# Patient Record
Sex: FEMALE | Race: WHITE | Employment: OTHER
[De-identification: names, ages, dates, MRNs, and addresses within clinical notes are randomized per-mention and may not be internally consistent; named-entity substitution may affect disease eponyms.]

---

## 2017-01-04 ENCOUNTER — TELEPHONE (OUTPATIENT)
Dept: FAMILY MEDICINE CLINIC | Facility: CLINIC | Age: 62
End: 2017-01-04

## 2017-01-04 RX ORDER — NITROFURANTOIN 25; 75 MG/1; MG/1
100 CAPSULE ORAL 2 TIMES DAILY
Qty: 14 CAPSULE | Refills: 0 | Status: SHIPPED | OUTPATIENT
Start: 2017-01-04 | End: 2017-01-11

## 2017-03-06 RX ORDER — SIMVASTATIN 40 MG
TABLET ORAL
Qty: 30 TABLET | Refills: 5 | Status: SHIPPED | OUTPATIENT
Start: 2017-03-06 | End: 2017-09-15 | Stop reason: SDUPTHER

## 2017-03-30 DIAGNOSIS — E55.9 VITAMIN D DEFICIENCY: ICD-10-CM

## 2017-04-03 RX ORDER — ERGOCALCIFEROL 1.25 MG/1
CAPSULE ORAL
Qty: 4 CAPSULE | Refills: 5 | Status: SHIPPED | OUTPATIENT
Start: 2017-04-03 | End: 2017-09-15 | Stop reason: SDUPTHER

## 2017-05-22 DIAGNOSIS — E55.9 VITAMIN D DEFICIENCY: ICD-10-CM

## 2017-05-23 RX ORDER — AVOBENZONE, HOMOSALATE, OCTISALATE, OCTOCRYLENE 30; 100; 50; 25 MG/ML; MG/ML; MG/ML; MG/ML
SPRAY TOPICAL
Qty: 30 TABLET | Refills: 5 | Status: SHIPPED | OUTPATIENT
Start: 2017-05-23 | End: 2017-12-06 | Stop reason: SDUPTHER

## 2017-05-23 RX ORDER — EPINEPHRINE 0.3 MG/.3ML
INJECTION SUBCUTANEOUS
Qty: 2 EACH | Refills: 2 | Status: SHIPPED | OUTPATIENT
Start: 2017-05-23 | End: 2019-04-16 | Stop reason: SDUPTHER

## 2017-09-15 DIAGNOSIS — E55.9 VITAMIN D DEFICIENCY: ICD-10-CM

## 2017-09-15 RX ORDER — ERGOCALCIFEROL 1.25 MG/1
CAPSULE ORAL
Qty: 4 CAPSULE | Refills: 5 | Status: SHIPPED | OUTPATIENT
Start: 2017-09-15 | End: 2018-03-05 | Stop reason: SDUPTHER

## 2017-11-16 ENCOUNTER — APPOINTMENT (OUTPATIENT)
Dept: GENERAL RADIOLOGY | Age: 62
DRG: 140 | End: 2017-11-16
Payer: COMMERCIAL

## 2017-11-16 ENCOUNTER — HOSPITAL ENCOUNTER (INPATIENT)
Age: 62
LOS: 5 days | Discharge: HOME | DRG: 140 | End: 2017-11-21
Attending: EMERGENCY MEDICINE | Admitting: INTERNAL MEDICINE
Payer: COMMERCIAL

## 2017-11-16 DIAGNOSIS — R05.9 COUGH: ICD-10-CM

## 2017-11-16 DIAGNOSIS — J44.1 COPD EXACERBATION (HCC): Primary | ICD-10-CM

## 2017-11-16 LAB
ABSOLUTE EOS #: 0 K/UL (ref 0–0.4)
ABSOLUTE IMMATURE GRANULOCYTE: ABNORMAL K/UL (ref 0–0.3)
ABSOLUTE LYMPH #: 0.35 K/UL (ref 1–4.8)
ABSOLUTE MONO #: 0.53 K/UL (ref 0.1–1.3)
ANION GAP SERPL CALCULATED.3IONS-SCNC: 10 MMOL/L (ref 9–17)
BASOPHILS # BLD: 0 %
BASOPHILS ABSOLUTE: 0 K/UL (ref 0–0.2)
BUN BLDV-MCNC: 11 MG/DL (ref 8–23)
BUN/CREAT BLD: ABNORMAL (ref 9–20)
CALCIUM SERPL-MCNC: 8 MG/DL (ref 8.6–10.4)
CHLORIDE BLD-SCNC: 107 MMOL/L (ref 98–107)
CO2: 25 MMOL/L (ref 20–31)
CREAT SERPL-MCNC: 0.57 MG/DL (ref 0.5–0.9)
DIFFERENTIAL TYPE: ABNORMAL
EKG ATRIAL RATE: 74 BPM
EKG P AXIS: 65 DEGREES
EKG P-R INTERVAL: 150 MS
EKG Q-T INTERVAL: 374 MS
EKG QRS DURATION: 84 MS
EKG QTC CALCULATION (BAZETT): 415 MS
EKG R AXIS: -106 DEGREES
EKG T AXIS: 46 DEGREES
EKG VENTRICULAR RATE: 74 BPM
EOSINOPHILS RELATIVE PERCENT: 0 %
GFR AFRICAN AMERICAN: >60 ML/MIN
GFR NON-AFRICAN AMERICAN: >60 ML/MIN
GFR SERPL CREATININE-BSD FRML MDRD: ABNORMAL ML/MIN/{1.73_M2}
GFR SERPL CREATININE-BSD FRML MDRD: ABNORMAL ML/MIN/{1.73_M2}
GLUCOSE BLD-MCNC: 88 MG/DL (ref 70–99)
HCT VFR BLD CALC: 38.6 % (ref 36–46)
HEMOGLOBIN: 12.7 G/DL (ref 12–16)
IMMATURE GRANULOCYTES: ABNORMAL %
LYMPHOCYTES # BLD: 8 %
MCH RBC QN AUTO: 32.7 PG (ref 26–34)
MCHC RBC AUTO-ENTMCNC: 33 G/DL (ref 31–37)
MCV RBC AUTO: 99 FL (ref 80–100)
MONOCYTES # BLD: 12 %
MORPHOLOGY: ABNORMAL
PDW BLD-RTO: 14.7 % (ref 11.5–14.9)
PLATELET # BLD: 242 K/UL (ref 150–450)
PLATELET ESTIMATE: ABNORMAL
PMV BLD AUTO: 8.1 FL (ref 6–12)
POTASSIUM SERPL-SCNC: 4.1 MMOL/L (ref 3.7–5.3)
RBC # BLD: 3.89 M/UL (ref 4–5.2)
RBC # BLD: ABNORMAL 10*6/UL
SEG NEUTROPHILS: 80 %
SEGMENTED NEUTROPHILS ABSOLUTE COUNT: 3.52 K/UL (ref 1.3–9.1)
SODIUM BLD-SCNC: 142 MMOL/L (ref 135–144)
TROPONIN INTERP: NORMAL
TROPONIN INTERP: NORMAL
TROPONIN T: <0.03 NG/ML
TROPONIN T: <0.03 NG/ML
WBC # BLD: 4.4 K/UL (ref 3.5–11)
WBC # BLD: ABNORMAL 10*3/UL

## 2017-11-16 PROCEDURE — 2060000000 HC ICU INTERMEDIATE R&B

## 2017-11-16 PROCEDURE — 94760 N-INVAS EAR/PLS OXIMETRY 1: CPT

## 2017-11-16 PROCEDURE — 99223 1ST HOSP IP/OBS HIGH 75: CPT | Performed by: INTERNAL MEDICINE

## 2017-11-16 PROCEDURE — 84484 ASSAY OF TROPONIN QUANT: CPT

## 2017-11-16 PROCEDURE — 36415 COLL VENOUS BLD VENIPUNCTURE: CPT

## 2017-11-16 PROCEDURE — 99285 EMERGENCY DEPT VISIT HI MDM: CPT

## 2017-11-16 PROCEDURE — 6360000002 HC RX W HCPCS: Performed by: EMERGENCY MEDICINE

## 2017-11-16 PROCEDURE — 6370000000 HC RX 637 (ALT 250 FOR IP): Performed by: NURSE PRACTITIONER

## 2017-11-16 PROCEDURE — 94664 DEMO&/EVAL PT USE INHALER: CPT

## 2017-11-16 PROCEDURE — 85025 COMPLETE CBC W/AUTO DIFF WBC: CPT

## 2017-11-16 PROCEDURE — 80048 BASIC METABOLIC PNL TOTAL CA: CPT

## 2017-11-16 PROCEDURE — 6370000000 HC RX 637 (ALT 250 FOR IP): Performed by: EMERGENCY MEDICINE

## 2017-11-16 PROCEDURE — 96374 THER/PROPH/DIAG INJ IV PUSH: CPT

## 2017-11-16 PROCEDURE — 94640 AIRWAY INHALATION TREATMENT: CPT

## 2017-11-16 PROCEDURE — 2580000003 HC RX 258: Performed by: EMERGENCY MEDICINE

## 2017-11-16 PROCEDURE — 96375 TX/PRO/DX INJ NEW DRUG ADDON: CPT

## 2017-11-16 PROCEDURE — 93005 ELECTROCARDIOGRAM TRACING: CPT

## 2017-11-16 PROCEDURE — 6370000000 HC RX 637 (ALT 250 FOR IP): Performed by: INTERNAL MEDICINE

## 2017-11-16 PROCEDURE — 71020 XR CHEST STANDARD TWO VW: CPT

## 2017-11-16 RX ORDER — METHYLPREDNISOLONE SODIUM SUCCINATE 125 MG/2ML
60 INJECTION, POWDER, LYOPHILIZED, FOR SOLUTION INTRAMUSCULAR; INTRAVENOUS EVERY 6 HOURS
Status: DISCONTINUED | OUTPATIENT
Start: 2017-11-16 | End: 2017-11-17

## 2017-11-16 RX ORDER — METHYLPREDNISOLONE SODIUM SUCCINATE 125 MG/2ML
125 INJECTION, POWDER, LYOPHILIZED, FOR SOLUTION INTRAMUSCULAR; INTRAVENOUS ONCE
Status: COMPLETED | OUTPATIENT
Start: 2017-11-16 | End: 2017-11-16

## 2017-11-16 RX ORDER — BISACODYL 10 MG
10 SUPPOSITORY, RECTAL RECTAL DAILY PRN
Status: DISCONTINUED | OUTPATIENT
Start: 2017-11-16 | End: 2017-11-21 | Stop reason: HOSPADM

## 2017-11-16 RX ORDER — NICOTINE 21 MG/24HR
1 PATCH, TRANSDERMAL 24 HOURS TRANSDERMAL DAILY PRN
Status: DISCONTINUED | OUTPATIENT
Start: 2017-11-16 | End: 2017-11-21 | Stop reason: HOSPADM

## 2017-11-16 RX ORDER — SODIUM CHLORIDE 0.9 % (FLUSH) 0.9 %
10 SYRINGE (ML) INJECTION PRN
Status: DISCONTINUED | OUTPATIENT
Start: 2017-11-16 | End: 2017-11-21 | Stop reason: HOSPADM

## 2017-11-16 RX ORDER — ACETAMINOPHEN 325 MG/1
650 TABLET ORAL ONCE
Status: COMPLETED | OUTPATIENT
Start: 2017-11-16 | End: 2017-11-16

## 2017-11-16 RX ORDER — BENZONATATE 100 MG/1
100 CAPSULE ORAL ONCE
Status: COMPLETED | OUTPATIENT
Start: 2017-11-16 | End: 2017-11-16

## 2017-11-16 RX ORDER — 0.9 % SODIUM CHLORIDE 0.9 %
1000 INTRAVENOUS SOLUTION INTRAVENOUS ONCE
Status: COMPLETED | OUTPATIENT
Start: 2017-11-16 | End: 2017-11-16

## 2017-11-16 RX ORDER — MORPHINE SULFATE 10 MG/ML
4 INJECTION, SOLUTION INTRAMUSCULAR; INTRAVENOUS
Status: DISCONTINUED | OUTPATIENT
Start: 2017-11-16 | End: 2017-11-17

## 2017-11-16 RX ORDER — BENZONATATE 100 MG/1
200 CAPSULE ORAL 3 TIMES DAILY PRN
Status: DISCONTINUED | OUTPATIENT
Start: 2017-11-16 | End: 2017-11-17

## 2017-11-16 RX ORDER — DIPHENHYDRAMINE HYDROCHLORIDE 50 MG/ML
25 INJECTION INTRAMUSCULAR; INTRAVENOUS ONCE
Status: COMPLETED | OUTPATIENT
Start: 2017-11-16 | End: 2017-11-16

## 2017-11-16 RX ORDER — LEVOFLOXACIN 5 MG/ML
750 INJECTION, SOLUTION INTRAVENOUS EVERY 24 HOURS
Status: DISCONTINUED | OUTPATIENT
Start: 2017-11-17 | End: 2017-11-17

## 2017-11-16 RX ORDER — CITALOPRAM 20 MG/1
20 TABLET ORAL DAILY
Status: DISCONTINUED | OUTPATIENT
Start: 2017-11-17 | End: 2017-11-21 | Stop reason: HOSPADM

## 2017-11-16 RX ORDER — ACETAMINOPHEN 325 MG/1
650 TABLET ORAL EVERY 4 HOURS PRN
Status: DISCONTINUED | OUTPATIENT
Start: 2017-11-16 | End: 2017-11-16 | Stop reason: CLARIF

## 2017-11-16 RX ORDER — FAMOTIDINE 20 MG/1
20 TABLET, FILM COATED ORAL 2 TIMES DAILY
Status: DISCONTINUED | OUTPATIENT
Start: 2017-11-16 | End: 2017-11-21 | Stop reason: HOSPADM

## 2017-11-16 RX ORDER — MORPHINE SULFATE 10 MG/ML
2 INJECTION, SOLUTION INTRAMUSCULAR; INTRAVENOUS
Status: DISCONTINUED | OUTPATIENT
Start: 2017-11-16 | End: 2017-11-21 | Stop reason: HOSPADM

## 2017-11-16 RX ORDER — AZITHROMYCIN 250 MG/1
250 TABLET, FILM COATED ORAL DAILY
Status: DISCONTINUED | OUTPATIENT
Start: 2017-11-18 | End: 2017-11-17

## 2017-11-16 RX ORDER — ONDANSETRON 2 MG/ML
4 INJECTION INTRAMUSCULAR; INTRAVENOUS EVERY 6 HOURS PRN
Status: DISCONTINUED | OUTPATIENT
Start: 2017-11-16 | End: 2017-11-21 | Stop reason: HOSPADM

## 2017-11-16 RX ORDER — HYDROCODONE BITARTRATE AND ACETAMINOPHEN 5; 325 MG/1; MG/1
1 TABLET ORAL ONCE
Status: DISCONTINUED | OUTPATIENT
Start: 2017-11-16 | End: 2017-11-16

## 2017-11-16 RX ORDER — BUTALBITAL, ACETAMINOPHEN AND CAFFEINE 50; 325; 40 MG/1; MG/1; MG/1
1 TABLET ORAL EVERY 4 HOURS PRN
Status: DISCONTINUED | OUTPATIENT
Start: 2017-11-16 | End: 2017-11-17

## 2017-11-16 RX ORDER — SODIUM CHLORIDE 0.9 % (FLUSH) 0.9 %
10 SYRINGE (ML) INJECTION EVERY 12 HOURS SCHEDULED
Status: DISCONTINUED | OUTPATIENT
Start: 2017-11-16 | End: 2017-11-21 | Stop reason: HOSPADM

## 2017-11-16 RX ORDER — LEVOFLOXACIN 750 MG/1
750 TABLET ORAL ONCE
Status: COMPLETED | OUTPATIENT
Start: 2017-11-16 | End: 2017-11-16

## 2017-11-16 RX ORDER — LEVOFLOXACIN 5 MG/ML
750 INJECTION, SOLUTION INTRAVENOUS EVERY 24 HOURS
Status: DISCONTINUED | OUTPATIENT
Start: 2017-11-17 | End: 2017-11-16

## 2017-11-16 RX ORDER — SODIUM CHLORIDE 9 MG/ML
INJECTION, SOLUTION INTRAVENOUS CONTINUOUS
Status: DISCONTINUED | OUTPATIENT
Start: 2017-11-16 | End: 2017-11-18

## 2017-11-16 RX ORDER — ACETAMINOPHEN 325 MG/1
650 TABLET ORAL EVERY 6 HOURS PRN
Status: DISCONTINUED | OUTPATIENT
Start: 2017-11-16 | End: 2017-11-21 | Stop reason: HOSPADM

## 2017-11-16 RX ORDER — SIMVASTATIN 40 MG
40 TABLET ORAL NIGHTLY
Status: DISCONTINUED | OUTPATIENT
Start: 2017-11-16 | End: 2017-11-21 | Stop reason: HOSPADM

## 2017-11-16 RX ORDER — AZITHROMYCIN 250 MG/1
500 TABLET, FILM COATED ORAL DAILY
Status: COMPLETED | OUTPATIENT
Start: 2017-11-17 | End: 2017-11-17

## 2017-11-16 RX ORDER — IPRATROPIUM BROMIDE AND ALBUTEROL SULFATE 2.5; .5 MG/3ML; MG/3ML
1 SOLUTION RESPIRATORY (INHALATION)
Status: DISCONTINUED | OUTPATIENT
Start: 2017-11-16 | End: 2017-11-17

## 2017-11-16 RX ORDER — IPRATROPIUM BROMIDE AND ALBUTEROL SULFATE 2.5; .5 MG/3ML; MG/3ML
1 SOLUTION RESPIRATORY (INHALATION)
Status: DISCONTINUED | OUTPATIENT
Start: 2017-11-16 | End: 2017-11-16

## 2017-11-16 RX ORDER — TOPIRAMATE 25 MG/1
50 TABLET ORAL EVERY MORNING
Status: DISCONTINUED | OUTPATIENT
Start: 2017-11-17 | End: 2017-11-21 | Stop reason: HOSPADM

## 2017-11-16 RX ADMIN — LEVOFLOXACIN 750 MG: 750 TABLET, FILM COATED ORAL at 17:07

## 2017-11-16 RX ADMIN — FAMOTIDINE 20 MG: 20 TABLET, FILM COATED ORAL at 23:32

## 2017-11-16 RX ADMIN — DIPHENHYDRAMINE HYDROCHLORIDE 25 MG: 50 INJECTION, SOLUTION INTRAMUSCULAR; INTRAVENOUS at 16:33

## 2017-11-16 RX ADMIN — SIMVASTATIN 40 MG: 40 TABLET, FILM COATED ORAL at 23:32

## 2017-11-16 RX ADMIN — IPRATROPIUM BROMIDE AND ALBUTEROL SULFATE 1 AMPULE: .5; 3 SOLUTION RESPIRATORY (INHALATION) at 21:03

## 2017-11-16 RX ADMIN — ACETAMINOPHEN 650 MG: 325 TABLET, FILM COATED ORAL at 18:25

## 2017-11-16 RX ADMIN — SODIUM CHLORIDE: 9 INJECTION, SOLUTION INTRAVENOUS at 23:05

## 2017-11-16 RX ADMIN — IPRATROPIUM BROMIDE AND ALBUTEROL SULFATE 1 AMPULE: .5; 3 SOLUTION RESPIRATORY (INHALATION) at 16:57

## 2017-11-16 RX ADMIN — METHYLPREDNISOLONE SODIUM SUCCINATE 125 MG: 125 INJECTION, POWDER, FOR SOLUTION INTRAMUSCULAR; INTRAVENOUS at 16:41

## 2017-11-16 RX ADMIN — BENZONATATE 100 MG: 100 CAPSULE ORAL at 18:19

## 2017-11-16 RX ADMIN — LURASIDONE HYDROCHLORIDE 80 MG: 80 TABLET, FILM COATED ORAL at 23:33

## 2017-11-16 RX ADMIN — PROCHLORPERAZINE EDISYLATE 10 MG: 5 INJECTION INTRAMUSCULAR; INTRAVENOUS at 16:34

## 2017-11-16 RX ADMIN — SODIUM CHLORIDE 1000 ML: 9 INJECTION, SOLUTION INTRAVENOUS at 16:30

## 2017-11-16 ASSESSMENT — ENCOUNTER SYMPTOMS
VOMITING: 0
ABDOMINAL PAIN: 0
RHINORRHEA: 1
COUGH: 1
DIARRHEA: 0
EYE PAIN: 0
CHEST TIGHTNESS: 1
CONSTIPATION: 0
NAUSEA: 0
SHORTNESS OF BREATH: 1

## 2017-11-16 ASSESSMENT — PAIN DESCRIPTION - DESCRIPTORS: DESCRIPTORS: ACHING

## 2017-11-16 ASSESSMENT — PAIN DESCRIPTION - LOCATION: LOCATION: CHEST

## 2017-11-16 ASSESSMENT — PAIN SCALES - GENERAL
PAINLEVEL_OUTOF10: 6
PAINLEVEL_OUTOF10: 0

## 2017-11-16 ASSESSMENT — PAIN DESCRIPTION - FREQUENCY: FREQUENCY: CONTINUOUS

## 2017-11-16 NOTE — ED NOTES
Pt to ED with c/o cough, HA, fatigue, fever, chills and SOB for two days. Pt has a hx of COPD. Family reports that she tried Mucinex with no relief. Pt is currently 94% O2 on monitor with 2L of oxygen. Pt denies oxygen use at home.       Chilango Garza RN  11/16/17 7556

## 2017-11-16 NOTE — ED PROVIDER NOTES
[facility]  eMERGENCY dEPARTMENT eNCOUnter   Attending Attestation     Pt Name: Lynn Mcgill  MRN: [id number]  [place] 1955  Date of evaluation: 11/16/17       Lynn Mcgill is a 63 y.o. female who presents with Cough      MDM:     History of COPD. Patient coming in with cough congestion. We'll obtain lab work, chest x-ray and disposition per patient response. Vitals:   Vitals:    11/20/17 0358 11/20/17 0531 11/20/17 0747 11/20/17 0747   BP:    (!) 146/63   Pulse:    69   Resp:    18   Temp:    97.5 °F (36.4 °C)   TempSrc:       SpO2: 97%  (!) 89% 90%   Weight:  158 lb 11.7 oz (72 kg)     Height:             I personally evaluated and examined the patient in conjunction with the resident and agree with the assessment, treatment plan, and disposition of the patient as recorded by the resident. I performed a history and physical examination of the patient and discussed management with the resident. I reviewed the residents note and agree with the documented findings and plan of care. Any areas of disagreement are noted on the chart. I was personally present for the key portions of any procedures. I have documented in the chart those procedures where I was not present during the key portions. I have personally reviewed all images and agree with the resident's interpretation. I have reviewed the emergency nurses triage note. I agree with the chief complaint, past medical history, past surgical history, allergies, medications, social and family history as documented unless otherwise noted.     Maynro Benites MD  Attending Emergency Physician            Maynor Benites MD  11/16/17 7957       Maynor Benites MD  11/20/17 7085

## 2017-11-16 NOTE — ED PROVIDER NOTES
[address]  Emergency Department Encounter  Emergency Medicine Resident     Pt Name: Mercedes Rose  MRN: [id number]  [place] 1955  Date of evaluation: 17  PCP:  [address], [place]    [address] [place] [address]       Chief Complaint   Patient presents with    Cough       HISTORY OF PRESENT ILLNESS  (Location/Symptom, Timing/Onset, Context/Setting, Quality, Duration, Modifying Factors, Severity.)      Mercedes Rose is a 63 y.o. female who presents with cough, shortness of breath, and headache. Patient states that symptoms started 2 days ago with productive cough of yellow sputum which is different for her. Patient also states she did have some shortness of breath and chest tightness yesterday. Patient does have history of COPD and asthma. Patient denies any fevers. Patient does admit to headache that she states started after her cough. Patient states she sees a neurologist and headache feels like her typical headache which she states is like migraine. Patient denies any chest pain or current shortness of breath. Patient has been taking her inhaler as prescribed for COPD/asthma. Patient denies any recent steroids. Patient denies any history of blood clots, recent travel, recent hospitalization, recent surgeries, use of estrogen, or history of cancer. Patient is a smoker and states she smokes approximately one pack per week. PAST MEDICAL / SURGICAL / SOCIAL / FAMILY HISTORY      has a past medical history of Abnormal EKG; Anxiety; Asthma; Bipolar disorder ([address] COPD (chronic obstructive pulmonary disease) ([address] Cramps, extremity; Depression; History of elective ; Hyperlipidemia; Prolonged emergence from general anesthesia; Substance abuse; Unspecified sleep apnea; and Vision abnormalities. has a past surgical history that includes Tonsillectomy and adenoidectomy (Bilateral); LASIK; Induced ;  Ankle surgery; eye surgery (Bilateral); Vulva surgery; and hysteroscopy (10/19/2016). Social History     Social History    Marital status:      Spouse name: N/A    Number of children: N/A    Years of education: N/A     Occupational History    Not on file. Social History Main Topics    Smoking status: Current Some Day Smoker     Packs/day: 2.00     Years: 42.00     Types: Cigarettes     Last attempt to quit: 5/12/2016    Smokeless tobacco: Never Used    Alcohol use No    Drug use: No    Sexual activity: Not Currently     Partners: Male     Birth control/ protection: Post-menopausal     Other Topics Concern    Not on file     Social History Narrative    No narrative on file       Family History   Problem Relation Age of Onset    Dementia Maternal Aunt     Cancer Mother      unsure of type    Kidney Disease Mother     Heart Attack Sister     Prostate Cancer Father     High Cholesterol Brother     Heart Attack Paternal Grandmother        Allergies:  Advil [ibuprofen]; Aleve [naproxen]; Other; Strawberry extract; and Aspirin    Home Medications:  Prior to Admission medications    Medication Sig Start Date End Date Taking?  Authorizing Provider   vitamin D (ERGOCALCIFEROL) 96798 units CAPS capsule take 1 capsule by mouth every week 9/15/17  Yes Mini Coulter MD   simvastatin (ZOCOR) 40 MG tablet take 1 tablet by mouth at bedtime 9/15/17  Yes Jaleel Chan MD   RA VITAMIN D-3 1000 UNITS TABS tablet take 1 tablet by mouth once daily 5/23/17  Yes Tiffanie Chopra MD   Acetaminophen (TYLENOL) 325 MG CAPS Take 1 tablet by mouth every 4 hours 21/06/34  Yes Chanelle Alert, DO   butalbital-acetaminophen-caffeine (FIORICET, ESGIC) -40 MG per tablet Take 1 tablet by mouth every 4 hours as needed for Headaches   Yes Historical Provider, MD   citalopram (CELEXA) 20 MG tablet Take 20 mg by mouth daily   Yes Historical Provider, MD   LATUDA 80 MG TABS tablet Take 80 mg by mouth nightly 9/29/16  Yes Historical Provider, MD   albuterol sulfate HFA 108 (90 BASE) MCG/ACT inhaler Inhale 2 puffs into the lungs every 6 hours as needed for Wheezing   Yes Historical Provider, MD   albuterol (PROVENTIL) (2.5 MG/3ML) 0.083% nebulizer solution Take 2.5 mg by nebulization every 6 hours as needed for Wheezing   Yes Historical Provider, MD   topiramate (TOPAMAX) 50 MG tablet Take 50 mg by mouth every morning  2/1/16  Yes Historical Provider, MD   EPINEPHrine (EPIPEN) 0.3 MG/0.3ML SOAJ injection use as directed by prescriber FOR ALLERGIC REACTION 5/23/17   Nena Hurley MD   fluticasone [facility]) 50 MCG/ACT nasal spray 1 spray by Nasal route daily 11/10/16   Nena Hurley MD   TUDORZA PRESSAIR 400 MCG/ACT AEPB inhaler inhale 1 puff by mouth twice a day 10/18/16   Debbie Kohler CNP   Biotin 5000 MCG CAPS Take by mouth    Historical Provider, MD   Multiple Vitamins-Minerals (THERAPEUTIC MULTIVITAMIN-MINERALS) tablet Take 1 tablet by mouth daily [address]    Historical Provider, MD   Omega 3-6-9 Fatty Acids (OMEGA DHA PO) Take by mouth    Historical Provider, MD   budesonide-formoterol (SYMBICORT) 160-4.5 MCG/ACT AERO Inhale 2 puffs into the lungs 2 times daily    Historical Provider, MD   ketotifen (ZADITOR) 0.025 % ophthalmic solution Place 1 drop into both eyes 2 times daily    Historical Provider, MD   montelukast (SINGULAIR) 10 MG tablet Take 10 mg by mouth nightly    Historical Provider, MD   benztropine (COGENTIN) 2 MG tablet Take 2 mg by mouth 3 times daily  1/15/16   Historical Provider, MD       REVIEW OF SYSTEMS    (2-9 systems for level 4, 10 or more for level 5)      Review of Systems   Constitutional: Positive for appetite change. Negative for chills and fever. HENT: Positive for rhinorrhea. Negative for congestion. Eyes: Negative for pain and visual disturbance. Respiratory: Positive for cough, chest tightness and shortness of breath. Cardiovascular: Negative for chest pain and palpitations.    Gastrointestinal: Negative for abdominal pain, constipation, diarrhea, nausea and vomiting. Genitourinary: Negative for difficulty urinating and dysuria. Musculoskeletal: Negative for gait problem and neck pain. Skin: Negative for rash and wound. Neurological: Negative for dizziness, weakness and numbness. PHYSICAL EXAM   (up to 7 for level 4, 8 or more for level 5)      INITIAL VITALS:   BP (!) 90/55   Pulse 66   Temp 97.8 °F (36.6 °C) (Oral)   Resp 18   Ht 5' 4\" (1.626 m)   Wt 143 lb (64.9 kg)   LMP 05/20/2013 (Exact Date)   SpO2 92%   BMI 24.55 kg/m²     Physical Exam   Constitutional: She is oriented to person, place, and time. She appears well-developed and well-nourished. No distress. HENT:   Head: Normocephalic and atraumatic. Eyes: Conjunctivae and EOM are normal. Pupils are equal, round, and reactive to light. Neck: Neck supple. Cardiovascular: Normal rate, regular rhythm, normal heart sounds and intact distal pulses. Exam reveals no gallop and no friction rub. No murmur heard. Pulmonary/Chest: Effort normal. No accessory muscle usage. No respiratory distress. She has no wheezes. She has no rhonchi. She has no rales. Decreased breath sounds bilaterally   Abdominal: Soft. She exhibits no distension. There is no tenderness. There is no rebound and no guarding. Musculoskeletal: She exhibits no edema, tenderness or deformity. Lymphadenopathy:     She has no cervical adenopathy. Neurological: She is alert and oriented to person, place, and time. Skin: Skin is warm and dry. No rash noted. She is not diaphoretic.        DIFFERENTIAL  DIAGNOSIS     PLAN (LABS / IMAGING / EKG):  Orders Placed This Encounter   Procedures    Sputum gram stain    Respiratory Culture    XR CHEST STANDARD (2 VW)    XR Chest Portable    CBC Auto Differential    Troponin    Basic Metabolic Panel    PREVIOUS SPECIMEN    Troponin    Basic Metabolic Prof    CBC with DIFF    DIET CARDIAC;    Cardiac output monitoring    (ZOCOR) tablet 40 mg    levofloxacin (LEVAQUIN) 750 MG/150ML infusion 750 mg    OR Linked Order Group     morphine injection 2 mg     morphine injection 4 mg    bisacodyl (DULCOLAX) suppository 10 mg    famotidine (PEPCID) tablet 20 mg    nicotine (NICODERM CQ) 21 MG/24HR 1 patch     If indicated/pateint smokes    enoxaparin (LOVENOX) injection 40 mg    methylPREDNISolone sodium (SOLU-MEDROL) injection 60 mg    FOLLOWED BY Linked Order Group     azithromycin (ZITHROMAX) tablet 500 mg     azithromycin (ZITHROMAX) tablet 250 mg    benzonatate (TESSALON) capsule 200 mg       DDX: COPD exacerbation, asthma exacerbation, pneumonia, viral infection, URI    DIAGNOSTIC RESULTS / EMERGENCY DEPARTMENT COURSE / MDM     LABS:  Results for orders placed or performed during the hospital encounter of 11/16/17   CBC Auto Differential   Result Value Ref Range    WBC 4.4 3.5 - 11.0 k/uL    RBC 3.89 (L) 4.0 - 5.2 m/uL    Hemoglobin 12.7 12.0 - 16.0 g/dL    Hematocrit 38.6 36 - 46 %    MCV 99.0 80 - 100 fL    MCH 32.7 26 - 34 pg    MCHC 33.0 31 - 37 g/dL    RDW 14.7 11.5 - 14.9 %    Platelets 140 031 - 763 k/uL    MPV 8.1 6.0 - 12.0 fL    Differential Type NOT REPORTED     Immature Granulocytes NOT REPORTED 0 %    Absolute Immature Granulocyte NOT REPORTED 0.00 - 0.30 k/uL    WBC Morphology NOT REPORTED     RBC Morphology NOT REPORTED     Platelet Estimate NOT REPORTED     Seg Neutrophils 80 %    Lymphocytes 8 %    Monocytes 12 %    Eosinophils % 0 %    Basophils 0 %    Segs Absolute 3.52 1.3 - 9.1 k/uL    Absolute Lymph # 0.35 (L) 1.0 - 4.8 k/uL    Absolute Mono # 0.53 0.1 - 1.3 k/uL    Absolute Eos # 0.00 0.0 - 0.4 k/uL    Basophils # 0.00 0.0 - 0.2 k/uL    Morphology TOXIC GRANULATION PRESENT    Troponin   Result Value Ref Range    Troponin T <0.03 <0.03 ng/mL    Troponin Interp         Basic Metabolic Panel   Result Value Ref Range    Glucose 88 70 - 99 mg/dL    BUN 11 8 - 23 mg/dL    CREATININE 0.57 0.50 - 0.90 mg/dL Impression: no acute changes and non-specific EKG    Yohana Downing      All EKG's are interpreted by the Emergency Department Physician who either signs or Co-signs this chart in the absence of a cardiologist.    EMERGENCY DEPARTMENT COURSE:  4:11 PM: Patient evaluated by myself and attending physician. Patient appears in no acute distress. Patient was with dry cough is in the room. Patient appears in no respiratory distress. Patient with decreased breath sounds bilaterally. Abdomen soft, nontender, nondistended. Heart rate normal with regular rhythm. O2 saturation 89-90% has been somewhat thin but quickly climbed to 93-95% on nasal cannula O2. Patient states he is not on oxygen at home. We'll continue with lab evaluation, EKG, steroids, chest x-ray, and DuoNeb treatment. We'll continue symptomatic treatment of patient's headache with migraine cocktail and fluids. We'll reassess. Based on history and physical examination, I believe the patient is likely having a COPD exacerbation. 5:35 PM: Patient reassessed. Patient lying complaint bed. Patient informed of x-ray and current labs. Awaiting rest of her blood work. Patient at 93% on 2 L O2 nasal cannula. Recommended admission due to patient's likely COPD exacerbation with O2 desaturation. Patient agrees with plan. We will await further lab work and plan for admission. 6:27 PM: Spoke to Dr. Anna Martinez. Patient except for admission. We'll place bridging orders at this time. PROCEDURES:  None    CONSULTS:  IP CONSULT TO INTERNAL MEDICINE    CRITICAL CARE:  None    FINAL IMPRESSION      1. COPD exacerbation ([address]    2. Cough          DISPOSITION / PLAN     DISPOSITION Admitted    PATIENT REFERRED TO:  Emir Bowie MD  [address] [place], [place] [address].  [facility]  [phone]            DISCHARGE MEDICATIONS:  Current Discharge Medication List          Moinque Sandoval DO  Emergency Medicine Resident    (Please note that portions of

## 2017-11-16 NOTE — ED NOTES
Fluids continue to infuse. Pt in bed with eyes closed at this time.  Daughter at [facility], [address] [place] [address]  11/16/17 3625

## 2017-11-17 ENCOUNTER — APPOINTMENT (OUTPATIENT)
Dept: GENERAL RADIOLOGY | Age: 62
DRG: 140 | End: 2017-11-17
Payer: COMMERCIAL

## 2017-11-17 ENCOUNTER — APPOINTMENT (OUTPATIENT)
Dept: CT IMAGING | Age: 62
DRG: 140 | End: 2017-11-17
Payer: COMMERCIAL

## 2017-11-17 PROBLEM — F17.200 CURRENT SMOKER ON SOME DAYS: Status: ACTIVE | Noted: 2017-11-17

## 2017-11-17 PROBLEM — R51.9 CHRONIC HEADACHES: Status: ACTIVE | Noted: 2017-11-17

## 2017-11-17 PROBLEM — G89.29 CHRONIC HEADACHES: Status: ACTIVE | Noted: 2017-11-17

## 2017-11-17 PROBLEM — F17.200 TOBACCO DEPENDENCE: Chronic | Status: ACTIVE | Noted: 2017-11-17

## 2017-11-17 PROBLEM — J18.9 COMMUNITY ACQUIRED PNEUMONIA OF RIGHT MIDDLE LOBE OF LUNG: Status: ACTIVE | Noted: 2017-11-17

## 2017-11-17 LAB
ABSOLUTE EOS #: 0 K/UL (ref 0–0.4)
ABSOLUTE IMMATURE GRANULOCYTE: ABNORMAL K/UL (ref 0–0.3)
ABSOLUTE LYMPH #: 0.4 K/UL (ref 1–4.8)
ABSOLUTE MONO #: 0.4 K/UL (ref 0.1–1.3)
ANION GAP SERPL CALCULATED.3IONS-SCNC: 13 MMOL/L (ref 9–17)
BASOPHILS # BLD: 0 %
BASOPHILS ABSOLUTE: 0 K/UL (ref 0–0.2)
BUN BLDV-MCNC: 9 MG/DL (ref 8–23)
BUN/CREAT BLD: ABNORMAL (ref 9–20)
CALCIUM SERPL-MCNC: 8.3 MG/DL (ref 8.6–10.4)
CHLORIDE BLD-SCNC: 106 MMOL/L (ref 98–107)
CO2: 20 MMOL/L (ref 20–31)
CREAT SERPL-MCNC: 0.51 MG/DL (ref 0.5–0.9)
DIFFERENTIAL TYPE: ABNORMAL
EOSINOPHILS RELATIVE PERCENT: 0 %
GFR AFRICAN AMERICAN: >60 ML/MIN
GFR NON-AFRICAN AMERICAN: >60 ML/MIN
GFR SERPL CREATININE-BSD FRML MDRD: ABNORMAL ML/MIN/{1.73_M2}
GFR SERPL CREATININE-BSD FRML MDRD: ABNORMAL ML/MIN/{1.73_M2}
GLUCOSE BLD-MCNC: 89 MG/DL (ref 70–99)
HCT VFR BLD CALC: 35.8 % (ref 36–46)
HEMOGLOBIN: 11.6 G/DL (ref 12–16)
IMMATURE GRANULOCYTES: ABNORMAL %
LYMPHOCYTES # BLD: 10 %
MCH RBC QN AUTO: 32.9 PG (ref 26–34)
MCHC RBC AUTO-ENTMCNC: 32.5 G/DL (ref 31–37)
MCV RBC AUTO: 101.4 FL (ref 80–100)
MONOCYTES # BLD: 12 %
PDW BLD-RTO: 15.5 % (ref 11.5–14.9)
PLATELET # BLD: 196 K/UL (ref 150–450)
PLATELET ESTIMATE: ABNORMAL
PMV BLD AUTO: 8 FL (ref 6–12)
POTASSIUM SERPL-SCNC: 3.7 MMOL/L (ref 3.7–5.3)
RBC # BLD: 3.53 M/UL (ref 4–5.2)
RBC # BLD: ABNORMAL 10*6/UL
SEG NEUTROPHILS: 78 %
SEGMENTED NEUTROPHILS ABSOLUTE COUNT: 2.9 K/UL (ref 1.3–9.1)
SODIUM BLD-SCNC: 139 MMOL/L (ref 135–144)
WBC # BLD: 3.7 K/UL (ref 3.5–11)
WBC # BLD: ABNORMAL 10*3/UL

## 2017-11-17 PROCEDURE — 6370000000 HC RX 637 (ALT 250 FOR IP): Performed by: INTERNAL MEDICINE

## 2017-11-17 PROCEDURE — 6360000002 HC RX W HCPCS: Performed by: INTERNAL MEDICINE

## 2017-11-17 PROCEDURE — 94760 N-INVAS EAR/PLS OXIMETRY 1: CPT

## 2017-11-17 PROCEDURE — 71010 XR CHEST PORTABLE: CPT

## 2017-11-17 PROCEDURE — 85025 COMPLETE CBC W/AUTO DIFF WBC: CPT

## 2017-11-17 PROCEDURE — 80048 BASIC METABOLIC PNL TOTAL CA: CPT

## 2017-11-17 PROCEDURE — 2060000000 HC ICU INTERMEDIATE R&B

## 2017-11-17 PROCEDURE — 86738 MYCOPLASMA ANTIBODY: CPT

## 2017-11-17 PROCEDURE — 36415 COLL VENOUS BLD VENIPUNCTURE: CPT

## 2017-11-17 PROCEDURE — 94640 AIRWAY INHALATION TREATMENT: CPT

## 2017-11-17 PROCEDURE — 87804 INFLUENZA ASSAY W/OPTIC: CPT

## 2017-11-17 PROCEDURE — 6360000002 HC RX W HCPCS: Performed by: NURSE PRACTITIONER

## 2017-11-17 PROCEDURE — 6370000000 HC RX 637 (ALT 250 FOR IP): Performed by: NURSE PRACTITIONER

## 2017-11-17 PROCEDURE — 87449 NOS EACH ORGANISM AG IA: CPT

## 2017-11-17 PROCEDURE — 2580000003 HC RX 258: Performed by: EMERGENCY MEDICINE

## 2017-11-17 PROCEDURE — 94761 N-INVAS EAR/PLS OXIMETRY MLT: CPT

## 2017-11-17 PROCEDURE — 71250 CT THORAX DX C-: CPT

## 2017-11-17 PROCEDURE — 94664 DEMO&/EVAL PT USE INHALER: CPT

## 2017-11-17 RX ORDER — LEVOFLOXACIN 5 MG/ML
500 INJECTION, SOLUTION INTRAVENOUS EVERY 24 HOURS
Status: DISCONTINUED | OUTPATIENT
Start: 2017-11-17 | End: 2017-11-17

## 2017-11-17 RX ORDER — LEVOFLOXACIN 5 MG/ML
750 INJECTION, SOLUTION INTRAVENOUS EVERY 24 HOURS
Status: DISCONTINUED | OUTPATIENT
Start: 2017-11-17 | End: 2017-11-21 | Stop reason: HOSPADM

## 2017-11-17 RX ORDER — BUTALBITAL, ACETAMINOPHEN AND CAFFEINE 50; 325; 40 MG/1; MG/1; MG/1
2 TABLET ORAL EVERY 6 HOURS PRN
Status: DISCONTINUED | OUTPATIENT
Start: 2017-11-17 | End: 2017-11-21 | Stop reason: HOSPADM

## 2017-11-17 RX ORDER — GUAIFENESIN 600 MG/1
1200 TABLET, EXTENDED RELEASE ORAL 2 TIMES DAILY
Status: DISCONTINUED | OUTPATIENT
Start: 2017-11-17 | End: 2017-11-21 | Stop reason: HOSPADM

## 2017-11-17 RX ORDER — BENZONATATE 100 MG/1
200 CAPSULE ORAL 3 TIMES DAILY
Status: DISCONTINUED | OUTPATIENT
Start: 2017-11-17 | End: 2017-11-21 | Stop reason: HOSPADM

## 2017-11-17 RX ORDER — METHYLPREDNISOLONE SODIUM SUCCINATE 40 MG/ML
40 INJECTION, POWDER, LYOPHILIZED, FOR SOLUTION INTRAMUSCULAR; INTRAVENOUS EVERY 6 HOURS
Status: DISCONTINUED | OUTPATIENT
Start: 2017-11-17 | End: 2017-11-19

## 2017-11-17 RX ORDER — GUAIFENESIN 600 MG/1
600 TABLET, EXTENDED RELEASE ORAL 2 TIMES DAILY
Status: DISCONTINUED | OUTPATIENT
Start: 2017-11-17 | End: 2017-11-17

## 2017-11-17 RX ORDER — ALBUTEROL SULFATE 2.5 MG/3ML
2.5 SOLUTION RESPIRATORY (INHALATION) EVERY 4 HOURS
Status: DISCONTINUED | OUTPATIENT
Start: 2017-11-17 | End: 2017-11-21 | Stop reason: HOSPADM

## 2017-11-17 RX ORDER — IPRATROPIUM BROMIDE AND ALBUTEROL SULFATE 2.5; .5 MG/3ML; MG/3ML
1 SOLUTION RESPIRATORY (INHALATION) EVERY 4 HOURS PRN
Status: DISCONTINUED | OUTPATIENT
Start: 2017-11-17 | End: 2017-11-18

## 2017-11-17 RX ADMIN — FAMOTIDINE 20 MG: 20 TABLET, FILM COATED ORAL at 10:21

## 2017-11-17 RX ADMIN — METHYLPREDNISOLONE SODIUM SUCCINATE 60 MG: 125 INJECTION, POWDER, FOR SOLUTION INTRAMUSCULAR; INTRAVENOUS at 10:22

## 2017-11-17 RX ADMIN — ACETAMINOPHEN 650 MG: 325 TABLET, FILM COATED ORAL at 06:59

## 2017-11-17 RX ADMIN — BUTALBITAL, ACETAMINOPHEN AND CAFFEINE 2 TABLET: 50; 325; 40 TABLET ORAL at 03:16

## 2017-11-17 RX ADMIN — GUAIFENESIN 600 MG: 600 TABLET, EXTENDED RELEASE ORAL at 10:22

## 2017-11-17 RX ADMIN — TIOTROPIUM BROMIDE 18 MCG: 18 CAPSULE ORAL; RESPIRATORY (INHALATION) at 18:20

## 2017-11-17 RX ADMIN — ALBUTEROL SULFATE 2.5 MG: 2.5 SOLUTION RESPIRATORY (INHALATION) at 20:22

## 2017-11-17 RX ADMIN — METHYLPREDNISOLONE SODIUM SUCCINATE 40 MG: 40 INJECTION, POWDER, FOR SOLUTION INTRAMUSCULAR; INTRAVENOUS at 23:38

## 2017-11-17 RX ADMIN — ALBUTEROL SULFATE 2.5 MG: 2.5 SOLUTION RESPIRATORY (INHALATION) at 15:37

## 2017-11-17 RX ADMIN — LURASIDONE HYDROCHLORIDE 80 MG: 80 TABLET, FILM COATED ORAL at 21:10

## 2017-11-17 RX ADMIN — IPRATROPIUM BROMIDE AND ALBUTEROL SULFATE 1 AMPULE: .5; 3 SOLUTION RESPIRATORY (INHALATION) at 11:19

## 2017-11-17 RX ADMIN — GUAIFENESIN 1200 MG: 600 TABLET, EXTENDED RELEASE ORAL at 21:10

## 2017-11-17 RX ADMIN — METHYLPREDNISOLONE SODIUM SUCCINATE 40 MG: 40 INJECTION, POWDER, FOR SOLUTION INTRAMUSCULAR; INTRAVENOUS at 17:01

## 2017-11-17 RX ADMIN — ACETAMINOPHEN 650 MG: 325 TABLET, FILM COATED ORAL at 23:37

## 2017-11-17 RX ADMIN — AZITHROMYCIN 500 MG: 250 TABLET, FILM COATED ORAL at 10:22

## 2017-11-17 RX ADMIN — LEVOFLOXACIN 750 MG: 5 INJECTION, SOLUTION INTRAVENOUS at 17:03

## 2017-11-17 RX ADMIN — METHYLPREDNISOLONE SODIUM SUCCINATE 60 MG: 125 INJECTION, POWDER, FOR SOLUTION INTRAMUSCULAR; INTRAVENOUS at 06:22

## 2017-11-17 RX ADMIN — MOMETASONE FUROATE AND FORMOTEROL FUMARATE DIHYDRATE 2 PUFF: 200; 5 AEROSOL RESPIRATORY (INHALATION) at 21:09

## 2017-11-17 RX ADMIN — CITALOPRAM HYDROBROMIDE 20 MG: 20 TABLET ORAL at 10:22

## 2017-11-17 RX ADMIN — SIMVASTATIN 40 MG: 40 TABLET, FILM COATED ORAL at 21:10

## 2017-11-17 RX ADMIN — TOPIRAMATE 50 MG: 25 TABLET, FILM COATED ORAL at 10:25

## 2017-11-17 RX ADMIN — FAMOTIDINE 20 MG: 20 TABLET, FILM COATED ORAL at 21:10

## 2017-11-17 RX ADMIN — BENZONATATE 200 MG: 100 CAPSULE ORAL at 06:22

## 2017-11-17 RX ADMIN — IPRATROPIUM BROMIDE AND ALBUTEROL SULFATE 1 AMPULE: .5; 3 SOLUTION RESPIRATORY (INHALATION) at 07:13

## 2017-11-17 RX ADMIN — IPRATROPIUM BROMIDE AND ALBUTEROL SULFATE 1 AMPULE: .5; 3 SOLUTION RESPIRATORY (INHALATION) at 03:54

## 2017-11-17 RX ADMIN — SODIUM CHLORIDE: 9 INJECTION, SOLUTION INTRAVENOUS at 13:38

## 2017-11-17 RX ADMIN — ENOXAPARIN SODIUM 40 MG: 40 INJECTION SUBCUTANEOUS at 10:25

## 2017-11-17 RX ADMIN — BENZONATATE 200 MG: 100 CAPSULE ORAL at 21:10

## 2017-11-17 ASSESSMENT — ENCOUNTER SYMPTOMS
COUGH: 1
SPUTUM PRODUCTION: 1
SORE THROAT: 0
DOUBLE VISION: 0
DIARRHEA: 0
ABDOMINAL PAIN: 0
WHEEZING: 1
VOMITING: 0
BACK PAIN: 0
SHORTNESS OF BREATH: 1
NAUSEA: 0
BLURRED VISION: 0
CONSTIPATION: 0
ORTHOPNEA: 0

## 2017-11-17 ASSESSMENT — PAIN SCALES - GENERAL
PAINLEVEL_OUTOF10: 8
PAINLEVEL_OUTOF10: 8
PAINLEVEL_OUTOF10: 6
PAINLEVEL_OUTOF10: 6
PAINLEVEL_OUTOF10: 8

## 2017-11-17 ASSESSMENT — PAIN DESCRIPTION - FREQUENCY
FREQUENCY: CONTINUOUS
FREQUENCY: CONTINUOUS

## 2017-11-17 ASSESSMENT — PAIN DESCRIPTION - DESCRIPTORS
DESCRIPTORS: ACHING
DESCRIPTORS: ACHING

## 2017-11-17 ASSESSMENT — PAIN DESCRIPTION - PAIN TYPE
TYPE: ACUTE PAIN
TYPE: ACUTE PAIN

## 2017-11-17 ASSESSMENT — PAIN DESCRIPTION - LOCATION
LOCATION: HEAD
LOCATION: HEAD

## 2017-11-17 NOTE — PLAN OF CARE
Problem: Falls - Risk of:  Goal: Will remain free from falls  Will remain free from falls   Outcome: Met This Shift  The patient remained free from falls this shift, call light within reach, bed in locked and lowest position. Side rails up x2. Continue to monitor closely. Problem: Breathing Pattern - Ineffective:  Goal: Ability to achieve and maintain a regular respiratory rate will improve  Ability to achieve and maintain a regular respiratory rate will improve   Outcome: Met This Shift  Patient remains on 2L nasal cannula, resting comfortably in bed. IV steroids administered this shift, as well as IV levaquin. Problem: Pain:  Goal: Pain level will decrease  Pain level will decrease   Outcome: Met This Shift  Patient's pain has been well controlled throughout the entire shift.

## 2017-11-17 NOTE — H&P
[address] [facility]     HISTORY AND PHYSICAL EXAMINATION            Date:   2017  Patient name:  Andres Ledezma  Date of admission:  2017  3:55 PM  MRN:   [id number]  Account:  [de-identified]  YOB: 1955  PCP:    Lora Murray MD  Room:     Code Status:    Full Code    CHIEF COMPLAINT     Chief Complaint   Patient presents with    Cough       HISTORY OF PRESENT ILLNESS  (Character, Onset, Location, Duration,  Exacerbating/Relieving Factors, Radiation,   Associated Symptoms, Severity )      The patient is a 63 y.o.  female with a history of asthma and COPD, who presents with a 3 day history of productive cough, shortness of breath, and body aches. According to patient, symptoms started as a head cold and has progressively worsened since that time. Symptoms are associated with headache,  wheezing and  intermittent episodes of chills. Denies chest pain,  abdominal pain, nausea, vomiting, diarrhea, and urinary symptoms. There are no aggravating or alleviating factors. Symptoms are reported as moderate and worsening. Patient is a current smoker and reportedly smokes approximately one pack per week. PAST MEDICAL HISTORY   Patient  has a past medical history of Abnormal EKG; Anxiety; Asthma; Bipolar disorder ([address] COPD (chronic obstructive pulmonary disease) ([facility] [address] Cramps, extremity; Depression; History of elective ; Hyperlipidemia; Prolonged emergence from general anesthesia; Substance abuse; Unspecified sleep apnea; and Vision abnormalities. PAST SURGICAL HISTORY    Patient  has a past surgical history that includes Tonsillectomy and adenoidectomy (Bilateral); LASIK; Induced ; Ankle surgery; eye surgery (Bilateral); Vulva surgery; and hysteroscopy (10/19/2016). FAMILY HISTORY    Patient family history includes Cancer in her mother; Dementia in her maternal aunt;  Heart Attack in her paternal grandmother and sister; High Cholesterol in her brother; Kidney Disease in her mother; Prostate Cancer in her father. SOCIAL HISTORY    Patient  reports that she has been smoking Cigarettes. She has a 84.00 pack-year smoking history. She has never used smokeless tobacco. She reports that she does not drink alcohol or use drugs. HOME MEDICATIONS        Prior to Admission medications    Medication Sig Start Date End Date Taking?  Authorizing Provider   vitamin D (ERGOCALCIFEROL) 77535 units CAPS capsule take 1 capsule by mouth every week 9/15/17  Yes Monica Lopez MD   simvastatin (ZOCOR) 40 MG tablet take 1 tablet by mouth at bedtime 9/15/17  Yes River Pagan MD   RA VITAMIN D-3 1000 UNITS TABS tablet take 1 tablet by mouth once daily 5/23/17  Yes Rebecca Sanz MD   Acetaminophen (TYLENOL) 325 MG CAPS Take 1 tablet by mouth every 4 hours 03/84/83  Yes Vonzelkaren [place], DO   butalbital-acetaminophen-caffeine (FIORICET, ESGIC) -40 MG per tablet Take 1 tablet by mouth every 4 hours as needed for Headaches   Yes Historical Provider, MD   citalopram (CELEXA) 20 MG tablet Take 20 mg by mouth daily   Yes Historical Provider, MD   LATUDA 80 MG TABS tablet Take 80 mg by mouth nightly 9/29/16  Yes Historical Provider, MD   albuterol sulfate  (90 BASE) MCG/ACT inhaler Inhale 2 puffs into the lungs every 6 hours as needed for Wheezing   Yes Historical Provider, MD   albuterol (PROVENTIL) (2.5 MG/3ML) 0.083% nebulizer solution Take 2.5 mg by nebulization every 6 hours as needed for Wheezing   Yes Historical Provider, MD   topiramate (TOPAMAX) 50 MG tablet Take 50 mg by mouth every morning  2/1/16  Yes Historical Provider, MD   EPINEPHrine (EPIPEN) 0.3 MG/0.3ML SOAJ injection use as directed by prescriber FOR ALLERGIC REACTION 5/23/17   Rebecca Sanz MD   fluticasone [facility]) 50 MCG/ACT nasal spray 1 spray by Nasal route daily 11/10/16   Rebecca Sanz MD   TUDORZA PRESSAIR 400 MCG/ACT AEPB inhaler inhale 1 puff by mouth twice a day 10/18/16   April Ding CNP   Biotin 5000 MCG CAPS Take by mouth    Historical Provider, MD   Multiple Vitamins-Minerals (THERAPEUTIC MULTIVITAMIN-MINERALS) tablet Take 1 tablet by mouth daily [address]    Historical Provider, MD   Omega 3-6-9 Fatty Acids (OMEGA DHA PO) Take by mouth    Historical Provider, MD   budesonide-formoterol (SYMBICORT) 160-4.5 MCG/ACT AERO Inhale 2 puffs into the lungs 2 times daily    Historical Provider, MD   ketotifen (ZADITOR) 0.025 % ophthalmic solution Place 1 drop into both eyes 2 times daily    Historical Provider, MD   montelukast (SINGULAIR) 10 MG tablet Take 10 mg by mouth nightly    Historical Provider, MD   benztropine (COGENTIN) 2 MG tablet Take 2 mg by mouth 3 times daily  1/15/16   Historical Provider, MD       ALLERGIES      Advil [ibuprofen]; Aleve [naproxen]; Other; Strawberry extract; and Aspirin    REVIEW OF SYSTEMS     Review of Systems   Constitutional: Positive for chills. Negative for diaphoresis, fever and malaise/fatigue. Poor po intake recently   HENT: Negative for congestion and sore throat. Eyes: Negative for blurred vision and double vision. Respiratory: Positive for cough, sputum production, shortness of breath and wheezing. Cardiovascular: Negative for chest pain, palpitations, orthopnea and leg swelling. Gastrointestinal: Negative for abdominal pain, constipation, diarrhea, nausea and vomiting. Genitourinary: Negative for dysuria, frequency and urgency. Musculoskeletal: Positive for myalgias. Negative for back pain and falls. Skin: Negative for itching and rash. Neurological: Positive for headaches. Negative for dizziness, sensory change, focal weakness and weakness. Psychiatric/Behavioral: Negative for depression and substance abuse. The patient is not nervous/anxious.         PHYSICAL EXAM      BP (!) 90/55   Pulse 66   Temp 97.8 °F (36.6 °C) (Oral)   Resp 18   Ht 5' 4\" (1.626 m)   Wt 143 lb (64.9 kg)   LMP 05/20/2013 (Exact Date)   SpO2 94%   BMI 24.55 kg/m²  Body mass index is 24.55 kg/m². Physical Exam   Constitutional: She is oriented to person, place, and time. She appears well-developed and well-nourished. No distress. HENT:   Head: Normocephalic and atraumatic. Mouth/Throat: Oropharynx is clear and moist.   Eyes: Conjunctivae and EOM are normal. Pupils are equal, round, and reactive to light. Neck: Normal range of motion. Neck supple. No tracheal deviation present. Cardiovascular: Normal rate, regular rhythm, normal heart sounds and intact distal pulses. Exam reveals no gallop and no friction rub. No murmur heard. Pulmonary/Chest: Effort normal. No respiratory distress. She has wheezes. She has no rales. She exhibits no tenderness. Abdominal: Soft. Bowel sounds are normal. She exhibits no distension. There is no tenderness. There is no guarding. Musculoskeletal: Normal range of motion. She exhibits no edema or tenderness. Lymphadenopathy:     She has no cervical adenopathy. Neurological: She is alert and oriented to person, place, and time. Skin: Skin is warm and dry. No rash noted. She is not diaphoretic. No erythema. No pallor. Psychiatric: She has a normal mood and affect.  Her behavior is normal. Thought content normal.     DIAGNOSTICS      EKG:   EKG 12 Lead [[id number]] Collected: 11/16/17 1620   Updated: 11/16/17 1622     Ventricular Rate 74 BPM    Atrial Rate 74 BPM    P-R Interval 150 ms    QRS Duration 84 ms    Q-T Interval 374 ms    QTc Calculation (Bazett) 415 ms    P Axis 65 degrees    R Axis -106 degrees    T Axis 46 degrees   Narrative:     Normal sinus rhythm  Right superior axis deviation  Pulmonary disease pattern  Right ventricular hypertrophy  Septal infarct , age undetermined  Abnormal ECG  When compared with ECG of 09-SEP-2016 13:22,  Premature atrial complexes are no longer Present     Labs:  CBC:   Recent Labs      11/16/17   1630  11/17/17   0566 Provided Reason for Exam: Productive cough  H/o COPD Acuity: Acute Type of Exam: Initial Additional signs and symptoms: Productive cough  H/o COPD FINDINGS: The heart is not enlarged. No pulmonary venous congestion or edema. Emphysematous changes of the lungs are redemonstrated. Reticulonodular densities in the lateral right mid chest bilaterally are redemonstrated. No focal lung consolidation or infiltrate. No pleural effusion or pneumothorax. Chronic wedge compression deformity of the mid thoracic vertebral body is stable. Stable exam without acute cardiopulmonary process. COPD. ASSESSMENT  and  PLAN     Principal Problem:    COPD exacerbation (HCC)  Active Problems:    Asthma    Chronic headaches    Current smoker on some days    Plan:    COPD Exacerbation  -IV Solumedrol initiated in ED  --Continue on admission  -IV Levaquin and Zithromax po  -Mucinex BID; Tessalon PRN  -pain control  -Respiratory care eval and treat  -Duoneb aerosols 4x/ day and PRN  -Sputum C&S - pending specimen  -repeat CXR in am    Tobacco use   -smoking cessation education  -nicotine patch PRN    Consultations:     IP CONSULT TO INTERNAL MEDICINE      Olga Saavedra CNP   11/17/2017  6:15 AM  Agree with H and P by [employer] CNP   Admitted with COPD Exacerbation   Symptoms started Monday with Excessive Coughing and Wheezing   CXR negative for PNA , but Positive for SPN in RUL   Will Start on Levoquin   Consult Pulmonologist   CT chest , SPN is not present in CXR in 2016   Continue steroids , Duoneb and ALbuterol   Still smokes   On DVT [address] [facility] - [address]  [address], [address] [facility].    Phone [phone]

## 2017-11-18 LAB
ABSOLUTE BANDS #: 0.13 K/UL (ref 0–1)
ABSOLUTE EOS #: 0 K/UL (ref 0–0.4)
ABSOLUTE IMMATURE GRANULOCYTE: ABNORMAL K/UL (ref 0–0.3)
ABSOLUTE LYMPH #: 0.23 K/UL (ref 1–4.8)
ABSOLUTE MONO #: 0.2 K/UL (ref 0.1–1.3)
ANION GAP SERPL CALCULATED.3IONS-SCNC: 9 MMOL/L (ref 9–17)
BANDS: 4 %
BASOPHILS # BLD: 0 %
BASOPHILS ABSOLUTE: 0 K/UL (ref 0–0.2)
BUN BLDV-MCNC: 10 MG/DL (ref 8–23)
BUN/CREAT BLD: ABNORMAL (ref 9–20)
CALCIUM SERPL-MCNC: 8.3 MG/DL (ref 8.6–10.4)
CHLORIDE BLD-SCNC: 108 MMOL/L (ref 98–107)
CO2: 25 MMOL/L (ref 20–31)
CREAT SERPL-MCNC: 0.46 MG/DL (ref 0.5–0.9)
DIFFERENTIAL TYPE: ABNORMAL
DIRECT EXAM: NORMAL
EOSINOPHILS RELATIVE PERCENT: 0 %
GFR AFRICAN AMERICAN: >60 ML/MIN
GFR NON-AFRICAN AMERICAN: >60 ML/MIN
GFR SERPL CREATININE-BSD FRML MDRD: ABNORMAL ML/MIN/{1.73_M2}
GFR SERPL CREATININE-BSD FRML MDRD: ABNORMAL ML/MIN/{1.73_M2}
GLUCOSE BLD-MCNC: 115 MG/DL (ref 70–99)
HCT VFR BLD CALC: 34.2 % (ref 36–46)
HEMOGLOBIN: 11.3 G/DL (ref 12–16)
IMMATURE GRANULOCYTES: ABNORMAL %
LYMPHOCYTES # BLD: 7 %
Lab: NORMAL
Lab: NORMAL
MCH RBC QN AUTO: 32.5 PG (ref 26–34)
MCHC RBC AUTO-ENTMCNC: 33.1 G/DL (ref 31–37)
MCV RBC AUTO: 98.1 FL (ref 80–100)
MONOCYTES # BLD: 6 %
MORPHOLOGY: ABNORMAL
PDW BLD-RTO: 15.3 % (ref 11.5–14.9)
PLATELET # BLD: 202 K/UL (ref 150–450)
PLATELET ESTIMATE: ABNORMAL
PMV BLD AUTO: 8 FL (ref 6–12)
POTASSIUM SERPL-SCNC: 4 MMOL/L (ref 3.7–5.3)
RBC # BLD: 3.49 M/UL (ref 4–5.2)
RBC # BLD: ABNORMAL 10*6/UL
SEG NEUTROPHILS: 83 %
SEGMENTED NEUTROPHILS ABSOLUTE COUNT: 2.74 K/UL (ref 1.3–9.1)
SODIUM BLD-SCNC: 142 MMOL/L (ref 135–144)
SPECIMEN DESCRIPTION: NORMAL
STATUS: NORMAL
STATUS: NORMAL
WBC # BLD: 3.3 K/UL (ref 3.5–11)
WBC # BLD: ABNORMAL 10*3/UL

## 2017-11-18 PROCEDURE — 6370000000 HC RX 637 (ALT 250 FOR IP): Performed by: INTERNAL MEDICINE

## 2017-11-18 PROCEDURE — 85025 COMPLETE CBC W/AUTO DIFF WBC: CPT

## 2017-11-18 PROCEDURE — 6360000002 HC RX W HCPCS: Performed by: INTERNAL MEDICINE

## 2017-11-18 PROCEDURE — 94640 AIRWAY INHALATION TREATMENT: CPT

## 2017-11-18 PROCEDURE — 6360000002 HC RX W HCPCS: Performed by: NURSE PRACTITIONER

## 2017-11-18 PROCEDURE — 94761 N-INVAS EAR/PLS OXIMETRY MLT: CPT

## 2017-11-18 PROCEDURE — 36415 COLL VENOUS BLD VENIPUNCTURE: CPT

## 2017-11-18 PROCEDURE — 99232 SBSQ HOSP IP/OBS MODERATE 35: CPT | Performed by: INTERNAL MEDICINE

## 2017-11-18 PROCEDURE — 2580000003 HC RX 258: Performed by: EMERGENCY MEDICINE

## 2017-11-18 PROCEDURE — 6370000000 HC RX 637 (ALT 250 FOR IP): Performed by: NURSE PRACTITIONER

## 2017-11-18 PROCEDURE — 87804 INFLUENZA ASSAY W/OPTIC: CPT

## 2017-11-18 PROCEDURE — 80048 BASIC METABOLIC PNL TOTAL CA: CPT

## 2017-11-18 PROCEDURE — 2580000003 HC RX 258: Performed by: INTERNAL MEDICINE

## 2017-11-18 PROCEDURE — 2060000000 HC ICU INTERMEDIATE R&B

## 2017-11-18 RX ORDER — ALBUTEROL SULFATE 2.5 MG/3ML
2.5 SOLUTION RESPIRATORY (INHALATION) EVERY 6 HOURS PRN
Status: DISCONTINUED | OUTPATIENT
Start: 2017-11-18 | End: 2017-11-21 | Stop reason: HOSPADM

## 2017-11-18 RX ADMIN — TOPIRAMATE 50 MG: 25 TABLET, FILM COATED ORAL at 08:30

## 2017-11-18 RX ADMIN — METHYLPREDNISOLONE SODIUM SUCCINATE 40 MG: 40 INJECTION, POWDER, FOR SOLUTION INTRAMUSCULAR; INTRAVENOUS at 06:04

## 2017-11-18 RX ADMIN — GUAIFENESIN 1200 MG: 600 TABLET, EXTENDED RELEASE ORAL at 08:28

## 2017-11-18 RX ADMIN — MOMETASONE FUROATE AND FORMOTEROL FUMARATE DIHYDRATE 2 PUFF: 200; 5 AEROSOL RESPIRATORY (INHALATION) at 08:29

## 2017-11-18 RX ADMIN — METHYLPREDNISOLONE SODIUM SUCCINATE 40 MG: 40 INJECTION, POWDER, FOR SOLUTION INTRAMUSCULAR; INTRAVENOUS at 22:27

## 2017-11-18 RX ADMIN — FAMOTIDINE 20 MG: 20 TABLET, FILM COATED ORAL at 08:28

## 2017-11-18 RX ADMIN — ENOXAPARIN SODIUM 40 MG: 40 INJECTION SUBCUTANEOUS at 08:28

## 2017-11-18 RX ADMIN — Medication 10 ML: at 20:21

## 2017-11-18 RX ADMIN — ALBUTEROL SULFATE 2.5 MG: 2.5 SOLUTION RESPIRATORY (INHALATION) at 11:47

## 2017-11-18 RX ADMIN — SIMVASTATIN 40 MG: 40 TABLET, FILM COATED ORAL at 20:21

## 2017-11-18 RX ADMIN — GUAIFENESIN 1200 MG: 600 TABLET, EXTENDED RELEASE ORAL at 20:21

## 2017-11-18 RX ADMIN — METHYLPREDNISOLONE SODIUM SUCCINATE 40 MG: 40 INJECTION, POWDER, FOR SOLUTION INTRAMUSCULAR; INTRAVENOUS at 18:09

## 2017-11-18 RX ADMIN — TIOTROPIUM BROMIDE 18 MCG: 18 CAPSULE ORAL; RESPIRATORY (INHALATION) at 08:30

## 2017-11-18 RX ADMIN — LURASIDONE HYDROCHLORIDE 80 MG: 80 TABLET, FILM COATED ORAL at 20:21

## 2017-11-18 RX ADMIN — LEVOFLOXACIN 750 MG: 5 INJECTION, SOLUTION INTRAVENOUS at 16:16

## 2017-11-18 RX ADMIN — ALBUTEROL SULFATE 2.5 MG: 2.5 SOLUTION RESPIRATORY (INHALATION) at 15:48

## 2017-11-18 RX ADMIN — METHYLPREDNISOLONE SODIUM SUCCINATE 40 MG: 40 INJECTION, POWDER, FOR SOLUTION INTRAMUSCULAR; INTRAVENOUS at 11:50

## 2017-11-18 RX ADMIN — FAMOTIDINE 20 MG: 20 TABLET, FILM COATED ORAL at 20:21

## 2017-11-18 RX ADMIN — MOMETASONE FUROATE AND FORMOTEROL FUMARATE DIHYDRATE 2 PUFF: 200; 5 AEROSOL RESPIRATORY (INHALATION) at 20:21

## 2017-11-18 RX ADMIN — BENZONATATE 200 MG: 100 CAPSULE ORAL at 13:52

## 2017-11-18 RX ADMIN — BENZONATATE 200 MG: 100 CAPSULE ORAL at 20:21

## 2017-11-18 RX ADMIN — ALBUTEROL SULFATE 2.5 MG: 2.5 SOLUTION RESPIRATORY (INHALATION) at 07:55

## 2017-11-18 RX ADMIN — SODIUM CHLORIDE: 9 INJECTION, SOLUTION INTRAVENOUS at 04:11

## 2017-11-18 RX ADMIN — BENZONATATE 200 MG: 100 CAPSULE ORAL at 08:28

## 2017-11-18 RX ADMIN — ALBUTEROL SULFATE 2.5 MG: 2.5 SOLUTION RESPIRATORY (INHALATION) at 19:49

## 2017-11-18 RX ADMIN — IPRATROPIUM BROMIDE AND ALBUTEROL SULFATE 1 AMPULE: .5; 3 SOLUTION RESPIRATORY (INHALATION) at 03:31

## 2017-11-18 RX ADMIN — Medication 10 ML: at 08:29

## 2017-11-18 RX ADMIN — ALBUTEROL SULFATE 2.5 MG: 2.5 SOLUTION RESPIRATORY (INHALATION) at 00:31

## 2017-11-18 RX ADMIN — CITALOPRAM HYDROBROMIDE 20 MG: 20 TABLET ORAL at 08:28

## 2017-11-18 ASSESSMENT — PAIN SCALES - GENERAL: PAINLEVEL_OUTOF10: 5

## 2017-11-18 NOTE — PROGRESS NOTES
Progress Note    11/18/2017    7:47 PM    Name:   Bernice Chatman  MRN:     [id number]     [place]:      [de-identified]   Room:   [address] [place] [address] Day:  2  Admit Date:  11/16/2017  3:55 PM    PCP:   Josh Rosenberg MD  Code Status:  Full Code    Subjective:     C/C:     Interval History Status: significantly improved. Brief History:   Patient admitted with SOB, has H/o COPD, CXR suggestive of SPN in Right Lung     Review of Systems:     Constitutional:  negative for chills, fevers, sweats  Respiratory:  Positive cough, dyspnea on exertion, shortness of breath and wheezing  Cardiovascular:  negative for chest pain, chest pressure/discomfort, lower extremity edema, palpitations  Gastrointestinal:  negative for abdominal pain, constipation, diarrhea, nausea, vomiting  Neurological:  negative for dizziness, headache    Medications: Allergies:     Allergies   Allergen Reactions    Advil [Ibuprofen] Other (See Comments)     vomiting    Aleve [Naproxen] Other (See Comments)     vomiting    Other      GRASS, TREES, WEEDS    Strawberry Extract      HEADACHES    Aspirin Nausea And Vomiting       Current Meds:   Scheduled Meds:    influenza virus vaccine  0.5 mL Intramuscular Once    methylPREDNISolone  40 mg Intravenous Q6H    levofloxacin  750 mg Intravenous Q24H    tiotropium  18 mcg Inhalation Daily    albuterol  2.5 mg Nebulization Q4H    mometasone-formoterol  2 puff Inhalation BID    guaiFENesin  1,200 mg Oral BID    benzonatate  200 mg Oral TID    sodium chloride flush  10 mL Intravenous 2 times per day    citalopram  20 mg Oral Daily    lurasidone  80 mg Oral Nightly    topiramate  50 mg Oral QAM    simvastatin  40 mg Oral Nightly    famotidine  20 mg Oral BID    enoxaparin  40 mg Subcutaneous Daily     Continuous Infusions:    PRN Meds: albuterol, butalbital-acetaminophen-caffeine, sodium chloride flush, magnesium hydroxide, ondansetron, acetaminophen, morphine **OR** [DISCONTINUED] morphine, bisacodyl, nicotine    Data:     Past Medical History:   has a past medical history of Abnormal EKG; Anxiety; Asthma; Bipolar disorder ([address] COPD (chronic obstructive pulmonary disease) ([facility] [address] Cramps, extremity; Depression; History of elective ; Hyperlipidemia; Prolonged emergence from general anesthesia; Substance abuse; Unspecified sleep apnea; and Vision abnormalities. Social History:   reports that she has been smoking Cigarettes. She has a 84.00 pack-year smoking history. She has never used smokeless tobacco. She reports that she does not drink alcohol or use drugs. Family History:   Family History   Problem Relation Age of Onset    Dementia Maternal Aunt     Cancer Mother      unsure of type    Kidney Disease Mother     Heart Attack Sister     Prostate Cancer Father     High Cholesterol Brother     Heart Attack Paternal Grandmother        Vitals:  BP (!) 119/54   Pulse 65   Temp 97.9 °F (36.6 °C) (Oral)   Resp 17   Ht 5' 4\" (1.626 m)   Wt 155 lb 3.3 oz (70.4 kg)   LMP 2013 (Exact Date)   SpO2 96%   BMI 26.64 kg/m²   Temp (24hrs), Av °F (36.7 °C), Min:96.9 °F (36.1 °C), Max:98.6 °F (37 °C)    No results for input(s): POCGLU in the last 72 hours. I/O (24Hr):     Intake/Output Summary (Last 24 hours) at 17  Last data filed at 17 1613   Gross per 24 hour   Intake             1722 ml   Output                0 ml   Net             1722 ml       Labs:    [unfilled]    Lab Results   Component Value Date/Time    SPECIAL NOT REPORTED 2017 02:30 PM     Lab Results   Component Value Date/Time    CULTURE NO SIGNIFICANT GROWTH 10/12/2016 01:30 PM    CULTURE  10/12/2016 01:30 PM     Performed at [address], [address] [place] [address] [phone]       [facility]    Radiology:      Physical Examination:        General appearance:  alert, cooperative and no distress  Mental Status:  oriented to person, place and time and normal affect  Lungs:  Bilateral wheezing Present   Heart:  regular rate and rhythm, no murmur  Abdomen:  soft, nontender, nondistended, normal bowel sounds, no masses, hepatomegaly, splenomegaly  Extremities:  no edema, redness, tenderness in the calves  Skin:  no gross lesions, rashes, induration    Assessment:        Primary Problem  COPD exacerbation [facility])    Active Hospital Problems    Diagnosis Date Noted    Chronic headaches [R51] 11/17/2017    Current smoker on some days [F17.200] 11/17/2017    Tobacco dependence [F17.200] 11/17/2017    Community acquired pneumonia of right middle lobe of lung ([facility] [J18.1] 11/17/2017    Asthma [J45.909]     COPD exacerbation ([facility] [address] [J44.1]        Plan:        1. COPD - Improving , on steroids , Nebulization   2. CXR was suggestive of SPN in Right Lung , CT chest was Reviewed , will Needs to have Repeat CT in 6 months   3. On DVT Prophyalaxis   4.  Stop smoking     Curt Sykes MD  11/18/2017  7:47 PM

## 2017-11-18 NOTE — CONSULTS
[address] [facility]                   [address] [facility], [address]                                   CONSULTATION    PATIENT NAME: Tory Cordoba                    :        1955  MED REC NO:   [id number]                              ROOM:       2109  ACCOUNT NO:   [de-identified]                           ADMIT DATE: 2017  PROVIDER:     Celestino Longo    CONSULT DATE:  2017    CHIEF COMPLAINT:  Dyspnea and cough. HISTORY OF PRESENT ILLNESS:  The patient is a 63-year-old female, well  known to me with severe COPD (FEV1 is 1.40, which is 55% of predicted). I  had just seen her in the office on 2017. At that time, she was doing  reasonably well. She has had COPD for approximately 11 years. She did not  feel any worse at that time. She is maintained on Spiriva, Breo, and  albuterol either via inhaler or nebulizers. She is not on home oxygen  therapy. When I had seen her, she has stopped smoking for about four  weeks. However, she resumed and now she is up to one pack a day. Subsequently, the patient stated that since Monday she has had increasing  shortness of breath, dyspnea with exertion, accompanied by subjective  fevers, cough productive of yellowish phlegm, and chest tightness. She did  not call our office. This progressed and so she came into the emergency  room yesterday and subsequently was admitted. She had a CT of the chest  done, which showed a scar in the right lower lobe but it is unchanged from  her CT scan in 2016. Additionally, she also had an area of what appears  to me more of an infiltrative process in the right middle lobe. ALLERGIES:  She is allergic to IBUPROFEN. PAST MEDICAL HISTORY:  Significant for COPD as noted above and history of  depression with bipolar disorder. She had a tonsillectomy, adenoidectomy,  joint replacement surgery, and seasonal allergies.     SOCIAL HISTORY:  As mentioned above, smoker in the past.  She smokes two  packs a day. Again, she had stopped smoking, but now she has resumed. FAMILY HISTORY:  Noncontributory. REVIEW OF SYSTEMS:  As in the history of present illness, otherwise all  systems reviewed and negative. PHYSICAL EXAMINATION:  GENERAL APPEARANCE:  Middle-aged female, looking older than her stated age,  looking fatigued but in no acute respiratory distress. VITALS:  Temperature 98.1, T-max is 99.9, respiratory rate about 20, pulse  is 78, blood pressure 100/60, and oxygen saturation on 2 liters 94%. NECK:  Supple without adenopathy. HEENT:  Oral cavity is clear. LUNGS:  Notable for diffuse bilateral coarse scattered wheezes and rhonchi. CARDIAC SYSTEM:  S1 and S2.  ABDOMINAL EXAM:  Soft, nontender, and nondistended. EXTREMITIES:  Show no edema. NEUROLOGIC:  There are no focal deficits. LABORATORY DATA:  Notable for electrolytes all being within normal limits. Her white count is 4. 4.  with an H and H of 11.6 and 35.8 and  platelet count of 496. IMAGING STUDIES:  CT scan results are as above. IMPRESSION:  My impression is that the patient has possibly early pneumonia  right middle lobe, COPD exacerbation, tobacco dependence, and chronic right  lower lobe nodule scarring that has been stable since 05/2016. RECOMMENDATIONS:  My recommendation would be to continue her Levaquin but  increase the dose to 750 to cover pneumonia. Could check serologies and  cultures. Continue IV Solu-Medrol. We will place her home Spiriva and  Breo. Smoking cessation has been discussed extensively on multiple times  with her and she need a followup CT scan in 05/2018. I specifically told  her that just because there is no change in the size of her nodule it does  not mean down the road that she would not develop a new nodule or this  nodule may get bigger. She understands. We will also put her on Acapella,  Mucinex, and Tessalon Perles.         Jose HENRY: 11/17/2017 15:15:49       T: 11/17/2017 15:25:30     [email or web address]  Job#: [id number]     Doc#: [id number]

## 2017-11-18 NOTE — PROGRESS NOTES
rhythm   Abdomen - Soft, nontender, nondistended, no masses or organomegaly  Neurologic - There are no focal motor or sensory deficits  Skin - No bruising or bleeding  Extremities - No clubbing, cyanosis, edema    MEDS      influenza virus vaccine  0.5 mL Intramuscular Once    methylPREDNISolone  40 mg Intravenous Q6H    levofloxacin  750 mg Intravenous Q24H    tiotropium  18 mcg Inhalation Daily    albuterol  2.5 mg Nebulization Q4H    mometasone-formoterol  2 puff Inhalation BID    guaiFENesin  1,200 mg Oral BID    benzonatate  200 mg Oral TID    sodium chloride flush  10 mL Intravenous 2 times per day    citalopram  20 mg Oral Daily    lurasidone  80 mg Oral Nightly    topiramate  50 mg Oral QAM    simvastatin  40 mg Oral Nightly    famotidine  20 mg Oral BID    enoxaparin  40 mg Subcutaneous Daily      sodium chloride 75 mL/hr at 11/18/17 0411     albuterol, butalbital-acetaminophen-caffeine, sodium chloride flush, magnesium hydroxide, ondansetron, acetaminophen, morphine **OR** [DISCONTINUED] morphine, bisacodyl, nicotine    LABS   CBC   Recent Labs      11/18/17   0540   WBC  3.3*   HGB  11.3*   HCT  34.2*   MCV  98.1   PLT  202     BMP: Lab Results   Component Value Date     11/18/2017    K 4.0 11/18/2017     11/18/2017    CO2 25 11/18/2017    BUN 10 11/18/2017    LABALBU 3.7 09/09/2016    CREATININE 0.46 11/18/2017    CALCIUM 8.3 11/18/2017    GFRAA >60 11/18/2017    LABGLOM >60 11/18/2017     ABGs:  Lab Results   Component Value Date    PHART 7.361 05/19/2016    PO2ART 67.1 05/19/2016    WOH5TII 46.1 05/19/2016    Lab Results   Component Value Date    MODE NOT REPORTED 05/19/2016     Ionized Calcium:  No results found for: IONCA  Magnesium:  No results found for: MG  Phosphorus:  No results found for: PHOS     LIVER PROFILE No results for input(s): AST, ALT, LIPASE, BILIDIR, BILITOT, ALKPHOS in the last 72 hours. Invalid input(s):   AMYLASE,  ALB  INR No results for input(s): INR in the last 72 hours.   PTT   Lab Results   Component Value Date    APTT 23.7 10/12/2016         RADIOLOGY     (See actual reports for details)    ASSESSMENT/PLAN   Principal Problem:    COPD exacerbation ([facility] [address]  Active Problems:    Asthma    Chronic headaches    Current smoker on some days    Tobacco dependence    Community acquired pneumonia of right middle lobe of lung ([facility] [address]    Continue current level of care  No change in steroid dose  Discussed with her once again the need to stop smoking  Awaiting serologies  Mobilize  Electronically signed by Nichol Nascimento MD on 11/18/2017 at 10:52 AM

## 2017-11-19 LAB
ABSOLUTE BANDS #: 0.14 K/UL (ref 0–1)
ABSOLUTE EOS #: 0 K/UL (ref 0–0.4)
ABSOLUTE IMMATURE GRANULOCYTE: ABNORMAL K/UL (ref 0–0.3)
ABSOLUTE LYMPH #: 0.28 K/UL (ref 1–4.8)
ABSOLUTE MONO #: 0.28 K/UL (ref 0.1–1.3)
ANION GAP SERPL CALCULATED.3IONS-SCNC: 10 MMOL/L (ref 9–17)
BANDS: 4 %
BASOPHILS # BLD: 0 %
BASOPHILS ABSOLUTE: 0 K/UL (ref 0–0.2)
BUN BLDV-MCNC: 13 MG/DL (ref 8–23)
BUN/CREAT BLD: ABNORMAL (ref 9–20)
CALCIUM SERPL-MCNC: 8.2 MG/DL (ref 8.6–10.4)
CHLORIDE BLD-SCNC: 109 MMOL/L (ref 98–107)
CHOLESTEROL/HDL RATIO: 2.3
CHOLESTEROL: 152 MG/DL
CO2: 28 MMOL/L (ref 20–31)
CREAT SERPL-MCNC: 0.5 MG/DL (ref 0.5–0.9)
DIFFERENTIAL TYPE: ABNORMAL
EOSINOPHILS RELATIVE PERCENT: 0 %
GFR AFRICAN AMERICAN: >60 ML/MIN
GFR NON-AFRICAN AMERICAN: >60 ML/MIN
GFR SERPL CREATININE-BSD FRML MDRD: ABNORMAL ML/MIN/{1.73_M2}
GFR SERPL CREATININE-BSD FRML MDRD: ABNORMAL ML/MIN/{1.73_M2}
GLUCOSE BLD-MCNC: 115 MG/DL (ref 70–99)
HCT VFR BLD CALC: 34 % (ref 36–46)
HDLC SERPL-MCNC: 65 MG/DL
HEMOGLOBIN: 11.5 G/DL (ref 12–16)
IMMATURE GRANULOCYTES: ABNORMAL %
LDL CHOLESTEROL: 71 MG/DL (ref 0–130)
LYMPHOCYTES # BLD: 8 %
MCH RBC QN AUTO: 32.9 PG (ref 26–34)
MCHC RBC AUTO-ENTMCNC: 33.7 G/DL (ref 31–37)
MCV RBC AUTO: 97.7 FL (ref 80–100)
MONOCYTES # BLD: 8 %
MORPHOLOGY: NORMAL
PDW BLD-RTO: 15.4 % (ref 11.5–14.9)
PLATELET # BLD: 217 K/UL (ref 150–450)
PLATELET ESTIMATE: ABNORMAL
PMV BLD AUTO: 7.6 FL (ref 6–12)
POTASSIUM SERPL-SCNC: 3.9 MMOL/L (ref 3.7–5.3)
RBC # BLD: 3.48 M/UL (ref 4–5.2)
RBC # BLD: ABNORMAL 10*6/UL
SEG NEUTROPHILS: 80 %
SEGMENTED NEUTROPHILS ABSOLUTE COUNT: 2.8 K/UL (ref 1.3–9.1)
SODIUM BLD-SCNC: 147 MMOL/L (ref 135–144)
TRIGL SERPL-MCNC: 79 MG/DL
VLDLC SERPL CALC-MCNC: NORMAL MG/DL (ref 1–30)
WBC # BLD: 3.5 K/UL (ref 3.5–11)
WBC # BLD: ABNORMAL 10*3/UL

## 2017-11-19 PROCEDURE — 94761 N-INVAS EAR/PLS OXIMETRY MLT: CPT

## 2017-11-19 PROCEDURE — 94640 AIRWAY INHALATION TREATMENT: CPT

## 2017-11-19 PROCEDURE — 6370000000 HC RX 637 (ALT 250 FOR IP): Performed by: NURSE PRACTITIONER

## 2017-11-19 PROCEDURE — 2580000003 HC RX 258: Performed by: INTERNAL MEDICINE

## 2017-11-19 PROCEDURE — 80048 BASIC METABOLIC PNL TOTAL CA: CPT

## 2017-11-19 PROCEDURE — 36415 COLL VENOUS BLD VENIPUNCTURE: CPT

## 2017-11-19 PROCEDURE — 6360000002 HC RX W HCPCS: Performed by: INTERNAL MEDICINE

## 2017-11-19 PROCEDURE — 2060000000 HC ICU INTERMEDIATE R&B

## 2017-11-19 PROCEDURE — 80061 LIPID PANEL: CPT

## 2017-11-19 PROCEDURE — 6370000000 HC RX 637 (ALT 250 FOR IP): Performed by: INTERNAL MEDICINE

## 2017-11-19 PROCEDURE — 85025 COMPLETE CBC W/AUTO DIFF WBC: CPT

## 2017-11-19 PROCEDURE — 99232 SBSQ HOSP IP/OBS MODERATE 35: CPT | Performed by: INTERNAL MEDICINE

## 2017-11-19 PROCEDURE — 6360000002 HC RX W HCPCS: Performed by: NURSE PRACTITIONER

## 2017-11-19 RX ORDER — METHYLPREDNISOLONE SODIUM SUCCINATE 40 MG/ML
40 INJECTION, POWDER, LYOPHILIZED, FOR SOLUTION INTRAMUSCULAR; INTRAVENOUS EVERY 8 HOURS
Status: DISCONTINUED | OUTPATIENT
Start: 2017-11-19 | End: 2017-11-20

## 2017-11-19 RX ADMIN — METHYLPREDNISOLONE SODIUM SUCCINATE 40 MG: 40 INJECTION, POWDER, FOR SOLUTION INTRAMUSCULAR; INTRAVENOUS at 05:50

## 2017-11-19 RX ADMIN — Medication 10 ML: at 08:27

## 2017-11-19 RX ADMIN — MOMETASONE FUROATE AND FORMOTEROL FUMARATE DIHYDRATE 2 PUFF: 200; 5 AEROSOL RESPIRATORY (INHALATION) at 08:26

## 2017-11-19 RX ADMIN — ACETAMINOPHEN 650 MG: 325 TABLET, FILM COATED ORAL at 06:33

## 2017-11-19 RX ADMIN — ALBUTEROL SULFATE 2.5 MG: 2.5 SOLUTION RESPIRATORY (INHALATION) at 00:17

## 2017-11-19 RX ADMIN — GUAIFENESIN 1200 MG: 600 TABLET, EXTENDED RELEASE ORAL at 08:26

## 2017-11-19 RX ADMIN — GUAIFENESIN 1200 MG: 600 TABLET, EXTENDED RELEASE ORAL at 19:44

## 2017-11-19 RX ADMIN — LURASIDONE HYDROCHLORIDE 80 MG: 80 TABLET, FILM COATED ORAL at 19:44

## 2017-11-19 RX ADMIN — BENZONATATE 200 MG: 100 CAPSULE ORAL at 19:44

## 2017-11-19 RX ADMIN — TOPIRAMATE 50 MG: 25 TABLET, FILM COATED ORAL at 08:31

## 2017-11-19 RX ADMIN — ALBUTEROL SULFATE 2.5 MG: 2.5 SOLUTION RESPIRATORY (INHALATION) at 16:08

## 2017-11-19 RX ADMIN — ALBUTEROL SULFATE 2.5 MG: 2.5 SOLUTION RESPIRATORY (INHALATION) at 07:42

## 2017-11-19 RX ADMIN — BENZONATATE 200 MG: 100 CAPSULE ORAL at 08:26

## 2017-11-19 RX ADMIN — LEVOFLOXACIN 750 MG: 5 INJECTION, SOLUTION INTRAVENOUS at 19:44

## 2017-11-19 RX ADMIN — CITALOPRAM HYDROBROMIDE 20 MG: 20 TABLET ORAL at 08:26

## 2017-11-19 RX ADMIN — ALBUTEROL SULFATE 2.5 MG: 2.5 SOLUTION RESPIRATORY (INHALATION) at 04:12

## 2017-11-19 RX ADMIN — Medication 10 ML: at 19:45

## 2017-11-19 RX ADMIN — MOMETASONE FUROATE AND FORMOTEROL FUMARATE DIHYDRATE 2 PUFF: 200; 5 AEROSOL RESPIRATORY (INHALATION) at 19:45

## 2017-11-19 RX ADMIN — ALBUTEROL SULFATE 2.5 MG: 2.5 SOLUTION RESPIRATORY (INHALATION) at 11:29

## 2017-11-19 RX ADMIN — FAMOTIDINE 20 MG: 20 TABLET, FILM COATED ORAL at 08:26

## 2017-11-19 RX ADMIN — BENZONATATE 200 MG: 100 CAPSULE ORAL at 15:14

## 2017-11-19 RX ADMIN — SIMVASTATIN 40 MG: 40 TABLET, FILM COATED ORAL at 19:44

## 2017-11-19 RX ADMIN — ALBUTEROL SULFATE 2.5 MG: 2.5 SOLUTION RESPIRATORY (INHALATION) at 19:05

## 2017-11-19 RX ADMIN — FAMOTIDINE 20 MG: 20 TABLET, FILM COATED ORAL at 19:44

## 2017-11-19 RX ADMIN — METHYLPREDNISOLONE SODIUM SUCCINATE 40 MG: 40 INJECTION, POWDER, FOR SOLUTION INTRAMUSCULAR; INTRAVENOUS at 15:14

## 2017-11-19 RX ADMIN — TIOTROPIUM BROMIDE 18 MCG: 18 CAPSULE ORAL; RESPIRATORY (INHALATION) at 08:27

## 2017-11-19 RX ADMIN — ENOXAPARIN SODIUM 40 MG: 40 INJECTION SUBCUTANEOUS at 08:26

## 2017-11-19 ASSESSMENT — PAIN SCALES - GENERAL: PAINLEVEL_OUTOF10: 8

## 2017-11-19 NOTE — PROGRESS NOTES
Progress Note    11/19/2017    3:29 PM    Name:   Reina Whitlock  MRN:     [id number]     [place]:      [de-identified]   Room:   [address] [place] [address] Day:  3  Admit Date:  11/16/2017  3:55 PM    PCP:   Tara Nicholson MD  Code Status:  Full Code    Subjective:     C/C:     Interval History Status: significantly improved. Brief History:   Patient admitted with SOB, has H/o COPD, CXR suggestive of SPN in Right Lung     Review of Systems:     Constitutional:  negative for chills, fevers, sweats  Respiratory:  Positive cough, dyspnea on exertion, shortness of breath and wheezing  Cardiovascular:  negative for chest pain, chest pressure/discomfort, lower extremity edema, palpitations  Gastrointestinal:  negative for abdominal pain, constipation, diarrhea, nausea, vomiting  Neurological:  negative for dizziness, headache    Medications: Allergies:     Allergies   Allergen Reactions    Advil [Ibuprofen] Other (See Comments)     vomiting    Aleve [Naproxen] Other (See Comments)     vomiting    Other      GRASS, TREES, WEEDS    Strawberry Extract      HEADACHES    Aspirin Nausea And Vomiting       Current Meds:   Scheduled Meds:    [START ON 11/20/2017] influenza quadrivalent split vaccine  0.5 mL Intramuscular Once    methylPREDNISolone  40 mg Intravenous Q8H    levofloxacin  750 mg Intravenous Q24H    tiotropium  18 mcg Inhalation Daily    albuterol  2.5 mg Nebulization Q4H    mometasone-formoterol  2 puff Inhalation BID    guaiFENesin  1,200 mg Oral BID    benzonatate  200 mg Oral TID    sodium chloride flush  10 mL Intravenous 2 times per day    citalopram  20 mg Oral Daily    lurasidone  80 mg Oral Nightly    topiramate  50 mg Oral QAM    simvastatin  40 mg Oral Nightly    famotidine  20 mg Oral BID    enoxaparin  40 mg Subcutaneous Daily     Continuous Infusions:    PRN Meds: albuterol, butalbital-acetaminophen-caffeine, sodium chloride flush, magnesium hydroxide, ondansetron, acetaminophen, morphine **OR** [DISCONTINUED] morphine, bisacodyl, nicotine    Data:     Past Medical History:   has a past medical history of Abnormal EKG; Anxiety; Asthma; Bipolar disorder ([facility] COPD (chronic obstructive pulmonary disease) ([facility] Cramps, extremity; Depression; History of elective ; Hyperlipidemia; Prolonged emergence from general anesthesia; Substance abuse; Unspecified sleep apnea; and Vision abnormalities. Social History:   reports that she has been smoking Cigarettes. She has a 84.00 pack-year smoking history. She has never used smokeless tobacco. She reports that she does not drink alcohol or use drugs. Family History:   Family History   Problem Relation Age of Onset    Dementia Maternal Aunt     Cancer Mother      unsure of type    Kidney Disease Mother     Heart Attack Sister     Prostate Cancer Father     High Cholesterol Brother     Heart Attack Paternal Grandmother        Vitals:  BP (!) 129/55   Pulse 54   Temp 98 °F (36.7 °C)   Resp 19   Ht 5' 4\" (1.626 m)   Wt 157 lb 3 oz (71.3 kg)   LMP 2013 (Exact Date)   SpO2 95%   BMI 26.98 kg/m²   Temp (24hrs), Av.1 °F (36.7 °C), Min:97.9 °F (36.6 °C), Max:98.6 °F (37 °C)    No results for input(s): POCGLU in the last 72 hours. I/O (24Hr):     Intake/Output Summary (Last 24 hours) at 17 1529  Last data filed at 17 0549   Gross per 24 hour   Intake              858 ml   Output                0 ml   Net              858 ml       Labs:    [unfilled]    Lab Results   Component Value Date/Time    SPECIAL NOT REPORTED 2017 02:30 PM     Lab Results   Component Value Date/Time    CULTURE NO SIGNIFICANT GROWTH 10/12/2016 01:30 PM    CULTURE  10/12/2016 01:30 PM     Performed at [address], [address] [place] [address] [phone]       [facility]    Radiology:      Physical Examination:        General appearance:  alert, cooperative and no distress  Mental Status:  oriented to person, place and time and normal affect  Lungs:  Air entry bilaterally decreased  , occasional wheezing present   Heart:  regular rate and rhythm, no murmur  Abdomen:  soft, nontender, nondistended, normal bowel sounds, no masses, hepatomegaly, splenomegaly  Extremities:  no edema, redness, tenderness in the calves  Skin:  no gross lesions, rashes, induration    Assessment:        Primary Problem  COPD exacerbation ([facility]    Active Hospital Problems    Diagnosis Date Noted    Chronic headaches [R51] 11/17/2017    Current smoker on some days [F17.200] 11/17/2017    Tobacco dependence [F17.200] 11/17/2017    Community acquired pneumonia of right middle lobe of lung ([facility] [J18.1] 11/17/2017    Asthma [J45.909]     COPD exacerbation ([facility] [J44.1]        Plan:        1. COPD - Improving , on steroids , Nebulization , Steroids dosages reducing   2. CXR was suggestive of SPN in Right Lung , CT chest was Reviewed , will Needs to have Repeat CT in 6 months   3. On DVT Prophyalaxis   4.  Stop smoking     Allison Ballesteros MD  11/19/2017  3:29 PM

## 2017-11-19 NOTE — PROGRESS NOTES
Pulmonary Progress Note  [facility]      Patient - Reina Whitlock,  Age - 63 y.o.    - 1955      Room Number - 2109/2109-01   N -  [id number]   Acct # - [de-identified]  Date of Admission -  2017  3:55 PM        Consulting Theluis Goldstein MD  Primary Care Physician - Tara Nicholson MD     SUBJECTIVE   She feels better today, less dyspneic    OBJECTIVE   VITALS    height is 5' 4\" (1.626 m) and weight is 157 lb 3 oz (71.3 kg). Her oral temperature is 98.2 °F (36.8 °C). Her blood pressure is 128/52 (abnormal) and her pulse is 50. Her respiration is 19 and oxygen saturation is 98%. Body mass index is 26.98 kg/m². Temperature Range: Temp: 98.2 °F (36.8 °C) Temp  Av.9 °F (36.6 °C)  Min: 96.9 °F (36.1 °C)  Max: 98.6 °F (37 °C)  BP Range:  Systolic (66QVC), VWW:841 , Min:113 , XWN:282     Diastolic (30NIJ), FCZ:73, Min:52, Max:76    Pulse Range: Pulse  Av  Min: 50  Max: 70  Respiration Range: Resp  Av  Min: 17  Max: 24  Current Pulse Ox[de-identified]  SpO2: 98 %  24HR Pulse Ox Range:  SpO2  Av %  Min: 92 %  Max: 98 %  Oxygen Amount and Delivery: O2 Flow Rate (L/min): 2 L/min    Wt Readings from Last 3 Encounters:   17 157 lb 3 oz (71.3 kg)   16 162 lb (73.5 kg)   10/19/16 164 lb (74.4 kg)       I/O (24 Hours)    Intake/Output Summary (Last 24 hours) at 17 1036  Last data filed at 17 0549   Gross per 24 hour   Intake              858 ml   Output                0 ml   Net              858 ml       EXAM     General Appearance  Awake, alert, oriented, in no acute distress  HEENT - normocephalic, atraumatic.  []  Mallampati  [] Crowded airway   [] Macroglossia  []  Retrognathia  [] Micrognathia  []  Normal tongue size []  Normal Bite  [] Tru sign positive    Neck - Supple,  trachea midline   Lungs - Wheezes improved  Cardiovascular - Heart sounds are normal.  Regular rate and rhythm Abdomen - Soft, nontender, nondistended, no masses or organomegaly  Neurologic - There are no focal motor or sensory deficits  Skin - No bruising or bleeding  Extremities - No clubbing, cyanosis, edema    MEDS      [START ON 11/20/2017] influenza quadrivalent split vaccine  0.5 mL Intramuscular Once    methylPREDNISolone  40 mg Intravenous Q6H    levofloxacin  750 mg Intravenous Q24H    tiotropium  18 mcg Inhalation Daily    albuterol  2.5 mg Nebulization Q4H    mometasone-formoterol  2 puff Inhalation BID    guaiFENesin  1,200 mg Oral BID    benzonatate  200 mg Oral TID    sodium chloride flush  10 mL Intravenous 2 times per day    citalopram  20 mg Oral Daily    lurasidone  80 mg Oral Nightly    topiramate  50 mg Oral QAM    simvastatin  40 mg Oral Nightly    famotidine  20 mg Oral BID    enoxaparin  40 mg Subcutaneous Daily        albuterol, butalbital-acetaminophen-caffeine, sodium chloride flush, magnesium hydroxide, ondansetron, acetaminophen, morphine **OR** [DISCONTINUED] morphine, bisacodyl, nicotine    LABS   CBC   Recent Labs      11/19/17   0605   WBC  3.5   HGB  11.5*   HCT  34.0*   MCV  97.7   PLT  217     BMP: Lab Results   Component Value Date     11/19/2017    K 3.9 11/19/2017     11/19/2017    CO2 28 11/19/2017    BUN 13 11/19/2017    LABALBU 3.7 09/09/2016    CREATININE 0.50 11/19/2017    CALCIUM 8.2 11/19/2017    GFRAA >60 11/19/2017    LABGLOM >60 11/19/2017     ABGs:  Lab Results   Component Value Date    PHART 7.361 05/19/2016    PO2ART 67.1 05/19/2016    TEC2POK 46.1 05/19/2016    Lab Results   Component Value Date    MODE NOT REPORTED 05/19/2016     Ionized Calcium:  No results found for: IONCA  Magnesium:  No results found for: MG  Phosphorus:  No results found for: PHOS     LIVER PROFILE No results for input(s): AST, ALT, LIPASE, BILIDIR, BILITOT, ALKPHOS in the last 72 hours. Invalid input(s):   AMYLASE,  ALB  INR No results for input(s): INR in the last 72 hours.  PTT   Lab Results   Component Value Date    APTT 23.7 10/12/2016         RADIOLOGY     (See actual reports for details)    ASSESSMENT/PLAN   Principal Problem:    COPD exacerbation ([facility]  Active Problems:    Asthma    Chronic headaches    Current smoker on some days    Tobacco dependence    Community acquired pneumonia of right middle lobe of lung ([facility]    Continue Levaquin  Wean Solu-Medrol  Mobilize, encouraged to use Acapella  Electronically signed by Denys Duckworth MD on 11/19/2017 at 10:36 AM

## 2017-11-19 NOTE — PLAN OF CARE
Problem: Falls - Risk of:  Goal: Will remain free from falls  Will remain free from falls   Outcome: Met This Shift      Problem: Breathing Pattern - Ineffective:  Goal: Ability to achieve and maintain a regular respiratory rate will improve  Ability to achieve and maintain a regular respiratory rate will improve   Outcome: Met This Shift      Problem: Pain:  Goal: Control of acute pain  Control of acute pain   Outcome: Met This Shift    Goal: Control of chronic pain  Control of chronic pain   Outcome: Met This Shift      Problem: Falls - Risk of  Goal: Absence of falls  Outcome: Met This Shift

## 2017-11-20 LAB
ABSOLUTE EOS #: 0 K/UL (ref 0–0.4)
ABSOLUTE IMMATURE GRANULOCYTE: ABNORMAL K/UL (ref 0–0.3)
ABSOLUTE LYMPH #: 0.4 K/UL (ref 1–4.8)
ABSOLUTE MONO #: 0.5 K/UL (ref 0.1–1.3)
ANION GAP SERPL CALCULATED.3IONS-SCNC: 10 MMOL/L (ref 9–17)
BASOPHILS # BLD: 0 %
BASOPHILS ABSOLUTE: 0 K/UL (ref 0–0.2)
BUN BLDV-MCNC: 14 MG/DL (ref 8–23)
BUN/CREAT BLD: NORMAL (ref 9–20)
CALCIUM SERPL-MCNC: 8.7 MG/DL (ref 8.6–10.4)
CHLORIDE BLD-SCNC: 105 MMOL/L (ref 98–107)
CO2: 29 MMOL/L (ref 20–31)
CREAT SERPL-MCNC: 0.54 MG/DL (ref 0.5–0.9)
DIFFERENTIAL TYPE: ABNORMAL
EOSINOPHILS RELATIVE PERCENT: 0 %
GFR AFRICAN AMERICAN: >60 ML/MIN
GFR NON-AFRICAN AMERICAN: >60 ML/MIN
GFR SERPL CREATININE-BSD FRML MDRD: NORMAL ML/MIN/{1.73_M2}
GFR SERPL CREATININE-BSD FRML MDRD: NORMAL ML/MIN/{1.73_M2}
GLUCOSE BLD-MCNC: 97 MG/DL (ref 70–99)
HCT VFR BLD CALC: 38.4 % (ref 36–46)
HEMOGLOBIN: 12.8 G/DL (ref 12–16)
IMMATURE GRANULOCYTES: ABNORMAL %
LYMPHOCYTES # BLD: 10 %
MCH RBC QN AUTO: 32.7 PG (ref 26–34)
MCHC RBC AUTO-ENTMCNC: 33.3 G/DL (ref 31–37)
MCV RBC AUTO: 98.2 FL (ref 80–100)
MONOCYTES # BLD: 12 %
MYCOPLASMA PNEUMONIAE IGM: 0.6
PDW BLD-RTO: 15.3 % (ref 11.5–14.9)
PLATELET # BLD: 225 K/UL (ref 150–450)
PLATELET ESTIMATE: ABNORMAL
PMV BLD AUTO: 7.9 FL (ref 6–12)
POTASSIUM SERPL-SCNC: 3.7 MMOL/L (ref 3.7–5.3)
RBC # BLD: 3.91 M/UL (ref 4–5.2)
RBC # BLD: ABNORMAL 10*6/UL
SEG NEUTROPHILS: 78 %
SEGMENTED NEUTROPHILS ABSOLUTE COUNT: 3.5 K/UL (ref 1.3–9.1)
SODIUM BLD-SCNC: 144 MMOL/L (ref 135–144)
WBC # BLD: 4.4 K/UL (ref 3.5–11)
WBC # BLD: ABNORMAL 10*3/UL

## 2017-11-20 PROCEDURE — 2060000000 HC ICU INTERMEDIATE R&B

## 2017-11-20 PROCEDURE — 6370000000 HC RX 637 (ALT 250 FOR IP): Performed by: INTERNAL MEDICINE

## 2017-11-20 PROCEDURE — 80048 BASIC METABOLIC PNL TOTAL CA: CPT

## 2017-11-20 PROCEDURE — 99232 SBSQ HOSP IP/OBS MODERATE 35: CPT | Performed by: INTERNAL MEDICINE

## 2017-11-20 PROCEDURE — 94640 AIRWAY INHALATION TREATMENT: CPT

## 2017-11-20 PROCEDURE — 6360000002 HC RX W HCPCS: Performed by: INTERNAL MEDICINE

## 2017-11-20 PROCEDURE — 6360000002 HC RX W HCPCS: Performed by: NURSE PRACTITIONER

## 2017-11-20 PROCEDURE — 36415 COLL VENOUS BLD VENIPUNCTURE: CPT

## 2017-11-20 PROCEDURE — 97161 PT EVAL LOW COMPLEX 20 MIN: CPT

## 2017-11-20 PROCEDURE — 85025 COMPLETE CBC W/AUTO DIFF WBC: CPT

## 2017-11-20 PROCEDURE — 6370000000 HC RX 637 (ALT 250 FOR IP): Performed by: NURSE PRACTITIONER

## 2017-11-20 PROCEDURE — G0008 ADMIN INFLUENZA VIRUS VAC: HCPCS | Performed by: INTERNAL MEDICINE

## 2017-11-20 PROCEDURE — 94761 N-INVAS EAR/PLS OXIMETRY MLT: CPT

## 2017-11-20 PROCEDURE — 90686 IIV4 VACC NO PRSV 0.5 ML IM: CPT | Performed by: INTERNAL MEDICINE

## 2017-11-20 PROCEDURE — 2580000003 HC RX 258: Performed by: INTERNAL MEDICINE

## 2017-11-20 PROCEDURE — 97116 GAIT TRAINING THERAPY: CPT

## 2017-11-20 RX ORDER — METHYLPREDNISOLONE SODIUM SUCCINATE 40 MG/ML
40 INJECTION, POWDER, LYOPHILIZED, FOR SOLUTION INTRAMUSCULAR; INTRAVENOUS EVERY 12 HOURS
Status: DISCONTINUED | OUTPATIENT
Start: 2017-11-21 | End: 2017-11-21 | Stop reason: HOSPADM

## 2017-11-20 RX ADMIN — ENOXAPARIN SODIUM 40 MG: 40 INJECTION SUBCUTANEOUS at 10:20

## 2017-11-20 RX ADMIN — BENZONATATE 200 MG: 100 CAPSULE ORAL at 13:51

## 2017-11-20 RX ADMIN — CITALOPRAM HYDROBROMIDE 20 MG: 20 TABLET ORAL at 10:21

## 2017-11-20 RX ADMIN — SIMVASTATIN 40 MG: 40 TABLET, FILM COATED ORAL at 22:09

## 2017-11-20 RX ADMIN — ALBUTEROL SULFATE 2.5 MG: 2.5 SOLUTION RESPIRATORY (INHALATION) at 07:37

## 2017-11-20 RX ADMIN — METHYLPREDNISOLONE SODIUM SUCCINATE 40 MG: 40 INJECTION, POWDER, FOR SOLUTION INTRAMUSCULAR; INTRAVENOUS at 05:31

## 2017-11-20 RX ADMIN — ALBUTEROL SULFATE 2.5 MG: 2.5 SOLUTION RESPIRATORY (INHALATION) at 11:47

## 2017-11-20 RX ADMIN — ACETAMINOPHEN 650 MG: 325 TABLET, FILM COATED ORAL at 18:35

## 2017-11-20 RX ADMIN — ALBUTEROL SULFATE 2.5 MG: 2.5 SOLUTION RESPIRATORY (INHALATION) at 03:57

## 2017-11-20 RX ADMIN — LURASIDONE HYDROCHLORIDE 80 MG: 80 TABLET, FILM COATED ORAL at 22:09

## 2017-11-20 RX ADMIN — Medication 10 ML: at 10:20

## 2017-11-20 RX ADMIN — ALBUTEROL SULFATE 2.5 MG: 2.5 SOLUTION RESPIRATORY (INHALATION) at 00:00

## 2017-11-20 RX ADMIN — METHYLPREDNISOLONE SODIUM SUCCINATE 40 MG: 40 INJECTION, POWDER, FOR SOLUTION INTRAMUSCULAR; INTRAVENOUS at 13:51

## 2017-11-20 RX ADMIN — GUAIFENESIN 1200 MG: 600 TABLET, EXTENDED RELEASE ORAL at 10:21

## 2017-11-20 RX ADMIN — TIOTROPIUM BROMIDE 18 MCG: 18 CAPSULE ORAL; RESPIRATORY (INHALATION) at 10:20

## 2017-11-20 RX ADMIN — INFLUENZA VIRUS VACCINE 0.5 ML: 15; 15; 15; 15 SUSPENSION INTRAMUSCULAR at 13:51

## 2017-11-20 RX ADMIN — Medication 10 ML: at 22:08

## 2017-11-20 RX ADMIN — TOPIRAMATE 50 MG: 25 TABLET, FILM COATED ORAL at 10:21

## 2017-11-20 RX ADMIN — GUAIFENESIN 1200 MG: 600 TABLET, EXTENDED RELEASE ORAL at 22:08

## 2017-11-20 RX ADMIN — BENZONATATE 200 MG: 100 CAPSULE ORAL at 22:08

## 2017-11-20 RX ADMIN — ALBUTEROL SULFATE 2.5 MG: 2.5 SOLUTION RESPIRATORY (INHALATION) at 15:39

## 2017-11-20 RX ADMIN — BUTALBITAL, ACETAMINOPHEN AND CAFFEINE 2 TABLET: 50; 325; 40 TABLET ORAL at 10:23

## 2017-11-20 RX ADMIN — ALBUTEROL SULFATE 2.5 MG: 2.5 SOLUTION RESPIRATORY (INHALATION) at 19:40

## 2017-11-20 RX ADMIN — LEVOFLOXACIN 750 MG: 5 INJECTION, SOLUTION INTRAVENOUS at 22:09

## 2017-11-20 RX ADMIN — Medication 10 ML: at 13:51

## 2017-11-20 RX ADMIN — MOMETASONE FUROATE AND FORMOTEROL FUMARATE DIHYDRATE 2 PUFF: 200; 5 AEROSOL RESPIRATORY (INHALATION) at 10:19

## 2017-11-20 RX ADMIN — MOMETASONE FUROATE AND FORMOTEROL FUMARATE DIHYDRATE 2 PUFF: 200; 5 AEROSOL RESPIRATORY (INHALATION) at 22:09

## 2017-11-20 RX ADMIN — BENZONATATE 200 MG: 100 CAPSULE ORAL at 10:21

## 2017-11-20 RX ADMIN — ACETAMINOPHEN 650 MG: 325 TABLET, FILM COATED ORAL at 05:31

## 2017-11-20 RX ADMIN — ALBUTEROL SULFATE 2.5 MG: 2.5 SOLUTION RESPIRATORY (INHALATION) at 23:42

## 2017-11-20 RX ADMIN — FAMOTIDINE 20 MG: 20 TABLET, FILM COATED ORAL at 22:09

## 2017-11-20 RX ADMIN — FAMOTIDINE 20 MG: 20 TABLET, FILM COATED ORAL at 10:21

## 2017-11-20 ASSESSMENT — PAIN SCALES - GENERAL
PAINLEVEL_OUTOF10: 8
PAINLEVEL_OUTOF10: 8
PAINLEVEL_OUTOF10: 0
PAINLEVEL_OUTOF10: 8

## 2017-11-20 ASSESSMENT — PAIN DESCRIPTION - PAIN TYPE: TYPE: ACUTE PAIN

## 2017-11-20 ASSESSMENT — PAIN DESCRIPTION - LOCATION: LOCATION: HEAD

## 2017-11-20 NOTE — PROGRESS NOTES
Physical Therapy    Facility/Department: [facility] PROGRESSIVE CARE  Initial Assessment    NAME: Allison Zheng  : 1955  MRN: [id number]    Date of Service: 2017    Patient Diagnosis(es): The primary encounter diagnosis was COPD exacerbation ([facility] A diagnosis of Cough was also pertinent to this visit. has a past medical history of Abnormal EKG; Anxiety; Asthma; Bipolar disorder ([facility] COPD (chronic obstructive pulmonary disease) ([facility] Cramps, extremity; Depression; History of elective ; Hyperlipidemia; Prolonged emergence from general anesthesia; Substance abuse; Unspecified sleep apnea; and Vision abnormalities. has a past surgical history that includes Tonsillectomy and adenoidectomy (Bilateral); LASIK; Induced ; Ankle surgery; eye surgery (Bilateral); Vulva surgery; and hysteroscopy (10/19/2016).     Restrictions     Vision/Hearing  Vision: Impaired  Vision Exceptions: Wears glasses for reading  Hearing: Within functional limits     Subjective  General  Patient assessed for rehabilitation services?: Yes  Additional Pertinent Hx: Admitted with SOB, productive cough, diagnosed with puemonia  Referral Date : 17  Diagnosis: COPD exacerbation  Follows Commands: Within Functional Limits  Pain Screening  Patient Currently in Pain: Denies  Vital Signs  Patient Currently in Pain: Denies       Orientation  Orientation  Overall Orientation Status: Within Normal Limits    Social/Functional History  Social/Functional History  Lives With: Alone  Type of Home: House  Home Layout: One level  Home Access: Stairs to enter without rails  Entrance Stairs - Number of Steps: 3  Bathroom Shower/Tub: Tub/Shower unit, Curtain  Bathroom Toilet: Standard  Bathroom Accessibility: Accessible  ADL Assistance: Independent  Ambulation Assistance: Independent  Transfer Assistance: Independent  Active : No  Patient's  Info: Daughter /[place]  Occupation: On disability  Additional Comments: Pt Training, Functional Mobility Training, Transfer Training, Gait Training, Endurance Training, Home Exercise Program, Safety Education & Training  Safety Devices  Type of devices: Left in bed, Call light within reach    G-Code     OutComes Score                                           Goals  Short term goals  Time Frame for Short term goals: 3 to 4 visits  Short term goal 1:  Pt able to ambulate dist of 200 ft, sao2 > 89%, safely   Short term goal 2: Pt to tolerate activity for 20-25 minutes with knowledge of pacing skills. Short term goal 3: Pt to demonstrate independence in HEP  Patient Goals   Patient goals : \"I hope I don't have to use oxygen at home. \"       Therapy Time   Individual Concurrent Group Co-treatment   Time In 3430         Time Out 1430         Minutes 15                 Janit Ranks, PT

## 2017-11-20 NOTE — PROGRESS NOTES
[facility]    Date:   11/20/2017  Patient name:  Raúl Figueroa  Date of admission:  11/16/2017  3:55 PM  MRN:   [id number]  YOB: 1955      HPI   COPD: Patient complains of dyspnea. Symptoms began 7 days ago. Symptoms acute dyspnea does worsen with exertion. Sputum is clear in small amounts. Fever has been low grade fevers. Patient uses 0 pillows at night. Patient can walk 10 feet before resting. Patient currently is not on home oxygen therapy. Jelena Nash Respiratory history: asthma            REVIEW OF SYSTEMS:    · Cardiovascular: Negative for lightheadedness/orthostatic symptoms ,chest pain, dyspnea on exertion, palpitations or loss of consciousness. · Respiratory: positive for dyspena  · Cough wheeze  ·   · Gastrointestinal: Negative for nausea/vomiting, change in bowel habits, abdominal pain, dysphagia/appetite loss, hematemesis, blood in stools. OBJECTIVE:    /75   Pulse 63   Temp 97.9 °F (36.6 °C) (Oral)   Resp 16   Ht 5' 4\" (1.626 m)   Wt 158 lb 11.7 oz (72 kg)   LMP 05/20/2013 (Exact Date)   SpO2 93%   BMI 27.25 kg/m²      · Lungs: poor air entry  · Wheeze  ·   · Heart: regular rate and rhythm, S1, S2 normal, no murmur, click, rub or gallop  · Abdomen: soft, non-tender; bowel sounds normal; no masses,  no organomegaly  · Extremities: extremities normal, atraumatic, no cyanosis or edema  · Neurological:  Reflexes normal and symmetric. Sensation grossly normal  · Skin - no rash, no lump   · Eye- no icterus no redness  · GI-oral mucosa moist,  · Lymphatic system-no lymphadenopathy no splenomegaly  · Mouth- mucous membrane moist  · Head-normocephalic atraumatic  · Neck- supple no carotid bruit,Thyroid not palpable.       Laboratory Testing:  CBC:   Recent Labs      11/20/17 0439   WBC  4.4   HGB  12.8   PLT  225     BMP:    Recent Labs      11/18/17   0540  11/19/17   0605  11/20/17   0439   NA  142 History of elective     Vision abnormalities    Abnormal endometrial ultrasound    Status post D&C    Status post hysteroscopy    Chronic headaches    Current smoker on some days    Tobacco dependence    Community acquired pneumonia of right middle lobe of lung ([facility]        PLAN:  Copd exa  Slow improvement  cotn iv steroids  Dr Paresh Aldrich input noted  Appreciated      MD JUNO [address], [address] [place] [address].    Phone [phone]   Fax: [phone]  Answering Service: [phone]

## 2017-11-20 NOTE — PROGRESS NOTES
ml       EXAM     General Appearance  Awake, alert, oriented, in no acute distress  HEENT - normocephalic, atraumatic. []  Mallampati  [] Crowded airway   [] Macroglossia  []  Retrognathia  [] Micrognathia  []  Normal tongue size []  Normal Bite  [] Tru sign positive    Neck - Supple,  trachea midline   Lungs - BS equal bilat. + wheezes, no rales, or rhonchi.    Cardiovascular - Heart sounds are normal.  Regular rate and rhythm   Abdomen - Soft, nontender, nondistended, no masses or organomegaly  Neurologic - There are no focal motor or sensory deficits  Skin - No bruising or bleeding  Extremities - No clubbing, cyanosis, edema    MEDS      influenza quadrivalent split vaccine  0.5 mL Intramuscular Once    methylPREDNISolone  40 mg Intravenous Q8H    levofloxacin  750 mg Intravenous Q24H    tiotropium  18 mcg Inhalation Daily    albuterol  2.5 mg Nebulization Q4H    mometasone-formoterol  2 puff Inhalation BID    guaiFENesin  1,200 mg Oral BID    benzonatate  200 mg Oral TID    sodium chloride flush  10 mL Intravenous 2 times per day    citalopram  20 mg Oral Daily    lurasidone  80 mg Oral Nightly    topiramate  50 mg Oral QAM    simvastatin  40 mg Oral Nightly    famotidine  20 mg Oral BID    enoxaparin  40 mg Subcutaneous Daily        albuterol, butalbital-acetaminophen-caffeine, sodium chloride flush, magnesium hydroxide, ondansetron, acetaminophen, morphine **OR** [DISCONTINUED] morphine, bisacodyl, nicotine    LABS   CBC   Recent Labs      11/20/17   0439   WBC  4.4   HGB  12.8   HCT  38.4   MCV  98.2   PLT  225     BMP: Lab Results   Component Value Date     11/20/2017    K 3.7 11/20/2017     11/20/2017    CO2 29 11/20/2017    BUN 14 11/20/2017    LABALBU 3.7 09/09/2016    CREATININE 0.54 11/20/2017    CALCIUM 8.7 11/20/2017    GFRAA >60 11/20/2017    LABGLOM >60 11/20/2017     ABGs:  Lab Results   Component Value Date    PHART 7.361 05/19/2016    PO2ART 67.1 05/19/2016    QGA0NJL 46.1 05/19/2016    Lab Results   Component Value Date    MODE NOT REPORTED 05/19/2016     Ionized Calcium:  No results found for: IONCA  Magnesium:  No results found for: MG  Phosphorus:  No results found for: PHOS     LIVER PROFILE No results for input(s): AST, ALT, LIPASE, BILIDIR, BILITOT, ALKPHOS in the last 72 hours. Invalid input(s): AMYLASE,  ALB  INR No results for input(s): INR in the last 72 hours. PTT   Lab Results   Component Value Date    APTT 23.7 10/12/2016         RADIOLOGY     (See actual reports for details)    ASSESSMENT/PLAN   Principal Problem:    COPD exacerbation ([facility]  Active Problems:    Asthma    Chronic headaches    Current smoker on some days    Tobacco dependence    Community acquired pneumonia of right middle lobe of lung ([facility]      1. COPD Exacerbation  - Improved, no longer on oxygen and able to ambulate without difficulty   - Possible d/c today    2. Tobacco Dependence  - Currently using Nicorette gum  - Encouraged patient to quit    Electronically signed by Jody Mobley on 11/20/2017 at 12:33 PM  Patient seen and examined independently by me. Above discussed and I agree with Medical student note except where indicated in the EMR revision history. Also see my additional comments and changes indicated by discrete font, text color, italics, and/or initials. Labs, cultures, and radiographs where available were reviewed.      Wean Solu-Medrol  Home oxygen evaluation including nocturnal  Mobilize  Smoking cessation discussed  Electronically signed by Arsalan Hurd MD on 11/20/2017 at 3:55 PM

## 2017-11-20 NOTE — PLAN OF CARE
Problem: Falls - Risk of:  Goal: Will remain free from falls  Will remain free from falls   Outcome: Ongoing  No falls noted this shift. Patient ambulates up per self without difficulty. Bed kept in low position. Safe environment maintained. Bedside table & call light in reach. Uses call light appropriately when needing assistance. Problem: Pain:  Goal: Pain level will decrease  Pain level will decrease   Outcome: Ongoing  No pain at this time. 0/10 pain scale    Problem: Falls - Risk of  Goal: Absence of falls  Outcome: Ongoing  No falls noted on this shift. Problem: Musculor/Skeletal Functional Status  Goal: Highest potential functional level  Outcome: Ongoing  Patient is up per self. Home O2 eval will be done tonight.

## 2017-11-20 NOTE — CARE COORDINATION
DISCHARGE PLANNING NOTE:    Plan is for patient to return to home. She is independent with her care and up per self. We are following for possible home O2 needs. Does not want information at this time about outpatient pulmonary rehab. Will follow.      Electronically signed by Corrine Lozoya RN on 11/20/2017 at 12:07 PM

## 2017-11-21 VITALS
HEART RATE: 71 BPM | HEIGHT: 64 IN | SYSTOLIC BLOOD PRESSURE: 127 MMHG | BODY MASS INDEX: 27.1 KG/M2 | OXYGEN SATURATION: 93 % | WEIGHT: 158.73 LBS | DIASTOLIC BLOOD PRESSURE: 64 MMHG | TEMPERATURE: 98.8 F | RESPIRATION RATE: 20 BRPM

## 2017-11-21 LAB
ABSOLUTE EOS #: 0 K/UL (ref 0–0.4)
ABSOLUTE IMMATURE GRANULOCYTE: ABNORMAL K/UL (ref 0–0.3)
ABSOLUTE LYMPH #: 0.49 K/UL (ref 1–4.8)
ABSOLUTE MONO #: 0.14 K/UL (ref 0.1–1.3)
ANION GAP SERPL CALCULATED.3IONS-SCNC: 13 MMOL/L (ref 9–17)
BASOPHILS # BLD: 0 %
BASOPHILS ABSOLUTE: 0 K/UL (ref 0–0.2)
BUN BLDV-MCNC: 15 MG/DL (ref 8–23)
BUN/CREAT BLD: ABNORMAL (ref 9–20)
CALCIUM SERPL-MCNC: 8.6 MG/DL (ref 8.6–10.4)
CHLORIDE BLD-SCNC: 103 MMOL/L (ref 98–107)
CO2: 28 MMOL/L (ref 20–31)
CREAT SERPL-MCNC: 0.51 MG/DL (ref 0.5–0.9)
DIFFERENTIAL TYPE: ABNORMAL
EOSINOPHILS RELATIVE PERCENT: 0 %
GFR AFRICAN AMERICAN: >60 ML/MIN
GFR NON-AFRICAN AMERICAN: >60 ML/MIN
GFR SERPL CREATININE-BSD FRML MDRD: ABNORMAL ML/MIN/{1.73_M2}
GFR SERPL CREATININE-BSD FRML MDRD: ABNORMAL ML/MIN/{1.73_M2}
GLUCOSE BLD-MCNC: 120 MG/DL (ref 70–99)
HCT VFR BLD CALC: 37.6 % (ref 36–46)
HEMOGLOBIN: 12.6 G/DL (ref 12–16)
IMMATURE GRANULOCYTES: ABNORMAL %
LYMPHOCYTES # BLD: 14 %
MCH RBC QN AUTO: 33.1 PG (ref 26–34)
MCHC RBC AUTO-ENTMCNC: 33.5 G/DL (ref 31–37)
MCV RBC AUTO: 98.7 FL (ref 80–100)
MONOCYTES # BLD: 4 %
MORPHOLOGY: NORMAL
PDW BLD-RTO: 14.9 % (ref 11.5–14.9)
PLATELET # BLD: 217 K/UL (ref 150–450)
PLATELET ESTIMATE: ABNORMAL
PMV BLD AUTO: 7.7 FL (ref 6–12)
POTASSIUM SERPL-SCNC: 3.7 MMOL/L (ref 3.7–5.3)
RBC # BLD: 3.81 M/UL (ref 4–5.2)
RBC # BLD: ABNORMAL 10*6/UL
SEG NEUTROPHILS: 82 %
SEGMENTED NEUTROPHILS ABSOLUTE COUNT: 2.87 K/UL (ref 1.3–9.1)
SODIUM BLD-SCNC: 144 MMOL/L (ref 135–144)
WBC # BLD: 3.5 K/UL (ref 3.5–11)
WBC # BLD: ABNORMAL 10*3/UL

## 2017-11-21 PROCEDURE — 6370000000 HC RX 637 (ALT 250 FOR IP): Performed by: INTERNAL MEDICINE

## 2017-11-21 PROCEDURE — 80048 BASIC METABOLIC PNL TOTAL CA: CPT

## 2017-11-21 PROCEDURE — 6360000002 HC RX W HCPCS: Performed by: NURSE PRACTITIONER

## 2017-11-21 PROCEDURE — 85025 COMPLETE CBC W/AUTO DIFF WBC: CPT

## 2017-11-21 PROCEDURE — 6370000000 HC RX 637 (ALT 250 FOR IP): Performed by: NURSE PRACTITIONER

## 2017-11-21 PROCEDURE — 6360000002 HC RX W HCPCS: Performed by: INTERNAL MEDICINE

## 2017-11-21 PROCEDURE — 2580000003 HC RX 258: Performed by: INTERNAL MEDICINE

## 2017-11-21 PROCEDURE — 36415 COLL VENOUS BLD VENIPUNCTURE: CPT

## 2017-11-21 PROCEDURE — 94762 N-INVAS EAR/PLS OXIMTRY CONT: CPT

## 2017-11-21 PROCEDURE — 94640 AIRWAY INHALATION TREATMENT: CPT

## 2017-11-21 PROCEDURE — 99239 HOSP IP/OBS DSCHRG MGMT >30: CPT | Performed by: INTERNAL MEDICINE

## 2017-11-21 RX ORDER — ALBUTEROL SULFATE 90 UG/1
2 AEROSOL, METERED RESPIRATORY (INHALATION) EVERY 4 HOURS PRN
Qty: 1 INHALER | Refills: 3 | Status: SHIPPED | OUTPATIENT
Start: 2017-11-21 | End: 2021-08-05 | Stop reason: ALTCHOICE

## 2017-11-21 RX ORDER — FLUTICASONE PROPIONATE 50 MCG
2 SPRAY, SUSPENSION (ML) NASAL DAILY
Qty: 1 BOTTLE | Refills: 5 | Status: SHIPPED | OUTPATIENT
Start: 2017-11-21 | End: 2021-08-05 | Stop reason: ALTCHOICE

## 2017-11-21 RX ORDER — DIPHENHYDRAMINE HCL 25 MG
25 TABLET ORAL NIGHTLY PRN
Status: DISCONTINUED | OUTPATIENT
Start: 2017-11-21 | End: 2017-11-21 | Stop reason: HOSPADM

## 2017-11-21 RX ORDER — PREDNISONE 10 MG/1
TABLET ORAL
Qty: 31 TABLET | Refills: 0 | Status: SHIPPED | OUTPATIENT
Start: 2017-11-21 | End: 2018-01-05

## 2017-11-21 RX ORDER — LEVOFLOXACIN 750 MG/1
750 TABLET ORAL DAILY
Qty: 3 TABLET | Refills: 0 | Status: SHIPPED | OUTPATIENT
Start: 2017-11-21 | End: 2017-11-24

## 2017-11-21 RX ORDER — DIPHENHYDRAMINE HCL 25 MG
25 TABLET ORAL NIGHTLY PRN
Status: DISCONTINUED | OUTPATIENT
Start: 2017-11-21 | End: 2017-11-21

## 2017-11-21 RX ORDER — ALBUTEROL SULFATE 2.5 MG/3ML
2.5 SOLUTION RESPIRATORY (INHALATION) 4 TIMES DAILY
Qty: 120 EACH | Refills: 3 | Status: ON HOLD | OUTPATIENT
Start: 2017-11-21 | End: 2022-05-03 | Stop reason: HOSPADM

## 2017-11-21 RX ADMIN — CITALOPRAM HYDROBROMIDE 20 MG: 20 TABLET ORAL at 08:15

## 2017-11-21 RX ADMIN — ACETAMINOPHEN 650 MG: 325 TABLET, FILM COATED ORAL at 01:14

## 2017-11-21 RX ADMIN — ENOXAPARIN SODIUM 40 MG: 40 INJECTION SUBCUTANEOUS at 08:17

## 2017-11-21 RX ADMIN — TOPIRAMATE 50 MG: 25 TABLET, FILM COATED ORAL at 08:15

## 2017-11-21 RX ADMIN — BENZONATATE 200 MG: 100 CAPSULE ORAL at 15:01

## 2017-11-21 RX ADMIN — ALBUTEROL SULFATE 2.5 MG: 2.5 SOLUTION RESPIRATORY (INHALATION) at 07:11

## 2017-11-21 RX ADMIN — Medication 10 ML: at 04:30

## 2017-11-21 RX ADMIN — Medication 10 ML: at 08:16

## 2017-11-21 RX ADMIN — TIOTROPIUM BROMIDE 18 MCG: 18 CAPSULE ORAL; RESPIRATORY (INHALATION) at 08:15

## 2017-11-21 RX ADMIN — ALBUTEROL SULFATE 2.5 MG: 2.5 SOLUTION RESPIRATORY (INHALATION) at 14:53

## 2017-11-21 RX ADMIN — FAMOTIDINE 20 MG: 20 TABLET, FILM COATED ORAL at 08:15

## 2017-11-21 RX ADMIN — ALBUTEROL SULFATE 2.5 MG: 2.5 SOLUTION RESPIRATORY (INHALATION) at 03:21

## 2017-11-21 RX ADMIN — MORPHINE SULFATE 2 MG: 10 INJECTION, SOLUTION INTRAMUSCULAR; INTRAVENOUS at 04:30

## 2017-11-21 RX ADMIN — MOMETASONE FUROATE AND FORMOTEROL FUMARATE DIHYDRATE 2 PUFF: 200; 5 AEROSOL RESPIRATORY (INHALATION) at 08:16

## 2017-11-21 RX ADMIN — ACETAMINOPHEN 650 MG: 325 TABLET, FILM COATED ORAL at 15:03

## 2017-11-21 RX ADMIN — BENZONATATE 200 MG: 100 CAPSULE ORAL at 08:15

## 2017-11-21 RX ADMIN — METHYLPREDNISOLONE SODIUM SUCCINATE 40 MG: 40 INJECTION, POWDER, FOR SOLUTION INTRAMUSCULAR; INTRAVENOUS at 01:14

## 2017-11-21 RX ADMIN — DIPHENHYDRAMINE HCL 25 MG: 25 TABLET ORAL at 01:14

## 2017-11-21 RX ADMIN — ALBUTEROL SULFATE 2.5 MG: 2.5 SOLUTION RESPIRATORY (INHALATION) at 10:45

## 2017-11-21 RX ADMIN — GUAIFENESIN 1200 MG: 600 TABLET, EXTENDED RELEASE ORAL at 08:15

## 2017-11-21 ASSESSMENT — PAIN DESCRIPTION - LOCATION
LOCATION: BACK
LOCATION: BACK

## 2017-11-21 ASSESSMENT — PAIN DESCRIPTION - PAIN TYPE
TYPE: ACUTE PAIN
TYPE: ACUTE PAIN

## 2017-11-21 ASSESSMENT — PAIN DESCRIPTION - ORIENTATION
ORIENTATION: LOWER
ORIENTATION: LOWER

## 2017-11-21 ASSESSMENT — PAIN SCALES - GENERAL
PAINLEVEL_OUTOF10: 0
PAINLEVEL_OUTOF10: 4
PAINLEVEL_OUTOF10: 0
PAINLEVEL_OUTOF10: 8
PAINLEVEL_OUTOF10: 0
PAINLEVEL_OUTOF10: 8
PAINLEVEL_OUTOF10: 8

## 2017-11-21 ASSESSMENT — PAIN DESCRIPTION - DESCRIPTORS
DESCRIPTORS: ACHING
DESCRIPTORS: ACHING

## 2017-11-21 ASSESSMENT — PAIN DESCRIPTION - FREQUENCY
FREQUENCY: INTERMITTENT
FREQUENCY: INTERMITTENT

## 2017-11-21 NOTE — CARE COORDINATION
Reviewed new antibiotic prescription for patient at discharge. Information added to discharge instructions    - reviewed possible and common side effects. Especially monitoring for diarrhea due to levaquin  antibiotic use. -reviewed directions for when to take antibiotic, and dietary restrictions. - emphasized importance of completing antibiotic therapy. - reviewed when to call physician.     Patient waiting on ride at approx 4pm.

## 2017-11-21 NOTE — PROGRESS NOTES
Patient requests something to \"help her sleep\". Notified Valeda Finder JENNIFER, who gave an order.

## 2017-11-21 NOTE — PROGRESS NOTES
Home Oxygen Evaluation    Home Oxygen Evaluation completed. Patient is on 1 liters per minute via nasal canula. Resting SpO2 = 91%  Resting SpO2 on room air = 90%    SpO2 on room air with exercise = 87%  SpO2 on oxygen as above with exercise = 89%    Nocturnal Oximetry with patient on room air is recommended is SpO2 is between 89% and 95% (requires additional order).     Enzo Ortiz  9:36 AM

## 2017-11-21 NOTE — CARE COORDINATION
DISCHARGE PLANNING NOTE:    Prescription for portable home O2, prescription for nebulizer, facesheet, face to face, and discharge med list was faxed to [facility] [place] at [phone]. I also called Ralf Martinez from [facility] [place] and notified her that this patient qualified for home O2 and is agreeable and she also needs a nebulizer and she stated that she will be here fairly soon to deliver the oxygen. Will follow.      Electronically signed by Candi Bonds RN on 11/21/2017 at 2:09 PM

## 2017-11-21 NOTE — DISCHARGE SUMMARY
[facility]    Patient name:  Lynne Shea  YOB: 1955  Primary Care Physician: Colt Coulter MD    Date of admission:  11/16/2017  3:55 PM  Date of discharge:11/21/2017       DISCHARGE DIAGNOSES       Principal Problem:    COPD exacerbation ([facility] [address]  Active Problems:    Asthma    Chronic headaches    Current smoker on some days    Tobacco dependence    Community acquired pneumonia of right middle lobe of lung ([facility] [address]      HOSPITAL COURSE      Pt admittedwith dyspnea  Found to have copd exa  No pneumonia  Home oxygen taper steroids  Out pt with dr Kelly Rodriguez    Consultants:  -dr Oliva Erp    Procedures:  None    DISCHARGE MEDICATIONS        Neyda Benoit   Home Medication Instructions INQ:[id number]    Printed on:11/21/17 1411   Medication Information                      Acetaminophen (TYLENOL) 325 MG CAPS  Take 1 tablet by mouth every 4 hours             albuterol (PROVENTIL) (2.5 MG/3ML) 0.083% nebulizer solution  Take 3 mLs by nebulization 4 times daily             albuterol sulfate  (90 Base) MCG/ACT inhaler  Inhale 2 puffs into the lungs every 4 hours as needed for Wheezing             Biotin 5000 MCG CAPS  Take by mouth             budesonide-formoterol (SYMBICORT) 160-4.5 MCG/ACT AERO  Inhale 2 puffs into the lungs 2 times daily             butalbital-acetaminophen-caffeine (FIORICET, ESGIC) -40 MG per tablet  Take 1 tablet by mouth every 4 hours as needed for Headaches             citalopram (CELEXA) 20 MG tablet  Take 20 mg by mouth daily             EPINEPHrine (EPIPEN) 0.3 MG/0.3ML SOAJ injection  use as directed by prescriber FOR ALLERGIC REACTION             fluticasone (FLONASE) 50 MCG/ACT nasal spray  2 sprays by Nasal route daily             ketotifen (ZADITOR) 0.025 % ophthalmic solution  Place 1 drop into both eyes 2 times daily             LATUDA 80 MG TABS tablet  Take 80 mg by mouth nightly             levofloxacin (LEVAQUIN) 750 MG tablet  Take 1 tablet by mouth daily for 3 days             montelukast (SINGULAIR) 10 MG tablet  Take 10 mg by mouth nightly             Multiple Vitamins-Minerals (THERAPEUTIC MULTIVITAMIN-MINERALS) tablet  Take 1 tablet by mouth daily ADVANCED EYE HEALTH             Omega 3-6-9 Fatty Acids (OMEGA DHA PO)  Take by mouth             predniSONE (DELTASONE) 10 MG tablet  Take 40 mg for 3 days, then 30 mg for 3 days, then 20 mg for 3 days, then 10 mg for 4 days then stop. RA VITAMIN D-3 1000 UNITS TABS tablet  take 1 tablet by mouth once daily             simvastatin (ZOCOR) 40 MG tablet  take 1 tablet by mouth at bedtime             topiramate (TOPAMAX) 50 MG tablet  Take 50 mg by mouth every morning              TUDORZA PRESSAIR 400 MCG/ACT AEPB inhaler  inhale 1 puff by mouth twice a day             vitamin D (ERGOCALCIFEROL) 73297 units CAPS capsule  take 1 capsule by mouth every week                 DISPOSITION AND FOLLOW-UP     Disposition: home      Condition: Stable     Diet:  Regular diet     Activity: As tolerated     Follow-up:   with Cony Ny MD,    Discharge time spent on pt and paperworki more than MD JUNO [place] [address], [address] [place] [address].    Phone [phone]   Fax: [phone]  Answering Service: [phone]

## 2017-11-22 ENCOUNTER — TELEPHONE (OUTPATIENT)
Dept: FAMILY MEDICINE CLINIC | Age: 62
End: 2017-11-22

## 2017-11-22 NOTE — TELEPHONE ENCOUNTER
Voicemail left for patient regarding recent hospital stay, patient was instructed to follow up with OBGYN within two weeks and message left for patient to remind her to do so.

## 2017-12-06 ENCOUNTER — OFFICE VISIT (OUTPATIENT)
Dept: FAMILY MEDICINE CLINIC | Age: 62
End: 2017-12-06
Payer: COMMERCIAL

## 2017-12-06 VITALS
HEART RATE: 77 BPM | SYSTOLIC BLOOD PRESSURE: 110 MMHG | DIASTOLIC BLOOD PRESSURE: 80 MMHG | HEIGHT: 64 IN | RESPIRATION RATE: 20 BRPM | WEIGHT: 150.2 LBS | BODY MASS INDEX: 25.64 KG/M2 | TEMPERATURE: 96.9 F | OXYGEN SATURATION: 96 %

## 2017-12-06 DIAGNOSIS — E55.9 VITAMIN D DEFICIENCY: ICD-10-CM

## 2017-12-06 DIAGNOSIS — J44.1 COPD EXACERBATION (HCC): Primary | ICD-10-CM

## 2017-12-06 DIAGNOSIS — E78.5 HYPERLIPIDEMIA WITH TARGET LDL LESS THAN 100: ICD-10-CM

## 2017-12-06 DIAGNOSIS — Z12.11 COLON CANCER SCREENING: ICD-10-CM

## 2017-12-06 PROBLEM — J18.9 COMMUNITY ACQUIRED PNEUMONIA OF RIGHT MIDDLE LOBE OF LUNG: Status: RESOLVED | Noted: 2017-11-17 | Resolved: 2017-12-06

## 2017-12-06 PROBLEM — F17.200 CURRENT SMOKER ON SOME DAYS: Status: RESOLVED | Noted: 2017-11-17 | Resolved: 2017-12-06

## 2017-12-06 PROCEDURE — G8926 SPIRO NO PERF OR DOC: HCPCS | Performed by: FAMILY MEDICINE

## 2017-12-06 PROCEDURE — G8427 DOCREV CUR MEDS BY ELIG CLIN: HCPCS | Performed by: FAMILY MEDICINE

## 2017-12-06 PROCEDURE — 3023F SPIROM DOC REV: CPT | Performed by: FAMILY MEDICINE

## 2017-12-06 PROCEDURE — 4004F PT TOBACCO SCREEN RCVD TLK: CPT | Performed by: FAMILY MEDICINE

## 2017-12-06 PROCEDURE — 99214 OFFICE O/P EST MOD 30 MIN: CPT | Performed by: FAMILY MEDICINE

## 2017-12-06 PROCEDURE — G8419 CALC BMI OUT NRM PARAM NOF/U: HCPCS | Performed by: FAMILY MEDICINE

## 2017-12-06 PROCEDURE — 3017F COLORECTAL CA SCREEN DOC REV: CPT | Performed by: FAMILY MEDICINE

## 2017-12-06 PROCEDURE — 3014F SCREEN MAMMO DOC REV: CPT | Performed by: FAMILY MEDICINE

## 2017-12-06 PROCEDURE — 1111F DSCHRG MED/CURRENT MED MERGE: CPT | Performed by: FAMILY MEDICINE

## 2017-12-06 PROCEDURE — G8484 FLU IMMUNIZE NO ADMIN: HCPCS | Performed by: FAMILY MEDICINE

## 2017-12-06 RX ORDER — GUAIFENESIN/DEXTROMETHORPHAN 100-10MG/5
5 SYRUP ORAL 3 TIMES DAILY PRN
Qty: 120 ML | Refills: 0 | Status: SHIPPED | OUTPATIENT
Start: 2017-12-06 | End: 2018-01-05

## 2017-12-06 RX ORDER — SIMVASTATIN 40 MG
TABLET ORAL
Qty: 30 TABLET | Refills: 3 | Status: SHIPPED | OUTPATIENT
Start: 2017-12-06 | End: 2018-04-03 | Stop reason: SDUPTHER

## 2017-12-06 ASSESSMENT — ENCOUNTER SYMPTOMS
NAUSEA: 0
SORE THROAT: 0
COUGH: 1
BLOOD IN STOOL: 0
ABDOMINAL PAIN: 0
VOMITING: 0
SPUTUM PRODUCTION: 1
BACK PAIN: 0
SHORTNESS OF BREATH: 1
WHEEZING: 0
DIARRHEA: 0
CONSTIPATION: 0

## 2017-12-06 NOTE — PROGRESS NOTES
Post-Discharge Transitional Care Management Services      Galileo Mendez   YOB: 1955    Date of Office Visit:  12/6/2017  Date of Hospital Admission: 11/16/17  Date of Hospital Discharge: 11/21/17  [facility] Risk Score [risk of hospital readmission >=10  medium risk (chance of readmission ~ 12%) >14  high risk (chance of readmission ~18%)]:Risk Score: 11.75    Care management risk score Rising risk (score 2-5) and Complex Care (Scores >=6): 2       Patient Active Problem List   Diagnosis    Cervical motion tenderness    Chronic obstructive pulmonary disease ([address]    Nabothian cyst    Vitamin D deficiency    [facility] bronchitis ([facility] [address]    COPD exacerbation ([facility] [address]    Anxiety    Asthma    Bipolar disorder ([facility] [address]    Depression    Hyperlipidemia    Sleep apnea    Substance abuse    Vision abnormalities    Abnormal endometrial ultrasound    Status post D&C    Status post hysteroscopy    Chronic headaches    Tobacco dependence       Allergies   Allergen Reactions    Advil [Ibuprofen] Other (See Comments)     vomiting    Aleve [Naproxen] Other (See Comments)     vomiting    Other      GRASS, TREES, WEEDS    Strawberry Extract      HEADACHES    Aspirin Nausea And Vomiting       Medications listed as ordered at the time of discharge from hospital   Judie Spaniel   Home Medication Instructions NARAYAN:    Printed on:12/06/17 1050   Medication Information                      Acetaminophen (TYLENOL) 325 MG CAPS  Take 1 tablet by mouth every 4 hours             albuterol (PROVENTIL) (2.5 MG/3ML) 0.083% nebulizer solution  Take 3 mLs by nebulization 4 times daily             albuterol sulfate  (90 Base) MCG/ACT inhaler  Inhale 2 puffs into the lungs every 4 hours as needed for Wheezing             Biotin 5000 MCG CAPS  Take by mouth             budesonide-formoterol (SYMBICORT) 160-4.5 MCG/ACT AERO  Inhale 2 puffs into the lungs 2 times daily Medication Sig Dispense Refill    Fluticasone Furoate-Vilanterol (BREO ELLIPTA IN) Inhale into the lungs daily      Tiotropium Bromide Monohydrate (SPIRIVA HANDIHALER IN) Inhale into the lungs daily      vitamin D (RA VITAMIN D-3) 1000 units TABS tablet take 1 tablet by mouth once daily 30 tablet 5    simvastatin (ZOCOR) 40 MG tablet take 1 tablet by mouth at bedtime 30 tablet 3    cyanocobalamin 1000 MCG tablet Take 1 tablet by mouth daily 30 tablet 3    guaiFENesin-dextromethorphan (ROBITUSSIN DM) 100-10 MG/5ML syrup Take 5 mLs by mouth 3 times daily as needed for Cough 120 mL 0    albuterol (PROVENTIL) (2.5 MG/3ML) 0.083% nebulizer solution Take 3 mLs by nebulization 4 times daily 120 each 3    albuterol sulfate  (90 Base) MCG/ACT inhaler Inhale 2 puffs into the lungs every 4 hours as needed for Wheezing 1 Inhaler 3    fluticasone (FLONASE) 50 MCG/ACT nasal spray 2 sprays by Nasal route daily 1 Bottle 5    predniSONE (DELTASONE) 10 MG tablet Take 40 mg for 3 days, then 30 mg for 3 days, then 20 mg for 3 days, then 10 mg for 4 days then stop.  31 tablet 0    vitamin D (ERGOCALCIFEROL) 96872 units CAPS capsule take 1 capsule by mouth every week 4 capsule 5    EPINEPHrine (EPIPEN) 0.3 MG/0.3ML SOAJ injection use as directed by prescriber FOR ALLERGIC REACTION 2 each 2    Acetaminophen (TYLENOL) 325 MG CAPS Take 1 tablet by mouth every 4 hours 30 capsule 0    TUDORZA PRESSAIR 400 MCG/ACT AEPB inhaler inhale 1 puff by mouth twice a day 1 each 0    butalbital-acetaminophen-caffeine (FIORICET, ESGIC) -40 MG per tablet Take 1 tablet by mouth every 4 hours as needed for Headaches      Biotin 5000 MCG CAPS Take by mouth      Multiple Vitamins-Minerals (THERAPEUTIC MULTIVITAMIN-MINERALS) tablet Take 1 tablet by mouth daily ADVANCED EYE HEALTH      citalopram (CELEXA) 20 MG tablet Take 20 mg by mouth daily      LATUDA 80 MG TABS tablet Take 80 mg by mouth nightly      budesonide-formoterol (SYMBICORT) 160-4.5 MCG/ACT AERO Inhale 2 puffs into the lungs 2 times daily      ketotifen (ZADITOR) 0.025 % ophthalmic solution Place 1 drop into both eyes 2 times daily      topiramate (TOPAMAX) 50 MG tablet Take 50 mg by mouth every morning   0        Medications patient taking as of now reconciled against medications ordered at time of hospital discharge . No change, pt started on prednisone     Vitals:    12/06/17 1017   BP: 110/80   Pulse: 77   Resp: 20   Temp: 96.9 °F (36.1 °C)   TempSrc: Tympanic   SpO2: 96%   Weight: 150 lb 3.2 oz (68.1 kg)   Height: 5' 4\" (1.626 m)     Body mass index is 25.78 kg/m². Wt Readings from Last 3 Encounters:   12/06/17 150 lb 3.2 oz (68.1 kg)   11/20/17 158 lb 11.7 oz (72 kg)   11/03/16 162 lb (73.5 kg)     BP Readings from Last 3 Encounters:   12/06/17 110/80   11/21/17 127/64   11/03/16 104/60        Inpatient course: Discharge summary reviewed- see chart. Chief Complaint   Patient presents with   [address] from Hospital     copd     History of Present illness - Follow up of Hospital diagnosis(es): COPD exercabation   -Patient is here for hospital follow-up for COPD exacerbation. Patient reports she was admitted for 5 days, was treated with steroids, antibiotic and aerosol treatments. Chest x-ray did not show any pneumonia. Patient was also seen by pulmonologist Dr. Lawrnce Moritz. She has appointment with Dr. Lawrnce Moritz today. Patient reports she feels same, her breathing has been improved but she is been coughing continuously. She is still taking steroids and is done with antibiotics. Patient reports she is doing her aerosol treatments. She denies any new fever or chills.   Patient is oxygen dependent 24/24 hours      Non face to face  following discharge, date last encounter closed (first attempt may have been earlier): *No documented post hospital discharge outreach found in the last 14 days    Call initiated 2 business days of discharge: *No response recorded in the last 14 days     Interval history/Current status: active       Review of Systems   Constitutional: Negative for chills, fever and malaise/fatigue. HENT: Negative for congestion, ear discharge, ear pain and sore throat. Respiratory: Positive for cough, sputum production and shortness of breath. Negative for wheezing. Cardiovascular: Negative for chest pain. Gastrointestinal: Negative for abdominal pain, blood in stool, constipation, diarrhea, nausea and vomiting. Genitourinary: Negative for flank pain, frequency, hematuria and urgency. Musculoskeletal: Negative for back pain, myalgias and neck pain. Skin: Negative for itching and rash. Neurological: Negative for dizziness, tingling, tremors, speech change, focal weakness, weakness and headaches. Psychiatric/Behavioral: Negative for depression and hallucinations. The patient is not nervous/anxious.         Physical Exam:  General Appearance: alert and oriented to person, place and time, well-developed and well-nourished, in no acute distress, wearing oxygen  Skin: warm and dry, no rash or erythema  Head: normocephalic and atraumatic  Neck: neck supple and non tender without mass, no thyromegaly or thyroid nodules, no cervical lymphadenopathy   Pulmonary/Chest: Bilateral decreased breath sounds, no wheezes heard  Cardiovascular: normal rate, normal S1 and S2, no gallops, intact distal pulses and no carotid bruits  Abdomen: soft, non-tender, non-distended, normal bowel sounds, no masses or organomegaly  Extremities: no cyanosis and no clubbing  Musculoskeletal: normal range of motion, no joint swelling, deformity or tenderness  Neurologic: gait and coordination normal and speech normal        Assessment/Plan:  Jase Dockery was seen today for follow-up from hospital.    Diagnoses and all orders for this visit:    COPD exacerbation ([address]  -Stable on current treatment, and discussed with patient to follow with pulmonologist.  Symptomatic

## 2017-12-08 ENCOUNTER — HOSPITAL ENCOUNTER (OUTPATIENT)
Age: 62
Discharge: HOME | End: 2017-12-08
Payer: COMMERCIAL

## 2017-12-08 ENCOUNTER — HOSPITAL ENCOUNTER (OUTPATIENT)
Dept: GENERAL RADIOLOGY | Age: 62
Discharge: HOME | End: 2017-12-08
Payer: COMMERCIAL

## 2017-12-08 DIAGNOSIS — Z12.11 COLON CANCER SCREENING: ICD-10-CM

## 2017-12-08 DIAGNOSIS — J44.1 COPD EXACERBATION (HCC): ICD-10-CM

## 2017-12-08 LAB
CONTROL: PRESENT
HEMOCCULT STL QL: POSITIVE

## 2017-12-08 PROCEDURE — 71020 XR CHEST STANDARD TWO VW: CPT

## 2017-12-08 PROCEDURE — 82274 ASSAY TEST FOR BLOOD FECAL: CPT | Performed by: FAMILY MEDICINE

## 2017-12-11 DIAGNOSIS — R19.5 POSITIVE FIT (FECAL IMMUNOCHEMICAL TEST): Primary | ICD-10-CM

## 2018-01-05 ENCOUNTER — OFFICE VISIT (OUTPATIENT)
Dept: GASTROENTEROLOGY | Age: 63
End: 2018-01-05
Payer: COMMERCIAL

## 2018-01-05 VITALS
WEIGHT: 148 LBS | SYSTOLIC BLOOD PRESSURE: 119 MMHG | RESPIRATION RATE: 16 BRPM | BODY MASS INDEX: 27.23 KG/M2 | DIASTOLIC BLOOD PRESSURE: 79 MMHG | OXYGEN SATURATION: 96 % | HEART RATE: 109 BPM | HEIGHT: 62 IN

## 2018-01-05 DIAGNOSIS — K58.8 OTHER IRRITABLE BOWEL SYNDROME: ICD-10-CM

## 2018-01-05 DIAGNOSIS — K64.8 INTERNAL HEMORRHOIDS WITH COMPLICATION: ICD-10-CM

## 2018-01-05 DIAGNOSIS — Z12.11 SCREENING FOR COLORECTAL CANCER: Primary | ICD-10-CM

## 2018-01-05 DIAGNOSIS — R19.5 POSITIVE FIT (FECAL IMMUNOCHEMICAL TEST): ICD-10-CM

## 2018-01-05 DIAGNOSIS — Z12.12 SCREENING FOR COLORECTAL CANCER: Primary | ICD-10-CM

## 2018-01-05 PROCEDURE — G8427 DOCREV CUR MEDS BY ELIG CLIN: HCPCS | Performed by: INTERNAL MEDICINE

## 2018-01-05 PROCEDURE — G8419 CALC BMI OUT NRM PARAM NOF/U: HCPCS | Performed by: INTERNAL MEDICINE

## 2018-01-05 PROCEDURE — 3017F COLORECTAL CA SCREEN DOC REV: CPT | Performed by: INTERNAL MEDICINE

## 2018-01-05 PROCEDURE — 99244 OFF/OP CNSLTJ NEW/EST MOD 40: CPT | Performed by: INTERNAL MEDICINE

## 2018-01-05 PROCEDURE — 3014F SCREEN MAMMO DOC REV: CPT | Performed by: INTERNAL MEDICINE

## 2018-01-05 PROCEDURE — G8484 FLU IMMUNIZE NO ADMIN: HCPCS | Performed by: INTERNAL MEDICINE

## 2018-01-05 ASSESSMENT — ENCOUNTER SYMPTOMS
ALLERGIC/IMMUNOLOGIC NEGATIVE: 1
VOMITING: 0
CONSTIPATION: 0
BLOOD IN STOOL: 0
ABDOMINAL PAIN: 0
DIARRHEA: 0
ABDOMINAL DISTENTION: 0
RESPIRATORY NEGATIVE: 1
GASTROINTESTINAL NEGATIVE: 1
ANAL BLEEDING: 0
NAUSEA: 0
RECTAL PAIN: 0
TROUBLE SWALLOWING: 0

## 2018-01-05 NOTE — PROGRESS NOTES
start taking some OTC Probiotics products   These are available over the counter at the Pharmacy stores and Grocery stores  He was explained about the beneficial effects they have in the GI track  They will help to establish the good bacterial kit and will help with the digestion and bowel movements  The patient has verbalized understanding and agreement to this plan

## 2018-01-09 RX ORDER — POLYETHYLENE GLYCOL 3350 17 G/17G
POWDER, FOR SOLUTION ORAL
Qty: 255 G | Refills: 0 | Status: SHIPPED | OUTPATIENT
Start: 2018-01-09 | End: 2018-02-04

## 2018-02-07 ENCOUNTER — OFFICE VISIT (OUTPATIENT)
Dept: FAMILY MEDICINE CLINIC | Age: 63
End: 2018-02-07
Payer: COMMERCIAL

## 2018-02-07 VITALS
OXYGEN SATURATION: 96 % | SYSTOLIC BLOOD PRESSURE: 98 MMHG | DIASTOLIC BLOOD PRESSURE: 70 MMHG | TEMPERATURE: 97.9 F | WEIGHT: 153 LBS | BODY MASS INDEX: 28.16 KG/M2 | RESPIRATION RATE: 24 BRPM | HEIGHT: 62 IN

## 2018-02-07 DIAGNOSIS — J20.9 ACUTE BRONCHITIS, UNSPECIFIED ORGANISM: ICD-10-CM

## 2018-02-07 DIAGNOSIS — R19.5 POSITIVE FIT (FECAL IMMUNOCHEMICAL TEST): ICD-10-CM

## 2018-02-07 DIAGNOSIS — Z23 NEED FOR ZOSTER VACCINATION: ICD-10-CM

## 2018-02-07 DIAGNOSIS — Z12.39 BREAST CANCER SCREENING: ICD-10-CM

## 2018-02-07 DIAGNOSIS — R68.89 FLU-LIKE SYMPTOMS: ICD-10-CM

## 2018-02-07 DIAGNOSIS — J41.8 MIXED SIMPLE AND MUCOPURULENT CHRONIC BRONCHITIS (HCC): Primary | ICD-10-CM

## 2018-02-07 PROBLEM — F17.200 TOBACCO DEPENDENCE: Chronic | Status: RESOLVED | Noted: 2017-11-17 | Resolved: 2018-02-07

## 2018-02-07 LAB
INFLUENZA A ANTIBODY: NEGATIVE
INFLUENZA B ANTIBODY: NEGATIVE

## 2018-02-07 PROCEDURE — G8484 FLU IMMUNIZE NO ADMIN: HCPCS | Performed by: FAMILY MEDICINE

## 2018-02-07 PROCEDURE — 3023F SPIROM DOC REV: CPT | Performed by: FAMILY MEDICINE

## 2018-02-07 PROCEDURE — 3014F SCREEN MAMMO DOC REV: CPT | Performed by: FAMILY MEDICINE

## 2018-02-07 PROCEDURE — 1036F TOBACCO NON-USER: CPT | Performed by: FAMILY MEDICINE

## 2018-02-07 PROCEDURE — 96160 PT-FOCUSED HLTH RISK ASSMT: CPT | Performed by: FAMILY MEDICINE

## 2018-02-07 PROCEDURE — G8926 SPIRO NO PERF OR DOC: HCPCS | Performed by: FAMILY MEDICINE

## 2018-02-07 PROCEDURE — G8419 CALC BMI OUT NRM PARAM NOF/U: HCPCS | Performed by: FAMILY MEDICINE

## 2018-02-07 PROCEDURE — 87804 INFLUENZA ASSAY W/OPTIC: CPT | Performed by: FAMILY MEDICINE

## 2018-02-07 PROCEDURE — 3017F COLORECTAL CA SCREEN DOC REV: CPT | Performed by: FAMILY MEDICINE

## 2018-02-07 PROCEDURE — 99214 OFFICE O/P EST MOD 30 MIN: CPT | Performed by: FAMILY MEDICINE

## 2018-02-07 PROCEDURE — G8427 DOCREV CUR MEDS BY ELIG CLIN: HCPCS | Performed by: FAMILY MEDICINE

## 2018-02-07 RX ORDER — GUAIFENESIN 600 MG/1
600 TABLET, EXTENDED RELEASE ORAL 2 TIMES DAILY
Qty: 30 TABLET | Refills: 0 | Status: SHIPPED | OUTPATIENT
Start: 2018-02-07 | End: 2018-05-09 | Stop reason: ALTCHOICE

## 2018-02-07 RX ORDER — AZITHROMYCIN 250 MG/1
TABLET, FILM COATED ORAL
Qty: 1 PACKET | Refills: 0 | Status: SHIPPED | OUTPATIENT
Start: 2018-02-07 | End: 2018-05-09 | Stop reason: ALTCHOICE

## 2018-02-07 ASSESSMENT — ENCOUNTER SYMPTOMS
COUGH: 1
BLOOD IN STOOL: 0
WHEEZING: 0
EYE REDNESS: 0
PHOTOPHOBIA: 0
RHINORRHEA: 1
DIARRHEA: 0
SINUS PRESSURE: 1
ABDOMINAL PAIN: 0
SHORTNESS OF BREATH: 0
CONSTIPATION: 0
SORE THROAT: 0

## 2018-02-07 ASSESSMENT — PATIENT HEALTH QUESTIONNAIRE - PHQ9
2. FEELING DOWN, DEPRESSED OR HOPELESS: 2
SUM OF ALL RESPONSES TO PHQ QUESTIONS 1-9: 14
5. POOR APPETITE OR OVEREATING: 2
10. IF YOU CHECKED OFF ANY PROBLEMS, HOW DIFFICULT HAVE THESE PROBLEMS MADE IT FOR YOU TO DO YOUR WORK, TAKE CARE OF THINGS AT HOME, OR GET ALONG WITH OTHER PEOPLE: 1
7. TROUBLE CONCENTRATING ON THINGS, SUCH AS READING THE NEWSPAPER OR WATCHING TELEVISION: 0
SUM OF ALL RESPONSES TO PHQ9 QUESTIONS 1 & 2: 4
6. FEELING BAD ABOUT YOURSELF - OR THAT YOU ARE A FAILURE OR HAVE LET YOURSELF OR YOUR FAMILY DOWN: 2
4. FEELING TIRED OR HAVING LITTLE ENERGY: 3
8. MOVING OR SPEAKING SO SLOWLY THAT OTHER PEOPLE COULD HAVE NOTICED. OR THE OPPOSITE, BEING SO FIGETY OR RESTLESS THAT YOU HAVE BEEN MOVING AROUND A LOT MORE THAN USUAL: 0
9. THOUGHTS THAT YOU WOULD BE BETTER OFF DEAD, OR OF HURTING YOURSELF: 0
1. LITTLE INTEREST OR PLEASURE IN DOING THINGS: 2
3. TROUBLE FALLING OR STAYING ASLEEP: 3

## 2018-02-07 NOTE — PROGRESS NOTES
Visit Information    Have you changed or started any medications since your last visit including any over-the-counter medicines, vitamins, or herbal medicines? no   Have you stopped taking any of your medications? Is so, why? -  no  Are you having any side effects from any of your medications? - no    Have you seen any other physician or provider since your last visit?  no   Have you had any other diagnostic tests since your last visit?  no   Have you been seen in the emergency room and/or had an admission in a hospital since we last saw you?  no   Have you had your routine dental cleaning in the past 6 months?  no     Do you have an active MyChart account? If no, what is the barrier?   Yes    Patient Care Team:  Cornelia Dove MD as PCP - General (Family Medicine)  Shyann Hayes DO as Consulting Physician (Obstetrics & Gynecology)  Gisella Pendleton MD as Consulting Physician (Pulmonology)  Lopez Wilder MD as Consulting Physician (Gastroenterology)    Medical History Review  Past Medical, Family, and Social History reviewed and does contribute to the patient presenting condition    Health Maintenance   Topic Date Due    Zostavax vaccine  02/10/2017    Breast cancer screen  02/17/2018    Smoker: low dose lung CT screening  11/17/2018    Colon Cancer Screen FIT/FOBT  12/08/2018    Cervical cancer screen  01/06/2019    Diabetes screen  03/17/2019    Lipid screen  11/19/2022    DTaP/Tdap/Td vaccine (2 - Td) 09/09/2026    Flu vaccine  Completed    Pneumococcal med risk  Completed    Hepatitis C screen  Completed    HIV screen  Completed

## 2018-02-07 NOTE — PROGRESS NOTES
250 MG tablet Take 2 tablets (500 mg) on Day 1, followed by 1 tablet (250 mg) once daily on Days 2 through 5. 1 packet 0    Fluticasone Furoate-Vilanterol (BREO ELLIPTA IN) Inhale into the lungs daily      Tiotropium Bromide Monohydrate (SPIRIVA HANDIHALER IN) Inhale into the lungs daily      simvastatin (ZOCOR) 40 MG tablet take 1 tablet by mouth at bedtime 30 tablet 3    albuterol (PROVENTIL) (2.5 MG/3ML) 0.083% nebulizer solution Take 3 mLs by nebulization 4 times daily 120 each 3    albuterol sulfate  (90 Base) MCG/ACT inhaler Inhale 2 puffs into the lungs every 4 hours as needed for Wheezing 1 Inhaler 3    fluticasone (FLONASE) 50 MCG/ACT nasal spray 2 sprays by Nasal route daily 1 Bottle 5    vitamin D (ERGOCALCIFEROL) 26344 units CAPS capsule take 1 capsule by mouth every week 4 capsule 5    EPINEPHrine (EPIPEN) 0.3 MG/0.3ML SOAJ injection use as directed by prescriber FOR ALLERGIC REACTION 2 each 2    Acetaminophen (TYLENOL) 325 MG CAPS Take 1 tablet by mouth every 4 hours 30 capsule 0    TUDORZA PRESSAIR 400 MCG/ACT AEPB inhaler inhale 1 puff by mouth twice a day 1 each 0    butalbital-acetaminophen-caffeine (FIORICET, ESGIC) -40 MG per tablet Take 1 tablet by mouth every 4 hours as needed for Headaches      Biotin 5000 MCG CAPS Take 1,000 mcg by mouth       citalopram (CELEXA) 20 MG tablet Take 20 mg by mouth daily      LATUDA 80 MG TABS tablet Take 80 mg by mouth nightly      ketotifen (ZADITOR) 0.025 % ophthalmic solution Place 1 drop into both eyes 2 times daily      topiramate (TOPAMAX) 50 MG tablet Take 50 mg by mouth every morning   0     No current facility-administered medications for this visit. Social History     Social History    Marital status:      Spouse name: N/A    Number of children: N/A    Years of education: N/A     Occupational History    Not on file.      Social History Main Topics    Smoking status: Former Smoker     Packs/day: 2.00     Years: 42.00     Types: Cigarettes     Quit date: 5/12/2016    Smokeless tobacco: Never Used    Alcohol use Yes      Comment: yearly    Drug use: No    Sexual activity: Not Currently     Partners: Male     Birth control/ protection: Post-menopausal     Other Topics Concern    Not on file     Social History Narrative    No narrative on file     Counseling given: Not Answered        Family History   Problem Relation Age of Onset    Dementia Maternal Aunt     Cancer Mother      unsure of type    Kidney Disease Mother     Heart Attack Sister     Prostate Cancer Father     High Cholesterol Brother     Heart Attack Paternal Grandmother              -rest of complaints with corresponding details per ROS    The patient's past medical, surgical, social, and family history as well as her current medications and allergies were reviewed as documented in today's encounter. Review of Systems   Constitutional: Positive for chills. Negative for activity change, appetite change, fever and unexpected weight change. HENT: Positive for congestion, rhinorrhea and sinus pressure. Negative for ear pain, postnasal drip and sore throat. Eyes: Negative for photophobia, redness and visual disturbance. Respiratory: Positive for cough. Negative for shortness of breath and wheezing. Cardiovascular: Negative for chest pain and palpitations. Gastrointestinal: Negative for abdominal pain, blood in stool, constipation and diarrhea. Genitourinary: Negative for flank pain, frequency, hematuria, urgency and vaginal pain. Musculoskeletal: Negative for arthralgias and neck pain. Neurological: Positive for headaches. Negative for dizziness, speech difficulty, weakness, light-headedness and numbness. Hematological: Negative for adenopathy. Psychiatric/Behavioral: Negative for confusion, decreased concentration, hallucinations, self-injury and sleep disturbance. The patient is not nervous/anxious. Physical Exam    PHYSICAL EXAM:   VITALS:   Vitals:    02/07/18 1038   BP: 98/70   Resp: 24   Temp: 97.9 °F (36.6 °C)   SpO2: 96%     GENERAL:  Patient is a well-developed, well-nourished female  in no acute distress, alert and oriented x3, appropriate and pleasant conversation. HEAD: Normocephalic, atraumatic. EYES: Pupils equal, round and reactive to light and accommodation, extraocular   movements intact. ENT: Moist mucous membranes. No sinus tenderness seen. NECK: Supple. No masses. No lymphadenopathy. CARDIOVASCULAR: Regular rate and rhythm. PULMONARY: Lungs are clear to auscultation bilaterally. Occasional wheezes heard. ABDOMEN: Soft, nontender, nondistended. Positive bowel sounds. MUSCULOSKELETAL: Strength 5/5 bilaterally in all extremities. No tenderness to   palpation of the ribs, long bones, or spine. NEUROLOGIC: Cranial nerves II through XII grossly intact. No focal deficits are noted. ASSESSMENT AND PLAN      1. Mixed simple and mucopurulent chronic bronchitis ([address]  -Stable continue current therapy continued to follow with pulmonologist    2. Acute bronchitis, unspecified organism  -Influenza test is negative, treatment for bronchitis with Z-Tucker and Mucinex.  - guaiFENesin (MUCINEX) 600 MG extended release tablet; Take 1 tablet by mouth 2 times daily  Dispense: 30 tablet; Refill: 0  - azithromycin (ZITHROMAX) 250 MG tablet; Take 2 tablets (500 mg) on Day 1, followed by 1 tablet (250 mg) once daily on Days 2 through 5. Dispense: 1 packet; Refill: 0  - POCT Influenza A  - POCT Influenza B    3. Flu-like symptoms  -Negative influenza test, discussed with patient if symptoms do not improve or got worse call back. - POCT Influenza A  - POCT Influenza B    4. Positive FIT (fecal immunochemical test)  -Patient has appointment with GI for colonoscopy. 5. Breast cancer screening    - FARIHA DIGITAL SCREEN W CAD BILATERAL; Future    6.  Need for zoster vaccination    - Zoster Vac Recomb Adjuvanted 50 MCG SUSR; Inject 0.5 mLs into the muscle once for 1 dose  Dispense: 1 each; Refill: 0      Orders Placed This Encounter   Procedures    FARIHA DIGITAL SCREEN W CAD BILATERAL     Standing Status:   Future     Standing Expiration Date:   4/9/2019     Order Specific Question:   Reason for exam:     Answer:   screening    POCT Influenza A    POCT Influenza B         Medications Discontinued During This Encounter   Medication Reason    Multiple Vitamins-Minerals (THERAPEUTIC MULTIVITAMIN-MINERALS) tablet Therapy completed    cyanocobalamin 6360 MCG tablet Duplicate Order    vitamin D (RA VITAMIN D-3) 1000 units TABS tablet Therapy completed       Uvaldo Rupal received counseling on the following healthy behaviors: nutrition, exercise, medication adherence and tobacco cessation  Reviewed prior labs and health maintenance  Continue current medications, diet and exercise. Discussed use, benefit, and side effects of prescribed medications. Barriers to medication compliance addressed. Patient given educational materials - see patient instructions  Was a self-tracking handout given in paper form or via [employer]? Yes    Requested Prescriptions     Signed Prescriptions Disp Refills    Zoster Vac Recomb Adjuvanted 50 MCG SUSR 1 each 0     Sig: Inject 0.5 mLs into the muscle once for 1 dose    guaiFENesin (MUCINEX) 600 MG extended release tablet 30 tablet 0     Sig: Take 1 tablet by mouth 2 times daily    azithromycin (ZITHROMAX) 250 MG tablet 1 packet 0     Sig: Take 2 tablets (500 mg) on Day 1, followed by 1 tablet (250 mg) once daily on Days 2 through 5. All patient questions answered. Patient voiced understanding. Quality Measures    Body mass index is 27.98 kg/m². Normal. Weight control planned discussed Healthy diet and regular exercise. BP: 98/70 Blood pressure is normal. Treatment plan consists of No treatment change needed.     Lab Results   Component Value Date    LDLCHOLESTEROL 70

## 2018-02-19 RX ORDER — KETOTIFEN FUMARATE 0.25 MG/ML
SOLUTION/ DROPS OPHTHALMIC
Qty: 10 ML | Refills: 6 | Status: SHIPPED | OUTPATIENT
Start: 2018-02-19 | End: 2021-06-17

## 2018-02-21 DIAGNOSIS — R19.5 POSITIVE FIT (FECAL IMMUNOCHEMICAL TEST): ICD-10-CM

## 2018-02-21 DIAGNOSIS — Z12.12 SCREENING FOR COLORECTAL CANCER: ICD-10-CM

## 2018-02-21 DIAGNOSIS — K64.8 INTERNAL HEMORRHOIDS WITH COMPLICATION: ICD-10-CM

## 2018-02-21 DIAGNOSIS — K58.8 OTHER IRRITABLE BOWEL SYNDROME: ICD-10-CM

## 2018-02-21 DIAGNOSIS — Z12.11 SCREENING FOR COLORECTAL CANCER: ICD-10-CM

## 2018-03-05 DIAGNOSIS — E55.9 VITAMIN D DEFICIENCY: ICD-10-CM

## 2018-03-05 RX ORDER — ERGOCALCIFEROL 1.25 MG/1
CAPSULE ORAL
Qty: 4 CAPSULE | Refills: 5 | Status: SHIPPED | OUTPATIENT
Start: 2018-03-05 | End: 2018-07-05 | Stop reason: DRUGHIGH

## 2018-03-07 ENCOUNTER — HOSPITAL ENCOUNTER (OUTPATIENT)
Dept: CT IMAGING | Age: 63
Discharge: HOME | End: 2018-03-09
Payer: COMMERCIAL

## 2018-03-07 ENCOUNTER — HOSPITAL ENCOUNTER (OUTPATIENT)
Age: 63
Discharge: HOME | End: 2018-03-09
Payer: COMMERCIAL

## 2018-03-07 DIAGNOSIS — Z72.0 TOBACCO USE: ICD-10-CM

## 2018-03-07 DIAGNOSIS — R91.8 LUNG MASS: ICD-10-CM

## 2018-03-07 PROCEDURE — 71250 CT THORAX DX C-: CPT

## 2018-03-13 ENCOUNTER — TELEPHONE (OUTPATIENT)
Dept: FAMILY MEDICINE CLINIC | Age: 63
End: 2018-03-13

## 2018-03-30 ENCOUNTER — OFFICE VISIT (OUTPATIENT)
Dept: GASTROENTEROLOGY | Age: 63
End: 2018-03-30
Payer: COMMERCIAL

## 2018-03-30 VITALS
BODY MASS INDEX: 27.29 KG/M2 | SYSTOLIC BLOOD PRESSURE: 114 MMHG | WEIGHT: 149.2 LBS | DIASTOLIC BLOOD PRESSURE: 75 MMHG | HEART RATE: 88 BPM

## 2018-03-30 DIAGNOSIS — K52.831 COLLAGENOUS COLITIS: Primary | ICD-10-CM

## 2018-03-30 DIAGNOSIS — K58.9 IRRITABLE BOWEL SYNDROME WITHOUT DIARRHEA: ICD-10-CM

## 2018-03-30 DIAGNOSIS — K63.5 POLYP OF COLON, UNSPECIFIED PART OF COLON, UNSPECIFIED TYPE: ICD-10-CM

## 2018-03-30 PROCEDURE — G8427 DOCREV CUR MEDS BY ELIG CLIN: HCPCS | Performed by: INTERNAL MEDICINE

## 2018-03-30 PROCEDURE — G8482 FLU IMMUNIZE ORDER/ADMIN: HCPCS | Performed by: INTERNAL MEDICINE

## 2018-03-30 PROCEDURE — G8419 CALC BMI OUT NRM PARAM NOF/U: HCPCS | Performed by: INTERNAL MEDICINE

## 2018-03-30 PROCEDURE — 99214 OFFICE O/P EST MOD 30 MIN: CPT | Performed by: INTERNAL MEDICINE

## 2018-03-30 PROCEDURE — 3017F COLORECTAL CA SCREEN DOC REV: CPT | Performed by: INTERNAL MEDICINE

## 2018-03-30 PROCEDURE — 3014F SCREEN MAMMO DOC REV: CPT | Performed by: INTERNAL MEDICINE

## 2018-03-30 PROCEDURE — 1036F TOBACCO NON-USER: CPT | Performed by: INTERNAL MEDICINE

## 2018-03-30 ASSESSMENT — ENCOUNTER SYMPTOMS
ABDOMINAL DISTENTION: 0
ABDOMINAL PAIN: 0
RESPIRATORY NEGATIVE: 1
BLOOD IN STOOL: 0
TROUBLE SWALLOWING: 0
VOMITING: 0
RECTAL PAIN: 0
NAUSEA: 0
GASTROINTESTINAL NEGATIVE: 1
ALLERGIC/IMMUNOLOGIC NEGATIVE: 1
CONSTIPATION: 0
DIARRHEA: 0
ANAL BLEEDING: 0

## 2018-04-03 DIAGNOSIS — E78.5 HYPERLIPIDEMIA WITH TARGET LDL LESS THAN 100: ICD-10-CM

## 2018-04-03 DIAGNOSIS — J44.1 COPD EXACERBATION (HCC): ICD-10-CM

## 2018-04-03 RX ORDER — SIMVASTATIN 40 MG
TABLET ORAL
Qty: 30 TABLET | Refills: 3 | Status: SHIPPED | OUTPATIENT
Start: 2018-04-03 | End: 2018-07-21 | Stop reason: SDUPTHER

## 2018-04-03 RX ORDER — PSYLLIUM HUSK 3.4 G/7G
POWDER ORAL
Qty: 30 TABLET | Refills: 3 | Status: SHIPPED | OUTPATIENT
Start: 2018-04-03 | End: 2018-07-24 | Stop reason: SDUPTHER

## 2018-05-09 ENCOUNTER — OFFICE VISIT (OUTPATIENT)
Dept: FAMILY MEDICINE CLINIC | Age: 63
End: 2018-05-09
Payer: COMMERCIAL

## 2018-05-09 VITALS
HEIGHT: 62 IN | HEART RATE: 78 BPM | SYSTOLIC BLOOD PRESSURE: 98 MMHG | BODY MASS INDEX: 28.3 KG/M2 | WEIGHT: 153.8 LBS | TEMPERATURE: 98.1 F | DIASTOLIC BLOOD PRESSURE: 62 MMHG | OXYGEN SATURATION: 97 %

## 2018-05-09 DIAGNOSIS — R91.1 LUNG NODULE: ICD-10-CM

## 2018-05-09 DIAGNOSIS — K63.5 HYPERPLASTIC COLONIC POLYP, UNSPECIFIED PART OF COLON: ICD-10-CM

## 2018-05-09 DIAGNOSIS — K52.831 COLLAGENOUS COLITIS: ICD-10-CM

## 2018-05-09 DIAGNOSIS — R73.9 HYPERGLYCEMIA: ICD-10-CM

## 2018-05-09 DIAGNOSIS — Z12.31 ENCOUNTER FOR SCREENING MAMMOGRAM FOR BREAST CANCER: ICD-10-CM

## 2018-05-09 DIAGNOSIS — F31.9 BIPOLAR AFFECTIVE DISORDER, REMISSION STATUS UNSPECIFIED (HCC): Primary | ICD-10-CM

## 2018-05-09 LAB — HBA1C MFR BLD: 5.1 %

## 2018-05-09 PROCEDURE — G8427 DOCREV CUR MEDS BY ELIG CLIN: HCPCS | Performed by: FAMILY MEDICINE

## 2018-05-09 PROCEDURE — G8419 CALC BMI OUT NRM PARAM NOF/U: HCPCS | Performed by: FAMILY MEDICINE

## 2018-05-09 PROCEDURE — 1036F TOBACCO NON-USER: CPT | Performed by: FAMILY MEDICINE

## 2018-05-09 PROCEDURE — 3017F COLORECTAL CA SCREEN DOC REV: CPT | Performed by: FAMILY MEDICINE

## 2018-05-09 PROCEDURE — 83036 HEMOGLOBIN GLYCOSYLATED A1C: CPT | Performed by: FAMILY MEDICINE

## 2018-05-09 PROCEDURE — 99213 OFFICE O/P EST LOW 20 MIN: CPT | Performed by: FAMILY MEDICINE

## 2018-05-09 ASSESSMENT — ENCOUNTER SYMPTOMS
DIARRHEA: 0
EYE REDNESS: 0
COUGH: 0
COLOR CHANGE: 0
NAUSEA: 0
WHEEZING: 0
SHORTNESS OF BREATH: 1
RHINORRHEA: 0
PHOTOPHOBIA: 0
BACK PAIN: 0
ABDOMINAL PAIN: 0
ABDOMINAL DISTENTION: 0
CONSTIPATION: 0

## 2018-06-14 ENCOUNTER — HOSPITAL ENCOUNTER (OUTPATIENT)
Dept: CT IMAGING | Age: 63
Discharge: HOME | End: 2018-06-16
Payer: COMMERCIAL

## 2018-06-14 DIAGNOSIS — Z72.0 TOBACCO ABUSE: ICD-10-CM

## 2018-06-14 DIAGNOSIS — R91.8 LUNG NODULES: ICD-10-CM

## 2018-06-14 PROCEDURE — 71250 CT THORAX DX C-: CPT

## 2018-07-05 DIAGNOSIS — E55.9 VITAMIN D DEFICIENCY: ICD-10-CM

## 2018-07-05 RX ORDER — AVOBENZONE, HOMOSALATE, OCTISALATE, OCTOCRYLENE 30; 100; 50; 25 MG/ML; MG/ML; MG/ML; MG/ML
SPRAY TOPICAL
Qty: 30 TABLET | Refills: 5 | Status: SHIPPED | OUTPATIENT
Start: 2018-07-05 | End: 2019-01-16 | Stop reason: SDUPTHER

## 2018-07-18 ENCOUNTER — HOSPITAL ENCOUNTER (OUTPATIENT)
Dept: WOMENS IMAGING | Age: 63
Discharge: HOME | End: 2018-07-20
Payer: COMMERCIAL

## 2018-07-18 DIAGNOSIS — Z12.39 BREAST CANCER SCREENING: ICD-10-CM

## 2018-07-18 PROCEDURE — 77067 SCR MAMMO BI INCL CAD: CPT

## 2018-07-21 DIAGNOSIS — E78.5 HYPERLIPIDEMIA WITH TARGET LDL LESS THAN 100: ICD-10-CM

## 2018-07-23 RX ORDER — SIMVASTATIN 40 MG
TABLET ORAL
Qty: 30 TABLET | Refills: 3 | Status: SHIPPED | OUTPATIENT
Start: 2018-07-23 | End: 2018-11-20 | Stop reason: SDUPTHER

## 2018-07-24 DIAGNOSIS — J44.1 COPD EXACERBATION (HCC): ICD-10-CM

## 2018-07-24 RX ORDER — PSYLLIUM HUSK 3.4 G/7G
POWDER ORAL
Qty: 30 TABLET | Refills: 3 | Status: SHIPPED | OUTPATIENT
Start: 2018-07-24 | End: 2018-11-16 | Stop reason: SDUPTHER

## 2018-07-24 NOTE — TELEPHONE ENCOUNTER
Please Approve or Refuse.   Send [facility] per Pt's Request[facility] [address] [facility] [phone] - f [phone]: [phone]       Next Visit Date:  8/22/2018   Last Visit Date: 5/9/2018    Hemoglobin A1C (%)   Date Value   05/09/2018 5.1   03/17/2016 5.4             ( goal A1C is < 7)   BP Readings from Last 3 Encounters:   05/09/18 98/62   03/30/18 114/75   02/07/18 98/70          (goal 120/80)

## 2018-08-16 DIAGNOSIS — E55.9 VITAMIN D DEFICIENCY: ICD-10-CM

## 2018-08-16 RX ORDER — ERGOCALCIFEROL 1.25 MG/1
CAPSULE ORAL
Qty: 4 CAPSULE | Refills: 5 | Status: SHIPPED | OUTPATIENT
Start: 2018-08-16 | End: 2019-01-31 | Stop reason: SDUPTHER

## 2018-08-22 ENCOUNTER — OFFICE VISIT (OUTPATIENT)
Dept: FAMILY MEDICINE CLINIC | Age: 63
End: 2018-08-22
Payer: COMMERCIAL

## 2018-08-22 VITALS
SYSTOLIC BLOOD PRESSURE: 115 MMHG | HEART RATE: 63 BPM | OXYGEN SATURATION: 98 % | DIASTOLIC BLOOD PRESSURE: 69 MMHG | TEMPERATURE: 96.8 F | WEIGHT: 156.2 LBS | BODY MASS INDEX: 28.74 KG/M2 | HEIGHT: 62 IN

## 2018-08-22 DIAGNOSIS — R91.1 LUNG NODULE: ICD-10-CM

## 2018-08-22 DIAGNOSIS — Z23 NEED FOR PROPHYLACTIC VACCINATION AND INOCULATION AGAINST VARICELLA: ICD-10-CM

## 2018-08-22 DIAGNOSIS — E78.5 HYPERLIPIDEMIA, UNSPECIFIED HYPERLIPIDEMIA TYPE: ICD-10-CM

## 2018-08-22 DIAGNOSIS — K63.5 HYPERPLASTIC COLONIC POLYP, UNSPECIFIED PART OF COLON: ICD-10-CM

## 2018-08-22 DIAGNOSIS — K52.831 COLLAGENOUS COLITIS: ICD-10-CM

## 2018-08-22 DIAGNOSIS — R19.5 POSITIVE FIT (FECAL IMMUNOCHEMICAL TEST): ICD-10-CM

## 2018-08-22 DIAGNOSIS — J41.8 MIXED SIMPLE AND MUCOPURULENT CHRONIC BRONCHITIS (HCC): Primary | ICD-10-CM

## 2018-08-22 PROBLEM — R68.89 FLU-LIKE SYMPTOMS: Status: RESOLVED | Noted: 2018-02-07 | Resolved: 2018-08-22

## 2018-08-22 PROCEDURE — 1036F TOBACCO NON-USER: CPT | Performed by: FAMILY MEDICINE

## 2018-08-22 PROCEDURE — 99214 OFFICE O/P EST MOD 30 MIN: CPT | Performed by: FAMILY MEDICINE

## 2018-08-22 PROCEDURE — 3017F COLORECTAL CA SCREEN DOC REV: CPT | Performed by: FAMILY MEDICINE

## 2018-08-22 PROCEDURE — G8427 DOCREV CUR MEDS BY ELIG CLIN: HCPCS | Performed by: FAMILY MEDICINE

## 2018-08-22 PROCEDURE — G8419 CALC BMI OUT NRM PARAM NOF/U: HCPCS | Performed by: FAMILY MEDICINE

## 2018-08-22 PROCEDURE — 3023F SPIROM DOC REV: CPT | Performed by: FAMILY MEDICINE

## 2018-08-22 PROCEDURE — G8926 SPIRO NO PERF OR DOC: HCPCS | Performed by: FAMILY MEDICINE

## 2018-08-22 ASSESSMENT — ENCOUNTER SYMPTOMS
ABDOMINAL PAIN: 0
PHOTOPHOBIA: 0
SINUS PRESSURE: 0
WHEEZING: 0
DIARRHEA: 0
RHINORRHEA: 0
BLOOD IN STOOL: 0
SHORTNESS OF BREATH: 0
CONSTIPATION: 0
COUGH: 0

## 2018-08-22 NOTE — PROGRESS NOTES
Chief Complaint   Patient presents with    COPD    Results     lung nodule, mammogram         Clifford Obrien  here today for follow up on chronic medical problems, go over labs and/or diagnostic studies, and medication refills. COPD and Results (lung nodule, mammogram)      HPI:Patient is here to discuss test results. She had CT lung done which showed pulmonary nodules and lymphadenopathy. Patient follows with Dr. Anel Ricks pulmonologist and they are planning to repeat the CT scan of her they will discuss with her insurance. Membranes normal.    She also had posterior fit test, had colonoscopy done showed colonic polyp and also collagenous colitis. Patient follows with GI. COPD stable on current treatment on home oxygen. Hyperlipidemia on statins. Needs yearly blood work. /69   Pulse 63   Temp 96.8 °F (36 °C) (Tympanic)   Ht 5' 2\" (1.575 m)   Wt 156 lb 3.2 oz (70.9 kg)   LMP 05/20/2013 (Exact Date)   SpO2 98% Comment: with oxygen @ 2  BMI 28.57 kg/m²   Body mass index is 28.57 kg/m². Wt Readings from Last 3 Encounters:   08/22/18 156 lb 3.2 oz (70.9 kg)   05/09/18 153 lb 12.8 oz (69.8 kg)   03/30/18 149 lb 3.2 oz (67.7 kg)        []Negative depression screening. PHQ Scores 2/7/2018 6/30/2016   PHQ2 Score 4 0   PHQ9 Score 14 0      []1-4 = Minimal depression   []5-9 = Mild depression   [x]10-14 = Moderate depression   []15-19 = Moderately severe depression   []20-27 = Severe depression    Discussed testing with the patient and all questions fully answered.     Office Visit on 05/09/2018   Component Date Value Ref Range Status    Hemoglobin A1C 05/09/2018 5.1  % Final         Most recent labs reviewed:     Lab Results   Component Value Date    WBC 3.5 11/21/2017    HGB 12.6 11/21/2017    HCT 37.6 11/21/2017    MCV 98.7 11/21/2017     11/21/2017       Lab Results   Component Value Date     11/21/2017    K 3.7 11/21/2017     11/21/2017    CO2 28 11/21/2017 BUN 15 11/21/2017    CREATININE 0.51 11/21/2017    GLUCOSE 120 11/21/2017    CALCIUM 8.6 11/21/2017        Lab Results   Component Value Date    ALT 16 09/09/2016    AST 15 09/09/2016    ALKPHOS 86 09/09/2016    BILITOT <0.15 (L) 09/09/2016       Lab Results   Component Value Date    TSH 2.72 03/17/2016       Lab Results   Component Value Date    CHOL 152 11/19/2017    CHOL 182 03/17/2016    CHOL 125 05/05/2014     Lab Results   Component Value Date    TRIG 79 11/19/2017    TRIG 91 03/17/2016    TRIG 63 05/05/2014     Lab Results   Component Value Date    HDL 65 11/19/2017    HDL 74 03/17/2016    HDL 59 05/05/2014     Lab Results   Component Value Date    LDLCHOLESTEROL 71 11/19/2017    LDLCHOLESTEROL 90 03/17/2016    LDLCHOLESTEROL 53 05/05/2014     Lab Results   Component Value Date    VLDL NOT REPORTED 11/19/2017    VLDL NOT REPORTED 03/17/2016    VLDL NOT REPORTED 05/05/2014     Lab Results   Component Value Date    CHOLHDLRATIO 2.3 11/19/2017    CHOLHDLRATIO 2.5 03/17/2016    CHOLHDLRATIO 2.1 05/05/2014       Lab Results   Component Value Date    LABA1C 5.1 05/09/2018       No results found for: [id number]    No results found for: FOLATE    No results found for: IRON, TIBC, FERRITIN    Lab Results   Component Value Date    VITD25 14.4 (L) 03/17/2016             Current Outpatient Prescriptions   Medication Sig Dispense Refill    zoster recombinant adjuvanted vaccine (SHINGRIX) 50 MCG SUSR injection 50 MCG IM then repeat 2-6 months.  0.5 mL 0    vitamin D (ERGOCALCIFEROL) 32357 units CAPS capsule take 1 capsule by mouth every week 4 capsule 5    RA VITAMIN B-12 TR 1000 MCG TBCR take 1 tablet by mouth once daily 30 tablet 3    simvastatin (ZOCOR) 40 MG tablet take 1 tablet by mouth at bedtime 30 tablet 3    RA VITAMIN D-3 1000 units TABS tablet take 1 tablet by mouth once daily 30 tablet 5    ALAWAY 0.025 % ophthalmic solution INSTILL 1 DROP TWO TIMES A DAY INTO LEFT EYE ONLY 10 mL 6    Fluticasone Furoate-Vilanterol (BREO ELLIPTA IN) Inhale into the lungs daily      Tiotropium Bromide Monohydrate (SPIRIVA HANDIHALER IN) Inhale into the lungs daily      albuterol (PROVENTIL) (2.5 MG/3ML) 0.083% nebulizer solution Take 3 mLs by nebulization 4 times daily 120 each 3    albuterol sulfate  (90 Base) MCG/ACT inhaler Inhale 2 puffs into the lungs every 4 hours as needed for Wheezing 1 Inhaler 3    fluticasone (FLONASE) 50 MCG/ACT nasal spray 2 sprays by Nasal route daily 1 Bottle 5    EPINEPHrine (EPIPEN) 0.3 MG/0.3ML SOAJ injection use as directed by prescriber FOR ALLERGIC REACTION 2 each 2    TUDORZA PRESSAIR 400 MCG/ACT AEPB inhaler inhale 1 puff by mouth twice a day 1 each 0    butalbital-acetaminophen-caffeine (FIORICET, ESGIC) -40 MG per tablet Take 1 tablet by mouth every 4 hours as needed for Headaches      Biotin 5000 MCG CAPS Take 1,000 mcg by mouth       citalopram (CELEXA) 20 MG tablet Take 20 mg by mouth daily      LATUDA 80 MG TABS tablet Take 80 mg by mouth nightly      topiramate (TOPAMAX) 50 MG tablet Take 50 mg by mouth every morning   0     No current facility-administered medications for this visit. Social History     Social History    Marital status:      Spouse name: N/A    Number of children: N/A    Years of education: N/A     Occupational History    Not on file.      Social History Main Topics    Smoking status: Former Smoker     Packs/day: 2.00     Years: 42.00     Types: Cigarettes     Quit date: 5/12/2016    Smokeless tobacco: Never Used    Alcohol use Yes      Comment: yearly    Drug use: No    Sexual activity: Not Currently     Partners: Male     Birth control/ protection: Post-menopausal     Other Topics Concern    Not on file     Social History Narrative    No narrative on file     Counseling given: Yes        Family History   Problem Relation Age of Onset    Dementia Maternal Aunt     Cancer Mother         unsure of type    by editing.   Electronically signed by Betsy Leggett MD on 8/22/2018  12:34 PM

## 2018-11-15 ENCOUNTER — TELEPHONE (OUTPATIENT)
Dept: FAMILY MEDICINE CLINIC | Age: 63
End: 2018-11-15

## 2018-11-16 DIAGNOSIS — J44.1 COPD EXACERBATION (HCC): ICD-10-CM

## 2018-11-16 DIAGNOSIS — Z29.9 PREVENTIVE MEASURE: Primary | ICD-10-CM

## 2018-11-16 RX ORDER — PSYLLIUM HUSK 3.4 G/7G
POWDER ORAL
Qty: 30 TABLET | Refills: 3 | Status: SHIPPED | OUTPATIENT
Start: 2018-11-16 | End: 2019-03-11 | Stop reason: SDUPTHER

## 2018-11-20 DIAGNOSIS — E78.5 HYPERLIPIDEMIA WITH TARGET LDL LESS THAN 100: ICD-10-CM

## 2018-11-20 RX ORDER — SIMVASTATIN 40 MG
TABLET ORAL
Qty: 30 TABLET | Refills: 3 | Status: SHIPPED | OUTPATIENT
Start: 2018-11-20 | End: 2019-03-23 | Stop reason: SDUPTHER

## 2018-11-20 NOTE — TELEPHONE ENCOUNTER
Please Approve or Refuse.   Send to Pharmacy per Pt's Request:      Next Visit Date:  2/27/2019   Last Visit Date: 8/22/2018    Hemoglobin A1C (%)   Date Value   05/09/2018 5.1   03/17/2016 5.4             ( goal A1C is < 7)   BP Readings from Last 3 Encounters:   08/22/18 115/69   05/09/18 98/62   03/30/18 114/75          (goal 120/80)  BUN   Date Value Ref Range Status   11/21/2017 15 8 - 23 mg/dL Final     CREATININE   Date Value Ref Range Status   11/21/2017 0.51 0.50 - 0.90 mg/dL Final     Potassium   Date Value Ref Range Status   11/21/2017 3.7 3.7 - 5.3 mmol/L Final

## 2019-01-16 DIAGNOSIS — E55.9 VITAMIN D DEFICIENCY: ICD-10-CM

## 2019-01-17 RX ORDER — AVOBENZONE, HOMOSALATE, OCTISALATE, OCTOCRYLENE 30; 100; 50; 25 MG/ML; MG/ML; MG/ML; MG/ML
SPRAY TOPICAL
Qty: 30 TABLET | Refills: 5 | Status: SHIPPED | OUTPATIENT
Start: 2019-01-17 | End: 2019-10-14 | Stop reason: SDUPTHER

## 2019-01-31 DIAGNOSIS — E55.9 VITAMIN D DEFICIENCY: ICD-10-CM

## 2019-01-31 RX ORDER — ERGOCALCIFEROL 1.25 MG/1
CAPSULE ORAL
Qty: 4 CAPSULE | Refills: 5 | Status: SHIPPED | OUTPATIENT
Start: 2019-01-31 | End: 2019-07-11 | Stop reason: SDUPTHER

## 2019-02-06 ENCOUNTER — TELEPHONE (OUTPATIENT)
Dept: FAMILY MEDICINE CLINIC | Age: 64
End: 2019-02-06

## 2019-02-06 DIAGNOSIS — B00.2 ORAL HERPES: Primary | ICD-10-CM

## 2019-02-06 RX ORDER — ACYCLOVIR 400 MG/1
400 TABLET ORAL 2 TIMES DAILY
Qty: 10 TABLET | Refills: 0 | Status: SHIPPED | OUTPATIENT
Start: 2019-02-06 | End: 2019-03-25 | Stop reason: SDUPTHER

## 2019-02-22 ENCOUNTER — HOSPITAL ENCOUNTER (OUTPATIENT)
Age: 64
Discharge: HOME | End: 2019-02-22
Payer: COMMERCIAL

## 2019-02-22 DIAGNOSIS — E78.5 HYPERLIPIDEMIA, UNSPECIFIED HYPERLIPIDEMIA TYPE: ICD-10-CM

## 2019-02-22 LAB
ABSOLUTE BANDS #: 0.16 K/UL (ref 0–1)
ABSOLUTE EOS #: 0.03 K/UL (ref 0–0.4)
ABSOLUTE IMMATURE GRANULOCYTE: ABNORMAL K/UL (ref 0–0.3)
ABSOLUTE LYMPH #: 0.31 K/UL (ref 1–4.8)
ABSOLUTE MONO #: 0.34 K/UL (ref 0.1–1.3)
ALBUMIN SERPL-MCNC: 3.9 G/DL (ref 3.5–5.2)
ALBUMIN/GLOBULIN RATIO: ABNORMAL (ref 1–2.5)
ALP BLD-CCNC: 76 U/L (ref 35–104)
ALT SERPL-CCNC: 8 U/L (ref 5–33)
ANION GAP SERPL CALCULATED.3IONS-SCNC: 7 MMOL/L (ref 9–17)
AST SERPL-CCNC: 13 U/L
ATYPICAL LYMPHOCYTE ABSOLUTE COUNT: 0.06 K/UL
ATYPICAL LYMPHOCYTES: 2 %
BANDS: 5 % (ref 0–10)
BASOPHILS # BLD: 0 % (ref 0–2)
BASOPHILS ABSOLUTE: 0 K/UL (ref 0–0.2)
BILIRUB SERPL-MCNC: 0.15 MG/DL (ref 0.3–1.2)
BUN BLDV-MCNC: 11 MG/DL (ref 8–23)
BUN/CREAT BLD: ABNORMAL (ref 9–20)
CALCIUM SERPL-MCNC: 9.2 MG/DL (ref 8.6–10.4)
CHLORIDE BLD-SCNC: 109 MMOL/L (ref 98–107)
CHOLESTEROL/HDL RATIO: 2.3
CHOLESTEROL: 161 MG/DL
CO2: 26 MMOL/L (ref 20–31)
CREAT SERPL-MCNC: 0.84 MG/DL (ref 0.5–0.9)
DIFFERENTIAL TYPE: ABNORMAL
EOSINOPHILS RELATIVE PERCENT: 1 % (ref 0–4)
GFR AFRICAN AMERICAN: >60 ML/MIN
GFR NON-AFRICAN AMERICAN: >60 ML/MIN
GFR SERPL CREATININE-BSD FRML MDRD: ABNORMAL ML/MIN/{1.73_M2}
GFR SERPL CREATININE-BSD FRML MDRD: ABNORMAL ML/MIN/{1.73_M2}
GLUCOSE BLD-MCNC: 95 MG/DL (ref 70–99)
HCT VFR BLD CALC: 42.9 % (ref 36–46)
HDLC SERPL-MCNC: 69 MG/DL
HEMOGLOBIN: 13.6 G/DL (ref 12–16)
IMMATURE GRANULOCYTES: ABNORMAL %
LDL CHOLESTEROL: 76 MG/DL (ref 0–130)
LYMPHOCYTES # BLD: 10 % (ref 24–44)
MCH RBC QN AUTO: 31.8 PG (ref 26–34)
MCHC RBC AUTO-ENTMCNC: 31.8 G/DL (ref 31–37)
MCV RBC AUTO: 100 FL (ref 80–100)
MONOCYTES # BLD: 11 % (ref 1–7)
MORPHOLOGY: NORMAL
NRBC AUTOMATED: ABNORMAL PER 100 WBC
PDW BLD-RTO: 14.2 % (ref 11.5–14.9)
PLATELET # BLD: 246 K/UL (ref 150–450)
PLATELET ESTIMATE: ABNORMAL
PMV BLD AUTO: 8.1 FL (ref 6–12)
POTASSIUM SERPL-SCNC: 4.1 MMOL/L (ref 3.7–5.3)
RBC # BLD: 4.29 M/UL (ref 4–5.2)
RBC # BLD: ABNORMAL 10*6/UL
SEG NEUTROPHILS: 71 % (ref 36–66)
SEGMENTED NEUTROPHILS ABSOLUTE COUNT: 2.2 K/UL (ref 1.3–9.1)
SODIUM BLD-SCNC: 142 MMOL/L (ref 135–144)
TOTAL PROTEIN: 6.8 G/DL (ref 6.4–8.3)
TRIGL SERPL-MCNC: 79 MG/DL
VLDLC SERPL CALC-MCNC: NORMAL MG/DL (ref 1–30)
WBC # BLD: 3.1 K/UL (ref 3.5–11)
WBC # BLD: ABNORMAL 10*3/UL

## 2019-02-22 PROCEDURE — 80053 COMPREHEN METABOLIC PANEL: CPT

## 2019-02-22 PROCEDURE — 36415 COLL VENOUS BLD VENIPUNCTURE: CPT

## 2019-02-22 PROCEDURE — 85025 COMPLETE CBC W/AUTO DIFF WBC: CPT

## 2019-02-22 PROCEDURE — 80061 LIPID PANEL: CPT

## 2019-02-27 ENCOUNTER — OFFICE VISIT (OUTPATIENT)
Dept: FAMILY MEDICINE CLINIC | Age: 64
End: 2019-02-27
Payer: COMMERCIAL

## 2019-02-27 ENCOUNTER — HOSPITAL ENCOUNTER (OUTPATIENT)
Age: 64
Discharge: HOME | End: 2019-02-27
Payer: COMMERCIAL

## 2019-02-27 VITALS
DIASTOLIC BLOOD PRESSURE: 58 MMHG | OXYGEN SATURATION: 96 % | BODY MASS INDEX: 25.19 KG/M2 | WEIGHT: 151.2 LBS | HEART RATE: 64 BPM | HEIGHT: 65 IN | SYSTOLIC BLOOD PRESSURE: 106 MMHG

## 2019-02-27 DIAGNOSIS — M25.522 LEFT ELBOW PAIN: ICD-10-CM

## 2019-02-27 DIAGNOSIS — D72.810 LYMPHOPENIA: ICD-10-CM

## 2019-02-27 DIAGNOSIS — F31.9 BIPOLAR AFFECTIVE DISORDER, REMISSION STATUS UNSPECIFIED (HCC): ICD-10-CM

## 2019-02-27 DIAGNOSIS — J45.20 MILD INTERMITTENT ASTHMA WITHOUT COMPLICATION: ICD-10-CM

## 2019-02-27 DIAGNOSIS — J41.8 MIXED SIMPLE AND MUCOPURULENT CHRONIC BRONCHITIS (HCC): Primary | ICD-10-CM

## 2019-02-27 DIAGNOSIS — E78.5 HYPERLIPIDEMIA, UNSPECIFIED HYPERLIPIDEMIA TYPE: ICD-10-CM

## 2019-02-27 DIAGNOSIS — K63.5 POLYP OF COLON, UNSPECIFIED PART OF COLON, UNSPECIFIED TYPE: ICD-10-CM

## 2019-02-27 PROBLEM — R19.5 POSITIVE FIT (FECAL IMMUNOCHEMICAL TEST): Status: RESOLVED | Noted: 2018-01-05 | Resolved: 2019-02-27

## 2019-02-27 LAB
ABSOLUTE BANDS #: 0.09 K/UL (ref 0–1)
ABSOLUTE EOS #: 0.09 K/UL (ref 0–0.4)
ABSOLUTE IMMATURE GRANULOCYTE: ABNORMAL K/UL (ref 0–0.3)
ABSOLUTE LYMPH #: 0.41 K/UL (ref 1–4.8)
ABSOLUTE MONO #: 0.23 K/UL (ref 0.1–1.3)
ATYPICAL LYMPHOCYTE ABSOLUTE COUNT: 0.05 K/UL
ATYPICAL LYMPHOCYTES: 2 %
BANDS: 4 % (ref 0–10)
BASOPHILS # BLD: 1 % (ref 0–2)
BASOPHILS ABSOLUTE: 0.02 K/UL (ref 0–0.2)
DIFFERENTIAL TYPE: ABNORMAL
EOSINOPHILS RELATIVE PERCENT: 4 % (ref 0–4)
HCT VFR BLD CALC: 40.6 % (ref 36–46)
HEMOGLOBIN: 13.5 G/DL (ref 12–16)
IMMATURE GRANULOCYTES: ABNORMAL %
LYMPHOCYTES # BLD: 18 % (ref 24–44)
MCH RBC QN AUTO: 33.3 PG (ref 26–34)
MCHC RBC AUTO-ENTMCNC: 33.3 G/DL (ref 31–37)
MCV RBC AUTO: 100.2 FL (ref 80–100)
MONOCYTES # BLD: 10 % (ref 1–7)
MORPHOLOGY: NORMAL
NRBC AUTOMATED: ABNORMAL PER 100 WBC
PDW BLD-RTO: 13.7 % (ref 11.5–14.9)
PLATELET # BLD: 209 K/UL (ref 150–450)
PLATELET ESTIMATE: ABNORMAL
PMV BLD AUTO: 7.5 FL (ref 6–12)
RBC # BLD: 4.05 M/UL (ref 4–5.2)
RBC # BLD: ABNORMAL 10*6/UL
SEG NEUTROPHILS: 61 % (ref 36–66)
SEGMENTED NEUTROPHILS ABSOLUTE COUNT: 1.41 K/UL (ref 1.3–9.1)
WBC # BLD: 2.3 K/UL (ref 3.5–11)
WBC # BLD: ABNORMAL 10*3/UL

## 2019-02-27 PROCEDURE — G8484 FLU IMMUNIZE NO ADMIN: HCPCS | Performed by: FAMILY MEDICINE

## 2019-02-27 PROCEDURE — 3023F SPIROM DOC REV: CPT | Performed by: FAMILY MEDICINE

## 2019-02-27 PROCEDURE — G8427 DOCREV CUR MEDS BY ELIG CLIN: HCPCS | Performed by: FAMILY MEDICINE

## 2019-02-27 PROCEDURE — 36415 COLL VENOUS BLD VENIPUNCTURE: CPT

## 2019-02-27 PROCEDURE — G8926 SPIRO NO PERF OR DOC: HCPCS | Performed by: FAMILY MEDICINE

## 2019-02-27 PROCEDURE — 1036F TOBACCO NON-USER: CPT | Performed by: FAMILY MEDICINE

## 2019-02-27 PROCEDURE — G8419 CALC BMI OUT NRM PARAM NOF/U: HCPCS | Performed by: FAMILY MEDICINE

## 2019-02-27 PROCEDURE — 85025 COMPLETE CBC W/AUTO DIFF WBC: CPT

## 2019-02-27 PROCEDURE — 3017F COLORECTAL CA SCREEN DOC REV: CPT | Performed by: FAMILY MEDICINE

## 2019-02-27 PROCEDURE — 99214 OFFICE O/P EST MOD 30 MIN: CPT | Performed by: FAMILY MEDICINE

## 2019-02-27 RX ORDER — MIRTAZAPINE 15 MG/1
TABLET, FILM COATED ORAL
Refills: 0 | COMMUNITY
Start: 2019-02-20 | End: 2021-05-19 | Stop reason: ALTCHOICE

## 2019-02-27 RX ORDER — ACETAMINOPHEN 500 MG
1000 TABLET ORAL EVERY 6 HOURS PRN
Qty: 120 TABLET | Refills: 0 | Status: SHIPPED | OUTPATIENT
Start: 2019-02-27 | End: 2019-03-28 | Stop reason: SDUPTHER

## 2019-02-27 RX ORDER — LIDOCAINE 40 MG/G
CREAM TOPICAL
Qty: 45 G | Refills: 3 | Status: SHIPPED | OUTPATIENT
Start: 2019-02-27 | End: 2019-07-02 | Stop reason: SDUPTHER

## 2019-02-27 RX ORDER — LEG BRACE
EACH MISCELLANEOUS
Qty: 1 EACH | Refills: 0 | Status: SHIPPED | OUTPATIENT
Start: 2019-02-27 | End: 2021-08-05 | Stop reason: ALTCHOICE

## 2019-02-27 RX ORDER — BENZONATATE 200 MG/1
CAPSULE ORAL
Qty: 30 CAPSULE | Refills: 2 | Status: SHIPPED | OUTPATIENT
Start: 2019-02-27 | End: 2019-07-08 | Stop reason: SDUPTHER

## 2019-02-27 RX ORDER — BENZONATATE 200 MG/1
CAPSULE ORAL
Refills: 0 | COMMUNITY
Start: 2019-01-14 | End: 2019-02-27 | Stop reason: SDUPTHER

## 2019-02-27 ASSESSMENT — ENCOUNTER SYMPTOMS
BACK PAIN: 1
SORE THROAT: 0
WHEEZING: 0
CONSTIPATION: 0
SHORTNESS OF BREATH: 0
RHINORRHEA: 0
ABDOMINAL DISTENTION: 0
COUGH: 1
PHOTOPHOBIA: 0
DIARRHEA: 0
SINUS PRESSURE: 0

## 2019-02-28 LAB — PATHOLOGIST REVIEW: NORMAL

## 2019-03-08 ENCOUNTER — HOSPITAL ENCOUNTER (OUTPATIENT)
Age: 64
Discharge: HOME | End: 2019-03-08
Payer: COMMERCIAL

## 2019-03-08 ENCOUNTER — INITIAL CONSULT (OUTPATIENT)
Dept: ONCOLOGY | Age: 64
End: 2019-03-08
Payer: COMMERCIAL

## 2019-03-08 VITALS
HEIGHT: 64 IN | WEIGHT: 147 LBS | HEART RATE: 70 BPM | SYSTOLIC BLOOD PRESSURE: 127 MMHG | TEMPERATURE: 98.2 F | DIASTOLIC BLOOD PRESSURE: 79 MMHG | BODY MASS INDEX: 25.1 KG/M2

## 2019-03-08 DIAGNOSIS — D72.810 LYMPHOPENIA: ICD-10-CM

## 2019-03-08 DIAGNOSIS — D72.810 LYMPHOPENIA: Primary | ICD-10-CM

## 2019-03-08 LAB
ABSOLUTE EOS #: 0.03 K/UL (ref 0–0.4)
ABSOLUTE IMMATURE GRANULOCYTE: ABNORMAL K/UL (ref 0–0.3)
ABSOLUTE LYMPH #: 0.35 K/UL (ref 1–4.8)
ABSOLUTE MONO #: 0.22 K/UL (ref 0.1–0.8)
ABSOLUTE RETIC #: 0.05 M/UL (ref 0.03–0.08)
BASOPHILS # BLD: 0 % (ref 0–2)
BASOPHILS ABSOLUTE: 0 K/UL (ref 0–0.2)
DIFFERENTIAL TYPE: ABNORMAL
EOSINOPHILS RELATIVE PERCENT: 1 % (ref 1–4)
FERRITIN: 160 UG/L (ref 13–150)
FOLATE: 8.3 NG/ML
HAV IGM SER IA-ACNC: NONREACTIVE
HCT VFR BLD CALC: 42.5 % (ref 36–46)
HEMOGLOBIN: 13.3 G/DL (ref 12–16)
HEPATITIS B CORE IGM ANTIBODY: NONREACTIVE
HEPATITIS B SURFACE ANTIGEN: NONREACTIVE
HEPATITIS C ANTIBODY: NONREACTIVE
IMMATURE GRANULOCYTES: ABNORMAL %
IMMATURE RETIC FRACT: 9.3 % (ref 2.7–18.3)
IRON SATURATION: 19 % (ref 20–55)
IRON: 56 UG/DL (ref 37–145)
LACTATE DEHYDROGENASE: 194 U/L (ref 135–214)
LYMPHOCYTES # BLD: 11 % (ref 24–44)
MCH RBC QN AUTO: 32 PG (ref 26–34)
MCHC RBC AUTO-ENTMCNC: 31.3 G/DL (ref 31–37)
MCV RBC AUTO: 102.4 FL (ref 80–100)
MONOCYTES # BLD: 7 % (ref 1–7)
MORPHOLOGY: NORMAL
NRBC AUTOMATED: ABNORMAL PER 100 WBC
PDW BLD-RTO: 13.3 % (ref 12.5–15.4)
PLATELET # BLD: 206 K/UL (ref 140–450)
PLATELET ESTIMATE: ABNORMAL
PMV BLD AUTO: 9.1 FL (ref 8–14)
RBC # BLD: 4.15 M/UL (ref 4–5.2)
RBC # BLD: ABNORMAL 10*6/UL
RETIC %: 1.4 % (ref 0.5–1.9)
RETIC HEMOGLOBIN: 35.6 PG (ref 28.2–35.7)
SEG NEUTROPHILS: 81 % (ref 36–66)
SEGMENTED NEUTROPHILS ABSOLUTE COUNT: 2.6 K/UL (ref 1.8–7.7)
TOTAL IRON BINDING CAPACITY: 288 UG/DL (ref 250–450)
UNSATURATED IRON BINDING CAPACITY: 232 UG/DL (ref 112–347)
VITAMIN B-12: 1053 PG/ML (ref 232–1245)
WBC # BLD: 3.2 K/UL (ref 3.5–11)
WBC # BLD: ABNORMAL 10*3/UL

## 2019-03-08 PROCEDURE — 83615 LACTATE (LD) (LDH) ENZYME: CPT

## 2019-03-08 PROCEDURE — 83550 IRON BINDING TEST: CPT

## 2019-03-08 PROCEDURE — 36415 COLL VENOUS BLD VENIPUNCTURE: CPT

## 2019-03-08 PROCEDURE — 88184 FLOWCYTOMETRY/ TC 1 MARKER: CPT

## 2019-03-08 PROCEDURE — G8419 CALC BMI OUT NRM PARAM NOF/U: HCPCS | Performed by: INTERNAL MEDICINE

## 2019-03-08 PROCEDURE — 99201 HC NEW PT, E/M LEVEL 1: CPT | Performed by: INTERNAL MEDICINE

## 2019-03-08 PROCEDURE — 88185 FLOWCYTOMETRY/TC ADD-ON: CPT

## 2019-03-08 PROCEDURE — 87389 HIV-1 AG W/HIV-1&-2 AB AG IA: CPT

## 2019-03-08 PROCEDURE — G8427 DOCREV CUR MEDS BY ELIG CLIN: HCPCS | Performed by: INTERNAL MEDICINE

## 2019-03-08 PROCEDURE — 82728 ASSAY OF FERRITIN: CPT

## 2019-03-08 PROCEDURE — 82746 ASSAY OF FOLIC ACID SERUM: CPT

## 2019-03-08 PROCEDURE — 80074 ACUTE HEPATITIS PANEL: CPT

## 2019-03-08 PROCEDURE — 85045 AUTOMATED RETICULOCYTE COUNT: CPT

## 2019-03-08 PROCEDURE — 99244 OFF/OP CNSLTJ NEW/EST MOD 40: CPT | Performed by: INTERNAL MEDICINE

## 2019-03-08 PROCEDURE — 82525 ASSAY OF COPPER: CPT

## 2019-03-08 PROCEDURE — G8484 FLU IMMUNIZE NO ADMIN: HCPCS | Performed by: INTERNAL MEDICINE

## 2019-03-08 PROCEDURE — 86038 ANTINUCLEAR ANTIBODIES: CPT

## 2019-03-08 PROCEDURE — 85025 COMPLETE CBC W/AUTO DIFF WBC: CPT

## 2019-03-08 PROCEDURE — 83540 ASSAY OF IRON: CPT

## 2019-03-08 PROCEDURE — 3017F COLORECTAL CA SCREEN DOC REV: CPT | Performed by: INTERNAL MEDICINE

## 2019-03-08 PROCEDURE — 82607 VITAMIN B-12: CPT

## 2019-03-09 LAB — HIV AG/AB: NONREACTIVE

## 2019-03-11 DIAGNOSIS — Z29.9 PREVENTIVE MEASURE: ICD-10-CM

## 2019-03-11 LAB
ANTI-NUCLEAR ANTIBODY (ANA): NEGATIVE
COPPER: 114 UG/DL (ref 80–155)
SURGICAL PATHOLOGY REPORT: NORMAL

## 2019-03-12 LAB — FLOW CYTOMETRY BL: NORMAL

## 2019-03-12 RX ORDER — PSYLLIUM HUSK 3.4 G/7G
POWDER ORAL
Qty: 30 TABLET | Refills: 3 | Status: SHIPPED | OUTPATIENT
Start: 2019-03-12 | End: 2019-07-09 | Stop reason: SDUPTHER

## 2019-03-15 ENCOUNTER — HOSPITAL ENCOUNTER (OUTPATIENT)
Dept: ULTRASOUND IMAGING | Age: 64
Discharge: HOME | End: 2019-03-17
Payer: COMMERCIAL

## 2019-03-15 DIAGNOSIS — D72.810 LYMPHOPENIA: ICD-10-CM

## 2019-03-15 PROCEDURE — 76705 ECHO EXAM OF ABDOMEN: CPT

## 2019-03-21 ENCOUNTER — TELEPHONE (OUTPATIENT)
Dept: FAMILY MEDICINE CLINIC | Age: 64
End: 2019-03-21

## 2019-03-21 DIAGNOSIS — J41.8 MIXED SIMPLE AND MUCOPURULENT CHRONIC BRONCHITIS (HCC): Primary | ICD-10-CM

## 2019-03-21 RX ORDER — GUAIFENESIN AND DEXTROMETHORPHAN HYDROBROMIDE 100; 10 MG/5ML; MG/5ML
10 SOLUTION ORAL EVERY 4 HOURS PRN
Qty: 120 ML | Refills: 0 | Status: SHIPPED | OUTPATIENT
Start: 2019-03-21 | End: 2019-05-15

## 2019-03-23 DIAGNOSIS — E78.5 HYPERLIPIDEMIA WITH TARGET LDL LESS THAN 100: ICD-10-CM

## 2019-03-25 DIAGNOSIS — B00.2 ORAL HERPES: ICD-10-CM

## 2019-03-25 RX ORDER — SIMVASTATIN 40 MG
TABLET ORAL
Qty: 30 TABLET | Refills: 3 | Status: SHIPPED | OUTPATIENT
Start: 2019-03-25 | End: 2019-08-02 | Stop reason: SDUPTHER

## 2019-03-25 RX ORDER — ACYCLOVIR 400 MG/1
400 TABLET ORAL 2 TIMES DAILY
Qty: 14 TABLET | Refills: 0 | Status: SHIPPED | OUTPATIENT
Start: 2019-03-25 | End: 2019-10-29 | Stop reason: SDUPTHER

## 2019-03-28 DIAGNOSIS — D72.810 LYMPHOPENIA: ICD-10-CM

## 2019-03-28 RX ORDER — ACETAMINOPHEN 500 MG
TABLET ORAL
Qty: 120 TABLET | Refills: 0 | Status: SHIPPED | OUTPATIENT
Start: 2019-03-28 | End: 2019-05-02 | Stop reason: SDUPTHER

## 2019-04-09 ENCOUNTER — OFFICE VISIT (OUTPATIENT)
Dept: ONCOLOGY | Age: 64
End: 2019-04-09
Payer: COMMERCIAL

## 2019-04-09 VITALS
HEART RATE: 72 BPM | SYSTOLIC BLOOD PRESSURE: 102 MMHG | WEIGHT: 144 LBS | DIASTOLIC BLOOD PRESSURE: 66 MMHG | BODY MASS INDEX: 24.72 KG/M2 | TEMPERATURE: 97.9 F

## 2019-04-09 DIAGNOSIS — D72.810 LYMPHOPENIA: Primary | ICD-10-CM

## 2019-04-09 PROCEDURE — G8427 DOCREV CUR MEDS BY ELIG CLIN: HCPCS | Performed by: INTERNAL MEDICINE

## 2019-04-09 PROCEDURE — G8420 CALC BMI NORM PARAMETERS: HCPCS | Performed by: INTERNAL MEDICINE

## 2019-04-09 PROCEDURE — 3017F COLORECTAL CA SCREEN DOC REV: CPT | Performed by: INTERNAL MEDICINE

## 2019-04-09 PROCEDURE — 99211 OFF/OP EST MAY X REQ PHY/QHP: CPT | Performed by: INTERNAL MEDICINE

## 2019-04-09 PROCEDURE — 1036F TOBACCO NON-USER: CPT | Performed by: INTERNAL MEDICINE

## 2019-04-09 PROCEDURE — 99214 OFFICE O/P EST MOD 30 MIN: CPT | Performed by: INTERNAL MEDICINE

## 2019-04-09 NOTE — PROGRESS NOTES
Patient ID: Santos Anderson, 1955, [id number], 60 y.o. Referred by : Katey Turner MD  Reason for consultation:   Leukopenia  HISTORY OF PRESENT ILLNESS:    Hematologic History:  Annmarie Napier is a 56-year-old  female with a past medical history of bipolar disorder, COPD, anxiety, asthma was seen during initial consultation visit for leukopenia. She states she had routine blood work with her primary care physician on 2/22/19 which showed WBC 3.1 and she had a repeat blood work on 2/27 which showed WBC 2.3 and therefore she was referred to hematology for further evaluation. Her hemoglobin is within normal limit and platelets are also normal.    Review of patient's prior records indicate that she did have chronic leukopenia for past few years. She is a smoker since the age of 18 and has COPD with home oxygen dependent. She lives by herself. She denied any unintentional weight loss, drenching night sweats or fever chills. She denied any recent infection or change in her medication. She reports she had a colonoscopy last year for positive stool test.  She has routine screening mammogram last year and was negative for any malignancy. Interval history:   Patient is returning for follow-up visit and to discuss the results of labwork and further recommendations. She denied any fever chills, unintentional weight loss or drenching night sweats. She reports off an on right upper quadrant pain and thinks that she may have had muscle spasm. Her ultrasound of abdomen showed mild biliary duct dilatation. During this visit patient's allergy, social, medical, surgical history and medications were reviewed and updated.     Allergies   Allergen Reactions    Advil [Ibuprofen] Other (See Comments)     vomiting    Aleve [Naproxen] Other (See Comments)     vomiting    Antipyrine Other (See Comments)     unknown    Celecoxib Other (See Comments)    Fomepizole     Other      GRASS, TREES, WEEDS    Rofecoxib Other (See Comments)     Unknown reaction    Salicylates      Unknown reaction     Strawberry Extract      HEADACHES    Sulfinpyrazone Other (See Comments)     Unknown reaction    Aspirin Nausea And Vomiting, Other (See Comments) and Nausea Only    Nsaids Nausea Only, Other (See Comments) and Nausea And Vomiting     Current Outpatient Medications   Medication Sig Dispense Refill    MAPAP 500 MG tablet take 2 tablets by mouth every 6 hours if needed for pain 120 tablet 0    simvastatin (ZOCOR) 40 MG tablet take 1 tablet by mouth at bedtime 30 tablet 3    Dextromethorphan-guaiFENesin  MG/5ML SYRP Take 10 mLs by mouth every 4 hours as needed for Cough 120 mL 0    RA VITAMIN B-12 TR 1000 MCG TBCR take 1 tablet by mouth once daily 30 tablet 3    nicotine polacrilex (NICORETTE) 2 MG gum Take 2 mg by mouth as needed for Smoking cessation      mirtazapine (REMERON) 15 MG tablet take 1 tablet by mouth at bedtime for sleep  0    benzonatate (TESSALON) 200 MG capsule take 1 capsule by mouth three times a day if needed 30 capsule 2    lidocaine (LMX) 4 % cream Apply topically every 8 hrs as needed for pain 45 g 3    Elastic Bandages & Supports (ACE ELBOW BRACE LARGE/X-LARGE) MISC Use daily for elbow pain 1 each 0    vitamin D (ERGOCALCIFEROL) 14662 units CAPS capsule take 1 capsule by mouth every week 4 capsule 5    RA VITAMIN D-3 1000 units TABS tablet take 1 tablet by mouth once daily 30 tablet 5    ALAWAY 0.025 % ophthalmic solution INSTILL 1 DROP TWO TIMES A DAY INTO LEFT EYE ONLY 10 mL 6    Fluticasone Furoate-Vilanterol (BREO ELLIPTA IN) Inhale into the lungs daily      Tiotropium Bromide Monohydrate (SPIRIVA HANDIHALER IN) Inhale into the lungs daily      albuterol (PROVENTIL) (2.5 MG/3ML) 0.083% nebulizer solution Take 3 mLs by nebulization 4 times daily 120 each 3    albuterol sulfate  (90 Base) MCG/ACT inhaler Inhale 2 puffs into the lungs every 4 hours as needed for Wheezing 1 Inhaler 3  fluticasone (FLONASE) 50 MCG/ACT nasal spray 2 sprays by Nasal route daily 1 Bottle 5    EPINEPHrine (EPIPEN) 0.3 MG/0.3ML SOAJ injection use as directed by prescriber FOR ALLERGIC REACTION 2 each 2    TUDORZA PRESSAIR 400 MCG/ACT AEPB inhaler inhale 1 puff by mouth twice a day 1 each 0    butalbital-acetaminophen-caffeine (FIORICET, ESGIC) -40 MG per tablet Take 1 tablet by mouth every 4 hours as needed for Headaches      Biotin 5000 MCG CAPS Take 1,000 mcg by mouth       citalopram (CELEXA) 20 MG tablet Take 20 mg by mouth daily      LATUDA 80 MG TABS tablet Take 80 mg by mouth nightly      topiramate (TOPAMAX) 50 MG tablet Take 50 mg by mouth every morning   0    zoster recombinant adjuvanted vaccine (SHINGRIX) 50 MCG SUSR injection 50 MCG IM then repeat 2-6 months. 0.5 mL 0     No current facility-administered medications for this visit.       Social History     Socioeconomic History    Marital status:      Spouse name: Not on file    Number of children: Not on file    Years of education: Not on file    Highest education level: Not on file   Occupational History    Not on file   Social Needs    Financial resource strain: Not on file    Food insecurity:     Worry: Not on file     Inability: Not on file    Transportation needs:     Medical: Not on file     Non-medical: Not on file   Tobacco Use    Smoking status: Former Smoker     Packs/day: 2.00     Years: 42.00     Pack years: 84.00     Types: Cigarettes     Last attempt to quit: 2018     Years since quittin.4    Smokeless tobacco: Never Used   Substance and Sexual Activity    Alcohol use: Yes     Comment: yearly    Drug use: No    Sexual activity: Not Currently     Partners: Male     Birth control/protection: Post-menopausal   Lifestyle    Physical activity:     Days per week: Not on file     Minutes per session: Not on file    Stress: Not on file   Relationships    Social connections:     Talks on phone: Not on file     Gets together: Not on file     Attends Sikh service: Not on file     Active member of club or organization: Not on file     Attends meetings of clubs or organizations: Not on file     Relationship status: Not on file    Intimate partner violence:     Fear of current or ex partner: Not on file     Emotionally abused: Not on file     Physically abused: Not on file     Forced sexual activity: Not on file   Other Topics Concern    Not on file   Social History Narrative    Not on file       Family History   Problem Relation Age of Onset    Dementia Maternal Aunt     Kidney Disease Mother     Heart Attack Sister     Prostate Cancer Father     High Cholesterol Brother     Heart Attack Paternal Grandmother      REVIEW OF SYSTEM:   Constitutional: No fever or chills. No night sweats, no weight loss   Eyes: No eye discharge, double vision, or eye pain   HEENT: negative for sore mouth, sore throat, hoarseness and voice change   Respiratory: negative for cough , sputum, dyspnea, wheezing, hemoptysis, chest pain   Cardiovascular: negative for chest pain, dyspnea, palpitations, orthopnea, PND   Gastrointestinal: negative for nausea, vomiting, diarrhea, constipation, abdominal pain, Dysphagia, hematemesis and hematochezia   Genitourinary: negative for frequency, dysuria, nocturia, urinary incontinence, and hematuria   Integument: negative for rash, skin lesions, bruises.    Hematologic/Lymphatic: negative for easy bruising, bleeding, lymphadenopathy, petechiae and swelling/edema   Endocrine: negative for heat or cold intolerance, tremor, weight changes, change in bowel habits and hair loss   Musculoskeletal: negative for myalgias, arthralgias, pain, joint swelling,and bone pain   Neurological: negative for headaches, dizziness, seizures, weakness, numbness   OBJECTIVE:         Vitals:    04/09/19 1057   BP: 102/66   Pulse: 72   Temp: 97.9 °F (36.6 °C)   PHYSICAL EXAM:   General appearance - well appearing, no 1 [facility]   Value: TO BE REVIEWED BY PATHOLOGIST   Comment: Reviewed by pathologist: Shant Galarza M.D.   Cristobal Carroll. LEUKOPENIA WITH ABSOLUTE NEUTROPHIL COUNT 1,300,   LYMPHOPENIA AND MONOCYTOSIS. WBCs ARE MORPHOLOGICALLY UNREMARKABLE. PLATELETS ARE NORMAL IN   NUMBER AND MORPHOLOGY. IMAGING DATA:    REviewed  ASSESSMENT:    Perez is a 72-year-old female with a history of bipolar disorder, COPD was seen for leukopenia. Her leukopenia/lymphopenia appears to be chronic in nature. She denied any recurrent infection. She does not have systemic B symptoms. Based on her symptoms I do not suspect any under lying bone marrow pathology. However due to chronicity of her leukopenia low-grade bone marrow disease cannot be completely ruled out. I reviewed the lab work which is unremarkable. I will also check ultrasound of the abdomen which did not show hepatosplenomegaly  I advised her to follow with her primary care physician to arrange for MRCP to evaluate biliary dilatation  Additionally her medication Topamax can cause leukopenia however I do not recommend changing the medication at this point as her leukopenia is mild. I do not see any indication for bone marrow biopsy at this point however if her workup is negative and her counts drops further I would consider bone marrow biopsy    During today's visit, the patient and the family had a number of reasonable questions which were answered to their satisfaction. They verbalized understanding of the information provided and they agreed to proceed as outlined above. PLAN:   Check nutritional deficiency, flow cytometry, HIV and hepatitis  Check ultrasound of abdomen  Return to clinic in 3-4 weeks to discuss the results and further recommendations      Tom Means MD  Hematologist/Medical Oncologist          This note is created with the assistance of a speech recognition program.  While intending to generate a document that actually reflects the content of the visit, the document can still have some errors including those of syntax and sound a like substitutions which may escape proof reading. It such instances, actual meaning can be extrapolated by contextual diversion.     CC:  Zenobia Drew MD

## 2019-04-16 RX ORDER — BUTALBITAL, ACETAMINOPHEN AND CAFFEINE 50; 325; 40 MG/1; MG/1; MG/1
1 TABLET ORAL EVERY 4 HOURS PRN
Qty: 180 TABLET | Refills: 3 | Status: SHIPPED | OUTPATIENT
Start: 2019-04-16 | End: 2019-07-17 | Stop reason: SDUPTHER

## 2019-04-16 RX ORDER — EPINEPHRINE 0.3 MG/.3ML
INJECTION SUBCUTANEOUS
Qty: 2 EACH | Refills: 2 | Status: SHIPPED | OUTPATIENT
Start: 2019-04-16 | End: 2021-03-28

## 2019-04-16 NOTE — TELEPHONE ENCOUNTER
Please Approve or Refuse.   Send to Pharmacy per Pt's Request:      Next Visit Date:  5/15/2019   Last Visit Date: 2/27/2019    Hemoglobin A1C (%)   Date Value   05/09/2018 5.1   03/17/2016 5.4             ( goal A1C is < 7)   BP Readings from Last 3 Encounters:   04/09/19 102/66   03/08/19 127/79   02/27/19 (!) 106/58          (goal 120/80)  BUN   Date Value Ref Range Status   02/22/2019 11 8 - 23 mg/dL Final     CREATININE   Date Value Ref Range Status   02/22/2019 0.84 0.50 - 0.90 mg/dL Final     Potassium   Date Value Ref Range Status   02/22/2019 4.1 3.7 - 5.3 mmol/L Final

## 2019-05-02 DIAGNOSIS — M25.522 LEFT ELBOW PAIN: Primary | ICD-10-CM

## 2019-05-02 DIAGNOSIS — D72.810 LYMPHOPENIA: ICD-10-CM

## 2019-05-02 RX ORDER — ACETAMINOPHEN 500 MG
TABLET ORAL
Qty: 120 TABLET | Refills: 0 | Status: SHIPPED | OUTPATIENT
Start: 2019-05-02 | End: 2019-07-17 | Stop reason: SDUPTHER

## 2019-05-15 ENCOUNTER — OFFICE VISIT (OUTPATIENT)
Dept: FAMILY MEDICINE CLINIC | Age: 64
End: 2019-05-15
Payer: COMMERCIAL

## 2019-05-15 VITALS
SYSTOLIC BLOOD PRESSURE: 102 MMHG | OXYGEN SATURATION: 95 % | WEIGHT: 141.6 LBS | BODY MASS INDEX: 24.17 KG/M2 | HEART RATE: 72 BPM | HEIGHT: 64 IN | DIASTOLIC BLOOD PRESSURE: 60 MMHG

## 2019-05-15 DIAGNOSIS — K83.8 DILATED BILE DUCT: ICD-10-CM

## 2019-05-15 DIAGNOSIS — D72.810 LYMPHOPENIA: Primary | ICD-10-CM

## 2019-05-15 PROCEDURE — G8427 DOCREV CUR MEDS BY ELIG CLIN: HCPCS | Performed by: FAMILY MEDICINE

## 2019-05-15 PROCEDURE — 99213 OFFICE O/P EST LOW 20 MIN: CPT | Performed by: FAMILY MEDICINE

## 2019-05-15 PROCEDURE — 1036F TOBACCO NON-USER: CPT | Performed by: FAMILY MEDICINE

## 2019-05-15 PROCEDURE — 3017F COLORECTAL CA SCREEN DOC REV: CPT | Performed by: FAMILY MEDICINE

## 2019-05-15 PROCEDURE — G8420 CALC BMI NORM PARAMETERS: HCPCS | Performed by: FAMILY MEDICINE

## 2019-05-15 ASSESSMENT — PATIENT HEALTH QUESTIONNAIRE - PHQ9
SUM OF ALL RESPONSES TO PHQ QUESTIONS 1-9: 0
1. LITTLE INTEREST OR PLEASURE IN DOING THINGS: 0
SUM OF ALL RESPONSES TO PHQ9 QUESTIONS 1 & 2: 0
SUM OF ALL RESPONSES TO PHQ QUESTIONS 1-9: 0
2. FEELING DOWN, DEPRESSED OR HOPELESS: 0

## 2019-05-15 ASSESSMENT — ENCOUNTER SYMPTOMS
SINUS PRESSURE: 0
ABDOMINAL PAIN: 0
NAUSEA: 0
SHORTNESS OF BREATH: 1
RHINORRHEA: 0
COUGH: 0
ABDOMINAL DISTENTION: 0
COLOR CHANGE: 0
SINUS PAIN: 0
DIARRHEA: 0
WHEEZING: 0
CHEST TIGHTNESS: 0
BACK PAIN: 1
BLOOD IN STOOL: 0
CONSTIPATION: 0

## 2019-05-15 NOTE — PROGRESS NOTES
Visit Information    Have you changed or started any medications since your last visit including any over-the-counter medicines, vitamins, or herbal medicines? no   Are you having any side effects from any of your medications? -  no  Have you stopped taking any of your medications? Is so, why? -  no    Have you seen any other physician or provider since your last visit? Yes - Records Obtained  Have you had any other diagnostic tests since your last visit? Yes - Records Obtained  Have you been seen in the emergency room and/or had an admission to a hospital since we last saw you? No  Have you had your routine dental cleaning in the past 6 months? no    Have you activated your [employer] account? If not, what are your barriers?  Yes     Patient Care Team:  Katey Turner MD as PCP - General (Family Medicine)  Katey Turner MD as PCP - S Attributed Provider  Pauly Cuellar DO as Consulting Physician (Obstetrics & Gynecology)  Angle Dean MD as Consulting Physician (Pulmonology)  Kevin Berrios MD as Consulting Physician (Gastroenterology)    Medical History Review  Past Medical, Family, and Social History reviewed and does contribute to the patient presenting condition    Health Maintenance   Topic Date Due    Shingles Vaccine (2 of 2) 04/09/2018    Cervical cancer screen  01/06/2019    Low dose CT lung screening  06/14/2019    Breast cancer screen  07/18/2020    Colon cancer screen colonoscopy  02/15/2023    Lipid screen  02/22/2024    DTaP/Tdap/Td vaccine (2 - Td) 09/09/2026    Flu vaccine  Completed    Pneumococcal 0-64 years Vaccine  Completed    Hepatitis C screen  Completed    HIV screen  Completed

## 2019-05-15 NOTE — PROGRESS NOTES
Chief Complaint   Patient presents with   [address]     pt has seen specialist and they are concerned about her gallbladder    Arm Pain     needs a new rx for arm brace as she lost the one we gave her last sharon    [id number] [facility]  here today for follow up on chronic medical problems, go over labs and/or diagnostic studies, and medication refills. Discuss Labs (pt has seen specialist and they are concerned about her gallbladder); Arm Pain (needs a new rx for arm brace as she lost the one we gave her last sharon); and Shaking      HPI: Patient is here follow-up for lymphopenia/leukopenia. Patient was referred to hematologist for further evaluation. Patient had flow cytometry done and also path review which is normal.  Patient also had ultrasound abdomen done to rule out hepatosplenomegaly which showed dilated common bile duct but no gallstones. Patient reports , as per hematologist lymphopenia is likely due to Topamax and a decrease Topamax to 1 tablet twice daily interested of 2 tablets in the evening. She has further blood work ordered and is scheduled appointment in October. Ultrasound abdomen showed dilated common bile duct but no gallstones patient is asymptomatic for any pain, she had normal liver enzymes and normal alk phos face. Patient follows with GI for irritable bowel syndrome and her colonoscopy done. Needs evaluation by MRCP. /60   Pulse 72   Ht 5' 4\" (1.626 m)   Wt 141 lb 9.6 oz (64.2 kg)   LMP 05/20/2013 (Exact Date)   SpO2 95%   BMI 24.31 kg/m²    Body mass index is 24.31 kg/m². Wt Readings from Last 3 Encounters:   05/15/19 141 lb 9.6 oz (64.2 kg)   04/09/19 144 lb (65.3 kg)   03/08/19 147 lb (66.7 kg)        [x]Negative depression screening.   PHQ Scores 5/15/2019 2/7/2018 6/30/2016   PHQ2 Score 0 4 0   PHQ9 Score 0 14 0      []1-4 = Minimal depression   []5-9 = Milddepression   []10-14 = Moderate depression   []15-19 = Moderately severe depression ug/L Final    Copper 03/08/2019 114  80 - 155 ug/dL Final    Comment: (NOTE)  INTERPRETIVE INFORMATION: Copper, Serum or Plasma  Serum copper may be elevated with infection, inflammation, stress,   and copper supplementation. In females, elevated copper may also   be caused by oral contraceptives and pregnancy (concentrations may   be elevated up to 3 times normal during the third trimester). Serum copper may be reduced by use of corticosteroids and zinc and   by malnutrition or malabsorption. See Compliance Statement B at [email or web address]  Performed by Pam Early , [address] [facility] [phone]  www. Carman Seip, MD, Lab. Director      LD 03/08/2019 194  135 - 214 U/L Final    J CARLOS 03/08/2019 NEGATIVE  NEGATIVE Final    Comment: This test was run on the [employer] J CARLOS test system. The system provides ten test   results (HEp-2NA, dsDNA, SSA, SSB, Sm, RNP, Scl-70, Rebecca-1, Centromere and Histone analytes)   from a single patient sample. A negative J CARLOS screen indicates that the specimen was   negative for all ten markers.  HIV Ag/Ab 03/08/2019 NONREACTIVE  NONREACTIVE Final    Comment: No laboratory evidence of HIV infection. If acute HIV infection is suspected, consider   testing for HIV-1 RNA.  Hepatitis B Surface Ag 03/08/2019 NONREACTIVE  NONREACTIVE Final    Hepatitis C Ab 03/08/2019 NONREACTIVE  NONREACTIVE Final    Comment:       The hepatitis C procedure used in our laboratory is a Chemiluminescent test specific for   three recombinant HCV antigens. A negative anti-HCV result indicates that the antibodies to   hepatitis C virus are not present at this time. Individuals with reactive anti-HCV should be considered infected and infectious until proven   otherwise. Confirmation of all equivocal or reactive results is recommended by ordering   HCV RNA by PCR.       Hep B Core Ab, IgM 03/08/2019 NONREACTIVE  NONREACTIVE Final    Hep A IgM 03/08/2019 NONREACTIVE  NONREACTIVE Final    Flow Cytometry Bl 03/08/2019    Final    SEE SEPARATE REPORT    Retic % 03/08/2019 1.4  0.5 - 1.9 % Final    Absolute Retic # 03/08/2019 0.050  0.030 - 0.080 M/uL Final    Immature Retic Fract 03/08/2019 9.300  2.7 - 18.3 % Final    Retic Hemoglobin 03/08/2019 35.6  28.2 - 35.7 pg Final    Surgical Pathology Report 03/08/2019    Final                    [id number]  [employer]  [facility]  ANATOMIC PATHOLOGY  [address] [place] [address], [facility] [address] [place], [address] [place]  [phone]  Fax: [phone]  SURGICAL PATHOLOGY CONSULTATION    Patient Name: Karie Orr  MR#: [id number]  Specimen #[id number]    Procedures/Addenda  FLOW CYTOMETRY REPORT     Date Ordered:     3/11/2019     Status:  Signed Out       Date Complete:     3/11/2019     By: Alexandra Avitia M.D. Date Reported:     3/11/2019       INTERPRETATION  PERIPHERAL BLOOD:  -MILD MACROCYTOSIS. MACROCYTOSIS CAN BE SECONDARY TO B12/FOLATE  DEFICIENCY, LIVER    DISEASE, HYPOTHYROIDISM, DRUG/TOXIN EFFECT INCLUDING ETOH, MDS, ETC.  -LYMPHOPENIA (350/UL); FLOW CYTOMETRY NEGATIVE (SEE BELOW). Arleth Puentes FLOW CYTOMETRIC IMMUNOPHENOTYPING ANALYSIS OF PERIPHERAL BLOOD  LYMPHOCYTE POPULATION IS NEGATIVE FOR B-CELL MONOCLONALITY AND T-CELL  ABERRANCY; ALTHOUGH, T-LYMPHOCYTES ARE MARKEDLY DECREASED. RESULTS-COMMENTS  CLINICAL INFORMATION:  OTHER STUDIES:                                                       PERIPHERAL BLOOD STUDY    HEMOGRAM                              DIFFERENTIAL %     ABSOLUTE  (K/UL)    WBC (K/uL)                    IMM GRANS                                           RBC (K/uL)     4.15               SEGS               81                2.60  HGB (G/dL)     13.3               LYMPHS          11                0.35  HCT (%)     42.5               ATYP.  LYMPH                      MCV (FL.)     102.4               MONOS          7               0.22  MCH (PG.)     32.0 EOS               1                0.03  MCHC (g/dL)     31.3               BASO               0                0.00  RDW (%)     13.3               META                           PLT (k/uL)     206               MYELO                           RETIC (%)     1.4               PROGRAN                      Absolute RetCt     0.050               BLAST                                                    NRBS                                                      PERIPHERAL EXAMIN                          ATION BY PATHOLOGIST  Morphology: Macrocytosis  SPECIMEN TYPE:               CYTOCENTRIFUGE DIFFERENTIAL CELL COUNT:    Peripheral Blood               Lymphocytes                7%                           Neutrophils/Mono, Eosinophils     93%                             Viability:  89%                        Flow cytometric immunophenotyping analysis is performed on peripheral  blood following RBC lysis procedure. These cells are labeled by  direct, five color immunostaining procedure, and analyzed on a   flow cytometer.                        % POSITIVE                                    Target Cells                    RESULTS:               (Lymphocytes)    1. CD1a               0       2.     CD2               25       3. CD3               22       4. CD4               15       5. CD5               23       6. CD7               19       7. CD8               8       8.     CD10               7       9.     CD11c               5       10. CD16/56                                         6       11. CD19               66       12. CD20               70       13. CD22               74       14. CD23               48       15. CD25               2       16. CD45               100       17. CD57               4       18.                1       19. FMC7               67       20. Kappa               45       21.      Lambda 20         This test was developed and its performance characteristics determined  by [facility]. It has not  been cleared or approved by the U.S. Food and Drug Administration. The FDA does not require this test to go through premarket FDA review. This test is used for clinical purposes. It should not be regarded  as investigational or for research. This laboratory is certified  under the [address] (CLIA) as  qualified to perform high complexity clinical laboratory testing. GISELLA Martin M.D.                    Source:  1: PERIPHERAL BLOOD FOR FLOW CYTOMETRY           Most recent labs reviewed:     Lab Results   Component Value Date    WBC 3.2 (L) 03/08/2019    HGB 13.3 03/08/2019    HCT 42.5 03/08/2019    .4 (H) 03/08/2019     03/08/2019       @BRIEFLAB(NA,K,CL,CO2,BUN,CREATININE,GLUCOSE,CALCIUM)@     Lab Results   Component Value Date    ALT 8 02/22/2019    AST 13 02/22/2019    ALKPHOS 76 02/22/2019    BILITOT 0.15 (L) 02/22/2019       Lab Results   Component Value Date    TSH 2.72 03/17/2016       Lab Results   Component Value Date    CHOL 161 02/22/2019    CHOL 152 11/19/2017    CHOL 182 03/17/2016     Lab Results   Component Value Date    TRIG 79 02/22/2019    TRIG 79 11/19/2017    TRIG 91 03/17/2016     Lab Results   Component Value Date    HDL 69 02/22/2019    HDL 65 11/19/2017    HDL 74 03/17/2016     Lab Results   Component Value Date    LDLCHOLESTEROL 76 02/22/2019    LDLCHOLESTEROL 71 11/19/2017    LDLCHOLESTEROL 90 03/17/2016     Lab Results   Component Value Date    VLDL NOT REPORTED 02/22/2019    VLDL NOT REPORTED 11/19/2017    VLDL NOT REPORTED 03/17/2016     Lab Results   Component Value Date    CHOLHDLRATIO 2.3 02/22/2019    CHOLHDLRATIO 2.3 11/19/2017    CHOLHDLRATIO 2.5 03/17/2016       Lab Results   Component Value Date    LABA1C 5.1 05/09/2018 Lab Results   Component Value Date    HFPTWGTW21 3725 03/08/2019       Lab Results   Component Value Date    FOLATE 8.3 03/08/2019       Lab Results   Component Value Date    IRON 56 03/08/2019    TIBC 288 03/08/2019    FERRITIN 160 (H) 03/08/2019       Lab Results   Component Value Date    VITD25 14.4 (L) 03/17/2016             Current Outpatient Medications   Medication Sig Dispense Refill    MAPAP 500 MG tablet take 2 tablets by mouth every 6 hours if needed for pain 120 tablet 0    EPINEPHrine (EPIPEN) 0.3 MG/0.3ML SOAJ injection use as directed by prescriber FOR ALLERGIC REACTION 2 each 2    butalbital-acetaminophen-caffeine (FIORICET, ESGIC) -40 MG per tablet Take 1 tablet by mouth every 4 hours as needed for Headaches 180 tablet 3    simvastatin (ZOCOR) 40 MG tablet take 1 tablet by mouth at bedtime 30 tablet 3    RA VITAMIN B-12 TR 1000 MCG TBCR take 1 tablet by mouth once daily 30 tablet 3    nicotine polacrilex (NICORETTE) 2 MG gum Take 2 mg by mouth as needed for Smoking cessation      mirtazapine (REMERON) 15 MG tablet take 1 tablet by mouth at bedtime for sleep  0    benzonatate (TESSALON) 200 MG capsule take 1 capsule by mouth three times a day if needed 30 capsule 2    lidocaine (LMX) 4 % cream Apply topically every 8 hrs as needed for pain 45 g 3    Elastic Bandages & Supports (ACE ELBOW BRACE LARGE/X-LARGE) MISC Use daily for elbow pain 1 each 0    vitamin D (ERGOCALCIFEROL) 95819 units CAPS capsule take 1 capsule by mouth every week 4 capsule 5    RA VITAMIN D-3 1000 units TABS tablet take 1 tablet by mouth once daily 30 tablet 5    ALAWAY 0.025 % ophthalmic solution INSTILL 1 DROP TWO TIMES A DAY INTO LEFT EYE ONLY 10 mL 6    Fluticasone Furoate-Vilanterol (BREO ELLIPTA IN) Inhale into the lungs daily      Tiotropium Bromide Monohydrate (SPIRIVA HANDIHALER IN) Inhale into the lungs daily      albuterol (PROVENTIL) (2.5 MG/3ML) 0.083% nebulizer solution Take 3 mLs by nebulization 4 times daily 120 each 3    albuterol sulfate  (90 Base) MCG/ACT inhaler Inhale 2 puffs into the lungs every 4 hours as needed for Wheezing 1 Inhaler 3    fluticasone (FLONASE) 50 MCG/ACT nasal spray 2 sprays by Nasal route daily 1 Bottle 5    TUDORZA PRESSAIR 400 MCG/ACT AEPB inhaler inhale 1 puff by mouth twice a day 1 each 0    butalbital-acetaminophen-caffeine (FIORICET, ESGIC) -40 MG per tablet Take 1 tablet by mouth every 4 hours as needed for Headaches      Biotin 5000 MCG CAPS Take 1,000 mcg by mouth       citalopram (CELEXA) 20 MG tablet Take 20 mg by mouth daily      LATUDA 80 MG TABS tablet Take 80 mg by mouth nightly      topiramate (TOPAMAX) 50 MG tablet Take 50 mg by mouth every morning   0     No current facility-administered medications for this visit.               Social History     Socioeconomic History    Marital status:      Spouse name: Not on file    Number of children: Not on file    Years of education: Not on file    Highest education level: Not on file   Occupational History    Not on file   Social Needs    Financial resource strain: Not on file    Food insecurity:     Worry: Not on file     Inability: Not on file    Transportation needs:     Medical: Not on file     Non-medical: Not on file   Tobacco Use    Smoking status: Former Smoker     Packs/day: 2.00     Years: 42.00     Pack years: 84.00     Types: Cigarettes     Last attempt to quit: 2018     Years since quittin.5    Smokeless tobacco: Never Used   Substance and Sexual Activity    Alcohol use: Yes     Comment: yearly    Drug use: No    Sexual activity: Not Currently     Partners: Male     Birth control/protection: Post-menopausal   Lifestyle    Physical activity:     Days per week: Not on file     Minutes per session: Not on file    Stress: Not on file   Relationships    Social connections:     Talks on phone: Not on file     Gets together: Not on file     Attends Christian service: Not on file     Active member of club or organization: Not on file     Attends meetings of clubs or organizations: Not on file     Relationship status: Not on file    Intimate partner violence:     Fear of current or ex partner: Not on file     Emotionally abused: Not on file     Physically abused: Not on file     Forced sexual activity: Not on file   Other Topics Concern    Not on file   Social History Narrative    Not on file     Counseling given: Yes        Family History   Problem Relation Age of Onset    Dementia Maternal Aunt     Kidney Disease Mother     Heart Attack Sister     Prostate Cancer Father     High Cholesterol Brother     Heart Attack Paternal Grandmother              -rest of complaints with corresponding details per ROS    The patient's past medical, surgical, social, and family history as well as her current medications and allergies were reviewed as documented intoday's encounter. Review of Systems   Constitutional: Negative for activity change, appetite change, fatigue, fever and unexpected weight change. HENT: Negative for congestion, ear pain, postnasal drip, rhinorrhea, sinus pressure and sinus pain. Eyes: Negative for visual disturbance. Respiratory: Positive for shortness of breath. Negative for cough, chest tightness and wheezing. Cardiovascular: Negative for chest pain, palpitations and leg swelling. Gastrointestinal: Negative for abdominal distention, abdominal pain, blood in stool, constipation, diarrhea and nausea. Endocrine: Negative for polydipsia, polyphagia and polyuria. Genitourinary: Negative for difficulty urinating, dysuria, enuresis, flank pain, frequency and urgency. Musculoskeletal: Positive for arthralgias and back pain. Negative for gait problem, joint swelling, neck pain and neck stiffness. Skin: Negative for color change and pallor. Neurological: Positive for numbness.  Negative for dizziness, weakness and following healthy behaviors: nutrition, exercise and medication adherence  Reviewed prior labs and health maintenance  Continue current medications, diet and exercise. Discussed use, benefit, and side effects of prescribed medications. Barriers to medication compliance addressed. Patient given educational materials - see patient instructions  Was a self-tracking handout given in paper form or via [employer]? Yes    Requested Prescriptions      No prescriptions requested or ordered in this encounter       All patient questions answered. Patient voiced understanding. Quality Measures    Body mass index is 24.31 kg/m². Normal. Weight control planned discussed Healthy diet and regular exercise. BP: 102/60 Blood pressure is normal. Treatment plan consists of No treatment change needed. Lab Results   Component Value Date    LDLCHOLESTEROL 76 02/22/2019    (goal LDL reduction with dx if diabetes is 50% LDL reduction)      PHQ Scores 5/15/2019 2/7/2018 6/30/2016   PHQ2 Score 0 4 0   PHQ9 Score 0 14 0     Interpretation of Total Score Depression Severity: 1-4 = Minimal depression, 5-9 = Mild depression, 10-14 = Moderate depression, 15-19 = Moderately severe depression, 20-27 = Severe depression    The patient'spast medical, surgical, social, and family history as well as her   current medications and allergies were reviewed as documented in today's encounter. Medications, labs, diagnostic studies, consultations andfollow-up as documented in this encounter. Return in about 2 months (around 7/15/2019) for gi note. Patient wasseen with total face to face time of 15 minutes. More than 50% of this visit was counseling and education.        Future Appointments   Date Time Provider Percy Rodriguez   7/17/2019  2:15 PM Dulce Campbell MD [id number]   10/11/2019  1:00 PM Vic Shoemaker MD [address] [place] [address]     This note was completed by using the assistance of a speech-recognition program. However, inadvertent computerized transcription errors may be present. Althoughevery effort was made to ensure accuracy, no guarantees can be provided that every mistake has been identified and corrected by editing.   Electronically signed by Manjeet Perez MD on 5/15/2019  11:55 AM

## 2019-05-16 ENCOUNTER — OFFICE VISIT (OUTPATIENT)
Dept: GASTROENTEROLOGY | Age: 64
End: 2019-05-16
Payer: COMMERCIAL

## 2019-05-16 VITALS
BODY MASS INDEX: 24.27 KG/M2 | SYSTOLIC BLOOD PRESSURE: 126 MMHG | DIASTOLIC BLOOD PRESSURE: 76 MMHG | HEART RATE: 82 BPM | WEIGHT: 141.4 LBS

## 2019-05-16 DIAGNOSIS — K83.8 DILATED BILE DUCT: Primary | ICD-10-CM

## 2019-05-16 DIAGNOSIS — K52.831 COLLAGENOUS COLITIS: ICD-10-CM

## 2019-05-16 PROCEDURE — G8427 DOCREV CUR MEDS BY ELIG CLIN: HCPCS | Performed by: INTERNAL MEDICINE

## 2019-05-16 PROCEDURE — 99243 OFF/OP CNSLTJ NEW/EST LOW 30: CPT | Performed by: INTERNAL MEDICINE

## 2019-05-16 PROCEDURE — G8420 CALC BMI NORM PARAMETERS: HCPCS | Performed by: INTERNAL MEDICINE

## 2019-05-16 ASSESSMENT — ENCOUNTER SYMPTOMS
TROUBLE SWALLOWING: 0
ANAL BLEEDING: 0
SHORTNESS OF BREATH: 1
ABDOMINAL PAIN: 0
CONSTIPATION: 0
DIARRHEA: 0
RECTAL PAIN: 0
VOMITING: 0
ALLERGIC/IMMUNOLOGIC NEGATIVE: 1
GASTROINTESTINAL NEGATIVE: 1
BLOOD IN STOOL: 0
NAUSEA: 0
ABDOMINAL DISTENTION: 0

## 2019-05-16 NOTE — PROGRESS NOTES
Subjective:      Patient ID: Shirley Gaytan is a 60 y.o. female. Dr. Destiney Sun MD our mutual patient Shirley Gaytan was seen  for   1. Dilated bile duct    2. Collagenous colitis     . This patient is seen in my office for above issues    She has been seen in the past with GI issues and was diagnosed with Collagenous colitis    She is here with NEW COMPLAINTS and referal    While working for low WBCs hematology w/u showed dilated CBD during US    Pt is clinically feeling well GI wise  Has No significant abdominal pains, bloating or cramping  Has no sig GERD symptoms  Has no Dysphagia, No Nausea or any vomiting  Denies any rectal bleeding or any melena  Denies any sig constipation or any diarrhea symptoms  Weight is stable and appetite is generally good. She is still a smoker while on O2    Past Medical, Family, and Social History reviewed and does contribute to the patient presenting condition. patient\"s PMH/PSH,SH,PSYCH hx, MEDs, ALLERGIES, and ROS was all reviewed and updated ion the appropriate sections    GI Problem   Primary symptoms do not include fatigue, abdominal pain, nausea, vomiting or diarrhea. The illness does not include constipation. Review of Systems   Constitutional: Negative. Negative for fatigue. HENT: Positive for dental problem. Negative for trouble swallowing. Eyes: Positive for visual disturbance. Respiratory: Positive for shortness of breath (on O2). Cardiovascular: Negative. Gastrointestinal: Negative. Negative for abdominal distention, abdominal pain, anal bleeding, blood in stool, constipation, diarrhea, nausea, rectal pain and vomiting. Denies   Endocrine: Negative. Genitourinary: Negative. Musculoskeletal: Negative. Skin: Negative. Allergic/Immunologic: Negative. Neurological: Negative. Negative for dizziness, weakness and light-headedness. Hematological: Negative. Psychiatric/Behavioral: Negative.       Reviewed and agree  Objective:   Physical Exam   Constitutional: She is oriented to person, place, and time. She appears well-developed and well-nourished. Anxious  On O 2   HENT:   Head: Normocephalic and atraumatic. Mouth/Throat: Oropharynx is clear and moist.   Eyes: Pupils are equal, round, and reactive to light. Conjunctivae are normal.   Neck: Normal range of motion. Neck supple. Cardiovascular: Normal heart sounds and intact distal pulses. Pulmonary/Chest: Effort normal. She has rales. Abdominal: Soft. Bowel sounds are normal.   NON TENDER, NON DISTENTED  LIVER SPLEEN AND HERNIAS ARE NOT  PALPABLE  BOWEL SOUNDS ARE POSITIVE      Musculoskeletal: Normal range of motion. Neurological: She is alert and oriented to person, place, and time. Skin: Skin is warm. Psychiatric: Her behavior is normal.   Vitals reviewed. Assessment:      Patient Active Problem List   Diagnosis    Chronic obstructive pulmonary disease ([facility]    Vitamin D deficiency    Anxiety    Asthma    Bipolar disorder ([facility]    Depression    Hyperlipidemia    Sleep apnea    Substance abuse ([facility]    Vision abnormalities    Status post hysteroscopy    Chronic headaches    IBS (irritable bowel syndrome)    Colon polyp    Collagenous colitis    Lung nodule    Lymphopenia    Left elbow pain    Dilated bile duct           Plan: Will order MRCP    Check LFTs    Appears to be non specific    If has any issues may need ERCP    Her questions were answered    Her Collagenous colitis seems to be stable    Quit smoking    Pt was advised in detail about some life style and dietary modifications. She was advised about avoidance of caffeine, nicotine and chocolate. Pt was also told to stay away from any kind of fast foods, soda pops. She was also advised to avoid lots of spices, grease and fried food etc.     Instructions were also given about trying to arrange the timing, quality and quantity of food.     Instructions were given about

## 2019-06-26 ENCOUNTER — HOSPITAL ENCOUNTER (OUTPATIENT)
Dept: MRI IMAGING | Age: 64
Discharge: HOME | End: 2019-06-28
Payer: COMMERCIAL

## 2019-06-26 DIAGNOSIS — K83.8 DILATED BILE DUCT: ICD-10-CM

## 2019-06-26 DIAGNOSIS — K52.831 COLLAGENOUS COLITIS: ICD-10-CM

## 2019-06-26 LAB
BUN BLDV-MCNC: 11 MG/DL (ref 8–23)
CREAT SERPL-MCNC: 0.78 MG/DL (ref 0.5–0.9)
GFR AFRICAN AMERICAN: >60 ML/MIN
GFR NON-AFRICAN AMERICAN: >60 ML/MIN
GFR SERPL CREATININE-BSD FRML MDRD: NORMAL ML/MIN/{1.73_M2}
GFR SERPL CREATININE-BSD FRML MDRD: NORMAL ML/MIN/{1.73_M2}

## 2019-06-26 PROCEDURE — A9579 GAD-BASE MR CONTRAST NOS,1ML: HCPCS | Performed by: INTERNAL MEDICINE

## 2019-06-26 PROCEDURE — 84520 ASSAY OF UREA NITROGEN: CPT

## 2019-06-26 PROCEDURE — 2580000003 HC RX 258: Performed by: INTERNAL MEDICINE

## 2019-06-26 PROCEDURE — 82565 ASSAY OF CREATININE: CPT

## 2019-06-26 PROCEDURE — 36415 COLL VENOUS BLD VENIPUNCTURE: CPT

## 2019-06-26 PROCEDURE — 6360000004 HC RX CONTRAST MEDICATION: Performed by: INTERNAL MEDICINE

## 2019-06-26 PROCEDURE — 76377 3D RENDER W/INTRP POSTPROCES: CPT

## 2019-06-26 RX ORDER — SODIUM CHLORIDE 0.9 % (FLUSH) 0.9 %
10 SYRINGE (ML) INJECTION PRN
Status: DISCONTINUED | OUTPATIENT
Start: 2019-06-26 | End: 2019-06-29 | Stop reason: HOSPADM

## 2019-06-26 RX ORDER — 0.9 % SODIUM CHLORIDE 0.9 %
50 INTRAVENOUS SOLUTION INTRAVENOUS ONCE
Status: COMPLETED | OUTPATIENT
Start: 2019-06-26 | End: 2019-06-26

## 2019-06-26 RX ADMIN — GADOTERIDOL 15 ML: 279.3 INJECTION, SOLUTION INTRAVENOUS at 12:16

## 2019-06-26 RX ADMIN — SODIUM CHLORIDE 50 ML: 9 INJECTION, SOLUTION INTRAVENOUS at 12:16

## 2019-06-26 RX ADMIN — Medication 10 ML: at 12:16

## 2019-07-01 ENCOUNTER — TELEPHONE (OUTPATIENT)
Dept: GASTROENTEROLOGY | Age: 64
End: 2019-07-01

## 2019-07-01 DIAGNOSIS — K58.9 IRRITABLE BOWEL SYNDROME WITHOUT DIARRHEA: ICD-10-CM

## 2019-07-01 DIAGNOSIS — K83.8 DILATED BILE DUCT: ICD-10-CM

## 2019-07-01 DIAGNOSIS — K52.831 COLLAGENOUS COLITIS: Primary | ICD-10-CM

## 2019-07-02 DIAGNOSIS — M25.522 LEFT ELBOW PAIN: ICD-10-CM

## 2019-07-02 RX ORDER — LIDOCAINE 40 MG/G
CREAM TOPICAL
Qty: 30 G | Refills: 3 | Status: SHIPPED | OUTPATIENT
Start: 2019-07-02 | End: 2020-01-27 | Stop reason: SDUPTHER

## 2019-07-08 ENCOUNTER — HOSPITAL ENCOUNTER (OUTPATIENT)
Age: 64
Discharge: HOME | End: 2019-07-08
Payer: COMMERCIAL

## 2019-07-08 DIAGNOSIS — K83.8 DILATED BILE DUCT: ICD-10-CM

## 2019-07-08 DIAGNOSIS — J41.8 MIXED SIMPLE AND MUCOPURULENT CHRONIC BRONCHITIS (HCC): ICD-10-CM

## 2019-07-08 DIAGNOSIS — K52.831 COLLAGENOUS COLITIS: ICD-10-CM

## 2019-07-08 DIAGNOSIS — K58.9 IRRITABLE BOWEL SYNDROME WITHOUT DIARRHEA: ICD-10-CM

## 2019-07-08 LAB
ALBUMIN SERPL-MCNC: 4.3 G/DL (ref 3.5–5.2)
ALBUMIN/GLOBULIN RATIO: ABNORMAL (ref 1–2.5)
ALP BLD-CCNC: 88 U/L (ref 35–104)
ALT SERPL-CCNC: 10 U/L (ref 5–33)
AST SERPL-CCNC: 13 U/L
BILIRUB SERPL-MCNC: 0.21 MG/DL (ref 0.3–1.2)
BILIRUBIN DIRECT: 0.09 MG/DL
BILIRUBIN, INDIRECT: 0.12 MG/DL (ref 0–1)
GLOBULIN: ABNORMAL G/DL (ref 1.5–3.8)
TOTAL PROTEIN: 7.3 G/DL (ref 6.4–8.3)

## 2019-07-08 PROCEDURE — 80076 HEPATIC FUNCTION PANEL: CPT

## 2019-07-08 PROCEDURE — 36415 COLL VENOUS BLD VENIPUNCTURE: CPT

## 2019-07-08 RX ORDER — BENZONATATE 200 MG/1
CAPSULE ORAL
Qty: 30 CAPSULE | Refills: 2 | Status: SHIPPED | OUTPATIENT
Start: 2019-07-08 | End: 2019-09-24 | Stop reason: SDUPTHER

## 2019-07-09 DIAGNOSIS — Z29.9 PREVENTIVE MEASURE: ICD-10-CM

## 2019-07-09 RX ORDER — UBIDECARENONE 75 MG
CAPSULE ORAL
Qty: 30 TABLET | Refills: 3 | Status: SHIPPED | OUTPATIENT
Start: 2019-07-09 | End: 2020-01-13

## 2019-07-11 DIAGNOSIS — E55.9 VITAMIN D DEFICIENCY: ICD-10-CM

## 2019-07-11 RX ORDER — ERGOCALCIFEROL 1.25 MG/1
CAPSULE ORAL
Qty: 4 CAPSULE | Refills: 5 | Status: SHIPPED | OUTPATIENT
Start: 2019-07-11 | End: 2019-12-23

## 2019-07-17 ENCOUNTER — OFFICE VISIT (OUTPATIENT)
Dept: FAMILY MEDICINE CLINIC | Age: 64
End: 2019-07-17
Payer: COMMERCIAL

## 2019-07-17 VITALS
SYSTOLIC BLOOD PRESSURE: 119 MMHG | HEIGHT: 64 IN | BODY MASS INDEX: 24.69 KG/M2 | WEIGHT: 144.6 LBS | OXYGEN SATURATION: 96 % | HEART RATE: 73 BPM | DIASTOLIC BLOOD PRESSURE: 69 MMHG

## 2019-07-17 DIAGNOSIS — Z87.891 PERSONAL HISTORY OF TOBACCO USE: ICD-10-CM
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
## 2019-07-17 DIAGNOSIS — K83.8 DILATED BILE DUCT: Primary | ICD-10-CM

## 2019-07-17 DIAGNOSIS — M25.512 ACUTE PAIN OF LEFT SHOULDER: ICD-10-CM

## 2019-07-17 DIAGNOSIS — J41.8 MIXED SIMPLE AND MUCOPURULENT CHRONIC BRONCHITIS (HCC): ICD-10-CM

## 2019-07-17 DIAGNOSIS — E78.5 HYPERLIPIDEMIA, UNSPECIFIED HYPERLIPIDEMIA TYPE: ICD-10-CM

## 2019-07-17 DIAGNOSIS — M25.522 LEFT ELBOW PAIN: ICD-10-CM

## 2019-07-17 PROCEDURE — 1036F TOBACCO NON-USER: CPT | Performed by: FAMILY MEDICINE

## 2019-07-17 PROCEDURE — G0296 VISIT TO DETERM LDCT ELIG: HCPCS | Performed by: FAMILY MEDICINE

## 2019-07-17 PROCEDURE — 3017F COLORECTAL CA SCREEN DOC REV: CPT | Performed by: FAMILY MEDICINE

## 2019-07-17 PROCEDURE — 99214 OFFICE O/P EST MOD 30 MIN: CPT | Performed by: FAMILY MEDICINE

## 2019-07-17 PROCEDURE — G8926 SPIRO NO PERF OR DOC: HCPCS | Performed by: FAMILY MEDICINE

## 2019-07-17 PROCEDURE — G8427 DOCREV CUR MEDS BY ELIG CLIN: HCPCS | Performed by: FAMILY MEDICINE

## 2019-07-17 PROCEDURE — 3023F SPIROM DOC REV: CPT | Performed by: FAMILY MEDICINE

## 2019-07-17 PROCEDURE — G8420 CALC BMI NORM PARAMETERS: HCPCS | Performed by: FAMILY MEDICINE

## 2019-07-17 RX ORDER — ACETAMINOPHEN 500 MG
TABLET ORAL
Qty: 120 TABLET | Refills: 0 | Status: SHIPPED | OUTPATIENT
Start: 2019-07-17 | End: 2019-08-11 | Stop reason: SDUPTHER

## 2019-07-17 ASSESSMENT — ENCOUNTER SYMPTOMS
ABDOMINAL DISTENTION: 0
COLOR CHANGE: 0
COUGH: 1
CONSTIPATION: 0
BACK PAIN: 1
SINUS PRESSURE: 0
RHINORRHEA: 0
WHEEZING: 1
VOMITING: 0
BLOOD IN STOOL: 0
ABDOMINAL PAIN: 0
PHOTOPHOBIA: 0
SHORTNESS OF BREATH: 1
CHEST TIGHTNESS: 0

## 2019-07-17 NOTE — PROGRESS NOTES
Chief Complaint   Patient presents with    COPD    Hyperlipidemia    Anxiety    Depression    Other     wants xray          Francisco Crews  here today for follow up on chronic medical problems, go over labs and/or diagnostic studies, and medication refills. COPD; Hyperlipidemia; Anxiety; Depression; and Other (wants xray )      HPI: Patient is here for follow-up on dilated common bile duct was referred to GI, has MRCP done that showed dilated duct but no mass or blockage. Patient has appointment with GI tomorrow. Patient is asymptomatic for any pain. COPD on oxygen follows with Dr. Allan Fernandez reports she has 30% lung function left. She is trying to quit smoking now is due for CT lung screening. Hyperlipidemia stable  Patient complaining of  Left shoulder pain reports she fell down from her bed 2 weeks before and is causing pain. The pain is worse with movement, denies any weakness she denies a history of chronic joint pains. She tried topical lidocaine which helped and Tylenol as needed. /69   Pulse 73   Ht 5' 4\" (1.626 m)   Wt 144 lb 9.6 oz (65.6 kg)   LMP 05/20/2013 (Exact Date)   SpO2 96% Comment: o2 @ 2L  BMI 24.82 kg/m²    Body mass index is 24.82 kg/m². Wt Readings from Last 3 Encounters:   07/17/19 144 lb 9.6 oz (65.6 kg)   05/16/19 141 lb 6.4 oz (64.1 kg)   05/15/19 141 lb 9.6 oz (64.2 kg)        [x]Negative depression screening. PHQ Scores 5/15/2019 2/7/2018 6/30/2016   PHQ2 Score 0 4 0   PHQ9 Score 0 14 0      []1-4 = Minimal depression   []5-9 = Milddepression   []10-14 = Moderate depression   []15-19 = Moderately severe depression   []20-27 = Severe depression    Discussed testing with the patient and all questions fully answered.     Hospital Outpatient Visit on 07/08/2019   Component Date Value Ref Range Status    Alb 07/08/2019 4.3  3.5 - 5.2 g/dL Final    Alkaline Phosphatase 07/08/2019 88  35 - 104 U/L Final    ALT 07/08/2019 10  5 - 33 U/L Final   

## 2019-07-18 ENCOUNTER — OFFICE VISIT (OUTPATIENT)
Dept: GASTROENTEROLOGY | Age: 64
End: 2019-07-18
Payer: COMMERCIAL

## 2019-07-18 ENCOUNTER — HOSPITAL ENCOUNTER (OUTPATIENT)
Dept: GENERAL RADIOLOGY | Age: 64
Discharge: HOME | End: 2019-07-20
Payer: COMMERCIAL

## 2019-07-18 ENCOUNTER — HOSPITAL ENCOUNTER (OUTPATIENT)
Age: 64
Discharge: HOME | End: 2019-07-20
Payer: COMMERCIAL

## 2019-07-18 VITALS
DIASTOLIC BLOOD PRESSURE: 72 MMHG | BODY MASS INDEX: 24.51 KG/M2 | WEIGHT: 142.8 LBS | SYSTOLIC BLOOD PRESSURE: 121 MMHG | HEART RATE: 83 BPM

## 2019-07-18 DIAGNOSIS — K52.831 COLLAGENOUS COLITIS: ICD-10-CM

## 2019-07-18 DIAGNOSIS — K58.9 IRRITABLE BOWEL SYNDROME WITHOUT DIARRHEA: Primary | ICD-10-CM

## 2019-07-18 DIAGNOSIS — K83.8 DILATED CBD, ACQUIRED: ICD-10-CM

## 2019-07-18 DIAGNOSIS — M25.512 ACUTE PAIN OF LEFT SHOULDER: ICD-10-CM

## 2019-07-18 PROCEDURE — G8427 DOCREV CUR MEDS BY ELIG CLIN: HCPCS | Performed by: INTERNAL MEDICINE

## 2019-07-18 PROCEDURE — 99214 OFFICE O/P EST MOD 30 MIN: CPT | Performed by: INTERNAL MEDICINE

## 2019-07-18 PROCEDURE — 3017F COLORECTAL CA SCREEN DOC REV: CPT | Performed by: INTERNAL MEDICINE

## 2019-07-18 PROCEDURE — 1036F TOBACCO NON-USER: CPT | Performed by: INTERNAL MEDICINE

## 2019-07-18 PROCEDURE — 73030 X-RAY EXAM OF SHOULDER: CPT

## 2019-07-18 PROCEDURE — G8420 CALC BMI NORM PARAMETERS: HCPCS | Performed by: INTERNAL MEDICINE

## 2019-07-18 RX ORDER — FLUTICASONE FUROATE AND VILANTEROL TRIFENATATE 100; 25 UG/1; UG/1
POWDER RESPIRATORY (INHALATION)
Refills: 0 | COMMUNITY
Start: 2019-07-17 | End: 2020-01-27

## 2019-07-18 ASSESSMENT — ENCOUNTER SYMPTOMS
ABDOMINAL DISTENTION: 0
COUGH: 1
TROUBLE SWALLOWING: 0
WHEEZING: 1
RECTAL PAIN: 0
SHORTNESS OF BREATH: 1
BLOOD IN STOOL: 0
ABDOMINAL PAIN: 0
ALLERGIC/IMMUNOLOGIC NEGATIVE: 1
CONSTIPATION: 0
DIARRHEA: 0
GASTROINTESTINAL NEGATIVE: 1
NAUSEA: 0
ANAL BLEEDING: 0
VOMITING: 0

## 2019-07-18 NOTE — PROGRESS NOTES
well-nourished. On O2  Anxious    HENT:   Head: Normocephalic and atraumatic. Mouth/Throat: Oropharynx is clear and moist.   Eyes: Pupils are equal, round, and reactive to light. Conjunctivae are normal.   Neck: Normal range of motion. Neck supple. Cardiovascular: Normal heart sounds and intact distal pulses. Pulmonary/Chest: Effort normal. She has wheezes. She has rales. Abdominal: Soft. Bowel sounds are normal.   NON TENDER, NON DISTENTED  LIVER SPLEEN AND HERNIAS ARE NOT  PALPABLE  BOWEL SOUNDS ARE POSITIVE      Musculoskeletal: Normal range of motion. Neurological: She is alert and oriented to person, place, and time. Skin: Skin is warm. Psychiatric: Her behavior is normal.   Vitals reviewed. Assessment:      Patient Active Problem List   Diagnosis    Chronic obstructive pulmonary disease ([facility] [address] [facility]    Vitamin D deficiency    Anxiety    Asthma    Bipolar disorder ([facility]    Depression    Hyperlipidemia    Sleep apnea    Substance abuse ([facility] [address] [facility]    Vision abnormalities    Status post hysteroscopy    Chronic headaches    IBS (irritable bowel syndrome)    Colon polyp    Collagenous colitis    Lung nodule    Lymphopenia    Left elbow pain    Dilated bile duct    Acute pain of left shoulder           Plan: Will watch    Pt was advised in detail about some life style and dietary modifications. She was advised about avoidance of caffeine, nicotine and chocolate. Pt was also told to stay away from any kind of fast foods, soda pops. She was also advised to avoid lots of spices, grease and fried food etc.     Instructions were also given about trying to arrange the timing, quality and quantity of food. Instructions were given about using ample amount of fiber including dietary and supplemental fiber either metamucil, bennafiber or citrucell etc.  Pt was advised about drinking ample amount of water without any colors or chemicals. Stress was given about regular exercise.     Pt has

## 2019-07-19 ENCOUNTER — TELEPHONE (OUTPATIENT)
Dept: ONCOLOGY | Age: 64
End: 2019-07-19

## 2019-07-19 NOTE — LETTER
7/19/2019        Reina Nuñez  [address]  [facility] [address]    Dear Reina Nuñez:    Your healthcare provider has ordered a low dose CT scan of the chest for lung cancer screening. You will find enclosed, information about CT lung screening. Please review the statement of understanding, you will be asked to sign a copy of this at the time of your CT scan    If you have not already been contacted to make the appointment for your scan, please call our scheduling department at [phone]    Keep in mind that CT lung screening does not take the place of smoking cessation. If you are a current smoker, you will find enclosed smoking cessation resources. Please do not hesitate to contact me if you have any questions or concerns.     [address],      [id number] [place] [address] Lung Screening Program  [phone]

## 2019-07-25 ENCOUNTER — HOSPITAL ENCOUNTER (OUTPATIENT)
Dept: CT IMAGING | Age: 64
Discharge: HOME | End: 2019-07-27
Payer: COMMERCIAL

## 2019-07-25 DIAGNOSIS — Z87.891 PERSONAL HISTORY OF TOBACCO USE: ICD-10-CM

## 2019-07-25 PROCEDURE — G0297 LDCT FOR LUNG CA SCREEN: HCPCS

## 2019-08-02 DIAGNOSIS — E78.5 HYPERLIPIDEMIA WITH TARGET LDL LESS THAN 100: ICD-10-CM

## 2019-08-02 RX ORDER — SIMVASTATIN 40 MG
TABLET ORAL
Qty: 30 TABLET | Refills: 3 | Status: SHIPPED | OUTPATIENT
Start: 2019-08-02 | End: 2020-03-03

## 2019-08-11 DIAGNOSIS — M25.522 LEFT ELBOW PAIN: ICD-10-CM

## 2019-08-12 RX ORDER — ACETAMINOPHEN 500 MG
TABLET ORAL
Qty: 120 TABLET | Refills: 0 | Status: SHIPPED | OUTPATIENT
Start: 2019-08-12 | End: 2020-06-17 | Stop reason: SDUPTHER

## 2019-08-19 ENCOUNTER — TELEPHONE (OUTPATIENT)
Dept: FAMILY MEDICINE CLINIC | Age: 64
End: 2019-08-19
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
## 2019-08-19 NOTE — TELEPHONE ENCOUNTER
Took a sleeping pill and fell back on 08/02/19, she states the pain is on R side under breast bone and would like to see if you can order an XR to see if she broke anything. Please advise. RX: [facility].

## 2019-08-20 ENCOUNTER — HOSPITAL ENCOUNTER (OUTPATIENT)
Age: 64
Discharge: HOME | End: 2019-08-22
Payer: COMMERCIAL

## 2019-08-20 ENCOUNTER — HOSPITAL ENCOUNTER (OUTPATIENT)
Dept: GENERAL RADIOLOGY | Age: 64
Discharge: HOME | End: 2019-08-22
Payer: COMMERCIAL
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
## 2019-08-20 PROCEDURE — 71100 X-RAY EXAM RIBS UNI 2 VIEWS: CPT

## 2019-09-09 DIAGNOSIS — M25.522 LEFT ELBOW PAIN: ICD-10-CM

## 2019-09-09 RX ORDER — ACETAMINOPHEN 500 MG
TABLET ORAL
Qty: 120 TABLET | Refills: 0 | Status: SHIPPED | OUTPATIENT
Start: 2019-09-09 | End: 2020-01-15

## 2019-09-24 DIAGNOSIS — J41.8 MIXED SIMPLE AND MUCOPURULENT CHRONIC BRONCHITIS (HCC): ICD-10-CM

## 2019-09-24 RX ORDER — BENZONATATE 200 MG/1
CAPSULE ORAL
Qty: 30 CAPSULE | Refills: 2 | Status: SHIPPED | OUTPATIENT
Start: 2019-09-24 | End: 2019-12-13 | Stop reason: SDUPTHER

## 2019-10-08 ENCOUNTER — HOSPITAL ENCOUNTER (OUTPATIENT)
Dept: PULMONOLOGY | Age: 64
Setting detail: THERAPIES SERIES
Discharge: HOME | End: 2019-10-08
Payer: COMMERCIAL

## 2019-10-08 VITALS — WEIGHT: 143 LBS | BODY MASS INDEX: 24.55 KG/M2

## 2019-10-08 PROCEDURE — G0424 PULMONARY REHAB W EXER: HCPCS

## 2019-10-08 ASSESSMENT — PATIENT HEALTH QUESTIONNAIRE - PHQ9: SUM OF ALL RESPONSES TO PHQ QUESTIONS 1-9: 13

## 2019-10-11 ENCOUNTER — TELEPHONE (OUTPATIENT)
Dept: ONCOLOGY | Age: 64
End: 2019-10-11

## 2019-10-11 ENCOUNTER — OFFICE VISIT (OUTPATIENT)
Dept: ONCOLOGY | Age: 64
End: 2019-10-11
Payer: COMMERCIAL

## 2019-10-11 ENCOUNTER — HOSPITAL ENCOUNTER (OUTPATIENT)
Age: 64
Discharge: HOME | End: 2019-10-11
Payer: COMMERCIAL

## 2019-10-11 VITALS
TEMPERATURE: 98.3 F | HEART RATE: 80 BPM | DIASTOLIC BLOOD PRESSURE: 62 MMHG | SYSTOLIC BLOOD PRESSURE: 119 MMHG | BODY MASS INDEX: 24.31 KG/M2 | WEIGHT: 141.6 LBS

## 2019-10-11 DIAGNOSIS — D72.810 LYMPHOPENIA: ICD-10-CM

## 2019-10-11 LAB
ABSOLUTE EOS #: 0.2 K/UL (ref 0–0.4)
ABSOLUTE IMMATURE GRANULOCYTE: ABNORMAL K/UL (ref 0–0.3)
ABSOLUTE LYMPH #: 1.2 K/UL (ref 1–4.8)
ABSOLUTE MONO #: 0.8 K/UL (ref 0.1–1.2)
BASOPHILS # BLD: 0 % (ref 0–2)
BASOPHILS ABSOLUTE: 0 K/UL (ref 0–0.2)
DIFFERENTIAL TYPE: ABNORMAL
EOSINOPHILS RELATIVE PERCENT: 2 % (ref 1–4)
HCT VFR BLD CALC: 45.5 % (ref 36–46)
HEMOGLOBIN: 14.6 G/DL (ref 12–16)
IMMATURE GRANULOCYTES: ABNORMAL %
LYMPHOCYTES # BLD: 18 % (ref 24–44)
MCH RBC QN AUTO: 32 PG (ref 26–34)
MCHC RBC AUTO-ENTMCNC: 32.2 G/DL (ref 31–37)
MCV RBC AUTO: 99.5 FL (ref 80–100)
MONOCYTES # BLD: 11 % (ref 2–11)
NRBC AUTOMATED: ABNORMAL PER 100 WBC
PDW BLD-RTO: 14.4 % (ref 12.5–15.4)
PLATELET # BLD: 250 K/UL (ref 140–450)
PLATELET ESTIMATE: ABNORMAL
PMV BLD AUTO: 7.5 FL (ref 6–12)
RBC # BLD: 4.57 M/UL (ref 4–5.2)
RBC # BLD: ABNORMAL 10*6/UL
SEG NEUTROPHILS: 69 % (ref 36–66)
SEGMENTED NEUTROPHILS ABSOLUTE COUNT: 4.8 K/UL (ref 1.8–7.7)
WBC # BLD: 6.9 K/UL (ref 3.5–11)
WBC # BLD: ABNORMAL 10*3/UL

## 2019-10-11 PROCEDURE — 85025 COMPLETE CBC W/AUTO DIFF WBC: CPT

## 2019-10-11 PROCEDURE — 36415 COLL VENOUS BLD VENIPUNCTURE: CPT

## 2019-10-11 PROCEDURE — 1036F TOBACCO NON-USER: CPT | Performed by: INTERNAL MEDICINE

## 2019-10-11 PROCEDURE — 99214 OFFICE O/P EST MOD 30 MIN: CPT | Performed by: INTERNAL MEDICINE

## 2019-10-11 PROCEDURE — G8420 CALC BMI NORM PARAMETERS: HCPCS | Performed by: INTERNAL MEDICINE

## 2019-10-11 PROCEDURE — 99211 OFF/OP EST MAY X REQ PHY/QHP: CPT | Performed by: INTERNAL MEDICINE

## 2019-10-11 PROCEDURE — 3017F COLORECTAL CA SCREEN DOC REV: CPT | Performed by: INTERNAL MEDICINE

## 2019-10-11 PROCEDURE — G8484 FLU IMMUNIZE NO ADMIN: HCPCS | Performed by: INTERNAL MEDICINE

## 2019-10-11 PROCEDURE — G8427 DOCREV CUR MEDS BY ELIG CLIN: HCPCS | Performed by: INTERNAL MEDICINE

## 2019-10-11 RX ORDER — TRAMADOL HYDROCHLORIDE 50 MG/1
50 TABLET ORAL EVERY 6 HOURS PRN
COMMUNITY
End: 2019-10-30 | Stop reason: ALTCHOICE

## 2019-10-11 RX ORDER — PREDNISONE 10 MG/1
10 TABLET ORAL DAILY
COMMUNITY
End: 2019-10-30 | Stop reason: ALTCHOICE

## 2019-10-14 DIAGNOSIS — E55.9 VITAMIN D DEFICIENCY: ICD-10-CM

## 2019-10-15 RX ORDER — AVOBENZONE, HOMOSALATE, OCTISALATE, OCTOCRYLENE 30; 100; 50; 25 MG/ML; MG/ML; MG/ML; MG/ML
SPRAY TOPICAL
Qty: 30 TABLET | Refills: 5 | Status: SHIPPED | OUTPATIENT
Start: 2019-10-15 | End: 2020-03-09

## 2019-10-21 ENCOUNTER — HOSPITAL ENCOUNTER (OUTPATIENT)
Dept: PULMONOLOGY | Age: 64
Setting detail: THERAPIES SERIES
Discharge: HOME | End: 2019-10-21
Payer: COMMERCIAL

## 2019-10-21 VITALS — BODY MASS INDEX: 25.75 KG/M2 | WEIGHT: 150 LBS

## 2019-10-21 PROCEDURE — G0424 PULMONARY REHAB W EXER: HCPCS

## 2019-10-23 ENCOUNTER — HOSPITAL ENCOUNTER (OUTPATIENT)
Dept: PULMONOLOGY | Age: 64
Setting detail: THERAPIES SERIES
Discharge: HOME | End: 2019-10-23
Payer: COMMERCIAL

## 2019-10-23 VITALS — BODY MASS INDEX: 25.37 KG/M2 | WEIGHT: 147.8 LBS

## 2019-10-28 ENCOUNTER — HOSPITAL ENCOUNTER (OUTPATIENT)
Dept: PULMONOLOGY | Age: 64
Setting detail: THERAPIES SERIES
Discharge: HOME | End: 2019-10-28
Payer: COMMERCIAL

## 2019-10-29 DIAGNOSIS — B00.2 ORAL HERPES: ICD-10-CM

## 2019-10-30 ENCOUNTER — APPOINTMENT (OUTPATIENT)
Dept: PULMONOLOGY | Age: 64
End: 2019-10-30
Payer: COMMERCIAL

## 2019-10-30 ENCOUNTER — OFFICE VISIT (OUTPATIENT)
Dept: FAMILY MEDICINE CLINIC | Age: 64
End: 2019-10-30
Payer: COMMERCIAL

## 2019-10-30 VITALS
WEIGHT: 143.4 LBS | SYSTOLIC BLOOD PRESSURE: 108 MMHG | HEIGHT: 64 IN | DIASTOLIC BLOOD PRESSURE: 74 MMHG | HEART RATE: 81 BPM | OXYGEN SATURATION: 89 % | BODY MASS INDEX: 24.48 KG/M2

## 2019-10-30 DIAGNOSIS — J41.8 MIXED SIMPLE AND MUCOPURULENT CHRONIC BRONCHITIS (HCC): Primary | ICD-10-CM

## 2019-10-30 DIAGNOSIS — D72.810 LYMPHOPENIA: ICD-10-CM

## 2019-10-30 DIAGNOSIS — M75.02 ADHESIVE CAPSULITIS OF LEFT SHOULDER: ICD-10-CM

## 2019-10-30 DIAGNOSIS — K58.9 IRRITABLE BOWEL SYNDROME WITHOUT DIARRHEA: ICD-10-CM

## 2019-10-30 DIAGNOSIS — Z23 NEED FOR VACCINATION: ICD-10-CM

## 2019-10-30 PROCEDURE — G8427 DOCREV CUR MEDS BY ELIG CLIN: HCPCS | Performed by: FAMILY MEDICINE

## 2019-10-30 PROCEDURE — 90686 IIV4 VACC NO PRSV 0.5 ML IM: CPT | Performed by: FAMILY MEDICINE

## 2019-10-30 PROCEDURE — G8926 SPIRO NO PERF OR DOC: HCPCS | Performed by: FAMILY MEDICINE

## 2019-10-30 PROCEDURE — 3023F SPIROM DOC REV: CPT | Performed by: FAMILY MEDICINE

## 2019-10-30 PROCEDURE — G8482 FLU IMMUNIZE ORDER/ADMIN: HCPCS | Performed by: FAMILY MEDICINE

## 2019-10-30 PROCEDURE — 1036F TOBACCO NON-USER: CPT | Performed by: FAMILY MEDICINE

## 2019-10-30 PROCEDURE — 99214 OFFICE O/P EST MOD 30 MIN: CPT | Performed by: FAMILY MEDICINE

## 2019-10-30 PROCEDURE — 3017F COLORECTAL CA SCREEN DOC REV: CPT | Performed by: FAMILY MEDICINE

## 2019-10-30 PROCEDURE — 90471 IMMUNIZATION ADMIN: CPT | Performed by: FAMILY MEDICINE

## 2019-10-30 PROCEDURE — G8420 CALC BMI NORM PARAMETERS: HCPCS | Performed by: FAMILY MEDICINE

## 2019-10-30 RX ORDER — ACYCLOVIR 400 MG/1
TABLET ORAL
Qty: 14 TABLET | Refills: 0 | Status: SHIPPED | OUTPATIENT
Start: 2019-10-30 | End: 2020-01-30

## 2019-10-30 RX ORDER — LIDOCAINE HYDROCHLORIDE 10 MG/ML
2 INJECTION, SOLUTION EPIDURAL; INFILTRATION; INTRACAUDAL; PERINEURAL ONCE
Status: COMPLETED | OUTPATIENT
Start: 2019-10-30 | End: 2019-10-30

## 2019-10-30 RX ADMIN — LIDOCAINE HYDROCHLORIDE 2 ML: 10 INJECTION, SOLUTION EPIDURAL; INFILTRATION; INTRACAUDAL; PERINEURAL at 15:51

## 2019-10-30 ASSESSMENT — ENCOUNTER SYMPTOMS
DIARRHEA: 0
SINUS PRESSURE: 0
SHORTNESS OF BREATH: 1
BLOOD IN STOOL: 0
BACK PAIN: 1
WHEEZING: 1
RHINORRHEA: 0
ABDOMINAL DISTENTION: 0
VOMITING: 0
CHEST TIGHTNESS: 1
ABDOMINAL PAIN: 0

## 2019-11-04 ENCOUNTER — HOSPITAL ENCOUNTER (OUTPATIENT)
Dept: PULMONOLOGY | Age: 64
Setting detail: THERAPIES SERIES
Discharge: HOME | End: 2019-11-04
Payer: COMMERCIAL

## 2019-11-06 ENCOUNTER — HOSPITAL ENCOUNTER (OUTPATIENT)
Dept: PULMONOLOGY | Age: 64
Setting detail: THERAPIES SERIES
Discharge: HOME | End: 2019-11-06
Payer: COMMERCIAL

## 2019-11-06 ENCOUNTER — HOSPITAL ENCOUNTER (OUTPATIENT)
Dept: PHYSICAL THERAPY | Age: 64
Setting detail: THERAPIES SERIES
Discharge: HOME | End: 2019-11-06
Payer: COMMERCIAL

## 2019-11-06 VITALS — WEIGHT: 143.8 LBS | BODY MASS INDEX: 24.68 KG/M2

## 2019-11-06 PROCEDURE — 97162 PT EVAL MOD COMPLEX 30 MIN: CPT

## 2019-11-06 PROCEDURE — G0424 PULMONARY REHAB W EXER: HCPCS

## 2019-11-06 PROCEDURE — 97110 THERAPEUTIC EXERCISES: CPT

## 2019-11-06 ASSESSMENT — PAIN DESCRIPTION - PAIN TYPE: TYPE: CHRONIC PAIN

## 2019-11-06 ASSESSMENT — PAIN - FUNCTIONAL ASSESSMENT: PAIN_FUNCTIONAL_ASSESSMENT: INTOLERABLE, UNABLE TO DO ANY ACTIVE OR PASSIVE ACTIVITIES

## 2019-11-06 ASSESSMENT — PAIN DESCRIPTION - FREQUENCY: FREQUENCY: CONTINUOUS

## 2019-11-06 ASSESSMENT — PAIN DESCRIPTION - LOCATION: LOCATION: SHOULDER

## 2019-11-06 ASSESSMENT — PAIN DESCRIPTION - ORIENTATION: ORIENTATION: LEFT;ANTERIOR

## 2019-11-06 ASSESSMENT — PAIN DESCRIPTION - ONSET: ONSET: ON-GOING

## 2019-11-06 ASSESSMENT — PAIN SCALES - GENERAL: PAINLEVEL_OUTOF10: 9

## 2019-11-06 ASSESSMENT — PAIN DESCRIPTION - PROGRESSION: CLINICAL_PROGRESSION: GRADUALLY WORSENING

## 2019-11-06 ASSESSMENT — PAIN DESCRIPTION - DESCRIPTORS: DESCRIPTORS: DISCOMFORT

## 2019-11-11 ENCOUNTER — HOSPITAL ENCOUNTER (OUTPATIENT)
Dept: PULMONOLOGY | Age: 64
Setting detail: THERAPIES SERIES
Discharge: HOME | End: 2019-11-11
Payer: COMMERCIAL

## 2019-11-13 ENCOUNTER — HOSPITAL ENCOUNTER (OUTPATIENT)
Dept: PULMONOLOGY | Age: 64
Setting detail: THERAPIES SERIES
Discharge: HOME | End: 2019-11-13
Payer: COMMERCIAL

## 2019-11-13 VITALS — WEIGHT: 147 LBS | BODY MASS INDEX: 25.23 KG/M2

## 2019-11-13 PROCEDURE — G0424 PULMONARY REHAB W EXER: HCPCS

## 2019-11-18 ENCOUNTER — APPOINTMENT (OUTPATIENT)
Dept: PULMONOLOGY | Age: 64
End: 2019-11-18
Payer: COMMERCIAL

## 2019-11-18 ENCOUNTER — HOSPITAL ENCOUNTER (OUTPATIENT)
Dept: PHYSICAL THERAPY | Age: 64
Setting detail: THERAPIES SERIES
Discharge: HOME | End: 2019-11-18
Payer: COMMERCIAL

## 2019-11-20 ENCOUNTER — APPOINTMENT (OUTPATIENT)
Dept: PULMONOLOGY | Age: 64
End: 2019-11-20
Payer: COMMERCIAL

## 2019-11-25 ENCOUNTER — HOSPITAL ENCOUNTER (OUTPATIENT)
Dept: PHYSICAL THERAPY | Age: 64
Setting detail: THERAPIES SERIES
Discharge: HOME | End: 2019-11-25
Payer: COMMERCIAL

## 2019-11-25 ENCOUNTER — APPOINTMENT (OUTPATIENT)
Dept: PULMONOLOGY | Age: 64
End: 2019-11-25
Payer: COMMERCIAL

## 2019-11-27 ENCOUNTER — HOSPITAL ENCOUNTER (OUTPATIENT)
Dept: PULMONOLOGY | Age: 64
Setting detail: THERAPIES SERIES
Discharge: HOME | End: 2019-11-27
Payer: COMMERCIAL

## 2019-12-02 ENCOUNTER — HOSPITAL ENCOUNTER (OUTPATIENT)
Dept: PULMONOLOGY | Age: 64
Setting detail: THERAPIES SERIES
Discharge: HOME | End: 2019-12-02
Payer: COMMERCIAL

## 2019-12-02 ENCOUNTER — HOSPITAL ENCOUNTER (OUTPATIENT)
Dept: PHYSICAL THERAPY | Age: 64
Setting detail: THERAPIES SERIES
Discharge: HOME | End: 2019-12-02
Payer: COMMERCIAL

## 2019-12-04 ENCOUNTER — HOSPITAL ENCOUNTER (OUTPATIENT)
Dept: PULMONOLOGY | Age: 64
Setting detail: THERAPIES SERIES
Discharge: HOME | End: 2019-12-04
Payer: COMMERCIAL

## 2019-12-04 VITALS — BODY MASS INDEX: 24.34 KG/M2 | WEIGHT: 141.8 LBS

## 2019-12-04 PROCEDURE — G0424 PULMONARY REHAB W EXER: HCPCS

## 2019-12-09 ENCOUNTER — HOSPITAL ENCOUNTER (OUTPATIENT)
Dept: PULMONOLOGY | Age: 64
Setting detail: THERAPIES SERIES
Discharge: HOME | End: 2019-12-09
Payer: COMMERCIAL

## 2019-12-11 ENCOUNTER — HOSPITAL ENCOUNTER (OUTPATIENT)
Dept: PULMONOLOGY | Age: 64
Setting detail: THERAPIES SERIES
Discharge: HOME | End: 2019-12-11
Payer: COMMERCIAL

## 2019-12-11 VITALS — WEIGHT: 142.8 LBS | BODY MASS INDEX: 24.51 KG/M2

## 2019-12-11 PROCEDURE — G0424 PULMONARY REHAB W EXER: HCPCS

## 2019-12-16 ENCOUNTER — TELEPHONE (OUTPATIENT)
Dept: FAMILY MEDICINE CLINIC | Age: 64
End: 2019-12-16

## 2019-12-16 ENCOUNTER — HOSPITAL ENCOUNTER (OUTPATIENT)
Dept: PULMONOLOGY | Age: 64
Setting detail: THERAPIES SERIES
Discharge: HOME | End: 2019-12-16
Payer: COMMERCIAL

## 2019-12-16 VITALS — WEIGHT: 147 LBS | BODY MASS INDEX: 25.23 KG/M2

## 2019-12-16 PROCEDURE — G0424 PULMONARY REHAB W EXER: HCPCS

## 2019-12-18 ENCOUNTER — HOSPITAL ENCOUNTER (OUTPATIENT)
Dept: PULMONOLOGY | Age: 64
Setting detail: THERAPIES SERIES
Discharge: HOME | End: 2019-12-18
Payer: COMMERCIAL

## 2019-12-23 ENCOUNTER — HOSPITAL ENCOUNTER (OUTPATIENT)
Dept: PULMONOLOGY | Age: 64
Setting detail: THERAPIES SERIES
Discharge: HOME | End: 2019-12-23
Payer: COMMERCIAL

## 2019-12-23 VITALS — WEIGHT: 146.4 LBS | BODY MASS INDEX: 25.13 KG/M2

## 2019-12-23 DIAGNOSIS — E55.9 VITAMIN D DEFICIENCY: ICD-10-CM

## 2019-12-23 PROCEDURE — G0424 PULMONARY REHAB W EXER: HCPCS

## 2019-12-23 RX ORDER — ERGOCALCIFEROL 1.25 MG/1
CAPSULE ORAL
Qty: 4 CAPSULE | Refills: 5 | Status: SHIPPED | OUTPATIENT
Start: 2019-12-23 | End: 2020-08-17 | Stop reason: SDUPTHER

## 2019-12-25 ENCOUNTER — APPOINTMENT (OUTPATIENT)
Dept: PULMONOLOGY | Age: 64
End: 2019-12-25
Payer: COMMERCIAL

## 2019-12-30 ENCOUNTER — HOSPITAL ENCOUNTER (OUTPATIENT)
Dept: PULMONOLOGY | Age: 64
Setting detail: THERAPIES SERIES
Discharge: HOME | End: 2019-12-30
Payer: COMMERCIAL

## 2020-01-01 ENCOUNTER — APPOINTMENT (OUTPATIENT)
Dept: PULMONOLOGY | Age: 65
End: 2020-01-01
Payer: COMMERCIAL

## 2020-01-06 ENCOUNTER — HOSPITAL ENCOUNTER (OUTPATIENT)
Dept: PHYSICAL THERAPY | Age: 65
Setting detail: THERAPIES SERIES
Discharge: HOME | End: 2020-01-06
Payer: COMMERCIAL

## 2020-01-06 ENCOUNTER — HOSPITAL ENCOUNTER (OUTPATIENT)
Dept: PULMONOLOGY | Age: 65
Setting detail: THERAPIES SERIES
Discharge: HOME | End: 2020-01-06
Payer: COMMERCIAL

## 2020-01-06 VITALS — WEIGHT: 144 LBS | BODY MASS INDEX: 24.72 KG/M2

## 2020-01-06 VITALS — OXYGEN SATURATION: 94 %

## 2020-01-06 PROCEDURE — 97162 PT EVAL MOD COMPLEX 30 MIN: CPT

## 2020-01-06 PROCEDURE — G0424 PULMONARY REHAB W EXER: HCPCS

## 2020-01-06 PROCEDURE — 97110 THERAPEUTIC EXERCISES: CPT

## 2020-01-06 ASSESSMENT — PAIN DESCRIPTION - ONSET
ONSET: ON-GOING
ONSET: ON-GOING

## 2020-01-06 ASSESSMENT — PAIN DESCRIPTION - ORIENTATION
ORIENTATION: LEFT;ANTERIOR
ORIENTATION: LEFT;ANTERIOR

## 2020-01-06 ASSESSMENT — PAIN DESCRIPTION - LOCATION
LOCATION: SHOULDER
LOCATION: SHOULDER

## 2020-01-06 ASSESSMENT — PAIN DESCRIPTION - PROGRESSION
CLINICAL_PROGRESSION: GRADUALLY WORSENING
CLINICAL_PROGRESSION: GRADUALLY WORSENING

## 2020-01-06 ASSESSMENT — PAIN - FUNCTIONAL ASSESSMENT
PAIN_FUNCTIONAL_ASSESSMENT: PREVENTS OR INTERFERES WITH MANY ACTIVE NOT PASSIVE ACTIVITIES
PAIN_FUNCTIONAL_ASSESSMENT: PREVENTS OR INTERFERES SOME ACTIVE ACTIVITIES AND ADLS

## 2020-01-06 ASSESSMENT — PAIN DESCRIPTION - PAIN TYPE
TYPE: CHRONIC PAIN
TYPE: CHRONIC PAIN

## 2020-01-06 ASSESSMENT — PAIN DESCRIPTION - DIRECTION
RADIATING_TOWARDS: TO LEFT ELBOW
RADIATING_TOWARDS: TO LEFT ELBOW

## 2020-01-06 ASSESSMENT — PAIN DESCRIPTION - DESCRIPTORS
DESCRIPTORS: ACHING;TINGLING
DESCRIPTORS: ACHING;TINGLING

## 2020-01-06 ASSESSMENT — PAIN DESCRIPTION - FREQUENCY
FREQUENCY: CONTINUOUS
FREQUENCY: CONTINUOUS

## 2020-01-06 ASSESSMENT — PAIN SCALES - GENERAL
PAINLEVEL_OUTOF10: 5
PAINLEVEL_OUTOF10: 5

## 2020-01-06 NOTE — PROGRESS NOTES
Pt was not able to complete her free walk or perform second set on NuStep due to a migraine headache. Reviewed set up and use of NuStep. She c/o right knee pain today. Pt was instructed to exercise as tolerated. Encouraged use of PLB/DB, especially while walking with moderate to minimal cueing required. We will continue to monitor her SpO2 and increase exercise time and intensity as tolerated.

## 2020-01-06 NOTE — PROGRESS NOTES
Physical Therapy  Initial Assessment  Date: 2020  Patient Name: Brigitte Mortensen  MRN: [id number]  : 1955     Treatment Diagnosis: Left Shoulder Pain     Restrictions  Restrictions/Precautions  Restrictions/Precautions: ROM Restrictions    Subjective   General  Chart Reviewed: Yes  Patient assessed for rehabilitation services?: Yes  Additional Pertinent Hx: COPD - oxygen 2 L   Response To Previous Treatment: Not applicable  Family / Caregiver Present: No  Referring Practitioner: Stephen Portillo MD  Referral Date : 10/30/19  Diagnosis: Left Should Adhesive capsulitis   Follows Commands: Within Functional Limits  PT Visit Information  Onset Date: 19  PT Insurance Information: [facility] source   Total # of Visits Approved: 6  Total # of Visits to Date: 1  Plan of Care/Certification Expiration Date: 19  No Show: 0  Progress Note Due Date: 20  Canceled Appointment: 0  Progress Note Counter:   Subjective  Subjective: My shoulder froze up last April around Located within [facility]. I have not been abl to use my arm since, for washing my hair or anything. Pain Screening  Patient Currently in Pain: Yes  Pain Assessment  Pain Assessment: 0-10  Pain Level: 5  Patient's Stated Pain Goal: No pain  Pain Type: Chronic pain  Pain Location: Shoulder  Pain Orientation: Left; Anterior  Pain Radiating Towards: to left elbow   Pain Descriptors: Aching;Tingling  Pain Frequency: Continuous  Pain Onset: On-going  Clinical Progression: Gradually worsening  Functional Pain Assessment: Prevents or interferes some active activities and ADLs  Non-Pharmaceutical Pain Intervention(s): Ambulation/Increased Activity; Emotional support; Environmental changes; Rest;Repositioned;Relaxation techniques  Response to Pain Intervention: None  Multiple Pain Sites: No  Vital Signs  Patient Currently in Pain: Yes  Oxygen Therapy  SpO2: 94 %  Pulse Oximeter Device Mode: Intermittent  Pulse Oximeter Device Location: Right;Finger  O2 Device: Nasal table slides, shoulder extension, triceps strech   Exercise 3: Heat to left shoulder today for pain pre/post exercise. Ortho Screen  Posture: poor rounded shoulders and forward head     Assessment   Neurological  Neurological (WDL): Within Defined Limits  Conditions Requiring Skilled Therapeutic Intervention  Body structures, Functions, Activity limitations: Decreased functional mobility ; Decreased ADL status; Decreased ROM; Decreased strength;Decreased posture; Increased pain;Decreased endurance;Decreased high-level IADLs;Decreased coordination  Assessment: The patient should do well with PT 1 x a week. Will progress as tolerated, maybe limited by sever pain. Treatment Diagnosis: Left shoulder pain   Prognosis: Fair  Decision Making: Medium Complexity  Clinical Presentation: limited AROM of left shoulder   Barriers to Learning: pain  REQUIRES PT FOLLOW UP: Yes  Treatment Initiated : PROM and HEP   Discharge Recommendations: Home independently  Activity Tolerance  Activity Tolerance: Patient limited by pain     Plan     Will see 1 x a week at the patient's request.  She has a very limited left shoulder ROM and strength. Short term goals  Time Frame for Short term goals: 3 weeks   Short term goal 1: OPTIMAL score of 15/3 at the time of  her evaluation, her goal is 12/3. Short term goal 2: Independent with home program.   Long term goals  Time Frame for Long term goals : 6 weeks  Long term goal 1: OPTIMAL score of 9/3 at the time of her discharge.    Patient Goals   Patient goals : Use left arm with ADL's without pain        Therapy Time   Individual Concurrent Group Co-treatment   Time In  1430         Time Out  1530         Minutes  60 min   Total  30 min Eval - mod  15 min  XRD  15 min   HP N/C          Timed Code Treatment Minutes: 39 Minutes    Rehab Potential:  [x] Good  [] Fair  [] Poor   Suggested Professional Referral:  [x] No  [] Yes:  Barriers to Goal Achievement:  [x] No  [] Yes:  Domestic Concerns:  [x] No  [] Yes:    Treatment Plan:  [x] Therapeutic Exercise    [] Modalities:  [] Therapeutic Activity     [] Ultrasound  [x] Electrical Stimulation  [] Gait Training     [] Massage       [] Lumbar/Cervical Traction  [] Neuromuscular Re-education [x] Cold/hot pack [] Other:  [x] Instruction in HEP              [] Work Conditioning       [] Manual Therapy                     [] Aquatic Therapy     [] Vasocompression  [] Iontophoresis: 4 mg/mL                      Dexamethasone Sodium      Phosphate 40-80mAmin (Allergies Reviewed)     Frequency:    1       X/wk x     6     wk's      [x] Plans/Goals, Risk/Benefits discussed with pt/family  Comprehension of Education [x] yes  [x] Needs Review  Pt/Family Education: [x] Verbal  [x] Demo  [x] Written    More objective information is available upon request.  Thank you for this referral.        Medicare/Regulatory Requirements:  I have reviewed this plan of care and certify a need for   Medically necessary rehabilitation services.   [x] Physician Signature    Date:     Electronically signed by: Cecy Viveros, [address], [address] [facility]  Phone [phone]  Fax [phone]

## 2020-01-08 ENCOUNTER — HOSPITAL ENCOUNTER (OUTPATIENT)
Dept: PULMONOLOGY | Age: 65
Setting detail: THERAPIES SERIES
Discharge: HOME | End: 2020-01-08
Payer: COMMERCIAL

## 2020-01-08 VITALS — WEIGHT: 142.4 LBS | BODY MASS INDEX: 24.44 KG/M2

## 2020-01-08 PROCEDURE — G0424 PULMONARY REHAB W EXER: HCPCS

## 2020-01-08 NOTE — PROGRESS NOTES
Pt states she is experiencing very slight dyspnea at rest today. She is using her nebulizer as ordered. Instructed pt to exercise as tolerated. She was able to complete 3 sets on the NuStep. She has resumed PT for her left shoulder. She was able to use her left arm about half of the time on the NuStep-arms and legs together. She stopped free walk at 6 minutes due to increased dyspnea reporting a 4 on the sky chart. Pt required moderate cueing to perform PLB and pace her walk. She states she has not used tobacco since 1/6/20. We will continue to monitor her SpO2 and increase exercise time and intensity as tolerated.

## 2020-01-13 ENCOUNTER — HOSPITAL ENCOUNTER (OUTPATIENT)
Dept: PHYSICAL THERAPY | Age: 65
Setting detail: THERAPIES SERIES
Discharge: HOME | End: 2020-01-13
Payer: COMMERCIAL

## 2020-01-13 ENCOUNTER — HOSPITAL ENCOUNTER (OUTPATIENT)
Dept: PULMONOLOGY | Age: 65
Setting detail: THERAPIES SERIES
Discharge: HOME | End: 2020-01-13
Payer: COMMERCIAL

## 2020-01-13 VITALS — WEIGHT: 144 LBS | BODY MASS INDEX: 24.72 KG/M2

## 2020-01-13 PROCEDURE — G0424 PULMONARY REHAB W EXER: HCPCS

## 2020-01-13 PROCEDURE — 97110 THERAPEUTIC EXERCISES: CPT

## 2020-01-13 PROCEDURE — 97140 MANUAL THERAPY 1/> REGIONS: CPT

## 2020-01-13 RX ORDER — PSYLLIUM HUSK 3.4 G/7G
POWDER ORAL
Qty: 30 TABLET | Refills: 3 | Status: SHIPPED | OUTPATIENT
Start: 2020-01-13 | End: 2020-04-13

## 2020-01-13 ASSESSMENT — PAIN DESCRIPTION - PROGRESSION: CLINICAL_PROGRESSION: GRADUALLY WORSENING

## 2020-01-13 NOTE — PROGRESS NOTES
Physical Therapy  Daily Treatment Note  Date: 2020  Patient Name: Cuco Sherwood  MRN: [id number]     :   1955    Subjective:   General  Additional Pertinent Hx: COPD - oxygen 2 L   Referring Practitioner: Stephanie Araiza MD  PT Visit Information  Onset Date: 19  PT Insurance Information: Letha Garay   Total # of Visits Approved: 6  Total # of Visits to Date: 2  Plan of Care/Certification Expiration Date: 19  No Show: 0  Canceled Appointment: 0  Progress Note Counter: /  Pain Screening  Patient Currently in Pain: Yes  Pain Assessment  Pain Assessment: 0-10  Clinical Progression: Gradually worsening  Response to Pain Intervention: None  Vital Signs  Patient Currently in Pain: Yes  Oxygen Therapy  O2 Device: Nasal cannula  Patient Observation  Observations: limited mobility of left shoulder        Treatment Activities:      Bed mobility  Bridging: Independent  Transfers  Sit to Stand: Independent         AROM RUE (degrees)  RUE General AROM: .   PROM LUE (degrees)  L Shoulder Flex  0-170: 0-60  L Shoulder Ext  0-45: 0-30  L Shoulder ABduction 0-140: 0-40  L Shoulder Int Rotation  0-70: 0-15  L Shoulder Ext Rotation  0-90: 0-10  AROM LUE (degrees)  LUE General AROM: Unable to use UE in any functional patters due to poor shoulder AROM   Strength RUE  Strength RUE: WFL  Strength LUE  Strength LUE: Exception  Comment: painful   L Shoulder Flexion: 2/5  L Shoulder Extension: 2/5  L Shoulder ABduction: 2/5  L Shoulder Internal Rotation: 2/5  L Shoulder External Rotation: 2/5    Exercises  Exercise 1: Table slides 10 x Flex/ABD  Exercise 2: Cane stretches chest press/over head  Exercise 3: Pulley 10 x 5 sec hold   Exercise 4: PREs flexion, Scaption  Exercise 5: PROM(15 MIN) flex/abd distraction,           Ortho Screen  Posture: poor rounded shoulders and forward head       Assessment:   Conditions Requiring Skilled Therapeutic Intervention  Body structures, Functions, Activity limitations:

## 2020-01-15 ENCOUNTER — HOSPITAL ENCOUNTER (OUTPATIENT)
Dept: PULMONOLOGY | Age: 65
Setting detail: THERAPIES SERIES
Discharge: HOME | End: 2020-01-15
Payer: COMMERCIAL

## 2020-01-15 VITALS — WEIGHT: 144 LBS | BODY MASS INDEX: 24.72 KG/M2

## 2020-01-15 PROCEDURE — G0424 PULMONARY REHAB W EXER: HCPCS

## 2020-01-15 RX ORDER — ACETAMINOPHEN 500 MG
TABLET ORAL
Qty: 120 TABLET | Refills: 0 | Status: SHIPPED | OUTPATIENT
Start: 2020-01-15 | End: 2020-04-13

## 2020-01-15 RX ORDER — BENZONATATE 200 MG/1
CAPSULE ORAL
Qty: 30 CAPSULE | Refills: 0 | Status: SHIPPED | OUTPATIENT
Start: 2020-01-15 | End: 2020-07-30

## 2020-01-20 ENCOUNTER — HOSPITAL ENCOUNTER (OUTPATIENT)
Dept: PULMONOLOGY | Age: 65
Setting detail: THERAPIES SERIES
Discharge: HOME | End: 2020-01-20
Payer: COMMERCIAL

## 2020-01-20 ENCOUNTER — HOSPITAL ENCOUNTER (OUTPATIENT)
Dept: PHYSICAL THERAPY | Age: 65
Setting detail: THERAPIES SERIES
Discharge: HOME | End: 2020-01-20
Payer: COMMERCIAL

## 2020-01-20 VITALS — BODY MASS INDEX: 24.2 KG/M2 | WEIGHT: 141 LBS

## 2020-01-20 PROCEDURE — 97140 MANUAL THERAPY 1/> REGIONS: CPT

## 2020-01-20 PROCEDURE — 97110 THERAPEUTIC EXERCISES: CPT

## 2020-01-20 PROCEDURE — G0424 PULMONARY REHAB W EXER: HCPCS

## 2020-01-20 ASSESSMENT — PAIN DESCRIPTION - FREQUENCY: FREQUENCY: CONTINUOUS

## 2020-01-20 ASSESSMENT — PAIN DESCRIPTION - DESCRIPTORS: DESCRIPTORS: DISCOMFORT;ACHING;SORE;SHARP

## 2020-01-20 ASSESSMENT — PAIN DESCRIPTION - ORIENTATION: ORIENTATION: LEFT;ANTERIOR;INNER

## 2020-01-20 ASSESSMENT — PAIN DESCRIPTION - LOCATION: LOCATION: SHOULDER

## 2020-01-20 ASSESSMENT — PAIN DESCRIPTION - PAIN TYPE: TYPE: CHRONIC PAIN

## 2020-01-20 ASSESSMENT — PAIN DESCRIPTION - ONSET: ONSET: ON-GOING

## 2020-01-20 ASSESSMENT — PAIN DESCRIPTION - DIRECTION: RADIATING_TOWARDS: LEFT ELBOW

## 2020-01-20 ASSESSMENT — PAIN SCALES - GENERAL: PAINLEVEL_OUTOF10: 7

## 2020-01-20 ASSESSMENT — PAIN DESCRIPTION - PROGRESSION: CLINICAL_PROGRESSION: GRADUALLY WORSENING

## 2020-01-20 ASSESSMENT — PAIN - FUNCTIONAL ASSESSMENT: PAIN_FUNCTIONAL_ASSESSMENT: PREVENTS OR INTERFERES SOME ACTIVE ACTIVITIES AND ADLS

## 2020-01-21 NOTE — PROGRESS NOTES
Physical Therapy  Daily Treatment Note  Date: 2020  Patient Name: Hieu Schafer  MRN: [id number]     :   1955    Subjective:   General  Additional Pertinent Hx: COPD - oxygen 2 L   Referring Practitioner: Arnav Brown MD  PT Visit Information  Onset Date: 19  PT Insurance Information: P[employer]ar Dougie   Total # of Visits Approved: 6  Total # of Visits to Date: 3  Plan of Care/Certification Expiration Date: 19  Progress Note Counter: 3/6  Subjective  Subjective: \"My arm is really sore, in fact it's killing me. \"  Pain Screening  Patient Currently in Pain: Yes  Pain Assessment  Pain Assessment: 0-10  Pain Level: 7  Patient's Stated Pain Goal: 1  Pain Type: Chronic pain  Pain Location: Shoulder  Pain Orientation: Left; Anterior; Inner  Pain Radiating Towards: left elbow   Pain Descriptors: Discomfort;Aching;Sore;Sharp  Pain Frequency: Continuous  Pain Onset: On-going  Clinical Progression: Gradually worsening  Functional Pain Assessment: Prevents or interferes some active activities and ADLs  Non-Pharmaceutical Pain Intervention(S): Ambulation/Increased Activity; Heat applied;Relaxation techniques; Rest;Repositioned  Response to Pain Intervention: None  Multiple Pain Sites: No  Vital Signs  Patient Currently in Pain: Yes  Oxygen Therapy  O2 Device: Nasal cannula  Patient Observation  Observations: limited mobility of left shoulder        Treatment Activities:      Bed mobility  Bridging: Independent  Transfers  Sit to Stand: Independent      AROM RUE (degrees)  RUE General AROM: .   PROM LUE (degrees)  L Shoulder Flex  0-170: 0-60  L Shoulder Ext  0-45: 0-30  L Shoulder ABduction 0-140: 0-40  L Shoulder Int Rotation  0-70: 0-15  L Shoulder Ext Rotation  0-90: 0-10  AROM LUE (degrees)  LUE General AROM: Unable to use UE in any functional patters due to poor shoulder AROM   Strength RUE  Strength RUE: WFL  Strength LUE  Strength LUE: Exception  Comment: painful   L Shoulder Flexion: 2/5  L Shoulder Minutes  45 min   Total  15 min   XRD  15 min   MRD  15 min   N/C - HP          Timed Code Treatment Minutes: 39 Minutes      Rehab Potential:  [] Good  [x] Fair  [] Poor   Suggested Professional Referral:  [x] No  [] Yes:  Barriers to Goal Achievement:  [x] No  [] Yes:  Domestic Concerns:  [x] No  [] Yes:    Treatment Plan:  [x] Therapeutic Exercise    [x] Modalities:  [] Therapeutic Activity     [] Ultrasound  [x] Electrical Stimulation  [] Gait Training     [] Massage       [] Lumbar/Cervical Traction  [x] Neuromuscular Re-education [x] Cold/hot pack [] Other:  [x] Instruction in HEP              [] Work Conditioning                                  [] Manual Therapy                     [] Aquatic Therapy     [] Vasocompression  [] Iontophoresis: 4 mg/mL                      Dexamethasone Sodium      Phosphate 40-80mAmin (Allergies Reviewed)     Frequency:      2     X/wk x      6    wk's      [x] Plans/Goals, Risk/Benefits discussed with pt/family  Comprehension of Education [] yes  [x] Needs Review  Pt/Family Education: [x] Verbal  [x] Demo  [x] Written    More objective information is available upon request.  Thank you for this referral.        Medicare/Regulatory Requirements:  I have reviewed this plan of care and certify a need for   Medically necessary rehabilitation services.   [] Physician Signature    Date:     Electronically signed by: Kristie Schafer, [address] [facility] [address] [facility], [id number] [place] [address]  Phone [phone]  Fax [phone]

## 2020-01-22 ENCOUNTER — HOSPITAL ENCOUNTER (OUTPATIENT)
Dept: PULMONOLOGY | Age: 65
Setting detail: THERAPIES SERIES
Discharge: HOME | End: 2020-01-22
Payer: COMMERCIAL

## 2020-01-22 ENCOUNTER — TELEPHONE (OUTPATIENT)
Dept: GASTROENTEROLOGY | Age: 65
End: 2020-01-22

## 2020-01-22 VITALS — BODY MASS INDEX: 24.2 KG/M2 | WEIGHT: 141 LBS

## 2020-01-22 PROCEDURE — G0424 PULMONARY REHAB W EXER: HCPCS

## 2020-01-23 ENCOUNTER — OFFICE VISIT (OUTPATIENT)
Dept: GASTROENTEROLOGY | Age: 65
End: 2020-01-23
Payer: COMMERCIAL

## 2020-01-23 VITALS
DIASTOLIC BLOOD PRESSURE: 63 MMHG | SYSTOLIC BLOOD PRESSURE: 111 MMHG | HEART RATE: 76 BPM | WEIGHT: 139.2 LBS | BODY MASS INDEX: 23.89 KG/M2

## 2020-01-23 PROCEDURE — G8420 CALC BMI NORM PARAMETERS: HCPCS | Performed by: INTERNAL MEDICINE

## 2020-01-23 PROCEDURE — 1036F TOBACCO NON-USER: CPT | Performed by: INTERNAL MEDICINE

## 2020-01-23 PROCEDURE — 99214 OFFICE O/P EST MOD 30 MIN: CPT | Performed by: INTERNAL MEDICINE

## 2020-01-23 PROCEDURE — 3017F COLORECTAL CA SCREEN DOC REV: CPT | Performed by: INTERNAL MEDICINE

## 2020-01-23 PROCEDURE — G8482 FLU IMMUNIZE ORDER/ADMIN: HCPCS | Performed by: INTERNAL MEDICINE

## 2020-01-23 PROCEDURE — G8427 DOCREV CUR MEDS BY ELIG CLIN: HCPCS | Performed by: INTERNAL MEDICINE

## 2020-01-23 ASSESSMENT — ENCOUNTER SYMPTOMS
TROUBLE SWALLOWING: 0
COUGH: 1
ANAL BLEEDING: 0
VOMITING: 0
ABDOMINAL DISTENTION: 0
DIARRHEA: 0
BLOOD IN STOOL: 0
ABDOMINAL PAIN: 0
SHORTNESS OF BREATH: 1
RECTAL PAIN: 0
CONSTIPATION: 0
WHEEZING: 1
ALLERGIC/IMMUNOLOGIC NEGATIVE: 1
NAUSEA: 0
GASTROINTESTINAL NEGATIVE: 1

## 2020-01-23 NOTE — PROGRESS NOTES
is well-developed. Comments: Anxious   HENT:      Head: Normocephalic and atraumatic. Eyes:      Conjunctiva/sclera: Conjunctivae normal.      Pupils: Pupils are equal, round, and reactive to light. Neck:      Musculoskeletal: Normal range of motion and neck supple. Cardiovascular:      Heart sounds: Normal heart sounds. Pulmonary:      Effort: Pulmonary effort is normal.      Breath sounds: Rales present. Abdominal:      General: Bowel sounds are normal.      Palpations: Abdomen is soft. Comments: NON TENDER, NON DISTENTED  LIVER SPLEEN AND HERNIAS ARE NOT  PALPABLE  BOWEL SOUNDS ARE POSITIVE      Musculoskeletal: Normal range of motion. Skin:     General: Skin is warm. Neurological:      Mental Status: She is alert and oriented to person, place, and time. Psychiatric:         Behavior: Behavior normal.         Assessment:      Patient Active Problem List   Diagnosis    Chronic obstructive pulmonary disease (HCC)    Vitamin D deficiency    Anxiety    Asthma    Bipolar disorder ([address]    Depression    Hyperlipidemia    Sleep apnea    Substance abuse ([facility] [address] [facility]    Vision abnormalities    Status post hysteroscopy    Chronic headaches    IBS (irritable bowel syndrome)    Colon polyp    Collagenous colitis    Lung nodule    Lymphopenia    Left elbow pain    Dilated bile duct    Acute pain of left shoulder    Adhesive capsulitis of left shoulder           Plan:      The patient was instructed to start taking some OTC Probiotics products   These are available over the counter at the Pharmacy stores and Grocery stores  He was explained about the beneficial effects they have in the GI track  They will help to establish the good bacterial kit and will help with the digestion and bowel movements  The patient has verbalized understanding and agreement to this plan      Pt was advised in detail about some life style and dietary modifications.  She was advised about avoidance of

## 2020-01-27 ENCOUNTER — OFFICE VISIT (OUTPATIENT)
Dept: FAMILY MEDICINE CLINIC | Age: 65
End: 2020-01-27
Payer: COMMERCIAL

## 2020-01-27 ENCOUNTER — HOSPITAL ENCOUNTER (OUTPATIENT)
Dept: PULMONOLOGY | Age: 65
Setting detail: THERAPIES SERIES
Discharge: HOME | End: 2020-01-27
Payer: COMMERCIAL

## 2020-01-27 ENCOUNTER — HOSPITAL ENCOUNTER (OUTPATIENT)
Dept: PHYSICAL THERAPY | Age: 65
Setting detail: THERAPIES SERIES
Discharge: HOME | End: 2020-01-27
Payer: COMMERCIAL

## 2020-01-27 VITALS — WEIGHT: 140 LBS | BODY MASS INDEX: 24.03 KG/M2

## 2020-01-27 VITALS
HEART RATE: 85 BPM | BODY MASS INDEX: 23.9 KG/M2 | DIASTOLIC BLOOD PRESSURE: 80 MMHG | OXYGEN SATURATION: 96 % | SYSTOLIC BLOOD PRESSURE: 110 MMHG | WEIGHT: 140 LBS | HEIGHT: 64 IN

## 2020-01-27 PROBLEM — D72.810 LYMPHOPENIA: Status: RESOLVED | Noted: 2019-02-27 | Resolved: 2020-01-27

## 2020-01-27 PROCEDURE — 99214 OFFICE O/P EST MOD 30 MIN: CPT | Performed by: FAMILY MEDICINE

## 2020-01-27 PROCEDURE — G0424 PULMONARY REHAB W EXER: HCPCS

## 2020-01-27 PROCEDURE — 97110 THERAPEUTIC EXERCISES: CPT

## 2020-01-27 PROCEDURE — 1036F TOBACCO NON-USER: CPT | Performed by: FAMILY MEDICINE

## 2020-01-27 PROCEDURE — G8482 FLU IMMUNIZE ORDER/ADMIN: HCPCS | Performed by: FAMILY MEDICINE

## 2020-01-27 PROCEDURE — G8427 DOCREV CUR MEDS BY ELIG CLIN: HCPCS | Performed by: FAMILY MEDICINE

## 2020-01-27 PROCEDURE — G8926 SPIRO NO PERF OR DOC: HCPCS | Performed by: FAMILY MEDICINE

## 2020-01-27 PROCEDURE — G8420 CALC BMI NORM PARAMETERS: HCPCS | Performed by: FAMILY MEDICINE

## 2020-01-27 PROCEDURE — 3017F COLORECTAL CA SCREEN DOC REV: CPT | Performed by: FAMILY MEDICINE

## 2020-01-27 PROCEDURE — 3023F SPIROM DOC REV: CPT | Performed by: FAMILY MEDICINE

## 2020-01-27 RX ORDER — MONTELUKAST SODIUM 10 MG/1
10 TABLET ORAL NIGHTLY
COMMUNITY
Start: 2019-12-27 | End: 2021-08-05 | Stop reason: ALTCHOICE

## 2020-01-27 RX ORDER — LIDOCAINE 40 MG/G
CREAM TOPICAL
Qty: 30 G | Refills: 3 | Status: SHIPPED | OUTPATIENT
Start: 2020-01-27 | End: 2020-07-19

## 2020-01-27 RX ORDER — TOPIRAMATE 50 MG/1
50 TABLET, FILM COATED ORAL DAILY
COMMUNITY
Start: 2020-01-12 | End: 2021-08-05 | Stop reason: ALTCHOICE

## 2020-01-27 ASSESSMENT — ENCOUNTER SYMPTOMS
SINUS PRESSURE: 0
SHORTNESS OF BREATH: 1
CHEST TIGHTNESS: 0
WHEEZING: 0
PHOTOPHOBIA: 0
BACK PAIN: 1
RHINORRHEA: 0
COUGH: 1

## 2020-01-27 ASSESSMENT — PAIN DESCRIPTION - LOCATION: LOCATION: SHOULDER

## 2020-01-27 ASSESSMENT — PATIENT HEALTH QUESTIONNAIRE - PHQ9
SUM OF ALL RESPONSES TO PHQ QUESTIONS 1-9: 0
SUM OF ALL RESPONSES TO PHQ QUESTIONS 1-9: 0
SUM OF ALL RESPONSES TO PHQ9 QUESTIONS 1 & 2: 0
2. FEELING DOWN, DEPRESSED OR HOPELESS: 0
1. LITTLE INTEREST OR PLEASURE IN DOING THINGS: 0

## 2020-01-27 ASSESSMENT — PAIN DESCRIPTION - PAIN TYPE: TYPE: CHRONIC PAIN

## 2020-01-27 ASSESSMENT — PAIN DESCRIPTION - ONSET: ONSET: PROGRESSIVE

## 2020-01-27 ASSESSMENT — PAIN DESCRIPTION - PROGRESSION: CLINICAL_PROGRESSION: NOT CHANGED

## 2020-01-27 ASSESSMENT — PAIN DESCRIPTION - ORIENTATION: ORIENTATION: LEFT;ANTERIOR

## 2020-01-27 ASSESSMENT — PAIN DESCRIPTION - FREQUENCY: FREQUENCY: CONTINUOUS

## 2020-01-27 ASSESSMENT — PAIN - FUNCTIONAL ASSESSMENT: PAIN_FUNCTIONAL_ASSESSMENT: PREVENTS OR INTERFERES WITH ALL ACTIVE AND SOME PASSIVE ACTIVITIES

## 2020-01-27 ASSESSMENT — PAIN DESCRIPTION - DIRECTION: RADIATING_TOWARDS: LEFT ELBOW

## 2020-01-27 ASSESSMENT — PAIN SCALES - GENERAL: PAINLEVEL_OUTOF10: 7

## 2020-01-27 NOTE — PROGRESS NOTES
Physical Therapy  Daily Treatment Note  Date: 2020  Patient Name: Ck Pace  MRN: [id number]     :   1955    Subjective:   General  Additional Pertinent Hx: COPD - oxygen 2 L   Referring Practitioner: Lor Combs MD  PT Visit Information  Onset Date: 19  PT Insurance Information: Justine Mosqueda   Total # of Visits Approved: 6  Total # of Visits to Date: 4  Plan of Care/Certification Expiration Date: 02/10/20  No Show: 0  Progress Note Due Date: 02/10/20  Progress Note Counter:   Subjective  Subjective: \"My arm kept me awake all night. \"  \"The pain is so bad it wakes me up or doesn't let me sleep. \"  Pain Screening  Patient Currently in Pain: Yes  Pain Assessment  Pain Assessment: 0-10  Pain Level: 7  Patient's Stated Pain Goal: 1  Pain Type: Chronic pain  Pain Location: Shoulder  Pain Orientation: Left; Anterior  Pain Radiating Towards: left elbow   Pain Descriptors: Sore;Sharp;Radiating;Pounding;Discomfort; Constant; Aching  Pain Frequency: Continuous  Pain Onset: Progressive  Clinical Progression: Not changed  Functional Pain Assessment: Prevents or interferes with all active and some passive activities  Non-Pharmaceutical Pain Intervention(s): Ambulation/Increased Activity; Heat applied; Environmental changes; Emotional support;Rest;Repositioned;Relaxation techniques  Response to Pain Intervention: None  Multiple Pain Sites: No  Vital Signs  Patient Currently in Pain: Yes  Oxygen Therapy  O2 Device: Nasal cannula  O2 Flow Rate (L/min): 2 L/min  Patient Observation  Observations: limited mobility of left shoulder        Treatment Activities:      Bed mobility  Bridging: Independent  Transfers  Sit to Stand: Independent         AROM RUE (degrees)  RUE General AROM: .   PROM LUE (degrees)  L Shoulder Flex  0-170: 0-60  L Shoulder Ext  0-45: 0-30  L Shoulder ABduction 0-140: 0-40  L Shoulder Int Rotation  0-70: 0-15  L Shoulder Ext Rotation  0-90: 0-10  AROM LUE (degrees)  LUE General AROM: Code Treatment Minutes: 45 Minutes  Total Treatment Time: 45  Frequency and duration of TX  Days: 1  Weeks: 6     Therapy Time   Individual Concurrent Group Co-treatment   Time In  1400         Time Out  1445         Minutes  45 min   Total  30 min   XRD  15 min   HP         Timed Code Treatment Minutes: 70 Tawnya Echavarria, PT

## 2020-01-27 NOTE — PROGRESS NOTES
Visit Information    Have you changed or started any medications since your last visit including any over-the-counter medicines, vitamins, or herbal medicines? no   Are you having any side effects from any of your medications? -  no  Have you stopped taking any of your medications? Is so, why? -  no    Have you seen any other physician or provider since your last visit? No  Have you had any other diagnostic tests since your last visit? No  Have you been seen in the emergency room and/or had an admission to a hospital since we last saw you? No  Have you had your routine dental cleaning in the past 6 months? no    Have you activated your [employer] account? If not, what are your barriers?  Yes     Patient Care Team:  Arnav Brown MD as PCP - General (Family Medicine)  Arnav Brown MD as PCP - [facility]  Isaac Luna DO as Consulting Physician (Obstetrics & Gynecology)  Parveen Fields MD as Consulting Physician (Pulmonology)  Mallika Tristan MD as Consulting Physician (Gastroenterology)  Rox London RN as Nurse Navigator    Medical History Review  Past Medical, Family, and Social History reviewed and does contribute to the patient presenting condition    Health Maintenance   Topic Date Due    Cervical cancer screen  01/06/2019    Lipid screen  02/22/2020    Breast cancer screen  07/18/2020    Low dose CT lung screening  07/25/2020    Colon cancer screen colonoscopy  02/15/2023    DTaP/Tdap/Td vaccine (2 - Td) 09/09/2026    Flu vaccine  Completed    Shingles Vaccine  Completed    Pneumococcal 0-64 years Vaccine  Completed    Hepatitis C screen  Completed    HIV screen  Completed

## 2020-01-27 NOTE — PROGRESS NOTES
Chief Complaint   Patient presents with    Shoulder Pain     left side          Weber Covert  here today for follow up on chronic medical problems, go over labs and/or diagnostic studies, and medication refills. Shoulder Pain (left side )      HPI: Patient is here for follow-up on COPD and left shoulder pain. COPD stable on home oxygen follows with Dr. Katelynn Moody. Patient had chronic left shoulder pain was referred to physical therapy, reports she could not complete physical therapy as she was sick, she restarted physical therapy. She also goes to pulmonary rehab. Patient had intra-articular injection done 3 months before, reports that helped her pain and movement. She was able to move her left arm patient wants to get another injection today. Bipolar disorder stable on current treatment        Lymphopenia resolved seen by oncologist.    Hyperlipidemia stable on diet control medications        /80   Pulse 85   Ht 5' 4\" (1.626 m)   Wt 140 lb (63.5 kg)   LMP 05/20/2013 (Exact Date)   SpO2 96%   BMI 24.03 kg/m²    Body mass index is 24.03 kg/m². Wt Readings from Last 3 Encounters:   01/27/20 140 lb (63.5 kg)   01/23/20 139 lb 3.2 oz (63.1 kg)   01/22/20 141 lb (64 kg)        [x]Negative depression screening. PHQ Scores 1/27/2020 10/8/2019 5/15/2019 2/7/2018 6/30/2016   PHQ2 Score 0 6 0 4 0   PHQ9 Score 0 13 0 14 0      []1-4 = Minimal depression   []5-9 = Milddepression   []10-14 = Moderate depression   []15-19 = Moderately severe depression   []20-27 = Severe depression    Discussed testing with the patient and all questions fully answered.     Hospital Outpatient Visit on 10/11/2019   Component Date Value Ref Range Status    WBC 10/11/2019 6.9  3.5 - 11.0 k/uL Final    RBC 10/11/2019 4.57  4.0 - 5.2 m/uL Final    Hemoglobin 10/11/2019 14.6  12.0 - 16.0 g/dL Final    Hematocrit 10/11/2019 45.5  36 - 46 % Final    MCV 10/11/2019 99.5  80 - 100 fL Final    MCH 10/11/2019 32.0  26 - 34 pg Final    MCHC 10/11/2019 32.2  31 - 37 g/dL Final    RDW 10/11/2019 14.4  12.5 - 15.4 % Final    Platelets 04/02/5961 250  140 - 450 k/uL Final    MPV 10/11/2019 7.5  6.0 - 12.0 fL Final    NRBC Automated 10/11/2019 NOT REPORTED  per 100 WBC Final    Differential Type 10/11/2019 NOT REPORTED   Final    Seg Neutrophils 10/11/2019 69* 36 - 66 % Final    Lymphocytes 10/11/2019 18* 24 - 44 % Final    Monocytes 10/11/2019 11  2 - 11 % Final    Eosinophils % 10/11/2019 2  1 - 4 % Final    Basophils 10/11/2019 0  0 - 2 % Final    Immature Granulocytes 10/11/2019 NOT REPORTED  0 % Final    Segs Absolute 10/11/2019 4.80  1.8 - 7.7 k/uL Final    Absolute Lymph # 10/11/2019 1.20  1.0 - 4.8 k/uL Final    Absolute Mono # 10/11/2019 0.80  0.1 - 1.2 k/uL Final    Absolute Eos # 10/11/2019 0.20  0.0 - 0.4 k/uL Final    Basophils Absolute 10/11/2019 0.00  0.0 - 0.2 k/uL Final    Absolute Immature Granulocyte 10/11/2019 NOT REPORTED  0.00 - 0.30 k/uL Final    WBC Morphology 10/11/2019 NOT REPORTED   Final    RBC Morphology 10/11/2019 NOT REPORTED   Final    Platelet Estimate 82/80/1060 NOT REPORTED   Final         Most recent labs reviewed:     Lab Results   Component Value Date    WBC 6.9 10/11/2019    HGB 14.6 10/11/2019    HCT 45.5 10/11/2019    MCV 99.5 10/11/2019     10/11/2019       @BRIEFLAB(NA,K,CL,CO2,BUN,CREATININE,GLUCOSE,CALCIUM)@     Lab Results   Component Value Date    ALT 10 07/08/2019    AST 13 07/08/2019    ALKPHOS 88 07/08/2019    BILITOT 0.21 (L) 07/08/2019       Lab Results   Component Value Date    TSH 2.72 03/17/2016       Lab Results   Component Value Date    CHOL 161 02/22/2019    CHOL 152 11/19/2017    CHOL 182 03/17/2016     Lab Results   Component Value Date    TRIG 79 02/22/2019    TRIG 79 11/19/2017    TRIG 91 03/17/2016     Lab Results   Component Value Date    HDL 69 02/22/2019    HDL 65 11/19/2017    HDL 74 03/17/2016     Lab Results   Component Value Date 2 each 2    nicotine polacrilex (NICORETTE) 2 MG gum Take 2 mg by mouth as needed for Smoking cessation      mirtazapine (REMERON) 15 MG tablet take 1 tablet by mouth at bedtime for sleep  0    Elastic Bandages & Supports (ACE ELBOW BRACE LARGE/X-LARGE) MISC Use daily for elbow pain 1 each 0    ALAWAY 0.025 % ophthalmic solution INSTILL 1 DROP TWO TIMES A DAY INTO LEFT EYE ONLY 10 mL 6    albuterol (PROVENTIL) (2.5 MG/3ML) 0.083% nebulizer solution Take 3 mLs by nebulization 4 times daily 120 each 3    albuterol sulfate  (90 Base) MCG/ACT inhaler Inhale 2 puffs into the lungs every 4 hours as needed for Wheezing 1 Inhaler 3    fluticasone (FLONASE) 50 MCG/ACT nasal spray 2 sprays by Nasal route daily 1 Bottle 5    TUDORZA PRESSAIR 400 MCG/ACT AEPB inhaler inhale 1 puff by mouth twice a day 1 each 0    butalbital-acetaminophen-caffeine (FIORICET, ESGIC) -40 MG per tablet Take 1 tablet by mouth every 4 hours as needed for Headaches      Biotin 5000 MCG CAPS Take 1,000 mcg by mouth       citalopram (CELEXA) 20 MG tablet Take 20 mg by mouth daily      LATUDA 80 MG TABS tablet Take 80 mg by mouth nightly      RA VITAMIN B-12 TR 1000 MCG TBCR take 1 tablet by mouth once daily (Patient not taking: Reported on 1/27/2020) 30 tablet 3     No current facility-administered medications for this visit.               Social History     Socioeconomic History    Marital status:      Spouse name: Not on file    Number of children: Not on file    Years of education: Not on file    Highest education level: Not on file   Occupational History    Not on file   Social Needs    Financial resource strain: Not on file    Food insecurity:     Worry: Not on file     Inability: Not on file    Transportation needs:     Medical: Not on file     Non-medical: Not on file   Tobacco Use    Smoking status: Former Smoker     Packs/day: 2.00     Years: 42.00     Pack years: 84.00     Types: Cigarettes     Last swelling. Endocrine: Negative for polyphagia and polyuria. Genitourinary: Negative for difficulty urinating, flank pain, frequency, hematuria and menstrual problem. Musculoskeletal: Positive for arthralgias, back pain, gait problem, joint swelling and myalgias. Shoulder pain   Neurological: Positive for weakness and numbness. Negative for dizziness, light-headedness and headaches. Psychiatric/Behavioral: Negative for agitation, behavioral problems, decreased concentration, dysphoric mood and sleep disturbance. The patient is not nervous/anxious. Physical Exam  Vitals signs and nursing note reviewed. Constitutional:       Appearance: She is cachectic. Comments: On home oxygen   HENT:      Head: Normocephalic and atraumatic. Eyes:      General: Lids are normal.   Neck:      Musculoskeletal: Full passive range of motion without pain and normal range of motion. Cardiovascular:      Rate and Rhythm: Normal rate. Heart sounds: Normal heart sounds, S1 normal and S2 normal.   Pulmonary:      Effort: Pulmonary effort is normal.      Breath sounds: Decreased air movement present. Decreased breath sounds present. Abdominal:      General: Abdomen is flat. Palpations: Abdomen is soft. Tenderness: There is no abdominal tenderness. Lymphadenopathy:      Head:      Right side of head: No submental adenopathy. Cervical: No cervical adenopathy. Skin:     General: Skin is warm. Neurological:      Mental Status: She is alert and oriented to person, place, and time. Cranial Nerves: Cranial nerves are intact. Psychiatric:         Attention and Perception: Attention normal.         Mood and Affect: Mood normal.         Speech: Speech normal.             ASSESSMENT AND PLAN      1. Mixed simple and mucopurulent chronic bronchitis (HCC)  -Stable continue same medications continue oxygen    2.  B[facility] affective disorder, [facility] status unspecified [facility]  Stable on current treatment    3. Mild intermittent asthma without complication  Stable continue same inhalers    4. Adhesive capsulitis of left shoulder  Discussed with patient continue physical therapy will do intra-articular injection in 1 month. 5. Hyperlipidemia, unspecified hyperlipidemia type  Stable continue statins-    6. Left elbow pain  Conservative treatment as needed  - lidocaine (LMX) 4 % cream; apply topically every 8 hours if needed for pain  Dispense: 30 g; Refill: 3      No orders of the defined types were placed in this encounter. Medications Discontinued During This Encounter   Medication Reason    Fluticasone Furoate-Vilanterol (BREO ELLIPTA IN)     BREO ELLIPTA 100-25 MCG/INH AEPB inhaler     Tiotropium Bromide Monohydrate (SPIRIVA HANDIHALER IN)     lidocaine (LMX) 4 % cream REORDER       Elaina received counseling on the following healthy behaviors: nutrition, exercise, medication adherence and tobacco cessation  Reviewed prior labs and health maintenance  Continue current medications, diet and exercise. Discussed use, benefit, and side effects of prescribed medications. Barriers to medication compliance addressed. Patient given educational materials - see patient instructions  Was a self-tracking handout given in paper form or via [employer]? Yes    Requested Prescriptions     Signed Prescriptions Disp Refills    lidocaine (LMX) 4 % cream 30 g 3     Sig: apply topically every 8 hours if needed for pain       All patient questions answered. Patient voiced understanding. Quality Measures    Body mass index is 24.03 kg/m². Normal. Weight control planned discussed Healthy diet and regular exercise. BP: 110/80 Blood pressure is normal. Treatment plan consists of No treatment change needed.     Lab Results   Component Value Date    LDLCHOLESTEROL 76 02/22/2019    (goal LDL reduction with dx if diabetes is 50% LDL reduction)      PHQ Scores 1/27/2020 10/8/2019 5/15/2019 2/7/2018 6/30/2016   PHQ2 Score 0 6 0 4 0   PHQ9 Score 0 13 0 14 0     Interpretation of Total Score Depression Severity: 1-4 = Minimal depression, 5-9 = Mild depression, 10-14 = Moderate depression, 15-19 = Moderately severe depression, 20-27 = Severe depression    The patient'spast medical, surgical, social, and family history as well as her   current medications and allergies were reviewed as documented in today's encounter. Medications, labs, diagnostic studies, consultations andfollow-up as documented in this encounter. Return in about 4 weeks (around 2/24/2020) for for joint injection in left shoulder . Patient wasseen with total face to face time of 15 minutes. More than 50% of this visit was counseling and education.        Future Appointments   Date Time Provider Percy Rodriguez   1/27/2020 12:00  [facility] [place] [facility] [place] 3 [facility] [place]   1/27/2020  2:00 PM Bakari Haley, PT [facility]   1/29/2020 12:00  [facility] [place] [facility] [place] 3 [facility]   2/3/2020 12:00 PM [facility] PULM REHAB RM 3 [facility]UL[facility]  [place]   2/3/2020  2:00 PM Bakari Haley, PT [facility]OB PT [facility]   2/3/2020  7:00 PM Bakari Haley, PT [facility] MOB PT [facility]   2/5/2020 12:00 PM [facility] PULM REHAB RM 3 STCZ [facility]   2/10/2020 12:00 PM [facility] PULM REHAB RM 3 STCZ [facility]UL[facility]   2/12/2020 12:00 PM [facility] PULM REHAB RM 3 STCZ [facility]UL[facility]   2/17/2020 12:00 PM [facility] PULM REHAB RM 3 [facility]   2/19/2020 12:00 PM [facility] PULM REHAB RM 3 [facility]CZ [facility]UL[facility]   2/24/2020 12:00 PM [facility] PULM REHAB RM 3 [facility]CZ [facility]   2/26/2020 10:30 AM [facility]nell [facility]   2/26/2020 12:00 PM [facility] PULM REHAB RM 3 [facility]Z [facility]   3/2/2020 12:00 PM [facility] PULM REHAB RM 3 STCZ [facility]UL[facility]   3/4/2020 12:00 PM [facility] PULM REHAB RM 3 [facility]CZ [facility]UL[facility]   3/9/2020 12:00 PM [facility] PULM REHAB RM 3 [facility]CZ [facility]UL[facility]   3/11/2020 12:00 PM [facility] PULM REHAB RM 3 [facility] PUL[facility]  [place]   3/16/2020 12:00  [facility] [place] [facility] [place] 3 [facility]CZ [facility]UL[facility]   3/18/2020 12:00 PM [facility] PULM REHAB RM 3 [facility]CZ [facility]UL[facility]   3/23/2020 12:00 PM [facility] PULM REHAB RM 3 STCZ PUL[facility]   3/25/2020 12:00 PM [facility] PULM REHAB RM 3 [facility]CZ PUL[facility]   3/30/2020 12:00 PM [facility] PULM REHAB RM 3 [facility]   4/1/2020 12:00 PM [facility] PULM REHAB RM 3 [facility]   4/6/2020 12:00 PM [facility] PULM REHAB RM 3 [facility]CZ [facility]UL[facility]   4/8/2020 12:00 PM [facility] PULM REHAB RM 3 [facility]CZ [facility]UL[facility]   4/30/2020  2:00 PM Aly Robles MD [facility]   7/30/2020  1:00 PM Naomy Christie MD [facility]     This note was completed by using the assistance of a speech-recognition program. However, inadvertent computerized transcription errors may be present. Althoughevery effort was made to ensure accuracy, no guarantees can be provided that every mistake has been identified and corrected by editing.   Electronically signed by Aly Robles MD on 1/27/2020  11:20 AM

## 2020-01-29 ENCOUNTER — HOSPITAL ENCOUNTER (OUTPATIENT)
Dept: PULMONOLOGY | Age: 65
Setting detail: THERAPIES SERIES
Discharge: HOME | End: 2020-01-29
Payer: COMMERCIAL

## 2020-01-29 VITALS — WEIGHT: 138 LBS | BODY MASS INDEX: 23.69 KG/M2

## 2020-01-29 PROCEDURE — G0424 PULMONARY REHAB W EXER: HCPCS

## 2020-01-30 RX ORDER — ACYCLOVIR 400 MG/1
TABLET ORAL
Qty: 14 TABLET | Refills: 0 | Status: SHIPPED | OUTPATIENT
Start: 2020-01-30 | End: 2020-12-21

## 2020-02-03 ENCOUNTER — APPOINTMENT (OUTPATIENT)
Dept: PHYSICAL THERAPY | Age: 65
End: 2020-02-03
Payer: COMMERCIAL

## 2020-02-03 ENCOUNTER — HOSPITAL ENCOUNTER (OUTPATIENT)
Dept: PHYSICAL THERAPY | Age: 65
Setting detail: THERAPIES SERIES
Discharge: HOME | End: 2020-02-03
Payer: COMMERCIAL

## 2020-02-03 ENCOUNTER — HOSPITAL ENCOUNTER (OUTPATIENT)
Dept: PULMONOLOGY | Age: 65
Setting detail: THERAPIES SERIES
Discharge: HOME | End: 2020-02-03
Payer: COMMERCIAL

## 2020-02-03 NOTE — PROGRESS NOTES
Physical Therapy  Cancellation Note  Date: 2/3/2020  Patient Name: Rinku Sharpe  MRN: [id number]     :   1955    General  Additional Pertinent Hx: COPD - oxygen 2 L   Referring Practitioner: Jitendra Gonzales MD  PT Visit Information  Onset Date: 19  PT Insurance Information: Sally Hernandez   Total # of Visits Approved: 6  Total # of Visits to Date: 4  Plan of Care/Certification Expiration Date: 02/10/20  No Show: 0  Progress Note Due Date: 02/10/20  Canceled Appointment: 0  Progress Note Counter:   General Comment  Comments: Called and cancelled           Ximena Zuniga, PT

## 2020-02-05 ENCOUNTER — HOSPITAL ENCOUNTER (OUTPATIENT)
Dept: PULMONOLOGY | Age: 65
Setting detail: THERAPIES SERIES
Discharge: HOME | End: 2020-02-05
Payer: COMMERCIAL

## 2020-02-10 ENCOUNTER — HOSPITAL ENCOUNTER (OUTPATIENT)
Dept: PULMONOLOGY | Age: 65
Setting detail: THERAPIES SERIES
Discharge: HOME | End: 2020-02-10
Payer: COMMERCIAL

## 2020-02-10 ENCOUNTER — HOSPITAL ENCOUNTER (OUTPATIENT)
Dept: PHYSICAL THERAPY | Age: 65
Setting detail: THERAPIES SERIES
Discharge: HOME | End: 2020-02-10
Payer: COMMERCIAL

## 2020-02-10 NOTE — PROGRESS NOTES
Physical Therapy  Cancellation  Note  Date: 2/10/2020  Patient Name: Cuco Sherwood  MRN: [id number]     :   1955    General  Additional Pertinent Hx: COPD - oxygen 2 L   Referring Practitioner: Stephanie Araiza MD  PT Visit Information  Onset Date: 19  PT Insurance Information: Cristino Schirmer   Total # of Visits Approved: 6  Total # of Visits to Date: 4  Plan of Care/Certification Expiration Date: 02/10/20  No Show: 0  Canceled Appointment: 2  Progress Note Counter:   General Comment  Comments: Called and cancelled        Michi Amador, PT

## 2020-02-12 ENCOUNTER — HOSPITAL ENCOUNTER (OUTPATIENT)
Dept: PULMONOLOGY | Age: 65
Setting detail: THERAPIES SERIES
Discharge: HOME | End: 2020-02-12
Payer: COMMERCIAL

## 2020-02-12 NOTE — PROGRESS NOTES
Vielka Child called stated she would not be in feels better but she needs another day . Plans to be here on Monday .

## 2020-02-17 ENCOUNTER — HOSPITAL ENCOUNTER (OUTPATIENT)
Dept: GENERAL RADIOLOGY | Age: 65
Discharge: HOME | End: 2020-02-19
Payer: COMMERCIAL

## 2020-02-17 ENCOUNTER — HOSPITAL ENCOUNTER (OUTPATIENT)
Dept: PHYSICAL THERAPY | Age: 65
Setting detail: THERAPIES SERIES
Discharge: HOME | End: 2020-02-17
Payer: COMMERCIAL

## 2020-02-17 ENCOUNTER — HOSPITAL ENCOUNTER (OUTPATIENT)
Age: 65
Discharge: HOME | End: 2020-02-17
Payer: COMMERCIAL

## 2020-02-17 ENCOUNTER — HOSPITAL ENCOUNTER (OUTPATIENT)
Dept: PULMONOLOGY | Age: 65
Setting detail: THERAPIES SERIES
Discharge: HOME | End: 2020-02-17
Payer: COMMERCIAL

## 2020-02-17 PROCEDURE — G0283 ELEC STIM OTHER THAN WOUND: HCPCS

## 2020-02-17 PROCEDURE — 97110 THERAPEUTIC EXERCISES: CPT

## 2020-02-17 PROCEDURE — 97140 MANUAL THERAPY 1/> REGIONS: CPT

## 2020-02-17 PROCEDURE — 71046 X-RAY EXAM CHEST 2 VIEWS: CPT

## 2020-02-17 ASSESSMENT — PAIN DESCRIPTION - FREQUENCY: FREQUENCY: CONTINUOUS

## 2020-02-17 ASSESSMENT — PAIN DESCRIPTION - DESCRIPTORS: DESCRIPTORS: ACHING;SORE

## 2020-02-17 ASSESSMENT — PAIN DESCRIPTION - PAIN TYPE: TYPE: CHRONIC PAIN

## 2020-02-17 ASSESSMENT — PAIN DESCRIPTION - ORIENTATION: ORIENTATION: LEFT;ANTERIOR

## 2020-02-17 ASSESSMENT — PAIN DESCRIPTION - PROGRESSION: CLINICAL_PROGRESSION: NOT CHANGED

## 2020-02-17 ASSESSMENT — PAIN SCALES - GENERAL: PAINLEVEL_OUTOF10: 8

## 2020-02-17 ASSESSMENT — PAIN DESCRIPTION - LOCATION: LOCATION: ARM;SHOULDER

## 2020-02-17 ASSESSMENT — PAIN DESCRIPTION - ONSET: ONSET: PROGRESSIVE

## 2020-02-17 ASSESSMENT — PAIN - FUNCTIONAL ASSESSMENT: PAIN_FUNCTIONAL_ASSESSMENT: PREVENTS OR INTERFERES SOME ACTIVE ACTIVITIES AND ADLS

## 2020-02-17 NOTE — PROGRESS NOTES
Physical Therapy  Daily Treatment Note  Date: 2020  Patient Name: Ruth Sheridan  MRN: [id number]     :   1955    Subjective:   General  Additional Pertinent Hx: COPD - oxygen 2 L   Referring Practitioner: Aleksandar Bhakta MD  PT Visit Information  Onset Date: 19  PT Insurance Information: Mookie De La Torre   Total # of Visits Approved: 6  Total # of Visits to Date: 5  Plan of Care/Certification Expiration Date: 02/10/20  No Show: 0  Canceled Appointment: 2  Progress Note Counter:   Subjective  Subjective: \"I get an injection in two weeks. \"  \"My arm is still killing me. \"  Pain Screening  Patient Currently in Pain: Yes  Pain Assessment  Pain Assessment: 0-10  Pain Level: 8  Patient's Stated Pain Goal: No pain  Pain Type: Chronic pain  Pain Location: Arm; Shoulder  Pain Orientation: Left; Anterior  Pain Descriptors: Aching; Sore  Pain Frequency: Continuous  Pain Onset: Progressive  Clinical Progression: Not changed  Functional Pain Assessment: Prevents or interferes some active activities and ADLs  Non-Pharmaceutical Pain Intervention(s): Ambulation/Increased Activity; Environmental changes; Emotional support;Rest;Repositioned;Relaxation techniques  Response to Pain Intervention: None  Multiple Pain Sites: No  Vital Signs  Patient Currently in Pain: Yes  Oxygen Therapy  O2 Device: Nasal cannula  Patient Observation  Observations: limited mobility of left shoulder        Treatment Activities:      Bed mobility  Bridging: Independent  Transfers  Sit to Stand: Independent   AROM RUE (degrees)  RUE General AROM: .   PROM LUE (degrees)  L Shoulder Flex  0-170: 0-60  L Shoulder Ext  0-45: 0-30  L Shoulder ABduction 0-140: 0-70(with elbow flexed to 90 deg. )  L Shoulder Int Rotation  0-70: 0-15  L Shoulder Ext Rotation  0-90: 0-10  AROM LUE (degrees)  LUE General AROM: Unable to use UE in any functional patters due to poor shoulder AROM and sever pain.    Strength RUE  Strength RUE: WFL  Strength LUE  Strength MELISAE: Exception  Comment: painful   L Shoulder Flexion: 2/5  L Shoulder Extension: 2/5  L Shoulder ABduction: 2/5  L Shoulder Internal Rotation: 2/5  L Shoulder External Rotation: 2/5    Exercises  Exercise 1: (HEP)Table slides 10 x Flex/ABD  Exercise 2: Wall walks 5 x flex/abd   Exercise 3: Pulley 5 min   Exercise 4: PROM left shoulder 15 min in sitting   Exercise 5: IFC + HP 15 min to left shoulder for pain relief. Exercise 6: (HEP) Codmans CCW/CW/Flex/ext x10      Modalities  Moist heat: 15 min left shoulder  E-Stim (parameters): IFC with heat to left shoulder in sitting with a pillow under the left elbow. Ortho Screen  Posture: Poor rounded shoulders and forward head       Assessment:   Conditions Requiring Skilled Therapeutic Intervention  Body structures, Functions, Activity limitations: Decreased functional mobility ; Decreased ADL status; Decreased ROM; Decreased strength;Decreased posture; Increased pain;Decreased endurance;Decreased high-level IADLs;Decreased coordination  Assessment: The patient is not making any progress at this time. Will see in one week after her injection. Added IFC + Ho for pain reduction. Treatment Diagnosis: Left shoulder pain   Prognosis: Fair  Decision Making: Medium Complexity  Clinical Presentation: limited AROM of left shoulder   Barriers to Learning: pain  REQUIRES PT FOLLOW UP: Yes  Treatment Initiated : PROM and HEP   Discharge Recommendations: Home independently  Activity Tolerance  Activity Tolerance: Patient limited by pain     Goals:  Short term goals  Time Frame for Short term goals: 3 weeks   Short term goal 1: OPTIMAL score of 15/3 at her evaluation, her goal is 12/3. (NOT MET )  Short term goal 2: Independent with home program. (NOT MET )  Long term goals  Time Frame for Long term goals : 6 weeks  Long term goal 1: OPTIMAL score of 9/3 at the time of her discharge.  (NOT MET )  Patient Goals   Patient goals : Use left arm with ADL's without pain (NOT MET)    Plan:      Continue 1 x a week.   Timed Code Treatment Minutes: 45 Minutes  Total Treatment Time: 45  Frequency and duration of TX  Days: 1  Weeks: 6     Therapy Time   Individual Concurrent Group Co-treatment   Time In  1400         Time Out  1445         Minutes  45 min   Total         Timed Code Treatment Minutes: 45 Minutes     Treatment Charges: Minutes Units   []  Ultrasound     []  Electrical-Stim 15 1   []  Iontophoresis     []  Traction     []  Massage       []  Eval     []  Gait     [x]  Ther Exercise 15  1    [x]  Manual Therapy 15  1    []  Ther Activities       []  Aquatics     []  Vasopneumatic Device     []  Neuro Re-Ed       []  Other       Total Treatment Time: 39 3          Ara Comp, PT

## 2020-02-19 ENCOUNTER — HOSPITAL ENCOUNTER (OUTPATIENT)
Dept: PULMONOLOGY | Age: 65
Setting detail: THERAPIES SERIES
Discharge: HOME | End: 2020-02-19
Payer: COMMERCIAL

## 2020-02-19 VITALS — WEIGHT: 140 LBS | BODY MASS INDEX: 24.03 KG/M2

## 2020-02-19 PROCEDURE — G0424 PULMONARY REHAB W EXER: HCPCS

## 2020-02-24 ENCOUNTER — HOSPITAL ENCOUNTER (OUTPATIENT)
Dept: PULMONOLOGY | Age: 65
Setting detail: THERAPIES SERIES
Discharge: HOME | End: 2020-02-24
Payer: COMMERCIAL

## 2020-02-26 ENCOUNTER — APPOINTMENT (OUTPATIENT)
Dept: PULMONOLOGY | Age: 65
End: 2020-02-26
Payer: COMMERCIAL

## 2020-03-02 ENCOUNTER — APPOINTMENT (OUTPATIENT)
Dept: PULMONOLOGY | Age: 65
End: 2020-03-02
Payer: COMMERCIAL

## 2020-03-03 RX ORDER — SIMVASTATIN 40 MG
TABLET ORAL
Qty: 30 TABLET | Refills: 3 | Status: SHIPPED | OUTPATIENT
Start: 2020-03-03 | End: 2020-07-06

## 2020-03-04 ENCOUNTER — APPOINTMENT (OUTPATIENT)
Dept: PULMONOLOGY | Age: 65
End: 2020-03-04
Payer: COMMERCIAL

## 2020-03-04 ENCOUNTER — APPOINTMENT (OUTPATIENT)
Dept: PHYSICAL THERAPY | Age: 65
End: 2020-03-04
Payer: COMMERCIAL

## 2020-03-09 ENCOUNTER — HOSPITAL ENCOUNTER (OUTPATIENT)
Dept: PHYSICAL THERAPY | Age: 65
Setting detail: THERAPIES SERIES
Discharge: HOME | End: 2020-03-09
Payer: COMMERCIAL

## 2020-03-09 ENCOUNTER — HOSPITAL ENCOUNTER (OUTPATIENT)
Dept: PULMONOLOGY | Age: 65
Setting detail: THERAPIES SERIES
Discharge: HOME | End: 2020-03-09
Payer: COMMERCIAL

## 2020-03-09 VITALS — WEIGHT: 144 LBS | BODY MASS INDEX: 24.72 KG/M2

## 2020-03-09 VITALS — OXYGEN SATURATION: 92 %

## 2020-03-09 PROCEDURE — G0283 ELEC STIM OTHER THAN WOUND: HCPCS

## 2020-03-09 PROCEDURE — 97110 THERAPEUTIC EXERCISES: CPT

## 2020-03-09 PROCEDURE — 97140 MANUAL THERAPY 1/> REGIONS: CPT

## 2020-03-09 PROCEDURE — G0424 PULMONARY REHAB W EXER: HCPCS

## 2020-03-09 RX ORDER — MELATONIN
Qty: 30 TABLET | Refills: 5 | Status: SHIPPED | OUTPATIENT
Start: 2020-03-09 | End: 2020-12-22 | Stop reason: SDUPTHER

## 2020-03-09 ASSESSMENT — PAIN DESCRIPTION - ORIENTATION: ORIENTATION: LEFT;ANTERIOR

## 2020-03-09 ASSESSMENT — PAIN DESCRIPTION - PAIN TYPE: TYPE: CHRONIC PAIN

## 2020-03-09 ASSESSMENT — PAIN SCALES - GENERAL: PAINLEVEL_OUTOF10: 8

## 2020-03-09 ASSESSMENT — PAIN DESCRIPTION - ONSET: ONSET: PROGRESSIVE

## 2020-03-09 ASSESSMENT — PAIN DESCRIPTION - FREQUENCY: FREQUENCY: CONTINUOUS

## 2020-03-09 ASSESSMENT — PAIN DESCRIPTION - LOCATION: LOCATION: ARM;SHOULDER

## 2020-03-09 ASSESSMENT — PAIN DESCRIPTION - PROGRESSION: CLINICAL_PROGRESSION: NOT CHANGED

## 2020-03-09 ASSESSMENT — PAIN - FUNCTIONAL ASSESSMENT: PAIN_FUNCTIONAL_ASSESSMENT: PREVENTS OR INTERFERES SOME ACTIVE ACTIVITIES AND ADLS

## 2020-03-09 ASSESSMENT — PAIN DESCRIPTION - DESCRIPTORS: DESCRIPTORS: ACHING;SORE

## 2020-03-11 ENCOUNTER — HOSPITAL ENCOUNTER (OUTPATIENT)
Dept: PULMONOLOGY | Age: 65
Setting detail: THERAPIES SERIES
Discharge: HOME | End: 2020-03-11
Payer: COMMERCIAL

## 2020-03-11 VITALS — WEIGHT: 144 LBS | BODY MASS INDEX: 24.72 KG/M2

## 2020-03-11 PROCEDURE — G0424 PULMONARY REHAB W EXER: HCPCS

## 2020-03-16 ENCOUNTER — APPOINTMENT (OUTPATIENT)
Dept: PHYSICAL THERAPY | Age: 65
End: 2020-03-16
Payer: COMMERCIAL

## 2020-03-16 ENCOUNTER — HOSPITAL ENCOUNTER (OUTPATIENT)
Dept: PULMONOLOGY | Age: 65
Setting detail: THERAPIES SERIES
Discharge: HOME | End: 2020-03-16
Payer: COMMERCIAL

## 2020-03-18 ENCOUNTER — APPOINTMENT (OUTPATIENT)
Dept: PULMONOLOGY | Age: 65
End: 2020-03-18
Payer: COMMERCIAL

## 2020-03-23 ENCOUNTER — APPOINTMENT (OUTPATIENT)
Dept: PULMONOLOGY | Age: 65
End: 2020-03-23
Payer: COMMERCIAL

## 2020-03-23 ENCOUNTER — APPOINTMENT (OUTPATIENT)
Dept: PHYSICAL THERAPY | Age: 65
End: 2020-03-23
Payer: COMMERCIAL

## 2020-03-25 ENCOUNTER — APPOINTMENT (OUTPATIENT)
Dept: PULMONOLOGY | Age: 65
End: 2020-03-25
Payer: COMMERCIAL

## 2020-03-30 ENCOUNTER — APPOINTMENT (OUTPATIENT)
Dept: PULMONOLOGY | Age: 65
End: 2020-03-30
Payer: COMMERCIAL

## 2020-03-30 ENCOUNTER — APPOINTMENT (OUTPATIENT)
Dept: PHYSICAL THERAPY | Age: 65
End: 2020-03-30
Payer: COMMERCIAL

## 2020-04-13 RX ORDER — ACETAMINOPHEN 500 MG
TABLET ORAL
Qty: 120 TABLET | Refills: 0 | Status: SHIPPED | OUTPATIENT
Start: 2020-04-13 | End: 2020-06-17 | Stop reason: SDUPTHER

## 2020-04-13 RX ORDER — PSYLLIUM HUSK 3.4 G/7G
POWDER ORAL
Qty: 30 TABLET | Refills: 3 | Status: SHIPPED | OUTPATIENT
Start: 2020-04-13 | End: 2020-05-08

## 2020-04-29 ENCOUNTER — TELEPHONE (OUTPATIENT)
Dept: FAMILY MEDICINE CLINIC | Age: 65
End: 2020-04-29

## 2020-05-08 RX ORDER — PSYLLIUM HUSK 3.4 G/7G
POWDER ORAL
Qty: 30 TABLET | Refills: 3 | Status: SHIPPED | OUTPATIENT
Start: 2020-05-08 | End: 2021-06-17

## 2020-06-17 RX ORDER — ACETAMINOPHEN 500 MG
TABLET ORAL
Qty: 120 TABLET | Refills: 0 | Status: SHIPPED | OUTPATIENT
Start: 2020-06-17 | End: 2020-12-14

## 2020-06-17 NOTE — TELEPHONE ENCOUNTER
Please Approve or Refuse.   Send to Pharmacy per Pt's Request:      Next Visit Date:  Visit date not found   Last Visit Date: 1/27/2020    Hemoglobin A1C (%)   Date Value   05/09/2018 5.1   03/17/2016 5.4             ( goal A1C is < 7)   BP Readings from Last 3 Encounters:   01/27/20 110/80   01/23/20 111/63   10/30/19 108/74          (goal 120/80)  BUN   Date Value Ref Range Status   06/26/2019 11 8 - 23 mg/dL Final     CREATININE   Date Value Ref Range Status   06/26/2019 0.78 0.50 - 0.90 mg/dL Final     Potassium   Date Value Ref Range Status   02/22/2019 4.1 3.7 - 5.3 mmol/L Final

## 2020-07-06 RX ORDER — SIMVASTATIN 40 MG
TABLET ORAL
Qty: 30 TABLET | Refills: 3 | Status: SHIPPED | OUTPATIENT
Start: 2020-07-06 | End: 2020-11-02

## 2020-07-17 NOTE — TELEPHONE ENCOUNTER
Last seen 1/27/20    Next Visit Date:  Future Appointments   Date Time Provider Percy Jennifer   7/30/2020  1:00 PM [facility]   Topic Date Due    Cervical cancer screen  01/06/2019    Lipid screen  02/22/2020    Breast cancer screen  07/18/2020    Low dose CT lung screening  07/25/2020    Flu vaccine (1) 09/01/2020    Colon cancer screen colonoscopy  02/15/2023    DTaP/Tdap/Td vaccine (2 - Td) 09/09/2026    Shingles Vaccine  Completed    Pneumococcal 0-64 years Vaccine  Completed    Hepatitis C screen  Completed    HIV screen  Completed    Hepatitis A vaccine  Aged Out    Hepatitis B vaccine  Aged Out    Hib vaccine  Aged Out    Meningococcal (ACWY) vaccine  Aged Out       Hemoglobin A1C (%)   Date Value   05/09/2018 5.1   03/17/2016 5.4             ( goal A1C is < 7)   No results found for: LABMICR  LDL Cholesterol (mg/dL)   Date Value   02/22/2019 76       (goal LDL is <100)   AST (U/L)   Date Value   07/08/2019 13     ALT (U/L)   Date Value   07/08/2019 10     BUN (mg/dL)   Date Value   06/26/2019 11     BP Readings from Last 3 Encounters:   01/27/20 110/80   01/23/20 111/63   10/30/19 108/74          (goal 120/80)    All Future Testing planned in [facility]  Lab Frequency Next Occurrence               Patient Active Problem List:     Chronic obstructive pulmonary disease (HCC)     Vitamin D deficiency     Anxiety     Asthma     Bipolar disorder ([address]     Depression     Hyperlipidemia     Sleep apnea     Vision abnormalities     Status post hysteroscopy     Chronic headaches     IBS (irritable bowel syndrome)     Colon polyp     Collagenous colitis     Lung nodule     Left elbow pain     Dilated bile duct     Acute pain of left shoulder     Adhesive capsulitis of left shoulder

## 2020-07-19 RX ORDER — LIDOCAINE 40 MG/G
CREAM TOPICAL
Qty: 30 G | Refills: 3 | Status: SHIPPED | OUTPATIENT
Start: 2020-07-19 | End: 2020-11-18

## 2020-07-29 ENCOUNTER — TELEPHONE (OUTPATIENT)
Dept: ONCOLOGY | Age: 65
End: 2020-07-29

## 2020-07-30 ENCOUNTER — OFFICE VISIT (OUTPATIENT)
Dept: GASTROENTEROLOGY | Age: 65
End: 2020-07-30
Payer: COMMERCIAL

## 2020-07-30 VITALS — BODY MASS INDEX: 27.57 KG/M2 | WEIGHT: 160.6 LBS

## 2020-07-30 PROBLEM — K59.1 FUNCTIONAL DIARRHEA: Status: ACTIVE | Noted: 2020-07-30

## 2020-07-30 PROBLEM — R63.5 WEIGHT GAIN: Status: ACTIVE | Noted: 2020-07-30

## 2020-07-30 PROCEDURE — G8427 DOCREV CUR MEDS BY ELIG CLIN: HCPCS | Performed by: INTERNAL MEDICINE

## 2020-07-30 PROCEDURE — G8419 CALC BMI OUT NRM PARAM NOF/U: HCPCS | Performed by: INTERNAL MEDICINE

## 2020-07-30 PROCEDURE — 3017F COLORECTAL CA SCREEN DOC REV: CPT | Performed by: INTERNAL MEDICINE

## 2020-07-30 PROCEDURE — 1036F TOBACCO NON-USER: CPT | Performed by: INTERNAL MEDICINE

## 2020-07-30 PROCEDURE — 99214 OFFICE O/P EST MOD 30 MIN: CPT | Performed by: INTERNAL MEDICINE

## 2020-07-30 ASSESSMENT — ENCOUNTER SYMPTOMS
GASTROINTESTINAL NEGATIVE: 1
RECTAL PAIN: 0
ABDOMINAL DISTENTION: 0
BLOOD IN STOOL: 0
SHORTNESS OF BREATH: 1
COUGH: 1
ALLERGIC/IMMUNOLOGIC NEGATIVE: 1
ABDOMINAL PAIN: 0
ANAL BLEEDING: 0
WHEEZING: 1
CONSTIPATION: 0
NAUSEA: 0
TROUBLE SWALLOWING: 0
VOMITING: 0
DIARRHEA: 0

## 2020-07-30 NOTE — PROGRESS NOTES
GI OFFICE FOLLOW UP    Joel Quintanilla is a 63 y.o. female evaluated via on 2020. Consent:  She and/or health care decision maker is aware that that she may receive a bill for this telephone service, depending on her insurance coverage, and has provided verbal consent to proceed: YES      INTERVAL HISTORY:   No referring provider defined for this encounter. Chief Complaint   Patient presents with    Diarrhea     6 month f/u denies any issues       1. Collagenous colitis    2. Functional diarrhea    3. Weight gain      Pt is clinically feeling well GI wise  Has No significant abdominal pains, bloating or cramping  Has no sig GERD symptoms  Has no Dysphagia, No Nausea or any vomiting  Denies any rectal bleeding or any melena  Denies any sig constipation or any diarrhea symptoms  Weight is stable and appetite is generally good. Clinically feeling much better has frequency of stools 1-2 bowel movements per day    Apparently has family history for colon cancer    She has gained some weight        HISTORY OF PRESENT ILLNESS: Reginaldo Fitzpatrick is a 63 y.o. female with a past history remarkable for , referred for evaluation of   Chief Complaint   Patient presents with    Diarrhea     6 month f/u denies any issues   . Past Medical,Family, and Social History reviewed and does contribute to the patient presenting condition. Patient's PMH/PSH,SH,PSYCH Hx, MEDs, ALLERGIES, and ROS were all reviewed and updated in the appropriate sections.     PAST MEDICAL HISTORY:  Past Medical History:   Diagnosis Date    Abnormal EKG     Anxiety     Asthma     Bipolar disorder ([facility] [address]     SEVERE IN , [place]    COPD (chronic obstructive pulmonary disease) (HCC)     Cramps, extremity     SEVERE LEG CRAMPS    Depression     Dilated bile duct     Headache     History of elective      Hyperlipidemia     mirtazapine (REMERON) 15 MG tablet, take 1 tablet by mouth at bedtime for sleep, Disp: , Rfl: 0    Elastic Bandages & Supports (ACE ELBOW BRACE LARGE/X-LARGE) MISC, Use daily for elbow pain, Disp: 1 each, Rfl: 0    ALAWAY 0.025 % ophthalmic solution, INSTILL 1 DROP TWO TIMES A DAY INTO LEFT EYE ONLY, Disp: 10 mL, Rfl: 6    albuterol (PROVENTIL) (2.5 MG/3ML) 0.083% nebulizer solution, Take 3 mLs by nebulization 4 times daily, Disp: 120 each, Rfl: 3    albuterol sulfate  (90 Base) MCG/ACT inhaler, Inhale 2 puffs into the lungs every 4 hours as needed for Wheezing, Disp: 1 Inhaler, Rfl: 3    fluticasone (FLONASE) 50 MCG/ACT nasal spray, 2 sprays by Nasal route daily, Disp: 1 Bottle, Rfl: 5    TUDORZA PRESSAIR 400 MCG/ACT AEPB inhaler, inhale 1 puff by mouth twice a day, Disp: 1 each, Rfl: 0    butalbital-acetaminophen-caffeine (FIORICET, ESGIC) -40 MG per tablet, Take 1 tablet by mouth every 4 hours as needed for Headaches, Disp: , Rfl:     Biotin 5000 MCG CAPS, Take 1,000 mcg by mouth , Disp: , Rfl:     citalopram (CELEXA) 20 MG tablet, Take 20 mg by mouth daily, Disp: , Rfl:     LATUDA 80 MG TABS tablet, Take 80 mg by mouth nightly, Disp: , Rfl:     ALLERGIES:   Allergies   Allergen Reactions    Advil [Ibuprofen] Other (See Comments)     vomiting    Aleve [Naproxen] Other (See Comments)     vomiting    Antipyrine Other (See Comments)     unknown    Celecoxib Other (See Comments)    Fomepizole     Other      GRASS, TREES, WEEDS    Rofecoxib Other (See Comments)     Unknown reaction    Salicylates      Unknown reaction     Strawberry Extract      HEADACHES    Sulfinpyrazone Other (See Comments)     Unknown reaction    Aspirin Nausea And Vomiting, Other (See Comments) and Nausea Only    Nsaids Nausea Only, Other (See Comments) and Nausea And Vomiting       FAMILY HISTORY:       Problem Relation Age of Onset    Dementia Maternal Aunt     Kidney Disease Mother     Heart Attack Sister shortness of breath (on O2) and wheezing. Cardiovascular: Negative. Gastrointestinal: Negative. Negative for abdominal distention, abdominal pain, anal bleeding, blood in stool, constipation, diarrhea, nausea, rectal pain and vomiting. Denies   Endocrine: Negative. Genitourinary: Negative. Musculoskeletal: Negative. Skin: Negative. Allergic/Immunologic: Negative. Neurological: Negative. Negative for dizziness, weakness and light-headedness. Hematological: Negative. Psychiatric/Behavioral: Negative. PHYSICAL EXAMINATION: Vital signs reviewed per the nursing documentation. Wt 160 lb 9.6 oz (72.8 kg)   LMP 05/20/2013 (Exact Date)   BMI 27.57 kg/m²   Body mass index is 27.57 kg/m². Physical Exam  Nursing note reviewed. Constitutional:       Appearance: She is well-developed. Comments: Anxious    HENT:      Head: Normocephalic and atraumatic. Eyes:      Conjunctiva/sclera: Conjunctivae normal.      Pupils: Pupils are equal, round, and reactive to light. Neck:      Musculoskeletal: Normal range of motion and neck supple. Cardiovascular:      Heart sounds: Normal heart sounds. Pulmonary:      Effort: Pulmonary effort is normal.      Breath sounds: Normal breath sounds. Abdominal:      General: Bowel sounds are normal.      Palpations: Abdomen is soft. Comments: NON TENDER, NON DISTENTED  LIVER SPLEEN AND HERNIAS ARE NOT  PALPABLE  BOWEL SOUNDS ARE POSITIVE      Musculoskeletal: Normal range of motion. Skin:     General: Skin is warm. Neurological:      Mental Status: She is alert and oriented to person, place, and time.    Psychiatric:         Behavior: Behavior normal.           LABORATORY DATA: Reviewed  Lab Results   Component Value Date    WBC 6.9 10/11/2019    HGB 14.6 10/11/2019    HCT 45.5 10/11/2019    MCV 99.5 10/11/2019     10/11/2019     02/22/2019    K 4.1 02/22/2019     (H) 02/22/2019    CO2 26 02/22/2019    BUN 11 06/26/2019    CREATININE 0.78 06/26/2019    LABALBU 4.3 07/08/2019    BILITOT 0.21 (L) 07/08/2019    ALKPHOS 88 07/08/2019    AST 13 07/08/2019    ALT 10 07/08/2019    INR 1.0 10/12/2016         Lab Results   Component Value Date    RBC 4.57 10/11/2019    HGB 14.6 10/11/2019    MCV 99.5 10/11/2019    MCH 32.0 10/11/2019    MCHC 32.2 10/11/2019    RDW 14.4 10/11/2019    MPV 7.5 10/11/2019    BASOPCT 0 10/11/2019    LYMPHSABS 1.20 10/11/2019    MONOSABS 0.80 10/11/2019    NEUTROABS 4.80 10/11/2019    EOSABS 0.20 10/11/2019    BASOSABS 0.00 10/11/2019         DIAGNOSTIC TESTING:     No results found. Assessment  1. Collagenous colitis    2. Functional diarrhea    3. Weight gain        Plan    Pt was advised in detail about some life style and dietary modifications. She was advised about avoidance of caffeine, nicotine and chocolate. Pt was also told to stay away from any kind of fast foods, soda pops. She was also advised to avoid lots of spices, grease and fried food etc.     Instructions were also given about trying to arrange the timing, quality and quantity of food. Instructions were given about using ample amount of fiber including dietary and supplemental fiber either metamucil, bennafiber or citrucell etc.  Pt was advised about drinking ample amount of water without any colors or chemicals. Stress was given about regular exercise. Pt has verbalized understanding and agreement to these modifications.         The patient was instructed to start taking some OTC Probiotics products   These are available over the counter at the Pharmacy stores and Grocery stores  He was explained about the beneficial effects they have in the GI track  They will help to establish the good bacterial kit and will help with the digestion and bowel movements  The patient has verbalized understanding and agreement to this plan    More than half of patient's clinic visit time was spent in counseling about lifestyle and dietary modifications  Patient's  questions were answered in this regard as well  The patient has verbalized understanding and agreement       I communicated with the patient and/or health care decision maker about   Details of this discussion including any medical advice provided:YES      I affirm this is a Patient Initiated Episode with an Established Patient who has not had a related appointment within my department in the past 7 days or scheduled within the next 24 hours. Total Time: minutes: 21-30 minutes    Note: not billable if this call serves to triage the patient into an appointment for the relevant concern      Thank you for allowing me to participate in the care of Ms. Sirena Hernandez. For any further questions please do not hesitate to contact me. I have reviewed and agree with the ROS entered by the MA/LPN.          Nathaly Vicente MD, [facility]  Board Certified in Gastroenterology and [address] [place] [address] Gastroenterology  Office #: [phone]

## 2020-08-17 RX ORDER — ERGOCALCIFEROL 1.25 MG/1
CAPSULE ORAL
Qty: 4 CAPSULE | Refills: 0 | Status: SHIPPED | OUTPATIENT
Start: 2020-08-17 | End: 2020-12-22 | Stop reason: SDUPTHER

## 2020-08-17 NOTE — TELEPHONE ENCOUNTER
Please Approve or Refuse.   Send to Pharmacy per Pt's Request:      Next Visit Date:  8/19/2020   Last Visit Date: 1/27/2020    Hemoglobin A1C (%)   Date Value   05/09/2018 5.1   03/17/2016 5.4             ( goal A1C is < 7)   BP Readings from Last 3 Encounters:   01/27/20 110/80   01/23/20 111/63   10/30/19 108/74          (goal 120/80)  BUN   Date Value Ref Range Status   06/26/2019 11 8 - 23 mg/dL Final     CREATININE   Date Value Ref Range Status   06/26/2019 0.78 0.50 - 0.90 mg/dL Final     Potassium   Date Value Ref Range Status   02/22/2019 4.1 3.7 - 5.3 mmol/L Final

## 2020-08-19 ENCOUNTER — VIRTUAL VISIT (OUTPATIENT)
Dept: FAMILY MEDICINE CLINIC | Age: 65
End: 2020-08-19
Payer: COMMERCIAL

## 2020-08-19 PROBLEM — M25.472 ANKLE SWELLING, LEFT: Status: ACTIVE | Noted: 2020-08-19

## 2020-08-19 PROCEDURE — 99443 PR PHYS/QHP TELEPHONE EVALUATION 21-30 MIN: CPT | Performed by: FAMILY MEDICINE

## 2020-08-19 NOTE — PROGRESS NOTES
Felipe Dow is a 63 y.o. female evaluated via telephone on 8/19/2020. Consults  This is a telehealth visit that was performed with the originating site at Patient Location: home and Provider Location of [place], [place]. Verbal consent to participate in video visit was obtained. Pursuant to the emergency declaration under the [address] [place] [address] [facility] authority and the [employer] Act, this Virtual Visit was conducted, with patient's consent, to reduce the patient's risk of exposure to COVID-19 and provide continuity of care for an established/new patient. Services were provided through a video synchronous discussion virtually to substitute for in-person clinic visit. I discussed with the patient the nature of our telehealth visits via interactive/real-time audio/video that:  - I would evaluate the patient and recommend diagnostics and treatments based on my assessment  - Our sessions are not being recorded and that personal health information is protected  - Our team would provide follow up care in person if/when the patient needs it. Consent:  She and/or health care decision maker is aware that that she may receive a bill for this telephone service, depending on her insurance coverage, and has provided verbal consent to proceed: Yes      Documentation:  I communicated with the patient and/or health care decision maker about . Is here for follow-up for COPD and medication refills. COPD patient is on home oxygen follows with pulmonologist, patient is due for lung screening which is scheduled in September. Patient reports her breathing is stable. History of lung nodule stable on previous CT. Hyperlipidemia on statins. Patient complains of left ankle swelling reports she has history of fracture in the past and has screws placed, patient reports her ankle is swollen on and off.   She denies any leg swelling, any redness. She is due for mammogram    Addendum:  Patient is due for CT chest, which was ordered needs to reschedule, that showed thoracic spine fracture which needed DEXA scan and MRI. Patient has MRI ordered and that showed compression fracture and DEXA scan showed osteoporosis. She was started on Prolia. Details of this discussion including any medical advice provided:     1. Mixed simple and mucopurulent chronic bronchitis (HCC)  -Stable continue same medication.  - CBC; Future  - Comprehensive Metabolic Panel; Future  - TSH without Reflex; Future    2. Lung nodule  -Patient CT lung scheduled  - CBC; Future  - Comprehensive Metabolic Panel; Future  - TSH without Reflex; Future    3. Hyperlipidemia, unspecified hyperlipidemia type  -Stable continue same treatment  - Lipid, Fasting; Future    4. Ankle swelling, left  Discussed with patient try compression stockings. If that does not help call back  - Elastic Bandages & Supports (MEDICAL COMPRESSION STOCKINGS) MISC; 1 each by Does not apply route daily Keep pressure between 20 -30mm  Dispense: 1 each; Refill: 0    5. Screening mammogram for high-risk patient    - [facility] DIGITAL DIAGNOSTIC W OR WO CAD BILATERAL; Future          I affirm this is a Patient Initiated Episode with a Patient who has not had a related appointment within my department in the past 7 days or scheduled within the next 24 hours.     Patient identification was verified at the start of the visit: Yes    Total Time: minutes: 21-30 minutes    Note: not billable if this call serves to triage the patient into an appointment for the relevant concern      Chas Kolb

## 2020-08-21 ENCOUNTER — TELEPHONE (OUTPATIENT)
Dept: FAMILY MEDICINE CLINIC | Age: 65
End: 2020-08-21

## 2020-09-02 ENCOUNTER — TELEPHONE (OUTPATIENT)
Dept: ONCOLOGY | Age: 65
End: 2020-09-02

## 2020-09-02 NOTE — LETTER
9/2/2020        Rubens Ellison  [address] [facility] [address]    Dear Rubens Ellison:    Your healthcare provider has ordered a low dose CT scan of the chest for lung cancer screening. You will find enclosed, information about CT lung screening. Please review the statement of understanding, you will be asked to sign a copy of this at the time of your CT scan    If you have not already been contacted to make the appointment for your scan, please call our scheduling department at [phone]    Keep in mind that CT lung screening does not take the place of smoking cessation. If you are a current smoker, you will find enclosed smoking cessation resources. Please do not hesitate to contact me if you have any questions or concerns.     [address] [place] [address],      [facility]  [phone]

## 2020-09-09 ENCOUNTER — HOSPITAL ENCOUNTER (OUTPATIENT)
Age: 65
Discharge: HOME | End: 2020-09-09
Payer: COMMERCIAL

## 2020-09-09 ENCOUNTER — HOSPITAL ENCOUNTER (OUTPATIENT)
Dept: CT IMAGING | Age: 65
Discharge: HOME | End: 2020-09-11
Payer: COMMERCIAL

## 2020-09-09 PROBLEM — D72.819 LEUCOPENIA: Status: ACTIVE | Noted: 2020-09-09

## 2020-09-09 LAB
ALBUMIN SERPL-MCNC: 4.1 G/DL (ref 3.5–5.2)
ALBUMIN/GLOBULIN RATIO: ABNORMAL (ref 1–2.5)
ALP BLD-CCNC: 107 U/L (ref 35–104)
ALT SERPL-CCNC: 12 U/L (ref 5–33)
ANION GAP SERPL CALCULATED.3IONS-SCNC: 9 MMOL/L (ref 9–17)
AST SERPL-CCNC: 16 U/L
BILIRUB SERPL-MCNC: <0.15 MG/DL (ref 0.3–1.2)
BUN BLDV-MCNC: 13 MG/DL (ref 8–23)
BUN/CREAT BLD: ABNORMAL (ref 9–20)
CALCIUM SERPL-MCNC: 8.9 MG/DL (ref 8.6–10.4)
CHLORIDE BLD-SCNC: 104 MMOL/L (ref 98–107)
CHOLESTEROL, FASTING: 162 MG/DL
CHOLESTEROL/HDL RATIO: 2.1
CO2: 28 MMOL/L (ref 20–31)
CREAT SERPL-MCNC: 0.88 MG/DL (ref 0.5–0.9)
GFR AFRICAN AMERICAN: >60 ML/MIN
GFR NON-AFRICAN AMERICAN: >60 ML/MIN
GFR SERPL CREATININE-BSD FRML MDRD: ABNORMAL ML/MIN/{1.73_M2}
GFR SERPL CREATININE-BSD FRML MDRD: ABNORMAL ML/MIN/{1.73_M2}
GLUCOSE BLD-MCNC: 91 MG/DL (ref 70–99)
HCT VFR BLD CALC: 41.2 % (ref 36–46)
HDLC SERPL-MCNC: 77 MG/DL
HEMOGLOBIN: 13.6 G/DL (ref 12–16)
LDL CHOLESTEROL: 72 MG/DL (ref 0–130)
MCH RBC QN AUTO: 32.1 PG (ref 26–34)
MCHC RBC AUTO-ENTMCNC: 33 G/DL (ref 31–37)
MCV RBC AUTO: 97.2 FL (ref 80–100)
NRBC AUTOMATED: ABNORMAL PER 100 WBC
PDW BLD-RTO: 13.9 % (ref 11.5–14.9)
PLATELET # BLD: 239 K/UL (ref 150–450)
PMV BLD AUTO: 7.7 FL (ref 6–12)
POTASSIUM SERPL-SCNC: 4.8 MMOL/L (ref 3.7–5.3)
RBC # BLD: 4.24 M/UL (ref 4–5.2)
SODIUM BLD-SCNC: 141 MMOL/L (ref 135–144)
TOTAL PROTEIN: 6.7 G/DL (ref 6.4–8.3)
TRIGLYCERIDE, FASTING: 66 MG/DL
TSH SERPL DL<=0.05 MIU/L-ACNC: 1.94 MIU/L (ref 0.3–5)
VLDLC SERPL CALC-MCNC: NORMAL MG/DL (ref 1–30)
WBC # BLD: 2.9 K/UL (ref 3.5–11)

## 2020-09-09 PROCEDURE — 36415 COLL VENOUS BLD VENIPUNCTURE: CPT

## 2020-09-09 PROCEDURE — G0297 LDCT FOR LUNG CA SCREEN: HCPCS

## 2020-09-09 PROCEDURE — 85027 COMPLETE CBC AUTOMATED: CPT

## 2020-09-09 PROCEDURE — 80061 LIPID PANEL: CPT

## 2020-09-09 PROCEDURE — 80053 COMPREHEN METABOLIC PANEL: CPT

## 2020-09-09 PROCEDURE — 84443 ASSAY THYROID STIM HORMONE: CPT

## 2020-09-22 ENCOUNTER — TELEPHONE (OUTPATIENT)
Dept: FAMILY MEDICINE CLINIC | Age: 65
End: 2020-09-22

## 2020-09-22 RX ORDER — CETIRIZINE HYDROCHLORIDE, PSEUDOEPHEDRINE HYDROCHLORIDE 5; 120 MG/1; MG/1
1 TABLET, FILM COATED, EXTENDED RELEASE ORAL 2 TIMES DAILY
Qty: 90 TABLET | Refills: 1 | Status: SHIPPED | OUTPATIENT
Start: 2020-09-22 | End: 2020-10-22

## 2020-09-22 NOTE — TELEPHONE ENCOUNTER
Patient called in because she has a flare up of her allergies right now. She states she has hay fever. She is sneezing a lot more and has laryngitis. She currently takes Singulair but it is not helping. Please advise.      RX: [facility] [address]    Next Visit Date:  Visit date not found   Last Visit Date: 8/19/2020    Hemoglobin A1C (%)   Date Value   05/09/2018 5.1   03/17/2016 5.4             ( goal A1C is < 7)   BP Readings from Last 3 Encounters:   01/27/20 110/80   01/23/20 111/63   10/30/19 108/74          (goal 120/80)  BUN   Date Value Ref Range Status   09/09/2020 13 8 - 23 mg/dL Final     CREATININE   Date Value Ref Range Status   09/09/2020 0.88 0.50 - 0.90 mg/dL Final     Potassium   Date Value Ref Range Status   09/09/2020 4.8 3.7 - 5.3 mmol/L Final

## 2020-09-25 ENCOUNTER — HOSPITAL ENCOUNTER (OUTPATIENT)
Dept: MRI IMAGING | Facility: CLINIC | Age: 65
Discharge: HOME | End: 2020-09-27
Payer: COMMERCIAL

## 2020-09-25 PROCEDURE — 72146 MRI CHEST SPINE W/O DYE: CPT

## 2020-09-28 PROBLEM — S22.000A COMPRESSION FRACTURE OF BODY OF THORACIC VERTEBRA (HCC): Status: ACTIVE | Noted: 2020-09-28

## 2020-09-28 RX ORDER — DENOSUMAB 60 MG/ML
60 INJECTION SUBCUTANEOUS ONCE
Qty: 1 ML | Refills: 0 | Status: SHIPPED | OUTPATIENT
Start: 2020-09-28 | End: 2021-06-15

## 2020-10-08 ENCOUNTER — OFFICE VISIT (OUTPATIENT)
Dept: ORTHOPEDIC SURGERY | Age: 65
End: 2020-10-08
Payer: COMMERCIAL

## 2020-10-08 PROCEDURE — 3017F COLORECTAL CA SCREEN DOC REV: CPT | Performed by: ORTHOPAEDIC SURGERY

## 2020-10-08 PROCEDURE — G8484 FLU IMMUNIZE NO ADMIN: HCPCS | Performed by: ORTHOPAEDIC SURGERY

## 2020-10-08 PROCEDURE — G8419 CALC BMI OUT NRM PARAM NOF/U: HCPCS | Performed by: ORTHOPAEDIC SURGERY

## 2020-10-08 PROCEDURE — G8428 CUR MEDS NOT DOCUMENT: HCPCS | Performed by: ORTHOPAEDIC SURGERY

## 2020-10-08 PROCEDURE — 1036F TOBACCO NON-USER: CPT | Performed by: ORTHOPAEDIC SURGERY

## 2020-10-08 PROCEDURE — 99203 OFFICE O/P NEW LOW 30 MIN: CPT | Performed by: ORTHOPAEDIC SURGERY

## 2020-10-08 PROCEDURE — 20610 DRAIN/INJ JOINT/BURSA W/O US: CPT | Performed by: ORTHOPAEDIC SURGERY

## 2020-10-08 RX ORDER — BETAMETHASONE SODIUM PHOSPHATE AND BETAMETHASONE ACETATE 3; 3 MG/ML; MG/ML
12 INJECTION, SUSPENSION INTRA-ARTICULAR; INTRALESIONAL; INTRAMUSCULAR; SOFT TISSUE ONCE
Status: COMPLETED | OUTPATIENT
Start: 2020-10-08 | End: 2020-10-08

## 2020-10-08 RX ORDER — BUPIVACAINE HYDROCHLORIDE 5 MG/ML
2 INJECTION, SOLUTION PERINEURAL ONCE
Status: COMPLETED | OUTPATIENT
Start: 2020-10-08 | End: 2020-10-08

## 2020-10-08 RX ORDER — LIDOCAINE HYDROCHLORIDE 10 MG/ML
2 INJECTION, SOLUTION EPIDURAL; INFILTRATION; INTRACAUDAL; PERINEURAL ONCE
Status: COMPLETED | OUTPATIENT
Start: 2020-10-08 | End: 2020-10-08

## 2020-10-08 RX ADMIN — BUPIVACAINE HYDROCHLORIDE 10 MG: 5 INJECTION, SOLUTION PERINEURAL at 14:15

## 2020-10-08 RX ADMIN — BETAMETHASONE SODIUM PHOSPHATE AND BETAMETHASONE ACETATE 12 MG: 3; 3 INJECTION, SUSPENSION INTRA-ARTICULAR; INTRALESIONAL; INTRAMUSCULAR; SOFT TISSUE at 14:15

## 2020-10-08 RX ADMIN — LIDOCAINE HYDROCHLORIDE 2 ML: 10 INJECTION, SOLUTION EPIDURAL; INFILTRATION; INTRACAUDAL; PERINEURAL at 14:16

## 2020-10-08 NOTE — PROGRESS NOTES
Patient ID: Kyaw Colvin is a 63 y.o. female. No chief complaint on file. HPI       Patient presents with left shoulder pain.     Patient had an MRI of her thoracic spine at this time she is really not sure why she did she denies any back pain    Reports that she did physical therapy and had an injection of her left shoulder before COVID but then had to cease treatment    Past Medical History:   Diagnosis Date    Abnormal EKG     Anxiety     Asthma     Bipolar disorder ([address]     [place] , [place]    COPD (chronic obstructive pulmonary disease) (HCC)     Cramps, extremity     SEVERE LEG CRAMPS    Depression     Dilated bile duct     Headache     History of elective      Hyperlipidemia     Irritable bowel syndrome     Prolonged emergence from general anesthesia     SEVERE ISSUES WAKING UP    Substance abuse ([address]     street drugs when younger   [place] Unspecified sleep apnea     Vision abnormalities     glasses     Past Surgical History:   Procedure Laterality Date    ANKLE SURGERY      HAD SCREWS AND HARDWARE, THEN REMOVED    COLONOSCOPY  02/15/2018    tubular adenoma    EYE SURGERY Bilateral     CATARACTS    HYSTEROSCOPY  10/19/2016    D & C    INDUCED       LASIK      bilateral    TONSILLECTOMY AND ADENOIDECTOMY Bilateral     VULVA SURGERY      HAD A BIOPSY AND REMOVAL OF     Family History   Problem Relation Age of Onset    Dementia Maternal Aunt     Kidney Disease Mother     Heart Attack Sister     Prostate Cancer Father     High Cholesterol Brother     Heart Attack Paternal Grandmother      Social History     Occupational History    Not on file   Tobacco Use    Smoking status: Former Smoker     Packs/day: 2.00     Years: 42.00     Pack years: 84.00     Types: Cigarettes     Last attempt to quit: 2018     Years since quittin.9    Smokeless tobacco: Never Used   Substance and Sexual Activity    Alcohol use: Yes     Comment: yearly    Drug use: No    Sexual activity: Not Currently     Partners: Male     Birth control/protection: Post-menopausal        Review of Systems         Physical Exam  Vitals signs and nursing note reviewed. Constitutional:       Appearance: She is well-developed. HENT:      Head: Normocephalic and atraumatic. Nose: Nose normal.   Eyes:      Conjunctiva/sclera: Conjunctivae normal.   Neck:      Musculoskeletal: Normal range of motion and neck supple. Pulmonary:      Effort: Pulmonary effort is normal. No respiratory distress. Musculoskeletal:      Comments: Normal gait     Skin:     General: Skin is warm and dry. Neurological:      Mental Status: She is alert and oriented to person, place, and time. Sensory: No sensory deficit. Psychiatric:         Behavior: Behavior normal.         Thought Content: Thought content normal.       Diagnostic shoulders left shoulder x-rays age-appropriate severe osteopenia    MRI thoracic spine T5 compression fracture acute T6 chronic  Assessment:          1. Closed wedge compression fracture of T5 vertebra, initial encounter ([facility])    2. Closed wedge compression fracture of T6 vertebra, sequela    3. Adhesive capsulitis of left shoulder    4. Age-related osteoporosis with current pathological fracture, initial encounter        Asymptomatic T5 osteoporotic fracture    Symptomatic adhesive capsulitis left shoulder    Due to osteoporosis patient poor candidate for manipulation under anesthesia also secondary to COPD          Plan:     Instructed on home stretching    Reorder physical therapy    Inject left shoulder    An informed verbal consent for the procedure was obtained and risks including, but not limited to: allergy to medications, injection, bleeding, stiffness of joint, recurrence of symptoms, loss of function, swelling, drainage, irrigation, need for surgery and pseudo-septic inflammation, were explained to the patient.  Also, discussed was the potential for further

## 2020-10-09 ENCOUNTER — TELEPHONE (OUTPATIENT)
Dept: FAMILY MEDICINE CLINIC | Age: 65
End: 2020-10-09

## 2020-10-19 ENCOUNTER — HOSPITAL ENCOUNTER (OUTPATIENT)
Dept: PHYSICAL THERAPY | Age: 65
Setting detail: THERAPIES SERIES
Discharge: HOME | End: 2020-10-19
Payer: COMMERCIAL

## 2020-10-19 PROCEDURE — 97161 PT EVAL LOW COMPLEX 20 MIN: CPT

## 2020-10-19 PROCEDURE — G0283 ELEC STIM OTHER THAN WOUND: HCPCS

## 2020-10-19 PROCEDURE — 97110 THERAPEUTIC EXERCISES: CPT

## 2020-10-19 NOTE — PROGRESS NOTES
Transfers: Independent  Ambulation  Ambulation?: Yes  Ambulation 1  Surface: level tile  Device: No Device  Other Apparatus: O2  Assistance: Independent  Gait Deviations: None  Stairs/Curb  Stairs?: No     Exercises  Exercise 1: PROM L judy sitting 10x  Exercise 2: seated FB stretch L judy 10x10\"  Exercise 3: Codman's ex 10x L judy  Exercise 4: HP and EStim to L judy 20 min sitting    Assessment   Conditions Requiring Skilled Therapeutic Intervention  Body structures, Functions, Activity limitations: Decreased functional mobility ; Decreased ADL status; Decreased ROM; Decreased strength  Assessment: L judy stiffness limiting function  Treatment Diagnosis: stiffness L judy M25.612 weakness L judy M62.512  Prognosis: Good  Decision Making: Low Complexity  History: fell 2 years ago and injured L judy  Exam: limited ROM and strength L judy  Clinical Presentation: UEFS 69  Barriers to Learning: none  REQUIRES PT FOLLOW UP: Yes  Treatment Initiated : therapeutic ex HP and EStim to L judy  Discharge Recommendations: Home independently  Activity Tolerance  Activity Tolerance: Patient Tolerated treatment well         Plan   Plan  Times per week: 2x/week  Plan weeks: 6 weeks  Specific instructions for Next Treatment: progress with ex as tolerated  Current Treatment Recommendations: Strengthening, ROM, Home Exercise Program, Modalities  Plan Comment: instructed in HEP    OutComes Score  UEFS Score: 68.75 (10/19/20 1010)     Goals  Short term goals  Time Frame for Short term goals: 6 visits  Short term goal 1: increase AROM L judy by 10-20 or better  Short term goal 2: increase strength L judy by 1/2 grade or better  Short term goal 3: indep with HEP  Long term goals  Time Frame for Long term goals : 12 visits  Long term goal 1: improve UEFS from 71 to 78 or better  Patient Goals   Patient goals : to be able to take a bath using L arm without problem     Treatment Charges: Minutes Units   []  Ultrasound     [x]  Electrical-Stim 20 1   [] Iontophoresis     []  Traction     []  Massage       [x]  Eval 20 1   []  Gait     [x]  Ther Exercise 10  1    []  Manual Therapy       []  Ther Activities       []  Aquatics     []  Vasopneumatic Device     []  Neuro Re-Ed       []  Other       Total Treatment Time: 50 3        Therapy Time   Individual Concurrent Group Co-treatment   Time In 1010         Time Out 1100         Minutes 50         Timed Code Treatment Minutes: 10 Minutes    Patient Goals:to be able to take a bath using L arm without problem    Comments/Assessment:    Rehab Potential:  [x] Good  [] Fair  [] Poor   Suggested Professional Referral:  [x] No  [] Yes:  Barriers to Goal Achievement:  [] No  [x] Yes: chronic  Domestic Concerns:  [x] No  [] Yes:    Treatment Plan:  [x] Therapeutic Exercise   45163  [] Iontophoresis: 4 mg/mL Dexamethasone Sodium Phosphate  mAmin  60858   [] Therapeutic Activity  11805 [] Vasopneumatic cold with compression  67233    [] Gait Training   58602 [] Ultrasound   [id number]   [] Neuromuscular Re-education  38554 [x] Electrical Stimulation Unattended  57306   [] Manual Therapy  79626 [] Electrical Stimulation Attended  63402   [x] Instruction in HEP  [] Lumbar/Cervical Traction  07621   [] Aquatic Therapy   78867 [] Cold/hotpack    [] Massage   00604      [] Dry Needling, 1 or 2 muscles  32114   [] Biofeedback, first 15 minutes   24752  [] Biofeedback, additional 15 minutes   54952 [] Dry Needling, 3 or more muscles  03946      Frequency:        2   X/wk x       6   wk's      [x] Plans/Goals, Risk/Benefits discussed with pt/family  Comprehension of Education [x] yes  [] Needs Review  Pt/Family Education: [x] Verbal  [x] Demo  [] Written    More objective information is available upon request.  Thank you for this referral.        Medicare/Regulatory Requirements:  I have reviewed this plan of care and certify a need for   Medically necessary rehabilitation services.   [] Physician Signature    Date:     Electronically signed by: Heather Nation, [address] [place] [address] Vikas Shireen  [place], [address] [facility]  Phone [phone]  Fax [phone]

## 2020-10-26 ENCOUNTER — HOSPITAL ENCOUNTER (OUTPATIENT)
Dept: GENERAL RADIOLOGY | Age: 65
Discharge: HOME | End: 2020-10-28
Payer: COMMERCIAL

## 2020-10-26 ENCOUNTER — HOSPITAL ENCOUNTER (OUTPATIENT)
Age: 65
Discharge: HOME | End: 2020-10-28
Payer: COMMERCIAL

## 2020-10-26 PROCEDURE — 71046 X-RAY EXAM CHEST 2 VIEWS: CPT

## 2020-10-27 ENCOUNTER — HOSPITAL ENCOUNTER (OUTPATIENT)
Dept: PHYSICAL THERAPY | Age: 65
Setting detail: THERAPIES SERIES
Discharge: HOME | End: 2020-10-27
Payer: COMMERCIAL

## 2020-10-27 NOTE — PROGRESS NOTES
[] [facility] ([facility]) @ [facility]  [address] [address] [address]  Phone [phone]  Fax [phone]    Physical Therapy Discharge Note    Date: 10/27/2020      Patient: Mia Carr  : 1955  MRN: [id number]    Physician: Dr Nikko Lira     Diagnosis: L judy pain Onset Date: 10/19/18    Rehab Diagnosis:stiff/weak L judy  ICD-10 Codes: Total visits attended:  Cancels/No shows:1  Date of initial visit: 10/19/20                  [] Patient recovered from conditions. Treatment goals were met. [] Patient received maximum benefit. No further therapy indicated at this time. [x] Patient demonstrated improvement from condition with  0 Of  3 Short term goals met. [x]Patient demonstrated improvement from condition with 0   Of 1  Long term goals met. [] Patient to continue exercise/home instructions independently. [] Therapy interrupted due to:    [] Patient has 2 or more no shows/cancels, is discontinued per our policy. [] Patient has completed prescribed number of treatment sessions. [] Other:      Pain level at evaluation was      0 /10 and at discharge was       /10    It Is My Understanding That The:  [] Patient returned to work. [] Patient demonstrated improved level of function. [] Patient returned to previous functional level.   [] Patient's current functional status is unknown due to no-shows  [x] Other:patient requested to be discontinued from PT due to her COPD getting worst     Recommendations/Comments: to discontinue PT                  Treatment Included:     [x] Therapeutic Exercise   51358  [] Iontophoresis: 4 mg/mL Dexamethasone Sodium Phosphate  mAmin  19789   [] Therapeutic Activity  22906 [] Vasopneumatic cold with compression  42097    [] Gait Training   83380 [] Ultrasound   00923   [] Neuromuscular Re-education  54362 [x] Electrical Stimulation Unattended  15217   [] Manual Therapy  63796 [] Electrical Stimulation

## 2020-10-30 ENCOUNTER — APPOINTMENT (OUTPATIENT)
Dept: PHYSICAL THERAPY | Age: 65
End: 2020-10-30
Payer: COMMERCIAL

## 2020-11-02 RX ORDER — SIMVASTATIN 40 MG
TABLET ORAL
Qty: 30 TABLET | Refills: 3 | Status: SHIPPED | OUTPATIENT
Start: 2020-11-02 | End: 2021-03-29

## 2020-11-02 NOTE — TELEPHONE ENCOUNTER
Please Approve or Refuse.   Send to Pharmacy per Pt's Request:      Next Visit Date:  Visit date not found   Last Visit Date: 8/19/2020    Hemoglobin A1C (%)   Date Value   05/09/2018 5.1   03/17/2016 5.4             ( goal A1C is < 7)   BP Readings from Last 3 Encounters:   01/27/20 110/80   01/23/20 111/63   10/30/19 108/74          (goal 120/80)  BUN   Date Value Ref Range Status   09/09/2020 13 8 - 23 mg/dL Final     CREATININE   Date Value Ref Range Status   09/09/2020 0.88 0.50 - 0.90 mg/dL Final     Potassium   Date Value Ref Range Status   09/09/2020 4.8 3.7 - 5.3 mmol/L Final

## 2020-11-18 RX ORDER — LIDOCAINE 40 MG/G
CREAM TOPICAL
Qty: 30 G | Refills: 3 | Status: SHIPPED | OUTPATIENT
Start: 2020-11-18 | End: 2021-08-05 | Stop reason: ALTCHOICE

## 2020-12-14 RX ORDER — ACETAMINOPHEN 500 MG
TABLET ORAL
Qty: 120 TABLET | Refills: 0 | Status: SHIPPED | OUTPATIENT
Start: 2020-12-14 | End: 2021-03-11 | Stop reason: SDUPTHER

## 2020-12-21 ENCOUNTER — HOSPITAL ENCOUNTER (OUTPATIENT)
Dept: WOMENS IMAGING | Age: 65
Discharge: HOME | End: 2020-12-23
Payer: COMMERCIAL

## 2020-12-21 PROCEDURE — 77080 DXA BONE DENSITY AXIAL: CPT

## 2020-12-21 PROCEDURE — 77063 BREAST TOMOSYNTHESIS BI: CPT

## 2020-12-21 RX ORDER — ACYCLOVIR 400 MG/1
TABLET ORAL
Qty: 14 TABLET | Refills: 0 | Status: SHIPPED | OUTPATIENT
Start: 2020-12-21 | End: 2021-01-12

## 2020-12-22 RX ORDER — ERGOCALCIFEROL 1.25 MG/1
CAPSULE ORAL
Qty: 4 CAPSULE | Refills: 3 | Status: SHIPPED | OUTPATIENT
Start: 2020-12-22 | End: 2021-04-01

## 2021-01-07 ENCOUNTER — TELEPHONE (OUTPATIENT)
Dept: FAMILY MEDICINE CLINIC | Age: 66
End: 2021-01-07

## 2021-01-07 DIAGNOSIS — S22.000A COMPRESSION FRACTURE OF BODY OF THORACIC VERTEBRA (HCC): Primary | ICD-10-CM

## 2021-01-07 DIAGNOSIS — M80.00XD AGE-RELATED OSTEOPOROSIS WITH CURRENT PATHOLOGICAL FRACTURE WITH ROUTINE HEALING, SUBSEQUENT ENCOUNTER: ICD-10-CM

## 2021-01-07 RX ORDER — DENOSUMAB 60 MG/ML
60 INJECTION SUBCUTANEOUS ONCE
Qty: 1 ML | Refills: 0 | Status: SHIPPED | OUTPATIENT
Start: 2021-01-07 | End: 2021-03-11

## 2021-01-08 ENCOUNTER — TELEPHONE (OUTPATIENT)
Dept: ORTHOPEDIC SURGERY | Age: 66
End: 2021-01-08

## 2021-01-08 NOTE — TELEPHONE ENCOUNTER
Patient called to let doctor Beeks  know her shoulder is unfroze and can put arm over head. Has been unable to come in and go to therapy due to illness and covid but is doing exercises at home and seems to be improving per patient.

## 2021-01-12 DIAGNOSIS — B00.2 ORAL HERPES: ICD-10-CM

## 2021-01-12 RX ORDER — ACYCLOVIR 400 MG/1
TABLET ORAL
Qty: 14 TABLET | Refills: 0 | Status: SHIPPED | OUTPATIENT
Start: 2021-01-12 | End: 2021-03-11 | Stop reason: ALTCHOICE

## 2021-03-02 RX ORDER — ALLOPURINOL 300 MG/1
TABLET ORAL
COMMUNITY
Start: 2021-01-05 | End: 2021-06-17

## 2021-03-02 NOTE — PROGRESS NOTES
Visit Information    Have you changed or started any medications since your last visit including any over-the-counter medicines, vitamins, or herbal medicines? no   Are you having any side effects from any of your medications? -  no  Have you stopped taking any of your medications? Is so, why? -  no    Have you seen any other physician or provider since your last visit? Yes - Records Obtained  Have you had any other diagnostic tests since your last visit? No  Have you been seen in the emergency room and/or had an admission to a hospital since we last saw you? No  Have you had your routine dental cleaning in the past 6 months? no    Have you activated your [employer] account? If not, what are your barriers?  Yes     Patient Care Team:  Dulce Huertas MD as PCP - General (Family Medicine)  Dulce Huertas MD as PCP - [address] [place] [address]  [facility] Provider  Alison Rodarte DO as Consulting Physician (Obstetrics & Gynecology)  Celestine Garland MD as Consulting Physician (Pulmonology)  Sil Laguna MD as Consulting Physician (Gastroenterology)  Kylie Balderrama RN as Nurse Navigator    Medical History Review  Past Medical, Family, and Social History reviewed and does contribute to the patient presenting condition    Health Maintenance   Topic Date Due    Pneumococcal 0-64 years Vaccine (2 of 3 - PCV13) 04/06/2017    Cervical cancer screen  01/06/2019    Lipid screen  09/09/2021    Low dose CT lung screening  09/09/2021    Breast cancer screen  12/21/2022    Colon cancer screen colonoscopy  02/15/2023    DTaP/Tdap/Td vaccine (2 - Td) 09/09/2026    Flu vaccine  Completed    Shingles Vaccine  Completed    Hepatitis C screen  Completed    HIV screen  Completed    Hepatitis A vaccine  Aged Out    Hepatitis B vaccine  Aged Out    Hib vaccine  Aged Out    Meningococcal (ACWY) vaccine  Aged Out

## 2021-03-03 ENCOUNTER — VIRTUAL VISIT (OUTPATIENT)
Dept: FAMILY MEDICINE CLINIC | Age: 66
End: 2021-03-03
Payer: COMMERCIAL

## 2021-03-03 DIAGNOSIS — B00.2 ORAL HERPES: Primary | ICD-10-CM

## 2021-03-03 DIAGNOSIS — F31.9 BIPOLAR AFFECTIVE DISORDER, REMISSION STATUS UNSPECIFIED (HCC): ICD-10-CM

## 2021-03-03 DIAGNOSIS — J41.8 MIXED SIMPLE AND MUCOPURULENT CHRONIC BRONCHITIS (HCC): ICD-10-CM

## 2021-03-03 PROCEDURE — 99443 PR PHYS/QHP TELEPHONE EVALUATION 21-30 MIN: CPT | Performed by: FAMILY MEDICINE

## 2021-03-03 RX ORDER — GUAIFENESIN 100 MG/5ML
10 SYRUP ORAL EVERY 4 HOURS PRN
Qty: 1 BOTTLE | Refills: 0 | Status: SHIPPED | OUTPATIENT
Start: 2021-03-03 | End: 2021-03-18

## 2021-03-03 RX ORDER — ACYCLOVIR 50 MG/G
OINTMENT TOPICAL
Qty: 1 TUBE | Refills: 0 | Status: SHIPPED | OUTPATIENT
Start: 2021-03-03 | End: 2021-03-11 | Stop reason: ALTCHOICE

## 2021-03-03 RX ORDER — VALACYCLOVIR HYDROCHLORIDE 500 MG/1
500 TABLET, FILM COATED ORAL 2 TIMES DAILY
Qty: 10 TABLET | Refills: 0 | Status: SHIPPED | OUTPATIENT
Start: 2021-03-03 | End: 2021-03-08

## 2021-03-03 NOTE — PROGRESS NOTES
Dewey Herrera is a 61 y.o. female evaluated via telephone on 3/3/2021. Chief Complaint   Patient presents with    Mouth Lesions     Patient-Reported Vitals 3/2/2021   Patient-Reported Weight 160 lb   Patient-Reported Height 5'5        Consent:  She and/or health care decision maker is aware that that she may receive a bill for this telephone service, depending on her insurance coverage, and has provided verbal consent to proceed: Yes      Documentation:  I communicated with the patient and/or health care decision maker about ,    Patient is scheduled for sick call, complaining of cold sores on lips and around the chin. Patient reports it started 2 days before. She has recurrent history of oral herpes. She denies any fever chills shortness of breath. Patient has COPD and is on home oxygen. Patient also complains of congestion reports he did not get in contact with any person who is sick. She is asking for cough syrup. Bipolar disorder stable on current treatment  Details of this discussion including any medical advice provided:   1. Oral herpes    - valACYclovir (VALTREX) 500 MG tablet; Take 1 tablet by mouth 2 times daily for 5 days  Dispense: 10 tablet; Refill: 0  - acyclovir (ZOVIRAX) 5 % ointment; Apply topically 5 times daily FOR 4 DAYS  Dispense: 1 Tube; Refill: 0    2. Bipolar affective disorder, remission status unspecified ([facility]  Stable follows with psychiatrist    3. Mixed simple and mucopurulent chronic bronchitis (HCC)    - guaiFENesin (ALTARUSSIN) 100 MG/5ML syrup; Take 10 mLs by mouth every 4 hours as needed for Cough  Dispense: 1 Bottle; Refill: 0        I affirm this is a Patient Initiated Episode with a Patient who has not had a related appointment within my department in the past 7 days or scheduled within the next 24 hours.     Patient identification was verified at the start of the visit: Yes    Total Time: minutes: 21-30 minutes    The visit was conducted pursuant to the emergency declaration under the [facility], [address] [place] [address] waiver authority and the [employer] and [employer] General Act. Patient identification was verified, and a caregiver was present when appropriate. The patient was located in a state where the provider was credentialed to provide care.     Note: not billable if this call serves to triage the patient into an appointment for the relevant concern      Martha Voss

## 2021-03-08 ENCOUNTER — TELEPHONE (OUTPATIENT)
Dept: FAMILY MEDICINE CLINIC | Age: 66
End: 2021-03-08

## 2021-03-08 DIAGNOSIS — R30.0 DYSURIA: Primary | ICD-10-CM

## 2021-03-08 NOTE — TELEPHONE ENCOUNTER
Patient states since starting the Valtrex and Acyclovir she has had burning with urination and frequency. Patient denies any blood in urine, difficulty in urinating, or discharge. Please advise.

## 2021-03-11 ENCOUNTER — HOSPITAL ENCOUNTER (OUTPATIENT)
Age: 66
Discharge: HOME | End: 2021-03-11
Payer: COMMERCIAL

## 2021-03-11 ENCOUNTER — OFFICE VISIT (OUTPATIENT)
Dept: FAMILY MEDICINE CLINIC | Age: 66
End: 2021-03-11
Payer: COMMERCIAL

## 2021-03-11 VITALS
DIASTOLIC BLOOD PRESSURE: 70 MMHG | SYSTOLIC BLOOD PRESSURE: 100 MMHG | WEIGHT: 147.6 LBS | TEMPERATURE: 97.3 F | OXYGEN SATURATION: 97 % | BODY MASS INDEX: 27.16 KG/M2 | HEIGHT: 62 IN | HEART RATE: 85 BPM

## 2021-03-11 DIAGNOSIS — Z23 NEED FOR PROPHYLACTIC VACCINATION AGAINST STREPTOCOCCUS PNEUMONIAE (PNEUMOCOCCUS): ICD-10-CM

## 2021-03-11 DIAGNOSIS — M25.522 LEFT ELBOW PAIN: ICD-10-CM

## 2021-03-11 DIAGNOSIS — R10.9 FLANK PAIN: ICD-10-CM

## 2021-03-11 DIAGNOSIS — J41.8 MIXED SIMPLE AND MUCOPURULENT CHRONIC BRONCHITIS (HCC): ICD-10-CM

## 2021-03-11 DIAGNOSIS — D70.2 OTHER DRUG-INDUCED NEUTROPENIA (HCC): ICD-10-CM

## 2021-03-11 DIAGNOSIS — R35.89 POLYURIA: ICD-10-CM

## 2021-03-11 DIAGNOSIS — R10.9 FLANK PAIN: Primary | ICD-10-CM

## 2021-03-11 DIAGNOSIS — M80.00XD AGE-RELATED OSTEOPOROSIS WITH CURRENT PATHOLOGICAL FRACTURE WITH ROUTINE HEALING, SUBSEQUENT ENCOUNTER: ICD-10-CM

## 2021-03-11 PROBLEM — M80.00XA AGE-RELATED OSTEOPOROSIS WITH CURRENT PATHOLOGICAL FRACTURE: Status: ACTIVE | Noted: 2021-03-11

## 2021-03-11 PROBLEM — M25.472 ANKLE SWELLING, LEFT: Status: RESOLVED | Noted: 2020-08-19 | Resolved: 2021-03-11

## 2021-03-11 PROBLEM — K59.1 FUNCTIONAL DIARRHEA: Status: RESOLVED | Noted: 2020-07-30 | Resolved: 2021-03-11

## 2021-03-11 PROBLEM — R63.5 WEIGHT GAIN: Status: RESOLVED | Noted: 2020-07-30 | Resolved: 2021-03-11

## 2021-03-11 LAB
-: ABNORMAL
ABSOLUTE EOS #: 0.1 K/UL (ref 0–0.4)
ABSOLUTE IMMATURE GRANULOCYTE: ABNORMAL K/UL (ref 0–0.3)
ABSOLUTE LYMPH #: 0.5 K/UL (ref 1–4.8)
ABSOLUTE MONO #: 0.4 K/UL (ref 0.1–1.3)
ALBUMIN SERPL-MCNC: 4.2 G/DL (ref 3.5–5.2)
ALBUMIN/GLOBULIN RATIO: ABNORMAL (ref 1–2.5)
ALP BLD-CCNC: 110 U/L (ref 35–104)
ALT SERPL-CCNC: 9 U/L (ref 5–33)
AMORPHOUS: ABNORMAL
ANION GAP SERPL CALCULATED.3IONS-SCNC: 6 MMOL/L (ref 9–17)
AST SERPL-CCNC: 14 U/L
BACTERIA: ABNORMAL
BASOPHILS # BLD: 0 % (ref 0–2)
BASOPHILS ABSOLUTE: 0 K/UL (ref 0–0.2)
BILIRUB SERPL-MCNC: <0.15 MG/DL (ref 0.3–1.2)
BILIRUBIN URINE: NEGATIVE
BILIRUBIN, POC: NEGATIVE
BLOOD URINE, POC: NEGATIVE
BUN BLDV-MCNC: 16 MG/DL (ref 8–23)
BUN/CREAT BLD: ABNORMAL (ref 9–20)
CALCIUM SERPL-MCNC: 8.9 MG/DL (ref 8.6–10.4)
CASTS UA: ABNORMAL /LPF
CHLORIDE BLD-SCNC: 104 MMOL/L (ref 98–107)
CLARITY, POC: NORMAL
CO2: 30 MMOL/L (ref 20–31)
COLOR, POC: YELLOW
COLOR: YELLOW
COMMENT UA: ABNORMAL
CREAT SERPL-MCNC: 0.89 MG/DL (ref 0.5–0.9)
CREATININE URINE: 157.6 MG/DL (ref 28–217)
CRYSTALS, UA: ABNORMAL /HPF
DIFFERENTIAL TYPE: ABNORMAL
EOSINOPHILS RELATIVE PERCENT: 1 % (ref 0–4)
EPITHELIAL CELLS UA: ABNORMAL /HPF
GFR AFRICAN AMERICAN: >60 ML/MIN
GFR NON-AFRICAN AMERICAN: >60 ML/MIN
GFR SERPL CREATININE-BSD FRML MDRD: ABNORMAL ML/MIN/{1.73_M2}
GFR SERPL CREATININE-BSD FRML MDRD: ABNORMAL ML/MIN/{1.73_M2}
GLUCOSE BLD-MCNC: 98 MG/DL (ref 70–99)
GLUCOSE URINE, POC: NEGATIVE
GLUCOSE URINE: NEGATIVE
HCT VFR BLD CALC: 40.1 % (ref 36–46)
HEMOGLOBIN: 12.6 G/DL (ref 12–16)
IMMATURE GRANULOCYTES: ABNORMAL %
KETONES, POC: NEGATIVE
KETONES, URINE: NEGATIVE
LEUKOCYTE EST, POC: NEGATIVE
LEUKOCYTE ESTERASE, URINE: NEGATIVE
LYMPHOCYTES # BLD: 11 % (ref 24–44)
MCH RBC QN AUTO: 31.5 PG (ref 26–34)
MCHC RBC AUTO-ENTMCNC: 31.4 G/DL (ref 31–37)
MCV RBC AUTO: 100.1 FL (ref 80–100)
MICROALBUMIN/CREAT 24H UR: <12 MG/L
MICROALBUMIN/CREAT UR-RTO: NORMAL MCG/MG CREAT
MONOCYTES # BLD: 7 % (ref 1–7)
MUCUS: ABNORMAL
NITRITE, POC: NEGATIVE
NITRITE, URINE: NEGATIVE
NRBC AUTOMATED: ABNORMAL PER 100 WBC
OSMOLALITY URINE: 725 MOSM/KG (ref 80–1300)
OTHER OBSERVATIONS UA: ABNORMAL
PDW BLD-RTO: 14.5 % (ref 11.5–14.9)
PH UA: 7.5 (ref 5–8)
PH, POC: 7
PLATELET # BLD: 228 K/UL (ref 150–450)
PLATELET ESTIMATE: ABNORMAL
PMV BLD AUTO: 7.7 FL (ref 6–12)
POTASSIUM SERPL-SCNC: 4.1 MMOL/L (ref 3.7–5.3)
PROTEIN UA: NEGATIVE
PROTEIN, POC: NEGATIVE
RBC # BLD: 4.01 M/UL (ref 4–5.2)
RBC # BLD: ABNORMAL 10*6/UL
RBC UA: ABNORMAL /HPF
RENAL EPITHELIAL, UA: ABNORMAL /HPF
SEG NEUTROPHILS: 81 % (ref 36–66)
SEGMENTED NEUTROPHILS ABSOLUTE COUNT: 3.9 K/UL (ref 1.3–9.1)
SERUM OSMOLALITY: 301 MOSM/KG (ref 275–295)
SODIUM BLD-SCNC: 140 MMOL/L (ref 135–144)
SPECIFIC GRAVITY UA: 1.02 (ref 1–1.03)
SPECIFIC GRAVITY, POC: 1.02
TOTAL PROTEIN: 6.5 G/DL (ref 6.4–8.3)
TRICHOMONAS: ABNORMAL
TURBIDITY: ABNORMAL
URINE HGB: NEGATIVE
UROBILINOGEN, POC: 0.2
UROBILINOGEN, URINE: NORMAL
WBC # BLD: 4.9 K/UL (ref 3.5–11)
WBC # BLD: ABNORMAL 10*3/UL
WBC UA: ABNORMAL /HPF
YEAST: ABNORMAL

## 2021-03-11 PROCEDURE — 80053 COMPREHEN METABOLIC PANEL: CPT

## 2021-03-11 PROCEDURE — G8926 SPIRO NO PERF OR DOC: HCPCS | Performed by: FAMILY MEDICINE

## 2021-03-11 PROCEDURE — 83930 ASSAY OF BLOOD OSMOLALITY: CPT

## 2021-03-11 PROCEDURE — 99214 OFFICE O/P EST MOD 30 MIN: CPT | Performed by: FAMILY MEDICINE

## 2021-03-11 PROCEDURE — 90471 IMMUNIZATION ADMIN: CPT | Performed by: FAMILY MEDICINE

## 2021-03-11 PROCEDURE — 82570 ASSAY OF URINE CREATININE: CPT

## 2021-03-11 PROCEDURE — 3023F SPIROM DOC REV: CPT | Performed by: FAMILY MEDICINE

## 2021-03-11 PROCEDURE — G8482 FLU IMMUNIZE ORDER/ADMIN: HCPCS | Performed by: FAMILY MEDICINE

## 2021-03-11 PROCEDURE — G8427 DOCREV CUR MEDS BY ELIG CLIN: HCPCS | Performed by: FAMILY MEDICINE

## 2021-03-11 PROCEDURE — 36415 COLL VENOUS BLD VENIPUNCTURE: CPT

## 2021-03-11 PROCEDURE — 1036F TOBACCO NON-USER: CPT | Performed by: FAMILY MEDICINE

## 2021-03-11 PROCEDURE — 3017F COLORECTAL CA SCREEN DOC REV: CPT | Performed by: FAMILY MEDICINE

## 2021-03-11 PROCEDURE — 81001 URINALYSIS AUTO W/SCOPE: CPT

## 2021-03-11 PROCEDURE — 81002 URINALYSIS NONAUTO W/O SCOPE: CPT | Performed by: FAMILY MEDICINE

## 2021-03-11 PROCEDURE — G8419 CALC BMI OUT NRM PARAM NOF/U: HCPCS | Performed by: FAMILY MEDICINE

## 2021-03-11 PROCEDURE — 83935 ASSAY OF URINE OSMOLALITY: CPT

## 2021-03-11 PROCEDURE — 90670 PCV13 VACCINE IM: CPT | Performed by: FAMILY MEDICINE

## 2021-03-11 PROCEDURE — 85025 COMPLETE CBC W/AUTO DIFF WBC: CPT

## 2021-03-11 PROCEDURE — 82043 UR ALBUMIN QUANTITATIVE: CPT

## 2021-03-11 RX ORDER — IBANDRONATE SODIUM 150 MG/1
150 TABLET, FILM COATED ORAL
Qty: 30 TABLET | Refills: 3 | Status: SHIPPED | OUTPATIENT
Start: 2021-03-11 | End: 2022-02-08 | Stop reason: SDUPTHER

## 2021-03-11 RX ORDER — ACETAMINOPHEN 500 MG
TABLET ORAL
Qty: 120 TABLET | Refills: 0 | Status: SHIPPED | OUTPATIENT
Start: 2021-03-11 | End: 2021-05-03

## 2021-03-11 ASSESSMENT — ENCOUNTER SYMPTOMS
COLOR CHANGE: 0
SHORTNESS OF BREATH: 1
VOMITING: 0
NAUSEA: 0
SINUS PAIN: 0
BLOOD IN STOOL: 0
RECTAL PAIN: 0
PHOTOPHOBIA: 0
CONSTIPATION: 0
ABDOMINAL PAIN: 1
ABDOMINAL DISTENTION: 0
COUGH: 1
APNEA: 0
WHEEZING: 1
DIARRHEA: 0
BACK PAIN: 1

## 2021-03-11 NOTE — PROGRESS NOTES
Chief Complaint   Patient presents with    Urinary Tract Infection         Faye Cordova  here today for follow up on chronic medical problems, go over labs and/or diagnostic studies, and medication refills. Urinary Tract Infection      HPI: Patient is scheduled for sick call is complaining of suprapubic pain, increased frequency of micturition. Patient reports she feels urgency denies any incontinence. The symptoms started last 1 week and are getting worse. Patient reports she feels pain on the left side of belly, and when she sleeps on the left side she has to use restroom more often. She wakes up 6-7 times during night. She never had this problem in the past.  She did not had any incontinence or any UTI. Patient denies any fever chills. Patient denies any abdominal swelling. Patient never had any recent Pap done. She feels bulge in the pelvic area. Recently she was treated for oral herpes with acyclovir reports lesions has improved. Osteoporosis on DEXA scan, was started on Prolia reports insurance did not cover. Mammogram also discussed with patient          /70   Pulse 85   Temp 97.3 °F (36.3 °C) (Temporal)   Ht 5' 2\" (1.575 m)   Wt 147 lb 9.6 oz (67 kg)   LMP 05/20/2013 (Exact Date)   SpO2 97% Comment: @2L  BMI 27.00 kg/m²    Body mass index is 27 kg/m². Wt Readings from Last 3 Encounters:   03/11/21 147 lb 9.6 oz (67 kg)   07/30/20 160 lb 9.6 oz (72.8 kg)   03/11/20 144 lb (65.3 kg)        [x]Negative depression screening. PHQ Scores 1/27/2020 10/8/2019 5/15/2019 2/7/2018 6/30/2016   PHQ2 Score 0 6 0 4 0   PHQ9 Score 0 13 0 14 0      []1-4 = Minimal depression   []5-9 = Milddepression   []10-14 = Moderate depression   []15-19 = Moderately severe depression   []20-27 = Severe depression    Discussed testing with the patient and all questions fully answered.     Hospital Outpatient Visit on 09/09/2020   Component Date Value Ref Range Status    Cholesterol, Fasting 09/09/2020 162  <200 mg/dL Final    Comment:    Cholesterol Guidelines:      <200  Desirable   200-240  Borderline      >240  Undesirable         HDL 09/09/2020 77  >40 mg/dL Final    Comment:    HDL Guidelines:    <40     Undesirable   40-59    Borderline    >59     Desirable         LDL Cholesterol 09/09/2020 72  0 - 130 mg/dL Final    Comment:    LDL Guidelines:     <100    Desirable   100-129   Near to/above Desirable   130-159   Borderline      >159   Undesirable     Direct (measured) LDL and calculated LDL are not interchangeable tests.  Chol/HDL Ratio 09/09/2020 2.1  <5 Final            Triglyceride, Fasting 09/09/2020 66  <150 mg/dL Final    Comment:    Triglyceride Guidelines:     <150   Desirable   150-199  Borderline   200-499  High     >499   Very high   Based on AHA Guidelines for fasting triglyceride, October 2012.          VLDL 09/09/2020 NOT REPORTED  1 - 30 mg/dL Final    WBC 09/09/2020 2.9* 3.5 - 11.0 k/uL Final    RBC 09/09/2020 4.24  4.0 - 5.2 m/uL Final    Hemoglobin 09/09/2020 13.6  12.0 - 16.0 g/dL Final    Hematocrit 09/09/2020 41.2  36 - 46 % Final    MCV 09/09/2020 97.2  80 - 100 fL Final    MCH 09/09/2020 32.1  26 - 34 pg Final    MCHC 09/09/2020 33.0  31 - 37 g/dL Final    RDW 09/09/2020 13.9  11.5 - 14.9 % Final    Platelets 44/72/1246 239  150 - 450 k/uL Final    MPV 09/09/2020 7.7  6.0 - 12.0 fL Final    NRBC Automated 09/09/2020 NOT REPORTED  per 100 WBC Final    TSH 09/09/2020 1.94  0.30 - 5.00 mIU/L Final    Glucose 09/09/2020 91  70 - 99 mg/dL Final    BUN 09/09/2020 13  8 - 23 mg/dL Final    CREATININE 09/09/2020 0.88  0.50 - 0.90 mg/dL Final    Bun/Cre Ratio 09/09/2020 NOT REPORTED  9 - 20 Final    Calcium 09/09/2020 8.9  8.6 - 10.4 mg/dL Final    Sodium 09/09/2020 141  135 - 144 mmol/L Final    Potassium 09/09/2020 4.8  3.7 - 5.3 mmol/L Final    Chloride 09/09/2020 104  98 - 107 mmol/L Final    CO2 09/09/2020 28  20 - 31 mmol/L Final    Anion Gap 09/09/2020 9 9 - 17 mmol/L Final    Alkaline Phosphatase 09/09/2020 107* 35 - 104 U/L Final    ALT 09/09/2020 12  5 - 33 U/L Final    AST 09/09/2020 16  <32 U/L Final    Total Bilirubin 09/09/2020 <0.15* 0.3 - 1.2 mg/dL Final    Total Protein 09/09/2020 6.7  6.4 - 8.3 g/dL Final    Albumin 09/09/2020 4.1  3.5 - 5.2 g/dL Final    Albumin/Globulin Ratio 09/09/2020 NOT REPORTED  1.0 - 2.5 Final    GFR Non- 09/09/2020 >60  >60 mL/min Final    GFR  09/09/2020 >60  >60 mL/min Final    GFR Comment 09/09/2020        Final    Comment: Average GFR for 61-76 years old:   80 mL/min/1.73sq m  Chronic Kidney Disease:   <60 mL/min/1.73sq m  Kidney failure:   <15 mL/min/1.73sq m              eGFR calculated using average adult body mass.  Additional eGFR calculator available at:        Creww.[email or web address]            GFR Staging 09/09/2020 NOT REPORTED   Final         Most recent labs reviewed:     Lab Results   Component Value Date    WBC 4.9 03/11/2021    HGB 12.6 03/11/2021    HCT 40.1 03/11/2021    .1 (H) 03/11/2021     03/11/2021       @BRIEFLAB(NA,K,CL,CO2,BUN,CREATININE,GLUCOSE,CALCIUM)@     Lab Results   Component Value Date    ALT 9 03/11/2021    AST 14 03/11/2021    ALKPHOS 110 (H) 03/11/2021    BILITOT <0.15 (L) 03/11/2021       Lab Results   Component Value Date    TSH 1.94 09/09/2020       Lab Results   Component Value Date    CHOL 161 02/22/2019    CHOL 152 11/19/2017    CHOL 182 03/17/2016     Lab Results   Component Value Date    TRIG 79 02/22/2019    TRIG 79 11/19/2017    TRIG 91 03/17/2016     Lab Results   Component Value Date    HDL 77 09/09/2020    HDL 69 02/22/2019    HDL 65 11/19/2017     Lab Results   Component Value Date    LDLCHOLESTEROL 72 09/09/2020    LDLCHOLESTEROL 76 02/22/2019    LDLCHOLESTEROL 71 11/19/2017     Lab Results   Component Value Date    VLDL NOT REPORTED 09/09/2020    VLDL NOT REPORTED 02/22/2019    VLDL NOT REPORTED 11/19/2017     Lab Results   Component Value Date    CHOLHDLRATIO 2.1 09/09/2020    CHOLHDLRATIO 2.3 02/22/2019    CHOLHDLRATIO 2.3 11/19/2017       Lab Results   Component Value Date    LABA1C 5.1 05/09/2018       Lab Results   Component Value Date    LDQXXOQJ36 1053 03/08/2019       Lab Results   Component Value Date    FOLATE 8.3 03/08/2019       Lab Results   Component Value Date    IRON 56 03/08/2019    TIBC 288 03/08/2019    FERRITIN 160 (H) 03/08/2019       Lab Results   Component Value Date    VITD25 14.4 (L) 03/17/2016             Current Outpatient Medications   Medication Sig Dispense Refill    ibandronate (BONIVA) 150 MG tablet Take 1 tablet by mouth every 30 days Take one (1) tablet once per month in the morning with a full glass of water, on an empty stomach, and do not take anything else by mouth or lie down for the next 30 minutes.  30 tablet 3    acetaminophen (ACETAMINOPHEN EXTRA STRENGTH) 500 MG tablet take 2 tablets by mouth every 6 hours AS NEEDED FOR PAIN 120 tablet 0    guaiFENesin (ALTARUSSIN) 100 MG/5ML syrup Take 10 mLs by mouth every 4 hours as needed for Cough 1 Bottle 0    AYR 0.65 % nasal spray instill 2 sprays into each nostril four times a day      vitamin D (ERGOCALCIFEROL) 1.25 MG (43051 UT) CAPS capsule take 1 capsule by mouth every week 4 capsule 3    lidocaine (LMX) 4 % cream apply topically every 8 hours AS NEEDED FOR PAIN 30 g 3    simvastatin (ZOCOR) 40 MG tablet take 1 tablet by mouth at bedtime 30 tablet 3    Elastic Bandages & Supports (MEDICAL COMPRESSION STOCKINGS) MISC 1 each by Does not apply route daily Keep pressure between 20 -30mm 1 each 0    RA VITAMIN B-12 TR 1000 MCG TBCR take 1 tablet by mouth once daily 30 tablet 3    topiramate (TOPAMAX) 50 MG tablet       montelukast (SINGULAIR) 10 MG tablet       fluticasone-umeclidin-vilant (TRELEGY ELLIPTA) 100-62.5-25 MCG/INH AEPB Inhale 1 puff into the lungs daily      EPINEPHrine (EPIPEN) 0.3 MG/0.3ML SOAJ injection use as directed by prescriber FOR ALLERGIC REACTION 2 each 2    nicotine polacrilex (NICORETTE) 2 MG gum Take 2 mg by mouth as needed for Smoking cessation      mirtazapine (REMERON) 15 MG tablet take 1 tablet by mouth at bedtime for sleep  0    Elastic Bandages & Supports (ACE ELBOW BRACE LARGE/X-LARGE) MISC Use daily for elbow pain 1 each 0    ALAWAY 0.025 % ophthalmic solution INSTILL 1 DROP TWO TIMES A DAY INTO LEFT EYE ONLY 10 mL 6    albuterol (PROVENTIL) (2.5 MG/3ML) 0.083% nebulizer solution Take 3 mLs by nebulization 4 times daily 120 each 3    albuterol sulfate  (90 Base) MCG/ACT inhaler Inhale 2 puffs into the lungs every 4 hours as needed for Wheezing 1 Inhaler 3    fluticasone (FLONASE) 50 MCG/ACT nasal spray 2 sprays by Nasal route daily 1 Bottle 5    TUDORZA PRESSAIR 400 MCG/ACT AEPB inhaler inhale 1 puff by mouth twice a day 1 each 0    butalbital-acetaminophen-caffeine (FIORICET, ESGIC) -40 MG per tablet Take 1 tablet by mouth every 4 hours as needed for Headaches      Biotin 5000 MCG CAPS Take 1,000 mcg by mouth       citalopram (CELEXA) 20 MG tablet Take 20 mg by mouth daily      LATUDA 80 MG TABS tablet Take 80 mg by mouth nightly      denosumab (PROLIA) 60 MG/ML SOSY SC injection Inject 1 mL into the skin once for 1 dose 1 mL 0     No current facility-administered medications for this visit.               Social History     Socioeconomic History    Marital status:      Spouse name: Not on file    Number of children: Not on file    Years of education: Not on file    Highest education level: Not on file   Occupational History    Not on file   Social Needs    Financial resource strain: Not on file    Food insecurity     Worry: Not on file     Inability: Not on file    Transportation needs     Medical: Not on file     Non-medical: Not on file   Tobacco Use    Smoking status: Former Smoker     Packs/day: 2.00     Years: 42.00     Pack years: 84.00 Types: Cigarettes     Quit date: 2018     Years since quittin.3    Smokeless tobacco: Never Used   Substance and Sexual Activity    Alcohol use: Yes     Comment: yearly    Drug use: No    Sexual activity: Not Currently     Partners: Male     Birth control/protection: Post-menopausal   Lifestyle    Physical activity     Days per week: Not on file     Minutes per session: Not on file    Stress: Not on file   Relationships    Social connections     Talks on phone: Not on file     Gets together: Not on file     Attends Jewish service: Not on file     Active member of club or organization: Not on file     Attends meetings of clubs or organizations: Not on file     Relationship status: Not on file    Intimate partner violence     Fear of current or ex partner: Not on file     Emotionally abused: Not on file     Physically abused: Not on file     Forced sexual activity: Not on file   Other Topics Concern    Not on file   Social History Narrative    Not on file     Counseling given: Yes        Family History   Problem Relation Age of Onset    Dementia Maternal Aunt     Kidney Disease Mother     Heart Attack Sister     Prostate Cancer Father     High Cholesterol Brother     Heart Attack Paternal Grandmother              -rest of complaints with corresponding details per ROS    The patient's past medical, surgical, social, and family history as well as her current medications and allergies were reviewed as documented intoday's encounter. Review of Systems   Constitutional: Positive for appetite change. Negative for activity change, diaphoresis, fatigue and unexpected weight change. HENT: Negative for congestion, hearing loss, nosebleeds, postnasal drip and sinus pain. Eyes: Negative for photophobia and visual disturbance. Respiratory: Positive for cough, shortness of breath and wheezing. Negative for apnea. Cardiovascular: Negative for chest pain, palpitations and leg swelling. Gastrointestinal: Positive for abdominal pain. Negative for abdominal distention, blood in stool, constipation, diarrhea, nausea, rectal pain and vomiting. Genitourinary: Positive for difficulty urinating, dysuria, flank pain, frequency and urgency. Negative for genital sores and hematuria. Musculoskeletal: Positive for arthralgias, back pain and gait problem. Negative for joint swelling, myalgias and neck stiffness. Skin: Negative for color change and rash. Neurological: Positive for numbness. Negative for dizziness, speech difficulty, weakness, light-headedness and headaches. Psychiatric/Behavioral: Negative for agitation, decreased concentration, hallucinations and sleep disturbance. The patient is nervous/anxious. Physical Exam  Vitals signs and nursing note reviewed. Constitutional:       Appearance: Normal appearance. Comments: On home oxygen   HENT:      Head: Normocephalic and atraumatic. Nose: Nose normal.      Mouth/Throat:      Mouth: Mucous membranes are moist.      Tongue: No lesions. Tongue does not deviate from midline. Cardiovascular:      Rate and Rhythm: Normal rate and regular rhythm. Heart sounds: Normal heart sounds, S1 normal and S2 normal.   Pulmonary:      Effort: Pulmonary effort is normal. No tachypnea, prolonged expiration or respiratory distress. Breath sounds: Decreased air movement present. Decreased breath sounds present. No wheezing, rhonchi or rales. Abdominal:      General: Abdomen is protuberant. Bowel sounds are normal. There is no distension. Palpations: Abdomen is soft. Tenderness: There is abdominal tenderness in the suprapubic area. There is left CVA tenderness. There is no right CVA tenderness or guarding. Hernia: No hernia is present. Musculoskeletal:      Lumbar back: She exhibits normal range of motion and no tenderness. Lymphadenopathy:      Cervical: No cervical adenopathy.    Skin:     General: Skin is warm.   Neurological:      Mental Status: She is alert and oriented to person, place, and time. Cranial Nerves: Cranial nerves are intact. No cranial nerve deficit. Motor: Weakness present. Gait: Gait abnormal.   Psychiatric:         Attention and Perception: Attention normal.         Mood and Affect: Mood is anxious. Speech: Speech normal. She is communicative. Behavior: Behavior normal.         Thought Content: Thought content normal.         Cognition and Memory: Cognition and memory normal.             ASSESSMENT AND PLAN      1. Flank pain  Urine test is normal.  Discussed to do at lab ultrasound kidneys to rule out any mass. Patient may need pelvic exam scheduled in 1 month. - POCT Urinalysis no Micro  - Urinalysis Reflex to Culture; Future  - Comprehensive Metabolic Panel; Future  - CBC Auto Differential; Future  - Microalbumin / Creatinine Urine Ratio; Future  - US RENAL COMPLETE; Future    2. Polyuria  After blood work will try oxybutynin. - POCT Urinalysis no Micro  - Urinalysis Reflex to Culture; Future  - Comprehensive Metabolic Panel; Future  - CBC Auto Differential; Future  - Microalbumin / Creatinine Urine Ratio; Future  - US RENAL COMPLETE; Future  - Osmolality; Future  - Osmolality, Urine; Future    3. Mixed simple and mucopurulent chronic bronchitis ([facility] [address]  At her baseline continue home oxygen continue to follow with pulmonologist    4. Age-related osteoporosis with current pathological fracture with routine healing, subsequent encounter  Discontinue Prolia due to insurance. - ibandronate (BONIVA) 150 MG tablet; Take 1 tablet by mouth every 30 days Take one (1) tablet once per month in the morning with a full glass of water, on an empty stomach, and do not take anything else by mouth or lie down for the next 30 minutes. Dispense: 30 tablet; Refill: 3    5.  Need for prophylactic vaccination against Streptococcus pneumoniae (pneumococcus)    - Pneumococcal conjugate vaccine 13-valent PCV13    6. Left elbow pain    - acetaminophen (ACETAMINOPHEN EXTRA STRENGTH) 500 MG tablet; take 2 tablets by mouth every 6 hours AS NEEDED FOR PAIN  Dispense: 120 tablet; Refill: 0      Orders Placed This Encounter   Procedures    US RENAL COMPLETE     This procedure can be scheduled via [employer]. Access your [facility] account by visiting [email or web address]. Standing Status:   Future     Standing Expiration Date:   3/11/2022     Order Specific Question:   Reason for exam:     Answer:   only during    Pneumococcal conjugate vaccine 13-valent PCV13    Urinalysis Reflex to Culture     Standing Status:   Future     Number of Occurrences:   1     Standing Expiration Date:   3/11/2022     Order Specific Question:   SPECIFY(EX-CATH,MIDSTREAM,CYSTO,ETC)?      Answer:   midstream    Comprehensive Metabolic Panel     Fasting 8 hrs     Standing Status:   Future     Number of Occurrences:   1     Standing Expiration Date:   3/11/2022    CBC Auto Differential     Standing Status:   Future     Number of Occurrences:   1     Standing Expiration Date:   3/12/2022    Microalbumin / Creatinine Urine Ratio     Standing Status:   Future     Number of Occurrences:   1     Standing Expiration Date:   3/11/2022    Osmolality     Standing Status:   Future     Number of Occurrences:   1     Standing Expiration Date:   3/11/2022    Osmolality, Urine     Standing Status:   Future     Number of Occurrences:   1     Standing Expiration Date:   3/11/2022    POCT Urinalysis no Micro         Medications Discontinued During This Encounter   Medication Reason    acyclovir (ZOVIRAX) 400 MG tablet Therapy completed    acyclovir (ZOVIRAX) 5 % ointment Therapy completed    denosumab (PROLIA) 60 MG/ML SOSY SC injection     acetaminophen (ACETAMINOPHEN EXTRA STRENGTH) 500 MG tablet REORDER       Elaina received counseling on the following healthy behaviors: nutrition, exercise and medication adherence  Reviewed prior labs and health maintenance  Continue current medications, diet and exercise. Discussed use, benefit, and side effects of prescribed medications. Barriers to medication compliance addressed. Patient given educational materials - see patient instructions  Was a self-tracking handout given in paper form or via [employer]? Yes    Requested Prescriptions     Signed Prescriptions Disp Refills    ibandronate (BONIVA) 150 MG tablet 30 tablet 3     Sig: Take 1 tablet by mouth every 30 days Take one (1) tablet once per month in the morning with a full glass of water, on an empty stomach, and do not take anything else by mouth or lie down for the next 30 minutes.  acetaminophen (ACETAMINOPHEN EXTRA STRENGTH) 500 MG tablet 120 tablet 0     Sig: take 2 tablets by mouth every 6 hours AS NEEDED FOR PAIN       All patient questions answered. Patient voiced understanding. Quality Measures    Body mass index is 27 kg/m². Elevated. Weight control planned discussed daily exercise regimen and Healthy diet and regular exercise. BP: 100/70 Blood pressure is normal. Treatment plan consists of No treatment change needed. Lab Results   Component Value Date    LDLCHOLESTEROL 72 09/09/2020    (goal LDL reduction with dx if diabetes is 50% LDL reduction)      PHQ Scores 1/27/2020 10/8/2019 5/15/2019 2/7/2018 6/30/2016   PHQ2 Score 0 6 0 4 0   PHQ9 Score 0 13 0 14 0     Interpretation of Total Score Depression Severity: 1-4 = Minimal depression, 5-9 = Mild depression, 10-14 = Moderate depression, 15-19 = Moderately severe depression, 20-27 = Severe depression    The patient'spast medical, surgical, social, and family history as well as her   current medications and allergies were reviewed as documented in today's encounter. Medications, labs, diagnostic studies, consultations andfollow-up as documented in this encounter. Return in about 4 weeks (around 4/8/2021) for pa check on cervic , pap 30min, lab work.     Patient wasseen with total face to face time of 30 minutes. More than 50% of this visit was counseling and education. Future Appointments   Date Time Provider Percy Rodriguez   4/13/2021 11:30 AM Annika Morfin MD fp sc [facility]   5/6/2021  7:15 AM Annika Morfin MD fp sc [facility]     This note was completed by using the assistance of a speech-recognition program. However, inadvertent computerized transcription errors may be present. Althoughevery effort was made to ensure accuracy, no guarantees can be provided that every mistake has been identified and corrected by editing.   Electronically signed by Annika Morfin MD on 3/11/2021  2:45 PM

## 2021-03-11 NOTE — PATIENT INSTRUCTIONS
Schedule a Vaccine  If you qualify for the vaccine as a Phase 1B recipient, call the [facility]) COVID-19 Vaccination Hotline to schedule your appointment or to get additional information about the [facility] locations which are offering the COVID-19 vaccine. To be 94% effective, it's important that you receive two doses of one of the COVID-19 vaccines. -If you are receiving the Barahona Peter vaccine, your second shot will be scheduled as close to 21 days after the first shot as possible. -If you are receiving the Moderna vaccine, your second shot will be scheduled as close to 28 days after the first shot as possible. Call [phone]      Links to [facility]) website and [facility] website -     [email or web address]    [email or web address]

## 2021-03-11 NOTE — PROGRESS NOTES
Visit Information    Have you changed or started any medications since your last visit including any over-the-counter medicines, vitamins, or herbal medicines? no   Are you having any side effects from any of your medications? -  no  Have you stopped taking any of your medications? Is so, why? -  no    Have you seen any other physician or provider since your last visit? No  Have you had any other diagnostic tests since your last visit? No  Have you been seen in the emergency room and/or had an admission to a hospital since we last saw you? No  Have you had your routine dental cleaning in the past 6 months? no    Have you activated your [employer] account? If not, what are your barriers?  Yes     Patient Care Team:  Mallory Benoit MD as PCP - General (Family Medicine)  Mallory Benoit MD as PCP - [facility] Provider  Tiffany Kelly DO as Consulting Physician (Obstetrics & Gynecology)  Krista Oliveira MD as Consulting Physician (Pulmonology)  Gayle Rice MD as Consulting Physician (Gastroenterology)  Quentin Santamaria RN as Nurse Navigator    Medical History Review  Past Medical, Family, and Social History reviewed and does contribute to the patient presenting condition    Health Maintenance   Topic Date Due    COVID-19 Vaccine (1 of 2) Never done    Pneumococcal 0-64 years Vaccine (2 of 3 - PCV13) 04/06/2017    Cervical cancer screen  01/06/2019    Lipid screen  09/09/2021    Low dose CT lung screening  09/09/2021    Breast cancer screen  12/21/2022    Colon cancer screen colonoscopy  02/15/2023    DTaP/Tdap/Td vaccine (2 - Td) 09/09/2026    Flu vaccine  Completed    Shingles Vaccine  Completed    Hepatitis C screen  Completed    HIV screen  Completed    Hepatitis A vaccine  Aged Out    Hepatitis B vaccine  Aged Out    Hib vaccine  Aged Out    Meningococcal (ACWY) vaccine  Aged Out

## 2021-03-12 ENCOUNTER — TELEPHONE (OUTPATIENT)
Dept: FAMILY MEDICINE CLINIC | Age: 66
End: 2021-03-12

## 2021-03-12 NOTE — TELEPHONE ENCOUNTER
Patient states she is wanting to know about her lab results from yesterday. I advised patient that they are in the results but that I would have her talk with an MA regarding them. Patient states she is not having any pain when she urinates. Patient states she gets her medications delivered so she did not pick them up. Patient states a friend of hers told her it may be a kidney stone. Please advise.

## 2021-03-12 NOTE — TELEPHONE ENCOUNTER
Her lab test results are normal, I have ordered US kidney, she can schedule that. Kidney stone does not usually cause pain, unless they cause any blockage ,and her urine test did not show any blood. She need pelvic exam in 2 wks.

## 2021-03-15 LAB — PATHOLOGIST REVIEW: NORMAL

## 2021-03-17 ENCOUNTER — HOSPITAL ENCOUNTER (OUTPATIENT)
Dept: ULTRASOUND IMAGING | Age: 66
Discharge: HOME | End: 2021-03-19
Payer: COMMERCIAL

## 2021-03-17 DIAGNOSIS — R35.89 POLYURIA: ICD-10-CM

## 2021-03-17 DIAGNOSIS — R10.9 FLANK PAIN: ICD-10-CM

## 2021-03-17 PROCEDURE — 76770 US EXAM ABDO BACK WALL COMP: CPT

## 2021-03-18 DIAGNOSIS — J41.8 MIXED SIMPLE AND MUCOPURULENT CHRONIC BRONCHITIS (HCC): ICD-10-CM

## 2021-03-18 NOTE — TELEPHONE ENCOUNTER
Patient states she just called in for a med refill on the Robitussin but states she does not want it anymore because it is not working. She states she would like to be prescribe something else since her cough is more dry than before. Please advise.

## 2021-03-28 RX ORDER — EPINEPHRINE 0.3 MG/.3ML
INJECTION SUBCUTANEOUS
Qty: 2 EACH | Refills: 2 | Status: SHIPPED | OUTPATIENT
Start: 2021-03-28 | End: 2021-08-05

## 2021-03-29 DIAGNOSIS — E78.5 HYPERLIPIDEMIA WITH TARGET LDL LESS THAN 100: ICD-10-CM

## 2021-03-29 RX ORDER — SIMVASTATIN 40 MG
TABLET ORAL
Qty: 30 TABLET | Refills: 3 | Status: SHIPPED | OUTPATIENT
Start: 2021-03-29 | End: 2021-07-20

## 2021-04-01 DIAGNOSIS — E55.9 VITAMIN D DEFICIENCY: ICD-10-CM

## 2021-04-01 RX ORDER — ERGOCALCIFEROL 1.25 MG/1
CAPSULE ORAL
Qty: 4 CAPSULE | Refills: 3 | Status: SHIPPED | OUTPATIENT
Start: 2021-04-01 | End: 2021-07-19

## 2021-04-13 ENCOUNTER — OFFICE VISIT (OUTPATIENT)
Dept: FAMILY MEDICINE CLINIC | Age: 66
End: 2021-04-13
Payer: COMMERCIAL

## 2021-04-13 ENCOUNTER — HOSPITAL ENCOUNTER (OUTPATIENT)
Age: 66
Setting detail: SPECIMEN
Discharge: HOME | End: 2021-04-13
Payer: COMMERCIAL

## 2021-04-13 VITALS
BODY MASS INDEX: 26.35 KG/M2 | HEART RATE: 92 BPM | DIASTOLIC BLOOD PRESSURE: 72 MMHG | TEMPERATURE: 97.9 F | SYSTOLIC BLOOD PRESSURE: 110 MMHG | HEIGHT: 62 IN | OXYGEN SATURATION: 97 % | WEIGHT: 143.2 LBS

## 2021-04-13 DIAGNOSIS — R10.9 FLANK PAIN: ICD-10-CM

## 2021-04-13 DIAGNOSIS — Z12.4 PAP SMEAR FOR CERVICAL CANCER SCREENING: Primary | ICD-10-CM

## 2021-04-13 PROCEDURE — 99396 PREV VISIT EST AGE 40-64: CPT | Performed by: FAMILY MEDICINE

## 2021-04-13 RX ORDER — FLUTICASONE FUROATE AND VILANTEROL TRIFENATATE 100; 25 UG/1; UG/1
1 POWDER RESPIRATORY (INHALATION) DAILY
Status: ON HOLD | COMMUNITY
Start: 2021-03-06 | End: 2021-06-19 | Stop reason: HOSPADM

## 2021-04-13 RX ORDER — TIOTROPIUM BROMIDE INHALATION SPRAY 3.12 UG/1
SPRAY, METERED RESPIRATORY (INHALATION)
Status: ON HOLD | COMMUNITY
Start: 2021-03-18 | End: 2021-06-19 | Stop reason: HOSPADM

## 2021-04-13 ASSESSMENT — ENCOUNTER SYMPTOMS
BACK PAIN: 1
PHOTOPHOBIA: 0
COUGH: 0
SHORTNESS OF BREATH: 0
APNEA: 0
SINUS PAIN: 0
ABDOMINAL DISTENTION: 0
SINUS PRESSURE: 0

## 2021-04-13 ASSESSMENT — PATIENT HEALTH QUESTIONNAIRE - PHQ9
2. FEELING DOWN, DEPRESSED OR HOPELESS: 0
SUM OF ALL RESPONSES TO PHQ QUESTIONS 1-9: 0
SUM OF ALL RESPONSES TO PHQ QUESTIONS 1-9: 0

## 2021-04-13 NOTE — PROGRESS NOTES
2021      Christiana Tovar (:  1955) is a 61 y.o. female, here for a preventive medicine evaluation, Gynecologic Exam   .      ASSESSMENT/PLAN:    1. Pap smear for cervical cancer screening  -     PAP Smear; Future  2. Flank pain  Pain has resolved . Ultrasound normal    Low carb, low fat diet, increase fruits and vegetables, and exercise 4-5 times a week 30-40 minutes a day, or walk 1-2 hours per day, or wear a pedometer and get at least 10,000 steps per day. Dental exam 2-3 times /year advised. Immunizations reviewed. Health Maintenance reviewed   Smoking cessation counseling given     Annual eye exam advised. Stanislaw Ahuja received counseling on the following healthy behaviors: nutrition, exercise and medication adherence  Reviewed prior labs and health maintenance  Discussed use, benefit, and side effects of prescribed medications. Barriers to medication compliance addressed. Patient given educational materials - see patient instructions  All patient questions answered. Patient voiced understanding. The patient's past medical, surgical, social, and family history as well as her  current medications and allergies were reviewed as documented in today's encounter. Medications, labs, diagnostic studies, consultations and follow-up as documented in thisencounter. Return in about 3 months (around 2021).     Data Unavailable        Patient Active Problem List   Diagnosis    Chronic obstructive pulmonary disease (HCC)    Vitamin D deficiency    Anxiety    Asthma    [facility] disorder ([facility] [address]    Depression    Hyperlipidemia    Sleep apnea    Vision abnormalities    Status post hysteroscopy    Chronic headaches    IBS (irritable bowel syndrome)    Colon polyp    Collagenous colitis    Lung nodule    Left elbow pain    Dilated bile duct    Acute pain of left shoulder    Adhesive capsulitis of left shoulder    Leucopenia    Compression fracture of body of thoracic vertebra ([address]    Age-related osteoporosis with current pathological fracture    Flank pain    Polyuria       Prior to Visit Medications    Medication Sig Taking? Authorizing Provider   BREO ELLIPTA 100-25 MCG/INH AEPB inhaler inhale 1 puff by mouth and INTO THE LUNGS once daily Yes Historical Provider, MD   SPIRIVA RESPIMAT 2.5 MCG/ACT AERS inhaler inhale 2 puffs by mouth once daily Yes Historical Provider, MD   vitamin D (ERGOCALCIFEROL) 1.25 MG (15428 UT) CAPS capsule take 1 capsule by mouth every week Yes Rosa Batista MD   simvastatin (ZOCOR) 40 MG tablet take 1 tablet by mouth at bedtime Yes IVONNE Nunez CNP   EPINEPHrine (EPIPEN) 0.3 MG/0.3ML SOAJ injection use as directed BY PRESCRIBER FOR ALLERGIC REACTION Yes IVONNE Nunez CNP   guaiFENesin (ROBITUSSIN) 100 MG/5ML liquid take 10 milliliters every 4 hours if needed for cough Yes Rosa Batista MD   ibandronate (BONIVA) 150 MG tablet Take 1 tablet by mouth every 30 days Take one (1) tablet once per month in the morning with a full glass of water, on an empty stomach, and do not take anything else by mouth or lie down for the next 30 minutes.  Yes Rosa Batista MD   acetaminophen (ACETAMINOPHEN EXTRA STRENGTH) 500 MG tablet take 2 tablets by mouth every 6 hours AS NEEDED FOR PAIN Yes Rosa Batista MD   AYR 0.65 % nasal spray instill 2 sprays into each nostril four times a day Yes Historical Provider, MD   lidocaine (LMX) 4 % cream apply topically every 8 hours AS NEEDED FOR PAIN Yes Rosa Batista MD   Elastic Bandages & Supports ([facility]) [address] 1 each by Does not apply route daily Keep pressure between 20 -30mm Yes Rosa Batista MD   RA VITAMIN B-12 TR 1000 MCG TBCR take 1 tablet by mouth once daily Yes Rosa Batista MD   topiramate (TOPAMAX) 50 MG tablet  Yes Historical Provider, MD   montelukast (SINGULAIR) 10 MG tablet  Yes Historical Provider, MD   fluticasone-umeclidin-vilant (Dorise Jeanette) 168-62.4-90 MCG/INH AEPB Inhale 1 puff into the lungs daily Yes Historical Provider, MD   nicotine polacrilex (NICORETTE) 2 MG gum Take 2 mg by mouth as needed for Smoking cessation Yes Historical Provider, MD   mirtazapine (REMERON) 15 MG tablet take 1 tablet by mouth at bedtime for sleep Yes Historical Provider, MD   Elastic Bandages & Supports (ACE ELBOW BRACE LARGE/X-LARGE) MISC Use daily for elbow pain Yes Khris Bridges MD   ALAWAY 0.025 % ophthalmic solution INSTILL 1 DROP TWO TIMES A DAY INTO LEFT EYE ONLY Yes Khris Bridges MD   albuterol (PROVENTIL) (2.5 MG/3ML) 0.083% nebulizer solution Take 3 mLs by nebulization 4 times daily Yes Unique Ferro MD   albuterol sulfate  (90 Base) MCG/ACT inhaler Inhale 2 puffs into the lungs every 4 hours as needed for Wheezing Yes Unique Ferro MD   fluticasone (FLONASE) 50 MCG/ACT nasal spray 2 sprays by Nasal route daily Yes Unique Ferro MD   TUDORZA PRESSAIR 400 MCG/ACT AEPB inhaler inhale 1 puff by mouth twice a day Yes IVONNE Maddox - CNP   butalbital-acetaminophen-caffeine (FIORICET, ESGIC) -40 MG per tablet Take 1 tablet by mouth every 4 hours as needed for Headaches Yes Historical Provider, MD   Biotin 5000 MCG CAPS Take 1,000 mcg by mouth  Yes Historical Provider, MD   citalopram (CELEXA) 20 MG tablet Take 20 mg by mouth daily Yes Historical Provider, MD   LATUDA 80 MG TABS tablet Take 80 mg by mouth nightly Yes Historical Provider, MD   denosumab (PROLIA) 60 MG/ML SOSY SC injection Inject 1 mL into the skin once for 1 dose  Khris Bridges MD        Allergies   Allergen Reactions    Advil [Ibuprofen] Other (See Comments)     vomiting    Aleve [Naproxen] Other (See Comments)     vomiting    Antipyrine Other (See Comments)     unknown    Celecoxib Other (See Comments)    Fomepizole     Other      GRASS, TREES, WEEDS    Rofecoxib Other (See Comments)     Unknown reaction    Salicylates      Unknown reaction     Strawberry Extract years Vaccine (3 of 3 - PPSV23) 05/06/2021    Lipid screen  09/09/2021    Low dose CT lung screening  09/09/2021    Breast cancer screen  12/21/2022    Colon cancer screen colonoscopy  02/15/2023    DTaP/Tdap/Td vaccine (2 - Td) 09/09/2026    Flu vaccine  Completed    Shingles Vaccine  Completed    Hepatitis C screen  Completed    HIV screen  Completed    Hepatitis A vaccine  Aged Out    Hepatitis B vaccine  Aged Out    Hib vaccine  Aged Out    Meningococcal (ACWY) vaccine  Aged Out       -rest of complaints with corresponding details per ROS    Review of Systems   Constitutional: Negative for activity change, appetite change, diaphoresis and unexpected weight change. HENT: Negative for congestion, sinus pressure and sinus pain. Eyes: Negative for photophobia and visual disturbance. Respiratory: Negative for apnea, cough and shortness of breath. Cardiovascular: Negative for chest pain, palpitations and leg swelling. Gastrointestinal: Negative for abdominal distention. Genitourinary: Negative for difficulty urinating, dysuria, flank pain, frequency, pelvic pain, urgency, vaginal discharge and vaginal pain. Musculoskeletal: Positive for arthralgias and back pain. Neurological: Negative for dizziness, weakness and light-headedness. Psychiatric/Behavioral: Negative for agitation, decreased concentration and dysphoric mood. -vital signs stable and within normal limits except     /72   Pulse 92   Temp 97.9 °F (36.6 °C)   Ht 5' 2\" (1.575 m)   Wt 143 lb 3.2 oz (65 kg)   LMP 05/20/2013 (Exact Date)   SpO2 97%   BMI 26.19 kg/m²   Vitals:    04/13/21 1135   BP: 110/72   Pulse: 92   Temp: 97.9 °F (36.6 °C)   SpO2: 97%   Weight: 143 lb 3.2 oz (65 kg)   Height: 5' 2\" (1.575 m)     Estimated body mass index is 26.19 kg/m² as calculated from the following:    Height as of this encounter: 5' 2\" (1.575 m). Weight as of this encounter: 143 lb 3.2 oz (65 kg).       Physical Exam  Vitals signs and nursing note reviewed. Exam conducted with a chaperone present. Constitutional:       Appearance: Normal appearance. HENT:      Head: Normocephalic and atraumatic. Nose: Nose normal.      Mouth/Throat:      Mouth: Mucous membranes are moist.   Neck:      Musculoskeletal: Normal range of motion. Cardiovascular:      Rate and Rhythm: Normal rate. Heart sounds: Normal heart sounds. Pulmonary:      Effort: Pulmonary effort is normal.      Breath sounds: Normal breath sounds. No wheezing. Abdominal:      Hernia: There is no hernia in the right inguinal area. Genitourinary:     General: Normal vulva. Exam position: Lithotomy position. Labia:         Right: No rash. Left: No rash. Comments: Cervix is normal in shape. No cervical motion tenderness. No vaginal discharge seen. Bilateral fornix is normal  Neurological:      Mental Status: She is alert. No flowsheet data found. I personally reviewed testing with patient.       Otherwise labs within normal limits      Lab Results   Component Value Date    WBC 4.9 03/11/2021    HGB 12.6 03/11/2021    HCT 40.1 03/11/2021    .1 (H) 03/11/2021     03/11/2021       Lab Results   Component Value Date     03/11/2021    K 4.1 03/11/2021     03/11/2021    CO2 30 03/11/2021    BUN 16 03/11/2021    CREATININE 0.89 03/11/2021    GLUCOSE 98 03/11/2021    CALCIUM 8.9 03/11/2021        Lab Results   Component Value Date    ALT 9 03/11/2021    AST 14 03/11/2021    ALKPHOS 110 (H) 03/11/2021    BILITOT <0.15 (L) 03/11/2021       Lab Results   Component Value Date    TSH 1.94 09/09/2020       Lab Results   Component Value Date    LDLCHOLESTEROL 72 09/09/2020       Lab Results   Component Value Date    LABA1C 5.1 05/09/2018       Lab Results   Component Value Date    ZILLNQXV27 1053 03/08/2019       Lab Results   Component Value Date    FOLATE 8.3 03/08/2019       Lab Results   Component Value Date    IRON 56 2019    TIBC 288 2019    FERRITIN 160 (H) 2019       Lab Results   Component Value Date    VITD25 14.4 (L) 2016         No orders of the defined types were placed in this encounter. There are no discontinued medications. Orders Placed This Encounter   Procedures    PAP Smear     Patient History:    Patient's last menstrual period was 2013 (exact date). OBGYN Status: Postmenopausal  Past Surgical History:  No date: ANKLE SURGERY      Comment:  HAD SCREWS AND HARDWARE, THEN REMOVED  02/15/2018: COLONOSCOPY      Comment:  tubular adenoma  No date: EYE SURGERY; Bilateral      Comment:  CATARACTS  10/19/2016: HYSTEROSCOPY      Comment:  D & C  No date: INDUCED   No date: LASIK      Comment:  bilateral  No date: TONSILLECTOMY AND ADENOIDECTOMY; Bilateral  No date: VULVA SURGERY      Comment:  HAD A BIOPSY AND REMOVAL OF      Social History    Tobacco Use      Smoking status: Former Smoker        Packs/day: 2.00        Years: 42.00        Pack years: 80        Types: Cigarettes        Quit date: 2018        Years since quittin.4      Smokeless tobacco: Never Used       Standing Status:   Future     Standing Expiration Date:   2022     Order Specific Question:   Collection Type     Answer: Thin Prep     Order Specific Question:   Prior Abnormal Pap Test     Answer:   No     Order Specific Question:   Screening or Diagnostic     Answer:   Screening     Order Specific Question:   HPV Requested? Answer:   Yes -  If ASCUS Reflex HPV     Order Specific Question:   High Risk Patient     Answer:   N/A         Future Appointments   Date Time Provider Percy Rodriguez   2021 11:45 AM MD afshin Shaffer [facility]       This note was completed by using the assistance of a speech-recognition program. However, inadvertent computerized transcription errors may be present.  Although every effort was made to ensure accuracy, no guarantees can be provided that every mistake has been identified and corrected by editing. An electronic signature was used to authenticate this note.     Electronically signed by Khris Bridges MD on 4/13/2021 at 12:55 PM

## 2021-04-15 LAB — CYTOLOGY REPORT: NORMAL

## 2021-05-02 DIAGNOSIS — M25.522 LEFT ELBOW PAIN: ICD-10-CM

## 2021-05-03 RX ORDER — ACETAMINOPHEN 500 MG
TABLET ORAL
Qty: 120 TABLET | Refills: 0 | Status: SHIPPED | OUTPATIENT
Start: 2021-05-03 | End: 2021-07-30

## 2021-05-09 ENCOUNTER — HOSPITAL ENCOUNTER (EMERGENCY)
Age: 66
Discharge: HOME | End: 2021-05-09
Attending: EMERGENCY MEDICINE
Payer: COMMERCIAL

## 2021-05-09 ENCOUNTER — APPOINTMENT (OUTPATIENT)
Dept: GENERAL RADIOLOGY | Age: 66
End: 2021-05-09
Payer: COMMERCIAL

## 2021-05-09 VITALS
OXYGEN SATURATION: 100 % | WEIGHT: 147 LBS | RESPIRATION RATE: 16 BRPM | DIASTOLIC BLOOD PRESSURE: 73 MMHG | HEART RATE: 80 BPM | TEMPERATURE: 98.6 F | BODY MASS INDEX: 27.05 KG/M2 | SYSTOLIC BLOOD PRESSURE: 105 MMHG | HEIGHT: 62 IN

## 2021-05-09 DIAGNOSIS — J44.1 COPD EXACERBATION (HCC): Primary | ICD-10-CM

## 2021-05-09 PROCEDURE — 71046 X-RAY EXAM CHEST 2 VIEWS: CPT

## 2021-05-09 PROCEDURE — 99284 EMERGENCY DEPT VISIT MOD MDM: CPT

## 2021-05-09 PROCEDURE — 6370000000 HC RX 637 (ALT 250 FOR IP): Performed by: EMERGENCY MEDICINE

## 2021-05-09 PROCEDURE — 94640 AIRWAY INHALATION TREATMENT: CPT

## 2021-05-09 PROCEDURE — 2700000000 HC OXYGEN THERAPY PER DAY

## 2021-05-09 RX ORDER — PREDNISONE 20 MG/1
50 TABLET ORAL ONCE
Status: COMPLETED | OUTPATIENT
Start: 2021-05-09 | End: 2021-05-09

## 2021-05-09 RX ORDER — PREDNISONE 10 MG/1
TABLET ORAL
Qty: 16 TABLET | Refills: 0 | Status: SHIPPED | OUTPATIENT
Start: 2021-05-10 | End: 2021-05-13

## 2021-05-09 RX ORDER — IPRATROPIUM BROMIDE AND ALBUTEROL SULFATE 2.5; .5 MG/3ML; MG/3ML
1 SOLUTION RESPIRATORY (INHALATION)
Status: DISCONTINUED | OUTPATIENT
Start: 2021-05-09 | End: 2021-05-10 | Stop reason: HOSPADM

## 2021-05-09 RX ADMIN — PREDNISONE 50 MG: 20 TABLET ORAL at 22:49

## 2021-05-09 RX ADMIN — IPRATROPIUM BROMIDE AND ALBUTEROL SULFATE 1 AMPULE: .5; 2.5 SOLUTION RESPIRATORY (INHALATION) at 19:18

## 2021-05-09 ASSESSMENT — ENCOUNTER SYMPTOMS
COUGH: 1
BACK PAIN: 0
WHEEZING: 1
SHORTNESS OF BREATH: 1
NAUSEA: 0
ABDOMINAL PAIN: 0
VOMITING: 0

## 2021-05-09 NOTE — ED PROVIDER NOTES
[address] [facility]  EMERGENCY DEPARTMENT ENCOUNTER      Pt Name: Wayne Claire  MRN: [id number]  [place] 1955  Date of evaluation: 21      CHIEF COMPLAINT       Chief Complaint   Patient presents with    Wheezing     HISTORY OF PRESENT ILLNESS   HPI 61 y.o. female presents with c/o sob. Has had sob and wheezing. Symptoms started about 10 days ago. Saw her pulmonologist and was suppose to start steroids, but she decided not to because she was having her covid vaccine. Symptoms have been present over the last 10 days. Symptoms are rated as moderate in severity, constant in duration, and worsened by weather changes. Patient tried her home breathing treatments with some relief of symtpoms  Symptoms are associated with cough. The patient denies associated chest pain, leg swelling or orthopnea. Pt reports have a history of similar symptoms, and states that symptoms feel typical of her copd flares. Pt is on 2-3L of O2 at home. REVIEW OF SYSTEMS       Review of Systems   Constitutional: Negative for chills and fever. HENT: Negative for congestion. Eyes: Negative for visual disturbance. Respiratory: Positive for cough, shortness of breath and wheezing. Cardiovascular: Negative for chest pain and leg swelling. Gastrointestinal: Negative for abdominal pain, nausea and vomiting. Genitourinary: Negative for difficulty urinating. Musculoskeletal: Negative for back pain. Neurological: Negative for headaches. Psychiatric/Behavioral: Negative for confusion.        PAST MEDICAL HISTORY     Past Medical History:   Diagnosis Date    Abnormal EKG     Anxiety     Asthma     Bipolar disorder ([facility] [address]     [place] , [place]    COPD (chronic obstructive pulmonary disease) (HCC)     Cramps, extremity     SEVERE LEG CRAMPS    Depression     Dilated bile duct     Headache     History of elective      Hyperlipidemia     Irritable bowel syndrome     Prolonged emergence from general anesthesia     SEVERE ISSUES WAKING UP    Substance abuse ([facility] [address]     street drugs when younger    Unspecified sleep apnea     Vision abnormalities     glasses       SURGICAL HISTORY       Past Surgical History:   Procedure Laterality Date    ANKLE SURGERY      HAD SCREWS AND HARDWARE, THEN REMOVED    COLONOSCOPY  02/15/2018    tubular adenoma    EYE SURGERY Bilateral     CATARACTS    HYSTEROSCOPY  10/19/2016    D & C    INDUCED       LASIK      bilateral    TONSILLECTOMY AND ADENOIDECTOMY Bilateral     VULVA SURGERY      HAD A BIOPSY AND REMOVAL OF       CURRENT MEDICATIONS       Discharge Medication List as of 2021 10:35 PM      CONTINUE these medications which have NOT CHANGED    Details   acetaminophen (ACETAMINOPHEN EXTRA STRENGTH) 500 MG tablet take 2 tablets by mouth every 6 hours AS NEEDED FOR PAIN, Disp-120 tablet, R-0Normal      BREO ELLIPTA 100-25 MCG/INH AEPB inhaler inhale 1 puff by mouth and INTO THE LUNGS once daily, DAWHistorical Med      SPIRIVA RESPIMAT 2.5 MCG/ACT AERS inhaler inhale 2 puffs by mouth once daily, DAWHistorical Med      vitamin D (ERGOCALCIFEROL) 1.25 MG (60969 UT) CAPS capsule take 1 capsule by mouth every week, Disp-4 capsule, R-3Normal      simvastatin (ZOCOR) 40 MG tablet take 1 tablet by mouth at bedtime, Disp-30 tablet, R-3Normal      EPINEPHrine (EPIPEN) 0.3 MG/0.3ML SOAJ injection use as directed BY PRESCRIBER FOR ALLERGIC REACTION, Disp-2 each, R-2Normal      guaiFENesin (ROBITUSSIN) 100 MG/5ML liquid take 10 milliliters every 4 hours if needed for cough, Disp-473 mL, R-1Normal      ibandronate (BONIVA) 150 MG tablet Take 1 tablet by mouth every 30 days Take one (1) tablet once per month in the morning with a full glass of water, on an empty stomach, and do not take anything else by mouth or lie down for the next 30 minutes. , Disp-30 tablet, R-3Normal      AYR 0.65 % nasal spray instill 2 sprays into each nostril four times a day, DAWHistorical Med 80 mg by mouth nightly, BELA       !! - Potential duplicate medications found. Please discuss with provider. ALLERGIES     is allergic to advil [ibuprofen]; aleve [naproxen]; antipyrine; celecoxib; fomepizole; other; rofecoxib; salicylates; strawberry extract; sulfinpyrazone; aspirin; and nsaids. FAMILY HISTORY     She indicated that her mother is . She indicated that her father is . She indicated that all of her three sisters are alive. She indicated that her brother is alive. She indicated that her maternal grandmother is . She indicated that her maternal grandfather is . She indicated that her paternal grandmother is . She indicated that her paternal grandfather is . She indicated that her maternal aunt is . SOCIAL HISTORY      reports that she quit smoking about 2 years ago. Her smoking use included cigarettes. She has a 84.00 pack-year smoking history. She has never used smokeless tobacco. She reports current alcohol use. She reports that she does not use drugs. PHYSICAL EXAM     INITIAL VITALS: /73   Pulse 80   Temp 98.6 °F (37 °C) (Oral)   Resp 16   Ht 5' 2\" (1.575 m)   Wt 147 lb (66.7 kg)   LMP 2013 (Exact Date)   SpO2 100%   BMI 26.89 kg/m²   Gen: nad  Head: Normocephalic, atraumatic  Eye: Pupils equal round reactive to light, no conjunctivitis  ENT: MMM  Neck: no JVD  Heart: Regular rate and rhythm no murmurs  Lungs: Wheezing in the upper lobes bilaterally and the r. Middle lobe, no respiratory distress, speaking full sentences, no rhonchi or rales.    Chest wall: No crepitus, no tenderness palpation  Abdomen: Soft, nontender, nondistended, with no peritoneal signs  Neurologic: Patient is alert and oriented x3, motor and sensation is intact in all 4 extremities, fluent speech  Extremities: no edema, no calf tenderness to palpation    MEDICAL DECISION MAKING:     MDM  61 y.o. female presenting with a copd [address]  [phone]    If symptoms worsen      DISCHARGE MEDICATIONS:  Discharge Medication List as of 5/9/2021 10:35 PM      START taking these medications    Details   predniSONE (DELTASONE) 10 MG tablet Take 4 tablets by mouth once daily for 4 days, Disp-16 tablet, R-0Print               Mallika Crow MD  Attending Emergency Physician                      Mallika Crow MD  05/10/21 0010

## 2021-05-09 NOTE — ED TRIAGE NOTES
Mode of arrival (squad #, walk in, police, etc) : walk in        Chief complaint(s): Wheezing         Arrival Note (brief scenario, treatment PTA, etc). : Pt states she was at Dr Alexi Drake' office 4/30 and he wanted to put her on a steroid for wheezing at that time. Pt states she was due for and received her second COVID vaccine on 5/5 so she did not start the steroids. Pt states she woke up this morning and felt that her wheezing had returned. Pt uses home inhaler and nebulizers. C= \"Have you ever felt that you should Cut down on your drinking? \"  No  A= \"Have people Annoyed you by criticizing your drinking? \"  No  G= \"Have you ever felt bad or Guilty about your drinking? \"  No  E= \"Have you ever had a drink as an Eye-opener first thing in the morning to steady your nerves or to help a hangover? \"  No      Deferred []      Reason for deferring: N/A    *If yes to two or more: probable alcohol abuse. *

## 2021-05-10 ENCOUNTER — CARE COORDINATION (OUTPATIENT)
Dept: CARE COORDINATION | Age: 66
End: 2021-05-10

## 2021-05-11 ENCOUNTER — TELEPHONE (OUTPATIENT)
Dept: FAMILY MEDICINE CLINIC | Age: 66
End: 2021-05-11

## 2021-05-11 NOTE — TELEPHONE ENCOUNTER
[facility] received a call from:Elaina    Name of Caller: same    Relationship to patient:self    Organization name: na    Best contact number: same    Reason for call: Patient was at the Er for wheezing. Gave her some meds but needs a f/u appt . Can come in Thurs or Friday. Was suppose to come yesterday per ER . They wanted to keep her overnight but pt refused. Call hr back to get an appt soon. May run out of meds before end of week.

## 2021-05-12 ENCOUNTER — CARE COORDINATION (OUTPATIENT)
Dept: CARE COORDINATION | Age: 66
End: 2021-05-12

## 2021-05-13 ENCOUNTER — CARE COORDINATION (OUTPATIENT)
Dept: CARE COORDINATION | Age: 66
End: 2021-05-13

## 2021-05-18 ASSESSMENT — PATIENT HEALTH QUESTIONNAIRE - PHQ9
SUM OF ALL RESPONSES TO PHQ QUESTIONS 1-9: 0
1. LITTLE INTEREST OR PLEASURE IN DOING THINGS: 0

## 2021-05-19 ENCOUNTER — CARE COORDINATION (OUTPATIENT)
Dept: CARE COORDINATION | Age: 66
End: 2021-05-19

## 2021-05-19 ENCOUNTER — TELEMEDICINE (OUTPATIENT)
Dept: FAMILY MEDICINE CLINIC | Age: 66
End: 2021-05-19
Payer: COMMERCIAL

## 2021-05-19 DIAGNOSIS — J41.8 MIXED SIMPLE AND MUCOPURULENT CHRONIC BRONCHITIS (HCC): ICD-10-CM

## 2021-05-19 DIAGNOSIS — J44.1 COPD EXACERBATION (HCC): Primary | ICD-10-CM

## 2021-05-19 PROCEDURE — 99442 PR PHYS/QHP TELEPHONE EVALUATION 11-20 MIN: CPT | Performed by: FAMILY MEDICINE

## 2021-05-19 NOTE — CARE COORDINATION
Second call placed to patient for CC Enrollment. No answer received. Message left requesting callback. Call back information provided. Introductory letter sent to patient via [employer] on 13 May 2021. Will make final attempt to enroll in one week if no response.

## 2021-05-19 NOTE — PROGRESS NOTES
Calista Gatica is a 61 y.o. female evaluated via telephone on 5/19/2021. Chief Complaint   Patient presents with    ED Follow-up     copd excerbation       Consent:  She and/or health care decision maker is aware that that she may receive a bill for this telephone service, depending on her insurance coverage, and has provided verbal consent to proceed: Yes      Documentation:  I communicated with the patient and/or health care decision maker about . Details of this discussion including any medical advice provided:     Patient is scheduled for ER visit. Patient continue on May 9 with symptoms of cough wheezing shortness of breath. Patient was treated with steroids and antibiotics. Patient reports her symptoms are improved. Currently she is on aerosol treatments. Reports she is still has cough on and off. She is on home oxygen 24 hours. Patient follows with pulmonologist denies any fever chills chest pain. Patient and chest x-ray done which did not show any pneumonia. I affirm this is a Patient Initiated Episode with a Patient who has not had a related appointment within my department in the past 7 days or scheduled within the next 24 hours. 1. COPD exacerbation (HCC)    - guaiFENesin (ROBITUSSIN) 100 MG/5ML liquid; take 10 milliliters every 4 hours if needed for cough  Dispense: 473 mL; Refill: 0    2. Mixed simple and mucopurulent chronic bronchitis (HCC)    - guaiFENesin (ROBITUSSIN) 100 MG/5ML liquid; take 10 milliliters every 4 hours if needed for cough  Dispense: 473 mL; Refill: 0      Patient identification was verified at the start of the visit: Yes    Total Time: minutes:  20 -30minutes    The visit was conducted pursuant to the emergency declaration under the [address] and the [address] authority and the [place] and [employer] General Act. Patient identification was verified, and a caregiver was present when appropriate.

## 2021-05-26 ENCOUNTER — CARE COORDINATION (OUTPATIENT)
Dept: CARE COORDINATION | Age: 66
End: 2021-05-26

## 2021-05-26 NOTE — CARE COORDINATION
Final call placed to patient for CC Enrollment. No answer received. Message left requesting return call. Call back info provided. Will suspend enrollments efforts d/t inability to contact if no response received. Jason Willson

## 2021-06-15 SDOH — ECONOMIC STABILITY: FOOD INSECURITY: WITHIN THE PAST 12 MONTHS, YOU WORRIED THAT YOUR FOOD WOULD RUN OUT BEFORE YOU GOT MONEY TO BUY MORE.: NEVER TRUE

## 2021-06-15 SDOH — ECONOMIC STABILITY: FOOD INSECURITY: WITHIN THE PAST 12 MONTHS, THE FOOD YOU BOUGHT JUST DIDN'T LAST AND YOU DIDN'T HAVE MONEY TO GET MORE.: NEVER TRUE

## 2021-06-15 ASSESSMENT — SOCIAL DETERMINANTS OF HEALTH (SDOH): HOW HARD IS IT FOR YOU TO PAY FOR THE VERY BASICS LIKE FOOD, HOUSING, MEDICAL CARE, AND HEATING?: VERY HARD

## 2021-06-16 ENCOUNTER — TELEMEDICINE (OUTPATIENT)
Dept: FAMILY MEDICINE CLINIC | Age: 66
End: 2021-06-16
Payer: COMMERCIAL

## 2021-06-16 DIAGNOSIS — F31.9 BIPOLAR AFFECTIVE DISORDER, REMISSION STATUS UNSPECIFIED (HCC): ICD-10-CM

## 2021-06-16 DIAGNOSIS — I27.21 PULMONARY ARTERIAL HYPERTENSION (HCC): ICD-10-CM

## 2021-06-16 DIAGNOSIS — M25.471 ANKLE SWELLING, RIGHT: ICD-10-CM

## 2021-06-16 DIAGNOSIS — R06.09 DOE (DYSPNEA ON EXERTION): Primary | ICD-10-CM

## 2021-06-16 DIAGNOSIS — M79.89 LEG SWELLING: ICD-10-CM

## 2021-06-16 PROBLEM — R35.89 POLYURIA: Status: RESOLVED | Noted: 2021-03-11 | Resolved: 2021-06-16

## 2021-06-16 PROCEDURE — 3017F COLORECTAL CA SCREEN DOC REV: CPT | Performed by: FAMILY MEDICINE

## 2021-06-16 PROCEDURE — G8427 DOCREV CUR MEDS BY ELIG CLIN: HCPCS | Performed by: FAMILY MEDICINE

## 2021-06-16 PROCEDURE — 99214 OFFICE O/P EST MOD 30 MIN: CPT | Performed by: FAMILY MEDICINE

## 2021-06-16 RX ORDER — BLOOD PRESSURE TEST KIT
KIT MISCELLANEOUS
Qty: 1 KIT | Refills: 0 | Status: SHIPPED | OUTPATIENT
Start: 2021-06-16 | End: 2021-08-05

## 2021-06-16 RX ORDER — FUROSEMIDE 20 MG/1
10 TABLET ORAL DAILY
Qty: 7 TABLET | Refills: 0 | Status: SHIPPED | OUTPATIENT
Start: 2021-06-16 | End: 2021-06-26

## 2021-06-16 ASSESSMENT — ENCOUNTER SYMPTOMS
COUGH: 0
SHORTNESS OF BREATH: 1
PHOTOPHOBIA: 0
DIARRHEA: 0
CHEST TIGHTNESS: 0
ABDOMINAL DISTENTION: 0
ABDOMINAL PAIN: 0
WHEEZING: 1
BLOOD IN STOOL: 0

## 2021-06-16 NOTE — PROGRESS NOTES
[place] [address]  Phone: [phone], Fax: [phone] EVALUATION -- Audio/Visual (During MKBNY-43 public health emergency)    Patient ID verified by me prior to start of this visit    Dany Esteban (:  1955) has requested an audio/video evaluation for the following concern(s):  Chief Complaint   Patient presents with    Other     Pt states its personal      HPI:  Dany Esteban is an established patient of Arlene Ward MD  . Patient is scheduled with her daughter is scheduled with her daughter for sick call. Patient reports she has noticed leg swelling since last 1 to 2 weeks, the swelling is more on the right side elbow ankle. Patient has mild pain, denies any trauma or any redness. On exam the swelling is more on the foot above the ankle denies any calf tenderness. Daughter reports patient has been paranoid since last 1 to 2 weeks, she has history of bipolar disorder and is on [place] and Topamax. She has appointment with Lakshmi Domínguez in  due to her worsening of symptoms. Patient reports she speaks irrelevant and is agitated. She is confused. She denies any suicidal thoughts or attempts. Patient lives herself and daughter is helping her . Daughter reports she has to take off from her work to take care of her. She refused home health. Patient has end-stage lung disease last echo was in  with pulmonary artery hypertension and valve regurgitation. Patient reports she did not had any echo since then. She has ankle swelling denies any trauma, patient does not remember any acute injury. .. [x]Negative depression screening.    []1-4 = Minimal depression   []5-9 = Mild depression   []10-14 = Moderate depression   []15-19 = Moderately severe depression   []20-27 = Severe depression  PHQ Scores 2021 2021 2020 10/8/2019 5/15/2019 2018 2016   PHQ2 Score 0 0 0 6 0 4 2016    Chronic obstructive pulmonary disease ([facility] [address] 2016        Past Surgical History:   Procedure Laterality Date    ANKLE SURGERY      HAD SCREWS AND HARDWARE, THEN REMOVED    COLONOSCOPY  02/15/2018    tubular adenoma    EYE SURGERY Bilateral     CATARACTS    HYSTEROSCOPY  10/19/2016    D & C    INDUCED       LASIK      bilateral    TONSILLECTOMY AND ADENOIDECTOMY Bilateral     VULVA SURGERY      HAD A BIOPSY AND REMOVAL OF     Family History   Problem Relation Age of Onset    Dementia Maternal Aunt     Kidney Disease Mother     Heart Attack Sister     Prostate Cancer Father     High Cholesterol Brother     Heart Attack Paternal Grandmother      Current Outpatient Medications   Medication Sig Dispense Refill    furosemide (LASIX) 20 MG tablet Take 0.5 tablets by mouth daily 7 tablet 0    Blood Pressure KIT Dx: HTN. Needs automatic blood pressure machine to monitor her blood pressure. 1 kit 0    guaiFENesin (ROBITUSSIN) 100 MG/5ML liquid take 10 milliliters every 4 hours if needed for cough 473 mL 0    acetaminophen (ACETAMINOPHEN EXTRA STRENGTH) 500 MG tablet take 2 tablets by mouth every 6 hours AS NEEDED FOR PAIN 120 tablet 0    BREO ELLIPTA 100-25 MCG/INH AEPB inhaler inhale 1 puff by mouth and INTO THE LUNGS once daily      SPIRIVA RESPIMAT 2.5 MCG/ACT AERS inhaler inhale 2 puffs by mouth once daily      vitamin D (ERGOCALCIFEROL) 1.25 MG (35518 UT) CAPS capsule take 1 capsule by mouth every week 4 capsule 3    simvastatin (ZOCOR) 40 MG tablet take 1 tablet by mouth at bedtime 30 tablet 3    EPINEPHrine (EPIPEN) 0.3 MG/0.3ML SOAJ injection use as directed BY PRESCRIBER FOR ALLERGIC REACTION 2 each 2    ibandronate (BONIVA) 150 MG tablet Take 1 tablet by mouth every 30 days Take one (1) tablet once per month in the morning with a full glass of water, on an empty stomach, and do not take anything else by mouth or lie down for the next 30 minutes.  30 tablet 3    lidocaine (LMX) 4 % cream apply topically every 8 hours AS NEEDED FOR PAIN 30 g 3    Elastic Bandages & Supports (MEDICAL COMPRESSION STOCKINGS) MISC 1 each by Does not apply route daily Keep pressure between 20 -30mm 1 each 0    topiramate (TOPAMAX) 50 MG tablet       montelukast (SINGULAIR) 10 MG tablet       fluticasone-umeclidin-vilant (TRELEGY ELLIPTA) 100-62.5-25 MCG/INH AEPB Inhale 1 puff into the lungs daily      Elastic Bandages & Supports (ACE ELBOW BRACE LARGE/X-LARGE) MISC Use daily for elbow pain 1 each 0    ALAWAY 0.025 % ophthalmic solution INSTILL 1 DROP TWO TIMES A DAY INTO LEFT EYE ONLY 10 mL 6    albuterol (PROVENTIL) (2.5 MG/3ML) 0.083% nebulizer solution Take 3 mLs by nebulization 4 times daily 120 each 3    albuterol sulfate  (90 Base) MCG/ACT inhaler Inhale 2 puffs into the lungs every 4 hours as needed for Wheezing 1 Inhaler 3    fluticasone (FLONASE) 50 MCG/ACT nasal spray 2 sprays by Nasal route daily 1 Bottle 5    butalbital-acetaminophen-caffeine (FIORICET, ESGIC) -40 MG per tablet Take 1 tablet by mouth every 4 hours as needed for Headaches      Biotin 5000 MCG CAPS Take 1,000 mcg by mouth       citalopram (CELEXA) 20 MG tablet Take 20 mg by mouth daily      LATUDA 80 MG TABS tablet Take 80 mg by mouth nightly      AYR 0.65 % nasal spray instill 2 sprays into each nostril four times a day (Patient not taking: Reported on 6/15/2021)      RA VITAMIN B-12 TR 1000 MCG TBCR take 1 tablet by mouth once daily (Patient not taking: Reported on 6/15/2021) 30 tablet 3    nicotine polacrilex (NICORETTE) 2 MG gum Take 2 mg by mouth as needed for Smoking cessation (Patient not taking: Reported on 6/15/2021)       No current facility-administered medications for this visit.        Allergies   Allergen Reactions    Advil [Ibuprofen] Other (See Comments)     vomiting    Aleve [Naproxen] Other (See Comments)     vomiting    Antipyrine Other (See Comments)     unknown    Celecoxib Other (See Comments)    Codeine      headache    Fomepizole     Other      GRASS, TREES, WEEDS    Rofecoxib Other (See Comments)     Unknown reaction    Salicylates      Unknown reaction     Strawberry Extract      HEADACHES    Sulfinpyrazone Other (See Comments)     Unknown reaction    Aspirin Nausea And Vomiting, Other (See Comments) and Nausea Only    Nsaids Nausea Only, Other (See Comments) and Nausea And Vomiting        Social History     Tobacco Use    Smoking status: Former Smoker     Packs/day: 2.00     Years: 42.00     Pack years: 84.00     Types: Cigarettes     Quit date: 2018     Years since quittin.6    Smokeless tobacco: Never Used   Substance Use Topics    Alcohol use: Yes     Comment: yearly    Drug use: No        PHYSICAL EXAMINATION:  Vital Signs: (As obtained by patient/caregiver or practitioner observation)  Patient-Reported Vitals 6/15/2021   Patient-Reported Weight 147 lb   Patient-Reported Height 5'2   Patient-Reported Temperature -      Constitutional: [x] Appears well-developed and well-nourished [x] No apparent distress      [] Abnormal-   Mental status  [x] Alert and awake  [x] Oriented to person/place/time [x]Able to follow commands      Eyes:  EOM    [x]  Normal  [] Abnormal-  Sclera  [x]  Normal  [] Abnormal -         Discharge [x]  None visible  [] Abnormal -    HENT:   [x] Normocephalic, atraumatic. [] Abnormal   [x] Mouth/Throat: Mucous membranes are moist.     External Ears [x] Normal  [] Abnormal-     Neck: [x] No visualized mass     Pulmonary/Chest: [x] Respiratory effort normal.  [x] No visualized signs of difficulty breathing or respiratory distress        [] Abnormal     Musculoskeletal:   [] Normal gait with no signs of ataxia         [] Normal range of motion of neck        [x] Abnormal-left lower extremity ankle swelling.     Neurological:        [x] No Facial Asymmetry (Cranial nerve 7 motor function) (limited exam to video visit)          [x] No gaze palsy        [] Abnormal-     Skin:        [x] No significant exanthematous lesions or discoloration noted on facial skin         [] Abnormal-     Psychiatric:       [] Normal Affect [x] No Hallucinations        [x] Abnormal- Anxious, with pressured speech patient talks irrelevant    Other pertinent observable physical exam findings-   Lab Results   Component Value Date    WBC 4.9 03/11/2021    HGB 12.6 03/11/2021    HCT 40.1 03/11/2021    .1 (H) 03/11/2021     03/11/2021     Lab Results   Component Value Date     03/11/2021    K 4.1 03/11/2021     03/11/2021    CO2 30 03/11/2021    BUN 16 03/11/2021    CREATININE 0.89 03/11/2021    GLUCOSE 98 03/11/2021    CALCIUM 8.9 03/11/2021        Due to this being a TeleHealth encounter, evaluation of the following organ systems is limited: Vitals/Constitutional/EENT/Resp/CV/GI//MS/Neuro/Skin/Heme-Lymph-Imm. ASSESSMENT/PLAN:  1. DOMÍNGUEZ (dyspnea on exertion)  Increasing shortness of breath and leg swelling, do echocardiogram start on low-dose Lasix. - ECHO Complete 2D W Doppler W Color; Future  - Brain Natriuretic Peptide; Future    2. Bipolar affective disorder, remission status unspecified ([facility] [address]  Worsening of psychosis, patient has appointment with psychiatrist in June 24. Daughter refused home health. She needs FMLA filled for herself. 3. Leg swelling  Echocardiogram, monitor blood pressure start on low-dose Lasix monitor BMP  - ECHO Complete 2D W Doppler W Color; Future  - Brain Natriuretic Peptide; Future  - Blood Pressure KIT; Dx: HTN. Needs automatic blood pressure machine to monitor her blood pressure. Dispense: 1 kit; Refill: 0  - Basic Metabolic Panel; Future    4. Ankle swelling, right  X-ray ankle rule out trauma  - XR ANKLE RIGHT (2 VIEWS); Future    5. Pulmonary arterial hypertension (HCC)  Pulmonary hypertension more than 35mm  - ECHO Complete 2D W Doppler W Color;  Future  -  Controlled Substance Monitoring:  Acute and Chronic Pain Monitoring:   No flowsheet data found. Orders Placed This Encounter   Procedures    XR ANKLE RIGHT (2 VIEWS)     Standing Status:   Future     Standing Expiration Date:   6/16/2022     Order Specific Question:   Reason for exam:     Answer:   pain    Brain Natriuretic Peptide     Standing Status:   Future     Standing Expiration Date:   6/16/2022    Basic Metabolic Panel     Standing Status:   Future     Standing Expiration Date:   6/16/2022    ECHO Complete 2D W Doppler W Color     Standing Status:   Future     Standing Expiration Date:   6/16/2022     Order Specific Question:   Reason for exam:     Answer:   DOMÍNGUEZ      Orders Placed This Encounter   Medications    furosemide (LASIX) 20 MG tablet     Sig: Take 0.5 tablets by mouth daily     Dispense:  7 tablet     Refill:  0    Blood Pressure KIT     Sig: Dx: HTN. Needs automatic blood pressure machine to monitor her blood pressure. Dispense:  1 kit     Refill:  0      Medications Discontinued During This Encounter   Medication Reason    denosumab (PROLIA) 60 MG/ML SOSY SC injection     TUDORZA PRESSAIR 400 MCG/ACT AEPB inhaler LIST CLEANUP      Elaina received counseling on the following healthy behaviors: nutrition, exercise and medication adherence  Reviewed prior labs and health maintenance. Continue current medications, diet and exercise. Discussed use, benefit, and side effects of prescribed medications. Barriers to medication compliance addressed. Patient given educational materials - see patient instructions. All patient questions answered. Patient voiced understanding. No follow-ups on file. Kiana Hinojosa is a 61 y.o. female patient  being evaluated by a Virtual Visit (video visit) encounter to address concerns as mentioned above. A caregiver was present when appropriate.  Due to this being a TeleHealth encounter (During ZJQCI-80 public health emergency), evaluation of the following organ systems was limited:Vitals/Constitutional/EENT/Resp/CV/GI//MS/Neuro/Skin/Heme-Lymph-Imm. Services were provided through a video synchronous discussion virtually to substitute for in-person clinic visit. This is a telehealth visit that was performed with the originating site at Patient Location: home and provider Location of [place], [place]. Verbal consent to participate in video visit was obtained. Patient ID verified by me prior to start of this visit  I discussed with the patient the nature of our telehealth visits via interactive/real-time audio/video that:  - I would evaluate the patient and recommend diagnostics and treatments based on my assessment  - Our sessions are not being recorded and that personal health information is protected  - Our team would provide follow up care in person if/when the patient needs it. Pursuant to the emergency declaration under the [address] [place] [address], [address] [place] [address] authority and the [employer] Act, this Virtual Visit was conducted with patient's (and/or legal guardian's) consent, to reduce the patient's risk of exposure to COVID-19 and provide necessary medical care. The patient (and/or legal guardian) has also been advised to contact this office for worsening conditions or problems, and seek emergency medical treatment and/or call 911 if deemed necessary. This note was completed by using the assistance of a speech-recognition program. However, inadvertent computerized transcription errors may be present. Although every effort was made to ensure accuracy, no guarantees can be provided that every mistake has been identified and corrected by editing.   Electronically signed by Nadine Sanchez MD on 6/16/21 at 4:37 PM EDT

## 2021-06-17 ENCOUNTER — APPOINTMENT (OUTPATIENT)
Dept: CT IMAGING | Age: 66
DRG: 134 | End: 2021-06-17
Payer: COMMERCIAL

## 2021-06-17 ENCOUNTER — HOSPITAL ENCOUNTER (INPATIENT)
Age: 66
LOS: 2 days | Discharge: HOME | DRG: 134 | End: 2021-06-19
Attending: EMERGENCY MEDICINE | Admitting: INTERNAL MEDICINE
Payer: COMMERCIAL

## 2021-06-17 ENCOUNTER — APPOINTMENT (OUTPATIENT)
Dept: GENERAL RADIOLOGY | Age: 66
DRG: 134 | End: 2021-06-17
Payer: COMMERCIAL

## 2021-06-17 DIAGNOSIS — I26.99 PULMONARY EMBOLISM ON RIGHT (HCC): Primary | ICD-10-CM

## 2021-06-17 DIAGNOSIS — R60.0 BILATERAL LOWER EXTREMITY EDEMA: ICD-10-CM

## 2021-06-17 DIAGNOSIS — F22 PARANOID BEHAVIOR (HCC): ICD-10-CM

## 2021-06-17 PROBLEM — G43.909 MIGRAINES: Status: ACTIVE | Noted: 2021-06-17

## 2021-06-17 LAB
-: ABNORMAL
ABSOLUTE EOS #: 0.1 K/UL (ref 0–0.4)
ABSOLUTE IMMATURE GRANULOCYTE: ABNORMAL K/UL (ref 0–0.3)
ABSOLUTE LYMPH #: 0.4 K/UL (ref 1–4.8)
ABSOLUTE MONO #: 0.5 K/UL (ref 0.1–1.3)
ACETAMINOPHEN LEVEL: <5 UG/ML (ref 10–30)
ALLEN TEST: ABNORMAL
AMORPHOUS: ABNORMAL
AMPHETAMINE SCREEN URINE: NEGATIVE
ANION GAP SERPL CALCULATED.3IONS-SCNC: 6 MMOL/L (ref 9–17)
BACTERIA: ABNORMAL
BARBITURATE SCREEN URINE: POSITIVE
BASOPHILS # BLD: 1 % (ref 0–2)
BASOPHILS ABSOLUTE: 0 K/UL (ref 0–0.2)
BENZODIAZEPINE SCREEN, URINE: NEGATIVE
BILIRUBIN URINE: NEGATIVE
BNP INTERPRETATION: ABNORMAL
BUN BLDV-MCNC: 11 MG/DL (ref 8–23)
BUN/CREAT BLD: ABNORMAL (ref 9–20)
BUPRENORPHINE URINE: ABNORMAL
CALCIUM SERPL-MCNC: 8.9 MG/DL (ref 8.6–10.4)
CANNABINOID SCREEN URINE: NEGATIVE
CARBOXYHEMOGLOBIN: 2.9 % (ref 0–5)
CASTS UA: ABNORMAL /LPF
CHLORIDE BLD-SCNC: 110 MMOL/L (ref 98–107)
CO2: 27 MMOL/L (ref 20–31)
COCAINE METABOLITE, URINE: NEGATIVE
COLOR: YELLOW
COMMENT UA: ABNORMAL
CREAT SERPL-MCNC: 0.65 MG/DL (ref 0.5–0.9)
CRYSTALS, UA: ABNORMAL /HPF
DIFFERENTIAL TYPE: ABNORMAL
EOSINOPHILS RELATIVE PERCENT: 2 % (ref 0–4)
EPITHELIAL CELLS UA: ABNORMAL /HPF
ETHANOL PERCENT: <0.01 %
ETHANOL: <10 MG/DL
FIO2: ABNORMAL
GFR AFRICAN AMERICAN: >60 ML/MIN
GFR NON-AFRICAN AMERICAN: >60 ML/MIN
GFR SERPL CREATININE-BSD FRML MDRD: ABNORMAL ML/MIN/{1.73_M2}
GFR SERPL CREATININE-BSD FRML MDRD: ABNORMAL ML/MIN/{1.73_M2}
GLUCOSE BLD-MCNC: 107 MG/DL (ref 70–99)
GLUCOSE URINE: NEGATIVE
HCO3 VENOUS: 27.5 MMOL/L (ref 24–30)
HCT VFR BLD CALC: 37.7 % (ref 36–46)
HEMOGLOBIN: 12.1 G/DL (ref 12–16)
IMMATURE GRANULOCYTES: ABNORMAL %
KETONES, URINE: ABNORMAL
LEUKOCYTE ESTERASE, URINE: NEGATIVE
LYMPHOCYTES # BLD: 10 % (ref 24–44)
MCH RBC QN AUTO: 31.7 PG (ref 26–34)
MCHC RBC AUTO-ENTMCNC: 32 G/DL (ref 31–37)
MCV RBC AUTO: 99.3 FL (ref 80–100)
MDMA URINE: ABNORMAL
METHADONE SCREEN, URINE: NEGATIVE
METHAMPHETAMINE, URINE: ABNORMAL
METHEMOGLOBIN: 0 % (ref 0–1.9)
MODE: ABNORMAL
MONOCYTES # BLD: 14 % (ref 1–7)
MUCUS: ABNORMAL
NEGATIVE BASE EXCESS, VEN: ABNORMAL MMOL/L (ref 0–2)
NITRITE, URINE: NEGATIVE
NOTIFICATION TIME: ABNORMAL
NOTIFICATION: ABNORMAL
NRBC AUTOMATED: ABNORMAL PER 100 WBC
O2 DEVICE/FLOW/%: ABNORMAL
O2 SAT, VEN: 47.3 % (ref 60–85)
OPIATES, URINE: NEGATIVE
OTHER OBSERVATIONS UA: ABNORMAL
OXYCODONE SCREEN URINE: NEGATIVE
OXYHEMOGLOBIN: ABNORMAL % (ref 95–98)
PATIENT TEMP: 37
PCO2, VEN, TEMP ADJ: ABNORMAL MMHG (ref 39–55)
PCO2, VEN: 48.4 (ref 39–55)
PDW BLD-RTO: 14.1 % (ref 11.5–14.9)
PEEP/CPAP: ABNORMAL
PH UA: 7.5 (ref 5–8)
PH VENOUS: 7.36 (ref 7.32–7.42)
PH, VEN, TEMP ADJ: ABNORMAL (ref 7.32–7.42)
PHENCYCLIDINE, URINE: NEGATIVE
PLATELET # BLD: 178 K/UL (ref 150–450)
PLATELET ESTIMATE: ABNORMAL
PMV BLD AUTO: 7.3 FL (ref 6–12)
PO2, VEN, TEMP ADJ: ABNORMAL MMHG (ref 30–50)
PO2, VEN: 26 (ref 30–50)
POSITIVE BASE EXCESS, VEN: 2.1 MMOL/L (ref 0–2)
POTASSIUM SERPL-SCNC: 4.3 MMOL/L (ref 3.7–5.3)
PRO-BNP: 337 PG/ML
PROPOXYPHENE, URINE: ABNORMAL
PROTEIN UA: NEGATIVE
PSV: ABNORMAL
PT. POSITION: ABNORMAL
RBC # BLD: 3.8 M/UL (ref 4–5.2)
RBC # BLD: ABNORMAL 10*6/UL
RBC UA: ABNORMAL /HPF
RENAL EPITHELIAL, UA: ABNORMAL /HPF
RESPIRATORY RATE: ABNORMAL
SALICYLATE LEVEL: <1 MG/DL (ref 3–10)
SAMPLE SITE: ABNORMAL
SEG NEUTROPHILS: 73 % (ref 36–66)
SEGMENTED NEUTROPHILS ABSOLUTE COUNT: 2.9 K/UL (ref 1.3–9.1)
SET RATE: ABNORMAL
SODIUM BLD-SCNC: 143 MMOL/L (ref 135–144)
SPECIFIC GRAVITY UA: 1.06 (ref 1–1.03)
TEST INFORMATION: ABNORMAL
TEXT FOR RESPIRATORY: ABNORMAL
TOTAL HB: ABNORMAL G/DL (ref 12–16)
TOTAL RATE: ABNORMAL
TOXIC TRICYCLIC SC,BLOOD: ABNORMAL
TRICHOMONAS: ABNORMAL
TRICYCLIC ANTIDEP,URINE: NEGATIVE
TRICYCLIC ANTIDEPRESSANTS, UR: ABNORMAL
TROPONIN INTERP: NORMAL
TROPONIN INTERP: NORMAL
TROPONIN T: NORMAL NG/ML
TROPONIN T: NORMAL NG/ML
TROPONIN, HIGH SENSITIVITY: 9 NG/L (ref 0–14)
TROPONIN, HIGH SENSITIVITY: 9 NG/L (ref 0–14)
TURBIDITY: ABNORMAL
URINE HGB: NEGATIVE
UROBILINOGEN, URINE: NORMAL
VT: ABNORMAL
WBC # BLD: 3.9 K/UL (ref 3.5–11)
WBC # BLD: ABNORMAL 10*3/UL
WBC UA: ABNORMAL /HPF
YEAST: ABNORMAL

## 2021-06-17 PROCEDURE — 80048 BASIC METABOLIC PNL TOTAL CA: CPT

## 2021-06-17 PROCEDURE — 6360000002 HC RX W HCPCS: Performed by: STUDENT IN AN ORGANIZED HEALTH CARE EDUCATION/TRAINING PROGRAM

## 2021-06-17 PROCEDURE — G0480 DRUG TEST DEF 1-7 CLASSES: HCPCS

## 2021-06-17 PROCEDURE — 84484 ASSAY OF TROPONIN QUANT: CPT

## 2021-06-17 PROCEDURE — 71045 X-RAY EXAM CHEST 1 VIEW: CPT

## 2021-06-17 PROCEDURE — 6370000000 HC RX 637 (ALT 250 FOR IP): Performed by: STUDENT IN AN ORGANIZED HEALTH CARE EDUCATION/TRAINING PROGRAM

## 2021-06-17 PROCEDURE — 81001 URINALYSIS AUTO W/SCOPE: CPT

## 2021-06-17 PROCEDURE — 36415 COLL VENOUS BLD VENIPUNCTURE: CPT

## 2021-06-17 PROCEDURE — 6360000002 HC RX W HCPCS: Performed by: EMERGENCY MEDICINE

## 2021-06-17 PROCEDURE — 83880 ASSAY OF NATRIURETIC PEPTIDE: CPT

## 2021-06-17 PROCEDURE — 2580000003 HC RX 258: Performed by: STUDENT IN AN ORGANIZED HEALTH CARE EDUCATION/TRAINING PROGRAM

## 2021-06-17 PROCEDURE — 93005 ELECTROCARDIOGRAM TRACING: CPT | Performed by: EMERGENCY MEDICINE

## 2021-06-17 PROCEDURE — 6360000004 HC RX CONTRAST MEDICATION: Performed by: EMERGENCY MEDICINE

## 2021-06-17 PROCEDURE — 99285 EMERGENCY DEPT VISIT HI MDM: CPT

## 2021-06-17 PROCEDURE — 85025 COMPLETE CBC W/AUTO DIFF WBC: CPT

## 2021-06-17 PROCEDURE — 70450 CT HEAD/BRAIN W/O DYE: CPT

## 2021-06-17 PROCEDURE — 80179 DRUG ASSAY SALICYLATE: CPT

## 2021-06-17 PROCEDURE — 82805 BLOOD GASES W/O2 SATURATION: CPT

## 2021-06-17 PROCEDURE — 80307 DRUG TEST PRSMV CHEM ANLYZR: CPT

## 2021-06-17 PROCEDURE — 2580000003 HC RX 258: Performed by: EMERGENCY MEDICINE

## 2021-06-17 PROCEDURE — 71260 CT THORAX DX C+: CPT

## 2021-06-17 PROCEDURE — 99223 1ST HOSP IP/OBS HIGH 75: CPT | Performed by: INTERNAL MEDICINE

## 2021-06-17 PROCEDURE — 2000000000 HC ICU R&B

## 2021-06-17 PROCEDURE — 80143 DRUG ASSAY ACETAMINOPHEN: CPT

## 2021-06-17 RX ORDER — HEPARIN SODIUM 10000 [USP'U]/100ML
5-30 INJECTION, SOLUTION INTRAVENOUS CONTINUOUS
Status: DISCONTINUED | OUTPATIENT
Start: 2021-06-17 | End: 2021-06-17

## 2021-06-17 RX ORDER — ONDANSETRON 4 MG/1
4 TABLET, ORALLY DISINTEGRATING ORAL EVERY 8 HOURS PRN
Status: DISCONTINUED | OUTPATIENT
Start: 2021-06-17 | End: 2021-06-19 | Stop reason: HOSPADM

## 2021-06-17 RX ORDER — SODIUM CHLORIDE 0.9 % (FLUSH) 0.9 %
5-40 SYRINGE (ML) INJECTION EVERY 12 HOURS SCHEDULED
Status: DISCONTINUED | OUTPATIENT
Start: 2021-06-17 | End: 2021-06-19 | Stop reason: HOSPADM

## 2021-06-17 RX ORDER — TOPIRAMATE 50 MG/1
100 TABLET, FILM COATED ORAL NIGHTLY
COMMUNITY
End: 2021-08-05

## 2021-06-17 RX ORDER — ONDANSETRON 2 MG/ML
4 INJECTION INTRAMUSCULAR; INTRAVENOUS EVERY 6 HOURS PRN
Status: DISCONTINUED | OUTPATIENT
Start: 2021-06-17 | End: 2021-06-19 | Stop reason: HOSPADM

## 2021-06-17 RX ORDER — POTASSIUM CHLORIDE 7.45 MG/ML
10 INJECTION INTRAVENOUS PRN
Status: DISCONTINUED | OUTPATIENT
Start: 2021-06-17 | End: 2021-06-19 | Stop reason: HOSPADM

## 2021-06-17 RX ORDER — SODIUM CHLORIDE 0.9 % (FLUSH) 0.9 %
10 SYRINGE (ML) INJECTION PRN
Status: DISCONTINUED | OUTPATIENT
Start: 2021-06-17 | End: 2021-06-19 | Stop reason: HOSPADM

## 2021-06-17 RX ORDER — PANTOPRAZOLE SODIUM 40 MG/1
40 TABLET, DELAYED RELEASE ORAL
Status: DISCONTINUED | OUTPATIENT
Start: 2021-06-18 | End: 2021-06-19 | Stop reason: HOSPADM

## 2021-06-17 RX ORDER — ACETAMINOPHEN 650 MG/1
650 SUPPOSITORY RECTAL EVERY 6 HOURS PRN
Status: DISCONTINUED | OUTPATIENT
Start: 2021-06-17 | End: 2021-06-19 | Stop reason: HOSPADM

## 2021-06-17 RX ORDER — HEPARIN SODIUM 1000 [USP'U]/ML
40 INJECTION, SOLUTION INTRAVENOUS; SUBCUTANEOUS PRN
Status: DISCONTINUED | OUTPATIENT
Start: 2021-06-17 | End: 2021-06-17

## 2021-06-17 RX ORDER — METHYLPREDNISOLONE SODIUM SUCCINATE 40 MG/ML
40 INJECTION, POWDER, LYOPHILIZED, FOR SOLUTION INTRAMUSCULAR; INTRAVENOUS EVERY 8 HOURS
Status: DISCONTINUED | OUTPATIENT
Start: 2021-06-17 | End: 2021-06-18

## 2021-06-17 RX ORDER — 0.9 % SODIUM CHLORIDE 0.9 %
80 INTRAVENOUS SOLUTION INTRAVENOUS ONCE
Status: COMPLETED | OUTPATIENT
Start: 2021-06-17 | End: 2021-06-17

## 2021-06-17 RX ORDER — METHYLPREDNISOLONE SODIUM SUCCINATE 125 MG/2ML
125 INJECTION, POWDER, LYOPHILIZED, FOR SOLUTION INTRAMUSCULAR; INTRAVENOUS ONCE
Status: COMPLETED | OUTPATIENT
Start: 2021-06-17 | End: 2021-06-17

## 2021-06-17 RX ORDER — TOPIRAMATE 50 MG/1
50 TABLET, FILM COATED ORAL DAILY
Status: DISCONTINUED | OUTPATIENT
Start: 2021-06-17 | End: 2021-06-19 | Stop reason: HOSPADM

## 2021-06-17 RX ORDER — BUTALBITAL, ACETAMINOPHEN AND CAFFEINE 300; 40; 50 MG/1; MG/1; MG/1
1 CAPSULE ORAL EVERY 4 HOURS PRN
Status: DISCONTINUED | OUTPATIENT
Start: 2021-06-17 | End: 2021-06-19 | Stop reason: HOSPADM

## 2021-06-17 RX ORDER — HEPARIN SODIUM 1000 [USP'U]/ML
80 INJECTION, SOLUTION INTRAVENOUS; SUBCUTANEOUS ONCE
Status: DISCONTINUED | OUTPATIENT
Start: 2021-06-17 | End: 2021-06-17

## 2021-06-17 RX ORDER — ACETAMINOPHEN 325 MG/1
650 TABLET ORAL EVERY 6 HOURS PRN
Status: DISCONTINUED | OUTPATIENT
Start: 2021-06-17 | End: 2021-06-19 | Stop reason: HOSPADM

## 2021-06-17 RX ORDER — SODIUM CHLORIDE 0.9 % (FLUSH) 0.9 %
5-40 SYRINGE (ML) INJECTION PRN
Status: DISCONTINUED | OUTPATIENT
Start: 2021-06-17 | End: 2021-06-19 | Stop reason: HOSPADM

## 2021-06-17 RX ORDER — HEPARIN SODIUM 1000 [USP'U]/ML
80 INJECTION, SOLUTION INTRAVENOUS; SUBCUTANEOUS PRN
Status: DISCONTINUED | OUTPATIENT
Start: 2021-06-17 | End: 2021-06-17

## 2021-06-17 RX ORDER — SODIUM CHLORIDE 9 MG/ML
25 INJECTION, SOLUTION INTRAVENOUS PRN
Status: DISCONTINUED | OUTPATIENT
Start: 2021-06-17 | End: 2021-06-19 | Stop reason: HOSPADM

## 2021-06-17 RX ORDER — POLYETHYLENE GLYCOL 3350 17 G/17G
17 POWDER, FOR SOLUTION ORAL DAILY PRN
Status: DISCONTINUED | OUTPATIENT
Start: 2021-06-17 | End: 2021-06-19 | Stop reason: HOSPADM

## 2021-06-17 RX ORDER — MONTELUKAST SODIUM 10 MG/1
10 TABLET ORAL NIGHTLY
Status: DISCONTINUED | OUTPATIENT
Start: 2021-06-17 | End: 2021-06-19 | Stop reason: HOSPADM

## 2021-06-17 RX ORDER — ATORVASTATIN CALCIUM 20 MG/1
20 TABLET, FILM COATED ORAL NIGHTLY
Status: DISCONTINUED | OUTPATIENT
Start: 2021-06-17 | End: 2021-06-19 | Stop reason: HOSPADM

## 2021-06-17 RX ORDER — BUDESONIDE AND FORMOTEROL FUMARATE DIHYDRATE 160; 4.5 UG/1; UG/1
2 AEROSOL RESPIRATORY (INHALATION) 2 TIMES DAILY
Status: DISCONTINUED | OUTPATIENT
Start: 2021-06-17 | End: 2021-06-19 | Stop reason: HOSPADM

## 2021-06-17 RX ORDER — TOPIRAMATE 100 MG/1
100 TABLET, FILM COATED ORAL NIGHTLY
Status: DISCONTINUED | OUTPATIENT
Start: 2021-06-17 | End: 2021-06-19 | Stop reason: HOSPADM

## 2021-06-17 RX ADMIN — TOPIRAMATE 100 MG: 100 TABLET, FILM COATED ORAL at 23:56

## 2021-06-17 RX ADMIN — SODIUM CHLORIDE 80 ML: 9 INJECTION, SOLUTION INTRAVENOUS at 14:32

## 2021-06-17 RX ADMIN — TIOTROPIUM BROMIDE INHALATION SPRAY 2 PUFF: 3.12 SPRAY, METERED RESPIRATORY (INHALATION) at 20:38

## 2021-06-17 RX ADMIN — METHYLPREDNISOLONE SODIUM SUCCINATE 40 MG: 40 INJECTION, POWDER, FOR SOLUTION INTRAMUSCULAR; INTRAVENOUS at 19:02

## 2021-06-17 RX ADMIN — TOPIRAMATE 50 MG: 50 TABLET, FILM COATED ORAL at 19:05

## 2021-06-17 RX ADMIN — ENOXAPARIN SODIUM 70 MG: 80 INJECTION SUBCUTANEOUS at 15:20

## 2021-06-17 RX ADMIN — IOPAMIDOL 75 ML: 755 INJECTION, SOLUTION INTRAVENOUS at 14:33

## 2021-06-17 RX ADMIN — METHYLPREDNISOLONE SODIUM SUCCINATE 125 MG: 125 INJECTION, POWDER, FOR SOLUTION INTRAMUSCULAR; INTRAVENOUS at 15:20

## 2021-06-17 RX ADMIN — MONTELUKAST 10 MG: 10 TABLET, FILM COATED ORAL at 20:40

## 2021-06-17 RX ADMIN — BUDESONIDE AND FORMOTEROL FUMARATE DIHYDRATE 2 PUFF: 160; 4.5 AEROSOL RESPIRATORY (INHALATION) at 20:45

## 2021-06-17 RX ADMIN — ATORVASTATIN CALCIUM 20 MG: 20 TABLET, FILM COATED ORAL at 20:40

## 2021-06-17 RX ADMIN — SODIUM CHLORIDE, PRESERVATIVE FREE 10 ML: 5 INJECTION INTRAVENOUS at 20:42

## 2021-06-17 RX ADMIN — ENOXAPARIN SODIUM 70 MG: 80 INJECTION SUBCUTANEOUS at 20:39

## 2021-06-17 RX ADMIN — SODIUM CHLORIDE, PRESERVATIVE FREE 10 ML: 5 INJECTION INTRAVENOUS at 14:33

## 2021-06-17 RX ADMIN — LURASIDONE HYDROCHLORIDE 80 MG: 80 TABLET, FILM COATED ORAL at 20:40

## 2021-06-17 ASSESSMENT — ENCOUNTER SYMPTOMS
EYE DISCHARGE: 0
NAUSEA: 0
COLOR CHANGE: 0
ABDOMINAL PAIN: 0
TROUBLE SWALLOWING: 0
SORE THROAT: 0
DIARRHEA: 0
SHORTNESS OF BREATH: 1
BACK PAIN: 0
BLOOD IN STOOL: 0
CONSTIPATION: 0
VOMITING: 0
RHINORRHEA: 0
COUGH: 0

## 2021-06-17 ASSESSMENT — PAIN SCALES - GENERAL
PAINLEVEL_OUTOF10: 0
PAINLEVEL_OUTOF10: 0

## 2021-06-17 NOTE — CONSULTS
[facility]   CONSULT NOTE:      DATE OF CONSULT 6/17/2021    REASON FOR CONSULTATION:  Pulmonary management, pulmonary embolism      PCP Mansoor Santizo MD     CHIEF COMPLAINT: Right leg swelling and dyspnea    HISTORY OF PRESENT ILLNESS:   Miah Salgado is a 33-year-old female with COPD and chronic hypoxic respiratory failure who wears 2 L nasal cannula 24 hours a day. I just seen her in the office towards the end of April. She is usually on Spiriva, Breo, and albuterol. She continues to smoke. She has an 84+ pack year history of smoking. When I had seen her she was doing fairly well She received her Materna vaccine at the end of April and early May. .A week ago she started having swelling in her right leg. She also started having dyspnea that began this past Monday. She did not call our office. The dyspnea became worse. It was not accompanied by any cough, fevers, chills, or sputum production. She denies any wheezing. She did turn her oxygen up and thought that that helped her. She finally came to the emergency room. Emergency room, she was evaluated and her saturation was 99% but it is unclear how much oxygen she was on. Was noted to be tachypneic and she also had lower extremity edema. She underwent CT of the chest and was found to have a large right pulmonary embolism in the mainstem pulmonary artery and also in the right upper lobe.       ALLERGIES:  Allergies   Allergen Reactions    Advil [Ibuprofen] Other (See Comments)     vomiting    Aleve [Naproxen] Other (See Comments)     vomiting    Antipyrine Other (See Comments)     unknown    Celecoxib Other (See Comments)    Codeine      headache    Fomepizole     Other      GRASS, TREES, WEEDS    Rofecoxib Other (See Comments)     Unknown reaction    Salicylates      Unknown reaction     Strawberry Extract      HEADACHES    Sulfinpyrazone Other (See Comments)     Unknown reaction    Aspirin Nausea And failure  COPD, stage IV  History of tobacco use  Right lower lobe nodule, stable    Plan:      Lovenox 1 mg/kg twice daily  Transition to Eliquis the next 24 hours-to treat at least 1 year; I suspect this is a nonprovoked pulmonary embolism  Venous Dopplers  Echocardiogram  Would give her a burst of steroids, 40 mg every 8  Continue home pulmonary medication  Elevate right lower extremity  Oxygen keep saturations greater than 88%  Smoking cessation once again advised  She does not need COVID-19 isolation   Admit to intermediate ICU for the next 24 hours

## 2021-06-17 NOTE — ED NOTES
Report given to Graham Alfonso RN from ICU. Report method in person   The following was reviewed with receiving RN:   Current vital signs:  /68   Pulse 75   Temp 98.1 °F (36.7 °C)   Resp 24   Ht 5' 2\" (1.575 m)   Wt 147 lb (66.7 kg)   LMP 05/20/2013 (Exact Date)   SpO2 99%   BMI 26.89 kg/m²                MEWS Score: 2     Any medication or safety alerts were reviewed. Any pending diagnostics and notifications were also reviewed, as well as any safety concerns or issues, abnormal labs, abnormal imaging, and abnormal assessment findings. Questions were answered.       Dario Wolf RN  06/17/21 2954

## 2021-06-17 NOTE — H&P
[address]      [address] [facility]     HISTORY AND PHYSICAL EXAMINATION            Date:   6/17/2021  Patient name:  Angie Weldon  Date of admission:  6/17/2021 12:49 PM  MRN:   [id number]  Account:  [de-identified]  YOB: 1955  PCP:    Mansoor Santizo MD  Room:   GABRIEL/GABRIEL  Code Status:    Prior    Chief Complaint:     Chief Complaint   Patient presents with    Mental Health Problem     Pt reports that she is paranoid but unable to explain why or what exactly she is feeling. Pt denies SI/HI.  Leg Swelling     Bilateral lower extremity swelling; pt stated the right leg is worse than the left. Pt is ambulatory per self.  Other     SOB with exertion - hx of COPD - on 2L continuously. History Obtained From:     patient, family member - daughter, electronic medical record    History of Present Illness: The patient is a 61 y.o. Non-/non  female who presents withMental Health Problem (Pt reports that she is paranoid but unable to explain why or what exactly she is feeling. Pt denies SI/HI.), Leg Swelling (Bilateral lower extremity swelling; pt stated the right leg is worse than the left. Pt is ambulatory per self.), and Other (SOB with exertion - hx of COPD - on 2L continuously. )   and she is admitted to the hospital for the management of acute pulmonary embolus. Patient has past medical history of COPD on 2 L home oxygen, bipolar disorder, hyperlipidemia, and gout who presents to the emergency department complaining of leg swelling and worsening shortness of breath. She reports being on 2 L oxygen at home and 3 L oxygen during sleep. Patient continued to smoke cigarettes recently quit on 6/14 and began chewing Nicorette gum. Patient has a 84-pack-year smoking history.   Follows pulmonologist Dr. Renu Rodriguez regularly who recently started 6/16/21  Yes Tameka Gilbert MD   acetaminophen (ACETAMINOPHEN EXTRA STRENGTH) 500 MG tablet take 2 tablets by mouth every 6 hours AS NEEDED FOR PAIN 5/3/21  Yes Tameka Gilbert MD   BREO ELLIPTA 100-25 MCG/INH AEPB inhaler Inhale 1 puff into the lungs daily  3/6/21  Yes Historical Provider, MD   SPIRIVA RESPIMAT 2.5 MCG/ACT AERS inhaler inhale 2 puffs by mouth once daily 3/18/21  Yes Historical Provider, MD   vitamin D (ERGOCALCIFEROL) 1.25 MG (48580 UT) CAPS capsule take 1 capsule by mouth every week 4/1/21  Yes Tameka Gilbert MD   simvastatin (ZOCOR) 40 MG tablet take 1 tablet by mouth at bedtime 3/29/21  Yes IVONNE Nunez CNP   EPINEPHrine (EPIPEN) 0.3 MG/0.3ML SOAJ injection use as directed BY PRESCRIBER FOR ALLERGIC REACTION 3/28/21  Yes IVONNE Nunez CNP   ibandronate (BONIVA) 150 MG tablet Take 1 tablet by mouth every 30 days Take one (1) tablet once per month in the morning with a full glass of water, on an empty stomach, and do not take anything else by mouth or lie down for the next 30 minutes.  3/11/21  Yes Tameka Gilbert MD   lidocaine (LMX) 4 % cream apply topically every 8 hours AS NEEDED FOR PAIN 11/18/20  Yes Tameka Gilbert MD   topiramate (TOPAMAX) 50 MG tablet Take 50 mg by mouth daily  1/12/20  Yes Historical Provider, MD   montelukast (SINGULAIR) 10 MG tablet Take 10 mg by mouth nightly  12/27/19  Yes Historical Provider, MD   albuterol (PROVENTIL) (2.5 MG/3ML) 0.083% nebulizer solution Take 3 mLs by nebulization 4 times daily 11/21/17  Yes Zak Epperson MD   albuterol sulfate  (90 Base) MCG/ACT inhaler Inhale 2 puffs into the lungs every 4 hours as needed for Wheezing 11/21/17  Yes Zak Epperson MD   fluticasone [facility]) 50 MCG/ACT nasal spray 2 sprays by Nasal route daily 11/21/17  Yes Zak Epperson MD   butalbital-acetaminophen-caffeine (FIORICET, ESGIC) 74717-69 MG per tablet Take 1 tablet by mouth every 4 hours as needed for Headaches   Yes Historical Provider, MD   Biotin 1000 MCG TABS Take 1,000 mcg by mouth daily    Yes Historical Provider, MD   citalopram (CELEXA) 20 MG tablet Take 20 mg by mouth daily   Yes Historical Provider, MD   LATUDA 80 MG TABS tablet Take 80 mg by mouth nightly 9/29/16  Yes Historical Provider, MD   Blood Pressure KIT Dx: HTN. Needs automatic blood pressure machine to monitor her blood pressure. 6/16/21   Khris Bridges MD   Elastic Bandages & Supports (MEDICAL COMPRESSION STOCKINGS) [address] 1 each by Does not apply route daily Keep pressure between 20 -30mm 8/19/20   Khris Bridges MD   Elastic Bandages & Supports (ACE ELBOW BRACE LARGE/X-LARGE) MISC Use daily for elbow pain 2/27/19   Khris Bridges MD        Allergies:     Advil [ibuprofen], Aleve [naproxen], Antipyrine, Celecoxib, Codeine, Fomepizole, Other, Rofecoxib, Salicylates, Strawberry extract, Sulfinpyrazone, Aspirin, and Nsaids    Social History:     Tobacco:    reports that she quit smoking about 2 years ago. Her smoking use included cigarettes. She has a 84.00 pack-year smoking history. She has never used smokeless tobacco.  Alcohol:      reports current alcohol use. Drug Use:  reports no history of drug use. Family History:     Family History   Problem Relation Age of Onset    Dementia Maternal Aunt     Kidney Disease Mother     Heart Attack Sister     Prostate Cancer Father     High Cholesterol Brother     Heart Attack Paternal Grandmother        Review of Systems:     Positive and Negative as described in HPI. Review of Systems   Constitutional: Negative for appetite change, fatigue and fever. HENT: Negative for ear pain, rhinorrhea and sore throat. Eyes: Negative for discharge and visual disturbance. Respiratory: Positive for shortness of breath. Negative for cough. Cardiovascular: Positive for leg swelling. Negative for chest pain and palpitations. Gastrointestinal: Negative for abdominal pain, constipation, diarrhea, nausea and vomiting. Endocrine: Negative for polyuria. Genitourinary: Negative for dysuria. Musculoskeletal: Negative for arthralgias. Skin: Negative for rash. Neurological: Negative for dizziness, weakness, light-headedness and headaches. Psychiatric/Behavioral: Negative for agitation. Physical Exam:   BP (!) 162/77   Pulse 83   Temp 98.1 °F (36.7 °C)   Resp 22   Ht 5' 2\" (1.575 m)   Wt 147 lb (66.7 kg)   LMP 2013 (Exact Date)   SpO2 99%   BMI 26.89 kg/m²   Temp (24hrs), Av.1 °F (36.7 °C), Min:98.1 °F (36.7 °C), Max:98.1 °F (36.7 °C)    No results for input(s): POCGLU in the last 72 hours. No intake or output data in the 24 hours ending 21 1712    Physical Exam  Vitals reviewed. Constitutional:       General: She is not in acute distress. HENT:      Nose: No congestion or rhinorrhea. Comments: Nasal cannula in place. Mouth/Throat:      Mouth: Mucous membranes are moist.   Eyes:      Conjunctiva/sclera: Conjunctivae normal.   Cardiovascular:      Rate and Rhythm: Normal rate and regular rhythm. Heart sounds: Normal heart sounds. No murmur heard. No friction rub. Pulmonary:      Breath sounds: Normal breath sounds. No wheezing or rales. Abdominal:      General: Bowel sounds are normal.      Palpations: Abdomen is soft. Tenderness: There is no abdominal tenderness. There is no guarding or rebound. Musculoskeletal:         General: No swelling or tenderness. Normal range of motion. Right lower leg: Edema present. Left lower leg: No edema. Skin:     Findings: No rash. Neurological:      Mental Status: She is alert and oriented to person, place, and time. Psychiatric:         Mood and Affect: Mood is anxious. Speech: Speech normal.         Behavior: Behavior is cooperative. Thought Content: Thought content normal. Thought content does not include homicidal or suicidal ideation.          Investigations:     Laboratory Testing:  Recent Results (from the past 24 hour(s))   Troponin    Collection Time: 06/17/21  1:15 PM   Result Value Ref Range    Troponin, High Sensitivity 9 0 - 14 ng/L    Troponin T NOT REPORTED <0.03 ng/mL    Troponin Interp NOT REPORTED    CBC Auto Differential    Collection Time: 06/17/21  1:15 PM   Result Value Ref Range    WBC 3.9 3.5 - 11.0 k/uL    RBC 3.80 (L) 4.0 - 5.2 m/uL    Hemoglobin 12.1 12.0 - 16.0 g/dL    Hematocrit 37.7 36 - 46 %    MCV 99.3 80 - 100 fL    MCH 31.7 26 - 34 pg    MCHC 32.0 31 - 37 g/dL    RDW 14.1 11.5 - 14.9 %    Platelets 677 164 - 472 k/uL    MPV 7.3 6.0 - 12.0 fL    NRBC Automated NOT REPORTED per 100 WBC    Differential Type NOT REPORTED     Seg Neutrophils 73 (H) 36 - 66 %    Lymphocytes 10 (L) 24 - 44 %    Monocytes 14 (H) 1 - 7 %    Eosinophils % 2 0 - 4 %    Basophils 1 0 - 2 %    Immature Granulocytes NOT REPORTED 0 %    Segs Absolute 2.90 1.3 - 9.1 k/uL    Absolute Lymph # 0.40 (L) 1.0 - 4.8 k/uL    Absolute Mono # 0.50 0.1 - 1.3 k/uL    Absolute Eos # 0.10 0.0 - 0.4 k/uL    Basophils Absolute 0.00 0.0 - 0.2 k/uL    Absolute Immature Granulocyte NOT REPORTED 0.00 - 0.30 k/uL    WBC Morphology NOT REPORTED     RBC Morphology NOT REPORTED     Platelet Estimate NOT REPORTED    Basic Metabolic Panel    Collection Time: 06/17/21  1:15 PM   Result Value Ref Range    Glucose 107 (H) 70 - 99 mg/dL    BUN 11 8 - 23 mg/dL    CREATININE 0.65 0.50 - 0.90 mg/dL    Bun/Cre Ratio NOT REPORTED 9 - 20    Calcium 8.9 8.6 - 10.4 mg/dL    Sodium 143 135 - 144 mmol/L    Potassium 4.3 3.7 - 5.3 mmol/L    Chloride 110 (H) 98 - 107 mmol/L    CO2 27 20 - 31 mmol/L    Anion Gap 6 (L) 9 - 17 mmol/L    GFR Non-African American >60 >60 mL/min    GFR African American >60 >60 mL/min    GFR Comment          GFR Staging NOT REPORTED    Brain Natriuretic Peptide    Collection Time: 06/17/21  1:15 PM   Result Value Ref Range    Pro- (H) <300 pg/mL    BNP Interpretation Pro-BNP Reference Range:    TOX SCR, BLD, ED Collection Time: 06/17/21  1:15 PM   Result Value Ref Range    Acetaminophen Level <5 (L) 10 - 30 ug/mL    Ethanol <10 <10 mg/dL    Ethanol percent <1.650 %    Salicylate Lvl <1 (L) 3 - 10 mg/dL    Toxic Tricyclic Sc,Blood WRONG TEST ORDERED NEGATIVE   EKG 12 Lead    Collection Time: 06/17/21  1:28 PM   Result Value Ref Range    Ventricular Rate 78 BPM    Atrial Rate 78 BPM    P-R Interval 144 ms    QRS Duration 84 ms    Q-T Interval 374 ms    QTc Calculation (Bazett) 426 ms    P Axis 76 degrees    R Axis -95 degrees    T Axis 49 degrees   Blood Gas, Venous    Collection Time: 06/17/21  1:30 PM   Result Value Ref Range    pH, Louis 7.362 7.320 - 7.420    pCO2, Louis 48.4 39.0 - 55.0    pO2, Louis 26.0 (L) 30 - 50    HCO3, Venous 27.5 24.0 - 30.0 mmol/L    Positive Base Excess, Louis 2.1 (H) 0.0 - 2.0 mmol/L    Negative Base Excess, Louis NOT REPORTED 0.0 - 2.0 mmol/L    O2 Sat, Louis 47.3 (L) 60.0 - 85.0 %    Total Hb NOT REPORTED 12.0 - 16.0 g/dl    Oxyhemoglobin NOT REPORTED 95.0 - 98.0 %    Carboxyhemoglobin 2.9 0 - 5 %    Methemoglobin 0.0 0.0 - 1.9 %    Pt Temp 37.0     pH, Louis, Temp Adj NOT REPORTED 7.320 - 7.420    pCO2, Louis, Temp Adj NOT REPORTED 39.0 - 55.0 mmHg    pO2, Louis, Temp Adj NOT REPORTED 30.0 - 50.0 mmHg    O2 Device/Flow/% NOT REPORTED     Respiratory Rate NOT REPORTED     Nicolas Test NA     Sample Site NA     Pt.  Position NOT REPORTED     Mode NOT REPORTED     Set Rate NOT REPORTED     Total Rate NOT REPORTED     VT NOT REPORTED     FIO2 NOT REPORTED     Peep/Cpap NOT REPORTED     PSV NOT REPORTED     Text for Respiratory NOT REPORTED     NOTIFICATION NOT REPORTED     NOTIFICATION TIME NOT REPORTED    Troponin    Collection Time: 06/17/21  3:20 PM   Result Value Ref Range    Troponin, High Sensitivity 9 0 - 14 ng/L    Troponin T NOT REPORTED <0.03 ng/mL    Troponin Interp NOT REPORTED        Imaging/Diagnostics:  CT HEAD WO CONTRAST    Result Date: 6/17/2021  EXAMINATION: CT OF THE HEAD WITHOUT CONTRAST abnormalities. The hilar structures are normal.     No acute cardiopulmonary disease     CT CHEST PULMONARY EMBOLISM W CONTRAST    Result Date: 6/17/2021  EXAMINATION: CTA OF THE CHEST 6/17/2021 2:11 pm TECHNIQUE: CTA of the chest was performed after the administration of intravenous contrast.  Multiplanar reformatted images are provided for review. MIP images are provided for review. Dose modulation, iterative reconstruction, and/or weight based adjustment of the mA/kV was utilized to reduce the radiation dose to as low as reasonably achievable. COMPARISON: 09/09/2020, 07/25/2019 HISTORY: ORDERING SYSTEM PROVIDED HISTORY: SOB TECHNOLOGIST PROVIDED HISTORY: SOB Decision Support Exception - unselect if not a suspected or confirmed emergency medical condition->Emergency Medical Condition (MA) Reason for Exam: SOB FINDINGS: Pulmonary Arteries: Pulmonary arteries are adequately opacified for evaluation. Emboli in the right main pulmonary artery as well as lobar, segmental and subsegmental branches of the right middle and upper lobes. Similar dilatation of the main pulmonary artery to 3.3 cm. . Mediastinum: No evidence of mediastinal lymphadenopathy. The heart and pericardium demonstrate no acute abnormality. There is no acute abnormality of the thoracic aorta. Lungs/pleura: The lungs are without acute process. No focal consolidation or pulmonary edema. No evidence of pleural effusion or pneumothorax. Moderate emphysema. Ill-defined 1.9 cm solid nodular opacity of the superior segment of the right lower lobe on series 4, image 46 is not significantly changed since 2019. Upper Abdomen: Limited images of the upper abdomen are unremarkable. Soft Tissues/Bones: No acute bone or soft tissue abnormality. Diffuse osteopenia with multilevel degenerative disc disease.   Similar compression deformities at T5 and T6.     1. Emboli in the right main pulmonary artery as well as lobar, segmental and subsegmental branches of the not provided in a hospital setting. Emily Torres MD  6/17/2021  5:12 PM    Copy sent to Dr. Georgi Brantley MD    Attending Physician Statement  I have discussed the care of Virginia Cormier and I have examined the patient myselft and taken ros and hpi , including pertinent history and exam findings,  with the resident. I have reviewed the key elements of all parts of the encounter with the resident. I agree with the assessment, plan and orders as documented by the resident.   pulm artery artery  No rv strain  eliquis full dose transion with lovenox  Psych input for bipolar and auditory hallucination  Age approproate screen for cancer   Out pt mamogram  And stool occult blood  Ok to [employer]      Electronically signed by Arnav Dalton MD

## 2021-06-17 NOTE — PROGRESS NOTES
Medication History completed:    New medications: none    Medications discontinued: vitamin B12, nicotine gum, guaifenesin liquid, Trelegy ellipta, Ayr nasal spray, alaway ophthalmic solution    Changes to dosing:   Topiramate changed to 50 mg in the morning and 100 mg (two 50 mg tablets) in the evening    Stated allergies: As listed    Other pertinent information: Medications confirmed with [facility].      Thank you,  Richie Oliver, PharmD, BCPS  [phone]

## 2021-06-17 NOTE — ED PROVIDER NOTES
[address]  EMERGENCY DEPARTMENT ENCOUNTER    Pt Name: Jeraldine Scheuermann  MRN: [id number]  [place]: 1955  Date of evaluation:6/17/21  PCP: Khris Bridges MD    [address] [place] [address]       Chief Complaint   Patient presents with    Mental Health Problem     Pt reports that she is paranoid but unable to explain why or what exactly she is feeling. Pt denies SI/HI.  Leg Swelling     Bilateral lower extremity swelling; pt stated the right leg is worse than the left. Pt is ambulatory per self.  Other     SOB with exertion - hx of COPD - on 2L continuously. HISTORY OF PRESENT ILLNESS    Jeraldine Scheuermann is a 61 y.o. female who presents with a chief complaint of leg swelling and shortness of breath. Patient states for over about the past 2 weeks she has been more short of breath than her baseline and having worsening swelling in her lower extremities. Symptoms are worse with exertion. Rest does improve her symptoms. She is supposed to be on 2 L of oxygen continuously at home secondary to her COPD. She tells me she has been wearing it as prescribed. Also states she has been paranoid. She cannot really explain what she means by this. Denies thoughts of suicide or homicide. No recent fevers, chills other illnesses. No chest pain or cough. She also reports decreased appetite, decreased urination and constipation. Symptoms are acute on chronic. Symptoms are moderate. Nothing else make symptoms better or worse. Patient has no other complaints at this time. REVIEW OF SYSTEMS       Review of Systems   Constitutional: Positive for activity change, appetite change and fatigue. Negative for chills and fever. HENT: Negative for congestion, ear pain, sore throat and trouble swallowing. Eyes: Negative for visual disturbance. Respiratory: Positive for shortness of breath. Negative for cough. Cardiovascular: Positive for leg swelling. Negative for chest pain and palpitations. Gastrointestinal: Negative for abdominal pain, blood in stool, constipation, diarrhea, nausea and vomiting. Genitourinary: Negative for dysuria and flank pain. Musculoskeletal: Negative for arthralgias, back pain, myalgias and neck pain. Skin: Negative for color change, rash and wound. Neurological: Negative for dizziness, weakness, light-headedness, numbness and headaches. Psychiatric/Behavioral: Negative for confusion and suicidal ideas. The patient is nervous/anxious. All other systems reviewed and are negative. Negative in 10 essential Systems except as mentioned above and in the HPI. PAST MEDICAL HISTORY     Past Medical History:   Diagnosis Date    Abnormal EKG     Anxiety     Asthma     Bipolar disorder ([facility]     SEVERE [place] , [place]    COPD (chronic obstructive pulmonary disease) (HCC)     Cramps, extremity     SEVERE LEG CRAMPS    Depression     Dilated bile duct     Headache     History of elective      Hyperlipidemia     Irritable bowel syndrome     Prolonged emergence from general anesthesia     SEVERE ISSUES WAKING UP    Substance abuse ([facility]     street drugs when younger    Unspecified sleep apnea     Vision abnormalities     glasses         SURGICAL HISTORY      has a past surgical history that includes Tonsillectomy and adenoidectomy (Bilateral); LASIK; Induced ; Ankle surgery; eye surgery (Bilateral); Vulva surgery; hysteroscopy (10/19/2016); and Colonoscopy (02/15/2018).       CURRENT MEDICATIONS       Previous Medications    ACETAMINOPHEN (ACETAMINOPHEN EXTRA STRENGTH) 500 MG TABLET    take 2 tablets by mouth every 6 hours AS NEEDED FOR PAIN    ALAWAY 0.025 % OPHTHALMIC SOLUTION    INSTILL 1 DROP TWO TIMES A DAY INTO LEFT EYE ONLY    ALBUTEROL (PROVENTIL) (2.5 MG/3ML) 0.083% NEBULIZER SOLUTION    Take 3 mLs by nebulization 4 times daily    ALBUTEROL SULFATE  (90 BASE) MCG/ACT INHALER    Inhale 2 puffs into the lungs every 4 hours as needed for Wheezing    AYR 0.65 % NASAL SPRAY    instill 2 sprays into each nostril four times a day    BIOTIN 5000 MCG CAPS    Take 1,000 mcg by mouth     BLOOD PRESSURE KIT    Dx: HTN. Needs automatic blood pressure machine to monitor her blood pressure. BREO ELLIPTA 100-25 MCG/INH AEPB INHALER    inhale 1 puff by mouth and INTO THE LUNGS once daily    BUTALBITAL-ACETAMINOPHEN-CAFFEINE (FIORICET, ESGIC) -40 MG PER TABLET    Take 1 tablet by mouth every 4 hours as needed for Headaches    CITALOPRAM (CELEXA) 20 MG TABLET    Take 20 mg by mouth daily    ELASTIC BANDAGES & SUPPORTS (ACE ELBOW BRACE LARGE/X-LARGE) MISC    Use daily for elbow pain    ELASTIC BANDAGES & SUPPORTS (MEDICAL COMPRESSION STOCKINGS) MISC    1 each by Does not apply route daily Keep pressure between 20 -30mm    EPINEPHRINE (EPIPEN) 0.3 MG/0.3ML SOAJ INJECTION    use as directed BY PRESCRIBER FOR ALLERGIC REACTION    FLUTICASONE (FLONASE) 50 MCG/ACT NASAL SPRAY    2 sprays by Nasal route daily    FLUTICASONE-UMECLIDIN-VILANT (TRELEGY ELLIPTA) 100-62.5-25 MCG/INH AEPB    Inhale 1 puff into the lungs daily    FUROSEMIDE (LASIX) 20 MG TABLET    Take 0.5 tablets by mouth daily    GUAIFENESIN (ROBITUSSIN) 100 MG/5ML LIQUID    take 10 milliliters every 4 hours if needed for cough    IBANDRONATE (BONIVA) 150 MG TABLET    Take 1 tablet by mouth every 30 days Take one (1) tablet once per month in the morning with a full glass of water, on an empty stomach, and do not take anything else by mouth or lie down for the next 30 minutes.     LATUDA 80 MG TABS TABLET    Take 80 mg by mouth nightly    LIDOCAINE (LMX) 4 % CREAM    apply topically every 8 hours AS NEEDED FOR PAIN    MONTELUKAST (SINGULAIR) 10 MG TABLET        NICOTINE POLACRILEX (NICORETTE) 2 MG GUM    Take 2 mg by mouth as needed for Smoking cessation    RA VITAMIN B-12 TR 1000 MCG TBCR    take 1 tablet by mouth once daily    SIMVASTATIN (ZOCOR) 40 MG TABLET    take 1 tablet by mouth at bedtime    SPIRIVA RESPIMAT 2.5 MCG/ACT AERS INHALER    inhale 2 puffs by mouth once daily    TOPIRAMATE (TOPAMAX) 50 MG TABLET        VITAMIN D (ERGOCALCIFEROL) 1.25 MG (49963 UT) CAPS CAPSULE    take 1 capsule by mouth every week       ALLERGIES     is allergic to advil [ibuprofen], aleve [naproxen], antipyrine, celecoxib, codeine, fomepizole, other, rofecoxib, salicylates, strawberry extract, sulfinpyrazone, aspirin, and nsaids. FAMILY HISTORY     She indicated that her mother is . She indicated that her father is . She indicated that all of her three sisters are alive. She indicated that her brother is alive. She indicated that her maternal grandmother is . She indicated that her maternal grandfather is . She indicated that her paternal grandmother is . She indicated that her paternal grandfather is . She indicated that her maternal aunt is . family history includes Dementia in her maternal aunt; Heart Attack in her paternal grandmother and sister; High Cholesterol in her brother; Kidney Disease in her mother; Prostate Cancer in her father. SOCIAL HISTORY      reports that she quit smoking about 2 years ago. Her smoking use included cigarettes. She has a 84.00 pack-year smoking history. She has never used smokeless tobacco. She reports current alcohol use. She reports that she does not use drugs. PHYSICAL EXAM     INITIAL VITALS:  height is 5' 2\" (1.575 m) and weight is 147 lb (66.7 kg). Her temperature is 98.1 °F (36.7 °C). Her blood pressure is 138/68 and her pulse is 76. Her respiration is 20 and oxygen saturation is 99%. Physical Exam  Vitals and nursing note reviewed. Constitutional:       General: She is not in acute distress. HENT:      Head: Normocephalic and atraumatic. Eyes:      Conjunctiva/sclera: Conjunctivae normal.      Pupils: Pupils are equal, round, and reactive to light.    Cardiovascular:      Rate and Rhythm: Normal rate and regular rhythm. Heart sounds: Normal heart sounds. No murmur heard. Pulmonary:      Effort: Pulmonary effort is normal. Tachypnea present. No respiratory distress. Breath sounds: Normal breath sounds. Abdominal:      General: Bowel sounds are normal. There is no distension. Palpations: Abdomen is soft. Tenderness: There is no abdominal tenderness. Musculoskeletal:         General: No tenderness. Cervical back: Neck supple. Right lower leg: Edema present. Left lower leg: Edema present. Lymphadenopathy:      Cervical: No cervical adenopathy. Skin:     General: Skin is warm and dry. Findings: No rash. Neurological:      Mental Status: She is alert and oriented to person, place, and time. Psychiatric:         Judgment: Judgment normal.           DIFFERENTIAL DIAGNOSIS/MDM:   72-year-old female presents with several weeks of increased shortness of breath and fluid overload. She is afebrile, nontoxic, tachypneic but otherwise vital signs normal.  Oxygen saturation is 98 to 99% on 2 L nasal cannula. She does have 2+ edema in bilateral lower extremities. They appear equal to me. No skin changes noted. Lower suspicion for DVT with bilateral symptoms. She is paranoid but has no thoughts of suicidal or homicidal ideations. We will get cardiac work-up, electrolytes, chest x-ray, reassess. Differential diagnosis is broad includes pneumonia, COPD exacerbation, fluid overload, PE, anxiety.     DIAGNOSTIC RESULTS     EKG: All EKG's are interpreted by the Emergency Department Physician who either signs or Co-signs this chart in the absence of a cardiologist.    EKG Interpretation    Interpreted by me    Rhythm: normal sinus   Rate: normal  Axis: Right  Ectopy: none  Conduction: normal  ST Segments: no acute change  T Waves: no acute change  Q Waves: none    Clinical Impression: Nonspecific EKG      RADIOLOGY:   I directly visualized the following  images and reviewed the radiologist interpretations:  CT CHEST PULMONARY EMBOLISM W CONTRAST   Final Result   1. Emboli in the right main pulmonary artery as well as lobar, segmental and   subsegmental branches of the right middle and upper lobes. No evidence of   right heart strain. 2. Ill-defined 1.9 cm solid nodular opacity of the superior segment of the   right lower lobe is not significantly changed since 2019. Recommend   attention on continued lung cancer screening. CT HEAD WO CONTRAST   Final Result   No acute intracranial abnormality. Negative CT of the head.          XR CHEST PORTABLE   Final Result   No acute cardiopulmonary disease                 ED BEDSIDE ULTRASOUND:      LABS:  Labs Reviewed   CBC WITH AUTO DIFFERENTIAL - Abnormal; Notable for the following components:       Result Value    RBC 3.80 (*)     Seg Neutrophils 73 (*)     Lymphocytes 10 (*)     Monocytes 14 (*)     Absolute Lymph # 0.40 (*)     All other components within normal limits   BASIC METABOLIC PANEL - Abnormal; Notable for the following components:    Glucose 107 (*)     Chloride 110 (*)     Anion Gap 6 (*)     All other components within normal limits   BRAIN NATRIURETIC PEPTIDE - Abnormal; Notable for the following components:    Pro- (*)     All other components within normal limits   BLOOD GAS, VENOUS - Abnormal; Notable for the following components:    pO2, Louis 26.0 (*)     Positive Base Excess, Louis 2.1 (*)     O2 Sat, Louis 47.3 (*)     All other components within normal limits   TOX SCR, BLD, ED - Abnormal; Notable for the following components:    Acetaminophen Level <5 (*)     Salicylate Lvl <1 (*)     All other components within normal limits   TROPONIN   TROPONIN   URINE RT REFLEX TO CULTURE   URINE DRUG SCREEN         EMERGENCY DEPARTMENT COURSE:   Vitals:    Vitals:    06/17/21 1345 06/17/21 1400 06/17/21 1415 06/17/21 1445   BP: 130/72 116/72 133/67 138/68   Pulse: 74 74 75 76   Resp: 22 22 18 20 Temp:       SpO2: 99% 98% 98% 99%   Weight:       Height:         2:59 PM EDT  Received call from reading radiologist the patient has a pulmonary embolus in the right main pulmonary artery as well as some distal clot burden. No evidence of heart strain. I spoke with Dr. Akilah Baer. He is actually familiar with the patient as she sees him for her COPD. He is recommending full dose Lovenox, IV steroids and would like the patient in intermediate ICU at least for today. 3:13 PM EDT  Spoke with Dr. Justyna Ford who accepted patient for admission. Spoke with residents will place admission orders to intermediate ICU. CRITICALCARE:      CONSULTS:  IP CONSULT TO INTERNAL MEDICINE  IP CONSULT TO PULMONOLOGY      PROCEDURES:      FINAL IMPRESSION      1. Pulmonary embolism on right ([address]    2. Paranoid behavior ([address]    3. Bilateral lower extremity edema            DISPOSITION/PLAN   DISPOSITION Decision To Admit 06/17/2021 02:52:26 PM          PATIENT REFERRED TO:  No follow-up provider specified.     DISCHARGE MEDICATIONS:  New Prescriptions    No medications on file       (Please note that portions ofthis note were completed with a voice recognition program.  Efforts were made to edit the dictations but occasionally words are mis-transcribed.)    Olga Lei, DO  Attending Emergency Physician          Olga Lei, DO  06/17/21 [facility], DO  06/17/21 153

## 2021-06-18 LAB
ABSOLUTE EOS #: 0 K/UL (ref 0–0.4)
ABSOLUTE IMMATURE GRANULOCYTE: ABNORMAL K/UL (ref 0–0.3)
ABSOLUTE LYMPH #: 0.13 K/UL (ref 1–4.8)
ABSOLUTE MONO #: 0.03 K/UL (ref 0.1–1.3)
ALBUMIN SERPL-MCNC: 3.6 G/DL (ref 3.5–5.2)
ALBUMIN/GLOBULIN RATIO: ABNORMAL (ref 1–2.5)
ALP BLD-CCNC: 86 U/L (ref 35–104)
ALT SERPL-CCNC: 13 U/L (ref 5–33)
ANION GAP SERPL CALCULATED.3IONS-SCNC: 8 MMOL/L (ref 9–17)
AST SERPL-CCNC: 14 U/L
BASOPHILS # BLD: 0 % (ref 0–2)
BASOPHILS ABSOLUTE: 0 K/UL (ref 0–0.2)
BILIRUB SERPL-MCNC: 0.18 MG/DL (ref 0.3–1.2)
BUN BLDV-MCNC: 12 MG/DL (ref 8–23)
BUN/CREAT BLD: ABNORMAL (ref 9–20)
CALCIUM SERPL-MCNC: 8.4 MG/DL (ref 8.6–10.4)
CARCINOEMBRYONIC ANTIGEN: 6.4 NG/ML
CHLORIDE BLD-SCNC: 107 MMOL/L (ref 98–107)
CO2: 23 MMOL/L (ref 20–31)
CREAT SERPL-MCNC: 0.47 MG/DL (ref 0.5–0.9)
DIFFERENTIAL TYPE: ABNORMAL
EOSINOPHILS RELATIVE PERCENT: 0 % (ref 0–4)
GFR AFRICAN AMERICAN: >60 ML/MIN
GFR NON-AFRICAN AMERICAN: >60 ML/MIN
GFR SERPL CREATININE-BSD FRML MDRD: ABNORMAL ML/MIN/{1.73_M2}
GFR SERPL CREATININE-BSD FRML MDRD: ABNORMAL ML/MIN/{1.73_M2}
GLUCOSE BLD-MCNC: 137 MG/DL (ref 70–99)
HCT VFR BLD CALC: 36.7 % (ref 36–46)
HEMOGLOBIN: 11.8 G/DL (ref 12–16)
IMMATURE GRANULOCYTES: ABNORMAL %
INR BLD: 1.1
LV EF: 68 %
LVEF MODALITY: NORMAL
LYMPHOCYTES # BLD: 4 % (ref 24–44)
MCH RBC QN AUTO: 31.9 PG (ref 26–34)
MCHC RBC AUTO-ENTMCNC: 32.3 G/DL (ref 31–37)
MCV RBC AUTO: 98.9 FL (ref 80–100)
MONOCYTES # BLD: 1 % (ref 1–7)
MORPHOLOGY: NORMAL
NRBC AUTOMATED: ABNORMAL PER 100 WBC
PARTIAL THROMBOPLASTIN TIME: 37.3 SEC (ref 24–36)
PDW BLD-RTO: 13.8 % (ref 11.5–14.9)
PLATELET # BLD: 176 K/UL (ref 150–450)
PLATELET ESTIMATE: ABNORMAL
PMV BLD AUTO: 7.1 FL (ref 6–12)
POTASSIUM SERPL-SCNC: 4.3 MMOL/L (ref 3.7–5.3)
PROTHROMBIN TIME: 14.1 SEC (ref 11.8–14.6)
RBC # BLD: 3.71 M/UL (ref 4–5.2)
RBC # BLD: ABNORMAL 10*6/UL
SEG NEUTROPHILS: 95 % (ref 36–66)
SEGMENTED NEUTROPHILS ABSOLUTE COUNT: 3.14 K/UL (ref 1.3–9.1)
SODIUM BLD-SCNC: 138 MMOL/L (ref 135–144)
TOTAL PROTEIN: 6 G/DL (ref 6.4–8.3)
WBC # BLD: 3.3 K/UL (ref 3.5–11)
WBC # BLD: ABNORMAL 10*3/UL

## 2021-06-18 PROCEDURE — 6370000000 HC RX 637 (ALT 250 FOR IP): Performed by: STUDENT IN AN ORGANIZED HEALTH CARE EDUCATION/TRAINING PROGRAM

## 2021-06-18 PROCEDURE — 2700000000 HC OXYGEN THERAPY PER DAY

## 2021-06-18 PROCEDURE — 93970 EXTREMITY STUDY: CPT

## 2021-06-18 PROCEDURE — 6360000002 HC RX W HCPCS: Performed by: INTERNAL MEDICINE

## 2021-06-18 PROCEDURE — 94664 DEMO&/EVAL PT USE INHALER: CPT

## 2021-06-18 PROCEDURE — 93010 ELECTROCARDIOGRAM REPORT: CPT | Performed by: INTERNAL MEDICINE

## 2021-06-18 PROCEDURE — 99233 SBSQ HOSP IP/OBS HIGH 50: CPT | Performed by: INTERNAL MEDICINE

## 2021-06-18 PROCEDURE — 6360000002 HC RX W HCPCS: Performed by: STUDENT IN AN ORGANIZED HEALTH CARE EDUCATION/TRAINING PROGRAM

## 2021-06-18 PROCEDURE — 82378 CARCINOEMBRYONIC ANTIGEN: CPT

## 2021-06-18 PROCEDURE — 2580000003 HC RX 258: Performed by: STUDENT IN AN ORGANIZED HEALTH CARE EDUCATION/TRAINING PROGRAM

## 2021-06-18 PROCEDURE — 94761 N-INVAS EAR/PLS OXIMETRY MLT: CPT

## 2021-06-18 PROCEDURE — 93306 TTE W/DOPPLER COMPLETE: CPT

## 2021-06-18 PROCEDURE — 80053 COMPREHEN METABOLIC PANEL: CPT

## 2021-06-18 PROCEDURE — 36415 COLL VENOUS BLD VENIPUNCTURE: CPT

## 2021-06-18 PROCEDURE — 85730 THROMBOPLASTIN TIME PARTIAL: CPT

## 2021-06-18 PROCEDURE — 85025 COMPLETE CBC W/AUTO DIFF WBC: CPT

## 2021-06-18 PROCEDURE — 94640 AIRWAY INHALATION TREATMENT: CPT

## 2021-06-18 PROCEDURE — 2060000000 HC ICU INTERMEDIATE R&B

## 2021-06-18 PROCEDURE — 85610 PROTHROMBIN TIME: CPT

## 2021-06-18 RX ORDER — METHYLPREDNISOLONE SODIUM SUCCINATE 40 MG/ML
40 INJECTION, POWDER, LYOPHILIZED, FOR SOLUTION INTRAMUSCULAR; INTRAVENOUS EVERY 12 HOURS
Status: DISCONTINUED | OUTPATIENT
Start: 2021-06-18 | End: 2021-06-19

## 2021-06-18 RX ORDER — ALBUTEROL SULFATE 2.5 MG/3ML
2.5 SOLUTION RESPIRATORY (INHALATION) EVERY 4 HOURS
Status: DISCONTINUED | OUTPATIENT
Start: 2021-06-18 | End: 2021-06-19 | Stop reason: HOSPADM

## 2021-06-18 RX ADMIN — METHYLPREDNISOLONE SODIUM SUCCINATE 40 MG: 40 INJECTION, POWDER, FOR SOLUTION INTRAMUSCULAR; INTRAVENOUS at 21:23

## 2021-06-18 RX ADMIN — BUDESONIDE AND FORMOTEROL FUMARATE DIHYDRATE 2 PUFF: 160; 4.5 AEROSOL RESPIRATORY (INHALATION) at 19:51

## 2021-06-18 RX ADMIN — TOPIRAMATE 100 MG: 100 TABLET, FILM COATED ORAL at 19:50

## 2021-06-18 RX ADMIN — ALBUTEROL SULFATE 2.5 MG: 2.5 SOLUTION RESPIRATORY (INHALATION) at 12:19

## 2021-06-18 RX ADMIN — TOPIRAMATE 50 MG: 50 TABLET, FILM COATED ORAL at 08:49

## 2021-06-18 RX ADMIN — TIOTROPIUM BROMIDE INHALATION SPRAY 2 PUFF: 3.12 SPRAY, METERED RESPIRATORY (INHALATION) at 08:50

## 2021-06-18 RX ADMIN — ALBUTEROL SULFATE 2.5 MG: 2.5 SOLUTION RESPIRATORY (INHALATION) at 23:09

## 2021-06-18 RX ADMIN — APIXABAN 10 MG: 5 TABLET, FILM COATED ORAL at 19:50

## 2021-06-18 RX ADMIN — PANTOPRAZOLE SODIUM 40 MG: 40 TABLET, DELAYED RELEASE ORAL at 06:19

## 2021-06-18 RX ADMIN — MONTELUKAST 10 MG: 10 TABLET, FILM COATED ORAL at 19:50

## 2021-06-18 RX ADMIN — BUTALBITA,ACETAMINOPHEN AND CAFFEINE 1 CAPSULE: 50; 300; 40 CAPSULE ORAL at 18:49

## 2021-06-18 RX ADMIN — ATORVASTATIN CALCIUM 20 MG: 20 TABLET, FILM COATED ORAL at 19:50

## 2021-06-18 RX ADMIN — ALBUTEROL SULFATE 2.5 MG: 2.5 SOLUTION RESPIRATORY (INHALATION) at 15:10

## 2021-06-18 RX ADMIN — ALBUTEROL SULFATE 2.5 MG: 2.5 SOLUTION RESPIRATORY (INHALATION) at 19:22

## 2021-06-18 RX ADMIN — LURASIDONE HYDROCHLORIDE 80 MG: 80 TABLET, FILM COATED ORAL at 19:49

## 2021-06-18 RX ADMIN — METHYLPREDNISOLONE SODIUM SUCCINATE 40 MG: 40 INJECTION, POWDER, FOR SOLUTION INTRAMUSCULAR; INTRAVENOUS at 08:51

## 2021-06-18 RX ADMIN — SODIUM CHLORIDE, PRESERVATIVE FREE 10 ML: 5 INJECTION INTRAVENOUS at 08:51

## 2021-06-18 RX ADMIN — ENOXAPARIN SODIUM 70 MG: 80 INJECTION SUBCUTANEOUS at 08:49

## 2021-06-18 RX ADMIN — BUDESONIDE AND FORMOTEROL FUMARATE DIHYDRATE 2 PUFF: 160; 4.5 AEROSOL RESPIRATORY (INHALATION) at 08:50

## 2021-06-18 RX ADMIN — SODIUM CHLORIDE, PRESERVATIVE FREE 10 ML: 5 INJECTION INTRAVENOUS at 19:55

## 2021-06-18 RX ADMIN — METHYLPREDNISOLONE SODIUM SUCCINATE 40 MG: 40 INJECTION, POWDER, FOR SOLUTION INTRAMUSCULAR; INTRAVENOUS at 02:06

## 2021-06-18 ASSESSMENT — PAIN SCALES - GENERAL
PAINLEVEL_OUTOF10: 0

## 2021-06-18 ASSESSMENT — ENCOUNTER SYMPTOMS
NAUSEA: 0
ABDOMINAL PAIN: 0
EYE DISCHARGE: 0
COUGH: 0
SHORTNESS OF BREATH: 1
DIARRHEA: 0
CONSTIPATION: 0
VOMITING: 0
SORE THROAT: 0
RHINORRHEA: 0

## 2021-06-18 NOTE — PROGRESS NOTES
Physician Progress Note      PATIENT:               Janell Whiting  CSN #:                  [id number]  :                       1955  ADMIT DATE:       2021 12:49 PM  [address] [place] DATE:  RESPONDING  PROVIDER #:        Yakelin Mtz MD          QUERY TEXT:    Patient admitted with pulmonary embolism. Noted documentation of acute on   chronic respiratory failure in H&P on  and in pulmonary consult note from   . In order to support the diagnosis of acute respiratory failure, please   include additional clinical indicators in your documentation. Or please   document if the diagnosis of acute respiratory failure has been ruled out   after further study.     The medical record reflects the following:  Risk Factors: 67yo, Hx COPD, wears home o2 @ 2L  Clinical Indicators: RR17-28, pt satting % on 3L; per H/P--> pt positive   for SOB, normal breath sound, no wheezing; Per ED notes--> pulmonary effort   is normal, tachypnea present, no respiratory distress noted, normal breath   sounds; no noted documentation of use of accessory muscles or inability to   speak in complete sentences, no noted confusion; VBG pH 7.36 PO2 26 CO2 48.4  Treatment: 2-3L O2 via NC, pulm consult, respiratory care evaluation,   monitoring, hospital admission    Acute Respiratory Failure Clinical Indicators per [employer] [employer] Training Guide and   Quick Reference Guide:  pO2 < 60 mmHg or SpO2 (pulse oximetry) < 91% breathing room air  pCO2 > 50 and pH < 7.35  P/F ratio (pO2 / FIO2) < 300  pO2 decrease or pCO2 increase by 10 mmHg from baseline (if known)  Supplemental oxygen of 40% or more  Presence of respiratory distress, tachypnea, dyspnea, shortness of breath,   wheezing  Unable to speak in complete sentences  Use of accessory muscles to breathe  Extreme anxiety and feeling of impending doom  Tripod position  Confusion/altered mental status/obtunded  Options provided:  -- Acute Respiratory Failure as evidenced by, Please document evidence. -- Acute Respiratory Failure ruled out after study, chronic respiratory   failure only present. -- Other - I will add my own diagnosis  -- Disagree - Not applicable / Not valid  -- Disagree - Clinically unable to determine / Unknown  -- Refer to Clinical Documentation Reviewer    PROVIDER RESPONSE TEXT:    This patient is in acute respiratory failure as evidenced by When I saw her in   the ER, she was on 4 to 5 L nasal cannula. Normally she is just on 2-3. She   also told me that she increased her oxygen for the last several days   especially at nighttime.  She has chronic respiratory failure and wears oxygen   at home so therefore if she increase her oxygen because she was short of   breath, I felt that she had acute respiratory failure    Query created by: Jelani Galicia on 6/18/2021 10:39 AM      Electronically signed by:  Jenni Reyes MD 6/18/2021 10:44 AM

## 2021-06-18 NOTE — PROGRESS NOTES
Patient upon returning to the bed from the restroom expressed that she currently has a hemorrhoid and that there was some bleeding after having a bowel movement this time. I advised that patient to keep an eye on the bleeding as she is on blood thinners and to alert staff if the bleeding should increase. The patient was also able to get up and walk to the restroom with O2 in place. She did appear SOB after returning to bed and oxygen was increased form 2L to 3L for ten minutes before returning back down to 2L after patient caught her breath. Will continue to monitor.

## 2021-06-18 NOTE — PROGRESS NOTES
Resident rounded on patient and spoke with both her and her daughter regarding the care plan for this admission. All questions answered at this time. Writer discussed medications Solumedrol and Lovenox at this time with purpose of medication as well as side effects. Plan to convert Lovenox to Elaquis also discussed at this time. Patient is extremely pleasant and perceptive to education. Resident discussed possible transfer to PCU today if okay from pulmonary stand point. Patient does continue to express paranoia. This was relayed to resident team and orders were placed for inpatient psych to evaluate. Will continue to follow.

## 2021-06-18 NOTE — PROGRESS NOTES
ICU Progress Note (Non-Vent)  [facility]    Patient - Katya Yoo,  Age - 61 y.o.    - 1955      Room Number -    MRN -  [id number]   [facility]  Date of Admission -  2021 12:49 PM    Events of Past 24 Hours   Still dyspneic with exertion, requiring her to increase her oxygen when she moves; at rest, she is at her baseline 3 L nasal cannula saturating 97%    Vitals    height is 5' 2\" (1.575 m) and weight is 150 lb 5.7 oz (68.2 kg). Her oral temperature is 97.7 °F (36.5 °C). Her blood pressure is 109/80 and her pulse is 89. Her respiration is 22 and oxygen saturation is 95%.        Temperature Range: Temp: 97.7 °F (36.5 °C) Temp  Av.3 °F (36.8 °C)  Min: 97.7 °F (36.5 °C)  Max: 98.8 °F (37.1 °C)  BP Range:  Systolic (85NBE), TGF:159 , Min:108 , QCL:467     Diastolic (48YIR), VVN:51, Min:58, Max:107    Pulse Range: Pulse  Av.5  Min: 66  Max: 102  Respiration Range: Resp  Av.6  Min: 14  Max: 30  Current Pulse Ox[de-identified]  SpO2: 95 %  24HR Pulse Ox Range:  SpO2  Av.8 %  Min: 91 %  Max: 100 %  Oxygen Amount and Delivery: O2 Flow Rate (L/min): 2 L/min    Wt Readings from Last 3 Encounters:   21 150 lb 5.7 oz (68.2 kg)   21 147 lb (66.7 kg)   21 143 lb 3.2 oz (65 kg)     I/O       Intake/Output Summary (Last 24 hours) at 2021 1208  Last data filed at 2021 2990  Gross per 24 hour   Intake 480 ml   Output 1000 ml   Net -520 ml     DRAIN/TUBE OUTPUT       Invasive Lines   ICP PRESSURE RANGE  No data recorded  CVP PRESSURE RANGE  No data recorded      Medications      montelukast  10 mg Oral Nightly    lurasidone  80 mg Oral Nightly    sodium chloride flush  5-40 mL Intravenous 2 times per day    enoxaparin  1 mg/kg Subcutaneous BID    atorvastatin  20 mg Oral Nightly    topiramate  50 mg Oral Daily    topiramate  100 mg Oral Nightly    methylPREDNISolone  40 mg Intravenous Q8H    pantoprazole  40 mg Oral QAM AC    budesonide-formoterol  2 puff Inhalation BID    And    tiotropium  2 puff Inhalation Daily     perflutren lipid microspheres, sodium chloride flush, guaiFENesin, butalbital-APAP-caffeine, sodium chloride flush, sodium chloride, ondansetron **OR** ondansetron, polyethylene glycol, acetaminophen **OR** acetaminophen, potassium chloride, magnesium sulfate, nicotine polacrilex  IV Drips/Infusions   sodium chloride         Diet/Nutrition   ADULT DIET; Regular    Exam      Constitutional - Alert, arousable  General Appearance  well developed, well nourished  HEENT -normocephalic, atraumatic. PERRLA  Lungs - Chest expands equally, diminished with poor air movement   Cardiovascular - Heart sounds are normal.  normal rate and rhythm regular, no murmur, gallop or rub. Abdomen - soft, nontender, nondistended, no masses or organomegaly  Neurologic - CN II-XII are grossly intact.  There are no focal motor deficits  Skin - no bruising or bleeding  Extremities - no cyanosis, clubbing or edema    Lab Results   CBC     Lab Results   Component Value Date    WBC 3.3 06/18/2021    RBC 3.71 06/18/2021    HGB 11.8 06/18/2021    HCT 36.7 06/18/2021     06/18/2021    MCV 98.9 06/18/2021    MCH 31.9 06/18/2021    MCHC 32.3 06/18/2021    RDW 13.8 06/18/2021    LYMPHOPCT 4 06/18/2021    MONOPCT 1 06/18/2021    BASOPCT 0 06/18/2021    MONOSABS 0.03 06/18/2021    LYMPHSABS 0.13 06/18/2021    EOSABS 0.00 06/18/2021    BASOSABS 0.00 06/18/2021    DIFFTYPE NOT REPORTED 06/18/2021       BMP   Lab Results   Component Value Date     06/18/2021    K 4.3 06/18/2021     06/18/2021    CO2 23 06/18/2021    BUN 12 06/18/2021    CREATININE 0.47 06/18/2021    GLUCOSE 137 06/18/2021       LFTS  Lab Results   Component Value Date    ALKPHOS 86 06/18/2021    ALT 13 06/18/2021    AST 14 06/18/2021    PROT 6.0 06/18/2021    BILITOT 0.18 06/18/2021    BILIDIR 0.09 07/08/2019    IBILI 0.12 07/08/2019    LABALBU 3.6 06/18/2021       ABG ABGs:   Lab Results   Component Value Date    PHART 7.361 05/19/2016    PO2ART 67.1 05/19/2016    CQG7FOU 46.1 05/19/2016       Lab Results   Component Value Date    MODE NOT REPORTED 06/17/2021         INR  Recent Labs     06/18/21  0604   PROTIME 14.1   INR 1.1       APTT  Recent Labs     06/18/21  0604   APTT 37.3*       Lactic Acid  No results found for: LACTA     BNP   No results for input(s): BNP in the last 72 hours.      Cultures       Radiology     CXR      CT Scans    (See actual reports for details)      SYSTEMS ASSESSMENT    Acute pulmonary embolism  Acute on chronic hypoxic respiratory failure-improved  Stage IV COPD  History of tobacco use  Right lower lobe nodule, stable    Agree with transition to Eliquis  Decrease Solu-Medrol to 40 every 12  Still awaiting results of echo and venous Dopplers  Okay to transfer to stepdown  Needs mobilization  Discussed with her again smoking cessation        Electronically signed by Devika Varela MD on 6/18/2021 at 12:08 PM

## 2021-06-18 NOTE — PROGRESS NOTES
[address]    PROGRESS NOTE             6/18/2021    8:17 AM    Name:   Agnes Ford  MRN:     [id number]     [place]:      [de-identified]   Room:   2012/2012-01  IP Day:  1  Admit Date:  6/17/2021 12:49 PM    PCP:  Anita Go MD  Code Status:  Full Code    Subjective:     C/C:   Chief Complaint   Patient presents with    Mental Health Problem     Pt reports that she is paranoid but unable to explain why or what exactly she is feeling. Pt denies SI/HI.  Leg Swelling     Bilateral lower extremity swelling; pt stated the right leg is worse than the left. Pt is ambulatory per self.  Other     SOB with exertion - hx of COPD - on 2L continuously. Interval History Status: improved. No acute events overnight. Patient seen and examined at bedside. Nurse at bedside. Daughter at bedside. Patient sitting comfortably in bed on 3 L oxygen nasal cannula. Patient reports no worsening shortness of breath. Patient does report some paranoid thoughts. Otherwise patient denies chest pain palpitations. Brief History:     The patient is a 61 y.o. Non-/non  female who presents withMental Health Problem (Pt reports that she is paranoid but unable to explain why or what exactly she is feeling. Pt denies SI/HI.), Leg Swelling (Bilateral lower extremity swelling; pt stated the right leg is worse than the left. Pt is ambulatory per self.), and Other (SOB with exertion - hx of COPD - on 2L continuously. )   and she is admitted to the hospital for the management of acute pulmonary embolus. Patient has past medical history of COPD on 2 L home oxygen, bipolar disorder, hyperlipidemia, and gout who presents to the emergency department complaining of leg swelling and worsening shortness of breath. She reports being on 2 L oxygen at home and 3 L oxygen during sleep.   Patient continued to smoke cigarettes recently quit on 6/14 and began chewing Nicorette gum. Patient has a 84-pack-year smoking history. Follows pulmonologist Dr. Mali Mtz regularly who recently started patient on Spiriva, Breo and albuterol. Patient reports she is compliant with all medications. Patient denies worsening productive cough, fevers or chills. Patient also reports change in her mood recently has become paranoid. She denies SI/HI/AVH.    In the emergency department, patient was tachypneic and had right lower extremity edema. BMP within normal limits, proBNP 337. Troponin x2 normal.  CBC shows no leukocytosis or anemia. CT of the chest and was found to have a large right pulmonary embolism in the mainstem pulmonary artery and also in the right upper lobe. Stable 1.9 cm nodule in the right lower lobe. Review of Systems:     Review of Systems   Constitutional: Negative for appetite change, fatigue and fever. HENT: Negative for ear pain, rhinorrhea and sore throat. Eyes: Negative for discharge and visual disturbance. Respiratory: Positive for shortness of breath. Negative for cough. Cardiovascular: Negative for chest pain and palpitations. Gastrointestinal: Negative for abdominal pain, constipation, diarrhea, nausea and vomiting. Endocrine: Negative for polyuria. Genitourinary: Negative for dysuria. Musculoskeletal: Negative for arthralgias. Skin: Negative for rash. Neurological: Negative for dizziness, weakness, light-headedness and headaches. Psychiatric/Behavioral: Negative for agitation. Medications: Allergies:     Allergies   Allergen Reactions    Advil [Ibuprofen] Other (See Comments)     vomiting    Aleve [Naproxen] Other (See Comments)     vomiting    Antipyrine Other (See Comments)     unknown    Celecoxib Other (See Comments)    Codeine      headache    Fomepizole     Other      GRASS, TREES, WEEDS    Rofecoxib Other (See Comments)     Unknown reaction    Salicylates      Unknown reaction     Strawberry Extract      HEADACHES    Sulfinpyrazone Other (See Comments)     Unknown reaction    Aspirin Nausea And Vomiting, Other (See Comments) and Nausea Only    Nsaids Nausea Only, Other (See Comments) and Nausea And Vomiting       Current Meds:   Scheduled Meds:    montelukast  10 mg Oral Nightly    lurasidone  80 mg Oral Nightly    sodium chloride flush  5-40 mL Intravenous 2 times per day    enoxaparin  1 mg/kg Subcutaneous BID    atorvastatin  20 mg Oral Nightly    topiramate  50 mg Oral Daily    topiramate  100 mg Oral Nightly    methylPREDNISolone  40 mg Intravenous Q8H    pantoprazole  40 mg Oral QAM AC    budesonide-formoterol  2 puff Inhalation BID    And    tiotropium  2 puff Inhalation Daily     Continuous Infusions:    sodium chloride       PRN Meds: sodium chloride flush, guaiFENesin, butalbital-APAP-caffeine, sodium chloride flush, sodium chloride, ondansetron **OR** ondansetron, polyethylene glycol, acetaminophen **OR** acetaminophen, potassium chloride, magnesium sulfate, nicotine polacrilex    Data:     Past Medical History:   has a past medical history of Abnormal EKG, Anxiety, Asthma, Bipolar disorder ([facility]), COPD (chronic obstructive pulmonary disease) ([facility] Cramps, extremity, Depression, Dilated bile duct, Headache, History of elective , Hyperlipidemia, Irritable bowel syndrome, Prolonged emergence from general anesthesia, Substance abuse ([facility] Unspecified sleep apnea, and Vision abnormalities. Social History:   reports that she quit smoking about 2 years ago. Her smoking use included cigarettes. She has a 84.00 pack-year smoking history. She has never used smokeless tobacco. She reports current alcohol use. She reports that she does not use drugs.      Family History:   Family History   Problem Relation Age of Onset    Dementia Maternal Aunt     Kidney Disease Mother     Heart Attack Sister     Prostate Cancer Father     High Cholesterol Brother     Heart Attack Paternal Grandmother        Vitals:  BP (!) 134/91   Pulse 82   Temp 98.2 °F (36.8 °C) (Oral)   Resp 26   Ht 5' 2\" (1.575 m)   Wt 150 lb 5.7 oz (68.2 kg)   LMP 2013 (Exact Date)   SpO2 99%   BMI 27.50 kg/m²   Temp (24hrs), Av.4 °F (36.9 °C), Min:98.1 °F (36.7 °C), Max:98.8 °F (37.1 °C)    No results for input(s): POCGLU in the last 72 hours. I/O(24Hr): Intake/Output Summary (Last 24 hours) at 2021 0817  Last data filed at 2021 0624  Gross per 24 hour   Intake 480 ml   Output 1000 ml   Net -520 ml       Labs:    [unfilled]    Lab Results   Component Value Date/Time    SPECIAL NOT REPORTED 2017 02:30 PM     Lab Results   Component Value Date/Time    CULTURE NO SIGNIFICANT GROWTH 10/12/2016 01:30 PM    CULTURE  10/12/2016 01:30 PM     Performed at [address] [phone]       [facility]    Radiology:    CT HEAD WO CONTRAST    Result Date: 2021  EXAMINATION: CT OF THE HEAD WITHOUT CONTRAST  2021 2:10 pm TECHNIQUE: CT of the head was performed without the administration of intravenous contrast. Dose modulation, iterative reconstruction, and/or weight based adjustment of the mA/kV was utilized to reduce the radiation dose to as low as reasonably achievable. COMPARISON: None. HISTORY: ORDERING SYSTEM PROVIDED HISTORY: AMS, paranoid TECHNOLOGIST PROVIDED HISTORY: AMS, paranoid Decision Support Exception - unselect if not a suspected or confirmed emergency medical condition->Emergency Medical Condition (MA) Reason for Exam: AMS, patient denies any HA, dizziness, or blurred vision Acuity: Unknown Type of Exam: Initial FINDINGS: The patient is somewhat asymmetrically positioned in the CT gantry. BRAIN/VENTRICLES: There is no acute intracranial hemorrhage, mass effect or midline shift. No abnormal extra-axial fluid collection. The gray-white differentiation is maintained without evidence of an acute infarct.   There is no evidence of hydrocephalus. ORBITS: The visualized portion of the orbits demonstrate no acute abnormality. SINUSES: The visualized paranasal sinuses and mastoid air cells demonstrate no acute abnormality. There is some deviation of the nasal septum. SOFT TISSUES/SKULL:  No acute abnormality of the visualized skull or soft tissues. No acute intracranial abnormality. Negative CT of the head. XR CHEST PORTABLE    Result Date: 6/17/2021  EXAMINATION: ONE XRAY VIEW OF THE CHEST 6/17/2021 10:19 am COMPARISON: 05/09/2021 HISTORY: ORDERING SYSTEM PROVIDED HISTORY: SOB TECHNOLOGIST PROVIDED HISTORY: SOB Reason for Exam: leg awelling, ams Acuity: Unknown Type of Exam: Unknown FINDINGS: Pulmonary hyperinflation. Stable mild patchy areas of parenchymal scarring. The cardiac size is normal. No acute infiltrates or pleural effusions are seen. Pulmonary vascularity appears normal. There is mild ectasia of the thoracic aorta. There are degenerative changes in the spine . No acute bony abnormalities. The hilar structures are normal.     No acute cardiopulmonary disease     CT CHEST PULMONARY EMBOLISM W CONTRAST    Addendum Date: 6/17/2021    ADDENDUM: Critical results were called by Dr. Nicola Jones to Dr. Raleigh Patel on 6/17/2021 at 14:51. Result Date: 6/17/2021  EXAMINATION: CTA OF THE CHEST 6/17/2021 2:11 pm TECHNIQUE: CTA of the chest was performed after the administration of intravenous contrast.  Multiplanar reformatted images are provided for review. MIP images are provided for review. Dose modulation, iterative reconstruction, and/or weight based adjustment of the mA/kV was utilized to reduce the radiation dose to as low as reasonably achievable.  COMPARISON: 09/09/2020, 07/25/2019 HISTORY: ORDERING SYSTEM PROVIDED HISTORY: SOB TECHNOLOGIST PROVIDED HISTORY: SOB Decision Support Exception - unselect if not a suspected or confirmed emergency medical condition->Emergency Medical Condition (MA) Reason for Exam: SOB FINDINGS: Pulmonary Arteries: Pulmonary arteries are adequately opacified for evaluation. Emboli in the right main pulmonary artery as well as lobar, segmental and subsegmental branches of the right middle and upper lobes. Similar dilatation of the main pulmonary artery to 3.3 cm. . Mediastinum: No evidence of mediastinal lymphadenopathy. The heart and pericardium demonstrate no acute abnormality. There is no acute abnormality of the thoracic aorta. Lungs/pleura: The lungs are without acute process. No focal consolidation or pulmonary edema. No evidence of pleural effusion or pneumothorax. Moderate emphysema. Ill-defined 1.9 cm solid nodular opacity of the superior segment of the right lower lobe on series 4, image 46 is not significantly changed since 2019. Upper Abdomen: Limited images of the upper abdomen are unremarkable. Soft Tissues/Bones: No acute bone or soft tissue abnormality. Diffuse osteopenia with multilevel degenerative disc disease. Similar compression deformities at T5 and T6.     1. Emboli in the right main pulmonary artery as well as lobar, segmental and subsegmental branches of the right middle and upper lobes. No evidence of right heart strain. 2. Ill-defined 1.9 cm solid nodular opacity of the superior segment of the right lower lobe is not significantly changed since 2019. Recommend attention on continued lung cancer screening. Physical Examination:        Physical Exam  Constitutional:       Appearance: Normal appearance. HENT:      Nose:      Comments: Nasal cannula in place. Mouth/Throat:      Mouth: Mucous membranes are moist.   Eyes:      Conjunctiva/sclera: Conjunctivae normal.   Cardiovascular:      Rate and Rhythm: Normal rate and regular rhythm. Pulses: Normal pulses. Heart sounds: Normal heart sounds. Pulmonary:      Breath sounds: Normal breath sounds. No wheezing. Comments: 3 L oxygen nasal cannula.   Abdominal:      Palpations: Abdomen is soft.      Tenderness: There is no abdominal tenderness. Musculoskeletal:         General: Swelling present. No tenderness. Right lower leg: Edema present. Neurological:      Mental Status: She is alert and oriented to person, place, and time. Psychiatric:         Attention and Perception: Attention normal.         Mood and Affect: Mood normal.         Speech: Speech normal.         Behavior: Behavior normal. Behavior is cooperative. Thought Content: Thought content is paranoid.            Assessment:        Primary Problem  <principal problem not specified>    Active Hospital Problems    Diagnosis Date Noted    Pulmonary embolism on right ([facility] [facility] [I26.99] 06/17/2021    Migraines [G43.909] 06/17/2021    DOMÍNGUEZ (dyspnea on exertion) [R06.00] 06/16/2021    Leg swelling [M79.89] 08/19/2020    Bipolar disorder ([facility] [address] [F31.9]     Sleep apnea [G47.30]     [facility] [J44.9] 01/06/2016       Plan:        Acute pulmonary embolism, nonprovoked  -Lovenox 1 mg/kg twice daily  -Transition to Eliquis for next 24 hours treat for at least 1 year  -Venous Dopplers ordered  -Echocardiogram ordered  -Pulmonology on board     Acute on chronic hypoxic respiratory failure, setting of COPD due to tobacco use disorder  -On 3 L oxygen via nasal cannula  -Solu-Medrol 125 mg IV once  -Nicorette gum 2 mg p.o. as needed  -Pulmonology on board              -Recommends continuing home pulmonary medications              -Trelegy Ellipta, Spiriva              -Montelukast 10 mg p.o. nightly              -Solu-Medrol 40 mg IV every 8 hours     Migraines  -Continue Topamax 100 MG p.o. nightly  -Need Topamax 50 mg p.o. daily     Bipolar disorder  -Latuda 80 mg  -Psych consulted.     Hyperlipidemia   -Atorvastatin 20 mg p.o. nightly     Code full  Diet adult  DVT prophylaxis Lovenox 70 mg subcu twice daily  GI prophylaxis Protonix 40 mg PO once daily  PT/OT/SW    Joy Cash MD  6/18/2021  8:17 AM Attending Physician Statement  I have discussed the care of Chelsea England and I have examined the patient myselft and taken ros and hpi , including pertinent history and exam findings,  with the resident. I have reviewed the key elements of all parts of the encounter with the resident. I agree with the assessment, plan and orders as documented by the resident.     pulm artery artery  No rv strain  eliquis full dose transion with lovenox  Psych input for bipolar and auditory hallucination  Age approproate screen for cancer   Out pt mamogram  And stool occult blood  Ok to [employer]  Electronically signed by Jenniefr Reed MD

## 2021-06-18 NOTE — PROGRESS NOTES
Physical Therapy        Physical Therapy Cancel Note      DATE: 2021    NAME: Jeraldine Scheuermann  MRN: [id number]   : 1955      Patient not seen this date for Physical Therapy due to:    Testin21: BLE Dopplers ordered, await results      Electronically signed by Janki Lim PT on 2021 at 8:20 AM

## 2021-06-18 NOTE — PLAN OF CARE
Problem: Falls - Risk of:  Goal: Will remain free from falls  Description: Will remain free from falls  6/18/2021 0307 by Dalton Camacho RN  Outcome: Ongoing  Note: Bed remains in lowest position, bed alarm on, call light within reach. Patient remains free of falls at this time. RN will continue to monitor. Problem: Falls - Risk of:  Goal: Absence of physical injury  Description: Absence of physical injury  6/18/2021 0307 by Dalton Camacho RN  Outcome: Ongoing     Problem: Skin Integrity:  Goal: Will show no infection signs and symptoms  Description: Will show no infection signs and symptoms  6/18/2021 0307 by Dalton Camacho RN  Outcome: Ongoing     Problem: Skin Integrity:  Goal: Absence of new skin breakdown  Description: Absence of new skin breakdown  6/18/2021 0307 by Dalton Camacho RN  Outcome: Ongoing  Note: Skin remains dry and intact. No new skin breakdown noted.       Problem: Breathing Pattern - Ineffective:  Goal: Ability to achieve and maintain a regular respiratory rate will improve  Description: Ability to achieve and maintain a regular respiratory rate will improve  6/18/2021 0307 by Dalton Camacho RN  Outcome: Ongoing     Problem: Anxiety:  Goal: Level of anxiety will decrease  Description: Level of anxiety will decrease  6/18/2021 0307 by Dalton Camacho RN  Outcome: Ongoing     Problem: Bleeding:  Goal: Will show no signs and symptoms of excessive bleeding  Description: Will show no signs and symptoms of excessive bleeding  Outcome: Ongoing

## 2021-06-18 NOTE — CARE COORDINATION
CASE MANAGEMENT NOTE:    Admission Date:  6/17/2021 Oh Min is a 61 y.o.  female    Admitted for : Pulmonary embolism on right ([address] [I26.99]    Met with:  Patient and daughter Cecilia Delgadillo    PCP:  Devika Mullen                                Insurance:  Antwan Villafana      Is patient alert and oriented at time of discussion:  Yes    Current Residence/ Living Arrangements:  independently at home             Current Services PTA:  Yes, current with [facility] Dec  On Sumeet Cabrera, has appt on 6/24/21  Does patient go to outpatient dialysis: No  If yes, location and chair time:     Is patient agreeable to VNS: No    Freedom of choice provided:  NA    List of [address] provided: NA    VNS chosen:  No    DME:  none    Home Oxygen: Yes - 2l/nc  During the day and 3l/nc at HS    Nebulizer: Yes    CPAP/BIPAP: No    Supplier: [facility]    Potential Assistance Needed: Yes - patient wants a oxygen concentrator to be at her daughters home as well    SNF needed: No    Freedom of choice and list provided: NA    [facility]       Does Patient want to use MEDS to BEDS? No    Is patient currently receiving oral anticoagulation therapy? No    Is the Patient an [facility] with Read[facility] Risk Score greater than 14%? Yes  If yes, pt needs a follow up appointment made within 7 days. Family Members/Caregivers that pt would like involved in their care:    Yes    If yes, list name here:  Kevan Postin    Transportation Provider:  Family             Discharge Plan:  6/18/21 - [facility] - patient lives alone, however will be staying with her Daughter for awhile after discharge. DME: Rolator if she needs it, home oxygen 2-3l/nc 24/7 by [facility]. Nebulizer, Denies need for VNS. Patient daughter asking if they can get another oxygen concentrator to stray at her daughters home. Will check with [facility]. Otherwise daughter will move concentrator back and forth from homes.   eliquis called in, OOP $0   [place] header pt risk 20% will need f/u appt,  Plan is for home with no needs. Will follow . //pf            Electronically signed by: Marnie Schreiber RN on 6/18/2021 at 5:29 PM

## 2021-06-18 NOTE — PLAN OF CARE
Problem: Falls - Risk of:  Goal: Will remain free from falls  Description: Will remain free from falls  6/18/2021 1525 by Joe Campbell RN  Outcome: Ongoing  Note: Bed remains in lowest position, call light within reach. Patient remains free of falls at this time. RN will continue to monitor. 6/18/2021 0307 by Merry Gtz RN  Outcome: Ongoing  Note: Bed remains in lowest position, call light within reach. Patient remains free of falls at this time. RN will continue to monitor. Goal: Absence of physical injury  Description: Absence of physical injury  6/18/2021 1525 by Joe Campbell RN  Outcome: Ongoing  6/18/2021 0307 by Merry Gtz RN  Outcome: Ongoing     Problem: Skin Integrity:  Goal: Will show no infection signs and symptoms  Description: Will show no infection signs and symptoms  6/18/2021 1525 by Joe Campbell RN  Outcome: Ongoing  Note: Patient turned and repositioned every 2 hours and as needed for comfort. Skin remains dry and intact. No new skin breakdown noted. 6/18/2021 0307 by Merry Gtz RN  Outcome: Ongoing  Goal: Absence of new skin breakdown  Description: Absence of new skin breakdown  6/18/2021 1525 by Joe Campbell RN  Outcome: Ongoing  6/18/2021 0307 by Merry Gtz RN  Outcome: Ongoing  Note: Skin remains dry and intact. No new skin breakdown noted.       Problem: Breathing Pattern - Ineffective:  Goal: Ability to achieve and maintain a regular respiratory rate will improve  Description: Ability to achieve and maintain a regular respiratory rate will improve  6/18/2021 1525 by Joe Campbell RN  Outcome: Ongoing  Note: Home O2 2-3L  6/18/2021 0307 by Merry Gtz RN  Outcome: Ongoing     Problem: Anxiety:  Goal: Level of anxiety will decrease  Description: Level of anxiety will decrease  6/18/2021 1525 by Joe Campbell RN  Outcome: Ongoing  Note: Calm and Cooperative this shift  6/18/2021 0307 by Hermann Abbott SUE Robles  Outcome: Ongoing     Problem: Bleeding:  Goal: Will show no signs and symptoms of excessive bleeding  Description: Will show no signs and symptoms of excessive bleeding  6/18/2021 1525 by Curt Sandoval RN  Outcome: Ongoing  Note: Pt educated  6/18/2021 0307 by Jaren David RN  Outcome: Ongoing

## 2021-06-19 VITALS
TEMPERATURE: 98 F | WEIGHT: 151.24 LBS | HEIGHT: 62 IN | BODY MASS INDEX: 27.83 KG/M2 | RESPIRATION RATE: 21 BRPM | DIASTOLIC BLOOD PRESSURE: 76 MMHG | OXYGEN SATURATION: 95 % | HEART RATE: 87 BPM | SYSTOLIC BLOOD PRESSURE: 120 MMHG

## 2021-06-19 PROBLEM — I82.401 ACUTE DEEP VEIN THROMBOSIS (DVT) OF RIGHT LOWER EXTREMITY (HCC): Status: ACTIVE | Noted: 2021-06-19

## 2021-06-19 LAB
ABSOLUTE BANDS #: 0.41 K/UL (ref 0–1)
ABSOLUTE EOS #: 0 K/UL (ref 0–0.4)
ABSOLUTE IMMATURE GRANULOCYTE: ABNORMAL K/UL (ref 0–0.3)
ABSOLUTE LYMPH #: 0.18 K/UL (ref 1–4.8)
ABSOLUTE MONO #: 0.12 K/UL (ref 0.1–1.3)
ALBUMIN SERPL-MCNC: 3.7 G/DL (ref 3.5–5.2)
ALBUMIN/GLOBULIN RATIO: ABNORMAL (ref 1–2.5)
ALP BLD-CCNC: 82 U/L (ref 35–104)
ALT SERPL-CCNC: 12 U/L (ref 5–33)
ANION GAP SERPL CALCULATED.3IONS-SCNC: 9 MMOL/L (ref 9–17)
AST SERPL-CCNC: 11 U/L
BANDS: 7 % (ref 0–10)
BASOPHILS # BLD: 0 % (ref 0–2)
BASOPHILS ABSOLUTE: 0 K/UL (ref 0–0.2)
BILIRUB SERPL-MCNC: <0.15 MG/DL (ref 0.3–1.2)
BUN BLDV-MCNC: 20 MG/DL (ref 8–23)
BUN/CREAT BLD: ABNORMAL (ref 9–20)
CALCIUM SERPL-MCNC: 8.3 MG/DL (ref 8.6–10.4)
CHLORIDE BLD-SCNC: 107 MMOL/L (ref 98–107)
CO2: 24 MMOL/L (ref 20–31)
CREAT SERPL-MCNC: 0.63 MG/DL (ref 0.5–0.9)
DIFFERENTIAL TYPE: ABNORMAL
EOSINOPHILS RELATIVE PERCENT: 0 % (ref 0–4)
GFR AFRICAN AMERICAN: >60 ML/MIN
GFR NON-AFRICAN AMERICAN: >60 ML/MIN
GFR SERPL CREATININE-BSD FRML MDRD: ABNORMAL ML/MIN/{1.73_M2}
GFR SERPL CREATININE-BSD FRML MDRD: ABNORMAL ML/MIN/{1.73_M2}
GLUCOSE BLD-MCNC: 172 MG/DL (ref 70–99)
HCT VFR BLD CALC: 36 % (ref 36–46)
HEMOGLOBIN: 11.7 G/DL (ref 12–16)
IMMATURE GRANULOCYTES: ABNORMAL %
INR BLD: 1.1
LYMPHOCYTES # BLD: 3 % (ref 24–44)
MCH RBC QN AUTO: 32.4 PG (ref 26–34)
MCHC RBC AUTO-ENTMCNC: 32.4 G/DL (ref 31–37)
MCV RBC AUTO: 99.8 FL (ref 80–100)
METAMYELOCYTES ABSOLUTE COUNT: 0.06 K/UL
METAMYELOCYTES: 1 %
MONOCYTES # BLD: 2 % (ref 1–7)
MORPHOLOGY: ABNORMAL
NRBC AUTOMATED: ABNORMAL PER 100 WBC
PARTIAL THROMBOPLASTIN TIME: 28.7 SEC (ref 24–36)
PDW BLD-RTO: 14 % (ref 11.5–14.9)
PLATELET # BLD: 185 K/UL (ref 150–450)
PLATELET ESTIMATE: ABNORMAL
PMV BLD AUTO: 7.2 FL (ref 6–12)
POTASSIUM SERPL-SCNC: 4.6 MMOL/L (ref 3.7–5.3)
PROTHROMBIN TIME: 14.7 SEC (ref 11.8–14.6)
RBC # BLD: 3.61 M/UL (ref 4–5.2)
RBC # BLD: ABNORMAL 10*6/UL
SEG NEUTROPHILS: 87 % (ref 36–66)
SEGMENTED NEUTROPHILS ABSOLUTE COUNT: 5.13 K/UL (ref 1.3–9.1)
SODIUM BLD-SCNC: 140 MMOL/L (ref 135–144)
TOTAL PROTEIN: 6.1 G/DL (ref 6.4–8.3)
WBC # BLD: 5.9 K/UL (ref 3.5–11)
WBC # BLD: ABNORMAL 10*3/UL

## 2021-06-19 PROCEDURE — 80053 COMPREHEN METABOLIC PANEL: CPT

## 2021-06-19 PROCEDURE — 94640 AIRWAY INHALATION TREATMENT: CPT

## 2021-06-19 PROCEDURE — 6370000000 HC RX 637 (ALT 250 FOR IP): Performed by: INTERNAL MEDICINE

## 2021-06-19 PROCEDURE — 85610 PROTHROMBIN TIME: CPT

## 2021-06-19 PROCEDURE — 6370000000 HC RX 637 (ALT 250 FOR IP): Performed by: STUDENT IN AN ORGANIZED HEALTH CARE EDUCATION/TRAINING PROGRAM

## 2021-06-19 PROCEDURE — 2700000000 HC OXYGEN THERAPY PER DAY

## 2021-06-19 PROCEDURE — 36415 COLL VENOUS BLD VENIPUNCTURE: CPT

## 2021-06-19 PROCEDURE — 94761 N-INVAS EAR/PLS OXIMETRY MLT: CPT

## 2021-06-19 PROCEDURE — 99232 SBSQ HOSP IP/OBS MODERATE 35: CPT | Performed by: INTERNAL MEDICINE

## 2021-06-19 PROCEDURE — 99254 IP/OBS CNSLTJ NEW/EST MOD 60: CPT | Performed by: PSYCHIATRY & NEUROLOGY

## 2021-06-19 PROCEDURE — 2580000003 HC RX 258: Performed by: STUDENT IN AN ORGANIZED HEALTH CARE EDUCATION/TRAINING PROGRAM

## 2021-06-19 PROCEDURE — 85025 COMPLETE CBC W/AUTO DIFF WBC: CPT

## 2021-06-19 PROCEDURE — 85730 THROMBOPLASTIN TIME PARTIAL: CPT

## 2021-06-19 PROCEDURE — 6360000002 HC RX W HCPCS: Performed by: INTERNAL MEDICINE

## 2021-06-19 PROCEDURE — 99233 SBSQ HOSP IP/OBS HIGH 50: CPT | Performed by: INTERNAL MEDICINE

## 2021-06-19 RX ORDER — METHYLPREDNISOLONE 4 MG/1
TABLET ORAL
Qty: 1 KIT | Refills: 0 | Status: SHIPPED | OUTPATIENT
Start: 2021-06-19 | End: 2021-06-22 | Stop reason: ALTCHOICE

## 2021-06-19 RX ORDER — PREDNISONE 20 MG/1
40 TABLET ORAL DAILY
Status: DISCONTINUED | OUTPATIENT
Start: 2021-06-19 | End: 2021-06-19 | Stop reason: HOSPADM

## 2021-06-19 RX ORDER — BUDESONIDE AND FORMOTEROL FUMARATE DIHYDRATE 160; 4.5 UG/1; UG/1
2 AEROSOL RESPIRATORY (INHALATION) 2 TIMES DAILY
Qty: 1 INHALER | Refills: 3 | Status: SHIPPED | OUTPATIENT
Start: 2021-06-19 | End: 2021-06-22

## 2021-06-19 RX ORDER — DONEPEZIL HYDROCHLORIDE 5 MG/1
5 TABLET, FILM COATED ORAL NIGHTLY
Qty: 30 TABLET | Refills: 3 | Status: SHIPPED | OUTPATIENT
Start: 2021-06-19 | End: 2021-08-05 | Stop reason: ALTCHOICE

## 2021-06-19 RX ADMIN — APIXABAN 10 MG: 5 TABLET, FILM COATED ORAL at 10:00

## 2021-06-19 RX ADMIN — TOPIRAMATE 50 MG: 50 TABLET, FILM COATED ORAL at 10:00

## 2021-06-19 RX ADMIN — ALBUTEROL SULFATE 2.5 MG: 2.5 SOLUTION RESPIRATORY (INHALATION) at 03:15

## 2021-06-19 RX ADMIN — PREDNISONE 40 MG: 20 TABLET ORAL at 10:00

## 2021-06-19 RX ADMIN — ALBUTEROL SULFATE 2.5 MG: 2.5 SOLUTION RESPIRATORY (INHALATION) at 08:01

## 2021-06-19 RX ADMIN — ALBUTEROL SULFATE 2.5 MG: 2.5 SOLUTION RESPIRATORY (INHALATION) at 11:28

## 2021-06-19 RX ADMIN — BUDESONIDE AND FORMOTEROL FUMARATE DIHYDRATE 2 PUFF: 160; 4.5 AEROSOL RESPIRATORY (INHALATION) at 09:59

## 2021-06-19 RX ADMIN — TIOTROPIUM BROMIDE INHALATION SPRAY 2 PUFF: 3.12 SPRAY, METERED RESPIRATORY (INHALATION) at 09:59

## 2021-06-19 RX ADMIN — SODIUM CHLORIDE, PRESERVATIVE FREE 10 ML: 5 INJECTION INTRAVENOUS at 10:06

## 2021-06-19 RX ADMIN — PANTOPRAZOLE SODIUM 40 MG: 40 TABLET, DELAYED RELEASE ORAL at 06:23

## 2021-06-19 ASSESSMENT — ENCOUNTER SYMPTOMS
VOMITING: 0
ABDOMINAL PAIN: 0
DIARRHEA: 0
CONSTIPATION: 0
SHORTNESS OF BREATH: 0
COUGH: 0
RHINORRHEA: 0
NAUSEA: 0
SORE THROAT: 0
EYE DISCHARGE: 0

## 2021-06-19 ASSESSMENT — PAIN SCALES - GENERAL: PAINLEVEL_OUTOF10: 0

## 2021-06-19 NOTE — CONSULTS
Department of Psychiatry   Psychiatric Assessment      Thank you very much for allowing us to participate in the care of this patient. Reason for Consult: Concern for manic symptoms    HISTORY OF PRESENT ILLNESS:          The patient is a 61 y.o. female with significant history of pulmonary embolism, anxiety, COPD, and bipolar disorder who is admitted medically secondary to respiratory distress, exacerbation of COPD and suspected pulmonary embolus. Currently be treating with steroids. Patient states that she has had a long time history of bipolar disorder and that she follows up with a provider at [facility]. She is clearly struggling with memory throughout the course of our conversation. Has difficulty with word finding. Her difficulty with memory does not seem to be secondary to any manic process going on for underlying neurocognitive process versus delirium. She was given a slums assessment and scored a 14 out of 30, positive screening for dementia but again not a diagnostic tool. She has multiple reasons including exacerbation of COPD, PE, to have potential decline in cognitive functioning at present time. She endorses chronic paranoia. She states at present time she would rate this paranoia at about a 4 out of 10 intensity and this is a level \"where I usually live\". She denies that the paranoia is any worse than at her baseline. She is denying any auditory or visual hallucinations. She is denying suicidal or homicidal ideation intent or plan. She expresses some concerns about taking medications to sleep as she reports erratic behavior in the past when taking sleeping medication but she cannot remember which medication that was    She does endorse more difficulties with memory lately. She states that the evening time does seem to be a more difficult time, with increased restlessness and confusion and worsening paranoia in the evening.   Did discuss with her that this could be related to MD   donepezil (ARICEPT) 5 MG tablet Take 1 tablet by mouth nightly 6/19/21  Yes Anntete Bess MD   topiramate (TOPAMAX) 50 MG tablet Take 100 mg by mouth nightly   Yes Historical Provider, MD   furosemide (LASIX) 20 MG tablet Take 0.5 tablets by mouth daily 6/16/21  Yes Monika Fan MD   acetaminophen (ACETAMINOPHEN EXTRA STRENGTH) 500 MG tablet take 2 tablets by mouth every 6 hours AS NEEDED FOR PAIN 5/3/21  Yes Moniak Fan MD   vitamin D (ERGOCALCIFEROL) 1.25 MG (17746 UT) CAPS capsule take 1 capsule by mouth every week 4/1/21  Yes Monika Fan MD   simvastatin (ZOCOR) 40 MG tablet take 1 tablet by mouth at bedtime 3/29/21  Yes IVONNE Nunez CNP   EPINEPHrine (EPIPEN) 0.3 MG/0.3ML SOAJ injection use as directed BY PRESCRIBER FOR ALLERGIC REACTION 3/28/21  Yes IVONNE Nunez CNP   ibandronate (BONIVA) 150 MG tablet Take 1 tablet by mouth every 30 days Take one (1) tablet once per month in the morning with a full glass of water, on an empty stomach, and do not take anything else by mouth or lie down for the next 30 minutes.  3/11/21  Yes Monika Fan MD   lidocaine (LMX) 4 % cream apply topically every 8 hours AS NEEDED FOR PAIN 11/18/20  Yes Monika Fan MD   topiramate (TOPAMAX) 50 MG tablet Take 50 mg by mouth daily  1/12/20  Yes Historical Provider, MD   montelukast (SINGULAIR) 10 MG tablet Take 10 mg by mouth nightly  12/27/19  Yes Historical Provider, MD   albuterol (PROVENTIL) (2.5 MG/3ML) 0.083% nebulizer solution Take 3 mLs by nebulization 4 times daily 11/21/17  Yes Andrey Drew MD   albuterol sulfate  (90 Base) MCG/ACT inhaler Inhale 2 puffs into the lungs every 4 hours as needed for Wheezing 11/21/17  Yes Andrey Drew MD   fluticasone [facility]) 50 MCG/ACT nasal spray 2 sprays by Nasal route daily 11/21/17  Yes MD leila Plummer-acetaminophen-caffeine (FIORICET, ESGIC) -40 MG per tablet Take 1 tablet by mouth every 4 hours as needed for Headaches   Yes Historical Provider, MD   Biotin 1000 MCG TABS Take 1,000 mcg by mouth daily    Yes Historical Provider, MD   citalopram (CELEXA) 20 MG tablet Take 20 mg by mouth daily   Yes Historical Provider, MD   LATUDA 80 MG TABS tablet Take 80 mg by mouth nightly 9/29/16  Yes Historical Provider, MD   Blood Pressure KIT Dx: HTN. Needs automatic blood pressure machine to monitor her blood pressure.  6/16/21   Cheri Craven MD   Elastic Bandages & Supports (MEDICAL COMPRESSION STOCKINGS) [facility] 1 each by Does not apply route daily Keep pressure between 20 -30mm 8/19/20   Cheri Craven MD   Elastic Bandages & Supports (ACE ELBOW BRACE LARGE/X-LARGE) MISC Use daily for elbow pain 2/27/19   Cheri Craven MD        Medications:    Current Facility-Administered Medications: predniSONE (DELTASONE) tablet 40 mg, 40 mg, Oral, Daily  perflutren lipid microspheres (DEFINITY) injection 2.2 mg, 2 mL, Intravenous, ONCE PRN  apixaban (ELIQUIS) tablet 10 mg, 10 mg, Oral, BID  albuterol (PROVENTIL) nebulizer solution 2.5 mg, 2.5 mg, Nebulization, Q4H  sodium chloride flush 0.9 % injection 10 mL, 10 mL, Intravenous, PRN  montelukast (SINGULAIR) tablet 10 mg, 10 mg, Oral, Nightly  lurasidone (LATUDA) tablet 80 mg, 80 mg, Oral, Nightly  guaiFENesin (ROBITUSSIN) 100 MG/5ML liquid 100 mg, 100 mg, Oral, Q6H PRN  butalbital-APAP-caffeine -40 MG per capsule 1 capsule, 1 capsule, Oral, Q4H PRN  sodium chloride flush 0.9 % injection 5-40 mL, 5-40 mL, Intravenous, 2 times per day  sodium chloride flush 0.9 % injection 5-40 mL, 5-40 mL, Intravenous, PRN  0.9 % sodium chloride infusion, 25 mL, Intravenous, PRN  ondansetron (ZOFRAN-ODT) disintegrating tablet 4 mg, 4 mg, Oral, Q8H PRN **OR** ondansetron (ZOFRAN) injection 4 mg, 4 mg, Intravenous, Q6H PRN  polyethylene glycol (GLYCOLAX) packet 17 g, 17 g, Oral, Daily PRN  acetaminophen (TYLENOL) tablet 650 mg, 650 mg, Oral, Q6H PRN **OR** acetaminophen (TYLENOL) suppository 650 mg, 650 mg, Rectal, Q6H PRN  potassium chloride 10 mEq/100 mL IVPB (Peripheral Line), 10 mEq, Intravenous, PRN  magnesium sulfate 2,000 mg in dextrose 5 % 100 mL IVPB, 2,000 mg, Intravenous, PRN  atorvastatin (LIPITOR) tablet 20 mg, 20 mg, Oral, Nightly  topiramate (TOPAMAX) tablet 50 mg, 50 mg, Oral, Daily  topiramate (TOPAMAX) tablet 100 mg, 100 mg, Oral, Nightly  nicotine polacrilex (NICORETTE) gum 2 mg, 2 mg, Oral, PRN  pantoprazole (PROTONIX) tablet 40 mg, 40 mg, Oral, QAM AC  budesonide-formoterol (SYMBICORT) 160-4.5 MCG/ACT inhaler 2 puff, 2 puff, Inhalation, BID **AND** tiotropium (SPIRIVA RESPIMAT) 2.5 MCG/ACT inhaler 2 puff, 2 puff, Inhalation, Daily     Past Medical History:        Diagnosis Date    Abnormal EKG     Anxiety     Asthma     Bipolar disorder ([address]     [place] , [place]    [facility]     Cramps, extremity     SEVERE LEG CRAMPS    Depression     Dilated bile duct     Headache     History of elective      Hyperlipidemia     Irritable bowel syndrome     Prolonged emergence from general anesthesia     SEVERE ISSUES WAKING UP    Substance abuse ([facility]     street drugs when younger   Murrel Neither Unspecified sleep apnea     Vision abnormalities     glasses       Past Surgical History:        Procedure Laterality Date    ANKLE SURGERY      HAD SCREWS AND HARDWARE, THEN REMOVED    COLONOSCOPY  02/15/2018    tubular adenoma    EYE SURGERY Bilateral     CATARACTS    HYSTEROSCOPY  10/19/2016    D & C    INDUCED       LASIK      bilateral    TONSILLECTOMY AND ADENOIDECTOMY Bilateral     VULVA SURGERY      HAD A BIOPSY AND REMOVAL OF       Allergies: Advil [ibuprofen], Aleve [naproxen], Antipyrine, Celecoxib, Codeine, Fomepizole, Other, Rofecoxib, Salicylates, Strawberry extract, Sulfinpyrazone, Aspirin, and Nsaids      Social History:      RESIDENCE: Lives in her own home  : Not     CHILDREN: Reports 1 adult child  OCCUPATION: Unemployed  EDUCATION: Completed 10th grade    SUBSTANCE USE HISTORY: Patient denies        Family Medical and Psychiatric History:     Denies        Problem Relation Age of Onset    Dementia Maternal Aunt     Kidney Disease Mother     Heart Attack Sister     Prostate Cancer Father     High Cholesterol Brother     Heart Attack Paternal Grandmother          Physical  /63   Pulse 84   Temp 98 °F (36.7 °C) (Oral)   Resp 16   Ht 5' 2\" (1.575 m)   Wt 151 lb 3.8 oz (68.6 kg)   LMP 05/20/2013 (Exact Date)   SpO2 93%   BMI 27.66 kg/m²         Mental Status Examination:  Level of consciousness:  Within normal limits  Appearance: hospital attire, lying in bed, fair grooming  Behavior/Motor:  no abnormalities noted  Attitude toward examiner:  cooperative, attentive and good eye contact  Speech:  Spontaneous, normal rate and volume  Mood: \"Good\"  Affect: mood congruent  Thought processes:  Linear, goal directed, coherent  Thought content: Denies suicidal ideations   Denies homicidal ideations    Denies hallucinations   Denies delusions  Cognition: Oriented to self and general circumstance  Concentration impaired   memory impaired-scored a 14 out of 30 on slums examination  Insight & Judgment: Limited    DSM-5 DIAGNOSIS:      Bipolar disorder-in partial remission  Delirium due to COPD exacerbation  Rule out underlying neurocognitive impairment        PLAN:      Would recommend the patient have occupational therapy assessment or home health care upon discharge to the hospital to help manage her affairs at home. Can start donepezil 5 mg daily and follow-up with outpatient provider    Would recommend neuropsychological testing upon discharge-phone number to call and schedule an name of provider given to nurse    Would consider geriatric psychiatrist in name and number given to nurse    Thank you very much for allowing us to participate in the care of this patient. Time spent 45 min. Electronically signed by Qamar Myers MD on 6/19/21 at 2:12 PM EDT

## 2021-06-19 NOTE — PROGRESS NOTES
Pulmonary Progress Note  [facility]      Patient - Efrain Whittington,  Age - 61 y.o.    - 1955      Room Number -    MRN -  [id number]   [facility]  Date of Admission -  2021 12:49 PM        Consulting Rosendo Ronquillo MD  Primary Care Physician - Lesly Davis MD     SUBJECTIVE   Alert and oriented getting respiratory treatment    OBJECTIVE   VITALS    height is 5' 2\" (1.575 m) and weight is 151 lb 3.8 oz (68.6 kg). Her oral temperature is 98 °F (36.7 °C). Her blood pressure is 101/57 (abnormal) and her pulse is 72. Her respiration is 19 and oxygen saturation is 98%. Body mass index is 27.66 kg/m². Temperature Range: Temp: 98 °F (36.7 °C) Temp  Av °F (36.7 °C)  Min: 97.7 °F (36.5 °C)  Max: 98.5 °F (36.9 °C)  BP Range:  Systolic (94MHW), TJ , Min:101 , QKX:364     Diastolic (27RYD), AXD:63, Min:41, Max:141    Pulse Range: Pulse  Av.2  Min: 72  Max: 102  Respiration Range: Resp  Av.5  Min: 15  Max: 33  Current Pulse Ox[de-identified]  SpO2: 98 %  24HR Pulse Ox Range:  SpO2  Av.6 %  Min: 92 %  Max: 98 %  Oxygen Amount and Delivery: O2 Flow Rate (L/min): 2 L/min    Wt Readings from Last 3 Encounters:   21 151 lb 3.8 oz (68.6 kg)   21 147 lb (66.7 kg)   21 143 lb 3.2 oz (65 kg)       I/O (24 Hours)    Intake/Output Summary (Last 24 hours) at 2021  Last data filed at 2021 4740  Gross per 24 hour   Intake --   Output 600 ml   Net -600 ml       EXAM     General Appearance  Awake, alert, oriented, in no acute distress  HEENT - normocephalic, atraumatic.  []  Mallampati  [] Crowded airway   [] Macroglossia  []  Retrognathia  [] Micrognathia  []  Normal tongue size []  Normal Bite  [] Tru sign positive    Neck - Supple,  trachea midline   Lungs -clear but diminished, close to her baseline  Cardiovascular - Heart sounds are normal.  Regular rate and rhythm   Abdomen - Soft, nontender, nondistended, no masses or organomegaly  Neurologic - There are no focal motor or sensory deficits  Skin - No bruising or bleeding  Extremities - No clubbing, cyanosis, edema    MEDS      apixaban  10 mg Oral BID    albuterol  2.5 mg Nebulization Q4H    methylPREDNISolone  40 mg Intravenous Q12H    montelukast  10 mg Oral Nightly    lurasidone  80 mg Oral Nightly    sodium chloride flush  5-40 mL Intravenous 2 times per day    atorvastatin  20 mg Oral Nightly    topiramate  50 mg Oral Daily    topiramate  100 mg Oral Nightly    pantoprazole  40 mg Oral QAM AC    budesonide-formoterol  2 puff Inhalation BID    And    tiotropium  2 puff Inhalation Daily      sodium chloride       perflutren lipid microspheres, sodium chloride flush, guaiFENesin, butalbital-APAP-caffeine, sodium chloride flush, sodium chloride, ondansetron **OR** ondansetron, polyethylene glycol, acetaminophen **OR** acetaminophen, potassium chloride, magnesium sulfate, nicotine polacrilex    LABS   CBC   Recent Labs     06/19/21 0452   WBC 5.9   HGB 11.7*   HCT 36.0   MCV 99.8        BMP:   Lab Results   Component Value Date     06/19/2021    K 4.6 06/19/2021     06/19/2021    CO2 24 06/19/2021    BUN 20 06/19/2021    LABALBU 3.7 06/19/2021    CREATININE 0.63 06/19/2021    CALCIUM 8.3 06/19/2021    GFRAA >60 06/19/2021    LABGLOM >60 06/19/2021     ABGs:  Lab Results   Component Value Date    PHART 7.361 05/19/2016    PO2ART 67.1 05/19/2016    FRJ0VHG 46.1 05/19/2016      Lab Results   Component Value Date    MODE NOT REPORTED 06/17/2021     Ionized Calcium:  No results found for: IONCA  Magnesium:  No results found for: MG  Phosphorus:  No results found for: PHOS     LIVER PROFILE   Recent Labs     06/19/21  0452   AST 11   ALT 12   BILITOT <0.15*   ALKPHOS 82     INR   Recent Labs     06/19/21 0452   INR 1.1     PTT   Lab Results   Component Value Date    APTT 28.7 06/19/2021 RADIOLOGY     (See actual reports for details)    ASSESSMENT/PLAN   Active Problems:    Chronic obstructive pulmonary disease (HCC)    Bipolar disorder (HCC)    Sleep apnea    Leg swelling    DOMÍNGUEZ (dyspnea on exertion)    Pulmonary embolism on right (HCC)    Migraines    Paranoid behavior (HCC)    Bilateral lower extremity edema    Acute deep vein thrombosis (DVT) of right lower extremity (HCC)  Resolved Problems:    * No resolved hospital problems.  *    Echo shows normal LV function, unable to estimate RVSP  Venous Doppler shows DVT right lower extremity  Needs to be mobilized-has not gotten out of bed    switch to oral prednisone  Continue bronchodilators  No objection to low-dose Risperdal  She is asking for headache medication but I am not sure what it is, and she says it begins with \"B\"-butalbital has already been ordered  Unclear why she is not in stepdown    Electronically signed by Edd Marquez MD on 6/19/2021 at 8:08 AM

## 2021-06-19 NOTE — CONSULTS
Yes Historical Provider, MD   furosemide (LASIX) 20 MG tablet Take 0.5 tablets by mouth daily 6/16/21  Yes Devika Section, MD   acetaminophen (ACETAMINOPHEN EXTRA STRENGTH) 500 MG tablet take 2 tablets by mouth every 6 hours AS NEEDED FOR PAIN 5/3/21  Yes MD SHAD Parrish ELLIPTA 100-25 MCG/INH AEPB inhaler Inhale 1 puff into the lungs daily  3/6/21  Yes Historical Provider, MD   SPIRIVA RESPIMAT 2.5 MCG/ACT AERS inhaler inhale 2 puffs by mouth once daily 3/18/21  Yes Historical Provider, MD   vitamin D (ERGOCALCIFEROL) 1.25 MG (82830 UT) CAPS capsule take 1 capsule by mouth every week 4/1/21  Yes Devika Mullen MD   simvastatin (ZOCOR) 40 MG tablet take 1 tablet by mouth at bedtime 3/29/21  Yes IVONNE Nunez CNP   EPINEPHrine (EPIPEN) 0.3 MG/0.3ML SOAJ injection use as directed BY PRESCRIBER FOR ALLERGIC REACTION 3/28/21  Yes IVONNE Nunez CNP   ibandronate (BONIVA) 150 MG tablet Take 1 tablet by mouth every 30 days Take one (1) tablet once per month in the morning with a full glass of water, on an empty stomach, and do not take anything else by mouth or lie down for the next 30 minutes.  3/11/21  Yes Devika Mullen MD   lidocaine (LMX) 4 % cream apply topically every 8 hours AS NEEDED FOR PAIN 11/18/20  Yes Devika Mullen MD   topiramate (TOPAMAX) 50 MG tablet Take 50 mg by mouth daily  1/12/20  Yes Historical Provider, MD   montelukast (SINGULAIR) 10 MG tablet Take 10 mg by mouth nightly  12/27/19  Yes Historical Provider, MD   albuterol (PROVENTIL) (2.5 MG/3ML) 0.083% nebulizer solution Take 3 mLs by nebulization 4 times daily 11/21/17  Yes Jean Paul Staff, MD   albuterol sulfate  (90 Base) MCG/ACT inhaler Inhale 2 puffs into the lungs every 4 hours as needed for Wheezing 11/21/17  Yes Jean Paul Staff, MD   fluticasone [facility]) 50 MCG/ACT nasal spray 2 sprays by Nasal route daily 11/21/17  Yes Jean Paul Staff, MD   butalbital-acetaminophen-caffeine [facility]) -76 MG per tablet Take 1 tablet by mouth every 4 hours as needed for Headaches   Yes Historical Provider, MD   Biotin 1000 MCG TABS Take 1,000 mcg by mouth daily    Yes Historical Provider, MD   citalopram (CELEXA) 20 MG tablet Take 20 mg by mouth daily   Yes Historical Provider, MD   LATUDA 80 MG TABS tablet Take 80 mg by mouth nightly 9/29/16  Yes Historical Provider, MD   Blood Pressure KIT Dx: HTN. Needs automatic blood pressure machine to monitor her blood pressure.  6/16/21   Mary Kay Oneill MD   Elastic Bandages & Supports (MEDICAL COMPRESSION STOCKINGS) [facility] 1 each by Does not apply route daily Keep pressure between 20 -30mm 8/19/20   Mary Kay Oneill MD   Elastic Bandages & Supports (ACE ELBOW BRACE LARGE/X-LARGE) MISC Use daily for elbow pain 2/27/19   Mary Kay Oneill MD     Current Facility-Administered Medications   Medication Dose Route Frequency Provider Last Rate Last Admin    perflutren lipid microspheres (DEFINITY) injection 2.2 mg  2 mL Intravenous ONCE PRN Manish Flores DO        apixaban (ELIQUIS) tablet 10 mg  10 mg Oral BID Urbano Raymond MD   10 mg at 06/18/21 1950    albuterol (PROVENTIL) nebulizer solution 2.5 mg  2.5 mg Nebulization Q4H Romy Phillips MD   2.5 mg at 06/18/21 1922    methylPREDNISolone sodium (SOLU-MEDROL) injection 40 mg  40 mg Intravenous Q12H Romy Phillips MD   40 mg at 06/18/21 2123    sodium chloride flush 0.9 % injection 10 mL  10 mL Intravenous PRN Olga Lei DO   10 mL at 06/17/21 1433    montelukast (SINGULAIR) tablet 10 mg  10 mg Oral Nightly Francisco Sun MD   10 mg at 06/18/21 1950    lurasidone (LATUDA) tablet 80 mg  80 mg Oral Nightly Francisco Sun MD   80 mg at 06/18/21 1949    guaiFENesin (ROBITUSSIN) 100 MG/5ML liquid 100 mg  100 mg Oral Q6H PRN Francisco Sun MD        butalbital-APAP-caffeine -40 MG per capsule 1 capsule  1 capsule Oral Q4H PRN Francisco Sun MD   1 capsule at 06/18/21 4732    sodium chloride flush 0.9 % injection 5-40 mL  5-40 mL Intravenous 2 times per day Eliel Booker MD   10 mL at 06/18/21 1955    sodium chloride flush 0.9 % injection 5-40 mL  5-40 mL Intravenous PRN Eliel Booker MD        0.9 % sodium chloride infusion  25 mL Intravenous PRN Eliel Booker MD        ondansetron (ZOFRAN-ODT) disintegrating tablet 4 mg  4 mg Oral Q8H PRN Eliel Booker MD        Or    [facility] injection 4 mg  4 mg Intravenous Q6H PRN Eliel Booker MD        polyethylene glycol (GLYCOLAX) packet 17 g  17 g Oral Daily PRN Eliel Booker MD        acetaminophen (TYLENOL) tablet 650 mg  650 mg Oral Q6H PRN Eliel Booker MD        Or    acetaminophen (TYLENOL) suppository 650 mg  650 mg Rectal Q6H PRN Eliel Booker MD        potassium chloride 10 mEq/100 mL IVPB (Peripheral Line)  10 mEq Intravenous PRN Eliel Booker MD        magnesium sulfate 2,000 mg in dextrose 5 % 100 mL IVPB  2,000 mg Intravenous PRN Eliel Booker MD        atorvastatin (LIPITOR) tablet 20 mg  20 mg Oral Nightly Armando Rees MD   20 mg at 06/18/21 1950    topiramate (TOPAMAX) tablet 50 mg  50 mg Oral Daily Armando Rees MD   50 mg at 06/18/21 0849    topiramate (TOPAMAX) tablet 100 mg  100 mg Oral Nightly Armando Rees MD   100 mg at 06/18/21 1950    nicotine polacrilex (NICORETTE) gum 2 mg  2 mg Oral PRN Uriel Mary MD        pantoprazole (PROTONIX) tablet 40 mg  40 mg Oral QAM AC Armando Rees MD   40 mg at 06/18/21 8061    budesonide-formoterol (SYMBICORT) 160-4.5 MCG/ACT inhaler 2 puff  2 puff Inhalation BID Eliel Booker MD   2 puff at 06/18/21 1951    And    tiotropium (SPIRIVA RESPIMAT) 2.5 MCG/ACT inhaler 2 puff  2 puff Inhalation Daily Eliel Booker MD   2 puff at 06/18/21 0850       Allergies:  Advil [ibuprofen], Aleve [naproxen], Antipyrine, Celecoxib, Codeine, Fomepizole, Other, Rofecoxib, Salicylates, Strawberry extract, Sulfinpyrazone, Aspirin, and Nsaids    Social History:   reports that she quit smoking about 2 years ago. Her smoking use included cigarettes. She has a 84.00 pack-year smoking history. She has never used smokeless tobacco. She reports current alcohol use. She reports that she does not use drugs. Family History: family history includes Dementia in her maternal aunt; Heart Attack in her paternal grandmother and sister; High Cholesterol in her brother; Kidney Disease in her mother; Prostate Cancer in her father. REVIEW OF SYSTEMS:      Constitutional: No fever or chills. No night sweats, no weight loss   Eyes: No eye discharge, double vision, or eye pain   HEENT: negative for sore mouth, sore throat, hoarseness and voice change   Respiratory: negative for cough , sputum, dyspnea, wheezing, hemoptysis, chest pain. Positive shortness of breath. Cardiovascular: negative for chest pain, dyspnea, palpitations, orthopnea, PND   Gastrointestinal: negative for nausea, vomiting, diarrhea, constipation, abdominal pain, Dysphagia, hematemesis and hematochezia   Genitourinary: negative for frequency, dysuria, nocturia, urinary incontinence, and hematuria   Integument: negative for rash, skin lesions, bruises.    Hematologic/Lymphatic: negative for easy bruising, bleeding, lymphadenopathy, or petechiae   Endocrine: negative for heat or cold intolerance,weight changes, change in bowel habits and hair loss   Musculoskeletal: negative for myalgias, arthralgias, pain, joint swelling,and bone pain   Neurological: negative for headaches, dizziness, seizures, weakness, numbness    PHYSICAL EXAM:        BP (!) 109/54   Pulse 98   Temp 97.9 °F (36.6 °C) (Oral)   Resp 28   Ht 5' 2\" (1.575 m)   Wt 150 lb 5.7 oz (68.2 kg)   LMP 2013 (Exact Date)   SpO2 92%   BMI 27.50 kg/m²    Temp (24hrs), Av °F (36.7 °C), Min:97.7 °F (36.5 °C), Max:98.5 °F (36.9 °C)      General appearance - well appearing, no in pain or distress   Mental status - alert and cooperative   Eyes - pupils equal and 0. 00 0.0 - 0.2 k/uL    Absolute Immature Granulocyte NOT REPORTED 0.00 - 0.30 k/uL    WBC Morphology NOT REPORTED     RBC Morphology NOT REPORTED     Platelet Estimate NOT REPORTED    Basic Metabolic Panel   Result Value Ref Range    Glucose 107 (H) 70 - 99 mg/dL    BUN 11 8 - 23 mg/dL    CREATININE 0.65 0.50 - 0.90 mg/dL    Bun/Cre Ratio NOT REPORTED 9 - 20    Calcium 8.9 8.6 - 10.4 mg/dL    Sodium 143 135 - 144 mmol/L    Potassium 4.3 3.7 - 5.3 mmol/L    Chloride 110 (H) 98 - 107 mmol/L    CO2 27 20 - 31 mmol/L    Anion Gap 6 (L) 9 - 17 mmol/L    GFR Non-African American >60 >60 mL/min    GFR African American >60 >60 mL/min    GFR Comment          GFR Staging NOT REPORTED    Brain Natriuretic Peptide   Result Value Ref Range    Pro- (H) <300 pg/mL    BNP Interpretation Pro-BNP Reference Range:    Blood Gas, Venous   Result Value Ref Range    pH, Louis 7.362 7.320 - 7.420    pCO2, Louis 48.4 39.0 - 55.0    pO2, Louis 26.0 (L) 30 - 50    HCO3, Venous 27.5 24.0 - 30.0 mmol/L    Positive Base Excess, Louis 2.1 (H) 0.0 - 2.0 mmol/L    Negative Base Excess, Louis NOT REPORTED 0.0 - 2.0 mmol/L    O2 Sat, Louis 47.3 (L) 60.0 - 85.0 %    Total Hb NOT REPORTED 12.0 - 16.0 g/dl    Oxyhemoglobin NOT REPORTED 95.0 - 98.0 %    Carboxyhemoglobin 2.9 0 - 5 %    Methemoglobin 0.0 0.0 - 1.9 %    Pt Temp 37.0     pH, Louis, Temp Adj NOT REPORTED 7.320 - 7.420    pCO2, Louis, Temp Adj NOT REPORTED 39.0 - 55.0 mmHg    pO2, Louis, Temp Adj NOT REPORTED 30.0 - 50.0 mmHg    O2 Device/Flow/% NOT REPORTED     Respiratory Rate NOT REPORTED     Nicolas Test NA     Sample Site NA     Pt.  Position NOT REPORTED     Mode NOT REPORTED     Set Rate NOT REPORTED     Total Rate NOT REPORTED     VT NOT REPORTED     FIO2 NOT REPORTED     Peep/Cpap NOT REPORTED     PSV NOT REPORTED     Text for Respiratory NOT REPORTED     NOTIFICATION NOT REPORTED     NOTIFICATION TIME NOT REPORTED    TOX SCR, BLD, ED   Result Value Ref Range    Acetaminophen Level <5 (L) 10 - 30 ug/mL    Ethanol <10 <10 mg/dL    Ethanol percent <6.634 %    Salicylate Lvl <1 (L) 3 - 10 mg/dL    Toxic Tricyclic Sc,Blood WRONG TEST ORDERED NEGATIVE   Urinalysis Reflex to Culture    Specimen: Urine, clean catch   Result Value Ref Range    Color, UA YELLOW YELLOW    Turbidity UA TURBID (A) CLEAR    Glucose, Ur NEGATIVE NEGATIVE    Bilirubin Urine NEGATIVE NEGATIVE    Ketones, Urine TRACE (A) NEGATIVE    Specific Gravity, UA 1.062 (H) 1.000 - 1.030    Urine Hgb NEGATIVE NEGATIVE    pH, UA 7.5 5.0 - 8.0    Protein, UA NEGATIVE NEGATIVE    Urobilinogen, Urine Normal Normal    Nitrite, Urine NEGATIVE NEGATIVE    Leukocyte Esterase, Urine NEGATIVE NEGATIVE    Urinalysis Comments NOT REPORTED    Urine Drug Screen   Result Value Ref Range    Amphetamine Screen, Ur NEGATIVE NEGATIVE    Barbiturate Screen, Ur POSITIVE (A) NEGATIVE    Benzodiazepine Screen, Urine NEGATIVE NEGATIVE    Cocaine Metabolite, Urine NEGATIVE NEGATIVE    Methadone Screen, Urine NEGATIVE NEGATIVE    Opiates, Urine NEGATIVE NEGATIVE    Phencyclidine, Urine NEGATIVE NEGATIVE    Propoxyphene, Urine NOT REPORTED NEGATIVE    Cannabinoid Scrn, Ur NEGATIVE NEGATIVE    Oxycodone Screen, Ur NEGATIVE NEGATIVE    Methamphetamine, Urine NOT REPORTED NEGATIVE    Tricyclic Antidepressants, Urine NOT REPORTED NEGATIVE    MDMA, Urine NOT REPORTED NEGATIVE    Buprenorphine Urine NOT REPORTED NEGATIVE    Test Information       Assay provides medical screening only. The absence of expected drug(s) and/or metabolite(s) may indicate diluted or adulterated urine, limitations of testing or timing of collection.    CBC auto differential   Result Value Ref Range    WBC 3.3 (L) 3.5 - 11.0 k/uL    RBC 3.71 (L) 4.0 - 5.2 m/uL    Hemoglobin 11.8 (L) 12.0 - 16.0 g/dL    Hematocrit 36.7 36 - 46 %    MCV 98.9 80 - 100 fL    MCH 31.9 26 - 34 pg    MCHC 32.3 31 - 37 g/dL    RDW 13.8 11.5 - 14.9 %    Platelets 759 459 - 856 k/uL    MPV 7.1 6.0 - 12.0 fL    NRBC Automated NOT REPORTED per 100 WBC    Differential Type NOT REPORTED     Immature Granulocytes NOT REPORTED 0 %    Absolute Immature Granulocyte NOT REPORTED 0.00 - 0.30 k/uL    WBC Morphology NOT REPORTED     RBC Morphology NOT REPORTED     Platelet Estimate NOT REPORTED     Seg Neutrophils 95 (H) 36 - 66 %    Lymphocytes 4 (L) 24 - 44 %    Monocytes 1 1 - 7 %    Eosinophils % 0 0 - 4 %    Basophils 0 0 - 2 %    Segs Absolute 3.14 1.3 - 9.1 k/uL    Absolute Lymph # 0.13 (L) 1.0 - 4.8 k/uL    Absolute Mono # 0.03 (L) 0.1 - 1.3 k/uL    Absolute Eos # 0.00 0.0 - 0.4 k/uL    Basophils Absolute 0.00 0.0 - 0.2 k/uL    Morphology Normal    Comprehensive Metabolic Panel w/ Reflex to MG   Result Value Ref Range    Glucose 137 (H) 70 - 99 mg/dL    BUN 12 8 - 23 mg/dL    CREATININE 0.47 (L) 0.50 - 0.90 mg/dL    Bun/Cre Ratio NOT REPORTED 9 - 20    Calcium 8.4 (L) 8.6 - 10.4 mg/dL    Sodium 138 135 - 144 mmol/L    Potassium 4.3 3.7 - 5.3 mmol/L    Chloride 107 98 - 107 mmol/L    CO2 23 20 - 31 mmol/L    Anion Gap 8 (L) 9 - 17 mmol/L    Alkaline Phosphatase 86 35 - 104 U/L    ALT 13 5 - 33 U/L    AST 14 <32 U/L    Total Bilirubin 0.18 (L) 0.3 - 1.2 mg/dL    Total Protein 6.0 (L) 6.4 - 8.3 g/dL    Albumin 3.6 3.5 - 5.2 g/dL    Albumin/Globulin Ratio NOT REPORTED 1.0 - 2.5    GFR Non-African American >60 >60 mL/min    GFR African American >60 >60 mL/min    GFR Comment          GFR Staging NOT REPORTED    PT   Result Value Ref Range    Protime 14.1 11.8 - 14.6 sec    INR 1.1    PTT   Result Value Ref Range    PTT 37.3 (H) 24.0 - 36.0 sec   Drug screen, tricyclic   Result Value Ref Range    Tricyclic Antidep,Urine NEGATIVE NEGATIVE   Microscopic Urinalysis   Result Value Ref Range    -          WBC, UA 2 TO 5 /HPF    RBC, UA 0 TO 2 /HPF    Casts UA NOT REPORTED /LPF    Crystals, UA NOT REPORTED None /HPF    Epithelial Cells UA 5 TO 10 /HPF    Renal Epithelial, UA NOT REPORTED 0 /HPF    Bacteria, UA MODERATE (A) None    Mucus, UA NOT REPORTED None    Trichomonas, UA NOT REPORTED None    Amorphous, UA 4+ (A) None    Other Observations UA NOT REPORTED NOT REQ. Yeast, UA NOT REPORTED None   CEA   Result Value Ref Range    CEA 6.4 (H) <3.9 ng/mL   EKG 12 Lead   Result Value Ref Range    Ventricular Rate 78 BPM    Atrial Rate 78 BPM    P-R Interval 144 ms    QRS Duration 84 ms    Q-T Interval 374 ms    QTc Calculation (Bazett) 426 ms    P Axis 76 degrees    R Axis -95 degrees    T Axis 49 degrees         IMAGING DATA:    ECHO Complete 2D W Doppler W Color    Result Date: 6/18/2021  1604 [facility] Transthoracic Echocardiography Report (TTE)  Patient Name Maxwell Galeana     Date of Study               06/18/2021               BOBBY OROSCO   Date of      1955  Gender                      Female  Birth   Age          61 year(s)  Race                           Room Number  2012        Height:                     62 inch, 157.48 cm   Corporate ID [id number]    Weight:                     147 pounds, 66.7 kg  #   Patient Acct [de-identified]   BSA:          1.68 m^2      BMI:      26.89  #                                                              kg/m^2   MR #         [id number]      Sonographer                 Мария Stein   Accession #  [id number]  Interpreting Physician      [address] [place] [address]   Fellow                   Referring Nurse                           Practitioner   Interpreting             Referring Physician         Yoan Britton  Fellow  Type of Study   TTE procedure:2D Echocardiogram, M-Mode, Doppler, Color Doppler. Procedure Date Date: 06/18/2021 Start: 11:44 AM Study Location: [address] [place] [address] Technical Quality: Fair visualization Indications:Pulmonary embolus. History / Tech. Comments: COPD, HLD, Sleep apnea Patient Status: Inpatient Height: 62 inches Weight: 147 pounds BSA: 1.68 m^2 BMI: 26.89 kg/m^2 Rhythm: Within normal limits HR: 78 bpm BP: 140/79 mmHg CONCLUSIONS Summary Left ventricle is normal in size and wall thickness. Global left ventricular systolic function is normal. Estimated LV EF 65-70 %. No significant valvular regurgitation or stenosis seen. Signature ----------------------------------------------------------------------------  Electronically signed by Мария Stein(Sonographer) on 2021 01:07  PM ---------------------------------------------------------------------------- ----------------------------------------------------------------------------  Electronically signed by Manish Flores(Interpreting physician) on 2021  02:46 PM ---------------------------------------------------------------------------- FINDINGS Left Atrium Left atrium is normal in size. Left Ventricle Left ventricle is normal in size and wall thickness. Global left ventricular systolic function is normal. Estimated LV EF 65-70 %. No obvious wall motion abnormality seen. Right Atrium Right atrium is normal in size. Right Ventricle Normal right ventricular size and function. Mitral Valve No obvious valvular abnormality seen. No evidence of mitral regurgitation. Aortic Valve No obvious valvular abnormality seen. No evidence of aortic insufficiency or stenosis. Tricuspid Valve No obvious valvular abnormality seen. Insignificant tricuspid regurgitation, unable to estimate RVSP. Pulmonic Valve Pulmonic valve was not well visualized. No evidence of pulmonic insufficiency or stenosis. Pericardial Effusion No significant pericardial effusion is seen. Pleural Effusion No pleural effusion seen. Miscellaneous Normal aortic root dimension.  E/E' average = 6.7. M-mode / 2D Measurements & Calculations:   LVIDd:4.01 cm(3.7 - 5.6 cm)       Diastolic WQKIFO:49.1 ml  IHHOR:2.27 cm(2.2 - 4.0 cm)       Systolic CVBRKT:10.4 ml  LYUD:2.89 cm(0.6 - 1.1 cm)        Aortic Root:3.4 cm(2.0 - 3.7 cm)  LVPWd:1.06 cm(0.6 - 1.1 cm)       LA Dimension: 3 cm(1.9 - 4.0 cm)  Fractional Shortenin.64 %     LA volume/Index: 37.3 ml /22m^2  Calculated LVEF (%): 82.17 % LVOT:1.9 cm   Mitral:                              Aortic   Peak E-Wave: 0.81 m/s                Peak Velocity: 1.28 m/s  Peak A-Wave: 1.06 m/s                Mean Velocity: 0.86 m/s  E/A Ratio: 0.76                      Peak Gradient: 6.55 mmHg  Peak Gradient: 2.59 mmHg             Mean Gradient: 3 mmHg  Deceleration Time: 246 msec                                        Area (continuity): 2.56 cm^2                                       AV VTI: 22.5 cm  Septal Wall E' velocity:0.11 m/s Lateral Wall E' velocity:0.14 m/s    CT HEAD WO CONTRAST    Result Date: 6/17/2021  EXAMINATION: CT OF THE HEAD WITHOUT CONTRAST  6/17/2021 2:10 pm TECHNIQUE: CT of the head was performed without the administration of intravenous contrast. Dose modulation, iterative reconstruction, and/or weight based adjustment of the mA/kV was utilized to reduce the radiation dose to as low as reasonably achievable. COMPARISON: None. HISTORY: ORDERING SYSTEM PROVIDED HISTORY: AMS, paranoid TECHNOLOGIST PROVIDED HISTORY: AMS, paranoid Decision Support Exception - unselect if not a suspected or confirmed emergency medical condition->Emergency Medical Condition (MA) Reason for Exam: AMS, patient denies any HA, dizziness, or blurred vision Acuity: Unknown Type of Exam: Initial FINDINGS: The patient is somewhat asymmetrically positioned in the CT gantry. BRAIN/VENTRICLES: There is no acute intracranial hemorrhage, mass effect or midline shift. No abnormal extra-axial fluid collection. The gray-white differentiation is maintained without evidence of an acute infarct. There is no evidence of hydrocephalus. ORBITS: The visualized portion of the orbits demonstrate no acute abnormality. SINUSES: The visualized paranasal sinuses and mastoid air cells demonstrate no acute abnormality. There is some deviation of the nasal septum. SOFT TISSUES/SKULL:  No acute abnormality of the visualized skull or soft tissues. No acute intracranial abnormality.  Negative no acute abnormality. There is no acute abnormality of the thoracic aorta. Lungs/pleura: The lungs are without acute process. No focal consolidation or pulmonary edema. No evidence of pleural effusion or pneumothorax. Moderate emphysema. Ill-defined 1.9 cm solid nodular opacity of the superior segment of the right lower lobe on series 4, image 46 is not significantly changed since 2019. Upper Abdomen: Limited images of the upper abdomen are unremarkable. Soft Tissues/Bones: No acute bone or soft tissue abnormality. Diffuse osteopenia with multilevel degenerative disc disease. Similar compression deformities at T5 and T6.     1. Emboli in the right main pulmonary artery as well as lobar, segmental and subsegmental branches of the right middle and upper lobes. No evidence of right heart strain. 2. Ill-defined 1.9 cm solid nodular opacity of the superior segment of the right lower lobe is not significantly changed since 2019. Recommend attention on continued lung cancer screening.      VL Lower Extremity Bilateral Venous Duplex    Result Date: 6/18/2021    [facility]  Vascular Lower Extremities DVT Study Procedure   Patient Name   Ade Kamara     Date of Study           06/18/2021                 BOBBY OROSCO   Date of Birth  1955  Gender                  Female   Age            61 year(s)  Race                       Room Number    2012        Height:                 62 inch, 157.48 cm   Corporate ID # [id number]    Weight:                 147 pounds, 66.7 kg   Patient Acct # [de-identified]   BSA:        1.68 m^2    BMI:       26.89 kg/m^2   MR #           [id number]      Sonographer             Marlena Mejia, T   Accession #    [id number]  Interpreting Physician  Hermelinda Cogan   Referring                  Referring Physician     Rohit Lee  Nurse  Practitioner  Procedure Type of Study:   Veins: Lower Extremities DVT Study, Venous Scan Lower Bilateral.  Indications for Study:Leg Swelling and Pulmonary Embolism. Patient Status: In Patient. Technical Quality:Limited visualization.   - Critical Result:SUE Hunter. Conclusions   Summary   Acute deep vein thrombosis of the right leg involving the femoral,  popliteal, and tibio-peroneal trunk veins. Signature   ----------------------------------------------------------------  Electronically signed by Rafy Eid RVT(Sonographer) on  06/18/2021 08:27 AM  ----------------------------------------------------------------   ----------------------------------------------------------------  Electronically signed by Geoffery Morgans Reyes,Arthur(Interpreting  physician) on 06/18/2021 04:30 PM  ----------------------------------------------------------------  Findings:   Right Impression:                   Left Impression:  The common femoral and tibial veins The common femoral, femoral, popliteal  demonstrate normal compressibility  and tibial veins demonstrate normal  and augmentation. compressibility and augmentation. The mid and distal femoral vein is  Normal compressibility of the great  dilated and non-compressible with   saphenous vein. hypoechoic echoes. Normal compressibility of the small  The popliteal vein and              saphenous vein. tibio-peroneal trunk are dilated  and non-compressible with  hypoechoic echoes. Normal compressibility of the great  saphenous vein. Normal compressibility of the small  saphenous vein. Velocities are measured in cm/s ; Diameters are measured in cm Right Lower Extremities DVT Study Measurements Right 2D Measurements +------------------------------------+----------+---------------+----------+ ! Location                            ! Visualized! Compressibility! Thrombosis! +------------------------------------+----------+---------------+----------+ ! Common Femoral                      !Yes       ! Yes            ! None      ! +------------------------------------+----------+---------------+----------+ ! Prox Femoral                        !Yes       ! Yes            ! None      ! +------------------------------------+----------+---------------+----------+ ! Mid Femoral                         !Yes       ! No             !          ! +------------------------------------+----------+---------------+----------+ ! Dist Femoral                        !Yes       ! No             !          ! +------------------------------------+----------+---------------+----------+ ! Deep Femoral                        !Yes       ! Yes            ! None      ! +------------------------------------+----------+---------------+----------+ ! Popliteal                           !Yes       ! No             !          ! +------------------------------------+----------+---------------+----------+ ! Sapheno Femoral Junction            ! Yes       ! Yes            ! None      ! +------------------------------------+----------+---------------+----------+ ! PTV                                 ! Yes       ! Yes            ! None      ! +------------------------------------+----------+---------------+----------+ ! Peroneal                            !Yes       ! Yes            ! None      ! +------------------------------------+----------+---------------+----------+ ! Gastroc                             ! Yes       ! Yes            ! None      ! +------------------------------------+----------+---------------+----------+ ! GSV Thigh                           ! Yes       ! Yes            ! None      ! +------------------------------------+----------+---------------+----------+ ! GSV Knee                            ! Yes       ! Yes            ! None      ! +------------------------------------+----------+---------------+----------+ ! GSV Ankle                           ! Yes       ! Yes            ! None      ! +------------------------------------+----------+---------------+----------+ ! SSV still have some errors including those of syntax and sound a like substitutions which may escape proof reading. It such instances, actual meaning can be extrapolated by contextual diversion.

## 2021-06-19 NOTE — PROGRESS NOTES
Today's Date: 2021  Patient Name: Kalyani Harrison  Date of admission: 2021 12:49 PM  Patient's age: 61 y.o., 1955  Admission Dx: Pulmonary embolism on right [facility]) [I26.99]    Reason for Consult: management recommendations  Requesting Physician: Claus Parson MD    CHIEF COMPLAINT: Shortness of breath. Pulmonary embolism. INTERIM HISTORY  The patient is seen and evaluated. She is alert and oriented. According to the nurse, plan for discharge later today. She is transition to Eliquis and tolerates that well. No active bleeding. HISTORY OF PRESENT ILLNESS:      The patient is a 61 y.o.  female who is admitted to the hospital for Admitted to hospital with chief complains of shortness of breath. Patient started noticing shortness of breath a week ago. She does have baseline COPD on home O2. Patient has significant history of tobacco dependence. Patient also noted swelling of her right leg. Patient shortness of breath worsened and she presented to the ER. CT chest done shows a large right pulmonary embolism in the main stem pulmonary artery and also in the right upper lobe. Patient continues to smoke cigarettes. Patient has been started on Lovenox. She has been transitioned to Eliquis. Past Medical History:   has a past medical history of Abnormal EKG, Anxiety, Asthma, Bipolar disorder ([address] COPD (chronic obstructive pulmonary disease) ([address] Cramps, extremity, Depression, Dilated bile duct, Headache, History of elective , Hyperlipidemia, Irritable bowel syndrome, Prolonged emergence from general anesthesia, Substance abuse ([address] Unspecified sleep apnea, and Vision abnormalities. Past Surgical History:   has a past surgical history that includes Tonsillectomy and adenoidectomy (Bilateral); LASIK; Induced ; Ankle surgery; eye surgery (Bilateral); Vulva surgery; hysteroscopy (10/19/2016); and Colonoscopy (02/15/2018).      Medications:    Reviewed kg/m²    Temp (24hrs), Av °F (36.7 °C), Min:97.7 °F (36.5 °C), Max:98.5 °F (36.9 °C)      General appearance - well appearing, no in pain or distress   Mental status - alert and cooperative   Eyes - pupils equal and reactive, extraocular eye movements intact   Ears - bilateral TM's and external ear canals normal   Mouth - mucous membranes moist, pharynx normal without lesions   Neck - supple, no significant adenopathy   Lymphatics - no palpable lymphadenopathy, no hepatosplenomegaly   Chest -decreased breathing sounds  Heart - normal rate, regular rhythm, normal S1, S2, no murmurs  Abdomen - soft, nontender, nondistended, no masses or organomegaly   Neurological - alert, oriented, normal speech, no focal findings or movement disorder noted   Musculoskeletal - no joint tenderness, deformity or swelling   Extremities - peripheral pulses normal, no pedal edema, no clubbing or cyanosis   Skin - normal coloration and turgor, no rashes, no suspicious skin lesions noted ,      DATA:      Labs:     Lab Results   Component Value Date    WBC 5.9 2021    HGB 11.7 (L) 2021    HCT 36.0 2021    MCV 99.8 2021     2021           IMPRESSION:   Primary Problem  <principal problem not specified>    Active Hospital Problems    Diagnosis Date Noted    Acute deep vein thrombosis (DVT) of right lower extremity ([address] [I82.401] 2021    Paranoid behavior ([address] [F22]     Bilateral lower extremity edema [R60.0]     Pulmonary embolism on right ([address] [I26.99] 2021    Migraines [G43.909] 2021    DOMÍNGUEZ (dyspnea on exertion) [R06.00] 2021    Leg swelling [M79.89] 2020    Bipolar disorder ([address] [F31.9]     Sleep apnea [G47.30]     Chronic obstructive pulmonary disease ([address] [J44.9] 2016     Acute DVT right lower extremity      RECOMMENDATIONS:  Patient seems to be doing well.   We will plan to continue on with anticoagulation with Eliquis  Recommend follow-up with hematology with Dr. Jody Schwab in about 3 months. we will decide the duration of anticoagulation after 6 months                     This note is created with the assistance of a speech recognition program.  While intending to generate a document that actually reflects the content of the visit, the document can still have some errors including those of syntax and sound a like substitutions which may escape proof reading. It such instances, actual meaning can be extrapolated by contextual diversion.
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
## 2021-06-19 NOTE — PROGRESS NOTES
[address]    PROGRESS NOTE             6/19/2021    8:12 AM    Name:   Fatuma Biswas  MRN:     [id number]     [place]:      [de-identified]   Room:   2012/2012-01  IP Day:  2  Admit Date:  6/17/2021 12:49 PM    PCP:  Darien Olguin MD  Code Status:  Full Code    Subjective:     C/C:   Chief Complaint   Patient presents with    Mental Health Problem     Pt reports that she is paranoid but unable to explain why or what exactly she is feeling. Pt denies SI/HI.  Leg Swelling     Bilateral lower extremity swelling; pt stated the right leg is worse than the left. Pt is ambulatory per self.  Other     SOB with exertion - hx of COPD - on 2L continuously. Interval History Status: improved. Patient was seen and examined bedside. No acute events overnight. Patient is currently on 2L NC lying comfortably in bed. VL duplex shows acute DVT of the right leg involving the femoral, popliteal, and tibio-peroneal trunk veins. Patient is currently on oral Eliquis 10 mg BID. Echo shows an EF 65-70%. No evidence of right heart strain. Patient lying comfortably in bed on 2L NC. Patient denies any fevers, chills, headaches, chest pain, worsening SOB, palpitations, abdominal pain, nausea, or vomiting. Plan for discharge home later today    Brief History:     The patient is a 61 y.o. Non-/non  female who presents withMental Health Problem (Pt reports that she is paranoid but unable to explain why or what exactly she is feeling. Pt denies SI/HI.), Leg Swelling (Bilateral lower extremity swelling; pt stated the right leg is worse than the left. Pt is ambulatory per self.), and Other (SOB with exertion - hx of COPD - on 2L continuously. )   and she is admitted to the hospital for the management of acute pulmonary embolus.   Patient has past medical history of COPD on 2 L home oxygen, bipolar disorder, hyperlipidemia, and gout who presents to the emergency department complaining of leg swelling and worsening shortness of breath. She reports being on 2 L oxygen at home and 3 L oxygen during sleep. Patient continued to smoke cigarettes recently quit on 6/14 and began chewing Nicorette gum. Patient has a 84-pack-year smoking history. Follows pulmonologist Dr. Ailin Narvaez regularly who recently started patient on Spiriva, Breo and albuterol. Patient reports she is compliant with all medications. Patient denies worsening productive cough, fevers or chills. Patient also reports change in her mood recently has become paranoid. She denies SI/HI/AVH.    In the emergency department, patient was tachypneic and had right lower extremity edema. BMP within normal limits, proBNP 337. Troponin x2 normal.  CBC shows no leukocytosis or anemia. CT of the chest and was found to have a large right pulmonary embolism in the mainstem pulmonary artery and also in the right upper lobe. Stable 1.9 cm nodule in the right lower lobe. Review of Systems:     Review of Systems   Constitutional: Negative for appetite change, fatigue and fever. HENT: Negative for ear pain, rhinorrhea and sore throat. Eyes: Negative for discharge and visual disturbance. Respiratory: Negative for cough and shortness of breath. Cardiovascular: Negative for chest pain and palpitations. Gastrointestinal: Negative for abdominal pain, constipation, diarrhea, nausea and vomiting. Endocrine: Negative for polyuria. Genitourinary: Negative for dysuria. Musculoskeletal: Negative for arthralgias. Skin: Negative for rash. Neurological: Negative for dizziness, weakness, light-headedness and headaches. Psychiatric/Behavioral: Negative for agitation. Medications: Allergies:     Allergies   Allergen Reactions    Advil [Ibuprofen] Other (See Comments)     vomiting    Aleve [Naproxen] Other (See Comments)     vomiting    Antipyrine Other (See Comments) unknown    Celecoxib Other (See Comments)    Codeine      headache    Fomepizole     Other      GRASS, TREES, WEEDS    Rofecoxib Other (See Comments)     Unknown reaction    Salicylates      Unknown reaction     Strawberry Extract      HEADACHES    Sulfinpyrazone Other (See Comments)     Unknown reaction    Aspirin Nausea And Vomiting, Other (See Comments) and Nausea Only    Nsaids Nausea Only, Other (See Comments) and Nausea And Vomiting       Current Meds:   Scheduled Meds:    apixaban  10 mg Oral BID    albuterol  2.5 mg Nebulization Q4H    methylPREDNISolone  40 mg Intravenous Q12H    montelukast  10 mg Oral Nightly    lurasidone  80 mg Oral Nightly    sodium chloride flush  5-40 mL Intravenous 2 times per day    atorvastatin  20 mg Oral Nightly    topiramate  50 mg Oral Daily    topiramate  100 mg Oral Nightly    pantoprazole  40 mg Oral QAM AC    budesonide-formoterol  2 puff Inhalation BID    And    tiotropium  2 puff Inhalation Daily     Continuous Infusions:    sodium chloride       PRN Meds: perflutren lipid microspheres, sodium chloride flush, guaiFENesin, butalbital-APAP-caffeine, sodium chloride flush, sodium chloride, ondansetron **OR** ondansetron, polyethylene glycol, acetaminophen **OR** acetaminophen, potassium chloride, magnesium sulfate, nicotine polacrilex    Data:     Past Medical History:   has a past medical history of Abnormal EKG, Anxiety, Asthma, Bipolar disorder (HCC), COPD (chronic obstructive pulmonary disease) (HCC), Cramps, extremity, Depression, Dilated bile duct, Headache, History of elective , Hyperlipidemia, Irritable bowel syndrome, Prolonged emergence from general anesthesia, Substance abuse ([facility] [address] Unspecified sleep apnea, and Vision abnormalities. Social History:   reports that she quit smoking about 2 years ago. Her smoking use included cigarettes. She has a 84.00 pack-year smoking history.  She has never used smokeless tobacco. She reports current alcohol use. She reports that she does not use drugs. Family History:   Family History   Problem Relation Age of Onset    Dementia Maternal Aunt     Kidney Disease Mother     Heart Attack Sister     Prostate Cancer Father     High Cholesterol Brother     Heart Attack Paternal Grandmother        Vitals:  BP (!) 101/57   Pulse 72   Temp 98 °F (36.7 °C) (Oral)   Resp 19   Ht 5' 2\" (1.575 m)   Wt 151 lb 3.8 oz (68.6 kg)   LMP 2013 (Exact Date)   SpO2 98%   BMI 27.66 kg/m²   Temp (24hrs), Av °F (36.7 °C), Min:97.7 °F (36.5 °C), Max:98.5 °F (36.9 °C)    No results for input(s): POCGLU in the last 72 hours. I/O(24Hr): Intake/Output Summary (Last 24 hours) at 2021 0812  Last data filed at 2021 0634  Gross per 24 hour   Intake --   Output 600 ml   Net -600 ml       Labs:    [unfilled]    Lab Results   Component Value Date/Time    SPECIAL NOT REPORTED 2017 02:30 PM     Lab Results   Component Value Date/Time    CULTURE NO SIGNIFICANT GROWTH 10/12/2016 01:30 PM    CULTURE  10/12/2016 01:30 PM     Performed at [address] [phone]       [facility]    Radiology:    CT HEAD WO CONTRAST    Result Date: 2021  EXAMINATION: CT OF THE HEAD WITHOUT CONTRAST  2021 2:10 pm TECHNIQUE: CT of the head was performed without the administration of intravenous contrast. Dose modulation, iterative reconstruction, and/or weight based adjustment of the mA/kV was utilized to reduce the radiation dose to as low as reasonably achievable. COMPARISON: None.  HISTORY: ORDERING SYSTEM PROVIDED HISTORY: AMS, paranoid TECHNOLOGIST PROVIDED HISTORY: AMS, paranoid Decision Support Exception - unselect if not a suspected or confirmed emergency medical condition->Emergency Medical Condition (MA) Reason for Exam: AMS, patient denies any HA, dizziness, or blurred vision Acuity: Unknown Type of Exam: Initial FINDINGS: The patient is somewhat asymmetrically positioned in the CT gantry. BRAIN/VENTRICLES: There is no acute intracranial hemorrhage, mass effect or midline shift. No abnormal extra-axial fluid collection. The gray-white differentiation is maintained without evidence of an acute infarct. There is no evidence of hydrocephalus. ORBITS: The visualized portion of the orbits demonstrate no acute abnormality. SINUSES: The visualized paranasal sinuses and mastoid air cells demonstrate no acute abnormality. There is some deviation of the nasal septum. SOFT TISSUES/SKULL:  No acute abnormality of the visualized skull or soft tissues. No acute intracranial abnormality. Negative CT of the head. XR CHEST PORTABLE    Result Date: 6/17/2021  EXAMINATION: ONE XRAY VIEW OF THE CHEST 6/17/2021 10:19 am COMPARISON: 05/09/2021 HISTORY: ORDERING SYSTEM PROVIDED HISTORY: SOB TECHNOLOGIST PROVIDED HISTORY: SOB Reason for Exam: leg awelling, ams Acuity: Unknown Type of Exam: Unknown FINDINGS: Pulmonary hyperinflation. Stable mild patchy areas of parenchymal scarring. The cardiac size is normal. No acute infiltrates or pleural effusions are seen. Pulmonary vascularity appears normal. There is mild ectasia of the thoracic aorta. There are degenerative changes in the spine . No acute bony abnormalities. The hilar structures are normal.     No acute cardiopulmonary disease     CT CHEST PULMONARY EMBOLISM W CONTRAST    Addendum Date: 6/17/2021    ADDENDUM: Critical results were called by Dr. Nicola Jones to Dr. Raleigh Patel on 6/17/2021 at 14:51. Result Date: 6/17/2021  EXAMINATION: CTA OF THE CHEST 6/17/2021 2:11 pm TECHNIQUE: CTA of the chest was performed after the administration of intravenous contrast.  Multiplanar reformatted images are provided for review. MIP images are provided for review.  Dose modulation, iterative reconstruction, and/or weight based adjustment of the mA/kV was utilized to reduce the radiation dose to as low as reasonably achievable. COMPARISON: 09/09/2020, 07/25/2019 HISTORY: ORDERING SYSTEM PROVIDED HISTORY: SOB TECHNOLOGIST PROVIDED HISTORY: SOB Decision Support Exception - unselect if not a suspected or confirmed emergency medical condition->Emergency Medical Condition (MA) Reason for Exam: SOB FINDINGS: Pulmonary Arteries: Pulmonary arteries are adequately opacified for evaluation. Emboli in the right main pulmonary artery as well as lobar, segmental and subsegmental branches of the right middle and upper lobes. Similar dilatation of the main pulmonary artery to 3.3 cm. . Mediastinum: No evidence of mediastinal lymphadenopathy. The heart and pericardium demonstrate no acute abnormality. There is no acute abnormality of the thoracic aorta. Lungs/pleura: The lungs are without acute process. No focal consolidation or pulmonary edema. No evidence of pleural effusion or pneumothorax. Moderate emphysema. Ill-defined 1.9 cm solid nodular opacity of the superior segment of the right lower lobe on series 4, image 46 is not significantly changed since 2019. Upper Abdomen: Limited images of the upper abdomen are unremarkable. Soft Tissues/Bones: No acute bone or soft tissue abnormality. Diffuse osteopenia with multilevel degenerative disc disease. Similar compression deformities at T5 and T6.     1. Emboli in the right main pulmonary artery as well as lobar, segmental and subsegmental branches of the right middle and upper lobes. No evidence of right heart strain. 2. Ill-defined 1.9 cm solid nodular opacity of the superior segment of the right lower lobe is not significantly changed since 2019. Recommend attention on continued lung cancer screening. Physical Examination:        Physical Exam  Constitutional:       Appearance: Normal appearance.       Comments: On 2L NC   HENT:      Mouth/Throat:      Mouth: Mucous membranes are moist.   Eyes:      Conjunctiva/sclera: Conjunctivae normal.   Cardiovascular:      Rate and Rhythm: Normal rate and regular rhythm. Pulses: Normal pulses. Heart sounds: Normal heart sounds. Pulmonary:      Breath sounds: Normal breath sounds. No wheezing. Abdominal:      Palpations: Abdomen is soft. Tenderness: There is no abdominal tenderness. Musculoskeletal:         General: Swelling present. No tenderness. Right lower leg: Edema present. Neurological:      Mental Status: She is alert and oriented to person, place, and time. Psychiatric:         Attention and Perception: Attention normal.         Speech: Speech normal.         Behavior: Behavior normal. Behavior is cooperative. Assessment:        Primary Problem  <principal problem not specified>    Active Hospital Problems    Diagnosis Date Noted    Acute deep vein thrombosis (DVT) of right lower extremity ([facility] [address] [I82.401] 06/19/2021    Paranoid behavior (HCC) [F22]     Bilateral lower extremity edema [R60.0]     Pulmonary embolism on right ([address] [I26.99] 06/17/2021    Migraines [G43.909] 06/17/2021    DOMÍNGUEZ (dyspnea on exertion) [R06.00] 06/16/2021    Leg swelling [M79.89] 08/19/2020    Bipolar disorder ([facility] [address] [F31.9]     Sleep apnea [G47.30]     Chronic obstructive pulmonary disease ([facility] [address] [J44.9] 01/06/2016       Plan:        Acute pulmonary embolism, nonprovoked  -On oral Eliquis 10 mg BID  -Venous Dopplers: Shows DVT right lower extremity  -Echocardiogram ordered: EF 65-70%. Unable to estimate RVSP  -Heme-onc on board:   -Recommends at least 6 months of anticoagulation   -Recommend outpatient follow-up for consideration for thrombophilia work-up     Acute on chronic hypoxic respiratory failure, setting of COPD due to tobacco use disorder  -On 2L oxygen via nasal cannula  -On oral prednisone 40 mg  -Nicorette gum 2 mg p.o. as needed  -Pulmonology on board:   -Switch to oral prednisone   -Continue bronchodilators      Migraines  -Continue Topamax 100 MG p.o. nightly  -Need Topamax 50 mg p.o. daily     Bipolar disorder  -Latuda 80 mg  -Psych consulted:   -Recommends Risperdal 0.5 mg at bedtime?   -Appreciate further recommendations for discharge on [facility] meds     Hyperlipidemia   -Atorvastatin 20 mg p.o. nightly     Code full  Diet adult  DVT prophylaxis: On Eliquis 10 mg BID  GI prophylaxis Protonix 40 mg PO once daily  PT/OT/SW    Marta Leung MD  6/19/2021  8:12 AM     Attestation and add on       I have discussed the care of Opal Alexis , including pertinent history and exam findings,      6/19/21    with the resident. I have seen and examined the patient and the key elements of all parts of the encounter have been performed by me . I agree with the assessment, plan and orders as documented by the resident.     ---- ;     MD JUNO Pyle[address], [address] [place] [address].    Phone [phone]   Fax: [phone]  Answering Service: [phone]

## 2021-06-19 NOTE — PROGRESS NOTES
Pt discharged home with all belongings with daughter Esha Cox. New medication information printed, provided to and discussed with pt and family. F/u recs from Dr. Sarah Espino added to discharge paperwork including contact phone numbers and explained to pt and family.

## 2021-06-19 NOTE — FLOWSHEET NOTE
Pt's daughter Alfred Smalls at bedside. Patient welcomed prayer. 06/19/21 1127   Encounter Summary   Services provided to: Patient and family together   Referral/Consult From: [address] [place] [address] Road Visiting   (6-19-21)   Complexity of Encounter Moderate   Length of Encounter 15 minutes   Spiritual Assessment Completed Yes   Spiritual/Shinto   Type Spiritual support   Assessment Calm; Approachable   Intervention Active listening;Explored feelings, thoughts, concerns;Prayer;Sustaining presence/ Ministry of presence; Discussed illness/injury and it's impact   Outcome Expressed gratitude;Engaged in conversation;Expressed feelings/needs/concerns;Receptive

## 2021-06-21 ENCOUNTER — TELEPHONE (OUTPATIENT)
Dept: FAMILY MEDICINE CLINIC | Age: 66
End: 2021-06-21

## 2021-06-21 ENCOUNTER — CARE COORDINATION (OUTPATIENT)
Dept: CASE MANAGEMENT | Age: 66
End: 2021-06-21

## 2021-06-21 NOTE — TELEPHONE ENCOUNTER
[facility] Initial [facility] Up Call    Outreach made within 2 business days of discharge: Yes    Patient: Xuan Fitzpatrick Patient : 1955   MRN: [id number]  Reason for Admission: There are no discharge diagnoses documented for the most recent discharge. Discharge Date: 21       Spoke with: [facility]    Discharge department/facility: [address] [place] [address] Interactive Patient Contact:  Was patient able to fill all prescriptions: Yes  Was patient instructed to bring all medications to the follow-up visit: Yes  Is patient taking all medications as directed in the discharge summary?  Yes  Does patient understand their discharge instructions: Yes  Does patient have questions or concerns that need addressed prior to 7-14 day follow up office visit: NO    Scheduled appointment with PCP within 7-14 days    Follow Up  Future Appointments   Date Time Provider Percy Rodriguez   2021 11:15 AM IVONNE Choi - CNP fp sc [email or web address]   2021 12:45 PM Beckie Rain MD [address] [place] [address]   2021 11:45 AM Roddie Gottron, MD fp renetta Funez MA

## 2021-06-21 NOTE — CARE COORDINATION
[facility] Transitions Initial Follow Up Call    Call within 2 business days of discharge: Yes    Patient: Karen Balderrama Patient : 1955   MRN: [id number]  Reason for Admission: DVT  Discharge Date: 21 RARS: Readmission Risk Score: 21      Last Discharge [facility]       Complaint Diagnosis Description Type Department Provider    21 Mental Health Problem; Leg Swelling; Other Pulmonary embolism on right ([address] . .. ED to Hosp-Admission (Discharged) (ADMITTED) Wally Hernandez MD; Aicha Meadows. ..            # 1 attempt-unable to reach patient, left vm message with name and call back information, requested call back//JU    Facility: [facility]    Non-face-to-face services provided:  Scheduled appointment with PCP-Hospital follow up appointment with pcp on 21    Care Transitions 24 Hour Call    Care Transitions Interventions         Follow Up  Future Appointments   Date Time Provider Percy Rodriguez   2021 11:15 AM IVONNE Choi - CNP fp sc [email or web address]   2021 12:45 PM Felicia Cespedes MD [address] [place] [address]   2021 11:45 AM Constantine Maldonado MD fp sc Isabel Gardiner RN

## 2021-06-22 ENCOUNTER — CARE COORDINATION (OUTPATIENT)
Dept: CASE MANAGEMENT | Age: 66
End: 2021-06-22

## 2021-06-22 ENCOUNTER — TELEPHONE (OUTPATIENT)
Dept: FAMILY MEDICINE CLINIC | Age: 66
End: 2021-06-22

## 2021-06-22 ENCOUNTER — OFFICE VISIT (OUTPATIENT)
Dept: FAMILY MEDICINE CLINIC | Age: 66
End: 2021-06-22
Payer: COMMERCIAL

## 2021-06-22 VITALS
HEART RATE: 80 BPM | DIASTOLIC BLOOD PRESSURE: 80 MMHG | BODY MASS INDEX: 26.7 KG/M2 | OXYGEN SATURATION: 99 % | WEIGHT: 146 LBS | TEMPERATURE: 96.9 F | SYSTOLIC BLOOD PRESSURE: 120 MMHG

## 2021-06-22 DIAGNOSIS — I82.4Y1 ACUTE DEEP VEIN THROMBOSIS (DVT) OF PROXIMAL VEIN OF RIGHT LOWER EXTREMITY (HCC): ICD-10-CM

## 2021-06-22 DIAGNOSIS — J44.1 COPD EXACERBATION (HCC): Primary | ICD-10-CM

## 2021-06-22 DIAGNOSIS — K59.04 CHRONIC IDIOPATHIC CONSTIPATION: ICD-10-CM

## 2021-06-22 DIAGNOSIS — I27.21 PULMONARY ARTERIAL HYPERTENSION (HCC): ICD-10-CM

## 2021-06-22 DIAGNOSIS — R73.9 HYPERGLYCEMIA: ICD-10-CM

## 2021-06-22 DIAGNOSIS — F31.70 BIPOLAR DISORDER IN PARTIAL REMISSION, MOST RECENT EPISODE UNSPECIFIED TYPE (HCC): ICD-10-CM

## 2021-06-22 DIAGNOSIS — R91.8 PULMONARY NODULES: ICD-10-CM

## 2021-06-22 DIAGNOSIS — J96.22 ACUTE ON CHRONIC RESPIRATORY FAILURE WITH HYPOXIA AND HYPERCAPNIA (HCC): ICD-10-CM

## 2021-06-22 DIAGNOSIS — I26.99 PULMONARY EMBOLISM ON RIGHT (HCC): ICD-10-CM

## 2021-06-22 DIAGNOSIS — J96.21 ACUTE ON CHRONIC RESPIRATORY FAILURE WITH HYPOXIA AND HYPERCAPNIA (HCC): ICD-10-CM

## 2021-06-22 LAB — HBA1C MFR BLD: 5.2 %

## 2021-06-22 PROCEDURE — 83036 HEMOGLOBIN GLYCOSYLATED A1C: CPT | Performed by: FAMILY MEDICINE

## 2021-06-22 PROCEDURE — 1111F DSCHRG MED/CURRENT MED MERGE: CPT | Performed by: FAMILY MEDICINE

## 2021-06-22 PROCEDURE — 99215 OFFICE O/P EST HI 40 MIN: CPT | Performed by: FAMILY MEDICINE

## 2021-06-22 RX ORDER — DOCUSATE SODIUM 100 MG/1
100 CAPSULE, LIQUID FILLED ORAL 2 TIMES DAILY
Qty: 30 CAPSULE | Refills: 5 | Status: SHIPPED | OUTPATIENT
Start: 2021-06-22 | End: 2021-07-22

## 2021-06-22 RX ORDER — METHYLPREDNISOLONE 4 MG/1
TABLET ORAL
Qty: 1 KIT | Refills: 0 | Status: SHIPPED | OUTPATIENT
Start: 2021-06-22 | End: 2021-06-28

## 2021-06-22 ASSESSMENT — ENCOUNTER SYMPTOMS
ABDOMINAL PAIN: 0
SHORTNESS OF BREATH: 1
CONSTIPATION: 1
WHEEZING: 1

## 2021-06-22 ASSESSMENT — PATIENT HEALTH QUESTIONNAIRE - PHQ9
SUM OF ALL RESPONSES TO PHQ QUESTIONS 1-9: 0
1. LITTLE INTEREST OR PLEASURE IN DOING THINGS: 0
SUM OF ALL RESPONSES TO PHQ QUESTIONS 1-9: 0
SUM OF ALL RESPONSES TO PHQ9 QUESTIONS 1 & 2: 0
SUM OF ALL RESPONSES TO PHQ QUESTIONS 1-9: 0
2. FEELING DOWN, DEPRESSED OR HOPELESS: 0

## 2021-06-22 NOTE — PROGRESS NOTES
Visit Information    Have you changed or started any medications since your last visit including any over-the-counter medicines, vitamins, or herbal medicines? no   Are you having any side effects from any of your medications? -  no  Have you stopped taking any of your medications? Is so, why? -  no    Have you seen any other physician or provider since your last visit? Yes - Records Obtained  Have you had any other diagnostic tests since your last visit? Yes - Records Obtained  Have you been seen in the emergency room and/or had an admission to a hospital since we last saw you? Yes - Records Obtained  Have you had your routine dental cleaning in the past 6 months? yes     Have you activated your [employer] account? If not, what are your barriers?  Yes     Patient Care Team:  Raisa Rossi MD as PCP - General (Family Medicine)  Raisa Rossi MD as PCP - [facility] Provider  Khushi Kellogg DO as Consulting Physician (Obstetrics & Gynecology)  Maru Montemayor MD as Consulting Physician (Pulmonology)  Severo Sullivan MD as Consulting Physician (Gastroenterology)  Bisi Moore RN as Nurse Navigator  Taylor Paradise, RN as Care Transitions Nurse    Medical History Review  Past Medical, Family, and Social History reviewed and does contribute to the patient presenting condition    Health Maintenance   Topic Date Due    Pneumococcal 0-64 years Vaccine (3 of 4 - PPSV23) 05/06/2021    Diabetes screen  05/09/2021    Lipid screen  09/09/2021    Low dose CT lung screening  09/09/2021    Potassium monitoring  06/19/2022    Creatinine monitoring  06/19/2022    Breast cancer screen  12/21/2022    Colon cancer screen colonoscopy  02/15/2023    Cervical cancer screen  04/13/2024    DTaP/Tdap/Td vaccine (2 - Td or Tdap) 09/09/2026    Flu vaccine  Completed    Shingles Vaccine  Completed    COVID-19 Vaccine  Completed    Hepatitis C screen  Completed    HIV screen  Completed    Hepatitis A vaccine  Aged Out  Hepatitis B vaccine  Aged Out    Hib vaccine  Aged Out    Meningococcal (ACWY) vaccine  Aged Out

## 2021-06-22 NOTE — TELEPHONE ENCOUNTER
pts insurance will not cover her Symbicort.  She said you told her to let you know and you would call in something else

## 2021-06-22 NOTE — DISCHARGE SUMMARY
[facility] Internal Medicine    Discharge Summary     Patient ID: Oh Min  :  1955   MRN: [id number]     ACCOUNT:  [de-identified]   Patient's PCP: Devika Mullen MD  Admit Date: 2021   Discharge Date: 2021   Length of Stay: 2  Code Status:  Prior  Admitting Physician: Ann Tamez MD  Discharge Physician: Malia Marquez MD     Active Discharge Diagnoses:     Primary Problem  <principal problem not specified>      [place] Problems    Diagnosis Date Noted    Acute deep vein thrombosis (DVT) of right lower extremity ([address] [I82.401] 2021    Delirium [R41.0]     Paranoid behavior ([address] [F22]     Bilateral lower extremity edema [R60.0]     Pulmonary embolism on right ([address] [I26.99] 2021    Migraines [G43.909] 2021    DOMÍNGUEZ (dyspnea on exertion) [R06.00] 2021    Leg swelling [M79.89] 2020    Bipolar disorder ([address] [F31.9]     Sleep apnea [G47.30]     Chronic obstructive pulmonary disease ([facility] [J44.9] 2016       Admission Condition:  fair     Discharged Condition: fair    Hospital Stay:     Hospital Course:  Oh Min is a 61 y.o. female who was admitted for the management of   .     , presented with Mental Health Problem (Pt reports that she is paranoid but unable to explain why or what exactly she is feeling. Pt denies SI/HI.), Leg Swelling (Bilateral lower extremity swelling; pt stated the right leg is worse than the left.  Pt is ambulatory per self.), and Other (SOB with exertion - hx of COPD - on 2L continuously. )      ,                         <principal problem not specified>;                               Active Problems:    Chronic obstructive pulmonary disease (HCC)    Bipolar disorder (HCC)    Sleep apnea    Leg swelling    DOMÍNGUEZ (dyspnea on exertion)    Pulmonary embolism on right (HCC)    Migraines    Paranoid behavior (HCC)    Bilateral lower extremity edema    Acute deep vein thrombosis (DVT) of right lower extremity ([address]    Delirium  Resolved Problems:    * No resolved hospital problems. *       Significant therapeutic interventions: The patient is a 61 y.o. Non-/non  female who presents withMental Health Problem (Pt reports that she is paranoid but unable to explain why or what exactly she is feeling. Pt denies SI/HI.), Leg Swelling (Bilateral lower extremity swelling; pt stated the right leg is worse than the left. Pt is ambulatory per self.), and Other (SOB with exertion - hx of COPD - on 2L continuously. )   and she is admitted to the hospital for the management of acute pulmonary embolus. Patient has past medical history of COPD on 2 L home oxygen, bipolar disorder, hyperlipidemia, and gout who presents to the emergency department complaining of leg swelling and worsening shortness of breath. She reports being on 2 L oxygen at home and 3 L oxygen during sleep. Patient continued to smoke cigarettes recently quit on 6/14 and began chewing Nicorette gum. Patient has a 84-pack-year smoking history. Follows pulmonologist Dr. Neelam Flanagan regularly who recently started patient on Spiriva, Breo and albuterol. Patient reports she is compliant with all medications. Patient denies worsening productive cough, fevers or chills. Patient also reports change in her mood recently has become paranoid. She denies SI/HI/AVH. In the emergency department, patient was tachypneic and had right lower extremity edema. BMP within normal limits, proBNP 337. Troponin x2 normal.  CBC shows no leukocytosis or anemia. CT of the chest and was found to have a large right pulmonary embolism in the mainstem pulmonary artery and also in the right upper lobe.   Stable 1.9 cm nodule in the right lower lobe            Active Hospital Problems     Diagnosis Date Noted    Acute deep vein thrombosis (DVT) of right lower extremity ([facility] [I82.401] 06/19/2021    Paranoid behavior ([facility] [F22]      Bilateral lower extremity edema [R60.0]      Pulmonary embolism on right ([facility] [I26.99] 06/17/2021    Migraines [G43.909] 06/17/2021    DOMÍNGUEZ (dyspnea on exertion) [R06.00] 06/16/2021    Leg swelling [M79.89] 08/19/2020    Bipolar disorder ([facility] [F31.9]      Sleep apnea [G47.30]      Chronic obstructive pulmonary disease ([facility] [J44.9] 01/06/2016      Acute DVT right lower extremity        RECOMMENDATIONS:  Patient seems to be doing well. We will plan to continue on with anticoagulation with Eliquis  Recommend follow-up with hematology with Dr. Calista Garcia in about 3 months.    we will decide the duration of anticoagulation after 6 months          Significant Diagnostic Studies:   Labs / Micro:       Results for orders placed or performed during the hospital encounter of 06/17/21   Troponin   Result Value Ref Range    Troponin, High Sensitivity 9 0 - 14 ng/L    Troponin T NOT REPORTED <0.03 ng/mL    Troponin Interp NOT REPORTED    Troponin   Result Value Ref Range    Troponin, High Sensitivity 9 0 - 14 ng/L    Troponin T NOT REPORTED <0.03 ng/mL    Troponin Interp NOT REPORTED    CBC Auto Differential   Result Value Ref Range    WBC 3.9 3.5 - 11.0 k/uL    RBC 3.80 (L) 4.0 - 5.2 m/uL    Hemoglobin 12.1 12.0 - 16.0 g/dL    Hematocrit 37.7 36 - 46 %    MCV 99.3 80 - 100 fL    MCH 31.7 26 - 34 pg    MCHC 32.0 31 - 37 g/dL    RDW 14.1 11.5 - 14.9 %    Platelets 110 849 - 954 k/uL    MPV 7.3 6.0 - 12.0 fL    NRBC Automated NOT REPORTED per 100 WBC    Differential Type NOT REPORTED     Seg Neutrophils 73 (H) 36 - 66 %    Lymphocytes 10 (L) 24 - 44 %    Monocytes 14 (H) 1 - 7 %    Eosinophils % 2 0 - 4 %    Basophils 1 0 - 2 %    Immature Granulocytes NOT REPORTED 0 %    Segs Absolute 2.90 1.3 - 9.1 k/uL    Absolute Lymph # 0.40 (L) 1.0 - 4.8 k/uL    Absolute Mono # 0.50 0.1 - 1.3 k/uL    Absolute Eos # 0.10 0.0 - 0.4 k/uL    Basophils Absolute 0.00 0.0 - 0.2 k/uL    Absolute Immature Granulocyte NOT REPORTED 0.00 - 0.30 k/uL    WBC Morphology NOT REPORTED     RBC Morphology NOT REPORTED     Platelet Estimate NOT REPORTED    Basic Metabolic Panel   Result Value Ref Range    Glucose 107 (H) 70 - 99 mg/dL    BUN 11 8 - 23 mg/dL    CREATININE 0.65 0.50 - 0.90 mg/dL    Bun/Cre Ratio NOT REPORTED 9 - 20    Calcium 8.9 8.6 - 10.4 mg/dL    Sodium 143 135 - 144 mmol/L    Potassium 4.3 3.7 - 5.3 mmol/L    Chloride 110 (H) 98 - 107 mmol/L    CO2 27 20 - 31 mmol/L    Anion Gap 6 (L) 9 - 17 mmol/L    GFR Non-African American >60 >60 mL/min    GFR African American >60 >60 mL/min    GFR Comment          GFR Staging NOT REPORTED    Brain Natriuretic Peptide   Result Value Ref Range    Pro- (H) <300 pg/mL    BNP Interpretation Pro-BNP Reference Range:    Blood Gas, Venous   Result Value Ref Range    pH, Louis 7.362 7.320 - 7.420    pCO2, Louis 48.4 39.0 - 55.0    pO2, Louis 26.0 (L) 30 - 50    HCO3, Venous 27.5 24.0 - 30.0 mmol/L    Positive Base Excess, Louis 2.1 (H) 0.0 - 2.0 mmol/L    Negative Base Excess, Louis NOT REPORTED 0.0 - 2.0 mmol/L    O2 Sat, Louis 47.3 (L) 60.0 - 85.0 %    Total Hb NOT REPORTED 12.0 - 16.0 g/dl    Oxyhemoglobin NOT REPORTED 95.0 - 98.0 %    Carboxyhemoglobin 2.9 0 - 5 %    Methemoglobin 0.0 0.0 - 1.9 %    Pt Temp 37.0     pH, Louis, Temp Adj NOT REPORTED 7.320 - 7.420    pCO2, Louis, Temp Adj NOT REPORTED 39.0 - 55.0 mmHg    pO2, Louis, Temp Adj NOT REPORTED 30.0 - 50.0 mmHg    O2 Device/Flow/% NOT REPORTED     Respiratory Rate NOT REPORTED     Nicolas Test NA     Sample Site NA     Pt.  Position NOT REPORTED     Mode NOT REPORTED     Set Rate NOT REPORTED     Total Rate NOT REPORTED     VT NOT REPORTED     FIO2 NOT REPORTED     Peep/Cpap NOT REPORTED     PSV NOT REPORTED     Text for Respiratory NOT REPORTED     NOTIFICATION NOT REPORTED     NOTIFICATION TIME NOT REPORTED    TOX SCR, BLD, ED   Result Value Ref Range    Acetaminophen Level <5 (L) 10 - 30 ug/mL    Ethanol <10 <10 mg/dL    Ethanol percent <0.010 %    Salicylate Lvl <1 (L) 3 - 10 mg/dL    Toxic Tricyclic Sc,Blood WRONG TEST ORDERED NEGATIVE   Urinalysis Reflex to Culture    Specimen: Urine, clean catch   Result Value Ref Range    Color, UA YELLOW YELLOW    Turbidity UA TURBID (A) CLEAR    Glucose, Ur NEGATIVE NEGATIVE    Bilirubin Urine NEGATIVE NEGATIVE    Ketones, Urine TRACE (A) NEGATIVE    Specific Gravity, UA 1.062 (H) 1.000 - 1.030    Urine Hgb NEGATIVE NEGATIVE    pH, UA 7.5 5.0 - 8.0    Protein, UA NEGATIVE NEGATIVE    Urobilinogen, Urine Normal Normal    Nitrite, Urine NEGATIVE NEGATIVE    Leukocyte Esterase, Urine NEGATIVE NEGATIVE    Urinalysis Comments NOT REPORTED    Urine Drug Screen   Result Value Ref Range    Amphetamine Screen, Ur NEGATIVE NEGATIVE    Barbiturate Screen, Ur POSITIVE (A) NEGATIVE    Benzodiazepine Screen, Urine NEGATIVE NEGATIVE    Cocaine Metabolite, Urine NEGATIVE NEGATIVE    Methadone Screen, Urine NEGATIVE NEGATIVE    Opiates, Urine NEGATIVE NEGATIVE    Phencyclidine, Urine NEGATIVE NEGATIVE    Propoxyphene, Urine NOT REPORTED NEGATIVE    Cannabinoid Scrn, Ur NEGATIVE NEGATIVE    Oxycodone Screen, Ur NEGATIVE NEGATIVE    Methamphetamine, Urine NOT REPORTED NEGATIVE    Tricyclic Antidepressants, Urine NOT REPORTED NEGATIVE    MDMA, Urine NOT REPORTED NEGATIVE    Buprenorphine Urine NOT REPORTED NEGATIVE    Test Information       Assay provides medical screening only. The absence of expected drug(s) and/or metabolite(s) may indicate diluted or adulterated urine, limitations of testing or timing of collection.    CBC auto differential   Result Value Ref Range    WBC 3.3 (L) 3.5 - 11.0 k/uL    RBC 3.71 (L) 4.0 - 5.2 m/uL    Hemoglobin 11.8 (L) 12.0 - 16.0 g/dL    Hematocrit 36.7 36 - 46 %    MCV 98.9 80 - 100 fL    MCH 31.9 26 - 34 pg    MCHC 32.3 31 - 37 g/dL    RDW 13.8 11.5 - 14.9 %    Platelets 570 406 - 767 k/uL    MPV 7.1 6.0 - 12.0 fL    NRBC Automated NOT REPORTED per 100 WBC    Differential Type NOT REPORTED Immature Granulocytes NOT REPORTED 0 %    Absolute Immature Granulocyte NOT REPORTED 0.00 - 0.30 k/uL    WBC Morphology NOT REPORTED     RBC Morphology NOT REPORTED     Platelet Estimate NOT REPORTED     Seg Neutrophils 95 (H) 36 - 66 %    Lymphocytes 4 (L) 24 - 44 %    Monocytes 1 1 - 7 %    Eosinophils % 0 0 - 4 %    Basophils 0 0 - 2 %    Segs Absolute 3.14 1.3 - 9.1 k/uL    Absolute Lymph # 0.13 (L) 1.0 - 4.8 k/uL    Absolute Mono # 0.03 (L) 0.1 - 1.3 k/uL    Absolute Eos # 0.00 0.0 - 0.4 k/uL    Basophils Absolute 0.00 0.0 - 0.2 k/uL    Morphology Normal    Comprehensive Metabolic Panel w/ Reflex to MG   Result Value Ref Range    Glucose 137 (H) 70 - 99 mg/dL    BUN 12 8 - 23 mg/dL    CREATININE 0.47 (L) 0.50 - 0.90 mg/dL    Bun/Cre Ratio NOT REPORTED 9 - 20    Calcium 8.4 (L) 8.6 - 10.4 mg/dL    Sodium 138 135 - 144 mmol/L    Potassium 4.3 3.7 - 5.3 mmol/L    Chloride 107 98 - 107 mmol/L    CO2 23 20 - 31 mmol/L    Anion Gap 8 (L) 9 - 17 mmol/L    Alkaline Phosphatase 86 35 - 104 U/L    ALT 13 5 - 33 U/L    AST 14 <32 U/L    Total Bilirubin 0.18 (L) 0.3 - 1.2 mg/dL    Total Protein 6.0 (L) 6.4 - 8.3 g/dL    Albumin 3.6 3.5 - 5.2 g/dL    Albumin/Globulin Ratio NOT REPORTED 1.0 - 2.5    GFR Non-African American >60 >60 mL/min    GFR African American >60 >60 mL/min    GFR Comment          GFR Staging NOT REPORTED    PT   Result Value Ref Range    Protime 14.1 11.8 - 14.6 sec    INR 1.1    PTT   Result Value Ref Range    PTT 37.3 (H) 24.0 - 36.0 sec   Drug screen, tricyclic   Result Value Ref Range    Tricyclic Antidep,Urine NEGATIVE NEGATIVE   Microscopic Urinalysis   Result Value Ref Range    -          WBC, UA 2 TO 5 /HPF    RBC, UA 0 TO 2 /HPF    Casts UA NOT REPORTED /LPF    Crystals, UA NOT REPORTED None /HPF    Epithelial Cells UA 5 TO 10 /HPF    Renal Epithelial, UA NOT REPORTED 0 /HPF    Bacteria, UA MODERATE (A) None    Mucus, UA NOT REPORTED None    Trichomonas, UA NOT REPORTED None    Amorphous, UA 4+ (A) None    Other Observations UA NOT REPORTED NOT REQ.     Yeast, UA NOT REPORTED None   CEA   Result Value Ref Range    CEA 6.4 (H) <3.9 ng/mL   CBC auto differential   Result Value Ref Range    WBC 5.9 3.5 - 11.0 k/uL    RBC 3.61 (L) 4.0 - 5.2 m/uL    Hemoglobin 11.7 (L) 12.0 - 16.0 g/dL    Hematocrit 36.0 36 - 46 %    MCV 99.8 80 - 100 fL    MCH 32.4 26 - 34 pg    MCHC 32.4 31 - 37 g/dL    RDW 14.0 11.5 - 14.9 %    Platelets 693 092 - 752 k/uL    MPV 7.2 6.0 - 12.0 fL    NRBC Automated NOT REPORTED per 100 WBC    Differential Type NOT REPORTED     Immature Granulocytes NOT REPORTED 0 %    Absolute Immature Granulocyte NOT REPORTED 0.00 - 0.30 k/uL    WBC Morphology NOT REPORTED     RBC Morphology NOT REPORTED     Platelet Estimate NOT REPORTED     Seg Neutrophils 87 (H) 36 - 66 %    Lymphocytes 3 (L) 24 - 44 %    Monocytes 2 1 - 7 %    Eosinophils % 0 0 - 4 %    Basophils 0 0 - 2 %    Bands 7 0 - 10 %    Metamyelocytes 1 (H) 0 %    Segs Absolute 5.13 1.3 - 9.1 k/uL    Absolute Lymph # 0.18 (L) 1.0 - 4.8 k/uL    Absolute Mono # 0.12 0.1 - 1.3 k/uL    Absolute Eos # 0.00 0.0 - 0.4 k/uL    Basophils Absolute 0.00 0.0 - 0.2 k/uL    Absolute Bands # 0.41 0.0 - 1.0 k/uL    Metamyelocytes Absolute 0.06 (H) 0 k/uL    Morphology ANISOCYTOSIS PRESENT     Morphology 1+ ELLIPTOCYTES     Morphology 1+ TEARDROPS    Comprehensive Metabolic Panel w/ Reflex to MG   Result Value Ref Range    Glucose 172 (H) 70 - 99 mg/dL    BUN 20 8 - 23 mg/dL    CREATININE 0.63 0.50 - 0.90 mg/dL    Bun/Cre Ratio NOT REPORTED 9 - 20    Calcium 8.3 (L) 8.6 - 10.4 mg/dL    Sodium 140 135 - 144 mmol/L    Potassium 4.6 3.7 - 5.3 mmol/L    Chloride 107 98 - 107 mmol/L    CO2 24 20 - 31 mmol/L    Anion Gap 9 9 - 17 mmol/L    Alkaline Phosphatase 82 35 - 104 U/L    ALT 12 5 - 33 U/L    AST 11 <32 U/L    Total Bilirubin <0.15 (L) 0.3 - 1.2 mg/dL    Total Protein 6.1 (L) 6.4 - 8.3 g/dL    Albumin 3.7 3.5 - 5.2 g/dL    Albumin/Globulin Ratio NOT REPORTED 1.0 - 2.5    GFR Non-African American >60 >60 mL/min    GFR African American >60 >60 mL/min    GFR Comment          GFR Staging NOT REPORTED    PT   Result Value Ref Range    Protime 14.7 (H) 11.8 - 14.6 sec    INR 1.1    PTT   Result Value Ref Range    PTT 28.7 24.0 - 36.0 sec   EKG 12 Lead   Result Value Ref Range    Ventricular Rate 78 BPM    Atrial Rate 78 BPM    P-R Interval 144 ms    QRS Duration 84 ms    Q-T Interval 374 ms    QTc Calculation (Bazett) 426 ms    P Axis 76 degrees    R Axis -95 degrees    T Axis 49 degrees        {Radiology:    ECHO Complete 2D W Doppler W Color    Result Date: 6/18/2021  1604 [facility] Transthoracic Echocardiography Report (TTE)  Patient Name Polo Fry     Date of Study               06/18/2021               BOBBY OROSCO   Date of      1955  Gender                      Female  Birth   Age          61 year(s)  Race                           Room Number  2012        Height:                     62 inch, 157.48 cm   Corporate ID [id number]    Weight:                     147 pounds, 66.7 kg  #   Patient Acct [de-identified]   BSA:          1.68 m^2      BMI:      26.89  #                                                              kg/m^2   MR #         [id number]      Sonographer                 Мария Stein   Accession #  [id number]  Interpreting Physician      [address] [place] [address]   Fellow                   Referring Nurse                           Practitioner   Interpreting             Referring Physician         Laura Nava  Fellow  Type of Study   TTE procedure:2D Echocardiogram, M-Mode, Doppler, Color Doppler. Procedure Date Date: 06/18/2021 Start: 11:44 AM Study Location: [facility] Technical Quality: Fair visualization Indications:Pulmonary embolus. History / Tech.  Comments: COPD, HLD, Sleep apnea Patient Status: Inpatient Height: 62 inches Weight: 147 pounds BSA: 1.68 m^2 BMI: 26.89 kg/m^2 Rhythm: Within normal limits HR: 78 bpm BP: 140/79 mmHg CONCLUSIONS Summary Left ventricle is normal in size and wall thickness. Global left ventricular systolic function is normal. Estimated LV EF 65-70 %. No significant valvular regurgitation or stenosis seen. Signature ----------------------------------------------------------------------------  Electronically signed by Мария Stein(Sonographer) on 2021 01:07  PM ---------------------------------------------------------------------------- ----------------------------------------------------------------------------  Electronically signed by Manish Flores(Interpreting physician) on 2021  02:46 PM ---------------------------------------------------------------------------- FINDINGS Left Atrium Left atrium is normal in size. Left Ventricle Left ventricle is normal in size and wall thickness. Global left ventricular systolic function is normal. Estimated LV EF 65-70 %. No obvious wall motion abnormality seen. Right Atrium Right atrium is normal in size. Right Ventricle Normal right ventricular size and function. Mitral Valve No obvious valvular abnormality seen. No evidence of mitral regurgitation. Aortic Valve No obvious valvular abnormality seen. No evidence of aortic insufficiency or stenosis. Tricuspid Valve No obvious valvular abnormality seen. Insignificant tricuspid regurgitation, unable to estimate RVSP. Pulmonic Valve Pulmonic valve was not well visualized. No evidence of pulmonic insufficiency or stenosis. Pericardial Effusion No significant pericardial effusion is seen. Pleural Effusion No pleural effusion seen. Miscellaneous Normal aortic root dimension.  E/E' average = 6.7. M-mode / 2D Measurements & Calculations:   LVIDd:4.01 cm(3.7 - 5.6 cm)       Diastolic MUTNET:58.5 ml  LIZEE:0.33 cm(2.2 - 4.0 cm)       Systolic JYYPPW:66.7 ml  KYXF:4.16 cm(0.6 - 1.1 cm)        Aortic Root:3.4 cm(2.0 - 3.7 cm)  LVPWd:1.06 cm(0.6 - 1.1 cm)       LA Dimension: 3 cm(1.9 - 4.0 cm)  Fractional Shortenin.64 % skull or soft tissues. No acute intracranial abnormality. Negative CT of the head. XR CHEST PORTABLE    Result Date: 6/17/2021  EXAMINATION: ONE XRAY VIEW OF THE CHEST 6/17/2021 10:19 am COMPARISON: 05/09/2021 HISTORY: ORDERING SYSTEM PROVIDED HISTORY: SOB TECHNOLOGIST PROVIDED HISTORY: SOB Reason for Exam: leg awelling, ams Acuity: Unknown Type of Exam: Unknown FINDINGS: Pulmonary hyperinflation. Stable mild patchy areas of parenchymal scarring. The cardiac size is normal. No acute infiltrates or pleural effusions are seen. Pulmonary vascularity appears normal. There is mild ectasia of the thoracic aorta. There are degenerative changes in the spine . No acute bony abnormalities. The hilar structures are normal.     No acute cardiopulmonary disease     CT CHEST PULMONARY EMBOLISM W CONTRAST    Addendum Date: 6/17/2021    ADDENDUM: Critical results were called by Dr. Ofelia Sneed to Dr. Alice Moreno on 6/17/2021 at 14:51. Result Date: 6/17/2021  EXAMINATION: CTA OF THE CHEST 6/17/2021 2:11 pm TECHNIQUE: CTA of the chest was performed after the administration of intravenous contrast.  Multiplanar reformatted images are provided for review. MIP images are provided for review. Dose modulation, iterative reconstruction, and/or weight based adjustment of the mA/kV was utilized to reduce the radiation dose to as low as reasonably achievable. COMPARISON: 09/09/2020, 07/25/2019 HISTORY: ORDERING SYSTEM PROVIDED HISTORY: SOB TECHNOLOGIST PROVIDED HISTORY: SOB Decision Support Exception - unselect if not a suspected or confirmed emergency medical condition->Emergency Medical Condition (MA) Reason for Exam: SOB FINDINGS: Pulmonary Arteries: Pulmonary arteries are adequately opacified for evaluation. Emboli in the right main pulmonary artery as well as lobar, segmental and subsegmental branches of the right middle and upper lobes. Similar dilatation of the main pulmonary artery to 3.3 cm. . Mediastinum: No evidence of mediastinal lymphadenopathy. The heart and pericardium demonstrate no acute abnormality. There is no acute abnormality of the thoracic aorta. Lungs/pleura: The lungs are without acute process. No focal consolidation or pulmonary edema. No evidence of pleural effusion or pneumothorax. Moderate emphysema. Ill-defined 1.9 cm solid nodular opacity of the superior segment of the right lower lobe on series 4, image 46 is not significantly changed since 2019. Upper Abdomen: Limited images of the upper abdomen are unremarkable. Soft Tissues/Bones: No acute bone or soft tissue abnormality. Diffuse osteopenia with multilevel degenerative disc disease. Similar compression deformities at T5 and T6.     1. Emboli in the right main pulmonary artery as well as lobar, segmental and subsegmental branches of the right middle and upper lobes. No evidence of right heart strain. 2. Ill-defined 1.9 cm solid nodular opacity of the superior segment of the right lower lobe is not significantly changed since 2019. Recommend attention on continued lung cancer screening.      VL Lower Extremity Bilateral Venous Duplex    Result Date: 6/18/2021    [address] [place] [address]  Vascular Lower Extremities DVT Study Procedure   Patient Name   Astrid Crain     Date of Study           06/18/2021                 BOBBY OROSCO   Date of Birth  1955  Gender                  Female   Age            61 year(s)  Race                       Room Number    2012        Height:                 62 inch, 157.48 cm   Corporate ID # [id number]    Weight:                 147 pounds, 66.7 kg   Patient Acct # [de-identified]   BSA:        1.68 m^2    BMI:       26.89 kg/m^2   MR #           [id number]      Sonographer             Stepan Castro T   Accession #    [id number]  Interpreting Physician  Meño Bloom   Referring                  Referring Physician     Mario Alberto Yanez  Nurse  Practitioner  Procedure Type of Study:   Veins: Lower Extremities DVT Study, Venous Scan Lower Bilateral.  Indications for Study:Leg Swelling and Pulmonary Embolism. Patient Status: In Patient. Technical Quality:Limited visualization.   - Critical Result:SUE [place]. Conclusions   Summary   Acute deep vein thrombosis of the right leg involving the femoral,  popliteal, and tibio-peroneal trunk veins. Signature   ----------------------------------------------------------------  Electronically signed by Tasha Whitman RVT(Sonographer) on  06/18/2021 08:27 AM  ----------------------------------------------------------------   ----------------------------------------------------------------  Electronically signed by French Books Reyes,Arthur(Interpreting  physician) on 06/18/2021 04:30 PM  ----------------------------------------------------------------  Findings:   Right Impression:                   Left Impression:  The common femoral and tibial veins The common femoral, femoral, popliteal  demonstrate normal compressibility  and tibial veins demonstrate normal  and augmentation. compressibility and augmentation. The mid and distal femoral vein is  Normal compressibility of the great  dilated and non-compressible with   saphenous vein. hypoechoic echoes. Normal compressibility of the small  The popliteal vein and              saphenous vein. tibio-peroneal trunk are dilated  and non-compressible with  hypoechoic echoes. Normal compressibility of the great  saphenous vein. Normal compressibility of the small  saphenous vein. Velocities are measured in cm/s ; Diameters are measured in cm Right Lower Extremities DVT Study Measurements Right 2D Measurements +------------------------------------+----------+---------------+----------+ ! Location                            ! Visualized! Compressibility! Thrombosis! +------------------------------------+----------+---------------+----------+ ! Common Femoral                      !Yes !Yes            !None      ! +------------------------------------+----------+---------------+----------+ ! Prox Femoral                        !Yes       ! Yes            ! None      ! +------------------------------------+----------+---------------+----------+ ! Mid Femoral                         !Yes       ! No             !          ! +------------------------------------+----------+---------------+----------+ ! Dist Femoral                        !Yes       ! No             !          ! +------------------------------------+----------+---------------+----------+ ! Deep Femoral                        !Yes       ! Yes            ! None      ! +------------------------------------+----------+---------------+----------+ ! Popliteal                           !Yes       ! No             !          ! +------------------------------------+----------+---------------+----------+ ! Sapheno Femoral Junction            ! Yes       ! Yes            ! None      ! +------------------------------------+----------+---------------+----------+ ! PTV                                 ! Yes       ! Yes            ! None      ! +------------------------------------+----------+---------------+----------+ ! Peroneal                            !Yes       ! Yes            ! None      ! +------------------------------------+----------+---------------+----------+ ! Gastroc                             ! Yes       ! Yes            ! None      ! +------------------------------------+----------+---------------+----------+ ! GSV Thigh                           ! Yes       ! Yes            ! None      ! +------------------------------------+----------+---------------+----------+ ! GSV Knee                            ! Yes       ! Yes            ! None      ! +------------------------------------+----------+---------------+----------+ ! GSV Ankle                           ! Yes       ! Yes            ! None      ! +------------------------------------+----------+---------------+----------+ ! SSV                                 ! Yes       ! Yes            ! None      ! +------------------------------------+----------+---------------+----------+ Left Lower Extremities DVT Study Measurements Left 2D Measurements +------------------------------------+----------+---------------+----------+ ! Location                            ! Visualized! Compressibility! Thrombosis! +------------------------------------+----------+---------------+----------+ ! Common Femoral                      !Yes       ! Yes            ! None      ! +------------------------------------+----------+---------------+----------+ ! Prox Femoral                        !Yes       ! Yes            ! None      ! +------------------------------------+----------+---------------+----------+ ! Mid Femoral                         !Yes       ! Yes            ! None      ! +------------------------------------+----------+---------------+----------+ ! Dist Femoral                        !Yes       ! Yes            ! None      ! +------------------------------------+----------+---------------+----------+ ! Deep Femoral                        !Yes       ! Yes            ! None      ! +------------------------------------+----------+---------------+----------+ ! Popliteal                           !Yes       ! Yes            ! None      ! +------------------------------------+----------+---------------+----------+ ! Sapheno Femoral Junction            ! Yes       ! Yes            ! None      ! +------------------------------------+----------+---------------+----------+ ! PTV                                 ! Yes       ! Yes            ! None      ! +------------------------------------+----------+---------------+----------+ ! Dai                            !Yes       ! Yes            ! None      ! +------------------------------------+----------+---------------+----------+ ! Gastroc !Yes       !Yes            ! None      ! +------------------------------------+----------+---------------+----------+ ! GSV Thigh                           ! Yes       ! Yes            ! None      ! +------------------------------------+----------+---------------+----------+ ! GSV Knee                            ! Yes       ! Yes            ! None      ! +------------------------------------+----------+---------------+----------+ ! GSV Ankle                           ! Yes       ! Yes            ! None      ! +------------------------------------+----------+---------------+----------+ ! SSV                                 ! Yes       ! Yes            ! None      ! +------------------------------------+----------+---------------+----------+       Consultations:    Consults:     Final Specialist Recommendations/Findings:   IP CONSULT TO INTERNAL MEDICINE  IP CONSULT TO PULMONOLOGY  IP CONSULT TO SOCIAL WORK  IP CONSULT TO PSYCHIATRY  IP CONSULT TO HEM/ONC      The patient was seen and examined on day of discharge and this discharge summary is in conjunction with any daily progress note from day of discharge. Discharge plan:     Disposition: Home    Physician Follow Up: With PCP and as specified     Requiring Further Evaluation/Follow Up POST HOSPITALIZATION/Incidental Findings:    Diet: regular     Activity: As tolerated    I  Discharge Medications:      Medication List      START taking these medications    * apixaban 5 MG Tabs tablet  Commonly known as: ELIQUIS  Take 2 tablets by mouth 2 times daily for 6 days     * apixaban 5 MG Tabs tablet  Commonly known as: Eliquis  Take 1 tablet by mouth 2 times daily     donepezil 5 MG tablet  Commonly known as: Aricept  Take 1 tablet by mouth nightly         * This list has 2 medication(s) that are the same as other medications prescribed for you. Read the directions carefully, and ask your doctor or other care provider to review them with you.             CHANGE how you take these medications    tiotropium 2.5 MCG/ACT Aers inhaler  Commonly known as: SPIRIVA RESPIMAT  Inhale 2 puffs into the lungs daily  What changed: See the new instructions. CONTINUE taking these medications    * ACE Elbow Brace Large/X-Large Misc  Use daily for elbow pain     * Medical Compression Stockings Misc  1 each by Does not apply route daily Keep pressure between 20 -30mm     acetaminophen 500 MG tablet  Commonly known as: Acetaminophen Extra Strength  take 2 tablets by mouth every 6 hours AS NEEDED FOR PAIN     * albuterol (2.5 MG/3ML) 0.083% nebulizer solution  Commonly known as: PROVENTIL  Take 3 mLs by nebulization 4 times daily     * albuterol sulfate  (90 Base) MCG/ACT inhaler  Inhale 2 puffs into the lungs every 4 hours as needed for Wheezing     Biotin 1000 MCG Tabs     Blood Pressure Kit  Dx: HTN. Needs automatic blood pressure machine to monitor her blood pressure. butalbital-acetaminophen-caffeine -40 MG per tablet  Commonly known as: FIORICET, ESGIC     citalopram 20 MG tablet  Commonly known as: CELEXA     EPINEPHrine 0.3 MG/0.3ML Soaj injection  Commonly known as: EPIPEN  use as directed BY PRESCRIBER FOR ALLERGIC REACTION     fluticasone 50 MCG/ACT nasal spray  Commonly known as: FLONASE  2 sprays by Nasal route daily     furosemide 20 MG tablet  Commonly known as: Lasix  Take 0.5 tablets by mouth daily     ibandronate 150 MG tablet  Commonly known as: Boniva  Take 1 tablet by mouth every 30 days Take one (1) tablet once per month in the morning with a full glass of water, on an empty stomach, and do not take anything else by mouth or lie down for the next 30 minutes.      Latuda 80 MG Tabs tablet  Generic drug: lurasidone     lidocaine 4 % cream  Commonly known as: LMX  apply topically every 8 hours AS NEEDED FOR PAIN     montelukast 10 MG tablet  Commonly known as: SINGULAIR     nicotine polacrilex 2 MG gum  Commonly known as: NICORETTE  Take 1 each by mouth as needed for

## 2021-06-22 NOTE — CARE COORDINATION
[facility] Transitions Initial Follow Up Call    Call within 2 business days of discharge: Yes    Patient: Efrain Whittington Patient : 1955   MRN: [id number]  Reason for Admission: leg swelling  Discharge Date: 21 RARS: Readmission Risk Score: 21      Last Discharge [address]       Complaint Diagnosis Description Type Department Provider    21 Mental Health Problem; Leg Swelling; Other Pulmonary embolism on right ([facility] [address] . .. ED to Hosp-Admission (Discharged) (ADMITTED) Yandy Farah MD; Juan Kelley. .. # 2 unable to reach patient, tried both home and EC #.  Left vm message on EC with name and call back information, care transitions completed//JU    Facility: [facility]    Non-face-to-face services provided:      Care Transitions 24 Hour Call    Care Transitions Interventions         Follow Up  Future Appointments   Date Time Provider Percy Rodriguez   2021 12:45 PM Lydia Arce MD [address] [place] [address]   2021 11:45 AM Lesly Davis MD fp renetta Cope RN

## 2021-06-22 NOTE — PATIENT INSTRUCTIONS
[facility]  [email or web address]?:[email or web address]:display_count=[email or web address]&:origin=[email or web address]_share_link&:embed=y    [address]  [email or web address]?:toolbar=[email or web address]:display_count=n&:origin=[employer]_link&:embed=y    [address]  [email or web address]:display_count=[email or web address]:origin=[email or web address]_link&:embed=y

## 2021-06-22 NOTE — PROGRESS NOTES
[facility] PHYSICIANS  [facility] [address] [facility] [address].  [address] [address]  Dept: [phone]      Post-Discharge Transitional Care Management Services or Hospital Follow Up      Trenton Felisha   YOB: 1955    Date of Office Visit:  6/22/2021  Date of Hospital Admission: 6/17/21  Date of Hospital Discharge: 6/19/21  Readmission Risk Score(high >=14%.  Medium >=10%):Readmission Risk Score: 21      Care management risk score Rising risk (score 2-5) and Complex Care (Scores >=6): 6     Non face to face  following discharge, date last encounter closed (first attempt may have been earlier): 6/21/2021  2:32 PM 6/21/2021  2:32 PM    Call initiated 2 business days of discharge: Yes     Patient Active Problem List   Diagnosis    Chronic obstructive pulmonary disease ([address]    Vitamin D deficiency    Anxiety    Asthma    Bipolar disorder ([address]    Depression    Hyperlipidemia    Sleep apnea    Vision abnormalities    Status post hysteroscopy    Chronic headaches    IBS (irritable bowel syndrome)    Colon polyp    Collagenous colitis    Lung nodule    Left elbow pain    Dilated bile duct    Acute pain of left shoulder    Adhesive capsulitis of left shoulder    Leg swelling    Leucopenia    Compression fracture of body of thoracic vertebra (HCC)    Age-related osteoporosis with current pathological fracture    Flank pain    DOMÍNGUEZ (dyspnea on exertion)    Pulmonary embolism on right ([address]    Migraines    Paranoid behavior ([facility] [address]    Bilateral lower extremity edema    Acute deep vein thrombosis (DVT) of right lower extremity (HCC)    Delirium       Allergies   Allergen Reactions    Advil [Ibuprofen] Other (See Comments)     vomiting    Aleve [Naproxen] Other (See Comments)     vomiting    Antipyrine Other (See Comments)     unknown    Celecoxib Other (See Comments)    Codeine      headache    Fomepizole     Other      GRASS, TREES, WEEDS    Rofecoxib Other (See Comments)     Unknown reaction    Salicylates      Unknown reaction     Strawberry Extract      HEADACHES    Sulfinpyrazone Other (See Comments)     Unknown reaction    Aspirin Nausea And Vomiting, Other (See Comments) and Nausea Only    Nsaids Nausea Only, Other (See Comments) and Nausea And Vomiting       Medications listed as ordered at the time of discharge from hospital   [facility] Medication Instructions NARAYAN:    Printed on:06/22/21 1711   Medication Information                      acetaminophen (ACETAMINOPHEN EXTRA STRENGTH) 500 MG tablet  take 2 tablets by mouth every 6 hours AS NEEDED FOR PAIN             albuterol (PROVENTIL) (2.5 MG/3ML) 0.083% nebulizer solution  Take 3 mLs by nebulization 4 times daily             albuterol sulfate  (90 Base) MCG/ACT inhaler  Inhale 2 puffs into the lungs every 4 hours as needed for Wheezing             apixaban (ELIQUIS) 5 MG TABS tablet  Take 2 tablets by mouth 2 times daily for 6 days             apixaban (ELIQUIS) 5 MG TABS tablet  Take 1 tablet by mouth 2 times daily             Biotin 1000 MCG TABS  Take 1,000 mcg by mouth daily              Blood Pressure KIT  Dx: HTN. Needs automatic blood pressure machine to monitor her blood pressure.              budesonide-formoterol (SYMBICORT) 160-4.5 MCG/ACT AERO  Inhale 2 puffs into the lungs 2 times daily             butalbital-acetaminophen-caffeine (FIORICET, ESGIC) -40 MG per tablet  Take 1 tablet by mouth every 4 hours as needed for Headaches             citalopram (CELEXA) 20 MG tablet  Take 20 mg by mouth daily             docusate sodium (COLACE) 100 MG capsule  Take 1 capsule by mouth 2 times daily             donepezil (ARICEPT) 5 MG tablet  Take 1 tablet by mouth nightly             Elastic Bandages & Supports (ACE ELBOW BRACE LARGE/X-LARGE) MISC  Use daily for elbow pain             Elastic Bandages & Supports (MEDICAL COMPRESSION STOCKINGS) MISC  1 each by 0    methylPREDNISolone (MEDROL DOSEPACK) 4 MG tablet Take by mouth. 1 kit 0    budesonide-formoterol (SYMBICORT) 160-4.5 MCG/ACT AERO Inhale 2 puffs into the lungs 2 times daily 1 Inhaler 3    tiotropium (SPIRIVA RESPIMAT) 2.5 MCG/ACT AERS inhaler Inhale 2 puffs into the lungs daily 1 Inhaler 0    apixaban (ELIQUIS) 5 MG TABS tablet Take 2 tablets by mouth 2 times daily for 6 days 24 tablet 0    apixaban (ELIQUIS) 5 MG TABS tablet Take 1 tablet by mouth 2 times daily 360 tablet 0    nicotine polacrilex (NICORETTE) 2 MG gum Take 1 each by mouth as needed for Smoking cessation 110 each 0    donepezil (ARICEPT) 5 MG tablet Take 1 tablet by mouth nightly 30 tablet 3    topiramate (TOPAMAX) 50 MG tablet Take 100 mg by mouth nightly      furosemide (LASIX) 20 MG tablet Take 0.5 tablets by mouth daily 7 tablet 0    Blood Pressure KIT Dx: HTN. Needs automatic blood pressure machine to monitor her blood pressure. 1 kit 0    acetaminophen (ACETAMINOPHEN EXTRA STRENGTH) 500 MG tablet take 2 tablets by mouth every 6 hours AS NEEDED FOR PAIN 120 tablet 0    vitamin D (ERGOCALCIFEROL) 1.25 MG (25565 UT) CAPS capsule take 1 capsule by mouth every week 4 capsule 3    simvastatin (ZOCOR) 40 MG tablet take 1 tablet by mouth at bedtime 30 tablet 3    EPINEPHrine (EPIPEN) 0.3 MG/0.3ML SOAJ injection use as directed BY PRESCRIBER FOR ALLERGIC REACTION 2 each 2    ibandronate (BONIVA) 150 MG tablet Take 1 tablet by mouth every 30 days Take one (1) tablet once per month in the morning with a full glass of water, on an empty stomach, and do not take anything else by mouth or lie down for the next 30 minutes.  30 tablet 3    lidocaine (LMX) 4 % cream apply topically every 8 hours AS NEEDED FOR PAIN 30 g 3    Elastic Bandages & Supports (MEDICAL COMPRESSION STOCKINGS) MISC 1 each by Does not apply route daily Keep pressure between 20 -30mm 1 each 0    topiramate (TOPAMAX) 50 MG tablet Take 50 mg by mouth daily       montelukast (SINGULAIR) 10 MG tablet Take 10 mg by mouth nightly       Elastic Bandages & Supports (ACE ELBOW BRACE LARGE/X-LARGE) MISC Use daily for elbow pain 1 each 0    albuterol (PROVENTIL) (2.5 MG/3ML) 0.083% nebulizer solution Take 3 mLs by nebulization 4 times daily 120 each 3    albuterol sulfate  (90 Base) MCG/ACT inhaler Inhale 2 puffs into the lungs every 4 hours as needed for Wheezing 1 Inhaler 3    fluticasone (FLONASE) 50 MCG/ACT nasal spray 2 sprays by Nasal route daily 1 Bottle 5    butalbital-acetaminophen-caffeine (FIORICET, ESGIC) -40 MG per tablet Take 1 tablet by mouth every 4 hours as needed for Headaches      Biotin 1000 MCG TABS Take 1,000 mcg by mouth daily       citalopram (CELEXA) 20 MG tablet Take 20 mg by mouth daily      LATUDA 80 MG TABS tablet Take 80 mg by mouth nightly          Medications patient taking as of now reconciled against medications ordered at time of hospital discharge: Yes     Chief Complaint   Patient presents with    Follow-Up from Hospital     Pt was discharged from [facility] after mental illness. HPI    Inpatient course: Discharge summary reviewed- see chart. Interval history/Current status: Patient was brought in by her daughter Lyn Goncalves today. Patient was recently admitted to the hospital for pulmonary embolism and a DVT. Patient also has a known history of COPD, chronic bronchitis, bipolar disorder, hypertension, constipation, and pulmonary nodules    Patient was started on Eliquis. Patient had been adherent to therapy. Daughter had been able to help manage patient's medication. She denies any signs of bleeding. Patient also has a known history of COPD. She is a longtime smoker and most likely have COPD although she has not really had any pulmonary function test.  Patient is established with Dr. Nory Starr. She was started on Symbicort while in the hospital in in addition to the albuterol that she has at home. However, patient have not been able to receive the Symbicort from the pharmacy we will work on getting this approved or replaced today since patient presented with wheezing in the office today. Patient is also on Spiriva. Patient is also on 3 L of oxygen    Patient denies any chest pain but does have some dyspnea on exertion. Patient states that as long she keeps her oxygen on and not strain too much she does very well and she is moves around her house with no problems still able to do her activities of daily living. She is not receiving any home health because of her daughter who is very active and helping her with her health issues. Patient has a known history of bipolar depression. Patient has had issues with adherence and have not routinely followed up with psychiatry. Patient was seen in the hospital and was referred to another psychiatrist.  Patient was started on Aricept. She was previously on [place] and Celexa. Patient was seen by psychiatry. [employer]- Web appointment. Established 20 years, latuda, Celexa, Aricept. Daughter thinks that the patient seems to have calmed down a lot since being discharged to the hospital.  Patient's daughter will make sure that the patient follows up. No suicidality or homicidality. Patient also had an incidental finding of pulmonary nodules. Patient was referred to oncologist.  Patient will be seeing Dr. Mag Van. Eliel Carrillo Appointment has been set up. Patient complains of constipation and some hemorrhoids. Patient has had this problem for a while now. She had been using some over-the-counter fiber without any success. Patient will be given some stool softeners since patient is on Eliquis now. Patient is established with a gastroenterologist and have not followed up for a while. Xuan Fitzpatrick 's Diabetes Screening -Hemoglobin A1c level was normal. Patient no familial history of diabetes. We will continue to screen annually.   Lab Results   Component Value Date/Time    LABA1C 5.2 06/22/2021 11:40 AM         Review of Systems   Constitutional: Positive for activity change and fatigue. Negative for chills and fever. HENT: Negative. Respiratory: Positive for shortness of breath (on exertion) and wheezing. Cardiovascular: Negative. Negative for chest pain and palpitations. Gastrointestinal: Positive for constipation. Negative for abdominal pain. Endocrine: Negative. Genitourinary: Negative for dysuria. Musculoskeletal: Positive for arthralgias. Negative for myalgias. No claudication     Skin: Negative for rash. Allergic/Immunologic: Positive for environmental allergies. Neurological: Negative for headaches. Psychiatric/Behavioral: Positive for behavioral problems and dysphoric mood. Negative for sleep disturbance and suicidal ideas. The patient is nervous/anxious. Vitals:    06/22/21 1126   BP: 120/80   Pulse: 80   Temp: 96.9 °F (36.1 °C)   SpO2: 99%   Weight: 146 lb (66.2 kg)     Body mass index is 26.7 kg/m². Wt Readings from Last 3 Encounters:   06/22/21 146 lb (66.2 kg)   06/19/21 151 lb 3.8 oz (68.6 kg)   05/09/21 147 lb (66.7 kg)     BP Readings from Last 3 Encounters:   06/22/21 120/80   06/19/21 120/76   05/09/21 105/73       Physical Exam  Vitals and nursing note reviewed. Constitutional:       Appearance: She is well-developed. HENT:      Head: Normocephalic. Right Ear: External ear normal.      Left Ear: External ear normal.      Nose: Nose normal.      Mouth/Throat:      Mouth: Mucous membranes are moist.   Eyes:      Pupils: Pupils are equal, round, and reactive to light. Cardiovascular:      Rate and Rhythm: Normal rate and regular rhythm. Pulses: Normal pulses. Heart sounds: Normal heart sounds. Pulmonary:      Effort: Accessory muscle usage present. No respiratory distress. Breath sounds: Examination of the right-middle field reveals wheezing.  Examination of the left-middle field reveals wheezing. Examination of the right-lower field reveals decreased breath sounds. Decreased breath sounds and wheezing present. Comments: On 3 LPM  Abdominal:      General: Bowel sounds are normal. There is no distension. Palpations: Abdomen is soft. Tenderness: There is no abdominal tenderness. Musculoskeletal:         General: Normal range of motion. Cervical back: Normal range of motion and neck supple. Lymphadenopathy:      Head:      Right side of head: Submandibular adenopathy present. Left side of head: Submandibular adenopathy present. Skin:     General: Skin is warm and dry. Capillary Refill: Capillary refill takes less than 2 seconds. Neurological:      Mental Status: She is alert and oriented to person, place, and time. Psychiatric:         Mood and Affect: Mood is anxious. Behavior: Behavior is slowed and withdrawn. Thought Content: Thought content does not include homicidal or suicidal ideation. Assessment/Plan:  1. COPD exacerbation ([address]  Failure to Improve  Work on getting symbicort. Reviewed use, techniques, schedule and side effects of all inhaled medications  Critical need for compliance with treatment plan to achieve optimal results  Very strongly urged to quit smoking to reduce pulmonary and CVD risk. - methylPREDNISolone (MEDROL DOSEPACK) 4 MG tablet; Take by mouth. Dispense: 1 kit; Refill: 0    2. Pulmonary embolism on right ([address]  Stable  On [facility]  Vascular follow up  - [facility] MED LIST    3. Acute on chronic respiratory failure with hypoxia and hypercapnia (HCC)  Improving  Still on 3lpm  - RI DISCHARGE MEDS RECONCILED W/ CURRENT OUTPATIENT MED LIST  - Misc. Devices MISC; 1 each by Does not apply route once as needed (pulse oximeter)  Dispense: 1 Device; Refill: 0    4.  Acute deep vein thrombosis (DVT) of proximal vein of right lower extremity (HCC)  Stable  On hemoglobin (Hb A1C);  Future  - POCT glycosylated hemoglobin (Hb A1C)        Medical Decision Making: moderate complexity and high complexity

## 2021-06-23 ENCOUNTER — TELEPHONE (OUTPATIENT)
Dept: ONCOLOGY | Age: 66
End: 2021-06-23

## 2021-06-23 ENCOUNTER — TELEPHONE (OUTPATIENT)
Dept: FAMILY MEDICINE CLINIC | Age: 66
End: 2021-06-23

## 2021-06-23 ENCOUNTER — HOSPITAL ENCOUNTER (OUTPATIENT)
Age: 66
Discharge: HOME | End: 2021-06-23
Payer: COMMERCIAL

## 2021-06-23 ENCOUNTER — OFFICE VISIT (OUTPATIENT)
Dept: ONCOLOGY | Age: 66
End: 2021-06-23
Payer: COMMERCIAL

## 2021-06-23 VITALS
SYSTOLIC BLOOD PRESSURE: 154 MMHG | HEIGHT: 63 IN | WEIGHT: 146.5 LBS | BODY MASS INDEX: 25.96 KG/M2 | HEART RATE: 81 BPM | DIASTOLIC BLOOD PRESSURE: 94 MMHG | RESPIRATION RATE: 16 BRPM | TEMPERATURE: 98.8 F

## 2021-06-23 DIAGNOSIS — J41.8 MIXED SIMPLE AND MUCOPURULENT CHRONIC BRONCHITIS (HCC): ICD-10-CM

## 2021-06-23 DIAGNOSIS — I82.4Y1 ACUTE DEEP VEIN THROMBOSIS (DVT) OF PROXIMAL VEIN OF RIGHT LOWER EXTREMITY (HCC): ICD-10-CM

## 2021-06-23 DIAGNOSIS — I26.99 PULMONARY EMBOLISM ON RIGHT (HCC): Primary | ICD-10-CM

## 2021-06-23 DIAGNOSIS — Z87.891 PERSONAL HISTORY OF NICOTINE DEPENDENCE: ICD-10-CM

## 2021-06-23 DIAGNOSIS — I26.99 PULMONARY EMBOLISM ON RIGHT (HCC): ICD-10-CM

## 2021-06-23 PROCEDURE — 3023F SPIROM DOC REV: CPT | Performed by: INTERNAL MEDICINE

## 2021-06-23 PROCEDURE — 81240 F2 GENE: CPT

## 2021-06-23 PROCEDURE — 81241 F5 GENE: CPT

## 2021-06-23 PROCEDURE — 36415 COLL VENOUS BLD VENIPUNCTURE: CPT

## 2021-06-23 PROCEDURE — G8427 DOCREV CUR MEDS BY ELIG CLIN: HCPCS | Performed by: INTERNAL MEDICINE

## 2021-06-23 PROCEDURE — 1036F TOBACCO NON-USER: CPT | Performed by: INTERNAL MEDICINE

## 2021-06-23 PROCEDURE — 1111F DSCHRG MED/CURRENT MED MERGE: CPT | Performed by: INTERNAL MEDICINE

## 2021-06-23 PROCEDURE — 3017F COLORECTAL CA SCREEN DOC REV: CPT | Performed by: INTERNAL MEDICINE

## 2021-06-23 PROCEDURE — G8926 SPIRO NO PERF OR DOC: HCPCS | Performed by: INTERNAL MEDICINE

## 2021-06-23 PROCEDURE — 99211 OFF/OP EST MAY X REQ PHY/QHP: CPT | Performed by: INTERNAL MEDICINE

## 2021-06-23 PROCEDURE — G8419 CALC BMI OUT NRM PARAM NOF/U: HCPCS | Performed by: INTERNAL MEDICINE

## 2021-06-23 PROCEDURE — 99214 OFFICE O/P EST MOD 30 MIN: CPT | Performed by: INTERNAL MEDICINE

## 2021-06-23 NOTE — TELEPHONE ENCOUNTER
----- Message from IVONNE Jett CNP sent at 6/22/2021 10:52 AM EDT -----  Can we tell these folks about this?   ----- Message -----  From: Heide Henriquez MA  Sent: 6/22/2021   7:30 AM EDT  To: IVONNE Jett CNP    That would be preservice  ----- Message -----  From: IVONNE Jett CNP  Sent: 6/21/2021   8:00 PM EDT  To: [address] [place] [address]    I would like to know who scheduled this patient who is supposed to be TCM and not ED. The person who scheduled knows this but still booked her for 15 mins.

## 2021-06-23 NOTE — PROGRESS NOTES
Zack Barahona                                                                                                                  6/23/2021  MRN:   [id number]  YOB: 1955  PCP:                           Bart Contreras MD  Referring Physician: No ref. provider found  Treating Physician Name: Mohan Alvarez MD      Reason for visit:  Chief Complaint   Patient presents with    Follow-up     review status of disease    Follow-Up from Hospital    Constipation    Numbness     a little        Current problems:  DVT, PE, 06/2021    Active and recent treatments:  Eliquis    Summary of Case/History:    Zack Barahona a 61 y. o.female is a  female who is admitted to the hospital for Admitted to hospital with chief complains of shortness of breath. Patient started noticing shortness of breath a week ago. She does have baseline COPD on home O2. Patient has significant history of tobacco dependence. Patient also noted swelling of her right leg. Patient shortness of breath worsened and she presented to the ER. CT chest done shows a large right pulmonary embolism in the main stem pulmonary artery and also in the right upper lobe. Patient continues to smoke cigarettes. Patient has been started on Lovenox. She has been transitioned to Eliquis. Her history includes menorrhagia and hormone birth control premenopausal.   She was fully vaccinated for COVID 05/05/2021. Interim History: The patient presents today following discharge to discuss further plan. She has been taking the Eliquis with no issues bleeding. She reports she has stopped. She remains on oxygen. She continues to deny family or previous personal history of clotting. She has moved in with family and they are helping to increase her activity. She has premenopausal history of menorrhagia and was started on hormone birth control to regulate.      Past Medical History:   Past Medical History:   Diagnosis Date    Abnormal EKG     Anxiety     Asthma     Bipolar disorder ([facility]     SEVERE IN , UNISON    COPD (chronic obstructive pulmonary disease) ([facility]     Cramps, extremity     SEVERE LEG CRAMPS    Depression     Dilated bile duct     Headache     History of elective      Hyperlipidemia     Irritable bowel syndrome     Prolonged emergence from general anesthesia     SEVERE ISSUES WAKING UP    Substance abuse ([facility]     street drugs when younger   Marissa Hernadez Unspecified sleep apnea     Vision abnormalities     glasses       Past Surgical History:     Past Surgical History:   Procedure Laterality Date    ANKLE SURGERY      HAD SCREWS AND HARDWARE, THEN REMOVED    COLONOSCOPY  02/15/2018    tubular adenoma    EYE SURGERY Bilateral     CATARACTS    HYSTEROSCOPY  10/19/2016    D & C    INDUCED       LASIK      bilateral    TONSILLECTOMY AND ADENOIDECTOMY Bilateral     VULVA SURGERY      HAD A BIOPSY AND REMOVAL OF       Patient Family Social History:     Social History     Social History Narrative    Not on file       Current Medications:     Current Outpatient Medications   Medication Sig Dispense Refill    docusate sodium (COLACE) 100 MG capsule Take 1 capsule by mouth 2 times daily 30 capsule 5    methylPREDNISolone (MEDROL DOSEPACK) 4 MG tablet Take by mouth.  1 kit 0    mometasone-formoterol (DULERA) 200-5 MCG/ACT inhaler Inhale 2 puffs into the lungs every 12 hours 1 Inhaler 2    tiotropium (SPIRIVA RESPIMAT) 2.5 MCG/ACT AERS inhaler Inhale 2 puffs into the lungs daily 1 Inhaler 0    apixaban (ELIQUIS) 5 MG TABS tablet Take 2 tablets by mouth 2 times daily for 6 days 24 tablet 0    apixaban (ELIQUIS) 5 MG TABS tablet Take 1 tablet by mouth 2 times daily 360 tablet 0    nicotine polacrilex (NICORETTE) 2 MG gum Take 1 each by mouth as needed for Smoking cessation 110 each 0    donepezil (ARICEPT) 5 MG tablet Take 1 tablet by mouth nightly 30 tablet 3    topiramate (TOPAMAX) 50 MG tablet Take 100 mg by mouth nightly      furosemide (LASIX) 20 MG tablet Take 0.5 tablets by mouth daily 7 tablet 0    Blood Pressure KIT Dx: HTN. Needs automatic blood pressure machine to monitor her blood pressure. 1 kit 0    acetaminophen (ACETAMINOPHEN EXTRA STRENGTH) 500 MG tablet take 2 tablets by mouth every 6 hours AS NEEDED FOR PAIN 120 tablet 0    vitamin D (ERGOCALCIFEROL) 1.25 MG (82165 UT) CAPS capsule take 1 capsule by mouth every week 4 capsule 3    simvastatin (ZOCOR) 40 MG tablet take 1 tablet by mouth at bedtime 30 tablet 3    EPINEPHrine (EPIPEN) 0.3 MG/0.3ML SOAJ injection use as directed BY PRESCRIBER FOR ALLERGIC REACTION 2 each 2    ibandronate (BONIVA) 150 MG tablet Take 1 tablet by mouth every 30 days Take one (1) tablet once per month in the morning with a full glass of water, on an empty stomach, and do not take anything else by mouth or lie down for the next 30 minutes.  30 tablet 3    lidocaine (LMX) 4 % cream apply topically every 8 hours AS NEEDED FOR PAIN 30 g 3    Elastic Bandages & Supports (MEDICAL COMPRESSION STOCKINGS) MISC 1 each by Does not apply route daily Keep pressure between 20 -30mm 1 each 0    topiramate (TOPAMAX) 50 MG tablet Take 50 mg by mouth daily       montelukast (SINGULAIR) 10 MG tablet Take 10 mg by mouth nightly       Elastic Bandages & Supports (ACE ELBOW BRACE LARGE/X-LARGE) MISC Use daily for elbow pain 1 each 0    albuterol (PROVENTIL) (2.5 MG/3ML) 0.083% nebulizer solution Take 3 mLs by nebulization 4 times daily 120 each 3    albuterol sulfate  (90 Base) MCG/ACT inhaler Inhale 2 puffs into the lungs every 4 hours as needed for Wheezing 1 Inhaler 3    fluticasone (FLONASE) 50 MCG/ACT nasal spray 2 sprays by Nasal route daily 1 Bottle 5    butalbital-acetaminophen-caffeine (FIORICET, ESGIC) -40 MG per tablet Take 1 tablet by mouth every 4 hours as needed for Headaches      Biotin 1000 MCG TABS Take 1,000 mcg by mouth daily       citalopram (CELEXA) 20 MG tablet Take 20 mg by mouth daily      LATUDA 80 MG TABS tablet Take 80 mg by mouth nightly      Misc. Devices MISC 1 each by Does not apply route once as needed (pulse oximeter) 1 Device 0     No current facility-administered medications for this visit. Allergies:   Advil [ibuprofen], Aleve [naproxen], Antipyrine, Celecoxib, Codeine, Fomepizole, Other, Rofecoxib, Salicylates, Strawberry extract, Sulfinpyrazone, Aspirin, and Nsaids    Review of Systems:    Constitutional: No fever or chills. No night sweats, no weight loss   Eyes: No eye discharge, double vision, or eye pain   HEENT: negative for sore mouth, sore throat, hoarseness and voice change   Respiratory: negative for cough , sputum, dyspnea, wheezing, hemoptysis, chest pain   Cardiovascular: negative for chest pain, dyspnea, palpitations, orthopnea, PND   Gastrointestinal: negative for nausea, vomiting, diarrhea, constipation, abdominal pain, Dysphagia, hematemesis and hematochezia   Genitourinary: negative for frequency, dysuria, nocturia, urinary incontinence, and hematuria   Integument: negative for rash, skin lesions, bruises.    Hematologic/Lymphatic: negative for easy bruising, bleeding, lymphadenopathy, or petechiae   Endocrine: negative for heat or cold intolerance,weight changes, change in bowel habits and hair loss   Musculoskeletal: negative for myalgias, arthralgias, pain, joint swelling,and bone pain   Neurological: negative for headaches, dizziness, seizures, weakness, numbness        Physical Exam:    Vitals: BP (!) 154/94   Pulse 81   Temp 98.8 °F (37.1 °C) (Oral)   Resp 16   Ht 5' 3\" (1.6 m)   Wt 146 lb 8 oz (66.5 kg)   LMP 05/20/2013 (Exact Date)   BMI 25.95 kg/m²   General appearance - well appearing, no in pain or distress  Mental status - AAO X3  Eyes - pupils equal and reactive, extraocular eye movements intact  Mouth - mucous membranes moist, pharynx normal without lesions  Neck - supple, no significant adenopathy  Lymphatics - no palpable lymphadenopathy, no hepatosplenomegaly  Chest - clear to auscultation, no wheezes, rales or rhonchi, symmetric air entry  Heart - normal rate, regular rhythm, normal S1, S2, no murmurs  Abdomen - soft, nontender, nondistended, no masses or organomegaly  Neurological - alert, oriented, normal speech, no focal findings or movement disorder noted  Extremities - peripheral pulses normal, no pedal edema, no clubbing or cyanosis  Skin - normal coloration and turgor, no rashes, no suspicious skin lesions noted       DATA:  Lab Results   Component Value Date    WBC 5.9 06/19/2021    HGB 11.7 (L) 06/19/2021    HCT 36.0 06/19/2021    MCV 99.8 06/19/2021     06/19/2021       Chemistry        Component Value Date/Time     06/19/2021 0452    K 4.6 06/19/2021 0452     06/19/2021 0452    CO2 24 06/19/2021 0452    BUN 20 06/19/2021 0452    CREATININE 0.63 06/19/2021 0452        Component Value Date/Time    CALCIUM 8.3 (L) 06/19/2021 0452    ALKPHOS 82 06/19/2021 0452    AST 11 06/19/2021 0452    ALT 12 06/19/2021 0452    BILITOT <0.15 (L) 06/19/2021 0452            Impression:  PE, DVT  Eliquis   HX smoking, quit  HX COPD, on oxygen      Plan:  I had a detailed discussion with the patient and personally went over results of lab work-up imaging studies and other relevant clinical data. She has been taking the Eliquis with no issues, no symptoms of recurrence. She has stopped smoking and I encouraged her to continue with full cessation as that reduces her risk of recurrent thromboembolism. I discussed with the extent of her thromboembolism I am recommending 6 months of anti-coagulation. She has moved in with family and they are working with her to increase her activity, I encouraged her to continue. I discussed plan for genetic testing to be done. Return in 3 months to review and discuss ongoing plan.          Garcia Roberts MD    This note is created with the assistance of a speech recognition program.  While intending to generate a document that actually reflects the content of the visit, the document can still have some errors including those of syntax and sound a like substitutions which may escape proof reading. It such instances, actual meaning can be extrapolated by contextual diversion.

## 2021-06-23 NOTE — TELEPHONE ENCOUNTER
Ragini, I looked into this and your booking guidelines only states you wanted 15 minutes for ED follow ups. Do you want me to have this changed for you?

## 2021-06-24 LAB
EKG ATRIAL RATE: 78 BPM
EKG P AXIS: 76 DEGREES
EKG P-R INTERVAL: 144 MS
EKG Q-T INTERVAL: 374 MS
EKG QRS DURATION: 84 MS
EKG QTC CALCULATION (BAZETT): 426 MS
EKG R AXIS: -95 DEGREES
EKG T AXIS: 49 DEGREES
EKG VENTRICULAR RATE: 78 BPM

## 2021-06-26 ENCOUNTER — HOSPITAL ENCOUNTER (EMERGENCY)
Age: 66
Discharge: HOME | End: 2021-06-26
Attending: EMERGENCY MEDICINE
Payer: COMMERCIAL

## 2021-06-26 VITALS
WEIGHT: 147 LBS | HEART RATE: 102 BPM | TEMPERATURE: 98.1 F | SYSTOLIC BLOOD PRESSURE: 134 MMHG | RESPIRATION RATE: 18 BRPM | BODY MASS INDEX: 26.04 KG/M2 | OXYGEN SATURATION: 97 % | DIASTOLIC BLOOD PRESSURE: 91 MMHG

## 2021-06-26 DIAGNOSIS — G47.09 OTHER INSOMNIA: Primary | ICD-10-CM

## 2021-06-26 DIAGNOSIS — F31.9 BIPOLAR 1 DISORDER (HCC): ICD-10-CM

## 2021-06-26 PROCEDURE — 99282 EMERGENCY DEPT VISIT SF MDM: CPT

## 2021-06-26 RX ORDER — FUROSEMIDE 20 MG/1
TABLET ORAL
Qty: 7 TABLET | Refills: 0 | Status: SHIPPED | OUTPATIENT
Start: 2021-06-26 | End: 2021-07-07

## 2021-06-27 NOTE — ED PROVIDER NOTES
EMERGENCY DEPARTMENT ENCOUNTER    Pt Name: Jesenia Maradiaga  MRN: [id number]  [place] 1955  Date of evaluation: 21  CHIEF COMPLAINT       Chief Complaint   Patient presents with    Mental Health Problem    Insomnia    Anxiety     HISTORY OF PRESENT ILLNESS     Mental Health Problem  Presenting symptoms comment:  Insomnia, paranoid behavior  Degree of incapacity (severity): Moderate  Onset quality:  Gradual  Duration: years. Timing:  Constant  Progression:  Partially resolved  Chronicity:  Chronic (bipolar, insomnia)  Context: not alcohol use, not drug abuse and not noncompliant    Treatment compliance: All of the time  Worsened by:  Nothing  Ineffective treatments: on latuda 80 mg daily, started trazodone last night 50 mg for sleep. Associated symptoms: insomnia    Hx from patient and daughters  Here with two daughters  She has chronic paranoia for 20 years, chronic bipolar  Having some dementia, recently put on medication  [facility] for geriatric psychiatrist next month  Goes to [place], sees the doctor virtual visit, just had one two days ago, they started trazodone for sleeping, she actually slept 5 hours last night which is the most in a long time per daughters  Chronic copd on home oxygen    REVIEW OF SYSTEMS     Review of Systems   Psychiatric/Behavioral: The patient has insomnia. All other systems reviewed and are negative.     PASTMEDICAL HISTORY     Past Medical History:   Diagnosis Date    Abnormal EKG     Anxiety     Asthma     Bipolar disorder ([address]     SEVERE IN , [place]ISON    COPD (chronic obstructive pulmonary disease) (HCC)     Cramps, extremity     SEVERE LEG CRAMPS    Depression     Dilated bile duct     Headache     History of elective      Hyperlipidemia     Irritable bowel syndrome     Prolonged emergence from general anesthesia     SEVERE ISSUES WAKING UP    Substance abuse ([address]     street drugs when younger    Unspecified sleep apnea     for Smoking cessation    SIMVASTATIN (ZOCOR) 40 MG TABLET    take 1 tablet by mouth at bedtime    TIOTROPIUM (SPIRIVA RESPIMAT) 2.5 MCG/ACT AERS INHALER    Inhale 2 puffs into the lungs daily    TOPIRAMATE (TOPAMAX) 50 MG TABLET    Take 50 mg by mouth daily     TOPIRAMATE (TOPAMAX) 50 MG TABLET    Take 100 mg by mouth nightly    VITAMIN D (ERGOCALCIFEROL) 1.25 MG (31226 UT) CAPS CAPSULE    take 1 capsule by mouth every week     ALLERGIES     is allergic to advil [ibuprofen], aleve [naproxen], antipyrine, celecoxib, codeine, fomepizole, other, rofecoxib, salicylates, strawberry extract, sulfinpyrazone, aspirin, and nsaids. FAMILY HISTORY     She indicated that her mother is . She indicated that her father is . She indicated that all of her three sisters are alive. She indicated that her brother is alive. She indicated that her maternal grandmother is . She indicated that her maternal grandfather is . She indicated that her paternal grandmother is . She indicated that her paternal grandfather is . She indicated that her maternal aunt is . SOCIAL HISTORY       Social History     Tobacco Use    Smoking status: Former Smoker     Packs/day: 2.00     Years: 42.00     Pack years: 84.00     Types: Cigarettes     Quit date: 2018     Years since quittin.6    Smokeless tobacco: Never Used   Substance Use Topics    Alcohol use: Yes     Comment: yearly    Drug use: No     PHYSICAL EXAM     INITIAL VITALS: BP (!) 134/91   Pulse 102   Temp 98.1 °F (36.7 °C) (Oral)   Resp 18   Wt 147 lb (66.7 kg)   LMP 2013 (Exact Date)   SpO2 97%   BMI 26.04 kg/m²    Physical Exam  Constitutional:       General: She is not in acute distress. Appearance: Normal appearance. She is well-developed. She is not diaphoretic. HENT:      Head: Normocephalic and atraumatic.       Right Ear: External ear normal.      Left Ear: External ear normal.      Nose: Nose normal. No congestion. Mouth/Throat:      Mouth: Mucous membranes are moist.      Pharynx: Oropharynx is clear. Eyes:      General:         Right eye: No discharge. Left eye: No discharge. Conjunctiva/sclera: Conjunctivae normal.      Pupils: Pupils are equal, round, and reactive to light. Neck:      Trachea: No tracheal deviation. Cardiovascular:      Rate and Rhythm: Normal rate and regular rhythm. Pulses: Normal pulses. Heart sounds: Normal heart sounds. Pulmonary:      Effort: Pulmonary effort is normal. No respiratory distress. Breath sounds: Normal breath sounds. No stridor. No wheezing or rales. Abdominal:      Palpations: Abdomen is soft. Tenderness: There is no abdominal tenderness. There is no guarding or rebound. Musculoskeletal:         General: No tenderness or deformity. Normal range of motion. Cervical back: Normal range of motion and neck supple. Skin:     General: Skin is warm and dry. Capillary Refill: Capillary refill takes less than 2 seconds. Findings: No erythema or rash. Neurological:      General: No focal deficit present. Mental Status: She is alert and oriented to person, place, and time. Cranial Nerves: No cranial nerve deficit. Coordination: Coordination normal.   Psychiatric:         Mood and Affect: Mood normal.         Behavior: Behavior normal.         Thought Content: Thought content normal.         Judgment: Judgment normal.      Comments: Calm, cooperative, not disorganized, denies SI or HI  She states she wants her purse and her daughter, she doesn't want to be here         MEDICAL DECISION MAKING:   Patient doesn't meet admission criteria to [facility] or medical units  She is compliant with meds  Discussed with patient anticipatory guidance, discharge instructions, follow up PCP and [facility] Monday  Daughters are agreeable with plan.   Plan is to continue trazodone 50 mg qhs Procedures    DIAGNOSTIC RESULTS       EMERGENCY DEPARTMENTCOURSE:         Vitals:    Vitals:    06/26/21 1931   BP: (!) 134/91   Pulse: 102   Resp: 18   Temp: 98.1 °F (36.7 °C)   TempSrc: Oral   SpO2: 97%   Weight: 147 lb (66.7 kg)         FINAL IMPRESSION      1. Other insomnia    2.  Bipolar 1 disorder [facility])          DISPOSITION/PLAN   DISPOSITION Decision To Discharge 06/26/2021 08:16:04 PM      PATIENT REFERRED TO:  Smiley Pacheco [address] [place] [address]  [phone]  Schedule an appointment as soon as possible for a visit in 2 days      DISCHARGE MEDICATIONS:  New Prescriptions    No medications on file     Nigel Merino MD  Attending Emergency Physician                    Nigel Merino MD  06/26/21 2034

## 2021-06-28 ENCOUNTER — TELEPHONE (OUTPATIENT)
Dept: FAMILY MEDICINE CLINIC | Age: 66
End: 2021-06-28

## 2021-06-28 NOTE — TELEPHONE ENCOUNTER
[facility] ([facility]) ED Follow up Call    Reason for ED visit:  Insomnia      6/28/2021         FU appts/Provider:    Future Appointments   Date Time Provider Percy Rodriguez   7/13/2021 11:45 AM Augustina Puga MD [facility]   9/15/2021 11:15 AM Albert Vora MD [facility]         VOICEMAIL DOCUMENTATION - ERASE IF NOT USED  Hi, this message is for Perez. This is [employer], MA from [facility] office. Just calling to see how you are doing after your recent visit to the Emergency Room. [employer] wants to make sure you were able to fill any prescriptions and that you understand your discharge instructions. Please return our call if you need to make a follow up appointment with your provider or have any further needs. Our phone number is [phone]. Have a great day.

## 2021-06-29 ENCOUNTER — TELEPHONE (OUTPATIENT)
Dept: OTHER | Facility: CLINIC | Age: 66
End: 2021-06-29

## 2021-07-01 LAB
FACTOR V MUTATION: NEGATIVE
PROTHROMBIN G20210A MUTATION: NEGATIVE
PT PCR SPECIMEN: NORMAL
SPECIMEN: NORMAL

## 2021-07-07 RX ORDER — FUROSEMIDE 20 MG/1
TABLET ORAL
Qty: 7 TABLET | Refills: 0 | Status: SHIPPED | OUTPATIENT
Start: 2021-07-07 | End: 2021-08-05 | Stop reason: ALTCHOICE

## 2021-07-07 NOTE — TELEPHONE ENCOUNTER
Please Approve or Refuse.   Send to Pharmacy per Pt's Request:     Next Visit Date:  7/13/2021   Last Visit Date: 6/22/2021    Hemoglobin A1C (%)   Date Value   06/22/2021 5.2   05/09/2018 5.1   03/17/2016 5.4             ( goal A1C is < 7)   BP Readings from Last 3 Encounters:   06/26/21 (!) 134/91   06/23/21 (!) 154/94   06/22/21 120/80          (goal 120/80)  BUN   Date Value Ref Range Status   06/19/2021 20 8 - 23 mg/dL Final     CREATININE   Date Value Ref Range Status   06/19/2021 0.63 0.50 - 0.90 mg/dL Final     Potassium   Date Value Ref Range Status   06/19/2021 4.6 3.7 - 5.3 mmol/L Final

## 2021-07-18 DIAGNOSIS — E55.9 VITAMIN D DEFICIENCY: ICD-10-CM

## 2021-07-19 RX ORDER — ERGOCALCIFEROL 1.25 MG/1
CAPSULE ORAL
Qty: 4 CAPSULE | Refills: 3 | Status: SHIPPED | OUTPATIENT
Start: 2021-07-19 | End: 2021-11-08

## 2021-07-19 NOTE — TELEPHONE ENCOUNTER
Please Approve or Refuse.   Send to Pharmacy per Pt's Request:      Next Visit Date:  Visit date not found   Last Visit Date: 6/22/2021    Hemoglobin A1C (%)   Date Value   06/22/2021 5.2   05/09/2018 5.1   03/17/2016 5.4             ( goal A1C is < 7)   BP Readings from Last 3 Encounters:   06/26/21 (!) 134/91   06/23/21 (!) 154/94   06/22/21 120/80          (goal 120/80)  BUN   Date Value Ref Range Status   07/08/2021 9 7 - 25 mg/dL Final     CREATININE   Date Value Ref Range Status   07/08/2021 0.74 0.60 - 1.20 mg/dL Final     Potassium   Date Value Ref Range Status   07/08/2021 3.8 3.5 - 5.1 meq/L Final

## 2021-07-29 DIAGNOSIS — M25.522 LEFT ELBOW PAIN: ICD-10-CM

## 2021-07-30 ENCOUNTER — TELEPHONE (OUTPATIENT)
Dept: FAMILY MEDICINE CLINIC | Age: 66
End: 2021-07-30

## 2021-07-30 RX ORDER — TRAZODONE HYDROCHLORIDE 50 MG/1
50 TABLET ORAL NIGHTLY
COMMUNITY
Start: 2021-07-28

## 2021-07-30 RX ORDER — CETIRIZINE HCL/PSEUDOEPHEDRINE 5 MG-120MG
TABLET, EXTENDED RELEASE 12 HR ORAL
COMMUNITY
Start: 2021-05-30 | End: 2021-08-05 | Stop reason: ALTCHOICE

## 2021-07-30 RX ORDER — OLOPATADINE HYDROCHLORIDE 1 MG/ML
SOLUTION/ DROPS OPHTHALMIC
COMMUNITY
Start: 2021-07-28 | End: 2021-08-05 | Stop reason: ALTCHOICE

## 2021-07-30 RX ORDER — ACETAMINOPHEN 500 MG
TABLET ORAL
Qty: 120 TABLET | Refills: 0 | Status: SHIPPED | OUTPATIENT
Start: 2021-07-30 | End: 2021-08-16

## 2021-07-30 RX ORDER — RISPERIDONE 0.5 MG/1
0.5 TABLET, FILM COATED ORAL EVERY 12 HOURS
COMMUNITY
Start: 2021-07-28 | End: 2021-08-05 | Stop reason: SDUPTHER

## 2021-07-30 RX ORDER — CALCIUM CARBONATE-CHOLECALCIFEROL TAB 250 MG-125 UNIT 250-125 MG-UNIT
TAB ORAL
COMMUNITY
Start: 2021-07-28 | End: 2021-08-05 | Stop reason: ALTCHOICE

## 2021-07-30 RX ORDER — SIMVASTATIN 20 MG
TABLET ORAL
COMMUNITY
Start: 2021-07-28 | End: 2021-08-05 | Stop reason: ALTCHOICE

## 2021-07-30 NOTE — TELEPHONE ENCOUNTER
Please Approve or Refuse.   Send to Pharmacy per Pt's Request:     Next Visit Date:  No scheduled  Last Visit Date: 6/22/2021    Hemoglobin A1C (%)   Date Value   06/22/2021 5.2   05/09/2018 5.1   03/17/2016 5.4             ( goal A1C is < 7)   BP Readings from Last 3 Encounters:   06/26/21 (!) 134/91   06/23/21 (!) 154/94   06/22/21 120/80          (goal 120/80)  BUN   Date Value Ref Range Status   07/08/2021 9 7 - 25 mg/dL Final     CREATININE   Date Value Ref Range Status   07/08/2021 0.74 0.60 - 1.20 mg/dL Final     Potassium   Date Value Ref Range Status   07/08/2021 3.8 3.5 - 5.1 meq/L Final

## 2021-07-30 NOTE — TELEPHONE ENCOUNTER
----- Message from Leyda John sent at 7/30/2021  1:32 PM EDT -----  Subject: Hospital Follow Up    QUESTIONS  What hospital was the Patient Discharged from? [facility] geriatric behavioral   unit   Date of Discharge? 2021-07-29  Discharge Location? Home  Reason for hospitalization as patient stated? patient daughter stated   patient has history of mental illness , and was displaying confusion ,   irritability and paranoia. What question does the patient have, if applicable? n/a  ---------------------------------------------------------------------------  --------------  CALL BACK INFO  What is the best way for the office to contact you? OK to leave message on   voicemail  Preferred Call Back Phone Number? [phone]  ---------------------------------------------------------------------------  --------------  SCRIPT ANSWERS  Relationship to Patient? Other  Representative Name? manjinder  Additional information verified (besides Name and Date of Birth)? Address  (Patient requests to see provider urgently. )? No  (Has the patient been discharged from the hospital within 2 business days   AND does not have a Telephone Encounter  Follow Up From [address] [place] [address]   documented in [address]?)? Yes  Do you have any questions for your primary care provider that need to be   answered prior to your appointment? (Use RN Triage if question pertains to   anything on the red flag list)? No  (Patient needs follow up visit after hospital discharge) Book first   available appointment within 7 days OF DISCHARGE, if no appt, proceed to   book the next available time slot within 14 days OF DISCHARGE AND Send   Message to Provider. [facility] Follow Up appointment cannot be booked   beyond 14 Days and should result in a Message to Provider. ? Yes   Have you been diagnosed with, awaiting test results for, or told that you   are suspected of having COVID-19 (Coronavirus)?  (If patient has tested   negative or was tested as a requirement for work, school, or

## 2021-08-05 ENCOUNTER — TELEPHONE (OUTPATIENT)
Dept: FAMILY MEDICINE CLINIC | Age: 66
End: 2021-08-05

## 2021-08-05 ENCOUNTER — OFFICE VISIT (OUTPATIENT)
Dept: FAMILY MEDICINE CLINIC | Age: 66
End: 2021-08-05
Payer: COMMERCIAL

## 2021-08-05 VITALS
TEMPERATURE: 96.9 F | HEART RATE: 83 BPM | HEIGHT: 65 IN | WEIGHT: 160 LBS | OXYGEN SATURATION: 98 % | BODY MASS INDEX: 26.66 KG/M2 | DIASTOLIC BLOOD PRESSURE: 64 MMHG | SYSTOLIC BLOOD PRESSURE: 100 MMHG

## 2021-08-05 DIAGNOSIS — J96.11 CHRONIC RESPIRATORY FAILURE WITH HYPOXIA (HCC): ICD-10-CM

## 2021-08-05 DIAGNOSIS — F31.70 BIPOLAR DISORDER IN PARTIAL REMISSION, MOST RECENT EPISODE UNSPECIFIED TYPE (HCC): Primary | ICD-10-CM

## 2021-08-05 DIAGNOSIS — R60.0 BILATERAL LEG EDEMA: ICD-10-CM

## 2021-08-05 DIAGNOSIS — F03.90 MAJOR NEUROCOGNITIVE DISORDER (HCC): ICD-10-CM

## 2021-08-05 DIAGNOSIS — M25.522 LEFT ELBOW PAIN: ICD-10-CM

## 2021-08-05 DIAGNOSIS — Z23 ENCOUNTER FOR IMMUNIZATION: ICD-10-CM

## 2021-08-05 DIAGNOSIS — J44.9 CHRONIC OBSTRUCTIVE PULMONARY DISEASE, UNSPECIFIED COPD TYPE (HCC): ICD-10-CM

## 2021-08-05 DIAGNOSIS — E78.5 HYPERLIPIDEMIA WITH TARGET LDL LESS THAN 100: ICD-10-CM

## 2021-08-05 PROCEDURE — 90732 PPSV23 VACC 2 YRS+ SUBQ/IM: CPT | Performed by: FAMILY MEDICINE

## 2021-08-05 PROCEDURE — 1111F DSCHRG MED/CURRENT MED MERGE: CPT | Performed by: FAMILY MEDICINE

## 2021-08-05 PROCEDURE — 99215 OFFICE O/P EST HI 40 MIN: CPT | Performed by: FAMILY MEDICINE

## 2021-08-05 PROCEDURE — 90471 IMMUNIZATION ADMIN: CPT | Performed by: FAMILY MEDICINE

## 2021-08-05 RX ORDER — DOCUSATE SODIUM 100 MG/1
100 CAPSULE, LIQUID FILLED ORAL 2 TIMES DAILY
COMMUNITY
End: 2021-08-26 | Stop reason: SDUPTHER

## 2021-08-05 RX ORDER — RIVASTIGMINE TARTRATE 4.5 MG/1
4.5 CAPSULE ORAL 2 TIMES DAILY
Qty: 60 CAPSULE | Refills: 3 | Status: SHIPPED | OUTPATIENT
Start: 2021-08-05 | End: 2021-12-06

## 2021-08-05 RX ORDER — FLUTICASONE PROPIONATE 50 MCG
2 SPRAY, SUSPENSION (ML) NASAL DAILY
Qty: 1 BOTTLE | Refills: 3
Start: 2021-08-05 | End: 2022-10-19

## 2021-08-05 RX ORDER — CETIRIZINE HYDROCHLORIDE 10 MG/1
10 TABLET ORAL DAILY
COMMUNITY
End: 2022-06-30

## 2021-08-05 RX ORDER — LIDOCAINE 40 MG/G
CREAM TOPICAL
Qty: 45 G | Refills: 3 | Status: SHIPPED | OUTPATIENT
Start: 2021-08-05 | End: 2021-12-13

## 2021-08-05 RX ORDER — RISPERIDONE 0.5 MG/1
1.5 TABLET, FILM COATED ORAL EVERY 12 HOURS
Qty: 60 TABLET | Refills: 0 | Status: ON HOLD
Start: 2021-08-05 | End: 2022-05-03 | Stop reason: HOSPADM

## 2021-08-05 RX ORDER — SIMVASTATIN 20 MG
20 TABLET ORAL NIGHTLY
Qty: 90 TABLET | Refills: 3
Start: 2021-08-05 | End: 2021-08-26 | Stop reason: SDUPTHER

## 2021-08-05 ASSESSMENT — ENCOUNTER SYMPTOMS
DIARRHEA: 0
RHINORRHEA: 0
WHEEZING: 0
SINUS PRESSURE: 0
ABDOMINAL DISTENTION: 0
SINUS PAIN: 0
NAUSEA: 0
SHORTNESS OF BREATH: 1
CONSTIPATION: 0
CHEST TIGHTNESS: 0
VOMITING: 0
ABDOMINAL PAIN: 0
COUGH: 1

## 2021-08-05 ASSESSMENT — PATIENT HEALTH QUESTIONNAIRE - PHQ9
SUM OF ALL RESPONSES TO PHQ QUESTIONS 1-9: 0
SUM OF ALL RESPONSES TO PHQ9 QUESTIONS 1 & 2: 0
SUM OF ALL RESPONSES TO PHQ QUESTIONS 1-9: 0
2. FEELING DOWN, DEPRESSED OR HOPELESS: 0
SUM OF ALL RESPONSES TO PHQ QUESTIONS 1-9: 0
1. LITTLE INTEREST OR PLEASURE IN DOING THINGS: 0

## 2021-08-05 NOTE — PROGRESS NOTES
Post-Discharge Transitional Care Management Services or Hospital Follow Up      Shelby Lopez   YOB: 1955    Date of Office Visit:  8/5/2021  Date of Hospital Admission: 7/8/2021  Date of Hospital Discharge: 7/29/2021      Readmission Risk Score(high >=14%.  Medium >=10%):Readmission Risk Score: 21      Care management risk score Rising risk (score 2-5) and Complex Care (Scores >=6): 6     Non face to face  following discharge, date last encounter closed (first attempt may have been earlier): Yes, done on 7/30/2021    Call initiated 2 business days of discharge: Yes    Patient Active Problem List   Diagnosis    Chronic obstructive pulmonary disease ([address]    Vitamin D deficiency    Anxiety    Asthma    Bipolar disorder ([address]    Depression    Hyperlipidemia with target LDL less than 100    Sleep apnea    Vision abnormalities    Status post hysteroscopy    Chronic headaches    IBS (irritable bowel syndrome)    Colon polyp    Collagenous colitis    Lung nodule    Left elbow pain    Dilated bile duct    Acute pain of left shoulder    Adhesive capsulitis of left shoulder    Leg swelling    Leucopenia    Compression fracture of body of thoracic vertebra (HCC)    Age-related osteoporosis with current pathological fracture    Flank pain    DOMÍNGUEZ (dyspnea on exertion)    Pulmonary embolism on right ([address]    Migraines    Paranoid behavior ([address]    Bilateral leg edema    Acute deep vein thrombosis (DVT) of right lower extremity (HCC)    Delirium    Chronic respiratory failure with hypoxia (HCC)    Major neurocognitive disorder (HCC)       Allergies   Allergen Reactions    Advil [Ibuprofen] Other (See Comments)     vomiting    Aleve [Naproxen] Other (See Comments)     vomiting    Antipyrine Other (See Comments)     unknown    Celecoxib Other (See Comments)    Codeine      headache    Fomepizole     Incruse Ellipta [Umeclidinium Bromide]      confusion    Other GRASS, TREES, WEEDS    Rofecoxib Other (See Comments)     Unknown reaction    Salicylates      Unknown reaction     Strawberry Extract      HEADACHES    Sulfinpyrazone Other (See Comments)     Unknown reaction    Aspirin Nausea And Vomiting, Other (See Comments) and Nausea Only    Nsaids Nausea Only, Other (See Comments) and Nausea And Vomiting       Medications listed as ordered at the time of discharge from [facility] Medication Instructions NARAYAN:    Printed on:08/05/21 5236   Medication Information                      acetaminophen (RA ACETAMINOPHEN EX ST) 500 MG tablet  take 2 tablets by mouth every 6 hours if needed for pain             albuterol (PROVENTIL) (2.5 MG/3ML) 0.083% nebulizer solution  Take 3 mLs by nebulization 4 times daily             apixaban (ELIQUIS) 5 MG TABS tablet  Take 1 tablet by mouth 2 times daily             Calcium 250 MG CAPS  Take 250 mg by mouth daily             cetirizine (ZYRTEC) 10 MG tablet  Take 10 mg by mouth daily             docusate sodium (COLACE) 100 MG capsule  Take 100 mg by mouth 2 times daily             fluticasone (FLONASE) 50 MCG/ACT nasal spray  2 sprays by Nasal route daily             ibandronate (BONIVA) 150 MG tablet  Take 1 tablet by mouth every 30 days Take one (1) tablet once per month in the morning with a full glass of water, on an empty stomach, and do not take anything else by mouth or lie down for the next 30 minutes. lidocaine (LMX) 4 % cream  Apply topically  q 8 hrs  times/day as needed.              mometasone-formoterol (DULERA) 200-5 MCG/ACT inhaler  Inhale 2 puffs into the lungs every 12 hours             risperiDONE (RISPERDAL) 0.5 MG tablet  Take 0.5 mg by mouth every 12 hours              simvastatin (ZOCOR) 20 MG tablet  Take 1 tablet by mouth nightly take 1 tablet by mouth at bedtime             tiotropium (SPIRIVA RESPIMAT) 2.5 MCG/ACT AERS inhaler  Inhale 2 puffs into the lungs daily traZODone (DESYREL) 50 MG tablet  50 mg nightly              vitamin D (ERGOCALCIFEROL) 1.25 MG (56122 UT) CAPS capsule  take 1 capsule by mouth every week                   Medications marked \"taking\" at this time  Outpatient Medications Marked as Taking for the 8/5/21 encounter (Office Visit) with Lyssa Coats MD   Medication Sig Dispense Refill    docusate sodium (COLACE) 100 MG capsule Take 100 mg by mouth 2 times daily      Calcium 250 MG CAPS Take 250 mg by mouth daily      cetirizine (ZYRTEC) 10 MG tablet Take 10 mg by mouth daily      tiotropium (SPIRIVA RESPIMAT) 2.5 MCG/ACT AERS inhaler Inhale 2 puffs into the lungs daily 1 Inhaler 0    fluticasone (FLONASE) 50 MCG/ACT nasal spray 2 sprays by Nasal route daily 1 Bottle 3    simvastatin (ZOCOR) 20 MG tablet Take 1 tablet by mouth nightly take 1 tablet by mouth at bedtime 90 tablet 3    lidocaine (LMX) 4 % cream Apply topically  q 8 hrs  times/day as needed. 45 g 3    risperiDONE (RISPERDAL) 0.5 MG tablet Take 1.5 mg by mouth every 12 hours       traZODone (DESYREL) 50 MG tablet 50 mg nightly       vitamin D (ERGOCALCIFEROL) 1.25 MG (70859 UT) CAPS capsule take 1 capsule by mouth every week 4 capsule 3    apixaban (ELIQUIS) 5 MG TABS tablet Take 1 tablet by mouth 2 times daily 360 tablet 0    albuterol (PROVENTIL) (2.5 MG/3ML) 0.083% nebulizer solution Take 3 mLs by nebulization 4 times daily 120 each 3        Medications patient taking as of now reconciled against medications ordered at time of hospital discharge: Yes    Chief Complaint   Patient presents with    Follow-Up from [address] [place] [address]    Foot Swelling       HPI    Inpatient course: Discharge summary reviewed- see chart. Interval history/Current status:  Records from [facility] received and reviewed with patient and her daughter in the room. Patient was admitted 7/8/2021 through 7/29/2021 at [facility] due to behavioral disturbance.   Discharge papers reviewed in [facility] for maintenance, the other one she doesn't have it   Residual symptoms: chronic dyspnea and cough productive of clear and white sputum in small amounts. She denies purulent sputum, wheezing, chest tightness. She requires her rescue inhaler 4-6 time(s) per day. Also has nebulizer and alternates with inhaler. Saw Dr Silvia Dixon nurse yesterday, and she was told she is stable  They stopped singulair  Started Zyrtec for allergies which the patient says will be flaring up soon    She quit smoking. Praise given  Counseling given: Yes    Has Walker at home which she has been using    Had recent PE and DVT about 2 months ago, and she is on Elliquis    Hyperlipidemia:  No new myalgias or GI upset on simvastatin (Zocor) 20 mg, the dosage was decreased. Medication compliance: compliant most of the time. Patient is  following a low fat, low cholesterol diet. LDL is normal   Lab Results   Component Value Date    LDLCHOLESTEROL 72 09/09/2020     Lab Results   Component Value Date    TRIG 79 02/22/2019    TRIG 79 11/19/2017    TRIG 91 03/17/2016       Elaina complains of Left elbow pain, chronic, decreased range of motion, did see Dr. Raghav Naqvi  in the past, had surgery and PT. She says her range of motion has improved a little. Lidocaine helps with pain. When talking on the phone, it hurts, advised to put it on the speaker. She does complain of worsening leg swelling bilateral.  She does not wear the compression stockings at home unless reminded. Her daughter says she does have them, and will insist to wear them. denies pain in the legs  Wants to restart the lasix, advised that BP is low and should try the compression stockings first      Still drinking some manjinder Haleigh wine, advised not to drink any  alcoholic beverages         Review of Systems   Constitutional: Positive for fatigue. Negative for activity change, appetite change, chills, diaphoresis, fever and unexpected weight change.    HENT: Negative for congestion, postnasal drip, rhinorrhea, sinus pressure and sinus pain. Respiratory: Positive for cough and shortness of breath. Negative for chest tightness and wheezing. On continuous oxygen at 3 L/min   Cardiovascular: Positive for leg swelling. Negative for chest pain and palpitations. Gastrointestinal: Negative for abdominal distention, abdominal pain, constipation, diarrhea, nausea and vomiting. Endocrine: Negative for cold intolerance, heat intolerance, polydipsia, polyphagia and polyuria. Musculoskeletal: Positive for arthralgias and gait problem. Ambulates with walker at home, and needs help from her daughter   Allergic/Immunologic: Positive for environmental allergies. Neurological: Negative for dizziness and light-headedness. Hematological: Does not bruise/bleed easily. Psychiatric/Behavioral: Positive for decreased concentration. Negative for dysphoric mood, hallucinations, self-injury, sleep disturbance and suicidal ideas. The patient is nervous/anxious and is hyperactive. Vital signs within normal limits except overweight per BMI and hypoxia corrected with oxygen at 3 L/min    Vitals:    08/05/21 1359   BP: 100/64   Pulse: 83   Temp: 96.9 °F (36.1 °C)   SpO2: 98%   Weight: 160 lb (72.6 kg)   Height: 5' 5\" (1.651 m)     Body mass index is 26.63 kg/m². Wt Readings from Last 3 Encounters:   08/05/21 160 lb (72.6 kg)   06/26/21 147 lb (66.7 kg)   06/23/21 146 lb 8 oz (66.5 kg)     BP Readings from Last 3 Encounters:   08/05/21 100/64   06/26/21 (!) 134/91   06/23/21 (!) 154/94       Physical Exam  Vitals and nursing note reviewed. Constitutional:       General: She is not in acute distress. Appearance: Normal appearance. She is well-developed. She is not diaphoretic. HENT:      Head: Normocephalic and atraumatic.       Right Ear: External ear normal.      Left Ear: External ear normal.      Mouth/Throat:      Comments: I did not examine the mouth due to auditory or visual hallucinations. Mood and Affect: Mood is anxious. Affect is labile. Speech: Speech is rapid and pressured. Behavior: Behavior normal.         Thought Content: Thought content is not paranoid or delusional.         Judgment: Judgment is impulsive. Most recent labs reviewed consistent with low WBC count, anemia, has been seeing hematologist oncologist  Increased MCV, advised to stop drinking any alcoholic beverages.   Patient says she has been taking from over-the-counter vitamin B12 and biotin and I agreed  Mild hyperglycemia    Vitamin D deficiency, she is taking supplementation  Otherwise labs within normal limits      Lab Results   Component Value Date    WBC 2.39 (L) 07/08/2021    HGB 11.6 (L) 07/08/2021    HCT 37.5 07/08/2021    .9 (H) 07/08/2021     07/08/2021       Lab Results   Component Value Date     07/08/2021    K 3.8 07/08/2021     07/08/2021    CO2 29 07/08/2021    BUN 9 07/08/2021    CREATININE 0.74 07/08/2021    GLUCOSE 127 07/08/2021    CALCIUM 9.1 07/08/2021        Lab Results   Component Value Date    ALT 15 07/08/2021    AST 20 07/08/2021    ALKPHOS 56 07/08/2021    BILITOT 0.3 07/08/2021       Lab Results   Component Value Date    TSH 1.94 09/09/2020       Lab Results   Component Value Date    CHOL 161 02/22/2019    CHOL 152 11/19/2017    CHOL 182 03/17/2016     Lab Results   Component Value Date    TRIG 79 02/22/2019    TRIG 79 11/19/2017    TRIG 91 03/17/2016     Lab Results   Component Value Date    HDL 77 09/09/2020    HDL 69 02/22/2019    HDL 65 11/19/2017     Lab Results   Component Value Date    LDLCHOLESTEROL 72 09/09/2020    LDLCHOLESTEROL 76 02/22/2019    LDLCHOLESTEROL 71 11/19/2017       Lab Results   Component Value Date    CHOLHDLRATIO 2.1 09/09/2020    CHOLHDLRATIO 2.3 02/22/2019    CHOLHDLRATIO 2.3 11/19/2017       Lab Results   Component Value Date    LABA1C 5.2 06/22/2021       Lab Results   Component Value Date    XYHKFZQH28 1053 03/08/2019       Lab Results   Component Value Date    FOLATE 8.3 03/08/2019           Lab Results   Component Value Date    VITD25 14.4 (L) 03/17/2016         Assessment/Plan:  1. Bipolar disorder in partial remission, most recent episode unspecified type ([email or web address]  Improved  Corrected dosage of risperdal to 1.5 mg twice a day, trazodone 50 mg at nighttime  - [place] DISCHARGE MEDS RECONCILED W/ CURRENT OUTPATIENT MED LIST  - [address]    2. Major neurocognitive disorder ([facility] [[email or web address] 75.)  Likely worsening    - [place] DISCHARGE MEDS RECONCILED W/ CURRENT OUTPATIENT MED LIST  -start rivastigmine (EXELON) 4.5 MG capsule; Take 1 capsule by mouth 2 times daily  Dispense: 60 capsule; Refill: 3    3. Chronic obstructive pulmonary disease, unspecified COPD type ([facility] [[email or web address] 75.)  Stable but Inadequately controlled   - [place] DISCHARGE MEDS RECONCILED W/ CURRENT OUTPATIENT MED LIST  -start mometasone-formoterol (DULERA) 200-5 MCG/ACT inhaler; Inhale 2 puffs into the lungs every 12 hours  Dispense: 1 Inhaler; Refill: 2--might need prior authorization  - tiotropium (SPIRIVA RESPIMAT) 2.5 MCG/ACT AERS inhaler; Inhale 2 puffs into the lungs daily  Dispense: 1 Inhaler; Refill: 0  - [address]  Albuterol as needed  4. Chronic respiratory failure with hypoxia (HCC)  Stable  Continue oxygen at 3 L/min  - [place] DISCHARGE MEDS RECONCILED W/ CURRENT OUTPATIENT MED LIST  - [address]    5. Hyperlipidemia with target LDL less than 100    well controlled. - [place] DISCHARGE MEDS RECONCILED W/ CURRENT OUTPATIENT MED LIST  - simvastatin (ZOCOR) 20 MG tablet; Take 1 tablet by mouth nightly take 1 tablet by mouth at bedtime  Dispense: 90 tablet; Refill: 3  - [address]    6.  Bilateral leg edema  worsening  Low salt diet  Avoid alcohol  compression stockings  I declined lasix as stopped at [facility], but was told to contact us if the compression stockings not helping    - [place] DISCHARGE MEDS RECONCILED W/ CURRENT OUTPATIENT MED LIST    7. Left elbow pain  stable  - [place] DISCHARGE MEDS RECONCILED W/ CURRENT OUTPATIENT MED LIST  - lidocaine (LMX) 4 % cream; Apply topically  q 8 hrs  times/day as needed. Dispense: 45 g; Refill: 3    8. Encounter for immunization  - Pneumococcal polysaccharide vaccine 23-valent greater than or equal to 3yo subcutaneous/IM  - [place] DISCHARGE MEDS RECONCILED W/ CURRENT OUTPATIENT MED LIST        Return in about 4 weeks (around 9/2/2021) for COPD.       Medical Decision Making: high complexity    Future Appointments   Date Time Provider Percy Benitesi   9/7/2021  8:30 AM Mj Roman MD [email or web address]   9/15/2021 11:15 AM Som Milan MD [address] [place] [address]

## 2021-08-05 NOTE — TELEPHONE ENCOUNTER
request for records.  spoke with [facility] medical records and they are faxing a form patient has to give permision since physic  visit

## 2021-08-05 NOTE — PROGRESS NOTES
Visit Information    Have you changed or started any medications since your last visit including any over-the-counter medicines, vitamins, or herbal medicines? no   Are you having any side effects from any of your medications? -  no  Have you stopped taking any of your medications? Is so, why? -  no    Have you seen any other physician or provider since your last visit? Yes - Records Obtained  Have you had any other diagnostic tests since your last visit? Yes - Records Obtained  Have you been seen in the emergency room and/or had an admission to a hospital since we last saw you? Yes - Records Obtained  Have you had your routine dental cleaning in the past 6 months? no    Have you activated your [employer] account? If not, what are your barriers?  Yes     Patient Care Team:  Robert Carlson MD as PCP - General (Family Medicine)  Robert Carlson MD as PCP - [facility] Provider  Roberta Pathak DO as Consulting Physician (Obstetrics & Gynecology)  Leticia Haynes MD as Consulting Physician (Pulmonology)  Jose E Escalona MD as Consulting Physician (Gastroenterology)  Itz Saxena RN as Nurse Navigator  Stephanie Blackman MD as Consulting Physician (Neurology)    Medical History Review  Past Medical, Family, and Social History reviewed and does contribute to the patient presenting condition    Health Maintenance   Topic Date Due    Pneumococcal 0-64 years Vaccine (3 of 4 - PPSV23) 05/06/2021    Flu vaccine (1) 09/01/2021    Lipid screen  09/09/2021    Low dose CT lung screening  09/09/2021    Potassium monitoring  07/08/2022    Creatinine monitoring  07/08/2022    Breast cancer screen  12/21/2022    Colon cancer screen colonoscopy  02/15/2023    Cervical cancer screen  04/13/2024    Diabetes screen  06/22/2024    DTaP/Tdap/Td vaccine (2 - Td or Tdap) 09/09/2026    Shingles Vaccine  Completed    COVID-19 Vaccine  Completed    Hepatitis C screen  Completed    HIV screen  Completed    Hepatitis A vaccine Aged Out    Hepatitis B vaccine  Aged Out    Hib vaccine  Aged Out    Meningococcal (ACWY) vaccine  Aged Out

## 2021-08-13 ENCOUNTER — TELEPHONE (OUTPATIENT)
Dept: FAMILY MEDICINE CLINIC | Age: 66
End: 2021-08-13

## 2021-08-13 NOTE — TELEPHONE ENCOUNTER
Found the referral and faxed it to the home care agency, [facility]

## 2021-08-13 NOTE — TELEPHONE ENCOUNTER
----- Message from Pilar Esposito sent at 8/12/2021  4:10 PM EDT -----  Subject: Message to Provider    QUESTIONS  Information for Provider? [place] with [address] was calling in on behalf   of pt. She was asking about an order For [address] which was   supposed to be done by the hospital but she is having issues locating the   order and would like for someone from the office to give her a call. Her   direct number is [phone].  ---------------------------------------------------------------------------  --------------  Jovan ROWAN  What is the best way for the office to contact you? OK to leave message on   voicemail  Preferred Call Back Phone Number? [phone]  ---------------------------------------------------------------------------  --------------  SCRIPT ANSWERS  Relationship to Patient? Third Party  Representative Name?  Zack

## 2021-08-14 DIAGNOSIS — M25.522 LEFT ELBOW PAIN: ICD-10-CM

## 2021-08-16 RX ORDER — ACETAMINOPHEN 500 MG
TABLET ORAL
Qty: 120 TABLET | Refills: 0 | Status: SHIPPED | OUTPATIENT
Start: 2021-08-16 | End: 2021-12-09 | Stop reason: SDUPTHER

## 2021-08-26 DIAGNOSIS — E78.5 HYPERLIPIDEMIA WITH TARGET LDL LESS THAN 100: ICD-10-CM

## 2021-08-26 RX ORDER — SIMVASTATIN 20 MG
20 TABLET ORAL NIGHTLY
Qty: 90 TABLET | Refills: 3 | Status: SHIPPED | OUTPATIENT
Start: 2021-08-26 | End: 2022-06-30

## 2021-08-26 RX ORDER — DOCUSATE SODIUM 100 MG/1
100 CAPSULE, LIQUID FILLED ORAL 2 TIMES DAILY
Qty: 60 CAPSULE | Refills: 0 | Status: SHIPPED | OUTPATIENT
Start: 2021-08-26 | End: 2021-09-20

## 2021-08-26 NOTE — TELEPHONE ENCOUNTER
Please Approve or Refuse.   Send to Pharmacy per Pt's Request:     Next Visit Date:  9/7/2021   Last Visit Date: 8/5/2021    Hemoglobin A1C (%)   Date Value   06/22/2021 5.2   05/09/2018 5.1   03/17/2016 5.4             ( goal A1C is < 7)   BP Readings from Last 3 Encounters:   08/05/21 100/64   06/26/21 (!) 134/91   06/23/21 (!) 154/94          (goal 120/80)  BUN   Date Value Ref Range Status   07/08/2021 9 7 - 25 mg/dL Final     CREATININE   Date Value Ref Range Status   07/08/2021 0.74 0.60 - 1.20 mg/dL Final     Potassium   Date Value Ref Range Status   07/08/2021 3.8 3.5 - 5.1 meq/L Final

## 2021-08-30 DIAGNOSIS — J44.9 CHRONIC OBSTRUCTIVE PULMONARY DISEASE, UNSPECIFIED COPD TYPE (HCC): ICD-10-CM

## 2021-08-30 RX ORDER — TIOTROPIUM BROMIDE INHALATION SPRAY 3.12 UG/1
SPRAY, METERED RESPIRATORY (INHALATION)
Qty: 4 G | Refills: 1 | Status: SHIPPED | OUTPATIENT
Start: 2021-08-30 | End: 2021-10-28

## 2021-09-07 ENCOUNTER — OFFICE VISIT (OUTPATIENT)
Dept: FAMILY MEDICINE CLINIC | Age: 66
End: 2021-09-07
Payer: COMMERCIAL

## 2021-09-07 VITALS
BODY MASS INDEX: 27.69 KG/M2 | HEART RATE: 81 BPM | WEIGHT: 166.2 LBS | OXYGEN SATURATION: 98 % | SYSTOLIC BLOOD PRESSURE: 122 MMHG | TEMPERATURE: 97.3 F | DIASTOLIC BLOOD PRESSURE: 78 MMHG | HEIGHT: 65 IN

## 2021-09-07 DIAGNOSIS — I26.99 PULMONARY EMBOLISM ON RIGHT (HCC): Primary | ICD-10-CM

## 2021-09-07 DIAGNOSIS — F03.90 MAJOR NEUROCOGNITIVE DISORDER (HCC): ICD-10-CM

## 2021-09-07 DIAGNOSIS — F31.70 BIPOLAR DISORDER IN PARTIAL REMISSION, MOST RECENT EPISODE UNSPECIFIED TYPE (HCC): ICD-10-CM

## 2021-09-07 DIAGNOSIS — I82.4Y1 ACUTE DEEP VEIN THROMBOSIS (DVT) OF PROXIMAL VEIN OF RIGHT LOWER EXTREMITY (HCC): ICD-10-CM

## 2021-09-07 DIAGNOSIS — J43.1 PANLOBULAR EMPHYSEMA (HCC): ICD-10-CM

## 2021-09-07 DIAGNOSIS — J96.11 CHRONIC RESPIRATORY FAILURE WITH HYPOXIA (HCC): ICD-10-CM

## 2021-09-07 DIAGNOSIS — E78.5 HYPERLIPIDEMIA WITH TARGET LDL LESS THAN 100: ICD-10-CM

## 2021-09-07 PROBLEM — R06.09 DOE (DYSPNEA ON EXERTION): Status: RESOLVED | Noted: 2021-06-16 | Resolved: 2021-09-07

## 2021-09-07 PROCEDURE — 90674 CCIIV4 VAC NO PRSV 0.5 ML IM: CPT | Performed by: FAMILY MEDICINE

## 2021-09-07 PROCEDURE — G8419 CALC BMI OUT NRM PARAM NOF/U: HCPCS | Performed by: FAMILY MEDICINE

## 2021-09-07 PROCEDURE — 1036F TOBACCO NON-USER: CPT | Performed by: FAMILY MEDICINE

## 2021-09-07 PROCEDURE — 90471 IMMUNIZATION ADMIN: CPT | Performed by: FAMILY MEDICINE

## 2021-09-07 PROCEDURE — G8427 DOCREV CUR MEDS BY ELIG CLIN: HCPCS | Performed by: FAMILY MEDICINE

## 2021-09-07 PROCEDURE — 99214 OFFICE O/P EST MOD 30 MIN: CPT | Performed by: FAMILY MEDICINE

## 2021-09-07 PROCEDURE — 3017F COLORECTAL CA SCREEN DOC REV: CPT | Performed by: FAMILY MEDICINE

## 2021-09-07 PROCEDURE — 3023F SPIROM DOC REV: CPT | Performed by: FAMILY MEDICINE

## 2021-09-07 RX ORDER — FLUTICASONE FUROATE AND VILANTEROL TRIFENATATE 100; 25 UG/1; UG/1
1 POWDER RESPIRATORY (INHALATION) DAILY
COMMUNITY
Start: 2021-08-13

## 2021-09-07 ASSESSMENT — ENCOUNTER SYMPTOMS
NAUSEA: 0
DIARRHEA: 0
VOMITING: 0
ABDOMINAL DISTENTION: 0
CONSTIPATION: 0
CHEST TIGHTNESS: 0
COUGH: 0
ABDOMINAL PAIN: 0
SHORTNESS OF BREATH: 1
WHEEZING: 1
BACK PAIN: 1
SINUS PAIN: 0
BLOOD IN STOOL: 0

## 2021-09-07 NOTE — PROGRESS NOTES
Visit Information    Have you changed or started any medications since your last visit including any over-the-counter medicines, vitamins, or herbal medicines? no   Are you having any side effects from any of your medications? -  no  Have you stopped taking any of your medications? Is so, why? -  no    Have you seen any other physician or provider since your last visit? Yes - Records Obtained  Have you had any other diagnostic tests since your last visit? No  Have you been seen in the emergency room and/or had an admission to a hospital since we last saw you? No  Have you had your routine dental cleaning in the past 6 months? no    Have you activated your [employer] account? If not, what are your barriers?  Yes     Patient Care Team:  Margret Magallanes MD as PCP - General (Family Medicine)  Margret Magallanes MD as PCP - [address] [place] [address]  [facility] Provider  Robert Genao DO as Consulting Physician (Obstetrics & Gynecology)  Belle Kwok MD as Consulting Physician (Pulmonology)  Shann Spurling, MD as Consulting Physician (Gastroenterology)  Junaid Rivas RN as Nurse Navigator  Tj Jessica MD as Consulting Physician (Neurology)    Medical History Review  Past Medical, Family, and Social History reviewed and does contribute to the patient presenting condition    Health Maintenance   Topic Date Due    COVID-19 Vaccine (3 - Moderna risk 3-dose series) 06/02/2021    Flu vaccine (1) 09/01/2021    Lipid screen  09/09/2021    Low dose CT lung screening  09/09/2021    Breast cancer screen  12/21/2022    Colon cancer screen colonoscopy  02/15/2023    Cervical cancer screen  04/13/2024    Diabetes screen  06/22/2024    Pneumococcal 0-64 years Vaccine (4 of 4 - PPSV23) 08/05/2026    DTaP/Tdap/Td vaccine (2 - Td or Tdap) 09/09/2026    Shingles Vaccine  Completed    Hepatitis C screen  Completed    HIV screen  Completed    Hepatitis A vaccine  Aged Out    Hepatitis B vaccine  Aged Out    Hib vaccine  Aged C/ De[address] A[address] 19 Meningococcal (ACWY) vaccine  Aged Out

## 2021-09-07 NOTE — PROGRESS NOTES
Chief Complaint   Patient presents with    COPD         Bj Saez  here today for follow up on chronic medical problems, go over labs and/or diagnostic studies, and medication refills. COPD      HPI: Patient is here for follow-up on chronic respiratory failure. Patient has emphysema is on home oxygen follows with pulmonologist.    Patient has diagnosed with pulmonary embolism with DVT on the right lower extremity, is on Eliquis has seen oncologist.  Patient reports her swelling has improved. She denies any pain, denies any side effects from Eliquis. She has repeat CT ordered by Dr. Bard Mccarthy. Respiratory failure is on home oxygen, breathing has improved. Daughter reports she is doing much better has home health nurse coming once in a week, she checks her blood pressure medications. Bipolar disorder with neurocognitive impairment, is on multiple medications is also with counselor. Patient [facility] with psychiatrist at [facility]. Symptoms has improved. Hyperlipidemia needs repeat lipid panel. Patient is also due for booster dose of Covid vaccine discussed with patient and daughter to schedule. /78   Pulse 81   Temp 97.3 °F (36.3 °C) (Temporal)   Ht 5' 5\" (1.651 m)   Wt 166 lb 3.2 oz (75.4 kg)   LMP 05/20/2013 (Exact Date)   SpO2 98% Comment: o2 @ 3L  BMI 27.66 kg/m²    Body mass index is 27.66 kg/m². Wt Readings from Last 3 Encounters:   09/07/21 166 lb 3.2 oz (75.4 kg)   08/05/21 160 lb (72.6 kg)   06/26/21 147 lb (66.7 kg)        [x]Negative depression screening. PHQ Scores 8/5/2021 6/22/2021 5/18/2021 4/13/2021 1/27/2020 10/8/2019 5/15/2019   PHQ2 Score 0 0 0 0 0 6 0   PHQ9 Score 0 0 0 0 0 13 0      []1-4 = Minimal depression   []5-9 = Milddepression   []10-14 = Moderate depression   []15-19 = Moderately severe depression   []20-27 = Severe depression    Discussed testing with the patient and all questions fully answered.     Orders Only on 07/08/2021   Component Date Value Ref Range Status    aPTT 07/08/2021 32.4  25.0 - 35.0 sec Final    Comment: ALL RESULTS MUST BE INTERPRETED WITH RESPECT TO BLOOD DRAWING ARTIFACT  OR DILUTION ERROR OF ANTICOAGULANT AT THE TIME OF SAMPLING. THE APTT SHOULD NOT BE USED TO MONITOR UNFRACTIONATED HEPARIN THERAPY,  AS THIS LABORATORY NO LONGER HAS AN ESTABLISHED THERAPEUTIC RANGE BASED  ON THE APTT. IT IS RECOMMENDED THAT THE [facility] - HEPARIN ASSAY (ANTI-XA  ACTIVITY) BE USED FOR THIS PURPOSE.      PT 07/08/2021 15.2* 12.3 - 14.8 sec Final    Comment: ALL RESULTS MUST BE INTERPRETED WITH RESPECT TO BLOOD DRAWING ARTIFACT  OR DILUTION ERROR OF ANTICOAGULANT AT THE TIME OF SAMPLING.  INR 07/08/2021 1.20* 0.91 - 1.16 Final    Comment: [facility] RECOMMENDED INR FOR WARFARIN THERAPY   ------------------------------------------------------------------------  -------  CONDITION                                  INR  PROPHYLAXIS OF VENOUS THROMBOSIS           2-3  (HIGH-RISK SURGERY)  TREATMENT OF VENOUS THROMBOSIS             2-3  TREATMENT OF PULMONARY EMBOLISM            2-3  PREVENTION OF SYSTEMIC EMBOLISM:           2-3      ACUTE MYOCARDIAL INFARCTION      TISSUE HEART VALVES      VALVULAR HEART DISEASE      ATRIAL FIBRILLATION      RECURRENT SYSTEMIC EMBOLISM  MECHANICAL HEART VALVE                   2.5-3.5  -------------------------------------------------  FROM: \"ORAL ANTICOAGULANTS. MECHANISM OF ACTION, CLINICAL  EFFECTIVENESS, AND OPTIMAL THERAPEUTIC RANGE. \"  CHEST  [phone].  Ethanol Lvl 07/08/2021 NONE DETECTED  mg/dL Final    Divide by 1000 to convert mg/dL to percent. Example: 100mg/dL = 0.1%.     Glucose 07/08/2021 127* 70 - 100 mg/dL Final    BUN 07/08/2021 9  7 - 25 mg/dL Final    CREATININE 07/08/2021 0.74  0.60 - 1.20 mg/dL Final    Sodium 07/08/2021 142  136 - 145 meq/L Final    Potassium 07/08/2021 3.8  3.5 - 5.1 meq/L Final    Chloride 07/08/2021 109* 98 - 107 meq/L Final    CO2 07/08/2021 29  21 - 31 meq/L Final    Calcium 07/08/2021 9.1  8.6 - 10.3 mg/dL Final    EGFR IF NonAfrican American 07/08/2021 >60  >60 ml/min/1.73sq m Final    eGFR  07/08/2021 >60  >60 ml/min/1.73sq m Final    Total Bilirubin 07/08/2021 0.3  0.3 - 1.0 mg/dL Final    Bilirubin, Direct 07/08/2021 0.1  0.0 - 0.2 mg/dL Final    Alkaline Phosphatase 07/08/2021 56  34 - 104 IU/L Final    AST 07/08/2021 20  13 - 39 IU/L Final    ALT 07/08/2021 15  7 - 52 IU/L Final    Total Protein 07/08/2021 5.7* 6.0 - 8.3 g/dL Final    Albumin 07/08/2021 3.6  3.5 - 5.7 g/dL Final    T4 Free 07/08/2021 1.41  0.71 - 1.85 ng/dL Final    TSH, 3RD GENERATION 07/08/2021 1.02  0.34 - 5.60 uIU/mL Final    T3, Free 07/08/2021 4.1* 2.5 - 3.9 pg/mL Final    SARS-COV-2, POC 07/08/2021 NEGATIVE  NEGATIVE Final    Comment: Negative results from patients with symptom onset beyond seven days,  should be treated presumptive and confirmation with a molecular assay,  if necessary, for patient management, may be performed. Negative results  do not rule out SARS-CoV-2 infection and should not be used as the sole  basis for treatment or patient management decisions, including infection  control decisions. Negative results should be considered in the context  of a patient?s recent exposures, history and the presence of clinical  signs and symptoms consistent with COVID-19. The BinaxNOW COVID-19 Ag Card is a lateral flow immunoassay intended for  the qualitative detection of nucleocapsid protein antigen from  SARS-CoV-2 in direct nasal swabs from individuals within the first seven  days of symptom onset. Testing is limited to laboratories certified  under the Clinical Laboratory Improvement Amendments of 1988 (CLIA), [place] ???986K, that meet the requirements to perform moderate, high or  waived complexity t                           ests.  This test is authorized for use at the [facility] ([facility]), i.e., in patient care settings operating under a CLIA  Certificate of Waiver, Certificate of Compliance, or Certificate of  Accreditation.       WBC 07/08/2021 2.39* 4.00 - 10.60 10*3/uL Final    RBC 07/08/2021 3.68* 3.80 - 5.00 10*6/uL Final    Hemoglobin 07/08/2021 11.6* 12.0 - 15.0 g/dL Final    Hematocrit 07/08/2021 37.5  36.0 - 45.0 % Final    MCV 07/08/2021 101.9* 82.0 - 98.0 fL Final    MCH 07/08/2021 31.5  27.0 - 33.0 pg Final    MCHC 07/08/2021 30.9* 32.0 - 35.0 g/dL Final    RDW 07/08/2021 13.0  11.5 - 15.0 % Final    Platelets 48/86/6509 215  150 - 400 10*3/uL Final    Neutrophils % 07/08/2021 67.8  40.0 - 72.0 % Final    Immature Granulocytes 07/08/2021 0.0  0.0 - 1.0 % Final    Lymphocytes % 07/08/2021 12.1* 20.0 - 45.0 % Final    Monocytes % 07/08/2021 15.1* 5.0 - 12.0 % Final    Eosinophils % 07/08/2021 4.2  0.0 - 6.0 % Final    Basophils % 07/08/2021 0.8  0.0 - 1.0 % Final    Neutrophils Absolute 07/08/2021 1.6  1.6 - 7.6 10*3/uL Final    Absolute Immature Granulocyte 07/08/2021 0.0  0.0 - 0.2 10*3/uL Final    Lymphocytes Absolute 07/08/2021 0.3* 1.2 - 4.0 10*3/uL Final    Monocytes Absolute 07/08/2021 0.4  0.1 - 1.0 10*3/uL Final    Eosinophils Absolute 07/08/2021 0.1  0.0 - 0.5 10*3/uL Final    Basophils Absolute 07/08/2021 0.0  0.0 - 0.2 10*3/uL Final    nRBC 07/08/2021 0  0 - 0 % Final    Color, UA 07/08/2021 YELLOW  YELLOW Final    Appearance 07/08/2021 CLOUDY* CLEAR Final    Specific Gravity, Urine 07/08/2021 1.005* 1.015 - 1.02 Final    pH 07/08/2021 8.0  5.0 - 8.0 Final    Protein, Urine 07/08/2021 NEGATIVE  NEGATIVE mg/dL Final    Glucose, UA 07/08/2021 NEGATIVE  NEGATIVE mg/dL Final    Ketones, Urine 07/08/2021 NEGATIVE  NEGATIVE mg/dL Final    Bilirubin Urine 07/08/2021 NEGATIVE  NEGATIVE Final    Blood, Urine 07/08/2021 NEGATIVE  NEGATIVE Final    Nitrite, Urine 07/08/2021 NEGATIVE  NEGATIVE Final    Leukocyte Esterase, Urine 07/08/2021 NEGATIVE  NEGATIVE Final    WBC, UA 07/08/2021 0-2* NONE SEEN /HPF Final    RBC 07/08/2021 0-2* NONE SEEN /HPF Final    Epithelial Cells, UA 07/08/2021 MANY* FEW,OCC,NO /LPF Final    Mucus Threads 07/08/2021 OCC* NONE SEEN Final    Mono Amphetamines 07/08/2021 NEGATIVE  NEGATIVE Final    Cocaine 07/08/2021 NEGATIVE  NEGATIVE Final    THC, 50ng Cutoff 07/08/2021 NEGATIVE  NEGATIVE Final    Opiates 07/08/2021 NEGATIVE  NEGATIVE Final    Phencyclidine 07/08/2021 NEGATIVE  NEGATIVE Final    Barbiturates 07/08/2021 POSITIVE* NEGATIVE Final    Benzodiazepines 07/08/2021 NEGATIVE  NEGATIVE Final    Propoxyphene 07/08/2021 NEGATIVE  NEGATIVE Final    Methadone 07/08/2021 NEGATIVE  NEGATIVE Final    Tricyclic 86/45/3815 NEGATIVE  NEGATIVE Final         Most recent labs reviewed:     Lab Results   Component Value Date    WBC 2.39 (L) 07/08/2021    HGB 11.6 (L) 07/08/2021    HCT 37.5 07/08/2021    .9 (H) 07/08/2021     07/08/2021       @BRIEFLAB(NA,K,CL,CO2,BUN,CREATININE,GLUCOSE,CALCIUM)@     Lab Results   Component Value Date    ALT 15 07/08/2021    AST 20 07/08/2021    ALKPHOS 56 07/08/2021    BILITOT 0.3 07/08/2021       Lab Results   Component Value Date    TSH 1.94 09/09/2020       Lab Results   Component Value Date    CHOL 161 02/22/2019    CHOL 152 11/19/2017    CHOL 182 03/17/2016     Lab Results   Component Value Date    TRIG 79 02/22/2019    TRIG 79 11/19/2017    TRIG 91 03/17/2016     Lab Results   Component Value Date    HDL 77 09/09/2020    HDL 69 02/22/2019    HDL 65 11/19/2017     Lab Results   Component Value Date    LDLCHOLESTEROL 72 09/09/2020    LDLCHOLESTEROL 76 02/22/2019    LDLCHOLESTEROL 71 11/19/2017     Lab Results   Component Value Date    VLDL NOT REPORTED 09/09/2020    VLDL NOT REPORTED 02/22/2019    VLDL NOT REPORTED 11/19/2017     Lab Results   Component Value Date    CHOLHDLRATIO 2.1 09/09/2020    CHOLHDLRATIO 2.3 02/22/2019    CHOLHDLRATIO 2.3 11/19/2017       Lab Results   Component Value Date    LABA1C 5.2 06/22/2021       Lab Results   Component Value Date    DCSMZOEG41 1053 03/08/2019       Lab Results   Component Value Date    FOLATE 8.3 03/08/2019       Lab Results   Component Value Date    IRON 56 03/08/2019    TIBC 288 03/08/2019    FERRITIN 160 (H) 03/08/2019       Lab Results   Component Value Date    VITD25 14.4 (L) 03/17/2016             Current Outpatient Medications   Medication Sig Dispense Refill    BREO ELLIPTA 100-25 MCG/INH AEPB inhaler inhale 1 puff INTO THE LUNGS once daily      SPIRIVA RESPIMAT 2.5 MCG/ACT AERS inhaler inhale 2 puffs INTO THE LUNGS once daily 4 g 1    simvastatin (ZOCOR) 20 MG tablet Take 1 tablet by mouth nightly 90 tablet 3    docusate sodium (COLACE) 100 MG capsule Take 1 capsule by mouth 2 times daily 60 capsule 0    Calcium 250 MG CAPS Take 250 mg by mouth daily 30 capsule 0    acetaminophen (ACETAMINOPHEN EXTRA STRENGTH) 500 MG tablet take 2 tablets by mouth every 6 hours AS NEEDED FOR PAIN 120 tablet 0    cetirizine (ZYRTEC) 10 MG tablet Take 10 mg by mouth daily      fluticasone (FLONASE) 50 MCG/ACT nasal spray 2 sprays by Nasal route daily 1 Bottle 3    lidocaine (LMX) 4 % cream Apply topically  q 8 hrs  times/day as needed. 45 g 3    rivastigmine (EXELON) 4.5 MG capsule Take 1 capsule by mouth 2 times daily 60 capsule 3    risperiDONE (RISPERDAL) 0.5 MG tablet Take 3 tablets by mouth every 12 hours Per [facility] psych 60 tablet 0    traZODone (DESYREL) 50 MG tablet 50 mg nightly       vitamin D (ERGOCALCIFEROL) 1.25 MG (71321 UT) CAPS capsule take 1 capsule by mouth every week 4 capsule 3    apixaban (ELIQUIS) 5 MG TABS tablet Take 1 tablet by mouth 2 times daily 360 tablet 0    ibandronate (BONIVA) 150 MG tablet Take 1 tablet by mouth every 30 days Take one (1) tablet once per month in the morning with a full glass of water, on an empty stomach, and do not take anything else by mouth or lie down for the next 30 minutes.  30 tablet 3    albuterol (PROVENTIL) (2.5 MG/3ML) 0.083% nebulizer solution Take 3 mLs by nebulization 4 times daily 120 each 3     No current facility-administered medications for this visit. Social History     Socioeconomic History    Marital status:      Spouse name: Not on file    Number of children: Not on file    Years of education: Not on file    Highest education level: Not on file   Occupational History    Not on file   Tobacco Use    Smoking status: Former Smoker     Packs/day: 2.00     Years: 42.00     Pack years: 84.00     Types: Cigarettes     Quit date: 2018     Years since quittin.8    Smokeless tobacco: Never Used   Substance and Sexual Activity    Alcohol use: Yes     Comment: yearly    Drug use: No    Sexual activity: Not Currently     Partners: Male     Birth control/protection: Post-menopausal   Other Topics Concern    Not on file   Social History Narrative    Not on file     Social Determinants of Health     Financial Resource Strain: High Risk    Difficulty of Paying Living Expenses: Very hard   Food Insecurity: No Food Insecurity    Worried About Running Out of Food in the Last Year: Never true    Agustin of Food in the Last Year: Never true   Transportation Needs:     Lack of Transportation (Medical):      Lack of Transportation (Non-Medical):    Physical Activity:     Days of Exercise per Week:     Minutes of Exercise per Session:    Stress:     Feeling of Stress :    Social Connections:     Frequency of Communication with Friends and Family:     Frequency of Social Gatherings with Friends and Family:     Attends [employer] Services:     Active Member of Clubs or Organizations:     Attends Club or Organization Meetings:     Marital Status:    Intimate Partner Violence:     Fear of Current or Ex-Partner:     Emotionally Abused:     Physically Abused:     Sexually Abused:      Counseling given: Yes        Family History   Problem Relation Age of Onset    Dementia Maternal Aunt     Kidney Disease Mother     Heart Attack Sister     Prostate Cancer Father     High Cholesterol Brother     Heart Attack Paternal Grandmother              -rest of complaints with corresponding details per ROS    The patient's past medical, surgical, social, and family history as well as her current medications and allergies were reviewed as documented intoday's encounter. Review of Systems   Constitutional: Positive for activity change. Negative for appetite change, diaphoresis, fatigue and unexpected weight change. HENT: Negative for congestion, nosebleeds, postnasal drip and sinus pain. Eyes: Negative for visual disturbance. Respiratory: Positive for shortness of breath and wheezing. Negative for cough and chest tightness. Cardiovascular: Positive for leg swelling. Negative for chest pain and palpitations. Gastrointestinal: Negative for abdominal distention, abdominal pain, blood in stool, constipation, diarrhea, nausea and vomiting. Endocrine: Negative for polyuria. Genitourinary: Negative for decreased urine volume, difficulty urinating, dyspareunia, flank pain, frequency, hematuria and vaginal pain. Musculoskeletal: Positive for arthralgias and back pain. Negative for gait problem, joint swelling and neck pain. Neurological: Positive for numbness. Negative for dizziness, syncope, speech difficulty, weakness, light-headedness and headaches. Psychiatric/Behavioral: Positive for dysphoric mood and sleep disturbance. Negative for agitation, behavioral problems, confusion, decreased concentration, hallucinations and suicidal ideas. The patient is nervous/anxious. The patient is not hyperactive. Physical Exam  Vitals and nursing note reviewed. Constitutional:       Appearance: Normal appearance. HENT:      Head: Normocephalic and atraumatic. Nose: Nose normal.   Eyes:      Extraocular Movements: Extraocular movements intact.       Pupils: Pupils are equal, round, and reactive to light. Cardiovascular:      Rate and Rhythm: Normal rate and regular rhythm. Heart sounds: Normal heart sounds. Pulmonary:      Effort: Pulmonary effort is normal. No bradypnea. Breath sounds: Decreased air movement present. Examination of the right-middle field reveals wheezing. Examination of the left-middle field reveals wheezing. Examination of the right-lower field reveals wheezing. Examination of the left-lower field reveals wheezing. Decreased breath sounds and wheezing present. No rhonchi or rales. Abdominal:      General: Bowel sounds are normal.      Palpations: Abdomen is soft. Tenderness: There is no abdominal tenderness. Musculoskeletal:      Lumbar back: Spasms present. No deformity, tenderness or bony tenderness. Decreased range of motion. Right lower leg: No tenderness. 1+ Edema present. Left lower leg: No swelling or tenderness. No edema. Lymphadenopathy:      Cervical: No cervical adenopathy. Skin:     General: Skin is warm. Findings: No rash. Neurological:      Mental Status: She is alert and oriented to person, place, and time. Cranial Nerves: Cranial nerves are intact. No cranial nerve deficit. Motor: Weakness present. No tremor. Gait: Gait abnormal.   Psychiatric:         Attention and Perception: Attention normal.         Mood and Affect: Affect normal. Mood is anxious. Affect is not angry or tearful. Speech: She is communicative. Speech is not rapid and pressured. Behavior: Behavior normal. Behavior is not agitated, slowed or aggressive. Thought Content: Thought content is not paranoid. ASSESSMENT AND PLAN      1. Pulmonary embolism on right [facility])  On Eliquis has repeat CT ordered by pulmonologist.  - CBC Auto Differential; Future  - Comprehensive Metabolic Panel; Future    2. Chronic respiratory failure with hypoxia (HCC)  On home oxygen, continue oxygen continue inhalers.     3. Acute answered. Patient voiced understanding. Quality Measures    Body mass index is 27.66 kg/m². Elevated. Weight control planned discussed daily exercise regimen and Healthy diet and regular exercise. BP: 122/78. Blood pressure is normal. Treatment plan consists of No treatment change needed. No flowsheet data found. The patient does not have a history of falls. I did , complete a risk assessment for falls. A plan of care for falls in-office gait and balance testing performed using The Timed Up and Go Test was US[employer] W[employer], home safety tips provided, No Treatment plan indicated    Lab Results   Component Value Date    LDLCHOLESTEROL 72 09/09/2020    (goal LDL reduction with dx if diabetes is 50% LDL reduction)    PHQ Scores 8/5/2021 6/22/2021 5/18/2021 4/13/2021 1/27/2020 10/8/2019 5/15/2019   PHQ2 Score 0 0 0 0 0 6 0   PHQ9 Score 0 0 0 0 0 13 0     Interpretation of Total Score Depression Severity: 1-4 = Minimal depression, 5-9 = Mild depression, 10-14 = Moderate depression, 15-19 = Moderately severe depression, 20-27 = Severe depression      The patient'spast medical, surgical, social, and family history as well as her   current medications and allergies were reviewed as documented in today's encounter. Medications, labs, diagnostic studies, consultations andfollow-up as documented in this encounter. Return in about 3 months (around 12/7/2021). Patient wasseen with total face to face time of 30 minutes. More than 50% of this visit was counseling and education. Future Appointments   Date Time Provider Percy Rodriguez   9/15/2021 11:15 AM Shari Morin MD [address] [place] [address]   12/8/2021 10:15 AM Thomas Parker MD fp renetta Arguello     This note was completed by using the assistance of a speech-recognition program. However, inadvertent computerized transcription errors may be present.  Althoughevery effort was made to ensure accuracy, no guarantees can be provided that every mistake

## 2021-09-20 RX ORDER — DOCUSATE SODIUM 100 MG/1
CAPSULE, LIQUID FILLED ORAL
Qty: 60 CAPSULE | Refills: 0 | Status: SHIPPED | OUTPATIENT
Start: 2021-09-20 | End: 2021-10-20

## 2021-09-22 ENCOUNTER — HOSPITAL ENCOUNTER (OUTPATIENT)
Age: 66
Discharge: HOME | End: 2021-09-22
Payer: COMMERCIAL

## 2021-09-22 ENCOUNTER — TELEPHONE (OUTPATIENT)
Dept: ONCOLOGY | Age: 66
End: 2021-09-22

## 2021-09-22 ENCOUNTER — OFFICE VISIT (OUTPATIENT)
Dept: ONCOLOGY | Age: 66
End: 2021-09-22
Payer: COMMERCIAL

## 2021-09-22 VITALS
TEMPERATURE: 96.1 F | WEIGHT: 173 LBS | SYSTOLIC BLOOD PRESSURE: 93 MMHG | DIASTOLIC BLOOD PRESSURE: 62 MMHG | BODY MASS INDEX: 28.79 KG/M2 | HEART RATE: 68 BPM

## 2021-09-22 DIAGNOSIS — D64.9 ANEMIA, UNSPECIFIED TYPE: ICD-10-CM

## 2021-09-22 DIAGNOSIS — I82.4Y1 ACUTE DEEP VEIN THROMBOSIS (DVT) OF PROXIMAL VEIN OF RIGHT LOWER EXTREMITY (HCC): ICD-10-CM

## 2021-09-22 DIAGNOSIS — Z87.891 PERSONAL HISTORY OF NICOTINE DEPENDENCE: ICD-10-CM

## 2021-09-22 DIAGNOSIS — I26.99 PULMONARY EMBOLISM ON RIGHT (HCC): ICD-10-CM

## 2021-09-22 DIAGNOSIS — I26.99 PULMONARY EMBOLISM ON RIGHT (HCC): Primary | ICD-10-CM

## 2021-09-22 DIAGNOSIS — J41.8 MIXED SIMPLE AND MUCOPURULENT CHRONIC BRONCHITIS (HCC): ICD-10-CM

## 2021-09-22 LAB
ABSOLUTE EOS #: 0.2 K/UL (ref 0–0.4)
ABSOLUTE IMMATURE GRANULOCYTE: ABNORMAL K/UL (ref 0–0.3)
ABSOLUTE LYMPH #: 0.6 K/UL (ref 1–4.8)
ABSOLUTE MONO #: 0.7 K/UL (ref 0.1–1.2)
BASOPHILS # BLD: 1 % (ref 0–2)
BASOPHILS ABSOLUTE: 0 K/UL (ref 0–0.2)
DIFFERENTIAL TYPE: ABNORMAL
EOSINOPHILS RELATIVE PERCENT: 5 % (ref 1–4)
HCT VFR BLD CALC: 35.1 % (ref 36–46)
HEMOGLOBIN: 11.5 G/DL (ref 12–16)
IMMATURE GRANULOCYTES: ABNORMAL %
LYMPHOCYTES # BLD: 16 % (ref 24–44)
MCH RBC QN AUTO: 30.9 PG (ref 26–34)
MCHC RBC AUTO-ENTMCNC: 32.8 G/DL (ref 31–37)
MCV RBC AUTO: 94.4 FL (ref 80–100)
MONOCYTES # BLD: 19 % (ref 2–11)
NRBC AUTOMATED: ABNORMAL PER 100 WBC
PDW BLD-RTO: 14.4 % (ref 12.5–15.4)
PLATELET # BLD: 240 K/UL (ref 140–450)
PLATELET ESTIMATE: ABNORMAL
PMV BLD AUTO: 7.2 FL (ref 6–12)
RBC # BLD: 3.72 M/UL (ref 4–5.2)
RBC # BLD: ABNORMAL 10*6/UL
SEG NEUTROPHILS: 59 % (ref 36–66)
SEGMENTED NEUTROPHILS ABSOLUTE COUNT: 2.1 K/UL (ref 1.8–7.7)
WBC # BLD: 3.5 K/UL (ref 3.5–11)
WBC # BLD: ABNORMAL 10*3/UL

## 2021-09-22 PROCEDURE — 36415 COLL VENOUS BLD VENIPUNCTURE: CPT

## 2021-09-22 PROCEDURE — G8419 CALC BMI OUT NRM PARAM NOF/U: HCPCS | Performed by: INTERNAL MEDICINE

## 2021-09-22 PROCEDURE — 99214 OFFICE O/P EST MOD 30 MIN: CPT | Performed by: INTERNAL MEDICINE

## 2021-09-22 PROCEDURE — 1036F TOBACCO NON-USER: CPT | Performed by: INTERNAL MEDICINE

## 2021-09-22 PROCEDURE — 99211 OFF/OP EST MAY X REQ PHY/QHP: CPT | Performed by: INTERNAL MEDICINE

## 2021-09-22 PROCEDURE — 85025 COMPLETE CBC W/AUTO DIFF WBC: CPT

## 2021-09-22 PROCEDURE — 3017F COLORECTAL CA SCREEN DOC REV: CPT | Performed by: INTERNAL MEDICINE

## 2021-09-22 PROCEDURE — G8427 DOCREV CUR MEDS BY ELIG CLIN: HCPCS | Performed by: INTERNAL MEDICINE

## 2021-09-22 RX ORDER — BIOTIN 10 MG
10 TABLET ORAL DAILY
COMMUNITY
End: 2022-04-01

## 2021-09-22 RX ORDER — UBIDECARENONE 75 MG
50 CAPSULE ORAL DAILY
COMMUNITY
End: 2022-06-30

## 2021-09-22 NOTE — PROGRESS NOTES
Flaco Chao                                                                                                                  9/22/2021  MRN:   [id number]  YOB: 1955  PCP:                           Joelle Calvo MD  Referring Physician: No ref. provider found  Treating Physician Name: Olaf Jhaveri MD      Reason for visit:  Chief Complaint   Patient presents with    Follow-up     review status of disease    Discuss Labs    Other     questions on the blood clots        Current problems:  DVT, PE, 06/2021    Active and recent treatments:  Eliquis    Summary of Case/History:    Flaco Chao a 61 y. o.female is a  female who is admitted to the hospital for Admitted to hospital with chief complains of shortness of breath. Patient started noticing shortness of breath a week ago. She does have baseline COPD on home O2. Patient has significant history of tobacco dependence. Patient also noted swelling of her right leg. Patient shortness of breath worsened and she presented to the ER. CT chest done shows a large right pulmonary embolism in the main stem pulmonary artery and also in the right upper lobe. Patient continues to smoke cigarettes. Patient has been started on Lovenox. She has been transitioned to Eliquis. Her history includes menorrhagia and hormone birth control premenopausal.   She was fully vaccinated for COVID 05/05/2021. Interim History:    Patient presents to the clinic for a follow-up visit and will discuss results of her lab work-up and other relevant clinical data. Patient testing for inherited thrombophilia including factor V Leyden mutation and prothrombin gene mutation came back negative. Patient continues to be on Eliquis without any patient. Patient continues to present. Patient has not quit smoking. Patient is now living with her daughter. She is trying to be more.     During this visit patient's allergy, social, medical, surgical history and medications were reviewed and updated.     Past Medical History:   Past Medical History:   Diagnosis Date    Abnormal EKG     Anxiety     Asthma     Bipolar disorder ([address]     [place] , [place]    COPD (chronic obstructive pulmonary disease) (HCC)     Cramps, extremity     SEVERE LEG CRAMPS    Depression     Dilated bile duct     Headache     History of elective      Hyperlipidemia     Irritable bowel syndrome     Prolonged emergence from general anesthesia     SEVERE ISSUES WAKING UP    Substance abuse ([address]     street drugs when younger   [employer] Unspecified sleep apnea     Vision abnormalities     glasses       Past Surgical History:     Past Surgical History:   Procedure Laterality Date    ANKLE SURGERY      HAD SCREWS AND HARDWARE, THEN REMOVED    COLONOSCOPY  02/15/2018    tubular adenoma    EYE SURGERY Bilateral     CATARACTS    HYSTEROSCOPY  10/19/2016    D & C    INDUCED       LASIK      bilateral    TONSILLECTOMY AND ADENOIDECTOMY Bilateral     VULVA SURGERY      HAD A BIOPSY AND REMOVAL OF       Patient Family Social History:     Social History     Socioeconomic History    Marital status:      Spouse name: None    Number of children: None    Years of education: None    Highest education level: None   Occupational History    None   Tobacco Use    Smoking status: Former Smoker     Packs/day: 2.00     Years: 42.00     Pack years: 84.00     Types: Cigarettes     Quit date: 2018     Years since quittin.8    Smokeless tobacco: Never Used   Substance and Sexual Activity    Alcohol use: Yes     Comment: yearly    Drug use: No    Sexual activity: Not Currently     Partners: Male     Birth control/protection: Post-menopausal   Other Topics Concern    None   Social History Narrative    None     Social Determinants of Health     Financial Resource Strain: High Risk    Difficulty of Paying Living Expenses: Very hard   Food Insecurity: No Food lidocaine (LMX) 4 % cream Apply topically  q 8 hrs  times/day as needed. 45 g 3    rivastigmine (EXELON) 4.5 MG capsule Take 1 capsule by mouth 2 times daily 60 capsule 3    risperiDONE (RISPERDAL) 0.5 MG tablet Take 3 tablets by mouth every 12 hours Per [facility] psych 60 tablet 0    traZODone (DESYREL) 50 MG tablet 50 mg nightly       vitamin D (ERGOCALCIFEROL) 1.25 MG (15344 UT) CAPS capsule take 1 capsule by mouth every week 4 capsule 3    apixaban (ELIQUIS) 5 MG TABS tablet Take 1 tablet by mouth 2 times daily 360 tablet 0    albuterol (PROVENTIL) (2.5 MG/3ML) 0.083% nebulizer solution Take 3 mLs by nebulization 4 times daily 120 each 3    ibandronate (BONIVA) 150 MG tablet Take 1 tablet by mouth every 30 days Take one (1) tablet once per month in the morning with a full glass of water, on an empty stomach, and do not take anything else by mouth or lie down for the next 30 minutes. (Patient not taking: Reported on 9/22/2021) 30 tablet 3     No current facility-administered medications for this visit. Allergies:   Advil [ibuprofen], Aleve [naproxen], Antipyrine, Celecoxib, Codeine, Fomepizole, Incruse ellipta [umeclidinium bromide], Other, Rofecoxib, Salicylates, Strawberry extract, Sulfinpyrazone, Aspirin, and Nsaids    Review of Systems:    Constitutional: No fever or chills. No night sweats, no weight loss   Eyes: No eye discharge, double vision, or eye pain   HEENT: negative for sore mouth, sore throat, hoarseness and voice change   Respiratory: negative for cough , sputum, dyspnea, wheezing, hemoptysis, chest pain   Cardiovascular: negative for chest pain, dyspnea, palpitations, orthopnea, PND   Gastrointestinal: negative for nausea, vomiting, diarrhea, constipation, abdominal pain, Dysphagia, hematemesis and hematochezia   Genitourinary: negative for frequency, dysuria, nocturia, urinary incontinence, and hematuria   Integument: negative for rash, skin lesions, bruises.    Hematologic/Lymphatic: negative for easy bruising, bleeding, lymphadenopathy, or petechiae   Endocrine: negative for heat or cold intolerance,weight changes, change in bowel habits and hair loss   Musculoskeletal: negative for myalgias, arthralgias, pain, joint swelling,and bone pain   Neurological: negative for headaches, dizziness, seizures, weakness, numbness        Physical Exam:    Vitals: BP 93/62   Pulse 68   Temp 96.1 °F (35.6 °C) (Temporal)   Wt 173 lb (78.5 kg)   LMP 05/20/2013 (Exact Date)   BMI 28.79 kg/m²   General appearance - well appearing, no in pain or distress  Mental status - AAO X3  Eyes - pupils equal and reactive, extraocular eye movements intact  Mouth - mucous membranes moist, pharynx normal without lesions  Neck - supple, no significant adenopathy  Lymphatics - no palpable lymphadenopathy, no hepatosplenomegaly  Chest - clear to auscultation, no wheezes, rales or rhonchi, symmetric air entry  Heart - normal rate, regular rhythm, normal S1, S2, no murmurs  Abdomen - soft, nontender, nondistended, no masses or organomegaly  Neurological - alert, oriented, normal speech, no focal findings or movement disorder noted  Extremities - peripheral pulses normal, no pedal edema, no clubbing or cyanosis  Skin - normal coloration and turgor, no rashes, no suspicious skin lesions noted       DATA:  Lab Results   Component Value Date    WBC 3.5 09/22/2021    HGB 11.5 (L) 09/22/2021    HCT 35.1 (L) 09/22/2021    MCV 94.4 09/22/2021     09/22/2021       Chemistry        Component Value Date/Time     07/08/2021 1152    K 3.8 07/08/2021 1152     (H) 07/08/2021 1152    CO2 29 07/08/2021 1152    BUN 9 07/08/2021 1152    CREATININE 0.74 07/08/2021 1152        Component Value Date/Time    CALCIUM 9.1 07/08/2021 1152    ALKPHOS 56 07/08/2021 1152    AST 20 07/08/2021 1152    ALT 15 07/08/2021 1152    BILITOT 0.3 07/08/2021 1152            Impression:  PE, DVT, provoked from smoking and sedentary lifestyle  Eliquis   HX smoking, quit  HX COPD, on oxygen    Plan:  Personally reviewed results of lab work-up and the relevant clinical data. Patient testing for factor V Leyden mutation and prothrombin gene mutation is negative. Patient does not have any prior history of venous thromboembolism. Denies any family history of venous thromboembolism. Clinical suspicion for inherited thrombophilia is low. Function testing was not ordered since patient is on Eliquis. Patient risk factors are thought to be smoking which has now quit. Further risk factor was thought to be sedentary lifestyle and she is working on getting more active now but has limitation due to COPD and overall health  Given extent of index event that is large clot burden we plan to anticoagulate patient at least for 6 months. Patient has been tolerating anticoagulation without any complications. We will see patient back in office in December that would be 6 months of anticoagulation with repeat D-dimer and assess patient for discontinuation of anticoagulation at that time  Patient also is mildly anemic. We will order work-up for anemia as well. Return in 3 months to review and discuss ongoing plan. Kaylah Cuadra MD    This note is created with the assistance of a speech recognition program.  While intending to generate a document that actually reflects the content of the visit, the document can still have some errors including those of syntax and sound a like substitutions which may escape proof reading. It such instances, actual meaning can be extrapolated by contextual diversion.

## 2021-09-22 NOTE — TELEPHONE ENCOUNTER
AVS from 9/22/21    rv in 3 months with labs 1 week prior    RV scheduled 12/22/21 @ 10:30am    PT will have labs drawn one week prior to return visit    PT was given lab orders, AVS and an appt schedule    Electronically signed by Emil Dowd on 9/22/2021 at 11:06 AM

## 2021-10-08 ENCOUNTER — HOSPITAL ENCOUNTER (OUTPATIENT)
Age: 66
Discharge: HOME | End: 2021-10-08
Payer: COMMERCIAL

## 2021-10-08 DIAGNOSIS — Z87.891 PERSONAL HISTORY OF NICOTINE DEPENDENCE: ICD-10-CM

## 2021-10-08 DIAGNOSIS — F03.90 MAJOR NEUROCOGNITIVE DISORDER (HCC): ICD-10-CM

## 2021-10-08 DIAGNOSIS — D64.9 ANEMIA, UNSPECIFIED TYPE: ICD-10-CM

## 2021-10-08 DIAGNOSIS — I82.4Y1 ACUTE DEEP VEIN THROMBOSIS (DVT) OF PROXIMAL VEIN OF RIGHT LOWER EXTREMITY (HCC): ICD-10-CM

## 2021-10-08 DIAGNOSIS — E78.5 HYPERLIPIDEMIA WITH TARGET LDL LESS THAN 100: ICD-10-CM

## 2021-10-08 DIAGNOSIS — F31.70 BIPOLAR DISORDER IN PARTIAL REMISSION, MOST RECENT EPISODE UNSPECIFIED TYPE (HCC): ICD-10-CM

## 2021-10-08 DIAGNOSIS — I26.99 PULMONARY EMBOLISM ON RIGHT (HCC): ICD-10-CM

## 2021-10-08 LAB
ABSOLUTE EOS #: 0.21 K/UL (ref 0–0.4)
ABSOLUTE IMMATURE GRANULOCYTE: ABNORMAL K/UL (ref 0–0.3)
ABSOLUTE LYMPH #: 0.57 K/UL (ref 1–4.8)
ABSOLUTE MONO #: 0.39 K/UL (ref 0.1–1.3)
ALBUMIN SERPL-MCNC: 3.9 G/DL (ref 3.5–5.2)
ALBUMIN/GLOBULIN RATIO: ABNORMAL (ref 1–2.5)
ALP BLD-CCNC: 81 U/L (ref 35–104)
ALT SERPL-CCNC: 10 U/L (ref 5–33)
ANION GAP SERPL CALCULATED.3IONS-SCNC: 5 MMOL/L (ref 9–17)
AST SERPL-CCNC: 16 U/L
BASOPHILS # BLD: 2 % (ref 0–2)
BASOPHILS ABSOLUTE: 0.05 K/UL (ref 0–0.2)
BILIRUB SERPL-MCNC: 0.26 MG/DL (ref 0.3–1.2)
BUN BLDV-MCNC: 13 MG/DL (ref 8–23)
BUN/CREAT BLD: ABNORMAL (ref 9–20)
CALCIUM SERPL-MCNC: 9.1 MG/DL (ref 8.6–10.4)
CHLORIDE BLD-SCNC: 101 MMOL/L (ref 98–107)
CHOLESTEROL/HDL RATIO: 2.3
CHOLESTEROL: 170 MG/DL
CO2: 34 MMOL/L (ref 20–31)
CREAT SERPL-MCNC: 0.59 MG/DL (ref 0.5–0.9)
D-DIMER QUANTITATIVE: 1.18 MG/L FEU (ref 0–0.59)
DIFFERENTIAL TYPE: ABNORMAL
EOSINOPHILS RELATIVE PERCENT: 8 % (ref 0–4)
FERRITIN: 55 UG/L (ref 13–150)
FOLATE: 15.7 NG/ML
GFR AFRICAN AMERICAN: >60 ML/MIN
GFR NON-AFRICAN AMERICAN: >60 ML/MIN
GFR SERPL CREATININE-BSD FRML MDRD: ABNORMAL ML/MIN/{1.73_M2}
GFR SERPL CREATININE-BSD FRML MDRD: ABNORMAL ML/MIN/{1.73_M2}
GLUCOSE BLD-MCNC: 87 MG/DL (ref 70–99)
HCT VFR BLD CALC: 36.1 % (ref 36–46)
HDLC SERPL-MCNC: 75 MG/DL
HEMOGLOBIN: 11.7 G/DL (ref 12–16)
IMMATURE GRANULOCYTES: ABNORMAL %
IRON SATURATION: 21 % (ref 20–55)
IRON: 74 UG/DL (ref 37–145)
LDL CHOLESTEROL: 81 MG/DL (ref 0–130)
LYMPHOCYTES # BLD: 22 % (ref 24–44)
MCH RBC QN AUTO: 30.8 PG (ref 26–34)
MCHC RBC AUTO-ENTMCNC: 32.4 G/DL (ref 31–37)
MCV RBC AUTO: 95.1 FL (ref 80–100)
MONOCYTES # BLD: 15 % (ref 1–7)
MORPHOLOGY: ABNORMAL
NRBC AUTOMATED: ABNORMAL PER 100 WBC
PDW BLD-RTO: 14.3 % (ref 11.5–14.9)
PLATELET # BLD: 237 K/UL (ref 150–450)
PLATELET ESTIMATE: ABNORMAL
PMV BLD AUTO: 7.7 FL (ref 6–12)
POTASSIUM SERPL-SCNC: 4.5 MMOL/L (ref 3.7–5.3)
RBC # BLD: 3.8 M/UL (ref 4–5.2)
RBC # BLD: ABNORMAL 10*6/UL
SEG NEUTROPHILS: 53 % (ref 36–66)
SEGMENTED NEUTROPHILS ABSOLUTE COUNT: 1.38 K/UL (ref 1.3–9.1)
SODIUM BLD-SCNC: 140 MMOL/L (ref 135–144)
TOTAL IRON BINDING CAPACITY: 350 UG/DL (ref 250–450)
TOTAL PROTEIN: 6.8 G/DL (ref 6.4–8.3)
TRIGL SERPL-MCNC: 69 MG/DL
TSH SERPL DL<=0.05 MIU/L-ACNC: 3.29 MIU/L (ref 0.3–5)
UNSATURATED IRON BINDING CAPACITY: 276 UG/DL (ref 112–347)
VITAMIN B-12: 1591 PG/ML (ref 232–1245)
VLDLC SERPL CALC-MCNC: NORMAL MG/DL (ref 1–30)
WBC # BLD: 2.6 K/UL (ref 3.5–11)
WBC # BLD: ABNORMAL 10*3/UL

## 2021-10-08 PROCEDURE — 85379 FIBRIN DEGRADATION QUANT: CPT

## 2021-10-08 PROCEDURE — 83550 IRON BINDING TEST: CPT

## 2021-10-08 PROCEDURE — 82746 ASSAY OF FOLIC ACID SERUM: CPT

## 2021-10-08 PROCEDURE — 82728 ASSAY OF FERRITIN: CPT

## 2021-10-08 PROCEDURE — 80053 COMPREHEN METABOLIC PANEL: CPT

## 2021-10-08 PROCEDURE — 85025 COMPLETE CBC W/AUTO DIFF WBC: CPT

## 2021-10-08 PROCEDURE — 83540 ASSAY OF IRON: CPT

## 2021-10-08 PROCEDURE — 80061 LIPID PANEL: CPT

## 2021-10-08 PROCEDURE — 82607 VITAMIN B-12: CPT

## 2021-10-08 PROCEDURE — 84443 ASSAY THYROID STIM HORMONE: CPT

## 2021-10-08 PROCEDURE — 36415 COLL VENOUS BLD VENIPUNCTURE: CPT

## 2021-10-15 ENCOUNTER — HOSPITAL ENCOUNTER (OUTPATIENT)
Dept: CT IMAGING | Age: 66
Discharge: HOME | End: 2021-10-17
Payer: COMMERCIAL

## 2021-10-15 DIAGNOSIS — R91.1 LUNG NODULE: ICD-10-CM

## 2021-10-15 PROCEDURE — 71250 CT THORAX DX C-: CPT

## 2021-10-20 ENCOUNTER — TELEPHONE (OUTPATIENT)
Dept: FAMILY MEDICINE CLINIC | Age: 66
End: 2021-10-20

## 2021-10-20 RX ORDER — DOCUSATE SODIUM 100 MG/1
CAPSULE, LIQUID FILLED ORAL
Qty: 60 CAPSULE | Refills: 0 | Status: SHIPPED | OUTPATIENT
Start: 2021-10-20 | End: 2021-11-15

## 2021-10-28 DIAGNOSIS — J44.9 CHRONIC OBSTRUCTIVE PULMONARY DISEASE, UNSPECIFIED COPD TYPE (HCC): ICD-10-CM

## 2021-10-28 RX ORDER — TIOTROPIUM BROMIDE INHALATION SPRAY 3.12 UG/1
SPRAY, METERED RESPIRATORY (INHALATION)
Qty: 4 G | Refills: 1 | Status: SHIPPED | OUTPATIENT
Start: 2021-10-28 | End: 2021-12-22

## 2021-11-07 DIAGNOSIS — E55.9 VITAMIN D DEFICIENCY: ICD-10-CM

## 2021-11-07 NOTE — TELEPHONE ENCOUNTER
Please Approve or Refuse.   Send to Pharmacy per Pt's Request:      Next Visit Date:  12/8/2021   Last Visit Date: 9/7/2021    Hemoglobin A1C (%)   Date Value   06/22/2021 5.2   05/09/2018 5.1   03/17/2016 5.4             ( goal A1C is < 7)   BP Readings from Last 3 Encounters:   09/22/21 93/62   09/07/21 122/78   08/05/21 100/64          (goal 120/80)  BUN   Date Value Ref Range Status   10/08/2021 13 8 - 23 mg/dL Final     CREATININE   Date Value Ref Range Status   10/08/2021 0.59 0.50 - 0.90 mg/dL Final     Potassium   Date Value Ref Range Status   10/08/2021 4.5 3.7 - 5.3 mmol/L Final

## 2021-11-08 RX ORDER — ERGOCALCIFEROL 1.25 MG/1
CAPSULE ORAL
Qty: 4 CAPSULE | Refills: 3 | Status: SHIPPED | OUTPATIENT
Start: 2021-11-08 | End: 2022-02-28

## 2021-11-15 RX ORDER — DOCUSATE SODIUM 100 MG/1
CAPSULE, LIQUID FILLED ORAL
Qty: 60 CAPSULE | Refills: 0 | Status: SHIPPED | OUTPATIENT
Start: 2021-11-15 | End: 2021-12-13

## 2021-11-24 ENCOUNTER — TELEPHONE (OUTPATIENT)
Dept: FAMILY MEDICINE CLINIC | Age: 66
End: 2021-11-24

## 2021-11-24 NOTE — TELEPHONE ENCOUNTER
Pt called and stated that something was going on with the patient and she is having some swelling issues, the patient reported that she cannot fit into her clothes. Her O2 concentrator is set at 4 and while on o2 her pulse ox is 95. She was super SOB and winded when she got up to answer phone for Nurse and the nurse said feet were slightly swollen and the patient says it was worse when she woke up. Patient is alert and oriented.

## 2021-12-06 DIAGNOSIS — F03.90 MAJOR NEUROCOGNITIVE DISORDER (HCC): ICD-10-CM

## 2021-12-06 RX ORDER — RIVASTIGMINE TARTRATE 4.5 MG/1
CAPSULE ORAL
Qty: 60 CAPSULE | Refills: 3 | Status: SHIPPED | OUTPATIENT
Start: 2021-12-06 | End: 2022-03-29

## 2021-12-09 ENCOUNTER — OFFICE VISIT (OUTPATIENT)
Dept: FAMILY MEDICINE CLINIC | Age: 66
End: 2021-12-09
Payer: COMMERCIAL

## 2021-12-09 VITALS
WEIGHT: 194 LBS | DIASTOLIC BLOOD PRESSURE: 74 MMHG | BODY MASS INDEX: 35.7 KG/M2 | OXYGEN SATURATION: 94 % | TEMPERATURE: 98 F | SYSTOLIC BLOOD PRESSURE: 106 MMHG | HEIGHT: 62 IN | HEART RATE: 75 BPM

## 2021-12-09 DIAGNOSIS — E78.5 HYPERLIPIDEMIA WITH TARGET LDL LESS THAN 100: ICD-10-CM

## 2021-12-09 DIAGNOSIS — M25.522 LEFT ELBOW PAIN: ICD-10-CM

## 2021-12-09 DIAGNOSIS — I82.4Y1 ACUTE DEEP VEIN THROMBOSIS (DVT) OF PROXIMAL VEIN OF RIGHT LOWER EXTREMITY (HCC): ICD-10-CM

## 2021-12-09 DIAGNOSIS — I26.99 PULMONARY EMBOLISM ON RIGHT (HCC): ICD-10-CM

## 2021-12-09 DIAGNOSIS — S22.000A COMPRESSION FRACTURE OF BODY OF THORACIC VERTEBRA (HCC): ICD-10-CM

## 2021-12-09 DIAGNOSIS — J96.11 CHRONIC RESPIRATORY FAILURE WITH HYPOXIA (HCC): Primary | ICD-10-CM

## 2021-12-09 DIAGNOSIS — F31.70 BIPOLAR DISORDER IN PARTIAL REMISSION, MOST RECENT EPISODE UNSPECIFIED TYPE (HCC): ICD-10-CM

## 2021-12-09 PROCEDURE — G8417 CALC BMI ABV UP PARAM F/U: HCPCS | Performed by: FAMILY MEDICINE

## 2021-12-09 PROCEDURE — 1036F TOBACCO NON-USER: CPT | Performed by: FAMILY MEDICINE

## 2021-12-09 PROCEDURE — G8427 DOCREV CUR MEDS BY ELIG CLIN: HCPCS | Performed by: FAMILY MEDICINE

## 2021-12-09 PROCEDURE — G8482 FLU IMMUNIZE ORDER/ADMIN: HCPCS | Performed by: FAMILY MEDICINE

## 2021-12-09 PROCEDURE — 3017F COLORECTAL CA SCREEN DOC REV: CPT | Performed by: FAMILY MEDICINE

## 2021-12-09 PROCEDURE — 99214 OFFICE O/P EST MOD 30 MIN: CPT | Performed by: FAMILY MEDICINE

## 2021-12-09 RX ORDER — BUTALBITAL, ACETAMINOPHEN AND CAFFEINE 50; 325; 40 MG/1; MG/1; MG/1
TABLET ORAL
COMMUNITY
Start: 2021-11-29 | End: 2022-04-01

## 2021-12-09 RX ORDER — ACETAMINOPHEN 500 MG
TABLET ORAL
Qty: 120 TABLET | Refills: 0 | Status: SHIPPED | OUTPATIENT
Start: 2021-12-09 | End: 2021-12-28 | Stop reason: SDUPTHER

## 2021-12-09 RX ORDER — TOPIRAMATE 50 MG/1
50 TABLET, FILM COATED ORAL DAILY
COMMUNITY
Start: 2021-12-07 | End: 2022-06-30

## 2021-12-09 ASSESSMENT — ANXIETY QUESTIONNAIRES
2. NOT BEING ABLE TO STOP OR CONTROL WORRYING: 0
GAD7 TOTAL SCORE: 7
7. FEELING AFRAID AS IF SOMETHING AWFUL MIGHT HAPPEN: 3
5. BEING SO RESTLESS THAT IT IS HARD TO SIT STILL: 0
IF YOU CHECKED OFF ANY PROBLEMS ON THIS QUESTIONNAIRE, HOW DIFFICULT HAVE THESE PROBLEMS MADE IT FOR YOU TO DO YOUR WORK, TAKE CARE OF THINGS AT HOME, OR GET ALONG WITH OTHER PEOPLE: NOT DIFFICULT AT ALL
1. FEELING NERVOUS, ANXIOUS, OR ON EDGE: 1
3. WORRYING TOO MUCH ABOUT DIFFERENT THINGS: 3
4. TROUBLE RELAXING: 0
6. BECOMING EASILY ANNOYED OR IRRITABLE: 0

## 2021-12-09 ASSESSMENT — PATIENT HEALTH QUESTIONNAIRE - PHQ9
SUM OF ALL RESPONSES TO PHQ QUESTIONS 1-9: 0
SUM OF ALL RESPONSES TO PHQ QUESTIONS 1-9: 0
2. FEELING DOWN, DEPRESSED OR HOPELESS: 0
1. LITTLE INTEREST OR PLEASURE IN DOING THINGS: 0
SUM OF ALL RESPONSES TO PHQ9 QUESTIONS 1 & 2: 0
SUM OF ALL RESPONSES TO PHQ QUESTIONS 1-9: 0

## 2021-12-09 NOTE — PROGRESS NOTES
Chief Complaint   Patient presents with    COPD    Hyperlipidemia    Anxiety    Depression    Migraine     haven't had any         Simran Sox  here today for follow up on chronic medical problems, go over labs and/or diagnostic studies, and medication refills. COPD, Hyperlipidemia, Anxiety, Depression, and Migraine (haven't had any)    Patient presented with her daughter  HPI: Patient is here for follow-up on respiratory failure, hyperlipidemia. Patient is here for follow-up, chronic respiratory failure on oxygen 2 to 3 L continuous. Patient follows with pulmonologist had recent CT chest done which showed stable findings. She had PE is on Eliquis reports compliance. Patient also follows with oncologist.    Patient is at her baseline for her chronic respiratory failure. Hyperlipidemia stable on statins and aspirin. Compression fracture of the vertebrae pain is under control. Patient has bipolar disorder is in partial remission follows with psychiatrist.  Patient reports medications are helping. CT chest  Emphysematous changes.       No evidence of significant change when compared to the previous study of June 17, 2021.       Ill-defined 19 mm solid nodular irregular opacity in the superior segment of   the right lower lobe does not appear significantly changed since the previous   study or the study in 2019.  Recommend continued lung cancer screening. /74   Pulse 75   Temp 98 °F (36.7 °C) (Temporal)   Ht 5' 2\" (1.575 m)   Wt 194 lb (88 kg)   LMP 05/20/2013 (Exact Date)   SpO2 94%   BMI 35.48 kg/m²    Body mass index is 35.48 kg/m². Wt Readings from Last 3 Encounters:   12/09/21 194 lb (88 kg)   09/22/21 173 lb (78.5 kg)   09/07/21 166 lb 3.2 oz (75.4 kg)        [x]Negative depression screening.   PHQ Scores 12/9/2021 8/5/2021 6/22/2021 5/18/2021 4/13/2021 1/27/2020 10/8/2019   PHQ2 Score 0 0 0 0 0 0 6   PHQ9 Score 0 0 0 0 0 0 13      []1-4 = Minimal depression   []5-9 = Milddepression   []10-14 = Moderate depression   []15-19 = Moderately severe depression   []20-27 = Severe depression    Discussed testing with the patient and all questions fully answered. Hospital Outpatient Visit on 10/08/2021   Component Date Value Ref Range Status    D-Dimer, Quant 10/08/2021 1.18* 0.00 - 0.59 mg/L FEU Final    Comment:        When combined with a low clinical probability, a D dimer value of <0.50 mg/L FEU is   considered negative for DVT and PE (negative predictive value of 98%, sensitivity of 97%). If this test is not being used to help rule out DVT and PE, then the following reference   range should be utilized: 0.00 - 0.59 mg/L FEU. The D-Dimer assay is intended for use as an aid in the diagnosis of venous thromboembolism   (DVT and PE) and the results should be interpreted in conjunction with the patient's medical   history, clinical presentation, and other findings. Elevated levels of D-dimer activity can be seen in any state of coagulation activation and   is not recommended in patients with therapeutic dose anticoagulant therapy for >24 hours,   fibrinolytic therapy within the previous 7 days, trauma or surgery within the previous 4   weeks,   disseminated malignancies, aortic aneurysm, sepsis, severe infections, pneumonia, severe   skin infections, liver cirrhosis, advanced age, milvia                           nary disease, diabetes, and pregnancy. A very low percentage of patients with DVT may yield D-dimer results below the cutoff of   0.5 mg/L FEU. This is known to be more prevalent in patients with distal DVT.             Vitamin B-12 10/08/2021 1591* 232 - 1245 pg/mL Final    Folate 10/08/2021 15.7  >4.8 ng/mL Final    Iron 10/08/2021 74  37 - 145 ug/dL Final    TIBC 10/08/2021 350  250 - 450 ug/dL Final    Iron Saturation 10/08/2021 21  20 - 55 % Final    UIBC 10/08/2021 276  112 - 347 ug/dL Final    Ferritin 10/08/2021 55  13 - 150 ug/L Final         Most recent labs reviewed:     Lab Results   Component Value Date    WBC 2.6 (L) 10/08/2021    HGB 11.7 (L) 10/08/2021    HCT 36.1 10/08/2021    MCV 95.1 10/08/2021     10/08/2021       @BRIEFLAB(NA,K,CL,CO2,BUN,CREATININE,GLUCOSE,CALCIUM)@     Lab Results   Component Value Date    ALT 10 10/08/2021    AST 16 10/08/2021    ALKPHOS 81 10/08/2021    BILITOT 0.26 (L) 10/08/2021       Lab Results   Component Value Date    TSH 3.29 10/08/2021       Lab Results   Component Value Date    CHOL 170 10/08/2021    CHOL 161 02/22/2019    CHOL 152 11/19/2017     Lab Results   Component Value Date    TRIG 69 10/08/2021    TRIG 79 02/22/2019    TRIG 79 11/19/2017     Lab Results   Component Value Date    HDL 75 10/08/2021    HDL 77 09/09/2020    HDL 69 02/22/2019     Lab Results   Component Value Date    LDLCHOLESTEROL 81 10/08/2021    LDLCHOLESTEROL 72 09/09/2020    LDLCHOLESTEROL 76 02/22/2019     Lab Results   Component Value Date    VLDL NOT REPORTED 10/08/2021    VLDL NOT REPORTED 09/09/2020    VLDL NOT REPORTED 02/22/2019     Lab Results   Component Value Date    CHOLHDLRATIO 2.3 10/08/2021    CHOLHDLRATIO 2.1 09/09/2020    CHOLHDLRATIO 2.3 02/22/2019       Lab Results   Component Value Date    LABA1C 5.2 06/22/2021       Lab Results   Component Value Date    LHLXFRZN42 1591 (H) 10/08/2021       Lab Results   Component Value Date    FOLATE 15.7 10/08/2021       Lab Results   Component Value Date    IRON 74 10/08/2021    TIBC 350 10/08/2021    FERRITIN 55 10/08/2021       Lab Results   Component Value Date    VITD25 14.4 (L) 03/17/2016             Current Outpatient Medications   Medication Sig Dispense Refill    butalbital-acetaminophen-caffeine (FIORICET, ESGIC) -40 MG per tablet take 1-2 tablet by mouth every 8 hours if needed      topiramate (TOPAMAX) 50 MG tablet       acetaminophen (ACETAMINOPHEN EXTRA STRENGTH) 500 MG tablet take 2 tablets by mouth every 6 hours AS NEEDED FOR PAIN 120 tablet 0    rivastigmine (EXELON) 4.5 MG capsule take 1 capsule by mouth twice a day 60 capsule 3    docusate sodium (COLACE) 100 MG capsule take 1 capsule by mouth twice a day 60 capsule 0    vitamin D (ERGOCALCIFEROL) 1.25 MG (89835 UT) CAPS capsule take 1 capsule by mouth every week 4 capsule 3    SPIRIVA RESPIMAT 2.5 MCG/ACT AERS inhaler inhale 2 puffs INTO THE LUNGS once daily 4 g 1    vitamin B-12 (CYANOCOBALAMIN) 100 MCG tablet Take 50 mcg by mouth daily      Biotin 10 MG tablet Take 10 mg by mouth daily      BREO ELLIPTA 100-25 MCG/INH AEPB inhaler inhale 1 puff INTO THE LUNGS once daily      simvastatin (ZOCOR) 20 MG tablet Take 1 tablet by mouth nightly 90 tablet 3    Calcium 250 MG CAPS Take 250 mg by mouth daily 30 capsule 0    cetirizine (ZYRTEC) 10 MG tablet Take 10 mg by mouth daily      fluticasone (FLONASE) 50 MCG/ACT nasal spray 2 sprays by Nasal route daily 1 Bottle 3    lidocaine (LMX) 4 % cream Apply topically  q 8 hrs  times/day as needed. 45 g 3    risperiDONE (RISPERDAL) 0.5 MG tablet Take 3 tablets by mouth every 12 hours Per [facility] psych 60 tablet 0    traZODone (DESYREL) 50 MG tablet 50 mg nightly       apixaban (ELIQUIS) 5 MG TABS tablet Take 1 tablet by mouth 2 times daily 360 tablet 0    ibandronate (BONIVA) 150 MG tablet Take 1 tablet by mouth every 30 days Take one (1) tablet once per month in the morning with a full glass of water, on an empty stomach, and do not take anything else by mouth or lie down for the next 30 minutes. 30 tablet 3    albuterol (PROVENTIL) (2.5 MG/3ML) 0.083% nebulizer solution Take 3 mLs by nebulization 4 times daily 120 each 3     No current facility-administered medications for this visit.              Social History     Socioeconomic History    Marital status:      Spouse name: Not on file    Number of children: Not on file    Years of education: Not on file    Highest education level: Not on file   Occupational History    Not on file   Tobacco Use    Smoking status: Former Smoker     Packs/day: 2.00     Years: 42.00     Pack years: 84.00     Types: Cigarettes     Quit date: 11/1/2018     Years since quitting: 3.1    Smokeless tobacco: Never Used   Substance and Sexual Activity    Alcohol use: Yes     Comment: yearly    Drug use: No    Sexual activity: Not Currently     Partners: Male     Birth control/protection: Post-menopausal   Other Topics Concern    Not on file   Social History Narrative    Not on file     Social Determinants of Health     Financial Resource Strain: High Risk    Difficulty of Paying Living Expenses: Very hard   Food Insecurity: No Food Insecurity    Worried About Running Out of Food in the Last Year: Never true    Agustin of Food in the Last Year: Never true   Transportation Needs:     Lack of Transportation (Medical): Not on file    Lack of Transportation (Non-Medical):  Not on file   Physical Activity:     Days of Exercise per Week: Not on file    Minutes of Exercise per Session: Not on file   Stress:     Feeling of Stress : Not on file   Social Connections:     Frequency of Communication with Friends and Family: Not on file    Frequency of Social Gatherings with Friends and Family: Not on file    Attends Holiness Services: Not on file    Active Member of [address] [place] [address] [employer] or Organizations: Not on file    Attends Club or Organization Meetings: Not on file    Marital Status: Not on file   Intimate Partner Violence:     Fear of Current or Ex-Partner: Not on file    Emotionally Abused: Not on file    Physically Abused: Not on file    Sexually Abused: Not on file   Housing Stability:     Unable to Pay for Housing in the Last Year: Not on file    Number of [place] in the Last Year: Not on file    Unstable Housing in the Last Year: Not on file     Counseling given: Yes        Family History   Problem Relation Age of Onset    Dementia Maternal Aunt     Kidney Disease Mother     Heart Attack Sister Effort: Pulmonary effort is normal.      Breath sounds: Normal breath sounds. No wheezing or rhonchi. Abdominal:      General: Bowel sounds are normal. There is no distension. Palpations: Abdomen is soft. There is no mass. Tenderness: There is no abdominal tenderness. There is no guarding. Hernia: No hernia is present. Musculoskeletal:      Cervical back: Tenderness present. No swelling, deformity, erythema, rigidity, spasms or bony tenderness. Decreased range of motion. Thoracic back: Spasms present. No swelling, edema, tenderness or bony tenderness. Decreased range of motion. Lumbar back: Deformity, spasms and tenderness present. No edema or bony tenderness. Decreased range of motion. Positive right straight leg raise test. Negative left straight leg raise test.      Right lower le+ Edema present. Left lower le+ Edema present. Lymphadenopathy:      Cervical: No cervical adenopathy. Neurological:      Mental Status: She is alert and oriented to person, place, and time. Cranial Nerves: Cranial nerves are intact. Sensory: Sensory deficit present. Motor: Weakness present. No tremor. Coordination: Romberg sign negative. Gait: Gait normal.   Psychiatric:         Attention and Perception: Attention and perception normal.         Mood and Affect: Mood is anxious. Mood is not depressed. Affect is not labile, blunt or flat. Speech: She is communicative. Speech is not rapid and pressured. Behavior: Behavior is slowed. Behavior is not agitated. Thought Content: Thought content is not paranoid or delusional.             ASSESSMENT AND PLAN      1. Chronic respiratory failure with hypoxia (HCC)  Stable continue oxygen. Continue inhalers continue aerosol treatments    2. Pulmonary embolism on right [facility])  Continue anticoagulants follow-up with oncologist    3.  Hyperlipidemia with target LDL less than 100  Stable continue statins and aspirin    4. Compression fracture of body of thoracic vertebra (HCC)  Stable continue pain control. Continue vitamin D    5. Left elbow pain    - acetaminophen (ACETAMINOPHEN EXTRA STRENGTH) 500 MG tablet; take 2 tablets by mouth every 6 hours AS NEEDED FOR PAIN  Dispense: 120 tablet; Refill: 0    6. Bipolar disorder in partial remission, most recent episode unspecified type ([facility] [address]  Stable continue same medications    7. Acute deep vein thrombosis (DVT) of proximal vein of right lower extremity (HCC)  Continue Eliquis      No orders of the defined types were placed in this encounter. Medications Discontinued During This Encounter   Medication Reason    acetaminophen (ACETAMINOPHEN EXTRA STRENGTH) 500 MG tablet Rosmery Amaya received counseling on the following healthy behaviors: nutrition, exercise and medication adherence  Reviewed prior labs and health maintenance  Continue current medications, diet and exercise. Discussed use, benefit, and side effects of prescribed medications. Barriers to medication compliance addressed. Patient given educational materials - see patient instructions  Was a self-tracking handout given in paper form or via [employer]? Yes    Requested Prescriptions     Signed Prescriptions Disp Refills    acetaminophen (ACETAMINOPHEN EXTRA STRENGTH) 500 MG tablet 120 tablet 0     Sig: take 2 tablets by mouth every 6 hours AS NEEDED FOR PAIN       All patient questions answered. Patient voiced understanding. Quality Measures    Body mass index is 35.48 kg/m². Elevated. Weight control planned discussed daily exercise regimen and Healthy diet and regular exercise. BP: 106/74 Blood pressure is normal. Treatment plan consists of Weight Reduction, DASH Eating Plan, Dietary Sodium Restriction and No treatment change needed.     Lab Results   Component Value Date    LDLCHOLESTEROL 81 10/08/2021    (goal LDL reduction with dx if diabetes is 50% LDL reduction)      PHQ Scores 12/9/2021 8/5/2021 6/22/2021 5/18/2021 4/13/2021 1/27/2020 10/8/2019   PHQ2 Score 0 0 0 0 0 0 6   PHQ9 Score 0 0 0 0 0 0 13     Interpretation of Total Score Depression Severity: 1-4 = Minimal depression, 5-9 = Mild depression, 10-14 = Moderate depression, 15-19 = Moderately severe depression, 20-27 = Severe depression    The patient'spast medical, surgical, social, and family history as well as her   current medications and allergies were reviewed as documented in today's encounter. Medications, labs, diagnostic studies, consultations andfollow-up as documented in this encounter. Return in about 4 months (around 4/9/2022). Patient wasseen with total face to face time of 30 minutes. More than 50% of this visit was counseling and education. Future Appointments   Date Time Provider Percy Jennifer   12/22/2021 10:30 AM Zeny Doan MD [address] [place] [address]   4/14/2022  2:30 PM Latosha Borges MD [id number]     This note was completed by using the assistance of a speech-recognition program. However, inadvertent computerized transcription errors may be present. Althoughevery effort was made to ensure accuracy, no guarantees can be provided that every mistake has been identified and corrected by editing.   Electronically signed by Latosha Borges MD on 12/10/2021  11:17 PM

## 2021-12-09 NOTE — PROGRESS NOTES
Visit Information    Have you changed or started any medications since your last visit including any over-the-counter medicines, vitamins, or herbal medicines? no   Are you having any side effects from any of your medications? -  no  Have you stopped taking any of your medications? Is so, why? -  no    Have you seen any other physician or provider since your last visit? Yes - Records Obtained  Have you had any other diagnostic tests since your last visit? No  Have you been seen in the emergency room and/or had an admission to a hospital since we last saw you? No  Have you had your routine dental cleaning in the past 6 months? no    Have you activated your [employer] account? If not, what are your barriers?  Yes     Patient Care Team:  Adithya Warren MD as PCP - General (Family Medicine)  Adithya Warren MD as PCP - [facility] Provider  Johnny Lombardi DO as Consulting Physician (Obstetrics & Gynecology)  Maurice Carver MD as Consulting Physician (Pulmonology)  Efrain Webb MD as Consulting Physician (Gastroenterology)  Zeny Wilson RN as Nurse Navigator  Alexi Shore MD as Consulting Physician (Neurology)    Medical History Review  Past Medical, Family, and Social History reviewed and does contribute to the patient presenting condition    Health Maintenance   Topic Date Due    COVID-19 Vaccine (3 - Moderna risk 4-dose series) 06/02/2021    Lipid screen  10/08/2022    Low dose CT lung screening  10/15/2022    Breast cancer screen  12/21/2022    Colon cancer screen colonoscopy  02/15/2023    Cervical cancer screen  04/13/2024    Pneumococcal 0-64 years Vaccine (4 of 4 - PPSV23) 08/05/2026    DTaP/Tdap/Td vaccine (2 - Td or Tdap) 09/09/2026    Flu vaccine  Completed    Shingles Vaccine  Completed    Hepatitis C screen  Completed    HIV screen  Completed    Hepatitis A vaccine  Aged Out    Hepatitis B vaccine  Aged Out    Hib vaccine  Aged Out    Meningococcal (ACWY) vaccine  Aged Out

## 2021-12-10 ASSESSMENT — ENCOUNTER SYMPTOMS
BACK PAIN: 1
VOMITING: 0
BLOOD IN STOOL: 0
CHEST TIGHTNESS: 0
NAUSEA: 0
COUGH: 0
SORE THROAT: 0
RHINORRHEA: 0
APNEA: 0
SINUS PRESSURE: 1
ABDOMINAL DISTENTION: 0
SHORTNESS OF BREATH: 1
ANAL BLEEDING: 0
WHEEZING: 1
ABDOMINAL PAIN: 0
COLOR CHANGE: 0

## 2021-12-12 DIAGNOSIS — M25.522 LEFT ELBOW PAIN: ICD-10-CM

## 2021-12-13 RX ORDER — LIDOCAINE 40 MG/G
CREAM TOPICAL
Qty: 45 G | Refills: 3 | Status: SHIPPED | OUTPATIENT
Start: 2021-12-13 | End: 2022-06-30 | Stop reason: DRUGHIGH

## 2021-12-13 RX ORDER — DOCUSATE SODIUM 100 MG/1
CAPSULE, LIQUID FILLED ORAL
Qty: 60 CAPSULE | Refills: 0 | Status: SHIPPED | OUTPATIENT
Start: 2021-12-13 | End: 2022-01-12

## 2021-12-13 NOTE — TELEPHONE ENCOUNTER
Please Approve or Refuse.   Send to Pharmacy per Pt's Request:     Next Visit Date:  4/14/2022   Last Visit Date: 12/9/2021    Hemoglobin A1C (%)   Date Value   06/22/2021 5.2   05/09/2018 5.1   03/17/2016 5.4             ( goal A1C is < 7)   BP Readings from Last 3 Encounters:   12/09/21 106/74   09/22/21 93/62   09/07/21 122/78          (goal 120/80)  BUN   Date Value Ref Range Status   10/08/2021 13 8 - 23 mg/dL Final     CREATININE   Date Value Ref Range Status   10/08/2021 0.59 0.50 - 0.90 mg/dL Final     Potassium   Date Value Ref Range Status   10/08/2021 4.5 3.7 - 5.3 mmol/L Final

## 2021-12-17 ENCOUNTER — TELEPHONE (OUTPATIENT)
Dept: ONCOLOGY | Age: 66
End: 2021-12-17

## 2021-12-17 DIAGNOSIS — E55.9 VITAMIN D DEFICIENCY: Primary | ICD-10-CM

## 2021-12-20 ENCOUNTER — HOSPITAL ENCOUNTER (OUTPATIENT)
Age: 66
Discharge: HOME | End: 2021-12-20
Payer: COMMERCIAL

## 2021-12-20 DIAGNOSIS — E55.9 VITAMIN D DEFICIENCY: ICD-10-CM

## 2021-12-20 LAB
ABSOLUTE EOS #: 0.2 K/UL (ref 0–0.4)
ABSOLUTE IMMATURE GRANULOCYTE: ABNORMAL K/UL (ref 0–0.3)
ABSOLUTE LYMPH #: 0.82 K/UL (ref 1–4.8)
ABSOLUTE MONO #: 0.41 K/UL (ref 0.1–1.3)
BASOPHILS # BLD: 1 % (ref 0–2)
BASOPHILS ABSOLUTE: 0.03 K/UL (ref 0–0.2)
D-DIMER QUANTITATIVE: 0.45 MG/L FEU (ref 0–0.59)
DIFFERENTIAL TYPE: ABNORMAL
EOSINOPHILS RELATIVE PERCENT: 6 % (ref 0–4)
FOLATE: 11.5 NG/ML
HCT VFR BLD CALC: 34 % (ref 36–46)
HEMOGLOBIN: 11 G/DL (ref 12–16)
IMMATURE GRANULOCYTES: ABNORMAL %
LYMPHOCYTES # BLD: 24 % (ref 24–44)
MCH RBC QN AUTO: 30.5 PG (ref 26–34)
MCHC RBC AUTO-ENTMCNC: 32.2 G/DL (ref 31–37)
MCV RBC AUTO: 94.8 FL (ref 80–100)
MONOCYTES # BLD: 12 % (ref 1–7)
MORPHOLOGY: NORMAL
NRBC AUTOMATED: ABNORMAL PER 100 WBC
PDW BLD-RTO: 16 % (ref 11.5–14.9)
PLATELET # BLD: 255 K/UL (ref 150–450)
PLATELET ESTIMATE: ABNORMAL
PMV BLD AUTO: 6.7 FL (ref 6–12)
RBC # BLD: 3.59 M/UL (ref 4–5.2)
RBC # BLD: ABNORMAL 10*6/UL
SEG NEUTROPHILS: 57 % (ref 36–66)
SEGMENTED NEUTROPHILS ABSOLUTE COUNT: 1.94 K/UL (ref 1.3–9.1)
VITAMIN B-12: >2000 PG/ML (ref 232–1245)
WBC # BLD: 3.4 K/UL (ref 3.5–11)
WBC # BLD: ABNORMAL 10*3/UL

## 2021-12-20 PROCEDURE — 82607 VITAMIN B-12: CPT

## 2021-12-20 PROCEDURE — 85379 FIBRIN DEGRADATION QUANT: CPT

## 2021-12-20 PROCEDURE — 82746 ASSAY OF FOLIC ACID SERUM: CPT

## 2021-12-20 PROCEDURE — 36415 COLL VENOUS BLD VENIPUNCTURE: CPT

## 2021-12-20 PROCEDURE — 85025 COMPLETE CBC W/AUTO DIFF WBC: CPT

## 2021-12-21 DIAGNOSIS — J44.9 CHRONIC OBSTRUCTIVE PULMONARY DISEASE, UNSPECIFIED COPD TYPE (HCC): ICD-10-CM

## 2021-12-22 ENCOUNTER — TELEPHONE (OUTPATIENT)
Dept: ONCOLOGY | Age: 66
End: 2021-12-22

## 2021-12-22 ENCOUNTER — OFFICE VISIT (OUTPATIENT)
Dept: ONCOLOGY | Age: 66
End: 2021-12-22
Payer: COMMERCIAL

## 2021-12-22 VITALS
SYSTOLIC BLOOD PRESSURE: 122 MMHG | BODY MASS INDEX: 35.83 KG/M2 | TEMPERATURE: 96.9 F | OXYGEN SATURATION: 100 % | HEART RATE: 74 BPM | DIASTOLIC BLOOD PRESSURE: 73 MMHG | WEIGHT: 195.9 LBS

## 2021-12-22 DIAGNOSIS — Z87.891 PERSONAL HISTORY OF NICOTINE DEPENDENCE: ICD-10-CM

## 2021-12-22 DIAGNOSIS — D64.9 ANEMIA, UNSPECIFIED TYPE: ICD-10-CM

## 2021-12-22 DIAGNOSIS — D72.819 LEUKOPENIA, UNSPECIFIED TYPE: ICD-10-CM

## 2021-12-22 DIAGNOSIS — I26.99 PULMONARY EMBOLISM ON RIGHT (HCC): Primary | ICD-10-CM

## 2021-12-22 PROCEDURE — G8482 FLU IMMUNIZE ORDER/ADMIN: HCPCS | Performed by: INTERNAL MEDICINE

## 2021-12-22 PROCEDURE — 99211 OFF/OP EST MAY X REQ PHY/QHP: CPT | Performed by: INTERNAL MEDICINE

## 2021-12-22 PROCEDURE — 1036F TOBACCO NON-USER: CPT | Performed by: INTERNAL MEDICINE

## 2021-12-22 PROCEDURE — 3017F COLORECTAL CA SCREEN DOC REV: CPT | Performed by: INTERNAL MEDICINE

## 2021-12-22 PROCEDURE — 99214 OFFICE O/P EST MOD 30 MIN: CPT | Performed by: INTERNAL MEDICINE

## 2021-12-22 PROCEDURE — G8427 DOCREV CUR MEDS BY ELIG CLIN: HCPCS | Performed by: INTERNAL MEDICINE

## 2021-12-22 PROCEDURE — G8417 CALC BMI ABV UP PARAM F/U: HCPCS | Performed by: INTERNAL MEDICINE

## 2021-12-22 RX ORDER — TIOTROPIUM BROMIDE INHALATION SPRAY 3.12 UG/1
SPRAY, METERED RESPIRATORY (INHALATION)
Qty: 4 G | Refills: 1 | Status: SHIPPED | OUTPATIENT
Start: 2021-12-22 | End: 2022-01-10

## 2021-12-22 NOTE — TELEPHONE ENCOUNTER
AVS from 12/22/21    rv in 6 months with labs couple of days prior    RV scheduled 6/22/22 @ 1:30pm    PT will have labs drawn one week prior to return visit    PT was given AVS and an appt schedule    Electronically signed by Jan Nunez on 12/22/2021 at 2:53 PM

## 2021-12-22 NOTE — PROGRESS NOTES
negative. During this visit patient's allergy, social, medical, surgical history and medications were reviewed and updated.     Past Medical History:   Past Medical History:   Diagnosis Date    Abnormal EKG     Anxiety     Asthma     Bipolar disorder ([address]     [place]    COPD (chronic obstructive pulmonary disease) (HCC)     Cramps, extremity     SEVERE LEG CRAMPS    Depression     Dilated bile duct     Headache     History of elective      Hyperlipidemia     Irritable bowel syndrome     Prolonged emergence from general anesthesia     SEVERE ISSUES WAKING UP    Substance abuse ([address]     street drugs when younger   [facility] Unspecified sleep apnea     Vision abnormalities     glasses       Past Surgical History:     Past Surgical History:   Procedure Laterality Date    ANKLE SURGERY      HAD SCREWS AND HARDWARE, THEN REMOVED    COLONOSCOPY  02/15/2018    tubular adenoma    EYE SURGERY Bilateral     CATARACTS    HYSTEROSCOPY  10/19/2016    D & C    INDUCED       LASIK      bilateral    TONSILLECTOMY AND ADENOIDECTOMY Bilateral     VULVA SURGERY      HAD A BIOPSY AND REMOVAL OF       Patient Family Social History:     Social History     Socioeconomic History    Marital status:      Spouse name: None    Number of children: None    Years of education: None    Highest education level: None   Occupational History    None   Tobacco Use    Smoking status: Former Smoker     Packs/day: 2.00     Years: 42.00     Pack years: 84.00     Types: Cigarettes     Quit date: 2018     Years since quitting: 3.1    Smokeless tobacco: Never Used   Substance and Sexual Activity    Alcohol use: Yes     Comment: yearly    Drug use: No    Sexual activity: Not Currently     Partners: Male     Birth control/protection: Post-menopausal   Other Topics Concern    None   Social History Narrative    None     Social Determinants of Health     Financial Resource Strain: High Risk  Difficulty of Paying Living Expenses: Very hard   Food Insecurity: No Food Insecurity    Worried About Running Out of Food in the Last Year: Never true    Ran Out of Food in the Last Year: Never true   Transportation Needs:     Lack of Transportation (Medical): Not on file    Lack of Transportation (Non-Medical):  Not on file   Physical Activity:     Days of Exercise per Week: Not on file    Minutes of Exercise per Session: Not on file   Stress:     Feeling of Stress : Not on file   Social Connections:     Frequency of Communication with Friends and Family: Not on file    Frequency of Social Gatherings with Friends and Family: Not on file    Attends Jain Services: Not on file    Active Member of [address] [place] [address] [employer] or Organizations: Not on file    Attends Club or Organization Meetings: Not on file    Marital Status: Not on file   Intimate Partner Violence:     Fear of Current or Ex-Partner: Not on file    Emotionally Abused: Not on file    Physically Abused: Not on file    Sexually Abused: Not on file   Housing Stability:     Unable to Pay for Housing in the Last Year: Not on file    Number of [place] in the Last Year: Not on file    Unstable Housing in the Last Year: Not on file     Family History   Problem Relation Age of Onset    Dementia Maternal Aunt     Kidney Disease Mother     Heart Attack Sister     Prostate Cancer Father     High Cholesterol Brother     Heart Attack Paternal Grandmother        Current Medications:     Current Outpatient Medications   Medication Sig Dispense Refill    lidocaine (LMX) 4 % cream apply topically every 8 hours if needed 45 g 3    docusate sodium (COLACE) 100 MG capsule take 1 capsule by mouth twice a day 60 capsule 0    butalbital-acetaminophen-caffeine (FIORICET, ESGIC) -40 MG per tablet take 1-2 tablet by mouth every 8 hours if needed      topiramate (TOPAMAX) 50 MG tablet       acetaminophen (ACETAMINOPHEN EXTRA STRENGTH) 500 MG tablet take 2 tablets by mouth every 6 hours AS NEEDED FOR PAIN 120 tablet 0    rivastigmine (EXELON) 4.5 MG capsule take 1 capsule by mouth twice a day 60 capsule 3    vitamin D (ERGOCALCIFEROL) 1.25 MG (44282 UT) CAPS capsule take 1 capsule by mouth every week 4 capsule 3    SPIRIVA RESPIMAT 2.5 MCG/ACT AERS inhaler inhale 2 puffs INTO THE LUNGS once daily 4 g 1    vitamin B-12 (CYANOCOBALAMIN) 100 MCG tablet Take 50 mcg by mouth daily      Biotin 10 MG tablet Take 10 mg by mouth daily      BREO ELLIPTA 100-25 MCG/INH AEPB inhaler inhale 1 puff INTO THE LUNGS once daily      simvastatin (ZOCOR) 20 MG tablet Take 1 tablet by mouth nightly 90 tablet 3    Calcium 250 MG CAPS Take 250 mg by mouth daily 30 capsule 0    cetirizine (ZYRTEC) 10 MG tablet Take 10 mg by mouth daily      fluticasone (FLONASE) 50 MCG/ACT nasal spray 2 sprays by Nasal route daily 1 Bottle 3    risperiDONE (RISPERDAL) 0.5 MG tablet Take 3 tablets by mouth every 12 hours Per [facility] psych 60 tablet 0    traZODone (DESYREL) 50 MG tablet 50 mg nightly       ibandronate (BONIVA) 150 MG tablet Take 1 tablet by mouth every 30 days Take one (1) tablet once per month in the morning with a full glass of water, on an empty stomach, and do not take anything else by mouth or lie down for the next 30 minutes. 30 tablet 3    albuterol (PROVENTIL) (2.5 MG/3ML) 0.083% nebulizer solution Take 3 mLs by nebulization 4 times daily 120 each 3     No current facility-administered medications for this visit. Allergies:   Advil [ibuprofen], Aleve [naproxen], Antipyrine, Celecoxib, Codeine, Fomepizole, Incruse ellipta [umeclidinium bromide], Other, Rofecoxib, Salicylates, Strawberry extract, Sulfinpyrazone, Aspirin, and Nsaids    Review of Systems:    Constitutional: No fever or chills.  No night sweats, no weight loss   Eyes: No eye discharge, double vision, or eye pain   HEENT: negative for sore mouth, sore throat, hoarseness and voice change   Respiratory: negative for cough , sputum, dyspnea, wheezing, hemoptysis, chest pain   Cardiovascular: negative for chest pain, dyspnea, palpitations, orthopnea, PND   Gastrointestinal: negative for nausea, vomiting, diarrhea, constipation, abdominal pain, Dysphagia, hematemesis and hematochezia   Genitourinary: negative for frequency, dysuria, nocturia, urinary incontinence, and hematuria   Integument: negative for rash, skin lesions, bruises.    Hematologic/Lymphatic: negative for easy bruising, bleeding, lymphadenopathy, or petechiae   Endocrine: negative for heat or cold intolerance,weight changes, change in bowel habits and hair loss   Musculoskeletal: negative for myalgias, arthralgias, pain, joint swelling,and bone pain   Neurological: negative for headaches, dizziness, seizures, weakness, numbness        Physical Exam:    Vitals: /73   Pulse 74   Temp 96.9 °F (36.1 °C) (Temporal)   Wt 195 lb 14.4 oz (88.9 kg)   LMP 05/20/2013 (Exact Date)   SpO2 100%   BMI 35.83 kg/m²   General appearance - well appearing, no in pain or distress  Mental status - AAO X3  Eyes - pupils equal and reactive, extraocular eye movements intact  Mouth - mucous membranes moist, pharynx normal without lesions  Neck - supple, no significant adenopathy  Lymphatics - no palpable lymphadenopathy, no hepatosplenomegaly  Chest - clear to auscultation, no wheezes, rales or rhonchi, symmetric air entry  Heart - normal rate, regular rhythm, normal S1, S2, no murmurs  Abdomen - soft, nontender, nondistended, no masses or organomegaly  Neurological - alert, oriented, normal speech, no focal findings or movement disorder noted  Extremities - peripheral pulses normal, no pedal edema, no clubbing or cyanosis  Skin - normal coloration and turgor, no rashes, no suspicious skin lesions noted       DATA:  Lab Results   Component Value Date    WBC 3.4 (L) 12/20/2021    HGB 11.0 (L) 12/20/2021    HCT 34.0 (L) 12/20/2021    MCV 94.8 12/20/2021     12/20/2021       Chemistry        Component Value Date/Time     10/08/2021 1225    K 4.5 10/08/2021 1225     10/08/2021 1225    CO2 34 (H) 10/08/2021 1225    BUN 13 10/08/2021 1225    CREATININE 0.59 10/08/2021 1225        Component Value Date/Time    CALCIUM 9.1 10/08/2021 1225    ALKPHOS 81 10/08/2021 1225    AST 16 10/08/2021 1225    ALT 10 10/08/2021 1225    BILITOT 0.26 (L) 10/08/2021 1225        Impression   Emphysematous changes.       No evidence of significant change when compared to the previous study of June 17, 2021.       Ill-defined 19 mm solid nodular irregular opacity in the superior segment of   the right lower lobe does not appear significantly changed since the previous   study or the study in 2019.  Recommend continued lung cancer screening.               Impression:  PE, DVT, provoked from smoking and sedentary lifestyle-6/2021  Eliquis-6/21 1 through 12/21  Leukopenia secondary to risperidone  Anemia  HX smoking, quit  HX COPD, on oxygen    Plan:  Personally reviewed results of lab work-up and the relevant clinical data. Patient testing for factor V Leyden mutation and prothrombin gene mutation is negative. Patient does not have any prior history of venous thromboembolism. Denies any family history of venous thromboembolism. Clinical suspicion for inherited thrombophilia is low. Function testing was not ordered since patient is on Eliquis. Patient risk factors are thought to be smoking and sedentary lifestyle. Patient has completed 6 months of anticoagulation. Patient D-dimer checked earlier was within normal range. I reviewed pros and cons of discontinuing anticoagulation. After detailed discussion we decided to discontinue anticoagulation and recheck D-dimer in 6 months. Patient was counseled on risk factor associated venous thromboembolism. Continue age-appropriate screening studies  Cytopenias likely secondary to risperidone.   Recommend continued surveillance of cell counts  Recommend annual CT screening for lung  Return to clinic in 6 months. Marcel Thornton MD    This note is created with the assistance of a speech recognition program.  While intending to generate a document that actually reflects the content of the visit, the document can still have some errors including those of syntax and sound a like substitutions which may escape proof reading. It such instances, actual meaning can be extrapolated by contextual diversion.

## 2021-12-28 DIAGNOSIS — M25.522 LEFT ELBOW PAIN: ICD-10-CM

## 2021-12-28 RX ORDER — ACETAMINOPHEN 500 MG
TABLET ORAL
Qty: 120 TABLET | Refills: 0 | Status: SHIPPED | OUTPATIENT
Start: 2021-12-28 | End: 2022-02-23

## 2022-01-08 DIAGNOSIS — J44.9 CHRONIC OBSTRUCTIVE PULMONARY DISEASE, UNSPECIFIED COPD TYPE (HCC): ICD-10-CM

## 2022-01-10 RX ORDER — TIOTROPIUM BROMIDE INHALATION SPRAY 3.12 UG/1
SPRAY, METERED RESPIRATORY (INHALATION)
Qty: 4 G | Refills: 1 | Status: SHIPPED | OUTPATIENT
Start: 2022-01-10

## 2022-01-12 RX ORDER — DOCUSATE SODIUM 100 MG/1
CAPSULE, LIQUID FILLED ORAL
Qty: 60 CAPSULE | Refills: 0 | Status: SHIPPED | OUTPATIENT
Start: 2022-01-12 | End: 2022-02-08

## 2022-02-08 DIAGNOSIS — M80.00XD AGE-RELATED OSTEOPOROSIS WITH CURRENT PATHOLOGICAL FRACTURE WITH ROUTINE HEALING, SUBSEQUENT ENCOUNTER: ICD-10-CM

## 2022-02-08 RX ORDER — DOCUSATE SODIUM 100 MG/1
CAPSULE, LIQUID FILLED ORAL
Qty: 60 CAPSULE | Refills: 0 | Status: SHIPPED | OUTPATIENT
Start: 2022-02-08 | End: 2022-03-09

## 2022-02-08 RX ORDER — IBANDRONATE SODIUM 150 MG/1
150 TABLET, FILM COATED ORAL
Qty: 30 TABLET | Refills: 3 | Status: ON HOLD | OUTPATIENT
Start: 2022-02-08 | End: 2022-05-03 | Stop reason: HOSPADM

## 2022-02-08 NOTE — TELEPHONE ENCOUNTER
Please Approve or Refuse.   Send to Pharmacy per Pt's Request:      Next Visit Date:  4/14/2022   Last Visit Date: 12/9/2021    Hemoglobin A1C (%)   Date Value   06/22/2021 5.2   05/09/2018 5.1   03/17/2016 5.4             ( goal A1C is < 7)   BP Readings from Last 3 Encounters:   12/22/21 122/73   12/09/21 106/74   09/22/21 93/62          (goal 120/80)  BUN   Date Value Ref Range Status   10/08/2021 13 8 - 23 mg/dL Final     CREATININE   Date Value Ref Range Status   10/08/2021 0.59 0.50 - 0.90 mg/dL Final     Potassium   Date Value Ref Range Status   10/08/2021 4.5 3.7 - 5.3 mmol/L Final

## 2022-02-23 DIAGNOSIS — M25.522 LEFT ELBOW PAIN: ICD-10-CM

## 2022-02-23 RX ORDER — ACETAMINOPHEN 500 MG
TABLET ORAL
Qty: 120 TABLET | Refills: 0 | Status: SHIPPED | OUTPATIENT
Start: 2022-02-23 | End: 2022-03-22 | Stop reason: SDUPTHER

## 2022-02-28 DIAGNOSIS — E55.9 VITAMIN D DEFICIENCY: ICD-10-CM

## 2022-02-28 RX ORDER — ERGOCALCIFEROL 1.25 MG/1
CAPSULE ORAL
Qty: 4 CAPSULE | Refills: 3 | Status: SHIPPED | OUTPATIENT
Start: 2022-02-28 | End: 2022-06-30

## 2022-03-09 RX ORDER — DOCUSATE SODIUM 100 MG/1
CAPSULE, LIQUID FILLED ORAL
Qty: 60 CAPSULE | Refills: 0 | Status: ON HOLD | OUTPATIENT
Start: 2022-03-09 | End: 2022-04-05

## 2022-03-16 NOTE — PROGRESS NOTES
for: IONCA  Magnesium:  No results found for: MG  Phosphorus:  No results found for: PHOS     LIVER PROFILE No results for input(s): AST, ALT, LIPASE, BILIDIR, BILITOT, ALKPHOS in the last 72 hours. Invalid input(s): AMYLASE,  ALB  INR No results for input(s): INR in the last 72 hours. PTT   Lab Results   Component Value Date    APTT 23.7 10/12/2016         RADIOLOGY     (See actual reports for details)    ASSESSMENT/PLAN   Principal Problem:    COPD exacerbation ([facility]  Active Problems:    Asthma    Chronic headaches    Current smoker on some days    Tobacco dependence    Community acquired pneumonia of right middle lobe of lung ([facility] [address]    1. COPD Exacerbation - Improved  - Continue to wean solu-medrol   - Qualifies for home O2  - Continue to ambulate  - Smoking cessation     Electronically signed by Oscar Negrete on 11/21/2017 at 11:59 AM    She has dramatically improved over the last 48 hours  Ambulating the halls  Does qualify for oxygen therapy and face-to-face discussion was done  Apparently her nebulizers no longer working condition therefore she will need a new one, face-to-face discussion done  Counseled on smoking cessation  Follow up with Dr. Hussain Ashton  in 4-6 weeks. Call [phone] for appointment  Patient seen and examined independently by me. Above discussed and I agree with Medical student note except where indicated in the EMR revision history. Also see my additional comments and changes indicated by discrete font, text color, italics, and/or initials. Labs, cultures, and radiographs where available were reviewed.      Electronically signed by Manuel Stauffer MD on 11/21/2017 at 1:38 PM 5

## 2022-03-22 DIAGNOSIS — M25.522 LEFT ELBOW PAIN: ICD-10-CM

## 2022-03-22 RX ORDER — ACETAMINOPHEN 500 MG
TABLET ORAL
Qty: 120 TABLET | Refills: 0 | Status: ON HOLD | OUTPATIENT
Start: 2022-03-22 | End: 2022-05-03 | Stop reason: HOSPADM

## 2022-03-22 RX ORDER — EPINEPHRINE 0.3 MG/.3ML
0.3 INJECTION SUBCUTANEOUS
Qty: 0.3 ML | Refills: 0 | Status: ON HOLD | OUTPATIENT
Start: 2022-03-22 | End: 2022-05-03 | Stop reason: HOSPADM

## 2022-03-29 DIAGNOSIS — F03.90 MAJOR NEUROCOGNITIVE DISORDER (HCC): ICD-10-CM

## 2022-03-29 RX ORDER — RIVASTIGMINE TARTRATE 4.5 MG/1
CAPSULE ORAL
Qty: 60 CAPSULE | Refills: 3 | Status: ON HOLD | OUTPATIENT
Start: 2022-03-29 | End: 2022-05-03 | Stop reason: HOSPADM

## 2022-03-29 NOTE — TELEPHONE ENCOUNTER
Please Approve or Refuse.   Send to Pharmacy per Pt's Request: [email or web address]     Next Visit Date:  4/14/2022   Last Visit Date: 12/9/2021    Hemoglobin A1C (%)   Date Value   06/22/2021 5.2   05/09/2018 5.1   03/17/2016 5.4             ( goal A1C is < 7)   BP Readings from Last 3 Encounters:   12/22/21 122/73   12/09/21 106/74   09/22/21 93/62          (goal 120/80)  BUN   Date Value Ref Range Status   10/08/2021 13 8 - 23 mg/dL Final     CREATININE   Date Value Ref Range Status   10/08/2021 0.59 0.50 - 0.90 mg/dL Final     Potassium   Date Value Ref Range Status   10/08/2021 4.5 3.7 - 5.3 mmol/L Final

## 2022-04-01 ENCOUNTER — HOSPITAL ENCOUNTER (INPATIENT)
Age: 67
LOS: 4 days | Discharge: HOME | DRG: 720 | End: 2022-04-05
Attending: EMERGENCY MEDICINE | Admitting: INTERNAL MEDICINE
Payer: COMMERCIAL

## 2022-04-01 ENCOUNTER — APPOINTMENT (OUTPATIENT)
Dept: GENERAL RADIOLOGY | Age: 67
DRG: 720 | End: 2022-04-01
Payer: COMMERCIAL

## 2022-04-01 DIAGNOSIS — B59 PNEUMONIA OF RIGHT LOWER LOBE DUE TO PNEUMOCYSTIS JIROVECII (HCC): ICD-10-CM

## 2022-04-01 DIAGNOSIS — J18.9 PNEUMONIA OF BOTH LUNGS DUE TO INFECTIOUS ORGANISM, UNSPECIFIED PART OF LUNG: ICD-10-CM

## 2022-04-01 DIAGNOSIS — A41.9 SEPTIC SHOCK (HCC): ICD-10-CM

## 2022-04-01 DIAGNOSIS — R10.9 FLANK PAIN: ICD-10-CM

## 2022-04-01 DIAGNOSIS — J15.7 PNEUMONIA OF RIGHT LOWER LOBE DUE TO MYCOPLASMA PNEUMONIAE: Primary | ICD-10-CM

## 2022-04-01 DIAGNOSIS — R65.21 SEPTIC SHOCK (HCC): ICD-10-CM

## 2022-04-01 DIAGNOSIS — J15.7 PNEUMONIA OF BOTH LOWER LOBES DUE TO MYCOPLASMA PNEUMONIAE: ICD-10-CM

## 2022-04-01 LAB
ABSOLUTE BANDS #: 2.69 K/UL (ref 0–1)
ABSOLUTE EOS #: 0.17 K/UL (ref 0–0.4)
ABSOLUTE LYMPH #: 0.34 K/UL (ref 1–4.8)
ABSOLUTE MONO #: 0.67 K/UL (ref 0.1–1.3)
ALBUMIN SERPL-MCNC: 3.8 G/DL (ref 3.5–5.2)
ALP BLD-CCNC: 81 U/L (ref 35–104)
ALT SERPL-CCNC: 12 U/L (ref 5–33)
ANION GAP SERPL CALCULATED.3IONS-SCNC: 13 MMOL/L (ref 9–17)
AST SERPL-CCNC: 17 U/L
BACTERIA: NORMAL
BANDS: 16 % (ref 0–10)
BASOPHILS # BLD: 0 % (ref 0–2)
BASOPHILS ABSOLUTE: 0 K/UL (ref 0–0.2)
BILIRUB SERPL-MCNC: 0.29 MG/DL (ref 0.3–1.2)
BILIRUBIN URINE: NEGATIVE
BUN BLDV-MCNC: 28 MG/DL (ref 8–23)
CALCIUM SERPL-MCNC: 9.3 MG/DL (ref 8.6–10.4)
CASTS UA: NORMAL /LPF
CHLORIDE BLD-SCNC: 101 MMOL/L (ref 98–107)
CO2: 23 MMOL/L (ref 20–31)
COLOR: YELLOW
CREAT SERPL-MCNC: 1.01 MG/DL (ref 0.5–0.9)
EOSINOPHILS RELATIVE PERCENT: 1 % (ref 0–4)
EPITHELIAL CELLS UA: NORMAL /HPF
GFR AFRICAN AMERICAN: >60 ML/MIN
GFR NON-AFRICAN AMERICAN: 55 ML/MIN
GFR SERPL CREATININE-BSD FRML MDRD: ABNORMAL ML/MIN/{1.73_M2}
GLUCOSE BLD-MCNC: 115 MG/DL (ref 70–99)
GLUCOSE URINE: NEGATIVE
HCT VFR BLD CALC: 34.7 % (ref 36–46)
HEMOGLOBIN: 10.9 G/DL (ref 12–16)
INFLUENZA A: NOT DETECTED
INFLUENZA B: NOT DETECTED
KETONES, URINE: NEGATIVE
LACTIC ACID, SEPSIS: 1 MMOL/L (ref 0.5–1.9)
LACTIC ACID, SEPSIS: 1.5 MMOL/L (ref 0.5–1.9)
LEUKOCYTE ESTERASE, URINE: NEGATIVE
LYMPHOCYTES # BLD: 2 % (ref 24–44)
MAGNESIUM: 1.6 MG/DL (ref 1.6–2.6)
MCH RBC QN AUTO: 30.1 PG (ref 26–34)
MCHC RBC AUTO-ENTMCNC: 31.5 G/DL (ref 31–37)
MCV RBC AUTO: 95.4 FL (ref 80–100)
METAMYELOCYTES ABSOLUTE COUNT: 0.34 K/UL
METAMYELOCYTES: 2 %
MONOCYTES # BLD: 4 % (ref 1–7)
MORPHOLOGY: ABNORMAL
NITRITE, URINE: NEGATIVE
PDW BLD-RTO: 15.6 % (ref 11.5–14.9)
PH UA: 5.5 (ref 5–8)
PLATELET # BLD: 222 K/UL (ref 150–450)
PMV BLD AUTO: 7.5 FL (ref 6–12)
POTASSIUM SERPL-SCNC: 4 MMOL/L (ref 3.7–5.3)
PRO-BNP: 754 PG/ML
PROTEIN UA: NEGATIVE
RBC # BLD: 3.64 M/UL (ref 4–5.2)
RBC UA: NORMAL /HPF
SARS-COV-2 RNA, RT PCR: NOT DETECTED
SEG NEUTROPHILS: 75 % (ref 36–66)
SEGMENTED NEUTROPHILS ABSOLUTE COUNT: 12.59 K/UL (ref 1.3–9.1)
SODIUM BLD-SCNC: 137 MMOL/L (ref 135–144)
SOURCE: NORMAL
SPECIFIC GRAVITY UA: 1.01 (ref 1–1.03)
SPECIMEN DESCRIPTION: NORMAL
TOTAL PROTEIN: 7.1 G/DL (ref 6.4–8.3)
TROPONIN, HIGH SENSITIVITY: 21 NG/L (ref 0–14)
TROPONIN, HIGH SENSITIVITY: 32 NG/L (ref 0–14)
TROPONIN, HIGH SENSITIVITY: 32 NG/L (ref 0–14)
TURBIDITY: CLEAR
URINE HGB: ABNORMAL
UROBILINOGEN, URINE: NORMAL
WBC # BLD: 16.8 K/UL (ref 3.5–11)
WBC UA: NORMAL /HPF

## 2022-04-01 PROCEDURE — 85025 COMPLETE CBC W/AUTO DIFF WBC: CPT

## 2022-04-01 PROCEDURE — 71045 X-RAY EXAM CHEST 1 VIEW: CPT

## 2022-04-01 PROCEDURE — 83735 ASSAY OF MAGNESIUM: CPT

## 2022-04-01 PROCEDURE — 6360000002 HC RX W HCPCS: Performed by: INTERNAL MEDICINE

## 2022-04-01 PROCEDURE — 94761 N-INVAS EAR/PLS OXIMETRY MLT: CPT

## 2022-04-01 PROCEDURE — 02HV33Z INSERTION OF INFUSION DEVICE INTO SUPERIOR VENA CAVA, PERCUTANEOUS APPROACH: ICD-10-PCS | Performed by: EMERGENCY MEDICINE

## 2022-04-01 PROCEDURE — 81001 URINALYSIS AUTO W/SCOPE: CPT

## 2022-04-01 PROCEDURE — 2580000003 HC RX 258: Performed by: EMERGENCY MEDICINE

## 2022-04-01 PROCEDURE — 84484 ASSAY OF TROPONIN QUANT: CPT

## 2022-04-01 PROCEDURE — 6360000002 HC RX W HCPCS

## 2022-04-01 PROCEDURE — 2580000003 HC RX 258

## 2022-04-01 PROCEDURE — 99291 CRITICAL CARE FIRST HOUR: CPT | Performed by: INTERNAL MEDICINE

## 2022-04-01 PROCEDURE — 99285 EMERGENCY DEPT VISIT HI MDM: CPT

## 2022-04-01 PROCEDURE — 2000000000 HC ICU R&B

## 2022-04-01 PROCEDURE — 2500000003 HC RX 250 WO HCPCS: Performed by: EMERGENCY MEDICINE

## 2022-04-01 PROCEDURE — 36556 INSERT NON-TUNNEL CV CATH: CPT

## 2022-04-01 PROCEDURE — 6360000002 HC RX W HCPCS: Performed by: EMERGENCY MEDICINE

## 2022-04-01 PROCEDURE — 87636 SARSCOV2 & INF A&B AMP PRB: CPT

## 2022-04-01 PROCEDURE — 93005 ELECTROCARDIOGRAM TRACING: CPT | Performed by: EMERGENCY MEDICINE

## 2022-04-01 PROCEDURE — 80053 COMPREHEN METABOLIC PANEL: CPT

## 2022-04-01 PROCEDURE — 96367 TX/PROPH/DG ADDL SEQ IV INF: CPT

## 2022-04-01 PROCEDURE — 6370000000 HC RX 637 (ALT 250 FOR IP): Performed by: INTERNAL MEDICINE

## 2022-04-01 PROCEDURE — 86738 MYCOPLASMA ANTIBODY: CPT

## 2022-04-01 PROCEDURE — 87040 BLOOD CULTURE FOR BACTERIA: CPT

## 2022-04-01 PROCEDURE — 83605 ASSAY OF LACTIC ACID: CPT

## 2022-04-01 PROCEDURE — 6370000000 HC RX 637 (ALT 250 FOR IP): Performed by: EMERGENCY MEDICINE

## 2022-04-01 PROCEDURE — 94640 AIRWAY INHALATION TREATMENT: CPT

## 2022-04-01 PROCEDURE — 87086 URINE CULTURE/COLONY COUNT: CPT

## 2022-04-01 PROCEDURE — 2700000000 HC OXYGEN THERAPY PER DAY

## 2022-04-01 PROCEDURE — 96375 TX/PRO/DX INJ NEW DRUG ADDON: CPT

## 2022-04-01 PROCEDURE — 94664 DEMO&/EVAL PT USE INHALER: CPT

## 2022-04-01 PROCEDURE — 83880 ASSAY OF NATRIURETIC PEPTIDE: CPT

## 2022-04-01 PROCEDURE — 2580000003 HC RX 258: Performed by: INTERNAL MEDICINE

## 2022-04-01 PROCEDURE — 96365 THER/PROPH/DIAG IV INF INIT: CPT

## 2022-04-01 PROCEDURE — 6370000000 HC RX 637 (ALT 250 FOR IP)

## 2022-04-01 PROCEDURE — 36415 COLL VENOUS BLD VENIPUNCTURE: CPT

## 2022-04-01 RX ORDER — ALBUTEROL SULFATE 2.5 MG/3ML
2.5 SOLUTION RESPIRATORY (INHALATION) EVERY 4 HOURS
Status: DISCONTINUED | OUTPATIENT
Start: 2022-04-01 | End: 2022-04-01 | Stop reason: SDUPTHER

## 2022-04-01 RX ORDER — METHYLPREDNISOLONE SODIUM SUCCINATE 125 MG/2ML
60 INJECTION, POWDER, LYOPHILIZED, FOR SOLUTION INTRAMUSCULAR; INTRAVENOUS EVERY 6 HOURS
Status: DISCONTINUED | OUTPATIENT
Start: 2022-04-01 | End: 2022-04-03

## 2022-04-01 RX ORDER — POLYETHYLENE GLYCOL 3350 17 G/17G
17 POWDER, FOR SOLUTION ORAL DAILY PRN
Status: DISCONTINUED | OUTPATIENT
Start: 2022-04-01 | End: 2022-04-05 | Stop reason: HOSPADM

## 2022-04-01 RX ORDER — ALBUTEROL SULFATE 90 UG/1
2 AEROSOL, METERED RESPIRATORY (INHALATION) EVERY 6 HOURS PRN
Status: ON HOLD | COMMUNITY
End: 2022-07-05 | Stop reason: SDUPTHER

## 2022-04-01 RX ORDER — ONDANSETRON 4 MG/1
4 TABLET, ORALLY DISINTEGRATING ORAL EVERY 8 HOURS PRN
Status: DISCONTINUED | OUTPATIENT
Start: 2022-04-01 | End: 2022-04-05 | Stop reason: HOSPADM

## 2022-04-01 RX ORDER — ACETAMINOPHEN 500 MG
1000 TABLET ORAL ONCE
Status: COMPLETED | OUTPATIENT
Start: 2022-04-01 | End: 2022-04-01

## 2022-04-01 RX ORDER — BUDESONIDE AND FORMOTEROL FUMARATE DIHYDRATE 80; 4.5 UG/1; UG/1
2 AEROSOL RESPIRATORY (INHALATION) 2 TIMES DAILY
Status: DISCONTINUED | OUTPATIENT
Start: 2022-04-01 | End: 2022-04-01 | Stop reason: SDUPTHER

## 2022-04-01 RX ORDER — ONDANSETRON 2 MG/ML
4 INJECTION INTRAMUSCULAR; INTRAVENOUS EVERY 6 HOURS PRN
Status: DISCONTINUED | OUTPATIENT
Start: 2022-04-01 | End: 2022-04-05 | Stop reason: HOSPADM

## 2022-04-01 RX ORDER — METHYLPREDNISOLONE SODIUM SUCCINATE 125 MG/2ML
125 INJECTION, POWDER, LYOPHILIZED, FOR SOLUTION INTRAMUSCULAR; INTRAVENOUS ONCE
Status: COMPLETED | OUTPATIENT
Start: 2022-04-01 | End: 2022-04-01

## 2022-04-01 RX ORDER — BUTALBITAL, ACETAMINOPHEN AND CAFFEINE 50; 325; 40 MG/1; MG/1; MG/1
1 TABLET ORAL EVERY 6 HOURS PRN
Status: ON HOLD | COMMUNITY
End: 2022-05-03 | Stop reason: HOSPADM

## 2022-04-01 RX ORDER — OLOPATADINE HYDROCHLORIDE 1 MG/ML
1 SOLUTION/ DROPS OPHTHALMIC 2 TIMES DAILY
COMMUNITY
End: 2022-06-30

## 2022-04-01 RX ORDER — SODIUM CHLORIDE 0.9 % (FLUSH) 0.9 %
5-40 SYRINGE (ML) INJECTION PRN
Status: DISCONTINUED | OUTPATIENT
Start: 2022-04-01 | End: 2022-04-05 | Stop reason: HOSPADM

## 2022-04-01 RX ORDER — NOREPINEPHRINE BIT/0.9 % NACL 16MG/250ML
1-100 INFUSION BOTTLE (ML) INTRAVENOUS CONTINUOUS
Status: DISCONTINUED | OUTPATIENT
Start: 2022-04-01 | End: 2022-04-05

## 2022-04-01 RX ORDER — 0.9 % SODIUM CHLORIDE 0.9 %
1000 INTRAVENOUS SOLUTION INTRAVENOUS ONCE
Status: COMPLETED | OUTPATIENT
Start: 2022-04-01 | End: 2022-04-01

## 2022-04-01 RX ORDER — SODIUM CHLORIDE, SODIUM LACTATE, POTASSIUM CHLORIDE, CALCIUM CHLORIDE 600; 310; 30; 20 MG/100ML; MG/100ML; MG/100ML; MG/100ML
INJECTION, SOLUTION INTRAVENOUS CONTINUOUS
Status: DISCONTINUED | OUTPATIENT
Start: 2022-04-01 | End: 2022-04-02

## 2022-04-01 RX ORDER — TRAZODONE HYDROCHLORIDE 50 MG/1
50 TABLET ORAL NIGHTLY
Status: DISCONTINUED | OUTPATIENT
Start: 2022-04-01 | End: 2022-04-05 | Stop reason: HOSPADM

## 2022-04-01 RX ORDER — ALBUTEROL SULFATE 90 UG/1
4 AEROSOL, METERED RESPIRATORY (INHALATION) ONCE
Status: COMPLETED | OUTPATIENT
Start: 2022-04-01 | End: 2022-04-01

## 2022-04-01 RX ORDER — SODIUM CHLORIDE 9 MG/ML
INJECTION, SOLUTION INTRAVENOUS PRN
Status: DISCONTINUED | OUTPATIENT
Start: 2022-04-01 | End: 2022-04-05 | Stop reason: HOSPADM

## 2022-04-01 RX ORDER — ALBUTEROL SULFATE 2.5 MG/3ML
2.5 SOLUTION RESPIRATORY (INHALATION) 4 TIMES DAILY
Status: DISCONTINUED | OUTPATIENT
Start: 2022-04-01 | End: 2022-04-05 | Stop reason: HOSPADM

## 2022-04-01 RX ORDER — ASPIRIN 81 MG/1
81 TABLET ORAL DAILY
Status: ON HOLD | COMMUNITY
End: 2022-05-03 | Stop reason: HOSPADM

## 2022-04-01 RX ORDER — SODIUM CHLORIDE 0.9 % (FLUSH) 0.9 %
5-40 SYRINGE (ML) INJECTION EVERY 12 HOURS SCHEDULED
Status: DISCONTINUED | OUTPATIENT
Start: 2022-04-01 | End: 2022-04-05 | Stop reason: HOSPADM

## 2022-04-01 RX ORDER — ATORVASTATIN CALCIUM 20 MG/1
20 TABLET, FILM COATED ORAL DAILY
Status: DISCONTINUED | OUTPATIENT
Start: 2022-04-02 | End: 2022-04-05 | Stop reason: HOSPADM

## 2022-04-01 RX ORDER — ASPIRIN 81 MG/1
81 TABLET ORAL DAILY
Status: DISCONTINUED | OUTPATIENT
Start: 2022-04-02 | End: 2022-04-05 | Stop reason: HOSPADM

## 2022-04-01 RX ORDER — RIVASTIGMINE TARTRATE 4.5 MG/1
4.5 CAPSULE ORAL 2 TIMES DAILY
Status: DISCONTINUED | OUTPATIENT
Start: 2022-04-01 | End: 2022-04-01

## 2022-04-01 RX ORDER — RISPERIDONE 0.5 MG/1
1.5 TABLET, FILM COATED ORAL EVERY 12 HOURS
Status: DISCONTINUED | OUTPATIENT
Start: 2022-04-01 | End: 2022-04-05 | Stop reason: HOSPADM

## 2022-04-01 RX ORDER — MAGNESIUM SULFATE 1 G/100ML
1000 INJECTION INTRAVENOUS ONCE
Status: COMPLETED | OUTPATIENT
Start: 2022-04-01 | End: 2022-04-01

## 2022-04-01 RX ORDER — TOPIRAMATE 50 MG/1
100 TABLET, FILM COATED ORAL NIGHTLY
COMMUNITY
End: 2022-06-30

## 2022-04-01 RX ORDER — RIVASTIGMINE TARTRATE 1.5 MG/1
4.5 CAPSULE ORAL 2 TIMES DAILY
Status: DISCONTINUED | OUTPATIENT
Start: 2022-04-01 | End: 2022-04-05 | Stop reason: HOSPADM

## 2022-04-01 RX ORDER — CETIRIZINE HYDROCHLORIDE 10 MG/1
10 TABLET ORAL DAILY
Status: DISCONTINUED | OUTPATIENT
Start: 2022-04-02 | End: 2022-04-05 | Stop reason: HOSPADM

## 2022-04-01 RX ORDER — ACETAMINOPHEN 650 MG/1
650 SUPPOSITORY RECTAL EVERY 6 HOURS PRN
Status: DISCONTINUED | OUTPATIENT
Start: 2022-04-01 | End: 2022-04-05 | Stop reason: HOSPADM

## 2022-04-01 RX ORDER — BUTALBITAL, ACETAMINOPHEN AND CAFFEINE 300; 40; 50 MG/1; MG/1; MG/1
1 CAPSULE ORAL EVERY 6 HOURS PRN
Status: DISCONTINUED | OUTPATIENT
Start: 2022-04-01 | End: 2022-04-05 | Stop reason: HOSPADM

## 2022-04-01 RX ORDER — BUDESONIDE AND FORMOTEROL FUMARATE DIHYDRATE 160; 4.5 UG/1; UG/1
2 AEROSOL RESPIRATORY (INHALATION) 2 TIMES DAILY
Status: DISCONTINUED | OUTPATIENT
Start: 2022-04-01 | End: 2022-04-05 | Stop reason: HOSPADM

## 2022-04-01 RX ORDER — ACETAMINOPHEN 325 MG/1
650 TABLET ORAL EVERY 6 HOURS PRN
Status: DISCONTINUED | OUTPATIENT
Start: 2022-04-01 | End: 2022-04-05 | Stop reason: HOSPADM

## 2022-04-01 RX ADMIN — BUDESONIDE AND FORMOTEROL FUMARATE DIHYDRATE 2 PUFF: 160; 4.5 AEROSOL RESPIRATORY (INHALATION) at 19:45

## 2022-04-01 RX ADMIN — ALBUTEROL SULFATE 2.5 MG: 2.5 SOLUTION RESPIRATORY (INHALATION) at 23:45

## 2022-04-01 RX ADMIN — BUTALBITA,ACETAMINOPHEN AND CAFFEINE 1 CAPSULE: 50; 300; 40 CAPSULE ORAL at 22:18

## 2022-04-01 RX ADMIN — CEFTRIAXONE SODIUM 1000 MG: 1 INJECTION, POWDER, FOR SOLUTION INTRAMUSCULAR; INTRAVENOUS at 14:28

## 2022-04-01 RX ADMIN — SODIUM CHLORIDE, PRESERVATIVE FREE 10 ML: 5 INJECTION INTRAVENOUS at 22:21

## 2022-04-01 RX ADMIN — ALBUTEROL SULFATE 2.5 MG: 2.5 SOLUTION RESPIRATORY (INHALATION) at 19:45

## 2022-04-01 RX ADMIN — SODIUM CHLORIDE 1000 ML: 9 INJECTION, SOLUTION INTRAVENOUS at 15:00

## 2022-04-01 RX ADMIN — MAGNESIUM SULFATE HEPTAHYDRATE 1000 MG: 1 INJECTION, SOLUTION INTRAVENOUS at 13:25

## 2022-04-01 RX ADMIN — METHYLPREDNISOLONE SODIUM SUCCINATE 125 MG: 125 INJECTION, POWDER, FOR SOLUTION INTRAMUSCULAR; INTRAVENOUS at 13:23

## 2022-04-01 RX ADMIN — METHYLPREDNISOLONE SODIUM SUCCINATE 60 MG: 125 INJECTION, POWDER, FOR SOLUTION INTRAMUSCULAR; INTRAVENOUS at 20:39

## 2022-04-01 RX ADMIN — ACETAMINOPHEN 1000 MG: 500 TABLET ORAL at 13:49

## 2022-04-01 RX ADMIN — SODIUM CHLORIDE 1000 ML: 9 INJECTION, SOLUTION INTRAVENOUS at 14:31

## 2022-04-01 RX ADMIN — ALBUTEROL SULFATE 4 PUFF: 90 AEROSOL, METERED RESPIRATORY (INHALATION) at 13:31

## 2022-04-01 RX ADMIN — AZITHROMYCIN MONOHYDRATE 500 MG: 500 INJECTION, POWDER, LYOPHILIZED, FOR SOLUTION INTRAVENOUS at 15:00

## 2022-04-01 RX ADMIN — RISPERIDONE 1.5 MG: 0.5 TABLET ORAL at 22:18

## 2022-04-01 RX ADMIN — Medication 5 MCG/MIN: at 16:20

## 2022-04-01 RX ADMIN — SODIUM CHLORIDE 1000 ML: 9 INJECTION, SOLUTION INTRAVENOUS at 13:50

## 2022-04-01 RX ADMIN — SODIUM CHLORIDE, POTASSIUM CHLORIDE, SODIUM LACTATE AND CALCIUM CHLORIDE: 600; 310; 30; 20 INJECTION, SOLUTION INTRAVENOUS at 20:44

## 2022-04-01 RX ADMIN — TRAZODONE HYDROCHLORIDE 50 MG: 50 TABLET ORAL at 22:18

## 2022-04-01 ASSESSMENT — ENCOUNTER SYMPTOMS
EYE PAIN: 0
ABDOMINAL DISTENTION: 0
NAUSEA: 0
VOMITING: 0
COUGH: 0
EYE REDNESS: 0
RHINORRHEA: 0
WHEEZING: 1
BLOOD IN STOOL: 0
ABDOMINAL PAIN: 0
SHORTNESS OF BREATH: 1
DIARRHEA: 0

## 2022-04-01 ASSESSMENT — PAIN SCALES - GENERAL
PAINLEVEL_OUTOF10: 8
PAINLEVEL_OUTOF10: 7

## 2022-04-01 NOTE — ED PROVIDER NOTES
EMERGENCY DEPARTMENT ENCOUNTER    Pt Name: Kamila Herman  MRN: [id number]  [place] 1955  Date of evaluation: 22  CHIEF COMPLAINT       Chief Complaint   Patient presents with    Shortness of Breath     HISTORY OF PRESENT ILLNESS     Shortness of Breath  Severity:  Severe  Onset quality:  Gradual  Duration:  1 day  Timing:  Constant  Progression:  Partially resolved  Chronicity:  Recurrent (copd)  Relieved by: breathing treatments at home. Worsened by:  Exertion  Associated symptoms: wheezing    Associated symptoms: no chest pain and no cough    on home oxygen 3 LNC at home  Dr Sarbjit Gaston is pulmology        REVIEW OF SYSTEMS     Review of Systems   Respiratory: Positive for shortness of breath and wheezing. Negative for cough. Cardiovascular: Negative for chest pain. All other systems reviewed and are negative. PASTMEDICAL HISTORY     Past Medical History:   Diagnosis Date    Abnormal EKG     Anxiety     Asthma     Bipolar disorder ([facility] [address]     [place] , [place]    [facility]chronic obstructive pulmonary disease) [facility]     Cramps, extremity     SEVERE LEG CRAMPS    Depression     Dilated bile duct     Headache     History of elective      Hyperlipidemia     Irritable bowel syndrome     Prolonged emergence from general anesthesia     SEVERE ISSUES WAKING UP    Substance abuse ([address]     street drugs when younger    Unspecified sleep apnea     Vision abnormalities     glasses     Past Problem List  Patient Active Problem List   Diagnosis Code    Chronic obstructive pulmonary disease (HCC) J44.9    Vitamin D deficiency E55.9    Anxiety F41.9    Asthma J45.909    Bipolar disorder ([address] F31.9    Depression F32. A    Hyperlipidemia with target LDL less than 100 E78.5    Sleep apnea G47.30    Vision abnormalities H53.9    Status post hysteroscopy Z98.890    Chronic headaches R51.9, G89.29    IBS (irritable bowel syndrome) K58.9    Colon polyp K63.5    Collagenous colitis K52.831    Lung nodule R91.1    Left elbow pain M25.522    Dilated bile duct K83.8    Acute pain of left shoulder M25.512    Adhesive capsulitis of left shoulder M75.02    Leg swelling M79.89    Leucopenia D72.819    Compression fracture of body of thoracic vertebra ([facility]) S22.000A    Age-related osteoporosis with current pathological fracture M80.00XA    Flank pain R10.9    Pulmonary embolism on right ([facility]) I26.99    Migraines G43.909    Paranoid behavior ([facility]) F22    Acute deep vein thrombosis (DVT) of right lower extremity ([facility]) I82.401    Delirium R41.0    Chronic respiratory failure with hypoxia ([facility]) J96.11    Major neurocognitive disorder ([facility]) F03.90    Pneumonia J18.9     SURGICAL HISTORY       Past Surgical History:   Procedure Laterality Date    ANKLE SURGERY      HAD SCREWS AND HARDWARE, THEN REMOVED    COLONOSCOPY  02/15/2018    tubular adenoma    EYE SURGERY Bilateral     CATARACTS    HYSTEROSCOPY  10/19/2016    D & C    INDUCED       LASIK      bilateral    TONSILLECTOMY AND ADENOIDECTOMY Bilateral     VULVA SURGERY      HAD A BIOPSY AND REMOVAL OF     CURRENT MEDICATIONS       Previous Medications    ACETAMINOPHEN (ACETAMINOPHEN EXTRA STRENGTH) 500 MG TABLET    take 2 tablets by mouth every 6 hours if needed for pain    ALBUTEROL (PROVENTIL) (2.5 MG/3ML) 0.083% NEBULIZER SOLUTION    Take 3 mLs by nebulization 4 times daily    ALBUTEROL SULFATE HFA (VENTOLIN HFA) 108 (90 BASE) MCG/ACT INHALER    Inhale 2 puffs into the lungs every 6 hours as needed for Wheezing    ASPIRIN 81 MG EC TABLET    Take 81 mg by mouth daily    BREO ELLIPTA 100-25 MCG/INH AEPB INHALER    Inhale 1 puff into the lungs daily     BUTALBITAL-ACETAMINOPHEN-CAFFEINE (FIORICET, ESGIC) -40 MG PER TABLET    Take 1 tablet by mouth every 6 hours as needed for Headaches Indications: #25 for 30 days filled 22    CALCIUM CARB-CHOLECALCIFEROL 600-500 MG-UNIT TABS    Take 2 tablets by mouth daily    CETIRIZINE (ZYRTEC) 10 MG TABLET    Take 10 mg by mouth daily    DOCUSATE SODIUM (COLACE) 100 MG CAPSULE    take 1 capsule by mouth twice a day    EPINEPHRINE (EPIPEN 2-SHARITA) 0.3 MG/0.3ML SOAJ INJECTION    Inject 0.3 mLs into the muscle once as needed (for allergic reaction) Use as directed for allergic reaction    FLUTICASONE (FLONASE) 50 MCG/ACT NASAL SPRAY    2 sprays by Nasal route daily    IBANDRONATE (BONIVA) 150 MG TABLET    Take 1 tablet by mouth every 30 days Take one (1) tablet once per month in the morning with a full glass of water, on an empty stomach, and do not take anything else by mouth or lie down for the next 30 minutes. LIDOCAINE (LMX) 4 % CREAM    apply topically every 8 hours if needed    OLOPATADINE (PATANOL) 0.1 % OPHTHALMIC SOLUTION    Place 1 drop into both eyes 2 times daily    RISPERIDONE (RISPERDAL) 0.5 MG TABLET    Take 3 tablets by mouth every 12 hours Per [facility] psych    RIVASTIGMINE (EXELON) 4.5 MG CAPSULE    take 1 capsule by mouth twice a day    SIMVASTATIN (ZOCOR) 20 MG TABLET    Take 1 tablet by mouth nightly    SODIUM CHLORIDE (OCEAN, BABY AYR) 0.65 % NASAL SPRAY    1 spray by Nasal route as needed for Congestion    SPIRIVA RESPIMAT 2.5 MCG/ACT AERS INHALER    inhale 2 puffs INTO THE LUNGS once daily    TOPIRAMATE (TOPAMAX) 50 MG TABLET    Take 50 mg by mouth daily     TOPIRAMATE (TOPAMAX) 50 MG TABLET    Take 100 mg by mouth at bedtime    TRAZODONE (DESYREL) 50 MG TABLET    Take 50 mg by mouth nightly     VITAMIN B-12 (CYANOCOBALAMIN) 100 MCG TABLET    Take 50 mcg by mouth daily    VITAMIN D (ERGOCALCIFEROL) 1.25 MG (20946 UT) CAPS CAPSULE    take 1 capsule by mouth every week     ALLERGIES     is allergic to advil [ibuprofen], aleve [naproxen], antipyrine, celecoxib, codeine, fomepizole, incruse ellipta [umeclidinium bromide], other, rofecoxib, salicylates, strawberry extract, sulfinpyrazone, aspirin, and nsaids.   FAMILY HISTORY     She indicated that her mother is . She indicated that her father is . She indicated that all of her three sisters are alive. She indicated that her brother is alive. She indicated that her maternal grandmother is . She indicated that her maternal grandfather is . She indicated that her paternal grandmother is . She indicated that her paternal grandfather is . She indicated that her maternal aunt is . SOCIAL HISTORY       Social History     Tobacco Use    Smoking status: Former Smoker     Packs/day: 2.00     Years: 42.00     Pack years: 84.00     Types: Cigarettes     Quit date: 2018     Years since quitting: 3.4    Smokeless tobacco: Never Used   Substance Use Topics    Alcohol use: Yes     Comment: yearly    Drug use: No     PHYSICAL EXAM     INITIAL VITALS: BP 96/82   Pulse 93   Temp 100.3 °F (37.9 °C) (Oral)   Resp 23   Wt 189 lb (85.7 kg)   LMP 2013 (Exact Date)   SpO2 98%   BMI 34.57 kg/m²    Physical Exam  Constitutional:       General: She is not in acute distress. Appearance: Normal appearance. She is well-developed. She is not diaphoretic. HENT:      Head: Normocephalic and atraumatic. Right Ear: External ear normal.      Left Ear: External ear normal.      Nose: Nose normal. No congestion. Mouth/Throat:      Mouth: Mucous membranes are moist.      Pharynx: Oropharynx is clear. Eyes:      General:         Right eye: No discharge. Left eye: No discharge. Conjunctiva/sclera: Conjunctivae normal.      Pupils: Pupils are equal, round, and reactive to light. Neck:      Trachea: No tracheal deviation. Cardiovascular:      Rate and Rhythm: Normal rate and regular rhythm. Pulses: Normal pulses. Heart sounds: Normal heart sounds. Pulmonary:      Effort: Pulmonary effort is normal. No respiratory distress. Breath sounds: No stridor. Wheezing present. No rales. Abdominal:      Palpations: Abdomen is soft. Tenderness: There is no abdominal tenderness. There is no guarding or rebound. Musculoskeletal:         General: No tenderness or deformity. Normal range of motion. Cervical back: Normal range of motion and neck supple. Right lower leg: No tenderness. Edema present. Left lower leg: No tenderness. Edema present. Skin:     General: Skin is warm and dry. Capillary Refill: Capillary refill takes less than 2 seconds. Findings: No erythema or rash. Neurological:      General: No focal deficit present. Mental Status: She is alert and oriented to person, place, and time. Coordination: Coordination normal.   Psychiatric:         Mood and Affect: Mood normal.         Behavior: Behavior normal.         Thought Content: Thought content normal.         Judgment: Judgment normal.         MEDICAL DECISION MAKING:       ED Course as of 04/01/22 1700   Fri Apr 01, 2022   1341 Echo last June:  Left ventricle is normal in size and wall thickness. Global left ventricular systolic function is normal. Estimated LV EF 65-70  %. [WM]   1341 Fever in ED  Suspect pneumonia  BC, LA, Urine cx  Iv abx  Iv fluids  Code sepsis protocol [WM]   12 DW Dr Mary Tyler for admit [WM]   26 DW [facility] residents for icu admit [WM]   80 DW Dr Baltazar Santana, will place central line [WM]   80 Patient getting iv fluid infusion, repeat sepsis exam completed [WM]   1548 3 liters ordered  Starting levophed [WM]   1600 Right IJ central line placed  Cxr lshows line in right SVC  Line ok to use [WM]      ED Course User Index  [WM] Jess Chung MD       CRITICAL CARE:   Due to the immediate potential for life-threatening deterioration due to septic shock , I spent 40 minutes providing critical care. This time is excluding time spent performing procedures. PROCEDURES:    Central Line    Date/Time: 4/1/2022 3:47 PM  Performed by: Jess Chung MD  Authorized by:  Jess Chung MD     Consent:     Consent obtained: Verbal    Consent given by:  Patient    Risks discussed:  Infection and bleeding    Alternatives discussed:  Alternative treatment  Pre-procedure details:     Hand hygiene: Hand hygiene performed prior to insertion      Sterile barrier technique: All elements of maximal sterile technique followed      Skin preparation:  ChloraPrep    Skin preparation agent: Skin preparation agent completely dried prior to procedure    Anesthesia (see MAR for exact dosages): Anesthesia method:  Local infiltration    Local anesthetic:  Lidocaine 1% w/o epi  Procedure details:     Location:  R internal jugular    Patient position:  Flat    Procedural supplies:  Triple lumen    Catheter size:  7 Fr    Landmarks identified: yes      Ultrasound guidance: yes      Number of attempts:  5 or more    Successful placement: yes    Post-procedure details:     Post-procedure:  Dressing applied and line sutured    Assessment:  Blood return through all ports and free fluid flow    Patient tolerance of procedure: Tolerated well, no immediate complications  Comments:      cxr ordered  Difficult to get first flash due to respiratory motion of IJ and SCM movement        DIAGNOSTIC RESULTS   EKG:All EKG's are interpreted by the Emergency Department Physician who either signs or Co-signs this chart in the absence of a cardiologist.  ST, nonspecific changes, some artifact, no acute ischemic changes on ST segments, no change compared to old, 124 bpm rate and normal intervals        RADIOLOGY:All plain film, CT, MRI, and formal ultrasound images (except ED bedside ultrasound) are read by the radiologist, see reports below, unless otherwisenoted in MDM or here. XR CHEST PORTABLE   Final Result   Satisfactory position of right internal jugular central line. No change in   the the right basal infiltrate. XR CHEST PORTABLE   Final Result   Bibasilar opacities, right greater than left, which may reflect multifocal   pneumonia.   Follow-up to imaging resolution is recommended. LABS: All lab results were reviewed by myself, and all abnormals are listed below.   Labs Reviewed   CBC WITH AUTO DIFFERENTIAL - Abnormal; Notable for the following components:       Result Value    WBC 16.8 (*)     RBC 3.64 (*)     Hemoglobin 10.9 (*)     Hematocrit 34.7 (*)     RDW 15.6 (*)     Seg Neutrophils 75 (*)     Lymphocytes 2 (*)     Bands 16 (*)     Metamyelocytes 2 (*)     Segs Absolute 12.59 (*)     Absolute Lymph # 0.34 (*)     Absolute Bands # 2.69 (*)     Metamyelocytes Absolute 0.34 (*)     All other components within normal limits   COMPREHENSIVE METABOLIC PANEL - Abnormal; Notable for the following components:    Glucose 115 (*)     BUN 28 (*)     CREATININE 1.01 (*)     Total Bilirubin 0.29 (*)     GFR Non- 55 (*)     All other components within normal limits   BRAIN NATRIURETIC PEPTIDE - Abnormal; Notable for the following components:    Pro- (*)     All other components within normal limits   TROPONIN - Abnormal; Notable for the following components:    Troponin, High Sensitivity 32 (*)     All other components within normal limits   TROPONIN - Abnormal; Notable for the following components:    Troponin, High Sensitivity 32 (*)     All other components within normal limits   COVID-19 & INFLUENZA COMBO   CULTURE, BLOOD 1   CULTURE, BLOOD 1   CULTURE, URINE   MAGNESIUM   LACTATE, SEPSIS   LACTATE, SEPSIS   URINALYSIS WITH REFLEX TO CULTURE       EMERGENCY DEPARTMENTCOURSE:         Vitals:    Vitals:    04/01/22 1432 04/01/22 1445 04/01/22 1457 04/01/22 1545   BP: (!) 82/47   96/82   Pulse: 105   93   Resp: 30   23   Temp:   100.3 °F (37.9 °C)    TempSrc:   Oral    SpO2: 97%   98%   Weight:  189 lb (85.7 kg)         The patient was given the following medications while in the emergency department:  Orders Placed This Encounter   Medications    albuterol sulfate  (90 Base) MCG/ACT inhaler 4 puff     Order Specific Question:   Initiate RT Bronchodilator Protocol     Answer: Yes    methylPREDNISolone sodium (SOLU-MEDROL) injection 125 mg    magnesium sulfate 1000 mg in dextrose 5% 100 mL IVPB    cefTRIAXone (ROCEPHIN) 1000 mg IVPB in 50 mL D5W minibag     Order Specific Question:   Antimicrobial Indications     Answer:   Pneumonia (CAP)    azithromycin (ZITHROMAX) 500 mg in D5W 250ml addavial     Order Specific Question:   Antimicrobial Indications     Answer:   Pneumonia (CAP)    0.9 % sodium chloride bolus    acetaminophen (TYLENOL) tablet 1,000 mg    0.9 % sodium chloride bolus    0.9 % sodium chloride bolus    norepinephrine (LEVOPHED) 16 mg in sodium chloride 0.9 % 250 mL infusion (non-weight-based)     Order Specific Question:   Titrate Infusion? Answer:   Yes     Order Specific Question:   Initial Infusion Dose: Answer:   5 mcg/min     Order Specific Question:   Goal of Therapy is: Answer:   MAP greater than 65 mmHg     Order Specific Question:   Contact Provider if:     Answer:   Patient is receiving the maximum dose and is not achieving the goal of therapy     CONSULTS:  IP CONSULT TO INTERNAL MEDICINE  IP CONSULT TO PULMONOLOGY    FINAL IMPRESSION      1. Pneumonia of both lungs due to infectious organism, unspecified part of lung    2. Septic shock ([facility] [address]          DISPOSITION/PLAN   DISPOSITION Admitted 04/01/2022 04:49:38 PM      PATIENT REFERRED TO:  No follow-up provider specified. DISCHARGE MEDICATIONS:  New Prescriptions    No medications on file     The care is provided during an unprecedented national emergency due to the novel coronavirus, COVID 19.   MD Artis Langford MD  04/01/22 1700

## 2022-04-01 NOTE — PROGRESS NOTES
Medication History completed:    New medications: sodium chloride nasal spray, aspirin, albuterol inhaler    Medications discontinued: biotin    Changes to dosing:   Topiramate changed to 50 mg in the morning and 100 mg (two 50 mg tablets) nightly    Stated allergies: As listed    Other pertinent information: Medications confirmed with [facility]. The patient reports taking her ibandronate on the 12th of each month and her ergocalciferol on Mondays.      Thank you,  Fernando Knott, PharmD, BCPS  [phone]

## 2022-04-01 NOTE — H&P
[address]    [facility]     HISTORY AND PHYSICAL EXAMINATION            Date:   4/1/2022  Patient name:  Latasha Wong  Date of admission:  4/1/2022  1:04 PM  MRN:   [id number]  Account:  [de-identified]  YOB: 1955  PCP:    Christoph Blanca MD  Room:   04/04  Code Status:    Prior    Chief Complaint:     Chief Complaint   Patient presents with    Shortness of Breath     History Obtained From:     patient, electronic medical record    History of Present Illness: The patient is a 74 y.o. Non- / non  female who presents withShortness of Breath   and she is admitted to the hospital for the management of respiratory distress. Patient w/ PMH of COPD, bipolar on trazodone, risperidone, DVT/ PE last May 2021, migraines,  reports yesterday afternoon she started experiencing increased SOB and headache. At baseline, she is on O2 3L at rest, 4L if she is ambulating. This AM, patient was dyspneic and tremulous and came to the hospital.   Patient reports she stopped smoking years ago, before she started to need home oxygen. She estimates she has needed oxygen since 2017. Patient reports she had a DVT/ PE last year in May 2021 for which she saw Dr. Emmie Cullen. She was put on eliquis, which was stopped in December 2022. Etiology of clot presumed to be smoking and sedentary lifestyle per hematology. Leukopenia secondary to risperidone. Patient also follow with neurology for migraines and Dr. Jazmin Kaufman for COPD.      She is not up to date on screening, including annual lung cancer screening, pap, mammogram, colonoscopy in 2018 found 2mm polyp w/ follow up 5 years.    -Febrile 103, RR 19, , BP 82/47, SpO2 97  -Code sepsis called in ED  -Central line was inserted  -Antibiotics started azithromycin, ceftriaxone  -Norepinephrine drip  -methylprednisolone 125mg  -Mg sulfate 1g   -4L O2 cannula    Past Medical History:     Past Medical History:   Diagnosis Date    Abnormal EKG     Anxiety     Asthma     Bipolar disorder ([facility] [address]     SEVERE IN , [place]    COPD (chronic obstructive pulmonary disease) ([facility])     Cramps, extremity     SEVERE LEG CRAMPS    Depression     Dilated bile duct     Headache     History of elective      Hyperlipidemia     Irritable bowel syndrome     Prolonged emergence from general anesthesia     SEVERE ISSUES WAKING UP    Substance abuse ([facility] [address]     street drugs when younger   Ralf Loser Unspecified sleep apnea     Vision abnormalities     glasses        Past SurgicalHistory:     Past Surgical History:   Procedure Laterality Date    ANKLE SURGERY      HAD SCREWS AND HARDWARE, THEN REMOVED    COLONOSCOPY  02/15/2018    tubular adenoma    EYE SURGERY Bilateral     CATARACTS    HYSTEROSCOPY  10/19/2016    D & C    INDUCED       LASIK      bilateral    TONSILLECTOMY AND ADENOIDECTOMY Bilateral     VULVA SURGERY      HAD A BIOPSY AND REMOVAL OF      Medications Prior to Admission:     Prior to Admission medications    Medication Sig Start Date End Date Taking?  Authorizing Provider   rivastigmine (EXELON) 4.5 MG capsule take 1 capsule by mouth twice a day 3/29/22   Christoph Blanca MD   acetaminophen (ACETAMINOPHEN EXTRA STRENGTH) 500 MG tablet take 2 tablets by mouth every 6 hours if needed for pain 3/22/22   Christoph Blanca MD   EPINEPHrine (EPIPEN 2-SHARITA) 0.3 MG/0.3ML SOAJ injection Inject 0.3 mLs into the muscle once as needed (for allergic reaction) Use as directed for allergic reaction 3/22/22 3/22/22  Christoph Blanca MD   docusate sodium (COLACE) 100 MG capsule take 1 capsule by mouth twice a day 3/9/22   Christoph Blanca MD   vitamin D (ERGOCALCIFEROL) 1.25 MG (51866 UT) CAPS capsule take 1 capsule by mouth every week 22   Christoph Blanca MD   ibandronate (BONIVA) 150 MG tablet Take 1 tablet by mouth every 30 days Take one (1) tablet once per month in the morning with a full glass of water, on an empty stomach, and do not take anything else by mouth or lie down for the next 30 minutes. 2/8/22   Mj Roman MD   SPIRIVA RESPIMAT 2.5 MCG/ACT AERS inhaler inhale 2 puffs INTO THE LUNGS once daily 1/10/22   Mj Roman MD   lidocaine (LMX) 4 % cream apply topically every 8 hours if needed 12/13/21   Mj Roman MD   butalbital-acetaminophen-caffeine (FIORICET, [facility]) -08 MG per tablet take 1-2 tablet by mouth every 8 hours if needed 11/29/21   Historical Provider, MD   topiramate (TOPAMAX) 50 MG tablet  12/7/21   Historical Provider, MD   vitamin B-12 (CYANOCOBALAMIN) 100 MCG tablet Take 50 mcg by mouth daily    Historical Provider, MD SHAD HAINESTA 100-25 MCG/INH AEPB inhaler inhale 1 puff INTO THE LUNGS once daily 8/13/21   Historical Provider, MD   simvastatin (ZOCOR) 20 MG tablet Take 1 tablet by mouth nightly 8/26/21   Mj Roman MD   Calcium 250 MG CAPS Take 250 mg by mouth daily 8/26/21   Mj Roman MD   cetirizine (ZYRTEC) 10 MG tablet Take 10 mg by mouth daily    Historical Provider, MD   fluticasone [facility]) 50 MCG/ACT nasal spray 2 sprays by Nasal route daily 8/5/21 8/5/22  Brittany Vasquez MD   risperiDONE (RISPERDAL) 0.5 MG tablet Take 3 tablets by mouth every 12 hours Per [facility] psych 8/5/21   Brittany Vasquez MD   traZODone (DESYREL) 50 MG tablet 50 mg nightly  7/28/21   Historical Provider, MD   albuterol (PROVENTIL) (2.5 MG/3ML) 0.083% nebulizer solution Take 3 mLs by nebulization 4 times daily 11/21/17   Sloane Hartley MD      Allergies:     Advil [ibuprofen], Aleve [naproxen], Antipyrine, Celecoxib, Codeine, Fomepizole, Incruse ellipta [umeclidinium bromide], Other, Rofecoxib, Salicylates, Strawberry extract, Sulfinpyrazone, Aspirin, and Nsaids    Social History:     Tobacco:    reports that she quit smoking about 3 years ago. Her smoking use included cigarettes.  She has a 84.00 pack-year smoking history. She has never used smokeless tobacco.  Alcohol:      reports current alcohol use. Drug Use:  reports no history of drug use. Family History:     Family History   Problem Relation Age of Onset    Dementia Maternal Aunt     Kidney Disease Mother     Heart Attack Sister     Prostate Cancer Father     High Cholesterol Brother     Heart Attack Paternal Grandmother      Review of Systems:     Positive and Negative as described in HPI. Review of Systems   Constitutional: Positive for chills. Negative for fever. HENT: Negative for congestion and rhinorrhea. Eyes: Negative for pain, redness and visual disturbance. Respiratory: Positive for shortness of breath. Cardiovascular: Positive for leg swelling. Negative for chest pain. Gastrointestinal: Negative for abdominal distention, abdominal pain, blood in stool, diarrhea, nausea and vomiting. Genitourinary: Negative for dysuria, frequency and urgency. Musculoskeletal: Negative for gait problem. Skin: Negative for rash and wound. Neurological: Positive for headaches (migraine yesterday). Negative for dizziness, syncope, speech difficulty and light-headedness. Psychiatric/Behavioral: Negative for agitation and behavioral problems. Physical Exam:   BP (!) 82/47   Pulse 105   Temp 103.1 °F (39.5 °C) (Oral)   Resp 30   LMP 2013 (Exact Date)   SpO2 97%   Temp (24hrs), Av.1 °F (39.5 °C), Min:103.1 °F (39.5 °C), Max:103.1 °F (39.5 °C)    No results for input(s): POCGLU in the last 72 hours. No intake or output data in the 24 hours ending 22 1451    Physical Exam  Constitutional:       General: She is not in acute distress. Appearance: She is ill-appearing. She is not toxic-appearing or diaphoretic. HENT:      Head: Normocephalic and atraumatic. Nose: No congestion or rhinorrhea. Eyes:      Extraocular Movements: Extraocular movements intact.       Conjunctiva/sclera: Conjunctivae Panel    Collection Time: 04/01/22  1:20 PM   Result Value Ref Range    Glucose 115 (H) 70 - 99 mg/dL    BUN 28 (H) 8 - 23 mg/dL    CREATININE 1.01 (H) 0.50 - 0.90 mg/dL    Calcium 9.3 8.6 - 10.4 mg/dL    Sodium 137 135 - 144 mmol/L    Potassium 4.0 3.7 - 5.3 mmol/L    Chloride 101 98 - 107 mmol/L    CO2 23 20 - 31 mmol/L    Anion Gap 13 9 - 17 mmol/L    Alkaline Phosphatase 81 35 - 104 U/L    ALT 12 5 - 33 U/L    AST 17 <32 U/L    Total Bilirubin 0.29 (L) 0.3 - 1.2 mg/dL    Total Protein 7.1 6.4 - 8.3 g/dL    Albumin 3.8 3.5 - 5.2 g/dL    GFR Non- 55 (L) >60 mL/min    GFR African American >60 >60 mL/min    GFR Comment         Brain Natriuretic Peptide    Collection Time: 04/01/22  1:20 PM   Result Value Ref Range    Pro- (H) <300 pg/mL   Troponin    Collection Time: 04/01/22  1:20 PM   Result Value Ref Range    Troponin, High Sensitivity 32 (H) 0 - 14 ng/L   Magnesium    Collection Time: 04/01/22  1:20 PM   Result Value Ref Range    Magnesium 1.6 1.6 - 2.6 mg/dL   COVID-19 & Influenza Combo    Collection Time: 04/01/22  1:35 PM    Specimen: Nasopharyngeal Swab   Result Value Ref Range    Specimen Description . NASOPHARYNGEAL SWAB     Source . NASOPHARYNGEAL SWAB     SARS-CoV-2 RNA, RT PCR Not Detected Not Detected    INFLUENZA A Not Detected Not Detected    INFLUENZA B Not Detected Not Detected   Lactate, Sepsis    Collection Time: 04/01/22  2:15 PM   Result Value Ref Range    Lactic Acid, Sepsis 1.5 0.5 - 1.9 mmol/L     Imaging/Diagnostics:  XR CHEST PORTABLE    Result Date: 4/1/2022  EXAMINATION: ONE XRAY VIEW OF THE CHEST 4/1/2022 1:22 pm COMPARISON: 06/17/2020. CT dated 10/15/2021. HISTORY: ORDERING SYSTEM PROVIDED HISTORY: dyspnea TECHNOLOGIST PROVIDED HISTORY: dyspnea Reason for Exam: Dyspnea Relevant Medical/Surgical History: Hx of COPD FINDINGS: The cardiac silhouette appears within normal limits.   There are bibasilar opacities, right greater than left which may reflect multifocal pneumonia in the correct clinical setting. No evidence of pleural effusion or pneumothorax is seen. Bibasilar opacities, right greater than left, which may reflect multifocal pneumonia. Follow-up to imaging resolution is recommended. Assessment :      Primary Problem  <principal problem not specified>    There are no active hospital problems to display for this patient. Plan:     Patient status Admit as inpatient in the  Medical ICU    Sepsis probably due to PNA  -Febrile, hypotensive, tachycardic, WBC 16.8  -4L nasal cannula  -Pressors started  -Sepsis fluids given  -Lactate 1.0  -Ceftriaxone 1g q24h, azithromycin 500mg q24h  -Blood cultures  -UA   -Strep, legionella, mycoplasma ordered  -  -Admit to ICU  -Pulm consulted    COPD  -On O2 at home, 2L  -Currently on 4L oxygen    TRAM  -Cr 1.01, baseline 0.59    Hx DVT/ PE  -Eliquis was discontinued in 12/2021    Bipolar disorder  Migraines    IVF: NS 100ml/hr w/ potassium  Diet: regular adult diet  GI ppx: pantoprazole 40mg daily   DVT ppx: enoxaparin 30mg daily or heparin 5000U tid  Code status: Full code  Consults:  Dispo: pending clinical course    Consultations:   IP CONSULT TO INTERNAL MEDICINE  IP CONSULT TO PULMONOLOGY    Patient is admitted as inpatient status because of co-morbidities listed above, severity of signs and symptoms as outlined, requirement for current medical therapies and most importantly because of direct risk to patient if care not provided in a hospital setting. Justina Parkinson MD  4/1/2022  2:51 PM    Copy sent to Dr. Jane Mondragon MD    Attestation and add on       I have discussed the care of Natasha Freeman , including pertinent history and exam findings,      4/1/22    with the resident. I have seen and examined the patient and the key elements of all parts of the encounter have been performed by me . I agree with the assessment, plan and orders as documented by the resident.      Principal Problem:    Septic shock due to undetermined organism [facility])  Active Problems:    Pneumonia    Chronic respiratory failure with hypoxia, on home O2 therapy (HCC)    COPD (chronic obstructive pulmonary disease) ([facility])    TRAM (acute kidney injury) ([facility] [address]    History of pulmonary embolus (PE)  Resolved Problems:    * No resolved hospital problems. *        ''''''     Condition  ;            [x] ill ,           [x] critical                  [] stable     [] improving    [] worsening      [] labile             [x] septic ,    [] delirium       [] debility---  [] on vent     [] shock             [] -end organ failure             [x] Multu organ insufficiency    Complexity of decision making ;           [] moderate    [x] high   Risk status M/M ;           [] moderate  [x] high       ----                 ''''       MD JUNO SteinNortheast [facility] Network  [address] [place] [address], [address] [place] [address].    Phone [phone]   Fax: [phone]  Answering Service: [phone]

## 2022-04-01 NOTE — ED NOTES
Pt resting comfortably, sheet provided, pt denies needs at this time. Bed in the lowest position, 2 side rails up. All IVs clamped and alcohol cap placed.  Daughter at bedside, plan of care updated, all questions answered     Ashley Arroyo RN  04/01/22 [id number]

## 2022-04-01 NOTE — ED NOTES
Bed in the lowest position, one side rail up, call light within reach. Patient updated on plan of care and denies needs.        Waqas Waddell RN  04/01/22 [id number]

## 2022-04-01 NOTE — CONSULTS
[facility]   CONSULT NOTE:      DATE OF CONSULT 4/1/2022    REASON FOR CONSULTATION:  Critical care management      PCP Alicia Jose MD     CHIEF COMPLAINT: Dyspnea, hypotension    HISTORY OF PRESENT ILLNESS:     Marbin is a very pleasant 44-year-old white female with stage IV COPD for about 19 years life taking care of for over a decade. In fact, I just seen her in the office Monday and she was doing quite well. She is maintained on oxygen 24 hours a day, Breo, Spiriva and albuterol which he takes every 4 hours. He had no complaints when I saw her and her PFTs have not changed significantly her FEV1 is 37% of predicted but was that unchanged from 2019. According to her daughter, she did well he the day afterwards and in fact they had lunch together. Today she felt ill and had increasing shortness of breath with wheezing. She increased her oxygen to 3 L, but she could not catch her breath. She denied any fevers or chills she denies any cough or sputum production. However she had right-sided chest pain. She had an elevated white count of 16.8. Her Covid testing and influenza testing was negative. Lactic acid also was not elevated. She has an 84+ pack year history of smoking. Quit in June 2021 when she had a pulmonary embolism.       ALLERGIES:  Allergies   Allergen Reactions    Advil [Ibuprofen] Other (See Comments)     vomiting    Aleve [Naproxen] Other (See Comments)     vomiting    Antipyrine Other (See Comments)     unknown    Celecoxib Other (See Comments)    Codeine      headache    Fomepizole     Incruse Ellipta [Umeclidinium Bromide]      confusion    Other      GRASS, TREES, WEEDS    Rofecoxib Other (See Comments)     Unknown reaction    Salicylates      Unknown reaction     Strawberry Extract      HEADACHES    Sulfinpyrazone Other (See Comments)     Unknown reaction    Aspirin Nausea And Vomiting, Other (See Comments) and Nausea Only  Nsaids Nausea Only, Other (See Comments) and Nausea And Vomiting       HOME MEDICATIONS:  Not in a hospital admission.       PAST MEDICAL HISTORY:  Past Medical History:   Diagnosis Date    Abnormal EKG     Anxiety     Asthma     Bipolar disorder ([address]     SEVERE IN , [place]    COPD (chronic obstructive pulmonary disease) (HCC)     Cramps, extremity     SEVERE LEG CRAMPS    Depression     Dilated bile duct     Headache     History of elective      Hyperlipidemia     Irritable bowel syndrome     Prolonged emergence from general anesthesia     SEVERE ISSUES WAKING UP    Substance abuse ([address]     street drugs when younger   Luisa Jose Enrique Unspecified sleep apnea     Vision abnormalities     glasses       PAST SURGICAL HISTORY:  Past Surgical History:   Procedure Laterality Date    ANKLE SURGERY      HAD SCREWS AND HARDWARE, THEN REMOVED    COLONOSCOPY  02/15/2018    tubular adenoma    EYE SURGERY Bilateral     CATARACTS    HYSTEROSCOPY  10/19/2016    D & C    INDUCED       LASIK      bilateral    TONSILLECTOMY AND ADENOIDECTOMY Bilateral     VULVA SURGERY      HAD A BIOPSY AND REMOVAL OF          SOCIAL HISTORY:  Social History     Socioeconomic History    Marital status:      Spouse name: Not on file    Number of children: Not on file    Years of education: Not on file    Highest education level: Not on file   Occupational History    Not on file   Tobacco Use    Smoking status: Former Smoker     Packs/day: 2.00     Years: 42.00     Pack years: 84.00     Types: Cigarettes     Quit date: 2018     Years since quitting: 3.4    Smokeless tobacco: Never Used   Substance and Sexual Activity    Alcohol use: Yes     Comment: yearly    Drug use: No    Sexual activity: Not Currently     Partners: Male     Birth control/protection: Post-menopausal   Other Topics Concern    Not on file   Social History Narrative    Not on file     Social Determinants of Health     Financial Resource Strain: High Risk    Difficulty of Paying Living Expenses: Very hard   Food Insecurity: No Food Insecurity    Worried About Running Out of Food in the Last Year: Never true    Agustin of Food in the Last Year: Never true   Transportation Needs:     Lack of Transportation (Medical): Not on file    Lack of Transportation (Non-Medical): Not on file   Physical Activity:     Days of Exercise per Week: Not on file    Minutes of Exercise per Session: Not on file   Stress:     Feeling of Stress : Not on file   Social Connections:     Frequency of Communication with Friends and Family: Not on file    Frequency of Social Gatherings with Friends and Family: Not on file    Attends [employer] Services: Not on file    Active Member of [address] [place] [address] [employer] or Organizations: Not on file    Attends Club or Organization Meetings: Not on file    Marital Status: Not on file   Intimate Partner Violence:     Fear of Current or Ex-Partner: Not on file    Emotionally Abused: Not on file    Physically Abused: Not on file    Sexually Abused: Not on file   Housing Stability:     Unable to Pay for Housing in the Last Year: Not on file    Number of [place] in the Last Year: Not on file    Unstable Housing in the Last Year: Not on file       FAMILY HISTORY:  Family History   Problem Relation Age of Onset    Dementia Maternal Aunt     Kidney Disease Mother     Heart Attack Sister     Prostate Cancer Father     High Cholesterol Brother     Heart Attack Paternal Grandmother        REVIEW OF SYSTEMS:  All other systems reviewed and are negative. PHYSICAL EXAM:  Vital Signs Blood pressure 113/73, pulse 77, temperature 100.3 °F (37.9 °C), temperature source Oral, resp. rate 21, weight 189 lb (85.7 kg), last menstrual period 05/20/2013, SpO2 98 %, not currently breastfeeding.   Oxygen Amount and Delivery: O2 Flow Rate (L/min): 3 L/min    Admission Weight Weight: 189 lb (85.7 kg)    General Appearance   Frail elderly female mildly tachypneic  Head  Normocephalic, without obvious abnormality, atraumatic    Eyes  conjunctivae/corneas clear. PERRL, EOM's intact. ENT oral cavity is clear  Neck  no adenopathy, no carotid bruit, no JVD, supple, symmetrical, trachea midline and thyroid not enlarged, symmetric, no tenderness/mass/nodules  Lungs diffuse wheezes bilaterally  Heart: regular rate and rhythm, S1, S2 normal, no murmur, click, rub or gallop  Abdomen  soft, non-tender; bowel sounds normal; no masses,  no organomegaly  Extremities  No edema  Skin  Skin color, texture, turgor normal. No rashes or lesions  Neurologic: Alert and oriented X 3, normal strength and tone.          Imaging  Chest x-ray shows new right lower lobe infiltrate        Lab Review  CBC     Lab Results   Component Value Date    WBC 16.8 04/01/2022    RBC 3.64 04/01/2022    RBC 0-2 07/08/2021    HGB 10.9 04/01/2022    HCT 34.7 04/01/2022     04/01/2022    MCV 95.4 04/01/2022    MCH 30.1 04/01/2022    MCHC 31.5 04/01/2022    RDW 15.6 04/01/2022    NRBC 0 07/08/2021    METASPCT 2 04/01/2022    LYMPHOPCT 2 04/01/2022    LYMPHOPCT 12.1 07/08/2021    MONOPCT 4 04/01/2022    MONOPCT 15.1 07/08/2021    BASOPCT 0 04/01/2022    BASOPCT 0.8 07/08/2021    MONOSABS 0.67 04/01/2022    MONOSABS 0.4 07/08/2021    LYMPHSABS 0.34 04/01/2022    LYMPHSABS 0.3 07/08/2021    EOSABS 0.17 04/01/2022    EOSABS 0.1 07/08/2021    BASOSABS 0.00 04/01/2022    DIFFTYPE NOT REPORTED 12/20/2021       BMP   Lab Results   Component Value Date     04/01/2022    K 4.0 04/01/2022     04/01/2022    CO2 23 04/01/2022    BUN 28 04/01/2022    CREATININE 1.01 04/01/2022    GLUCOSE 115 04/01/2022    GLUCOSE 127 07/08/2021    CALCIUM 9.3 04/01/2022       LFTS  Lab Results   Component Value Date    ALKPHOS 81 04/01/2022    ALT 12 04/01/2022    AST 17 04/01/2022    PROT 7.1 04/01/2022    PROT 5.7 07/08/2021    BILITOT 0.29 04/01/2022    BILIDIR 0.1 07/08/2021    IBILI 0.12 07/08/2019 LABALBU 3.8 04/01/2022    LABALBU 3.6 07/08/2021       INR  No results for input(s): PROTIME, INR in the last 72 hours. APTT  No results for input(s): APTT in the last 72 hours.     Lactic Acid  No results found for: LACTA     PRO-BNP   Recent Labs     04/01/22  1320   PROBNP 754*           ABGs:   Lab Results   Component Value Date    PHART 7.361 05/19/2016    PO2ART 67.1 05/19/2016    SKS9QPC 46.1 05/19/2016       Lab Results   Component Value Date    MODE NOT REPORTED 06/17/2021         Impression    Septic shock, pneumonia, right lower lobe  Dehydration  Stage IV COPD with exacerbation  History of tobacco use  Full code      Plan:      Continue Rocephin and Zithromax  Continue norepinephrine, titrate to keep systolic blood pressure greater than 100 or MAP greater than 60 (usually runs low blood pressures)  Albuterol aerosols every 4 hours like she takes at home  Continue Breo/Spiriva over here, they give her Symbicort instead  Solu-Medrol 60 every 6  IV fluid lactated Ringer's at 125 mL/h until blood pressure improved  DVT and GI prophylaxis  I did discuss with her CODE STATUS, she wishes to be a full code but she does not want a tracheostomy or feeding tube or prolonged ventilatory dependence  Discussed with her daughter at bedside  Critical care time 35 minutes

## 2022-04-01 NOTE — ED NOTES
Mode of arrival (squad #, walk in, police, etc) : walk        Chief complaint(s): SOB        Arrival Note (brief scenario, treatment PTA, etc). : Pt reports SOB since last night. Pt denies any triggers. Pt wears 3-4L of home O2 regularly, but felt she needed to raise herself up to 5L. Pt reports attempts breathing treatments and said they helped a little. Pt with a hx of COPD. C= \"Have you ever felt that you should Cut down on your drinking? \"  No  A= \"Have people Annoyed you by criticizing your drinking? \"  No  G= \"Have you ever felt bad or Guilty about your drinking? \"  No  E= \"Have you ever had a drink as an Eye-opener first thing in the morning to steady your nerves or to help a hangover? \"  No      Deferred []      Reason for deferring: N/A    *If yes to two or more: probable alcohol abuse. Fred Bridges RN  04/01/22 1788

## 2022-04-02 PROBLEM — J96.11 CHRONIC RESPIRATORY FAILURE WITH HYPOXIA, ON HOME O2 THERAPY (HCC): Status: ACTIVE | Noted: 2022-04-02

## 2022-04-02 PROBLEM — N17.9 AKI (ACUTE KIDNEY INJURY) (HCC): Status: ACTIVE | Noted: 2022-04-02

## 2022-04-02 PROBLEM — R65.21 SEPTIC SHOCK DUE TO UNDETERMINED ORGANISM (HCC): Status: ACTIVE | Noted: 2022-04-02

## 2022-04-02 PROBLEM — Z86.711 HISTORY OF PULMONARY EMBOLUS (PE): Status: ACTIVE | Noted: 2022-04-02

## 2022-04-02 PROBLEM — Z99.81 CHRONIC RESPIRATORY FAILURE WITH HYPOXIA, ON HOME O2 THERAPY (HCC): Status: ACTIVE | Noted: 2022-04-02

## 2022-04-02 PROBLEM — A41.9 SEPTIC SHOCK DUE TO UNDETERMINED ORGANISM (HCC): Status: ACTIVE | Noted: 2022-04-02

## 2022-04-02 PROBLEM — J44.9 COPD (CHRONIC OBSTRUCTIVE PULMONARY DISEASE) (HCC): Status: ACTIVE | Noted: 2022-04-02

## 2022-04-02 LAB
ABSOLUTE BANDS #: 3.91 K/UL (ref 0–1)
ABSOLUTE EOS #: 0 K/UL (ref 0–0.4)
ABSOLUTE LYMPH #: 0.62 K/UL (ref 1–4.8)
ABSOLUTE MONO #: 0.82 K/UL (ref 0.1–1.3)
ANION GAP SERPL CALCULATED.3IONS-SCNC: 9 MMOL/L (ref 9–17)
BANDS: 19 % (ref 0–10)
BASOPHILS # BLD: 0 % (ref 0–2)
BASOPHILS ABSOLUTE: 0 K/UL (ref 0–0.2)
BUN BLDV-MCNC: 18 MG/DL (ref 8–23)
CALCIUM SERPL-MCNC: 8.4 MG/DL (ref 8.6–10.4)
CHLORIDE BLD-SCNC: 104 MMOL/L (ref 98–107)
CO2: 23 MMOL/L (ref 20–31)
CREAT SERPL-MCNC: 0.73 MG/DL (ref 0.5–0.9)
CULTURE: NO GROWTH
EKG ATRIAL RATE: 124 BPM
EKG P AXIS: 82 DEGREES
EKG P-R INTERVAL: 188 MS
EKG Q-T INTERVAL: 274 MS
EKG QRS DURATION: 82 MS
EKG QTC CALCULATION (BAZETT): 393 MS
EKG R AXIS: -96 DEGREES
EKG T AXIS: 62 DEGREES
EKG VENTRICULAR RATE: 124 BPM
EOSINOPHILS RELATIVE PERCENT: 0 % (ref 0–4)
GFR AFRICAN AMERICAN: >60 ML/MIN
GFR NON-AFRICAN AMERICAN: >60 ML/MIN
GFR SERPL CREATININE-BSD FRML MDRD: ABNORMAL ML/MIN/{1.73_M2}
GLUCOSE BLD-MCNC: 182 MG/DL (ref 70–99)
HCT VFR BLD CALC: 30.2 % (ref 36–46)
HEMOGLOBIN: 9.4 G/DL (ref 12–16)
LACTIC ACID: 1.2 MMOL/L (ref 0.5–2.2)
LEGIONELLA PNEUMOPHILIA AG, URINE: NEGATIVE
LYMPHOCYTES # BLD: 3 % (ref 24–44)
MCH RBC QN AUTO: 29.7 PG (ref 26–34)
MCHC RBC AUTO-ENTMCNC: 31.3 G/DL (ref 31–37)
MCV RBC AUTO: 95.1 FL (ref 80–100)
METAMYELOCYTES ABSOLUTE COUNT: 0.21 K/UL
METAMYELOCYTES: 1 %
MONOCYTES # BLD: 4 % (ref 1–7)
MORPHOLOGY: ABNORMAL
PDW BLD-RTO: 15.2 % (ref 11.5–14.9)
PLATELET # BLD: 202 K/UL (ref 150–450)
PMV BLD AUTO: 7.6 FL (ref 6–12)
POTASSIUM SERPL-SCNC: 3.9 MMOL/L (ref 3.7–5.3)
RBC # BLD: 3.17 M/UL (ref 4–5.2)
SEG NEUTROPHILS: 73 % (ref 36–66)
SEGMENTED NEUTROPHILS ABSOLUTE COUNT: 15.04 K/UL (ref 1.3–9.1)
SODIUM BLD-SCNC: 136 MMOL/L (ref 135–144)
SPECIMEN DESCRIPTION: NORMAL
WBC # BLD: 20.6 K/UL (ref 3.5–11)

## 2022-04-02 PROCEDURE — 6360000002 HC RX W HCPCS: Performed by: INTERNAL MEDICINE

## 2022-04-02 PROCEDURE — 6360000002 HC RX W HCPCS

## 2022-04-02 PROCEDURE — 94640 AIRWAY INHALATION TREATMENT: CPT

## 2022-04-02 PROCEDURE — 83605 ASSAY OF LACTIC ACID: CPT

## 2022-04-02 PROCEDURE — 6370000000 HC RX 637 (ALT 250 FOR IP)

## 2022-04-02 PROCEDURE — 99233 SBSQ HOSP IP/OBS HIGH 50: CPT | Performed by: INTERNAL MEDICINE

## 2022-04-02 PROCEDURE — 6370000000 HC RX 637 (ALT 250 FOR IP): Performed by: INTERNAL MEDICINE

## 2022-04-02 PROCEDURE — 2580000003 HC RX 258

## 2022-04-02 PROCEDURE — 85025 COMPLETE CBC W/AUTO DIFF WBC: CPT

## 2022-04-02 PROCEDURE — 2700000000 HC OXYGEN THERAPY PER DAY

## 2022-04-02 PROCEDURE — 93010 ELECTROCARDIOGRAM REPORT: CPT | Performed by: INTERNAL MEDICINE

## 2022-04-02 PROCEDURE — 36415 COLL VENOUS BLD VENIPUNCTURE: CPT

## 2022-04-02 PROCEDURE — 94761 N-INVAS EAR/PLS OXIMETRY MLT: CPT

## 2022-04-02 PROCEDURE — 87449 NOS EACH ORGANISM AG IA: CPT

## 2022-04-02 PROCEDURE — 2580000003 HC RX 258: Performed by: INTERNAL MEDICINE

## 2022-04-02 PROCEDURE — 2000000000 HC ICU R&B

## 2022-04-02 PROCEDURE — 80048 BASIC METABOLIC PNL TOTAL CA: CPT

## 2022-04-02 PROCEDURE — 2500000003 HC RX 250 WO HCPCS: Performed by: EMERGENCY MEDICINE

## 2022-04-02 RX ADMIN — RIVASTIGMINE TARTRATE 4.5 MG: 1.5 CAPSULE ORAL at 08:07

## 2022-04-02 RX ADMIN — METHYLPREDNISOLONE SODIUM SUCCINATE 60 MG: 125 INJECTION, POWDER, FOR SOLUTION INTRAMUSCULAR; INTRAVENOUS at 00:19

## 2022-04-02 RX ADMIN — ENOXAPARIN SODIUM 40 MG: 40 INJECTION SUBCUTANEOUS at 08:06

## 2022-04-02 RX ADMIN — RISPERIDONE 1.5 MG: 0.5 TABLET ORAL at 09:22

## 2022-04-02 RX ADMIN — CEFTRIAXONE SODIUM 1000 MG: 1 INJECTION, POWDER, FOR SOLUTION INTRAMUSCULAR; INTRAVENOUS at 13:00

## 2022-04-02 RX ADMIN — SODIUM CHLORIDE, PRESERVATIVE FREE 10 ML: 5 INJECTION INTRAVENOUS at 08:08

## 2022-04-02 RX ADMIN — RIVASTIGMINE TARTRATE 4.5 MG: 1.5 CAPSULE ORAL at 00:18

## 2022-04-02 RX ADMIN — ACETAMINOPHEN 650 MG: 325 TABLET ORAL at 21:10

## 2022-04-02 RX ADMIN — SODIUM CHLORIDE, POTASSIUM CHLORIDE, SODIUM LACTATE AND CALCIUM CHLORIDE: 600; 310; 30; 20 INJECTION, SOLUTION INTRAVENOUS at 05:10

## 2022-04-02 RX ADMIN — CETIRIZINE HYDROCHLORIDE 10 MG: 10 TABLET, FILM COATED ORAL at 08:06

## 2022-04-02 RX ADMIN — ATORVASTATIN CALCIUM 20 MG: 20 TABLET, FILM COATED ORAL at 08:06

## 2022-04-02 RX ADMIN — ALBUTEROL SULFATE 2.5 MG: 2.5 SOLUTION RESPIRATORY (INHALATION) at 20:06

## 2022-04-02 RX ADMIN — ASPIRIN 81 MG: 81 TABLET, COATED ORAL at 08:06

## 2022-04-02 RX ADMIN — BUDESONIDE AND FORMOTEROL FUMARATE DIHYDRATE 2 PUFF: 160; 4.5 AEROSOL RESPIRATORY (INHALATION) at 07:21

## 2022-04-02 RX ADMIN — BUTALBITA,ACETAMINOPHEN AND CAFFEINE 1 CAPSULE: 50; 300; 40 CAPSULE ORAL at 14:19

## 2022-04-02 RX ADMIN — METHYLPREDNISOLONE SODIUM SUCCINATE 60 MG: 125 INJECTION, POWDER, FOR SOLUTION INTRAMUSCULAR; INTRAVENOUS at 08:06

## 2022-04-02 RX ADMIN — CALCIUM CARBONATE 600 MG (1,500 MG)-VITAMIN D3 400 UNIT TABLET 2 TABLET: at 08:06

## 2022-04-02 RX ADMIN — TRAZODONE HYDROCHLORIDE 50 MG: 50 TABLET ORAL at 21:03

## 2022-04-02 RX ADMIN — METHYLPREDNISOLONE SODIUM SUCCINATE 60 MG: 125 INJECTION, POWDER, FOR SOLUTION INTRAMUSCULAR; INTRAVENOUS at 12:50

## 2022-04-02 RX ADMIN — SODIUM CHLORIDE: 9 INJECTION, SOLUTION INTRAVENOUS at 12:59

## 2022-04-02 RX ADMIN — ALBUTEROL SULFATE 2.5 MG: 2.5 SOLUTION RESPIRATORY (INHALATION) at 16:07

## 2022-04-02 RX ADMIN — SODIUM CHLORIDE, PRESERVATIVE FREE 10 ML: 5 INJECTION INTRAVENOUS at 21:03

## 2022-04-02 RX ADMIN — BUDESONIDE AND FORMOTEROL FUMARATE DIHYDRATE 2 PUFF: 160; 4.5 AEROSOL RESPIRATORY (INHALATION) at 20:06

## 2022-04-02 RX ADMIN — RIVASTIGMINE TARTRATE 4.5 MG: 1.5 CAPSULE ORAL at 21:04

## 2022-04-02 RX ADMIN — ALBUTEROL SULFATE 2.5 MG: 2.5 SOLUTION RESPIRATORY (INHALATION) at 11:00

## 2022-04-02 RX ADMIN — TIOTROPIUM BROMIDE INHALATION SPRAY 2 PUFF: 3.12 SPRAY, METERED RESPIRATORY (INHALATION) at 07:21

## 2022-04-02 RX ADMIN — AZITHROMYCIN MONOHYDRATE 500 MG: 500 INJECTION, POWDER, LYOPHILIZED, FOR SOLUTION INTRAVENOUS at 14:27

## 2022-04-02 RX ADMIN — ALBUTEROL SULFATE 2.5 MG: 2.5 SOLUTION RESPIRATORY (INHALATION) at 07:21

## 2022-04-02 RX ADMIN — Medication 5 MCG/MIN: at 21:06

## 2022-04-02 RX ADMIN — RISPERIDONE 1.5 MG: 0.5 TABLET ORAL at 21:03

## 2022-04-02 RX ADMIN — BUTALBITA,ACETAMINOPHEN AND CAFFEINE 1 CAPSULE: 50; 300; 40 CAPSULE ORAL at 08:06

## 2022-04-02 RX ADMIN — SODIUM CHLORIDE, POTASSIUM CHLORIDE, SODIUM LACTATE AND CALCIUM CHLORIDE: 600; 310; 30; 20 INJECTION, SOLUTION INTRAVENOUS at 12:52

## 2022-04-02 RX ADMIN — METHYLPREDNISOLONE SODIUM SUCCINATE 60 MG: 125 INJECTION, POWDER, FOR SOLUTION INTRAMUSCULAR; INTRAVENOUS at 18:43

## 2022-04-02 ASSESSMENT — PAIN DESCRIPTION - PAIN TYPE
TYPE: ACUTE PAIN
TYPE: ACUTE PAIN

## 2022-04-02 ASSESSMENT — ENCOUNTER SYMPTOMS
VOMITING: 0
SHORTNESS OF BREATH: 1
NAUSEA: 0
ABDOMINAL PAIN: 0
EYE REDNESS: 0
COUGH: 0
ABDOMINAL DISTENTION: 0

## 2022-04-02 ASSESSMENT — PAIN SCALES - GENERAL
PAINLEVEL_OUTOF10: 6
PAINLEVEL_OUTOF10: 0
PAINLEVEL_OUTOF10: 7
PAINLEVEL_OUTOF10: 0
PAINLEVEL_OUTOF10: 8

## 2022-04-02 ASSESSMENT — PAIN DESCRIPTION - LOCATION
LOCATION: LEG
LOCATION: HEAD

## 2022-04-02 ASSESSMENT — PAIN DESCRIPTION - ORIENTATION: ORIENTATION: RIGHT;LEFT

## 2022-04-02 NOTE — CARE COORDINATION
CASE MANAGEMENT NOTE:    Admission Date:  4/1/2022 Andres Lim is a 74 y.o.  female    Admitted for : Pneumonia [J18.9]  Septic shock ([facility] [A41.9, R65.21]  Pneumonia of both lungs due to infectious organism, unspecified part of lung [J18.9]    Met with:  Patient    PCP:  Marjan Gates                                Insurance:  Kenneth Sawant      Is patient alert and oriented at time of discussion:  Yes    Current Residence/ Living Arrangements:  independently at home , Alone            Current Services PTA:  Yes, VNS    Does patient go to outpatient dialysis: No  If yes, location and chair time: NA    Is patient agreeable to VNS: Yes    Freedom of choice provided:  Yes    List of [address] provided: No    VNS chosen:  Yes,Continue w/ Gera Zepeda on Tuesday's, [address], from [place], notified, of admission. Referral sent. DME:  shower chair    Home Oxygen: Yes, Wears 3.5 LNC, 24/7, Concentrator/Port    Nebulizer: Yes    CPAP/BIPAP: No    Supplier: [facility]    Potential Assistance Needed: No    SNF needed: No    Freedom of choice and list provided: NA    Pharmacy:  [address] [place] [address] on [facility]       Does Patient want to use MEDS to BEDS? No    Is patient currently receiving oral anticoagulation therapy? No    Is the Patient an [facility] with Readmission Risk Score greater than 14%? No  If yes, pt needs a follow up appointment made within 7 days. Family Members/Caregivers that pt would like involved in their care:    Yes    If yes, list name here:  POA, Daughter, Margaret Oconnor    Transportation Provider:  Daughter             Discharge Plan:  4/2/22 [employer] Pt. Lives alone in 1 story home. DME- SC, Oxygen, wears 3.5 LNC, 24/7, Port/Concentrator/Neb, from [facility] [place]. Current w/ VNS, Ariana, Referral sent, notified, of admission. IV Zithromax/Rocephin, IV steroids, 60 Q6, Levophed, 94% on3LNC. Clare will need signed/completed.  Will follow//KB                Electronically signed by: Alvaro Patel RN on 4/2/2022 at 1:27 PM

## 2022-04-02 NOTE — PLAN OF CARE
Problem: Pain:  Description: Pain management should include both nonpharmacologic and pharmacologic interventions. Goal: Pain level will decrease  Description: Pain level will decrease  4/2/2022 0456 by Morena Tran RN  Outcome: Ongoing  Note: Patients pain level decreased with PRN medications. 4/2/2022 0455 by Morena Tran RN  Outcome: Ongoing  4/2/2022 0454 by Morena Tran RN  Outcome: Ongoing  Goal: Control of acute pain  Description: Control of acute pain  4/2/2022 0456 by Morena Tran RN  Outcome: Ongoing  4/2/2022 0455 by Morena Tran RN  Outcome: Ongoing  4/2/2022 0454 by Morena Tran RN  Outcome: Ongoing  Goal: Control of chronic pain  Description: Control of chronic pain  4/2/2022 0456 by Morena Tran RN  Outcome: Ongoing  4/2/2022 0455 by Morena Tran RN  Outcome: Ongoing  4/2/2022 0454 by Morena Tran RN  Outcome: Ongoing     Problem: Falls - Risk of:  Goal: Will remain free from falls  Description: Will remain free from falls  4/2/2022 0456 by Morena Tran RN  Outcome: Ongoing  Note: No falls during this shift. Call light is within reach. Side rails up x2 with bed in lowest position. Patient safety maintained. 4/2/2022 0455 by Morena Tran RN  Outcome: Ongoing  Goal: Absence of physical injury  Description: Absence of physical injury  4/2/2022 0456 by Morena Tarn RN  Outcome: Ongoing  Note: No injury this shift. Patient safety maintained.    4/2/2022 0455 by Morena Tran RN  Outcome: Ongoing

## 2022-04-02 NOTE — DISCHARGE INSTR - COC
Continuity of Care Form    Patient Name: Andres Lim   :  1955  MRN:  [id number]    Admit date:  2022  Discharge date:  2022    Code Status Order: Full Code   Advance Directives:      Admitting Physician:  Catrachita Smith MD  PCP: Marjan Gates MD    Discharging Nurse: [facility] Unit/Room#:   Discharging Unit Phone Number: ***    Emergency Contact:   Extended Emergency Contact Information  Primary Emergency Contact: Manasa Oviedo *hipaa,Calli  Address: [address] Phone: [phone]  Work Phone: [phone]  Mobile Phone: [phone]  Relation: Child    Past Surgical History:  Past Surgical History:   Procedure Laterality Date    ANKLE SURGERY      HAD SCREWS AND HARDWARE, THEN REMOVED    COLONOSCOPY  02/15/2018    tubular adenoma    EYE SURGERY Bilateral     CATARACTS    HYSTEROSCOPY  10/19/2016    D & C    INDUCED       LASIK      bilateral    TONSILLECTOMY AND ADENOIDECTOMY Bilateral     VULVA SURGERY      HAD A BIOPSY AND REMOVAL OF       Immunization History:   Immunization History   Administered Date(s) Administered    COVID-19, Jaron Hedricks, Primary or Immunocompromised, PF, 100mcg/0.5mL 2021, 2021    Influenza Virus Vaccine 10/15/2018    Influenza, MDCK Quadv, IM, PF (Flucelvax 2 yrs and older) 2020, 2021    Influenza, Quadv, IM, PF (6 mo and older Fluzone, Flulaval, Fluarix, and 3 yrs and older Afluria) 10/03/2016, 2017, 2018, 10/30/2019    Pneumococcal Conjugate 13-valent (Qcqmowu50) 2021    Pneumococcal Polysaccharide (Iushokwno34) 2016, 2021    Tdap (Boostrix, Adacel) 2016    Zoster Recombinant (Shingrix) 2018, 2018, 2018       Active Problems:  Patient Active Problem List   Diagnosis Code    Chronic [facility] pulmonary [facility] ([facility] J44.9    Vitamin D deficiency E55.9    Anxiety F41.9    Asthma J45.909    Bipolar disorder ([facility] F31.9    Depression F32. A    Hyperlipidemia with target LDL less than 100 E78.5    Sleep apnea G47.30    Vision abnormalities H53.9    Status post hysteroscopy Z98.890    Chronic headaches R51.9, G89.29    IBS (irritable bowel syndrome) K58.9    Colon polyp K63.5    Collagenous colitis K52.831    Lung nodule R91.1    Left elbow pain M25.522    Dilated bile duct K83.8    Acute pain of left shoulder M25.512    Adhesive capsulitis of left shoulder M75.02    Leg swelling M79.89    Leucopenia D72.819    Compression fracture of body of thoracic vertebra ([facility]) S22.000A    Age-related osteoporosis with current pathological fracture M80.00XA    Flank pain R10.9    Pulmonary embolism on right ([facility]) I26.99    Migraines G43.909    Paranoid behavior ([facility]) F22    Acute deep vein thrombosis (DVT) of right lower extremity ([facility]) I82.401    Delirium R41.0    Chronic respiratory failure with hypoxia ([facility]) J96.11    Major neurocognitive disorder ([facility]) F03.90    Pneumonia J18.9    Septic shock due to undetermined organism ([facility]) A41.9, R65.21    Chronic respiratory failure with hypoxia, on home O2 therapy ([facility]) J96.11, Z99.81    COPD (chronic obstructive pulmonary disease) ([facility]) J44.9    TRAM (acute kidney injury) ([facility]) N17.9    History of pulmonary embolus (PE) Z86.711       Isolation/Infection:   Isolation            No Isolation          Patient Infection Status       Infection Onset Added Last Indicated Last Indicated By Review Planned Expiration Resolved Resolved By    None active    Resolved    COVID-19 (Rule Out) 22 COVID-19 & Influenza Combo (Ordered)   22 Rule-Out Test Resulted    COVID-19 (Rule Out) 21 COVID-19, Rapid (Ordered)   21             Nurse Assessment:  Last Vital Signs: BP (!) 109/47   Pulse 63   Temp 98.1 °F (36.7 °C) (Oral)   Resp 21   Ht 5' 5\" (1.651 m)   Wt 188 lb 15 oz (85.7 kg)   LMP 2013 (Exact Date)   SpO2 94%   BMI 31.44 kg/m² Last documented pain score (0-10 scale): Pain Level: 0  Last Weight:   Wt Readings from Last 1 Encounters:   04/02/22 188 lb 15 oz (85.7 kg)     Mental Status:  oriented and alert    IV Access:  - None    Nursing Mobility/ADLs:  Walking   Assisted  Transfer  Assisted  Bathing  Assisted  Dressing  Assisted  Toileting  Assisted  Feeding  Assisted  Med Admin  Assisted  Med Delivery   whole    Wound Care Documentation and Therapy:        Elimination:  Continence: Bowel: Yes  Bladder: Yes  Urinary Catheter: None   Colostomy/Ileostomy/Ileal Conduit: No       Date of Last BM: ***    Intake/Output Summary (Last 24 hours) at 4/2/2022 1320  Last data filed at 4/2/2022 1101  Gross per 24 hour   Intake 2639.41 ml   Output 1400 ml   Net 1239.41 ml     I/O last 3 completed shifts: In: 1480.3 [P.O.:240; I.V.:1240.3]  Out: 1400 [Urine:1400]    Safety Concerns: At Risk for Falls    Impairments/Disabilities:      None    Nutrition Therapy:  Current Nutrition Therapy:   - Oral Diet:  General    Routes of Feeding: Oral  Liquids: No Straws  Daily Fluid Restriction: no  Last Modified Barium Swallow with Video (Video Swallowing Test): not done    Treatments at the Time of Hospital Discharge:   Respiratory Treatments: ***  Oxygen Therapy:  is on oxygen at 3.5 L/min per nasal cannula. Ventilator:    - No ventilator support    Rehab Therapies: Physical Therapy and Occupational Therapy  Weight Bearing Status/Restrictions: No weight bearing restrictions  Other Medical Equipment (for information only, NOT a DME order): Shower Chair  Other Treatments: Skilled Nursing Assessment Per Protocol. Medication Education & Monitoring. Resume previous services as PTA.     Patient's personal belongings (please select all that are sent with patient):  None    RN SIGNATURE:  Electronically signed by lApa Byers RN on 4/5/22 at 6:28 PM EDT    CASE MANAGEMENT/SOCIAL WORK SECTION    Inpatient Status Date: ***    Readmission Risk Assessment Score:  Readmission Risk              Risk of Unplanned Readmission:  21           Discharging to [facility]  [address]   [address]  P: [phone]  F: [phone]     Dialysis Facility (if applicable)   Name:  Address:  Dialysis Schedule:  Phone:  Fax:    / signature: Electronically signed by Victor Manuel Burk RN on 4/2/22 at 1:22 PM EDT    PHYSICIAN SECTION    Prognosis: {Prognosis:3832741289}    Condition at Discharge: [facility] Patient Condition:683021939}    Rehab Potential (if transferring to Rehab): {Prognosis:2508900359}    Recommended Labs or Other Treatments After Discharge: ***    Physician Certification: I certify the above information and transfer of Schuyler Tabor  is necessary for the continuing treatment of the diagnosis listed and that she requires {Admit to Appropriate Level of Care:41518} for {GREATER/LESS:974942784} 30 days.      Update Admission H&P: {CHP DME Changes in HMTUM:110231440}    PHYSICIAN SIGNATURE:  Electronically signed by Ara Lindsey MD on 4/5/22 at 12:24 PM EDT

## 2022-04-02 NOTE — PROGRESS NOTES
Osorio Morrow MD/Vasyl Junior MD/ Faby Garcia MD/Jose Bryan APRVIBHA JIMENESP-BC, NP-C      Carmelita Wong APRN NP-C     Prashanth Oliveros APRN NP-C                                           Pulmonary Critical Care Progress Note    Patient - Brianne Kirkpatrick   Age - 74 y.o.   - 1955  MRN - [id number]  Acct # - [de-identified]  Date of Admission - 2022  1:04 PM    Consulting Service/Physician:       Primary Care Physician: Soni Ronquillo MD    SUBJECTIVE:     Chief Complaint:   Chief Complaint   Patient presents with    Shortness of Breath   Pneumonia  Subjective:    Feeling better. Still some RLL pleurisy. Minimal cough. Appetite poor but drinking fine. Has chronic LE edema she tells me. VITALS  BP (!) 106/49   Pulse 54   Temp 97.4 °F (36.3 °C) (Axillary)   Resp 22   Ht 5' 5\" (1.651 m)   Wt 188 lb 15 oz (85.7 kg)   LMP 2013 (Exact Date)   SpO2 94%   BMI 31.44 kg/m²   Wt Readings from Last 3 Encounters:   22 188 lb 15 oz (85.7 kg)   21 195 lb 14.4 oz (88.9 kg)   21 194 lb (88 kg)     I/O (24 Hours)    Intake/Output Summary (Last 24 hours) at 2022 1302  Last data filed at 2022 1101  Gross per 24 hour   Intake 2639.41 ml   Output 1400 ml   Net 1239.41 ml     Ventilator:   Settings  FiO2 : 32 %  Exam:   Physical Exam   Constitutional:  Alert and cooperative, obese  HENT: Unremarkable, NC in place  Head: Normocephalic and atraumatic. Eyes: EOM are normal. Pupils are equal, round, and reactive to light. Neck: Neck supple. Right jugular line  Cardiovascular:  Regular rate and rhythm. Normal heart tones. No JVD. Pulmonary/Chest: severely diminished with fine rales RLL  Abdominal: Soft. Bowel sounds are normal. There is no tenderness. obese  Musculoskeletal: Normal range of motion. She exhibits no edema or tenderness.    Neurological:patient is alert and oriented to person, place, and time.   Skin: Skin is warm and dry. No rash noted.    Extremities: 1+ edema both legs  Infusions:      norepinephrine 10 mcg/min (04/02/22 1101)    sodium chloride 25 mL/hr at 04/02/22 1259    lactated ringers 125 mL/hr at 04/02/22 1252     Meds:     Current Facility-Administered Medications:     norepinephrine (LEVOPHED) 16 mg in sodium chloride 0.9 % 250 mL infusion (non-weight-based), 1-100 mcg/min, IntraVENous, Continuous, Melissa Chahal MD, Last Rate: 9.4 mL/hr at 04/02/22 1101, 10 mcg/min at 04/02/22 1101    albuterol (PROVENTIL) nebulizer solution 2.5 mg, 2.5 mg, Nebulization, 4x Daily, Raffaele Moran MD, 2.5 mg at 04/02/22 1100    aspirin EC tablet 81 mg, 81 mg, Oral, Daily, Raffaele Moran MD, 81 mg at 04/02/22 0806    calcium carbonate w/vitamin D (CALTRATE) 600-400 MG-UNIT per tab 2 tablet, 2 tablet, Oral, Daily, Raffaele Moran MD, 2 tablet at 04/02/22 0806    cetirizine (ZYRTEC) tablet 10 mg, 10 mg, Oral, Daily, Raffaele Moran MD, 10 mg at 04/02/22 6427    atorvastatin (LIPITOR) tablet 20 mg, 20 mg, Oral, Daily, Raffaele Moran MD, 20 mg at 04/02/22 0806    risperiDONE (RISPERDAL) tablet 1.5 mg, 1.5 mg, Oral, Q12H, Raffaele Moran MD, 1.5 mg at 04/02/22 0922    butalbital-APAP-caffeine -40 MG per capsule 1 capsule, 1 capsule, Oral, Q6H PRN, Raffaele Moran MD, 1 capsule at 04/02/22 0806    traZODone (DESYREL) tablet 50 mg, 50 mg, Oral, Nightly, Raffaele Moran MD, 50 mg at 04/01/22 2218    sodium chloride flush 0.9 % injection 5-40 mL, 5-40 mL, IntraVENous, 2 times per day, Raffaele Moran MD, 10 mL at 04/02/22 0808    sodium chloride flush 0.9 % injection 5-40 mL, 5-40 mL, IntraVENous, PRN, Raffaele Moran MD    0.9 % sodium chloride infusion, , IntraVENous, PRN, Raffaele Moran MD, Last Rate: 25 mL/hr at 04/02/22 1259, New Bag at 04/02/22 1259    enoxaparin (LOVENOX) injection 40 mg, 40 mg, SubCUTAneous, Daily, Raffaele Moran MD, 40 mg at 04/02/22 0806    ondansetron (ZOFRAN-ODT) disintegrating tablet 4 mg, 4 mg, Oral, Q8H PRN **OR** ondansetron (ZOFRAN) injection 4 mg, 4 mg, IntraVENous, Q6H PRN, Leanna Wade MD    polyethylene glycol [facility]) packet 17 g, 17 g, Oral, Daily PRN, Leanna Wade MD    acetaminophen (TYLENOL) tablet 650 mg, 650 mg, Oral, Q6H PRN **OR** acetaminophen (TYLENOL) suppository 650 mg, 650 mg, Rectal, Q6H PRN, Leanna Wade MD    cefTRIAXone (ROCEPHIN) 1000 mg IVPB in 50 mL D5W minibag, 1,000 mg, IntraVENous, Q24H, Leanna Wade MD, Last Rate: 100 mL/hr at 04/02/22 1300, 1,000 mg at 04/02/22 1300    azithromycin (ZITHROMAX) 500 mg in D5W 250ml addavial, 500 mg, IntraVENous, Q24H, Leanna Wade MD    lactated ringers infusion, , IntraVENous, Continuous, Kev Demarco MD, Last Rate: 125 mL/hr at 04/02/22 1252, New Bag at 04/02/22 1252    methylPREDNISolone sodium (SOLU-MEDROL) injection 60 mg, 60 mg, IntraVENous, Q6H, Kev Demarco MD, 60 mg at 04/02/22 1250    tiotropium (SPIRIVA RESPIMAT) 2.5 MCG/ACT inhaler 2 puff, 2 puff, Inhalation, Daily, Kev Demarco MD, 2 puff at 04/02/22 0721    budesonide-formoterol (SYMBICORT) 160-4.5 MCG/ACT inhaler 2 puff, 2 puff, Inhalation, BID, Kev Demarco MD, 2 puff at 04/02/22 0721    rivastigmine (EXELON) capsule 4.5 mg, 4.5 mg, Oral, BID, Leanna Wade MD, 4.5 mg at 04/02/22 5667    Lab Results:     Lab Results   Component Value Date    WBC 20.6 (H) 04/02/2022    HGB 9.4 (L) 04/02/2022    HCT 30.2 (L) 04/02/2022    MCV 95.1 04/02/2022     04/02/2022     Lab Results   Component Value Date    CALCIUM 8.4 (L) 04/02/2022     04/02/2022    K 3.9 04/02/2022    CO2 23 04/02/2022     04/02/2022    BUN 18 04/02/2022    CREATININE 0.73 04/02/2022     No components found for: ABGSAMPLETYP, ABGBODYTEMP, ABGPHCORRFOR, [id number], ABGPHCORRFOOR, ABGPH, ABGPCO2, ABGPO2, ABGBASEEXCES, ABGBASEDEFIC, ABGHCO3, TXLJ5VNL, ABGENDTIDALC, ABGALLENSTES, ABGSPO2, ABGSAMEPLESIT, [id number], ABGOXYGENSOU  Lab Results   Component Value Date    INR 1.20 (H) 07/08/2021    PROTIME 15.2 (H) 07/08/2021 Radiology:   [unfilled]  My reading of film: no new CXR    ASSESSMENT:       1. Septic shock, pneumonia, right lower lobe, Levophed at 6  2. Dehydration  3. Stage IV COPD with exacerbation, FEV1 37%, Dr Mirian Fisher  4. Chronic hypoxemic respiratory failure-O2 dependent 3L  5. History of tobacco use quit June 2021, >80 pk years   6. History of DVT/PE-was on Eliquis>> now just ASA  7. Full code-no trach/PEG or prolonged ventilation  PLAN:   1. Stop IVF-drinking fine and has fair amount of LE edema  2. Wean levophed as tolerated  3. CXR in AM  4. Continue abx  5. Not ready to leave ICU till off pressors    >30mCCT-weaning pressors and reviewing history    Electronically signed by Oh Sebastian MD on 04/02/22     This progress note was completed using a voice transcription system. Every effort was made to ensure accuracy. However, inadvertent computerized transcription errors may be present.     Rose Mensah MD  Pulmonary and Critical Care   [phone] [facility]  [phone] Cell

## 2022-04-02 NOTE — PROGRESS NOTES
BRONCHOSPASM/BRONCHOCONSTRICTION     [x]         IMPROVE AERATION/BREATH SOUNDS  [x]   ADMINISTER BRONCHODILATOR THERAPY AS APPROPRIATE  [x]   ASSESS BREATH SOUNDS  []   IMPLEMENT AEROSOL/MDI PROTOCOL  [x]   PATIENT EDUCATION AS NEEDED    PROVIDE ADEQUATE OXYGENATION WITH ACCEPTABLE SP02/ABG'S    [x]  IDENTIFY APPROPRIATE OXYGEN THERAPY  [x]   MONITOR SP02/ABG'S AS NEEDED   [x]   PATIENT EDUCATION AS NEEDED    Pt currently on 3lnc SpO2 95% diminished breath sounds through out.

## 2022-04-02 NOTE — PROGRESS NOTES
[address]    PROGRESS NOTE             4/2/2022    7:42 AM    Name:   Magan Prescott  MRN:     [id number]     Acct:      [de-identified]   Room:   2013/2013-01  IP Day:  1  Admit Date:  4/1/2022  1:04 PM    PCP:  Daniel Fan MD  Code Status:  Full Code    Subjective:     C/C:   Chief Complaint   Patient presents with    Shortness of Breath     Patient is examined at bedside in ICU. Patient is alert. Patient reports she has a headache similar to her migraines. Pain is 9 out of 10. Patient was given butalbital combo med. Her breathing is labored above baseline. Oxygen is on 2.5 L. Satting 94%. BP are soft, remains on pressor. Reports ongoing pain in right lateral abdominal area. Otherwise, denies any CP, abdominal pain, LE pain, nausea, vomiting. Brief History:     The patient is a 74 y.o. Non- / non  female who presents withShortness of Breath   and she is admitted to the hospital for the management of respiratory distress.     Patient w/ PMH of COPD, bipolar on trazodone, risperidone, DVT/ PE last May 2021, migraines,  reports yesterday afternoon she started experiencing increased SOB and headache. At baseline, she is on O2 3L at rest, 4L if she is ambulating. This AM, patient was dyspneic and tremulous and came to the hospital.   Patient reports she stopped smoking years ago, before she started to need home oxygen. She estimates she has needed oxygen since 2017. Patient reports she had a DVT/ PE last year in May 2021 for which she saw Dr. Bertin Stallings. She was put on eliquis, which was stopped in December 2022. Etiology of clot presumed to be smoking and sedentary lifestyle per hematology. Leukopenia secondary to risperidone.  Patient also follow with neurology for migraines and Dr. Daryl Mauricio for COPD.      She is not up to date on screening, including annual lung cancer screening, pap, mammogram, colonoscopy in 2018 found 2mm polyp w/ follow up 5 years.     -Febrile 103, RR 19, , BP 82/47, SpO2 97  -Code sepsis called in ED  -Central line was inserted  -Antibiotics started azithromycin, ceftriaxone  -Norepinephrine drip  -methylprednisolone 125mg  -Mg sulfate 1g   -4L O2 cannula    Review of Systems:     Review of Systems   Constitutional: Negative for fever. HENT: Negative for congestion. Eyes: Negative for redness and visual disturbance. Respiratory: Positive for shortness of breath. Negative for cough. Cardiovascular: Negative for chest pain and leg swelling. Gastrointestinal: Negative for abdominal distention, abdominal pain (some discomfort, pt reports she had OJ which worsens GERD), nausea and vomiting. Genitourinary: Negative for dysuria, frequency and urgency. Musculoskeletal: Negative for gait problem. Skin: Negative for rash and wound. Neurological: Positive for headaches. Negative for speech difficulty. Psychiatric/Behavioral: Negative for agitation and behavioral problems. Medications: Allergies:     Allergies   Allergen Reactions    Advil [Ibuprofen] Other (See Comments)     vomiting    Aleve [Naproxen] Other (See Comments)     vomiting    Antipyrine Other (See Comments)     unknown    Celecoxib Other (See Comments)    Codeine      headache    Fomepizole     Incruse Ellipta [Umeclidinium Bromide]      confusion    Other      GRASS, TREES, WEEDS    Rofecoxib Other (See Comments)     Unknown reaction    Salicylates      Unknown reaction     Strawberry Extract      HEADACHES    Sulfinpyrazone Other (See Comments)     Unknown reaction    Aspirin Nausea And Vomiting, Other (See Comments) and Nausea Only    Nsaids Nausea Only, Other (See Comments) and Nausea And Vomiting       Current Meds:   Scheduled Meds:    albuterol  2.5 mg Nebulization 4x Daily    aspirin  81 mg Oral Daily    calcium carbonate w/vitamin D  2 tablet Oral Daily    cetirizine  10 mg Oral Daily    atorvastatin  20 mg Oral Daily    risperiDONE  1.5 mg Oral Q12H    traZODone  50 mg Oral Nightly    sodium chloride flush  5-40 mL IntraVENous 2 times per day    enoxaparin  40 mg SubCUTAneous Daily    cefTRIAXone (ROCEPHIN) IV  1,000 mg IntraVENous Q24H    azithromycin  500 mg IntraVENous Q24H    methylPREDNISolone  60 mg IntraVENous Q6H    tiotropium  2 puff Inhalation Daily    budesonide-formoterol  2 puff Inhalation BID    rivastigmine  4.5 mg Oral BID     Continuous Infusions:    norepinephrine 5 mcg/min (22)    sodium chloride      lactated ringers 125 mL/hr at 22 0510     PRN Meds: butalbital-APAP-caffeine, sodium chloride flush, sodium chloride, ondansetron **OR** ondansetron, polyethylene glycol, acetaminophen **OR** acetaminophen    Data:     Past Medical History:   has a past medical history of Abnormal EKG, Anxiety, Asthma, Bipolar disorder ([facility] [address] COPD (chronic obstructive pulmonary disease) ([facility] [address] Cramps, extremity, Depression, Dilated bile duct, Headache, History of elective , Hyperlipidemia, Irritable bowel syndrome, Prolonged emergence from general anesthesia, Substance abuse ([facility] [address] Unspecified sleep apnea, and Vision abnormalities. Social History:   reports that she quit smoking about 3 years ago. Her smoking use included cigarettes. She has a 84.00 pack-year smoking history. She has never used smokeless tobacco. She reports current alcohol use. She reports that she does not use drugs.      Family History:   Family History   Problem Relation Age of Onset    Dementia Maternal Aunt     Kidney Disease Mother     Heart Attack Sister     Prostate Cancer Father     High Cholesterol Brother     Heart Attack Paternal Grandmother        Vitals:  BP (!) 100/50   Pulse 52   Temp 97.6 °F (36.4 °C) (Oral)   Resp 13   Ht 5' 5\" (1.651 m)   Wt 188 lb 15 oz (85.7 kg)   LMP 2013 (Exact Date)   SpO2 94%   BMI 31.44 kg/m²   Temp (24hrs), Av.6 °F (37.6 °C), Exam  Constitutional:       General: She is not in acute distress. Appearance: She is not ill-appearing, toxic-appearing or diaphoretic. HENT:      Head: Normocephalic and atraumatic. Nose: No congestion or rhinorrhea. Eyes:      Extraocular Movements: Extraocular movements intact. Conjunctiva/sclera: Conjunctivae normal.   Cardiovascular:      Rate and Rhythm: Normal rate and regular rhythm. Pulses: Normal pulses. Heart sounds: Normal heart sounds. Pulmonary:      Effort: Pulmonary effort is normal. No respiratory distress. Breath sounds: Rhonchi (lower right lobe, lower left) present. No wheezing. Abdominal:      General: Bowel sounds are normal. There is no distension. Palpations: Abdomen is soft. Tenderness: There is no abdominal tenderness. There is no guarding. Musculoskeletal:      Right lower leg: No edema. Left lower leg: No edema. Skin:     General: Skin is warm and dry. Findings: No rash. Neurological:      General: No focal deficit present. Mental Status: She is alert.    Psychiatric:         Mood and Affect: Mood normal.         Behavior: Behavior normal.       Assessment:        Primary Problem  Pneumonia    Active Hospital Problems    Diagnosis Date Noted    Pneumonia [J18.9] 04/01/2022     Plan:        Sepsis probably due to multifocal PNA  -Febrile, hypotensive, tachycardic, WBC 16.8 > 20  -4L nasal cannula > 2.5L currently, increased to 3L  -Norepinephrine 5mcg/min  -Sepsis fluids given  -  -Lactate 1.0  -Ceftriaxone 1g q24h, azithromycin 500mg q24h  -Blood cultures NGTD  -UA   -Strep, legionella, mycoplasma ordered  -  -Admit to ICU  -Pulm consulted     Chronic respiratory failure due to COPD stage IV  -On O2 at home, 2L  -Currently on 4L oxygen     TRAM, resolving  -Cr 1.01 > 0.73, baseline 0.59     Hx DVT/ PE  -Eliquis was discontinued in 12/2021     Bipolar disorder  Migraines     IVF:   Diet: regular adult

## 2022-04-02 NOTE — PLAN OF CARE
Problem: Pain:  Goal: Control of acute pain  Description: Control of acute pain  4/2/2022 1804 by Paul Gill RN  Outcome: Met This Shift    Problem: Falls - Risk of:  Goal: Will remain free from falls  Description: Will remain free from falls  4/2/2022 1804 by Paul Gill RN  Outcome: Met This Shift  Note: Hourly rounds performed. No fall this shift. Call light next to patient. Pt able to express needs. Problem: Falls - Risk of:  Goal: Absence of physical injury  Description: Absence of physical injury  4/2/2022 1804 by Paul Gill RN  Outcome: Met This Shift  Problem: Pain:  Goal: Pain level will decrease  Description: Pain level will decrease  4/2/2022 1804 by Paul Gill RN  Outcome: Ongoing    Problem: Skin Integrity:  Goal: Will show no infection signs and symptoms  Description: Will show no infection signs and symptoms  Outcome: Met This Shift  Note: No new signs of skin breakdown. Pt self turns.

## 2022-04-02 NOTE — PROGRESS NOTES
Patient arrives to ICU room 2013 via ED bed at this time. Patient wearing nasal cannula at 2 liters with no distress noted. Patient transfers self to bed with no issues. Bed locked and lowered and call light is within reach. Bedside monitor applied and vitals obtained. Admission assessment to be completed.

## 2022-04-02 NOTE — ACP (ADVANCE CARE PLANNING)
Advance Care Planning     Advance Care Planning Activator (Inpatient)  Conversation Note      Date of ACP Conversation: 4/2/2022     Conversation Conducted with: Patient with Decision Making Capacity    ACP Activator: Courtney Avila RN      Health Care Decision Maker:     Current Designated Health Care Decision Maker: Mary Jo Dawkins, Daughter, [phone]  Care Preferences    Ventilation: \"If you were in your present state of health and suddenly became very ill and were unable to breathe on your own, what would your preference be about the use of a ventilator (breathing machine) if it were available to you? \"      Would the patient desire the use of ventilator (breathing machine)?: yes    \"If your health worsens and it becomes clear that your chance of recovery is unlikely, what would your preference be about the use of a ventilator (breathing machine) if it were available to you? \"     Would the patient desire the use of ventilator (breathing machine)?: No      Resuscitation  \"CPR works best to restart the heart when there is a sudden event, like a heart attack, in someone who is otherwise healthy. Unfortunately, CPR does not typically restart the heart for people who have serious health conditions or who are very sick. \"    \"In the event your heart stopped as a result of an underlying serious health condition, would you want attempts to be made to restart your heart (answer \"yes\" for attempt to resuscitate) or would you prefer a natural death (answer \"no\" for do not attempt to resuscitate)? \" yes       [] Yes   [] No   Educated Patient / Mame Oliveros regarding differences between Advance Directives and portable DNR orders.     Length of ACP Conversation in minutes:      Conversation Outcomes:  [] ACP discussion completed  [] Existing advance directive reviewed with patient; no changes to patient's previously recorded wishes  [] New Advance Directive completed  [] Portable Do Not Rescitate prepared for Provider review and signature  [] POLST/POST/MOLST/MOST prepared for Provider review and signature      Follow-up plan:    [] Schedule follow-up conversation to continue planning  [] Referred individual to Provider for additional questions/concerns   [] Advised patient/agent/surrogate to review completed ACP document and update if needed with changes in condition, patient preferences or care setting    [x] This note routed to one or more involved healthcare providers

## 2022-04-03 ENCOUNTER — APPOINTMENT (OUTPATIENT)
Dept: GENERAL RADIOLOGY | Age: 67
DRG: 720 | End: 2022-04-03
Payer: COMMERCIAL

## 2022-04-03 LAB
ANION GAP SERPL CALCULATED.3IONS-SCNC: 12 MMOL/L (ref 9–17)
BUN BLDV-MCNC: 25 MG/DL (ref 8–23)
CALCIUM SERPL-MCNC: 8.4 MG/DL (ref 8.6–10.4)
CHLORIDE BLD-SCNC: 106 MMOL/L (ref 98–107)
CO2: 22 MMOL/L (ref 20–31)
CREAT SERPL-MCNC: 0.74 MG/DL (ref 0.5–0.9)
GFR AFRICAN AMERICAN: >60 ML/MIN
GFR NON-AFRICAN AMERICAN: >60 ML/MIN
GFR SERPL CREATININE-BSD FRML MDRD: ABNORMAL ML/MIN/{1.73_M2}
GLUCOSE BLD-MCNC: 264 MG/DL (ref 70–99)
POTASSIUM SERPL-SCNC: 4.2 MMOL/L (ref 3.7–5.3)
SODIUM BLD-SCNC: 140 MMOL/L (ref 135–144)

## 2022-04-03 PROCEDURE — 2000000000 HC ICU R&B

## 2022-04-03 PROCEDURE — 6360000002 HC RX W HCPCS: Performed by: INTERNAL MEDICINE

## 2022-04-03 PROCEDURE — 6370000000 HC RX 637 (ALT 250 FOR IP): Performed by: STUDENT IN AN ORGANIZED HEALTH CARE EDUCATION/TRAINING PROGRAM

## 2022-04-03 PROCEDURE — 36415 COLL VENOUS BLD VENIPUNCTURE: CPT

## 2022-04-03 PROCEDURE — 2580000003 HC RX 258: Performed by: INTERNAL MEDICINE

## 2022-04-03 PROCEDURE — 6360000002 HC RX W HCPCS

## 2022-04-03 PROCEDURE — 99233 SBSQ HOSP IP/OBS HIGH 50: CPT | Performed by: INTERNAL MEDICINE

## 2022-04-03 PROCEDURE — 6370000000 HC RX 637 (ALT 250 FOR IP)

## 2022-04-03 PROCEDURE — 2580000003 HC RX 258

## 2022-04-03 PROCEDURE — 94761 N-INVAS EAR/PLS OXIMETRY MLT: CPT

## 2022-04-03 PROCEDURE — 80048 BASIC METABOLIC PNL TOTAL CA: CPT

## 2022-04-03 PROCEDURE — 97530 THERAPEUTIC ACTIVITIES: CPT

## 2022-04-03 PROCEDURE — 71045 X-RAY EXAM CHEST 1 VIEW: CPT

## 2022-04-03 PROCEDURE — 87641 MR-STAPH DNA AMP PROBE: CPT

## 2022-04-03 PROCEDURE — 6370000000 HC RX 637 (ALT 250 FOR IP): Performed by: INTERNAL MEDICINE

## 2022-04-03 PROCEDURE — 94640 AIRWAY INHALATION TREATMENT: CPT

## 2022-04-03 PROCEDURE — 97161 PT EVAL LOW COMPLEX 20 MIN: CPT

## 2022-04-03 PROCEDURE — 2700000000 HC OXYGEN THERAPY PER DAY

## 2022-04-03 RX ORDER — METHYLPREDNISOLONE SODIUM SUCCINATE 40 MG/ML
40 INJECTION, POWDER, LYOPHILIZED, FOR SOLUTION INTRAMUSCULAR; INTRAVENOUS EVERY 8 HOURS
Status: DISCONTINUED | OUTPATIENT
Start: 2022-04-03 | End: 2022-04-05

## 2022-04-03 RX ORDER — TRAMADOL HYDROCHLORIDE 50 MG/1
50 TABLET ORAL ONCE
Status: COMPLETED | OUTPATIENT
Start: 2022-04-03 | End: 2022-04-03

## 2022-04-03 RX ADMIN — SALINE NASAL SPRAY 1 SPRAY: 1.5 SOLUTION NASAL at 14:20

## 2022-04-03 RX ADMIN — ASPIRIN 81 MG: 81 TABLET, COATED ORAL at 08:07

## 2022-04-03 RX ADMIN — METHYLPREDNISOLONE SODIUM SUCCINATE 60 MG: 125 INJECTION, POWDER, FOR SOLUTION INTRAMUSCULAR; INTRAVENOUS at 12:13

## 2022-04-03 RX ADMIN — TRAMADOL HYDROCHLORIDE 50 MG: 50 TABLET, FILM COATED ORAL at 09:39

## 2022-04-03 RX ADMIN — ALBUTEROL SULFATE 2.5 MG: 2.5 SOLUTION RESPIRATORY (INHALATION) at 20:09

## 2022-04-03 RX ADMIN — RISPERIDONE 1.5 MG: 0.5 TABLET ORAL at 21:41

## 2022-04-03 RX ADMIN — ALBUTEROL SULFATE 2.5 MG: 2.5 SOLUTION RESPIRATORY (INHALATION) at 07:25

## 2022-04-03 RX ADMIN — BUDESONIDE AND FORMOTEROL FUMARATE DIHYDRATE 2 PUFF: 160; 4.5 AEROSOL RESPIRATORY (INHALATION) at 20:09

## 2022-04-03 RX ADMIN — ALBUTEROL SULFATE 2.5 MG: 2.5 SOLUTION RESPIRATORY (INHALATION) at 15:52

## 2022-04-03 RX ADMIN — BUDESONIDE AND FORMOTEROL FUMARATE DIHYDRATE 2 PUFF: 160; 4.5 AEROSOL RESPIRATORY (INHALATION) at 07:25

## 2022-04-03 RX ADMIN — TIOTROPIUM BROMIDE INHALATION SPRAY 2 PUFF: 3.12 SPRAY, METERED RESPIRATORY (INHALATION) at 07:25

## 2022-04-03 RX ADMIN — SODIUM CHLORIDE, PRESERVATIVE FREE 10 ML: 5 INJECTION INTRAVENOUS at 08:09

## 2022-04-03 RX ADMIN — ALBUTEROL SULFATE 2.5 MG: 2.5 SOLUTION RESPIRATORY (INHALATION) at 11:21

## 2022-04-03 RX ADMIN — RIVASTIGMINE TARTRATE 4.5 MG: 1.5 CAPSULE ORAL at 09:34

## 2022-04-03 RX ADMIN — METHYLPREDNISOLONE SODIUM SUCCINATE 60 MG: 125 INJECTION, POWDER, FOR SOLUTION INTRAMUSCULAR; INTRAVENOUS at 08:08

## 2022-04-03 RX ADMIN — BUTALBITA,ACETAMINOPHEN AND CAFFEINE 1 CAPSULE: 50; 300; 40 CAPSULE ORAL at 15:28

## 2022-04-03 RX ADMIN — CALCIUM CARBONATE 600 MG (1,500 MG)-VITAMIN D3 400 UNIT TABLET 2 TABLET: at 08:07

## 2022-04-03 RX ADMIN — ENOXAPARIN SODIUM 40 MG: 40 INJECTION SUBCUTANEOUS at 08:07

## 2022-04-03 RX ADMIN — SODIUM CHLORIDE, PRESERVATIVE FREE 10 ML: 5 INJECTION INTRAVENOUS at 20:22

## 2022-04-03 RX ADMIN — BUTALBITA,ACETAMINOPHEN AND CAFFEINE 1 CAPSULE: 50; 300; 40 CAPSULE ORAL at 21:41

## 2022-04-03 RX ADMIN — CETIRIZINE HYDROCHLORIDE 10 MG: 10 TABLET, FILM COATED ORAL at 08:07

## 2022-04-03 RX ADMIN — CEFEPIME HYDROCHLORIDE 2000 MG: 2 INJECTION, POWDER, FOR SOLUTION INTRAVENOUS at 20:12

## 2022-04-03 RX ADMIN — METHYLPREDNISOLONE SODIUM SUCCINATE 40 MG: 40 INJECTION, POWDER, FOR SOLUTION INTRAMUSCULAR; INTRAVENOUS at 20:07

## 2022-04-03 RX ADMIN — METHYLPREDNISOLONE SODIUM SUCCINATE 60 MG: 125 INJECTION, POWDER, FOR SOLUTION INTRAMUSCULAR; INTRAVENOUS at 01:02

## 2022-04-03 RX ADMIN — ATORVASTATIN CALCIUM 20 MG: 20 TABLET, FILM COATED ORAL at 08:08

## 2022-04-03 RX ADMIN — ACETAMINOPHEN 650 MG: 325 TABLET ORAL at 08:07

## 2022-04-03 RX ADMIN — TRAZODONE HYDROCHLORIDE 50 MG: 50 TABLET ORAL at 20:07

## 2022-04-03 RX ADMIN — VANCOMYCIN HYDROCHLORIDE 2250 MG: 1 INJECTION, POWDER, LYOPHILIZED, FOR SOLUTION INTRAVENOUS at 15:31

## 2022-04-03 RX ADMIN — RISPERIDONE 1.5 MG: 0.5 TABLET ORAL at 09:39

## 2022-04-03 RX ADMIN — RIVASTIGMINE TARTRATE 4.5 MG: 1.5 CAPSULE ORAL at 20:07

## 2022-04-03 ASSESSMENT — PAIN SCALES - GENERAL
PAINLEVEL_OUTOF10: 7
PAINLEVEL_OUTOF10: 8
PAINLEVEL_OUTOF10: 0
PAINLEVEL_OUTOF10: 7
PAINLEVEL_OUTOF10: 2
PAINLEVEL_OUTOF10: 7

## 2022-04-03 ASSESSMENT — ENCOUNTER SYMPTOMS
VOMITING: 0
ABDOMINAL PAIN: 1
COUGH: 0
EYE REDNESS: 0
ABDOMINAL DISTENTION: 0
SHORTNESS OF BREATH: 1
NAUSEA: 0

## 2022-04-03 ASSESSMENT — PAIN DESCRIPTION - PAIN TYPE
TYPE: CHRONIC PAIN
TYPE: CHRONIC PAIN

## 2022-04-03 ASSESSMENT — PAIN DESCRIPTION - LOCATION
LOCATION: CHEST
LOCATION: LEG

## 2022-04-03 ASSESSMENT — PAIN DESCRIPTION - ORIENTATION: ORIENTATION: RIGHT;LEFT

## 2022-04-03 NOTE — PROGRESS NOTES
Osorio Morrow MD/Vasyl Lr MD/ Betzy Harrison MD/Jose Leal APRN AGACNP-BC, NP-C      Elio Limon APRN NP-C     [id number] Anil Rockmelt APRN NP-C                                           Pulmonary Critical Care Progress Note    Patient - Kyaw Colvin   Age - 74 y.o.   - 1955  MRN - [id number]  Acct # - [de-identified]  Date of Admission - 2022  1:04 PM    Consulting Service/Physician:       Primary Care Physician: Juan Kendall MD    SUBJECTIVE:     Chief Complaint:   Chief Complaint   Patient presents with    Shortness of Breath   Pneumonia  Subjective:    Not feeling much better. Pleurisy about the same. Cough mostly nonproductive. She is off levophed just now. WBC count  And bands rising. VITALS  BP (!) 105/43   Pulse 75   Temp 98.1 °F (36.7 °C) (Oral)   Resp 20   Ht 5' 5\" (1.651 m)   Wt 188 lb 15 oz (85.7 kg)   LMP 2013 (Exact Date)   SpO2 98%   BMI 31.44 kg/m²   Wt Readings from Last 3 Encounters:   22 188 lb 15 oz (85.7 kg)   21 195 lb 14.4 oz (88.9 kg)   21 194 lb (88 kg)     I/O (24 Hours)    Intake/Output Summary (Last 24 hours) at 4/3/2022 1205  Last data filed at 4/3/2022 1045  Gross per 24 hour   Intake 4216.7 ml   Output 950 ml   Net 3266.7 ml     Ventilator:   Settings  FiO2 : 32 %  Exam:   Physical Exam   Constitutional:  Alert and cooperative, obese  HENT: Unremarkable, NC in place  Head: Normocephalic and atraumatic. Eyes: EOM are normal. Pupils are equal, round, and reactive to light. Neck: Neck supple. Right jugular line  Cardiovascular:  Regular rate and rhythm. Normal heart tones. No JVD. Pulmonary/Chest: severely diminished with fine rales RLL  Abdominal: Soft. Bowel sounds are normal. There is no tenderness. obese  Musculoskeletal: Normal range of motion. She exhibits no edema or tenderness.    Neurological:patient is alert and oriented to person, place, and time. Skin: Skin is warm and dry. No rash noted.    Extremities: 1+ edema both legs  Infusions:      norepinephrine 1 mcg/min (04/03/22 1045)    sodium chloride Stopped (04/02/22 1625)     Meds:     Current Facility-Administered Medications:     sodium chloride (OCEAN, BABY AYR) 0.65 % nasal spray 1 spray, 1 spray, Each Nostril, PRN, Jodie Schmidt MD    norepinephrine (LEVOPHED) 16 mg in sodium chloride 0.9 % 250 mL infusion (non-weight-based), 1-100 mcg/min, IntraVENous, Continuous, Kasie Gruber MD, Last Rate: 0.9 mL/hr at 04/03/22 1045, 1 mcg/min at 04/03/22 1045    albuterol (PROVENTIL) nebulizer solution 2.5 mg, 2.5 mg, Nebulization, 4x Daily, Jodie Schmidt MD, 2.5 mg at 04/03/22 1121    aspirin EC tablet 81 mg, 81 mg, Oral, Daily, Jodie Schmidt MD, 81 mg at 04/03/22 0807    calcium carbonate w/vitamin D (CALTRATE) 600-400 MG-UNIT per tab 2 tablet, 2 tablet, Oral, Daily, Jodie Schmidt MD, 2 tablet at 04/03/22 0807    cetirizine (ZYRTEC) tablet 10 mg, 10 mg, Oral, Daily, Jodie Schmidt MD, 10 mg at 04/03/22 4871    atorvastatin (LIPITOR) tablet 20 mg, 20 mg, Oral, Daily, Jodie Schmidt MD, 20 mg at 04/03/22 0808    risperiDONE (RISPERDAL) tablet 1.5 mg, 1.5 mg, Oral, Q12H, Jodie Schmidt MD, 1.5 mg at 04/03/22 0939    butalbital-APAP-caffeine -40 MG per capsule 1 capsule, 1 capsule, Oral, Q6H PRN, Jodie Schmidt MD, 1 capsule at 04/02/22 1419    traZODone (DESYREL) tablet 50 mg, 50 mg, Oral, Nightly, Jodie Schmidt MD, 50 mg at 04/02/22 2103    sodium chloride flush 0.9 % injection 5-40 mL, 5-40 mL, IntraVENous, 2 times per day, Jodie Schmidt MD, 10 mL at 04/03/22 0809    sodium chloride flush 0.9 % injection 5-40 mL, 5-40 mL, IntraVENous, PRN, Jodie Schmidt MD    0.9 % sodium chloride infusion, , IntraVENous, PRN, Jodie Schmidt MD, Paused at 04/02/22 1625    enoxaparin (LOVENOX) injection 40 mg, 40 mg, SubCUTAneous, Daily, Jodie Schmidt MD, 40 mg at 04/03/22 0807    ondansetron (ZOFRAN-ODT) disintegrating tablet 4 mg, 4 mg, Oral, Q8H PRN **OR** ondansetron (ZOFRAN) injection 4 mg, 4 mg, IntraVENous, Q6H PRN, Herrera Mirza MD    polyethylene glycol [facility]) packet 17 g, 17 g, Oral, Daily PRN, Herrera Mirza MD    acetaminophen (TYLENOL) tablet 650 mg, 650 mg, Oral, Q6H PRN, 650 mg at 04/03/22 0807 **OR** acetaminophen (TYLENOL) suppository 650 mg, 650 mg, Rectal, Q6H PRN, Herrera Mirza MD    cefTRIAXone (ROCEPHIN) 1000 mg IVPB in 50 mL D5W minibag, 1,000 mg, IntraVENous, Q24H, Herrera Mirza MD, Stopped at 04/02/22 1326    azithromycin (ZITHROMAX) 500 mg in D5W 250ml addavial, 500 mg, IntraVENous, Q24H, Herrera Mirza MD, Stopped at 04/02/22 1525    methylPREDNISolone sodium (SOLU-MEDROL) injection 60 mg, 60 mg, IntraVENous, Q6H, Faiza Herr MD, 60 mg at 04/03/22 0808    tiotropium (SPIRIVA RESPIMAT) 2.5 MCG/ACT inhaler 2 puff, 2 puff, Inhalation, Daily, Faiza Herr MD, 2 puff at 04/03/22 0725    budesonide-formoterol (SYMBICORT) 160-4.5 MCG/ACT inhaler 2 puff, 2 puff, Inhalation, BID, Faiza Herr MD, 2 puff at 04/03/22 0725    rivastigmine (EXELON) capsule 4.5 mg, 4.5 mg, Oral, BID, Herrera Mirza MD, 4.5 mg at 04/03/22 0934    Lab Results:     Lab Results   Component Value Date    WBC 20.6 (H) 04/02/2022    HGB 9.4 (L) 04/02/2022    HCT 30.2 (L) 04/02/2022    MCV 95.1 04/02/2022     04/02/2022     Lab Results   Component Value Date    CALCIUM 8.4 (L) 04/03/2022     04/03/2022    K 4.2 04/03/2022    CO2 22 04/03/2022     04/03/2022    BUN 25 (H) 04/03/2022    CREATININE 0.74 04/03/2022     No components found for: ABGSAMPLETYP, ABGBODYTEMP, ABGPHCORRFOR, [id number], ABGPHCORRFOOR, ABGPH, ABGPCO2, ABGPO2, ABGBASEEXCES, ABGBASEDEFIC, ABGHCO3, JMBK4GND, ABGENDTIDALC, ABGALLENSTES, ABGSPO2, ABGSAMEPLESIT, [id number], ABGOXYGENSOU  Lab Results   Component Value Date    INR 1.20 (H) 07/08/2021    PROTIME 15.2 (H) 07/08/2021       Radiology:   [unfilled]  My reading of film:CXR RLL infiltrate about the same to my eye      ASSESSMENT: 1. Septic shock, pneumonia, right lower lobe, Levophed at 6, WBC still rising 16.8>20.6 and bands 16>>19  2. Dehydration  3. Stage IV COPD with exacerbation, FEV1 37%, Dr England Lowers  4. Chronic hypoxemic respiratory failure-O2 dependent 3L  5. History of tobacco use quit June 2021, >80 pk years   6. History of DVT/PE-was on Eliquis>> now just ASA  7. Full code-no trach/PEG or prolonged ventilation  PLAN:   1. Escalate abx  2. MRSA swab  3. CBC with diff in AM  4. Probably repeat CXR on Tues  5. If she is still off levophed tomorrow she can transfer out of ICU    Discussed with patient and RN    Electronically signed by Lester Nguyen MD on 04/03/22     This progress note was completed using a voice transcription system. Every effort was made to ensure accuracy. However, inadvertent computerized transcription errors may be present.     Robert Fair MD  Pulmonary and Critical Care   [phone] [employer]  [phone] Cell

## 2022-04-03 NOTE — PROGRESS NOTES
Physical Therapy    Facility/Department: [facility] ICU  Initial Assessment    NAME: Shauna Hutson  : 1955  MRN: [id number]    Date of Service: 4/3/2022    Discharge Recommendations:  Continue to assess pending progress      Assessment   Treatment Diagnosis: dec function  Prognosis: Good  Decision Making: Low Complexity  REQUIRES PT FOLLOW UP: Yes  Activity Tolerance  Activity Tolerance: Patient limited by endurance       Patient Diagnosis(es): The primary encounter diagnosis was Pneumonia of both lungs due to infectious organism, unspecified part of lung. A diagnosis of Septic shock ([address] was also pertinent to this visit. has a past medical history of Abnormal EKG, TRAM (acute kidney injury) ([address] Anxiety, Asthma, Bipolar disorder ([address] COPD (chronic obstructive pulmonary disease) ([address] Cramps, extremity, Depression, Dilated bile duct, Headache, History of elective , Hyperlipidemia, Irritable bowel syndrome, Prolonged emergence from general anesthesia, Substance abuse ([address] Unspecified sleep apnea, and Vision abnormalities. has a past surgical history that includes Tonsillectomy and adenoidectomy (Bilateral); LASIK; Induced ; Ankle surgery; eye surgery (Bilateral); Vulva surgery; hysteroscopy (10/19/2016); and Colonoscopy (02/15/2018).     Restrictions  Restrictions/Precautions  Required Braces or Orthoses?: No  Vision/Hearing  Vision: Impaired  Vision Exceptions: Wears glasses for reading  Hearing: Within functional limits     Subjective  Pain Screening  Patient Currently in Pain: Yes  Pain Assessment  Pain Assessment: 0-10  Pain Level: 8  Pain Location:  (R lung)  Vital Signs  Patient Currently in Pain: Yes     Orientation  Orientation  Overall Orientation Status: Within Normal Limits  Social/Functional History  Social/Functional History  Lives With: Alone  Type of Home: House  Home Layout: One level  Bathroom Shower/Tub: Tub/Shower unit,Curtain  Bathroom Toilet: Standard  Bathroom Equipment: Shower chair  Bathroom Accessibility: Not accessible  Home Equipment: Oxygen (continuous 3L)  Receives Help From: Family (daughter lives in town and sister lives down street)  ADL Assistance: Independent  Homemaking Assistance: Independent  Homemaking Responsibilities: Yes  Ambulation Assistance: Independent  Transfer Assistance: Independent  Active : No  Patient's  Info: Daughter drives to appointments and to grocery shopping  Mode of Transportation: Car  Additional Comments: Patient has cat  Cognition -wfl    Objective    AROM RLE (degrees)  RLE AROM: WFL  AROM LLE (degrees)  LLE AROM : WFL  AROM RUE (degrees)  RUE AROM : WFL  AROM LUE (degrees)  LUE AROM : WFL     Bed mobility  Bridging: Supervision  Supine to Sit: Supervision  Sit to Supine: Supervision  Scooting: Modified independent     Transfers  Sit to Stand: Stand by assistance  Stand to sit: Stand by assistance  Lateral Transfers: Stand by assistance     Ambulation  Ambulation?: Yes  More Ambulation?: No  Ambulation 1  Surface: level tile  Device: No Device  Assistance: Supervision  Distance: ambulation 3 steps fwd and bkw x2  Comments: Patient became very SOB and fatigued after standing and needed to use inhaler and lay down  Stairs/Curb  Stairs?: No     Balance  Sitting - Static: Good (Patient sat at EOB [id number])  Sitting - Dynamic: Good  Standing - Static: Fair;+  Standing - Dynamic: Fair;+  Comments: Patient a little unsteady when up      Plan   Plan  Times per week: 3x/wk  Current Treatment Recommendations: Strengthening,Transfer Training,Endurance Training,Balance Training,Gait Training  Safety Devices  Type of devices: Left in bed,Call light within reach    Goals  Short term goals  Short term goal 1: Patient to demonstrate GOOD standing balance  Short term goal 2: Patient to demonstrate the ability to ambulate 50ft with supervision  Short term goal 3: Patient to demonstrate IND with transfers     Therapy Time   Individual Concurrent Group Co-treatment   Time In [address] [place] [address] Road         Time Out 1109         Minutes Temo Rogers

## 2022-04-03 NOTE — PROGRESS NOTES
[facility]    PROGRESS NOTE             4/3/2022    8:16 AM    Name:   Kassandra Anguiano  MRN:     [id number]     [place]:      [de-identified]   Room:   2013/2013-01  IP Day:  2  Admit Date:  4/1/2022  1:04 PM    PCP:  April Oconnor MD  Code Status:  Full Code    Subjective:     C/C:   Chief Complaint   Patient presents with    Shortness of Breath     Patient is examined at bedside in ICU. Overnight, her HR was 36. Patient reports SOB. She is on 4L oxygen. Patient breathing is labored, RR is 28. Patient reports her right flank area hurts, similar to yesterday and tylenol is not improving the pain. Patient reports HA and some leg discomfort. Denies CP, nausea, vomiting. Brief History:     The patient is a 74 y.o. Non- / non  female who presents withShortness of Breath   and she is admitted to the hospital for the management of respiratory distress.     Patient w/ PMH of COPD, bipolar on trazodone, risperidone, DVT/ PE last May 2021, migraines,  reports yesterday afternoon she started experiencing increased SOB and headache. At baseline, she is on O2 3L at rest, 4L if she is ambulating. This AM, patient was dyspneic and tremulous and came to the hospital.   Patient reports she stopped smoking years ago, before she started to need home oxygen. She estimates she has needed oxygen since 2017. Patient reports she had a DVT/ PE last year in May 2021 for which she saw Dr. Yovany Beth. She was put on eliquis, which was stopped in December 2022. Etiology of clot presumed to be smoking and sedentary lifestyle per hematology. Leukopenia secondary to risperidone.  Patient also follow with neurology for migraines and Dr. Homer Sandhoff for COPD.      She is not up to date on screening, including annual lung cancer screening, pap, mammogram, colonoscopy in 2018 found 2mm polyp w/ follow up 5 years.     -Febrile 103, RR 19, , BP 82/47, SpO2 97  -Code sepsis called in ED  -Central line was inserted  -Antibiotics started azithromycin, ceftriaxone  -Norepinephrine drip  -methylprednisolone 125mg  -Mg sulfate 1g   -4L O2 cannula    Review of Systems:     Review of Systems   Constitutional: Negative for fever. HENT: Negative for congestion. Eyes: Negative for redness and visual disturbance. Respiratory: Positive for shortness of breath. Negative for cough. Cardiovascular: Negative for chest pain and leg swelling. Gastrointestinal: Positive for abdominal pain (right abdominal/ flank area pain). Negative for abdominal distention, nausea and vomiting. Genitourinary: Negative for dysuria, frequency and urgency. Musculoskeletal: Negative for gait problem. Skin: Negative for rash and wound. Neurological: Positive for headaches. Negative for speech difficulty. Psychiatric/Behavioral: Negative for agitation and behavioral problems. Medications: Allergies:     Allergies   Allergen Reactions    Advil [Ibuprofen] Other (See Comments)     vomiting    Aleve [Naproxen] Other (See Comments)     vomiting    Antipyrine Other (See Comments)     unknown    Celecoxib Other (See Comments)    Codeine      headache    Fomepizole     Incruse Ellipta [Umeclidinium Bromide]      confusion    Other      GRASS, TREES, WEEDS    Rofecoxib Other (See Comments)     Unknown reaction    Salicylates      Unknown reaction     Strawberry Extract      HEADACHES    Sulfinpyrazone Other (See Comments)     Unknown reaction    Aspirin Nausea And Vomiting, Other (See Comments) and Nausea Only    Nsaids Nausea Only, Other (See Comments) and Nausea And Vomiting     Current Meds:   Scheduled Meds:    albuterol  2.5 mg Nebulization 4x Daily    aspirin  81 mg Oral Daily    calcium carbonate w/vitamin D  2 tablet Oral Daily    cetirizine  10 mg Oral Daily    atorvastatin  20 mg Oral Daily    risperiDONE  1.5 mg Oral Q12H    traZODone  50 mg Oral Nightly    sodium chloride flush  5-40 mL IntraVENous 2 times per day    enoxaparin  40 mg SubCUTAneous Daily    cefTRIAXone (ROCEPHIN) IV  1,000 mg IntraVENous Q24H    azithromycin  500 mg IntraVENous Q24H    methylPREDNISolone  60 mg IntraVENous Q6H    tiotropium  2 puff Inhalation Daily    budesonide-formoterol  2 puff Inhalation BID    rivastigmine  4.5 mg Oral BID     Continuous Infusions:    norepinephrine 3 mcg/min (22 0600)    sodium chloride Stopped (22 1625)     PRN Meds: butalbital-APAP-caffeine, sodium chloride flush, sodium chloride, ondansetron **OR** ondansetron, polyethylene glycol, acetaminophen **OR** acetaminophen    Data:     Past Medical History:   has a past medical history of Abnormal EKG, TRAM (acute kidney injury) ([facility] Anxiety, Asthma, Bipolar disorder ([facility] COPD (chronic obstructive pulmonary disease) ([facility] Cramps, extremity, Depression, Dilated bile duct, Headache, History of elective , Hyperlipidemia, Irritable bowel syndrome, Prolonged emergence from general anesthesia, Substance abuse ([facility] Unspecified sleep apnea, and Vision abnormalities. Social History:   reports that she quit smoking about 3 years ago. Her smoking use included cigarettes. She has a 84.00 pack-year smoking history. She has never used smokeless tobacco. She reports current alcohol use. She reports that she does not use drugs.      Family History:   Family History   Problem Relation Age of Onset    Dementia Maternal Aunt     Kidney Disease Mother     Heart Attack Sister     Prostate Cancer Father     High Cholesterol Brother     Heart Attack Paternal Grandmother        Vitals:  BP (!) 131/41   Pulse 50   Temp 98.1 °F (36.7 °C) (Oral)   Resp 21   Ht 5' 5\" (1.651 m)   Wt 188 lb 15 oz (85.7 kg)   LMP 2013 (Exact Date)   SpO2 95%   BMI 31.44 kg/m²   Temp (24hrs), Av.9 °F (36.6 °C), Min:97.3 °F (36.3 °C), Max:98.1 °F (36.7 °C)    No results for input(s): POCGLU in the last 72 hours.    I/O(24Hr): Intake/Output Summary (Last 24 hours) at 4/3/2022 0816  Last data filed at 4/3/2022 8480  Gross per 24 hour   Intake 5036.41 ml   Output 950 ml   Net 4086.41 ml     Labs:  [unfilled]    Lab Results   Component Value Date/Time    SPECIAL NOT REPORTED 11/18/2017 02:30 PM     Lab Results   Component Value Date/Time    CULTURE NO GROWTH 04/01/2022 05:40 PM     [unfilled]    Radiology:    XR CHEST PORTABLE    Result Date: 4/1/2022  EXAMINATION: ONE XRAY VIEW OF THE CHEST 4/1/2022 4:01 pm COMPARISON: 04/01/2022 HISTORY: ORDERING SYSTEM PROVIDED HISTORY: central line placed TECHNOLOGIST PROVIDED HISTORY: central line placed Reason for Exam: Central line placed FINDINGS: There is a right internal jugular central line in place with distal tip overlying the superior vena cava. Previously noted right basal infiltrate is unchanged. Left lung appears clear. The heart and mediastinal structures appear normal.  There is no evidence of pneumothorax. Satisfactory position of right internal jugular central line. No change in the the right basal infiltrate. XR CHEST PORTABLE    Result Date: 4/1/2022  EXAMINATION: ONE XRAY VIEW OF THE CHEST 4/1/2022 1:22 pm COMPARISON: 06/17/2020. CT dated 10/15/2021. HISTORY: ORDERING SYSTEM PROVIDED HISTORY: dyspnea TECHNOLOGIST PROVIDED HISTORY: dyspnea Reason for Exam: Dyspnea Relevant Medical/Surgical History: Hx of COPD FINDINGS: The cardiac silhouette appears within normal limits. There are bibasilar opacities, right greater than left which may reflect multifocal pneumonia in the correct clinical setting. No evidence of pleural effusion or pneumothorax is seen. Bibasilar opacities, right greater than left, which may reflect multifocal pneumonia. Follow-up to imaging resolution is recommended. Physical Examination:        Physical Exam  Constitutional:       General: She is not in acute distress.      Appearance: She is not ill-appearing, toxic-appearing or diaphoretic. HENT:      Head: Normocephalic and atraumatic. Nose: No congestion or rhinorrhea. Eyes:      Extraocular Movements: Extraocular movements intact. Conjunctiva/sclera: Conjunctivae normal.   Cardiovascular:      Rate and Rhythm: Normal rate and regular rhythm. Pulses: Normal pulses. Heart sounds: Normal heart sounds. Pulmonary:      Effort: Pulmonary effort is normal. No respiratory distress. Breath sounds: Rhonchi (lower right lobe, lower left) present. No wheezing. Abdominal:      General: Bowel sounds are normal. There is no distension. Palpations: Abdomen is soft. Tenderness: There is no abdominal tenderness. There is no guarding. Musculoskeletal:      Right lower leg: No edema. Left lower leg: No edema. Skin:     General: Skin is warm and dry. Findings: No rash. Neurological:      General: No focal deficit present. Mental Status: She is alert.    Psychiatric:         Mood and Affect: Mood normal.         Behavior: Behavior normal.       Assessment:        Primary Problem  Septic shock due to undetermined organism [facility])    Active Hospital Problems    Diagnosis Date Noted    Septic shock due to undetermined organism ([facility] [A41.9, R65.21] 04/02/2022    Chronic respiratory failure with hypoxia, on home O2 therapy ([facility] [O23.23, Z99.81] 04/02/2022    COPD (chronic obstructive pulmonary disease) ([facility] [J44.9] 04/02/2022    TRAM (acute kidney injury) ([facility] [N17.9] 04/02/2022    History of pulmonary embolus (PE) [Z86.711] 04/02/2022    Pneumonia [J18.9] 04/01/2022     Plan:        Sepsis probably due to multifocal PNA  -Febrile, hypotensive, tachycardic, WBC 16.8 > 20  -3L nasal cannula at rest, 4L on exertion > currently at rest 4L  -Norepinephrine 5mcg/min > 1 mcg/min  -Sepsis fluids given  -  -Lactate 1.0  -Ceftriaxone 1g q24h, azithromycin 500mg q24h  -Blood cultures NGTD  -UA   -Strep, legionella neg, mycoplasma ordered  -  -Admit to ICU  -Pulm consulted     Chronic respiratory failure due to COPD stage IV  -On O2 at home, 3L  -Currently on 4L oxygen     TRAM, resolving  -Cr 1.01 > 0.74, baseline 0.59     Hx DVT/ PE  -Eliquis was discontinued in 12/2021     Bipolar disorder  Migraines     IVF: dc  Diet: regular adult diet  GI ppx: n/a   DVT ppx: enoxaparin 40mg daily  Code status: Full code  Consults: pulm  Dispo: pending clinical course    Cielo Lane MD  4/3/2022  8:16 AM     Attestation and add on       I have discussed the care of Ying Nichols , including pertinent history and exam findings,      4/3/22    with the resident. I have seen and examined the patient and the key elements of all parts of the encounter have been performed by me . I agree with the assessment, plan and orders as documented by the resident. Principal Problem:    Septic shock due to undetermined organism [facility])  Active Problems:    Pneumonia    Chronic respiratory failure with hypoxia, on home O2 therapy (HCC)    COPD (chronic obstructive pulmonary disease) (HCC)    TRAM (acute kidney injury) ([facility]    History of pulmonary embolus (PE)  Resolved Problems:    * No resolved hospital problems. *        ''''''''''       MD JUNO Mayorga [address], [address] [place] [address].    Phone [phone]   Fax: [phone]  Answering Service: [phone]

## 2022-04-03 NOTE — PROGRESS NOTES
BRONCHOSPASM/BRONCHOCONSTRICTION     [x]         IMPROVE AERATION/BREATH SOUNDS  [x]   ADMINISTER BRONCHODILATOR THERAPY AS APPROPRIATE  [x]   ASSESS BREATH SOUNDS  []   IMPLEMENT AEROSOL/MDI PROTOCOL  [x]   PATIENT EDUCATION AS NEEDED    PROVIDE ADEQUATE OXYGENATION WITH ACCEPTABLE SP02/ABG'S    [x]  IDENTIFY APPROPRIATE OXYGEN THERAPY  [x]   MONITOR SP02/ABG'S AS NEEDED   [x]   PATIENT EDUCATION AS NEEDED    Pt awake and alert currently on 3lnc SpO2 96% clear/diminished breath sounds through out. Pt stated she had to use rescue inhaler(albuterol) when getting up to the bathroom overnight.  At this time pt in no distress

## 2022-04-03 NOTE — PLAN OF CARE
Problem: Pain:  Goal: Pain level will decrease  Description: Pain level will decrease  4/3/2022 0335 by Sarath Sosa RN  Outcome: Met This Shift  Note: Prn tylenol given, patient states helped with pain. No complaints of headaches this shift   4/2/2022 1804 by Sofia Zavala RN  Outcome: Ongoing  Goal: Control of acute pain  Description: Control of acute pain  4/3/2022 0335 by Sarath Sosa RN  Outcome: Met This Shift  4/2/2022 1804 by Sofia Zavala RN  Outcome: Met This Shift  Goal: Control of chronic pain  Description: Control of chronic pain  4/3/2022 0335 by Sarath Sosa RN  Outcome: Met This Shift  4/2/2022 1804 by Sofia Zavala RN  Outcome: Ongoing     Problem: Falls - Risk of:  Goal: Will remain free from falls  Description: Will remain free from falls  4/3/2022 0335 by Sarath Sosa RN  Outcome: Met This Shift  Note: Bed alarm on, patient up with standby assist   4/2/2022 1804 by Sofia Zavala RN  Outcome: Met This Shift  Note: Hourly rounds performed. No fall this shift. Call light next to patient. Pt able to express needs. Goal: Absence of physical injury  Description: Absence of physical injury  4/3/2022 0335 by Sarath Sosa RN  Outcome: Met This Shift  4/2/2022 1804 by Sofia Zavala RN  Outcome: Met This Shift     Problem: Skin Integrity:  Goal: Absence of new skin breakdown  Description: Absence of new skin breakdown  4/3/2022 0335 by Sarath Sosa RN  Outcome: Met This Shift  Note: Patient repositions self frequently, skin integrity maintained. 4/2/2022 1804 by Sofia Zavala RN  Outcome: Met This Shift     Problem: Skin Integrity:  Goal: Will show no infection signs and symptoms  Description: Will show no infection signs and symptoms  4/3/2022 0335 by Sarath Sosa RN  Outcome: Ongoing  Note: No fever or chills noted this shift   4/2/2022 1804 by Sofia Zavala RN  Outcome: Met This Shift  Note: No new signs of skin breakdown. Pt self turns.

## 2022-04-03 NOTE — PROGRESS NOTES
Vancomycin Dosing by Pharmacy - Initial Note   TODAY'S DATE:  4/3/2022  Patient name, age:  Garfield Leavitt, 74 y.o. Indication: Respiratory infection, MRSA suspected  Additional antimicrobials:  Cefepime    Allergies:  Advil [ibuprofen], Aleve [naproxen], Antipyrine, Celecoxib, Codeine, Fomepizole, Incruse ellipta [umeclidinium bromide], Other, Rofecoxib, Salicylates, Strawberry extract, Sulfinpyrazone, Aspirin, and Nsaids   Actual Weight:    Wt Readings from Last 1 Encounters:   04/02/22 188 lb 15 oz (85.7 kg)     Labs/Ancillary Data  Estimated Creatinine Clearance: 82 mL/min (based on SCr of 0.74 mg/dL). Recent Labs     04/01/22  1320 04/02/22  0424 04/03/22  0356   CREATININE 1.01* 0.73 0.74   BUN 28* 18 25*   WBC 16.8* 20.6*  --      No results found for: PROCAL    Intake/Output Summary (Last 24 hours) at 4/3/2022 1249  Last data filed at 4/3/2022 1245  Gross per 24 hour   Intake 3976.7 ml   Output 950 ml   Net 3026.7 ml     Temp: 98.1    Recent vancomycin administrations        No vancomycin IV orders with administrations found. Culture Date / Source  /  Results  See Micro    MRSA Nasal Swab: was ordered by provider, awaiting results. Heddy  PLAN   Initial loading dose of 25mg/kg (max of 2,500 mg) = 2250  mg  x 1, then  vancomycin 1000 mg IV every 12 hours. Ensured BUN/sCr ordered at baseline and every 48 hours x at least 3 levels, then at least weekly. Vancomycin level ordered for 4/4 @ 0600. Will use bayesian method for dosing. This level will not be a trough. Target AUC/REANNA: 400-600.       Vancomycin Target Concentration Parameters  Treatment  Population Target AUC/REANNA Target Trough   Invasive MRSA Infection (bacteremia, pneumonia, meningitis, endocarditis, osteomyelitis)  Sepsis (undifferentiated) 400-600 N/A   Infection due to non-MRSA pathogen  Empiric treatment of non-invasive MRSA infection  (SSTI, UTI) <500 10-15 mg/L   CrCl < 29 mL/min  Rapidly fluctuating serum creatinine TRAM N/A < 15 mg/L     Renal replacement therapy is dosed by levels, per hospital protocol. Abbreviations  * Pauc: probability that AUC is >400 (efficacy); Pconc: probability that Ctrough is above 20 ?g/mL (toxicity); Tox: Probability of nephrotoxicity, based on Tapan et al. Clin Infect Dis 2009. Loading dose: 2250 mg at 14:00 04/03/2022. Regimen: 1000 mg IV every 12 hours. Start time: 12:58 on 04/03/2022  Exposure target: AUC24 (range)400-600 mg/L.hr   AUC24,ss: 500 mg/L.hr  Probability of AUC24 > 400: 73 %  Ctrough,ss: 16.0 mg/L  Probability of Ctrough,ss > 20: 32 %  Probability of nephrotoxicity (Lodise NYASIA 2009): 11 %      Thank you for the consult. Pharmacy will continue to follow.      Thank you,  Kymberly GoldsmithD, [facility]  PGY-1 Pharmacy Resident

## 2022-04-03 NOTE — PROGRESS NOTES
Cefepime Extended Interval Interchange    The following ordered dose of Cefepime has been changed to optimize its pharmacodynamic profile as approved by the Patient Care Review Committee. Ordered Dose    __ 2 gm  IV every 8  hrs (30 minute infusion)      CrCl Dose   >60 1-2 gm q8-12hrs over 4 hours   30-59 or CRRT 1-2gm [id number] over 4 hours   <30 500mg-2gm q24hrs over 4 hours or 1gm q12hrs over 4 hours   <10 or HD 500mg-1gm q24hrs over 4 hours       New Dose    Cefepime   2 gm  IV q 8 hrs  over 4 hours. Pharmacists should be contacted for issues concerning drug compatibility with multiple IV medications. All doses will be prepared using 50ml bag to be infused over 4-hours at a rate of 12.5ml/hr.     Thank Arvid Canavan, RPH4/[id number]:26 PM

## 2022-04-03 NOTE — FLOWSHEET NOTE
04/03/22 0005   Vital Signs   Pulse (!) 36   Nurse called Dr. Lazarus Milton to update on bradycardia. Nurse reviewed current medications. No further orders at this time.

## 2022-04-03 NOTE — CARE COORDINATION
ONGOING DISCHARGE PLAN:    Patient is alert and oriented x4. Spoke with patient regarding discharge plan and patient confirms that plan is still to return to home alone. Denies VNS. PT/OT on board. Remains on IV Cefepime & IV steroids, 40MG, Q8,     Sating 96% on 4LNC, wears 3. 5LNC, at home. Pulmonary following. Will continue to follow for additional discharge needs.     Electronically signed by Maria Esther Garza RN on 4/3/2022 at 3:31 PM

## 2022-04-04 ENCOUNTER — APPOINTMENT (OUTPATIENT)
Dept: GENERAL RADIOLOGY | Age: 67
DRG: 720 | End: 2022-04-04
Payer: COMMERCIAL

## 2022-04-04 PROBLEM — J15.7 MYCOPLASMA PNEUMONIA: Status: ACTIVE | Noted: 2022-04-04

## 2022-04-04 LAB
ABSOLUTE EOS #: 0 K/UL (ref 0–0.4)
ABSOLUTE LYMPH #: 0.42 K/UL (ref 1–4.8)
ABSOLUTE MONO #: 0.32 K/UL (ref 0.1–1.3)
ANION GAP SERPL CALCULATED.3IONS-SCNC: 9 MMOL/L (ref 9–17)
BASOPHILS # BLD: 0 % (ref 0–2)
BASOPHILS ABSOLUTE: 0 K/UL (ref 0–0.2)
BUN BLDV-MCNC: 26 MG/DL (ref 8–23)
CALCIUM SERPL-MCNC: 8.6 MG/DL (ref 8.6–10.4)
CHLORIDE BLD-SCNC: 102 MMOL/L (ref 98–107)
CO2: 24 MMOL/L (ref 20–31)
CREAT SERPL-MCNC: 0.64 MG/DL (ref 0.5–0.9)
EOSINOPHILS RELATIVE PERCENT: 0 % (ref 0–4)
GFR AFRICAN AMERICAN: >60 ML/MIN
GFR NON-AFRICAN AMERICAN: >60 ML/MIN
GFR SERPL CREATININE-BSD FRML MDRD: ABNORMAL ML/MIN/{1.73_M2}
GLUCOSE BLD-MCNC: 129 MG/DL (ref 65–105)
GLUCOSE BLD-MCNC: 198 MG/DL (ref 65–105)
GLUCOSE BLD-MCNC: 242 MG/DL (ref 65–105)
GLUCOSE BLD-MCNC: 290 MG/DL (ref 70–99)
HCT VFR BLD CALC: 28.8 % (ref 36–46)
HEMOGLOBIN: 9.4 G/DL (ref 12–16)
LYMPHOCYTES # BLD: 4 % (ref 24–44)
MCH RBC QN AUTO: 30.6 PG (ref 26–34)
MCHC RBC AUTO-ENTMCNC: 32.7 G/DL (ref 31–37)
MCV RBC AUTO: 93.6 FL (ref 80–100)
MONOCYTES # BLD: 3 % (ref 1–7)
MORPHOLOGY: ABNORMAL
MORPHOLOGY: ABNORMAL
MRSA, DNA, NASAL: NEGATIVE
MYCOPLASMA PNEUMONIAE IGM: 1.13
PDW BLD-RTO: 15.3 % (ref 11.5–14.9)
PLATELET # BLD: 212 K/UL (ref 150–450)
PMV BLD AUTO: 7.5 FL (ref 6–12)
POTASSIUM SERPL-SCNC: 4.3 MMOL/L (ref 3.7–5.3)
RBC # BLD: 3.07 M/UL (ref 4–5.2)
SEG NEUTROPHILS: 93 % (ref 36–66)
SEGMENTED NEUTROPHILS ABSOLUTE COUNT: 9.76 K/UL (ref 1.3–9.1)
SODIUM BLD-SCNC: 135 MMOL/L (ref 135–144)
SPECIMEN DESCRIPTION: NORMAL
THYROXINE, FREE: 0.89 NG/DL (ref 0.93–1.7)
TSH SERPL DL<=0.05 MIU/L-ACNC: 0.17 UIU/ML (ref 0.3–5)
VANCOMYCIN RANDOM DATE LAST DOSE: NORMAL
VANCOMYCIN RANDOM DOSE AMOUNT: NORMAL
VANCOMYCIN RANDOM TIME LAST DOSE: 215
VANCOMYCIN RANDOM: 31.2 UG/ML
WBC # BLD: 10.5 K/UL (ref 3.5–11)

## 2022-04-04 PROCEDURE — 6370000000 HC RX 637 (ALT 250 FOR IP)

## 2022-04-04 PROCEDURE — 74230 X-RAY XM SWLNG FUNCJ C+: CPT

## 2022-04-04 PROCEDURE — 80202 ASSAY OF VANCOMYCIN: CPT

## 2022-04-04 PROCEDURE — 80048 BASIC METABOLIC PNL TOTAL CA: CPT

## 2022-04-04 PROCEDURE — 94640 AIRWAY INHALATION TREATMENT: CPT

## 2022-04-04 PROCEDURE — 2700000000 HC OXYGEN THERAPY PER DAY

## 2022-04-04 PROCEDURE — 85025 COMPLETE CBC W/AUTO DIFF WBC: CPT

## 2022-04-04 PROCEDURE — 99233 SBSQ HOSP IP/OBS HIGH 50: CPT | Performed by: INTERNAL MEDICINE

## 2022-04-04 PROCEDURE — 82947 ASSAY GLUCOSE BLOOD QUANT: CPT

## 2022-04-04 PROCEDURE — 84443 ASSAY THYROID STIM HORMONE: CPT

## 2022-04-04 PROCEDURE — 92611 MOTION FLUOROSCOPY/SWALLOW: CPT

## 2022-04-04 PROCEDURE — 84439 ASSAY OF FREE THYROXINE: CPT

## 2022-04-04 PROCEDURE — 6370000000 HC RX 637 (ALT 250 FOR IP): Performed by: INTERNAL MEDICINE

## 2022-04-04 PROCEDURE — 94762 N-INVAS EAR/PLS OXIMTRY CONT: CPT

## 2022-04-04 PROCEDURE — 2580000003 HC RX 258: Performed by: INTERNAL MEDICINE

## 2022-04-04 PROCEDURE — 6360000002 HC RX W HCPCS: Performed by: INTERNAL MEDICINE

## 2022-04-04 PROCEDURE — 94761 N-INVAS EAR/PLS OXIMETRY MLT: CPT

## 2022-04-04 PROCEDURE — 97110 THERAPEUTIC EXERCISES: CPT

## 2022-04-04 PROCEDURE — 6360000002 HC RX W HCPCS

## 2022-04-04 PROCEDURE — 2060000000 HC ICU INTERMEDIATE R&B

## 2022-04-04 PROCEDURE — 2580000003 HC RX 258

## 2022-04-04 PROCEDURE — 36415 COLL VENOUS BLD VENIPUNCTURE: CPT

## 2022-04-04 RX ORDER — NICOTINE POLACRILEX 4 MG
15 LOZENGE BUCCAL PRN
Status: DISCONTINUED | OUTPATIENT
Start: 2022-04-04 | End: 2022-04-05 | Stop reason: HOSPADM

## 2022-04-04 RX ORDER — TRAMADOL HYDROCHLORIDE 50 MG/1
50 TABLET ORAL ONCE
Status: COMPLETED | OUTPATIENT
Start: 2022-04-04 | End: 2022-04-04

## 2022-04-04 RX ORDER — DEXTROSE MONOHYDRATE 25 G/50ML
12.5 INJECTION, SOLUTION INTRAVENOUS PRN
Status: DISCONTINUED | OUTPATIENT
Start: 2022-04-04 | End: 2022-04-05 | Stop reason: HOSPADM

## 2022-04-04 RX ORDER — DEXTROSE MONOHYDRATE 50 MG/ML
100 INJECTION, SOLUTION INTRAVENOUS PRN
Status: DISCONTINUED | OUTPATIENT
Start: 2022-04-04 | End: 2022-04-05 | Stop reason: HOSPADM

## 2022-04-04 RX ADMIN — POLYETHYLENE GLYCOL 3350 17 G: 17 POWDER, FOR SOLUTION ORAL at 16:56

## 2022-04-04 RX ADMIN — ALBUTEROL SULFATE 2.5 MG: 2.5 SOLUTION RESPIRATORY (INHALATION) at 19:51

## 2022-04-04 RX ADMIN — ATORVASTATIN CALCIUM 20 MG: 20 TABLET, FILM COATED ORAL at 08:36

## 2022-04-04 RX ADMIN — TIOTROPIUM BROMIDE INHALATION SPRAY 2 PUFF: 3.12 SPRAY, METERED RESPIRATORY (INHALATION) at 07:56

## 2022-04-04 RX ADMIN — RISPERIDONE 1.5 MG: 0.5 TABLET ORAL at 08:35

## 2022-04-04 RX ADMIN — BUDESONIDE AND FORMOTEROL FUMARATE DIHYDRATE 2 PUFF: 160; 4.5 AEROSOL RESPIRATORY (INHALATION) at 07:56

## 2022-04-04 RX ADMIN — RISPERIDONE 1.5 MG: 0.5 TABLET ORAL at 21:36

## 2022-04-04 RX ADMIN — ENOXAPARIN SODIUM 40 MG: 40 INJECTION SUBCUTANEOUS at 08:35

## 2022-04-04 RX ADMIN — SODIUM CHLORIDE, PRESERVATIVE FREE 10 ML: 5 INJECTION INTRAVENOUS at 08:37

## 2022-04-04 RX ADMIN — ALBUTEROL SULFATE 2.5 MG: 2.5 SOLUTION RESPIRATORY (INHALATION) at 07:56

## 2022-04-04 RX ADMIN — TRAMADOL HYDROCHLORIDE 50 MG: 50 TABLET, FILM COATED ORAL at 11:07

## 2022-04-04 RX ADMIN — BUDESONIDE AND FORMOTEROL FUMARATE DIHYDRATE 2 PUFF: 160; 4.5 AEROSOL RESPIRATORY (INHALATION) at 19:51

## 2022-04-04 RX ADMIN — INSULIN LISPRO 1 UNITS: 100 INJECTION, SOLUTION INTRAVENOUS; SUBCUTANEOUS at 21:36

## 2022-04-04 RX ADMIN — INSULIN LISPRO 4 UNITS: 100 INJECTION, SOLUTION INTRAVENOUS; SUBCUTANEOUS at 17:39

## 2022-04-04 RX ADMIN — ASPIRIN 81 MG: 81 TABLET, COATED ORAL at 08:35

## 2022-04-04 RX ADMIN — METHYLPREDNISOLONE SODIUM SUCCINATE 40 MG: 40 INJECTION, POWDER, FOR SOLUTION INTRAMUSCULAR; INTRAVENOUS at 21:37

## 2022-04-04 RX ADMIN — RIVASTIGMINE TARTRATE 4.5 MG: 1.5 CAPSULE ORAL at 08:36

## 2022-04-04 RX ADMIN — CEFEPIME HYDROCHLORIDE 2000 MG: 2 INJECTION, POWDER, FOR SOLUTION INTRAVENOUS at 21:52

## 2022-04-04 RX ADMIN — CALCIUM CARBONATE 600 MG (1,500 MG)-VITAMIN D3 400 UNIT TABLET 2 TABLET: at 08:34

## 2022-04-04 RX ADMIN — CETIRIZINE HYDROCHLORIDE 10 MG: 10 TABLET, FILM COATED ORAL at 08:35

## 2022-04-04 RX ADMIN — VANCOMYCIN HYDROCHLORIDE 1000 MG: 1 INJECTION, POWDER, LYOPHILIZED, FOR SOLUTION INTRAVENOUS at 02:15

## 2022-04-04 RX ADMIN — TRAZODONE HYDROCHLORIDE 50 MG: 50 TABLET ORAL at 23:44

## 2022-04-04 RX ADMIN — CEFEPIME HYDROCHLORIDE 2000 MG: 2 INJECTION, POWDER, FOR SOLUTION INTRAVENOUS at 03:53

## 2022-04-04 RX ADMIN — ALBUTEROL SULFATE 2.5 MG: 2.5 SOLUTION RESPIRATORY (INHALATION) at 16:04

## 2022-04-04 RX ADMIN — RIVASTIGMINE TARTRATE 4.5 MG: 1.5 CAPSULE ORAL at 21:57

## 2022-04-04 RX ADMIN — ACETAMINOPHEN 650 MG: 325 TABLET ORAL at 04:48

## 2022-04-04 RX ADMIN — ALBUTEROL SULFATE 2.5 MG: 2.5 SOLUTION RESPIRATORY (INHALATION) at 11:32

## 2022-04-04 RX ADMIN — CEFEPIME HYDROCHLORIDE 2000 MG: 2 INJECTION, POWDER, FOR SOLUTION INTRAVENOUS at 12:36

## 2022-04-04 RX ADMIN — METHYLPREDNISOLONE SODIUM SUCCINATE 40 MG: 40 INJECTION, POWDER, FOR SOLUTION INTRAMUSCULAR; INTRAVENOUS at 03:53

## 2022-04-04 RX ADMIN — SODIUM CHLORIDE, PRESERVATIVE FREE 10 ML: 5 INJECTION INTRAVENOUS at 21:39

## 2022-04-04 RX ADMIN — VANCOMYCIN HYDROCHLORIDE 1000 MG: 1 INJECTION, POWDER, LYOPHILIZED, FOR SOLUTION INTRAVENOUS at 14:35

## 2022-04-04 RX ADMIN — METHYLPREDNISOLONE SODIUM SUCCINATE 40 MG: 40 INJECTION, POWDER, FOR SOLUTION INTRAMUSCULAR; INTRAVENOUS at 12:32

## 2022-04-04 ASSESSMENT — PAIN SCALES - GENERAL
PAINLEVEL_OUTOF10: 0
PAINLEVEL_OUTOF10: 9
PAINLEVEL_OUTOF10: 0
PAINLEVEL_OUTOF10: 5
PAINLEVEL_OUTOF10: 0

## 2022-04-04 ASSESSMENT — ENCOUNTER SYMPTOMS
SHORTNESS OF BREATH: 1
NAUSEA: 0
CONSTIPATION: 1
EYE REDNESS: 0
ABDOMINAL PAIN: 1
COUGH: 0
VOMITING: 0
ABDOMINAL DISTENTION: 0

## 2022-04-04 NOTE — PROGRESS NOTES
[address]    PROGRESS NOTE             4/4/2022    7:20 AM    Name:   Natasha Freeman  MRN:     [id number]     [place]:      [de-identified]   Room:   2013/2013-01  IP Day:  3  Admit Date:  4/1/2022  1:04 PM    PCP:  Jane Mondragon MD  Code Status:  Full Code    Subjective:     C/C:   Chief Complaint   Patient presents with    Shortness of Breath     Patient is examined at bedside in ICU. VSS. She is on 4L oxygen. Patient reports labored breathing above baseline. She is on 3L oxygen. Patient reports ongoing pain in her right flank area, which improved with tramadol yesterday. Patient reports ongoing HA. Denies CP, nausea, vomiting. Brief History:     The patient is a 74 y.o. Non- / non  female who presents withShortness of Breath and she is admitted to the hospital for the management of respiratory distress.     Patient w/ PMH of COPD, bipolar on trazodone, risperidone, DVT/ PE last May 2021, migraines,  reports yesterday afternoon she started experiencing increased SOB and headache. At baseline, she is on O2 3L at rest, 4L if she is ambulating. This AM, patient was dyspneic and tremulous and came to the hospital.   Patient reports she stopped smoking years ago, before she started to need home oxygen. She estimates she has needed oxygen since 2017. Patient reports she had a DVT/ PE last year in May 2021 for which she saw Dr. Vic Salgado. She was put on eliquis, which was stopped in December 2022. Etiology of clot presumed to be smoking and sedentary lifestyle per hematology. Leukopenia secondary to risperidone.  Patient also follow with neurology for migraines and Dr. Loraine Pierre for COPD.      She is not up to date on screening, including annual lung cancer screening, pap, mammogram, colonoscopy in 2018 found 2mm polyp w/ follow up 5 years.     -Febrile 103, RR 19, , BP 82/47, SpO2 97  -Code sepsis called in ED  -Central line was inserted  -Antibiotics started azithromycin, ceftriaxone  -Norepinephrine drip  -methylprednisolone 125mg  -Mg sulfate 1g   -4L O2 cannula    Review of Systems:     Review of Systems   Constitutional: Negative for fever. HENT: Negative for congestion. Eyes: Negative for redness and visual disturbance. Respiratory: Positive for shortness of breath. Negative for cough. Cardiovascular: Negative for chest pain and leg swelling. Gastrointestinal: Positive for abdominal pain (right abdominal/ flank area pain) and constipation. Negative for abdominal distention, nausea and vomiting. Genitourinary: Negative for dysuria, frequency and urgency. Musculoskeletal: Negative for gait problem. Skin: Negative for rash and wound. Neurological: Positive for headaches. Negative for speech difficulty. Psychiatric/Behavioral: Negative for agitation and behavioral problems. Medications: Allergies:     Allergies   Allergen Reactions    Advil [Ibuprofen] Other (See Comments)     vomiting    Aleve [Naproxen] Other (See Comments)     vomiting    Antipyrine Other (See Comments)     unknown    Celecoxib Other (See Comments)    Codeine      headache    Fomepizole     Incruse Ellipta [Umeclidinium Bromide]      confusion    Other      GRASS, TREES, WEEDS    Rofecoxib Other (See Comments)     Unknown reaction    Salicylates      Unknown reaction     Strawberry Extract      HEADACHES    Sulfinpyrazone Other (See Comments)     Unknown reaction    Aspirin Nausea And Vomiting, Other (See Comments) and Nausea Only    Nsaids Nausea Only, Other (See Comments) and Nausea And Vomiting     Current Meds:   Scheduled Meds:    methylPREDNISolone  40 mg IntraVENous Q8H    cefepime  2,000 mg IntraVENous Once    Followed by    cefepime  2,000 mg IntraVENous Q8H    vancomycin (VANCOCIN) intermittent dosing (placeholder)   Other RX Placeholder    vancomycin  1,000 mg IntraVENous Q12H    albuterol  2.5 mg Nebulization 4x Daily    aspirin  81 mg Oral Daily    calcium carbonate w/vitamin D  2 tablet Oral Daily    cetirizine  10 mg Oral Daily    atorvastatin  20 mg Oral Daily    risperiDONE  1.5 mg Oral Q12H    traZODone  50 mg Oral Nightly    sodium chloride flush  5-40 mL IntraVENous 2 times per day    enoxaparin  40 mg SubCUTAneous Daily    tiotropium  2 puff Inhalation Daily    budesonide-formoterol  2 puff Inhalation BID    rivastigmine  4.5 mg Oral BID     Continuous Infusions:    norepinephrine Stopped (22 0702)    sodium chloride Stopped (22 1625)     PRN Meds: sodium chloride, butalbital-APAP-caffeine, sodium chloride flush, sodium chloride, ondansetron **OR** ondansetron, polyethylene glycol, acetaminophen **OR** acetaminophen    Data:     Past Medical History:   has a past medical history of Abnormal EKG, TRAM (acute kidney injury) ([facility] Anxiety, Asthma, Bipolar disorder ([facility] COPD (chronic obstructive pulmonary disease) ([facility] [address] Cramps, extremity, Depression, Dilated bile duct, Headache, History of elective , Hyperlipidemia, Irritable bowel syndrome, Prolonged emergence from general anesthesia, Substance abuse ([facility] [address] Unspecified sleep apnea, and Vision abnormalities. Social History:   reports that she quit smoking about 3 years ago. Her smoking use included cigarettes. She has a 84.00 pack-year smoking history. She has never used smokeless tobacco. She reports current alcohol use. She reports that she does not use drugs.      Family History:   Family History   Problem Relation Age of Onset    Dementia Maternal Aunt     Kidney Disease Mother     Heart Attack Sister     Prostate Cancer Father     High Cholesterol Brother     Heart Attack Paternal Grandmother      Vitals:  BP (!) 141/59   Pulse 57   Temp 97.9 °F (36.6 °C) (Oral)   Resp 21   Ht 5' 5\" (1.651 m)   Wt 186 lb 1.1 oz (84.4 kg)   LMP 2013 (Exact Date)   SpO2 96%   BMI 30.96 kg/m²   Temp (24hrs), Av.8 °F (36.6 °C), Min:97.3 °F (36.3 °C), Max:98.1 °F (36.7 °C)    No results for input(s): POCGLU in the last 72 hours. I/O(24Hr): Intake/Output Summary (Last 24 hours) at 4/4/2022 0720  Last data filed at 4/4/2022 0440  Gross per 24 hour   Intake 1839.98 ml   Output 1450 ml   Net 389.98 ml     Labs:  [unfilled]    Lab Results   Component Value Date/Time    SPECIAL NOT REPORTED 11/18/2017 02:30 PM     Lab Results   Component Value Date/Time    CULTURE NO GROWTH 04/01/2022 05:40 PM     [unfilled]    Radiology:    XR CHEST PORTABLE    Result Date: 4/1/2022  EXAMINATION: ONE XRAY VIEW OF THE CHEST 4/1/2022 4:01 pm COMPARISON: 04/01/2022 HISTORY: ORDERING SYSTEM PROVIDED HISTORY: central line placed TECHNOLOGIST PROVIDED HISTORY: central line placed Reason for Exam: Central line placed FINDINGS: There is a right internal jugular central line in place with distal tip overlying the superior vena cava. Previously noted right basal infiltrate is unchanged. Left lung appears clear. The heart and mediastinal structures appear normal.  There is no evidence of pneumothorax. Satisfactory position of right internal jugular central line. No change in the the right basal infiltrate. XR CHEST PORTABLE    Result Date: 4/1/2022  EXAMINATION: ONE XRAY VIEW OF THE CHEST 4/1/2022 1:22 pm COMPARISON: 06/17/2020. CT dated 10/15/2021. HISTORY: ORDERING SYSTEM PROVIDED HISTORY: dyspnea TECHNOLOGIST PROVIDED HISTORY: dyspnea Reason for Exam: Dyspnea Relevant Medical/Surgical History: Hx of COPD FINDINGS: The cardiac silhouette appears within normal limits. There are bibasilar opacities, right greater than left which may reflect multifocal pneumonia in the correct clinical setting. No evidence of pleural effusion or pneumothorax is seen. Bibasilar opacities, right greater than left, which may reflect multifocal pneumonia. Follow-up to imaging resolution is recommended.      Physical Examination:        Physical Exam  Constitutional:       General: She is not in acute distress. Appearance: She is not ill-appearing, toxic-appearing or diaphoretic. HENT:      Head: Normocephalic and atraumatic. Nose: No congestion or rhinorrhea. Eyes:      Extraocular Movements: Extraocular movements intact. Conjunctiva/sclera: Conjunctivae normal.   Cardiovascular:      Rate and Rhythm: Normal rate and regular rhythm. Pulses: Normal pulses. Heart sounds: Normal heart sounds. Pulmonary:      Effort: Pulmonary effort is normal. No respiratory distress. Breath sounds: Rhonchi present. No wheezing or rales. Abdominal:      General: Bowel sounds are normal. There is no distension. Palpations: Abdomen is soft. Tenderness: There is no abdominal tenderness. There is no guarding. Musculoskeletal:      Right lower leg: No edema. Left lower leg: No edema. Skin:     General: Skin is warm and dry. Findings: No rash. Neurological:      General: No focal deficit present. Mental Status: She is alert.    Psychiatric:         Mood and Affect: Mood normal.         Behavior: Behavior normal.       Assessment:        Primary Problem  Septic shock due to undetermined organism [facility])    Active Hospital Problems    Diagnosis Date Noted    [facility] due to und[facility] [A41.9, R65.21] 04/02/2022    Chronic respiratory failure with hypoxia, on home O2 therapy ([facility] [J75.32, Z99.81] 04/02/2022    COPD (chronic obstructive pulmonary disease) ([facility] [address] [J44.9] 04/02/2022    TRAM [facility]acute [facility]) ([facility] [N17.9] 04/02/2022    History of pulmonary embolus (PE) [Z86.711] 04/02/2022    Pneumonia [J18.9] 04/01/2022     Plan:        Sepsis probably due to multifocal PNA, possibly aspiration  -On admission: Febrile, hypotensive, tachycardic, WBC 16.8 > 20 > 10.5  -3L nasal cannula at rest, 4L on exertion > currently at rest 3L  -Norepinephrine 1 mcg/min discontinued this AM  -Sepsis fluids given  -Admit to ICU  -Ceftriaxone 1g q24h, azithromycin 500mg q24h > vancomycin, cefepime  -Blood cultures NGTD  -UA   -Strep, legionella neg, mycoplasma 1.13  -  -Pulm consulted  -4/4 NPO and barium swallow study ordered    Hyperglycemia likely secondary to steroids  -Glucose 290  -Medium sliding scale    Bradycardic episode  -EKG showed left anterior fasicular block     Chronic respiratory failure due to COPD stage IV  -On O2 at home, 3L  -Currently on 3L oxygen     TRAM, resolved  -Cr 1.01 > 0.64, baseline 0.59     Hx DVT/ PE  -Eliquis was discontinued in 12/2021     Bipolar disorder  Migraines     IVF: dc  Diet: NPO  GI ppx: n/a   DVT ppx: enoxaparin 40mg daily  Code status: Full code  Consults: pulm  Dispo: pending clinical course    Kristen Benites MD  4/4/2022  7:20 AM

## 2022-04-04 NOTE — PROCEDURES
INSTRUMENTAL SWALLOW REPORT  MODIFIED BARIUM SWALLOW    NAME: Cliff Pascual   : 1955  MRN: [id number]       Date of Eval: 2022              Referring Diagnosis(es): Referring Diagnosis: dysphagia    Past Medical History:  has a past medical history of Abnormal EKG, TRAM (acute kidney injury) ([facility] [address] Anxiety, Asthma, Bipolar disorder ([facility] [address] COPD (chronic obstructive pulmonary disease) ([facility] [address] Cramps, extremity, Depression, Dilated bile duct, Headache, History of elective , Hyperlipidemia, Irritable bowel syndrome, Prolonged emergence from general anesthesia, Substance abuse ([facility] [address] Unspecified sleep apnea, and Vision abnormalities. Past Surgical History:  has a past surgical history that includes Tonsillectomy and adenoidectomy (Bilateral); LASIK; Induced ; Ankle surgery; eye surgery (Bilateral); Vulva surgery; hysteroscopy (10/19/2016); and Colonoscopy (02/15/2018). Current Diet Solid Consistency: Regular  Current Diet Liquid Consistency: Thin     Type of Study: Initial MBS     Recent CXR/CT of Chest: CXR (single view frontal) 2022  Worsening opacity at the right base favoring airspace disease. Lyndsey Held in   minimal left basilar opacity which may be related atelectasis. Patient Complaints/Reason for Referral:  Cliff Pascual was referred for a MBS to assess the efficiency of his/her swallow function, assess for aspiration, and to make recommendations regarding safe dietary consistencies, effective compensatory strategies, and safe eating environment. Onset of problem: Per Internal Medicine    The patient is a 74 y.o. Non- / non  female who presents withShortness of Breath   and she is admitted to the hospital for the management of respiratory distress. Behavior/Cognition/Vision/Hearing:  Behavior/Cognition: Alert; Cooperative  Vision:  (ORALIA)  Hearing: Within functional limits    Impressions:   Patient Position: Lateral        Consistencies Administered: Dysphagia Soft and Bite-Sized (Dysphagia III); Reg solid;Pudding teaspoon;Nectar cup; Thin cup; Thin straw       Oral Phase: Premature vallecular spillage c thin liquids, mildly thick liquids, pudding, soft solids, and regular solid consistencies. Pt. Is endentulous and required extended time for mastication, however was able to break down bolus effectively. Pharyngeal: Trace penetration present during trials of thin liquid via straw, prevented c cup. Dysphagia Outcome Severity Scale: Level 6: Within functional limits/Modified independence  Penetration-Aspiration Scale (PAS): 3 - Material enters the airway, remains above the vocal folds, and is not ejected from airway    Recommended Diet:  Solid consistency: Regular  Liquid consistency: Thin (NO straws)  Liquid administration via:  (By cup, No straws)     Recommendations/Treatment  Requires SLP Intervention: Yes     Recommendations comment: Regular solid consistency reccomended. Thin liquids reccomended without straw only. Education: Results and recommendations were reviewed with pt. following this exam.   Patient Education: Regular solids recommended c thin liquids only without straw  Patient Education Response: Verbalizes understanding     Goals:    Long Term:      Goal 1: Pt. will tolerate diet at least restrictive consistency        Short Term:     Goal 1: Pt. will tolerate recommended diet without observed s/s of aspiration                    Oral Preparation / Oral Phase  Oral Phase: WNL    Pharyngeal Phase  Pharyngeal Phase: Impaired    Esophageal Phase   Esophageal Phase: WNL    Pain   Patient Currently in Pain: Denies    Therapy Time:   Individual Concurrent Group Co-treatment   Time In 1505         Time Out 1521         Minutes [address] A.CCC/SLP     4/4/2022, 3:57 PM

## 2022-04-04 NOTE — DISCHARGE SUMMARY
[address]    Discharge Summary     Patient ID: Homer Hoover  :  1955   MRN: [id number]     ACCOUNT:  [de-identified]   Patient's PCP: Jelly Dockery MD  Admit Date: 2022   Discharge Date: 2022   Length of Stay: 4  Code Status:  Full Code  Admitting Physician: Gonzalo Saravia MD  Discharge Physician: Viral Norris MD     Active Discharge Diagnoses:     Primary Problem  Mycoplasma pneumonia      Hospital Problems  Active Hospital Problems    Diagnosis Date Noted    Mycoplasma pneumonia [J15.7] 2022    Septic shock due to undetermined organism ([facility] [facility] [A41.9, R65.21] 2022    Chronic respiratory failure with hypoxia, on home O2 therapy ([facility] [T57.39, Z99.81] 2022    COPD (chronic obstructive pulmonary disease) ([facility] [J44.9] 2022    TRAM [facility]acute [facility] injury) [facility] [N17.9] 2022    History of pulmonary embolus (PE) [Z86.711] 2022    Pneumonia [J18.9] 2022     Admission Condition:  poor   Discharged Condition: fair    Hospital Stay:       Hospital Course:  Homer Hoover is a 74 y.o. female who was admitted for the management of Mycoplasma pneumonia , presented to ER with *Shortness of Breath    Patient w/ PMH of COPD, bipolar on trazodone, risperidone, DVT/ PE last May 2021, migraines,  reports yesterday afternoon she started experiencing increased SOB and headache. At baseline, she is on O2 3L at rest, 4L if she is ambulating. This AM, patient was dyspneic and tremulous and came to the hospital.   Patient reports she stopped smoking years ago, before she started to need home oxygen. She estimates she has needed oxygen since 2017. Patient reports she had a DVT/ PE last year in May 2021 for which she saw Dr. Dayton Freedman. She was put on eliquis, which was stopped in 2022. Etiology of clot presumed to be smoking and sedentary lifestyle per hematology. Leukopenia secondary to risperidone.  Patient also follow with neurology for migraines and Dr. Loraine Pierre for COPD. She is not up to date on screening, including annual lung cancer screening, pap, mammogram, colonoscopy in 2018 found 2mm polyp w/ follow up 5 years.     -Febrile 103, RR 19, , BP 82/47, SpO2 97  -Code sepsis called in ED  -Central line was inserted  -Antibiotics started azithromycin, ceftriaxone  -Norepinephrine drip  -methylprednisolone 125mg  -Mg sulfate 1g   -4L O2 cannula    4/3/22  Patient is examined at bedside in ICU. Overnight, her HR was 36. Patient reports SOB. She is on 4L oxygen. Patient breathing is labored, RR is 28. Patient reports her right flank area hurts, similar to yesterday and tylenol is not improving the pain. Patient reports HA and some leg discomfort. Denies CP, nausea, vomiting. She was found positive for Mycoplasma pneumonia.      Significant Diagnostic Studies:   Labs / Micro:  CBC:   Lab Results   Component Value Date    WBC 7.6 04/05/2022    RBC 3.07 04/04/2022    RBC 0-2 07/08/2021    HGB 9.4 04/05/2022    HCT 28.8 04/04/2022    MCV 93.6 04/04/2022    MCH 30.6 04/04/2022    MCHC 32.7 04/04/2022    RDW 15.3 04/04/2022     04/04/2022     BMP:    Lab Results   Component Value Date    GLUCOSE 202 04/05/2022    GLUCOSE 127 07/08/2021     04/05/2022    K 5.1 04/05/2022     04/05/2022    CO2 27 04/05/2022    ANIONGAP 8 04/05/2022    BUN 24 04/05/2022    CREATININE 0.66 04/05/2022    BUNCRER NOT REPORTED 10/08/2021    CALCIUM 8.9 04/05/2022    LABGLOM >60 04/05/2022    GFRAA >60 04/05/2022    GFR      04/05/2022     HFP:    Lab Results   Component Value Date    PROT 7.1 04/01/2022    PROT 5.7 07/08/2021     CMP:    Lab Results   Component Value Date    GLUCOSE 202 04/05/2022    GLUCOSE 127 07/08/2021     04/05/2022    K 5.1 04/05/2022     04/05/2022    CO2 27 04/05/2022    BUN 24 04/05/2022    CREATININE 0.66 04/05/2022    ANIONGAP 8 04/05/2022    ALKPHOS 81 04/01/2022    ALT 12 04/01/2022    AST 17 04/01/2022    BILITOT 0.29 04/01/2022    LABALBU 3.8 04/01/2022    LABALBU 3.6 07/08/2021    ALBUMIN NOT REPORTED 10/08/2021    LABGLOM >60 04/05/2022    GFRAA >60 04/05/2022    GFR      04/05/2022    PROT 7.1 04/01/2022    PROT 5.7 07/08/2021    CALCIUM 8.9 04/05/2022     PT/INR:    Lab Results   Component Value Date    PROTIME 15.2 07/08/2021    INR 1.20 07/08/2021     PTT:   Lab Results   Component Value Date    APTT 32.4 07/08/2021     FLP:    Lab Results   Component Value Date    CHOL 170 10/08/2021    TRIG 69 10/08/2021    HDL 75 10/08/2021     U/A:    Lab Results   Component Value Date    COLORU Yellow 04/01/2022    TURBIDITY Clear 04/01/2022    SPECGRAV 1.012 04/01/2022    HGBUR TRACE 04/01/2022    PHUR 5.5 04/01/2022    PROTEINU NEGATIVE 04/01/2022    GLUCOSEU NEGATIVE 04/01/2022    GLUCOSEU NEGATIVE 07/08/2021    KETUA NEGATIVE 04/01/2022    BILIRUBINUR NEGATIVE 04/01/2022    BILIRUBINUR negative 03/11/2021    UROBILINOGEN Normal 04/01/2022    NITRU NEGATIVE 04/01/2022    LEUKOCYTESUR NEGATIVE 04/01/2022     TSH:    Lab Results   Component Value Date    TSH 0.17 04/04/2022     Radiology:  XR CHEST (SINGLE VIEW FRONTAL)    Result Date: 4/3/2022  EXAMINATION: ONE XRAY VIEW OF THE CHEST 4/3/2022 5:51 am COMPARISON: 1 April 2022 HISTORY: ORDERING SYSTEM PROVIDED HISTORY: sob, pleurisy, cough TECHNOLOGIST PROVIDED HISTORY: sob, pleurisy, cough Reason for Exam: Shortness of breath, pleurisy, cough Additional signs and symptoms: Shortness of breath, pleurisy, cough Relevant Medical/Surgical History: Shortness of breath, pleurisy, cough FINDINGS: AP portable view of the chest time stamped at 528 hours demonstrates overlying cardiac monitoring electrodes. Heart size is stable. Right internal jugular catheter terminates in the distal superior vena cava. There has been worsening of a focal opacity at the right lung base. Minimal left basilar opacity is unchanged.   No extrapleural air or overt edema. No gross effusions. Worsening opacity at the right base favoring airspace disease. Change in minimal left basilar opacity which may be related atelectasis. XR CHEST PORTABLE    Result Date: 4/1/2022  EXAMINATION: ONE XRAY VIEW OF THE CHEST 4/1/2022 4:01 pm COMPARISON: 04/01/2022 HISTORY: ORDERING SYSTEM PROVIDED HISTORY: central line placed TECHNOLOGIST PROVIDED HISTORY: central line placed Reason for Exam: Central line placed FINDINGS: There is a right internal jugular central line in place with distal tip overlying the superior vena cava. Previously noted right basal infiltrate is unchanged. Left lung appears clear. The heart and mediastinal structures appear normal.  There is no evidence of pneumothorax. Satisfactory position of right internal jugular central line. No change in the the right basal infiltrate. XR CHEST PORTABLE    Result Date: 4/1/2022  EXAMINATION: ONE XRAY VIEW OF THE CHEST 4/1/2022 1:22 pm COMPARISON: 06/17/2020. CT dated 10/15/2021. HISTORY: ORDERING SYSTEM PROVIDED HISTORY: dyspnea TECHNOLOGIST PROVIDED HISTORY: dyspnea Reason for Exam: Dyspnea Relevant Medical/Surgical History: Hx of COPD FINDINGS: The cardiac silhouette appears within normal limits. There are bibasilar opacities, right greater than left which may reflect multifocal pneumonia in the correct clinical setting. No evidence of pleural effusion or pneumothorax is seen. Bibasilar opacities, right greater than left, which may reflect multifocal pneumonia. Follow-up to imaging resolution is recommended. Consultations:    Consults:     Final Specialist Recommendations/Findings:   IP CONSULT TO INTERNAL MEDICINE  IP CONSULT TO PULMONOLOGY  PHARMACY TO DOSE VANCOMYCIN      The patient was seen and examined on day of discharge and this discharge summary is in conjunction with any daily progress note from day of discharge.     Discharge plan:     Disposition: Home    Physician Follow Up:     [facility] [address]  [phone]    They will call you at home to set up a time to come to your house. [employer]  [address] [address]  [phone]    call when you get home so they can deliver your [address], [address]  [phone]    Schedule an appointment as soon as possible for a visit    Primary Care Physician    Schedule an appointment as soon as possible for a visit     Diet: regular diet    Activity: As tolerated    Instructions to Patient:   -Levofloxacin 750mg for 7 days  -Prednisone taper  -Continue medications as directed  -Follow up Chest Xray in 4 weeks  -Follow up with Dr. Jazmin Kaufman and Primary Care Physician  -Outpatient sleep study    Discharge Medications:      Medication List        START taking these medications      levoFLOXacin 750 MG tablet  Commonly known as: LEVAQUIN  Take 1 tablet by mouth daily for 7 days  Start taking on: April 6, 2022     * predniSONE 20 MG tablet  Commonly known as: DELTASONE  Take 2 tablets by mouth daily for 2 days     * predniSONE 10 MG tablet  Commonly known as: DELTASONE  Take 2 tablets by mouth daily for 2 days     * predniSONE 10 MG tablet  Commonly known as: DELTASONE  Take 1 tablet by mouth daily for 3 days     traMADol 50 MG tablet  Commonly known as: Ultram  Take 1 tablet by mouth every 6 hours as needed for Pain for up to 3 days. Intended supply: 3 days. Take lowest dose possible to manage pain           * This list has 3 medication(s) that are the same as other medications prescribed for you. Read the directions carefully, and ask your doctor or other care provider to review them with you.                 CONTINUE taking these medications      acetaminophen 500 MG tablet  Commonly known as: Acetaminophen Extra Strength  take 2 tablets by mouth every 6 hours if needed for pain     * Ventolin  (90 Base) MCG/ACT inhaler  Generic drug: albuterol sulfate HFA     * albuterol (2.5 MG/3ML) 0.083% nebulizer solution  Commonly known as: PROVENTIL  Take 3 mLs by nebulization 4 times daily     aspirin 81 MG EC tablet     Breo Ellipta 100-25 MCG/INH Aepb inhaler  Generic drug: fluticasone-vilanterol     butalbital-acetaminophen-caffeine -40 MG per tablet  Commonly known as: FIORICET, ESGIC     Calcium Carb-Cholecalciferol 600-500 MG-UNIT Tabs     cetirizine 10 MG tablet  Commonly known as: ZYRTEC     docusate sodium 100 MG capsule  Commonly known as: COLACE  take 1 capsule by mouth twice a day     EPINEPHrine 0.3 MG/0.3ML Soaj injection  Commonly known as: EpiPen 2-Tucker  Inject 0.3 mLs into the muscle once as needed (for allergic reaction) Use as directed for allergic reaction     fluticasone 50 MCG/ACT nasal spray  Commonly known as: Flonase  2 sprays by Nasal route daily     ibandronate 150 MG tablet  Commonly known as: Boniva  Take 1 tablet by mouth every 30 days Take one (1) tablet once per month in the morning with a full glass of water, on an empty stomach, and do not take anything else by mouth or lie down for the next 30 minutes.      lidocaine 4 % cream  Commonly known as: LMX  apply topically every 8 hours if needed     olopatadine 0.1 % ophthalmic solution  Commonly known as: PATANOL     risperiDONE 0.5 MG tablet  Commonly known as: RISPERDAL  Take 3 tablets by mouth every 12 hours Per [facility] psych     rivastigmine 4.5 MG capsule  Commonly known as: EXELON  take 1 capsule by mouth twice a day     simvastatin 20 MG tablet  Commonly known as: ZOCOR  Take 1 tablet by mouth nightly     sodium chloride 0.65 % nasal spray  Commonly known as: OCEAN, BABY AYR     Spiriva Respimat 2.5 MCG/ACT Aers inhaler  Generic drug: tiotropium  inhale 2 puffs INTO THE LUNGS once daily     * topiramate 50 MG tablet  Commonly known as: TOPAMAX     * topiramate 50 MG tablet  Commonly known as: TOPAMAX     traZODone 50 MG tablet  Commonly known as: DESYREL     vitamin B-12 100 MCG tablet  Commonly known as: CYANOCOBALAMIN     vitamin D 1.25 MG (68768 UT) Caps capsule  Commonly known as: ERGOCALCIFEROL  take 1 capsule by mouth every week           * This list has 4 medication(s) that are the same as other medications prescribed for you. Read the directions carefully, and ask your doctor or other care provider to review them with you. Where to Get Your Medications        These medications were sent to [facility]  [address] [facility]  [facility] [address] [address]      Phone: [phone]   levoFLOXacin 750 MG tablet  predniSONE 10 MG tablet  predniSONE 10 MG tablet  predniSONE 20 MG tablet       These medications were sent to [facility], [address] [address]      Phone: [phone]   docusate sodium 100 MG capsule       You can get these medications from any pharmacy    Bring a paper prescription for each of these medications  traMADol 50 MG tablet       Electronically signed by   Viral Norris MD  4/5/2022  6:59 PM    Thank you Dr. Jelly Dockery MD for the opportunity to be involved in this patient's care. Attending Physician Statement  I have discussed the care of Homer Hoover and I have examined the patient myselft and taken ros and hpi , including pertinent history and exam findings,  with the resident. I have reviewed the key elements of all parts of the encounter with the resident. I agree with the assessment, plan and orders as documented by the resident. I spent approx 35 mins in direct patient care as above and discussing discharge with patient, reviewing medications and counseling for discharge .     Electronically signed by Gonzalo Saravia MD

## 2022-04-04 NOTE — PROGRESS NOTES
Physical Therapy  Facility/Department: [facility] ICU  Daily Treatment Note  NAME: Magan Prescott  : 1955  MRN: [id number]    Date of Service: 2022    Discharge Recommendations:  Continue to assess pending progress        Assessment   Treatment Diagnosis: dec function  REQUIRES PT FOLLOW UP: Yes  Activity Tolerance  Activity Tolerance: Patient limited by endurance     Patient Diagnosis(es): The primary encounter diagnosis was Pneumonia of both lungs due to infectious organism, unspecified part of lung. A diagnosis of Septic shock ([address] was also pertinent to this visit. has a past medical history of Abnormal EKG, TRAM (acute kidney injury) ([address] Anxiety, Asthma, Bipolar disorder ([address] COPD (chronic obstructive pulmonary disease) ([address] Cramps, extremity, Depression, Dilated bile duct, Headache, History of elective , Hyperlipidemia, Irritable bowel syndrome, Prolonged emergence from general anesthesia, Substance abuse ([address] Unspecified sleep apnea, and Vision abnormalities. has a past surgical history that includes Tonsillectomy and adenoidectomy (Bilateral); LASIK; Induced ; Ankle surgery; eye surgery (Bilateral); Vulva surgery; hysteroscopy (10/19/2016); and Colonoscopy (02/15/2018). Restrictions  Restrictions/Precautions  Required Braces or Orthoses?: No  Subjective   General  Chart Reviewed: Yes  Response To Previous Treatment: Patient with no complaints from previous session.   Family / Caregiver Present: No  Subjective  Subjective: Patient laying in bed upon arrival, agreeable to therapy   General Comment  Comments: SUE Newton approved therapy   Pain Screening  Patient Currently in Pain: Denies  Vital Signs  Pulse: 68  Heart Rate Source: Monitor  Patient Currently in Pain: Denies  Oxygen Therapy  SpO2: 100 %  Pulse Oximeter Device Mode: Continuous  Pulse Oximeter Device Location: Finger  O2 Device: Nasal cannula  O2 Flow Rate (L/min): 3 L/min       Orientation  Orientation  Overall Orientation Status: Within Normal Limits  Objective   Bed mobility  Rolling to Left: Stand by assistance  Rolling to Right: Stand by assistance  Supine to Sit: Stand by assistance  Sit to Supine: Stand by assistance  Scooting: Stand by assistance  Transfers  Sit to Stand: Minimal Assistance  Stand to sit: Minimal Assistance  Ambulation  Ambulation?: Yes  Ambulation 1  Surface: level tile  Device: Hand-Held Assist  Assistance: Minimal assistance  Gait Deviations: Slow Berenice;Decreased step length;Decreased step height  Distance: 10ft  Comments: Patient became very SOB and fatigued after standing and lay down  Stairs/Curb  Stairs?: No        Other exercises  Other exercises?: Yes  Other exercises 1: bed mobility x2  Other exercises 2: seated EOB ~10 minutes   Other exercises 3: seated B LE exercises x10 reps   Other exercises 4: STS x1      Goals  Short term goals  Short term goal 1: Patient to demonstrate GOOD standing balance  Short term goal 2: Patient to demonstrate the ability to ambulate 50ft with supervision  Short term goal 3: Patient to demonstrate IND with transfers    Plan    Plan  Times per week: 3x/wk  Current Treatment Recommendations: Strengthening,Transfer Training,Endurance Training,Balance Training,Gait Training  Safety Devices  Type of devices: Left in bed,Call light within reach        04/04/22 1431   PT Individual Minutes   Time In 1355   Time Out 1412   Minutes 17     Electronically signed by Jae Garay PTA on 4/4/22 at 2:50 PM EDT

## 2022-04-04 NOTE — CARE COORDINATION
ONGOING DISCHARGE PLAN:    Patient is alert and oriented x4. Spoke with patient regarding discharge plan and patient confirms that plan is still home with Ariana FLEMING, pt is current. Pt is on home O2 at 3.5lpm [facility] from [facility]. Pt on IV cefepime, vanco and solumedrol 40 mg Q8. Off levophed overnight. Transfer to PCU today. Will continue to follow for additional discharge needs.     Electronically signed by Lelia Dan RN on 4/4/2022 at 1:26 PM

## 2022-04-04 NOTE — PROGRESS NOTES
Pulmonary Progress Note  Pulmonary and Critical Care Specialists      Patient - Cliff Pascual,  Age - 74 y.o.    - 1955      Room Number -    N -  [id number]   [facility]  Date of Admission -  2022  1:04 PM    Lilibeth Mcgill MD  Primary Care Physician - Abner óGmez MD     SUBJECTIVE   Patient currently is resting quietly. O2 saturation 3 L nasal cannula O2 saturations 95 to 96%. OBJECTIVE   VITALS    height is 5' 5\" (1.651 m) and weight is 186 lb 1.1 oz (84.4 kg). Her axillary temperature is 97.3 °F (36.3 °C). Her blood pressure is 112/38 (abnormal) and her pulse is 63. Her respiration is 20 and oxygen saturation is 96%. Body mass index is 30.96 kg/m². Temperature Range: Temp: 97.3 °F (36.3 °C) Temp  Av.5 °F (36.4 °C)  Min: 97.3 °F (36.3 °C)  Max: 97.9 °F (36.6 °C)  BP Range:  Systolic (51CAH), JBU:553 , Min:97 , QRQ:144     Diastolic (34VMQ), CWL:69, Min:30, Max:59    Pulse Range: Pulse  Av.9  Min: 47  Max: 81  Respiration Range: Resp  Av.8  Min: 11  Max: 48  Current Pulse Ox[de-identified]  SpO2: 96 %  24HR Pulse Ox Range:  SpO2  Av.7 %  Min: 91 %  Max: 99 %  Oxygen Amount and Delivery: O2 Flow Rate (L/min): 3 L/min    Wt Readings from Last 3 Encounters:   22 186 lb 1.1 oz (84.4 kg)   21 195 lb 14.4 oz (88.9 kg)   21 194 lb (88 kg)       I/O (24 Hours)    Intake/Output Summary (Last 24 hours) at 2022 1126  Last data filed at 2022 0912  Gross per 24 hour   Intake 1510.53 ml   Output 1250 ml   Net 260.53 ml       EXAM     General Appearance  Awake, alert, oriented, in no acute distress  HEENT - normocephalic, atraumatic. Neck - Supple,  trachea midline   Lungs -coarse breath sounds no crackles rales or wheezes  Heart Exam:PMI normal. No lifts, heaves, or thrills. RRR. No murmurs, clicks, gallops, or rubs  Abdomen Exam: Abdomen soft, non-tender.  BS normal.  Extremity Exam: No signs of cyanosis    MEDS      methylPREDNISolone  40 mg IntraVENous Q8H    cefepime  2,000 mg IntraVENous Once    Followed by    cefepime  2,000 mg IntraVENous Q8H    vancomycin (VANCOCIN) intermittent dosing (placeholder)   Other RX Placeholder    vancomycin  1,000 mg IntraVENous Q12H    albuterol  2.5 mg Nebulization 4x Daily    aspirin  81 mg Oral Daily    calcium carbonate w/vitamin D  2 tablet Oral Daily    cetirizine  10 mg Oral Daily    atorvastatin  20 mg Oral Daily    risperiDONE  1.5 mg Oral Q12H    traZODone  50 mg Oral Nightly    sodium chloride flush  5-40 mL IntraVENous 2 times per day    enoxaparin  40 mg SubCUTAneous Daily    tiotropium  2 puff Inhalation Daily    budesonide-formoterol  2 puff Inhalation BID    rivastigmine  4.5 mg Oral BID      norepinephrine Stopped (04/04/22 0702)    sodium chloride Stopped (04/02/22 1625)     sodium chloride, butalbital-APAP-caffeine, sodium chloride flush, sodium chloride, ondansetron **OR** ondansetron, polyethylene glycol, acetaminophen **OR** acetaminophen    LABS   CBC   Recent Labs     04/04/22  1021   WBC 10.5   HGB 9.4*   HCT 28.8*   MCV 93.6        BMP:   Lab Results   Component Value Date     04/04/2022    K 4.3 04/04/2022     04/04/2022    CO2 24 04/04/2022    BUN 26 04/04/2022    LABALBU 3.8 04/01/2022    LABALBU 3.6 07/08/2021    CREATININE 0.64 04/04/2022    CALCIUM 8.6 04/04/2022    GFRAA >60 04/04/2022    LABGLOM >60 04/04/2022     ABGs:  Lab Results   Component Value Date    PHART 7.361 05/19/2016    PO2ART 67.1 05/19/2016    LBH6EOS 46.1 05/19/2016      Lab Results   Component Value Date    MODE NOT REPORTED 06/17/2021     Ionized Calcium:  No results found for: IONCA  Magnesium:    Lab Results   Component Value Date    MG 1.6 04/01/2022     Phosphorus:  No results found for: PHOS     LIVER PROFILE   Recent Labs     04/01/22  1320   AST 17   ALT 12   BILITOT 0.29*   ALKPHOS 81     INR No results for input(s): INR in the last 72 hours. PTT   Lab Results   Component Value Date    APTT 32.4 07/08/2021         RADIOLOGY     (See actual reports for details)    ASSESSMENT/PLAN     Patient Active Problem List   Diagnosis    Chronic obstructive pulmonary disease ([facility]    Vitamin D deficiency    Anxiety    Asthma    Bipolar disorder ([facility]    Depression    Hyperlipidemia with target LDL less than 100    Sleep apnea    Vision abnormalities    Status post hysteroscopy    Chronic headaches    IBS (irritable bowel syndrome)    Colon polyp    Collagenous colitis    Lung nodule    Left elbow pain    Dilated bile duct    Acute pain of left shoulder    Adhesive capsulitis of left shoulder    Leg swelling    Leucopenia    Compression fracture of body of thoracic vertebra (HCC)    Age-related osteoporosis with current pathological fracture    Flank pain    Pulmonary embolism on right ([address]    Migraines    Paranoid behavior ([facility]    Acute deep vein thrombosis (DVT) of right lower extremity (HCC)    Delirium    [facility] with [facility] ([facility]    Major neurocognitive disorder ([facility]    Pneumonia    Septic shock due to undetermined organism ([facility]    Chronic respiratory failure with hypoxia, on home O2 therapy ([facility]    COPD (chronic obstructive pulmonary disease) ([facility]    TRAM (acute kidney injury) ([facility]    History of pulmonary embolus (PE)     1. Septic shock, pneumonia, right lower lobe, Levophed at 6, WBC still rising 16.8>20.6 and bands 16>>19--- today WBC improved to 10.5  2. Dehydration  3. Stage IV COPD with exacerbation, FEV1 37%, Dr Sofia Quintanilla  4. Chronic hypoxemic respiratory failure-O2 dependent 3L  5. History of tobacco use quit June 2021, >80 pk years   6. History of DVT/PE-was on Eliquis>> now just ASA  7. Full code-no trach/PEG or prolonged ventilation    No fevers appreciate. White blood count improved from 20.6-10.5. O2 saturations fairly stable.   Creatinine was at 1.01 upon admission now 0.64    On Maxipime and vancomycin blood cultures negative to date, 4/ 1  On Solu-Medrol  On Lovenox for DVT prophylaxis.       Progressive at other services discretion     electronically signed by Martine Barrera MD on 4/4/2022 at 11:26 AM

## 2022-04-04 NOTE — PROGRESS NOTES
Trough   Invasive MRSA Infection (bacteremia, pneumonia, meningitis, endocarditis, osteomyelitis)  Sepsis (undifferentiated) 400-600 N/A   Infection due to non-MRSA pathogen  Empiric treatment of non-invasive MRSA infection  (SSTI, UTI) <500 10-15 mg/L   CrCl < 29 mL/min  Rapidly fluctuating serum creatinine   TRAM N/A < 15 mg/L     Renal replacement therapy is dosed by levels, per hospital protocol. Abbreviations  * Pauc: probability that AUC is >400 (efficacy); Pconc: probability that Ctrough is above 20 ?g/mL (toxicity); Tox: Probability of nephrotoxicity, based on Tapan et al. Clin Infect Dis 2009. Loading dose: 2250 mg at 14:00 04/03/2022. Regimen: 1000 mg IV every 12 hours. Start time: 14:15 on 04/04/2022  Exposure target: AUC24 (range)400-600 mg/L.hr   AUC24,ss: 495 mg/L.hr  Probability of AUC24 > 400: 81 %  Ctrough,ss: 14.4 mg/L  Probability of Ctrough,ss > 20: 17 %  Probability of nephrotoxicity ([place] NYASIA 2009): 10 %      Thank you for the consult. Pharmacy will continue to follow.

## 2022-04-04 NOTE — DISCHARGE INSTR - DIET

## 2022-04-04 NOTE — PROGRESS NOTES
Attending Physician Statement  I have discussed the care of Michelle Bo with the resident team. I have examined the patient myself and taken ros and hpi , including pertinent history and exam findings,  with the resident. I have reviewed the key elements of all parts of the encounter with the resident. I agree with the assessment, plan and orders as documented by the resident. Principal Problem:    Septic shock due to undetermined organism [facility])  Active Problems:    Pneumonia    Chronic respiratory failure with hypoxia, on home O2 therapy (HCC)    COPD (chronic obstructive pulmonary disease) ([facility])    TRAM (acute kidney injury) ([facility] [address]    History of pulmonary embolus (PE)  Resolved Problems:    * No resolved hospital problems. *    COPD exac on 3L o2 at home- on iv steroids, scheduled BD sec to PNA  Septic shock sec to RLL PNA- cx neg so far, on   Off levophed today  abx switched to cefepime and vanc pre crit care yesterday  Pt appears improved copared to y'day but not at goal- on home O2 now. Overnight abbie while pt asleep- improved, ECHO in reserve if recurs, no need for cards yet.    wll do swallow study today    tx out to PCU this afternoon     Electronically signed by Shravan Kwok MD

## 2022-04-04 NOTE — PROGRESS NOTES
Patients daughter  Johnny Gonzales called. RN updated her. All questions and concern answered  this time.

## 2022-04-04 NOTE — PROGRESS NOTES
Physician Progress Note      Grant Perry  CSN #:                  [id number]  :                       1955  ADMIT DATE:       2022 1:04 PM  [address] [place] DATE:  RESPONDING  PROVIDER #:        Simin Hassan MD          QUERY TEXT:    Pt admitted with COPD exacerbation, PNA. Pt noted to have chronic home   oxygen. If possible, please document in the progress notes and discharge   summary if you are evaluating and/or treating any of the following: The medical record reflects the following:  Risk Factors: PNA, COPD, home oxygen  Clinical Indicators: per H&P  She estimates she has needed oxygen since   2017. At baseline, she is on O2 3L at rest, 4L if she is ambulating. , this   admit on 3-4L 95-98% sats . RR 17-30  Treatment: supplemental oxygen, pulse oximeter  Options provided:  -- Chronic respiratory failure with hypoxia  -- Chronic respiratory failure with hypercapnia  -- Chronic respiratory failure with hypoxia and hypercapnia  -- Other - I will add my own diagnosis  -- Disagree - Not applicable / Not valid  -- Disagree - Clinically unable to determine / Unknown  -- Refer to Clinical Documentation Reviewer    PROVIDER RESPONSE TEXT:    This patient has chronic respiratory failure with hypoxia.     Query created by: Merline Balzarine on 2022 7:25 AM      Electronically signed by:  Simin Hassan MD 2022 1:19 PM

## 2022-04-04 NOTE — PROGRESS NOTES
04/03/22 2308   Oxygen Therapy/Pulse Ox   O2 Therapy Room air  (Nocturnal Sleep Study )   Resp 21   SpO2 96 %   Pulse Oximeter Device Mode Continuous   Pulse Oximeter Device Location Finger

## 2022-04-04 NOTE — PLAN OF CARE
Problem: Pain:  Goal: Pain level will decrease  Description: Pain level will decrease  Outcome: Met This Shift   Pt medicated with pain medication prn. Assessed all pain characteristics including level, type, location, frequency, and onset. Non-pharmacologic interventions offered to pt as well. Pt states pain is tolerable at this time. Will continue to monitor. Problem: Falls - Risk of:  Goal: Will remain free from falls  Description: Will remain free from falls  Outcome: Met This Shift   No falls noted this shift. Patient ambulates with x1 staff assistance without difficulty. Bed kept in low position. Safe environment maintained. Bedside table & call light in reach. Uses call light appropriately when needing assistance. Problem: Skin Integrity:  Goal: Will show no infection signs and symptoms  Description: Will show no infection signs and symptoms  Outcome: Met This Shift   Skin assessment done this shift. No new skin issues noted this shift.

## 2022-04-05 ENCOUNTER — APPOINTMENT (OUTPATIENT)
Dept: GENERAL RADIOLOGY | Age: 67
DRG: 720 | End: 2022-04-05
Payer: COMMERCIAL

## 2022-04-05 VITALS
WEIGHT: 186.07 LBS | HEART RATE: 66 BPM | SYSTOLIC BLOOD PRESSURE: 135 MMHG | DIASTOLIC BLOOD PRESSURE: 65 MMHG | TEMPERATURE: 98.2 F | BODY MASS INDEX: 31 KG/M2 | OXYGEN SATURATION: 97 % | RESPIRATION RATE: 18 BRPM | HEIGHT: 65 IN

## 2022-04-05 LAB
ANION GAP SERPL CALCULATED.3IONS-SCNC: 8 MMOL/L (ref 9–17)
BUN BLDV-MCNC: 24 MG/DL (ref 8–23)
CALCIUM SERPL-MCNC: 8.9 MG/DL (ref 8.6–10.4)
CHLORIDE BLD-SCNC: 103 MMOL/L (ref 98–107)
CO2: 27 MMOL/L (ref 20–31)
CREAT SERPL-MCNC: 0.66 MG/DL (ref 0.5–0.9)
GFR AFRICAN AMERICAN: >60 ML/MIN
GFR NON-AFRICAN AMERICAN: >60 ML/MIN
GFR SERPL CREATININE-BSD FRML MDRD: ABNORMAL ML/MIN/{1.73_M2}
GLUCOSE BLD-MCNC: 126 MG/DL (ref 65–105)
GLUCOSE BLD-MCNC: 169 MG/DL (ref 65–105)
GLUCOSE BLD-MCNC: 202 MG/DL (ref 70–99)
HEMOGLOBIN: 9.4 G/DL (ref 12–16)
POTASSIUM SERPL-SCNC: 5.1 MMOL/L (ref 3.7–5.3)
SODIUM BLD-SCNC: 138 MMOL/L (ref 135–144)
WBC # BLD: 7.6 K/UL (ref 3.5–11)

## 2022-04-05 PROCEDURE — 99239 HOSP IP/OBS DSCHRG MGMT >30: CPT | Performed by: INTERNAL MEDICINE

## 2022-04-05 PROCEDURE — 2700000000 HC OXYGEN THERAPY PER DAY

## 2022-04-05 PROCEDURE — 71045 X-RAY EXAM CHEST 1 VIEW: CPT

## 2022-04-05 PROCEDURE — 6360000002 HC RX W HCPCS

## 2022-04-05 PROCEDURE — 85048 AUTOMATED LEUKOCYTE COUNT: CPT

## 2022-04-05 PROCEDURE — 6370000000 HC RX 637 (ALT 250 FOR IP)

## 2022-04-05 PROCEDURE — 6360000002 HC RX W HCPCS: Performed by: INTERNAL MEDICINE

## 2022-04-05 PROCEDURE — 6370000000 HC RX 637 (ALT 250 FOR IP): Performed by: INTERNAL MEDICINE

## 2022-04-05 PROCEDURE — 80048 BASIC METABOLIC PNL TOTAL CA: CPT

## 2022-04-05 PROCEDURE — 92526 ORAL FUNCTION THERAPY: CPT

## 2022-04-05 PROCEDURE — 6370000000 HC RX 637 (ALT 250 FOR IP): Performed by: STUDENT IN AN ORGANIZED HEALTH CARE EDUCATION/TRAINING PROGRAM

## 2022-04-05 PROCEDURE — 36415 COLL VENOUS BLD VENIPUNCTURE: CPT

## 2022-04-05 PROCEDURE — 94640 AIRWAY INHALATION TREATMENT: CPT

## 2022-04-05 PROCEDURE — 94669 MECHANICAL CHEST WALL OSCILL: CPT

## 2022-04-05 PROCEDURE — 85018 HEMOGLOBIN: CPT

## 2022-04-05 PROCEDURE — 2580000003 HC RX 258: Performed by: INTERNAL MEDICINE

## 2022-04-05 PROCEDURE — 94761 N-INVAS EAR/PLS OXIMETRY MLT: CPT

## 2022-04-05 PROCEDURE — 82947 ASSAY GLUCOSE BLOOD QUANT: CPT

## 2022-04-05 RX ORDER — DOCUSATE SODIUM 100 MG/1
CAPSULE, LIQUID FILLED ORAL
Qty: 60 CAPSULE | Refills: 3 | Status: SHIPPED | OUTPATIENT
Start: 2022-04-05

## 2022-04-05 RX ORDER — PREDNISONE 20 MG/1
40 TABLET ORAL DAILY
Status: DISCONTINUED | OUTPATIENT
Start: 2022-04-05 | End: 2022-04-05 | Stop reason: HOSPADM

## 2022-04-05 RX ORDER — PREDNISONE 10 MG/1
10 TABLET ORAL DAILY
Qty: 3 TABLET | Refills: 0 | Status: SHIPPED | OUTPATIENT
Start: 2022-04-05 | End: 2022-04-08

## 2022-04-05 RX ORDER — LEVOFLOXACIN 750 MG/1
750 TABLET ORAL DAILY
Status: DISCONTINUED | OUTPATIENT
Start: 2022-04-05 | End: 2022-04-05 | Stop reason: HOSPADM

## 2022-04-05 RX ORDER — TRAMADOL HYDROCHLORIDE 50 MG/1
50 TABLET ORAL EVERY 6 HOURS PRN
Qty: 12 TABLET | Refills: 0 | Status: SHIPPED | OUTPATIENT
Start: 2022-04-05 | End: 2022-04-08

## 2022-04-05 RX ORDER — LIDOCAINE 4 G/G
1 PATCH TOPICAL DAILY
Status: DISCONTINUED | OUTPATIENT
Start: 2022-04-05 | End: 2022-04-05 | Stop reason: HOSPADM

## 2022-04-05 RX ORDER — TRAMADOL HYDROCHLORIDE 50 MG/1
50 TABLET ORAL ONCE
Status: COMPLETED | OUTPATIENT
Start: 2022-04-05 | End: 2022-04-05

## 2022-04-05 RX ORDER — LEVOFLOXACIN 750 MG/1
750 TABLET ORAL DAILY
Qty: 7 TABLET | Refills: 0 | Status: SHIPPED | OUTPATIENT
Start: 2022-04-06 | End: 2022-04-13

## 2022-04-05 RX ORDER — PREDNISONE 20 MG/1
40 TABLET ORAL DAILY
Qty: 4 TABLET | Refills: 0 | Status: SHIPPED | OUTPATIENT
Start: 2022-04-05 | End: 2022-04-07

## 2022-04-05 RX ORDER — METHYLPREDNISOLONE SODIUM SUCCINATE 40 MG/ML
40 INJECTION, POWDER, LYOPHILIZED, FOR SOLUTION INTRAMUSCULAR; INTRAVENOUS EVERY 12 HOURS
Status: DISCONTINUED | OUTPATIENT
Start: 2022-04-05 | End: 2022-04-05

## 2022-04-05 RX ORDER — PREDNISONE 10 MG/1
20 TABLET ORAL DAILY
Qty: 4 TABLET | Refills: 0 | Status: SHIPPED | OUTPATIENT
Start: 2022-04-05 | End: 2022-04-07

## 2022-04-05 RX ADMIN — LEVOFLOXACIN 750 MG: 750 TABLET, FILM COATED ORAL at 11:19

## 2022-04-05 RX ADMIN — ATORVASTATIN CALCIUM 20 MG: 20 TABLET, FILM COATED ORAL at 08:50

## 2022-04-05 RX ADMIN — VANCOMYCIN HYDROCHLORIDE 1000 MG: 1 INJECTION, POWDER, LYOPHILIZED, FOR SOLUTION INTRAVENOUS at 02:57

## 2022-04-05 RX ADMIN — RIVASTIGMINE TARTRATE 4.5 MG: 1.5 CAPSULE ORAL at 08:54

## 2022-04-05 RX ADMIN — TRAMADOL HYDROCHLORIDE 50 MG: 50 TABLET, COATED ORAL at 15:18

## 2022-04-05 RX ADMIN — TIOTROPIUM BROMIDE INHALATION SPRAY 2 PUFF: 3.12 SPRAY, METERED RESPIRATORY (INHALATION) at 07:10

## 2022-04-05 RX ADMIN — BUTALBITA,ACETAMINOPHEN AND CAFFEINE 1 CAPSULE: 50; 300; 40 CAPSULE ORAL at 08:57

## 2022-04-05 RX ADMIN — INSULIN LISPRO 2 UNITS: 100 INJECTION, SOLUTION INTRAVENOUS; SUBCUTANEOUS at 13:16

## 2022-04-05 RX ADMIN — RISPERIDONE 1.5 MG: 0.5 TABLET ORAL at 08:51

## 2022-04-05 RX ADMIN — ALBUTEROL SULFATE 2.5 MG: 2.5 SOLUTION RESPIRATORY (INHALATION) at 07:03

## 2022-04-05 RX ADMIN — POLYETHYLENE GLYCOL 3350 17 G: 17 POWDER, FOR SOLUTION ORAL at 08:50

## 2022-04-05 RX ADMIN — METHYLPREDNISOLONE SODIUM SUCCINATE 40 MG: 40 INJECTION, POWDER, FOR SOLUTION INTRAMUSCULAR; INTRAVENOUS at 02:54

## 2022-04-05 RX ADMIN — BUDESONIDE AND FORMOTEROL FUMARATE DIHYDRATE 2 PUFF: 160; 4.5 AEROSOL RESPIRATORY (INHALATION) at 07:11

## 2022-04-05 RX ADMIN — ACETAMINOPHEN 650 MG: 325 TABLET ORAL at 11:24

## 2022-04-05 RX ADMIN — INSULIN LISPRO 4 UNITS: 100 INJECTION, SOLUTION INTRAVENOUS; SUBCUTANEOUS at 08:33

## 2022-04-05 RX ADMIN — ALBUTEROL SULFATE 2.5 MG: 2.5 SOLUTION RESPIRATORY (INHALATION) at 10:49

## 2022-04-05 RX ADMIN — ENOXAPARIN SODIUM 40 MG: 40 INJECTION SUBCUTANEOUS at 08:50

## 2022-04-05 RX ADMIN — PREDNISONE 40 MG: 20 TABLET ORAL at 13:16

## 2022-04-05 RX ADMIN — ALBUTEROL SULFATE 2.5 MG: 2.5 SOLUTION RESPIRATORY (INHALATION) at 14:36

## 2022-04-05 RX ADMIN — CALCIUM CARBONATE 600 MG (1,500 MG)-VITAMIN D3 400 UNIT TABLET 2 TABLET: at 08:50

## 2022-04-05 RX ADMIN — CETIRIZINE HYDROCHLORIDE 10 MG: 10 TABLET, FILM COATED ORAL at 08:50

## 2022-04-05 RX ADMIN — ASPIRIN 81 MG: 81 TABLET, COATED ORAL at 08:50

## 2022-04-05 RX ADMIN — CEFEPIME HYDROCHLORIDE 2000 MG: 2 INJECTION, POWDER, FOR SOLUTION INTRAVENOUS at 02:59

## 2022-04-05 ASSESSMENT — ENCOUNTER SYMPTOMS
ABDOMINAL PAIN: 1
SHORTNESS OF BREATH: 1
NAUSEA: 0
VOMITING: 0
COUGH: 0
CONSTIPATION: 0
EYE REDNESS: 0
ABDOMINAL DISTENTION: 0

## 2022-04-05 ASSESSMENT — PAIN SCALES - GENERAL
PAINLEVEL_OUTOF10: 0
PAINLEVEL_OUTOF10: 7
PAINLEVEL_OUTOF10: 4
PAINLEVEL_OUTOF10: 0

## 2022-04-05 NOTE — PROGRESS NOTES
Osorio Morrow MD/Vasyl Stauffer MD/ Alex Walden MD/Jose Turpin APRN YUDYP-BC, NP-C      Loida Gaytan APRN NP-C     Armando Tena APRN NP-C                                           Pulmonary Progress Note    Patient - Homer Hoover   Age - 74 y.o.   - 1955  MRN - [id number]  Acct # - [de-identified]  Date of Admission - 2022  1:04 PM    Consulting Service/Physician:       Primary Care Physician: Jelly Dockery MD    SUBJECTIVE:     Chief Complaint:   Chief Complaint   Patient presents with    Shortness of Breath     Subjective:    She feels slightly better than yesterday, continues to have some coughing and wheezing, but improved, she is not bringing up much phlegm, sometimes has difficulty with this. She is on her baseline of 3 L. She has not had any fevers. Vital signs been stable. VITALS  /70   Pulse 67   Temp 98.6 °F (37 °C) (Oral)   Resp 18   Ht 5' 5\" (1.651 m)   Wt 186 lb 1.1 oz (84.4 kg)   LMP 2013 (Exact Date)   SpO2 96%   BMI 30.96 kg/m²   Wt Readings from Last 3 Encounters:   22 186 lb 1.1 oz (84.4 kg)   21 195 lb 14.4 oz (88.9 kg)   21 194 lb (88 kg)     I/O (24 Hours)    Intake/Output Summary (Last 24 hours) at 2022 1012  Last data filed at 2022 1825  Gross per 24 hour   Intake 551.76 ml   Output --   Net 551.76 ml     Ventilator:   Settings  FiO2 : 32 %  Exam:   Physical Exam   Constitutional:  Oriented to person, place, and time. Appears well-developed and well-nourished. Lying in bed no apparent distress  HENT: Unremarkable, nasal cannula in place  Head: Normocephalic and atraumatic. Eyes: EOM are normal. Pupils are equal, round, and reactive to light. Neck: Neck supple. Cardiovascular:  Regular rate and rhythm. Normal heart tones. No JVD.     Pulmonary/Chest: Effort normal and breath sounds diminished with some wheezing and rhonchi, congested butalbital-APAP-caffeine -40 MG per capsule 1 capsule, 1 capsule, Oral, Q6H PRN, Yandel Laws MD, 1 capsule at 04/05/22 0857    traZODone (DESYREL) tablet 50 mg, 50 mg, Oral, Nightly, Yandel Laws MD, 50 mg at 04/04/22 2344    sodium chloride flush 0.9 % injection 5-40 mL, 5-40 mL, IntraVENous, 2 times per day, Yandel Laws MD, 10 mL at 04/04/22 2139    sodium chloride flush 0.9 % injection 5-40 mL, 5-40 mL, IntraVENous, PRN, Yandel Laws MD    0.9 % sodium chloride infusion, , IntraVENous, PRN, Yandel Laws MD, Last Rate: 10 mL/hr at 04/04/22 0013, Restarted at 04/04/22 0013    enoxaparin (LOVENOX) injection 40 mg, 40 mg, SubCUTAneous, Daily, Yandel Laws MD, 40 mg at 04/05/22 0850    ondansetron (ZOFRAN-ODT) disintegrating tablet 4 mg, 4 mg, Oral, Q8H PRN **OR** ondansetron (ZOFRAN) injection 4 mg, 4 mg, IntraVENous, Q6H PRN, Yandel Laws MD    polyethylene glycol Avani Fess) packet 17 g, 17 g, Oral, Daily PRN, Yandel Laws MD, 17 g at 04/05/22 0850    acetaminophen (TYLENOL) tablet 650 mg, 650 mg, Oral, Q6H PRN, 650 mg at 04/04/22 0448 **OR** acetaminophen (TYLENOL) suppository 650 mg, 650 mg, Rectal, Q6H PRN, Yandel Laws MD    tiotropium (SPIRIVA RESPIMAT) 2.5 MCG/ACT inhaler 2 puff, 2 puff, Inhalation, Daily, Pooja Gonzalez MD, 2 puff at 04/05/22 0710    budesonide-formoterol (SYMBICORT) 160-4.5 MCG/ACT inhaler 2 puff, 2 puff, Inhalation, BID, Pooja Gonzalez MD, 2 puff at 04/05/22 0711    rivastigmine (EXELON) capsule 4.5 mg, 4.5 mg, Oral, BID, Yandel Laws MD, 4.5 mg at 04/05/22 0854    Lab Results:     Lab Results   Component Value Date    WBC 7.6 04/05/2022    HGB 9.4 (L) 04/05/2022    HCT 28.8 (L) 04/04/2022    MCV 93.6 04/04/2022     04/04/2022     Lab Results   Component Value Date    CALCIUM 8.9 04/05/2022     04/05/2022    K 5.1 04/05/2022    CO2 27 04/05/2022     04/05/2022    BUN 24 (H) 04/05/2022    CREATININE 0.66 04/05/2022       Lab Results   Component Value Date    INR 1.20 (H) 07/08/2021 PROTIME 15.2 (H) 07/08/2021       Radiology:     4/5/22                                                                                                   4/3/22  Chest x-ray does show improvement to right lower lobe    ASSESSMENT:       1. Septic shock, pneumonia, right lower lobe, Levophed at 6, WBC still rising 16.8>20.6 and bands 16>>19--- today WBC improved to 7.6  2. Dehydration-resolved  3. Stage IV COPD with exacerbation, FEV1 37%, Dr Jazmin Kaufman  4. Chronic hypoxemic respiratory failure-O2 dependent 3L  5. History of tobacco use quit June 2021, >80 pk years   6. History of DVT/PE-was on Eliquis>> now just ASA  7. Full code-no trach/PEG or prolonged ventilation    PLAN:   1. Continue with empiric abx  2. Add vibratory device to aid with mucus clearance  3. Continue with solumedrol  4. Increase activity  5. DVT proph  6. Bronchodilator therapy q4  7. D/w daughter at bedside      Electronically signed by IVONNE Calvert - CNP on 04/05/22     This progress note was completed using a voice transcription system. Every effort was made to ensure accuracy. However, inadvertent computerized transcription errors may be present.     HALLEY CORONADO AGACNP-BC, NP-C  [facility] Pulmonary, Critical Care & Sleep

## 2022-04-05 NOTE — PROGRESS NOTES
[id number] [place]    PROGRESS NOTE             4/5/2022    7:43 AM    Name:   Homer Hoover  MRN:     [id number]     Acct:      [de-identified]   Room:   2119/2119-01   Day:  4  Admit Date:  4/1/2022  1:04 PM    PCP:  Jelly Dockery MD  Code Status:  Full Code    Subjective:     C/C:   Chief Complaint   Patient presents with    Shortness of Breath     Patient is examined at bedside in PCU. VSS. She is on 3L oxygen. Patient reports that breathing is still labored above baseline, but improved from yesterday. Patient reports some ongoing pain in her right flank area. Denies CP, nausea, vomiting. Patient had constipation from day of admission until yesterday. After some stool softener, she reports she had one small BM. Brief History:     The patient is a 74 y.o. Non- / non  female who presents withShortness of Breath and she is admitted to the hospital for the management of respiratory distress.     Patient w/ PMH of COPD, bipolar on trazodone, risperidone, DVT/ PE last May 2021, migraines,  reports yesterday afternoon she started experiencing increased SOB and headache. At baseline, she is on O2 3L at rest, 4L if she is ambulating. This AM, patient was dyspneic and tremulous and came to the hospital.   Patient reports she stopped smoking years ago, before she started to need home oxygen. She estimates she has needed oxygen since 2017. Patient reports she had a DVT/ PE last year in May 2021 for which she saw Dr. Dayton Freedman. She was put on eliquis, which was stopped in December 2022. Etiology of clot presumed to be smoking and sedentary lifestyle per hematology. Leukopenia secondary to risperidone.  Patient also follow with neurology for migraines and Dr. Gudelia Chang for COPD.      She is not up to date on screening, including annual lung cancer screening, pap, mammogram, colonoscopy in 2018 found 2mm polyp w/ follow up 5 years.     -Febrile 103, RR 19, , BP 82/47, SpO2 97  -Code sepsis called in ED  -Central line was inserted  -Antibiotics started azithromycin, ceftriaxone  -Norepinephrine drip  -methylprednisolone 125mg  -Mg sulfate 1g   -4L O2 cannula    Review of Systems:     Review of Systems   Constitutional: Negative for fever. HENT: Negative for congestion. Eyes: Negative for redness and visual disturbance. Respiratory: Positive for shortness of breath. Negative for cough. Cardiovascular: Negative for chest pain and leg swelling. Gastrointestinal: Positive for abdominal pain (right abdominal/ flank area pain). Negative for abdominal distention, constipation, nausea and vomiting. Genitourinary: Negative for dysuria and frequency. Musculoskeletal: Negative for gait problem. Skin: Negative for rash and wound. Neurological: Positive for headaches. Negative for speech difficulty. Psychiatric/Behavioral: Negative for agitation and behavioral problems. Medications: Allergies:     Allergies   Allergen Reactions    Advil [Ibuprofen] Other (See Comments)     vomiting    Aleve [Naproxen] Other (See Comments)     vomiting    Antipyrine Other (See Comments)     unknown    Celecoxib Other (See Comments)    Codeine      headache    Fomepizole     Incruse Ellipta [Umeclidinium Bromide]      confusion    Other      GRASS, TREES, WEEDS    Rofecoxib Other (See Comments)     Unknown reaction    Salicylates      Unknown reaction     Strawberry Extract      HEADACHES    Sulfinpyrazone Other (See Comments)     Unknown reaction    Aspirin Nausea And Vomiting, Other (See Comments) and Nausea Only    Nsaids Nausea Only, Other (See Comments) and Nausea And Vomiting     Current Meds:   Scheduled Meds:    insulin lispro  0-12 Units SubCUTAneous TID WC    insulin lispro  0-6 Units SubCUTAneous Nightly    methylPREDNISolone  40 mg IntraVENous Q8H    cefepime  2,000 mg IntraVENous Once    Followed by    cefepime  2,000 mg IntraVENous Q8H    vancomycin (VANCOCIN) intermittent dosing (placeholder)   Other RX Placeholder    vancomycin  1,000 mg IntraVENous Q12H    albuterol  2.5 mg Nebulization 4x Daily    aspirin  81 mg Oral Daily    calcium carbonate w/vitamin D  2 tablet Oral Daily    cetirizine  10 mg Oral Daily    atorvastatin  20 mg Oral Daily    risperiDONE  1.5 mg Oral Q12H    traZODone  50 mg Oral Nightly    sodium chloride flush  5-40 mL IntraVENous 2 times per day    enoxaparin  40 mg SubCUTAneous Daily    tiotropium  2 puff Inhalation Daily    budesonide-formoterol  2 puff Inhalation BID    rivastigmine  4.5 mg Oral BID     Continuous Infusions:    dextrose      norepinephrine Stopped (22 0702)    sodium chloride 10 mL/hr at 22 0013     PRN Meds: glucose, dextrose, glucagon (rDNA), dextrose, sodium chloride, butalbital-APAP-caffeine, sodium chloride flush, sodium chloride, ondansetron **OR** ondansetron, polyethylene glycol, acetaminophen **OR** acetaminophen    Data:     Past Medical History:   has a past medical history of Abnormal EKG, TRAM (acute kidney injury) ([facility] [address] Anxiety, Asthma, Bipolar disorder ([facility] [address] COPD (chronic obstructive pulmonary disease) ([facility] [address] Cramps, extremity, Depression, Dilated bile duct, Headache, History of elective , Hyperlipidemia, Irritable bowel syndrome, Prolonged emergence from general anesthesia, Substance abuse ([facility] [address] Unspecified sleep apnea, and Vision abnormalities. Social History:   reports that she quit smoking about 3 years ago. Her smoking use included cigarettes. She has a 84.00 pack-year smoking history. She has never used smokeless tobacco. She reports current alcohol use. She reports that she does not use drugs.      Family History:   Family History   Problem Relation Age of Onset    Dementia Maternal Aunt     Kidney Disease Mother     Heart Attack Sister     Prostate Cancer Father     High Cholesterol Brother     Heart Attack Paternal Grandmother      Vitals:  /63   Pulse 60   Temp 97.9 °F (36.6 °C) (Oral)   Resp 18   Ht 5' 5\" (1.651 m)   Wt 186 lb 1.1 oz (84.4 kg)   LMP 2013 (Exact Date)   SpO2 97%   BMI 30.96 kg/m²   Temp (24hrs), Av.8 °F (36.6 °C), Min:97.2 °F (36.2 °C), Max:98.9 °F (37.2 °C)    Recent Labs     22  1420 22  1712 22  2135   POCGLU 129* 242* 198*       I/O(24Hr): Intake/Output Summary (Last 24 hours) at 2022 0743  Last data filed at 2022 1825  Gross per 24 hour   Intake 801.76 ml   Output --   Net 801.76 ml     Labs:  [unfilled]    Lab Results   Component Value Date/Time    SPECIAL NOT REPORTED 2017 02:30 PM     Lab Results   Component Value Date/Time    CULTURE NO GROWTH 2022 05:40 PM     [unfilled]    Radiology:    XR CHEST PORTABLE    Result Date: 2022  EXAMINATION: ONE XRAY VIEW OF THE CHEST 2022 4:01 pm COMPARISON: 2022 HISTORY: ORDERING SYSTEM PROVIDED HISTORY: central line placed TECHNOLOGIST PROVIDED HISTORY: central line placed Reason for Exam: Central line placed FINDINGS: There is a right internal jugular central line in place with distal tip overlying the superior vena cava. Previously noted right basal infiltrate is unchanged. Left lung appears clear. The heart and mediastinal structures appear normal.  There is no evidence of pneumothorax. Satisfactory position of right internal jugular central line. No change in the the right basal infiltrate. XR CHEST PORTABLE    Result Date: 2022  EXAMINATION: ONE XRAY VIEW OF THE CHEST 2022 1:22 pm COMPARISON: 2020. CT dated 10/15/2021. HISTORY: ORDERING SYSTEM PROVIDED HISTORY: dyspnea TECHNOLOGIST PROVIDED HISTORY: dyspnea Reason for Exam: Dyspnea Relevant Medical/Surgical History: Hx of COPD FINDINGS: The cardiac silhouette appears within normal limits.   There are bibasilar opacities, right greater than left which may reflect multifocal pneumonia in the correct clinical setting. No evidence of pleural effusion or pneumothorax is seen. Bibasilar opacities, right greater than left, which may reflect multifocal pneumonia. Follow-up to imaging resolution is recommended. Physical Examination:        Physical Exam  Constitutional:       General: She is not in acute distress. Appearance: She is not ill-appearing, toxic-appearing or diaphoretic. HENT:      Head: Normocephalic and atraumatic. Nose: No congestion or rhinorrhea. Eyes:      Extraocular Movements: Extraocular movements intact. Conjunctiva/sclera: Conjunctivae normal.   Cardiovascular:      Rate and Rhythm: Normal rate and regular rhythm. Pulses: Normal pulses. Heart sounds: Normal heart sounds. Pulmonary:      Effort: Pulmonary effort is normal. No respiratory distress. Breath sounds: No wheezing, rhonchi or rales. Abdominal:      General: Bowel sounds are normal. There is no distension. Palpations: Abdomen is soft. Tenderness: There is abdominal tenderness (some tenderness in right flank/ basilar area). There is no guarding. Musculoskeletal:      Right lower leg: No edema. Left lower leg: No edema. Skin:     General: Skin is warm and dry. Findings: No rash. Neurological:      General: No focal deficit present. Mental Status: She is alert.    Psychiatric:         Mood and Affect: Mood normal.         Behavior: Behavior normal.       Assessment:        Primary Problem  Mycoplasma pneumonia    Active Hospital Problems    Diagnosis Date Noted    Mycoplasma pneumonia [J15.7] 04/04/2022    [facility] to [facility] [A41.9, R65.21] 04/02/2022    Chronic respiratory failure with hypoxia, on home O2 therapy ([facility] [J96.11, Z99.81] 04/02/2022    COPD (chronic obstructive pulmonary disease) ([facility] [J44.9] 04/02/2022    TRAM [facility]acute kidney injury) ([facility] [N17.9] 04/02/2022    History of pulmonary embolus (PE) [D16.456] 04/02/2022  Pneumonia [J18.9] 04/01/2022     Plan:        Sepsis probably due to multifocal PNA, possibly aspiration  -On admission: Febrile, hypotensive, tachycardic, WBC 16.8 > 20 > 10.5  -3L nasal cannula at rest, 4L on exertion > currently at rest 3L  -Norepinephrine discontinued 4/4  -Sepsis fluids given  -Admit to ICU  -Ceftriaxone 1g q24h, azithromycin 500mg q24h > vancomycin, cefepime > deescalate abx, MRSA is negative, vanco / cef discontinued  -Levofloxacin 750mg  -Blood cultures NGTD  -UA   -Strep, legionella neg, mycoplasma 1.13  -  -Pulm consulted  -4/4 barium swallow study ordered: no straws due to aspiration  -CXR ordered    Hyperglycemia likely secondary to steroids  -Glucose 290 > 202  -Medium sliding scale  -Solumedrol 40mg q8h > q12h    Bradycardic episode  -EKG showed left anterior fasicular block  -HR 67     Chronic respiratory failure due to COPD stage IV  -On O2 at home, 3L  -Currently on 3L oxygen     TRAM, resolved  -Cr 1.01 > 0.66, baseline 0.59     Hx DVT/ PE  -Eliquis was discontinued in 12/2021     Bipolar disorder  Migraines     IVF: dc  Diet: regular diet, NO straws  GI ppx: n/a   DVT ppx: enoxaparin 40mg daily  Code status: Full code  Consults: pulm  Dispo: pending clinical course    Bijal Puentes MD  4/5/2022  7:43 AM     Attending Physician Statement  I have discussed the care of Kyaw Colvin with the resident team. I have examined the patient myself and taken ros and hpi , including pertinent history and exam findings,  with the resident. I have reviewed the key elements of all parts of the encounter with the resident. I agree with the assessment, plan and orders as documented by the resident.     Principal Problem:    Mycoplasma pneumonia  Active Problems:    Pneumonia    Septic shock due to undetermined organism ([facility] [address]    Chronic respiratory failure with hypoxia, on home O2 therapy (HCC)    COPD (chronic obstructive pulmonary disease) ([facility] [address]    TRAM (acute kidney injury) ([facility] [address] History of pulmonary embolus (PE)  Resolved Problems:    * No resolved hospital problems.  *    Mycoplasma Ab positive  Switch abx to levaquin- 7days  Back on home level of O2  Discharge today, wll need f/up CXR in 4-6wk  No further abbie  Normal Diet no straws per swallow study  Discharge today      Electronically signed by Sally Cook MD

## 2022-04-05 NOTE — PLAN OF CARE
Problem: Pain:  Goal: Pain level will decrease  Description: Pain level will decrease  4/5/2022 0624 by Mauricio Reyes RN  Outcome: Ongoing     Problem: Pain:  Goal: Control of acute pain  Description: Control of acute pain  Outcome: Ongoing     Problem: Falls - Risk of:  Goal: Will remain free from falls  Description: Will remain free from falls  4/5/2022 0624 by Mauricio Reyes RN  Outcome: Ongoing    Problem: Falls - Risk of:  Goal: Will remain free from falls  Description: Will remain free from falls  4/5/2022 0624 by Mauricio Reyes RN  Outcome: Ongoing     Problem: Falls - Risk of:  Goal: Absence of physical injury  Description: Absence of physical injury  Outcome: Ongoing     Problem: Skin Integrity:  Goal: Will show no infection signs and symptoms  Description: Will show no infection signs and symptoms  4/5/2022 0624 by Mauricio Reyes RN  Outcome: Ongoing     Problem: Skin Integrity:  Goal: Will show no infection signs and symptoms  Description: Will show no infection signs and symptoms  4/5/2022 0624 by Mauricio Reyes RN  Outcome: Ongoing     Problem: Skin Integrity:  Goal: Absence of new skin breakdown  Description: Absence of new skin breakdown  Outcome: Ongoing

## 2022-04-05 NOTE — PROGRESS NOTES
Dr. Nellie Valle states pt okay to DC on prednisone tapering regimen and to follow up with pulm NP in 2 weeks in his office. This RN to call residents.

## 2022-04-05 NOTE — PROGRESS NOTES
Speech [facility]ology  [facility]ology  [address] [place] [address]    Dysphagia Treatment Note    Date: 4/5/2022  Patients Name: Bj Saez  MRN: [id number]  Diagnosis: dysphagia  Patient Active Problem List   Diagnosis Code    Chronic obstructive pulmonary disease ([facility] J44.9    Vitamin D deficiency E55.9    Anxiety F41.9    Asthma J45.909    Bipolar disorder ([facility] F31.9    Depression F32. A    Hyperlipidemia with target LDL less than 100 E78.5    Sleep apnea G47.30    Vision abnormalities H53.9    Status post hysteroscopy Z98.890    Chronic headaches R51.9, G89.29    IBS (irritable bowel syndrome) K58.9    Colon polyp K63.5    Collagenous colitis K52.831    Lung nodule R91.1    Left elbow pain M25.522    Dilated bile duct K83.8    Acute pain of left shoulder M25.512    Adhesive capsulitis of left shoulder M75.02    Leg swelling M79.89    Leucopenia D72.819    Compression fracture of body of thoracic vertebra ([facility]) S22.000A    Age-related osteoporosis with current pathological fracture M80.00XA    Flank pain R10.9    Pulmonary embolism on right ([facility]) I26.99    Migraines G43.909    Paranoid behavior ([facility]) F22    Acute deep vein thrombosis (DVT) of right lower extremity ([facility]) I82.401    Delirium R41.0    Chronic respiratory failure with hypoxia ([facility]) J96.11    Major neurocognitive disorder ([facility]) F03.90    Pneumonia J18.9    Septic shock due to undetermined organism ([facility]) A41.9, R65.21    Chronic respiratory failure with hypoxia, on home O2 therapy ([facility]) J96.11, Z99.81    COPD (chronic obstructive pulmonary disease) ([facility]) J44.9    TRAM (acute kidney injury) ([facility]) N17.9    History of pulmonary embolus (PE) Z86.711    Mycoplasma pneumonia J15.7       Pain:  Pt. denies    Dysphagia Treatment  Treatment time:  8500- 1476    Subjective: [x] Alert [x] Cooperative     [] Confused     [] Agitated    [] Lethargic    Objective/Assessment:    Pt.  Seen while eating breakfast, no overt s/s aspiration demonstrated when pt. Taking sips of thin liquid from cup (no straws) and when pt. Taking bites of scrambled eggs x6. No further ST recommended at this time as pt. Tolerating diet without issues. Pt. Verbalized understanding of compensatory swallow strategies including no straws. Plan:  [] Continue ST services    [x] Discharge from ST:        Discharge recommendations: []  Further therapy recommended at discharge. The patient should be able to tolerate at least 3 hours of therapy per day over 5 days or 15 hours over 7 days. [] Further therapy recommended at discharge. [] No therapy recommended at discharge. Treatment completed by: Gm Bender. RADHA/SLP

## 2022-04-05 NOTE — PROGRESS NOTES
CLINICAL PHARMACY NOTE: MEDS TO BEDS    Total # of Prescriptions Filled: 2   The following medications were delivered to the patient:  · Levofloxacin 750mg  · Prednisone 10mg    Additional Documentation:  Delivered Medication to Patients Room     Combined 3 Prednisone Rx's into 1

## 2022-04-05 NOTE — PROGRESS NOTES
RN sent perfect serve to Dr. Nayely Mojica regarding pt discharge approval per Med residents request.

## 2022-04-05 NOTE — CARE COORDINATION
ONGOING DISCHARGE PLAN:    Patient is alert and oriented x4. Spoke with patient regarding discharge plan and patient confirms that plan is still home with [facility]. DME: SC, Oxygen 3L 24/7 NC port & concentrator from [facility]. Reported nebulizer has been ordered from [facility] but patient has not received. Called [facility] notified they have order but have been unable to reach patient. Will notify patient to call [facility] when she gets home. On IV Cefe, Vano, and Soul 40mg Q8hrs. Will continue to follow for additional discharge needs.     Electronically signed by Shereen Barrera RN on 4/5/2022 at 9:11 AM

## 2022-04-05 NOTE — PROGRESS NOTES
Pt and pt's daughter given discharge instructions and meds delivered to bed. All questions answered at this time. Radha Rochae CTA took pt to Main entrance via wheel chair.

## 2022-04-06 LAB
CULTURE: NORMAL
CULTURE: NORMAL
SPECIMEN DESCRIPTION: NORMAL
SPECIMEN DESCRIPTION: NORMAL

## 2022-04-13 ENCOUNTER — TELEPHONE (OUTPATIENT)
Dept: ONCOLOGY | Age: 67
End: 2022-04-13

## 2022-04-14 ENCOUNTER — OFFICE VISIT (OUTPATIENT)
Dept: FAMILY MEDICINE CLINIC | Age: 67
End: 2022-04-14
Payer: COMMERCIAL

## 2022-04-14 VITALS
DIASTOLIC BLOOD PRESSURE: 66 MMHG | BODY MASS INDEX: 30.49 KG/M2 | OXYGEN SATURATION: 97 % | TEMPERATURE: 97.5 F | WEIGHT: 183 LBS | HEART RATE: 68 BPM | SYSTOLIC BLOOD PRESSURE: 98 MMHG | HEIGHT: 65 IN

## 2022-04-14 DIAGNOSIS — F31.70 BIPOLAR DISORDER IN PARTIAL REMISSION, MOST RECENT EPISODE UNSPECIFIED TYPE (HCC): ICD-10-CM

## 2022-04-14 DIAGNOSIS — R07.81 PLEURITIC PAIN: ICD-10-CM

## 2022-04-14 DIAGNOSIS — I27.82 OTHER CHRONIC PULMONARY EMBOLISM WITHOUT ACUTE COR PULMONALE (HCC): ICD-10-CM

## 2022-04-14 DIAGNOSIS — J15.7 PNEUMONIA OF BOTH LOWER LOBES DUE TO MYCOPLASMA PNEUMONIAE: Primary | ICD-10-CM

## 2022-04-14 DIAGNOSIS — J44.9 CHRONIC OBSTRUCTIVE PULMONARY DISEASE, UNSPECIFIED COPD TYPE (HCC): ICD-10-CM

## 2022-04-14 DIAGNOSIS — J96.11 CHRONIC RESPIRATORY FAILURE WITH HYPOXIA (HCC): ICD-10-CM

## 2022-04-14 DIAGNOSIS — D50.9 IRON DEFICIENCY ANEMIA, UNSPECIFIED IRON DEFICIENCY ANEMIA TYPE: ICD-10-CM

## 2022-04-14 PROBLEM — M79.89 LEG SWELLING: Status: RESOLVED | Noted: 2020-08-19 | Resolved: 2022-04-14

## 2022-04-14 PROBLEM — A41.9 SEPTIC SHOCK DUE TO UNDETERMINED ORGANISM (HCC): Status: RESOLVED | Noted: 2022-04-02 | Resolved: 2022-04-14

## 2022-04-14 PROBLEM — R10.9 FLANK PAIN: Status: RESOLVED | Noted: 2021-03-11 | Resolved: 2022-04-14

## 2022-04-14 PROBLEM — R65.21 SEPTIC SHOCK DUE TO UNDETERMINED ORGANISM (HCC): Status: RESOLVED | Noted: 2022-04-02 | Resolved: 2022-04-14

## 2022-04-14 PROCEDURE — 1111F DSCHRG MED/CURRENT MED MERGE: CPT | Performed by: FAMILY MEDICINE

## 2022-04-14 PROCEDURE — 1123F ACP DISCUSS/DSCN MKR DOCD: CPT | Performed by: FAMILY MEDICINE

## 2022-04-14 PROCEDURE — G8417 CALC BMI ABV UP PARAM F/U: HCPCS | Performed by: FAMILY MEDICINE

## 2022-04-14 PROCEDURE — 1090F PRES/ABSN URINE INCON ASSESS: CPT | Performed by: FAMILY MEDICINE

## 2022-04-14 PROCEDURE — 4040F PNEUMOC VAC/ADMIN/RCVD: CPT | Performed by: FAMILY MEDICINE

## 2022-04-14 PROCEDURE — 3023F SPIROM DOC REV: CPT | Performed by: FAMILY MEDICINE

## 2022-04-14 PROCEDURE — 3017F COLORECTAL CA SCREEN DOC REV: CPT | Performed by: FAMILY MEDICINE

## 2022-04-14 PROCEDURE — G8399 PT W/DXA RESULTS DOCUMENT: HCPCS | Performed by: FAMILY MEDICINE

## 2022-04-14 PROCEDURE — 99214 OFFICE O/P EST MOD 30 MIN: CPT | Performed by: FAMILY MEDICINE

## 2022-04-14 PROCEDURE — G8427 DOCREV CUR MEDS BY ELIG CLIN: HCPCS | Performed by: FAMILY MEDICINE

## 2022-04-14 PROCEDURE — 1036F TOBACCO NON-USER: CPT | Performed by: FAMILY MEDICINE

## 2022-04-14 RX ORDER — BENZONATATE 100 MG/1
100 CAPSULE ORAL 3 TIMES DAILY PRN
Qty: 21 CAPSULE | Refills: 0 | Status: ON HOLD | OUTPATIENT
Start: 2022-04-14 | End: 2022-05-03

## 2022-04-14 RX ORDER — IBUPROFEN 400 MG/1
400 TABLET ORAL 2 TIMES DAILY PRN
Qty: 30 TABLET | Refills: 0 | Status: ON HOLD | OUTPATIENT
Start: 2022-04-14 | End: 2022-05-03 | Stop reason: HOSPADM

## 2022-04-14 RX ORDER — LIDOCAINE 4 G/G
1 PATCH TOPICAL DAILY
Qty: 30 PATCH | Refills: 0 | Status: SHIPPED | OUTPATIENT
Start: 2022-04-14 | End: 2022-05-14

## 2022-04-14 ASSESSMENT — PATIENT HEALTH QUESTIONNAIRE - PHQ9
1. LITTLE INTEREST OR PLEASURE IN DOING THINGS: 0
SUM OF ALL RESPONSES TO PHQ QUESTIONS 1-9: 5
5. POOR APPETITE OR OVEREATING: 0
4. FEELING TIRED OR HAVING LITTLE ENERGY: 3
7. TROUBLE CONCENTRATING ON THINGS, SUCH AS READING THE NEWSPAPER OR WATCHING TELEVISION: 1
3. TROUBLE FALLING OR STAYING ASLEEP: 0
10. IF YOU CHECKED OFF ANY PROBLEMS, HOW DIFFICULT HAVE THESE PROBLEMS MADE IT FOR YOU TO DO YOUR WORK, TAKE CARE OF THINGS AT HOME, OR GET ALONG WITH OTHER PEOPLE: 0
6. FEELING BAD ABOUT YOURSELF - OR THAT YOU ARE A FAILURE OR HAVE LET YOURSELF OR YOUR FAMILY DOWN: 0
SUM OF ALL RESPONSES TO PHQ9 QUESTIONS 1 & 2: 0
SUM OF ALL RESPONSES TO PHQ QUESTIONS 1-9: 5
2. FEELING DOWN, DEPRESSED OR HOPELESS: 0
SUM OF ALL RESPONSES TO PHQ QUESTIONS 1-9: 5
SUM OF ALL RESPONSES TO PHQ QUESTIONS 1-9: 5
8. MOVING OR SPEAKING SO SLOWLY THAT OTHER PEOPLE COULD HAVE NOTICED. OR THE OPPOSITE, BEING SO FIGETY OR RESTLESS THAT YOU HAVE BEEN MOVING AROUND A LOT MORE THAN USUAL: 1
9. THOUGHTS THAT YOU WOULD BE BETTER OFF DEAD, OR OF HURTING YOURSELF: 0

## 2022-04-14 ASSESSMENT — ENCOUNTER SYMPTOMS
DIARRHEA: 0
STRIDOR: 0
ABDOMINAL DISTENTION: 0
BLOOD IN STOOL: 0
RECTAL PAIN: 0
SHORTNESS OF BREATH: 1
COLOR CHANGE: 0
BACK PAIN: 1
CHEST TIGHTNESS: 1
SORE THROAT: 0
SINUS PRESSURE: 0
NAUSEA: 0
EYE REDNESS: 0
WHEEZING: 1
COUGH: 0
ABDOMINAL PAIN: 0
CONSTIPATION: 0
RHINORRHEA: 0

## 2022-04-14 NOTE — PROGRESS NOTES
Visit Information    Have you changed or started any medications since your last visit including any over-the-counter medicines, vitamins, or herbal medicines? no   Have you stopped taking any of your medications? Is so, why? -  no  Are you having any side effects from any of your medications? - no    Have you seen any other physician or provider since your last visit? yes -    Have you had any other diagnostic tests since your last visit? yes -    Have you been seen in the emergency room and/or had an admission in a hospital since we last saw you?  yes -    Have you had your routine dental cleaning in the past 6 months?  no     Do you have an active MyChart account? If no, what is the barrier?   Yes    Patient Care Team:  Norene Schirmer, MD as PCP - General (Family Medicine)  Norene Schirmer, MD as PCP - [facility]  Lizzette Camacho DO as Consulting Physician (Obstetrics & Gynecology)  Tyrese Sifuentes MD as Consulting Physician (Pulmonology)  Piter Patricia MD as Consulting Physician (Gastroenterology)  Shandra Nunez RN as Nurse Navigator  Jose C Denney MD as Consulting Physician (Neurology)    Medical History Review  Past Medical, Family, and Social History reviewed and does contribute to the patient presenting condition    Health Maintenance   Topic Date Due    Lipid screen  10/08/2022    Low dose CT lung screening  10/15/2022    Depression Monitoring  12/09/2022    Breast cancer screen  12/21/2022    Colorectal Cancer Screen  02/15/2023    Cervical cancer screen  04/13/2024    Diabetes screen  06/22/2024    Pneumococcal 65+ years Vaccine (3 - PPSV23 or PCV20) 08/05/2026    DTaP/Tdap/Td vaccine (2 - Td or Tdap) 09/09/2026    DEXA (modify frequency per FRAX score)  Completed    Flu vaccine  Completed    Shingles Vaccine  Completed    COVID-19 Vaccine  Completed    Hepatitis C screen  Completed    HIV screen  Completed    Hepatitis A vaccine  Aged Out    Hepatitis B vaccine Aged Out    Hib vaccine  Aged Out    Meningococcal (ACWY) vaccine  Aged Out

## 2022-04-14 NOTE — PROGRESS NOTES
Chief Complaint   Patient presents with    Hypotension     [address] [facility] ON 04/01/2022          Heather Branch  here today for follow up on chronic medical problems, go over labs and/or diagnostic studies, and medication refills. Hypotension ([address] [facility] ON 04/01/2022 )    Patient presented with her daughter  HPI: Patient is scheduled for hospital follow-up was admitted on April fourth with symptoms of bilateral pneumonia. Patient reports she has more shortness of breath mucopurulent phlegm production. She has chronic respiratory failure, on home oxygen 24 hours. Patient had CT chest done that showed bilateral pneumonia was treated with antibiotics steroids and aerosol treatments. Patient was intubated due to respiratory failure. Patient did well was discharged after 1 week discharge summary is below. Currently patient is complaining of cough and right-sided pain, reports it hurts when she coughs. She is done with the Levaquin and steroids. She takes her aerosol treatments and inhalers and Tylenol for pain. She was given tramadol which was helping. Patient reports she is not allergic to ibuprofen feels nauseous, she is able to take it. She has history of pulmonary embolism and was taking Eliquis which was discontinued by hematologist and is currently only on aspirin. Bipolar disorder stable is on multiple medications, daughter reports, her symptoms   has improved. She follows with psychiatrist.    Patient blood pressure is running low asymptomatic reports its usually runs 110s 70s. Chronic anemia fairly stable. Patient w/ PMH of COPD, bipolar on trazodone, risperidone, DVT/ PE last May 2021, migraines,  reports yesterday afternoon she started experiencing increased SOB and headache. At baseline, she is on O2 3L at rest, 4L if she is ambulating.  This AM, patient was dyspneic and tremulous and came to the hospital.   Patient reports she stopped smoking years ago, before she started to need home oxygen. She estimates she has needed oxygen since 2017. Patient reports she had a DVT/ PE last year in May 2021 for which she saw Dr. Aries Mckeon. She was put on eliquis, which was stopped in December 2022. Etiology of clot presumed to be smoking and sedentary lifestyle per hematology. Leukopenia secondary to risperidone. Patient also follow with neurology for migraines and Dr. Victor Manuel Phipps for COPD.      She is not up to date on screening, including annual lung cancer screening, pap, mammogram, colonoscopy in 2018 found 2mm polyp w/ follow up 5 years.     -Febrile 103, RR 19, , BP 82/47, SpO2 97  -Code sepsis called in ED  -Central line was inserted  -Antibiotics started azithromycin, ceftriaxone  -Norepinephrine drip  -methylprednisolone 125mg  -Mg sulfate 1g   -4L O2 cannula     4/3/22  Patient is examined at bedside in ICU. Overnight, her HR was 36. Patient reports SOB. She is on 4L oxygen. Patient breathing is labored, RR is 28. Patient reports her right flank area hurts, similar to yesterday and tylenol is     BP 98/66   Pulse 68   Temp 97.5 °F (36.4 °C)   Ht 5' 5\" (1.651 m)   Wt 183 lb (83 kg)   LMP 05/20/2013 (Exact Date)   SpO2 97%   BMI 30.45 kg/m²    Body mass index is 30.45 kg/m². Wt Readings from Last 3 Encounters:   04/14/22 183 lb (83 kg)   04/04/22 186 lb 1.1 oz (84.4 kg)   12/22/21 195 lb 14.4 oz (88.9 kg)        [x]Negative depression screening. PHQ Scores 4/14/2022 12/9/2021 8/5/2021 6/22/2021 5/18/2021 4/13/2021 1/27/2020   PHQ2 Score 0 0 0 0 0 0 0   PHQ9 Score 5 0 0 0 0 0 0      []1-4 = Minimal depression   []5-9 = Milddepression   []10-14 = Moderate depression   []15-19 = Moderately severe depression   []20-27 = Severe depression    Discussed testing with the patient and all questions fully answered. No results displayed because visit has over 200 results.             Most recent labs reviewed:     Lab Results   Component Value Date    WBC 7.6 04/05/2022    HGB 9.4 (L) 04/05/2022    HCT 28.8 (L) 04/04/2022    MCV 93.6 04/04/2022     04/04/2022       @BRIEFLAB(NA,K,CL,CO2,BUN,CREATININE,GLUCOSE,CALCIUM)@     Lab Results   Component Value Date    ALT 12 04/01/2022    AST 17 04/01/2022    ALKPHOS 81 04/01/2022    BILITOT 0.29 (L) 04/01/2022       Lab Results   Component Value Date    TSH 0.17 (L) 04/04/2022       Lab Results   Component Value Date    CHOL 170 10/08/2021    CHOL 161 02/22/2019    CHOL 152 11/19/2017     Lab Results   Component Value Date    TRIG 69 10/08/2021    TRIG 79 02/22/2019    TRIG 79 11/19/2017     Lab Results   Component Value Date    HDL 75 10/08/2021    HDL 77 09/09/2020    HDL 69 02/22/2019     Lab Results   Component Value Date    LDLCHOLESTEROL 81 10/08/2021    LDLCHOLESTEROL 72 09/09/2020    LDLCHOLESTEROL 76 02/22/2019     Lab Results   Component Value Date    VLDL NOT REPORTED 10/08/2021    VLDL NOT REPORTED 09/09/2020    VLDL NOT REPORTED 02/22/2019     Lab Results   Component Value Date    CHOLHDLRATIO 2.3 10/08/2021    CHOLHDLRATIO 2.1 09/09/2020    CHOLHDLRATIO 2.3 02/22/2019       Lab Results   Component Value Date    LABA1C 5.2 06/22/2021       Lab Results   Component Value Date    MFBIIJAZ65 >2000 (H) 12/20/2021       Lab Results   Component Value Date    FOLATE 11.5 12/20/2021       Lab Results   Component Value Date    IRON 74 10/08/2021    TIBC 350 10/08/2021    FERRITIN 55 10/08/2021       Lab Results   Component Value Date    VITD25 14.4 (L) 03/17/2016             Current Outpatient Medications   Medication Sig Dispense Refill    benzonatate (TESSALON PERLES) 100 MG capsule Take 1 capsule by mouth 3 times daily as needed for Cough 21 capsule 0    ibuprofen (ADVIL;MOTRIN) 400 MG tablet Take 1 tablet by mouth 2 times daily as needed for Pain Short term. Take with food.  30 tablet 0    lidocaine 4 % external patch Place 1 patch onto the skin daily 30 patch 0    docusate sodium (COLACE) 100 MG capsule take 1 capsule by mouth twice a day 60 capsule 3    albuterol sulfate HFA (VENTOLIN HFA) 108 (90 Base) MCG/ACT inhaler Inhale 2 puffs into the lungs every 6 hours as needed for Wheezing      sodium chloride (OCEAN, BABY AYR) 0.65 % nasal spray 1 spray by Nasal route as needed for Congestion      olopatadine (PATANOL) 0.1 % ophthalmic solution Place 1 drop into both eyes 2 times daily      aspirin 81 MG EC tablet Take 81 mg by mouth daily      Calcium Carb-Cholecalciferol 600-500 MG-UNIT TABS Take 2 tablets by mouth daily      butalbital-acetaminophen-caffeine (FIORICET, ESGIC) -40 MG per tablet Take 1 tablet by mouth every 6 hours as needed for Headaches Indications: #25 for 30 days filled 2/4/22      topiramate (TOPAMAX) 50 MG tablet Take 100 mg by mouth at bedtime      rivastigmine (EXELON) 4.5 MG capsule take 1 capsule by mouth twice a day 60 capsule 3    vitamin D (ERGOCALCIFEROL) 1.25 MG (93759 UT) CAPS capsule take 1 capsule by mouth every week 4 capsule 3    ibandronate (BONIVA) 150 MG tablet Take 1 tablet by mouth every 30 days Take one (1) tablet once per month in the morning with a full glass of water, on an empty stomach, and do not take anything else by mouth or lie down for the next 30 minutes.  30 tablet 3    SPIRIVA RESPIMAT 2.5 MCG/ACT AERS inhaler inhale 2 puffs INTO THE LUNGS once daily 4 g 1    lidocaine (LMX) 4 % cream apply topically every 8 hours if needed 45 g 3    topiramate (TOPAMAX) 50 MG tablet Take 50 mg by mouth daily       vitamin B-12 (CYANOCOBALAMIN) 100 MCG tablet Take 50 mcg by mouth daily      BREO ELLIPTA 100-25 MCG/INH AEPB inhaler Inhale 1 puff into the lungs daily       simvastatin (ZOCOR) 20 MG tablet Take 1 tablet by mouth nightly 90 tablet 3    fluticasone (FLONASE) 50 MCG/ACT nasal spray 2 sprays by Nasal route daily 1 Bottle 3    risperiDONE (RISPERDAL) 0.5 MG tablet Take 3 tablets by mouth every 12 hours Per [facility] psych 60 tablet 0    traZODone (DESYREL) 50 MG tablet Take 50 mg by mouth nightly       albuterol (PROVENTIL) (2.5 MG/3ML) 0.083% nebulizer solution Take 3 mLs by nebulization 4 times daily 120 each 3    acetaminophen (ACETAMINOPHEN EXTRA STRENGTH) 500 MG tablet take 2 tablets by mouth every 6 hours if needed for pain 120 tablet 0    EPINEPHrine (EPIPEN 2-SHARITA) 0.3 MG/0.3ML SOAJ injection Inject 0.3 mLs into the muscle once as needed (for allergic reaction) Use as directed for allergic reaction 0.3 mL 0    cetirizine (ZYRTEC) 10 MG tablet Take 10 mg by mouth daily (Patient not taking: Reported on 4/14/2022)       No current facility-administered medications for this visit. Social History     Socioeconomic History    Marital status:      Spouse name: Not on file    Number of children: Not on file    Years of education: Not on file    Highest education level: Not on file   Occupational History    Not on file   Tobacco Use    Smoking status: Former Smoker     Packs/day: 2.00     Years: 42.00     Pack years: 84.00     Types: Cigarettes     Quit date: 11/1/2018     Years since quitting: 3.4    Smokeless tobacco: Never Used   Substance and Sexual Activity    Alcohol use: Yes     Comment: yearly    Drug use: No    Sexual activity: Not Currently     Partners: Male     Birth control/protection: Post-menopausal   Other Topics Concern    Not on file   Social History Narrative    Not on file     Social Determinants of Health     Financial Resource Strain: High Risk    Difficulty of Paying Living Expenses: Very hard   Food Insecurity: No Food Insecurity    Worried About Running Out of Food in the Last Year: Never true    Agustin of Food in the Last Year: Never true   Transportation Needs:     Lack of Transportation (Medical): Not on file    Lack of Transportation (Non-Medical):  Not on file   Physical Activity:     Days of Exercise per Week: Not on file    Minutes of Exercise per Session: Not on file   Stress:     Feeling of Stress : Not on file Social Connections:     Frequency of Communication with Friends and Family: Not on file    Frequency of Social Gatherings with Friends and Family: Not on file    Attends Quaker Services: Not on file    Active Member of Clubs or Organizations: Not on file    Attends Club or Organization Meetings: Not on file    Marital Status: Not on file   Intimate Partner Violence:     Fear of Current or Ex-Partner: Not on file    Emotionally Abused: Not on file    Physically Abused: Not on file    Sexually Abused: Not on file   Housing Stability:     Unable to Pay for Housing in the Last Year: Not on file    Number of [place] in the Last Year: Not on file    Unstable Housing in the Last Year: Not on file     Counseling given: Not Answered        Family History   Problem Relation Age of Onset    Dementia Maternal Aunt     Kidney Disease Mother     Heart Attack Sister     Prostate Cancer Father     High Cholesterol Brother     Heart Attack Paternal Grandmother              -rest of complaints with corresponding details per ROS    The patient's past medical, surgical, social, and family history as well as her current medications and allergies were reviewed as documented intoday's encounter. Review of Systems   Constitutional: Positive for activity change, appetite change and fatigue. Negative for fever and unexpected weight change. HENT: Positive for congestion. Negative for ear pain, postnasal drip, rhinorrhea, sinus pressure and sore throat. Eyes: Negative for redness and visual disturbance. Respiratory: Positive for chest tightness, shortness of breath and wheezing. Negative for cough and stridor. Cardiovascular: Negative for chest pain, palpitations and leg swelling. Gastrointestinal: Negative for abdominal distention, abdominal pain, blood in stool, constipation, diarrhea, nausea and rectal pain. Endocrine: Negative for polydipsia, polyphagia and polyuria.    Genitourinary: Negative for difficulty urinating, flank pain, frequency and urgency. Musculoskeletal: Positive for arthralgias, back pain, gait problem and myalgias. Negative for neck pain. Skin: Negative for color change, rash and wound. Allergic/Immunologic: Positive for immunocompromised state. Negative for food allergies. Neurological: Positive for weakness and numbness. Negative for dizziness, speech difficulty, light-headedness and headaches. Psychiatric/Behavioral: Positive for decreased concentration. Negative for agitation, behavioral problems, dysphoric mood, hallucinations, sleep disturbance and suicidal ideas. The patient is nervous/anxious. Physical Exam  Vitals and nursing note reviewed. Constitutional:       General: She is not in acute distress. Appearance: Normal appearance. She is well-developed and underweight. She is not diaphoretic. HENT:      Head: Normocephalic and atraumatic. Left Ear: Tympanic membrane normal.      Nose: Nose normal.   Eyes:      General:         Right eye: No discharge. Left eye: No discharge. Extraocular Movements: Extraocular movements intact. Conjunctiva/sclera: Conjunctivae normal.      Pupils: Pupils are equal, round, and reactive to light. Neck:      Thyroid: No thyromegaly. Cardiovascular:      Rate and Rhythm: Normal rate and regular rhythm. Heart sounds: Normal heart sounds. No murmur heard. Pulmonary:      Effort: Pulmonary effort is normal. No respiratory distress. Breath sounds: Decreased air movement present. No transmitted upper airway sounds. Decreased breath sounds, wheezing and rhonchi present. Abdominal:      General: Bowel sounds are normal. There is no distension. Palpations: Abdomen is soft. There is no mass. Tenderness: There is no abdominal tenderness. There is no right CVA tenderness, left CVA tenderness, guarding or rebound. Musculoskeletal:         General: No tenderness. Cervical back: Neck supple. Spasms present. No rigidity. Decreased range of motion. Thoracic back: Deformity present. No spasms. Normal range of motion. Lumbar back: Deformity and spasms present. Decreased range of motion. Right lower leg: No deformity. 1+ Edema present. Left lower le+ Edema present. Lymphadenopathy:      Cervical: No cervical adenopathy. Skin:     Coloration: Skin is not jaundiced or pale. Findings: No bruising, erythema or rash. Neurological:      General: No focal deficit present. Mental Status: She is alert and oriented to person, place, and time. Cranial Nerves: No cranial nerve deficit. Sensory: Sensory deficit present. Motor: Weakness present. No tremor. Coordination: Coordination normal.      Gait: Gait abnormal. Tandem walk normal.      Deep Tendon Reflexes: Reflexes are normal and symmetric. Psychiatric:         Attention and Perception: Attention and perception normal. She is attentive. Mood and Affect: Mood is anxious. Mood is not depressed. Affect is not tearful. Speech: She is communicative. Speech is not rapid and pressured, delayed or slurred. Behavior: Behavior normal. Behavior is not agitated or slowed. Thought Content: Thought content normal.         Judgment: Judgment normal.             ASSESSMENT AND PLAN      1. Pneumonia of both lower lobes due to Mycoplasma pneumoniae  Done with antibiotics, discussed it takes about a month to resolve completely, patient has chronic respiratory failure continue aerosol treatments inhalers follow-up with pulmonologist Kane Atwood as needed. - benzonatate (TESSALON PERLES) 100 MG capsule; Take 1 capsule by mouth 3 times daily as needed for Cough  Dispense: 21 capsule; Refill: 0    2. Chronic respiratory failure with hypoxia (HCC)  Continue home oxygen continue aerosol treatments inhalers follow-up with pulmonologist up-to-date on CT    3.  Chronic obstructive pulmonary disease, unspecified COPD type ([facility]  Continue on her inhalers and aerosol treatments    4. Other chronic pulmonary embolism without acute cor pulmonale (HCC)  Off of Eliquis continue aspirin    5. Bipolar disorder in partial remission, most recent episode unspecified type ([facility]  Stable, continue all psych medications follow-up with psychiatrist    6. Iron deficiency anemia, unspecified iron deficiency anemia type  Chronic anemia stable continue to monitor    7. Pleuritic pain  Symptomatic treatment. - ibuprofen (ADVIL;MOTRIN) 400 MG tablet; Take 1 tablet by mouth 2 times daily as needed for Pain Short term. Take with food. Dispense: 30 tablet; Refill: 0  - lidocaine 4 % external patch; Place 1 patch onto the skin daily  Dispense: 30 patch; Refill: 0      No orders of the defined types were placed in this encounter. There are no discontinued medications. Leeann Rossi received counseling on the following healthy behaviors: nutrition, exercise and medication adherence  Reviewed prior labs and health maintenance  Continue current medications, diet and exercise. Discussed use, benefit, and side effects of prescribed medications. Barriers to medication compliance addressed. Patient given educational materials - see patient instructions  Was a self-tracking handout given in paper form or via [employer]? Yes    Requested Prescriptions     Signed Prescriptions Disp Refills    benzonatate (TESSALON PERLES) 100 MG capsule 21 capsule 0     Sig: Take 1 capsule by mouth 3 times daily as needed for Cough    ibuprofen (ADVIL;MOTRIN) 400 MG tablet 30 tablet 0     Sig: Take 1 tablet by mouth 2 times daily as needed for Pain Short term. Take with food.  lidocaine 4 % external patch 30 patch 0     Sig: Place 1 patch onto the skin daily       All patient questions answered. Patient voiced understanding. Quality Measures    Body mass index is 30.45 kg/m².  Normal. Weight control planned discussed Healthy diet and regular exercise. BP: 98/66. Blood pressure is normal. Treatment plan consists of No treatment change needed. Fall Risk 4/14/2022   2 or more falls in past year? no   Fall with injury in past year? no     The patient does not have a history of falls. I did , complete a risk assessment for falls. A plan of care for falls in-office gait and balance testing performed using The Timed Up and Go Test was negative for increased falls risk- no further intervention is currently indicated, home safety tips provided, No Treatment plan indicated    Lab Results   Component Value Date    LDLCHOLESTEROL 81 10/08/2021    (goal LDL reduction with dx if diabetes is 50% LDL reduction)    PHQ Scores 4/14/2022 12/9/2021 8/5/2021 6/22/2021 5/18/2021 4/13/2021 1/27/2020   PHQ2 Score 0 0 0 0 0 0 0   PHQ9 Score 5 0 0 0 0 0 0     Interpretation of Total Score Depression Severity: 1-4 = Minimal depression, 5-9 = Mild depression, 10-14 = Moderate depression, 15-19 = Moderately severe depression, 20-27 = Severe depression      The patient'spast medical, surgical, social, and family history as well as her   current medications and allergies were reviewed as documented in today's encounter. Medications, labs, diagnostic studies, consultations andfollow-up as documented in this encounter. Return in about 3 months (around 7/14/2022) for 30min,always chronic conditons. Patient wasseen with total face to face time of 30 minutes. More than 50% of this visit was counseling and education. Future Appointments   Date Time Provider Percy Rodriguez   6/22/2022  1:30 PM David Garner MD [address] [place] [address]   7/13/2022 12:00 PM Gabe Boxer, MD  renetta Workman     This note was completed by using the assistance of a speech-recognition program. However, inadvertent computerized transcription errors may be present.  Althoughevery effort was made to ensure accuracy, no guarantees can be provided that every mistake has been identified and corrected by editing.   Electronically signed by Joelle Calvo MD on 4/14/2022  3:24 PM

## 2022-04-16 ENCOUNTER — APPOINTMENT (OUTPATIENT)
Dept: GENERAL RADIOLOGY | Age: 67
DRG: 720 | End: 2022-04-16
Payer: COMMERCIAL

## 2022-04-16 ENCOUNTER — APPOINTMENT (OUTPATIENT)
Dept: CT IMAGING | Age: 67
DRG: 720 | End: 2022-04-16
Payer: COMMERCIAL

## 2022-04-16 ENCOUNTER — HOSPITAL ENCOUNTER (INPATIENT)
Age: 67
LOS: 17 days | Discharge: HOME | DRG: 720 | End: 2022-05-03
Attending: EMERGENCY MEDICINE | Admitting: INTERNAL MEDICINE
Payer: COMMERCIAL

## 2022-04-16 ENCOUNTER — TELEPHONE (OUTPATIENT)
Dept: OTHER | Facility: CLINIC | Age: 67
End: 2022-04-16

## 2022-04-16 DIAGNOSIS — J15.7 PNEUMONIA OF BOTH LOWER LOBES DUE TO MYCOPLASMA PNEUMONIAE: ICD-10-CM

## 2022-04-16 DIAGNOSIS — J18.9 HCAP (HEALTHCARE-ASSOCIATED PNEUMONIA): ICD-10-CM

## 2022-04-16 DIAGNOSIS — R06.03 RESPIRATORY DISTRESS: ICD-10-CM

## 2022-04-16 DIAGNOSIS — J93.9 PNEUMOTHORAX ON RIGHT: Primary | ICD-10-CM

## 2022-04-16 DIAGNOSIS — J93.9 PNEUMOTHORAX, RIGHT: ICD-10-CM

## 2022-04-16 PROBLEM — Z86.718 HISTORY OF DVT (DEEP VEIN THROMBOSIS): Status: ACTIVE | Noted: 2022-04-16

## 2022-04-16 PROBLEM — J96.20 ACUTE ON CHRONIC RESPIRATORY FAILURE (HCC): Status: ACTIVE | Noted: 2022-04-16

## 2022-04-16 PROBLEM — J98.4 CAVITARY LESION OF LUNG: Status: ACTIVE | Noted: 2022-04-16

## 2022-04-16 PROBLEM — Z93.8 STATUS POST CHEST TUBE PLACEMENT (HCC): Status: ACTIVE | Noted: 2022-04-16

## 2022-04-16 PROBLEM — A41.9 SEPSIS (HCC): Status: ACTIVE | Noted: 2022-04-16

## 2022-04-16 LAB
-: ABNORMAL
ABSOLUTE BANDS #: 1.11 K/UL (ref 0–1)
ABSOLUTE EOS #: 0 K/UL (ref 0–0.4)
ABSOLUTE LYMPH #: 0.37 K/UL (ref 1–4.8)
ABSOLUTE MONO #: 0.56 K/UL (ref 0.1–1.3)
ALBUMIN SERPL-MCNC: 2.8 G/DL (ref 3.5–5.2)
ALLEN TEST: ABNORMAL
ALP BLD-CCNC: 137 U/L (ref 35–104)
ALT SERPL-CCNC: 25 U/L (ref 5–33)
AMORPHOUS: ABNORMAL
ANION GAP SERPL CALCULATED.3IONS-SCNC: 14 MMOL/L (ref 9–17)
APPEARANCE FLUID: NORMAL
AST SERPL-CCNC: 19 U/L
BACTERIA: ABNORMAL
BANDS: 6 % (ref 0–10)
BASOPHILS # BLD: 0 % (ref 0–2)
BASOPHILS ABSOLUTE: 0 K/UL (ref 0–0.2)
BILIRUB SERPL-MCNC: 0.16 MG/DL (ref 0.3–1.2)
BILIRUBIN URINE: NEGATIVE
BUN BLDV-MCNC: 31 MG/DL (ref 8–23)
C-REACTIVE PROTEIN: 308.8 MG/L (ref 0–5)
CALCIUM SERPL-MCNC: 9.2 MG/DL (ref 8.6–10.4)
CARBOXYHEMOGLOBIN: 0.4 % (ref 0–5)
CARBOXYHEMOGLOBIN: 4.7 % (ref 0–5)
CASTS UA: ABNORMAL /LPF
CASTS UA: ABNORMAL /LPF
CHLORIDE BLD-SCNC: 103 MMOL/L (ref 98–107)
CHOLESTEROL/HDL RATIO: 1.7
CHOLESTEROL: 85 MG/DL
CO2: 20 MMOL/L (ref 20–31)
COLOR FLUID: NORMAL
COLOR: ABNORMAL
CREAT SERPL-MCNC: 0.87 MG/DL (ref 0.5–0.9)
D-DIMER QUANTITATIVE: 1.86 MG/L FEU (ref 0–0.59)
EOSINOPHILS RELATIVE PERCENT: 0 % (ref 0–4)
EPITHELIAL CELLS UA: ABNORMAL /HPF
FIO2: 40
GFR AFRICAN AMERICAN: >60 ML/MIN
GFR NON-AFRICAN AMERICAN: >60 ML/MIN
GFR SERPL CREATININE-BSD FRML MDRD: ABNORMAL ML/MIN/{1.73_M2}
GLUCOSE BLD-MCNC: 124 MG/DL (ref 65–105)
GLUCOSE BLD-MCNC: 206 MG/DL (ref 70–99)
GLUCOSE BLD-MCNC: 211 MG/DL (ref 65–105)
GLUCOSE URINE: NEGATIVE
GLUCOSE, FLUID: 218 MG/DL
HCO3 ARTERIAL: 20.5 MMOL/L (ref 22–26)
HCO3 VENOUS: 18.4 MMOL/L (ref 24–30)
HCT VFR BLD CALC: 31.2 % (ref 36–46)
HDLC SERPL-MCNC: 51 MG/DL
HEMOGLOBIN: 9.7 G/DL (ref 12–16)
INFLUENZA A: NOT DETECTED
INFLUENZA B: NOT DETECTED
INR BLD: 1.3
KETONES, URINE: NEGATIVE
LACTATE DEHYDROGENASE, FLUID: 2961 U/L
LACTATE DEHYDROGENASE: 166 U/L (ref 135–214)
LACTIC ACID, SEPSIS: 1.5 MMOL/L (ref 0.5–1.9)
LACTIC ACID, SEPSIS: 2.8 MMOL/L (ref 0.5–1.9)
LDL CHOLESTEROL: 19 MG/DL (ref 0–130)
LEGIONELLA PNEUMOPHILIA AG, URINE: NEGATIVE
LEUKOCYTE ESTERASE, URINE: NEGATIVE
LYMPHOCYTES # BLD: 2 % (ref 24–44)
MAGNESIUM: 2.3 MG/DL (ref 1.6–2.6)
MCH RBC QN AUTO: 29.3 PG (ref 26–34)
MCHC RBC AUTO-ENTMCNC: 31.1 G/DL (ref 31–37)
MCV RBC AUTO: 94.4 FL (ref 80–100)
METHEMOGLOBIN: 0.4 % (ref 0–1.9)
METHEMOGLOBIN: 0.6 % (ref 0–1.9)
MODE: ABNORMAL
MONOCYTES # BLD: 3 % (ref 1–7)
MORPHOLOGY: ABNORMAL
MORPHOLOGY: ABNORMAL
NEGATIVE BASE EXCESS, ART: 5.8 MMOL/L (ref 0–2)
NEGATIVE BASE EXCESS, VEN: 7.5 MMOL/L (ref 0–2)
NITRITE, URINE: NEGATIVE
O2 DEVICE/FLOW/%: ABNORMAL
O2 SAT, ARTERIAL: 97.8 % (ref 95–98)
O2 SAT, VEN: 94.2 % (ref 60–85)
PARTIAL THROMBOPLASTIN TIME: 33.6 SEC (ref 24–36)
PATIENT TEMP: 37
PATIENT TEMP: 37
PCO2 ARTERIAL: 41.3 MMHG (ref 35–45)
PCO2, VEN: 34.6 (ref 39–55)
PDW BLD-RTO: 16.3 % (ref 11.5–14.9)
PEEP/CPAP: 8
PH ARTERIAL: 7.3 (ref 7.35–7.45)
PH FLUID: 8.5
PH UA: 5 (ref 5–8)
PH VENOUS: 7.33 (ref 7.32–7.42)
PLATELET # BLD: 584 K/UL (ref 150–450)
PMV BLD AUTO: 6.8 FL (ref 6–12)
PO2 ARTERIAL: 140 MMHG (ref 80–100)
PO2, VEN: 161 (ref 30–50)
POTASSIUM SERPL-SCNC: 4.2 MMOL/L (ref 3.7–5.3)
PRO-BNP: 785 PG/ML
PROCALCITONIN: >100 NG/ML
PROTEIN UA: ABNORMAL
PROTHROMBIN TIME: 16.1 SEC (ref 11.8–14.6)
PT. POSITION: ABNORMAL
RBC # BLD: 3.31 M/UL (ref 4–5.2)
RBC FLUID: NORMAL /MM3
RBC UA: ABNORMAL /HPF
RESPIRATORY RATE: 20
SAMPLE SITE: ABNORMAL
SARS-COV-2 RNA, RT PCR: NOT DETECTED
SEG NEUTROPHILS: 89 % (ref 36–66)
SEGMENTED NEUTROPHILS ABSOLUTE COUNT: 16.46 K/UL (ref 1.3–9.1)
SET RATE: 20
SODIUM BLD-SCNC: 137 MMOL/L (ref 135–144)
SOURCE: NORMAL
SOURCE: NORMAL
SPECIFIC GRAVITY UA: 1.03 (ref 1–1.03)
SPECIMEN DESCRIPTION: NORMAL
SPECIMEN TYPE: NORMAL
STREP PNEUMONIAE ANTIGEN: NEGATIVE
TOTAL PROTEIN, BODY FLUID: 4.2 G/DL
TOTAL PROTEIN: 7 G/DL (ref 6.4–8.3)
TOTAL PROTEIN: 7 G/DL (ref 6.4–8.3)
TOTAL RATE: 20
TRIGL SERPL-MCNC: 75 MG/DL
TROPONIN, HIGH SENSITIVITY: 28 NG/L (ref 0–14)
TROPONIN, HIGH SENSITIVITY: 33 NG/L (ref 0–14)
TURBIDITY: ABNORMAL
URINE HGB: NEGATIVE
UROBILINOGEN, URINE: NORMAL
VT: 450
WBC # BLD: 18.5 K/UL (ref 3.5–11)
WBC FLUID: 2500 /MM3
WBC UA: ABNORMAL /HPF

## 2022-04-16 PROCEDURE — 87205 SMEAR GRAM STAIN: CPT

## 2022-04-16 PROCEDURE — 2700000000 HC OXYGEN THERAPY PER DAY

## 2022-04-16 PROCEDURE — 88112 CYTOPATH CELL ENHANCE TECH: CPT

## 2022-04-16 PROCEDURE — 2580000003 HC RX 258: Performed by: EMERGENCY MEDICINE

## 2022-04-16 PROCEDURE — 85379 FIBRIN DEGRADATION QUANT: CPT

## 2022-04-16 PROCEDURE — 87040 BLOOD CULTURE FOR BACTERIA: CPT

## 2022-04-16 PROCEDURE — 71045 X-RAY EXAM CHEST 1 VIEW: CPT

## 2022-04-16 PROCEDURE — 2500000003 HC RX 250 WO HCPCS: Performed by: INTERNAL MEDICINE

## 2022-04-16 PROCEDURE — 96375 TX/PRO/DX INJ NEW DRUG ADDON: CPT

## 2022-04-16 PROCEDURE — 0BH17EZ INSERTION OF ENDOTRACHEAL AIRWAY INTO TRACHEA, VIA NATURAL OR ARTIFICIAL OPENING: ICD-10-PCS | Performed by: EMERGENCY MEDICINE

## 2022-04-16 PROCEDURE — 82805 BLOOD GASES W/O2 SATURATION: CPT

## 2022-04-16 PROCEDURE — 6360000002 HC RX W HCPCS: Performed by: EMERGENCY MEDICINE

## 2022-04-16 PROCEDURE — 87086 URINE CULTURE/COLONY COUNT: CPT

## 2022-04-16 PROCEDURE — 37799 UNLISTED PX VASCULAR SURGERY: CPT

## 2022-04-16 PROCEDURE — 87070 CULTURE OTHR SPECIMN AEROBIC: CPT

## 2022-04-16 PROCEDURE — 36415 COLL VENOUS BLD VENIPUNCTURE: CPT

## 2022-04-16 PROCEDURE — 5A1955Z RESPIRATORY VENTILATION, GREATER THAN 96 CONSECUTIVE HOURS: ICD-10-PCS | Performed by: EMERGENCY MEDICINE

## 2022-04-16 PROCEDURE — 83735 ASSAY OF MAGNESIUM: CPT

## 2022-04-16 PROCEDURE — 84484 ASSAY OF TROPONIN QUANT: CPT

## 2022-04-16 PROCEDURE — 83605 ASSAY OF LACTIC ACID: CPT

## 2022-04-16 PROCEDURE — 86140 C-REACTIVE PROTEIN: CPT

## 2022-04-16 PROCEDURE — 71260 CT THORAX DX C+: CPT

## 2022-04-16 PROCEDURE — 6370000000 HC RX 637 (ALT 250 FOR IP): Performed by: INTERNAL MEDICINE

## 2022-04-16 PROCEDURE — 85730 THROMBOPLASTIN TIME PARTIAL: CPT

## 2022-04-16 PROCEDURE — 93005 ELECTROCARDIOGRAM TRACING: CPT | Performed by: EMERGENCY MEDICINE

## 2022-04-16 PROCEDURE — 83036 HEMOGLOBIN GLYCOSYLATED A1C: CPT

## 2022-04-16 PROCEDURE — 85025 COMPLETE CBC W/AUTO DIFF WBC: CPT

## 2022-04-16 PROCEDURE — 82945 GLUCOSE OTHER FLUID: CPT

## 2022-04-16 PROCEDURE — 87636 SARSCOV2 & INF A&B AMP PRB: CPT

## 2022-04-16 PROCEDURE — 87186 SC STD MICRODIL/AGAR DIL: CPT

## 2022-04-16 PROCEDURE — 83986 ASSAY PH BODY FLUID NOS: CPT

## 2022-04-16 PROCEDURE — 2580000003 HC RX 258: Performed by: STUDENT IN AN ORGANIZED HEALTH CARE EDUCATION/TRAINING PROGRAM

## 2022-04-16 PROCEDURE — 2580000003 HC RX 258: Performed by: INTERNAL MEDICINE

## 2022-04-16 PROCEDURE — 6360000002 HC RX W HCPCS: Performed by: INTERNAL MEDICINE

## 2022-04-16 PROCEDURE — 6360000004 HC RX CONTRAST MEDICATION: Performed by: EMERGENCY MEDICINE

## 2022-04-16 PROCEDURE — 87641 MR-STAPH DNA AMP PROBE: CPT

## 2022-04-16 PROCEDURE — 84155 ASSAY OF PROTEIN SERUM: CPT

## 2022-04-16 PROCEDURE — 82947 ASSAY GLUCOSE BLOOD QUANT: CPT

## 2022-04-16 PROCEDURE — 82800 BLOOD PH: CPT

## 2022-04-16 PROCEDURE — 99285 EMERGENCY DEPT VISIT HI MDM: CPT

## 2022-04-16 PROCEDURE — 88305 TISSUE EXAM BY PATHOLOGIST: CPT

## 2022-04-16 PROCEDURE — 80053 COMPREHEN METABOLIC PANEL: CPT

## 2022-04-16 PROCEDURE — A4216 STERILE WATER/SALINE, 10 ML: HCPCS | Performed by: INTERNAL MEDICINE

## 2022-04-16 PROCEDURE — 84157 ASSAY OF PROTEIN OTHER: CPT

## 2022-04-16 PROCEDURE — 87075 CULTR BACTERIA EXCEPT BLOOD: CPT

## 2022-04-16 PROCEDURE — 87077 CULTURE AEROBIC IDENTIFY: CPT

## 2022-04-16 PROCEDURE — 2000000000 HC ICU R&B

## 2022-04-16 PROCEDURE — 96374 THER/PROPH/DIAG INJ IV PUSH: CPT

## 2022-04-16 PROCEDURE — 94660 CPAP INITIATION&MGMT: CPT

## 2022-04-16 PROCEDURE — 94640 AIRWAY INHALATION TREATMENT: CPT

## 2022-04-16 PROCEDURE — 89220 SPUTUM SPECIMEN COLLECTION: CPT

## 2022-04-16 PROCEDURE — 05HM33Z INSERTION OF INFUSION DEVICE INTO RIGHT INTERNAL JUGULAR VEIN, PERCUTANEOUS APPROACH: ICD-10-PCS | Performed by: EMERGENCY MEDICINE

## 2022-04-16 PROCEDURE — 85610 PROTHROMBIN TIME: CPT

## 2022-04-16 PROCEDURE — 6360000002 HC RX W HCPCS: Performed by: STUDENT IN AN ORGANIZED HEALTH CARE EDUCATION/TRAINING PROGRAM

## 2022-04-16 PROCEDURE — 2500000003 HC RX 250 WO HCPCS: Performed by: EMERGENCY MEDICINE

## 2022-04-16 PROCEDURE — 87899 AGENT NOS ASSAY W/OPTIC: CPT

## 2022-04-16 PROCEDURE — 84145 PROCALCITONIN (PCT): CPT

## 2022-04-16 PROCEDURE — 83615 LACTATE (LD) (LDH) ENZYME: CPT

## 2022-04-16 PROCEDURE — 31500 INSERT EMERGENCY AIRWAY: CPT

## 2022-04-16 PROCEDURE — 87449 NOS EACH ORGANISM AG IA: CPT

## 2022-04-16 PROCEDURE — 6360000002 HC RX W HCPCS

## 2022-04-16 PROCEDURE — 83880 ASSAY OF NATRIURETIC PEPTIDE: CPT

## 2022-04-16 PROCEDURE — 99291 CRITICAL CARE FIRST HOUR: CPT | Performed by: INTERNAL MEDICINE

## 2022-04-16 PROCEDURE — 6370000000 HC RX 637 (ALT 250 FOR IP): Performed by: EMERGENCY MEDICINE

## 2022-04-16 PROCEDURE — 0202U NFCT DS 22 TRGT SARS-COV-2: CPT

## 2022-04-16 PROCEDURE — 94761 N-INVAS EAR/PLS OXIMETRY MLT: CPT

## 2022-04-16 PROCEDURE — 81001 URINALYSIS AUTO W/SCOPE: CPT

## 2022-04-16 PROCEDURE — 94002 VENT MGMT INPAT INIT DAY: CPT

## 2022-04-16 PROCEDURE — 80061 LIPID PANEL: CPT

## 2022-04-16 PROCEDURE — 94664 DEMO&/EVAL PT USE INHALER: CPT

## 2022-04-16 RX ORDER — TOPIRAMATE 50 MG/1
100 TABLET, FILM COATED ORAL NIGHTLY
Status: DISCONTINUED | OUTPATIENT
Start: 2022-04-16 | End: 2022-04-16

## 2022-04-16 RX ORDER — TRAZODONE HYDROCHLORIDE 50 MG/1
50 TABLET ORAL NIGHTLY
Status: DISCONTINUED | OUTPATIENT
Start: 2022-04-16 | End: 2022-05-03 | Stop reason: HOSPADM

## 2022-04-16 RX ORDER — IBANDRONATE SODIUM 150 MG/1
150 TABLET, FILM COATED ORAL
Status: DISCONTINUED | OUTPATIENT
Start: 2022-04-16 | End: 2022-04-16

## 2022-04-16 RX ORDER — SODIUM CHLORIDE 0.9 % (FLUSH) 0.9 %
5-40 SYRINGE (ML) INJECTION PRN
Status: DISCONTINUED | OUTPATIENT
Start: 2022-04-16 | End: 2022-05-03 | Stop reason: HOSPADM

## 2022-04-16 RX ORDER — IPRATROPIUM BROMIDE AND ALBUTEROL SULFATE 2.5; .5 MG/3ML; MG/3ML
1 SOLUTION RESPIRATORY (INHALATION) EVERY 4 HOURS PRN
Status: DISCONTINUED | OUTPATIENT
Start: 2022-04-16 | End: 2022-04-16

## 2022-04-16 RX ORDER — POTASSIUM CHLORIDE 20 MEQ/1
40 TABLET, EXTENDED RELEASE ORAL PRN
Status: DISCONTINUED | OUTPATIENT
Start: 2022-04-16 | End: 2022-05-03 | Stop reason: HOSPADM

## 2022-04-16 RX ORDER — 0.9 % SODIUM CHLORIDE 0.9 %
1000 INTRAVENOUS SOLUTION INTRAVENOUS ONCE
Status: COMPLETED | OUTPATIENT
Start: 2022-04-16 | End: 2022-04-16

## 2022-04-16 RX ORDER — SODIUM CHLORIDE 0.9 % (FLUSH) 0.9 %
5-40 SYRINGE (ML) INJECTION EVERY 12 HOURS SCHEDULED
Status: DISCONTINUED | OUTPATIENT
Start: 2022-04-16 | End: 2022-05-03 | Stop reason: HOSPADM

## 2022-04-16 RX ORDER — IPRATROPIUM BROMIDE AND ALBUTEROL SULFATE 2.5; .5 MG/3ML; MG/3ML
1 SOLUTION RESPIRATORY (INHALATION) EVERY 10 MIN PRN
Status: DISCONTINUED | OUTPATIENT
Start: 2022-04-16 | End: 2022-04-16

## 2022-04-16 RX ORDER — TOPIRAMATE 50 MG/1
50 TABLET, FILM COATED ORAL DAILY
Status: DISCONTINUED | OUTPATIENT
Start: 2022-04-17 | End: 2022-04-16

## 2022-04-16 RX ORDER — SODIUM CHLORIDE 9 MG/ML
INJECTION, SOLUTION INTRAVENOUS PRN
Status: DISCONTINUED | OUTPATIENT
Start: 2022-04-16 | End: 2022-05-03 | Stop reason: HOSPADM

## 2022-04-16 RX ORDER — IPRATROPIUM BROMIDE AND ALBUTEROL SULFATE 2.5; .5 MG/3ML; MG/3ML
1 SOLUTION RESPIRATORY (INHALATION)
Status: DISCONTINUED | OUTPATIENT
Start: 2022-04-16 | End: 2022-04-16

## 2022-04-16 RX ORDER — PROPOFOL 10 MG/ML
INJECTION, EMULSION INTRAVENOUS
Status: COMPLETED
Start: 2022-04-16 | End: 2022-04-16

## 2022-04-16 RX ORDER — ATORVASTATIN CALCIUM 20 MG/1
20 TABLET, FILM COATED ORAL DAILY
Status: DISCONTINUED | OUTPATIENT
Start: 2022-04-16 | End: 2022-05-03 | Stop reason: HOSPADM

## 2022-04-16 RX ORDER — NICOTINE POLACRILEX 4 MG
15 LOZENGE BUCCAL PRN
Status: DISCONTINUED | OUTPATIENT
Start: 2022-04-16 | End: 2022-05-03 | Stop reason: HOSPADM

## 2022-04-16 RX ORDER — MINERAL OIL AND WHITE PETROLATUM 150; 830 MG/G; MG/G
OINTMENT OPHTHALMIC EVERY 4 HOURS
Status: DISCONTINUED | OUTPATIENT
Start: 2022-04-16 | End: 2022-04-28

## 2022-04-16 RX ORDER — MAGNESIUM SULFATE 1 G/100ML
1000 INJECTION INTRAVENOUS PRN
Status: DISCONTINUED | OUTPATIENT
Start: 2022-04-16 | End: 2022-04-16

## 2022-04-16 RX ORDER — RISPERIDONE 0.5 MG/1
1.5 TABLET, FILM COATED ORAL EVERY 12 HOURS
Status: DISCONTINUED | OUTPATIENT
Start: 2022-04-16 | End: 2022-05-03 | Stop reason: HOSPADM

## 2022-04-16 RX ORDER — PROPOFOL 10 MG/ML
5-50 INJECTION, EMULSION INTRAVENOUS CONTINUOUS
Status: DISCONTINUED | OUTPATIENT
Start: 2022-04-16 | End: 2022-04-16

## 2022-04-16 RX ORDER — METHYLPREDNISOLONE SODIUM SUCCINATE 125 MG/2ML
125 INJECTION, POWDER, LYOPHILIZED, FOR SOLUTION INTRAMUSCULAR; INTRAVENOUS ONCE
Status: COMPLETED | OUTPATIENT
Start: 2022-04-16 | End: 2022-04-16

## 2022-04-16 RX ORDER — ROCURONIUM BROMIDE 10 MG/ML
100 INJECTION, SOLUTION INTRAVENOUS ONCE
Status: COMPLETED | OUTPATIENT
Start: 2022-04-16 | End: 2022-04-16

## 2022-04-16 RX ORDER — POLYVINYL ALCOHOL 14 MG/ML
1 SOLUTION/ DROPS OPHTHALMIC EVERY 4 HOURS
Status: DISCONTINUED | OUTPATIENT
Start: 2022-04-16 | End: 2022-04-28

## 2022-04-16 RX ORDER — NICOTINE POLACRILEX 4 MG
15 LOZENGE BUCCAL PRN
Status: DISCONTINUED | OUTPATIENT
Start: 2022-04-16 | End: 2022-04-16 | Stop reason: SDUPTHER

## 2022-04-16 RX ORDER — CALCIUM CARBONATE-CHOLECALCIFEROL TAB 250 MG-125 UNIT 250-125 MG-UNIT
2 TAB ORAL DAILY
Status: DISCONTINUED | OUTPATIENT
Start: 2022-04-16 | End: 2022-04-16

## 2022-04-16 RX ORDER — SODIUM CHLORIDE 9 MG/ML
INJECTION, SOLUTION INTRAVENOUS CONTINUOUS
Status: DISCONTINUED | OUTPATIENT
Start: 2022-04-16 | End: 2022-04-16

## 2022-04-16 RX ORDER — SODIUM CHLORIDE, SODIUM LACTATE, POTASSIUM CHLORIDE, CALCIUM CHLORIDE 600; 310; 30; 20 MG/100ML; MG/100ML; MG/100ML; MG/100ML
INJECTION, SOLUTION INTRAVENOUS CONTINUOUS
Status: DISCONTINUED | OUTPATIENT
Start: 2022-04-16 | End: 2022-04-21

## 2022-04-16 RX ORDER — ETOMIDATE 2 MG/ML
20 INJECTION INTRAVENOUS ONCE
Status: COMPLETED | OUTPATIENT
Start: 2022-04-16 | End: 2022-04-16

## 2022-04-16 RX ORDER — DEXTROSE MONOHYDRATE 25 G/50ML
12.5 INJECTION, SOLUTION INTRAVENOUS PRN
Status: DISCONTINUED | OUTPATIENT
Start: 2022-04-16 | End: 2022-04-16 | Stop reason: SDUPTHER

## 2022-04-16 RX ORDER — ALBUTEROL SULFATE 2.5 MG/3ML
2.5 SOLUTION RESPIRATORY (INHALATION) 4 TIMES DAILY
Status: DISCONTINUED | OUTPATIENT
Start: 2022-04-16 | End: 2022-04-16

## 2022-04-16 RX ORDER — 0.9 % SODIUM CHLORIDE 0.9 %
80 INTRAVENOUS SOLUTION INTRAVENOUS ONCE
Status: COMPLETED | OUTPATIENT
Start: 2022-04-16 | End: 2022-04-16

## 2022-04-16 RX ORDER — POTASSIUM CHLORIDE 7.45 MG/ML
10 INJECTION INTRAVENOUS PRN
Status: DISCONTINUED | OUTPATIENT
Start: 2022-04-16 | End: 2022-05-03 | Stop reason: HOSPADM

## 2022-04-16 RX ORDER — DEXTROSE MONOHYDRATE 25 G/50ML
12.5 INJECTION, SOLUTION INTRAVENOUS PRN
Status: DISCONTINUED | OUTPATIENT
Start: 2022-04-16 | End: 2022-05-03 | Stop reason: HOSPADM

## 2022-04-16 RX ORDER — PROPOFOL 10 MG/ML
10 INJECTION, EMULSION INTRAVENOUS CONTINUOUS
Status: DISCONTINUED | OUTPATIENT
Start: 2022-04-16 | End: 2022-04-28

## 2022-04-16 RX ORDER — DEXTROSE MONOHYDRATE 50 MG/ML
100 INJECTION, SOLUTION INTRAVENOUS PRN
Status: DISCONTINUED | OUTPATIENT
Start: 2022-04-16 | End: 2022-04-16 | Stop reason: SDUPTHER

## 2022-04-16 RX ORDER — FENTANYL CITRATE 50 UG/ML
50 INJECTION, SOLUTION INTRAMUSCULAR; INTRAVENOUS EVERY 30 MIN PRN
Status: DISCONTINUED | OUTPATIENT
Start: 2022-04-16 | End: 2022-04-28

## 2022-04-16 RX ORDER — DEXTROSE MONOHYDRATE 50 MG/ML
100 INJECTION, SOLUTION INTRAVENOUS PRN
Status: DISCONTINUED | OUTPATIENT
Start: 2022-04-16 | End: 2022-05-03 | Stop reason: HOSPADM

## 2022-04-16 RX ORDER — CHLORHEXIDINE GLUCONATE 0.12 MG/ML
15 RINSE ORAL 2 TIMES DAILY
Status: DISCONTINUED | OUTPATIENT
Start: 2022-04-16 | End: 2022-04-28

## 2022-04-16 RX ORDER — IPRATROPIUM BROMIDE AND ALBUTEROL SULFATE 2.5; .5 MG/3ML; MG/3ML
1 SOLUTION RESPIRATORY (INHALATION) EVERY 4 HOURS
Status: DISCONTINUED | OUTPATIENT
Start: 2022-04-16 | End: 2022-04-18

## 2022-04-16 RX ORDER — ALBUTEROL SULFATE 90 UG/1
2 AEROSOL, METERED RESPIRATORY (INHALATION) EVERY 6 HOURS PRN
Status: DISCONTINUED | OUTPATIENT
Start: 2022-04-16 | End: 2022-04-16

## 2022-04-16 RX ORDER — RIVASTIGMINE TARTRATE 1.5 MG/1
4.5 CAPSULE ORAL 2 TIMES DAILY
Status: DISCONTINUED | OUTPATIENT
Start: 2022-04-16 | End: 2022-04-27

## 2022-04-16 RX ORDER — HEPARIN SODIUM 5000 [USP'U]/ML
5000 INJECTION, SOLUTION INTRAVENOUS; SUBCUTANEOUS EVERY 8 HOURS SCHEDULED
Status: DISCONTINUED | OUTPATIENT
Start: 2022-04-16 | End: 2022-05-03 | Stop reason: HOSPADM

## 2022-04-16 RX ORDER — SODIUM CHLORIDE 0.9 % (FLUSH) 0.9 %
10 SYRINGE (ML) INJECTION PRN
Status: DISCONTINUED | OUTPATIENT
Start: 2022-04-16 | End: 2022-05-03 | Stop reason: HOSPADM

## 2022-04-16 RX ADMIN — CEFEPIME HYDROCHLORIDE 2000 MG: 2 INJECTION, POWDER, FOR SOLUTION INTRAVENOUS at 13:15

## 2022-04-16 RX ADMIN — ROCURONIUM BROMIDE 100 MG: 10 INJECTION, SOLUTION INTRAVENOUS at 11:50

## 2022-04-16 RX ADMIN — SODIUM CHLORIDE 80 ML: 9 INJECTION, SOLUTION INTRAVENOUS at 14:56

## 2022-04-16 RX ADMIN — METRONIDAZOLE 500 MG: 500 INJECTION, SOLUTION INTRAVENOUS at 22:31

## 2022-04-16 RX ADMIN — SODIUM CHLORIDE, POTASSIUM CHLORIDE, SODIUM LACTATE AND CALCIUM CHLORIDE: 600; 310; 30; 20 INJECTION, SOLUTION INTRAVENOUS at 17:30

## 2022-04-16 RX ADMIN — FAMOTIDINE 20 MG: 10 INJECTION INTRAVENOUS at 17:32

## 2022-04-16 RX ADMIN — SODIUM CHLORIDE 1000 ML: 9 INJECTION, SOLUTION INTRAVENOUS at 10:45

## 2022-04-16 RX ADMIN — IPRATROPIUM BROMIDE AND ALBUTEROL SULFATE 1 AMPULE: 2.5; .5 SOLUTION RESPIRATORY (INHALATION) at 10:50

## 2022-04-16 RX ADMIN — MINERAL OIL, PETROLATUM: 425; 568 OINTMENT OPHTHALMIC at 17:32

## 2022-04-16 RX ADMIN — MINERAL OIL, PETROLATUM: 425; 568 OINTMENT OPHTHALMIC at 19:47

## 2022-04-16 RX ADMIN — POLYVINYL ALCOHOL 1 DROP: 14 SOLUTION/ DROPS OPHTHALMIC at 22:03

## 2022-04-16 RX ADMIN — METHYLPREDNISOLONE SODIUM SUCCINATE 125 MG: 125 INJECTION, POWDER, FOR SOLUTION INTRAMUSCULAR; INTRAVENOUS at 10:56

## 2022-04-16 RX ADMIN — FENTANYL CITRATE 50 MCG: 50 INJECTION, SOLUTION INTRAMUSCULAR; INTRAVENOUS at 23:34

## 2022-04-16 RX ADMIN — METRONIDAZOLE 500 MG: 500 INJECTION, SOLUTION INTRAVENOUS at 17:38

## 2022-04-16 RX ADMIN — ETOMIDATE 20 MG: 2 INJECTION, SOLUTION INTRAVENOUS at 11:50

## 2022-04-16 RX ADMIN — IPRATROPIUM BROMIDE AND ALBUTEROL SULFATE 1 AMPULE: .5; 2.5 SOLUTION RESPIRATORY (INHALATION) at 23:12

## 2022-04-16 RX ADMIN — CHLORHEXIDINE GLUCONATE 0.12% ORAL RINSE 15 ML: 1.2 LIQUID ORAL at 19:47

## 2022-04-16 RX ADMIN — IPRATROPIUM BROMIDE AND ALBUTEROL SULFATE 1 AMPULE: .5; 2.5 SOLUTION RESPIRATORY (INHALATION) at 15:57

## 2022-04-16 RX ADMIN — FENTANYL CITRATE 50 MCG: 50 INJECTION, SOLUTION INTRAMUSCULAR; INTRAVENOUS at 17:47

## 2022-04-16 RX ADMIN — SODIUM CHLORIDE, PRESERVATIVE FREE 10 ML: 5 INJECTION INTRAVENOUS at 19:46

## 2022-04-16 RX ADMIN — SODIUM CHLORIDE, PRESERVATIVE FREE 10 ML: 5 INJECTION INTRAVENOUS at 14:56

## 2022-04-16 RX ADMIN — PROPOFOL 35 MCG/KG/MIN: 10 INJECTION, EMULSION INTRAVENOUS at 23:20

## 2022-04-16 RX ADMIN — HEPARIN SODIUM 5000 UNITS: 5000 INJECTION INTRAVENOUS; SUBCUTANEOUS at 17:32

## 2022-04-16 RX ADMIN — PROPOFOL INJECTABLE EMULSION 15 MCG/KG/MIN: 10 INJECTION, EMULSION INTRAVENOUS at 12:12

## 2022-04-16 RX ADMIN — POLYVINYL ALCOHOL 1 DROP: 14 SOLUTION/ DROPS OPHTHALMIC at 17:32

## 2022-04-16 RX ADMIN — VANCOMYCIN HYDROCHLORIDE 2000 MG: 1 INJECTION, POWDER, LYOPHILIZED, FOR SOLUTION INTRAVENOUS at 13:34

## 2022-04-16 RX ADMIN — CHLORHEXIDINE GLUCONATE 0.12% ORAL RINSE 15 ML: 1.2 LIQUID ORAL at 17:31

## 2022-04-16 RX ADMIN — IOPAMIDOL 75 ML: 755 INJECTION, SOLUTION INTRAVENOUS at 14:55

## 2022-04-16 RX ADMIN — CEFEPIME HYDROCHLORIDE 2000 MG: 2 INJECTION, POWDER, FOR SOLUTION INTRAVENOUS at 19:37

## 2022-04-16 RX ADMIN — PROPOFOL 15 MCG/KG/MIN: 10 INJECTION, EMULSION INTRAVENOUS at 12:12

## 2022-04-16 RX ADMIN — PROPOFOL 35 MCG/KG/MIN: 10 INJECTION, EMULSION INTRAVENOUS at 17:31

## 2022-04-16 RX ADMIN — IPRATROPIUM BROMIDE AND ALBUTEROL SULFATE 1 AMPULE: .5; 2.5 SOLUTION RESPIRATORY (INHALATION) at 19:18

## 2022-04-16 RX ADMIN — IPRATROPIUM BROMIDE AND ALBUTEROL SULFATE 1 AMPULE: 2.5; .5 SOLUTION RESPIRATORY (INHALATION) at 10:56

## 2022-04-16 ASSESSMENT — PULMONARY FUNCTION TESTS
PIF_VALUE: 14
PIF_VALUE: 14
PIF_VALUE: 11
PIF_VALUE: 21
PIF_VALUE: 17
PIF_VALUE: 22
PIF_VALUE: 19
PIF_VALUE: 20
PIF_VALUE: 21
PIF_VALUE: 18
PIF_VALUE: 15
PIF_VALUE: 22
PIF_VALUE: 21
PIF_VALUE: 14
PIF_VALUE: 21
PIF_VALUE: 14
PIF_VALUE: 21
PIF_VALUE: 15
PIF_VALUE: 17
PIF_VALUE: 18
PIF_VALUE: 20
PIF_VALUE: 24
PIF_VALUE: 22
PIF_VALUE: 17
PIF_VALUE: 22
PIF_VALUE: 20
PIF_VALUE: 14
PIF_VALUE: 19

## 2022-04-16 ASSESSMENT — PAIN SCALES - GENERAL
PAINLEVEL_OUTOF10: 0
PAINLEVEL_OUTOF10: 0
PAINLEVEL_OUTOF10: 4
PAINLEVEL_OUTOF10: 0
PAINLEVEL_OUTOF10: 6

## 2022-04-16 ASSESSMENT — ENCOUNTER SYMPTOMS
COLOR CHANGE: 0
VOMITING: 0
SHORTNESS OF BREATH: 1
WHEEZING: 1
TROUBLE SWALLOWING: 0
NAUSEA: 0
CONSTIPATION: 0
BACK PAIN: 0
SORE THROAT: 0
COUGH: 0
BLOOD IN STOOL: 0
DIARRHEA: 0
ABDOMINAL PAIN: 0

## 2022-04-16 NOTE — H&P
[address]     HISTORY AND PHYSICAL EXAMINATION            Date:   4/16/2022  Patient name:  Moody Maher  Date of admission:  4/16/2022 10:25 AM  MRN:   [id number]  Account:  [de-identified]  YOB: 1955  PCP:    Shelva Cabot, MD  Room:   2002/2002-01  Code Status:    Full Code    Chief Complaint:     Chief Complaint   Patient presents with    Shortness of Breath       History Obtained From:     electronic medical record    History of Present Illness: The patient is a 74 y.o. Non- / non  female sever COPD 3L O2 baseline, bipolar, Hx of DVT/ PE, migraines, who presents with Shortness of Breath and cough  Patient was recently discharge from [facility] for mycoplasma pneumonia. She had a follow up appointment with PCP, patient was complaining of cough and chest pain, was started on Tessalon and nsaids for pain. However; she continued to be in distress and her daughter brought her to ED. She was hypoxic initially on 3L o2, was increased to 6L and continues to be hypoxic   Tachypneic, tachycardic  ABGs: pH 7.33, CO2 34, HCO3 18  WBC 18   Lactic 2.8> 1.5  Pancultures were sent  Chest x-ray: Moderate loculated right basal pneumothorax.  Evidence for tension.  Cavitarylesion noted in the right lung base  CT chest: Chronic clot material RUL, RLL. Thick-walled cavitary lesion RLL. Multiloculated pleural collection or hydropneumothorax posterior to the right lung, right chest tube in situ. Infiltrates of the right middle lobe. Stellate 6 mm lateral RUL nodule.   Mediastinal and right hilar lymphadenopathy  Was given 1 L bolus, IV cefepime, vancomycin, 125 mg Solu-Medrol and she is admitted to the hospital for the management of acute hypoxic respiratory failure due to pneumothorax and possible lung infection        Past Medical History:     Past Medical History:   Diagnosis Date    Abnormal EKG     TRAM [facility]acute kidney injury) ([facility] [address] 2022    Anxiety     Asthma     Bipolar disorder ([facility] [address]     SEVERE [place] , [place]    COPD (chronic obstructive pulmonary disease) (HCC)     Cramps, extremity     SEVERE LEG CRAMPS    Depression     Dilated bile duct     Headache     History of elective      Hyperlipidemia     Irritable bowel syndrome     Prolonged emergence from general anesthesia     SEVERE ISSUES WAKING UP    Substance abuse ([facility] [address]     street drugs when younger   [place] Unspecified sleep apnea     Vision abnormalities     glasses        Past SurgicalHistory:     Past Surgical History:   Procedure Laterality Date    ANKLE SURGERY      HAD SCREWS AND HARDWARE, THEN REMOVED    COLONOSCOPY  02/15/2018    tubular adenoma    EYE SURGERY Bilateral     CATARACTS    HYSTEROSCOPY  10/19/2016    D & C    INDUCED       LASIK      bilateral    TONSILLECTOMY AND ADENOIDECTOMY Bilateral     VULVA SURGERY      HAD A BIOPSY AND REMOVAL OF        Medications Prior to Admission:        Prior to Admission medications    Medication Sig Start Date End Date Taking? Authorizing Provider   benzonatate (TESSALON PERLES) 100 MG capsule Take 1 capsule by mouth 3 times daily as needed for Cough 22  Norene Schirmer, MD   ibuprofen (ADVIL;MOTRIN) 400 MG tablet Take 1 tablet by mouth 2 times daily as needed for Pain Short term. Take with food.  22   Norene Schirmer, MD   lidocaine 4 % external patch Place 1 patch onto the skin daily 22  Norene Schirmer, MD   docusate sodium (COLACE) 100 MG capsule take 1 capsule by mouth twice a day 22   Norene Schirmer, MD   albuterol sulfate HFA (VENTOLIN HFA) 108 (90 Base) MCG/ACT inhaler Inhale 2 puffs into the lungs every 6 hours as needed for Wheezing    Historical Provider, MD   sodium chloride (OCEAN, BABY AYR) 0.65 % nasal spray 1 spray by Nasal route as needed for Congestion    Historical Provider, MD   olopatadine (PATANOL) 0.1 % ophthalmic solution Place 1 drop into both eyes 2 times daily    Historical Provider, MD   aspirin 81 MG EC tablet Take 81 mg by mouth daily    Historical Provider, MD   Calcium Carb-Cholecalciferol 600-500 MG-UNIT TABS Take 2 tablets by mouth daily    Historical Provider, MD ellsworthbital-acetaminophen-caffeine (FIORICET, ESGIC) -40 MG per tablet Take 1 tablet by mouth every 6 hours as needed for Headaches Indications: #25 for 30 days filled 2/4/22    Historical Provider, MD   topiramate (TOPAMAX) 50 MG tablet Take 100 mg by mouth at bedtime    Historical Provider, MD   rivastigmine (EXELON) 4.5 MG capsule take 1 capsule by mouth twice a day 3/29/22   Eunice Pino MD   acetaminophen (ACETAMINOPHEN EXTRA STRENGTH) 500 MG tablet take 2 tablets by mouth every 6 hours if needed for pain 3/22/22   Eunice Pino MD   EPINEPHrine (EPIPEN 2-SHARITA) 0.3 MG/0.3ML SOAJ injection Inject 0.3 mLs into the muscle once as needed (for allergic reaction) Use as directed for allergic reaction 3/22/22 3/22/22  Eunice Pino MD   vitamin D (ERGOCALCIFEROL) 1.25 MG (60352 UT) CAPS capsule take 1 capsule by mouth every week 2/28/22   Eunice Pino MD   ibandronate (BONIVA) 150 MG tablet Take 1 tablet by mouth every 30 days Take one (1) tablet once per month in the morning with a full glass of water, on an empty stomach, and do not take anything else by mouth or lie down for the next 30 minutes.  2/8/22   Eunice Pino MD   SPIRIVA RESPIMAT 2.5 MCG/ACT AERS inhaler inhale 2 puffs INTO THE LUNGS once daily 1/10/22   Eunice Pino MD   lidocaine (LMX) 4 % cream apply topically every 8 hours if needed 12/13/21   Eunice Pino MD   topiramate (TOPAMAX) 50 MG tablet Take 50 mg by mouth daily  12/7/21   Historical Provider, MD   vitamin B-12 (CYANOCOBALAMIN) 100 MCG tablet Take 50 mcg by mouth daily    Historical Provider, MD SHAD PEDROZA 100-25 MCG/INH AEPB inhaler Inhale 1 puff into the lungs daily  21   Historical Provider, MD   simvastatin (ZOCOR) 20 MG tablet Take 1 tablet by mouth nightly 21   Christoph Blanca MD   cetirizine (ZYRTEC) 10 MG tablet Take 10 mg by mouth daily  Patient not taking: Reported on 2022    Historical Provider, MD   fluticasone Marissa Delfina) 50 MCG/ACT nasal spray 2 sprays by Nasal route daily 21  Kenya Coronel MD   risperiDONE (RISPERDAL) 0.5 MG tablet Take 3 tablets by mouth every 12 hours Per [facility] psych 21   Kenya Coronel MD   traZODone (DESYREL) 50 MG tablet Take 50 mg by mouth nightly  21   Historical Provider, MD   albuterol (PROVENTIL) (2.5 MG/3ML) 0.083% nebulizer solution Take 3 mLs by nebulization 4 times daily 17   Shannon Galeana MD        Allergies:     Advil [ibuprofen], Aleve [naproxen], Antipyrine, Celecoxib, Codeine, Fomepizole, Incruse ellipta [umeclidinium bromide], Other, Rofecoxib, Salicylates, Strawberry extract, Sulfinpyrazone, Aspirin, and Nsaids    Social History:     Tobacco:    reports that she quit smoking about 3 years ago. Her smoking use included cigarettes. She has a 84.00 pack-year smoking history. She has never used smokeless tobacco.  Alcohol:      reports current alcohol use. Drug Use:  reports no history of drug use. Family History:     Family History   Problem Relation Age of Onset    Dementia Maternal Aunt     Kidney Disease Mother     Heart Attack Sister     Prostate Cancer Father     High Cholesterol Brother     Heart Attack Paternal Grandmother        Review of Systems:     Positive and Negative as described in HPI.     Review of Systems   Unable to perform ROS: Intubated       Physical Exam:   BP (!) 143/70   Pulse 99   Temp 97.7 °F (36.5 °C) (Axillary)   Resp 28   Wt 179 lb 10.8 oz (81.5 kg)   LMP 2013 (Exact Date)   SpO2 99%   BMI 29.90 kg/m²   Temp (24hrs), Av.7 °F (36.5 °C), Min:97.7 °F (36.5 °C), Max:97.7 °F (36.5 °C)    Recent Labs     04/16/22  1640   POCGLU 211*       Intake/Output Summary (Last 24 hours) at 4/16/2022 1723  Last data filed at 4/16/2022 1515  Gross per 24 hour   Intake 1050 ml   Output 150 ml   Net 900 ml       Physical Exam  Cardiovascular:      Rate and Rhythm: Normal rate and regular rhythm. Pulmonary:      Effort: Respiratory distress present. Breath sounds: Wheezing (Bilateral) and rales (Right-sided) present. Abdominal:      General: Abdomen is flat. Musculoskeletal:      Right lower leg: No edema. Left lower leg: No edema. Skin:     Capillary Refill: Capillary refill takes less than 2 seconds. Neurological:      Comments: Sedated on propofol         Investigations:     Laboratory Testing:  Recent Results (from the past 24 hour(s))   Lactate, Sepsis    Collection Time: 04/16/22 10:14 AM   Result Value Ref Range    Lactic Acid, Sepsis 2.8 (H) 0.5 - 1.9 mmol/L   Troponin    Collection Time: 04/16/22 10:42 AM   Result Value Ref Range    Troponin, High Sensitivity 33 (H) 0 - 14 ng/L   Culture, Blood 1    Collection Time: 04/16/22 10:42 AM    Specimen: Blood   Result Value Ref Range    Specimen Description . BLOOD     Special Requests 8ML GREEN/2ML ORANGE LEFT WRIST     Culture NO GROWTH <24 HRS    CBC with Auto Differential    Collection Time: 04/16/22 10:42 AM   Result Value Ref Range    WBC 18.5 (H) 3.5 - 11.0 k/uL    RBC 3.31 (L) 4.0 - 5.2 m/uL    Hemoglobin 9.7 (L) 12.0 - 16.0 g/dL    Hematocrit 31.2 (L) 36 - 46 %    MCV 94.4 80 - 100 fL    MCH 29.3 26 - 34 pg    MCHC 31.1 31 - 37 g/dL    RDW 16.3 (H) 11.5 - 14.9 %    Platelets 482 (H) 038 - 450 k/uL    MPV 6.8 6.0 - 12.0 fL    Seg Neutrophils 89 (H) 36 - 66 %    Lymphocytes 2 (L) 24 - 44 %    Monocytes 3 1 - 7 %    Eosinophils % 0 0 - 4 %    Basophils 0 0 - 2 %    Bands 6 0 - 10 %    Segs Absolute 16.46 (H) 1.3 - 9.1 k/uL    Absolute Lymph # 0.37 (L) 1.0 - 4.8 k/uL    Absolute Mono # 0.56 0.1 - 1.3 k/uL    Absolute Eos # 0.00 0.0 - 0.4 k/uL Basophils Absolute 0.00 0.0 - 0.2 k/uL    Absolute Bands # 1.11 (H) 0.0 - 1.0 k/uL    Morphology ANISOCYTOSIS PRESENT     Morphology HYPOCHROMIA PRESENT    Comprehensive Metabolic Panel    Collection Time: 04/16/22 10:42 AM   Result Value Ref Range    Glucose 206 (H) 70 - 99 mg/dL    BUN 31 (H) 8 - 23 mg/dL    CREATININE 0.87 0.50 - 0.90 mg/dL    Calcium 9.2 8.6 - 10.4 mg/dL    Sodium 137 135 - 144 mmol/L    Potassium 4.2 3.7 - 5.3 mmol/L    Chloride 103 98 - 107 mmol/L    CO2 20 20 - 31 mmol/L    Anion Gap 14 9 - 17 mmol/L    Alkaline Phosphatase 137 (H) 35 - 104 U/L    ALT 25 5 - 33 U/L    AST 19 <32 U/L    Total Bilirubin 0.16 (L) 0.3 - 1.2 mg/dL    Total Protein 7.0 6.4 - 8.3 g/dL    Albumin 2.8 (L) 3.5 - 5.2 g/dL    GFR Non-African American >60 >60 mL/min    GFR African American >60 >60 mL/min    GFR Comment         Brain Natriuretic Peptide    Collection Time: 04/16/22 10:42 AM   Result Value Ref Range    Pro- (H) <300 pg/mL   D-Dimer, Quantitative    Collection Time: 04/16/22 10:42 AM   Result Value Ref Range    D-Dimer, Quant 1.86 (H) 0.00 - 0.59 mg/L FEU   Protime-INR    Collection Time: 04/16/22 10:42 AM   Result Value Ref Range    Protime 16.1 (H) 11.8 - 14.6 sec    INR 1.3    APTT    Collection Time: 04/16/22 10:42 AM   Result Value Ref Range    PTT 33.6 24.0 - 36.0 sec   Procalcitonin    Collection Time: 04/16/22 10:42 AM   Result Value Ref Range    Procalcitonin >100.00 (H) <0.09 ng/mL   Magnesium    Collection Time: 04/16/22 10:42 AM   Result Value Ref Range    Magnesium 2.3 1.6 - 2.6 mg/dL   C-Reactive Protein    Collection Time: 04/16/22 10:42 AM   Result Value Ref Range    .8 (H) 0.0 - 5.0 mg/L   Blood gas, venous    Collection Time: 04/16/22 11:05 AM   Result Value Ref Range    pH, Louis 7.334 7.320 - 7.420    pCO2, Louis 34.6 (L) 39.0 - 55.0    pO2, Louis 161.0 (H) 30.0 - 50.0    HCO3, Venous 18.4 (L) 24.0 - 30.0 mmol/L    Negative Base Excess, Louis 7.5 (H) 0.0 - 2.0 mmol/L    O2 Sat, Louis 94. 2 (H) 60.0 - 85.0 %    Carboxyhemoglobin 4.7 0 - 5 %    Methemoglobin 0.6 0.0 - 1.9 %    Pt Temp 37.0    COVID-19 & Influenza Combo    Collection Time: 04/16/22 11:05 AM    Specimen: Nasopharyngeal Swab   Result Value Ref Range    Specimen Description . NASOPHARYNGEAL SWAB     Source . NASOPHARYNGEAL SWAB     SARS-CoV-2 RNA, RT PCR Not Detected Not Detected    INFLUENZA A Not Detected Not Detected    INFLUENZA B Not Detected Not Detected   Urinalysis with Reflex to Culture    Collection Time: 04/16/22 12:07 PM    Specimen: Urine   Result Value Ref Range    Color, UA Dark Yellow (A) Yellow    Turbidity UA Cloudy (A) Clear    Glucose, Ur NEGATIVE NEGATIVE    Bilirubin Urine NEGATIVE NEGATIVE    Ketones, Urine NEGATIVE NEGATIVE    Specific Gravity, UA 1.034 (H) 1.000 - 1.030    Urine Hgb NEGATIVE NEGATIVE    pH, UA 5.0 5.0 - 8.0    Protein, UA TRACE (A) NEGATIVE    Urobilinogen, Urine Normal Normal    Nitrite, Urine NEGATIVE NEGATIVE    Leukocyte Esterase, Urine NEGATIVE NEGATIVE   Microscopic Urinalysis    Collection Time: 04/16/22 12:07 PM   Result Value Ref Range    -          WBC, UA 10 TO 20 /HPF    RBC, UA 6 TO 9 /HPF    Casts UA 10 TO 20 /LPF    Casts UA HYALINE /LPF    Epithelial Cells UA 10 TO 20 /HPF    Bacteria, UA FEW (A) None    Amorphous, UA 1+ (A) None   Culture, Blood 2    Collection Time: 04/16/22 12:12 PM    Specimen: Blood   Result Value Ref Range    Specimen Description . BLOOD     Special Requests 8ML GREEN/2ML ORANGE RIGHT IJ     Culture NO GROWTH <24 HRS    Troponin    Collection Time: 04/16/22 12:25 PM   Result Value Ref Range    Troponin, High Sensitivity 28 (H) 0 - 14 ng/L   Lactate, Sepsis    Collection Time: 04/16/22 12:25 PM   Result Value Ref Range    Lactic Acid, Sepsis 1.5 0.5 - 1.9 mmol/L   Lactate Dehydrogenase    Collection Time: 04/16/22 12:25 PM   Result Value Ref Range     135 - 214 U/L   Protein, Total    Collection Time: 04/16/22 12:25 PM   Result Value Ref Range    Total Protein 7.0 6.4 - 8.3 g/dL   Lipid Panel    Collection Time: 04/16/22 12:25 PM   Result Value Ref Range    Cholesterol 85 <200 mg/dL    HDL 51 >40 mg/dL    LDL Cholesterol 19 0 - 130 mg/dL    Chol/HDL Ratio 1.7 <5    Triglycerides 75 <150 mg/dL   BLOOD GAS, ARTERIAL    Collection Time: 04/16/22 12:40 PM   Result Value Ref Range    pH, Arterial 7.305 (L) 7.350 - 7.450    pCO2, Arterial 41.3 35.0 - 45.0 mmHg    pO2, Arterial 140.0 (H) 80.0 - 100.0 mmHg    HCO3, Arterial 20.5 (L) 22.0 - 26.0 mmol/L    Negative Base Excess, Art 5.8 (H) 0.0 - 2.0 mmol/L    O2 Sat, Arterial 97.8 95 - 98 %    Carboxyhemoglobin 0.4 0 - 5 %    Methemoglobin 0.4 0.0 - 1.9 %    Pt Temp 37.0     O2 Device/Flow/% VENTILATOR     Respiratory Rate 20     Nicolas Test PASS     Sample Site Left Radial Artery     Pt. Position SEMI-FOWLERS     Mode PRVC     Set Rate 20     Total Rate 20          FIO2 40     Peep/Cpap 8    EKG 12 Lead    Collection Time: 04/16/22  1:29 PM   Result Value Ref Range    Ventricular Rate 109 BPM    Atrial Rate 109 BPM    P-R Interval 166 ms    QRS Duration 92 ms    Q-T Interval 318 ms    QTc Calculation (Bazett) 428 ms    P Axis 75 degrees    R Axis -101 degrees    T Axis 54 degrees   pH, Body Fluid    Collection Time: 04/16/22  4:13 PM   Result Value Ref Range    Specimen Type . CHEST     pH, Fluid 8.5    POC Glucose Fingerstick    Collection Time: 04/16/22  4:40 PM   Result Value Ref Range    POC Glucose 211 (H) 65 - 105 mg/dL       Imaging/Diagnostics:  XR CHEST (SINGLE VIEW FRONTAL)    Result Date: 4/5/2022  EXAMINATION: ONE XRAY VIEW OF THE CHEST 4/5/2022 8:55 am COMPARISON: April 3, 2022 HISTORY: ORDERING SYSTEM PROVIDED HISTORY: pna TECHNOLOGIST PROVIDED HISTORY: pna Reason for Exam: pna FINDINGS: Stable position of the right internal jugular central venous catheter. Right infrahilar airspace opacity not significantly changed. No new focal lung abnormality. No sizable pleural effusion. No pneumothorax. Stable right infrahilar airspace opacity     XR CHEST (SINGLE VIEW FRONTAL)    Result Date: 4/3/2022  EXAMINATION: ONE XRAY VIEW OF THE CHEST 4/3/2022 5:51 am COMPARISON: 1 April 2022 HISTORY: ORDERING SYSTEM PROVIDED HISTORY: sob, pleurisy, cough TECHNOLOGIST PROVIDED HISTORY: sob, pleurisy, cough Reason for Exam: Shortness of breath, pleurisy, cough Additional signs and symptoms: Shortness of breath, pleurisy, cough Relevant Medical/Surgical History: Shortness of breath, pleurisy, cough FINDINGS: AP portable view of the chest time stamped at 528 hours demonstrates overlying cardiac monitoring electrodes. Heart size is stable. Right internal jugular catheter terminates in the distal superior vena cava. There has been worsening of a focal opacity at the right lung base. Minimal left basilar opacity is unchanged. No extrapleural air or overt edema. No gross effusions. Worsening opacity at the right base favoring airspace disease. Change in minimal left basilar opacity which may be related atelectasis. XR CHEST PORTABLE    Result Date: 4/16/2022  EXAMINATION: ONE XRAY VIEW OF THE CHEST 4/16/2022 1:10 pm COMPARISON: 04/16/2022, 1213 hours HISTORY: ORDERING SYSTEM PROVIDED HISTORY: chest tube TECHNOLOGIST PROVIDED HISTORY: chest tube Reason for Exam: chest tube Additional signs and symptoms: chest tube and NG tube placement 29-year-old female with history of NG tube and chest tube placement FINDINGS: Portable supine view of the chest. Right-sided chest tube has been placed with distal tip projecting over the right infrahilar region. Right IJ approach central venous catheter distal tip overlying the cavoatrial junction, stable. Endotracheal tube distal tip overlying the mid trachea approximately 4.3 cm above the level of the nimesh. Enteric tube traverses the GE junction with distal tip projecting over the left mid abdomen likely within the stomach body. Left lung is clear.   Pleural thickening and opacity involving the right mid and right lower lung zones. Subcutaneous emphysema along the right lateral chest wall. Cardiac and mediastinal contours remain unchanged. Atherosclerotic calcification of the thoracic aorta. No free air. There is re-expansion of the right lung compared with the prior study. Probable small residual inferolateral right pneumothorax component. 1. Tubes and right IJ line as detailed above. Re-expansion of the right lung compared with the prior study. There is likely a small residual right inferolateral pneumothorax component. 2. Pleural thickening and airspace opacity involving the right mid and right lower lung zones. Follow-up is recommended to document resolution. 3. Subcutaneous emphysema along the right lateral chest wall. XR CHEST PORTABLE    Result Date: 4/16/2022  EXAMINATION: ONE XRAY VIEW OF THE CHEST 4/16/2022 12:24 pm COMPARISON: None. HISTORY: ORDERING SYSTEM PROVIDED HISTORY: Intubation TECHNOLOGIST PROVIDED HISTORY: Intubation Reason for Exam: central line and ET placement FINDINGS: ET tube terminates 3 cm above the nimesh. Right line terminates in the SVC. There is a relatively stable right-sided basilar pneumothorax. The remaining lungs are unchanged. Cardiac silhouette and osseous structures unchanged. Intubation as above. No significant change     XR CHEST PORTABLE    Result Date: 4/16/2022  EXAMINATION: ONE XRAY VIEW OF THE CHEST 4/16/2022 11:02 am COMPARISON: 04/05/2022 HISTORY: ORDERING SYSTEM PROVIDED HISTORY: SOB TECHNOLOGIST PROVIDED HISTORY: SOB Reason for Exam: SOB FINDINGS: There is a moderate loculated right basal pneumothorax. Cavitary lesion is noted in the right lung base. Left lung is unremarkable. Heart and mediastinal structures appear normal.  There is no evidence for any tension phenomena. Moderate loculated right basal pneumothorax. Evidence for tension. Cavitary lesion noted in the right lung base. .  Case discussed with Dr. Vinod Forrester. XR CHEST PORTABLE    Result Date: 4/1/2022  EXAMINATION: ONE XRAY VIEW OF THE CHEST 4/1/2022 4:01 pm COMPARISON: 04/01/2022 HISTORY: ORDERING SYSTEM PROVIDED HISTORY: central line placed TECHNOLOGIST PROVIDED HISTORY: central line placed Reason for Exam: Central line placed FINDINGS: There is a right internal jugular central line in place with distal tip overlying the superior vena cava. Previously noted right basal infiltrate is unchanged. Left lung appears clear. The heart and mediastinal structures appear normal.  There is no evidence of pneumothorax. Satisfactory position of right internal jugular central line. No change in the the right basal infiltrate. XR CHEST PORTABLE    Result Date: 4/1/2022  EXAMINATION: ONE XRAY VIEW OF THE CHEST 4/1/2022 1:22 pm COMPARISON: 06/17/2020. CT dated 10/15/2021. HISTORY: ORDERING SYSTEM PROVIDED HISTORY: dyspnea TECHNOLOGIST PROVIDED HISTORY: dyspnea Reason for Exam: Dyspnea Relevant Medical/Surgical History: Hx of COPD FINDINGS: The cardiac silhouette appears within normal limits. There are bibasilar opacities, right greater than left which may reflect multifocal pneumonia in the correct clinical setting. No evidence of pleural effusion or pneumothorax is seen. Bibasilar opacities, right greater than left, which may reflect multifocal pneumonia. Follow-up to imaging resolution is recommended. CT CHEST PULMONARY EMBOLISM W CONTRAST    Result Date: 4/16/2022  EXAMINATION: CTA OF THE CHEST 4/16/2022 2:30 pm TECHNIQUE: CTA of the chest was performed after the administration of intravenous contrast.  Multiplanar reformatted images are provided for review. MIP images are provided for review. Dose modulation, iterative reconstruction, and/or weight based adjustment of the mA/kV was utilized to reduce the radiation dose to as low as reasonably achievable.  COMPARISON: CT chest from 10/15/2021 HISTORY: ORDERING SYSTEM PROVIDED HISTORY: SOB TECHNOLOGIST PROVIDED HISTORY: SOB Decision Support Exception - unselect if not a suspected or confirmed emergency medical condition->Emergency Medical Condition (MA) Reason for Exam: sob Relevant Medical/Surgical History: intubated, septic, hx of PE 77-year-old female with shortness of breath FINDINGS: Pulmonary Arteries: No obvious filling defect in the main, right main, or left main pulmonary arteries. Evaluation of the segmental and subsegmental pulmonary arterial vasculature is limited due to respiratory motion suboptimal bolus timing, airspace disease, and streak artifact. There are areas of probable residual linear chronic clot within the right lower lobar pulmonary artery on image 111, series 2. Probable linear residual clot within the anterior right upper lobe pulmonary artery on image 83, series 2. Mediastinum: Atherosclerotic calcification of the aorta and branch vasculature. No dissection flap within the visualized thoracic aorta. No pericardial effusion. There is fluid in the superior pericardial recess. Enlarged precarinal lymph nodes remain unchanged on image 83, series 2. Right hilar lymphadenopathy is seen on image 96, series 2. Coronary artery disease. No left hilar or axillary lymphadenopathy. Lungs/pleura: Endotracheal tube distal tip within the lower trachea. Trachea and very proximal central airways appear patent. Narrowing of the right middle lobe airway into a region of partial consolidation/atelectasis/scarring. Opacification of the right lower lobar segmental airways. There is a thick-walled cavitary lesion in the right lower lobe measuring 5.5 x 7.3 cm in greatest AP and transverse dimensions on image 68, series 4. There is a dependent air-fluid level within this lesion. This area measures 7.6 cm in greatest craniocaudal extent on image 48, series 602. There is surrounding airspace disease.   There is a gas and fluid containing multiloculated pleural collection or hydropneumothorax along the posterior margin of the right lung. Right sided chest tube distal tip along the anteromedial right lung. Subcutaneous emphysema along the right axilla and right lateral chest wall. Stellate pulmonary nodule in the lateral right upper lobe measuring 6 mm on image 32, series 4. Moderate to severe emphysema, dependent atelectasis and respiratory motion. Upper Abdomen: Fatty liver. NG tube is seen extending into the stomach body. Atherosclerotic calcification of the upper abdominal aorta and branch vasculature. Soft Tissues/Bones: Chronic anterior wedge compression deformities, unchanged from 10/15/2021 involving T5 and T6. Mild diffuse degenerative changes throughout the spine. 1. Linear filling defects in the anterior right upper lobe and right lower lobe pulmonary arteries likely representing residual/chronic clot material. No acute central filling defect/pulmonary embolus is evident. 2. Thick-walled cavitary lesion in the right lower lobe measuring up to 5.5 x 7.3 x 7.6 cm which could be related to TB or fungal disease or a necrotizing pneumonia. Underlying cavitary malignancy not entirely excluded. There is surrounding airspace disease. There is also an associated multiloculated pleural collection or hydropneumothorax primarily along the posterior margin of the right lung. Right-sided chest tube distal tip along the anteromedial right pleura/lung. 3. Partial consolidation, atelectasis and/or infiltrate of the right middle lobe. 4. Stellate 6 mm lateral right upper lobe pulmonary nodule. Follow-up guidelines provided below. 5. Underlying moderate to severe emphysema. 6. Endotracheal and NG tubes as above. 7. Coronary artery disease. 8. Chronic anterior wedge compression deformities of T5 and T6. 9. Subcutaneous emphysema along the right axilla and right lateral chest wall likely related to chest tube. 10. Mediastinal and right hilar lymphadenopathy.  RECOMMENDATIONS: 6 mm suspicious right solid pulmonary nodule within the upper lobe. Recommend a non-contrast Chest CT at 6-12 months, then another non-contrast Chest CT at 18-24 months. These guidelines do not apply to immunocompromised patients and patients with cancer. Follow up in patients with significant comorbidities as clinically warranted. For lung cancer screening, adhere to Lung-RADS guidelines. Reference: Radiology. 2017; 284(1):228-43. [place] MODIFIED BARIUM SWALLOW W VIDEO    Result Date: 4/4/2022  EXAMINATION: MODIFIED BARIUM SWALLOW WAS PERFORMED IN CONJUNCTION WITH SPEECH PATHOLOGY SERVICES TECHNIQUE: Fluoroscopic evaluation of the swallowing mechanism was performed using cineradiography with multiple consistency of barium product in conjunction with speech pathology services. FLUOROSCOPY DOSE AND TYPE OR TIME AND EXPOSURES: DAP 49.2 dGy cm squared COMPARISON: None HISTORY: ORDERING SYSTEM PROVIDED HISTORY: aspiration pna TECHNOLOGIST PROVIDED HISTORY: aspiration pna Reason for Exam: aspiration pneumonia FINDINGS: Premature vallecular spillage. There was trace penetration with straw with thin liquid. No aspiration. No aspiration. Trace penetration with straw with thin liquids. Please see separate speech pathology report for full discussion of findings and recommendations.  RECOMMENDATIONS: Unavailable       Assessment :      Primary Problem  Sepsis [facility])    Active Hospital Problems    Diagnosis Date Noted    Sepsis ([address] [A41.9] 04/16/2022    Acute on chronic respiratory failure ([address] [J96.20] 04/16/2022    Cavitary lesion of lung [J98.4] 04/16/2022    History of DVT (deep vein thrombosis) [Z86.718] 04/16/2022    Pneumothorax, right [J93.9] 04/16/2022    Status post chest tube placement ([address] [Z93.8] 04/16/2022    History of pulmonary embolus (PE) [Z86.711] 04/02/2022    Chronic respiratory failure with hypoxia ([address] [J96.11] 08/05/2021    Compression fracture of body of thoracic vertebra ([address] Azul Loja 09/28/2020    Lung nodule [R91.1] 08/22/2018    Bipolar disorder ([facility]) [F31.9]     Chronic obstructive pulmonary disease ([facility] [address] [J44.9] 01/06/2016       Plan:     Patient status Admit as inpatient in the  Medical ICU    Sepsis possible lung infection  Tachypnea, tachycardia  WBC 18  Pro-Branden>100    Lactate 2.8> 1.5  Chest x-ray: Moderate loculated right basal pneumothorax.  Evidence for tension.  Cavitarylesion noted in the right lung base  CT chest: Thick-walled cavitary lesion RLL. Right chest tube in situ for posterior right lung or right pneumothorax. Infiltrates of the right middle lobe. Stellate 6 mm lateral RUL nodule. Mediastinal and right hilar lymphadenopathy  Repeat pro-Branden  Pancultures pending  Received 1 L bolus  100 mL/H lactated Ringer  Broad-spectrum antibiotic with cefepime, vancomycin  Critical care following  Consult ID      Acute on chronic respiratory failure due pneumothorax  -History of severe COPD - 3 L home O2 baseline  -On O2 at home, 3L  -CXR right pneumothorax  -pH 7.33, CO2 34, HCO3 18  -S/p intubation and right chest tube placement  -Sedated with propofol  -Continue DuoNeb  -Hold Breo, Spiriva     Hx DVT/ PE -Eliquis was discontinued in 12/2021  History bipolar disorder: Trazodone, Risperidone      IVF: Lactated Ringer 100 mL/H  Diet: NPO  GI ppx: Pepcid 20 mg twice daily IV  DVT ppx: enoxaparin 40mg daily  Code status: Full code  Consults: pulm, ID  Dispo: Keep in ICU    Consultations:   IP CONSULT TO PULMONOLOGY  IP CONSULT TO INTERNAL MEDICINE  PHARMACY TO DOSE VANCOMYCIN  IP CONSULT TO DIETITIAN  IP CONSULT TO INFECTIOUS DISEASES    Patient is admitted as inpatient status because of co-morbiditieslisted above, severity of signs and symptoms as outlined, requirement for current medical therapies and most importantly because of direct risk to patient if care not provided in a hospital setting.     Thelma Doran MD  4/16/2022  5:23 PM    Copy sent to Dr. Domingo Severino MD    I have discussed the care of Beverley Betancourt , including pertinent history and exam findings,    today with the resident. I have seen and examined the patient and the key elements of all parts of the encounter have been performed by me . I agree with the assessment, plan and orders as documented by the resident. Principal Problem:    Sepsis ([facility]  Active Problems:    Hyperkalemia    Pseudomonas aeruginosa infection    Elevated procalcitonin    Elevated C-reactive protein (CRP)    Chronic obstructive pulmonary disease (HCC)    Bipolar disorder (HCC)    Lung nodule    Compression fracture of body of thoracic vertebra (HCC)    Chronic respiratory failure with hypoxia (HCC)    History of pulmonary embolus (PE)    Acute on chronic respiratory failure (HCC)    Cavitary lesion of lung    History of DVT (deep vein thrombosis)    Pneumothorax, right    Status post chest tube placement (HCC)    Pneumothorax on right    HCAP (healthcare-associated pneumonia)    Acute systolic HF (heart failure) ([facility]  Resolved Problems:    * No resolved hospital problems. *        Overall  course ;                                   are improving over time.         Patient s/p chest tube removal  On meropenem  On weaning  Very critical, high chance of clinical deterioration  CC time 32 minutes          Electronically signed by Britt Enriquez MD

## 2022-04-16 NOTE — PROGRESS NOTES
Dr Benita Griffin and Resident team notified of CT chest results. No venous dopplars today, will be Monday.

## 2022-04-16 NOTE — ED NOTES
Pt is brought to this ER by family with c/o dyspnea/SOB that is increasing in severity. Pt arrives A+O x 4, GCS = 15, PMS x 4 intact, and PWD. Pt appears to have labored breathing with slight upper retractions. Lung sounds are diminished t/o bilat with faint expiratory wheezes. Pt's pulse is tachycardic and regular. Pt is having appropriate conversation with this nurse, but she speaks in short phrases.        Selena Kirkpatrick RN  04/16/22 5965

## 2022-04-16 NOTE — CONSULTS
[facility]   CONSULT NOTE:      DATE OF CONSULT 4/16/2022    REASON FOR CONSULTATION:  Pulmonary and critical care management      PCP Toshia Munroe MD     CHIEF COMPLAINT: Acute on chronic respiratory failure, pneumothorax    HISTORY OF PRESENT ILLNESS:   Alo Wells is a 79-year-old female with COPD who is well-known to me in the outpatient setting. She was recently here and discharged in early April. She was doing reasonably well, and started having a cough around Wednesday. She did see her PCP Thursday and was prescribed some Tessalon Perles. However, Friday afternoon she called her office and she was more dyspneic. We called her in prednisone to have last through the weekend and she had an appointment with us on Monday. According to her daughter, she checked on her Friday evening and she was actually doing quite well. However when the daughter came to see her this morning she was in distress. The daughter just put her in her car and brought her to the emergency room. In the ER she was noted to be tachypneic and hypoxic. A chest x-ray done revealed a right-sided pneumothorax. Because of worsening respiratory distress, she was intubated (she has been a full code and always wanted to be intubated). Subsequently, a chest tube and central line were also placed in the ER because of the pneumothorax and for vascular access. She is currently on the ventilator on propofol, but she is easily arousable and understands what I am saying. She has severe stage IV COPD with an FEV1 of 35% predicted, and the FEV1 to FVC ratio was 37. Her diffusing capacity was severely decreased at 32% of predicted. She is oxygen dependent on 3 L. She has a history of former tobacco use quitting in June 2021 greater than 84 + pack year. She is maintained on Breo, Spiriva, and albuterol every 4 hours. In June 2021 she had a pulmonary embolism and a DVT.   This was treated with Eliquis. Her Covid test done today was negative        ALLERGIES:  Allergies   Allergen Reactions    Advil [Ibuprofen] Other (See Comments)     vomiting    Aleve [Naproxen] Other (See Comments)     vomiting    Antipyrine Other (See Comments)     unknown    Celecoxib Other (See Comments)    Codeine      headache    Fomepizole     Incruse Ellipta [Umeclidinium Bromide]      confusion    Other      GRASS, TREES, WEEDS    Rofecoxib Other (See Comments)     Unknown reaction    Salicylates      Unknown reaction     Strawberry Extract      HEADACHES    Sulfinpyrazone Other (See Comments)     Unknown reaction    Aspirin Nausea And Vomiting, Other (See Comments) and Nausea Only    Nsaids Nausea Only, Other (See Comments) and Nausea And Vomiting       HOME MEDICATIONS:  Not in a hospital admission.       PAST MEDICAL HISTORY:  Past Medical History:   Diagnosis Date    Abnormal EKG     TRAM (acute kidney injury) ([facility] [address] 2022    Anxiety     Asthma     Bipolar disorder ([facility]     SEVERE [place] , [place]    COPD (chronic obstructive pulmonary disease) (HCC)     Cramps, extremity     SEVERE LEG CRAMPS    Depression     Dilated bile duct     Headache     History of elective      Hyperlipidemia     Irritable bowel syndrome     Prolonged emergence from general anesthesia     SEVERE ISSUES WAKING UP    Substance abuse ([facility] [address]     street drugs when younger   [place] Unspecified sleep apnea     Vision abnormalities     glasses       PAST SURGICAL HISTORY:  Past Surgical History:   Procedure Laterality Date    ANKLE SURGERY      HAD SCREWS AND HARDWARE, THEN REMOVED    COLONOSCOPY  02/15/2018    tubular adenoma    EYE SURGERY Bilateral     CATARACTS    HYSTEROSCOPY  10/19/2016    D & C    INDUCED       LASIK      bilateral    TONSILLECTOMY AND ADENOIDECTOMY Bilateral     VULVA SURGERY      HAD A BIOPSY AND REMOVAL OF          SOCIAL HISTORY:  Social History     Socioeconomic History    Marital status:      Spouse name: Not on file    Number of children: Not on file    Years of education: Not on file    Highest education level: Not on file   Occupational History    Not on file   Tobacco Use    Smoking status: Former Smoker     Packs/day: 2.00     Years: 42.00     Pack years: 84.00     Types: Cigarettes     Quit date: 11/1/2018     Years since quitting: 3.4    Smokeless tobacco: Never Used   Substance and Sexual Activity    Alcohol use: Yes     Comment: yearly    Drug use: No    Sexual activity: Not Currently     Partners: Male     Birth control/protection: Post-menopausal   Other Topics Concern    Not on file   Social History Narrative    Not on file     Social Determinants of Health     Financial Resource Strain: High Risk    Difficulty of Paying Living Expenses: Very hard   Food Insecurity: No Food Insecurity    Worried About Running Out of Food in the Last Year: Never true    [place] of Food in the Last Year: Never true   Transportation Needs:     Lack of Transportation (Medical): Not on file    Lack of Transportation (Non-Medical):  Not on file   Physical Activity:     Days of Exercise per Week: Not on file    Minutes of Exercise per Session: Not on file   Stress:     Feeling of Stress : Not on file   Social Connections:     Frequency of Communication with Friends and Family: Not on file    Frequency of Social Gatherings with Friends and Family: Not on file    Attends Yarsani Services: Not on file    Active Member of Clubs or Organizations: Not on file    Attends Club or Organization Meetings: Not on file    Marital Status: Not on file   Intimate Partner Violence:     Fear of Current or Ex-Partner: Not on file    Emotionally Abused: Not on file    Physically Abused: Not on file    Sexually Abused: Not on file   Housing Stability:     Unable to Pay for Housing in the Last Year: Not on file    Number of [place] in the Last Year: Not on file    Unstable Housing in the Last Year: Not on file       FAMILY HISTORY:  Family History   Problem Relation Age of Onset    Dementia Maternal Aunt     Kidney Disease Mother     Heart Attack Sister     Prostate Cancer Father     High Cholesterol Brother     Heart Attack Paternal Grandmother        REVIEW OF SYSTEMS:  Per the chart, patient is intubated and sedated and history is taken from the chart as well as the patient's daughter    PHYSICAL EXAM:  Vital Signs Blood pressure 129/66, pulse 112, temperature 97.7 °F (36.5 °C), temperature source Oral, resp. rate 18, last menstrual period 05/20/2013, SpO2 (!) 89 %, not currently breastfeeding. Oxygen Amount and Delivery: O2 Flow Rate (L/min): 6 L/min    Admission Weight      General Appearance   Frail elderly female looking older than her stated age on the ventilator  Head  Normocephalic, without obvious abnormality, atraumatic    Eyes  conjunctivae/corneas clear. PERRL, EOM's intact. ENT intubated orally  Neck  no adenopathy, no carotid bruit, no JVD, supple, symmetrical, trachea midline and thyroid not enlarged, symmetric, no tenderness/mass/nodules  Lungs markedly diminished with some crackles at the right base, air movement is poor  Heart: regular rate and rhythm, S1, S2 normal, no murmur, click, rub or gallop  Abdomen  soft, non-tender; bowel sounds normal; no masses,  no organomegaly  Extremities  No edema  Skin  Skin color, texture, turgor normal. No rashes or lesions  Neurologic: Sedated but arousable, is appropriate and follows command       Imaging      Chest x-ray on the left is post chest tube placement and intubation showing appropriate position of lines and tubes.   Chest x-ray on the right is preintubation shows large pneumothorax with right lower lobe infiltrate    Lab Review  CBC     Lab Results   Component Value Date    WBC 18.5 04/16/2022    RBC 3.31 04/16/2022    RBC 0-2 07/08/2021    HGB 9.7 04/16/2022    HCT 31.2 04/16/2022     04/16/2022    MCV 94.4 04/16/2022    MCH 29.3 04/16/2022    MCHC 31.1 04/16/2022    RDW 16.3 04/16/2022    NRBC 0 07/08/2021    METASPCT 1 04/02/2022    LYMPHOPCT 2 04/16/2022    LYMPHOPCT 12.1 07/08/2021    MONOPCT 3 04/16/2022    MONOPCT 15.1 07/08/2021    BASOPCT 0 04/16/2022    BASOPCT 0.8 07/08/2021    MONOSABS 0.56 04/16/2022    MONOSABS 0.4 07/08/2021    LYMPHSABS 0.37 04/16/2022    LYMPHSABS 0.3 07/08/2021    EOSABS 0.00 04/16/2022    EOSABS 0.1 07/08/2021    BASOSABS 0.00 04/16/2022    DIFFTYPE NOT REPORTED 12/20/2021       BMP   Lab Results   Component Value Date     04/16/2022    K 4.2 04/16/2022     04/16/2022    CO2 20 04/16/2022    BUN 31 04/16/2022    CREATININE 0.87 04/16/2022    GLUCOSE 206 04/16/2022    GLUCOSE 127 07/08/2021    CALCIUM 9.2 04/16/2022       LFTS  Lab Results   Component Value Date    ALKPHOS 137 04/16/2022    ALT 25 04/16/2022    AST 19 04/16/2022    PROT 7.0 04/16/2022    PROT 5.7 07/08/2021    BILITOT 0.16 04/16/2022    BILIDIR 0.1 07/08/2021    IBILI 0.12 07/08/2019    LABALBU 2.8 04/16/2022    LABALBU 3.6 07/08/2021       INR  Recent Labs     04/16/22  1042   PROTIME 16.1*   INR 1.3       APTT  Recent Labs     04/16/22  1042   APTT 33.6       Lactic Acid  Lab Results   Component Value Date    LACTA 1.2 04/02/2022        PRO-BNP   Recent Labs     04/16/22  1042   PROBNP 785*           ABGs:   Lab Results   Component Value Date    PHART 7.305 04/16/2022    PO2ART 140.0 04/16/2022    UKJ9UWS 41.3 04/16/2022       Lab Results   Component Value Date    MODE PRVC 04/16/2022         Impression    Acute on chronic hypoxic and hypercapnic respiratory failure  Right-sided pneumothorax  Severe stage IV COPD  Recent hospitalization for pneumonia  Elevated inflammatory markers including D-dimer and procalcitonin  Full CODE STATUS      Plan:      ICU admission  Full ventilatory support with ventilator protocol and bundle  Chest tube to suction, follow-up chest x-ray  Empiric cefepime/vancomycin  Check cultures and MRSA swab  DuoNeb aerosols every 4 hours  Will hold Breo and Spiriva for now, once extubated will resume  Check CT of the chest given elevated D-dimer and history of pulmonary embolism in the past  DVT and GI prophylaxis  Monitor blood sugar  Gentle IV fluid hydration  Follow-up procalcitonin level  Extensive discussion with patient's daughters. I told them that her prognosis currently is guarded but we will do all can. In the past, she has told me that she wanted to be full code but does not want a tracheostomy or feeding tube.   Discussed with ER staff and ICU nursing staff  Critical care time spent 40 minutes

## 2022-04-16 NOTE — FLOWSHEET NOTE
04/16/22 1218   Encounter Summary   Services provided to: Patient not available   Referral/Consult From: Nurse   Continue Visiting   (4-16-22)   Complexity of Encounter High   Length of Encounter 15 minutes   Spiritual/Muslim   Intervention Prayer

## 2022-04-16 NOTE — PROGRESS NOTES
Pt intubated with 705 ET at 23 cm per Dr. Zane Real. Bilateral BS and color change on ETCO2.  Pt placed on vent with settings PRVC, 450, .50, PEEP 8

## 2022-04-16 NOTE — PROGRESS NOTES
Vancomycin Dosing by Pharmacy - Initial Note   TODAY'S DATE:  4/16/2022  Patient name, age:  Kate Campbell, 74 y.o. Indication: Sepsis of unknown origin, empiric  Additional antimicrobials:  Cefepime, Flagyl    Allergies:  Advil [ibuprofen], Aleve [naproxen], Antipyrine, Celecoxib, Codeine, Fomepizole, Incruse ellipta [umeclidinium bromide], Other, Rofecoxib, Salicylates, Strawberry extract, Sulfinpyrazone, Aspirin, and Nsaids   Actual Weight:    Wt Readings from Last 1 Encounters:   04/16/22 179 lb 10.8 oz (81.5 kg)     Labs/Ancillary Data  Estimated Creatinine Clearance: 68 mL/min (based on SCr of 0.87 mg/dL). Recent Labs     04/16/22  1042   CREATININE 0.87   BUN 31*   WBC 18.5*     Procalcitonin   Date Value Ref Range Status   04/16/2022 >100.00 (H) <0.09 ng/mL Final     Comment:           Suspected Sepsis:  <0.50 ng/mL     Low likelihood of sepsis. 0.50-2.00 ng/mL     Increased likelihood of sepsis. Antibiotics encouraged. >2.00 ng/mL     High risk of sepsis/shock. Antibiotics strongly encouraged. Suspected Lower Resp Tract Infections:  <0.24 ng/mL     Low likelihood of bacterial infection. >0.24 ng/mL     Increased likelihood of bacterial infection. Antibiotics encouraged. With successful antibiotic therapy, PCT levels should decrease rapidly. (Half-life of 24 to   36 hours.)        Procalcitonin values from samples collected within the first 6 hours of systemic infection   may still be low. Retesting may be indicated. Values from day 1 and day 4 can be entered into the Change in Procalcitonin Calculator   ([email or web address]-pct-[email or web address] com) to determine the patient's Mortality Risk Prognosis        In healthy neonates, plasma Procalcitonin (PCT) concentrations increase gradually after   birth, reaching peak values at about 24 hours of age then decrease to normal values below   0.5 ng/mL by 48-72 hours of age.          Intake/Output Summary (Last 24 hours) at 4/16/2022 1541  Last data filed at 4/16/2022 1330  Gross per 24 hour   Intake 1050 ml   Output --   Net 1050 ml     Temp: 97.7    Recent vancomycin administrations                     vancomycin (VANCOCIN) 2,000 mg in dextrose 5 % 500 mL IVPB (mg) 2,000 mg New Bag 04/16/22 1334                  Culture Date / Source  /  Results  See Micro    MRSA Nasal Swab: was ordered by provider, awaiting results. Gabino Reas PLAN   Initial loading dose of 25mg/kg (max of 2,500 mg) = 2000  mg  x 1, then  vancomycin 1500 mg IV every 24 hours. Ensured BUN/sCr ordered at baseline and every 48 hours x at least 3 levels, then at least weekly. Vancomycin level ordered for 04/18 @ 0600. Will use bayesian method for dosing. This level will not be a trough. Target AUC/REANNA: 400-600. Vancomycin Target Concentration Parameters  Treatment  Population Target AUC/REANNA Target Trough   Invasive MRSA Infection (bacteremia, pneumonia, meningitis, endocarditis, osteomyelitis)  Sepsis (undifferentiated) 400-600 N/A   Infection due to non-MRSA pathogen  Empiric treatment of non-invasive MRSA infection  (SSTI, UTI) <500 10-15 mg/L   CrCl < 29 mL/min  Rapidly fluctuating serum creatinine   TRAM N/A < 15 mg/L     Renal replacement therapy is dosed by levels, per hospital protocol. Abbreviations  * Pauc: probability that AUC is >400 (efficacy); Pconc: probability that Ctrough is above 20 ?g/mL (toxicity); Tox: Probability of nephrotoxicity, based on Tapan et al. Clin Infect Dis 2009. Loading dose: N/A  Regimen: 1500 mg IV every 24 hours. Start time: 01:34 on 04/17/2022  Exposure target: AUC24 (range)400-600 mg/L.hr   AUC24,ss: 448 mg/L.hr  Probability of AUC24 > 400: 62 %  Ctrough,ss: 12.2 mg/L  Probability of Ctrough,ss > 20: 15 %  Probability of nephrotoxicity (Lodise NYASIA 2009): 7 %      Thank you for the consult. Pharmacy will continue to follow.      Thank you,  Cj Aceves PharmD, Leesa Chadwick [phone]  PGY-1 Pharmacy Resident

## 2022-04-16 NOTE — FLOWSHEET NOTE
Daughter Vitor Almazan is very anxious and uncertain about decisions for her mom. She updated writer on pt's recent admission. Pt's step-sister is with Vitor Almazan, offering emotional support. Writer provided listening presence, emotional support, and prayer. Chaplains will continue to follow. 04/16/22 1219   Encounter Summary   Services provided to: Family   Referral/Consult From: Nurse   Support System Children;Family members   Continue Visiting   (4-16-22)   Complexity of Encounter High   Length of Encounter 30 minutes   Spiritual Assessment Completed Yes   Spiritual/Quaker   Type Spiritual support   Assessment Tearful; Anxious   Intervention Active listening;Explored feelings, thoughts, concerns;Prayer;Sustaining presence/ Ministry of presence; Discussed illness/injury and it's impact   Outcome Expressed gratitude;Engaged in conversation;Expressed feelings/needs/concerns;Receptive;Venting emotion

## 2022-04-16 NOTE — ED NOTES
Pt and her daughter informed of test results and plan for intubation and chest tube insertion. Pt and her daughter agree with these procedures.      Misty Sanchez RN  04/16/22 0041

## 2022-04-16 NOTE — PLAN OF CARE
Problem: Non-Violent Restraints  Goal: Removal from restraints as soon as assessed to be safe  Outcome: Ongoing  Goal: No harm/injury to patient while restraints in use  Outcome: Ongoing  Note: Pt remains in restraints this shift for airway protection. Pt attempts to reach for tubes and lines when restraints released for passive ROM. Goal: Patient's dignity will be maintained  Outcome: Ongoing     Problem: Aspiration:  Goal: Absence of aspiration  Description: Absence of aspiration  Outcome: Ongoing     Problem: Gas Exchange - Impaired:  Goal: Levels of oxygenation will improve  Description: Levels of oxygenation will improve  Outcome: Ongoing     Problem: Falls - Risk of:  Goal: Will remain free from falls  Description: Will remain free from falls  Outcome: Ongoing  Goal: Absence of physical injury  Description: Absence of physical injury  Outcome: Ongoing  Note: Pt remains free from falls this shift, fall precautions this shift.

## 2022-04-16 NOTE — PROGRESS NOTES
Attending Physician Statement  I have discussed the care of Homer Hoover with the resident team. I have examined the patient myself and taken ros and hpi , including pertinent history and exam findings,  with the resident. I have reviewed the key elements of all parts of the encounter with the resident. I agree with the assessment, plan and orders as documented by the resident. Principal Problem:    Sepsis ([address]  Resolved Problems:    * No resolved hospital problems. *    Admitted with hypoxic resp failure sec to spontaneous pneumothorax on the right. 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 Fungal mass in differential. will d/w pulm. Consult ID    Full note to follow. Patient continues to be unstable and has risk of sudden deterioration requiring highest level of care. Patient seen in ICU. Critical care time spent 35 minutes.         Electronically signed by Gonzalo Saravia MD

## 2022-04-16 NOTE — ED PROVIDER NOTES
[address] [facility]  EMERGENCY DEPARTMENT ENCOUNTER    Pt Name: Cliff Pascual  MRN: [id number]  [place]: 1955  Date of evaluation:4/16/22  PCP: Abner Gómez MD    [address] [place] [address]       Chief Complaint   Patient presents with    Shortness of Breath       HISTORY OF PRESENT ILLNESS    Cliff Pascual is a 74 y.o. female who presents with a chief complaint of shortness of breath. Patient states that she started getting short of breath last night. No chest pain or palpitations. No cough, fevers, any other symptoms really. She acknowledges a previous hospital admission this month for similar symptoms. She does wear oxygen at home and has had to use a little bit more than usual.  Symptoms are acute. Symptomatic. Nothing else make symptoms better or worse. Patient has no other complaints at this time. REVIEW OF SYSTEMS       Review of Systems   Constitutional: Negative for chills, fatigue and fever. HENT: Negative for congestion, ear pain, sore throat and trouble swallowing. Eyes: Negative for visual disturbance. Respiratory: Positive for shortness of breath and wheezing. Negative for cough. Cardiovascular: Negative for chest pain, palpitations and leg swelling. Gastrointestinal: Negative for abdominal pain, blood in stool, constipation, diarrhea, nausea and vomiting. Genitourinary: Negative for dysuria and flank pain. Musculoskeletal: Negative for arthralgias, back pain, myalgias and neck pain. Skin: Negative for color change, rash and wound. Neurological: Negative for dizziness, weakness, light-headedness, numbness and headaches. Psychiatric/Behavioral: Negative for confusion. All other systems reviewed and are negative. Negative in 10 essential Systems except as mentioned above and in the HPI.         PAST MEDICAL HISTORY     Past Medical History:   Diagnosis Date    Abnormal EKG     [facility] 4/2/2022    Anxiety     Asthma     Bipolar disorder [facility])     SEVERE [place] , [place]    COPD (chronic obstructive pulmonary disease) (HCC)     Cramps, extremity     SEVERE LEG CRAMPS    Depression     Dilated bile duct     Headache     History of elective      Hyperlipidemia     Irritable bowel syndrome     Prolonged emergence from general anesthesia     SEVERE ISSUES WAKING UP    Substance abuse ([address]     street drugs when younger    Unspecified sleep apnea     Vision abnormalities     glasses         SURGICAL HISTORY      has a past surgical history that includes Tonsillectomy and adenoidectomy (Bilateral); LASIK; Induced ; Ankle surgery; eye surgery (Bilateral); Vulva surgery; hysteroscopy (10/19/2016); and Colonoscopy (02/15/2018).       CURRENT MEDICATIONS       Previous Medications    ACETAMINOPHEN (ACETAMINOPHEN EXTRA STRENGTH) 500 MG TABLET    take 2 tablets by mouth every 6 hours if needed for pain    ALBUTEROL (PROVENTIL) (2.5 MG/3ML) 0.083% NEBULIZER SOLUTION    Take 3 mLs by nebulization 4 times daily    ALBUTEROL SULFATE HFA (VENTOLIN HFA) 108 (90 BASE) MCG/ACT INHALER    Inhale 2 puffs into the lungs every 6 hours as needed for Wheezing    ASPIRIN 81 MG EC TABLET    Take 81 mg by mouth daily    BENZONATATE (TESSALON PERLES) 100 MG CAPSULE    Take 1 capsule by mouth 3 times daily as needed for Cough    BREO ELLIPTA 100-25 MCG/INH AEPB INHALER    Inhale 1 puff into the lungs daily     BUTALBITAL-ACETAMINOPHEN-CAFFEINE (FIORICET, ESGIC) -40 MG PER TABLET    Take 1 tablet by mouth every 6 hours as needed for Headaches Indications: #25 for 30 days filled 22    CALCIUM CARB-CHOLECALCIFEROL 600-500 MG-UNIT TABS    Take 2 tablets by mouth daily    CETIRIZINE (ZYRTEC) 10 MG TABLET    Take 10 mg by mouth daily    DOCUSATE SODIUM (COLACE) 100 MG CAPSULE    take 1 capsule by mouth twice a day    EPINEPHRINE (EPIPEN 2-SHARITA) 0.3 MG/0.3ML SOAJ INJECTION    Inject 0.3 mLs into the muscle once as needed (for allergic reaction) Use as directed for allergic reaction    FLUTICASONE (FLONASE) 50 MCG/ACT NASAL SPRAY    2 sprays by Nasal route daily    IBANDRONATE (BONIVA) 150 MG TABLET    Take 1 tablet by mouth every 30 days Take one (1) tablet once per month in the morning with a full glass of water, on an empty stomach, and do not take anything else by mouth or lie down for the next 30 minutes. IBUPROFEN (ADVIL;MOTRIN) 400 MG TABLET    Take 1 tablet by mouth 2 times daily as needed for Pain Short term. Take with food. LIDOCAINE (LMX) 4 % CREAM    apply topically every 8 hours if needed    LIDOCAINE 4 % EXTERNAL PATCH    Place 1 patch onto the skin daily    OLOPATADINE (PATANOL) 0.1 % OPHTHALMIC SOLUTION    Place 1 drop into both eyes 2 times daily    RISPERIDONE (RISPERDAL) 0.5 MG TABLET    Take 3 tablets by mouth every 12 hours Per [facility] psych    RIVASTIGMINE (EXELON) 4.5 MG CAPSULE    take 1 capsule by mouth twice a day    SIMVASTATIN (ZOCOR) 20 MG TABLET    Take 1 tablet by mouth nightly    SODIUM CHLORIDE (OCEAN, BABY AYR) 0.65 % NASAL SPRAY    1 spray by Nasal route as needed for Congestion    SPIRIVA RESPIMAT 2.5 MCG/ACT AERS INHALER    inhale 2 puffs INTO THE LUNGS once daily    TOPIRAMATE (TOPAMAX) 50 MG TABLET    Take 50 mg by mouth daily     TOPIRAMATE (TOPAMAX) 50 MG TABLET    Take 100 mg by mouth at bedtime    TRAZODONE (DESYREL) 50 MG TABLET    Take 50 mg by mouth nightly     VITAMIN B-12 (CYANOCOBALAMIN) 100 MCG TABLET    Take 50 mcg by mouth daily    VITAMIN D (ERGOCALCIFEROL) 1.25 MG (83853 UT) CAPS CAPSULE    take 1 capsule by mouth every week       ALLERGIES     is allergic to advil [ibuprofen], aleve [naproxen], antipyrine, celecoxib, codeine, fomepizole, incruse ellipta [umeclidinium bromide], other, rofecoxib, salicylates, strawberry extract, sulfinpyrazone, aspirin, and nsaids. FAMILY HISTORY     She indicated that her mother is . She indicated that her father is .  She indicated that all of her three sisters are alive. She indicated that her brother is alive. She indicated that her maternal grandmother is . She indicated that her maternal grandfather is . She indicated that her paternal grandmother is . She indicated that her paternal grandfather is . She indicated that her maternal aunt is . family history includes Dementia in her maternal aunt; Heart Attack in her paternal grandmother and sister; High Cholesterol in her brother; Kidney Disease in her mother; Prostate Cancer in her father. SOCIAL HISTORY      reports that she quit smoking about 3 years ago. Her smoking use included cigarettes. She has a 84.00 pack-year smoking history. She has never used smokeless tobacco. She reports current alcohol use. She reports that she does not use drugs. PHYSICAL EXAM     INITIAL VITALS:  oral temperature is 97.7 °F (36.5 °C). Her blood pressure is 129/66 and her pulse is 112. Her respiration is 18 and oxygen saturation is 89% (abnormal). Physical Exam  Vitals and nursing note reviewed. Constitutional:       General: She is not in acute distress. Appearance: She is ill-appearing. HENT:      Head: Normocephalic and atraumatic. Eyes:      Conjunctiva/sclera: Conjunctivae normal.      Pupils: Pupils are equal, round, and reactive to light. Cardiovascular:      Rate and Rhythm: Regular rhythm. Tachycardia present. Heart sounds: Normal heart sounds. No murmur heard. Pulmonary:      Effort: Pulmonary effort is normal. Tachypnea present. No respiratory distress. Breath sounds: Wheezing present. Abdominal:      General: Bowel sounds are normal. There is no distension. Palpations: Abdomen is soft. Tenderness: There is no abdominal tenderness. Musculoskeletal:         General: No tenderness. Cervical back: Neck supple. Lymphadenopathy:      Cervical: No cervical adenopathy. Skin:     General: Skin is warm and dry. Findings: No rash. Neurological:      Mental Status: She is alert and oriented to person, place, and time. Psychiatric:         Judgment: Judgment normal.           DIFFERENTIAL DIAGNOSIS/MDM:   19-year-old female presents with worsening shortness of breath. She is afebrile, nontoxic but ill in appearance. She is tachycardic in the 120s and tachypneic in the 30s. Oxygen saturation is in the low 90s on 6 L nasal cannula. Patient was called as a code sepsis. We will get broad septic work-up. We will get cardiac work-up. PE is a possibility as well with her recent hospital admission. Broad-spectrum antibiotics, IV fluids, lactic acid, blood cultures ordered. DIAGNOSTIC RESULTS     EKG: All EKG's are interpreted by the Emergency Department Physician who either signs or Co-signs this chart in the absence of a cardiologist.    EKG Interpretation    Interpreted by me    Rhythm: Sinus tachycardia  Rate: 109  Axis: normal  Ectopy: none  Conduction: Incomplete right bundle branch block  ST Segments: no acute change  T Waves: no acute change  Q Waves: none    Clinical Impression: Nonspecific EKG, sinus tachycardia  RADIOLOGY:   I directly visualized the following  images and reviewed the radiologist interpretations:  XR CHEST PORTABLE   Final Result   1. Tubes and right IJ line as detailed above. Re-expansion of the right lung   compared with the prior study. There is likely a small residual right   inferolateral pneumothorax component. 2. Pleural thickening and airspace opacity involving the right mid and right   lower lung zones. Follow-up is recommended to document resolution. 3. Subcutaneous emphysema along the right lateral chest wall. XR CHEST PORTABLE   Final Result   Intubation as above. No significant change         XR CHEST PORTABLE   Final Result   Moderate loculated right basal pneumothorax. Evidence for tension. Cavitary   lesion noted in the right lung base. .  Case discussed with Dr. Trini Cardenas.          CT CHEST PULMONARY EMBOLISM W CONTRAST    (Results Pending)           ED BEDSIDE ULTRASOUND:      LABS:  Labs Reviewed   TROPONIN - Abnormal; Notable for the following components:       Result Value    Troponin, High Sensitivity 33 (*)     All other components within normal limits   TROPONIN - Abnormal; Notable for the following components:    Troponin, High Sensitivity 28 (*)     All other components within normal limits   LACTATE, SEPSIS - Abnormal; Notable for the following components:    Lactic Acid, Sepsis 2.8 (*)     All other components within normal limits   BLOOD GAS, VENOUS - Abnormal; Notable for the following components:    pCO2, Louis 34.6 (*)     pO2, Louis 161.0 (*)     HCO3, Venous 18.4 (*)     Negative Base Excess, Louis 7.5 (*)     O2 Sat, Louis 94.2 (*)     All other components within normal limits   CBC WITH AUTO DIFFERENTIAL - Abnormal; Notable for the following components:    WBC 18.5 (*)     RBC 3.31 (*)     Hemoglobin 9.7 (*)     Hematocrit 31.2 (*)     RDW 16.3 (*)     Platelets 746 (*)     Seg Neutrophils 89 (*)     Lymphocytes 2 (*)     Segs Absolute 16.46 (*)     Absolute Lymph # 0.37 (*)     Absolute Bands # 1.11 (*)     All other components within normal limits   COMPREHENSIVE METABOLIC PANEL - Abnormal; Notable for the following components:    Glucose 206 (*)     BUN 31 (*)     Alkaline Phosphatase 137 (*)     Total Bilirubin 0.16 (*)     Albumin 2.8 (*)     All other components within normal limits   BRAIN NATRIURETIC PEPTIDE - Abnormal; Notable for the following components:    Pro- (*)     All other components within normal limits   D-DIMER, QUANTITATIVE - Abnormal; Notable for the following components:    D-Dimer, Quant 1.86 (*)     All other components within normal limits   PROTIME-INR - Abnormal; Notable for the following components:    Protime 16.1 (*)     All other components within normal limits   PROCALCITONIN - Abnormal; Notable for the following components:    Procalcitonin >100.00 (*)     All other components within normal limits   C-REACTIVE PROTEIN - Abnormal; Notable for the following components:    .8 (*)     All other components within normal limits   URINALYSIS WITH REFLEX TO CULTURE - Abnormal; Notable for the following components:    Color, UA Dark Yellow (*)     Turbidity UA Cloudy (*)     Specific Gravity, UA 1.034 (*)     Protein, UA TRACE (*)     All other components within normal limits   MICROSCOPIC URINALYSIS - Abnormal; Notable for the following components:    Bacteria, UA FEW (*)     Amorphous, UA 1+ (*)     All other components within normal limits   BLOOD GAS, ARTERIAL - Abnormal; Notable for the following components:    pH, Arterial 7.305 (*)     pO2, Arterial 140.0 (*)     HCO3, Arterial 20.5 (*)     Negative Base Excess, Art 5.8 (*)     All other components within normal limits   CULTURE, BLOOD 1   COVID-19 & INFLUENZA COMBO   CULTURE, BLOOD 2   MRSA DNA PROBE, NASAL   CULTURE, URINE   LACTATE, SEPSIS   APTT   MAGNESIUM         EMERGENCY DEPARTMENT COURSE:   Vitals:    Vitals:    04/16/22 1240 04/16/22 1243 04/16/22 1327 04/16/22 1335   BP:  125/77 129/66    Pulse:  106 112 112   Resp: 15 15 20 18   Temp:       TempSrc:       SpO2: 98% 98% 90% (!) 89%     1:46 PM EDT  Shortly after patient got here respiratory therapy did place patient on BiPAP as she was starting to get more tachypneic seeming to be tiring out. Her initial x-ray showed a moderate sized pneumothorax without any evidence of tension. Due to her worsening respiratory status, new pneumothorax decision made to intubate the patient. I did speak with the patient and her daughter at length about this before we went through with the procedure. They did consent to the procedure. Patient confirms she is still full code and wants compressions, defibrillation, all measures taken if necessary. She also consented to a chest tube and central line.     See procedure note below. I spoke with Dr. Bishop Beckford pulmonology who is familiar with the patient. He did request that we place the chest tube in the emergency department. At this time patient is clinically stable after chest tube placement. She does have a right IJ central line however pressors have not been needed. Her D-dimer is mildly elevated. I spoke again with Dr. Bishop Beckford who requested we get a PE study. I spoke with residents will place admission orders under staff medicine service. A repeat sepsis focused exam has been completed 1:47 PM EDT. Sepsis reassessment and review of systems completed. Reviewed nurses documentation of repeat vital signs. I have reassessed tissue perfusion after fluid bolus given and patient is without signs of hypoperfusion. Lactic acid <4 and MAP >65. Patient does not clinically need additional IVF or pressors at this time. Antibiotics have been started, blood cultures x2 obtained, second lactic acid ordered. CRITICALCARE:  CRITICAL CARE: There was a high probability of clinically significant/life threatening deterioration in this patient's condition which required my urgent intervention. Total critical care time was 110 minutes. This excludes any time for separately reportable procedures. CONSULTS:  IP CONSULT TO PULMONOLOGY  IP CONSULT TO INTERNAL MEDICINE      PROCEDURES:  Intubation    Date/Time: 4/16/2022 1:17 PM  Performed by: Jose Ramon Mayfield DO  Authorized by: Jose Ramon Mayfield DO     Consent:     Consent obtained:  Verbal    Consent given by:  Patient    Risks discussed:  Aspiration, bleeding, brain injury, death, hypoxia and laryngeal injury    Alternatives discussed:  No treatment and delayed treatment  Pre-procedure details:     Patient status:  Awake    Mallampati score:  III    Pretreatment meds: Etomidate.     Paralytics:  Rocuronium  Procedure details:     Preoxygenation:  BiPAP    CPR in progress: no      Intubation method:  Oral    Oral intubation technique:  Video-assisted    Laryngoscope blade: Mac 3    Tube size (mm):  7.5    Tube type:  Cuffed    Number of attempts:  1    Ventilation between attempts: no      Cricoid pressure: yes      Tube visualized through cords: yes    Placement assessment:     ETT to lip:  23    Tube secured with:  ETT christine    Breath sounds:  Equal and absent over the epigastrium    Placement verification: chest rise, condensation, CXR verification, equal breath sounds, ETCO2 detector and tube exhalation      CXR findings:  ETT in proper place  Post-procedure details:     Patient tolerance of procedure: Tolerated well, no immediate complications    Central Line    Date/Time: 4/16/2022 1:49 PM  Performed by: Soraya Mccain DO  Authorized by: Soraya Mccain DO     Consent:     Consent obtained:  Verbal    Consent given by:  Patient    Risks discussed:  Arterial puncture, incorrect placement, infection, nerve damage and bleeding    Alternatives discussed:  No treatment and delayed treatment  Pre-procedure details:     Hand hygiene: Hand hygiene performed prior to insertion      Sterile barrier technique: All elements of maximal sterile technique followed      Skin preparation:  2% chlorhexidine    Skin preparation agent: Skin preparation agent completely dried prior to procedure    Sedation:     Sedation type: Moderate (conscious) sedation  Anesthesia (see MAR for exact dosages):      Anesthesia method:  None  Procedure details:     Location:  R internal jugular    Site selection rationale:  Ease of access    Patient position:  Flat    Procedural supplies:  Triple lumen    Catheter size:  7 Fr    Landmarks identified: yes      Ultrasound guidance: yes      Sterile ultrasound techniques: Sterile gel and sterile probe covers were used      Number of attempts:  1    Successful placement: yes    Post-procedure details:     Post-procedure:  Dressing applied and line sutured    Assessment:  Blood return through all ports, free fluid flow and placement verified by x-ray    Patient tolerance of procedure: Tolerated well, no immediate complications  Chest Tube    Date/Time: 4/16/2022 1:49 PM  Performed by: Enio Lyons DO  Authorized by: Enio Lyons DO     Consent:     Consent obtained:  Verbal    Consent given by:  Patient    Risks discussed:  Damage to surrounding structures, bleeding, incomplete drainage, nerve damage, pain and infection    Alternatives discussed:  No treatment and delayed treatment  Pre-procedure details:     Skin preparation:  Betadine    Preparation: Patient was prepped and draped in the usual sterile fashion    Sedation:     Sedation type: Moderate (conscious) sedation  Anesthesia (see MAR for exact dosages): Anesthesia method:  None  Procedure details:     Placement location:  R lateral    Scalpel size:  10    Tube size (Fr):  28    Dissection instrument:  Finger and Maggie clamp    Ultrasound guidance: no      Tension pneumothorax: no      Tube connected to:  Suction    Drainage characteristics:  Serosanguinous    Suture material:  0 silk    Dressing:  4x4 sterile gauze  Post-procedure details:     Post-insertion x-ray findings: tube in good position      Patient tolerance of procedure: Tolerated well, no immediate complications          FINAL IMPRESSION      1. Pneumothorax on right    2. HCAP (healthcare-associated pneumonia)    3. Respiratory distress            DISPOSITION/PLAN   DISPOSITION Admitted 04/16/2022 01:47:51 PM          PATIENT REFERRED TO:  No follow-up provider specified. DISCHARGE MEDICATIONS:  New Prescriptions    No medications on file       The care is provided during an unprecedented national emergency due to the novel coronavirus, COVID-19.     (Please note that portions ofthis note were completed with a voice recognition program.  Efforts were made to edit the dictations but occasionally words are mis-transcribed.)    Enio Lyons DO  Attending Emergency Physician Robbin Prather,   04/16/22 1396

## 2022-04-16 NOTE — FLOWSHEET NOTE
Writer followed up with patient/family; patient being moved to ICU after CT.      04/16/22 1427   Encounter Summary   Services provided to: Patient and family together   Referral/Consult From: Rounding   Continue Visiting   (4-16-22)   Complexity of Encounter Moderate   Length of Encounter 15 minutes   Routine   Type Follow up   Spiritual/Sabianist   Type Spiritual support   Assessment Calm; Approachable   Intervention Active listening;Explored feelings, thoughts, concerns;Sustaining presence/ Ministry of presence   Outcome Expressed gratitude;Engaged in conversation

## 2022-04-17 ENCOUNTER — APPOINTMENT (OUTPATIENT)
Dept: GENERAL RADIOLOGY | Age: 67
DRG: 720 | End: 2022-04-17
Payer: COMMERCIAL

## 2022-04-17 LAB
ABSOLUTE BANDS #: 1.02 K/UL (ref 0–1)
ABSOLUTE EOS #: 0 K/UL (ref 0–0.4)
ABSOLUTE LYMPH #: 0.44 K/UL (ref 1–4.8)
ABSOLUTE MONO #: 0.15 K/UL (ref 0.1–1.3)
ADENOVIRUS PCR: NOT DETECTED
ALLEN TEST: ABNORMAL
ANION GAP SERPL CALCULATED.3IONS-SCNC: 8 MMOL/L (ref 9–17)
BANDS: 7 % (ref 0–10)
BASOPHILS # BLD: 0 % (ref 0–2)
BASOPHILS ABSOLUTE: 0 K/UL (ref 0–0.2)
BORDETELLA PARAPERTUSSIS: NOT DETECTED
BORDETELLA PERTUSSIS PCR: NOT DETECTED
BUN BLDV-MCNC: 22 MG/DL (ref 8–23)
CALCIUM SERPL-MCNC: 7.8 MG/DL (ref 8.6–10.4)
CARBOXYHEMOGLOBIN: 0.3 % (ref 0–5)
CHLAMYDIA PNEUMONIAE BY PCR: NOT DETECTED
CHLORIDE BLD-SCNC: 106 MMOL/L (ref 98–107)
CO2: 24 MMOL/L (ref 20–31)
CORONAVIRUS 229E PCR: NOT DETECTED
CORONAVIRUS HKU1 PCR: NOT DETECTED
CORONAVIRUS NL63 PCR: NOT DETECTED
CORONAVIRUS OC43 PCR: NOT DETECTED
CREAT SERPL-MCNC: 0.64 MG/DL (ref 0.5–0.9)
CULTURE: NO GROWTH
EOSINOPHILS RELATIVE PERCENT: 0 % (ref 0–4)
ESTIMATED AVERAGE GLUCOSE: 126 MG/DL
FIO2: 30
GFR AFRICAN AMERICAN: >60 ML/MIN
GFR NON-AFRICAN AMERICAN: >60 ML/MIN
GFR SERPL CREATININE-BSD FRML MDRD: ABNORMAL ML/MIN/{1.73_M2}
GLUCOSE BLD-MCNC: 110 MG/DL (ref 65–105)
GLUCOSE BLD-MCNC: 120 MG/DL (ref 65–105)
GLUCOSE BLD-MCNC: 121 MG/DL (ref 70–99)
GLUCOSE BLD-MCNC: 161 MG/DL (ref 65–105)
GLUCOSE BLD-MCNC: 93 MG/DL (ref 65–105)
HBA1C MFR BLD: 6 % (ref 4–6)
HCO3 ARTERIAL: 25.6 MMOL/L (ref 22–26)
HCT VFR BLD CALC: 23.4 % (ref 36–46)
HCT VFR BLD CALC: 24.6 % (ref 36–46)
HEMOGLOBIN: 7.5 G/DL (ref 12–16)
HEMOGLOBIN: 7.9 G/DL (ref 12–16)
HUMAN METAPNEUMOVIRUS PCR: NOT DETECTED
INFLUENZA A BY PCR: NOT DETECTED
INFLUENZA B BY PCR: NOT DETECTED
LYMPHOCYTES # BLD: 3 % (ref 24–44)
MCH RBC QN AUTO: 29.8 PG (ref 26–34)
MCHC RBC AUTO-ENTMCNC: 31.9 G/DL (ref 31–37)
MCV RBC AUTO: 93.3 FL (ref 80–100)
METHEMOGLOBIN: 1.2 % (ref 0–1.9)
MODE: ABNORMAL
MONOCYTES # BLD: 1 % (ref 1–7)
MORPHOLOGY: ABNORMAL
MRSA, DNA, NASAL: NEGATIVE
MYCOPLASMA PNEUMONIAE PCR: NOT DETECTED
NEGATIVE BASE EXCESS, ART: 0.4 MMOL/L (ref 0–2)
O2 DEVICE/FLOW/%: ABNORMAL
O2 SAT, ARTERIAL: 93.5 % (ref 95–98)
PARAINFLUENZA 1 PCR: NOT DETECTED
PARAINFLUENZA 2 PCR: NOT DETECTED
PARAINFLUENZA 3 PCR: NOT DETECTED
PARAINFLUENZA 4 PCR: NOT DETECTED
PATIENT TEMP: 37.1
PCO2 ARTERIAL: 48.7 MMHG (ref 35–45)
PDW BLD-RTO: 16.7 % (ref 11.5–14.9)
PEEP/CPAP: 8
PH ARTERIAL: 7.33 (ref 7.35–7.45)
PLATELET # BLD: 431 K/UL (ref 150–450)
PMV BLD AUTO: 6.5 FL (ref 6–12)
PO2 ARTERIAL: 75.8 MMHG (ref 80–100)
POTASSIUM SERPL-SCNC: 4.5 MMOL/L (ref 3.7–5.3)
PROCALCITONIN: >100 NG/ML
PT. POSITION: ABNORMAL
RBC # BLD: 2.64 M/UL (ref 4–5.2)
RESP SYNCYTIAL VIRUS PCR: NOT DETECTED
RESPIRATORY RATE: 20
RHINO/ENTEROVIRUS PCR: NOT DETECTED
SAMPLE SITE: ABNORMAL
SARS-COV-2, PCR: NOT DETECTED
SEG NEUTROPHILS: 89 % (ref 36–66)
SEGMENTED NEUTROPHILS ABSOLUTE COUNT: 12.99 K/UL (ref 1.3–9.1)
SET RATE: 20
SODIUM BLD-SCNC: 138 MMOL/L (ref 135–144)
SPECIMEN DESCRIPTION: NORMAL
TEXT FOR RESPIRATORY: ABNORMAL
TOTAL RATE: 20
VT: 450
WBC # BLD: 14.6 K/UL (ref 3.5–11)

## 2022-04-17 PROCEDURE — 82805 BLOOD GASES W/O2 SATURATION: CPT

## 2022-04-17 PROCEDURE — 85025 COMPLETE CBC W/AUTO DIFF WBC: CPT

## 2022-04-17 PROCEDURE — 80048 BASIC METABOLIC PNL TOTAL CA: CPT

## 2022-04-17 PROCEDURE — 2500000003 HC RX 250 WO HCPCS: Performed by: INTERNAL MEDICINE

## 2022-04-17 PROCEDURE — 2580000003 HC RX 258: Performed by: INTERNAL MEDICINE

## 2022-04-17 PROCEDURE — 36415 COLL VENOUS BLD VENIPUNCTURE: CPT

## 2022-04-17 PROCEDURE — 82947 ASSAY GLUCOSE BLOOD QUANT: CPT

## 2022-04-17 PROCEDURE — 2580000003 HC RX 258: Performed by: STUDENT IN AN ORGANIZED HEALTH CARE EDUCATION/TRAINING PROGRAM

## 2022-04-17 PROCEDURE — 99291 CRITICAL CARE FIRST HOUR: CPT | Performed by: INTERNAL MEDICINE

## 2022-04-17 PROCEDURE — 94640 AIRWAY INHALATION TREATMENT: CPT

## 2022-04-17 PROCEDURE — 99255 IP/OBS CONSLTJ NEW/EST HI 80: CPT | Performed by: INTERNAL MEDICINE

## 2022-04-17 PROCEDURE — 84145 PROCALCITONIN (PCT): CPT

## 2022-04-17 PROCEDURE — 85014 HEMATOCRIT: CPT

## 2022-04-17 PROCEDURE — 94003 VENT MGMT INPAT SUBQ DAY: CPT

## 2022-04-17 PROCEDURE — 6360000002 HC RX W HCPCS: Performed by: INTERNAL MEDICINE

## 2022-04-17 PROCEDURE — 94761 N-INVAS EAR/PLS OXIMETRY MLT: CPT

## 2022-04-17 PROCEDURE — 6370000000 HC RX 637 (ALT 250 FOR IP): Performed by: INTERNAL MEDICINE

## 2022-04-17 PROCEDURE — 2000000000 HC ICU R&B

## 2022-04-17 PROCEDURE — 6360000002 HC RX W HCPCS: Performed by: STUDENT IN AN ORGANIZED HEALTH CARE EDUCATION/TRAINING PROGRAM

## 2022-04-17 PROCEDURE — A4216 STERILE WATER/SALINE, 10 ML: HCPCS | Performed by: INTERNAL MEDICINE

## 2022-04-17 PROCEDURE — 85018 HEMOGLOBIN: CPT

## 2022-04-17 PROCEDURE — 6370000000 HC RX 637 (ALT 250 FOR IP)

## 2022-04-17 PROCEDURE — 36600 WITHDRAWAL OF ARTERIAL BLOOD: CPT

## 2022-04-17 PROCEDURE — 71045 X-RAY EXAM CHEST 1 VIEW: CPT

## 2022-04-17 PROCEDURE — 2700000000 HC OXYGEN THERAPY PER DAY

## 2022-04-17 RX ORDER — METHYLPREDNISOLONE SODIUM SUCCINATE 40 MG/ML
40 INJECTION, POWDER, LYOPHILIZED, FOR SOLUTION INTRAMUSCULAR; INTRAVENOUS EVERY 6 HOURS
Status: DISCONTINUED | OUTPATIENT
Start: 2022-04-17 | End: 2022-04-19

## 2022-04-17 RX ADMIN — PROPOFOL 40 MCG/KG/MIN: 10 INJECTION, EMULSION INTRAVENOUS at 08:58

## 2022-04-17 RX ADMIN — FAMOTIDINE 20 MG: 10 INJECTION INTRAVENOUS at 09:02

## 2022-04-17 RX ADMIN — IPRATROPIUM BROMIDE AND ALBUTEROL SULFATE 1 AMPULE: .5; 2.5 SOLUTION RESPIRATORY (INHALATION) at 19:01

## 2022-04-17 RX ADMIN — RISPERIDONE 1.5 MG: 0.5 TABLET ORAL at 16:23

## 2022-04-17 RX ADMIN — METRONIDAZOLE 500 MG: 500 INJECTION, SOLUTION INTRAVENOUS at 14:47

## 2022-04-17 RX ADMIN — METHYLPREDNISOLONE SODIUM SUCCINATE 40 MG: 40 INJECTION, POWDER, FOR SOLUTION INTRAMUSCULAR; INTRAVENOUS at 20:23

## 2022-04-17 RX ADMIN — MINERAL OIL, PETROLATUM: 425; 568 OINTMENT OPHTHALMIC at 09:03

## 2022-04-17 RX ADMIN — POLYVINYL ALCOHOL 1 DROP: 14 SOLUTION/ DROPS OPHTHALMIC at 11:32

## 2022-04-17 RX ADMIN — INSULIN LISPRO 2 UNITS: 100 INJECTION, SOLUTION INTRAVENOUS; SUBCUTANEOUS at 20:21

## 2022-04-17 RX ADMIN — FENTANYL CITRATE 50 MCG: 50 INJECTION, SOLUTION INTRAMUSCULAR; INTRAVENOUS at 19:22

## 2022-04-17 RX ADMIN — HEPARIN SODIUM 5000 UNITS: 5000 INJECTION INTRAVENOUS; SUBCUTANEOUS at 22:30

## 2022-04-17 RX ADMIN — CEFEPIME HYDROCHLORIDE 2000 MG: 2 INJECTION, POWDER, FOR SOLUTION INTRAVENOUS at 16:23

## 2022-04-17 RX ADMIN — PROPOFOL 35 MCG/KG/MIN: 10 INJECTION, EMULSION INTRAVENOUS at 03:16

## 2022-04-17 RX ADMIN — METHYLPREDNISOLONE SODIUM SUCCINATE 40 MG: 40 INJECTION, POWDER, FOR SOLUTION INTRAMUSCULAR; INTRAVENOUS at 13:21

## 2022-04-17 RX ADMIN — FENTANYL CITRATE 50 MCG: 50 INJECTION, SOLUTION INTRAMUSCULAR; INTRAVENOUS at 11:35

## 2022-04-17 RX ADMIN — MINERAL OIL, PETROLATUM: 425; 568 OINTMENT OPHTHALMIC at 04:31

## 2022-04-17 RX ADMIN — FENTANYL CITRATE 50 MCG: 50 INJECTION, SOLUTION INTRAMUSCULAR; INTRAVENOUS at 04:30

## 2022-04-17 RX ADMIN — MINERAL OIL, PETROLATUM: 425; 568 OINTMENT OPHTHALMIC at 00:17

## 2022-04-17 RX ADMIN — RIVASTIGMINE TARTRATE 4.5 MG: 1.5 CAPSULE ORAL at 20:25

## 2022-04-17 RX ADMIN — HEPARIN SODIUM 5000 UNITS: 5000 INJECTION INTRAVENOUS; SUBCUTANEOUS at 13:21

## 2022-04-17 RX ADMIN — SODIUM CHLORIDE, PRESERVATIVE FREE 10 ML: 5 INJECTION INTRAVENOUS at 20:25

## 2022-04-17 RX ADMIN — METHYLPREDNISOLONE SODIUM SUCCINATE 40 MG: 40 INJECTION, POWDER, FOR SOLUTION INTRAMUSCULAR; INTRAVENOUS at 09:17

## 2022-04-17 RX ADMIN — VANCOMYCIN HYDROCHLORIDE 1000 MG: 1 INJECTION, POWDER, LYOPHILIZED, FOR SOLUTION INTRAVENOUS at 09:11

## 2022-04-17 RX ADMIN — POLYVINYL ALCOHOL 1 DROP: 14 SOLUTION/ DROPS OPHTHALMIC at 18:41

## 2022-04-17 RX ADMIN — SODIUM CHLORIDE, PRESERVATIVE FREE 10 ML: 5 INJECTION INTRAVENOUS at 09:25

## 2022-04-17 RX ADMIN — MINERAL OIL, PETROLATUM: 425; 568 OINTMENT OPHTHALMIC at 13:21

## 2022-04-17 RX ADMIN — IPRATROPIUM BROMIDE AND ALBUTEROL SULFATE 1 AMPULE: .5; 2.5 SOLUTION RESPIRATORY (INHALATION) at 03:00

## 2022-04-17 RX ADMIN — FAMOTIDINE 20 MG: 10 INJECTION INTRAVENOUS at 20:23

## 2022-04-17 RX ADMIN — PROPOFOL 40 MCG/KG/MIN: 10 INJECTION, EMULSION INTRAVENOUS at 17:03

## 2022-04-17 RX ADMIN — IPRATROPIUM BROMIDE AND ALBUTEROL SULFATE 1 AMPULE: .5; 2.5 SOLUTION RESPIRATORY (INHALATION) at 11:09

## 2022-04-17 RX ADMIN — POLYVINYL ALCOHOL 1 DROP: 14 SOLUTION/ DROPS OPHTHALMIC at 14:48

## 2022-04-17 RX ADMIN — SODIUM CHLORIDE, POTASSIUM CHLORIDE, SODIUM LACTATE AND CALCIUM CHLORIDE 100 ML/HR: 600; 310; 30; 20 INJECTION, SOLUTION INTRAVENOUS at 21:26

## 2022-04-17 RX ADMIN — IPRATROPIUM BROMIDE AND ALBUTEROL SULFATE 1 AMPULE: .5; 2.5 SOLUTION RESPIRATORY (INHALATION) at 14:34

## 2022-04-17 RX ADMIN — CEFEPIME HYDROCHLORIDE 2000 MG: 2 INJECTION, POWDER, FOR SOLUTION INTRAVENOUS at 09:07

## 2022-04-17 RX ADMIN — POLYVINYL ALCOHOL 1 DROP: 14 SOLUTION/ DROPS OPHTHALMIC at 06:14

## 2022-04-17 RX ADMIN — IPRATROPIUM BROMIDE AND ALBUTEROL SULFATE 1 AMPULE: .5; 2.5 SOLUTION RESPIRATORY (INHALATION) at 07:08

## 2022-04-17 RX ADMIN — MINERAL OIL, PETROLATUM: 425; 568 OINTMENT OPHTHALMIC at 20:26

## 2022-04-17 RX ADMIN — CHLORHEXIDINE GLUCONATE 0.12% ORAL RINSE 15 ML: 1.2 LIQUID ORAL at 20:23

## 2022-04-17 RX ADMIN — MINERAL OIL, PETROLATUM: 425; 568 OINTMENT OPHTHALMIC at 16:20

## 2022-04-17 RX ADMIN — HEPARIN SODIUM 5000 UNITS: 5000 INJECTION INTRAVENOUS; SUBCUTANEOUS at 05:12

## 2022-04-17 RX ADMIN — POLYVINYL ALCOHOL 1 DROP: 14 SOLUTION/ DROPS OPHTHALMIC at 23:00

## 2022-04-17 RX ADMIN — FENTANYL CITRATE 50 MCG: 50 INJECTION, SOLUTION INTRAMUSCULAR; INTRAVENOUS at 01:41

## 2022-04-17 RX ADMIN — SODIUM CHLORIDE, POTASSIUM CHLORIDE, SODIUM LACTATE AND CALCIUM CHLORIDE: 600; 310; 30; 20 INJECTION, SOLUTION INTRAVENOUS at 01:46

## 2022-04-17 RX ADMIN — FENTANYL CITRATE 50 MCG: 50 INJECTION, SOLUTION INTRAMUSCULAR; INTRAVENOUS at 06:18

## 2022-04-17 RX ADMIN — FENTANYL CITRATE 50 MCG: 50 INJECTION, SOLUTION INTRAMUSCULAR; INTRAVENOUS at 07:18

## 2022-04-17 RX ADMIN — IPRATROPIUM BROMIDE AND ALBUTEROL SULFATE 1 AMPULE: .5; 2.5 SOLUTION RESPIRATORY (INHALATION) at 23:39

## 2022-04-17 RX ADMIN — PROPOFOL 40 MCG/KG/MIN: 10 INJECTION, EMULSION INTRAVENOUS at 21:05

## 2022-04-17 RX ADMIN — POLYVINYL ALCOHOL 1 DROP: 14 SOLUTION/ DROPS OPHTHALMIC at 02:12

## 2022-04-17 RX ADMIN — METRONIDAZOLE 500 MG: 500 INJECTION, SOLUTION INTRAVENOUS at 06:34

## 2022-04-17 RX ADMIN — CHLORHEXIDINE GLUCONATE 0.12% ORAL RINSE 15 ML: 1.2 LIQUID ORAL at 09:02

## 2022-04-17 RX ADMIN — TRAZODONE HYDROCHLORIDE 50 MG: 50 TABLET ORAL at 20:23

## 2022-04-17 ASSESSMENT — PULMONARY FUNCTION TESTS
PIF_VALUE: 14
PIF_VALUE: 33
PIF_VALUE: 37
PIF_VALUE: 32
PIF_VALUE: 20
PIF_VALUE: 33
PIF_VALUE: 16
PIF_VALUE: 15
PIF_VALUE: 33
PIF_VALUE: 43
PIF_VALUE: 32
PIF_VALUE: 16
PIF_VALUE: 14
PIF_VALUE: 35
PIF_VALUE: 15
PIF_VALUE: 27
PIF_VALUE: 31
PIF_VALUE: 40
PIF_VALUE: 14
PIF_VALUE: 47
PIF_VALUE: 21
PIF_VALUE: 29
PIF_VALUE: 39
PIF_VALUE: 24
PIF_VALUE: 36
PIF_VALUE: 23
PIF_VALUE: 40
PIF_VALUE: 17
PIF_VALUE: 17
PIF_VALUE: 36
PIF_VALUE: 14
PIF_VALUE: 35
PIF_VALUE: 15
PIF_VALUE: 29
PIF_VALUE: 16
PIF_VALUE: 36
PIF_VALUE: 33
PIF_VALUE: 29
PIF_VALUE: 34
PIF_VALUE: 35
PIF_VALUE: 35

## 2022-04-17 ASSESSMENT — PAIN SCALES - GENERAL
PAINLEVEL_OUTOF10: 0
PAINLEVEL_OUTOF10: 0
PAINLEVEL_OUTOF10: 4
PAINLEVEL_OUTOF10: 0
PAINLEVEL_OUTOF10: 4
PAINLEVEL_OUTOF10: 4
PAINLEVEL_OUTOF10: 0

## 2022-04-17 NOTE — PLAN OF CARE
Nutrition Problem #1: Inadequate oral intake  Intervention: Food and/or Nutrient Delivery: Continue NPO,Start Tube Feeding  Nutritional Goals: Meet estimated nutrient needs

## 2022-04-17 NOTE — PLAN OF CARE
Problem: Non-Violent Restraints  Goal: Removal from restraints as soon as assessed to be safe  4/17/2022 0333 by Susan Pulido RN  Outcome: Ongoing  Note: Attempts to pull at tubes when released. ROM assessed every 2 hours and visual safety checks completed hourly. Restraints maintained to preserve the patient's airway. Problem: Non-Violent Restraints  Goal: No harm/injury to patient while restraints in use  4/17/2022 0333 by Susan Pulido RN  Outcome: Ongoing  Note: Attempts to pull at tubes when released. ROM assessed every 2 hours and visual safety checks completed hourly. Restraints maintained to preserve the patient's airway.       Problem: Non-Violent Restraints  Goal: Patient's dignity will be maintained  4/17/2022 0333 by Susan Pulido RN  Outcome: Ongoing     Problem: Aspiration:  Goal: Absence of aspiration  Description: Absence of aspiration  4/17/2022 0333 by Susan Pulido RN  Outcome: Ongoing     Problem: Gas Exchange - Impaired:  Goal: Levels of oxygenation will improve  Description: Levels of oxygenation will improve  4/17/2022 0333 by Susan Pulido RN  Outcome: Ongoing     Problem: Skin Integrity - Impaired:  Goal: Will show no infection signs and symptoms  Description: Will show no infection signs and symptoms  Outcome: Ongoing     Problem: Skin Integrity - Impaired:  Goal: Absence of new skin breakdown  Description: Absence of new skin breakdown  Outcome: Ongoing     Problem: Falls - Risk of:  Goal: Will remain free from falls  Description: Will remain free from falls  4/17/2022 0333 by Susan Pulido RN  Outcome: Ongoing     Problem: Falls - Risk of:  Goal: Absence of physical injury  Description: Absence of physical injury  4/17/2022 0333 by Susan Pulido RN  Outcome: Ongoing     Problem: Breathing Pattern - Ineffective:  Goal: Ability to achieve and maintain a regular respiratory rate will improve  Description: Ability to achieve and maintain a regular respiratory rate will improve  Outcome: Ongoing     Problem: Skin Integrity:  Goal: Will show no infection signs and symptoms  Description: Will show no infection signs and symptoms  Outcome: Ongoing     Problem: Skin Integrity:  Goal: Absence of new skin breakdown  Description: Absence of new skin breakdown  Outcome: Ongoing  Note: Patient turned and repositioned every 2 hours and as needed for comfort. Skin kept clean and dry. No new skin breakdown noted.

## 2022-04-17 NOTE — CARE COORDINATION
CASE MANAGEMENT NOTE:    Admission Date:  4/16/2022 Kamila Herman is a 74 y.o.  female    Admitted for : Respiratory distress [R06.03]  Pneumothorax on right [J93.9]  HCAP (healthcare-associated pneumonia) [J18.9]  Sepsis ([address] [A41.9]    Met with:  Pt. On Vent, Spoke to Pt's Daughter, Luis Goetz    PCP:  [facility]:  [employer]      Is patient alert and oriented at time of discussion:  Pt is on Vent    Current Residence/ Living Arrangements:  at home dependent on family care, Pt. Was staying at Daughter Calli's home when DC            Current Services PTA:  No    Does patient go to outpatient dialysis: No  If yes, location and chair time: NA    Is patient agreeable to VNS: Yes    Freedom of choice provided:  Yes    List of [address] provided: No    VNS chosen:  Yes, Will continue w/ Ariana, on Tuesdays    DME:  shower chair    Home Oxygen: Yes, Wears 3.5 LNC, 24/7, has concentrator/port    Nebulizer: Still waiting for it to come from [facility], Left VM, for Sary    CPAP/BIPAP: No    Supplier: N/A    Potential Assistance Needed: Yes, SNF    SNF needed: Luis Goetz, will speak to family members & will let LSW know choices    Freedom of choice and list provided: No    Pharmacy:  [facility] on [facility]       Does Patient want to use MEDS to BEDS? No    Is patient currently receiving oral anticoagulation therapy? No    Is the Patient an [facility]UV[facility] with Readmission Risk Score greater than 14%? No  If yes, pt needs a follow up appointment made within 7 days. Family Members/Caregivers that pt would like involved in their care:    Yes    If yes, list name here:  Luis Jacome    Transportation Provider:  Family             Discharge Plan:  4/17/22, Readmit VENT,30% FIO2,( Could not do ACP, on Vent) [employer] Pt. Was staying w/ Daughter Luis Goetz, when DC.  DME- [place], Wears, 3.5 LNC, 24/7, has Port/Concentrator, [facility], still waiting for Neb, from them, Writer left message w/ [facility]. Current w/ VNS, Ariana, on Tuesday's, Daughter feels, she need Rehab, to discuss w/ family, will get choices, will need LSW to follow.  TF, PT/OT, IV Cefepime, Steroids, 40 Q6, Clare will need signed/completed, Will follow//KB               Electronically signed by: Ramsey Lozano RN on 4/17/2022 at 4:11 PM

## 2022-04-17 NOTE — PROGRESS NOTES
Comprehensive Nutrition Assessment    Type and Reason for Visit:  Consult (TF ordering and management per vent bundle order set)    Nutrition Recommendations/Plan: Start Osmolite 1.2 at 20 ml increase as tolerated to 50 ml/hr. Nutrition Assessment:  Daughter brought pt to ED due to SOB & wheezing; initial x-ray showed a moderate sized pneumothorax. Due to pneumothorax and worsening respiratory status decision made to intubate. Pulmonologist is ok with starting tube feedings; Propofol is at 19.9 ml providin kcals. Malnutrition Assessment:  Malnutrition Status:  No malnutrition    Context:  Acute Illness     Findings of the 6 clinical characteristics of malnutrition:  Energy Intake:  No significant decrease in energy intake  Weight Loss:  No significant weight loss     Body Fat Loss:  No significant body fat loss     Muscle Mass Loss:  No significant muscle mass loss    Fluid Accumulation:  No significant fluid accumulation     Strength:  Not Performed    Estimated Daily Nutrient Needs:  Energy (kcal):  6881-2823 kcals based on 20-22 kcals/kg using adm wt 80.2 kg; Weight Used for Energy Requirements:  Admission     Protein (g):  74-85 gm protein based on 1.3-1.5 gm/kg using IBW; Weight Used for Protein Requirements:  Ideal          Nutrition Related Findings:  Edema: Trace-BUE's, +1 BLE's. Hx: severe COPD on home O2. Pt had right chest tube placed. Labs & meds reviewed. Wounds:  None       Current Nutrition Therapies:    ADULT TUBE FEEDING; Nasogastric; Standard without Fiber; Continuous; 20; Yes; 30; Q 8 hours; 50; 30;  Q 8 hours  Current Tube Feeding (TF) Orders:  · Feeding Route: Nasogastric  · Formula: Standard without Fiber  · Schedule: Continuous  · Water Flushes: 30 ml every 8 hrs (currently IV lactated ringers at 100 ml/hr)  · Current TF & Flush Orders Provides: 1440 kcals, 67 gm protein (doesn't include calories from Propofol)    Anthropometric Measures:  · Height: 5' 5\" (165.1 cm)  · Current Body Weight: 176 lb (79.8 kg)   · Admission Body Weight: 176 lb (79.8 kg)    · Ideal Body Weight: 125 lbs; % Ideal Body Weight 140.8 %   · BMI: 29.3  · BMI Categories: Overweight (BMI 25.0-29. 9)       Nutrition Diagnosis:   · Inadequate oral intake related to impaired respiratory function as evidenced by NPO or clear liquid status due to medical condition,intubation      Nutrition Interventions:   Food and/or Nutrient Delivery:  Continue NPO,Start Tube Feeding  Nutrition Education/Counseling:  No recommendation at this time   Coordination of Nutrition Care:  Continue to monitor while inpatient    Goals:  Meet estimated nutrient needs       Nutrition Monitoring and Evaluation:   Food/Nutrient Intake Outcomes:  None Identified,Enteral Nutrition Intake/Tolerance  Physical Signs/Symptoms Outcomes:  Biochemical Data,GI Status,Fluid Status or Edema,Hemodynamic Status,Nutrition Focused Physical Findings,Skin,Weight     Discharge Planning: Too soon to determine     Some areas of assessment may be incomplete due to COVID-19 precautions. Manasa Nguyễn R.D., L.CARL.   Clinical Dietitian  Office: [phone]

## 2022-04-17 NOTE — PLAN OF CARE
Problem: Non-Violent Restraints  Goal: Removal from restraints as soon as assessed to be safe  4/17/2022 0939 by Belén Jones RN  Outcome: Ongoing  4/17/2022 0333 by Alicia Mansfield RN  Outcome: Ongoing  Note: Attempts to pull at tubes when released. ROM assessed every 2 hours and visual safety checks completed hourly. Restraints maintained to preserve the patient's airway. Goal: No harm/injury to patient while restraints in use  4/17/2022 0939 by Belén Jones RN  Outcome: Ongoing  4/17/2022 0333 by Alicia Mansfield RN  Outcome: Ongoing  Note: Attempts to pull at tubes when released. ROM assessed every 2 hours and visual safety checks completed hourly. Restraints maintained to preserve the patient's airway.    Goal: Patient's dignity will be maintained  4/17/2022 0939 by Belén Jones RN  Outcome: Ongoing  4/17/2022 0333 by Alicia Mansfield RN  Outcome: Ongoing     Problem: Aspiration:  Goal: Absence of aspiration  Description: Absence of aspiration  4/17/2022 0939 by Belén Jones RN  Outcome: Ongoing  4/17/2022 0333 by Alicia Mansfield RN  Outcome: Ongoing     Problem: Gas Exchange - Impaired:  Goal: Levels of oxygenation will improve  Description: Levels of oxygenation will improve  4/17/2022 0939 by Belén Jones RN  Outcome: Ongoing  4/17/2022 0333 by Alicia Mansfield RN  Outcome: Ongoing     Problem: Skin Integrity - Impaired:  Goal: Will show no infection signs and symptoms  Description: Will show no infection signs and symptoms  4/17/2022 0939 by Belén Jones RN  Outcome: Ongoing  4/17/2022 0333 by Alicia Mansfield RN  Outcome: Ongoing  Goal: Absence of new skin breakdown  Description: Absence of new skin breakdown  4/17/2022 0939 by Belén Jones RN  Outcome: Ongoing  4/17/2022 0333 by Alicia Mansfield RN  Outcome: Ongoing     Problem: Falls - Risk of:  Goal: Will remain free from falls  Description: Will remain free from falls  4/17/2022 0939 by Xavier Au RN  Outcome: Ongoing  4/17/2022 0333 by Adriane Peñaloza RN  Outcome: Ongoing  Goal: Absence of physical injury  Description: Absence of physical injury  4/17/2022 0939 by Xavier Au RN  Outcome: Ongoing  4/17/2022 0333 by Adriane Peñaloza RN  Outcome: Ongoing     Problem: Breathing Pattern - Ineffective:  Goal: Ability to achieve and maintain a regular respiratory rate will improve  Description: Ability to achieve and maintain a regular respiratory rate will improve  4/17/2022 0939 by Xavier Au RN  Outcome: Ongoing  4/17/2022 0333 by Adriane Peñaloza RN  Outcome: Ongoing     Problem: Skin Integrity:  Goal: Will show no infection signs and symptoms  Description: Will show no infection signs and symptoms  4/17/2022 0939 by Xavier Au RN  Outcome: Ongoing  4/17/2022 0333 by Adriane Peñaloza RN  Outcome: Ongoing  Goal: Absence of new skin breakdown  Description: Absence of new skin breakdown  4/17/2022 0939 by Xavier Au RN  Outcome: Ongoing  4/17/2022 0333 by Adriane Peñaloza RN  Outcome: Ongoing  Note: Patient turned and repositioned every 2 hours and as needed for comfort. Skin kept clean and dry. No new skin breakdown noted.

## 2022-04-17 NOTE — DISCHARGE INSTR - COC
Continuity of Care Form    Patient Name: Flaco Chao   :  1955  MRN:  [id number]    Admit date:  2022  Discharge date:  5/3/22    Code Status Order: Full Code   Advance Directives:      Admitting Physician:  Aries Wang MD  PCP: Joelle Calvo MD    Discharging Nurse:   [facility] Unit/Room#:   Discharging Unit Phone Number:     Emergency Contact:   Extended Emergency Contact Information  Primary Emergency Contact: Jaimee Heller *hipaa,Calli  Address: [address] [address] Phone: [phone]  Work Phone: [phone]  Mobile Phone: [phone]  Relation: Child    Past Surgical History:  Past Surgical History:   Procedure Laterality Date    ANKLE SURGERY      HAD SCREWS AND HARDWARE, THEN REMOVED    COLONOSCOPY  02/15/2018    tubular adenoma    EYE SURGERY Bilateral     CATARACTS    HYSTEROSCOPY  10/19/2016    D & C    INDUCED       LASIK      bilateral    TONSILLECTOMY AND ADENOIDECTOMY Bilateral     VULVA SURGERY      HAD A BIOPSY AND REMOVAL OF       Immunization History:   Immunization History   Administered Date(s) Administered    COVID-19, [place], Primary or Immunocompromised, PF, 100mcg/0.5mL 2021, 2021, 2022    Influenza Virus Vaccine 10/15/2018    Influenza, MDCK Quadv, IM, PF (Flucelvax 2 yrs and older) 2020, 2021    Influenza, Quadv, IM, PF (6 mo and older Fluzone, Flulaval, Fluarix, and 3 yrs and older Afluria) 10/03/2016, 2017, 2018, 10/30/2019    Pneumococcal Conjugate 13-valent ([id number]) 2021    Pneumococcal Polysaccharide ([id number]) 2016, 2021    Tdap (Boostrix, Adacel) 2016    Zoster Recombinant (Shingrix) 2018, 2018, 2018       Active Problems:  Patient Active Problem List   Diagnosis Code    Chronic obstructive pulmonary disease ([facility] J44.9    Vitamin D deficiency E55.9    Anxiety F41.9    Asthma J45.909    Bipolar disorder ([facility] F31.9    Depression F32. A    Hyperlipidemia with target LDL less than 100 E78.5    Sleep apnea G47.30    Vision abnormalities H53.9    Status post hysteroscopy Z98.890    Chronic headaches R51.9, G89.29    IBS (irritable bowel syndrome) K58.9    Colon polyp K63.5    Collagenous colitis K52.831    Lung nodule R91.1    Left elbow pain M25.522    Dilated bile duct K83.8    Acute pain of left shoulder M25.512    Adhesive capsulitis of left shoulder M75.02    Leucopenia D72.819    Compression fracture of body of thoracic vertebra ([facility]) S22.000A    Age-related osteoporosis with current pathological fracture M80.00XA    Pulmonary embolism on right ([facility]) I26.99    Migraines G43.909    Paranoid behavior ([facility]) F22    Acute deep vein thrombosis (DVT) of right lower extremity ([facility]) I82.401    Delirium R41.0    Chronic respiratory failure with hypoxia ([facility]) J96.11    Major neurocognitive disorder ([facility]) F03.90    Pneumonia J18.9    Chronic respiratory failure with hypoxia, on home O2 therapy ([facility]) J96.11, Z99.81    COPD (chronic obstructive pulmonary disease) ([facility]) J44.9    TRAM (acute kidney injury) ([facility]) N17.9    History of pulmonary embolus (PE) Z86.711    Mycoplasma pneumonia J15.7    Iron deficiency anemia D50.9    Sepsis ([facility]) A41.9    Acute on chronic respiratory failure ([facility]) J96.20    Cavitary lesion of lung J98.4    History of DVT (deep vein thrombosis) Z86.718    Pneumothorax, right J93.9    Status post chest tube placement ([facility]) Z93.8       Isolation/Infection:   Isolation            No Isolation          Patient Infection Status       Infection Onset Added Last Indicated Last Indicated By Review Planned Expiration Resolved Resolved By    None active    Resolved    COVID-19 (Rule Out) 04/16/22 04/16/22 04/16/22 Respiratory Panel, Molecular, with COVID-19 (Restricted: peds pts or suitable admitted adults) (Ordered)   04/17/22 Rule-Out Test Resulted    COVID-19 (Rule Out) 04/16/22 04/16/22 04/16/22 COVID-19 & Influenza Combo (Ordered)   22 Rule-Out Test Resulted    COVID-19 (Rule Out) 22 COVID-19 & Influenza Combo (Ordered)   22 Rule-Out Test Resulted    COVID-19 (Rule Out) 21 COVID-19, Rapid (Ordered)   21             Nurse Assessment:  Last Vital Signs: BP (!) 114/52   Pulse 87   Temp 97.5 °F (36.4 °C) (Axillary)   Resp 25   Ht 5' 5\" (1.651 m)   Wt 176 lb 12.9 oz (80.2 kg)   LMP 2013 (Exact Date)   SpO2 97%   BMI 29.42 kg/m²     Last documented pain score (0-10 scale): Pain Level: 4  Last Weight:   Wt Readings from Last 1 Encounters:   22 176 lb 12.9 oz (80.2 kg)     Mental Status:  oriented and alert    IV Access:  - midline, right arm    Nursing Mobility/ADLs:  Walking   Dependent  Transfer  Dependent  Bathing  Assisted  Dressing  Assisted  Toileting  Dependent  Feeding  Independent  Med Admin  Assisted  Med Delivery   whole    Wound Care Documentation and Therapy:        Elimination:  Continence: Bowel: Yes  Bladder: Yes  Urinary Catheter: None   Colostomy/Ileostomy/Ileal Conduit: No       Date of Last BM:     Intake/Output Summary (Last 24 hours) at 2022 1625  Last data filed at 2022 1443  Gross per 24 hour   Intake 1591.69 ml   Output 1190 ml   Net 401.69 ml     I/O last 3 completed shifts: In: 2611.7 [I.V.:2313.9; IV Piggyback:297.8]  Out: 8926 [Urine:875; Emesis/NG output:200; Chest Tube:265]    Safety Concerns: At Risk for Falls    Impairments/Disabilities:      Vision    Nutrition Therapy:  Current Nutrition Therapy:   - Oral Diet:  General    Routes of Feeding: Oral  Liquids: No Restrictions NO STRAWS  Daily Fluid Restriction: no  Last Modified Barium Swallow with Video (Video Swallowing Test): not done    Treatments at the Time of Hospital Discharge:   Respiratory Treatments:   Oxygen Therapy:  is on oxygen at 3 L/min per nasal cannula.   Ventilator:    - No ventilator support    Rehab Therapies: Physical Therapy and Occupational Therapy  Weight Bearing Status/Restrictions: No weight bearing restrictions  Other Medical Equipment (for information only, NOT a DME order):  SC  Other Treatments: Skilled Nursing Assessment Per Protocol. Medication Education & Monitoring. Midline Care Per Protocol. Per Dr. Brianne Garcia: IV Merrem, 1000 MG, Q 8 hours, Until 5/14/22. LABS: CBC, BUN, Cr weekly while on IV antibiotics. Patient's personal belongings (please select all that are sent with patient):  Clothing, phone    RN SIGNATURE:  Electronically signed by Mounika Desouza RN on 5/3/22 at 4:07 PM EDT    CASE MANAGEMENT/SOCIAL WORK SECTION    Inpatient Status Date: 4/16/22    Readmission Risk Assessment Score:  Readmission Risk              Risk of Unplanned Readmission:  23           Discharging to [facility]   [address] [facility], [address]  Phone: [phone]  Fax: [phone]    Please refer patient to [address] [place] [address] when discharged from your facility for home health services. [address] [place] [address], [address] [place] [address]  P: [phone]  F: [phone]     Dialysis Facility (if applicable)   Name:  Address:  Dialysis Schedule:  Phone:  Fax:    / signature: Electronically signed by Kady Bellamy RN on 4/17/22 at 4:26 PM EDT    PHYSICIAN SECTION    Prognosis: Fair    Condition at Discharge: Stable    Rehab Potential (if transferring to Rehab): Fair    Recommended Labs or Other Treatments After Discharge: see above    Physician Certification: I certify the above information and transfer of Magan Prescott  is necessary for the continuing treatment of the diagnosis listed and that she requires LTAC for less 30 days.      Update Admission H&P: No change in H&P    PHYSICIAN SIGNATURE:  Electronically signed by Shelia Smith MD on 5/3/22 at 3:50 PM EDT

## 2022-04-17 NOTE — PROGRESS NOTES
ICU Progress Note (Vent)   [facility]    Patient - Devonorise Delay,  Age - 74 y.o.    - 1955      Room Number -    MRN -  [id number]   [facility]  Date of Admission -  2022 10:25 AM    Events of Past 24 Hours   Remains sedated, has had several coughing spells    Vitals    weight is 176 lb 12.9 oz (80.2 kg). Her oral temperature is 99.4 °F (37.4 °C). Her blood pressure is 118/49 (abnormal) and her pulse is 108. Her respiration is 27 and oxygen saturation is 95%. Temperature Range: Temp: 99.4 °F (37.4 °C) Temp  Av.3 °F (36.8 °C)  Min: 97.7 °F (36.5 °C)  Max: 99.4 °F (37.4 °C)  BP Range:  Systolic (19VHU), ACY:250 , Min:105 , IEQ:199     Diastolic (95AXJ), VJU:82, Min:41, Max:90    Pulse Range: Pulse  Av.3  Min: 94  Max: 121  Respiration Range: Resp  Av.5  Min: 15  Max: 35  Current Pulse Ox[de-identified]  SpO2: 95 %  24HR Pulse Ox Range:  SpO2  Av.8 %  Min: 89 %  Max: 100 %  Oxygen Amount and Delivery: O2 Flow Rate (L/min): 6 L/min      Wt Readings from Last 3 Encounters:   22 176 lb 12.9 oz (80.2 kg)   22 183 lb (83 kg)   22 186 lb 1.1 oz (84.4 kg)     I/O       Intake/Output Summary (Last 24 hours) at 2022 0652  Last data filed at 2022 0443  Gross per 24 hour   Intake 2611.69 ml   Output 1340 ml   Net 1271.69 ml     I/O last 3 completed shifts:   In: 1050 [I.V.:1000; IV Piggyback:50]  Out: 770 [Urine:425; Emesis/NG output:100; Chest Tube:245]     DRAIN/TUBE OUTPUT:     Invasive Lines   ETT Day -   2  Lines -right IJ central line day 2    ICP PRESSURE RANGE:  No data recorded  CVP PRESSURE RANGE:  No data recorded  Mechanical Ventilation Data   SETTINGS (Comprehensive)  Vent Information  $Ventilation: $Initial Day  Skin Assessment: Clean, dry, & intact  Vent Type: Servo i  Vent Mode: PRVC  Vt Ordered: 450 mL  Rate Set: 20 bmp  FiO2 : 30 %  SpO2: 95 %  SpO2/FiO2 ratio: 316.67  Sensitivity: 5  PEEP/CPAP: 8  I Time/ I Time %: 0.78 s  Humidification Source: HME  Mask Type: Full face mask  Mask Size: Medium  Additional Respiratory  Assessments  Pulse: 108  Resp: 27  SpO2: 95 %  End Tidal CO2: 32 (%)  Position: Semi-Molina's  Humidification Source: HME  Oral Care: Mouth swabbed,Mouth moisturizer,Mouth suctioned,Suction toothette,Mouthwash with chlorhexidine       ABGs:   Lab Results   Component Value Date    PHART 7.329 04/17/2022    PO2ART 75.8 04/17/2022    OAL8BUH 48.7 04/17/2022       Lab Results   Component Value Date    MODE PRVC 04/17/2022         Medications   IV   sodium chloride      dextrose      propofol 35 mcg/kg/min (04/17/22 0443)    lactated ringers 100 mL/hr at 04/17/22 0443      sodium chloride flush  5-40 mL IntraVENous 2 times per day    cefepime  2,000 mg IntraVENous Q12H    risperiDONE  1.5 mg Oral Q12H    rivastigmine  4.5 mg Oral BID    atorvastatin  20 mg Oral Daily    traZODone  50 mg Oral Nightly    polyvinyl alcohol  1 drop Both Eyes Q4H    And    artificial tears   Both Eyes Q4H    chlorhexidine  15 mL Mouth/Throat BID    famotidine (PEPCID) injection  20 mg IntraVENous BID    ipratropium-albuterol  1 ampule Inhalation Q4H    insulin lispro  0-12 Units SubCUTAneous Q6H    metroNIDAZOLE  500 mg IntraVENous Q8H    vancomycin (VANCOCIN) intermittent dosing (placeholder)   Other RX Placeholder    vancomycin  1,500 mg IntraVENous Q24H    heparin (porcine)  5,000 Units SubCUTAneous 3 times per day       Diet/Nutrition   Diet NPO    Exam   VITALS    weight is 176 lb 12.9 oz (80.2 kg). Her oral temperature is 99.4 °F (37.4 °C). Her blood pressure is 118/49 (abnormal) and her pulse is 108. Her respiration is 27 and oxygen saturation is 95%.    Ventilator Settings (Basic)  Vent Mode: PRVC Rate Set: 20 bmp/Vt Ordered: 450 mL/ /FiO2 : 30 %    Constitutional - Sedated, frail  General Appearance looks comfortable despite tachypnea   HEENT - Life support devices in place (ET, OG),normocephalic, atraumatic. PERRLA  Lungs - Chest expands equally, wheezes bilaterally minimal rhonchi   cardiovascular - Heart sounds are normal.  Tachycardic rate and rhythm regular, no murmur, gallop or rub. Abdomen - soft, nontender, nondistended, no masses or organomegaly  Neurologic - CN II-XII are grossly intact.  There are no focal motor deficits  Skin - no bruising or bleeding  Extremities - no cyanosis, clubbing or edema    Lab Results   CBC     Lab Results   Component Value Date    WBC 14.6 04/17/2022    RBC 2.64 04/17/2022    RBC 0-2 07/08/2021    HGB 7.9 04/17/2022    HCT 24.6 04/17/2022     04/17/2022    MCV 93.3 04/17/2022    MCH 29.8 04/17/2022    MCHC 31.9 04/17/2022    RDW 16.7 04/17/2022    NRBC 0 07/08/2021    METASPCT 1 04/02/2022    LYMPHOPCT PENDING 04/17/2022    LYMPHOPCT 12.1 07/08/2021    MONOPCT PENDING 04/17/2022    MONOPCT 15.1 07/08/2021    BASOPCT PENDING 04/17/2022    BASOPCT 0.8 07/08/2021    MONOSABS PENDING 04/17/2022    MONOSABS 0.4 07/08/2021    LYMPHSABS PENDING 04/17/2022    LYMPHSABS 0.3 07/08/2021    EOSABS PENDING 04/17/2022    EOSABS 0.1 07/08/2021    BASOSABS PENDING 04/17/2022    DIFFTYPE NOT REPORTED 12/20/2021       BMP   Lab Results   Component Value Date     04/17/2022    K 4.5 04/17/2022     04/17/2022    CO2 24 04/17/2022    BUN 22 04/17/2022    CREATININE 0.64 04/17/2022    GLUCOSE 121 04/17/2022    GLUCOSE 127 07/08/2021    CALCIUM 7.8 04/17/2022       LFTS  Lab Results   Component Value Date    ALKPHOS 137 04/16/2022    ALT 25 04/16/2022    AST 19 04/16/2022    PROT 7.0 04/16/2022    PROT 5.7 07/08/2021    BILITOT 0.16 04/16/2022    BILIDIR 0.1 07/08/2021    IBILI 0.12 07/08/2019    LABALBU 2.8 04/16/2022    LABALBU 3.6 07/08/2021       INR  Recent Labs     04/16/22  1042   PROTIME 16.1*   INR 1.3       APTT  Recent Labs     04/16/22  1042   APTT 33.6       Lactic Acid  Lab Results   Component Value Date    LACTA 1.2 04/02/2022 BNP   No results for input(s): BNP in the last 72 hours. Cultures       Radiology     Plain Films         Chest x-ray shows endotracheal tube too high, significant improvement and pneumothorax chest tubes and lines in reasonable position      SYSTEM ASSESSMENT    Acute on chronic hypoxic and hypercapnic respiratory failure, intubated 4/16  Right-sided pneumothorax  Right lower lobe cavitary lesions  Exudative pleural effusion on right  History of pulmonary embolism and what appears to be chronic filling defects (subsegmental, most likely clinically inconsequential)  Severe stage IV COPD, FEV1 is 35% of predicted  Recent hospitalization for pneumonia  Elevated inflammatory markers including D-dimer and procalcitonin  Full CODE STATUS    Neuro   Okay to do sedation vacation but otherwise keep sedated, use fentanyl to help with coughing spell    Respiratory   Wean oxygen as tolerated.  Keep O2 sat > 88%  Continue DuoNeb every 4 hour  Add Solu-Medrol due to bronchospasm  Not ready to wean, increase respiratory rate to 24  Advance endotracheal tube to centimeter  Continue chest tube for pneumothorax, no air leak seen, minimal drainage  Hemodynamics   Not on any pressors  We will check lower extremity venous Dopplers tomorrow but clinically/radiographically has no evidence for acute pulmonary embolism    Gastrointestinal/Nutrition   Okay to initiate tube feeds  GI prophylaxis    Renal   Continue IV fluid hydration another 24 hours, keep total fluids 100 mL/h    Infectious Disease   Currently on cefepime, Vanco and Flagyl  We will discuss with infectious disease the utility of possible bronchoscopy when she is more stable  Is not a candidate for any sort of surgical intervention given her severe emphysema  We will await pleural fluid cultures  Hematology/Oncology   DVT prophylaxis    Endocrine   Sliding scale insulin, suspect blood sugars may be elevated once steroids are initially    Social/Spiritual/DNR/Disposition/Other     Have been updating daughters in detail    Critical Care Time   35 min    Electronically signed by Karen Murry MD on 4/17/2022 at 6:52 AM

## 2022-04-17 NOTE — PROGRESS NOTES
Vancomycin Dosing by Pharmacy - Daily Note   Vancomycin Therapy Day:  2  Indication: sepsis    Allergies:  Advil [ibuprofen], Aleve [naproxen], Antipyrine, Celecoxib, Codeine, Fomepizole, Incruse ellipta [umeclidinium bromide], Other, Rofecoxib, Salicylates, Strawberry extract, Sulfinpyrazone, Aspirin, and Nsaids   Actual Weight:    Wt Readings from Last 1 Encounters:   04/17/22 176 lb 12.9 oz (80.2 kg)       Labs/Ancillary Data  Estimated Creatinine Clearance: 92 mL/min (based on SCr of 0.64 mg/dL). Recent Labs     04/16/22  1042 04/17/22  0526   CREATININE 0.87 0.64   BUN 31* 22   WBC 18.5* 14.6*     Procalcitonin   Date Value Ref Range Status   04/17/2022 >100.00 (H) <0.09 ng/mL Final     Comment:           Suspected Sepsis:  <0.50 ng/mL     Low likelihood of sepsis. 0.50-2.00 ng/mL     Increased likelihood of sepsis. Antibiotics encouraged. >2.00 ng/mL     High risk of sepsis/shock. Antibiotics strongly encouraged. Suspected Lower Resp Tract Infections:  <0.24 ng/mL     Low likelihood of bacterial infection. >0.24 ng/mL     Increased likelihood of bacterial infection. Antibiotics encouraged. With successful antibiotic therapy, PCT levels should decrease rapidly. (Half-life of 24 to   36 hours.)        Procalcitonin values from samples collected within the first 6 hours of systemic infection   may still be low. Retesting may be indicated. Values from day 1 and day 4 can be entered into the Change in Procalcitonin Calculator   ([email or web address]-pct-[email or web address] com) to determine the patient's Mortality Risk Prognosis        In healthy neonates, plasma Procalcitonin (PCT) concentrations increase gradually after   birth, reaching peak values at about 24 hours of age then decrease to normal values below   0.5 ng/mL by 48-72 hours of age.          Intake/Output Summary (Last 24 hours) at 4/17/2022 0718  Last data filed at 4/17/2022 0443  Gross per 24 hour   Intake 2611.69 ml   Output 1340 ml   Net 1271.69 ml Temp: 99.4    Culture Date / Source  /  Results  See micro  Recent vancomycin administrations                     vancomycin (VANCOCIN) 2,000 mg in dextrose 5 % 500 mL IVPB (mg) 2,000 mg New Bag 04/16/22 1334                    Vancomycin Concentrations:   TROUGH:  No results for input(s): VANCOTROUGH in the last 72 hours. RANDOM:  No results for input(s): VANCORANDOM in the last 72 hours. MRSA Nasal Swab: was ordered by provider, awaiting results. Levon Packer PLAN     Increase dose to 1000 mg q12h IV  Ensured BUN/sCr ordered at baseline and every 48 hours x at least 3 levels, then at least weekly. Repeat vancomycin concentration ordered for 4/18 @ 0600   Pharmacy will continue to monitor patient and adjust therapy as indicated      Vancomycin Target Concentration Parameters  Treatment  Population Target AUC/REANNA Target Trough   Invasive MRSA Infection (bacteremia, pneumonia, meningitis, endocarditis, osteomyelitis)  Sepsis (undifferentiated) 400-600 N/A   Infection due to non-MRSA pathogen  Empiric treatment of non-invasive MRSA infection  (SSTI, UTI) <500 10-15 mg/L   CrCl < 29 mL/min  Rapidly fluctuating serum creatinine   TRAM N/A < 15 mg/L     Renal replacement therapy is dosed by levels, per hospital protocol. Abbreviations  * Pauc: probability that AUC is >400 (efficacy); Pconc: probability that Ctrough is above 20 ?g/mL (toxicity); Tox: Probability of nephrotoxicity, based on Tapan et al. Clin Infect Dis 2009. Loading dose: N/A  Regimen: 1000 mg IV every 12 hours. Start time: 07:12 on 04/17/2022  Exposure target: AUC24 (range)400-600 mg/L.hr   AUC24,ss: 470 mg/L.hr  Probability of AUC24 > 400: 67 %  Ctrough,ss: 14.5 mg/L  Probability of Ctrough,ss > 20: 25 %  Probability of nephrotoxicity ([place] NYASIA 2009): 10 %      Thank you for the consult. Pharmacy will continue to follow.    Silvia Dixon RPh  4/17/2022  7:19 AM

## 2022-04-17 NOTE — CONSULTS
[facility] -   Infectious diseases evaluation  admission date 4/16/2022    reason for consultation:   Cavitary lung lesions  Impression :   Current:  · Right lower lobe cavitary lesions associated with multiloculated pleural fluid collection likely abscess with empyema status post chest tube with gram-negative juno growth on cultures  · Sepsis secondary to above  · Acute on chronic respiratory failure required intubation  · Right-sided pneumothorax  · Severe COPD      Recommendations   · Continue IV Flagyl, add cefepime  · Discontinue vancomycin and ceftriaxone  · Follow cultures and adjust antibiotics as needed  · Nasal swab for MRSA pending  · Not a candidate for surgical intervention per pulmonary due to severe emphysema. · Follow CBC and renal function  · Continue supportive care              History of Present Illness:   Initial history:  Nimco Alvarado is a 74y.o.-year-old female presents to the hospital with worsening shortness of breath associated with cough. At the ER with tachypneic and hypoxic  Chest x-ray showed pneumothorax  The patient was in respiratory distress, was intubated. The patient is intubated, sedated, unable to provide history that was obtained from chart review. CT chest 4/16/2022 showed right lower lobe cavitary lesion with surrounding airspace disease and multiloculated pleural collection. The patient had chest tube placed with gram-negative rods growth on pleural fluid culture.   Respiratory culture grew gram-positive cocci in pairs  Initial WBC was 18.5, procalcitonin no more than 100  Respiratory panel with COVID-19 PCR was negative  Urine for Legionella and strep pneumo antigen negative    Interval changes  4/17/2022   Right IJ line, NG tube and Reynoso cath in place  Patient Vitals for the past 8 hrs:   BP Temp Temp src Pulse Resp SpO2 Height Weight   04/17/22 1202 -- -- -- -- -- -- 5' 5\" (1.651 m) --   04/17/22 1200 (!) 123/55 98.2 °F (36.8 °C) Tympanic 96 25 97 % -- --   04/17/22 1109 -- -- -- -- 28 97 % -- --   04/17/22 1100 (!) 113/53 -- -- 90 21 96 % -- --   04/17/22 1000 (!) 95/45 -- -- 91 22 97 % -- --   04/17/22 0900 (!) 89/52 -- -- 97 21 98 % -- --   04/17/22 0800 (!) 121/48 -- -- 109 (!) 32 96 % -- --   04/17/22 0708 -- -- -- 117 28 90 % -- --   04/17/22 0700 (!) 126/51 100.1 °F (37.8 °C) Axillary 108 27 96 % -- --   04/17/22 0630 (!) 118/49 -- -- 108 27 95 % -- --   04/17/22 0600 (!) 125/49 -- -- 103 26 96 % -- --   04/17/22 0530 (!) 125/52 -- -- 106 23 97 % -- --   04/17/22 0503 (!) 112/46 -- -- 105 26 96 % -- 176 lb 12.9 oz (80.2 kg)             I have personally reviewed the past medical history, past surgical history, medications, social history, and family history, and I haveupdated the database accordingly. Allergies:   Advil [ibuprofen], Aleve [naproxen], Antipyrine, Celecoxib, Codeine, Fomepizole, Incruse ellipta [umeclidinium bromide], Other, Rofecoxib, Salicylates, Strawberry extract, Sulfinpyrazone, Aspirin, and Nsaids     Review of Systems:     Review of Systems  Intubated, unable to provide  Physical Examination :       Physical Exam  Constitutional:       Appearance: She is ill-appearing. Comments: Intubated   HENT:      Head: Normocephalic and atraumatic. Right Ear: External ear normal.      Left Ear: External ear normal.   Eyes:      General: No scleral icterus. Conjunctiva/sclera: Conjunctivae normal.   Cardiovascular:      Rate and Rhythm: Normal rate. Heart sounds: No murmur heard. Pulmonary:      Breath sounds: Wheezing present. Comments: Decreased breath sounds on the right side. Right-sided chest tube in place  Abdominal:      General: There is no distension. Palpations: Abdomen is soft. Musculoskeletal:      Cervical back: Neck supple. No rigidity. Right lower leg: No edema. Left lower leg: No edema. Skin:     Coloration: Skin is not jaundiced. Findings: No bruising.    Neurological:      Comments: Sedated         Past Medical History:     Past Medical History:   Diagnosis Date    Abnormal EKG     TRAM [facility]acute kidney injury) ([facility] [address] 2022    Anxiety     Asthma     Bipolar disorder ([facility] [address]     SEVERE IN , [place]    COPD (chronic obstructive pulmonary disease) (HCC)     Cramps, extremity     SEVERE LEG CRAMPS    Depression     Dilated bile duct     Headache     History of elective      Hyperlipidemia     Irritable bowel syndrome     Prolonged emergence from general anesthesia     SEVERE ISSUES WAKING UP    Substance abuse ([facility] [address]     street drugs when younger   [facility] Unspecified sleep apnea     Vision abnormalities     glasses       Past Surgical  History:     Past Surgical History:   Procedure Laterality Date    ANKLE SURGERY      HAD SCREWS AND HARDWARE, THEN REMOVED    COLONOSCOPY  02/15/2018    tubular adenoma    EYE SURGERY Bilateral     CATARACTS    HYSTEROSCOPY  10/19/2016    D & C    INDUCED       LASIK      bilateral    TONSILLECTOMY AND ADENOIDECTOMY Bilateral     VULVA SURGERY      HAD A BIOPSY AND REMOVAL OF       Medications:      methylPREDNISolone  40 mg IntraVENous Q6H    vancomycin  1,000 mg IntraVENous Q12H    sodium chloride flush  5-40 mL IntraVENous 2 times per day    cefepime  2,000 mg IntraVENous Q12H    risperiDONE  1.5 mg Oral Q12H    rivastigmine  4.5 mg Oral BID    atorvastatin  20 mg Oral Daily    traZODone  50 mg Oral Nightly    polyvinyl alcohol  1 drop Both Eyes Q4H    And    artificial tears   Both Eyes Q4H    chlorhexidine  15 mL Mouth/Throat BID    famotidine (PEPCID) injection  20 mg IntraVENous BID    ipratropium-albuterol  1 ampule Inhalation Q4H    insulin lispro  0-12 Units SubCUTAneous Q6H    metroNIDAZOLE  500 mg IntraVENous Q8H    vancomycin (VANCOCIN) intermittent dosing (placeholder)   Other RX Placeholder    heparin (porcine)  5,000 Units SubCUTAneous 3 times per day       Social History:     Social History     Socioeconomic History    Marital status:      Spouse name: Not on file    Number of children: Not on file    Years of education: Not on file    Highest education level: Not on file   Occupational History    Not on file   Tobacco Use    Smoking status: Former Smoker     Packs/day: 2.00     Years: 42.00     Pack years: 84.00     Types: Cigarettes     Quit date: 11/1/2018     Years since quitting: 3.4    Smokeless tobacco: Never Used   Substance and Sexual Activity    Alcohol use: Yes     Comment: yearly    Drug use: No    Sexual activity: Not Currently     Partners: Male     Birth control/protection: Post-menopausal   Other Topics Concern    Not on file   Social History Narrative    Not on file     Social Determinants of Health     Financial Resource Strain: High Risk    Difficulty of Paying Living Expenses: Very hard   Food Insecurity: No Food Insecurity    Worried About Running Out of Food in the Last Year: Never true    Agustin of Food in the Last Year: Never true   Transportation Needs:     Lack of Transportation (Medical): Not on file    Lack of Transportation (Non-Medical):  Not on file   Physical Activity:     Days of Exercise per Week: Not on file    Minutes of Exercise per Session: Not on file   Stress:     Feeling of Stress : Not on file   Social Connections:     Frequency of Communication with Friends and Family: Not on file    Frequency of Social Gatherings with Friends and Family: Not on file    Attends Yarsanism Services: Not on file    Active Member of Clubs or Organizations: Not on file    Attends Club or Organization Meetings: Not on file    Marital Status: Not on file   Intimate Partner Violence:     Fear of Current or Ex-Partner: Not on file    Emotionally Abused: Not on file    Physically Abused: Not on file    Sexually Abused: Not on file   Housing Stability:     Unable to Pay for Housing in the Last Year: Not on file    Number of Places Lived in the Last Year: Not on file    Unstable Housing in the Last Year: Not on file       Family History:     Family History   Problem Relation Age of Onset    Dementia Maternal Aunt     Kidney Disease Mother     Heart Attack Sister     Prostate Cancer Father     High Cholesterol Brother     Heart Attack Paternal Grandmother       Medical Decision Making:   I have independently reviewed/ordered the following labs:    CBC with Differential:   Recent Labs     04/16/22  1042 04/17/22  0526   WBC 18.5* 14.6*   HGB 9.7* 7.9*   HCT 31.2* 24.6*   * 431   LYMPHOPCT 2* 3*   MONOPCT 3 1     BMP:  Recent Labs     04/16/22  1042 04/17/22  0526    138   K 4.2 4.5    106   CO2 20 24   BUN 31* 22   CREATININE 0.87 0.64   MG 2.3  --      Hepatic Function Panel:   Recent Labs     04/16/22  1042 04/16/22  1225   PROT 7.0 7.0   LABALBU 2.8*  --    BILITOT 0.16*  --    ALKPHOS 137*  --    ALT 25  --    AST 19  --      No results for input(s): RPR in the last 72 hours. No results for input(s): HIV in the last 72 hours. No results for input(s): BC in the last 72 hours. Lab Results   Component Value Date    CREATININE 0.64 04/17/2022    GLUCOSE 121 04/17/2022    GLUCOSE 127 07/08/2021       Detailed results: Thank you for allowing us to participate in the care of this patient. Please call with questions. This note is created with the assistance of a speech recognition program.  While intending to generate adocument that actually reflects the content of the visit, the document can still have some errors including those of syntax and sound a like substitutions which may escape proof reading. It such instances, actual meaningcan be extrapolated by contextual diversion.     Uriah Knowles MD  Office: [phone]  Perfect serve / office [phone]

## 2022-04-17 NOTE — PROGRESS NOTES
[address] [facility] [employer]    PROGRESS NOTE             4/17/2022    7:12 AM    Name:   Kassandra Anguiano  MRN:     [id number]     [place]:      [de-identified]   Room:   2002/2002-01  IP Day:  1  Admit Date:  4/16/2022 10:25 AM    PCP:  April Oconnor MD  Code Status:  Full Code    Subjective:     C/C:   Chief Complaint   Patient presents with    Shortness of Breath     Interval History Status: not changed. Patient was seen and examined at beside, no acute events overnight. Tachypnea, tachycardia  Blood pressure stable  On BiPAP overnight FiO2 30%  WBC 14  Continues to be sedated on propofol  Received 4 doses of fentanyl overnight for coughing   Respiratory cultures gram-positive cocci in pairs  Continues to be on broad-spectrum IV Vanco, cefepime. Pulm added flagyl   Started Solumedrol 40mg IV q6h   Consult dietitian for TPN   Consult ID       Brief History:     The patient is a 74 y.o. Non- / non  female sever COPD 3L O2 baseline, bipolar, Hx of DVT/ PE, migraines, who presents with Shortness of Breath and cough  Patient was recently discharge from [facility] for mycoplasma pneumonia. She had a follow up appointment with PCP, patient was complaining of cough and chest pain, was started on Tessalon and nsaids for pain. However; she continued to be in distress and her daughter brought her to ED. She was hypoxic initially on 3L o2, was increased to 6L and continues to be hypoxic   Tachypneic, tachycardic  ABGs: pH 7.33, CO2 34, HCO3 18  WBC 18   Lactic 2.8> 1.5  Pancultures were sent  Chest x-ray: Moderate loculated right basal pneumothorax.  Evidence for tension.  Cavitarylesion noted in the right lung base  CT chest: Chronic clot material RUL, RLL. Thick-walled cavitary lesion RLL. Multiloculated pleural collection or hydropneumothorax posterior to the right lung, right chest tube in situ. Infiltrates of the right middle lobe.   Stellate 6 mm lateral RUL nodule. Mediastinal and right hilar lymphadenopathy  Was given 1 L bolus, IV cefepime, vancomycin, 125 mg Solu-Medrol and she is admitted to the hospital for the management of acute hypoxic respiratory failure due to pneumothorax and possible lung infection    Review of Systems:     Review of Systems   Unable to perform ROS: Intubated         Medications: Allergies:     Allergies   Allergen Reactions    Advil [Ibuprofen] Other (See Comments)     vomiting    Aleve [Naproxen] Other (See Comments)     vomiting    Antipyrine Other (See Comments)     unknown    Celecoxib Other (See Comments)    Codeine      headache    Fomepizole     Incruse Ellipta [Umeclidinium Bromide]      confusion    Other      GRASS, TREES, WEEDS    Rofecoxib Other (See Comments)     Unknown reaction    Salicylates      Unknown reaction     Strawberry Extract      HEADACHES    Sulfinpyrazone Other (See Comments)     Unknown reaction    Aspirin Nausea And Vomiting, Other (See Comments) and Nausea Only    Nsaids Nausea Only, Other (See Comments) and Nausea And Vomiting       Current Meds:   Scheduled Meds:    methylPREDNISolone  40 mg IntraVENous Q6H    sodium chloride flush  5-40 mL IntraVENous 2 times per day    cefepime  2,000 mg IntraVENous Q12H    risperiDONE  1.5 mg Oral Q12H    rivastigmine  4.5 mg Oral BID    atorvastatin  20 mg Oral Daily    traZODone  50 mg Oral Nightly    polyvinyl alcohol  1 drop Both Eyes Q4H    And    artificial tears   Both Eyes Q4H    chlorhexidine  15 mL Mouth/Throat BID    famotidine (PEPCID) injection  20 mg IntraVENous BID    ipratropium-albuterol  1 ampule Inhalation Q4H    insulin lispro  0-12 Units SubCUTAneous Q6H    metroNIDAZOLE  500 mg IntraVENous Q8H    vancomycin (VANCOCIN) intermittent dosing (placeholder)   Other RX Placeholder    vancomycin  1,500 mg IntraVENous Q24H    heparin (porcine)  5,000 Units SubCUTAneous 3 times per day     Continuous Infusions:  sodium chloride      dextrose      propofol 35 mcg/kg/min (22)    lactated ringers 100 mL/hr at 22 0443     PRN Meds: sodium chloride flush, sodium chloride, sodium chloride flush, potassium chloride **OR** potassium alternative oral replacement **OR** potassium chloride, glucose, dextrose, glucagon (rDNA), dextrose, fentanNYL    Data:     Past Medical History:   has a past medical history of Abnormal EKG, TRAM (acute kidney injury) ([facility] Anxiety, Asthma, Bipolar disorder ([facility] COPD (chronic obstructive pulmonary disease) ([facility] Cramps, extremity, Depression, Dilated bile duct, Headache, History of elective , Hyperlipidemia, Irritable bowel syndrome, Prolonged emergence from general anesthesia, Substance abuse ([facility] Unspecified sleep apnea, and Vision abnormalities. Social History:   reports that she quit smoking about 3 years ago. Her smoking use included cigarettes. She has a 84.00 pack-year smoking history. She has never used smokeless tobacco. She reports current alcohol use. She reports that she does not use drugs. Family History:   Family History   Problem Relation Age of Onset    Dementia Maternal Aunt     Kidney Disease Mother     Heart Attack Sister     Prostate Cancer Father     High Cholesterol Brother     Heart Attack Paternal Grandmother        Vitals:  BP (!) 118/49   Pulse 108   Temp 99.4 °F (37.4 °C) (Oral)   Resp 27   Wt 176 lb 12.9 oz (80.2 kg)   LMP 2013 (Exact Date)   SpO2 95%   BMI 29.42 kg/m²   Temp (24hrs), Av.3 °F (36.8 °C), Min:97.7 °F (36.5 °C), Max:99.4 °F (37.4 °C)    Recent Labs     22  1640 22  1938 22  0145   POCGLU 211* 124* 110*       I/O(24Hr):     Intake/Output Summary (Last 24 hours) at 2022 0712  Last data filed at 2022 0443  Gross per 24 hour   Intake 2611.69 ml   Output 1340 ml   Net 1271.69 ml       Labs:    CBC with Differential:    Lab Results   Component Value Date    WBC 14.6 04/17/2022    RBC 2.64 04/17/2022    RBC 0-2 07/08/2021    HGB 7.9 04/17/2022    HCT 24.6 04/17/2022     04/17/2022    MCV 93.3 04/17/2022    MCH 29.8 04/17/2022    MCHC 31.9 04/17/2022    RDW 16.7 04/17/2022    NRBC 0 07/08/2021    METASPCT 1 04/02/2022    LYMPHOPCT 3 04/17/2022    LYMPHOPCT 12.1 07/08/2021    MONOPCT 1 04/17/2022    MONOPCT 15.1 07/08/2021    BASOPCT 0 04/17/2022    BASOPCT 0.8 07/08/2021    MONOSABS 0.15 04/17/2022    MONOSABS 0.4 07/08/2021    LYMPHSABS 0.44 04/17/2022    LYMPHSABS 0.3 07/08/2021    EOSABS 0.00 04/17/2022    EOSABS 0.1 07/08/2021    BASOSABS 0.00 04/17/2022    DIFFTYPE NOT REPORTED 12/20/2021     BMP:    Lab Results   Component Value Date     04/17/2022    K 4.5 04/17/2022     04/17/2022    CO2 24 04/17/2022    BUN 22 04/17/2022    LABALBU 2.8 04/16/2022    LABALBU 3.6 07/08/2021    CREATININE 0.64 04/17/2022    CALCIUM 7.8 04/17/2022    GFRAA >60 04/17/2022    LABGLOM >60 04/17/2022    GLUCOSE 121 04/17/2022    GLUCOSE 127 07/08/2021       Lab Results   Component Value Date/Time    SPECIAL 8ML GREEN/2ML ORANGE RIGHT IJ 04/16/2022 12:12 PM     Lab Results   Component Value Date/Time    CULTURE PENDING 04/16/2022 04:16 PM         Radiology:    XR CHEST (SINGLE VIEW FRONTAL)    Result Date: 4/5/2022  EXAMINATION: ONE XRAY VIEW OF THE CHEST 4/5/2022 8:55 am COMPARISON: April 3, 2022 HISTORY: ORDERING SYSTEM PROVIDED HISTORY: pna TECHNOLOGIST PROVIDED HISTORY: pna Reason for Exam: pna FINDINGS: Stable position of the right internal jugular central venous catheter. Right infrahilar airspace opacity not significantly changed. No new focal lung abnormality. No sizable pleural effusion. No pneumothorax.      Stable right infrahilar airspace opacity     XR CHEST (SINGLE VIEW FRONTAL)    Result Date: 4/3/2022  EXAMINATION: ONE XRAY VIEW OF THE CHEST 4/3/2022 5:51 am COMPARISON: 1 April 2022 HISTORY: ORDERING SYSTEM PROVIDED HISTORY: sob, pleurisy, cough TECHNOLOGIST PROVIDED HISTORY: sob, pleurisy, cough Reason for Exam: Shortness of breath, pleurisy, cough Additional signs and symptoms: Shortness of breath, pleurisy, cough Relevant Medical/Surgical History: Shortness of breath, pleurisy, cough FINDINGS: AP portable view of the chest time stamped at 528 hours demonstrates overlying cardiac monitoring electrodes. Heart size is stable. Right internal jugular catheter terminates in the distal superior vena cava. There has been worsening of a focal opacity at the right lung base. Minimal left basilar opacity is unchanged. No extrapleural air or overt edema. No gross effusions. Worsening opacity at the right base favoring airspace disease. Change in minimal left basilar opacity which may be related atelectasis. XR CHEST PORTABLE    Result Date: 4/17/2022  EXAMINATION: ONE XRAY VIEW OF THE CHEST 4/17/2022 6:04 am COMPARISON: 04/16/2022, 1248 hours HISTORY: ORDERING SYSTEM PROVIDED HISTORY: ETT placement TECHNOLOGIST PROVIDED HISTORY: ETT placement Reason for Exam: ETT 56-year-old female with endotracheal tube placement FINDINGS: Endotracheal tube distal tip overlying the mid trachea approximately 4.8 cm above the level of the nimesh. Enteric tube traverses the GE junction with distal tip excluded from the field of view. Right IJ approach central venous catheter distal tip overlying the right atrium. Right-sided chest tube distal tip projects over the right hilum. Cardiac monitor leads overlie the chest. No obvious pneumothorax. No free air. Cardiac and mediastinal contours remain unchanged. Left lung is relatively clear. Persistent airspace disease at the right mid and right lower lung zones. Visualized osseous structures remain unchanged. Subcutaneous emphysema previously seen at the right lateral chest wall no longer identified. 1. Persistent airspace disease at the right mid and right lower lung zones. Follow-up is recommended to document resolution.  2. Tubes and right IJ line as detailed above. XR CHEST PORTABLE    Result Date: 4/16/2022  EXAMINATION: ONE XRAY VIEW OF THE CHEST 4/16/2022 1:10 pm COMPARISON: 04/16/2022, 1213 hours HISTORY: ORDERING SYSTEM PROVIDED HISTORY: chest tube TECHNOLOGIST PROVIDED HISTORY: chest tube Reason for Exam: chest tube Additional signs and symptoms: chest tube and NG tube placement 45-year-old female with history of NG tube and chest tube placement FINDINGS: Portable supine view of the chest. Right-sided chest tube has been placed with distal tip projecting over the right infrahilar region. Right IJ approach central venous catheter distal tip overlying the cavoatrial junction, stable. Endotracheal tube distal tip overlying the mid trachea approximately 4.3 cm above the level of the nimehs. Enteric tube traverses the GE junction with distal tip projecting over the left mid abdomen likely within the stomach body. Left lung is clear. Pleural thickening and opacity involving the right mid and right lower lung zones. Subcutaneous emphysema along the right lateral chest wall. Cardiac and mediastinal contours remain unchanged. Atherosclerotic calcification of the thoracic aorta. No free air. There is re-expansion of the right lung compared with the prior study. Probable small residual inferolateral right pneumothorax component. 1. Tubes and right IJ line as detailed above. Re-expansion of the right lung compared with the prior study. There is likely a small residual right inferolateral pneumothorax component. 2. Pleural thickening and airspace opacity involving the right mid and right lower lung zones. Follow-up is recommended to document resolution. 3. Subcutaneous emphysema along the right lateral chest wall. XR CHEST PORTABLE    Result Date: 4/16/2022  EXAMINATION: ONE XRAY VIEW OF THE CHEST 4/16/2022 12:24 pm COMPARISON: None.  HISTORY: ORDERING SYSTEM PROVIDED HISTORY: Intubation TECHNOLOGIST PROVIDED HISTORY: Intubation Reason for Exam: central line and ET placement FINDINGS: ET tube terminates 3 cm above the nimesh. Right line terminates in the SVC. There is a relatively stable right-sided basilar pneumothorax. The remaining lungs are unchanged. Cardiac silhouette and osseous structures unchanged. Intubation as above. No significant change     XR CHEST PORTABLE    Result Date: 4/16/2022  EXAMINATION: ONE XRAY VIEW OF THE CHEST 4/16/2022 11:02 am COMPARISON: 04/05/2022 HISTORY: ORDERING SYSTEM PROVIDED HISTORY: SOB TECHNOLOGIST PROVIDED HISTORY: SOB Reason for Exam: SOB FINDINGS: There is a moderate loculated right basal pneumothorax. Cavitary lesion is noted in the right lung base. Left lung is unremarkable. Heart and mediastinal structures appear normal.  There is no evidence for any tension phenomena. Moderate loculated right basal pneumothorax. Evidence for tension. Cavitary lesion noted in the right lung base. .  Case discussed with Dr. Burak Reddy. XR CHEST PORTABLE    Result Date: 4/1/2022  EXAMINATION: ONE XRAY VIEW OF THE CHEST 4/1/2022 4:01 pm COMPARISON: 04/01/2022 HISTORY: ORDERING SYSTEM PROVIDED HISTORY: central line placed TECHNOLOGIST PROVIDED HISTORY: central line placed Reason for Exam: Central line placed FINDINGS: There is a right internal jugular central line in place with distal tip overlying the superior vena cava. Previously noted right basal infiltrate is unchanged. Left lung appears clear. The heart and mediastinal structures appear normal.  There is no evidence of pneumothorax. Satisfactory position of right internal jugular central line. No change in the the right basal infiltrate. XR CHEST PORTABLE    Result Date: 4/1/2022  EXAMINATION: ONE XRAY VIEW OF THE CHEST 4/1/2022 1:22 pm COMPARISON: 06/17/2020. CT dated 10/15/2021.  HISTORY: ORDERING SYSTEM PROVIDED HISTORY: dyspnea TECHNOLOGIST PROVIDED HISTORY: dyspnea Reason for Exam: Dyspnea Relevant Medical/Surgical History: Hx of COPD FINDINGS: The cardiac silhouette appears within normal limits. There are bibasilar opacities, right greater than left which may reflect multifocal pneumonia in the correct clinical setting. No evidence of pleural effusion or pneumothorax is seen. Bibasilar opacities, right greater than left, which may reflect multifocal pneumonia. Follow-up to imaging resolution is recommended. CT CHEST PULMONARY EMBOLISM W CONTRAST    Result Date: 4/16/2022  EXAMINATION: CTA OF THE CHEST 4/16/2022 2:30 pm TECHNIQUE: CTA of the chest was performed after the administration of intravenous contrast.  Multiplanar reformatted images are provided for review. MIP images are provided for review. Dose modulation, iterative reconstruction, and/or weight based adjustment of the mA/kV was utilized to reduce the radiation dose to as low as reasonably achievable. COMPARISON: CT chest from 10/15/2021 HISTORY: ORDERING SYSTEM PROVIDED HISTORY: SOB TECHNOLOGIST PROVIDED HISTORY: SOB Decision Support Exception - unselect if not a suspected or confirmed emergency medical condition->Emergency Medical Condition (MA) Reason for Exam: sob Relevant Medical/Surgical History: intubated, septic, hx of PE 72-year-old female with shortness of breath FINDINGS: Pulmonary Arteries: No obvious filling defect in the main, right main, or left main pulmonary arteries. Evaluation of the segmental and subsegmental pulmonary arterial vasculature is limited due to respiratory motion suboptimal bolus timing, airspace disease, and streak artifact. There are areas of probable residual linear chronic clot within the right lower lobar pulmonary artery on image 111, series 2. Probable linear residual clot within the anterior right upper lobe pulmonary artery on image 83, series 2. Mediastinum: Atherosclerotic calcification of the aorta and branch vasculature. No dissection flap within the visualized thoracic aorta. No pericardial effusion. There is fluid in the superior pericardial recess. Enlarged precarinal lymph nodes remain unchanged on image 83, series 2. Right hilar lymphadenopathy is seen on image 96, series 2. Coronary artery disease. No left hilar or axillary lymphadenopathy. Lungs/pleura: Endotracheal tube distal tip within the lower trachea. Trachea and very proximal central airways appear patent. Narrowing of the right middle lobe airway into a region of partial consolidation/atelectasis/scarring. Opacification of the right lower lobar segmental airways. There is a thick-walled cavitary lesion in the right lower lobe measuring 5.5 x 7.3 cm in greatest AP and transverse dimensions on image 68, series 4. There is a dependent air-fluid level within this lesion. This area measures 7.6 cm in greatest craniocaudal extent on image 48, series 602. There is surrounding airspace disease. There is a gas and fluid containing multiloculated pleural collection or hydropneumothorax along the posterior margin of the right lung. Right sided chest tube distal tip along the anteromedial right lung. Subcutaneous emphysema along the right axilla and right lateral chest wall. Stellate pulmonary nodule in the lateral right upper lobe measuring 6 mm on image 32, series 4. Moderate to severe emphysema, dependent atelectasis and respiratory motion. Upper Abdomen: Fatty liver. NG tube is seen extending into the stomach body. Atherosclerotic calcification of the upper abdominal aorta and branch vasculature. Soft Tissues/Bones: Chronic anterior wedge compression deformities, unchanged from 10/15/2021 involving T5 and T6. Mild diffuse degenerative changes throughout the spine. 1. Linear filling defects in the anterior right upper lobe and right lower lobe pulmonary arteries likely representing residual/chronic clot material. No acute central filling defect/pulmonary embolus is evident.  2. Thick-walled cavitary lesion in the right lower lobe measuring up to 5.5 x 7.3 x 7.6 cm which could be related to TB or fungal disease or a necrotizing pneumonia. Underlying cavitary malignancy not entirely excluded. There is surrounding airspace disease. There is also an associated multiloculated pleural collection or hydropneumothorax primarily along the posterior margin of the right lung. Right-sided chest tube distal tip along the anteromedial right pleura/lung. 3. Partial consolidation, atelectasis and/or infiltrate of the right middle lobe. 4. Stellate 6 mm lateral right upper lobe pulmonary nodule. Follow-up guidelines provided below. 5. Underlying moderate to severe emphysema. 6. Endotracheal and NG tubes as above. 7. Coronary artery disease. 8. Chronic anterior wedge compression deformities of T5 and T6. 9. Subcutaneous emphysema along the right axilla and right lateral chest wall likely related to chest tube. 10. Mediastinal and right hilar lymphadenopathy. RECOMMENDATIONS: 6 mm suspicious right solid pulmonary nodule within the upper lobe. Recommend a non-contrast Chest CT at 6-12 months, then another non-contrast Chest CT at 18-24 months. These guidelines do not apply to immunocompromised patients and patients with cancer. Follow up in patients with significant comorbidities as clinically warranted. For lung cancer screening, adhere to Lung-RADS guidelines. Reference: Radiology. 2017; 284(1):228-43. [place] MODIFIED BARIUM SWALLOW W VIDEO    Result Date: 4/4/2022  EXAMINATION: MODIFIED BARIUM SWALLOW WAS PERFORMED IN CONJUNCTION WITH SPEECH PATHOLOGY SERVICES TECHNIQUE: Fluoroscopic evaluation of the swallowing mechanism was performed using cineradiography with multiple consistency of barium product in conjunction with speech pathology services.  FLUOROSCOPY DOSE AND TYPE OR TIME AND EXPOSURES: DAP 49.2 dGy cm squared COMPARISON: None HISTORY: ORDERING SYSTEM PROVIDED HISTORY: aspiration pna TECHNOLOGIST PROVIDED HISTORY: aspiration pna Reason for Exam: aspiration pneumonia FINDINGS: Premature vallecular spillage. There was trace penetration with straw with thin liquid. No aspiration. No aspiration. Trace penetration with straw with thin liquids. Please see separate speech pathology report for full discussion of findings and recommendations. RECOMMENDATIONS: Unavailable         Physical Examination:        Physical Exam  Eyes:      General: No scleral icterus. Pupils: Pupils are equal, round, and reactive to light. Neck:      Vascular: No carotid bruit. Cardiovascular:      Rate and Rhythm: Regular rhythm. Tachycardia present. Pulses: Normal pulses. Heart sounds: No murmur heard. No friction rub. No gallop. Pulmonary:      Effort: Respiratory distress present. Breath sounds: Wheezing (Bilateral) and rales (Right-sided) present. Abdominal:      General: Abdomen is flat. Bowel sounds are normal.      Palpations: Abdomen is soft. Musculoskeletal:      Right lower leg: No edema. Left lower leg: No edema. Skin:     Capillary Refill: Capillary refill takes less than 2 seconds.    Neurological:      Comments: Sedated on propofol           Assessment:        Primary Problem  Sepsis [facility])    Active Hospital Problems    Diagnosis Date Noted    Sepsis ([facility] [A41.9] 04/16/2022    Acute on chronic respiratory failure ([facility] [J96.20] 04/16/2022    Cavitary lesion of lung [J98.4] 04/16/2022    History of DVT (deep vein thrombosis) [Z86.718] 04/16/2022    Pneumothorax, right [J93.9] 04/16/2022    Status post chest tube placement ([address] [Z93.8] 04/16/2022    History of pulmonary embolus (PE) [Z86.711] 04/02/2022    Chronic respiratory failure with hypoxia (HCC) [J96.11] 08/05/2021    Compression fracture of body of thoracic vertebra ([facility] [S22.000A] 09/28/2020    Lung nodule [R91.1] 08/22/2018    Bipolar disorder ([address] [F31.9]     Chronic obstructive pulmonary disease ([address] [J44.9] 01/06/2016       Plan:        Sepsis possible lung infection  Tachypnea, tachycardia  WBC 18  Pro-Branden>100    Lactate 2.8> 1.5  Chest x-ray: Moderate loculated right basal pneumothorax.  Evidence for tension.  Cavitarylesion noted in the right lung base  CT chest: Thick-walled cavitary lesion RLL. Right chest tube in situ for posterior right lung or right pneumothorax. Infiltrates of the right middle lobe. Stellate 6 mm lateral RUL nodule. Mediastinal and right hilar lymphadenopathy  Received 1 L bolus  100 mL/H lactated Ringer  Broad-spectrum antibiotic with cefepime, vancomycin  Critical care following  Consult ID  Repeat pro-Branden >100  Respiratory cultures gram-positive cocci in pairs  Blood cultures NGTD  Urine culture in process      Acute on chronic respiratory failure due pneumothorax  -History of severe COPD - 3 L home O2 baseline  -On O2 at home, 3L  -CXR right pneumothorax  -S/p intubation and right chest tube placement  -pH 7.33, CO2 34, HCO3 18  -pH7.32, PCO2 84, HCO3 25 today  -Sedated with propofol  -Continue DuoNeb  -Hold Breo, Spiriva  Bipap overnight FiO2 30%  - Started Solumedrol 40mg q6h  - Insuline sliding scale     Hx DVT/ PE -Eliquis was discontinued in 12/2021  History bipolar disorder: Trazodone, Risperidone      IVF: Lactated Ringer 100 mL/H  Diet: NPO, Consult dietitian for TPN  GI ppx: Pepcid 20 mg twice daily IV  DVT ppx: enoxaparin 40mg daily Switched to Heparin 5000 units TID   Code status: Full code  Consults: pulm, ID  Dispo: Keep in ICU    Zaid Yoon MD  4/17/2022  7:12 AM     Attending Physician Statement  I have discussed the care of Zack Barahona with the resident team. I have examined the patient myself and taken ros and hpi , including pertinent history and exam findings,  with the resident. I have reviewed the key elements of all parts of the encounter with the resident. I agree with the assessment, plan and orders as documented by the resident.     Principal Problem:    Sepsis ([address]  Active Problems:    Chronic obstructive pulmonary disease (HCC)    Bipolar disorder (HCC)    Lung nodule    Compression fracture of body of thoracic vertebra (HCC)    Chronic respiratory failure with hypoxia (HCC)    History of pulmonary embolus (PE)    Acute on chronic respiratory failure (HCC)    Cavitary lesion of lung    History of DVT (deep vein thrombosis)    Pneumothorax, right    Status post chest tube placement [facility])  Resolved Problems:    * No resolved hospital problems. *    Improved vent requirements  Fever 100.1 this morning, leukocytosis rule  Continuing with cefepime Vancbyron Flagyl has been added, ID input awaited  Right-sided pneumothorax resolved, chest tube in situ, no further air leak  Will continue on antibiotics, appreciate pulm recommendation  Plan for bronchoscopy for cavitary lesion once patient more stable    Significant drop in hemoglobin approximately two-point since yesterday, will repeat H&H. Patient continues to be unstable and has risk of sudden deterioration requiring highest level of care. Patient seen in ICU. Critical care time spent 35 minutes.       Electronically signed by Kim Castillo MD

## 2022-04-17 NOTE — PLAN OF CARE
Problem: SKIN INTEGRITY  Goal: Skin integrity is maintained or improved  Outcome: Ongoing     Problem: MECHANICAL VENTILATION  Goal: Mobility/activity is maintained at optimum level for patient  Outcome: Ongoing     Problem: MECHANICAL VENTILATION  Goal: Ability to express needs and understand communication  Outcome: Ongoing     Problem: MECHANICAL VENTILATION  Goal: ET tube will be managed safely  Outcome: Ongoing     Problem: MECHANICAL VENTILATION  Goal: Oral health is maintained or improved  Outcome: Ongoing     Problem: MECHANICAL VENTILATION  Goal: Patient will maintain patent airway  Outcome: Ongoing     Problem: OXYGENATION/RESPIRATORY FUNCTION  Goal: Patient will achieve/maintain normal respiratory rate/effort  Description: Respiratory rate and effort will be within normal limits for the patient  Outcome: Ongoing     Problem: OXYGENATION/RESPIRATORY FUNCTION  Goal: Patient will maintain patent airway  Outcome: Ongoing   BRONCHOSPASM/BRONCHOCONSTRICTION     [x]         IMPROVE AERATION/BREATH SOUNDS  [x]   ADMINISTER BRONCHODILATOR THERAPY AS APPROPRIATE  [x]   ASSESS BREATH SOUNDS  [x]   IMPLEMENT AEROSOL/MDI PROTOCOL  [x]   PATIENT EDUCATION AS NEEDED

## 2022-04-18 ENCOUNTER — APPOINTMENT (OUTPATIENT)
Dept: NON INVASIVE DIAGNOSTICS | Age: 67
DRG: 720 | End: 2022-04-18
Payer: COMMERCIAL

## 2022-04-18 ENCOUNTER — APPOINTMENT (OUTPATIENT)
Dept: GENERAL RADIOLOGY | Age: 67
DRG: 720 | End: 2022-04-18
Payer: COMMERCIAL

## 2022-04-18 ENCOUNTER — APPOINTMENT (OUTPATIENT)
Dept: VASCULAR LAB | Age: 67
DRG: 720 | End: 2022-04-18
Payer: COMMERCIAL

## 2022-04-18 LAB
ABSOLUTE BANDS #: 0.42 K/UL (ref 0–1)
ABSOLUTE EOS #: 0 K/UL (ref 0–0.4)
ABSOLUTE LYMPH #: 0.95 K/UL (ref 1–4.8)
ABSOLUTE MONO #: 0.11 K/UL (ref 0.1–1.3)
ALLEN TEST: ABNORMAL
ANION GAP SERPL CALCULATED.3IONS-SCNC: 8 MMOL/L (ref 9–17)
BANDS: 4 % (ref 0–10)
BASO FLUID: ABNORMAL %
BASOPHILS # BLD: 0 % (ref 0–2)
BASOPHILS ABSOLUTE: 0 K/UL (ref 0–0.2)
BUN BLDV-MCNC: 21 MG/DL (ref 8–23)
CALCIUM IONIZED: 1.08 MMOL/L (ref 1.13–1.33)
CALCIUM SERPL-MCNC: 8.1 MG/DL (ref 8.6–10.4)
CARBOXYHEMOGLOBIN: 0.3 % (ref 0–5)
CHLORIDE BLD-SCNC: 106 MMOL/L (ref 98–107)
CO2: 24 MMOL/L (ref 20–31)
CREAT SERPL-MCNC: 0.57 MG/DL (ref 0.5–0.9)
EKG ATRIAL RATE: 109 BPM
EKG P AXIS: 75 DEGREES
EKG P-R INTERVAL: 166 MS
EKG Q-T INTERVAL: 318 MS
EKG QRS DURATION: 92 MS
EKG QTC CALCULATION (BAZETT): 428 MS
EKG R AXIS: -101 DEGREES
EKG T AXIS: 54 DEGREES
EKG VENTRICULAR RATE: 109 BPM
EOSINOPHIL FLUID: ABNORMAL %
EOSINOPHILS RELATIVE PERCENT: 0 % (ref 0–4)
FIO2: 30
FLUID DIFF COMMENT: ABNORMAL
GFR AFRICAN AMERICAN: >60 ML/MIN
GFR NON-AFRICAN AMERICAN: >60 ML/MIN
GFR SERPL CREATININE-BSD FRML MDRD: ABNORMAL ML/MIN/{1.73_M2}
GLUCOSE BLD-MCNC: 143 MG/DL (ref 65–105)
GLUCOSE BLD-MCNC: 161 MG/DL (ref 65–105)
GLUCOSE BLD-MCNC: 166 MG/DL (ref 65–105)
GLUCOSE BLD-MCNC: 185 MG/DL (ref 65–105)
GLUCOSE BLD-MCNC: 188 MG/DL (ref 65–105)
GLUCOSE BLD-MCNC: 194 MG/DL (ref 70–99)
HCO3 ARTERIAL: 26.3 MMOL/L (ref 22–26)
HCT VFR BLD CALC: 26.6 % (ref 36–46)
HEMOGLOBIN: 8.7 G/DL (ref 12–16)
LV EF: 48 %
LVEF MODALITY: NORMAL
LYMPHOCYTES # BLD: 9 % (ref 24–44)
LYMPHOCYTES, BODY FLUID: 20 %
MCH RBC QN AUTO: 30.8 PG (ref 26–34)
MCHC RBC AUTO-ENTMCNC: 32.8 G/DL (ref 31–37)
MCV RBC AUTO: 93.8 FL (ref 80–100)
METHEMOGLOBIN: 1.2 % (ref 0–1.9)
MODE: ABNORMAL
MONOCYTE, FLUID: ABNORMAL %
MONOCYTES # BLD: 1 % (ref 1–7)
MORPHOLOGY: ABNORMAL
NEUTROPHIL, FLUID: 69 %
O2 DEVICE/FLOW/%: ABNORMAL
O2 SAT, ARTERIAL: 93.5 % (ref 95–98)
OTHER CELLS FLUID: 11 %
PATIENT TEMP: 37
PCO2 ARTERIAL: 45.9 MMHG (ref 35–45)
PDW BLD-RTO: 16.3 % (ref 11.5–14.9)
PEEP/CPAP: 8
PH ARTERIAL: 7.37 (ref 7.35–7.45)
PLATELET # BLD: 366 K/UL (ref 150–450)
PMV BLD AUTO: 7 FL (ref 6–12)
PO2 ARTERIAL: 72.9 MMHG (ref 80–100)
POSITIVE BASE EXCESS, ART: 0.9 MMOL/L (ref 0–2)
POTASSIUM SERPL-SCNC: 4.6 MMOL/L (ref 3.7–5.3)
PT. POSITION: ABNORMAL
RBC # BLD: 2.84 M/UL (ref 4–5.2)
RESPIRATORY RATE: 24
SAMPLE SITE: ABNORMAL
SEG NEUTROPHILS: 86 % (ref 36–66)
SEGMENTED NEUTROPHILS ABSOLUTE COUNT: 9.12 K/UL (ref 1.3–9.1)
SET RATE: 24
SODIUM BLD-SCNC: 138 MMOL/L (ref 135–144)
TEXT FOR RESPIRATORY: ABNORMAL
TOTAL RATE: 26
TRIGL SERPL-MCNC: 97 MG/DL
VT: 450
WBC # BLD: 10.6 K/UL (ref 3.5–11)

## 2022-04-18 PROCEDURE — 82947 ASSAY GLUCOSE BLOOD QUANT: CPT

## 2022-04-18 PROCEDURE — 88312 SPECIAL STAINS GROUP 1: CPT

## 2022-04-18 PROCEDURE — 87102 FUNGUS ISOLATION CULTURE: CPT

## 2022-04-18 PROCEDURE — 0BJ08ZZ INSPECTION OF TRACHEOBRONCHIAL TREE, VIA NATURAL OR ARTIFICIAL OPENING ENDOSCOPIC: ICD-10-PCS | Performed by: INTERNAL MEDICINE

## 2022-04-18 PROCEDURE — 87106 FUNGI IDENTIFICATION YEAST: CPT

## 2022-04-18 PROCEDURE — 93010 ELECTROCARDIOGRAM REPORT: CPT | Performed by: INTERNAL MEDICINE

## 2022-04-18 PROCEDURE — 99233 SBSQ HOSP IP/OBS HIGH 50: CPT | Performed by: INTERNAL MEDICINE

## 2022-04-18 PROCEDURE — 88112 CYTOPATH CELL ENHANCE TECH: CPT

## 2022-04-18 PROCEDURE — 87186 SC STD MICRODIL/AGAR DIL: CPT

## 2022-04-18 PROCEDURE — 71045 X-RAY EXAM CHEST 1 VIEW: CPT

## 2022-04-18 PROCEDURE — 87116 MYCOBACTERIA CULTURE: CPT

## 2022-04-18 PROCEDURE — 2580000003 HC RX 258: Performed by: INTERNAL MEDICINE

## 2022-04-18 PROCEDURE — 6360000002 HC RX W HCPCS: Performed by: INTERNAL MEDICINE

## 2022-04-18 PROCEDURE — 2580000003 HC RX 258: Performed by: STUDENT IN AN ORGANIZED HEALTH CARE EDUCATION/TRAINING PROGRAM

## 2022-04-18 PROCEDURE — A4216 STERILE WATER/SALINE, 10 ML: HCPCS | Performed by: INTERNAL MEDICINE

## 2022-04-18 PROCEDURE — 94640 AIRWAY INHALATION TREATMENT: CPT

## 2022-04-18 PROCEDURE — 36600 WITHDRAWAL OF ARTERIAL BLOOD: CPT

## 2022-04-18 PROCEDURE — 93306 TTE W/DOPPLER COMPLETE: CPT

## 2022-04-18 PROCEDURE — 94761 N-INVAS EAR/PLS OXIMETRY MLT: CPT

## 2022-04-18 PROCEDURE — 6360000002 HC RX W HCPCS: Performed by: STUDENT IN AN ORGANIZED HEALTH CARE EDUCATION/TRAINING PROGRAM

## 2022-04-18 PROCEDURE — 6370000000 HC RX 637 (ALT 250 FOR IP)

## 2022-04-18 PROCEDURE — 80048 BASIC METABOLIC PNL TOTAL CA: CPT

## 2022-04-18 PROCEDURE — 82330 ASSAY OF CALCIUM: CPT

## 2022-04-18 PROCEDURE — 87015 SPECIMEN INFECT AGNT CONCNTJ: CPT

## 2022-04-18 PROCEDURE — 84478 ASSAY OF TRIGLYCERIDES: CPT

## 2022-04-18 PROCEDURE — 6370000000 HC RX 637 (ALT 250 FOR IP): Performed by: INTERNAL MEDICINE

## 2022-04-18 PROCEDURE — 85025 COMPLETE CBC W/AUTO DIFF WBC: CPT

## 2022-04-18 PROCEDURE — 99291 CRITICAL CARE FIRST HOUR: CPT | Performed by: INTERNAL MEDICINE

## 2022-04-18 PROCEDURE — 87205 SMEAR GRAM STAIN: CPT

## 2022-04-18 PROCEDURE — 87206 SMEAR FLUORESCENT/ACID STAI: CPT

## 2022-04-18 PROCEDURE — 36415 COLL VENOUS BLD VENIPUNCTURE: CPT

## 2022-04-18 PROCEDURE — 93970 EXTREMITY STUDY: CPT

## 2022-04-18 PROCEDURE — 88305 TISSUE EXAM BY PATHOLOGIST: CPT

## 2022-04-18 PROCEDURE — 82805 BLOOD GASES W/O2 SATURATION: CPT

## 2022-04-18 PROCEDURE — 2700000000 HC OXYGEN THERAPY PER DAY

## 2022-04-18 PROCEDURE — 87077 CULTURE AEROBIC IDENTIFY: CPT

## 2022-04-18 PROCEDURE — 2000000000 HC ICU R&B

## 2022-04-18 PROCEDURE — 94003 VENT MGMT INPAT SUBQ DAY: CPT

## 2022-04-18 PROCEDURE — 87070 CULTURE OTHR SPECIMN AEROBIC: CPT

## 2022-04-18 PROCEDURE — 2500000003 HC RX 250 WO HCPCS: Performed by: INTERNAL MEDICINE

## 2022-04-18 RX ORDER — ACETYLCYSTEINE 200 MG/ML
200 SOLUTION ORAL; RESPIRATORY (INHALATION) EVERY 4 HOURS
Status: DISCONTINUED | OUTPATIENT
Start: 2022-04-18 | End: 2022-04-29

## 2022-04-18 RX ORDER — ALBUTEROL SULFATE 2.5 MG/3ML
2.5 SOLUTION RESPIRATORY (INHALATION) EVERY 4 HOURS
Status: DISCONTINUED | OUTPATIENT
Start: 2022-04-18 | End: 2022-05-03 | Stop reason: HOSPADM

## 2022-04-18 RX ADMIN — POLYVINYL ALCOHOL 1 DROP: 14 SOLUTION/ DROPS OPHTHALMIC at 22:24

## 2022-04-18 RX ADMIN — ALBUTEROL SULFATE 2.5 MG: 2.5 SOLUTION RESPIRATORY (INHALATION) at 15:15

## 2022-04-18 RX ADMIN — FAMOTIDINE 20 MG: 10 INJECTION INTRAVENOUS at 08:14

## 2022-04-18 RX ADMIN — ACETYLCYSTEINE 200 MG: 200 SOLUTION ORAL; RESPIRATORY (INHALATION) at 15:16

## 2022-04-18 RX ADMIN — PROPOFOL 45 MCG/KG/MIN: 10 INJECTION, EMULSION INTRAVENOUS at 20:07

## 2022-04-18 RX ADMIN — POLYVINYL ALCOHOL 1 DROP: 14 SOLUTION/ DROPS OPHTHALMIC at 02:15

## 2022-04-18 RX ADMIN — POLYVINYL ALCOHOL 1 DROP: 14 SOLUTION/ DROPS OPHTHALMIC at 11:22

## 2022-04-18 RX ADMIN — FAMOTIDINE 20 MG: 10 INJECTION INTRAVENOUS at 20:00

## 2022-04-18 RX ADMIN — POLYVINYL ALCOHOL 1 DROP: 14 SOLUTION/ DROPS OPHTHALMIC at 18:27

## 2022-04-18 RX ADMIN — MINERAL OIL, PETROLATUM: 425; 568 OINTMENT OPHTHALMIC at 08:14

## 2022-04-18 RX ADMIN — METRONIDAZOLE 500 MG: 500 INJECTION, SOLUTION INTRAVENOUS at 00:05

## 2022-04-18 RX ADMIN — CHLORHEXIDINE GLUCONATE 0.12% ORAL RINSE 15 ML: 1.2 LIQUID ORAL at 08:13

## 2022-04-18 RX ADMIN — ATORVASTATIN CALCIUM 20 MG: 20 TABLET, FILM COATED ORAL at 08:13

## 2022-04-18 RX ADMIN — IPRATROPIUM BROMIDE AND ALBUTEROL SULFATE 1 AMPULE: .5; 2.5 SOLUTION RESPIRATORY (INHALATION) at 03:18

## 2022-04-18 RX ADMIN — CEFEPIME HYDROCHLORIDE 2000 MG: 2 INJECTION, POWDER, FOR SOLUTION INTRAVENOUS at 08:18

## 2022-04-18 RX ADMIN — ALBUTEROL SULFATE 2.5 MG: 2.5 SOLUTION RESPIRATORY (INHALATION) at 23:11

## 2022-04-18 RX ADMIN — MINERAL OIL, PETROLATUM: 425; 568 OINTMENT OPHTHALMIC at 13:32

## 2022-04-18 RX ADMIN — CEFEPIME HYDROCHLORIDE 2000 MG: 2 INJECTION, POWDER, FOR SOLUTION INTRAVENOUS at 17:44

## 2022-04-18 RX ADMIN — FENTANYL CITRATE 50 MCG: 50 INJECTION, SOLUTION INTRAMUSCULAR; INTRAVENOUS at 11:30

## 2022-04-18 RX ADMIN — MINERAL OIL, PETROLATUM: 425; 568 OINTMENT OPHTHALMIC at 05:05

## 2022-04-18 RX ADMIN — ACETYLCYSTEINE 200 MG: 200 SOLUTION ORAL; RESPIRATORY (INHALATION) at 19:54

## 2022-04-18 RX ADMIN — IPRATROPIUM BROMIDE AND ALBUTEROL SULFATE 1 AMPULE: .5; 2.5 SOLUTION RESPIRATORY (INHALATION) at 06:58

## 2022-04-18 RX ADMIN — PROPOFOL 35 MCG/KG/MIN: 10 INJECTION, EMULSION INTRAVENOUS at 08:23

## 2022-04-18 RX ADMIN — POLYVINYL ALCOHOL 1 DROP: 14 SOLUTION/ DROPS OPHTHALMIC at 06:15

## 2022-04-18 RX ADMIN — INSULIN LISPRO 2 UNITS: 100 INJECTION, SOLUTION INTRAVENOUS; SUBCUTANEOUS at 20:02

## 2022-04-18 RX ADMIN — SODIUM CHLORIDE, PRESERVATIVE FREE 10 ML: 5 INJECTION INTRAVENOUS at 20:01

## 2022-04-18 RX ADMIN — PROPOFOL 35 MCG/KG/MIN: 10 INJECTION, EMULSION INTRAVENOUS at 02:05

## 2022-04-18 RX ADMIN — METHYLPREDNISOLONE SODIUM SUCCINATE 40 MG: 40 INJECTION, POWDER, FOR SOLUTION INTRAMUSCULAR; INTRAVENOUS at 19:59

## 2022-04-18 RX ADMIN — IPRATROPIUM BROMIDE AND ALBUTEROL SULFATE 1 AMPULE: .5; 2.5 SOLUTION RESPIRATORY (INHALATION) at 11:02

## 2022-04-18 RX ADMIN — METHYLPREDNISOLONE SODIUM SUCCINATE 40 MG: 40 INJECTION, POWDER, FOR SOLUTION INTRAMUSCULAR; INTRAVENOUS at 13:31

## 2022-04-18 RX ADMIN — ACETYLCYSTEINE 200 MG: 200 SOLUTION ORAL; RESPIRATORY (INHALATION) at 23:11

## 2022-04-18 RX ADMIN — MINERAL OIL, PETROLATUM: 425; 568 OINTMENT OPHTHALMIC at 17:39

## 2022-04-18 RX ADMIN — METRONIDAZOLE 500 MG: 500 INJECTION, SOLUTION INTRAVENOUS at 17:31

## 2022-04-18 RX ADMIN — RIVASTIGMINE TARTRATE 4.5 MG: 1.5 CAPSULE ORAL at 08:14

## 2022-04-18 RX ADMIN — INSULIN LISPRO 2 UNITS: 100 INJECTION, SOLUTION INTRAVENOUS; SUBCUTANEOUS at 08:42

## 2022-04-18 RX ADMIN — MINERAL OIL, PETROLATUM: 425; 568 OINTMENT OPHTHALMIC at 01:14

## 2022-04-18 RX ADMIN — METHYLPREDNISOLONE SODIUM SUCCINATE 40 MG: 40 INJECTION, POWDER, FOR SOLUTION INTRAMUSCULAR; INTRAVENOUS at 08:13

## 2022-04-18 RX ADMIN — METHYLPREDNISOLONE SODIUM SUCCINATE 40 MG: 40 INJECTION, POWDER, FOR SOLUTION INTRAMUSCULAR; INTRAVENOUS at 01:22

## 2022-04-18 RX ADMIN — MINERAL OIL, PETROLATUM: 425; 568 OINTMENT OPHTHALMIC at 20:30

## 2022-04-18 RX ADMIN — SODIUM CHLORIDE, PRESERVATIVE FREE 10 ML: 5 INJECTION INTRAVENOUS at 08:15

## 2022-04-18 RX ADMIN — ALBUTEROL SULFATE 2.5 MG: 2.5 SOLUTION RESPIRATORY (INHALATION) at 19:53

## 2022-04-18 RX ADMIN — CHLORHEXIDINE GLUCONATE 0.12% ORAL RINSE 15 ML: 1.2 LIQUID ORAL at 20:00

## 2022-04-18 RX ADMIN — HEPARIN SODIUM 5000 UNITS: 5000 INJECTION INTRAVENOUS; SUBCUTANEOUS at 13:32

## 2022-04-18 RX ADMIN — PROPOFOL 45 MCG/KG/MIN: 10 INJECTION, EMULSION INTRAVENOUS at 13:07

## 2022-04-18 RX ADMIN — CEFEPIME HYDROCHLORIDE 2000 MG: 2 INJECTION, POWDER, FOR SOLUTION INTRAVENOUS at 01:24

## 2022-04-18 RX ADMIN — INSULIN LISPRO 2 UNITS: 100 INJECTION, SOLUTION INTRAVENOUS; SUBCUTANEOUS at 13:33

## 2022-04-18 RX ADMIN — METRONIDAZOLE 500 MG: 500 INJECTION, SOLUTION INTRAVENOUS at 08:17

## 2022-04-18 RX ADMIN — FENTANYL CITRATE 50 MCG: 50 INJECTION, SOLUTION INTRAMUSCULAR; INTRAVENOUS at 11:58

## 2022-04-18 RX ADMIN — INSULIN LISPRO 2 UNITS: 100 INJECTION, SOLUTION INTRAVENOUS; SUBCUTANEOUS at 01:22

## 2022-04-18 RX ADMIN — HEPARIN SODIUM 5000 UNITS: 5000 INJECTION INTRAVENOUS; SUBCUTANEOUS at 22:19

## 2022-04-18 RX ADMIN — PROPOFOL 45 MCG/KG/MIN: 10 INJECTION, EMULSION INTRAVENOUS at 17:07

## 2022-04-18 RX ADMIN — METRONIDAZOLE 500 MG: 500 INJECTION, SOLUTION INTRAVENOUS at 22:30

## 2022-04-18 ASSESSMENT — PULMONARY FUNCTION TESTS
PIF_VALUE: 24
PIF_VALUE: 28
PIF_VALUE: 30
PIF_VALUE: 26
PIF_VALUE: 21
PIF_VALUE: 21
PIF_VALUE: 28
PIF_VALUE: 26
PIF_VALUE: 25
PIF_VALUE: 27
PIF_VALUE: 32
PIF_VALUE: 22
PIF_VALUE: 24
PIF_VALUE: 28
PIF_VALUE: 30
PIF_VALUE: 26
PIF_VALUE: 23
PIF_VALUE: 32
PIF_VALUE: 28
PIF_VALUE: 21
PIF_VALUE: 32
PIF_VALUE: 28
PIF_VALUE: 25
PIF_VALUE: 31
PIF_VALUE: 26
PIF_VALUE: 27
PIF_VALUE: 33
PIF_VALUE: 35
PIF_VALUE: 31
PIF_VALUE: 26
PIF_VALUE: 28
PIF_VALUE: 21
PIF_VALUE: 31
PIF_VALUE: 22
PIF_VALUE: 38
PIF_VALUE: 44
PIF_VALUE: 38
PIF_VALUE: 33

## 2022-04-18 ASSESSMENT — PAIN SCALES - GENERAL
PAINLEVEL_OUTOF10: 0
PAINLEVEL_OUTOF10: 10
PAINLEVEL_OUTOF10: 10

## 2022-04-18 NOTE — PROGRESS NOTES
[facility] -   Infectious diseases evaluation  admission date 4/16/2022    reason for consultation:   Cavitary lung lesions  Impression :   Current:  · Right lower lobe cavitary lesions associated with multiloculated pleural fluid collection likely abscess with empyema status post chest tube with gram-negative juno growth on cultures  · Sepsis secondary to above  · Acute on chronic respiratory failure required intubation  · Right-sided pneumothorax  · Severe COPD      Recommendations   · Continue IV Flagyl and cefepime  · Follow cultures and adjust antibiotics as needed  · Nasal swab for MRSA negative   · Not a candidate for surgical intervention per Dr Jeff Bella due to severe emphysema. · Follow CBC and renal function  · Continue supportive care              History of Present Illness:   Initial history:  Magan Prescott is a 74y.o.-year-old female presents to the hospital with worsening shortness of breath associated with cough. At the ER with tachypneic and hypoxic  Chest x-ray showed pneumothorax  The patient was in respiratory distress, was intubated. The patient is intubated, sedated, unable to provide history that was obtained from chart review. CT chest 4/16/2022 showed right lower lobe cavitary lesion with surrounding airspace disease and multiloculated pleural collection. The patient had chest tube placed with gram-negative rods growth on pleural fluid culture. Respiratory culture grew gram-positive cocci in pairs  Initial WBC was 18.5, procalcitonin no more than 100  Respiratory panel with COVID-19 PCR was negative  Urine for Legionella and strep pneumo antigen negative    Interval changes  4/18/2022   She remains intubated, off pressors, afebrile. Chest tube in place  Right IJ line, NG tube and Reynoso cath in place  WBC 10.6  Gram-negative rods growth on pleural fluid culture.   Respiratory culture grew gram-positive cocci in pairs  Patient Vitals for the past 8 hrs:   BP Temp Temp src Pulse Resp SpO2   04/18/22 0700 (!) 110/59 -- -- (!) 45 24 98 %   04/18/22 0658 -- -- -- 50 24 98 %   04/18/22 0600 (!) 142/70 -- -- 58 26 100 %   04/18/22 0503 -- -- -- -- 26 99 %   04/18/22 0500 139/65 -- -- 61 26 99 %   04/18/22 0430 139/61 -- -- 61 26 99 %   04/18/22 0400 (!) 135/55 98.9 °F (37.2 °C) Oral 66 29 99 %   04/18/22 0330 (!) 135/59 -- -- 64 27 99 %   04/18/22 0319 -- -- -- 82 28 100 %   04/18/22 0300 (!) 136/58 -- -- 64 27 98 %   04/18/22 0230 (!) 103/53 -- -- 50 27 98 %   04/18/22 0200 (!) 109/54 -- -- 52 27 98 %   04/18/22 0130 (!) 108/55 -- -- 51 23 99 %   04/18/22 0100 (!) 101/48 -- -- 57 23 98 %   04/18/22 0030 (!) 105/52 -- -- 57 22 99 %             I have personally reviewed the past medical history, past surgical history, medications, social history, and family history, and I haveupdated the database accordingly. Allergies:   Advil [ibuprofen], Aleve [naproxen], Antipyrine, Celecoxib, Codeine, Fomepizole, Incruse ellipta [umeclidinium bromide], Other, Rofecoxib, Salicylates, Strawberry extract, Sulfinpyrazone, Aspirin, and Nsaids     Review of Systems:     Review of Systems  Intubated, unable to provide  Physical Examination :       Physical Exam  Constitutional:       Appearance: She is ill-appearing. Comments: Intubated   HENT:      Head: Normocephalic and atraumatic. Right Ear: External ear normal.      Left Ear: External ear normal.   Eyes:      General: No scleral icterus. Conjunctiva/sclera: Conjunctivae normal.   Cardiovascular:      Rate and Rhythm: Normal rate. Heart sounds: No murmur heard. Pulmonary:      Breath sounds: Wheezing present. Comments: Decreased breath sounds on the right side. Right-sided chest tube in place  Abdominal:      General: There is no distension. Palpations: Abdomen is soft. Musculoskeletal:      Cervical back: Neck supple. No rigidity. Right lower leg: No edema.       Left lower leg: No edema. Skin:     Coloration: Skin is not jaundiced. Findings: No bruising.    Neurological:      Comments: Sedated         Past Medical History:     Past Medical History:   Diagnosis Date    Abnormal EKG     TRAM (acute kidney injury) ([facility] [facility] 2022    Anxiety     Asthma     Bipolar disorder ([facility] [facility]     SEVERE [place] , [place]    COPD (chronic obstructive pulmonary disease) (HCC)     Cramps, extremity     SEVERE LEG CRAMPS    Depression     Dilated bile duct     Headache     History of elective      Hyperlipidemia     Irritable bowel syndrome     Prolonged emergence from general anesthesia     SEVERE ISSUES WAKING UP    Substance abuse ([facility] [address]     street drugs when younger   Vivien Dinero Unspecified sleep apnea     Vision abnormalities     glasses       Past Surgical  History:     Past Surgical History:   Procedure Laterality Date    ANKLE SURGERY      HAD SCREWS AND HARDWARE, THEN REMOVED    COLONOSCOPY  02/15/2018    tubular adenoma    EYE SURGERY Bilateral     CATARACTS    HYSTEROSCOPY  10/19/2016    D & C    INDUCED       LASIK      bilateral    TONSILLECTOMY AND ADENOIDECTOMY Bilateral     VULVA SURGERY      HAD A BIOPSY AND REMOVAL OF       Medications:      methylPREDNISolone  40 mg IntraVENous Q6H    cefepime  2,000 mg IntraVENous Q8H    sodium chloride flush  5-40 mL IntraVENous 2 times per day    risperiDONE  1.5 mg Oral Q12H    rivastigmine  4.5 mg Oral BID    atorvastatin  20 mg Oral Daily    traZODone  50 mg Oral Nightly    polyvinyl alcohol  1 drop Both Eyes Q4H    And    artificial tears   Both Eyes Q4H    chlorhexidine  15 mL Mouth/Throat BID    famotidine (PEPCID) injection  20 mg IntraVENous BID    ipratropium-albuterol  1 ampule Inhalation Q4H    insulin lispro  0-12 Units SubCUTAneous Q6H    metroNIDAZOLE  500 mg IntraVENous Q8H    heparin (porcine)  5,000 Units SubCUTAneous 3 times per day       Social History:     Social History Socioeconomic History    Marital status:      Spouse name: Not on file    Number of children: Not on file    Years of education: Not on file    Highest education level: Not on file   Occupational History    Not on file   Tobacco Use    Smoking status: Former Smoker     Packs/day: 2.00     Years: 42.00     Pack years: 84.00     Types: Cigarettes     Quit date: 11/1/2018     Years since quitting: 3.4    Smokeless tobacco: Never Used   Substance and Sexual Activity    Alcohol use: Yes     Comment: yearly    Drug use: No    Sexual activity: Not Currently     Partners: Male     Birth control/protection: Post-menopausal   Other Topics Concern    Not on file   Social History Narrative    Not on file     Social Determinants of Health     Financial Resource Strain: High Risk    Difficulty of Paying Living Expenses: Very hard   Food Insecurity: No Food Insecurity    Worried About Running Out of Food in the Last Year: Never true    Agustin of Food in the Last Year: Never true   Transportation Needs:     Lack of Transportation (Medical): Not on file    Lack of Transportation (Non-Medical):  Not on file   Physical Activity:     Days of Exercise per Week: Not on file    Minutes of Exercise per Session: Not on file   Stress:     Feeling of Stress : Not on file   Social Connections:     Frequency of Communication with Friends and Family: Not on file    Frequency of Social Gatherings with Friends and Family: Not on file    Attends Religion Services: Not on file    Active Member of Clubs or Organizations: Not on file    Attends Club or Organization Meetings: Not on file    Marital Status: Not on file   Intimate Partner Violence:     Fear of Current or Ex-Partner: Not on file    Emotionally Abused: Not on file    Physically Abused: Not on file    Sexually Abused: Not on file   Housing Stability:     Unable to Pay for Housing in the Last Year: Not on file    Number of [place] in the Last Year: Not on file    Unstable Housing in the Last Year: Not on file       Family History:     Family History   Problem Relation Age of Onset    Dementia Maternal Aunt     Kidney Disease Mother     Heart Attack Sister     Prostate Cancer Father     High Cholesterol Brother     Heart Attack Paternal Grandmother       Medical Decision Making:   I have independently reviewed/ordered the following labs:    CBC with Differential:   Recent Labs     04/17/22  0526 04/17/22  0526 04/17/22  1640 04/18/22  0440   WBC 14.6*  --   --  10.6   HGB 7.9*   < > 7.5* 8.7*   HCT 24.6*   < > 23.4* 26.6*     --   --  366   LYMPHOPCT 3*  --   --  9*   MONOPCT 1  --   --  1    < > = values in this interval not displayed. BMP:  Recent Labs     04/16/22  1042 04/16/22  1042 04/17/22  0526 04/18/22  0440      < > 138 138   K 4.2   < > 4.5 4.6      < > 106 106   CO2 20   < > 24 24   BUN 31*   < > 22 21   CREATININE 0.87   < > 0.64 0.57   MG 2.3  --   --   --     < > = values in this interval not displayed. Hepatic Function Panel:   Recent Labs     04/16/22  1042 04/16/22  1225   PROT 7.0 7.0   LABALBU 2.8*  --    BILITOT 0.16*  --    ALKPHOS 137*  --    ALT 25  --    AST 19  --      No results for input(s): RPR in the last 72 hours. No results for input(s): HIV in the last 72 hours. No results for input(s): BC in the last 72 hours. Lab Results   Component Value Date    CREATININE 0.57 04/18/2022    GLUCOSE 194 04/18/2022    GLUCOSE 127 07/08/2021       Detailed results: Thank you for allowing us to participate in the care of this patient. Please call with questions. This note is created with the assistance of a speech recognition program.  While intending to generate adocument that actually reflects the content of the visit, the document can still have some errors including those of syntax and sound a like substitutions which may escape proof reading.   It such instances, actual meaningcan be extrapolated by contextual diversion.     Lázaro Palmer MD  Office: [phone]  Perfect serve / office [phone]

## 2022-04-18 NOTE — CARE COORDINATION
ONGOING DISCHARGE PLAN:    Patient is alert and oriented x4. Spoke with patient regarding discharge plan and patient confirms that plan is still to return home with [facility]. SW following for SNF. Pt is currently on ventilator FIO2 30 %. IV cefepime, IV flagyl, IV solumedrol 40 mg Q6. 2 decho, PT/OT eval. Pt to have bronch today. Will continue to follow for additional discharge needs.     Electronically signed by Carlos Manuel Grover RN on 4/18/2022 at 9:45 AM

## 2022-04-18 NOTE — PLAN OF CARE
BRONCHOSPASM/BRONCHOCONSTRICTION   [x]  IMPROVE AERATION/BREATH SOUNDS  [x]   ADMINISTER BRONCHODILATOR THERAPY AS APPROPRIATE  [x]   ASSESS BREATH SOUNDS  []   IMPLEMENT AEROSOL/MDI PROTOCOL  [x]   PATIENT EDUCATION AS NEEDED    PROVIDE ADEQUATE OXYGENATION WITH ACCEPTABLE SP02/ABG'S  [x]  IDENTIFY APPROPRIATE OXYGEN THERAPY  [x]   MONITOR SP02/ABG'S AS NEEDED   [x]   PATIENT EDUCATION AS NEEDED      MECHANICAL VENTILATION     [x]  PROVIDE OPTIMAL VENTILATION  [x]   ASSESS FOR EXTUBATION READINESS  [x]   ASSESS FOR WEANING READINESS  [x]  EXTUBATE AS TOLERATED  []  IMPLEMENT ADULT MECHANICAL VENTILATION PROTOCOL  [x]  MAINTAIN ADEQUATE OXYGENATION  [x]  PERFORM SPONTANEOUS WEANING TRIAL AS TOLERATED

## 2022-04-18 NOTE — FLOWSHEET NOTE
04/18/22 1944   Encounter Summary   Services provided to: Patient   Referral/Consult From: Javier   Complexity of Encounter Low   Length of Encounter 15 minutes   Spiritual/Rastafari   Type Spiritual support   Assessment Unable to respond   Intervention Prayer

## 2022-04-18 NOTE — PROGRESS NOTES
Physical Therapy          Physical Therapy Cancel Note      DATE: 2022    NAME: Brianne Kirkpatrick  MRN: [id number]   : 1955      Patient not seen this date for Physical Therapy due to: Other:Pt has respiratory failure required intubation, will follow pt, at this time pt unable to actively participate in therapy.       Electronically signed by Shreyas Roe PT on 2022 at 8:05 AM

## 2022-04-18 NOTE — CARE COORDINATION
SW attempted to contact Patient's daughter, Alanda Favre to discuss SNF preferences. SW left a voicemail requesting a call In return. SW contact information left in message.      Electronically signed by Donte Rogers on 4/18/22 at 3:18 PM EDT

## 2022-04-18 NOTE — PLAN OF CARE
Problem: Non-Violent Restraints  Goal: Removal from restraints as soon as assessed to be safe  4/18/2022 1046 by Yessenia Edgar RN  Outcome: Ongoing  4/18/2022 0610 by Ryan Fagan RN  Outcome: Ongoing  Goal: No harm/injury to patient while restraints in use  4/18/2022 1046 by Yessenia Edgar RN  Outcome: Ongoing  4/18/2022 0610 by Ryan Fagan RN  Outcome: Ongoing  Goal: Patient's dignity will be maintained  4/18/2022 1046 by Yessenia Edgar RN  Outcome: Ongoing  4/18/2022 0610 by Ryan Fagan RN  Outcome: Ongoing     Problem: Aspiration:  Goal: Absence of aspiration  Description: Absence of aspiration  4/18/2022 1046 by Yessenia Edgar RN  Outcome: Ongoing  4/18/2022 0610 by Ryan Fagan RN  Outcome: Ongoing     Problem: Gas Exchange - Impaired:  Goal: Levels of oxygenation will improve  Description: Levels of oxygenation will improve  4/18/2022 1046 by Yessenia Edgar RN  Outcome: Ongoing  4/18/2022 0610 by Ryan Fagan RN  Outcome: Ongoing     Problem: Skin Integrity - Impaired:  Goal: Will show no infection signs and symptoms  Description: Will show no infection signs and symptoms  4/18/2022 1046 by Yessenia Edgar RN  Outcome: Ongoing  4/18/2022 0610 by Ryan Fagan RN  Outcome: Ongoing  Goal: Absence of new skin breakdown  Description: Absence of new skin breakdown  4/18/2022 1046 by Yessenia Edgar RN  Outcome: Ongoing  4/18/2022 0610 by Ryan Fagan RN  Outcome: Ongoing     Problem: Falls - Risk of:  Goal: Will remain free from falls  Description: Will remain free from falls  4/18/2022 1046 by Yessenia Edgar RN  Outcome: Ongoing  4/18/2022 0610 by Ryan Fagan RN  Outcome: Ongoing  Goal: Absence of physical injury  Description: Absence of physical injury  4/18/2022 1046 by Yessenia Edgar RN  Outcome: Ongoing  4/18/2022 0610 by Ryan Fagan RN  Outcome: Ongoing     Problem: Breathing Pattern - Ineffective:  Goal: Ability to achieve and maintain a regular respiratory rate will improve  Description: Ability to achieve and maintain a regular respiratory rate will improve  4/18/2022 1046 by Kajal Robles RN  Outcome: Ongoing  4/18/2022 0610 by Colton Ambrosio RN  Outcome: Ongoing     Problem: Skin Integrity:  Goal: Will show no infection signs and symptoms  Description: Will show no infection signs and symptoms  4/18/2022 1046 by Kajal Robles RN  Outcome: Ongoing  4/18/2022 0610 by Colton Ambrosio RN  Outcome: Ongoing  Goal: Absence of new skin breakdown  Description: Absence of new skin breakdown  4/18/2022 1046 by Kajal Robles RN  Outcome: Ongoing  4/18/2022 0610 by Colton Ambrosio RN  Outcome: Ongoing     Problem: OXYGENATION/RESPIRATORY FUNCTION  Goal: Patient will maintain patent airway  4/18/2022 1046 by Kajal Robles RN  Outcome: Ongoing  4/18/2022 0610 by Colton Ambrosio RN  Outcome: Ongoing  Goal: Patient will achieve/maintain normal respiratory rate/effort  Description: Respiratory rate and effort will be within normal limits for the patient  4/18/2022 1046 by Kajal Robles RN  Outcome: Ongoing  4/18/2022 0610 by Colton Ambrosio RN  Outcome: Ongoing     Problem: MECHANICAL VENTILATION  Goal: Patient will maintain patent airway  4/18/2022 1046 by Kajal Robles RN  Outcome: Ongoing  4/18/2022 0610 by Colton Ambrosio RN  Outcome: Ongoing  Goal: Oral health is maintained or improved  4/18/2022 1046 by Kajal oRbles RN  Outcome: Ongoing  4/18/2022 0610 by Colton Ambrosio RN  Outcome: Ongoing  Goal: ET tube will be managed safely  4/18/2022 1046 by Kajal Robles RN  Outcome: Ongoing  4/18/2022 0610 by Colton Ambrosio RN  Outcome: Ongoing  Goal: Ability to express needs and understand communication  4/18/2022 1046 by Kajal Robles RN  Outcome: Ongoing  4/18/2022 0610 by Colton Ambrosio RN  Outcome: Ongoing  Goal: Mobility/activity is maintained at optimum level for patient  4/18/2022 1046 by Christie Chapman RN  Outcome: Ongoing  4/18/2022 0610 by Scott Jordan RN  Outcome: Ongoing     Problem: SKIN INTEGRITY  Goal: Skin integrity is maintained or improved  4/18/2022 1046 by Christie Chapman RN  Outcome: Ongoing  4/18/2022 0610 by Scott Jordan RN  Outcome: Ongoing     Problem: Nutrition  Goal: Optimal nutrition therapy  4/18/2022 1046 by Christie Chapman RN  Outcome: Ongoing  4/18/2022 0610 by Scott Jordan RN  Outcome: Ongoing

## 2022-04-18 NOTE — PROGRESS NOTES
[id number] [employer]    PROGRESS NOTE             4/18/2022    7:16 AM    Name:   Kate Campbell  MRN:     [id number]     Elizabet Snuffer:      [de-identified]   Room:   2002/2002-01  IP Day:  2  Admit Date:  4/16/2022 10:25 AM    PCP:  Jose Chilel MD  Code Status:  Full Code    Subjective:     C/C:   Chief Complaint   Patient presents with    Shortness of Breath     Interval History Status: Improving     Bradycardia - will hold rivastigmine and trazodone   WBC trending down   Hgb stable 8.7  Continues to be sedated on propofol dose reduced to 35 will get triglycerides level    No fentanyl overnight  CXR: no pneumothorax identified, increased right basilar opacity  Respiratory cultures gram-positive cocci in pairs  Chest tube cultures grew gram-negative rods  Vancomycin was DC'd yesterday  Continue on cefepime, Flagyl  Solumedrol 40mg IV q6h   On tube feeding      Brief History:     The patient is a 74 y.o. Non- / non  female sever COPD 3L O2 baseline, bipolar, Hx of DVT/ PE, migraines, who presents with Shortness of Breath and cough  Patient was recently discharge from [facility] for mycoplasma pneumonia. She had a follow up appointment with PCP, patient was complaining of cough and chest pain, was started on Tessalon and nsaids for pain. However; she continued to be in distress and her daughter brought her to ED. She was hypoxic initially on 3L o2, was increased to 6L and continues to be hypoxic   Tachypneic, tachycardic  ABGs: pH 7.33, CO2 34, HCO3 18  WBC 18   Lactic 2.8> 1.5  Pancultures were sent  Chest x-ray: Moderate loculated right basal pneumothorax.  Evidence for tension.  Cavitarylesion noted in the right lung base  CT chest: Chronic clot material RUL, RLL. Thick-walled cavitary lesion RLL. Multiloculated pleural collection or hydropneumothorax posterior to the right lung, right chest tube in situ.   Infiltrates of the right middle lobe.  Stellate 6 mm lateral RUL nodule. Mediastinal and right hilar lymphadenopathy  Was given 1 L bolus, IV cefepime, vancomycin, 125 mg Solu-Medrol and she is admitted to the hospital for the management of acute hypoxic respiratory failure due to pneumothorax and possible lung infection    Review of Systems:     Review of Systems   Unable to perform ROS: Intubated         Medications: Allergies:     Allergies   Allergen Reactions    Advil [Ibuprofen] Other (See Comments)     vomiting    Aleve [Naproxen] Other (See Comments)     vomiting    Antipyrine Other (See Comments)     unknown    Celecoxib Other (See Comments)    Codeine      headache    Fomepizole     Incruse Ellipta [Umeclidinium Bromide]      confusion    Other      GRASS, TREES, WEEDS    Rofecoxib Other (See Comments)     Unknown reaction    Salicylates      Unknown reaction     Strawberry Extract      HEADACHES    Sulfinpyrazone Other (See Comments)     Unknown reaction    Aspirin Nausea And Vomiting, Other (See Comments) and Nausea Only    Nsaids Nausea Only, Other (See Comments) and Nausea And Vomiting       Current Meds:   Scheduled Meds:    methylPREDNISolone  40 mg IntraVENous Q6H    cefepime  2,000 mg IntraVENous Q8H    sodium chloride flush  5-40 mL IntraVENous 2 times per day    risperiDONE  1.5 mg Oral Q12H    rivastigmine  4.5 mg Oral BID    atorvastatin  20 mg Oral Daily    traZODone  50 mg Oral Nightly    polyvinyl alcohol  1 drop Both Eyes Q4H    And    artificial tears   Both Eyes Q4H    chlorhexidine  15 mL Mouth/Throat BID    famotidine (PEPCID) injection  20 mg IntraVENous BID    ipratropium-albuterol  1 ampule Inhalation Q4H    insulin lispro  0-12 Units SubCUTAneous Q6H    metroNIDAZOLE  500 mg IntraVENous Q8H    heparin (porcine)  5,000 Units SubCUTAneous 3 times per day     Continuous Infusions:    sodium chloride      dextrose      propofol 35 mcg/kg/min (04/18/22 0206)    lactated ringers 100 mL/hr (22)     PRN Meds: sodium chloride flush, sodium chloride, sodium chloride flush, potassium chloride **OR** potassium alternative oral replacement **OR** potassium chloride, glucose, dextrose, glucagon (rDNA), dextrose, fentanNYL    Data:     Past Medical History:   has a past medical history of Abnormal EKG, TRAM (acute kidney injury) ([facility] Anxiety, Asthma, Bipolar disorder ([facility] [facility] COPD (chronic obstructive pulmonary disease) ([facility] [address] Cramps, extremity, Depression, Dilated bile duct, Headache, History of elective , Hyperlipidemia, Irritable bowel syndrome, Prolonged emergence from general anesthesia, Substance abuse ([facility] [facility] Unspecified sleep apnea, and Vision abnormalities. Social History:   reports that she quit smoking about 3 years ago. Her smoking use included cigarettes. She has a 84.00 pack-year smoking history. She has never used smokeless tobacco. She reports current alcohol use. She reports that she does not use drugs. Family History:   Family History   Problem Relation Age of Onset    Dementia Maternal Aunt     Kidney Disease Mother     Heart Attack Sister     Prostate Cancer Father     High Cholesterol Brother     Heart Attack Paternal Grandmother        Vitals:  BP (!) 110/59   Pulse (!) 45   Temp 98.9 °F (37.2 °C) (Oral)   Resp 24   Ht 5' 5\" (1.651 m)   Wt 176 lb 12.9 oz (80.2 kg)   LMP 2013 (Exact Date)   SpO2 98%   BMI 29.42 kg/m²   Temp (24hrs), Av.5 °F (36.9 °C), Min:97.5 °F (36.4 °C), Max:99 °F (37.2 °C)    Recent Labs     22  0736 22  1343 22  0118   POCGLU 93 120* 161* 185*       I/O(24Hr):     Intake/Output Summary (Last 24 hours) at 2022 0716  Last data filed at 2022 0500  Gross per 24 hour   Intake 3653.35 ml   Output 925 ml   Net 2728.35 ml       Labs:    CBC with Differential:    Lab Results   Component Value Date    WBC 10.6 2022    RBC 2.84 2022    RBC 0-2 2021    HGB 8.7 04/18/2022    HCT 26.6 04/18/2022     04/18/2022    MCV 93.8 04/18/2022    MCH 30.8 04/18/2022    MCHC 32.8 04/18/2022    RDW 16.3 04/18/2022    NRBC 0 07/08/2021    METASPCT 1 04/02/2022    LYMPHOPCT 9 04/18/2022    LYMPHOPCT 12.1 07/08/2021    MONOPCT 1 04/18/2022    MONOPCT 15.1 07/08/2021    BASOPCT 0 04/18/2022    BASOPCT 0.8 07/08/2021    MONOSABS 0.11 04/18/2022    MONOSABS 0.4 07/08/2021    LYMPHSABS 0.95 04/18/2022    LYMPHSABS 0.3 07/08/2021    EOSABS 0.00 04/18/2022    EOSABS 0.1 07/08/2021    BASOSABS 0.00 04/18/2022    DIFFTYPE NOT REPORTED 12/20/2021     BMP:    Lab Results   Component Value Date     04/18/2022    K 4.6 04/18/2022     04/18/2022    CO2 24 04/18/2022    BUN 21 04/18/2022    LABALBU 2.8 04/16/2022    LABALBU 3.6 07/08/2021    CREATININE 0.57 04/18/2022    CALCIUM 8.1 04/18/2022    GFRAA >60 04/18/2022    LABGLOM >60 04/18/2022    GLUCOSE 194 04/18/2022    GLUCOSE 127 07/08/2021       Lab Results   Component Value Date/Time    SPECIAL 8ML GREEN/2ML ORANGE RIGHT IJ 04/16/2022 12:12 PM     Lab Results   Component Value Date/Time    CULTURE CULTURE IN PROGRESS 04/16/2022 04:16 PM         Radiology:    XR CHEST (SINGLE VIEW FRONTAL)    Result Date: 4/5/2022  EXAMINATION: ONE XRAY VIEW OF THE CHEST 4/5/2022 8:55 am COMPARISON: April 3, 2022 HISTORY: ORDERING SYSTEM PROVIDED HISTORY: pna TECHNOLOGIST PROVIDED HISTORY: pna Reason for Exam: pna FINDINGS: Stable position of the right internal jugular central venous catheter. Right infrahilar airspace opacity not significantly changed. No new focal lung abnormality. No sizable pleural effusion. No pneumothorax.      Stable right infrahilar airspace opacity     XR CHEST (SINGLE VIEW FRONTAL)    Result Date: 4/3/2022  EXAMINATION: ONE XRAY VIEW OF THE CHEST 4/3/2022 5:51 am COMPARISON: 1 April 2022 HISTORY: ORDERING SYSTEM PROVIDED HISTORY: sob, pleurisy, cough TECHNOLOGIST PROVIDED HISTORY: sob, pleurisy, cough Reason for Exam: Shortness of breath, pleurisy, cough Additional signs and symptoms: Shortness of breath, pleurisy, cough Relevant Medical/Surgical History: Shortness of breath, pleurisy, cough FINDINGS: AP portable view of the chest time stamped at 528 hours demonstrates overlying cardiac monitoring electrodes. Heart size is stable. Right internal jugular catheter terminates in the distal superior vena cava. There has been worsening of a focal opacity at the right lung base. Minimal left basilar opacity is unchanged. No extrapleural air or overt edema. No gross effusions. Worsening opacity at the right base favoring airspace disease. Change in minimal left basilar opacity which may be related atelectasis. XR CHEST PORTABLE    Result Date: 4/17/2022  EXAMINATION: ONE XRAY VIEW OF THE CHEST 4/17/2022 6:04 am COMPARISON: 04/16/2022, 1248 hours HISTORY: ORDERING SYSTEM PROVIDED HISTORY: ETT placement TECHNOLOGIST PROVIDED HISTORY: ETT placement Reason for Exam: ETT 60-year-old female with endotracheal tube placement FINDINGS: Endotracheal tube distal tip overlying the mid trachea approximately 4.8 cm above the level of the nimesh. Enteric tube traverses the GE junction with distal tip excluded from the field of view. Right IJ approach central venous catheter distal tip overlying the right atrium. Right-sided chest tube distal tip projects over the right hilum. Cardiac monitor leads overlie the chest. No obvious pneumothorax. No free air. Cardiac and mediastinal contours remain unchanged. Left lung is relatively clear. Persistent airspace disease at the right mid and right lower lung zones. Visualized osseous structures remain unchanged. Subcutaneous emphysema previously seen at the right lateral chest wall no longer identified. 1. Persistent airspace disease at the right mid and right lower lung zones. Follow-up is recommended to document resolution. 2. Tubes and right IJ line as detailed above.      XR CHEST PORTABLE    Result Date: 4/16/2022  EXAMINATION: ONE XRAY VIEW OF THE CHEST 4/16/2022 1:10 pm COMPARISON: 04/16/2022, 1213 hours HISTORY: ORDERING SYSTEM PROVIDED HISTORY: chest tube TECHNOLOGIST PROVIDED HISTORY: chest tube Reason for Exam: chest tube Additional signs and symptoms: chest tube and NG tube placement 62-year-old female with history of NG tube and chest tube placement FINDINGS: Portable supine view of the chest. Right-sided chest tube has been placed with distal tip projecting over the right infrahilar region. Right IJ approach central venous catheter distal tip overlying the cavoatrial junction, stable. Endotracheal tube distal tip overlying the mid trachea approximately 4.3 cm above the level of the nimesh. Enteric tube traverses the GE junction with distal tip projecting over the left mid abdomen likely within the stomach body. Left lung is clear. Pleural thickening and opacity involving the right mid and right lower lung zones. Subcutaneous emphysema along the right lateral chest wall. Cardiac and mediastinal contours remain unchanged. Atherosclerotic calcification of the thoracic aorta. No free air. There is re-expansion of the right lung compared with the prior study. Probable small residual inferolateral right pneumothorax component. 1. Tubes and right IJ line as detailed above. Re-expansion of the right lung compared with the prior study. There is likely a small residual right inferolateral pneumothorax component. 2. Pleural thickening and airspace opacity involving the right mid and right lower lung zones. Follow-up is recommended to document resolution. 3. Subcutaneous emphysema along the right lateral chest wall. XR CHEST PORTABLE    Result Date: 4/16/2022  EXAMINATION: ONE XRAY VIEW OF THE CHEST 4/16/2022 12:24 pm COMPARISON: None.  HISTORY: ORDERING SYSTEM PROVIDED HISTORY: Intubation TECHNOLOGIST PROVIDED HISTORY: Intubation Reason for Exam: central line and ET placement FINDINGS: ET tube terminates 3 cm above the nimesh. Right line terminates in the SVC. There is a relatively stable right-sided basilar pneumothorax. The remaining lungs are unchanged. Cardiac silhouette and osseous structures unchanged. Intubation as above. No significant change     XR CHEST PORTABLE    Result Date: 4/16/2022  EXAMINATION: ONE XRAY VIEW OF THE CHEST 4/16/2022 11:02 am COMPARISON: 04/05/2022 HISTORY: ORDERING SYSTEM PROVIDED HISTORY: SOB TECHNOLOGIST PROVIDED HISTORY: SOB Reason for Exam: SOB FINDINGS: There is a moderate loculated right basal pneumothorax. Cavitary lesion is noted in the right lung base. Left lung is unremarkable. Heart and mediastinal structures appear normal.  There is no evidence for any tension phenomena. Moderate loculated right basal pneumothorax. Evidence for tension. Cavitary lesion noted in the right lung base. .  Case discussed with Dr. Theodora Flores. XR CHEST PORTABLE    Result Date: 4/1/2022  EXAMINATION: ONE XRAY VIEW OF THE CHEST 4/1/2022 4:01 pm COMPARISON: 04/01/2022 HISTORY: ORDERING SYSTEM PROVIDED HISTORY: central line placed TECHNOLOGIST PROVIDED HISTORY: central line placed Reason for Exam: Central line placed FINDINGS: There is a right internal jugular central line in place with distal tip overlying the superior vena cava. Previously noted right basal infiltrate is unchanged. Left lung appears clear. The heart and mediastinal structures appear normal.  There is no evidence of pneumothorax. Satisfactory position of right internal jugular central line. No change in the the right basal infiltrate. XR CHEST PORTABLE    Result Date: 4/1/2022  EXAMINATION: ONE XRAY VIEW OF THE CHEST 4/1/2022 1:22 pm COMPARISON: 06/17/2020. CT dated 10/15/2021.  HISTORY: ORDERING SYSTEM PROVIDED HISTORY: dyspnea TECHNOLOGIST PROVIDED HISTORY: dyspnea Reason for Exam: Dyspnea Relevant Medical/Surgical History: Hx of COPD FINDINGS: The cardiac silhouette appears within normal limits. There are bibasilar opacities, right greater than left which may reflect multifocal pneumonia in the correct clinical setting. No evidence of pleural effusion or pneumothorax is seen. Bibasilar opacities, right greater than left, which may reflect multifocal pneumonia. Follow-up to imaging resolution is recommended. CT CHEST PULMONARY EMBOLISM W CONTRAST    Result Date: 4/16/2022  EXAMINATION: CTA OF THE CHEST 4/16/2022 2:30 pm TECHNIQUE: CTA of the chest was performed after the administration of intravenous contrast.  Multiplanar reformatted images are provided for review. MIP images are provided for review. Dose modulation, iterative reconstruction, and/or weight based adjustment of the mA/kV was utilized to reduce the radiation dose to as low as reasonably achievable. COMPARISON: CT chest from 10/15/2021 HISTORY: ORDERING SYSTEM PROVIDED HISTORY: SOB TECHNOLOGIST PROVIDED HISTORY: SOB Decision Support Exception - unselect if not a suspected or confirmed emergency medical condition->Emergency Medical Condition (MA) Reason for Exam: sob Relevant Medical/Surgical History: intubated, septic, hx of PE 29-year-old female with shortness of breath FINDINGS: Pulmonary Arteries: No obvious filling defect in the main, right main, or left main pulmonary arteries. Evaluation of the segmental and subsegmental pulmonary arterial vasculature is limited due to respiratory motion suboptimal bolus timing, airspace disease, and streak artifact. There are areas of probable residual linear chronic clot within the right lower lobar pulmonary artery on image 111, series 2. Probable linear residual clot within the anterior right upper lobe pulmonary artery on image 83, series 2. Mediastinum: Atherosclerotic calcification of the aorta and branch vasculature. No dissection flap within the visualized thoracic aorta. No pericardial effusion. There is fluid in the superior pericardial recess. Enlarged precarinal lymph nodes remain unchanged on image 83, series 2. Right hilar lymphadenopathy is seen on image 96, series 2. Coronary artery disease. No left hilar or axillary lymphadenopathy. Lungs/pleura: Endotracheal tube distal tip within the lower trachea. Trachea and very proximal central airways appear patent. Narrowing of the right middle lobe airway into a region of partial consolidation/atelectasis/scarring. Opacification of the right lower lobar segmental airways. There is a thick-walled cavitary lesion in the right lower lobe measuring 5.5 x 7.3 cm in greatest AP and transverse dimensions on image 68, series 4. There is a dependent air-fluid level within this lesion. This area measures 7.6 cm in greatest craniocaudal extent on image 48, series 602. There is surrounding airspace disease. There is a gas and fluid containing multiloculated pleural collection or hydropneumothorax along the posterior margin of the right lung. Right sided chest tube distal tip along the anteromedial right lung. Subcutaneous emphysema along the right axilla and right lateral chest wall. Stellate pulmonary nodule in the lateral right upper lobe measuring 6 mm on image 32, series 4. Moderate to severe emphysema, dependent atelectasis and respiratory motion. Upper Abdomen: Fatty liver. NG tube is seen extending into the stomach body. Atherosclerotic calcification of the upper abdominal aorta and branch vasculature. Soft Tissues/Bones: Chronic anterior wedge compression deformities, unchanged from 10/15/2021 involving T5 and T6. Mild diffuse degenerative changes throughout the spine. 1. Linear filling defects in the anterior right upper lobe and right lower lobe pulmonary arteries likely representing residual/chronic clot material. No acute central filling defect/pulmonary embolus is evident.  2. Thick-walled cavitary lesion in the right lower lobe measuring up to 5.5 x 7.3 x 7.6 cm which could be related to TB or fungal disease or a necrotizing pneumonia. Underlying cavitary malignancy not entirely excluded. There is surrounding airspace disease. There is also an associated multiloculated pleural collection or hydropneumothorax primarily along the posterior margin of the right lung. Right-sided chest tube distal tip along the anteromedial right pleura/lung. 3. Partial consolidation, atelectasis and/or infiltrate of the right middle lobe. 4. Stellate 6 mm lateral right upper lobe pulmonary nodule. Follow-up guidelines provided below. 5. Underlying moderate to severe emphysema. 6. Endotracheal and NG tubes as above. 7. Coronary artery disease. 8. Chronic anterior wedge compression deformities of T5 and T6. 9. Subcutaneous emphysema along the right axilla and right lateral chest wall likely related to chest tube. 10. Mediastinal and right hilar lymphadenopathy. RECOMMENDATIONS: 6 mm suspicious right solid pulmonary nodule within the upper lobe. Recommend a non-contrast Chest CT at 6-12 months, then another non-contrast Chest CT at 18-24 months. These guidelines do not apply to immunocompromised patients and patients with cancer. Follow up in patients with significant comorbidities as clinically warranted. For lung cancer screening, adhere to Lung-RADS guidelines. Reference: Radiology. 2017; 284(1):228-43. [place] MODIFIED BARIUM SWALLOW W VIDEO    Result Date: 4/4/2022  EXAMINATION: MODIFIED BARIUM SWALLOW WAS PERFORMED IN CONJUNCTION WITH SPEECH PATHOLOGY SERVICES TECHNIQUE: Fluoroscopic evaluation of the swallowing mechanism was performed using cineradiography with multiple consistency of barium product in conjunction with speech pathology services. FLUOROSCOPY DOSE AND TYPE OR TIME AND EXPOSURES: DAP 49.2 dGy cm squared COMPARISON: None HISTORY: ORDERING SYSTEM PROVIDED HISTORY: aspiration pna TECHNOLOGIST PROVIDED HISTORY: aspiration pna Reason for Exam: aspiration pneumonia FINDINGS: Premature vallecular spillage. There was trace penetration with straw with thin liquid. No aspiration. No aspiration. Trace penetration with straw with thin liquids. Please see separate speech pathology report for full discussion of findings and recommendations. RECOMMENDATIONS: Unavailable         Physical Examination:        Physical Exam  Eyes:      General: No scleral icterus. Pupils: Pupils are equal, round, and reactive to light. Neck:      Vascular: No carotid bruit. Cardiovascular:      Rate and Rhythm: Regular rhythm. Tachycardia present. Pulses: Normal pulses. Heart sounds: No murmur heard. No friction rub. No gallop. Pulmonary:      Effort: Respiratory distress present. Breath sounds: Wheezing (Bilateral) and rales (Right-sided) present. Abdominal:      General: Abdomen is flat. Bowel sounds are normal.      Palpations: Abdomen is soft. Musculoskeletal:      Right lower leg: No edema. Left lower leg: No edema. Skin:     Capillary Refill: Capillary refill takes less than 2 seconds.    Neurological:      Comments: Sedated on propofol           Assessment:        Primary Problem  Sepsis [facility])    Active Hospital Problems    Diagnosis Date Noted    [facility] [A41.9] 04/16/2022    Acute on chronic respiratory failure ([facility] [J96.20] 04/16/2022    Cavitary lesion of lung [J98.4] 04/16/2022    History of DVT (deep vein thrombosis) [Z86.718] 04/16/2022    Pneumothorax, right [J93.9] 04/16/2022    Status post chest tube placement ([facility] [Z93.8] 04/16/2022    History of pulmonary embolus (PE) [Z86.711] 04/02/2022    Chronic respiratory failure with hypoxia (HCC) [J96.11] 08/05/2021    Compression fracture of body of thoracic vertebra ([facility] [address] [S22.000A] 09/28/2020    Lung nodule [R91.1] 08/22/2018    Bipolar disorder ([address] [F31.9]     Chronic obstructive pulmonary disease ([address] [J44.9] 01/06/2016       Plan:        Sepsis possible lung infection  Tachypnea, tachycardia  WBC 18>14>10  Pro-Branden>100    Lactate 2.8> 1.5  Chest x-ray: Moderate loculated right basal pneumothorax.  Evidence for tension.  Cavitarylesion noted in the right lung base  CT chest: Thick-walled cavitary lesion RLL. Right chest tube in situ for posterior right lung or right pneumothorax. Infiltrates of the right middle lobe. Stellate 6 mm lateral RUL nodule. Mediastinal and right hilar lymphadenopathy  Received 1 L bolus  100 mL/H lactated Ringer  Broad-spectrum antibiotic with cefepime, vancomycin  Critical care following  ID following  Repeat pro-Branden >100  Respiratory cultures gram-positive cocci in pairs  Chest tubes cultures grew gram-negative rods  Blood cultures NGTD  Urine culture NGTD      Acute on chronic respiratory failure due pneumothorax  -History of severe COPD - 3 L home O2 baseline  -CXR right pneumothorax on admission   -S/p intubation and right chest tube placement  -pH 7.33, CO2 34, HCO3 18  -pH7.32, PCO2 84, HCO3 25  -pH 7.36, PCO2 45, HCO3 26 today  -Sedated with propofol  -Continue DuoNeb  -Hold Breo, Spiriva  Bipap overnight FiO2 30%  - Started Solumedrol 40mg q6h  - Insuline sliding scale     Hx DVT/ PE -Eliquis was discontinued in 12/2021  History bipolar disorder: Trazodone, Risperidone      IVF: Lactated Ringer 100 mL/H  Diet: NPO, Consult dietitian for TPN  GI ppx: Pepcid 20 mg twice daily IV  DVT ppx:Heparin 5000 units TID   Code status: Full code  Consults: pulm, ID  Dispo: Keep in ICU    Jennifer Jade MD  4/18/2022  7:16 AM   Attending Physician Statement  I have discussed the care of Beverley Betancourt and I have examined the patient myselft and taken ros and hpi , including pertinent history and exam findings,  with the resident. I have reviewed the key elements of all parts of the encounter with the resident. I agree with the assessment, plan and orders as documented by the resident.   Spent 35 minutes in reviewing data/medicines/talking to patient/family,  explaining and answering all the questions.     Acute on chronic respiratory failure,  Spontaneous pneumothorax, status post chest tube in place,  ABG, labs, chest x-ray reviewed,  Still critically ill,  Mechanical ventilation,  Continue to monitor,  Settings reviewed,  ABG reviewed,  Critical care time spent 35 minutes    Electronically signed by Daniela Johnson MD

## 2022-04-18 NOTE — PLAN OF CARE
Problem: Non-Violent Restraints  Goal: Removal from restraints as soon as assessed to be safe  Outcome: Ongoing  Goal: No harm/injury to patient while restraints in use  Outcome: Ongoing  Goal: Patient's dignity will be maintained  Outcome: Ongoing     Problem: Aspiration:  Goal: Absence of aspiration  Description: Absence of aspiration  Outcome: Ongoing     Problem: Gas Exchange - Impaired:  Goal: Levels of oxygenation will improve  Description: Levels of oxygenation will improve  Outcome: Ongoing     Problem: Skin Integrity - Impaired:  Goal: Will show no infection signs and symptoms  Description: Will show no infection signs and symptoms  Outcome: Ongoing  Goal: Absence of new skin breakdown  Description: Absence of new skin breakdown  Outcome: Ongoing     Problem: Falls - Risk of:  Goal: Will remain free from falls  Description: Will remain free from falls  Outcome: Ongoing  Goal: Absence of physical injury  Description: Absence of physical injury  Outcome: Ongoing     Problem: Breathing Pattern - Ineffective:  Goal: Ability to achieve and maintain a regular respiratory rate will improve  Description: Ability to achieve and maintain a regular respiratory rate will improve  Outcome: Ongoing     Problem: Skin Integrity:  Goal: Will show no infection signs and symptoms  Description: Will show no infection signs and symptoms  Outcome: Ongoing  Goal: Absence of new skin breakdown  Description: Absence of new skin breakdown  Outcome: Ongoing     Problem: OXYGENATION/RESPIRATORY FUNCTION  Goal: Patient will maintain patent airway  Outcome: Ongoing  Goal: Patient will achieve/maintain normal respiratory rate/effort  Description: Respiratory rate and effort will be within normal limits for the patient  Outcome: Ongoing     Problem: MECHANICAL VENTILATION  Goal: Patient will maintain patent airway  Outcome: Ongoing  Goal: Oral health is maintained or improved  Outcome: Ongoing  Goal: ET tube will be managed safely  Outcome: Ongoing  Goal: Ability to express needs and understand communication  Outcome: Ongoing  Goal: Mobility/activity is maintained at optimum level for patient  Outcome: Ongoing     Problem: SKIN INTEGRITY  Goal: Skin integrity is maintained or improved  Outcome: Ongoing     Problem: Nutrition  Goal: Optimal nutrition therapy  Outcome: Ongoing

## 2022-04-18 NOTE — PROCEDURES
Bronchoscopy with right lower lobe bronchial wash procedure Note    Date of Operation: 4/18/2022    Pre-op Diagnosis: Pneumonia    Post-op Diagnosis: Same, mucous    Surgeon: Leticia Haynes MD    Assistants: Daryl DODSON    Anesthesia: Monitored Local Anesthesia with Sedation    Operation: Flexible fiberoptic bronchoscopy, diagnostic bronchial wash      Specimen: Right lower lobe bronchial wall    Estimated Blood Loss: 0      Complications: None    Indications and History:  The patient is a 74 y.o. female with severe COPD on the ventilator. The risks, benefits, complications, treatment options and expected outcomes were discussed with the patient's daughter. The possibilities of reaction to medication, pulmonary aspiration, perforation of a viscus, bleeding, failure to diagnose a condition and creating a complication requiring transfusion or operation were discussed with the patient who freely signed the consent. Description of Procedure: The patient was seen in the ICU and the site of surgery properly noted/marked. The patient was identified as Shauna Males and the procedure verified as Flexible Fiberoptic Bronchoscopy. A Time Out was held and the above information confirmed. The bronchoscope was placed through the endotracheal tube. The patient remained on the ventilator and respiratory was there to assist during the procedure. It became immediately apparent that there were thick white copious secretions in the endotracheal tube. These were suctioned out aggressively using aliquots of saline. The bronchoscope was then passed into the trachea. Careful inspection of the tracheal lumen was accomplished. The scope was sequentially passed into the the right mainstem bronchus. The right upper lobe was visualized and was normal.  The right middle lobe was visualized and was normal.  We went into the right lower lobe with there were some whitish secretions.   These appeared to be plugging the right lower lobe. We attempted to do a BAL and despite 200 mL, we only got approximately a 15 to 20 mL return. Therefore we converted this into a bronchial washing. We then entered into the left mainstem bronchus. The bronchoscope was advanced into the left upper division both the upper lobe and lingula were normal.  The left lower lobe also was normal.  There were minimal clear secretions in the left lung. I then turned the scope over to Mindy Haney [place]. He maneuvered the scope back to the level of the nimesh and then sequentially entered the right mainstem bronchus and in the left mainstem bronchus. He suctioned out additional secretions in the right lower lobe and after careful inspection of the right lung and the left mainstem bronchus and lower lobe and upper lobes, he withdrew the scope out of the trachea and then did additional suctioning in the endotracheal tube. The Patient was monitored in the intensive care unit in satisfactory condition. Attestation: I was present and performed most of the procedure and supervised the medical student who also did the procedure. Caio Villaneuva MD     We will switch her over to albuterol Mucomyst aerosols due to her thick white/clear mucus plugging we will await the results of the cytology and cultures.

## 2022-04-18 NOTE — PROGRESS NOTES
ICU Progress Note (Vent)   [facility]    Patient - Shauna Males,  Age - 74 y.o.    - 1955      Room Number -    MRN -  [id number]   Acct # - [de-identified]  Date of Admission -  2022 10:25 AM    Events of Past 24 Hours   Uneventful night, tolerating tube feeds ventilator settings unchanged. Still sedated with propofol    Vitals    height is 5' 5\" (1.651 m) and weight is 176 lb 12.9 oz (80.2 kg). Her oral temperature is 98.9 °F (37.2 °C). Her blood pressure is 110/59 (abnormal) and her pulse is 45 (abnormal). Her respiration is 24 and oxygen saturation is 98%. Temperature Range: Temp: 98.9 °F (37.2 °C) Temp  Av.5 °F (36.9 °C)  Min: 97.5 °F (36.4 °C)  Max: 99 °F (37.2 °C)  BP Range:  Systolic (60PUR), WFW:328 , Min:89 , LP     Diastolic (34ESI), FKE:18, Min:42, Max:119    Pulse Range: Pulse  Av  Min: 45  Max: 97  Respiration Range: Resp  Av.8  Min: 20  Max: 29  Current Pulse Ox[de-identified]  SpO2: 98 %  24HR Pulse Ox Range:  SpO2  Av.7 %  Min: 95 %  Max: 100 %  Oxygen Amount and Delivery: O2 Flow Rate (L/min): 6 L/min      Wt Readings from Last 3 Encounters:   22 176 lb 12.9 oz (80.2 kg)   22 183 lb (83 kg)   22 186 lb 1.1 oz (84.4 kg)     I/O       Intake/Output Summary (Last 24 hours) at 2022 0850  Last data filed at 2022 0500  Gross per 24 hour   Intake 3653.35 ml   Output 925 ml   Net 2728.35 ml     I/O last 3 completed shifts: In: 5215 [I. V.:4139.4; NG/GT:138; IV Piggyback:937.7]  Out: 1495 [Urine:1350; Emesis/NG output:100; Chest Tube:45]     DRAIN/TUBE OUTPUT:     Invasive Lines   ETT Day -   3  Lines -right IJ 3    ICP PRESSURE RANGE:  No data recorded  CVP PRESSURE RANGE:  No data recorded  Mechanical Ventilation Data   SETTINGS (Comprehensive)  Vent Information  $Ventilation: $Subsequent Day  Skin Assessment: Clean, dry, & intact  Equipment Changed: HME  Vent Type: Servo i  Vent Mode: PRVC  Vt Ordered: 450 mL  Rate Set: 24 bmp  FiO2 : 30 %  SpO2: 98 %  SpO2/FiO2 ratio: 326.67  Sensitivity: 5  PEEP/CPAP: 8  I Time/ I Time %: 0.65 s  Humidification Source: HME  Mask Type: Full face mask  Mask Size: Medium  Additional Respiratory  Assessments  Pulse: (!) 45  Resp: 24  SpO2: 98 %  End Tidal CO2: 15 (%)  Position: Semi-Molina's  Humidification Source: HME  Oral Care: Suction toothette,Mouth suctioned,Mouth moisturizer       ABGs:   Lab Results   Component Value Date    PHART 7.366 04/18/2022    PO2ART 72.9 04/18/2022    SYG3AUQ 45.9 04/18/2022       Lab Results   Component Value Date    MODE PRVC 04/18/2022         Medications   IV   sodium chloride      dextrose      propofol 35 mcg/kg/min (04/18/22 0823)    lactated ringers 100 mL/hr (04/17/22 2126)      cefepime  2,000 mg IntraVENous Q8H    methylPREDNISolone  40 mg IntraVENous Q6H    sodium chloride flush  5-40 mL IntraVENous 2 times per day    risperiDONE  1.5 mg Oral Q12H    [Held by provider] rivastigmine  4.5 mg Oral BID    atorvastatin  20 mg Oral Daily    [Held by provider] traZODone  50 mg Oral Nightly    polyvinyl alcohol  1 drop Both Eyes Q4H    And    artificial tears   Both Eyes Q4H    chlorhexidine  15 mL Mouth/Throat BID    famotidine (PEPCID) injection  20 mg IntraVENous BID    ipratropium-albuterol  1 ampule Inhalation Q4H    insulin lispro  0-12 Units SubCUTAneous Q6H    metroNIDAZOLE  500 mg IntraVENous Q8H    heparin (porcine)  5,000 Units SubCUTAneous 3 times per day       Diet/Nutrition   ADULT TUBE FEEDING; Nasogastric; Standard without Fiber; Continuous; 20; Yes; 30; Q 8 hours; 50; 30; Q 8 hours    Exam   VITALS    height is 5' 5\" (1.651 m) and weight is 176 lb 12.9 oz (80.2 kg). Her oral temperature is 98.9 °F (37.2 °C). Her blood pressure is 110/59 (abnormal) and her pulse is 45 (abnormal). Her respiration is 24 and oxygen saturation is 98%.    Ventilator Settings (Basic)  Vent Mode: PRVC Rate Set: 24 bmp/Vt Ordered: 450 mL/ /FiO2 : 30 %    Constitutional - Sedated  General Appearance frail  HEENT - Life support devices in place (ET, OG),normocephalic, atraumatic. PERRLA  Lungs - Chest expands equally, coarse harsh rhonchi with scattered wheezes  Cardiovascular - Heart sounds are normal.  normal rate and rhythm regular, no murmur, gallop or rub. Abdomen - soft, nontender, nondistended, no masses or organomegaly  Neurologic - CN II-XII are grossly intact.  There are no focal motor deficits  Skin - no bruising or bleeding  Extremities - no cyanosis, clubbing or edema    No air leak on chest tube, drainage only 25 mL    Lab Results   CBC     Lab Results   Component Value Date    WBC 10.6 04/18/2022    RBC 2.84 04/18/2022    RBC 0-2 07/08/2021    HGB 8.7 04/18/2022    HCT 26.6 04/18/2022     04/18/2022    MCV 93.8 04/18/2022    MCH 30.8 04/18/2022    MCHC 32.8 04/18/2022    RDW 16.3 04/18/2022    NRBC 0 07/08/2021    METASPCT 1 04/02/2022    LYMPHOPCT 9 04/18/2022    LYMPHOPCT 12.1 07/08/2021    MONOPCT 1 04/18/2022    MONOPCT 15.1 07/08/2021    BASOPCT 0 04/18/2022    BASOPCT 0.8 07/08/2021    MONOSABS 0.11 04/18/2022    MONOSABS 0.4 07/08/2021    LYMPHSABS 0.95 04/18/2022    LYMPHSABS 0.3 07/08/2021    EOSABS 0.00 04/18/2022    EOSABS 0.1 07/08/2021    BASOSABS 0.00 04/18/2022    DIFFTYPE NOT REPORTED 12/20/2021       BMP   Lab Results   Component Value Date     04/18/2022    K 4.6 04/18/2022     04/18/2022    CO2 24 04/18/2022    BUN 21 04/18/2022    CREATININE 0.57 04/18/2022    GLUCOSE 194 04/18/2022    GLUCOSE 127 07/08/2021    CALCIUM 8.1 04/18/2022       LFTS  Lab Results   Component Value Date    ALKPHOS 137 04/16/2022    ALT 25 04/16/2022    AST 19 04/16/2022    PROT 7.0 04/16/2022    PROT 5.7 07/08/2021    BILITOT 0.16 04/16/2022    BILIDIR 0.1 07/08/2021    IBILI 0.12 07/08/2019    LABALBU 2.8 04/16/2022    LABALBU 3.6 07/08/2021       INR  Recent Labs     04/16/22  1042   PROTIME 16.1* INR 1.3       APTT  Recent Labs     04/16/22  1042   APTT 33.6       Lactic Acid  Lab Results   Component Value Date    LACTA 1.2 04/02/2022        BNP   No results for input(s): BNP in the last 72 hours. Cultures       Radiology     Plain Films         Chest x-ray shows worsening right lower lobe infiltrate/effusion      SYSTEM ASSESSMENT    Acute on chronic hypoxic and hypercapnic respiratory failure, intubated 4/16  Right-sided pneumothorax  Right lower lobe cavitary lesions  Exudative pleural effusion on right  History of pulmonary embolism and what appears to be chronic filling defects (subsegmental, most likely clinically inconsequential)  Severe stage IV COPD, FEV1 is 35% of predicted  Recent hospitalization for pneumonia  Elevated inflammatory markers including D-dimer and procalcitonin  Full CODE STATUS    Neuro   Keep sedated, will do sedation vacation    Respiratory   Wean oxygen as tolerated.  Keep O2 sat > 88%  We will plan on bronchoscopy and BAL  Keep chest tube to suction, especially since it is growing out gram-negative rods  Continue bronchodilators every 4 hours  No change in steroid dose  Not a candidate for thoracic surgery given severe lung disease  Hemodynamics   Recheck echocardiogram, make sure there is no right ventricular strain or change in ejection fraction    Gastrointestinal/Nutrition   Tolerating tube feeds    Renal   Stable renal function, on gentle hydration    Infectious Disease   Await cultures from sputum and pleural fluid we will plan on bronchoscopy given worsening infiltrate in the right lower lobe    Hematology/Oncology   On DVT prophylaxis    Endocrine   Monitor blood sugars which are elevated, cover with sliding scale insulin    Social/Spiritual/DNR/Disposition/Other     Updated her daughter in detail prognosis still guarded    Critical Care Time   35 min    Electronically signed by Cas Farrell MD on 4/18/2022 at 8:50 AM

## 2022-04-18 NOTE — PROGRESS NOTES
[facility]   OCCUPATIONAL THERAPY MISSED TREATMENT NOTE   INPATIENT   Date: 22  Patient Name: Schuyler Tabor       Room:   MRN: [id number]   Account #: [de-identified]    : 1955  (68 y.o.)  Gender: female                 REASON FOR MISSED TREATMENT:  Patient unable to participate   -   Other:Pt has respiratory failure required intubation, will follow pt, at this time pt unable to actively participate in therapy      Caroline Muñoz OT

## 2022-04-19 ENCOUNTER — APPOINTMENT (OUTPATIENT)
Dept: GENERAL RADIOLOGY | Age: 67
DRG: 720 | End: 2022-04-19
Payer: COMMERCIAL

## 2022-04-19 PROBLEM — I50.21 ACUTE SYSTOLIC HF (HEART FAILURE) (HCC): Status: ACTIVE | Noted: 2022-04-19

## 2022-04-19 LAB
ABSOLUTE BANDS #: 0.26 K/UL (ref 0–1)
ABSOLUTE EOS #: 0 K/UL (ref 0–0.4)
ABSOLUTE LYMPH #: 0.66 K/UL (ref 1–4.8)
ABSOLUTE MONO #: 0.26 K/UL (ref 0.1–1.3)
ALLEN TEST: ABNORMAL
ANION GAP SERPL CALCULATED.3IONS-SCNC: 10 MMOL/L (ref 9–17)
BANDS: 2 % (ref 0–10)
BASOPHILS # BLD: 0 % (ref 0–2)
BASOPHILS ABSOLUTE: 0 K/UL (ref 0–0.2)
BUN BLDV-MCNC: 27 MG/DL (ref 8–23)
CALCIUM SERPL-MCNC: 8.2 MG/DL (ref 8.6–10.4)
CARBOXYHEMOGLOBIN: 0.1 % (ref 0–5)
CHLORIDE BLD-SCNC: 105 MMOL/L (ref 98–107)
CO2: 22 MMOL/L (ref 20–31)
CREAT SERPL-MCNC: 0.6 MG/DL (ref 0.5–0.9)
CULTURE: ABNORMAL
DIRECT EXAM: ABNORMAL
EOSINOPHILS RELATIVE PERCENT: 0 % (ref 0–4)
FIO2: 30
GFR AFRICAN AMERICAN: >60 ML/MIN
GFR NON-AFRICAN AMERICAN: >60 ML/MIN
GFR SERPL CREATININE-BSD FRML MDRD: ABNORMAL ML/MIN/{1.73_M2}
GLUCOSE BLD-MCNC: 186 MG/DL (ref 65–105)
GLUCOSE BLD-MCNC: 226 MG/DL (ref 65–105)
GLUCOSE BLD-MCNC: 226 MG/DL (ref 65–105)
GLUCOSE BLD-MCNC: 231 MG/DL (ref 65–105)
GLUCOSE BLD-MCNC: 250 MG/DL (ref 70–99)
HCO3 ARTERIAL: 24.6 MMOL/L (ref 22–26)
HCT VFR BLD CALC: 25.8 % (ref 36–46)
HEMOGLOBIN: 8.3 G/DL (ref 12–16)
LACTIC ACID: 1.5 MMOL/L (ref 0.5–2.2)
LYMPHOCYTES # BLD: 5 % (ref 24–44)
MCH RBC QN AUTO: 30 PG (ref 26–34)
MCHC RBC AUTO-ENTMCNC: 32.1 G/DL (ref 31–37)
MCV RBC AUTO: 93.3 FL (ref 80–100)
METHEMOGLOBIN: 1.3 % (ref 0–1.9)
MODE: ABNORMAL
MONOCYTES # BLD: 2 % (ref 1–7)
MORPHOLOGY: ABNORMAL
MORPHOLOGY: ABNORMAL
NEGATIVE BASE EXCESS, ART: 1.1 MMOL/L (ref 0–2)
O2 DEVICE/FLOW/%: ABNORMAL
O2 SAT, ARTERIAL: 94.1 % (ref 95–98)
PATIENT TEMP: 37
PCO2 ARTERIAL: 45.8 MMHG (ref 35–45)
PDW BLD-RTO: 16.1 % (ref 11.5–14.9)
PEEP/CPAP: 8
PH ARTERIAL: 7.34 (ref 7.35–7.45)
PLATELET # BLD: 422 K/UL (ref 150–450)
PMV BLD AUTO: 6.6 FL (ref 6–12)
PO2 ARTERIAL: 79.9 MMHG (ref 80–100)
POTASSIUM SERPL-SCNC: 4.7 MMOL/L (ref 3.7–5.3)
PRO-BNP: 1022 PG/ML
PROCALCITONIN: >100 NG/ML
PT. POSITION: ABNORMAL
RBC # BLD: 2.76 M/UL (ref 4–5.2)
RESPIRATORY RATE: 24
SAMPLE SITE: ABNORMAL
SEG NEUTROPHILS: 91 % (ref 36–66)
SEGMENTED NEUTROPHILS ABSOLUTE COUNT: 11.92 K/UL (ref 1.3–9.1)
SET RATE: 24
SODIUM BLD-SCNC: 137 MMOL/L (ref 135–144)
SPECIMEN DESCRIPTION: ABNORMAL
SURGICAL PATHOLOGY REPORT: NORMAL
TEXT FOR RESPIRATORY: ABNORMAL
TOTAL RATE: 27
VT: 450
WBC # BLD: 13.1 K/UL (ref 3.5–11)

## 2022-04-19 PROCEDURE — 83880 ASSAY OF NATRIURETIC PEPTIDE: CPT

## 2022-04-19 PROCEDURE — 83605 ASSAY OF LACTIC ACID: CPT

## 2022-04-19 PROCEDURE — 6370000000 HC RX 637 (ALT 250 FOR IP): Performed by: INTERNAL MEDICINE

## 2022-04-19 PROCEDURE — 2580000003 HC RX 258: Performed by: INTERNAL MEDICINE

## 2022-04-19 PROCEDURE — 82947 ASSAY GLUCOSE BLOOD QUANT: CPT

## 2022-04-19 PROCEDURE — A4216 STERILE WATER/SALINE, 10 ML: HCPCS | Performed by: INTERNAL MEDICINE

## 2022-04-19 PROCEDURE — 84145 PROCALCITONIN (PCT): CPT

## 2022-04-19 PROCEDURE — 6360000002 HC RX W HCPCS: Performed by: INTERNAL MEDICINE

## 2022-04-19 PROCEDURE — 36600 WITHDRAWAL OF ARTERIAL BLOOD: CPT

## 2022-04-19 PROCEDURE — 2500000003 HC RX 250 WO HCPCS: Performed by: INTERNAL MEDICINE

## 2022-04-19 PROCEDURE — 94760 N-INVAS EAR/PLS OXIMETRY 1: CPT

## 2022-04-19 PROCEDURE — 94640 AIRWAY INHALATION TREATMENT: CPT

## 2022-04-19 PROCEDURE — 2700000000 HC OXYGEN THERAPY PER DAY

## 2022-04-19 PROCEDURE — 99291 CRITICAL CARE FIRST HOUR: CPT | Performed by: INTERNAL MEDICINE

## 2022-04-19 PROCEDURE — 2580000003 HC RX 258: Performed by: STUDENT IN AN ORGANIZED HEALTH CARE EDUCATION/TRAINING PROGRAM

## 2022-04-19 PROCEDURE — 2000000000 HC ICU R&B

## 2022-04-19 PROCEDURE — 99233 SBSQ HOSP IP/OBS HIGH 50: CPT | Performed by: INTERNAL MEDICINE

## 2022-04-19 PROCEDURE — 85025 COMPLETE CBC W/AUTO DIFF WBC: CPT

## 2022-04-19 PROCEDURE — 71045 X-RAY EXAM CHEST 1 VIEW: CPT

## 2022-04-19 PROCEDURE — 6360000002 HC RX W HCPCS: Performed by: STUDENT IN AN ORGANIZED HEALTH CARE EDUCATION/TRAINING PROGRAM

## 2022-04-19 PROCEDURE — 94003 VENT MGMT INPAT SUBQ DAY: CPT

## 2022-04-19 PROCEDURE — 6370000000 HC RX 637 (ALT 250 FOR IP)

## 2022-04-19 PROCEDURE — 82805 BLOOD GASES W/O2 SATURATION: CPT

## 2022-04-19 PROCEDURE — 36415 COLL VENOUS BLD VENIPUNCTURE: CPT

## 2022-04-19 PROCEDURE — 80048 BASIC METABOLIC PNL TOTAL CA: CPT

## 2022-04-19 RX ORDER — POLYETHYLENE GLYCOL 3350 17 G/17G
17 POWDER, FOR SOLUTION ORAL DAILY
Status: DISCONTINUED | OUTPATIENT
Start: 2022-04-19 | End: 2022-05-03 | Stop reason: HOSPADM

## 2022-04-19 RX ORDER — FUROSEMIDE 10 MG/ML
20 INJECTION INTRAMUSCULAR; INTRAVENOUS 2 TIMES DAILY
Status: DISCONTINUED | OUTPATIENT
Start: 2022-04-19 | End: 2022-05-02

## 2022-04-19 RX ORDER — METHYLPREDNISOLONE SODIUM SUCCINATE 40 MG/ML
40 INJECTION, POWDER, LYOPHILIZED, FOR SOLUTION INTRAMUSCULAR; INTRAVENOUS EVERY 8 HOURS
Status: DISCONTINUED | OUTPATIENT
Start: 2022-04-19 | End: 2022-04-29

## 2022-04-19 RX ORDER — INSULIN GLARGINE 100 [IU]/ML
10 INJECTION, SOLUTION SUBCUTANEOUS 2 TIMES DAILY
Status: DISCONTINUED | OUTPATIENT
Start: 2022-04-19 | End: 2022-04-20

## 2022-04-19 RX ADMIN — SODIUM CHLORIDE, PRESERVATIVE FREE 10 ML: 5 INJECTION INTRAVENOUS at 20:00

## 2022-04-19 RX ADMIN — INSULIN LISPRO 6 UNITS: 100 INJECTION, SOLUTION INTRAVENOUS; SUBCUTANEOUS at 20:01

## 2022-04-19 RX ADMIN — POLYVINYL ALCOHOL 1 DROP: 14 SOLUTION/ DROPS OPHTHALMIC at 22:15

## 2022-04-19 RX ADMIN — MINERAL OIL, PETROLATUM: 425; 568 OINTMENT OPHTHALMIC at 12:34

## 2022-04-19 RX ADMIN — MEROPENEM 1000 MG: 1 INJECTION, POWDER, FOR SOLUTION INTRAVENOUS at 19:52

## 2022-04-19 RX ADMIN — POLYVINYL ALCOHOL 1 DROP: 14 SOLUTION/ DROPS OPHTHALMIC at 06:30

## 2022-04-19 RX ADMIN — ALBUTEROL SULFATE 2.5 MG: 2.5 SOLUTION RESPIRATORY (INHALATION) at 04:05

## 2022-04-19 RX ADMIN — POLYVINYL ALCOHOL 1 DROP: 14 SOLUTION/ DROPS OPHTHALMIC at 10:57

## 2022-04-19 RX ADMIN — ALBUTEROL SULFATE 2.5 MG: 2.5 SOLUTION RESPIRATORY (INHALATION) at 19:37

## 2022-04-19 RX ADMIN — MINERAL OIL, PETROLATUM: 425; 568 OINTMENT OPHTHALMIC at 00:47

## 2022-04-19 RX ADMIN — ACETYLCYSTEINE 200 MG: 200 SOLUTION ORAL; RESPIRATORY (INHALATION) at 07:03

## 2022-04-19 RX ADMIN — INSULIN LISPRO 4 UNITS: 100 INJECTION, SOLUTION INTRAVENOUS; SUBCUTANEOUS at 03:00

## 2022-04-19 RX ADMIN — METRONIDAZOLE 500 MG: 500 INJECTION, SOLUTION INTRAVENOUS at 07:56

## 2022-04-19 RX ADMIN — POLYETHYLENE GLYCOL 3350 17 G: 17 POWDER, FOR SOLUTION ORAL at 14:09

## 2022-04-19 RX ADMIN — HEPARIN SODIUM 5000 UNITS: 5000 INJECTION INTRAVENOUS; SUBCUTANEOUS at 14:09

## 2022-04-19 RX ADMIN — PROPOFOL 45 MCG/KG/MIN: 10 INJECTION, EMULSION INTRAVENOUS at 17:49

## 2022-04-19 RX ADMIN — METHYLPREDNISOLONE SODIUM SUCCINATE 40 MG: 40 INJECTION, POWDER, FOR SOLUTION INTRAMUSCULAR; INTRAVENOUS at 07:53

## 2022-04-19 RX ADMIN — SODIUM CHLORIDE, PRESERVATIVE FREE 10 ML: 5 INJECTION INTRAVENOUS at 08:52

## 2022-04-19 RX ADMIN — FUROSEMIDE 20 MG: 10 INJECTION, SOLUTION INTRAMUSCULAR; INTRAVENOUS at 10:54

## 2022-04-19 RX ADMIN — HEPARIN SODIUM 5000 UNITS: 5000 INJECTION INTRAVENOUS; SUBCUTANEOUS at 21:44

## 2022-04-19 RX ADMIN — FAMOTIDINE 20 MG: 10 INJECTION INTRAVENOUS at 20:00

## 2022-04-19 RX ADMIN — ACETYLCYSTEINE 200 MG: 200 SOLUTION ORAL; RESPIRATORY (INHALATION) at 19:38

## 2022-04-19 RX ADMIN — MEROPENEM 1000 MG: 1 INJECTION, POWDER, FOR SOLUTION INTRAVENOUS at 12:38

## 2022-04-19 RX ADMIN — ACETYLCYSTEINE 200 MG: 200 SOLUTION ORAL; RESPIRATORY (INHALATION) at 14:39

## 2022-04-19 RX ADMIN — RISPERIDONE 1.5 MG: 0.5 TABLET ORAL at 04:50

## 2022-04-19 RX ADMIN — ALBUTEROL SULFATE 2.5 MG: 2.5 SOLUTION RESPIRATORY (INHALATION) at 07:03

## 2022-04-19 RX ADMIN — FUROSEMIDE 20 MG: 10 INJECTION, SOLUTION INTRAMUSCULAR; INTRAVENOUS at 17:44

## 2022-04-19 RX ADMIN — MINERAL OIL, PETROLATUM: 425; 568 OINTMENT OPHTHALMIC at 20:15

## 2022-04-19 RX ADMIN — MINERAL OIL, PETROLATUM: 425; 568 OINTMENT OPHTHALMIC at 07:57

## 2022-04-19 RX ADMIN — MINERAL OIL, PETROLATUM: 425; 568 OINTMENT OPHTHALMIC at 04:50

## 2022-04-19 RX ADMIN — PROPOFOL 45 MCG/KG/MIN: 10 INJECTION, EMULSION INTRAVENOUS at 14:20

## 2022-04-19 RX ADMIN — INSULIN LISPRO 2 UNITS: 100 INJECTION, SOLUTION INTRAVENOUS; SUBCUTANEOUS at 14:10

## 2022-04-19 RX ADMIN — CEFEPIME HYDROCHLORIDE 2000 MG: 2 INJECTION, POWDER, FOR SOLUTION INTRAVENOUS at 08:54

## 2022-04-19 RX ADMIN — ALBUTEROL SULFATE 2.5 MG: 2.5 SOLUTION RESPIRATORY (INHALATION) at 23:25

## 2022-04-19 RX ADMIN — METHYLPREDNISOLONE SODIUM SUCCINATE 40 MG: 40 INJECTION, POWDER, LYOPHILIZED, FOR SOLUTION INTRAMUSCULAR; INTRAVENOUS at 15:46

## 2022-04-19 RX ADMIN — CHLORHEXIDINE GLUCONATE 0.12% ORAL RINSE 15 ML: 1.2 LIQUID ORAL at 08:52

## 2022-04-19 RX ADMIN — CHLORHEXIDINE GLUCONATE 0.12% ORAL RINSE 15 ML: 1.2 LIQUID ORAL at 20:00

## 2022-04-19 RX ADMIN — ALBUTEROL SULFATE 2.5 MG: 2.5 SOLUTION RESPIRATORY (INHALATION) at 10:35

## 2022-04-19 RX ADMIN — HEPARIN SODIUM 5000 UNITS: 5000 INJECTION INTRAVENOUS; SUBCUTANEOUS at 06:00

## 2022-04-19 RX ADMIN — CEFEPIME HYDROCHLORIDE 2000 MG: 2 INJECTION, POWDER, FOR SOLUTION INTRAVENOUS at 00:45

## 2022-04-19 RX ADMIN — FAMOTIDINE 20 MG: 10 INJECTION INTRAVENOUS at 08:51

## 2022-04-19 RX ADMIN — METHYLPREDNISOLONE SODIUM SUCCINATE 40 MG: 40 INJECTION, POWDER, FOR SOLUTION INTRAMUSCULAR; INTRAVENOUS at 00:42

## 2022-04-19 RX ADMIN — POLYVINYL ALCOHOL 1 DROP: 14 SOLUTION/ DROPS OPHTHALMIC at 14:22

## 2022-04-19 RX ADMIN — INSULIN GLARGINE 10 UNITS: 100 INJECTION, SOLUTION SUBCUTANEOUS at 20:00

## 2022-04-19 RX ADMIN — INSULIN LISPRO 4 UNITS: 100 INJECTION, SOLUTION INTRAVENOUS; SUBCUTANEOUS at 09:03

## 2022-04-19 RX ADMIN — POLYVINYL ALCOHOL 1 DROP: 14 SOLUTION/ DROPS OPHTHALMIC at 17:44

## 2022-04-19 RX ADMIN — PROPOFOL 45 MCG/KG/MIN: 10 INJECTION, EMULSION INTRAVENOUS at 05:00

## 2022-04-19 RX ADMIN — ACETYLCYSTEINE 200 MG: 200 SOLUTION ORAL; RESPIRATORY (INHALATION) at 04:05

## 2022-04-19 RX ADMIN — PROPOFOL 45 MCG/KG/MIN: 10 INJECTION, EMULSION INTRAVENOUS at 00:41

## 2022-04-19 RX ADMIN — ALBUTEROL SULFATE 2.5 MG: 2.5 SOLUTION RESPIRATORY (INHALATION) at 14:39

## 2022-04-19 RX ADMIN — ACETYLCYSTEINE 200 MG: 200 SOLUTION ORAL; RESPIRATORY (INHALATION) at 10:35

## 2022-04-19 RX ADMIN — MINERAL OIL, PETROLATUM: 425; 568 OINTMENT OPHTHALMIC at 15:47

## 2022-04-19 RX ADMIN — ATORVASTATIN CALCIUM 20 MG: 20 TABLET, FILM COATED ORAL at 08:52

## 2022-04-19 RX ADMIN — ACETYLCYSTEINE 200 MG: 200 SOLUTION ORAL; RESPIRATORY (INHALATION) at 23:26

## 2022-04-19 RX ADMIN — SODIUM CHLORIDE, POTASSIUM CHLORIDE, SODIUM LACTATE AND CALCIUM CHLORIDE: 600; 310; 30; 20 INJECTION, SOLUTION INTRAVENOUS at 04:53

## 2022-04-19 RX ADMIN — PROPOFOL 45 MCG/KG/MIN: 10 INJECTION, EMULSION INTRAVENOUS at 21:57

## 2022-04-19 RX ADMIN — PROPOFOL 45 MCG/KG/MIN: 10 INJECTION, EMULSION INTRAVENOUS at 09:47

## 2022-04-19 RX ADMIN — POLYVINYL ALCOHOL 1 DROP: 14 SOLUTION/ DROPS OPHTHALMIC at 03:02

## 2022-04-19 RX ADMIN — INSULIN GLARGINE 10 UNITS: 100 INJECTION, SOLUTION SUBCUTANEOUS at 09:03

## 2022-04-19 ASSESSMENT — PULMONARY FUNCTION TESTS
PIF_VALUE: 38
PIF_VALUE: 50
PIF_VALUE: 24
PIF_VALUE: 17
PIF_VALUE: 18
PIF_VALUE: 50
PIF_VALUE: 32
PIF_VALUE: 23
PIF_VALUE: 22
PIF_VALUE: 26
PIF_VALUE: 43
PIF_VALUE: 27
PIF_VALUE: 37
PIF_VALUE: 26
PIF_VALUE: 31
PIF_VALUE: 30
PIF_VALUE: 31
PIF_VALUE: 25
PIF_VALUE: 39
PIF_VALUE: 49
PIF_VALUE: 19
PIF_VALUE: 32
PIF_VALUE: 52
PIF_VALUE: 18
PIF_VALUE: 43
PIF_VALUE: 26
PIF_VALUE: 48
PIF_VALUE: 37
PIF_VALUE: 23
PIF_VALUE: 35
PIF_VALUE: 28
PIF_VALUE: 53
PIF_VALUE: 39
PIF_VALUE: 49
PIF_VALUE: 25
PIF_VALUE: 33
PIF_VALUE: 56
PIF_VALUE: 22
PIF_VALUE: 48
PIF_VALUE: 53

## 2022-04-19 ASSESSMENT — PAIN SCALES - GENERAL
PAINLEVEL_OUTOF10: 0
PAINLEVEL_OUTOF10: 0

## 2022-04-19 NOTE — CARE COORDINATION
ONGOING DISCHARGE PLAN:    Patient is intubated on vent. FIO2 30%. Spoke with patient's dtr yesterday regarding discharge plan and patient confirms that plan is still home with her dtr Calli with [facility] is current with them. Dtr will consider SNF after patient is extubated and therapy works with her. Bronch 4/18. On mucomyst. IV cefepime, Flagyl and Solumedrol 40 mg Q8. Will continue to follow for additional discharge needs.     Electronically signed by Zaki Goldberg RN on 4/19/2022 at 8:24 AM

## 2022-04-19 NOTE — PROGRESS NOTES
[facility] -   Infectious diseases evaluation  admission date 4/16/2022    reason for consultation:   Cavitary lung lesions  Impression :   Current:  · Right lower lobe cavitary lesions associated with multiloculated pleural fluid collection likely abscess with empyema status post chest tube with Pseudomonas growth on culture  · Sepsis secondary to above  · Acute on chronic respiratory failure required intubation  · Right-sided pneumothorax  · Severe COPD      Recommendations   ·  IV Flagyl and cefepime discontinue  · IV meropenem  · Follow cultures and adjust antibiotics as needed  · Nasal swab for MRSA negative   · Follow CBC and renal function  · Continue supportive care              History of Present Illness:   Initial history:  Gagandeep Disla is a 74y.o.-year-old female presents to the hospital with worsening shortness of breath associated with cough. At the ER with tachypneic and hypoxic  Chest x-ray showed pneumothorax  The patient was in respiratory distress, was intubated. The patient is intubated, sedated, unable to provide history that was obtained from chart review. CT chest 4/16/2022 showed right lower lobe cavitary lesion with surrounding airspace disease and multiloculated pleural collection. The patient had chest tube placed with gram-negative rods growth on pleural fluid culture. Respiratory culture grew gram-positive cocci in pairs  Initial WBC was 18.5, procalcitonin no more than 100  Respiratory panel with COVID-19 PCR was negative  Urine for Legionella and strep pneumo antigen negative    Interval changes  4/19/2022   She remains intubated, sedated, off pressors. Status post bronchoscopy 4/18/2020  Chest tube in place  Right IJ line, NG tube and Reynoso cath in place  Pseudomonas growth on pleural fluid culture.   Respiratory culture grew gram-positive cocci in pairs  Patient Vitals for the past 8 hrs:   BP Temp Temp src Pulse Resp SpO2 04/19/22 1700 (!) 112/52 -- -- 71 24 97 %   04/19/22 1600 -- 98.5 °F (36.9 °C) Oral -- -- --   04/19/22 1500 127/62 -- -- 84 (!) 31 96 %   04/19/22 1447 -- -- -- 89 30 96 %   04/19/22 1439 -- -- -- -- 30 96 %   04/19/22 1400 128/61 -- -- 73 25 97 %   04/19/22 1300 139/69 -- -- 80 27 97 %   04/19/22 1200 -- 98 °F (36.7 °C) Oral -- -- --   04/19/22 1100 137/68 -- -- 82 (!) 32 97 %   04/19/22 1044 -- -- -- 92 (!) 37 97 %   04/19/22 1035 -- -- -- -- 29 97 %   04/19/22 1000 137/65 -- -- 80 28 97 %             I have personally reviewed the past medical history, past surgical history, medications, social history, and family history, and I haveupdated the database accordingly. Allergies:   Advil [ibuprofen], Aleve [naproxen], Antipyrine, Celecoxib, Codeine, Fomepizole, Incruse ellipta [umeclidinium bromide], Other, Rofecoxib, Salicylates, Strawberry extract, Sulfinpyrazone, Aspirin, and Nsaids     Review of Systems:     Review of Systems  Intubated, unable to provide  Physical Examination :       Physical Exam  Constitutional:       Appearance: She is ill-appearing. Comments: Intubated   HENT:      Head: Normocephalic and atraumatic. Right Ear: External ear normal.      Left Ear: External ear normal.   Eyes:      General: No scleral icterus. Conjunctiva/sclera: Conjunctivae normal.   Cardiovascular:      Rate and Rhythm: Normal rate. Heart sounds: No murmur heard. Pulmonary:      Breath sounds: Wheezing present. Comments: Decreased breath sounds on the right side. Right-sided chest tube in place  Abdominal:      General: There is no distension. Palpations: Abdomen is soft. Musculoskeletal:      Cervical back: Neck supple. No rigidity. Right lower leg: No edema. Left lower leg: No edema. Skin:     Coloration: Skin is not jaundiced. Findings: No bruising.    Neurological:      Comments: Sedated         Past Medical History:     Past Medical History:   Diagnosis Date  Abnormal EKG     TRAM [facility]acute kidney injury) ([facility] 2022    Anxiety     Asthma     Bipolar disorder ([facility]     SEVERE IN , [place]    COPD (chronic obstructive pulmonary disease) (HCC)     Cramps, extremity     SEVERE LEG CRAMPS    Depression     Dilated bile duct     Headache     History of elective      Hyperlipidemia     Irritable bowel syndrome     Prolonged emergence from general anesthesia     SEVERE ISSUES WAKING UP    Substance abuse ([facility] [address] [facility]     street drugs when younger   [employer] Unspecified sleep apnea     Vision abnormalities     glasses       Past Surgical  History:     Past Surgical History:   Procedure Laterality Date    ANKLE SURGERY      HAD SCREWS AND HARDWARE, THEN REMOVED    BRONCHOSCOPY  2022         COLONOSCOPY  02/15/2018    tubular adenoma    EYE SURGERY Bilateral     CATARACTS    HYSTEROSCOPY  10/19/2016    D & C    INDUCED       LASIK      bilateral    TONSILLECTOMY AND ADENOIDECTOMY Bilateral     VULVA SURGERY      HAD A BIOPSY AND REMOVAL OF       Medications:      methylPREDNISolone  40 mg IntraVENous Q8H    insulin glargine  10 Units SubCUTAneous BID    polyethylene glycol  17 g Oral Daily    furosemide  20 mg IntraVENous BID    meropenem  1,000 mg IntraVENous Q8H    albuterol  2.5 mg Nebulization Q4H    acetylcysteine  200 mg Inhalation Q4H    sodium chloride flush  5-40 mL IntraVENous 2 times per day    [Held by provider] risperiDONE  1.5 mg Oral Q12H    [Held by provider] rivastigmine  4.5 mg Oral BID    atorvastatin  20 mg Oral Daily    [Held by provider] traZODone  50 mg Oral Nightly    polyvinyl alcohol  1 drop Both Eyes Q4H    And    artificial tears   Both Eyes Q4H    chlorhexidine  15 mL Mouth/Throat BID    famotidine (PEPCID) injection  20 mg IntraVENous BID    insulin lispro  0-12 Units SubCUTAneous Q6H    heparin (porcine)  5,000 Units SubCUTAneous 3 times per day       Social History:     Social History Socioeconomic History    Marital status:      Spouse name: Not on file    Number of children: Not on file    Years of education: Not on file    Highest education level: Not on file   Occupational History    Not on file   Tobacco Use    Smoking status: Former Smoker     Packs/day: 2.00     Years: 42.00     Pack years: 84.00     Types: Cigarettes     Quit date: 11/1/2018     Years since quitting: 3.4    Smokeless tobacco: Never Used   Substance and Sexual Activity    Alcohol use: Yes     Comment: yearly    Drug use: No    Sexual activity: Not Currently     Partners: Male     Birth control/protection: Post-menopausal   Other Topics Concern    Not on file   Social History Narrative    Not on file     Social Determinants of Health     Financial Resource Strain: High Risk    Difficulty of Paying Living Expenses: Very hard   Food Insecurity: No Food Insecurity    Worried About Running Out of Food in the Last Year: Never true    Agustin of Food in the Last Year: Never true   Transportation Needs:     Lack of Transportation (Medical): Not on file    Lack of Transportation (Non-Medical):  Not on file   Physical Activity:     Days of Exercise per Week: Not on file    Minutes of Exercise per Session: Not on file   Stress:     Feeling of Stress : Not on file   Social Connections:     Frequency of Communication with Friends and Family: Not on file    Frequency of Social Gatherings with Friends and Family: Not on file    Attends [employer] Services: Not on file    Active Member of Clubs or Organizations: Not on file    Attends Club or Organization Meetings: Not on file    Marital Status: Not on file   Intimate Partner Violence:     Fear of Current or Ex-Partner: Not on file    Emotionally Abused: Not on file    Physically Abused: Not on file    Sexually Abused: Not on file   Housing Stability:     Unable to Pay for Housing in the Last Year: Not on file    Number of [place] in the Last Year: Not on file    Unstable Housing in the Last Year: Not on file       Family History:     Family History   Problem Relation Age of Onset    Dementia Maternal Aunt     Kidney Disease Mother     Heart Attack Sister     Prostate Cancer Father     High Cholesterol Brother     Heart Attack Paternal Grandmother       Medical Decision Making:   I have independently reviewed/ordered the following labs:    CBC with Differential:   Recent Labs     04/18/22  0440 04/19/22  0529   WBC 10.6 13.1*   HGB 8.7* 8.3*   HCT 26.6* 25.8*    422   LYMPHOPCT 9* 5*   MONOPCT 1 2     BMP:  Recent Labs     04/18/22  0440 04/19/22  0529    137   K 4.6 4.7    105   CO2 24 22   BUN 21 27*   CREATININE 0.57 0.60     Hepatic Function Panel:   No results for input(s): PROT, LABALBU, BILIDIR, IBILI, BILITOT, ALKPHOS, ALT, AST in the last 72 hours. No results for input(s): RPR in the last 72 hours. No results for input(s): HIV in the last 72 hours. No results for input(s): BC in the last 72 hours. Lab Results   Component Value Date    CREATININE 0.60 04/19/2022    GLUCOSE 250 04/19/2022    GLUCOSE 127 07/08/2021       Detailed results: Thank you for allowing us to participate in the care of this patient. Please call with questions. This note is created with the assistance of a speech recognition program.  While intending to generate adocument that actually reflects the content of the visit, the document can still have some errors including those of syntax and sound a like substitutions which may escape proof reading. It such instances, actual meaningcan be extrapolated by contextual diversion.     Brianne Garcia MD  Office: [phone]  Perfect serve / office [phone]

## 2022-04-19 NOTE — PROGRESS NOTES
ICU Progress Note (Vent)   [facility]    Patient - Beverley Betancourt,  Age - 74 y.o.    - 1955      Room Number -    MRN -  [id number]   Acct # - [de-identified]  Date of Admission -  2022 10:25 AM    Events of Past 24 Hours   More tachypneic today but no fever, growing Pseudomonas out of sputum as well as pleural fluid    Vitals    height is 5' 5\" (1.651 m) and weight is 176 lb 12.9 oz (80.2 kg). Her oral temperature is 99 °F (37.2 °C). Her blood pressure is 187/152 (abnormal) and her pulse is 99. Her respiration is 34 (abnormal) and oxygen saturation is 97%. Temperature Range: Temp: 99 °F (37.2 °C) Temp  Av.6 °F (37 °C)  Min: 97.9 °F (36.6 °C)  Max: 99 °F (37.2 °C)  BP Range:  Systolic (55EUJ), NAQ:694 , Min:104 , XKX:736     Diastolic (39QPR), LTC:89, Min:53, Max:152    Pulse Range: Pulse  Av.1  Min: 52  Max: 99  Respiration Range: Resp  Av.6  Min: 18  Max: 36  Current Pulse Ox[de-identified]  SpO2: 97 %  24HR Pulse Ox Range:  SpO2  Av.1 %  Min: 95 %  Max: 100 %  Oxygen Amount and Delivery: O2 Flow Rate (L/min): 6 L/min      Wt Readings from Last 3 Encounters:   22 176 lb 12.9 oz (80.2 kg)   22 183 lb (83 kg)   22 186 lb 1.1 oz (84.4 kg)     I/O       Intake/Output Summary (Last 24 hours) at 2022 0757  Last data filed at 2022 0530  Gross per 24 hour   Intake 2727.78 ml   Output 1040 ml   Net 1687.78 ml     I/O last 3 completed shifts: In: 4146.9 [I.V.:2936. 1; NG/GT:628; IV Piggyback:582.8]  Out: 1365 [Urine:1300;  Chest Tube:65]     DRAIN/TUBE OUTPUT:     Invasive Lines   ETT Day -   4  Lines -right IJ day 4    ICP PRESSURE RANGE:  No data recorded  CVP PRESSURE RANGE:  No data recorded  Mechanical Ventilation Data   SETTINGS (Comprehensive)  Vent Information  $Ventilation: $Subsequent Day  Skin Assessment: Clean, dry, & intact  Equipment Changed: HME  Vent Type: Servo i  Vent Mode: PRVC  Vt Ordered: 450 mL  Rate Set: 24 bmp  FiO2 : 30 %  SpO2: 97 %  SpO2/FiO2 ratio: 323.33  Sensitivity: 5  PEEP/CPAP: 8  I Time/ I Time %: 0.78 s  Humidification Source: HME  Mask Type: Full face mask  Mask Size: Medium  Additional Respiratory  Assessments  Pulse: 99  Resp: (!) 34  SpO2: 97 %  End Tidal CO2: 33 (%)  Position: Semi-Molina's  Humidification Source: HME  Oral Care Completed?: Yes  Oral Care: Mouth suctioned  Cuff Pressure (cm H2O): 30 cm H2O       ABGs:   Lab Results   Component Value Date    PHART 7.339 04/19/2022    PO2ART 79.9 04/19/2022    KZF8PUO 45.8 04/19/2022       Lab Results   Component Value Date    MODE PRVC 04/19/2022         Medications   IV   sodium chloride      dextrose      propofol 45 mcg/kg/min (04/19/22 0500)    lactated ringers 100 mL/hr at 04/19/22 0453      cefepime  2,000 mg IntraVENous Q8H    albuterol  2.5 mg Nebulization Q4H    acetylcysteine  200 mg Inhalation Q4H    methylPREDNISolone  40 mg IntraVENous Q6H    sodium chloride flush  5-40 mL IntraVENous 2 times per day    risperiDONE  1.5 mg Oral Q12H    [Held by provider] rivastigmine  4.5 mg Oral BID    atorvastatin  20 mg Oral Daily    [Held by provider] traZODone  50 mg Oral Nightly    polyvinyl alcohol  1 drop Both Eyes Q4H    And    artificial tears   Both Eyes Q4H    chlorhexidine  15 mL Mouth/Throat BID    famotidine (PEPCID) injection  20 mg IntraVENous BID    insulin lispro  0-12 Units SubCUTAneous Q6H    metroNIDAZOLE  500 mg IntraVENous Q8H    heparin (porcine)  5,000 Units SubCUTAneous 3 times per day       Diet/Nutrition   Diet NPO Exceptions are: Other (Specify); Specify Other Exceptions: Tube feed    Exam   VITALS    height is 5' 5\" (1.651 m) and weight is 176 lb 12.9 oz (80.2 kg). Her oral temperature is 99 °F (37.2 °C). Her blood pressure is 187/152 (abnormal) and her pulse is 99. Her respiration is 34 (abnormal) and oxygen saturation is 97%.    Ventilator Settings (Basic)  Vent Mode: PRVC Rate Set: 24 bmp/Vt Ordered: 450 mL/ /FiO2 : 30 %    Constitutional - Sedated  General Appearance frail  HEENT - Life support devices in place (ET, OG),normocephalic, atraumatic. PERRLA  Lungs - Chest expands equally, no wheezes, scattered rhonchi   Cardiovascular - Heart sounds are normal.  normal rate and rhythm regular, no murmur, gallop or rub. Abdomen - soft, nontender, nondistended, no masses or organomegaly  Neurologic - CN II-XII are grossly intact.  There are no focal motor deficits  Skin - no bruising or bleeding  Extremities - no cyanosis, clubbing, ++ edema    Lab Results   CBC     Lab Results   Component Value Date    WBC 13.1 04/19/2022    RBC 2.76 04/19/2022    RBC 0-2 07/08/2021    HGB 8.3 04/19/2022    HCT 25.8 04/19/2022     04/19/2022    MCV 93.3 04/19/2022    MCH 30.0 04/19/2022    MCHC 32.1 04/19/2022    RDW 16.1 04/19/2022    NRBC 0 07/08/2021    METASPCT 1 04/02/2022    LYMPHOPCT PENDING 04/19/2022    LYMPHOPCT 12.1 07/08/2021    MONOPCT PENDING 04/19/2022    MONOPCT 15.1 07/08/2021    BASOPCT PENDING 04/19/2022    BASOPCT 0.8 07/08/2021    MONOSABS PENDING 04/19/2022    MONOSABS 0.4 07/08/2021    LYMPHSABS PENDING 04/19/2022    LYMPHSABS 0.3 07/08/2021    EOSABS PENDING 04/19/2022    EOSABS 0.1 07/08/2021    BASOSABS PENDING 04/19/2022    DIFFTYPE NOT REPORTED 12/20/2021       BMP   Lab Results   Component Value Date     04/19/2022    K 4.7 04/19/2022     04/19/2022    CO2 22 04/19/2022    BUN 27 04/19/2022    CREATININE 0.60 04/19/2022    GLUCOSE 250 04/19/2022    GLUCOSE 127 07/08/2021    CALCIUM 8.2 04/19/2022       LFTS  Lab Results   Component Value Date    ALKPHOS 137 04/16/2022    ALT 25 04/16/2022    AST 19 04/16/2022    PROT 7.0 04/16/2022    PROT 5.7 07/08/2021    BILITOT 0.16 04/16/2022    BILIDIR 0.1 07/08/2021    IBILI 0.12 07/08/2019    LABALBU 2.8 04/16/2022    LABALBU 3.6 07/08/2021       INR  Recent Labs     04/16/22  1042   PROTIME 16.1*   INR 1.3       APTT  Recent Labs     04/16/22  1042   APTT 33.6       Lactic Acid  Lab Results   Component Value Date    LACTA 1.2 04/02/2022        BNP   No results for input(s): BNP in the last 72 hours. Cultures       Radiology     Plain Films         Chest x-ray shows tubes and lines in good position no obvious pneumothorax      SYSTEM ASSESSMENT    Acute on chronic hypoxic and hypercapnic respiratory failure, intubated 4/16  Pseudomonas pneumonia  Right-sided pneumothorax  Right lower lobe cavitary lesions  Exudative pleural effusion on right, growing Pseudomonas  Low ejection fraction EF 45 to 50%, echo 4/18  History of pulmonary embolism and what appears to be chronic filling defects (subsegmental, most likely clinically inconsequential)  Severe stage IV COPD, FEV1 is 35% of predicted  Recent hospitalization for pneumonia  Elevated inflammatory markers including D-dimer and procalcitonin  Full CODE STATUS    Neuro   Despite her tachypnea she seems to be fairly well sedated      Respiratory   Wean oxygen as tolerated. Keep O2 sat > 88%  Await bronchoscopy cultures  Continue Mucomyst and albuterol  Doubt she will be able to wean given her tachypnea  Decrease Solu-Medrol to 40 every 8, patient not bronchospastic  Continue chest tube to suction  Hemodynamics   Seems to be volume overloaded  Total fluids are supposed to be at 100 an hour yet she was getting 100 mL of lactated Ringer's as well as 50 mL of tube feeds and propofol drip!   EF poor we will check BNP, may need IV Lasix    Gastrointestinal/Nutrition   Tolerating tube feeds  GI prophylaxis    Renal   Some increase in BUN may be secondary to steroids  Still appears to be volume overloaded    Infectious Disease   May need double coverage for Pseudomonas especially since cefepime has poor REANNA  May need to start tobramycin because Cipro is intermediate sensitivity  Still concerned about anaerobic infection will defer to ID about continuing Flagyl  Awaiting bronchoscopy cultures  Recheck procalcitonin level  Check lactic acid level    Hematology/Oncology   Continue DVT prophylaxis    Endocrine   Blood sugars elevated, will start Lantus    Social/Spiritual/DNR/Disposition/Other   Have been updating daughter every day, prognosis is guarded      Critical Care Time   35 min    Electronically signed by Som Richter MD on 4/19/2022 at 7:57 AM

## 2022-04-19 NOTE — PLAN OF CARE
Problem: Non-Violent Restraints  Goal: Removal from restraints as soon as assessed to be safe  4/18/2022 2110 by Margret Quinones RN  Outcome: Ongoing  4/18/2022 1046 by Ritu Anne RN  Outcome: Ongoing  Goal: No harm/injury to patient while restraints in use  4/18/2022 2110 by Margret Quinones RN  Outcome: Ongoing  4/18/2022 1046 by Ritu Anne RN  Outcome: Ongoing  Goal: Patient's dignity will be maintained  4/18/2022 2110 by Margret Quinones RN  Outcome: Ongoing  4/18/2022 1046 by Ritu Anne RN  Outcome: Ongoing     Problem: Aspiration:  Goal: Absence of aspiration  Description: Absence of aspiration  4/18/2022 2110 by Margret Quinones RN  Outcome: Ongoing  4/18/2022 1046 by Ritu Anne RN  Outcome: Ongoing     Problem: Gas Exchange - Impaired:  Goal: Levels of oxygenation will improve  Description: Levels of oxygenation will improve  4/18/2022 2110 by Margret Quinones RN  Outcome: Ongoing  4/18/2022 1046 by Ritu Anne RN  Outcome: Ongoing     Problem: Skin Integrity - Impaired:  Goal: Will show no infection signs and symptoms  Description: Will show no infection signs and symptoms  4/18/2022 2110 by Margret Quinones RN  Outcome: Ongoing  4/18/2022 1046 by Ritu Anne RN  Outcome: Ongoing  Goal: Absence of new skin breakdown  Description: Absence of new skin breakdown  4/18/2022 2110 by Margret Quinones RN  Outcome: Ongoing  4/18/2022 1046 by Ritu Anne RN  Outcome: Ongoing     Problem: Falls - Risk of:  Goal: Will remain free from falls  Description: Will remain free from falls  4/18/2022 2110 by Margret Quinones RN  Outcome: Ongoing  4/18/2022 1046 by Ritu Anne RN  Outcome: Ongoing  Goal: Absence of physical injury  Description: Absence of physical injury  4/18/2022 2110 by Margret Quinones RN  Outcome: Ongoing  4/18/2022 1046 by Ritu Anne RN  Outcome: Ongoing     Problem: Breathing Pattern - Ineffective:  Goal: Ability to achieve and maintain a regular respiratory rate will improve  Description: Ability to achieve and maintain a regular respiratory rate will improve  4/18/2022 2110 by Renu Damon RN  Outcome: Ongoing  4/18/2022 1046 by Lorrie Gómez RN  Outcome: Ongoing     Problem: Skin Integrity:  Goal: Will show no infection signs and symptoms  Description: Will show no infection signs and symptoms  4/18/2022 2110 by Renu Damon RN  Outcome: Ongoing  4/18/2022 1046 by Lorrie Gómez RN  Outcome: Ongoing  Goal: Absence of new skin breakdown  Description: Absence of new skin breakdown  4/18/2022 2110 by Renu Damon RN  Outcome: Ongoing  4/18/2022 1046 by Lorrie Gómez RN  Outcome: Ongoing     Problem: OXYGENATION/RESPIRATORY FUNCTION  Goal: Patient will maintain patent airway  4/18/2022 2110 by Renu Damon RN  Outcome: Ongoing  4/18/2022 1046 by Lorrie Gómez RN  Outcome: Ongoing  Goal: Patient will achieve/maintain normal respiratory rate/effort  Description: Respiratory rate and effort will be within normal limits for the patient  4/18/2022 2110 by Renu Damon RN  Outcome: Ongoing  4/18/2022 1046 by Lorrie Gómez RN  Outcome: Ongoing     Problem: MECHANICAL VENTILATION  Goal: Patient will maintain patent airway  4/18/2022 2110 by Renu Damon RN  Outcome: Ongoing  4/18/2022 1046 by Lorrie Gómez RN  Outcome: Ongoing  Goal: Oral health is maintained or improved  4/18/2022 2110 by Renu Damon RN  Outcome: Ongoing  4/18/2022 1046 by Lorrie Gómez RN  Outcome: Ongoing  Goal: ET tube will be managed safely  4/18/2022 2110 by Renu Damon RN  Outcome: Ongoing  4/18/2022 1046 by Lorrie Gómez RN  Outcome: Ongoing  Goal: Ability to express needs and understand communication  4/18/2022 2110 by Renu Damon RN  Outcome: Ongoing  4/18/2022 1046 by Lorrie Gómez RN  Outcome: Ongoing  Goal: Mobility/activity is maintained at optimum level for patient  4/18/2022 2110 by Vickie Hammond RN  Outcome: Ongoing  4/18/2022 1046 by Macarena Leal RN  Outcome: Ongoing     Problem: SKIN INTEGRITY  Goal: Skin integrity is maintained or improved  4/18/2022 2110 by Vickie Hammond RN  Outcome: Ongoing  4/18/2022 1046 by Macarena Leal RN  Outcome: Ongoing     Problem: Nutrition  Goal: Optimal nutrition therapy  4/18/2022 2110 by Vickie Hammond RN  Outcome: Ongoing  4/18/2022 1046 by Macarena Leal RN  Outcome: Ongoing

## 2022-04-19 NOTE — PLAN OF CARE
Problem: OXYGENATION/RESPIRATORY FUNCTION  Goal: Patient will achieve/maintain normal respiratory rate/effort  Description: Respiratory rate and effort will be within normal limits for the patient  4/19/2022 1358 by Jerlean Kayser, RCP  Outcome: Ongoing     Problem: MECHANICAL VENTILATION  Goal: ET tube will be managed safely  4/19/2022 1358 by Jerlean Kayser, RCP  Outcome: Ongoing     Problem: MECHANICAL VENTILATION  Goal: Oral health is maintained or improved  4/19/2022 1358 by Jerlean Kayser, RCP  Outcome: Ongoing

## 2022-04-19 NOTE — PROGRESS NOTES
[id number]    PROGRESS NOTE             4/19/2022    7:45 AM    Name:   Nimco Alvarado  MRN:     [id number]     [place]:      [de-identified]   Room:   2002/2002-01  IP Day:  3  Admit Date:  4/16/2022 10:25 AM    PCP:  Pavel Yun MD  Code Status:  Full Code    Subjective:     C/C:   Chief Complaint   Patient presents with    Shortness of Breath     Interval History Status: Worsen    Crackles on auscultation, EF 40%, Probnp 1000s will give lasix bid 20mg   Awaiting ID input on ATBx       Brief History:     The patient is a 74 y.o. Non- / non  female sever COPD 3L O2 baseline, bipolar, Hx of DVT/ PE, migraines, who presents with Shortness of Breath and cough  Patient was recently discharge from [facility] for mycoplasma pneumonia. She had a follow up appointment with PCP, patient was complaining of cough and chest pain, was started on Tessalon and nsaids for pain. However; she continued to be in distress and her daughter brought her to ED. She was hypoxic initially on 3L o2, was increased to 6L and continues to be hypoxic   Tachypneic, tachycardic  ABGs: pH 7.33, CO2 34, HCO3 18  WBC 18   Lactic 2.8> 1.5  Pancultures were sent  Chest x-ray: Moderate loculated right basal pneumothorax.  Evidence for tension.  Cavitarylesion noted in the right lung base  CT chest: Chronic clot material RUL, RLL. Thick-walled cavitary lesion RLL. Multiloculated pleural collection or hydropneumothorax posterior to the right lung, right chest tube in situ. Infiltrates of the right middle lobe. Stellate 6 mm lateral RUL nodule.   Mediastinal and right hilar lymphadenopathy  Was given 1 L bolus, IV cefepime, vancomycin, 125 mg Solu-Medrol and she is admitted to the hospital for the management of acute hypoxic respiratory failure due to pneumothorax and possible lung infection    Review of Systems:     Review of Systems   Unable to perform ROS: Intubated         Medications: Allergies:     Allergies   Allergen Reactions    Advil [Ibuprofen] Other (See Comments)     vomiting    Aleve [Naproxen] Other (See Comments)     vomiting    Antipyrine Other (See Comments)     unknown    Celecoxib Other (See Comments)    Codeine      headache    Fomepizole     Incruse Ellipta [Umeclidinium Bromide]      confusion    Other      GRASS, TREES, WEEDS    Rofecoxib Other (See Comments)     Unknown reaction    Salicylates      Unknown reaction     Strawberry Extract      HEADACHES    Sulfinpyrazone Other (See Comments)     Unknown reaction    Aspirin Nausea And Vomiting, Other (See Comments) and Nausea Only    Nsaids Nausea Only, Other (See Comments) and Nausea And Vomiting       Current Meds:   Scheduled Meds:    cefepime  2,000 mg IntraVENous Q8H    albuterol  2.5 mg Nebulization Q4H    acetylcysteine  200 mg Inhalation Q4H    methylPREDNISolone  40 mg IntraVENous Q6H    sodium chloride flush  5-40 mL IntraVENous 2 times per day    risperiDONE  1.5 mg Oral Q12H    [Held by provider] rivastigmine  4.5 mg Oral BID    atorvastatin  20 mg Oral Daily    [Held by provider] traZODone  50 mg Oral Nightly    polyvinyl alcohol  1 drop Both Eyes Q4H    And    artificial tears   Both Eyes Q4H    chlorhexidine  15 mL Mouth/Throat BID    famotidine (PEPCID) injection  20 mg IntraVENous BID    insulin lispro  0-12 Units SubCUTAneous Q6H    metroNIDAZOLE  500 mg IntraVENous Q8H    heparin (porcine)  5,000 Units SubCUTAneous 3 times per day     Continuous Infusions:    sodium chloride      dextrose      propofol 45 mcg/kg/min (04/19/22 0500)    lactated ringers 100 mL/hr at 04/19/22 0453     PRN Meds: sodium chloride flush, sodium chloride, sodium chloride flush, potassium chloride **OR** potassium alternative oral replacement **OR** potassium chloride, glucose, dextrose, glucagon (rDNA), dextrose, fentanNYL    Data:     Past Medical History:   has a past medical history of Abnormal EKG, TRAM (acute kidney injury) ([facility] Anxiety, Asthma, Bipolar disorder ([facility] COPD (chronic obstructive pulmonary disease) ([facility] [address] Cramps, extremity, Depression, Dilated bile duct, Headache, History of elective , Hyperlipidemia, Irritable bowel syndrome, Prolonged emergence from general anesthesia, Substance abuse ([facility] [address] Unspecified sleep apnea, and Vision abnormalities. Social History:   reports that she quit smoking about 3 years ago. Her smoking use included cigarettes. She has a 84.00 pack-year smoking history. She has never used smokeless tobacco. She reports current alcohol use. She reports that she does not use drugs. Family History:   Family History   Problem Relation Age of Onset    Dementia Maternal Aunt     Kidney Disease Mother     Heart Attack Sister     Prostate Cancer Father     High Cholesterol Brother     Heart Attack Paternal Grandmother        Vitals:  BP (!) 187/152   Pulse 99   Temp 99 °F (37.2 °C) (Oral)   Resp (!) 34   Ht 5' 5\" (1.651 m)   Wt 176 lb 12.9 oz (80.2 kg)   LMP 2013 (Exact Date)   SpO2 97%   BMI 29.42 kg/m²   Temp (24hrs), Av.6 °F (37 °C), Min:97.9 °F (36.6 °C), Max:99 °F (37.2 °C)    Recent Labs     22  0838 22  1120 22  1800 22  1916   POCGLU 161* 143* 166* 188*       I/O(24Hr):     Intake/Output Summary (Last 24 hours) at 2022 0745  Last data filed at 2022 0530  Gross per 24 hour   Intake 2727.78 ml   Output 1040 ml   Net 1687.78 ml       Labs:    CBC with Differential:    Lab Results   Component Value Date    WBC 13.1 2022    RBC 2.76 2022    RBC 0-2 2021    HGB 8.3 2022    HCT 25.8 2022     2022    MCV 93.3 2022    MCH 30.0 2022    MCHC 32.1 2022    RDW 16.1 2022    NRBC 0 2021    METASPCT 1 2022    LYMPHOPCT PENDING 2022    LYMPHOPCT 12.1 2021    MONOPCT PENDING 2022    MONOPCT 15.1 07/08/2021    BASOPCT PENDING 04/19/2022    BASOPCT 0.8 07/08/2021    MONOSABS PENDING 04/19/2022    MONOSABS 0.4 07/08/2021    LYMPHSABS PENDING 04/19/2022    LYMPHSABS 0.3 07/08/2021    EOSABS PENDING 04/19/2022    EOSABS 0.1 07/08/2021    BASOSABS PENDING 04/19/2022    DIFFTYPE NOT REPORTED 12/20/2021     BMP:    Lab Results   Component Value Date     04/19/2022    K 4.7 04/19/2022     04/19/2022    CO2 22 04/19/2022    BUN 27 04/19/2022    LABALBU 2.8 04/16/2022    LABALBU 3.6 07/08/2021    CREATININE 0.60 04/19/2022    CALCIUM 8.2 04/19/2022    GFRAA >60 04/19/2022    LABGLOM >60 04/19/2022    GLUCOSE 250 04/19/2022    GLUCOSE 127 07/08/2021       Lab Results   Component Value Date/Time    SPECIAL 8ML GREEN/2ML ORANGE RIGHT IJ 04/16/2022 12:12 PM     Lab Results   Component Value Date/Time    CULTURE PENDING 04/18/2022 12:20 PM         Radiology:    XR CHEST (SINGLE VIEW FRONTAL)    Result Date: 4/5/2022  EXAMINATION: ONE XRAY VIEW OF THE CHEST 4/5/2022 8:55 am COMPARISON: April 3, 2022 HISTORY: ORDERING SYSTEM PROVIDED HISTORY: pna TECHNOLOGIST PROVIDED HISTORY: pna Reason for Exam: pna FINDINGS: Stable position of the right internal jugular central venous catheter. Right infrahilar airspace opacity not significantly changed. No new focal lung abnormality. No sizable pleural effusion. No pneumothorax.      Stable right infrahilar airspace opacity     XR CHEST (SINGLE VIEW FRONTAL)    Result Date: 4/3/2022  EXAMINATION: ONE XRAY VIEW OF THE CHEST 4/3/2022 5:51 am COMPARISON: 1 April 2022 HISTORY: ORDERING SYSTEM PROVIDED HISTORY: sob, pleurisy, cough TECHNOLOGIST PROVIDED HISTORY: sob, pleurisy, cough Reason for Exam: Shortness of breath, pleurisy, cough Additional signs and symptoms: Shortness of breath, pleurisy, cough Relevant Medical/Surgical History: Shortness of breath, pleurisy, cough FINDINGS: AP portable view of the chest time stamped at 528 hours demonstrates overlying cardiac monitoring electrodes. Heart size is stable. Right internal jugular catheter terminates in the distal superior vena cava. There has been worsening of a focal opacity at the right lung base. Minimal left basilar opacity is unchanged. No extrapleural air or overt edema. No gross effusions. Worsening opacity at the right base favoring airspace disease. Change in minimal left basilar opacity which may be related atelectasis. XR CHEST PORTABLE    Result Date: 4/17/2022  EXAMINATION: ONE XRAY VIEW OF THE CHEST 4/17/2022 6:04 am COMPARISON: 04/16/2022, 1248 hours HISTORY: ORDERING SYSTEM PROVIDED HISTORY: ETT placement TECHNOLOGIST PROVIDED HISTORY: ETT placement Reason for Exam: ETT 62-year-old female with endotracheal tube placement FINDINGS: Endotracheal tube distal tip overlying the mid trachea approximately 4.8 cm above the level of the nimesh. Enteric tube traverses the GE junction with distal tip excluded from the field of view. Right IJ approach central venous catheter distal tip overlying the right atrium. Right-sided chest tube distal tip projects over the right hilum. Cardiac monitor leads overlie the chest. No obvious pneumothorax. No free air. Cardiac and mediastinal contours remain unchanged. Left lung is relatively clear. Persistent airspace disease at the right mid and right lower lung zones. Visualized osseous structures remain unchanged. Subcutaneous emphysema previously seen at the right lateral chest wall no longer identified. 1. Persistent airspace disease at the right mid and right lower lung zones. Follow-up is recommended to document resolution. 2. Tubes and right IJ line as detailed above.      XR CHEST PORTABLE    Result Date: 4/16/2022  EXAMINATION: ONE XRAY VIEW OF THE CHEST 4/16/2022 1:10 pm COMPARISON: 04/16/2022, 1213 hours HISTORY: ORDERING SYSTEM PROVIDED HISTORY: chest tube TECHNOLOGIST PROVIDED HISTORY: chest tube Reason for Exam: chest tube Additional signs and symptoms: chest tube and NG tube placement 79-year-old female with history of NG tube and chest tube placement FINDINGS: Portable supine view of the chest. Right-sided chest tube has been placed with distal tip projecting over the right infrahilar region. Right IJ approach central venous catheter distal tip overlying the cavoatrial junction, stable. Endotracheal tube distal tip overlying the mid trachea approximately 4.3 cm above the level of the nimesh. Enteric tube traverses the GE junction with distal tip projecting over the left mid abdomen likely within the stomach body. Left lung is clear. Pleural thickening and opacity involving the right mid and right lower lung zones. Subcutaneous emphysema along the right lateral chest wall. Cardiac and mediastinal contours remain unchanged. Atherosclerotic calcification of the thoracic aorta. No free air. There is re-expansion of the right lung compared with the prior study. Probable small residual inferolateral right pneumothorax component. 1. Tubes and right IJ line as detailed above. Re-expansion of the right lung compared with the prior study. There is likely a small residual right inferolateral pneumothorax component. 2. Pleural thickening and airspace opacity involving the right mid and right lower lung zones. Follow-up is recommended to document resolution. 3. Subcutaneous emphysema along the right lateral chest wall. XR CHEST PORTABLE    Result Date: 4/16/2022  EXAMINATION: ONE XRAY VIEW OF THE CHEST 4/16/2022 12:24 pm COMPARISON: None. HISTORY: ORDERING SYSTEM PROVIDED HISTORY: Intubation TECHNOLOGIST PROVIDED HISTORY: Intubation Reason for Exam: central line and ET placement FINDINGS: ET tube terminates 3 cm above the nimesh. Right line terminates in the SVC. There is a relatively stable right-sided basilar pneumothorax. The remaining lungs are unchanged. Cardiac silhouette and osseous structures unchanged. Intubation as above.   No significant change     XR CHEST PORTABLE    Result Date: 4/16/2022  EXAMINATION: ONE XRAY VIEW OF THE CHEST 4/16/2022 11:02 am COMPARISON: 04/05/2022 HISTORY: ORDERING SYSTEM PROVIDED HISTORY: SOB TECHNOLOGIST PROVIDED HISTORY: SOB Reason for Exam: SOB FINDINGS: There is a moderate loculated right basal pneumothorax. Cavitary lesion is noted in the right lung base. Left lung is unremarkable. Heart and mediastinal structures appear normal.  There is no evidence for any tension phenomena. Moderate loculated right basal pneumothorax. Evidence for tension. Cavitary lesion noted in the right lung base. .  Case discussed with Dr. Herman Wilson. XR CHEST PORTABLE    Result Date: 4/1/2022  EXAMINATION: ONE XRAY VIEW OF THE CHEST 4/1/2022 4:01 pm COMPARISON: 04/01/2022 HISTORY: ORDERING SYSTEM PROVIDED HISTORY: central line placed TECHNOLOGIST PROVIDED HISTORY: central line placed Reason for Exam: Central line placed FINDINGS: There is a right internal jugular central line in place with distal tip overlying the superior vena cava. Previously noted right basal infiltrate is unchanged. Left lung appears clear. The heart and mediastinal structures appear normal.  There is no evidence of pneumothorax. Satisfactory position of right internal jugular central line. No change in the the right basal infiltrate. XR CHEST PORTABLE    Result Date: 4/1/2022  EXAMINATION: ONE XRAY VIEW OF THE CHEST 4/1/2022 1:22 pm COMPARISON: 06/17/2020. CT dated 10/15/2021. HISTORY: ORDERING SYSTEM PROVIDED HISTORY: dyspnea TECHNOLOGIST PROVIDED HISTORY: dyspnea Reason for Exam: Dyspnea Relevant Medical/Surgical History: Hx of COPD FINDINGS: The cardiac silhouette appears within normal limits. There are bibasilar opacities, right greater than left which may reflect multifocal pneumonia in the correct clinical setting. No evidence of pleural effusion or pneumothorax is seen.      Bibasilar opacities, right greater than left, which may reflect multifocal pneumonia. Follow-up to imaging resolution is recommended. CT CHEST PULMONARY EMBOLISM W CONTRAST    Result Date: 4/16/2022  EXAMINATION: CTA OF THE CHEST 4/16/2022 2:30 pm TECHNIQUE: CTA of the chest was performed after the administration of intravenous contrast.  Multiplanar reformatted images are provided for review. MIP images are provided for review. Dose modulation, iterative reconstruction, and/or weight based adjustment of the mA/kV was utilized to reduce the radiation dose to as low as reasonably achievable. COMPARISON: CT chest from 10/15/2021 HISTORY: ORDERING SYSTEM PROVIDED HISTORY: SOB TECHNOLOGIST PROVIDED HISTORY: SOB Decision Support Exception - unselect if not a suspected or confirmed emergency medical condition->Emergency Medical Condition (MA) Reason for Exam: sob Relevant Medical/Surgical History: intubated, septic, hx of PE 82-year-old female with shortness of breath FINDINGS: Pulmonary Arteries: No obvious filling defect in the main, right main, or left main pulmonary arteries. Evaluation of the segmental and subsegmental pulmonary arterial vasculature is limited due to respiratory motion suboptimal bolus timing, airspace disease, and streak artifact. There are areas of probable residual linear chronic clot within the right lower lobar pulmonary artery on image 111, series 2. Probable linear residual clot within the anterior right upper lobe pulmonary artery on image 83, series 2. Mediastinum: Atherosclerotic calcification of the aorta and branch vasculature. No dissection flap within the visualized thoracic aorta. No pericardial effusion. There is fluid in the superior pericardial recess. Enlarged precarinal lymph nodes remain unchanged on image 83, series 2. Right hilar lymphadenopathy is seen on image 96, series 2. Coronary artery disease. No left hilar or axillary lymphadenopathy. Lungs/pleura: Endotracheal tube distal tip within the lower trachea.   Trachea and very proximal central airways appear patent. Narrowing of the right middle lobe airway into a region of partial consolidation/atelectasis/scarring. Opacification of the right lower lobar segmental airways. There is a thick-walled cavitary lesion in the right lower lobe measuring 5.5 x 7.3 cm in greatest AP and transverse dimensions on image 68, series 4. There is a dependent air-fluid level within this lesion. This area measures 7.6 cm in greatest craniocaudal extent on image 48, series 602. There is surrounding airspace disease. There is a gas and fluid containing multiloculated pleural collection or hydropneumothorax along the posterior margin of the right lung. Right sided chest tube distal tip along the anteromedial right lung. Subcutaneous emphysema along the right axilla and right lateral chest wall. Stellate pulmonary nodule in the lateral right upper lobe measuring 6 mm on image 32, series 4. Moderate to severe emphysema, dependent atelectasis and respiratory motion. Upper Abdomen: Fatty liver. NG tube is seen extending into the stomach body. Atherosclerotic calcification of the upper abdominal aorta and branch vasculature. Soft Tissues/Bones: Chronic anterior wedge compression deformities, unchanged from 10/15/2021 involving T5 and T6. Mild diffuse degenerative changes throughout the spine. 1. Linear filling defects in the anterior right upper lobe and right lower lobe pulmonary arteries likely representing residual/chronic clot material. No acute central filling defect/pulmonary embolus is evident. 2. Thick-walled cavitary lesion in the right lower lobe measuring up to 5.5 x 7.3 x 7.6 cm which could be related to TB or fungal disease or a necrotizing pneumonia. Underlying cavitary malignancy not entirely excluded. There is surrounding airspace disease. There is also an associated multiloculated pleural collection or hydropneumothorax primarily along the posterior margin of the right lung. Right-sided chest tube distal tip along the anteromedial right pleura/lung. 3. Partial consolidation, atelectasis and/or infiltrate of the right middle lobe. 4. Stellate 6 mm lateral right upper lobe pulmonary nodule. Follow-up guidelines provided below. 5. Underlying moderate to severe emphysema. 6. Endotracheal and NG tubes as above. 7. Coronary artery disease. 8. Chronic anterior wedge compression deformities of T5 and T6. 9. Subcutaneous emphysema along the right axilla and right lateral chest wall likely related to chest tube. 10. Mediastinal and right hilar lymphadenopathy. RECOMMENDATIONS: 6 mm suspicious right solid pulmonary nodule within the upper lobe. Recommend a non-contrast Chest CT at 6-12 months, then another non-contrast Chest CT at 18-24 months. These guidelines do not apply to immunocompromised patients and patients with cancer. Follow up in patients with significant comorbidities as clinically warranted. For lung cancer screening, adhere to Lung-RADS guidelines. Reference: Radiology. 2017; 284(1):228-43. [place] MODIFIED BARIUM SWALLOW W VIDEO    Result Date: 4/4/2022  EXAMINATION: MODIFIED BARIUM SWALLOW WAS PERFORMED IN CONJUNCTION WITH SPEECH PATHOLOGY SERVICES TECHNIQUE: Fluoroscopic evaluation of the swallowing mechanism was performed using cineradiography with multiple consistency of barium product in conjunction with speech pathology services. FLUOROSCOPY DOSE AND TYPE OR TIME AND EXPOSURES: DAP 49.2 dGy cm squared COMPARISON: None HISTORY: ORDERING SYSTEM PROVIDED HISTORY: aspiration pna TECHNOLOGIST PROVIDED HISTORY: aspiration pna Reason for Exam: aspiration pneumonia FINDINGS: Premature vallecular spillage. There was trace penetration with straw with thin liquid. No aspiration. No aspiration. Trace penetration with straw with thin liquids. Please see separate speech pathology report for full discussion of findings and recommendations.  RECOMMENDATIONS: Unavailable Physical Examination:        Physical Exam  Eyes:      General: No scleral icterus. Pupils: Pupils are equal, round, and reactive to light. Neck:      Vascular: No carotid bruit. Cardiovascular:      Rate and Rhythm: Regular rhythm. Tachycardia present. Pulses: Normal pulses. Heart sounds: No murmur heard. No friction rub. No gallop. Pulmonary:      Effort: Respiratory distress present. Breath sounds: Rales (Right-sided) present. No wheezing. Abdominal:      General: Abdomen is flat. Bowel sounds are normal.      Palpations: Abdomen is soft. Musculoskeletal:      Right lower leg: No edema. Left lower leg: No edema. Skin:     Capillary Refill: Capillary refill takes less than 2 seconds. Neurological:      Comments: Sedated on propofol           Assessment:        Primary Problem  Sepsis [facility])    Active Hospital Problems    Diagnosis Date Noted    Pneumothorax on right [J93.9]     HCAP (healthcare-associated pneumonia) [J18.9]     Sepsis ([address] [A41.9] 04/16/2022    Acute on chronic respiratory failure ([address] [J96.20] 04/16/2022    Cavitary lesion of lung [J98.4] 04/16/2022    History of DVT (deep vein thrombosis) [Z86.718] 04/16/2022    Pneumothorax, right [J93.9] 04/16/2022    Status post chest tube placement ([address] [Z93.8] 04/16/2022    History of pulmonary embolus (PE) [Z86.711] 04/02/2022    Chronic respiratory failure with hypoxia (HCC) [J96.11] 08/05/2021    Compression fracture of body of thoracic vertebra ([address] [S22.000A] 09/28/2020    Lung nodule [R91.1] 08/22/2018    Bipolar disorder ([address] [F31.9]     Chronic obstructive pulmonary disease ([address] [J44.9] 01/06/2016       Plan:        Sepsis due to lung infection (abcess vs empyema)  Tachypnea, tachycardia  WBC 18>14>10  Pro-Branden >100 (>100 on repeat)    Lactate 2.8> 1.5  Chest x-ray: Moderate loculated right basal pneumothorax.  Evidence for tension.  Cavitarylesion noted in the right lung base  CT chest: Thick-walled cavitary lesion RLL. Right chest tube in situ for posterior right lung or right pneumothorax. Infiltrates of the right middle lobe. Stellate 6 mm lateral RUL nodule. Mediastinal and right hilar lymphadenopathy  Received 1 L bolus  100 mL/H lactated Ringer  Broad-spectrum antibiotic with cefepime, vancomycin  Critical care following  ID following  Respiratory cultures gram-positive cocci in pairs, Pseudomonas   Chest tubes cultures grew Pseudomonas Aeruginosa       Acute on chronic respiratory failure due pneumothorax  -History of severe COPD - 3 L home O2 baseline  -Currently on a ventilator FiO2 30%  -CXR right pneumothorax on admission   -S/p intubation and right chest tube placement  -pH 7. 3, PCO2 of 41.3, HCO3 20 Initially   -pH 7.33, PCO2 45, HCO3 24  -Sedated with propofol  -Continue DuoNeb  - Solumedrol 40mg q6h decreased to Q8H   - Insuline sliding scale and lantus was started     Acute systolic heart failure  Echo Estimated LV EF 45-50 %  Probnp 1000s   Will give need diuretics      Hx DVT/ PE -Eliquis was discontinued in 12/2021  History bipolar disorder: Trazodone, Risperidone      IVF: Lactated Ringer 100 mL/H  Diet: NPO, Consult dietitian for TPN  GI ppx: Pepcid 20 mg twice daily IV  DVT ppx:Heparin 5000 units TID   Code status: Full code  Consults: pulm, ID  Dispo: Keep in ICU    Kalyani Pulido MD  4/19/2022  7:45 AM       Attending Physician Statement  I have discussed the care of Cliff Pascual and I have examined the patient myselft and taken ros and hpi , including pertinent history and exam findings,  with the resident. I have reviewed the key elements of all parts of the encounter with the resident. I agree with the assessment, plan and orders as documented by the resident.    Acute on chronic respiratory failure,  Spontaneous pneumothorax, status post chest tube in place,  ABG, labs, chest x-ray reviewed,  Systolic dysf, start lasix 29BID iv  Still critically ill,  Mechanical ventilation,  Continue to monitor,  Settings reviewed,  ABG reviewed,  Critical care time spent 37 minutes    Electronically signed by Britt David MD

## 2022-04-19 NOTE — PLAN OF CARE
Problem: Non-Violent Restraints  Goal: Removal from restraints as soon as assessed to be safe  4/19/2022 0934 by Xavier Au RN  Outcome: Ongoing  4/18/2022 2110 by Luisa Qiu RN  Outcome: Ongoing  Goal: No harm/injury to patient while restraints in use  4/19/2022 0934 by Xavier Au RN  Outcome: Ongoing  4/18/2022 2110 by Luisa Qiu RN  Outcome: Ongoing  Goal: Patient's dignity will be maintained  4/19/2022 0934 by Xavier Au RN  Outcome: Ongoing  4/18/2022 2110 by Luisa Qiu RN  Outcome: Ongoing     Problem: Aspiration:  Goal: Absence of aspiration  Description: Absence of aspiration  4/19/2022 0934 by Xavier Au RN  Outcome: Ongoing  4/18/2022 2110 by Luisa Qiu RN  Outcome: Ongoing     Problem: Gas Exchange - Impaired:  Goal: Levels of oxygenation will improve  Description: Levels of oxygenation will improve  4/19/2022 0934 by Xavier Au RN  Outcome: Ongoing  4/18/2022 2110 by Luisa Qiu RN  Outcome: Ongoing     Problem: Skin Integrity - Impaired:  Goal: Will show no infection signs and symptoms  Description: Will show no infection signs and symptoms  4/19/2022 0934 by Xavier Au RN  Outcome: Ongoing  4/18/2022 2110 by Luisa Qiu RN  Outcome: Ongoing  Goal: Absence of new skin breakdown  Description: Absence of new skin breakdown  4/19/2022 0934 by Xavier Au RN  Outcome: Ongoing  4/18/2022 2110 by Luisa Qiu RN  Outcome: Ongoing     Problem: Falls - Risk of:  Goal: Will remain free from falls  Description: Will remain free from falls  4/19/2022 0934 by Xavier Au RN  Outcome: Ongoing  4/18/2022 2110 by Luisa Qiu RN  Outcome: Ongoing  Goal: Absence of physical injury  Description: Absence of physical injury  4/19/2022 0934 by Xavier Au RN  Outcome: Ongoing  4/18/2022 2110 by Luisa Qiu RN  Outcome: Ongoing     Problem: Breathing Pattern - Ineffective:  Goal: Ability to achieve and maintain a regular respiratory rate will improve  Description: Ability to achieve and maintain a regular respiratory rate will improve  4/19/2022 0934 by Lee Banegas RN  Outcome: Ongoing  4/18/2022 2110 by Osiel Jones RN  Outcome: Ongoing     Problem: Skin Integrity:  Goal: Will show no infection signs and symptoms  Description: Will show no infection signs and symptoms  4/19/2022 0934 by Lee Banegas RN  Outcome: Ongoing  4/18/2022 2110 by Osiel Jones RN  Outcome: Ongoing  Goal: Absence of new skin breakdown  Description: Absence of new skin breakdown  4/19/2022 0934 by Lee Banegas RN  Outcome: Ongoing  4/18/2022 2110 by Osiel Jones RN  Outcome: Ongoing     Problem: OXYGENATION/RESPIRATORY FUNCTION  Goal: Patient will maintain patent airway  4/19/2022 0934 by Lee Banegas RN  Outcome: Ongoing  4/18/2022 2110 by Osiel Jones RN  Outcome: Ongoing  Goal: Patient will achieve/maintain normal respiratory rate/effort  Description: Respiratory rate and effort will be within normal limits for the patient  4/19/2022 0934 by Lee Banegas RN  Outcome: Ongoing  4/18/2022 2110 by Osiel Jones RN  Outcome: Ongoing     Problem: MECHANICAL VENTILATION  Goal: Patient will maintain patent airway  4/19/2022 0934 by Lee Banegas RN  Outcome: Ongoing  4/18/2022 2110 by Osiel Jones RN  Outcome: Ongoing  Goal: Oral health is maintained or improved  4/19/2022 0934 by Lee Banegas RN  Outcome: Ongoing  4/18/2022 2110 by Osiel Jones RN  Outcome: Ongoing  Goal: ET tube will be managed safely  4/19/2022 0934 by Lee Banegas RN  Outcome: Ongoing  4/18/2022 2110 by Osiel Jones RN  Outcome: Ongoing  Goal: Ability to express needs and understand communication  4/19/2022 0934 by Lee Banegas RN  Outcome: Ongoing  4/18/2022 2110 by Osiel Jones RN  Outcome: Ongoing  Goal: Mobility/activity is maintained at optimum level for patient  4/19/2022 0934 by Marlin Corrigan RN  Outcome: Ongoing  4/18/2022 2110 by Odilia Moran RN  Outcome: Ongoing     Problem: SKIN INTEGRITY  Goal: Skin integrity is maintained or improved  4/19/2022 0934 by Marlin Corrigan RN  Outcome: Ongoing  4/18/2022 2110 by Odilia Moran RN  Outcome: Ongoing     Problem: Nutrition  Goal: Optimal nutrition therapy  4/19/2022 0934 by Marlin Corrigan RN  Outcome: Ongoing  4/18/2022 2110 by Odilia Moran RN  Outcome: Ongoing

## 2022-04-19 NOTE — FLOWSHEET NOTE
04/19/22 1439   Encounter Summary   Services provided to: Patient and family together   Referral/Consult From: [facility]   (4-19-22)   Complexity of Encounter Moderate   Length of Encounter 15 minutes   Spiritual/[place]ondoism   Type Spiritual support   Assessment Calm; Approachable   Intervention Active listening;Explored feelings, thoughts, concerns;Explored coping resources;Sustaining presence/ Ministry of presence; Discussed illness/injury and it's impact   Outcome Expressed gratitude;Engaged in conversation;Expressed feelings/needs/concerns;Comfort;Receptive;Encouraged

## 2022-04-19 NOTE — PROGRESS NOTES
Physical Therapy        Physical Therapy Cancel Note      DATE: 2022    NAME: Rebekah Trevino  MRN: [id number]   : 1955      Patient not seen this date for Physical Therapy due to:    Pt remains intubated- will continue to follow      Electronically signed by Jeronimo Chong PT on 2022 at 9:10 AM

## 2022-04-20 ENCOUNTER — APPOINTMENT (OUTPATIENT)
Dept: GENERAL RADIOLOGY | Age: 67
DRG: 720 | End: 2022-04-20
Payer: COMMERCIAL

## 2022-04-20 LAB
ALLEN TEST: ABNORMAL
ANION GAP SERPL CALCULATED.3IONS-SCNC: 6 MMOL/L (ref 9–17)
BUN BLDV-MCNC: 34 MG/DL (ref 8–23)
CALCIUM SERPL-MCNC: 7.9 MG/DL (ref 8.6–10.4)
CARBOXYHEMOGLOBIN: 0.3 % (ref 0–5)
CHLORIDE BLD-SCNC: 108 MMOL/L (ref 98–107)
CO2: 28 MMOL/L (ref 20–31)
CREAT SERPL-MCNC: 0.59 MG/DL (ref 0.5–0.9)
CULTURE: ABNORMAL
DIRECT EXAM: ABNORMAL
FIO2: 30
GFR AFRICAN AMERICAN: >60 ML/MIN
GFR NON-AFRICAN AMERICAN: >60 ML/MIN
GFR SERPL CREATININE-BSD FRML MDRD: ABNORMAL ML/MIN/{1.73_M2}
GLUCOSE BLD-MCNC: 159 MG/DL (ref 65–105)
GLUCOSE BLD-MCNC: 181 MG/DL (ref 65–105)
GLUCOSE BLD-MCNC: 234 MG/DL (ref 65–105)
GLUCOSE BLD-MCNC: 245 MG/DL (ref 70–99)
GLUCOSE BLD-MCNC: 247 MG/DL (ref 65–105)
HCO3 ARTERIAL: 30.4 MMOL/L (ref 22–26)
HCT VFR BLD CALC: 26.2 % (ref 36–46)
HEMOGLOBIN: 8.4 G/DL (ref 12–16)
MCH RBC QN AUTO: 29.9 PG (ref 26–34)
MCHC RBC AUTO-ENTMCNC: 32.1 G/DL (ref 31–37)
MCV RBC AUTO: 93.1 FL (ref 80–100)
METHEMOGLOBIN: 1.1 % (ref 0–1.9)
MODE: ABNORMAL
O2 DEVICE/FLOW/%: ABNORMAL
O2 SAT, ARTERIAL: 94.3 % (ref 95–98)
PATIENT TEMP: 37
PCO2 ARTERIAL: 53.3 MMHG (ref 35–45)
PDW BLD-RTO: 16.1 % (ref 11.5–14.9)
PEEP/CPAP: 8
PH ARTERIAL: 7.37 (ref 7.35–7.45)
PLATELET # BLD: 395 K/UL (ref 150–450)
PMV BLD AUTO: 7.1 FL (ref 6–12)
PO2 ARTERIAL: 78.5 MMHG (ref 80–100)
POSITIVE BASE EXCESS, ART: 5.1 MMOL/L (ref 0–2)
POTASSIUM SERPL-SCNC: 5 MMOL/L (ref 3.7–5.3)
PT. POSITION: ABNORMAL
RBC # BLD: 2.81 M/UL (ref 4–5.2)
RESPIRATORY RATE: 24
SAMPLE SITE: ABNORMAL
SET RATE: 24
SODIUM BLD-SCNC: 142 MMOL/L (ref 135–144)
SPECIMEN DESCRIPTION: ABNORMAL
SURGICAL PATHOLOGY REPORT: NORMAL
TEXT FOR RESPIRATORY: ABNORMAL
TOTAL RATE: 24
TRIGL SERPL-MCNC: 86 MG/DL
VT: 450
WBC # BLD: 10 K/UL (ref 3.5–11)

## 2022-04-20 PROCEDURE — 99233 SBSQ HOSP IP/OBS HIGH 50: CPT | Performed by: INTERNAL MEDICINE

## 2022-04-20 PROCEDURE — 82805 BLOOD GASES W/O2 SATURATION: CPT

## 2022-04-20 PROCEDURE — 80048 BASIC METABOLIC PNL TOTAL CA: CPT

## 2022-04-20 PROCEDURE — 71045 X-RAY EXAM CHEST 1 VIEW: CPT

## 2022-04-20 PROCEDURE — 6360000002 HC RX W HCPCS: Performed by: INTERNAL MEDICINE

## 2022-04-20 PROCEDURE — 94640 AIRWAY INHALATION TREATMENT: CPT

## 2022-04-20 PROCEDURE — 6370000000 HC RX 637 (ALT 250 FOR IP): Performed by: INTERNAL MEDICINE

## 2022-04-20 PROCEDURE — 2500000003 HC RX 250 WO HCPCS: Performed by: INTERNAL MEDICINE

## 2022-04-20 PROCEDURE — 2700000000 HC OXYGEN THERAPY PER DAY

## 2022-04-20 PROCEDURE — 94761 N-INVAS EAR/PLS OXIMETRY MLT: CPT

## 2022-04-20 PROCEDURE — 2580000003 HC RX 258: Performed by: INTERNAL MEDICINE

## 2022-04-20 PROCEDURE — 36415 COLL VENOUS BLD VENIPUNCTURE: CPT

## 2022-04-20 PROCEDURE — 99291 CRITICAL CARE FIRST HOUR: CPT | Performed by: INTERNAL MEDICINE

## 2022-04-20 PROCEDURE — 2500000003 HC RX 250 WO HCPCS: Performed by: NURSE PRACTITIONER

## 2022-04-20 PROCEDURE — A4216 STERILE WATER/SALINE, 10 ML: HCPCS | Performed by: INTERNAL MEDICINE

## 2022-04-20 PROCEDURE — 84478 ASSAY OF TRIGLYCERIDES: CPT

## 2022-04-20 PROCEDURE — 94003 VENT MGMT INPAT SUBQ DAY: CPT

## 2022-04-20 PROCEDURE — 82947 ASSAY GLUCOSE BLOOD QUANT: CPT

## 2022-04-20 PROCEDURE — 6370000000 HC RX 637 (ALT 250 FOR IP)

## 2022-04-20 PROCEDURE — 6360000002 HC RX W HCPCS: Performed by: STUDENT IN AN ORGANIZED HEALTH CARE EDUCATION/TRAINING PROGRAM

## 2022-04-20 PROCEDURE — 36600 WITHDRAWAL OF ARTERIAL BLOOD: CPT

## 2022-04-20 PROCEDURE — 2000000000 HC ICU R&B

## 2022-04-20 PROCEDURE — 85027 COMPLETE CBC AUTOMATED: CPT

## 2022-04-20 PROCEDURE — 2580000003 HC RX 258: Performed by: STUDENT IN AN ORGANIZED HEALTH CARE EDUCATION/TRAINING PROGRAM

## 2022-04-20 RX ORDER — METOPROLOL TARTRATE 5 MG/5ML
5 INJECTION INTRAVENOUS EVERY 4 HOURS PRN
Status: DISCONTINUED | OUTPATIENT
Start: 2022-04-20 | End: 2022-04-21

## 2022-04-20 RX ORDER — INSULIN GLARGINE 100 [IU]/ML
14 INJECTION, SOLUTION SUBCUTANEOUS 2 TIMES DAILY
Status: DISCONTINUED | OUTPATIENT
Start: 2022-04-20 | End: 2022-04-21

## 2022-04-20 RX ORDER — INSULIN LISPRO 100 [IU]/ML
0-12 INJECTION, SOLUTION INTRAVENOUS; SUBCUTANEOUS EVERY 6 HOURS
Status: DISCONTINUED | OUTPATIENT
Start: 2022-04-20 | End: 2022-04-29

## 2022-04-20 RX ADMIN — POLYVINYL ALCOHOL 1 DROP: 14 SOLUTION/ DROPS OPHTHALMIC at 13:27

## 2022-04-20 RX ADMIN — FAMOTIDINE 20 MG: 10 INJECTION INTRAVENOUS at 08:08

## 2022-04-20 RX ADMIN — ACETYLCYSTEINE 200 MG: 200 SOLUTION ORAL; RESPIRATORY (INHALATION) at 19:05

## 2022-04-20 RX ADMIN — INSULIN GLARGINE 14 UNITS: 100 INJECTION, SOLUTION SUBCUTANEOUS at 20:29

## 2022-04-20 RX ADMIN — PROPOFOL 30 MCG/KG/MIN: 10 INJECTION, EMULSION INTRAVENOUS at 13:26

## 2022-04-20 RX ADMIN — METHYLPREDNISOLONE SODIUM SUCCINATE 40 MG: 40 INJECTION, POWDER, LYOPHILIZED, FOR SOLUTION INTRAMUSCULAR; INTRAVENOUS at 16:32

## 2022-04-20 RX ADMIN — FUROSEMIDE 20 MG: 10 INJECTION, SOLUTION INTRAMUSCULAR; INTRAVENOUS at 08:08

## 2022-04-20 RX ADMIN — HEPARIN SODIUM 5000 UNITS: 5000 INJECTION INTRAVENOUS; SUBCUTANEOUS at 06:06

## 2022-04-20 RX ADMIN — POLYVINYL ALCOHOL 1 DROP: 14 SOLUTION/ DROPS OPHTHALMIC at 22:20

## 2022-04-20 RX ADMIN — INSULIN GLARGINE 10 UNITS: 100 INJECTION, SOLUTION SUBCUTANEOUS at 08:37

## 2022-04-20 RX ADMIN — ALBUTEROL SULFATE 2.5 MG: 2.5 SOLUTION RESPIRATORY (INHALATION) at 22:26

## 2022-04-20 RX ADMIN — HEPARIN SODIUM 5000 UNITS: 5000 INJECTION INTRAVENOUS; SUBCUTANEOUS at 13:27

## 2022-04-20 RX ADMIN — INSULIN LISPRO 4 UNITS: 100 INJECTION, SOLUTION INTRAVENOUS; SUBCUTANEOUS at 08:36

## 2022-04-20 RX ADMIN — MINERAL OIL, PETROLATUM: 425; 568 OINTMENT OPHTHALMIC at 20:27

## 2022-04-20 RX ADMIN — ALBUTEROL SULFATE 2.5 MG: 2.5 SOLUTION RESPIRATORY (INHALATION) at 10:48

## 2022-04-20 RX ADMIN — PROPOFOL 30 MCG/KG/MIN: 10 INJECTION, EMULSION INTRAVENOUS at 20:02

## 2022-04-20 RX ADMIN — MEROPENEM 1000 MG: 1 INJECTION, POWDER, FOR SOLUTION INTRAVENOUS at 20:30

## 2022-04-20 RX ADMIN — FAMOTIDINE 20 MG: 10 INJECTION INTRAVENOUS at 20:28

## 2022-04-20 RX ADMIN — ATORVASTATIN CALCIUM 20 MG: 20 TABLET, FILM COATED ORAL at 08:10

## 2022-04-20 RX ADMIN — ACETYLCYSTEINE 200 MG: 200 SOLUTION ORAL; RESPIRATORY (INHALATION) at 22:26

## 2022-04-20 RX ADMIN — ACETYLCYSTEINE 200 MG: 200 SOLUTION ORAL; RESPIRATORY (INHALATION) at 15:14

## 2022-04-20 RX ADMIN — MINERAL OIL, PETROLATUM: 425; 568 OINTMENT OPHTHALMIC at 00:15

## 2022-04-20 RX ADMIN — FUROSEMIDE 20 MG: 10 INJECTION, SOLUTION INTRAMUSCULAR; INTRAVENOUS at 18:15

## 2022-04-20 RX ADMIN — ACETYLCYSTEINE 200 MG: 200 SOLUTION ORAL; RESPIRATORY (INHALATION) at 03:33

## 2022-04-20 RX ADMIN — MEROPENEM 1000 MG: 1 INJECTION, POWDER, FOR SOLUTION INTRAVENOUS at 13:23

## 2022-04-20 RX ADMIN — MEROPENEM 1000 MG: 1 INJECTION, POWDER, FOR SOLUTION INTRAVENOUS at 03:31

## 2022-04-20 RX ADMIN — MINERAL OIL, PETROLATUM: 425; 568 OINTMENT OPHTHALMIC at 13:27

## 2022-04-20 RX ADMIN — POLYVINYL ALCOHOL 1 DROP: 14 SOLUTION/ DROPS OPHTHALMIC at 06:15

## 2022-04-20 RX ADMIN — INSULIN LISPRO 2 UNITS: 100 INJECTION, SOLUTION INTRAVENOUS; SUBCUTANEOUS at 02:28

## 2022-04-20 RX ADMIN — INSULIN LISPRO 2 UNITS: 100 INJECTION, SOLUTION INTRAVENOUS; SUBCUTANEOUS at 20:28

## 2022-04-20 RX ADMIN — HEPARIN SODIUM 5000 UNITS: 5000 INJECTION INTRAVENOUS; SUBCUTANEOUS at 20:28

## 2022-04-20 RX ADMIN — SODIUM CHLORIDE, PRESERVATIVE FREE 10 ML: 5 INJECTION INTRAVENOUS at 20:31

## 2022-04-20 RX ADMIN — ACETYLCYSTEINE 200 MG: 200 SOLUTION ORAL; RESPIRATORY (INHALATION) at 10:49

## 2022-04-20 RX ADMIN — ALBUTEROL SULFATE 2.5 MG: 2.5 SOLUTION RESPIRATORY (INHALATION) at 03:33

## 2022-04-20 RX ADMIN — CHLORHEXIDINE GLUCONATE 0.12% ORAL RINSE 15 ML: 1.2 LIQUID ORAL at 08:08

## 2022-04-20 RX ADMIN — ALBUTEROL SULFATE 2.5 MG: 2.5 SOLUTION RESPIRATORY (INHALATION) at 19:03

## 2022-04-20 RX ADMIN — METHYLPREDNISOLONE SODIUM SUCCINATE 40 MG: 40 INJECTION, POWDER, LYOPHILIZED, FOR SOLUTION INTRAMUSCULAR; INTRAVENOUS at 00:14

## 2022-04-20 RX ADMIN — CHLORHEXIDINE GLUCONATE 0.12% ORAL RINSE 15 ML: 1.2 LIQUID ORAL at 20:28

## 2022-04-20 RX ADMIN — ALBUTEROL SULFATE 2.5 MG: 2.5 SOLUTION RESPIRATORY (INHALATION) at 06:56

## 2022-04-20 RX ADMIN — POLYVINYL ALCOHOL 1 DROP: 14 SOLUTION/ DROPS OPHTHALMIC at 15:42

## 2022-04-20 RX ADMIN — METHYLPREDNISOLONE SODIUM SUCCINATE 40 MG: 40 INJECTION, POWDER, LYOPHILIZED, FOR SOLUTION INTRAMUSCULAR; INTRAVENOUS at 08:09

## 2022-04-20 RX ADMIN — ALBUTEROL SULFATE 2.5 MG: 2.5 SOLUTION RESPIRATORY (INHALATION) at 15:14

## 2022-04-20 RX ADMIN — PROPOFOL 35 MCG/KG/MIN: 10 INJECTION, EMULSION INTRAVENOUS at 08:39

## 2022-04-20 RX ADMIN — INSULIN LISPRO 4 UNITS: 100 INJECTION, SOLUTION INTRAVENOUS; SUBCUTANEOUS at 15:34

## 2022-04-20 RX ADMIN — PROPOFOL 45 MCG/KG/MIN: 10 INJECTION, EMULSION INTRAVENOUS at 02:29

## 2022-04-20 RX ADMIN — SODIUM CHLORIDE, PRESERVATIVE FREE 10 ML: 5 INJECTION INTRAVENOUS at 08:09

## 2022-04-20 RX ADMIN — MINERAL OIL, PETROLATUM: 425; 568 OINTMENT OPHTHALMIC at 04:45

## 2022-04-20 RX ADMIN — POLYVINYL ALCOHOL 1 DROP: 14 SOLUTION/ DROPS OPHTHALMIC at 02:28

## 2022-04-20 RX ADMIN — POLYETHYLENE GLYCOL 3350 17 G: 17 POWDER, FOR SOLUTION ORAL at 08:17

## 2022-04-20 RX ADMIN — POLYVINYL ALCOHOL 1 DROP: 14 SOLUTION/ DROPS OPHTHALMIC at 18:16

## 2022-04-20 RX ADMIN — METOPROLOL TARTRATE 5 MG: 1 INJECTION, SOLUTION INTRAVENOUS at 20:28

## 2022-04-20 RX ADMIN — ACETYLCYSTEINE 200 MG: 200 SOLUTION ORAL; RESPIRATORY (INHALATION) at 06:56

## 2022-04-20 RX ADMIN — MINERAL OIL, PETROLATUM: 425; 568 OINTMENT OPHTHALMIC at 15:42

## 2022-04-20 RX ADMIN — MINERAL OIL, PETROLATUM: 425; 568 OINTMENT OPHTHALMIC at 08:18

## 2022-04-20 ASSESSMENT — PAIN SCALES - GENERAL
PAINLEVEL_OUTOF10: 0
PAINLEVEL_OUTOF10: 0

## 2022-04-20 ASSESSMENT — PULMONARY FUNCTION TESTS
PIF_VALUE: 45
PIF_VALUE: 25
PIF_VALUE: 24
PIF_VALUE: 38
PIF_VALUE: 48
PIF_VALUE: 37
PIF_VALUE: 23
PIF_VALUE: 40
PIF_VALUE: 41
PIF_VALUE: 44
PIF_VALUE: 27
PIF_VALUE: 22
PIF_VALUE: 23
PIF_VALUE: 39
PIF_VALUE: 26
PIF_VALUE: 27
PIF_VALUE: 22
PIF_VALUE: 24
PIF_VALUE: 34
PIF_VALUE: 26
PIF_VALUE: 24
PIF_VALUE: 33
PIF_VALUE: 55

## 2022-04-20 NOTE — PROGRESS NOTES
Physical Therapy    Bia Alvarez     Date: 22  Patient Name: Kassandra Anguiano       Room: [facility]/1236-  MRN: [id number]  Account: [de-identified]   : 1955  (74 y.o.) Gender: female   Patient Height Height: 5' 5\" (165.1 cm)  Patient Weight 197 lb 5 oz (89.5 kg)     22 1216   PT Plan of Care   Wednesday H

## 2022-04-20 NOTE — FLOWSHEET NOTE
Pt's daughter at bedside; pt is stable per daughter who shared that Dr. Victor Manuel Phipps has been direct in his communication about prognosis when pt was here last time and now with this admission. She appreciates his  and knows may be hard to wean pt. She knows her mom's wishes so she takes comfort in that if she has to make decisions. She was appreciative of writer's follow up and support. 04/20/22 1433   Encounter Summary   Encounter Overview/Reason  Spiritual/Emotional Needs   Service Provided For: Patient and family together   Referral/Consult From: Rounding   Last Encounter  04/20/22   Complexity of Encounter Moderate   Spiritual/Emotional needs   Type Spiritual Support   Assessment/Intervention/Outcome   Assessment Calm   Intervention Active listening;Discussed illness injury and its impact; Explored/Affirmed feelings, thoughts, concerns;Explored Coping Skills/Resources;Prayer (assurance of)/[place];Sustaining Presence/Ministry of presence   Outcome Coping;Engaged in conversation;Expressed feelings, needs, and concerns;Expressed Gratitude;Receptive

## 2022-04-20 NOTE — PROGRESS NOTES
ICU Progress Note (Vent)   [facility]    Patient - Andres Lim,  Age - 74 y.o.    - 1955      Room Number -    MRN -  [id number]   Acct # - [de-identified]  Date of Admission -  2022 10:25 AM    Events of Past 24 Hours   Sedated on propofol, did not follow commands when sedation vacation was done; tachypneic breathing over the vent rate low 30s    Vitals    height is 5' 5\" (1.651 m) and weight is 197 lb 5 oz (89.5 kg). Her axillary temperature is 98.2 °F (36.8 °C). Her blood pressure is 142/70 (abnormal) and her pulse is 88. Her respiration is 30 and oxygen saturation is 97%. Temperature Range: Temp: 98.2 °F (36.8 °C) Temp  Av.6 °F (37 °C)  Min: 98 °F (36.7 °C)  Max: 99 °F (37.2 °C)  BP Range:  Systolic (33WID), BBA:568 , Min:108 , NTR:519     Diastolic (25UNJ), QTR:81, Min:52, Max:113    Pulse Range: Pulse  Av.2  Min: 61  Max: 102  Respiration Range: Resp  Av.2  Min: 14  Max: 37  Current Pulse Ox[de-identified]  SpO2: 97 %  24HR Pulse Ox Range:  SpO2  Av.5 %  Min: 92 %  Max: 99 %  Oxygen Amount and Delivery: O2 Flow Rate (L/min): 6 L/min      Wt Readings from Last 3 Encounters:   22 197 lb 5 oz (89.5 kg)   22 183 lb (83 kg)   22 186 lb 1.1 oz (84.4 kg)     I/O       Intake/Output Summary (Last 24 hours) at 2022 0843  Last data filed at 2022 0600  Gross per 24 hour   Intake 965.63 ml   Output 3120 ml   Net -2154.37 ml     I/O last 3 completed shifts: In: 3923.4 [I.V.:2041. 7; NG/GT:1266; IV Piggyback:615.7]  Out: 4160 [Urine:4100; Stool:20; Chest Tube:40]     DRAIN/TUBE OUTPUT:     Invasive Lines   ETT Day -   5  Lines -right IJ day 5    ICP PRESSURE RANGE:  No data recorded  CVP PRESSURE RANGE:  No data recorded  Mechanical Ventilation Data   SETTINGS (Comprehensive)  Vent Information  Vent Mode: PRVC  Additional Respiratory Assessments  Pulse: 88  Resp: 30  SpO2: 97 %  End Tidal CO2: 24 (%)  Position: Semi-Molina's  Humidification Source: E  Cuff Pressure (cm H2O): 22 cm H2O       ABGs:   Lab Results   Component Value Date    PHART 7.365 04/20/2022    PO2ART 78.5 04/20/2022    GGU9IIH 53.3 04/20/2022       Lab Results   Component Value Date    MODE [facility] 04/20/2022         Medications   IV   sodium chloride      dextrose      propofol 35 mcg/kg/min (04/20/22 0839)    lactated ringers 10 mL/hr (04/19/22 1849)      methylPREDNISolone  40 mg IntraVENous Q8H    insulin glargine  10 Units SubCUTAneous BID    polyethylene glycol  17 g Oral Daily    furosemide  20 mg IntraVENous BID    meropenem  1,000 mg IntraVENous Q8H    albuterol  2.5 mg Nebulization Q4H    acetylcysteine  200 mg Inhalation Q4H    sodium chloride flush  5-40 mL IntraVENous 2 times per day    [Held by provider] risperiDONE  1.5 mg Oral Q12H    [Held by provider] rivastigmine  4.5 mg Oral BID    atorvastatin  20 mg Oral Daily    [Held by provider] traZODone  50 mg Oral Nightly    polyvinyl alcohol  1 drop Both Eyes Q4H    And    artificial tears   Both Eyes Q4H    chlorhexidine  15 mL Mouth/Throat BID    famotidine (PEPCID) injection  20 mg IntraVENous BID    insulin lispro  0-12 Units SubCUTAneous Q6H    heparin (porcine)  5,000 Units SubCUTAneous 3 times per day       Diet/Nutrition   Diet NPO Exceptions are: Other (Specify); Specify Other Exceptions: Tube feed    Exam   VITALS    height is 5' 5\" (1.651 m) and weight is 197 lb 5 oz (89.5 kg). Her axillary temperature is 98.2 °F (36.8 °C). Her blood pressure is 142/70 (abnormal) and her pulse is 88. Her respiration is 30 and oxygen saturation is 97%. Ventilator Settings (Basic)  Vent Mode: [facility] Resp Rate (Set): 24 bmp/Vt (Set, mL): 450 mL/ /FiO2 : 30 %    Constitutional - Sedated  General Appearance frail, ill-appearing  HEENT - Life support devices in place (ET, OG),normocephalic, atraumatic. PERRLA  Lungs - Chest expands equally, no wheezes, rales or rhonchi.   Diminished poor air movement  Cardiovascular - Heart sounds are normal.  normal rate and rhythm regular, no murmur, gallop or rub. Abdomen - soft, nontender, nondistended, no masses or organomegaly  Neurologic - CN II-XII are grossly intact. There are no focal motor deficits  Skin - no bruising or bleeding  Extremities - no cyanosis, clubbing,: Increasing edema    Lab Results   CBC     Lab Results   Component Value Date    WBC 10.0 04/20/2022    RBC 2.81 04/20/2022    RBC 0-2 07/08/2021    HGB 8.4 04/20/2022    HCT 26.2 04/20/2022     04/20/2022    MCV 93.1 04/20/2022    MCH 29.9 04/20/2022    MCHC 32.1 04/20/2022    RDW 16.1 04/20/2022    NRBC 0 07/08/2021    METASPCT 1 04/02/2022    LYMPHOPCT 5 04/19/2022    LYMPHOPCT 12.1 07/08/2021    MONOPCT 2 04/19/2022    MONOPCT 15.1 07/08/2021    BASOPCT 0 04/19/2022    BASOPCT 0.8 07/08/2021    MONOSABS 0.26 04/19/2022    MONOSABS 0.4 07/08/2021    LYMPHSABS 0.66 04/19/2022    LYMPHSABS 0.3 07/08/2021    EOSABS 0.00 04/19/2022    EOSABS 0.1 07/08/2021    BASOSABS 0.00 04/19/2022    DIFFTYPE NOT REPORTED 12/20/2021       BMP   Lab Results   Component Value Date     04/20/2022    K 5.0 04/20/2022     04/20/2022    CO2 28 04/20/2022    BUN 34 04/20/2022    CREATININE 0.59 04/20/2022    GLUCOSE 245 04/20/2022    GLUCOSE 127 07/08/2021    CALCIUM 7.9 04/20/2022       LFTS  Lab Results   Component Value Date    ALKPHOS 137 04/16/2022    ALT 25 04/16/2022    AST 19 04/16/2022    PROT 7.0 04/16/2022    PROT 5.7 07/08/2021    BILITOT 0.16 04/16/2022    BILIDIR 0.1 07/08/2021    IBILI 0.12 07/08/2019    LABALBU 2.8 04/16/2022    LABALBU 3.6 07/08/2021       INR  No results for input(s): PROTIME, INR in the last 72 hours. APTT  No results for input(s): APTT in the last 72 hours. Lactic Acid  Lab Results   Component Value Date    LACTA 1.5 04/19/2022    LACTA 1.2 04/02/2022        BNP   No results for input(s): BNP in the last 72 hours.      Cultures   Bronchial wash negative for malignancy in terms of the cytology still awaiting final culture results    Radiology     Plain Films         Chest x-ray shows chest tube may be slightly migrating out but infiltrate is better        SYSTEM ASSESSMENT    Acute on chronic hypoxic and hypercapnic respiratory failure, intubated 4/16  Pseudomonas pneumonia  Right-sided pneumothorax  Right lower lobe cavitary lesions  Exudative pleural effusion on right, growing Pseudomonas  Low ejection fraction EF 45 to 50%, echo 4/18  History of pulmonary embolism and what appears to be chronic filling defects (subsegmental, most likely clinically inconsequential)  Severe stage IV COPD, FEV1 is 35% of predicted  Recent hospitalization for pneumonia  Elevated inflammatory markers including D-dimer and procalcitonin  Full CODE STATUS      Neuro   Continue to keep sedated, will use propofol  Daily sedation vacations; daughter says that she always has \"a problem coming out of anesthesia\"  Respiratory   Wean oxygen as tolerated. Keep O2 sat > 88%  We will place chest tube to waterseal since it is minimally draining  Follow-up chest x-ray  Not able to wean due to tachypnea, but we will continue daily weaning trial  Continue bronchodilators  No change in steroid dose today     Hemodynamics   Keep her on the dry side even her systolic dysfunction    Gastrointestinal/Nutrition   Tolerating her tube feeds  GI prophylaxis    Renal   Continue IV Lasix twice a day  Hep-Lock IV    Infectious Disease   Antibiotics switched over to [facility]    Hematology/Oncology   DVT prophylaxis with subcu heparin    Endocrine   Blood sugars are still elevated, increase Lantus 14 units twice daily  Triglyceride level acceptable  Social/Spiritual/DNR/Disposition/Other     Updated daughter at bedside. Her mother's wishes which are well-documented was never to be permanently on a ventilator or go for tracheostomy/PEG tube.   Told her we will give it through the weekend and see how she does and make an assessment of her chances of getting off the ventilator early next week    Critical Care Time   35 min    Electronically signed by Jannice Cooks, MD on 4/20/2022 at 8:43 AM

## 2022-04-20 NOTE — PLAN OF CARE
Problem: Non-Violent Restraints  Goal: Removal from restraints as soon as assessed to be safe  4/19/2022 2217 by Flaco Zavala RN  Outcome: Ongoing  4/19/2022 0934 by Ness Cope RN  Outcome: Ongoing  Goal: No harm/injury to patient while restraints in use  4/19/2022 2217 by Flaco Zavala RN  Outcome: Ongoing  4/19/2022 0934 by Ness Cope RN  Outcome: Ongoing  Goal: Patient's dignity will be maintained  4/19/2022 2217 by Flaco Zavala RN  Outcome: Ongoing  4/19/2022 0934 by Ness Cope RN  Outcome: Ongoing     Problem: Aspiration:  Goal: Absence of aspiration  Description: Absence of aspiration  4/19/2022 2217 by Flaco Zavala RN  Outcome: Ongoing  4/19/2022 0934 by Ness Cope RN  Outcome: Ongoing     Problem: Gas Exchange - Impaired:  Goal: Levels of oxygenation will improve  Description: Levels of oxygenation will improve  4/19/2022 2217 by Flaco Zavala RN  Outcome: Ongoing  4/19/2022 0934 by Ness Cope RN  Outcome: Ongoing     Problem: Skin Integrity - Impaired:  Goal: Will show no infection signs and symptoms  Description: Will show no infection signs and symptoms  4/19/2022 2217 by Flaco Zavala RN  Outcome: Ongoing  4/19/2022 0934 by Nses Cope RN  Outcome: Ongoing  Goal: Absence of new skin breakdown  Description: Absence of new skin breakdown  4/19/2022 2217 by Flaco Zavala RN  Outcome: Ongoing  4/19/2022 0934 by Ness Cope RN  Outcome: Ongoing     Problem: Falls - Risk of:  Goal: Will remain free from falls  Description: Will remain free from falls  4/19/2022 2217 by Flaco Zavala RN  Outcome: Ongoing  4/19/2022 0934 by Ness Cope RN  Outcome: Ongoing  Goal: Absence of physical injury  Description: Absence of physical injury  4/19/2022 2217 by Flaco Zavala RN  Outcome: Ongoing  4/19/2022 0934 by Ness Cope RN  Outcome: Ongoing     Problem: Breathing Pattern - Ineffective:  Goal: Ability to achieve and maintain a regular respiratory rate will improve  Description: Ability to achieve and maintain a regular respiratory rate will improve  4/19/2022 2217 by Corrine Rios RN  Outcome: Ongoing  4/19/2022 0934 by Thao Bar RN  Outcome: Ongoing     Problem: Skin Integrity:  Goal: Will show no infection signs and symptoms  Description: Will show no infection signs and symptoms  4/19/2022 2217 by Corrine Rios RN  Outcome: Ongoing  4/19/2022 0934 by Thao Bar RN  Outcome: Ongoing  Goal: Absence of new skin breakdown  Description: Absence of new skin breakdown  4/19/2022 2217 by Corrine Rios RN  Outcome: Ongoing  4/19/2022 0934 by Thao Bar RN  Outcome: Ongoing     Problem: OXYGENATION/RESPIRATORY FUNCTION  Goal: Patient will maintain patent airway  4/19/2022 2217 by Corrine Rios RN  Outcome: Ongoing  4/19/2022 1358 by Britney Knox RCP  Outcome: Ongoing  4/19/2022 0934 by Thao Bar RN  Outcome: Ongoing  Goal: Patient will achieve/maintain normal respiratory rate/effort  Description: Respiratory rate and effort will be within normal limits for the patient  4/19/2022 2217 by Corrine Rios RN  Outcome: Ongoing  4/19/2022 1358 by Britney Knox RCP  Outcome: Ongoing  4/19/2022 0934 by Thao Bar RN  Outcome: Ongoing     Problem: MECHANICAL VENTILATION  Goal: Patient will maintain patent airway  4/19/2022 2217 by Corrine Rios RN  Outcome: Ongoing  4/19/2022 0934 by Thao Bar RN  Outcome: Ongoing  Goal: Oral health is maintained or improved  4/19/2022 2217 by Corrine Rios RN  Outcome: Ongoing  4/19/2022 1358 by Britney Knox RCP  Outcome: Ongoing  4/19/2022 0934 by Thao Bar RN  Outcome: Ongoing  Goal: ET tube will be managed safely  4/19/2022 2217 by Corrine Rios RN  Outcome: Ongoing  4/19/2022 1358 by Britney Knox RCP  Outcome: Ongoing  4/19/2022 0934 by Kori Leos RN  Outcome: Ongoing  Goal: Ability to express needs and understand communication  4/19/2022 2217 by Darryl Hill RN  Outcome: Ongoing  4/19/2022 0934 by Kori Leos RN  Outcome: Ongoing  Goal: Mobility/activity is maintained at optimum level for patient  4/19/2022 2217 by Darryl Hill RN  Outcome: Ongoing  4/19/2022 0934 by Kori Leos RN  Outcome: Ongoing     Problem: SKIN INTEGRITY  Goal: Skin integrity is maintained or improved  4/19/2022 2217 by Darryl Hill RN  Outcome: Ongoing  4/19/2022 0934 by Kori Leos RN  Outcome: Ongoing     Problem: Nutrition  Goal: Optimal nutrition therapy  4/19/2022 2217 by Darryl Hill RN  Outcome: Ongoing  4/19/2022 0934 by Kori Leos RN  Outcome: Ongoing

## 2022-04-20 NOTE — PLAN OF CARE
Problem: Non-Violent Restraints  Goal: Removal from restraints as soon as assessed to be safe  Outcome: Not Progressing   Patient still in restraints, assessed every hour this shift. Problem: Aspiration:  Goal: Absence of aspiration  Description: Absence of aspiration  Outcome: Completed   Patient remained free from aspiration this shift. Problem: Gas Exchange - Impaired:  Goal: Levels of oxygenation will improve  Description: Levels of oxygenation will improve  Outcome: Progressing   Patient still intubated    Problem: Skin Integrity - Impaired:  Goal: Absence of new skin breakdown  Description: Absence of new skin breakdown  Outcome: Progressing   Skin assessment done this shift. No new skin issues noted. Problem: Falls - Risk of:  Goal: Will remain free from falls  Description: Will remain free from falls  Outcome: Completed   Pt assessed as a fall risk this shift. Remains free from falls and accidental injury at this time. Fall precautions in place, including falling star sign and fall risk band on pt. Floor free from obstacles, and bed is locked and in lowest position. Adequate lighting provided. Pt encouraged to call before getting OOB for any need. Bed alarm activated. Will continue to monitor needs during hourly rounding, and reinforce education on use of call light.

## 2022-04-20 NOTE — PROGRESS NOTES
[address]   OCCUPATIONAL THERAPY MISSED TREATMENT NOTE   INPATIENT   Date: 22  Patient Name: Michelle Bo       Room: 5508/9426-48  MRN: [id number]   Account #: [de-identified]    : 1955  (74 y.o.)  Gender: female                 REASON FOR MISSED TREATMENT:  Patient unable to participate   Pt with respiratory failure, required intubation. OT will continue to follow and attempt once medically appropriate.   [facility]

## 2022-04-20 NOTE — CARE COORDINATION
ONGOING DISCHARGE PLAN:    Pt remains intubated on vent. FIO2 24%     IV Merrem  Q8, IV solumedrol 40 mg Q8, IV lasix 20mg BID. Following for Home with [facility] vs possible need for SNF/LTACH. Waiting to see if patient will be able to wean off vent. Pulm following and per note will see how patient does this weekend and reassess. Will continue to follow for additional discharge needs.     Electronically signed by Rosalio Good RN on 4/20/2022 at 9:12 AM

## 2022-04-20 NOTE — PROGRESS NOTES
[address]    PROGRESS NOTE             4/20/2022    8:17 AM    Name:   Samuel Liu  MRN:     [id number]     [place]:      [de-identified]   Room:   2002/2002-01  IP Day:  4  Admit Date:  4/16/2022 10:25 AM    PCP:  Kelli Moseley MD  Code Status:  Full Code    Subjective:     C/C:   Chief Complaint   Patient presents with    Shortness of Breath     Interval History Status: Improve     Patient was seen and examined at bedside, no acute events overnight  Sedated with propofol 45 mcg/kg/ml  Vital sings stable. DC cefepime and flagyl  Start Merrem per ID       Brief History:     The patient is a 74 y.o. Non- / non  female sever COPD 3L O2 baseline, bipolar, Hx of DVT/ PE, migraines, who presents with Shortness of Breath and cough  Patient was recently discharge from [facility] for mycoplasma pneumonia. She had a follow up appointment with PCP, patient was complaining of cough and chest pain, was started on Tessalon and nsaids for pain. However; she continued to be in distress and her daughter brought her to ED. She was hypoxic initially on 3L o2, was increased to 6L and continues to be hypoxic   Tachypneic, tachycardic  ABGs: pH 7.33, CO2 34, HCO3 18  WBC 18   Lactic 2.8> 1.5  Pancultures were sent  Chest x-ray: Moderate loculated right basal pneumothorax.  Evidence for tension.  Cavitarylesion noted in the right lung base  CT chest: Chronic clot material RUL, RLL. Thick-walled cavitary lesion RLL. Multiloculated pleural collection or hydropneumothorax posterior to the right lung, right chest tube in situ. Infiltrates of the right middle lobe. Stellate 6 mm lateral RUL nodule.   Mediastinal and right hilar lymphadenopathy  Was given 1 L bolus, IV cefepime, vancomycin, 125 mg Solu-Medrol and she is admitted to the hospital for the management of acute hypoxic respiratory failure due to pneumothorax and possible lung infection    Review of Systems:     Review of Systems   Unable to perform ROS: Intubated         Medications: Allergies:     Allergies   Allergen Reactions    Advil [Ibuprofen] Other (See Comments)     vomiting    Aleve [Naproxen] Other (See Comments)     vomiting    Antipyrine Other (See Comments)     unknown    Celecoxib Other (See Comments)    Codeine      headache    Fomepizole     Incruse Ellipta [Umeclidinium Bromide]      confusion    Other      GRASS, TREES, WEEDS    Rofecoxib Other (See Comments)     Unknown reaction    Salicylates      Unknown reaction     Strawberry Extract      HEADACHES    Sulfinpyrazone Other (See Comments)     Unknown reaction    Aspirin Nausea And Vomiting, Other (See Comments) and Nausea Only    Nsaids Nausea Only, Other (See Comments) and Nausea And Vomiting       Current Meds:   Scheduled Meds:    methylPREDNISolone  40 mg IntraVENous Q8H    insulin glargine  10 Units SubCUTAneous BID    polyethylene glycol  17 g Oral Daily    furosemide  20 mg IntraVENous BID    meropenem  1,000 mg IntraVENous Q8H    albuterol  2.5 mg Nebulization Q4H    acetylcysteine  200 mg Inhalation Q4H    sodium chloride flush  5-40 mL IntraVENous 2 times per day    [Held by provider] risperiDONE  1.5 mg Oral Q12H    [Held by provider] rivastigmine  4.5 mg Oral BID    atorvastatin  20 mg Oral Daily    [Held by provider] traZODone  50 mg Oral Nightly    polyvinyl alcohol  1 drop Both Eyes Q4H    And    artificial tears   Both Eyes Q4H    chlorhexidine  15 mL Mouth/Throat BID    famotidine (PEPCID) injection  20 mg IntraVENous BID    insulin lispro  0-12 Units SubCUTAneous Q6H    heparin (porcine)  5,000 Units SubCUTAneous 3 times per day     Continuous Infusions:    sodium chloride      dextrose      propofol 45 mcg/kg/min (04/20/22 0229)    lactated ringers 10 mL/hr (04/19/22 4059)     PRN Meds: sodium chloride flush, sodium chloride, sodium chloride flush, potassium chloride **OR** potassium alternative oral replacement **OR** potassium chloride, glucose, dextrose, glucagon (rDNA), dextrose, fentanNYL    Data:     Past Medical History:   has a past medical history of Abnormal EKG, TRAM (acute kidney injury) ([facility] [address] Anxiety, Asthma, Bipolar disorder ([facility] COPD (chronic obstructive pulmonary disease) ([facility] Cramps, extremity, Depression, Dilated bile duct, Headache, History of elective , Hyperlipidemia, Irritable bowel syndrome, Prolonged emergence from general anesthesia, Substance abuse ([facility] Unspecified sleep apnea, and Vision abnormalities. Social History:   reports that she quit smoking about 3 years ago. Her smoking use included cigarettes. She has a 84.00 pack-year smoking history. She has never used smokeless tobacco. She reports current alcohol use. She reports that she does not use drugs. Family History:   Family History   Problem Relation Age of Onset    Dementia Maternal Aunt     Kidney Disease Mother     Heart Attack Sister     Prostate Cancer Father     High Cholesterol Brother     Heart Attack Paternal Grandmother        Vitals:  BP (!) 142/70   Pulse 88   Temp 98.2 °F (36.8 °C) (Axillary)   Resp 30   Ht 5' 5\" (1.651 m)   Wt 197 lb 5 oz (89.5 kg)   LMP 2013 (Exact Date)   SpO2 97%   BMI 32.83 kg/m²   Temp (24hrs), Av.6 °F (37 °C), Min:98 °F (36.7 °C), Max:99 °F (37.2 °C)    Recent Labs     22  1401 22  1932 22  0134 22  0728   POCGLU 186* 226* 181* 234*       I/O(24Hr):     Intake/Output Summary (Last 24 hours) at 2022 0817  Last data filed at 2022 0600  Gross per 24 hour   Intake 965.63 ml   Output 3120 ml   Net -2154.37 ml       Labs:    CBC with Differential:    Lab Results   Component Value Date    WBC 10.0 2022    RBC 2.81 2022    RBC 0-2 2021    HGB 8.4 2022    HCT 26.2 2022     2022    MCV 93.1 2022    MCH 29.9 2022    MCHC 32.1 04/20/2022    RDW 16.1 04/20/2022    NRBC 0 07/08/2021    METASPCT 1 04/02/2022    LYMPHOPCT 5 04/19/2022    LYMPHOPCT 12.1 07/08/2021    MONOPCT 2 04/19/2022    MONOPCT 15.1 07/08/2021    BASOPCT 0 04/19/2022    BASOPCT 0.8 07/08/2021    MONOSABS 0.26 04/19/2022    MONOSABS 0.4 07/08/2021    LYMPHSABS 0.66 04/19/2022    LYMPHSABS 0.3 07/08/2021    EOSABS 0.00 04/19/2022    EOSABS 0.1 07/08/2021    BASOSABS 0.00 04/19/2022    DIFFTYPE NOT REPORTED 12/20/2021     BMP:    Lab Results   Component Value Date     04/20/2022    K 5.0 04/20/2022     04/20/2022    CO2 28 04/20/2022    BUN 34 04/20/2022    LABALBU 2.8 04/16/2022    LABALBU 3.6 07/08/2021    CREATININE 0.59 04/20/2022    CALCIUM 7.9 04/20/2022    GFRAA >60 04/20/2022    LABGLOM >60 04/20/2022    GLUCOSE 245 04/20/2022    GLUCOSE 127 07/08/2021       Lab Results   Component Value Date/Time    SPECIAL 8ML GREEN/2ML ORANGE RIGHT IJ 04/16/2022 12:12 PM     Lab Results   Component Value Date/Time    CULTURE PENDING 04/18/2022 12:20 PM    CULTURE CULTURE IN PROGRESS 04/18/2022 12:20 PM         Radiology:    XR CHEST (SINGLE VIEW FRONTAL)    Result Date: 4/5/2022  EXAMINATION: ONE XRAY VIEW OF THE CHEST 4/5/2022 8:55 am COMPARISON: April 3, 2022 HISTORY: ORDERING SYSTEM PROVIDED HISTORY: pna TECHNOLOGIST PROVIDED HISTORY: linda Reason for Exam: pna FINDINGS: Stable position of the right internal jugular central venous catheter. Right infrahilar airspace opacity not significantly changed. No new focal lung abnormality. No sizable pleural effusion. No pneumothorax.      Stable right infrahilar airspace opacity     XR CHEST (SINGLE VIEW FRONTAL)    Result Date: 4/3/2022  EXAMINATION: ONE XRAY VIEW OF THE CHEST 4/3/2022 5:51 am COMPARISON: 1 April 2022 HISTORY: ORDERING SYSTEM PROVIDED HISTORY: sob, pleurisy, cough TECHNOLOGIST PROVIDED HISTORY: sob, pleurisy, cough Reason for Exam: Shortness of breath, pleurisy, cough Additional signs and symptoms: Shortness of breath, pleurisy, cough Relevant Medical/Surgical History: Shortness of breath, pleurisy, cough FINDINGS: AP portable view of the chest time stamped at 528 hours demonstrates overlying cardiac monitoring electrodes. Heart size is stable. Right internal jugular catheter terminates in the distal superior vena cava. There has been worsening of a focal opacity at the right lung base. Minimal left basilar opacity is unchanged. No extrapleural air or overt edema. No gross effusions. Worsening opacity at the right base favoring airspace disease. Change in minimal left basilar opacity which may be related atelectasis. XR CHEST PORTABLE    Result Date: 4/17/2022  EXAMINATION: ONE XRAY VIEW OF THE CHEST 4/17/2022 6:04 am COMPARISON: 04/16/2022, 1248 hours HISTORY: ORDERING SYSTEM PROVIDED HISTORY: ETT placement TECHNOLOGIST PROVIDED HISTORY: ETT placement Reason for Exam: ETT 82-year-old female with endotracheal tube placement FINDINGS: Endotracheal tube distal tip overlying the mid trachea approximately 4.8 cm above the level of the nimesh. Enteric tube traverses the GE junction with distal tip excluded from the field of view. Right IJ approach central venous catheter distal tip overlying the right atrium. Right-sided chest tube distal tip projects over the right hilum. Cardiac monitor leads overlie the chest. No obvious pneumothorax. No free air. Cardiac and mediastinal contours remain unchanged. Left lung is relatively clear. Persistent airspace disease at the right mid and right lower lung zones. Visualized osseous structures remain unchanged. Subcutaneous emphysema previously seen at the right lateral chest wall no longer identified. 1. Persistent airspace disease at the right mid and right lower lung zones. Follow-up is recommended to document resolution. 2. Tubes and right IJ line as detailed above.      XR CHEST PORTABLE    Result Date: 4/16/2022  EXAMINATION: ONE XRAY VIEW OF THE CHEST 4/16/2022 1:10 pm COMPARISON: 04/16/2022, 1213 hours HISTORY: ORDERING SYSTEM PROVIDED HISTORY: chest tube TECHNOLOGIST PROVIDED HISTORY: chest tube Reason for Exam: chest tube Additional signs and symptoms: chest tube and NG tube placement 82-year-old female with history of NG tube and chest tube placement FINDINGS: Portable supine view of the chest. Right-sided chest tube has been placed with distal tip projecting over the right infrahilar region. Right IJ approach central venous catheter distal tip overlying the cavoatrial junction, stable. Endotracheal tube distal tip overlying the mid trachea approximately 4.3 cm above the level of the nimesh. Enteric tube traverses the GE junction with distal tip projecting over the left mid abdomen likely within the stomach body. Left lung is clear. Pleural thickening and opacity involving the right mid and right lower lung zones. Subcutaneous emphysema along the right lateral chest wall. Cardiac and mediastinal contours remain unchanged. Atherosclerotic calcification of the thoracic aorta. No free air. There is re-expansion of the right lung compared with the prior study. Probable small residual inferolateral right pneumothorax component. 1. Tubes and right IJ line as detailed above. Re-expansion of the right lung compared with the prior study. There is likely a small residual right inferolateral pneumothorax component. 2. Pleural thickening and airspace opacity involving the right mid and right lower lung zones. Follow-up is recommended to document resolution. 3. Subcutaneous emphysema along the right lateral chest wall. XR CHEST PORTABLE    Result Date: 4/16/2022  EXAMINATION: ONE XRAY VIEW OF THE CHEST 4/16/2022 12:24 pm COMPARISON: None. HISTORY: ORDERING SYSTEM PROVIDED HISTORY: Intubation TECHNOLOGIST PROVIDED HISTORY: Intubation Reason for Exam: central line and ET placement FINDINGS: ET tube terminates 3 cm above the nimesh.   Right line terminates in the SVC. There is a relatively stable right-sided basilar pneumothorax. The remaining lungs are unchanged. Cardiac silhouette and osseous structures unchanged. Intubation as above. No significant change     XR CHEST PORTABLE    Result Date: 4/16/2022  EXAMINATION: ONE XRAY VIEW OF THE CHEST 4/16/2022 11:02 am COMPARISON: 04/05/2022 HISTORY: ORDERING SYSTEM PROVIDED HISTORY: SOB TECHNOLOGIST PROVIDED HISTORY: SOB Reason for Exam: SOB FINDINGS: There is a moderate loculated right basal pneumothorax. Cavitary lesion is noted in the right lung base. Left lung is unremarkable. Heart and mediastinal structures appear normal.  There is no evidence for any tension phenomena. Moderate loculated right basal pneumothorax. Evidence for tension. Cavitary lesion noted in the right lung base. .  Case discussed with Dr. Ismael Bloom. XR CHEST PORTABLE    Result Date: 4/1/2022  EXAMINATION: ONE XRAY VIEW OF THE CHEST 4/1/2022 4:01 pm COMPARISON: 04/01/2022 HISTORY: ORDERING SYSTEM PROVIDED HISTORY: central line placed TECHNOLOGIST PROVIDED HISTORY: central line placed Reason for Exam: Central line placed FINDINGS: There is a right internal jugular central line in place with distal tip overlying the superior vena cava. Previously noted right basal infiltrate is unchanged. Left lung appears clear. The heart and mediastinal structures appear normal.  There is no evidence of pneumothorax. Satisfactory position of right internal jugular central line. No change in the the right basal infiltrate. XR CHEST PORTABLE    Result Date: 4/1/2022  EXAMINATION: ONE XRAY VIEW OF THE CHEST 4/1/2022 1:22 pm COMPARISON: 06/17/2020. CT dated 10/15/2021. HISTORY: ORDERING SYSTEM PROVIDED HISTORY: dyspnea TECHNOLOGIST PROVIDED HISTORY: dyspnea Reason for Exam: Dyspnea Relevant Medical/Surgical History: Hx of COPD FINDINGS: The cardiac silhouette appears within normal limits.   There are bibasilar opacities, right greater than left which may reflect multifocal pneumonia in the correct clinical setting. No evidence of pleural effusion or pneumothorax is seen. Bibasilar opacities, right greater than left, which may reflect multifocal pneumonia. Follow-up to imaging resolution is recommended. CT CHEST PULMONARY EMBOLISM W CONTRAST    Result Date: 4/16/2022  EXAMINATION: CTA OF THE CHEST 4/16/2022 2:30 pm TECHNIQUE: CTA of the chest was performed after the administration of intravenous contrast.  Multiplanar reformatted images are provided for review. MIP images are provided for review. Dose modulation, iterative reconstruction, and/or weight based adjustment of the mA/kV was utilized to reduce the radiation dose to as low as reasonably achievable. COMPARISON: CT chest from 10/15/2021 HISTORY: ORDERING SYSTEM PROVIDED HISTORY: SOB TECHNOLOGIST PROVIDED HISTORY: SOB Decision Support Exception - unselect if not a suspected or confirmed emergency medical condition->Emergency Medical Condition (MA) Reason for Exam: sob Relevant Medical/Surgical History: intubated, septic, hx of PE 79-year-old female with shortness of breath FINDINGS: Pulmonary Arteries: No obvious filling defect in the main, right main, or left main pulmonary arteries. Evaluation of the segmental and subsegmental pulmonary arterial vasculature is limited due to respiratory motion suboptimal bolus timing, airspace disease, and streak artifact. There are areas of probable residual linear chronic clot within the right lower lobar pulmonary artery on image 111, series 2. Probable linear residual clot within the anterior right upper lobe pulmonary artery on image 83, series 2. Mediastinum: Atherosclerotic calcification of the aorta and branch vasculature. No dissection flap within the visualized thoracic aorta. No pericardial effusion. There is fluid in the superior pericardial recess. Enlarged precarinal lymph nodes remain unchanged on image 83, series 2. Right hilar lymphadenopathy is seen on image 96, series 2. Coronary artery disease. No left hilar or axillary lymphadenopathy. Lungs/pleura: Endotracheal tube distal tip within the lower trachea. Trachea and very proximal central airways appear patent. Narrowing of the right middle lobe airway into a region of partial consolidation/atelectasis/scarring. Opacification of the right lower lobar segmental airways. There is a thick-walled cavitary lesion in the right lower lobe measuring 5.5 x 7.3 cm in greatest AP and transverse dimensions on image 68, series 4. There is a dependent air-fluid level within this lesion. This area measures 7.6 cm in greatest craniocaudal extent on image 48, series 602. There is surrounding airspace disease. There is a gas and fluid containing multiloculated pleural collection or hydropneumothorax along the posterior margin of the right lung. Right sided chest tube distal tip along the anteromedial right lung. Subcutaneous emphysema along the right axilla and right lateral chest wall. Stellate pulmonary nodule in the lateral right upper lobe measuring 6 mm on image 32, series 4. Moderate to severe emphysema, dependent atelectasis and respiratory motion. Upper Abdomen: Fatty liver. NG tube is seen extending into the stomach body. Atherosclerotic calcification of the upper abdominal aorta and branch vasculature. Soft Tissues/Bones: Chronic anterior wedge compression deformities, unchanged from 10/15/2021 involving T5 and T6. Mild diffuse degenerative changes throughout the spine. 1. Linear filling defects in the anterior right upper lobe and right lower lobe pulmonary arteries likely representing residual/chronic clot material. No acute central filling defect/pulmonary embolus is evident. 2. Thick-walled cavitary lesion in the right lower lobe measuring up to 5.5 x 7.3 x 7.6 cm which could be related to TB or fungal disease or a necrotizing pneumonia.   Underlying cavitary malignancy not entirely excluded. There is surrounding airspace disease. There is also an associated multiloculated pleural collection or hydropneumothorax primarily along the posterior margin of the right lung. Right-sided chest tube distal tip along the anteromedial right pleura/lung. 3. Partial consolidation, atelectasis and/or infiltrate of the right middle lobe. 4. Stellate 6 mm lateral right upper lobe pulmonary nodule. Follow-up guidelines provided below. 5. Underlying moderate to severe emphysema. 6. Endotracheal and NG tubes as above. 7. Coronary artery disease. 8. Chronic anterior wedge compression deformities of T5 and T6. 9. Subcutaneous emphysema along the right axilla and right lateral chest wall likely related to chest tube. 10. Mediastinal and right hilar lymphadenopathy. RECOMMENDATIONS: 6 mm suspicious right solid pulmonary nodule within the upper lobe. Recommend a non-contrast Chest CT at 6-12 months, then another non-contrast Chest CT at 18-24 months. These guidelines do not apply to immunocompromised patients and patients with cancer. Follow up in patients with significant comorbidities as clinically warranted. For lung cancer screening, adhere to Lung-RADS guidelines. Reference: Radiology. 2017; 284(1):228-43. [place] MODIFIED BARIUM SWALLOW W VIDEO    Result Date: 4/4/2022  EXAMINATION: MODIFIED BARIUM SWALLOW WAS PERFORMED IN CONJUNCTION WITH SPEECH PATHOLOGY SERVICES TECHNIQUE: Fluoroscopic evaluation of the swallowing mechanism was performed using cineradiography with multiple consistency of barium product in conjunction with speech pathology services. FLUOROSCOPY DOSE AND TYPE OR TIME AND EXPOSURES: DAP 49.2 dGy cm squared COMPARISON: None HISTORY: ORDERING SYSTEM PROVIDED HISTORY: aspiration pna TECHNOLOGIST PROVIDED HISTORY: aspiration pna Reason for Exam: aspiration pneumonia FINDINGS: Premature vallecular spillage. There was trace penetration with straw with thin liquid.  No aspiration. No aspiration. Trace penetration with straw with thin liquids. Please see separate speech pathology report for full discussion of findings and recommendations. RECOMMENDATIONS: Unavailable         Physical Examination:        Physical Exam  Eyes:      General: No scleral icterus. Pupils: Pupils are equal, round, and reactive to light. Neck:      Vascular: No carotid bruit. Cardiovascular:      Rate and Rhythm: Regular rhythm. Tachycardia present. Pulses: Normal pulses. Heart sounds: No murmur heard. No friction rub. No gallop. Pulmonary:      Effort: Respiratory distress present. Breath sounds: Rales (Right-sided) present. No wheezing. Abdominal:      General: Abdomen is flat. Bowel sounds are normal.      Palpations: Abdomen is soft. Musculoskeletal:      Right lower leg: No edema. Left lower leg: No edema. Skin:     Capillary Refill: Capillary refill takes less than 2 seconds.    Neurological:      Comments: Sedated on propofol           Assessment:        Primary Problem  Sepsis [facility])    Active Hospital Problems    Diagnosis Date Noted    Acute systolic HF (heart failure) (HCC) [I50.21] 04/19/2022    Pneumothorax on right [J93.9]     HCAP (healthcare-associated pneumonia) [J18.9]     Sepsis ([facility] [address] [A41.9] 04/16/2022    Acute on chronic respiratory failure ([address] [J96.20] 04/16/2022    Cavitary lesion of lung [J98.4] 04/16/2022    History of DVT (deep vein thrombosis) [Z86.718] 04/16/2022    Pneumothorax, right [J93.9] 04/16/2022    Status post chest tube placement ([address] [Z93.8] 04/16/2022    History of pulmonary embolus (PE) [Z86.711] 04/02/2022    Chronic respiratory failure with hypoxia (HCC) [J96.11] 08/05/2021    Compression fracture of body of thoracic vertebra ([address] [S22.000A] 09/28/2020    Lung nodule [R91.1] 08/22/2018    Bipolar disorder ([address] [F31.9]     Chronic obstructive pulmonary disease ([address] [J44.9] 01/06/2016       Plan: Sepsis due to lung infection (abcess vs empyema)  Tachypnea, tachycardia  WBC 18>14>10  Pro-Branden >100 (>100 on repeat)    Lactate 2.8> 1.5  Chest x-ray: Moderate loculated right basal pneumothorax.  Evidence for tension.  Cavitarylesion noted in the right lung base  CT chest: Thick-walled cavitary lesion RLL. Right chest tube in situ for posterior right lung or right pneumothorax. Infiltrates of the right middle lobe. Stellate 6 mm lateral RUL nodule. Mediastinal and right hilar lymphadenopathy  Received 1 L bolus  100 mL/H lactated Ringer  Broad-spectrum antibiotic with cefepime, vancomycin  Critical care following  ID following  Respiratory cultures gram-positive cocci in pairs, Pseudomonas   Chest tubes cultures grew Pseudomonas Aeruginosa . ATBx broad spetrum Dc'd   Started on Merrem IV per ID       Acute on chronic respiratory failure due pneumothorax  -History of severe COPD - 3 L home O2 baseline  -Currently on a ventilator FiO2 30%  -CXR right pneumothorax on admission   -S/p intubation and right chest tube placement  -pH 7. 3, PCO2 of 41.3, HCO3 20 Initially   -pH 7.33, PCO2 45, HCO3 24  -Sedated with propofol  -Continue DuoNeb  - Solumedrol 40mg q6h decreased to Q8H   - Insuline sliding scale and lantus was started     Acute systolic heart failure  Echo Estimated LV EF 45-50 %  Probnp 1000s   Will give need diuretics      Hx DVT/ PE -Eliquis was discontinued in 12/2021  History bipolar disorder: Trazodone, Risperidone      IVF: Lactated Ringer 100 mL/H  Diet: NPO, Consult dietitian for TPN  GI ppx: Pepcid 20 mg twice daily IV  DVT ppx:Heparin 5000 units TID   Code status: Full code  Consults: pulm, ID  Dispo: Keep in ICU    Ana Amador MD  4/20/2022  8:17 AM     Attending Physician Statement  I have discussed the care of Teri Philip and I have examined the patient myselft and taken ros and hpi , including pertinent history and exam findings,  with the resident.  I have reviewed the key elements of all parts of the encounter with the resident. I agree with the assessment, plan and orders as documented by the resident.    Acute on chronic respiratory failure,  Spontaneous pneumothorax, status post chest tube in place,  ABG, labs, chest x-ray reviewed,  Systolic dysf,  lasix [id number] iv  Still critically ill,  Mechanical ventilation,  Continue to monitor,  Settings reviewed,  ABG reviewed,  Critical care time spent 35 minutes      Electronically signed by Chet Hong MD

## 2022-04-20 NOTE — PROGRESS NOTES
[facility] -   Infectious diseases evaluation  admission date 4/16/2022    reason for consultation:   Cavitary lung lesions  Impression :   Current:  · Right lower lobe cavitary lesions associated with multiloculated pleural fluid collection likely abscess with empyema status post chest tube with Pseudomonas growth on culture  · Sepsis secondary to above  · Acute on chronic respiratory failure required intubation  · Right-sided pneumothorax  · Severe COPD      Recommendations     · IV meropenem  · Follow chest x-ray  · Follow cultures and adjust antibiotics as needed  · Nasal swab for MRSA negative   · Follow CBC and renal function  · Continue supportive care              History of Present Illness:   Initial history:  Flor Doe is a 74y.o.-year-old female presents to the hospital with worsening shortness of breath associated with cough. At the ER with tachypneic and hypoxic  Chest x-ray showed pneumothorax  The patient was in respiratory distress, was intubated. The patient is intubated, sedated, unable to provide history that was obtained from chart review. CT chest 4/16/2022 showed right lower lobe cavitary lesion with surrounding airspace disease and multiloculated pleural collection. The patient had chest tube placed with gram-negative rods growth on pleural fluid culture. Respiratory culture grew gram-positive cocci in pairs  Initial WBC was 18.5, procalcitonin no more than 100  Respiratory panel with COVID-19 PCR was negative  Urine for Legionella and strep pneumo antigen negative    Interval changes  4/20/2022   She remains intubated, sedated, tachypneic and breathing over vent, afebrile  Status post bronchoscopy 4/18/2020  Chest tube in place  Right IJ line, NG tube and Reynoso cath in place  Pseudomonas growth on pleural fluid culture.   Respiratory culture grew gram-positive cocci in pairs  Patient Vitals for the past 8 hrs:   BP Temp Temp src Pulse Resp SpO2 Weight   04/20/22 1049 -- -- -- 100 20 92 % --   04/20/22 1000 (!) 141/74 -- -- 87 (!) 31 96 % --   04/20/22 0900 (!) 147/76 -- -- 90 30 96 % --   04/20/22 0800 (!) 142/70 98.2 °F (36.8 °C) Axillary 88 30 97 % 197 lb 5 oz (89.5 kg)   04/20/22 0700 (!) 140/70 -- -- 78 24 99 % --   04/20/22 0656 -- -- -- 96 24 97 % --   04/20/22 0630 128/74 -- -- 80 22 97 % --   04/20/22 0600 (!) 164/69 -- -- 84 (!) 34 94 % --   04/20/22 0530 134/78 -- -- 77 26 96 % --             I have personally reviewed the past medical history, past surgical history, medications, social history, and family history, and I haveupdated the database accordingly. Allergies:   Advil [ibuprofen], Aleve [naproxen], Antipyrine, Celecoxib, Codeine, Fomepizole, Incruse ellipta [umeclidinium bromide], Other, Rofecoxib, Salicylates, Strawberry extract, Sulfinpyrazone, Aspirin, and Nsaids     Review of Systems:     Review of Systems  Intubated, unable to provide  Physical Examination :       Physical Exam  Constitutional:       Appearance: She is ill-appearing. Comments: Intubated   HENT:      Head: Normocephalic and atraumatic. Right Ear: External ear normal.      Left Ear: External ear normal.   Eyes:      General: No scleral icterus. Conjunctiva/sclera: Conjunctivae normal.   Cardiovascular:      Rate and Rhythm: Normal rate. Heart sounds: No murmur heard. Pulmonary:      Breath sounds: Wheezing present. Comments: Decreased breath sounds on the right side. Right-sided chest tube in place  Abdominal:      General: There is no distension. Palpations: Abdomen is soft. Musculoskeletal:      Cervical back: Neck supple. No rigidity. Right lower leg: No edema. Left lower leg: No edema. Skin:     Coloration: Skin is not jaundiced. Findings: No bruising.    Neurological:      Comments: Sedated         Past Medical History:     Past Medical History:   Diagnosis Date    Abnormal EKG     TRAM [facility]acute kidney injury) ([facility] [address] 2022    Anxiety     Asthma     Bipolar disorder ([facility] [address]     SEVERE IN 2003, [place]    COPD (chronic obstructive pulmonary disease) (HCC)     Cramps, extremity     SEVERE LEG CRAMPS    Depression     Dilated bile duct     Headache     History of elective      Hyperlipidemia     Irritable bowel syndrome     Prolonged emergence from general anesthesia     SEVERE ISSUES WAKING UP    Substance abuse ([facility] [address]     street drugs when younger   [facility] Unspecified sleep apnea     Vision abnormalities     glasses       Past Surgical  History:     Past Surgical History:   Procedure Laterality Date    ANKLE SURGERY      HAD SCREWS AND HARDWARE, THEN REMOVED    BRONCHOSCOPY  2022         COLONOSCOPY  02/15/2018    tubular adenoma    EYE SURGERY Bilateral     CATARACTS    HYSTEROSCOPY  10/19/2016    D & C    INDUCED       LASIK      bilateral    TONSILLECTOMY AND ADENOIDECTOMY Bilateral     VULVA SURGERY      HAD A BIOPSY AND REMOVAL OF       Medications:      insulin glargine  14 Units SubCUTAneous BID    insulin lispro  0-12 Units SubCUTAneous Q6H    methylPREDNISolone  40 mg IntraVENous Q8H    polyethylene glycol  17 g Oral Daily    furosemide  20 mg IntraVENous BID    meropenem  1,000 mg IntraVENous Q8H    albuterol  2.5 mg Nebulization Q4H    acetylcysteine  200 mg Inhalation Q4H    sodium chloride flush  5-40 mL IntraVENous 2 times per day    [Held by provider] risperiDONE  1.5 mg Oral Q12H    [Held by provider] rivastigmine  4.5 mg Oral BID    atorvastatin  20 mg Oral Daily    [Held by provider] traZODone  50 mg Oral Nightly    polyvinyl alcohol  1 drop Both Eyes Q4H    And    artificial tears   Both Eyes Q4H    chlorhexidine  15 mL Mouth/Throat BID    famotidine (PEPCID) injection  20 mg IntraVENous BID    heparin (porcine)  5,000 Units SubCUTAneous 3 times per day       Social History:     Social History     Socioeconomic History    Marital status:      Spouse name: Not on file    Number of children: Not on file    Years of education: Not on file    Highest education level: Not on file   Occupational History    Not on file   Tobacco Use    Smoking status: Former Smoker     Packs/day: 2.00     Years: 42.00     Pack years: 84.00     Types: Cigarettes     Quit date: 11/1/2018     Years since quitting: 3.4    Smokeless tobacco: Never Used   Substance and Sexual Activity    Alcohol use: Yes     Comment: yearly    Drug use: No    Sexual activity: Not Currently     Partners: Male     Birth control/protection: Post-menopausal   Other Topics Concern    Not on file   Social History Narrative    Not on file     Social Determinants of Health     Financial Resource Strain: High Risk    Difficulty of Paying Living Expenses: Very hard   Food Insecurity: No Food Insecurity    Worried About Running Out of Food in the Last Year: Never true    Agustin of Food in the Last Year: Never true   Transportation Needs:     Lack of Transportation (Medical): Not on file    Lack of Transportation (Non-Medical):  Not on file   Physical Activity:     Days of Exercise per Week: Not on file    Minutes of Exercise per Session: Not on file   Stress:     Feeling of Stress : Not on file   Social Connections:     Frequency of Communication with Friends and Family: Not on file    Frequency of Social Gatherings with Friends and Family: Not on file    Attends Yazidi Services: Not on file    Active Member of Clubs or Organizations: Not on file    Attends Club or Organization Meetings: Not on file    Marital Status: Not on file   Intimate Partner Violence:     Fear of Current or Ex-Partner: Not on file    Emotionally Abused: Not on file    Physically Abused: Not on file    Sexually Abused: Not on file   Housing Stability:     Unable to Pay for Housing in the Last Year: Not on file    Number of Places Lived in the Last Year: Not on file    Unstable Housing in the Last Year: Not on file       Family History:     Family History   Problem Relation Age of Onset    Dementia Maternal Aunt     Kidney Disease Mother     Heart Attack Sister     Prostate Cancer Father     High Cholesterol Brother     Heart Attack Paternal Grandmother       Medical Decision Making:   I have independently reviewed/ordered the following labs:    CBC with Differential:   Recent Labs     04/18/22  0440 04/18/22  0440 04/19/22  0529 04/20/22  0410   WBC 10.6   < > 13.1* 10.0   HGB 8.7*   < > 8.3* 8.4*   HCT 26.6*   < > 25.8* 26.2*      < > 422 395   LYMPHOPCT 9*  --  5*  --    MONOPCT 1  --  2  --     < > = values in this interval not displayed. BMP:  Recent Labs     04/19/22  0529 04/20/22 0410    142   K 4.7 5.0    108*   CO2 22 28   BUN 27* 34*   CREATININE 0.60 0.59     Hepatic Function Panel:   No results for input(s): PROT, LABALBU, BILIDIR, IBILI, BILITOT, ALKPHOS, ALT, AST in the last 72 hours. No results for input(s): RPR in the last 72 hours. No results for input(s): HIV in the last 72 hours. No results for input(s): BC in the last 72 hours. Lab Results   Component Value Date    CREATININE 0.59 04/20/2022    GLUCOSE 245 04/20/2022    GLUCOSE 127 07/08/2021       Detailed results: Thank you for allowing us to participate in the care of this patient. Please call with questions. This note is created with the assistance of a speech recognition program.  While intending to generate adocument that actually reflects the content of the visit, the document can still have some errors including those of syntax and sound a like substitutions which may escape proof reading. It such instances, actual meaningcan be extrapolated by contextual diversion.     Cari Maguire MD  Office: [phone]  Perfect serve / office [phone]

## 2022-04-20 NOTE — PROGRESS NOTES
Comprehensive Nutrition Assessment    Type and Reason for Visit:  Reassess    Nutrition Recommendations/Plan:   1. Continue TF     Malnutrition Assessment:  Malnutrition Status:  No malnutrition (04/17/22 1434)    Context:  Acute Illness     Findings of the 6 clinical characteristics of malnutrition:  Energy Intake:  No significant decrease in energy intake  Weight Loss:  No significant weight loss     Body Fat Loss:  No significant body fat loss     Muscle Mass Loss:  No significant muscle mass loss    Fluid Accumulation:  No significant fluid accumulation     Strength:  Not Performed    Nutrition Assessment:    TF to continue. Family will decide if pt would get PEG/trach if unable to be removed from vent. Propofol at 14.9ml/hr providing 393kcal/day    Nutrition Related Findings:    Edema: BUE +2, BLE trace. Labs and meds reviewed Wound Type: None       Current Nutrition Intake & Therapies:    Average Meal Intake: NPO     Diet NPO Exceptions are: Other (Specify); Specify Other Exceptions: Tube feed    Anthropometric Measures:  Height: 5' 5\" (165.1 cm)  Ideal Body Weight (IBW): 125 lbs (57 kg)    Admission Body Weight: 176 lb (79.8 kg)  Current Body Weight: 176 lb (79.8 kg), 140.8 % IBW. Weight Source: Bed Scale  Current BMI (kg/m2): 29.3                          BMI Categories: Overweight (BMI 25.0-29. 9)    Estimated Daily Nutrient Needs:  Energy Requirements Based On: Kcal/kg  Weight Used for Energy Requirements: Admission  Energy (kcal/day): 2673-0010 kcals based on 20-22 kcals/kg using adm wt 80.2 kg  Weight Used for Protein Requirements: Ideal  Protein (g/day): 74-85 gm protein based on 1.3-1.5 gm/kg using IBW       Nutrition Diagnosis:   · Inadequate oral intake related to impaired respiratory function as evidenced by NPO or clear liquid status due to medical condition,intubation      Nutrition Interventions:   Food and/or Nutrient Delivery: Continue Current Tube Feeding  Nutrition Education/Counseling: No recommendation at this time  Coordination of Nutrition Care: Continue to monitor while inpatient       Goals:  Previous Goal Met: Progressing toward Goal(s)          Nutrition Monitoring and Evaluation:      Food/Nutrient Intake Outcomes: None Identified,Enteral Nutrition Intake/Tolerance  Physical Signs/Symptoms Outcomes: Biochemical Data,GI Status,Fluid Status or Edema,Hemodynamic Status,Nutrition Focused Physical Findings,Skin,Weight    Discharge Planning: Too soon to determine     Aliza García RD, LD  [phone]    Some areas of assessment may be incomplete due to standard COVID-19 Precautions.

## 2022-04-20 NOTE — PROGRESS NOTES
Physical Therapy        Physical Therapy Cancel Note      DATE: 2022    NAME: Gagandeep Disla  MRN: [id number]   : 1955      Patient not seen this date for Physical Therapy due to:    2022 at 26- HOLD PT evaluation. Pt is intubated and is currently on the vent. Will check status tomorrow.       Electronically signed by Yumiko Quesada PT on 2022 at 12:15 PM

## 2022-04-21 ENCOUNTER — APPOINTMENT (OUTPATIENT)
Dept: GENERAL RADIOLOGY | Age: 67
DRG: 720 | End: 2022-04-21
Payer: COMMERCIAL

## 2022-04-21 PROBLEM — E87.5 HYPERKALEMIA: Status: ACTIVE | Noted: 2022-04-21

## 2022-04-21 LAB
ALLEN TEST: ABNORMAL
ANION GAP SERPL CALCULATED.3IONS-SCNC: 4 MMOL/L (ref 9–17)
BUN BLDV-MCNC: 39 MG/DL (ref 8–23)
CALCIUM SERPL-MCNC: 8.1 MG/DL (ref 8.6–10.4)
CARBOXYHEMOGLOBIN: 0.5 % (ref 0–5)
CHLORIDE BLD-SCNC: 107 MMOL/L (ref 98–107)
CO2: 35 MMOL/L (ref 20–31)
CREAT SERPL-MCNC: 0.58 MG/DL (ref 0.5–0.9)
CULTURE: ABNORMAL
CULTURE: ABNORMAL
CULTURE: NORMAL
CULTURE: NORMAL
DIRECT EXAM: ABNORMAL
FIO2: 30
GFR AFRICAN AMERICAN: >60 ML/MIN
GFR NON-AFRICAN AMERICAN: >60 ML/MIN
GFR SERPL CREATININE-BSD FRML MDRD: ABNORMAL ML/MIN/{1.73_M2}
GLUCOSE BLD-MCNC: 166 MG/DL (ref 65–105)
GLUCOSE BLD-MCNC: 168 MG/DL (ref 65–105)
GLUCOSE BLD-MCNC: 176 MG/DL (ref 65–105)
GLUCOSE BLD-MCNC: 197 MG/DL (ref 65–105)
GLUCOSE BLD-MCNC: 221 MG/DL (ref 70–99)
HCO3 ARTERIAL: 37.5 MMOL/L (ref 22–26)
HCT VFR BLD CALC: 26.9 % (ref 36–46)
HEMOGLOBIN: 8.6 G/DL (ref 12–16)
Lab: NORMAL
Lab: NORMAL
MCH RBC QN AUTO: 30 PG (ref 26–34)
MCHC RBC AUTO-ENTMCNC: 31.8 G/DL (ref 31–37)
MCV RBC AUTO: 94.2 FL (ref 80–100)
METHEMOGLOBIN: 1.2 % (ref 0–1.9)
MODE: ABNORMAL
O2 DEVICE/FLOW/%: ABNORMAL
O2 SAT, ARTERIAL: 94.3 % (ref 95–98)
PATIENT TEMP: 37
PCO2 ARTERIAL: 58.6 MMHG (ref 35–45)
PDW BLD-RTO: 16.4 % (ref 11.5–14.9)
PEEP/CPAP: 8
PH ARTERIAL: 7.42 (ref 7.35–7.45)
PLATELET # BLD: 439 K/UL (ref 150–450)
PMV BLD AUTO: 6.8 FL (ref 6–12)
PO2 ARTERIAL: 82.1 MMHG (ref 80–100)
POSITIVE BASE EXCESS, ART: 13 MMOL/L (ref 0–2)
POTASSIUM SERPL-SCNC: 5.5 MMOL/L (ref 3.7–5.3)
PT. POSITION: ABNORMAL
RBC # BLD: 2.86 M/UL (ref 4–5.2)
SAMPLE SITE: ABNORMAL
SET RATE: 24
SODIUM BLD-SCNC: 146 MMOL/L (ref 135–144)
SPECIMEN DESCRIPTION: ABNORMAL
SPECIMEN DESCRIPTION: NORMAL
SPECIMEN DESCRIPTION: NORMAL
TEXT FOR RESPIRATORY: ABNORMAL
TOTAL RATE: 24
VT: 450
WBC # BLD: 12.2 K/UL (ref 3.5–11)

## 2022-04-21 PROCEDURE — 71045 X-RAY EXAM CHEST 1 VIEW: CPT

## 2022-04-21 PROCEDURE — 6360000002 HC RX W HCPCS: Performed by: INTERNAL MEDICINE

## 2022-04-21 PROCEDURE — 6370000000 HC RX 637 (ALT 250 FOR IP): Performed by: INTERNAL MEDICINE

## 2022-04-21 PROCEDURE — 2500000003 HC RX 250 WO HCPCS: Performed by: NURSE PRACTITIONER

## 2022-04-21 PROCEDURE — 82805 BLOOD GASES W/O2 SATURATION: CPT

## 2022-04-21 PROCEDURE — 82947 ASSAY GLUCOSE BLOOD QUANT: CPT

## 2022-04-21 PROCEDURE — 2700000000 HC OXYGEN THERAPY PER DAY

## 2022-04-21 PROCEDURE — 2580000003 HC RX 258: Performed by: INTERNAL MEDICINE

## 2022-04-21 PROCEDURE — 99291 CRITICAL CARE FIRST HOUR: CPT | Performed by: INTERNAL MEDICINE

## 2022-04-21 PROCEDURE — 6360000002 HC RX W HCPCS: Performed by: STUDENT IN AN ORGANIZED HEALTH CARE EDUCATION/TRAINING PROGRAM

## 2022-04-21 PROCEDURE — 94761 N-INVAS EAR/PLS OXIMETRY MLT: CPT

## 2022-04-21 PROCEDURE — 85027 COMPLETE CBC AUTOMATED: CPT

## 2022-04-21 PROCEDURE — 6370000000 HC RX 637 (ALT 250 FOR IP)

## 2022-04-21 PROCEDURE — 80048 BASIC METABOLIC PNL TOTAL CA: CPT

## 2022-04-21 PROCEDURE — 94003 VENT MGMT INPAT SUBQ DAY: CPT

## 2022-04-21 PROCEDURE — 36415 COLL VENOUS BLD VENIPUNCTURE: CPT

## 2022-04-21 PROCEDURE — A4216 STERILE WATER/SALINE, 10 ML: HCPCS | Performed by: INTERNAL MEDICINE

## 2022-04-21 PROCEDURE — 2500000003 HC RX 250 WO HCPCS: Performed by: INTERNAL MEDICINE

## 2022-04-21 PROCEDURE — 2580000003 HC RX 258: Performed by: STUDENT IN AN ORGANIZED HEALTH CARE EDUCATION/TRAINING PROGRAM

## 2022-04-21 PROCEDURE — 2000000000 HC ICU R&B

## 2022-04-21 PROCEDURE — 99233 SBSQ HOSP IP/OBS HIGH 50: CPT | Performed by: INTERNAL MEDICINE

## 2022-04-21 PROCEDURE — 94640 AIRWAY INHALATION TREATMENT: CPT

## 2022-04-21 RX ORDER — INSULIN GLARGINE 100 [IU]/ML
18 INJECTION, SOLUTION SUBCUTANEOUS 2 TIMES DAILY
Status: DISCONTINUED | OUTPATIENT
Start: 2022-04-21 | End: 2022-04-29

## 2022-04-21 RX ORDER — SODIUM CHLORIDE 450 MG/100ML
INJECTION, SOLUTION INTRAVENOUS CONTINUOUS
Status: DISCONTINUED | OUTPATIENT
Start: 2022-04-21 | End: 2022-05-02

## 2022-04-21 RX ORDER — HYDRALAZINE HYDROCHLORIDE 20 MG/ML
10 INJECTION INTRAMUSCULAR; INTRAVENOUS EVERY 4 HOURS PRN
Status: DISCONTINUED | OUTPATIENT
Start: 2022-04-21 | End: 2022-05-03 | Stop reason: HOSPADM

## 2022-04-21 RX ADMIN — FENTANYL CITRATE 50 MCG: 50 INJECTION, SOLUTION INTRAMUSCULAR; INTRAVENOUS at 01:10

## 2022-04-21 RX ADMIN — FENTANYL CITRATE 50 MCG: 50 INJECTION, SOLUTION INTRAMUSCULAR; INTRAVENOUS at 15:38

## 2022-04-21 RX ADMIN — ALBUTEROL SULFATE 2.5 MG: 2.5 SOLUTION RESPIRATORY (INHALATION) at 06:53

## 2022-04-21 RX ADMIN — INSULIN GLARGINE 18 UNITS: 100 INJECTION, SOLUTION SUBCUTANEOUS at 19:47

## 2022-04-21 RX ADMIN — FAMOTIDINE 20 MG: 10 INJECTION INTRAVENOUS at 19:47

## 2022-04-21 RX ADMIN — FUROSEMIDE 20 MG: 10 INJECTION, SOLUTION INTRAMUSCULAR; INTRAVENOUS at 19:03

## 2022-04-21 RX ADMIN — CHLORHEXIDINE GLUCONATE 0.12% ORAL RINSE 15 ML: 1.2 LIQUID ORAL at 07:40

## 2022-04-21 RX ADMIN — MINERAL OIL, PETROLATUM: 425; 568 OINTMENT OPHTHALMIC at 16:45

## 2022-04-21 RX ADMIN — PROPOFOL 30 MCG/KG/MIN: 10 INJECTION, EMULSION INTRAVENOUS at 02:37

## 2022-04-21 RX ADMIN — FAMOTIDINE 20 MG: 10 INJECTION INTRAVENOUS at 07:40

## 2022-04-21 RX ADMIN — SODIUM CHLORIDE, PRESERVATIVE FREE 10 ML: 5 INJECTION INTRAVENOUS at 09:02

## 2022-04-21 RX ADMIN — INSULIN LISPRO 2 UNITS: 100 INJECTION, SOLUTION INTRAVENOUS; SUBCUTANEOUS at 19:47

## 2022-04-21 RX ADMIN — ALBUTEROL SULFATE 2.5 MG: 2.5 SOLUTION RESPIRATORY (INHALATION) at 04:16

## 2022-04-21 RX ADMIN — SODIUM ZIRCONIUM CYCLOSILICATE 10 G: 5 POWDER, FOR SUSPENSION ORAL at 09:53

## 2022-04-21 RX ADMIN — ALBUTEROL SULFATE 2.5 MG: 2.5 SOLUTION RESPIRATORY (INHALATION) at 23:49

## 2022-04-21 RX ADMIN — HEPARIN SODIUM 5000 UNITS: 5000 INJECTION INTRAVENOUS; SUBCUTANEOUS at 22:04

## 2022-04-21 RX ADMIN — ACETYLCYSTEINE 200 MG: 200 SOLUTION ORAL; RESPIRATORY (INHALATION) at 10:29

## 2022-04-21 RX ADMIN — POLYVINYL ALCOHOL 1 DROP: 14 SOLUTION/ DROPS OPHTHALMIC at 14:30

## 2022-04-21 RX ADMIN — PROPOFOL 30 MCG/KG/MIN: 10 INJECTION, EMULSION INTRAVENOUS at 07:58

## 2022-04-21 RX ADMIN — POLYVINYL ALCOHOL 1 DROP: 14 SOLUTION/ DROPS OPHTHALMIC at 19:16

## 2022-04-21 RX ADMIN — METOPROLOL TARTRATE 5 MG: 1 INJECTION, SOLUTION INTRAVENOUS at 02:59

## 2022-04-21 RX ADMIN — HEPARIN SODIUM 5000 UNITS: 5000 INJECTION INTRAVENOUS; SUBCUTANEOUS at 13:40

## 2022-04-21 RX ADMIN — INSULIN LISPRO 2 UNITS: 100 INJECTION, SOLUTION INTRAVENOUS; SUBCUTANEOUS at 07:41

## 2022-04-21 RX ADMIN — HEPARIN SODIUM 5000 UNITS: 5000 INJECTION INTRAVENOUS; SUBCUTANEOUS at 06:27

## 2022-04-21 RX ADMIN — FUROSEMIDE 20 MG: 10 INJECTION, SOLUTION INTRAMUSCULAR; INTRAVENOUS at 07:40

## 2022-04-21 RX ADMIN — INSULIN LISPRO 2 UNITS: 100 INJECTION, SOLUTION INTRAVENOUS; SUBCUTANEOUS at 14:28

## 2022-04-21 RX ADMIN — ACETYLCYSTEINE 200 MG: 200 SOLUTION ORAL; RESPIRATORY (INHALATION) at 23:49

## 2022-04-21 RX ADMIN — POLYVINYL ALCOHOL 1 DROP: 14 SOLUTION/ DROPS OPHTHALMIC at 22:04

## 2022-04-21 RX ADMIN — ACETYLCYSTEINE 200 MG: 200 SOLUTION ORAL; RESPIRATORY (INHALATION) at 04:16

## 2022-04-21 RX ADMIN — SODIUM CHLORIDE, PRESERVATIVE FREE 10 ML: 5 INJECTION INTRAVENOUS at 19:47

## 2022-04-21 RX ADMIN — MINERAL OIL, PETROLATUM: 425; 568 OINTMENT OPHTHALMIC at 19:46

## 2022-04-21 RX ADMIN — ACETYLCYSTEINE 200 MG: 200 SOLUTION ORAL; RESPIRATORY (INHALATION) at 14:37

## 2022-04-21 RX ADMIN — METHYLPREDNISOLONE SODIUM SUCCINATE 40 MG: 40 INJECTION, POWDER, LYOPHILIZED, FOR SOLUTION INTRAMUSCULAR; INTRAVENOUS at 00:11

## 2022-04-21 RX ADMIN — POLYETHYLENE GLYCOL 3350 17 G: 17 POWDER, FOR SOLUTION ORAL at 07:40

## 2022-04-21 RX ADMIN — PROPOFOL 30 MCG/KG/MIN: 10 INJECTION, EMULSION INTRAVENOUS at 15:36

## 2022-04-21 RX ADMIN — SODIUM CHLORIDE: 4.5 INJECTION, SOLUTION INTRAVENOUS at 09:53

## 2022-04-21 RX ADMIN — CHLORHEXIDINE GLUCONATE 0.12% ORAL RINSE 15 ML: 1.2 LIQUID ORAL at 19:47

## 2022-04-21 RX ADMIN — POLYVINYL ALCOHOL 1 DROP: 14 SOLUTION/ DROPS OPHTHALMIC at 06:27

## 2022-04-21 RX ADMIN — METHYLPREDNISOLONE SODIUM SUCCINATE 40 MG: 40 INJECTION, POWDER, LYOPHILIZED, FOR SOLUTION INTRAMUSCULAR; INTRAVENOUS at 15:38

## 2022-04-21 RX ADMIN — MEROPENEM 1000 MG: 1 INJECTION, POWDER, FOR SOLUTION INTRAVENOUS at 04:17

## 2022-04-21 RX ADMIN — INSULIN LISPRO 2 UNITS: 100 INJECTION, SOLUTION INTRAVENOUS; SUBCUTANEOUS at 01:57

## 2022-04-21 RX ADMIN — POLYVINYL ALCOHOL 1 DROP: 14 SOLUTION/ DROPS OPHTHALMIC at 10:45

## 2022-04-21 RX ADMIN — METHYLPREDNISOLONE SODIUM SUCCINATE 40 MG: 40 INJECTION, POWDER, LYOPHILIZED, FOR SOLUTION INTRAMUSCULAR; INTRAVENOUS at 07:40

## 2022-04-21 RX ADMIN — MEROPENEM 1000 MG: 1 INJECTION, POWDER, FOR SOLUTION INTRAVENOUS at 13:40

## 2022-04-21 RX ADMIN — MEROPENEM 1000 MG: 1 INJECTION, POWDER, FOR SOLUTION INTRAVENOUS at 19:33

## 2022-04-21 RX ADMIN — ALBUTEROL SULFATE 2.5 MG: 2.5 SOLUTION RESPIRATORY (INHALATION) at 10:29

## 2022-04-21 RX ADMIN — MINERAL OIL, PETROLATUM: 425; 568 OINTMENT OPHTHALMIC at 04:16

## 2022-04-21 RX ADMIN — PROPOFOL 30 MCG/KG/MIN: 10 INJECTION, EMULSION INTRAVENOUS at 19:55

## 2022-04-21 RX ADMIN — ALBUTEROL SULFATE 2.5 MG: 2.5 SOLUTION RESPIRATORY (INHALATION) at 14:37

## 2022-04-21 RX ADMIN — MINERAL OIL, PETROLATUM: 425; 568 OINTMENT OPHTHALMIC at 08:53

## 2022-04-21 RX ADMIN — ALBUTEROL SULFATE 2.5 MG: 2.5 SOLUTION RESPIRATORY (INHALATION) at 19:26

## 2022-04-21 RX ADMIN — ATORVASTATIN CALCIUM 20 MG: 20 TABLET, FILM COATED ORAL at 07:40

## 2022-04-21 RX ADMIN — POLYVINYL ALCOHOL 1 DROP: 14 SOLUTION/ DROPS OPHTHALMIC at 01:57

## 2022-04-21 RX ADMIN — SODIUM CHLORIDE, POTASSIUM CHLORIDE, SODIUM LACTATE AND CALCIUM CHLORIDE: 600; 310; 30; 20 INJECTION, SOLUTION INTRAVENOUS at 00:29

## 2022-04-21 RX ADMIN — INSULIN GLARGINE 14 UNITS: 100 INJECTION, SOLUTION SUBCUTANEOUS at 07:41

## 2022-04-21 RX ADMIN — ACETYLCYSTEINE 200 MG: 200 SOLUTION ORAL; RESPIRATORY (INHALATION) at 06:53

## 2022-04-21 RX ADMIN — MINERAL OIL, PETROLATUM: 425; 568 OINTMENT OPHTHALMIC at 00:02

## 2022-04-21 RX ADMIN — MINERAL OIL, PETROLATUM: 425; 568 OINTMENT OPHTHALMIC at 13:32

## 2022-04-21 RX ADMIN — ACETYLCYSTEINE 200 MG: 200 SOLUTION ORAL; RESPIRATORY (INHALATION) at 19:29

## 2022-04-21 ASSESSMENT — PULMONARY FUNCTION TESTS
PIF_VALUE: 29
PIF_VALUE: 41
PIF_VALUE: 27
PIF_VALUE: 41
PIF_VALUE: 37
PIF_VALUE: 28
PIF_VALUE: 26
PIF_VALUE: 15
PIF_VALUE: 30
PIF_VALUE: 27
PIF_VALUE: 28
PIF_VALUE: 28
PIF_VALUE: 15
PIF_VALUE: 36
PIF_VALUE: 25
PIF_VALUE: 32
PIF_VALUE: 26
PIF_VALUE: 24
PIF_VALUE: 24
PIF_VALUE: 29
PIF_VALUE: 27
PIF_VALUE: 23
PIF_VALUE: 30

## 2022-04-21 ASSESSMENT — PAIN SCALES - GENERAL: PAINLEVEL_OUTOF10: 4

## 2022-04-21 NOTE — PROGRESS NOTES
Placed on wean trial with minimal sedation at 0851, vent alarming. Respiratory aware.  Placed back on full support

## 2022-04-21 NOTE — PROGRESS NOTES
[address]   OCCUPATIONAL THERAPY MISSED TREATMENT NOTE   INPATIENT   Date: 22  Patient Name: Shelby Lopez       Room: [facility]5/3541-75  MRN: [id number]   Account #: [de-identified]    : 1955  (74 y.o.)  Gender: female                 REASON FOR MISSED TREATMENT:  Patient unable to participate   -   Pt remains sedated on vent.  Confirmed with SUE Lowery [id number]     Ronney Goodpasture, OT

## 2022-04-21 NOTE — CARE COORDINATION
ONGOING DISCHARGE PLAN:    Patient is intubated. Has right sided chest tube. Spoke with patient's dtr Calli regarding discharge plan and patient confirms that plan is still unsure. She did agree to start looking at [facility] or SNF for discharge planning. Referral sent to [facility]. SW notified for possible SNF. Pt originally was home with [address]. Per Pulm note they will see how patient is doing by next week and reevaluate or possible withdrawal of care. IV lasix 20 mg BID, IV Merrem, IV Solumedrol 40 mg Q8. Will continue to follow for additional discharge needs.     Electronically signed by Daniela Hernadez RN on 4/21/2022 at 12:23 PM  '

## 2022-04-21 NOTE — PROGRESS NOTES
[facility] -   Infectious diseases evaluation  admission date 4/16/2022    reason for consultation:   Cavitary lung lesions  Impression :   Current:  · Right lower lobe cavitary lesions associated with multiloculated pleural fluid collection likely abscess with empyema status post chest tube with Pseudomonas growth on culture  · Sepsis secondary to above  · Acute on chronic respiratory failure required intubation  · Right-sided pneumothorax  · Severe COPD      Recommendations     · IV meropenem  · Follow cultures and adjust antibiotics as needed  · Nasal swab for MRSA negative   · Follow CBC and renal function  · Continue supportive care              History of Present Illness:   Initial history:  Kasey Arana is a 74y.o.-year-old female presents to the hospital with worsening shortness of breath associated with cough. At the ER with tachypneic and hypoxic  Chest x-ray showed pneumothorax  The patient was in respiratory distress, was intubated. The patient is intubated, sedated, unable to provide history that was obtained from chart review. CT chest 4/16/2022 showed right lower lobe cavitary lesion with surrounding airspace disease and multiloculated pleural collection. The patient had chest tube placed with gram-negative rods growth on pleural fluid culture. Respiratory culture grew gram-positive cocci in pairs  Initial WBC was 18.5, procalcitonin no more than 100  Respiratory panel with COVID-19 PCR was negative  Urine for Legionella and strep pneumo antigen negative    Interval changes  4/21/2022   She remains intubated, sedated, 40% FiO2  Right chest tube clamped, fecal management system in place with loose stool. Status post bronchoscopy 4/18/2020 with gram-negative rods light growth  Right IJ line, NG tube and Reynoso cath in place  Pseudomonas growth on pleural fluid culture.   Respiratory culture grew Pseudomonas aeruginosa  Chest x-ray from earlier today reviewed showed no pneumothorax with improved aeration of the right lung base. Patient Vitals for the past 8 hrs:   BP Temp Temp src Pulse Resp SpO2   04/21/22 0900 (!) 172/65 -- -- 80 28 94 %   04/21/22 0851 -- -- -- 73 24 95 %   04/21/22 0800 (!) 176/65 99.7 °F (37.6 °C) Oral 82 28 95 %   04/21/22 0703 -- -- -- 80 24 100 %   04/21/22 0653 -- -- -- -- 25 --   04/21/22 0400 (!) 139/59 99.5 °F (37.5 °C) Oral 81 30 95 %             I have personally reviewed the past medical history, past surgical history, medications, social history, and family history, and I haveupdated the database accordingly. Allergies:   Advil [ibuprofen], Aleve [naproxen], Antipyrine, Celecoxib, Codeine, Fomepizole, Incruse ellipta [umeclidinium bromide], Other, Rofecoxib, Salicylates, Strawberry extract, Sulfinpyrazone, Aspirin, and Nsaids     Review of Systems:     Review of Systems  Intubated, unable to provide  Physical Examination :       Physical Exam  Constitutional:       Appearance: She is ill-appearing. Comments: Intubated   HENT:      Head: Normocephalic and atraumatic. Right Ear: External ear normal.      Left Ear: External ear normal.   Cardiovascular:      Rate and Rhythm: Normal rate. Pulmonary:      Comments:   Right-sided chest tube in place  Abdominal:      General: There is no distension. Palpations: Abdomen is soft. Musculoskeletal:      Cervical back: Neck supple. No rigidity. Right lower leg: No edema. Left lower leg: No edema. Skin:     Coloration: Skin is not jaundiced. Findings: No bruising.    Neurological:      Comments: Sedated         Past Medical History:     Past Medical History:   Diagnosis Date    Abnormal EKG     TRAM (acute kidney injury) ([facility] [address] 4/2/2022    Anxiety     Asthma     Bipolar disorder ([facility] [address]     SEVERE IN 2003, [place]    COPD (chronic obstructive pulmonary disease) (HCC)     Cramps, extremity     SEVERE LEG CRAMPS    Depression     Dilated bile duct     Headache     History of elective      Hyperlipidemia     Irritable bowel syndrome     Prolonged emergence from general anesthesia     SEVERE ISSUES WAKING UP    Substance abuse ([address]     street drugs when younger    Unspecified sleep apnea     Vision abnormalities     glasses       Past Surgical  History:     Past Surgical History:   Procedure Laterality Date    ANKLE SURGERY      HAD SCREWS AND HARDWARE, THEN REMOVED    BRONCHOSCOPY  2022         COLONOSCOPY  02/15/2018    tubular adenoma    EYE SURGERY Bilateral     CATARACTS    HYSTEROSCOPY  10/19/2016    D & C    INDUCED       LASIK      bilateral    TONSILLECTOMY AND ADENOIDECTOMY Bilateral     VULVA SURGERY      HAD A BIOPSY AND REMOVAL OF       Medications:      sodium zirconium cyclosilicate  10 g Oral Once    insulin glargine  18 Units SubCUTAneous BID    insulin lispro  0-12 Units SubCUTAneous Q6H    methylPREDNISolone  40 mg IntraVENous Q8H    polyethylene glycol  17 g Oral Daily    furosemide  20 mg IntraVENous BID    meropenem  1,000 mg IntraVENous Q8H    albuterol  2.5 mg Nebulization Q4H    acetylcysteine  200 mg Inhalation Q4H    sodium chloride flush  5-40 mL IntraVENous 2 times per day    [Held by provider] risperiDONE  1.5 mg Oral Q12H    [Held by provider] rivastigmine  4.5 mg Oral BID    atorvastatin  20 mg Oral Daily    [Held by provider] traZODone  50 mg Oral Nightly    polyvinyl alcohol  1 drop Both Eyes Q4H    And    artificial tears   Both Eyes Q4H    chlorhexidine  15 mL Mouth/Throat BID    famotidine (PEPCID) injection  20 mg IntraVENous BID    heparin (porcine)  5,000 Units SubCUTAneous 3 times per day       Social History:     Social History     Socioeconomic History    Marital status:      Spouse name: Not on file    Number of children: Not on file    Years of education: Not on file    Highest education level: Not on file   Occupational History    Not on file   Tobacco Use    Smoking status: Former Smoker     Packs/day: 2.00     Years: 42.00     Pack years: 84.00     Types: Cigarettes     Quit date: 11/1/2018     Years since quitting: 3.4    Smokeless tobacco: Never Used   Substance and Sexual Activity    Alcohol use: Yes     Comment: yearly    Drug use: No    Sexual activity: Not Currently     Partners: Male     Birth control/protection: Post-menopausal   Other Topics Concern    Not on file   Social History Narrative    Not on file     Social Determinants of Health     Financial Resource Strain: High Risk    Difficulty of Paying Living Expenses: Very hard   Food Insecurity: No Food Insecurity    Worried About Running Out of Food in the Last Year: Never true    Agustin of Food in the Last Year: Never true   Transportation Needs:     Lack of Transportation (Medical): Not on file    Lack of Transportation (Non-Medical):  Not on file   Physical Activity:     Days of Exercise per Week: Not on file    Minutes of Exercise per Session: Not on file   Stress:     Feeling of Stress : Not on file   Social Connections:     Frequency of Communication with Friends and Family: Not on file    Frequency of Social Gatherings with Friends and Family: Not on file    Attends Scientology Services: Not on file    Active Member of [address] [place] [address] [employer] or Organizations: Not on file    Attends Club or Organization Meetings: Not on file    Marital Status: Not on file   Intimate Partner Violence:     Fear of Current or Ex-Partner: Not on file    Emotionally Abused: Not on file    Physically Abused: Not on file    Sexually Abused: Not on file   Housing Stability:     Unable to Pay for Housing in the Last Year: Not on file    Number of [place] in the Last Year: Not on file    Unstable Housing in the Last Year: Not on file       Family History:     Family History   Problem Relation Age of Onset    Dementia Maternal Aunt     Kidney Disease Mother     Heart Attack Sister    [facility] Prostate Cancer Father     High Cholesterol Brother     Heart Attack Paternal Grandmother       Medical Decision Making:   I have independently reviewed/ordered the following labs:    CBC with Differential:   Recent Labs     04/19/22  0529 04/19/22  0529 04/20/22  0410 04/21/22  0430   WBC 13.1*   < > 10.0 12.2*   HGB 8.3*   < > 8.4* 8.6*   HCT 25.8*   < > 26.2* 26.9*      < > 395 439   LYMPHOPCT 5*  --   --   --    MONOPCT 2  --   --   --     < > = values in this interval not displayed. BMP:  Recent Labs     04/20/22  0410 04/21/22  0430    146*   K 5.0 5.5*   * 107   CO2 28 35*   BUN 34* 39*   CREATININE 0.59 0.58     Hepatic Function Panel:   No results for input(s): PROT, LABALBU, BILIDIR, IBILI, BILITOT, ALKPHOS, ALT, AST in the last 72 hours. No results for input(s): RPR in the last 72 hours. No results for input(s): HIV in the last 72 hours. No results for input(s): BC in the last 72 hours. Lab Results   Component Value Date    CREATININE 0.58 04/21/2022    GLUCOSE 221 04/21/2022    GLUCOSE 127 07/08/2021       Detailed results: Thank you for allowing us to participate in the care of this patient. Please call with questions. This note is created with the assistance of a speech recognition program.  While intending to generate adocument that actually reflects the content of the visit, the document can still have some errors including those of syntax and sound a like substitutions which may escape proof reading. It such instances, actual meaningcan be extrapolated by contextual diversion.     Som Jesus MD  Office: [phone]  Perfect serve / office [phone]

## 2022-04-21 NOTE — PLAN OF CARE
Problem: Skin Integrity - Impaired:  Goal: Absence of new skin breakdown  Description: Absence of new skin breakdown  4/21/2022 0312 by Bella Art RN  Outcome: Progressing  Note: Skin assessment as charted. See charting.  4/20/2022 1821 by Astrid Portillo RN  Outcome: Progressing     Problem: Falls - Risk of:  Goal: Absence of physical injury  Description: Absence of physical injury  Outcome: Progressing  Note: No injury this shift. Patient safety maintained. Problem: OXYGENATION/RESPIRATORY FUNCTION  Goal: Patient will maintain patent airway  Outcome: Progressing  Note: Patient remains intubated and on the ventilator. Patent airway has been maintained throughout shift. Will continue to monitor.

## 2022-04-21 NOTE — PROGRESS NOTES
Sedation was turned off for sedation vacation. RR increased to 38, HR increased.  Sedation restarted

## 2022-04-21 NOTE — PROGRESS NOTES
[facility] [employer]    PROGRESS NOTE             4/21/2022    7:26 AM    Name:   Kasey Arana  MRN:     [id number]     [place]:      [de-identified]   Room:   2002/2002-01  IP Day:  5  Admit Date:  4/16/2022 10:25 AM    PCP:  Jeremy Armstrong MD  Code Status:  Full Code    Subjective:     C/C:   Chief Complaint   Patient presents with    Shortness of Breath     Interval History Status: No change     Continues to be on ventilator, FiO2 40% has not been changed  RR 24, ventilator settings continues to be sedated with propofol  pH 7.4, PCO2 50, HCO3 37  Potassium this morning 5.5 possible needs diet adjustments  -  FMS  Creatinine 0.58  Calcium 8.1 - we will give Ionized calcium, LFTs  Glu < 180 lantus was increased to 14Units - continues on Solumedrol 40mg IV q8   WBC trending from yesterday 10>12 increased with other cell lines we will monitor for now  Chest x-ray: The right chest tube side port is external to the chest wall which may predispose to an air leak. Chest tube on waterseal, Clamp today and possible remove per Pulmonary   On merrem IV       Brief History:     The patient is a 74 y.o. Non- / non  female sever COPD 3L O2 baseline, bipolar, Hx of DVT/ PE, migraines, who presents with Shortness of Breath and cough  Patient was recently discharge from [facility] for mycoplasma pneumonia. She had a follow up appointment with PCP, patient was complaining of cough and chest pain, was started on Tessalon and nsaids for pain. However; she continued to be in distress and her daughter brought her to ED. She was hypoxic initially on 3L o2, was increased to 6L and continues to be hypoxic   Tachypneic, tachycardic  ABGs: pH 7.33, CO2 34, HCO3 18  WBC 18   Lactic 2.8> 1.5  Pancultures were sent  Chest x-ray: Moderate loculated right basal pneumothorax.  Evidence for tension.  Cavitarylesion noted in the right lung base  CT chest: Chronic clot material RUL, RLL. Thick-walled cavitary lesion RLL. Multiloculated pleural collection or hydropneumothorax posterior to the right lung, right chest tube in situ. Infiltrates of the right middle lobe. Stellate 6 mm lateral RUL nodule. Mediastinal and right hilar lymphadenopathy  Was given 1 L bolus, IV cefepime, vancomycin, 125 mg Solu-Medrol and she is admitted to the hospital for the management of acute hypoxic respiratory failure due to pneumothorax and possible lung infection    Review of Systems:     Review of Systems   Unable to perform ROS: Intubated         Medications: Allergies:     Allergies   Allergen Reactions    Advil [Ibuprofen] Other (See Comments)     vomiting    Aleve [Naproxen] Other (See Comments)     vomiting    Antipyrine Other (See Comments)     unknown    Celecoxib Other (See Comments)    Codeine      headache    Fomepizole     Incruse Ellipta [Umeclidinium Bromide]      confusion    Other      GRASS, TREES, WEEDS    Rofecoxib Other (See Comments)     Unknown reaction    Salicylates      Unknown reaction     Strawberry Extract      HEADACHES    Sulfinpyrazone Other (See Comments)     Unknown reaction    Aspirin Nausea And Vomiting, Other (See Comments) and Nausea Only    Nsaids Nausea Only, Other (See Comments) and Nausea And Vomiting       Current Meds:   Scheduled Meds:    insulin glargine  14 Units SubCUTAneous BID    insulin lispro  0-12 Units SubCUTAneous Q6H    methylPREDNISolone  40 mg IntraVENous Q8H    polyethylene glycol  17 g Oral Daily    furosemide  20 mg IntraVENous BID    meropenem  1,000 mg IntraVENous Q8H    albuterol  2.5 mg Nebulization Q4H    acetylcysteine  200 mg Inhalation Q4H    sodium chloride flush  5-40 mL IntraVENous 2 times per day    [Held by provider] risperiDONE  1.5 mg Oral Q12H    [Held by provider] rivastigmine  4.5 mg Oral BID    atorvastatin  20 mg Oral Daily    [Held by provider] traZODone  50 mg Oral Nightly    polyvinyl alcohol  1 drop Both Eyes Q4H    And    artificial tears   Both Eyes Q4H    chlorhexidine  15 mL Mouth/Throat BID    famotidine (PEPCID) injection  20 mg IntraVENous BID    heparin (porcine)  5,000 Units SubCUTAneous 3 times per day     Continuous Infusions:    sodium chloride      dextrose      propofol 30 mcg/kg/min (22 0237)    lactated ringers 25 mL/hr at 22 0029     PRN Meds: metoprolol, sodium chloride flush, sodium chloride, sodium chloride flush, potassium chloride **OR** potassium alternative oral replacement **OR** potassium chloride, glucose, dextrose, glucagon (rDNA), dextrose, fentanNYL    Data:     Past Medical History:   has a past medical history of Abnormal EKG, TRAM (acute kidney injury) ([facility] [address] Anxiety, Asthma, Bipolar disorder ([facility] [address] COPD (chronic obstructive pulmonary disease) ([facility] [address] Cramps, extremity, Depression, Dilated bile duct, Headache, History of elective , Hyperlipidemia, Irritable bowel syndrome, Prolonged emergence from general anesthesia, Substance abuse ([facility] [address] Unspecified sleep apnea, and Vision abnormalities. Social History:   reports that she quit smoking about 3 years ago. Her smoking use included cigarettes. She has a 84.00 pack-year smoking history. She has never used smokeless tobacco. She reports current alcohol use. She reports that she does not use drugs.      Family History:   Family History   Problem Relation Age of Onset    Dementia Maternal Aunt     Kidney Disease Mother     Heart Attack Sister     Prostate Cancer Father     High Cholesterol Brother     Heart Attack Paternal Grandmother        Vitals:  BP (!) 139/59   Pulse 80   Temp 99.5 °F (37.5 °C) (Oral)   Resp 24   Ht 5' 5\" (1.651 m)   Wt 197 lb 5 oz (89.5 kg)   LMP 2013 (Exact Date)   SpO2 100%   BMI 32.83 kg/m²   Temp (24hrs), Av.9 °F (37.2 °C), Min:98.2 °F (36.8 °C), Max:99.5 °F (37.5 °C)    Recent Labs     22  0728 22  1519 22 04/21/22  0154   POCGLU 234* 247* 159* 197*       I/O(24Hr): Intake/Output Summary (Last 24 hours) at 4/21/2022 0726  Last data filed at 4/21/2022 0630  Gross per 24 hour   Intake 1577.35 ml   Output 2355 ml   Net -777.65 ml       Labs:    CBC with Differential:    Lab Results   Component Value Date    WBC 12.2 04/21/2022    RBC 2.86 04/21/2022    RBC 0-2 07/08/2021    HGB 8.6 04/21/2022    HCT 26.9 04/21/2022     04/21/2022    MCV 94.2 04/21/2022    MCH 30.0 04/21/2022    MCHC 31.8 04/21/2022    RDW 16.4 04/21/2022    NRBC 0 07/08/2021    METASPCT 1 04/02/2022    LYMPHOPCT 5 04/19/2022    LYMPHOPCT 12.1 07/08/2021    MONOPCT 2 04/19/2022    MONOPCT 15.1 07/08/2021    BASOPCT 0 04/19/2022    BASOPCT 0.8 07/08/2021    MONOSABS 0.26 04/19/2022    MONOSABS 0.4 07/08/2021    LYMPHSABS 0.66 04/19/2022    LYMPHSABS 0.3 07/08/2021    EOSABS 0.00 04/19/2022    EOSABS 0.1 07/08/2021    BASOSABS 0.00 04/19/2022    DIFFTYPE NOT REPORTED 12/20/2021     BMP:    Lab Results   Component Value Date     04/21/2022    K 5.5 04/21/2022     04/21/2022    CO2 35 04/21/2022    BUN 39 04/21/2022    LABALBU 2.8 04/16/2022    LABALBU 3.6 07/08/2021    CREATININE 0.58 04/21/2022    CALCIUM 8.1 04/21/2022    GFRAA >60 04/21/2022    LABGLOM >60 04/21/2022    GLUCOSE 221 04/21/2022    GLUCOSE 127 07/08/2021       Lab Results   Component Value Date/Time    SPECIAL 8ML GREEN/2ML ORANGE RIGHT IJ 04/16/2022 12:12 PM     Lab Results   Component Value Date/Time    CULTURE PENDING 04/18/2022 12:20 PM    CULTURE GRAM NEGATIVE RODS LIGHT GROWTH (A) 04/18/2022 12:20 PM         Radiology:    XR CHEST (SINGLE VIEW FRONTAL)    Result Date: 4/5/2022  EXAMINATION: ONE XRAY VIEW OF THE CHEST 4/5/2022 8:55 am COMPARISON: April 3, 2022 HISTORY: ORDERING SYSTEM PROVIDED HISTORY: pna TECHNOLOGIST PROVIDED HISTORY: pna Reason for Exam: pna FINDINGS: Stable position of the right internal jugular central venous catheter.   Right infrahilar airspace opacity not significantly changed. No new focal lung abnormality. No sizable pleural effusion. No pneumothorax. Stable right infrahilar airspace opacity     XR CHEST (SINGLE VIEW FRONTAL)    Result Date: 4/3/2022  EXAMINATION: ONE XRAY VIEW OF THE CHEST 4/3/2022 5:51 am COMPARISON: 1 April 2022 HISTORY: ORDERING SYSTEM PROVIDED HISTORY: sob, pleurisy, cough TECHNOLOGIST PROVIDED HISTORY: sob, pleurisy, cough Reason for Exam: Shortness of breath, pleurisy, cough Additional signs and symptoms: Shortness of breath, pleurisy, cough Relevant Medical/Surgical History: Shortness of breath, pleurisy, cough FINDINGS: AP portable view of the chest time stamped at 528 hours demonstrates overlying cardiac monitoring electrodes. Heart size is stable. Right internal jugular catheter terminates in the distal superior vena cava. There has been worsening of a focal opacity at the right lung base. Minimal left basilar opacity is unchanged. No extrapleural air or overt edema. No gross effusions. Worsening opacity at the right base favoring airspace disease. Change in minimal left basilar opacity which may be related atelectasis. XR CHEST PORTABLE    Result Date: 4/17/2022  EXAMINATION: ONE XRAY VIEW OF THE CHEST 4/17/2022 6:04 am COMPARISON: 04/16/2022, 1248 hours HISTORY: ORDERING SYSTEM PROVIDED HISTORY: ETT placement TECHNOLOGIST PROVIDED HISTORY: ETT placement Reason for Exam: ETT 79-year-old female with endotracheal tube placement FINDINGS: Endotracheal tube distal tip overlying the mid trachea approximately 4.8 cm above the level of the nimesh. Enteric tube traverses the GE junction with distal tip excluded from the field of view. Right IJ approach central venous catheter distal tip overlying the right atrium. Right-sided chest tube distal tip projects over the right hilum. Cardiac monitor leads overlie the chest. No obvious pneumothorax. No free air.   Cardiac and mediastinal contours remain unchanged. Left lung is relatively clear. Persistent airspace disease at the right mid and right lower lung zones. Visualized osseous structures remain unchanged. Subcutaneous emphysema previously seen at the right lateral chest wall no longer identified. 1. Persistent airspace disease at the right mid and right lower lung zones. Follow-up is recommended to document resolution. 2. Tubes and right IJ line as detailed above. XR CHEST PORTABLE    Result Date: 4/16/2022  EXAMINATION: ONE XRAY VIEW OF THE CHEST 4/16/2022 1:10 pm COMPARISON: 04/16/2022, 1213 hours HISTORY: ORDERING SYSTEM PROVIDED HISTORY: chest tube TECHNOLOGIST PROVIDED HISTORY: chest tube Reason for Exam: chest tube Additional signs and symptoms: chest tube and NG tube placement 46-year-old female with history of NG tube and chest tube placement FINDINGS: Portable supine view of the chest. Right-sided chest tube has been placed with distal tip projecting over the right infrahilar region. Right IJ approach central venous catheter distal tip overlying the cavoatrial junction, stable. Endotracheal tube distal tip overlying the mid trachea approximately 4.3 cm above the level of the nimesh. Enteric tube traverses the GE junction with distal tip projecting over the left mid abdomen likely within the stomach body. Left lung is clear. Pleural thickening and opacity involving the right mid and right lower lung zones. Subcutaneous emphysema along the right lateral chest wall. Cardiac and mediastinal contours remain unchanged. Atherosclerotic calcification of the thoracic aorta. No free air. There is re-expansion of the right lung compared with the prior study. Probable small residual inferolateral right pneumothorax component. 1. Tubes and right IJ line as detailed above. Re-expansion of the right lung compared with the prior study. There is likely a small residual right inferolateral pneumothorax component.  2. Pleural thickening and airspace opacity involving the right mid and right lower lung zones. Follow-up is recommended to document resolution. 3. Subcutaneous emphysema along the right lateral chest wall. XR CHEST PORTABLE    Result Date: 4/16/2022  EXAMINATION: ONE XRAY VIEW OF THE CHEST 4/16/2022 12:24 pm COMPARISON: None. HISTORY: ORDERING SYSTEM PROVIDED HISTORY: Intubation TECHNOLOGIST PROVIDED HISTORY: Intubation Reason for Exam: central line and ET placement FINDINGS: ET tube terminates 3 cm above the nimesh. Right line terminates in the SVC. There is a relatively stable right-sided basilar pneumothorax. The remaining lungs are unchanged. Cardiac silhouette and osseous structures unchanged. Intubation as above. No significant change     XR CHEST PORTABLE    Result Date: 4/16/2022  EXAMINATION: ONE XRAY VIEW OF THE CHEST 4/16/2022 11:02 am COMPARISON: 04/05/2022 HISTORY: ORDERING SYSTEM PROVIDED HISTORY: SOB TECHNOLOGIST PROVIDED HISTORY: SOB Reason for Exam: SOB FINDINGS: There is a moderate loculated right basal pneumothorax. Cavitary lesion is noted in the right lung base. Left lung is unremarkable. Heart and mediastinal structures appear normal.  There is no evidence for any tension phenomena. Moderate loculated right basal pneumothorax. Evidence for tension. Cavitary lesion noted in the right lung base. .  Case discussed with Dr. Vida Saez. XR CHEST PORTABLE    Result Date: 4/1/2022  EXAMINATION: ONE XRAY VIEW OF THE CHEST 4/1/2022 4:01 pm COMPARISON: 04/01/2022 HISTORY: ORDERING SYSTEM PROVIDED HISTORY: central line placed TECHNOLOGIST PROVIDED HISTORY: central line placed Reason for Exam: Central line placed FINDINGS: There is a right internal jugular central line in place with distal tip overlying the superior vena cava. Previously noted right basal infiltrate is unchanged. Left lung appears clear. The heart and mediastinal structures appear normal.  There is no evidence of pneumothorax. Satisfactory position of right internal jugular central line. No change in the the right basal infiltrate. XR CHEST PORTABLE    Result Date: 4/1/2022  EXAMINATION: ONE XRAY VIEW OF THE CHEST 4/1/2022 1:22 pm COMPARISON: 06/17/2020. CT dated 10/15/2021. HISTORY: ORDERING SYSTEM PROVIDED HISTORY: dyspnea TECHNOLOGIST PROVIDED HISTORY: dyspnea Reason for Exam: Dyspnea Relevant Medical/Surgical History: Hx of COPD FINDINGS: The cardiac silhouette appears within normal limits. There are bibasilar opacities, right greater than left which may reflect multifocal pneumonia in the correct clinical setting. No evidence of pleural effusion or pneumothorax is seen. Bibasilar opacities, right greater than left, which may reflect multifocal pneumonia. Follow-up to imaging resolution is recommended. CT CHEST PULMONARY EMBOLISM W CONTRAST    Result Date: 4/16/2022  EXAMINATION: CTA OF THE CHEST 4/16/2022 2:30 pm TECHNIQUE: CTA of the chest was performed after the administration of intravenous contrast.  Multiplanar reformatted images are provided for review. MIP images are provided for review. Dose modulation, iterative reconstruction, and/or weight based adjustment of the mA/kV was utilized to reduce the radiation dose to as low as reasonably achievable. COMPARISON: CT chest from 10/15/2021 HISTORY: ORDERING SYSTEM PROVIDED HISTORY: SOB TECHNOLOGIST PROVIDED HISTORY: SOB Decision Support Exception - unselect if not a suspected or confirmed emergency medical condition->Emergency Medical Condition (MA) Reason for Exam: sob Relevant Medical/Surgical History: intubated, septic, hx of PE 72-year-old female with shortness of breath FINDINGS: Pulmonary Arteries: No obvious filling defect in the main, right main, or left main pulmonary arteries.  Evaluation of the segmental and subsegmental pulmonary arterial vasculature is limited due to respiratory motion suboptimal bolus timing, airspace disease, and streak artifact. There are areas of probable residual linear chronic clot within the right lower lobar pulmonary artery on image 111, series 2. Probable linear residual clot within the anterior right upper lobe pulmonary artery on image 83, series 2. Mediastinum: Atherosclerotic calcification of the aorta and branch vasculature. No dissection flap within the visualized thoracic aorta. No pericardial effusion. There is fluid in the superior pericardial recess. Enlarged precarinal lymph nodes remain unchanged on image 83, series 2. Right hilar lymphadenopathy is seen on image 96, series 2. Coronary artery disease. No left hilar or axillary lymphadenopathy. Lungs/pleura: Endotracheal tube distal tip within the lower trachea. Trachea and very proximal central airways appear patent. Narrowing of the right middle lobe airway into a region of partial consolidation/atelectasis/scarring. Opacification of the right lower lobar segmental airways. There is a thick-walled cavitary lesion in the right lower lobe measuring 5.5 x 7.3 cm in greatest AP and transverse dimensions on image 68, series 4. There is a dependent air-fluid level within this lesion. This area measures 7.6 cm in greatest craniocaudal extent on image 48, series 602. There is surrounding airspace disease. There is a gas and fluid containing multiloculated pleural collection or hydropneumothorax along the posterior margin of the right lung. Right sided chest tube distal tip along the anteromedial right lung. Subcutaneous emphysema along the right axilla and right lateral chest wall. Stellate pulmonary nodule in the lateral right upper lobe measuring 6 mm on image 32, series 4. Moderate to severe emphysema, dependent atelectasis and respiratory motion. Upper Abdomen: Fatty liver. NG tube is seen extending into the stomach body. Atherosclerotic calcification of the upper abdominal aorta and branch vasculature.  Soft Tissues/Bones: Chronic anterior wedge compression deformities, unchanged from 10/15/2021 involving T5 and T6. Mild diffuse degenerative changes throughout the spine. 1. Linear filling defects in the anterior right upper lobe and right lower lobe pulmonary arteries likely representing residual/chronic clot material. No acute central filling defect/pulmonary embolus is evident. 2. Thick-walled cavitary lesion in the right lower lobe measuring up to 5.5 x 7.3 x 7.6 cm which could be related to TB or fungal disease or a necrotizing pneumonia. Underlying cavitary malignancy not entirely excluded. There is surrounding airspace disease. There is also an associated multiloculated pleural collection or hydropneumothorax primarily along the posterior margin of the right lung. Right-sided chest tube distal tip along the anteromedial right pleura/lung. 3. Partial consolidation, atelectasis and/or infiltrate of the right middle lobe. 4. Stellate 6 mm lateral right upper lobe pulmonary nodule. Follow-up guidelines provided below. 5. Underlying moderate to severe emphysema. 6. Endotracheal and NG tubes as above. 7. Coronary artery disease. 8. Chronic anterior wedge compression deformities of T5 and T6. 9. Subcutaneous emphysema along the right axilla and right lateral chest wall likely related to chest tube. 10. Mediastinal and right hilar lymphadenopathy. RECOMMENDATIONS: 6 mm suspicious right solid pulmonary nodule within the upper lobe. Recommend a non-contrast Chest CT at 6-12 months, then another non-contrast Chest CT at 18-24 months. These guidelines do not apply to immunocompromised patients and patients with cancer. Follow up in patients with significant comorbidities as clinically warranted. For lung cancer screening, adhere to Lung-RADS guidelines. Reference: Radiology. 2017; 284(1):228-43.      FL MODIFIED BARIUM SWALLOW W VIDEO    Result Date: 4/4/2022  EXAMINATION: MODIFIED BARIUM SWALLOW WAS PERFORMED IN CONJUNCTION WITH SPEECH PATHOLOGY SERVICES TECHNIQUE: Fluoroscopic evaluation of the swallowing mechanism was performed using cineradiography with multiple consistency of barium product in conjunction with speech pathology services. FLUOROSCOPY DOSE AND TYPE OR TIME AND EXPOSURES: DAP 49.2 dGy cm squared COMPARISON: None HISTORY: ORDERING SYSTEM PROVIDED HISTORY: aspiration pna TECHNOLOGIST PROVIDED HISTORY: aspiration pna Reason for Exam: aspiration pneumonia FINDINGS: Premature vallecular spillage. There was trace penetration with straw with thin liquid. No aspiration. No aspiration. Trace penetration with straw with thin liquids. Please see separate speech pathology report for full discussion of findings and recommendations. RECOMMENDATIONS: Unavailable         Physical Examination:        Physical Exam  Eyes:      General: No scleral icterus. Pupils: Pupils are equal, round, and reactive to light. Neck:      Vascular: No carotid bruit. Cardiovascular:      Rate and Rhythm: Regular rhythm. Tachycardia present. Pulses: Normal pulses. Heart sounds: No murmur heard. No friction rub. No gallop. Pulmonary:      Effort: Respiratory distress present. Breath sounds: Rales (Right-sided) present. No wheezing. Abdominal:      General: Abdomen is flat. Bowel sounds are normal.      Palpations: Abdomen is soft. Musculoskeletal:      Right lower leg: No edema. Left lower leg: No edema. Skin:     Capillary Refill: Capillary refill takes less than 2 seconds.    Neurological:      Comments: Sedated on propofol           Assessment:        Primary Problem  Sepsis [facility])    Active Hospital Problems    Diagnosis Date Noted    Acute systolic HF (heart failure) ([facility]) [I50.21] 04/19/2022    Pneumothorax on right [J93.9]     HCAP (healthcare-associated pneumonia) [J18.9]     Sepsis ([facility] [address] [A41.9] 04/16/2022    Acute on chronic respiratory failure ([facility] [address] [J96.20] 04/16/2022    Cavitary lesion of lung [J98.4] 04/16/2022    History of DVT (deep vein thrombosis) [Z86.718] 04/16/2022    Pneumothorax, right [J93.9] 04/16/2022    Status post chest tube placement ([address] [Z93.8] 04/16/2022    History of pulmonary embolus (PE) [Z86.711] 04/02/2022    Chronic respiratory failure with hypoxia (HCC) [J96.11] 08/05/2021    Compression fracture of body of thoracic vertebra ([facility] [address] [S22.000A] 09/28/2020    Lung nodule [R91.1] 08/22/2018    Bipolar disorder ([address] [F31.9]     Chronic obstructive pulmonary disease ([facility] [address] [J44.9] 01/06/2016       Plan:        Sepsis due to lung infection (abcess vs empyema)  Tachypnea, tachycardia  WBC 18>14>10  Pro-Branden >100 (>100 on repeat)    Lactate 2.8> 1.5  Chest x-ray: Moderate loculated right basal pneumothorax.  Evidence for tension.  Cavitarylesion noted in the right lung base  CT chest: Thick-walled cavitary lesion RLL. Right chest tube in situ for posterior right lung or right pneumothorax. Infiltrates of the right middle lobe. Stellate 6 mm lateral RUL nodule. Mediastinal and right hilar lymphadenopathy  Received 1 L bolus  100 mL/H lactated Ringer  Broad-spectrum antibiotic with cefepime, vancomycin  Critical care following  ID following  Respiratory cultures gram-positive cocci in pairs, Pseudomonas   Chest tubes cultures grew Pseudomonas Aeruginosa .   On Merrem IV per ID       Acute on chronic respiratory failure due pneumothorax  -History of severe COPD - 3 L home O2 baseline  -Currently on a ventilator FiO2 30%  -CXR right pneumothorax on admission   -S/p intubation and right chest tube placement  -pH 7. 3, PCO2 of 41.3, HCO3 20 Initially   -pH 7.4, PCO2 50, HCO3 37 Today  -Sedated with propofol  -Continue DuoNeb  - Solumedrol 40mg q8h   - Insuline sliding scale and lantus 18 Units     Acute systolic heart failure  Echo Estimated LV EF 45-50 %  Probnp 1000s     Hx DVT/ PE -Eliquis was discontinued in 12/2021  History bipolar disorder: Trazodone, Risperidone      IVF: Lactated Ringer 100 mL/H  Diet: NPO, on Tube feeds   GI ppx: Pepcid 20 mg twice daily IV  DVT ppx: Heparin 5000 units TID   Code status: Full code  Consults: pulm, ID  Dispo: Keep in ICU    Maximo Puentes MD  4/21/2022  7:26 AM       Attending Physician Statement  I have discussed the care of Andres Lim and I have examined the patient myselft and taken ros and hpi , including pertinent history and exam findings,  with the resident. I have reviewed the key elements of all parts of the encounter with the resident. I agree with the assessment, plan and orders as documented by the resident.    Acute on chronic respiratory failure,  Sepsis due to underlying pneumonia  Spontaneous pneumothorax, status post chest tube in place,  ABG, labs, chest x-ray reviewed,  Systolic dysf,  lasix 10CKF iv  Still critically ill,  Mechanical ventilation,  Continue to monitor,  Settings reviewed,  ABG reviewed,  Hyperkalemia, 5.5,      Critical care time spent 35 minutes       Electronically signed by Newton Lino MD

## 2022-04-21 NOTE — PLAN OF CARE
Problem: Breathing Pattern - Ineffective:  Goal: Ability to achieve and maintain a regular respiratory rate will improve  Description: Ability to achieve and maintain a regular respiratory rate will improve  4/21/2022 1544 by Alex Soares RCP  Outcome: Progressing     Problem: OXYGENATION/RESPIRATORY FUNCTION  Goal: Patient will maintain patent airway  4/21/2022 1544 by Alex Soares RCP  Outcome: Progressing     Problem: OXYGENATION/RESPIRATORY FUNCTION  Goal: Patient will achieve/maintain normal respiratory rate/effort  Description: Respiratory rate and effort will be within normal limits for the patient  4/21/2022 1544 by Alex Soares RCP  Outcome: Progressing

## 2022-04-21 NOTE — CARE COORDINATION
LSW will continue to follow for possible SNF placement. SW aware that patient may need a SNF with pulmonary Rehab.      Electronically signed by Heather Saavedra on 4/21/22 at 3:36 PM EDT

## 2022-04-21 NOTE — PROGRESS NOTES
Physical Therapy        Physical Therapy Cancel Note      DATE: 2022    NAME: Latasha Wong  MRN: [id number]   : 1955      Patient not seen this date for Physical Therapy due to: Other:  Pt remains sedated on vent.  Confirmed with SUE Patino [id number]      Electronically signed by Sepideh Edwards PT on 2022 at 10:57 AM

## 2022-04-21 NOTE — PROGRESS NOTES
Comprehensive Nutrition Assessment    Type and Reason for Visit:  Reassess    Nutrition Recommendations/Plan:   1. Change tube feeding from Osmolite 1.2 to Nepro due to high potassium level. Follow for tolerance. Malnutrition Assessment:  Malnutrition Status:  No malnutrition (04/17/22 1434)    Context:  Acute Illness     Findings of the 6 clinical characteristics of malnutrition:  Energy Intake:  No significant decrease in energy intake  Weight Loss:  No significant weight loss     Body Fat Loss:  No significant body fat loss     Muscle Mass Loss:  No significant muscle mass loss    Fluid Accumulation:  No significant fluid accumulation     Strength:  Not Performed    Nutrition Assessment:    Pt's K+ level up to 5.5, pulmonary wanting a tube feeding lower in potassium. Changed tube feeding from Osmolite 1.2 to Nepro 1.8 kcal/ml to start at 15 ml increase as tolerated to 35 ml to provide: 1512 kcals, 68 gm protein. Sodium level up to 146, water flushes ordered 200 ml 4 time per day. When tube feeding changed increased water flushes from 30 ml every 4 hour to 60 ml every 4 hours since Nepro very concentrated which writer discussed with RN. Per pulmonary note they will see how pt is doing by next week and reevaluate or possible withdrawal of care. Nutrition Related Findings:    Edema: +1 BUE's, non-pitting BLE's. Labs & meds reviewed. BM-4-20.  Wound Type: None       Current Nutrition Intake & Therapies:    Average Meal Intake: NPO     ADULT TUBE FEEDING; Nasogastric; Renal Formula; Continuous; 15; Yes; 10; Q 4 hours; 35; 60; Q 6 hours  Current Tube Feeding (TF) Orders:  · Feeding Route: Nasogastric  · Formula: Renal Formula  · Schedule: Continuous  · Feeding Regimen: 15 to 35 ml  · Additives/Modulars:    · Water Flushes: 60 ml every 4 hours  · Current TF & Flush Orders Provides: 1512 kcals, 68 gm protein (doesn't include calories from Propofol)  · Goal TF & Flush Orders Provides:        Anthropometric Measures:  Height: 5' 5\" (165.1 cm)  Ideal Body Weight (IBW): 125 lbs (57 kg)    Admission Body Weight: 176 lb (79.8 kg)  Current Body Weight: 197 lb (89.4 kg) (question accuracy), 140.8 % IBW. Weight Source: Bed Scale  Current BMI (kg/m2): 32.8  BMI Categories: Overweight (BMI 25.0-29. 9)    Estimated Daily Nutrient Needs:  Energy Requirements Based On: Kcal/kg  Weight Used for Energy Requirements: Admission  Energy (kcal/day): 8462-0989 kcals based on 20-22 kcals/kg using adm wt 80.2 kg  Weight Used for Protein Requirements: Ideal  Protein (g/day): 74-85 gm protein based on 1.3-1.5 gm/kg using IBW     Nutrition Diagnosis:   · Inadequate oral intake related to impaired respiratory function as evidenced by NPO or clear liquid status due to medical condition,intubation      Nutrition Interventions:   Food and/or Nutrient Delivery: Modify Tube Feeding  Nutrition Education/Counseling: No recommendation at this time  Coordination of Nutrition Care: Continue to monitor while inpatient       Goals:  Previous Goal Met: Progressing toward Goal(s)  Goals: Meet at least 75% of estimated needs       Nutrition Monitoring and Evaluation:      Food/Nutrient Intake Outcomes: None Identified,Enteral Nutrition Intake/Tolerance  Physical Signs/Symptoms Outcomes: Biochemical Data,GI Status,Fluid Status or Edema,Hemodynamic Status,Nutrition Focused Physical Findings,Skin,Weight    Discharge Planning:    Enteral Nutrition     [place], [address] [id number]

## 2022-04-21 NOTE — PROGRESS NOTES
ICU Progress Note (Vent)   [facility]    Patient - Teri Philip,  Age - 74 y.o.    - 1955      Room Number -    N -  [id number]   Acct # - [de-identified]  Date of Admission -  2022 10:25 AM    Events of Past 24 Hours   She is sedated, when sedation vacation was done this morning, she became tachypneic tachycardic and did not follow any commands. Vitals    height is 5' 5\" (1.651 m) and weight is 197 lb 5 oz (89.5 kg). Her oral temperature is 99.5 °F (37.5 °C). Her blood pressure is 139/59 (abnormal) and her pulse is 80. Her respiration is 24 and oxygen saturation is 100%. Temperature Range: Temp: 99.5 °F (37.5 °C) Temp  Av °F (37.2 °C)  Min: 98.3 °F (36.8 °C)  Max: 99.5 °F (37.5 °C)  BP Range:  Systolic (70WJE), ZQR:085 , Min:138 , GUB:643     Diastolic (20JKG), WSY:29, Min:59, Max:81    Pulse Range: Pulse  Av.9  Min: 79  Max: 105  Respiration Range: Resp  Av.2  Min: 16  Max: 35  Current Pulse Ox[de-identified]  SpO2: 100 %  24HR Pulse Ox Range:  SpO2  Av.8 %  Min: 92 %  Max: 100 %  Oxygen Amount and Delivery: O2 Flow Rate (L/min): 6 L/min      Wt Readings from Last 3 Encounters:   22 197 lb 5 oz (89.5 kg)   22 183 lb (83 kg)   22 186 lb 1.1 oz (84.4 kg)     I/O       Intake/Output Summary (Last 24 hours) at 2022 0832  Last data filed at 2022 0800  Gross per 24 hour   Intake 1607.35 ml   Output 2355 ml   Net -747.65 ml     I/O last 3 completed shifts:   In: 2543 [I.V.:549.1; NG/GT:1695; IV Piggyback:298.9]  Out: 0293 [Urine:3500; Stool:25]     DRAIN/TUBE OUTPUT:     Invasive Lines   ETT Day -   6  Lines -right IJ day 6    ICP PRESSURE RANGE:  No data recorded  CVP PRESSURE RANGE:  No data recorded  Mechanical Ventilation Data   SETTINGS (Comprehensive)  Vent Information  Vent Mode: PRVC  Additional Respiratory Assessments  Pulse: 80  Resp: 24  SpO2: 100 %  End Tidal CO2: 18 (%)  Position: Semi-Molina's  Humidification Source: HME  Cuff Pressure (cm H2O): 30 cm H2O       ABGs:   Lab Results   Component Value Date    PHART 7.415 04/21/2022    PO2ART 82.1 04/21/2022    HYD6UKE 58.6 04/21/2022       Lab Results   Component Value Date    MODE [facility] 04/21/2022         Medications   IV   sodium chloride      dextrose      propofol 30 mcg/kg/min (04/21/22 0758)    lactated ringers 25 mL/hr at 04/21/22 0029      insulin glargine  14 Units SubCUTAneous BID    insulin lispro  0-12 Units SubCUTAneous Q6H    methylPREDNISolone  40 mg IntraVENous Q8H    polyethylene glycol  17 g Oral Daily    furosemide  20 mg IntraVENous BID    meropenem  1,000 mg IntraVENous Q8H    albuterol  2.5 mg Nebulization Q4H    acetylcysteine  200 mg Inhalation Q4H    sodium chloride flush  5-40 mL IntraVENous 2 times per day    [Held by provider] risperiDONE  1.5 mg Oral Q12H    [Held by provider] rivastigmine  4.5 mg Oral BID    atorvastatin  20 mg Oral Daily    [Held by provider] traZODone  50 mg Oral Nightly    polyvinyl alcohol  1 drop Both Eyes Q4H    And    artificial tears   Both Eyes Q4H    chlorhexidine  15 mL Mouth/Throat BID    famotidine (PEPCID) injection  20 mg IntraVENous BID    heparin (porcine)  5,000 Units SubCUTAneous 3 times per day       Diet/Nutrition   Diet NPO Exceptions are: Other (Specify); Specify Other Exceptions: Tube feed    Exam   VITALS    height is 5' 5\" (1.651 m) and weight is 197 lb 5 oz (89.5 kg). Her oral temperature is 99.5 °F (37.5 °C). Her blood pressure is 139/59 (abnormal) and her pulse is 80. Her respiration is 24 and oxygen saturation is 100%. Ventilator Settings (Basic)  Vent Mode: [facility] Resp Rate (Set): 24 bmp/Vt (Set, mL): 450 mL/ /FiO2 : 30 %    Constitutional - Sedated  General Appearance frail mildly tachypneic   HEENT - Life support devices in place (ET, OG),normocephalic, atraumatic. PERRLA  Lungs - Chest expands equally, no wheezes, rales or rhonchi.   Diminished breath sounds  Cardiovascular - Heart sounds are normal.  normal rate and rhythm regular, no murmur, gallop or rub. Abdomen - soft, nontender, nondistended, no masses or organomegaly  Neurologic - CN II-XII are grossly intact. There are no focal motor deficits  Skin - no bruising or bleeding  Extremities - no cyanosis, clubbing, upper extremity edema    Lab Results   CBC     Lab Results   Component Value Date    WBC 12.2 04/21/2022    RBC 2.86 04/21/2022    RBC 0-2 07/08/2021    HGB 8.6 04/21/2022    HCT 26.9 04/21/2022     04/21/2022    MCV 94.2 04/21/2022    MCH 30.0 04/21/2022    MCHC 31.8 04/21/2022    RDW 16.4 04/21/2022    NRBC 0 07/08/2021    METASPCT 1 04/02/2022    LYMPHOPCT 5 04/19/2022    LYMPHOPCT 12.1 07/08/2021    MONOPCT 2 04/19/2022    MONOPCT 15.1 07/08/2021    BASOPCT 0 04/19/2022    BASOPCT 0.8 07/08/2021    MONOSABS 0.26 04/19/2022    MONOSABS 0.4 07/08/2021    LYMPHSABS 0.66 04/19/2022    LYMPHSABS 0.3 07/08/2021    EOSABS 0.00 04/19/2022    EOSABS 0.1 07/08/2021    BASOSABS 0.00 04/19/2022    DIFFTYPE NOT REPORTED 12/20/2021       BMP   Lab Results   Component Value Date     04/21/2022    K 5.5 04/21/2022     04/21/2022    CO2 35 04/21/2022    BUN 39 04/21/2022    CREATININE 0.58 04/21/2022    GLUCOSE 221 04/21/2022    GLUCOSE 127 07/08/2021    CALCIUM 8.1 04/21/2022       LFTS  Lab Results   Component Value Date    ALKPHOS 137 04/16/2022    ALT 25 04/16/2022    AST 19 04/16/2022    PROT 7.0 04/16/2022    PROT 5.7 07/08/2021    BILITOT 0.16 04/16/2022    BILIDIR 0.1 07/08/2021    IBILI 0.12 07/08/2019    LABALBU 2.8 04/16/2022    LABALBU 3.6 07/08/2021       INR  No results for input(s): PROTIME, INR in the last 72 hours. APTT  No results for input(s): APTT in the last 72 hours. Lactic Acid  Lab Results   Component Value Date    LACTA 1.5 04/19/2022    LACTA 1.2 04/02/2022        BNP   No results for input(s): BNP in the last 72 hours.      Cultures       Radiology Plain Films         Chest x-ray shows tubes and lines in good position no significant change      SYSTEM ASSESSMENT    Acute on chronic hypoxic and hypercapnic respiratory failure, intubated 4/16  Pseudomonas pneumonia  Right-sided pneumothorax  Right lower lobe cavitary lesions  Exudative pleural effusion on right, growing Pseudomonas  Low ejection fraction EF 45 to 50%, echo 4/18  History of pulmonary embolism and what appears to be chronic filling defects (subsegmental, most likely clinically inconsequential)  Severe stage IV COPD, FEV1 is 35% of predicted  Recent hospitalization for pneumonia  Elevated inflammatory markers including D-dimer and procalcitonin  Full CODE STATUS      Neuro   Continue to keep sedated except during weaning trial    Respiratory   Wean oxygen as tolerated.  Keep O2 sat > 88%  Continue bronchodilators every 4 hours  No change in steroid dose  Daily weaning trials have been so far unsuccessful  Will clamp chest tube and recheck chest x-ray; no evidence of pneumothorax despite one of the chest tube holes being external; possible removal tomorrow if she tolerates     Hemodynamics   Blood pressure intermittently elevated, will avoid Lopressor due to severe emphysema    Gastrointestinal/Nutrition   Continue tube feed, I am concerned that there may be too much potassium in the tube feeds; will ask dietary to check  Add free water flushes  GI prophylaxis    Renal   Discontinue lactated Ringer  Hypernatremia in part secondary to Lasix    Infectious Disease   Remains on Merrem for Pseudomonas infection    Hematology/Oncology   Continue DVT prophylaxis    Endocrine   Increase Lantus insulin to 18 units twice daily    Social/Spiritual/DNR/Disposition/Other     Have been updating daughter Dori Harris on a daily basis; given her mother's wishes, if she is not better by earlier midweek, would favor withdrawal    Critical Care Time   35 min    Electronically signed by Mikayla Schultz MD on 4/21/2022 at 8:32 AM

## 2022-04-22 ENCOUNTER — APPOINTMENT (OUTPATIENT)
Dept: GENERAL RADIOLOGY | Age: 67
DRG: 720 | End: 2022-04-22
Payer: COMMERCIAL

## 2022-04-22 LAB
ALLEN TEST: ABNORMAL
ANION GAP SERPL CALCULATED.3IONS-SCNC: 4 MMOL/L (ref 9–17)
BUN BLDV-MCNC: 44 MG/DL (ref 8–23)
CALCIUM SERPL-MCNC: 8.5 MG/DL (ref 8.6–10.4)
CARBOXYHEMOGLOBIN: 0.6 % (ref 0–5)
CHLORIDE BLD-SCNC: 101 MMOL/L (ref 98–107)
CO2: 37 MMOL/L (ref 20–31)
CREAT SERPL-MCNC: 0.47 MG/DL (ref 0.5–0.9)
FIO2: 30
GFR AFRICAN AMERICAN: >60 ML/MIN
GFR NON-AFRICAN AMERICAN: >60 ML/MIN
GFR SERPL CREATININE-BSD FRML MDRD: ABNORMAL ML/MIN/{1.73_M2}
GLUCOSE BLD-MCNC: 129 MG/DL (ref 65–105)
GLUCOSE BLD-MCNC: 172 MG/DL (ref 65–105)
GLUCOSE BLD-MCNC: 184 MG/DL (ref 65–105)
GLUCOSE BLD-MCNC: 196 MG/DL (ref 70–99)
GLUCOSE BLD-MCNC: 197 MG/DL (ref 65–105)
HCO3 ARTERIAL: 41.7 MMOL/L (ref 22–26)
HCT VFR BLD CALC: 27.1 % (ref 36–46)
HEMOGLOBIN: 8.8 G/DL (ref 12–16)
MCH RBC QN AUTO: 30.4 PG (ref 26–34)
MCHC RBC AUTO-ENTMCNC: 32.5 G/DL (ref 31–37)
MCV RBC AUTO: 93.5 FL (ref 80–100)
METHEMOGLOBIN: 1.1 % (ref 0–1.9)
MODE: ABNORMAL
O2 DEVICE/FLOW/%: ABNORMAL
O2 SAT, ARTERIAL: 92.7 % (ref 95–98)
PATIENT TEMP: 37
PCO2 ARTERIAL: 63.2 MMHG (ref 35–45)
PDW BLD-RTO: 16.5 % (ref 11.5–14.9)
PEEP/CPAP: 8
PH ARTERIAL: 7.43 (ref 7.35–7.45)
PLATELET # BLD: 356 K/UL (ref 150–450)
PMV BLD AUTO: 7.7 FL (ref 6–12)
PO2 ARTERIAL: 71.7 MMHG (ref 80–100)
POSITIVE BASE EXCESS, ART: 17.4 MMOL/L (ref 0–2)
POTASSIUM SERPL-SCNC: 5.3 MMOL/L (ref 3.7–5.3)
PT. POSITION: ABNORMAL
RBC # BLD: 2.9 M/UL (ref 4–5.2)
SAMPLE SITE: ABNORMAL
SET RATE: 24
SODIUM BLD-SCNC: 142 MMOL/L (ref 135–144)
TEXT FOR RESPIRATORY: ABNORMAL
TOTAL RATE: 24
VT: 450
WBC # BLD: 9.8 K/UL (ref 3.5–11)

## 2022-04-22 PROCEDURE — 99233 SBSQ HOSP IP/OBS HIGH 50: CPT | Performed by: INTERNAL MEDICINE

## 2022-04-22 PROCEDURE — 94003 VENT MGMT INPAT SUBQ DAY: CPT

## 2022-04-22 PROCEDURE — 85027 COMPLETE CBC AUTOMATED: CPT

## 2022-04-22 PROCEDURE — 82805 BLOOD GASES W/O2 SATURATION: CPT

## 2022-04-22 PROCEDURE — 36415 COLL VENOUS BLD VENIPUNCTURE: CPT

## 2022-04-22 PROCEDURE — 99291 CRITICAL CARE FIRST HOUR: CPT | Performed by: INTERNAL MEDICINE

## 2022-04-22 PROCEDURE — 2580000003 HC RX 258: Performed by: INTERNAL MEDICINE

## 2022-04-22 PROCEDURE — 6360000002 HC RX W HCPCS: Performed by: INTERNAL MEDICINE

## 2022-04-22 PROCEDURE — 6370000000 HC RX 637 (ALT 250 FOR IP)

## 2022-04-22 PROCEDURE — A4216 STERILE WATER/SALINE, 10 ML: HCPCS | Performed by: INTERNAL MEDICINE

## 2022-04-22 PROCEDURE — 2700000000 HC OXYGEN THERAPY PER DAY

## 2022-04-22 PROCEDURE — 6360000002 HC RX W HCPCS: Performed by: STUDENT IN AN ORGANIZED HEALTH CARE EDUCATION/TRAINING PROGRAM

## 2022-04-22 PROCEDURE — 94640 AIRWAY INHALATION TREATMENT: CPT

## 2022-04-22 PROCEDURE — 82947 ASSAY GLUCOSE BLOOD QUANT: CPT

## 2022-04-22 PROCEDURE — 2500000003 HC RX 250 WO HCPCS: Performed by: INTERNAL MEDICINE

## 2022-04-22 PROCEDURE — 6370000000 HC RX 637 (ALT 250 FOR IP): Performed by: INTERNAL MEDICINE

## 2022-04-22 PROCEDURE — 94761 N-INVAS EAR/PLS OXIMETRY MLT: CPT

## 2022-04-22 PROCEDURE — 80048 BASIC METABOLIC PNL TOTAL CA: CPT

## 2022-04-22 PROCEDURE — 71045 X-RAY EXAM CHEST 1 VIEW: CPT

## 2022-04-22 PROCEDURE — 2000000000 HC ICU R&B

## 2022-04-22 PROCEDURE — 2580000003 HC RX 258: Performed by: STUDENT IN AN ORGANIZED HEALTH CARE EDUCATION/TRAINING PROGRAM

## 2022-04-22 RX ADMIN — CHLORHEXIDINE GLUCONATE 0.12% ORAL RINSE 15 ML: 1.2 LIQUID ORAL at 07:33

## 2022-04-22 RX ADMIN — ALBUTEROL SULFATE 2.5 MG: 2.5 SOLUTION RESPIRATORY (INHALATION) at 03:04

## 2022-04-22 RX ADMIN — METHYLPREDNISOLONE SODIUM SUCCINATE 40 MG: 40 INJECTION, POWDER, LYOPHILIZED, FOR SOLUTION INTRAMUSCULAR; INTRAVENOUS at 00:02

## 2022-04-22 RX ADMIN — MINERAL OIL, PETROLATUM: 425; 568 OINTMENT OPHTHALMIC at 09:32

## 2022-04-22 RX ADMIN — POLYETHYLENE GLYCOL 3350 17 G: 17 POWDER, FOR SOLUTION ORAL at 07:32

## 2022-04-22 RX ADMIN — PROPOFOL 30 MCG/KG/MIN: 10 INJECTION, EMULSION INTRAVENOUS at 02:38

## 2022-04-22 RX ADMIN — INSULIN GLARGINE 18 UNITS: 100 INJECTION, SOLUTION SUBCUTANEOUS at 19:58

## 2022-04-22 RX ADMIN — HEPARIN SODIUM 5000 UNITS: 5000 INJECTION INTRAVENOUS; SUBCUTANEOUS at 14:38

## 2022-04-22 RX ADMIN — HEPARIN SODIUM 5000 UNITS: 5000 INJECTION INTRAVENOUS; SUBCUTANEOUS at 05:44

## 2022-04-22 RX ADMIN — ACETYLCYSTEINE 200 MG: 200 SOLUTION ORAL; RESPIRATORY (INHALATION) at 19:29

## 2022-04-22 RX ADMIN — POLYVINYL ALCOHOL 1 DROP: 14 SOLUTION/ DROPS OPHTHALMIC at 10:49

## 2022-04-22 RX ADMIN — MINERAL OIL, PETROLATUM: 425; 568 OINTMENT OPHTHALMIC at 04:13

## 2022-04-22 RX ADMIN — INSULIN LISPRO 2 UNITS: 100 INJECTION, SOLUTION INTRAVENOUS; SUBCUTANEOUS at 07:33

## 2022-04-22 RX ADMIN — MEROPENEM 1000 MG: 1 INJECTION, POWDER, FOR SOLUTION INTRAVENOUS at 19:41

## 2022-04-22 RX ADMIN — MINERAL OIL, PETROLATUM: 425; 568 OINTMENT OPHTHALMIC at 00:03

## 2022-04-22 RX ADMIN — HEPARIN SODIUM 5000 UNITS: 5000 INJECTION INTRAVENOUS; SUBCUTANEOUS at 21:22

## 2022-04-22 RX ADMIN — POLYVINYL ALCOHOL 1 DROP: 14 SOLUTION/ DROPS OPHTHALMIC at 02:17

## 2022-04-22 RX ADMIN — FUROSEMIDE 20 MG: 10 INJECTION, SOLUTION INTRAMUSCULAR; INTRAVENOUS at 07:33

## 2022-04-22 RX ADMIN — METHYLPREDNISOLONE SODIUM SUCCINATE 40 MG: 40 INJECTION, POWDER, LYOPHILIZED, FOR SOLUTION INTRAMUSCULAR; INTRAVENOUS at 07:33

## 2022-04-22 RX ADMIN — INSULIN LISPRO 2 UNITS: 100 INJECTION, SOLUTION INTRAVENOUS; SUBCUTANEOUS at 19:58

## 2022-04-22 RX ADMIN — FUROSEMIDE 20 MG: 10 INJECTION, SOLUTION INTRAMUSCULAR; INTRAVENOUS at 18:09

## 2022-04-22 RX ADMIN — POLYVINYL ALCOHOL 1 DROP: 14 SOLUTION/ DROPS OPHTHALMIC at 05:50

## 2022-04-22 RX ADMIN — MEROPENEM 1000 MG: 1 INJECTION, POWDER, FOR SOLUTION INTRAVENOUS at 11:36

## 2022-04-22 RX ADMIN — ALBUTEROL SULFATE 2.5 MG: 2.5 SOLUTION RESPIRATORY (INHALATION) at 07:09

## 2022-04-22 RX ADMIN — INSULIN LISPRO 2 UNITS: 100 INJECTION, SOLUTION INTRAVENOUS; SUBCUTANEOUS at 14:38

## 2022-04-22 RX ADMIN — ALBUTEROL SULFATE 2.5 MG: 2.5 SOLUTION RESPIRATORY (INHALATION) at 11:21

## 2022-04-22 RX ADMIN — ALBUTEROL SULFATE 2.5 MG: 2.5 SOLUTION RESPIRATORY (INHALATION) at 23:42

## 2022-04-22 RX ADMIN — SODIUM CHLORIDE: 4.5 INJECTION, SOLUTION INTRAVENOUS at 04:16

## 2022-04-22 RX ADMIN — FENTANYL CITRATE 50 MCG: 50 INJECTION, SOLUTION INTRAMUSCULAR; INTRAVENOUS at 07:33

## 2022-04-22 RX ADMIN — FAMOTIDINE 20 MG: 10 INJECTION INTRAVENOUS at 20:02

## 2022-04-22 RX ADMIN — SODIUM CHLORIDE, PRESERVATIVE FREE 10 ML: 5 INJECTION INTRAVENOUS at 07:41

## 2022-04-22 RX ADMIN — ALBUTEROL SULFATE 2.5 MG: 2.5 SOLUTION RESPIRATORY (INHALATION) at 19:28

## 2022-04-22 RX ADMIN — ACETYLCYSTEINE 200 MG: 200 SOLUTION ORAL; RESPIRATORY (INHALATION) at 03:08

## 2022-04-22 RX ADMIN — POLYVINYL ALCOHOL 1 DROP: 14 SOLUTION/ DROPS OPHTHALMIC at 22:11

## 2022-04-22 RX ADMIN — FENTANYL CITRATE 50 MCG: 50 INJECTION, SOLUTION INTRAMUSCULAR; INTRAVENOUS at 11:21

## 2022-04-22 RX ADMIN — ACETYLCYSTEINE 200 MG: 200 SOLUTION ORAL; RESPIRATORY (INHALATION) at 23:42

## 2022-04-22 RX ADMIN — CHLORHEXIDINE GLUCONATE 0.12% ORAL RINSE 15 ML: 1.2 LIQUID ORAL at 20:02

## 2022-04-22 RX ADMIN — METHYLPREDNISOLONE SODIUM SUCCINATE 40 MG: 40 INJECTION, POWDER, LYOPHILIZED, FOR SOLUTION INTRAMUSCULAR; INTRAVENOUS at 16:09

## 2022-04-22 RX ADMIN — PROPOFOL 30 MCG/KG/MIN: 10 INJECTION, EMULSION INTRAVENOUS at 21:21

## 2022-04-22 RX ADMIN — ACETYLCYSTEINE 200 MG: 200 SOLUTION ORAL; RESPIRATORY (INHALATION) at 11:22

## 2022-04-22 RX ADMIN — ACETYLCYSTEINE 200 MG: 200 SOLUTION ORAL; RESPIRATORY (INHALATION) at 15:40

## 2022-04-22 RX ADMIN — SODIUM CHLORIDE, PRESERVATIVE FREE 10 ML: 5 INJECTION INTRAVENOUS at 20:02

## 2022-04-22 RX ADMIN — ACETYLCYSTEINE 200 MG: 200 SOLUTION ORAL; RESPIRATORY (INHALATION) at 07:09

## 2022-04-22 RX ADMIN — ATORVASTATIN CALCIUM 20 MG: 20 TABLET, FILM COATED ORAL at 07:32

## 2022-04-22 RX ADMIN — PROPOFOL 30 MCG/KG/MIN: 10 INJECTION, EMULSION INTRAVENOUS at 16:19

## 2022-04-22 RX ADMIN — MINERAL OIL, PETROLATUM: 425; 568 OINTMENT OPHTHALMIC at 12:56

## 2022-04-22 RX ADMIN — FAMOTIDINE 20 MG: 10 INJECTION INTRAVENOUS at 07:33

## 2022-04-22 RX ADMIN — MINERAL OIL, PETROLATUM: 425; 568 OINTMENT OPHTHALMIC at 20:03

## 2022-04-22 RX ADMIN — INSULIN GLARGINE 18 UNITS: 100 INJECTION, SOLUTION SUBCUTANEOUS at 07:33

## 2022-04-22 RX ADMIN — PROPOFOL 30 MCG/KG/MIN: 10 INJECTION, EMULSION INTRAVENOUS at 07:32

## 2022-04-22 RX ADMIN — MEROPENEM 1000 MG: 1 INJECTION, POWDER, FOR SOLUTION INTRAVENOUS at 04:13

## 2022-04-22 RX ADMIN — ALBUTEROL SULFATE 2.5 MG: 2.5 SOLUTION RESPIRATORY (INHALATION) at 15:40

## 2022-04-22 ASSESSMENT — PULMONARY FUNCTION TESTS
PIF_VALUE: 24
PIF_VALUE: 36
PIF_VALUE: 34
PIF_VALUE: 28
PIF_VALUE: 37
PIF_VALUE: 27
PIF_VALUE: 26
PIF_VALUE: 32
PIF_VALUE: 28
PIF_VALUE: 27
PIF_VALUE: 25
PIF_VALUE: 27
PIF_VALUE: 37
PIF_VALUE: 30
PIF_VALUE: 15
PIF_VALUE: 32
PIF_VALUE: 42
PIF_VALUE: 27
PIF_VALUE: 33
PIF_VALUE: 29
PIF_VALUE: 61
PIF_VALUE: 34
PIF_VALUE: 30
PIF_VALUE: 31
PIF_VALUE: 28
PIF_VALUE: 26
PIF_VALUE: 29
PIF_VALUE: 36

## 2022-04-22 ASSESSMENT — PAIN SCALES - WONG BAKER
WONGBAKER_NUMERICALRESPONSE: 0

## 2022-04-22 ASSESSMENT — PAIN SCALES - GENERAL
PAINLEVEL_OUTOF10: 0
PAINLEVEL_OUTOF10: 3

## 2022-04-22 NOTE — PROGRESS NOTES
[facility] -   Infectious diseases evaluation  admission date 4/16/2022    reason for consultation:   Cavitary lung lesions  Impression :   Current:  · Right lower lobe cavitary lesions associated with multiloculated pleural fluid collection likely abscess with empyema status post chest tube with Pseudomonas growth on culture  · Sepsis secondary to above  · Acute on chronic respiratory failure required intubation  · Right-sided pneumothorax  · Severe COPD      Recommendations     · IV meropenem  · Follow cultures and adjust antibiotics as needed  · Nasal swab for MRSA negative   · Follow CBC and renal function  · Continue supportive care              History of Present Illness:   Initial history:  Moody Maher is a 74y.o.-year-old female presents to the hospital with worsening shortness of breath associated with cough. At the ER with tachypneic and hypoxic  Chest x-ray showed pneumothorax  The patient was in respiratory distress, was intubated. The patient is intubated, sedated, unable to provide history that was obtained from chart review. CT chest 4/16/2022 showed right lower lobe cavitary lesion with surrounding airspace disease and multiloculated pleural collection. The patient had chest tube placed with gram-negative rods growth on pleural fluid culture. Respiratory culture grew gram-positive cocci in pairs  Initial WBC was 18.5, procalcitonin no more than 100  Respiratory panel with COVID-19 PCR was negative  Urine for Legionella and strep pneumo antigen negative    Interval changes  4/22/2022   She remains intubated, sedated, temperature max 99.7, unsuccessful weaning trials, chest tube clamped  Status post bronchoscopy 4/18/2020 with Pseudomonas growth  Right IJ line, NG tube and Reynoso cath in place  Pseudomonas growth on pleural fluid culture.   Respiratory culture grew Pseudomonas aeruginosa  Chest x-ray from earlier today reviewed showed no pneumothorax with improved aeration of the right lung base. Patient Vitals for the past 8 hrs:   BP Temp Temp src Pulse Resp SpO2   04/22/22 1500 (!) 109/48 -- -- 70 24 92 %   04/22/22 1400 (!) 119/54 -- -- 72 24 92 %   04/22/22 1300 (!) 113/52 -- -- 75 25 91 %   04/22/22 1200 (!) 119/53 98.5 °F (36.9 °C) Oral 77 24 90 %   04/22/22 1122 -- -- -- 87 25 90 %   04/22/22 1100 (!) 153/65 -- -- 83 26 97 %   04/22/22 1000 (!) 166/64 -- -- 79 22 95 %   04/22/22 0900 (!) 120/58 -- -- 69 24 95 %   04/22/22 0800 (!) 97/50 98.5 °F (36.9 °C) Oral 70 24 95 %             I have personally reviewed the past medical history, past surgical history, medications, social history, and family history, and I haveupdated the database accordingly. Allergies:   Advil [ibuprofen], Aleve [naproxen], Antipyrine, Celecoxib, Codeine, Fomepizole, Incruse ellipta [umeclidinium bromide], Other, Rofecoxib, Salicylates, Strawberry extract, Sulfinpyrazone, Aspirin, and Nsaids     Review of Systems:     Review of Systems  Intubated, unable to provide  Physical Examination :       Physical Exam  Constitutional:       Appearance: She is ill-appearing. Comments: Intubated   HENT:      Head: Normocephalic and atraumatic. Right Ear: External ear normal.      Left Ear: External ear normal.   Cardiovascular:      Rate and Rhythm: Normal rate. Pulmonary:      Comments:   Right-sided chest tube in place  Abdominal:      General: There is no distension. Palpations: Abdomen is soft. Musculoskeletal:      Cervical back: Neck supple. No rigidity. Right lower leg: No edema. Left lower leg: No edema. Skin:     Coloration: Skin is not jaundiced. Findings: No bruising.    Neurological:      Comments: Sedated         Past Medical History:     Past Medical History:   Diagnosis Date    Abnormal EKG     TRAM (acute kidney injury) ([facility] [address] 4/2/2022    Anxiety     Asthma     B[facility]     SEVERE IN 2003, [place]    COPD (chronic obstructive pulmonary disease) (HCC)     Cramps, extremity     SEVERE LEG CRAMPS    Depression     Dilated bile duct     Headache     History of elective      Hyperlipidemia     Irritable bowel syndrome     Prolonged emergence from general anesthesia     SEVERE ISSUES WAKING UP    Substance abuse ([address]     street drugs when younger   [facility] Unspecified sleep apnea     Vision abnormalities     glasses       Past Surgical  History:     Past Surgical History:   Procedure Laterality Date    ANKLE SURGERY      HAD SCREWS AND HARDWARE, THEN REMOVED    BRONCHOSCOPY  2022         COLONOSCOPY  02/15/2018    tubular adenoma    EYE SURGERY Bilateral     CATARACTS    HYSTEROSCOPY  10/19/2016    D & C    INDUCED       LASIK      bilateral    TONSILLECTOMY AND ADENOIDECTOMY Bilateral     VULVA SURGERY      HAD A BIOPSY AND REMOVAL OF       Medications:      insulin glargine  18 Units SubCUTAneous BID    insulin lispro  0-12 Units SubCUTAneous Q6H    methylPREDNISolone  40 mg IntraVENous Q8H    polyethylene glycol  17 g Oral Daily    furosemide  20 mg IntraVENous BID    meropenem  1,000 mg IntraVENous Q8H    albuterol  2.5 mg Nebulization Q4H    acetylcysteine  200 mg Inhalation Q4H    sodium chloride flush  5-40 mL IntraVENous 2 times per day    [Held by provider] risperiDONE  1.5 mg Oral Q12H    [Held by provider] rivastigmine  4.5 mg Oral BID    atorvastatin  20 mg Oral Daily    [Held by provider] traZODone  50 mg Oral Nightly    polyvinyl alcohol  1 drop Both Eyes Q4H    And    artificial tears   Both Eyes Q4H    chlorhexidine  15 mL Mouth/Throat BID    famotidine (PEPCID) injection  20 mg IntraVENous BID    heparin (porcine)  5,000 Units SubCUTAneous 3 times per day       Social History:     Social History     Socioeconomic History    Marital status:      Spouse name: Not on file    Number of children: Not on file    Years of education: Not on file   [facility] Highest education level: Not on file   Occupational History    Not on file   Tobacco Use    Smoking status: Former Smoker     Packs/day: 2.00     Years: 42.00     Pack years: 84.00     Types: Cigarettes     Quit date: 11/1/2018     Years since quitting: 3.4    Smokeless tobacco: Never Used   Substance and Sexual Activity    Alcohol use: Yes     Comment: yearly    Drug use: No    Sexual activity: Not Currently     Partners: Male     Birth control/protection: Post-menopausal   Other Topics Concern    Not on file   Social History Narrative    Not on file     Social Determinants of Health     Financial Resource Strain: High Risk    Difficulty of Paying Living Expenses: Very hard   Food Insecurity: No Food Insecurity    Worried About Running Out of Food in the Last Year: Never true    Agustin of Food in the Last Year: Never true   Transportation Needs:     Lack of Transportation (Medical): Not on file    Lack of Transportation (Non-Medical):  Not on file   Physical Activity:     Days of Exercise per Week: Not on file    Minutes of Exercise per Session: Not on file   Stress:     Feeling of Stress : Not on file   Social Connections:     Frequency of Communication with Friends and Family: Not on file    Frequency of Social Gatherings with Friends and Family: Not on file    Attends Mandaen Services: Not on file    Active Member of [address] [place] [address] [employer] or Organizations: Not on file    Attends Club or Organization Meetings: Not on file    Marital Status: Not on file   Intimate Partner Violence:     Fear of Current or Ex-Partner: Not on file    Emotionally Abused: Not on file    Physically Abused: Not on file    Sexually Abused: Not on file   Housing Stability:     Unable to Pay for Housing in the Last Year: Not on file    Number of [place] in the Last Year: Not on file    Unstable Housing in the Last Year: Not on file       Family History:     Family History   Problem Relation Age of Onset    Dementia Maternal Aunt     Kidney Disease Mother     Heart Attack Sister     Prostate Cancer Father     High Cholesterol Brother     Heart Attack Paternal Grandmother       Medical Decision Making:   I have independently reviewed/ordered the following labs:    CBC with Differential:   Recent Labs     04/21/22 0430 04/22/22 0429   WBC 12.2* 9.8   HGB 8.6* 8.8*   HCT 26.9* 27.1*    356     BMP:  Recent Labs     04/21/22 0430 04/22/22 0429   * 142   K 5.5* 5.3    101   CO2 35* 37*   BUN 39* 44*   CREATININE 0.58 0.47*     Hepatic Function Panel:   No results for input(s): PROT, LABALBU, BILIDIR, IBILI, BILITOT, ALKPHOS, ALT, AST in the last 72 hours. No results for input(s): RPR in the last 72 hours. No results for input(s): HIV in the last 72 hours. No results for input(s): BC in the last 72 hours. Lab Results   Component Value Date    CREATININE 0.47 04/22/2022    GLUCOSE 196 04/22/2022    GLUCOSE 127 07/08/2021       Detailed results: Thank you for allowing us to participate in the care of this patient. Please call with questions. This note is created with the assistance of a speech recognition program.  While intending to generate adocument that actually reflects the content of the visit, the document can still have some errors including those of syntax and sound a like substitutions which may escape proof reading. It such instances, actual meaningcan be extrapolated by contextual diversion.     Tariq Vu MD  Office: [phone]  Perfect serve / office [phone]

## 2022-04-22 NOTE — FLOWSHEET NOTE
04/22/22 0610   Treatment Team Notification   Team Member Name Dr. Melissa Esposito   Notification Time [id number]     MD notified of abg results and vent settings. No new orders received.

## 2022-04-22 NOTE — PLAN OF CARE
Problem: Non-Violent Restraints  Goal: Removal from restraints as soon as assessed to be safe  4/22/2022 1251 by Basil Mendenhall RN  Outcome: Not Progressing  4/22/2022 0255 by Melissa Boyd RN  Outcome: Progressing  Note: Attempts to pull at tubes when released. ROM assessed every 2 hours and visual safety checks completed hourly. Restraints maintained to preserve the patient's airway. Goal: No harm/injury to patient while restraints in use  4/22/2022 1251 by Basil Mendenhall RN  Outcome: Not Progressing  4/22/2022 0255 by Melissa Boyd RN  Outcome: Progressing  Note: Attempts to pull at tubes when released. ROM assessed every 2 hours and visual safety checks completed hourly. Restraints maintained to preserve the patient's airway. Goal: Patient's dignity will be maintained  4/22/2022 1251 by Basil Mendenhall RN  Outcome: Not Progressing  4/22/2022 0255 by Melissa Boyd RN  Outcome: Progressing     Problem: Gas Exchange - Impaired:  Goal: Levels of oxygenation will improve  Description: Levels of oxygenation will improve  4/22/2022 1251 by Basil Mendenhall RN  Outcome: Not Progressing  4/22/2022 0255 by Melissa Boyd RN  Outcome: Progressing     Problem: Skin Integrity - Impaired:  Goal: Will show no infection signs and symptoms  Description: Will show no infection signs and symptoms  4/22/2022 1251 by Basil Mendenhall RN  Outcome: Not Progressing  4/22/2022 0255 by Melissa Boyd RN  Outcome: Progressing  Goal: Absence of new skin breakdown  Description: Absence of new skin breakdown  4/22/2022 1251 by Basil Mendenhall RN  Outcome: Not Progressing  4/22/2022 0255 by Melissa Boyd RN  Outcome: Progressing  Note: Patient turned and repositioned every 2 hours and as needed for comfort. Skin kept clean and dry. No new skin breakdown noted.       Problem: Falls - Risk of:  Goal: Absence of physical injury  Description: Absence of physical injury  4/22/2022 1251 by Rodger Farris RN  Outcome: Not Progressing  4/22/2022 0255 by Cindie Habermann, RN  Outcome: Progressing     Problem: Breathing Pattern - Ineffective:  Goal: Ability to achieve and maintain a regular respiratory rate will improve  Description: Ability to achieve and maintain a regular respiratory rate will improve  4/22/2022 1251 by Rodger Farris RN  Outcome: Not Progressing  4/22/2022 0255 by Cindie Habermann, RN  Outcome: Progressing     Problem: Skin Integrity:  Goal: Will show no infection signs and symptoms  Description: Will show no infection signs and symptoms  4/22/2022 1251 by Rodger Farris RN  Outcome: Not Progressing  4/22/2022 0255 by Cindie Habermann, RN  Outcome: Progressing  Goal: Absence of new skin breakdown  Description: Absence of new skin breakdown  4/22/2022 1251 by Rodger Farris RN  Outcome: Not Progressing  4/22/2022 0255 by Cindie Habermann, RN  Outcome: Progressing     Problem: OXYGENATION/RESPIRATORY FUNCTION  Goal: Patient will maintain patent airway  4/22/2022 1251 by Rodger Farris RN  Outcome: Not Progressing  4/22/2022 0255 by Cindie Habermann, RN  Outcome: Progressing  Goal: Patient will achieve/maintain normal respiratory rate/effort  Description: Respiratory rate and effort will be within normal limits for the patient  4/22/2022 1251 by Rodger Farris RN  Outcome: Not Progressing  4/22/2022 0255 by Cindie Habermann, RN  Outcome: Progressing     Problem: MECHANICAL VENTILATION  Goal: Oral health is maintained or improved  4/22/2022 1251 by Rodger Farris RN  Outcome: Not Progressing  4/22/2022 0255 by Cindie Habermann, RN  Outcome: Progressing  Goal: ET tube will be managed safely  4/22/2022 1251 by Rodger Farris RN  Outcome: Not Progressing  4/22/2022 0255 by Cindie Habermann, RN  Outcome: Progressing  Goal: Ability to express needs and understand communication  4/22/2022 1251 by Rodger Farris RN  Outcome: Not Progressing  4/22/2022 0255 by Matilda Martinez RN  Outcome: Progressing  Goal: Mobility/activity is maintained at optimum level for patient  4/22/2022 1251 by Mayela Nichols RN  Outcome: Not Progressing  4/22/2022 0255 by Matilda Martinez RN  Outcome: Progressing     Problem: SKIN INTEGRITY  Goal: Skin integrity is maintained or improved  4/22/2022 1251 by Mayela Nichols RN  Outcome: Not Progressing  4/22/2022 0255 by Matilda Martinez RN  Outcome: Progressing     Problem: Nutrition  Goal: Optimal nutrition therapy  4/22/2022 1251 by Mayela Nichols RN  Outcome: Not Progressing  4/22/2022 0255 by Matilda Martinez RN  Outcome: Progressing  Note: Pt tolerating tube feed at goal.     Problem: Discharge Planning  Goal: Discharge to home or other facility with appropriate resources  4/22/2022 1251 by Mayela Nichols RN  Outcome: Not Progressing  4/22/2022 0255 by Matilda Martinez RN  Outcome: Progressing     Problem: Safety - Medical Restraint  Goal: Remains free of injury from restraints (Restraint for Interference with Medical Device)  Description: INTERVENTIONS:  1. Determine that other, less restrictive measures have been tried or would not be effective before applying the restraint  2. Evaluate the patient's condition at the time of restraint application  3. Inform patient/family regarding the reason for restraint  4.  Q2H: Monitor safety, psychosocial status, comfort, nutrition and hydration  4/22/2022 1251 by Mayela Nichols RN  Outcome: Not Progressing  4/22/2022 0255 by Matilda Martinez RN  Outcome: Progressing     Problem: Pain  Goal: Verbalizes/displays adequate comfort level or baseline comfort level  4/22/2022 1251 by Mayela Nichols RN  Outcome: Not Progressing  4/22/2022 0255 by Matilda Martinez RN  Outcome: Progressing     Problem: ABCDS Injury Assessment  Goal: Absence of physical injury  4/22/2022 1251 by Mayela Nichols RN  Outcome: Not Progressing  4/22/2022 80 by Clement Marcelo RN  Outcome: Progressing

## 2022-04-22 NOTE — PROGRESS NOTES
Orders from Dr. Dyllan Esparza to unclamp chest tube for 15mins and see how much drainage comes out. Informed Dr. Dyllan Esparza about 10-15mLs of serosanguineous drainage. Okay to clamp the chest tube.

## 2022-04-22 NOTE — PROGRESS NOTES
Dr. Gordon Flores in the room, patient breathing with abdominal muscles and breathing around 35 RR. Having bronchospasms, coughing. Switched back over to full support. Respiratory Barbara aware.

## 2022-04-22 NOTE — CARE COORDINATION
ONGOING DISCHARGE PLAN:    Pt. Remains on the Vent, 30% FIO2. Per Pulm Notes, Will see how she does over the weekend & if not better/Improving by early next week, would withdraw care. Rt. Side Chest Tube continues. Remains on IV Merrem & IV Steroids, 40 Q8 & IV Lasix. LSW following for SNF, if needed. Per Previous DC planner notes, Referral was sent yesterday to [facility]. Will continue to follow for additional discharge needs.     Electronically signed by Selena Calloway RN on 4/22/2022 at 1:32 PM

## 2022-04-22 NOTE — PLAN OF CARE
Problem: Non-Violent Restraints  Goal: Removal from restraints as soon as assessed to be safe  4/22/2022 1632 by Selena Reyes RN  Outcome: Progressing  Note: Patient attempts to pull at lines and tubes when unrestrained.  Restraints continued      Problem: Gas Exchange - Impaired:  Goal: Levels of oxygenation will improve  Description: Levels of oxygenation will improve  4/22/2022 1632 by Selena Reyes RN  Outcome: Progressing  Note: Patient remains orally intubated at this time      Problem: Falls - Risk of:  Goal: Absence of physical injury  Description: Absence of physical injury  4/22/2022 1632 by Selena Reyes RN  Outcome: Progressing  Note: Patient remains free from falls this shift, appropriate safety measures in place      Problem: Skin Integrity:  Goal: Will show no infection signs and symptoms  Description: Will show no infection signs and symptoms  4/22/2022 1632 by Selena Reyes RN  Outcome: Progressing  Note: No new skin breakdown noted this shift, patient turned every 2 hours      Problem: OXYGENATION/RESPIRATORY FUNCTION  Goal: Patient will maintain patent airway  4/22/2022 1632 by Selena Reyes RN  Outcome: Progressing  Note: Patient remains on ventilator, failed wean trial today      Problem: Nutrition  Goal: Optimal nutrition therapy  4/22/2022 1632 by Selena Reyes RN  Outcome: Progressing  Note: Tube feed remains at goal      Problem: Pain  Goal: Verbalizes/displays adequate comfort level or baseline comfort level  4/22/2022 1632 by Selena Reyes RN  Outcome: Progressing  Note: Patient denies pain

## 2022-04-22 NOTE — PLAN OF CARE
Problem: Non-Violent Restraints  Goal: Removal from restraints as soon as assessed to be safe  Outcome: Progressing  Note: Attempts to pull at tubes when released. ROM assessed every 2 hours and visual safety checks completed hourly. Restraints maintained to preserve the patient's airway. Problem: Non-Violent Restraints  Goal: No harm/injury to patient while restraints in use  Outcome: Progressing  Note: Attempts to pull at tubes when released. ROM assessed every 2 hours and visual safety checks completed hourly. Restraints maintained to preserve the patient's airway. Problem: Non-Violent Restraints  Goal: Patient's dignity will be maintained  Outcome: Progressing     Problem: Gas Exchange - Impaired:  Goal: Levels of oxygenation will improve  Description: Levels of oxygenation will improve  Outcome: Progressing     Problem: Skin Integrity - Impaired:  Goal: Will show no infection signs and symptoms  Description: Will show no infection signs and symptoms  Outcome: Progressing     Problem: Skin Integrity - Impaired:  Goal: Absence of new skin breakdown  Description: Absence of new skin breakdown  Outcome: Progressing  Note: Patient turned and repositioned every 2 hours and as needed for comfort. Skin kept clean and dry. No new skin breakdown noted.       Problem: Falls - Risk of:  Goal: Absence of physical injury  Description: Absence of physical injury  Outcome: Progressing     Problem: Breathing Pattern - Ineffective:  Goal: Ability to achieve and maintain a regular respiratory rate will improve  Description: Ability to achieve and maintain a regular respiratory rate will improve  4/22/2022 0255 by Marek Anne RN  Outcome: Progressing     Problem: OXYGENATION/RESPIRATORY FUNCTION  Goal: Patient will maintain patent airway  4/22/2022 0255 by Marek Anne RN  Outcome: Progressing     Problem: OXYGENATION/RESPIRATORY FUNCTION  Goal: Patient will achieve/maintain normal respiratory rate/effort  Description: Respiratory rate and effort will be within normal limits for the patient  4/22/2022 0255 by Francia Doran RN  Outcome: Progressing     Problem: MECHANICAL VENTILATION  Goal: Oral health is maintained or improved  Outcome: Progressing     Problem: MECHANICAL VENTILATION  Goal: ET tube will be managed safely  4/22/2022 0255 by Francia Doran RN  Outcome: Progressing     Problem: MECHANICAL VENTILATION  Goal: Ability to express needs and understand communication  Outcome: Progressing     Problem: MECHANICAL VENTILATION  Goal: Mobility/activity is maintained at optimum level for patient  Outcome: Progressing     Problem: Nutrition  Goal: Optimal nutrition therapy  Outcome: Progressing  Note: Pt tolerating tube feed at goal.     Problem: Discharge Planning  Goal: Discharge to home or other facility with appropriate resources  Outcome: Progressing     Problem: ABCDS Injury Assessment  Goal: Absence of physical injury  Outcome: Progressing

## 2022-04-22 NOTE — PROGRESS NOTES
ICU Progress Note (Vent)   [facility]    Patient - Antoine Tran,  Age - 74 y.o.    - 1955      Room Number -    N -  [id number]   [facility]  Date of Admission -  2022 10:25 AM    Events of Past 24 Hours   Currently unresponsive and sedated on propofol. Had just gotten the fentanyl dose    Vitals    height is 5' 5\" (1.651 m) and weight is 183 lb 6.8 oz (83.2 kg). Her oral temperature is 98.5 °F (36.9 °C). Her blood pressure is 97/50 (abnormal) and her pulse is 70. Her respiration is 24 and oxygen saturation is 95%. Temperature Range: Temp: 98.5 °F (36.9 °C) Temp  Av.8 °F (37.1 °C)  Min: 98.4 °F (36.9 °C)  Max: 99.5 °F (37.5 °C)  BP Range:  Systolic (23ISA), RNF:272 , Min:93 , ACM:022     Diastolic (30DSC), FZY:77, Min:42, Max:68    Pulse Range: Pulse  Av.7  Min: 65  Max: 87  Respiration Range: Resp  Av.4  Min: 21  Max: 29  Current Pulse Ox[de-identified]  SpO2: 95 %  24HR Pulse Ox Range:  SpO2  Av.9 %  Min: 92 %  Max: 99 %  Oxygen Amount and Delivery: O2 Flow Rate (L/min): 6 L/min      Wt Readings from Last 3 Encounters:   22 183 lb 6.8 oz (83.2 kg)   22 183 lb (83 kg)   22 186 lb 1.1 oz (84.4 kg)     I/O       Intake/Output Summary (Last 24 hours) at 2022 0847  Last data filed at 2022 0800  Gross per 24 hour   Intake 2971.01 ml   Output 2475 ml   Net 496.01 ml     I/O last 3 completed shifts:   In: 9657 [I.V.:1016; NG/GT:2115; IV Piggyback:421]  Out: 7859 [Urine:3450; Stool:275]     DRAIN/TUBE OUTPUT:     Invasive Lines   ETT Day -   7  Lines -right IJ day 7    ICP PRESSURE RANGE:  No data recorded  CVP PRESSURE RANGE:  No data recorded  Mechanical Ventilation Data   SETTINGS (Comprehensive)  Vent Information  Vent Mode: PRVC  Additional Respiratory Assessments  Pulse: 70  Resp: 24  SpO2: 95 %  End Tidal CO2: 36 (%)  Position: Semi-Molina's  Humidification Source: HME  Cuff Pressure (cm H2O): 26 cm H2O       ABGs:   Lab Results   Component Value Date    PHART 7.428 04/22/2022    PO2ART 71.7 04/22/2022    IAC4YII 63.2 04/22/2022       Lab Results   Component Value Date    MODE PRVC 04/22/2022         Medications   IV   sodium chloride 15 mL/hr at 04/22/22 0502    sodium chloride      dextrose      propofol 30 mcg/kg/min (04/22/22 0732)      insulin glargine  18 Units SubCUTAneous BID    insulin lispro  0-12 Units SubCUTAneous Q6H    methylPREDNISolone  40 mg IntraVENous Q8H    polyethylene glycol  17 g Oral Daily    furosemide  20 mg IntraVENous BID    meropenem  1,000 mg IntraVENous Q8H    albuterol  2.5 mg Nebulization Q4H    acetylcysteine  200 mg Inhalation Q4H    sodium chloride flush  5-40 mL IntraVENous 2 times per day    [Held by provider] risperiDONE  1.5 mg Oral Q12H    [Held by provider] rivastigmine  4.5 mg Oral BID    atorvastatin  20 mg Oral Daily    [Held by provider] traZODone  50 mg Oral Nightly    polyvinyl alcohol  1 drop Both Eyes Q4H    And    artificial tears   Both Eyes Q4H    chlorhexidine  15 mL Mouth/Throat BID    famotidine (PEPCID) injection  20 mg IntraVENous BID    heparin (porcine)  5,000 Units SubCUTAneous 3 times per day       Diet/Nutrition   ADULT TUBE FEEDING; Nasogastric; Renal Formula; Continuous; 15; Yes; 10; Q 4 hours; 35; 60; Q 6 hours    Exam   VITALS    height is 5' 5\" (1.651 m) and weight is 183 lb 6.8 oz (83.2 kg). Her oral temperature is 98.5 °F (36.9 °C). Her blood pressure is 97/50 (abnormal) and her pulse is 70. Her respiration is 24 and oxygen saturation is 95%. Ventilator Settings (Basic)  Vent Mode: PRVC Resp Rate (Set): 24 bmp/Vt (Set, mL): 450 mL/ /FiO2 : 30 %    Constitutional - Sedated  General Appearance frail   HEENT - Life support devices in place (ET, OG),normocephalic, atraumatic. PERRLA  Lungs - Chest expands equally, no wheezes, rales or rhonchi.   Diminished  Cardiovascular - Heart sounds are normal.  normal rate and rhythm regular, no murmur, gallop or rub. Abdomen - soft, nontender, nondistended, no masses or organomegaly  Neurologic - CN II-XII are grossly intact. There are no focal motor deficits  Skin - no bruising or bleeding  Extremities - no cyanosis, clubbing; mild upper extremity edema    Lab Results   CBC     Lab Results   Component Value Date    WBC 9.8 04/22/2022    RBC 2.90 04/22/2022    RBC 0-2 07/08/2021    HGB 8.8 04/22/2022    HCT 27.1 04/22/2022     04/22/2022    MCV 93.5 04/22/2022    MCH 30.4 04/22/2022    MCHC 32.5 04/22/2022    RDW 16.5 04/22/2022    NRBC 0 07/08/2021    METASPCT 1 04/02/2022    LYMPHOPCT 5 04/19/2022    LYMPHOPCT 12.1 07/08/2021    MONOPCT 2 04/19/2022    MONOPCT 15.1 07/08/2021    BASOPCT 0 04/19/2022    BASOPCT 0.8 07/08/2021    MONOSABS 0.26 04/19/2022    MONOSABS 0.4 07/08/2021    LYMPHSABS 0.66 04/19/2022    LYMPHSABS 0.3 07/08/2021    EOSABS 0.00 04/19/2022    EOSABS 0.1 07/08/2021    BASOSABS 0.00 04/19/2022    DIFFTYPE NOT REPORTED 12/20/2021       BMP   Lab Results   Component Value Date     04/22/2022    K 5.3 04/22/2022     04/22/2022    CO2 37 04/22/2022    BUN 44 04/22/2022    CREATININE 0.47 04/22/2022    GLUCOSE 196 04/22/2022    GLUCOSE 127 07/08/2021    CALCIUM 8.5 04/22/2022       LFTS  Lab Results   Component Value Date    ALKPHOS 137 04/16/2022    ALT 25 04/16/2022    AST 19 04/16/2022    PROT 7.0 04/16/2022    PROT 5.7 07/08/2021    BILITOT 0.16 04/16/2022    BILIDIR 0.1 07/08/2021    IBILI 0.12 07/08/2019    LABALBU 2.8 04/16/2022    LABALBU 3.6 07/08/2021       INR  No results for input(s): PROTIME, INR in the last 72 hours. APTT  No results for input(s): APTT in the last 72 hours. Lactic Acid  Lab Results   Component Value Date    LACTA 1.5 04/19/2022    LACTA 1.2 04/02/2022        BNP   No results for input(s): BNP in the last 72 hours.      Cultures       Radiology     Plain Films         Chest x-ray to me is unchanged, I really do not think there is a significant pneumothorax noted      SYSTEM ASSESSMENT    Acute on chronic hypoxic and hypercapnic respiratory failure, intubated 4/16  Pseudomonas pneumonia  Right-sided pneumothorax  Right lower lobe cavitary lesions  Exudative pleural effusion on right, growing Pseudomonas  Low ejection fraction EF 45 to 50%, echo 4/18  History of pulmonary embolism and what appears to be chronic filling defects (subsegmental, most likely clinically inconsequential)  Severe stage IV COPD, FEV1 is 35% of predicted  Recent hospitalization for pneumonia  Elevated inflammatory markers including D-dimer and procalcitonin  Full CODE STATUS      Neuro   Will try to decrease sedation  Will use fentanyl as needed for pain control and sedation as well as her bronchospasms/coughing; however will reduce dose since it seems to be having some effect on blood pressure    Respiratory   Wean oxygen as tolerated. Keep O2 sat > 88%  We will keep chest tube clamped, despite radiology report, I do not think the pneumothorax is significant  I do not see any air leak or significant drainage.   No change in steroids  Continue DuoNebs every 4 hours     Hemodynamics   She appears to be 3.5 L ahead in terms of fluids  Continue Lasix twice a day    Gastrointestinal/Nutrition   Tube feeds changed to low potassium formulation and she is tolerating the  GI prophylaxis    Renal   Potassium level improving  Did receive Lokelma yesterday  Continue free water flushes    Infectious Disease   Unfortunately, Pseudomonas growing out of bronchial cultures as well to me, this signifies that her cavitary lesions are due to Pseudomonas pneumonia which is severe  Continue Merrem    Hematology/Oncology   DVT prophylaxis    Endocrine   Check triglyceride level tomorrow  Blood sugars are improved    Social/Spiritual/DNR/Disposition/Other     Extensive discussion with patient's daughter Percy Mendenhall, we will see how she does over the weekend and if not better or improving by early next week would withdraw therapy which would be in line with the patient's wishes not to have a tracheostomy or feeding tube.     Critical Care Time   35 min    Electronically signed by Karen Murry MD on 4/22/2022 at 8:47 AM

## 2022-04-22 NOTE — PROGRESS NOTES
[address]    PROGRESS NOTE             4/22/2022    1:37 PM    Name:   Kassandra Anguiano  MRN:     [id number]     [place]:      [de-identified]   Room:   2002/2002-01  IP Day:  6  Admit Date:  4/16/2022 10:25 AM    PCP:  April Oconnor MD  Code Status:  Full Code    Subjective:     C/C:   Chief Complaint   Patient presents with    Shortness of Breath     Interval History Status: not changed. Low grade fever overnight, Tmax 99.7. Several weaning trials have been attempted but have been unsuccessful. Patient's daughter present in the room and states she has continued to be somnolent and unresponsive. Sedated with propofol. Brief History:     The patient is a 76 y.o.  Non- / non  female sever COPD 3L O2 baseline, bipolar, Hx of DVT/ PE, migraines, who presents with Shortness of Breath and cough  Patient was recently discharge from [address] for mycoplasma pneumonia. She had a follow up appointment with PCP, patient was complaining of cough and chest pain, was started on Tessalon and nsaids for pain. However; she continued to be in distress and her daughter brought her to ED. She was hypoxic initially on 3L o2, was increased to 6L and continued to be hypoxic   Tachypneic, tachycardic  ABGs: pH 7.33, CO2 34, HCO3 18  WBC 18   Lactic 2.8> 1.5  Pancultures were sent  Chest x-ray: Moderate loculated right basal pneumothorax.  Evidence for tension.  Cavitary lesion noted in the right lung base  CT chest: Chronic clot material RUL, RLL.  Thick-walled cavitary lesion RLL.  Multiloculated pleural collection or hydropneumothorax posterior to the right lung, right chest tube in situ.  Infiltrates of the right middle lobe.  Stellate 6 mm lateral RUL nodule.  Mediastinal and right hilar lymphadenopathy  Chest tube was placed and patient was intubated.    Was given 1 L bolus, IV cefepime, vancomycin, 125 mg Solu-Medrol and she is admitted to the hospital for the management of acute hypoxic respiratory failure due to pneumothorax and possible lung infection    4/19: switched to meropenem     4/21: bronchial washing from bronchoscopy growing pseudomonas    Review of Systems:     Review of Systems   Unable to perform ROS: Intubated (sedated)       Medications: Allergies:     Allergies   Allergen Reactions    Advil [Ibuprofen] Other (See Comments)     vomiting    Aleve [Naproxen] Other (See Comments)     vomiting    Antipyrine Other (See Comments)     unknown    Celecoxib Other (See Comments)    Codeine      headache    Fomepizole     Incruse Ellipta [Umeclidinium Bromide]      confusion    Other      GRASS, TREES, WEEDS    Rofecoxib Other (See Comments)     Unknown reaction    Salicylates      Unknown reaction     Strawberry Extract      HEADACHES    Sulfinpyrazone Other (See Comments)     Unknown reaction    Aspirin Nausea And Vomiting, Other (See Comments) and Nausea Only    Nsaids Nausea Only, Other (See Comments) and Nausea And Vomiting       Current Meds:   Scheduled Meds:    insulin glargine  18 Units SubCUTAneous BID    insulin lispro  0-12 Units SubCUTAneous Q6H    methylPREDNISolone  40 mg IntraVENous Q8H    polyethylene glycol  17 g Oral Daily    furosemide  20 mg IntraVENous BID    meropenem  1,000 mg IntraVENous Q8H    albuterol  2.5 mg Nebulization Q4H    acetylcysteine  200 mg Inhalation Q4H    sodium chloride flush  5-40 mL IntraVENous 2 times per day    [Held by provider] risperiDONE  1.5 mg Oral Q12H    [Held by provider] rivastigmine  4.5 mg Oral BID    atorvastatin  20 mg Oral Daily    [Held by provider] traZODone  50 mg Oral Nightly    polyvinyl alcohol  1 drop Both Eyes Q4H    And    artificial tears   Both Eyes Q4H    chlorhexidine  15 mL Mouth/Throat BID    famotidine (PEPCID) injection  20 mg IntraVENous BID    heparin (porcine)  5,000 Units SubCUTAneous 3 times per day     Continuous Infusions:    sodium chloride 15 mL/hr at 22 0502    sodium chloride      dextrose      propofol 30 mcg/kg/min (22 0732)     PRN Meds: hydrALAZINE, sodium chloride flush, sodium chloride, sodium chloride flush, potassium chloride **OR** potassium alternative oral replacement **OR** potassium chloride, glucose, dextrose, glucagon (rDNA), dextrose, fentanNYL    Data:     Past Medical History:   has a past medical history of Abnormal EKG, TRAM (acute kidney injury) ([facility] [address] Anxiety, Asthma, Bipolar disorder ([facility] [address] COPD (chronic obstructive pulmonary disease) ([facility] [address] Cramps, extremity, Depression, Dilated bile duct, Headache, History of elective , Hyperlipidemia, Irritable bowel syndrome, Prolonged emergence from general anesthesia, Substance abuse ([facility] [address] Unspecified sleep apnea, and Vision abnormalities. Social History:   reports that she quit smoking about 3 years ago. Her smoking use included cigarettes. She has a 84.00 pack-year smoking history. She has never used smokeless tobacco. She reports current alcohol use. She reports that she does not use drugs. Family History:   Family History   Problem Relation Age of Onset    Dementia Maternal Aunt     Kidney Disease Mother     Heart Attack Sister     Prostate Cancer Father     High Cholesterol Brother     Heart Attack Paternal Grandmother        Vitals:  BP (!) 113/52   Pulse 75   Temp 98.5 °F (36.9 °C) (Oral)   Resp 25   Ht 5' 5\" (1.651 m)   Wt 183 lb 6.8 oz (83.2 kg)   LMP 2013 (Exact Date)   SpO2 91%   BMI 30.52 kg/m²   Temp (24hrs), Av.8 °F (37.1 °C), Min:98.4 °F (36.9 °C), Max:99.5 °F (37.5 °C)    Recent Labs     22  1422 22  1935 22  0109 22  0724   POCGLU 166* 168* 129* 172*       I/O(24Hr):     Intake/Output Summary (Last 24 hours) at 2022 1337  Last data filed at 2022 1117  Gross per 24 hour   Intake 2260.75 ml   Output 2425 ml   Net -164.25 ml       Labs:    CBC:   Lab Results   Component Value Date    WBC 9.8 04/22/2022    RBC 2.90 04/22/2022    RBC 0-2 07/08/2021    HGB 8.8 04/22/2022    HCT 27.1 04/22/2022    MCV 93.5 04/22/2022    MCH 30.4 04/22/2022    MCHC 32.5 04/22/2022    RDW 16.5 04/22/2022     04/22/2022    MPV 7.7 04/22/2022     BMP:    Lab Results   Component Value Date     04/22/2022    K 5.3 04/22/2022     04/22/2022    CO2 37 04/22/2022    BUN 44 04/22/2022    LABALBU 2.8 04/16/2022    LABALBU 3.6 07/08/2021    CREATININE 0.47 04/22/2022    CALCIUM 8.5 04/22/2022    GFRAA >60 04/22/2022    LABGLOM >60 04/22/2022    GLUCOSE 196 04/22/2022    GLUCOSE 127 07/08/2021     ABG:    Lab Results   Component Value Date    PH 8.0 07/08/2021       Radiology:    ECHO Complete 2D W Doppler W Color    Result Date: 4/18/2022  [facility] Transthoracic Echocardiography Report (TTE)  Patient Name Ricci Lundborg     Date of Study               04/18/2022               BOBBY OROSCO   Date of      1955  Gender                      Female  Birth   Age          74 year(s)  Race                           Room Number  2002        Height:                     65 inch, 165.1 cm   Corporate ID [id number]    Weight:                     176 pounds, 79.8 kg  #   Patient Acct [de-identified]   BSA:          1.87 m^2      BMI:      29.29  #                                                              kg/m^2   MR #         [id number]      Sonographer                 Мария Stein   Accession #  [id number]  Interpreting Physician      Meghna Infante   Fellow                   Referring Nurse                           Practitioner   Interpreting             Referring Physician         Jane Downey,  Selena  Type of Study   TTE procedure:2D Echocardiogram, M-Mode, Doppler, Color Doppler. Procedure Date Date: 04/18/2022 Start: 10:35 AM Study Location: [address] [place] [address] Technical Quality: Fair visualization Indications:Dyspnea/SOB, Respiratory Failure and Pulmonary embolus.  History / Tech. Comments: COPD, HLD, Sleep apnea Patient Status: Inpatient Height: 65 inches Weight: 176 pounds BSA: 1.87 m^2 BMI: 29.29 kg/m^2 Rhythm: Sinus bradycardia HR: 57 bpm BP: 138/65 mmHg CONCLUSIONS Summary Left ventricle is normal in size and wall thickness. Global left ventricular systolic function appears mildly reduced. Estimated LV EF 45-50 %. No obvious wall motion abnormality seen. RV size appears normal mildly reduced function Left atrium is normal in size. No significant valvular regurgitation or stenosis seen. No significant pericardial effusion is seen. Normal aortic root dimension. Dilated IVC, impaired or no respiratory variations suggestive of elevate Rt atrial pressure Signature ----------------------------------------------------------------------------  Electronically signed by Giovanna Cosme(Interpreting physician) on  04/18/2022 05:06 PM ---------------------------------------------------------------------------- ----------------------------------------------------------------------------  Electronically signed by Мария Stein(Sonographer) on 04/18/2022 02:29  PM ---------------------------------------------------------------------------- FINDINGS Left Atrium Left atrium is normal in size. Left Ventricle Left ventricle is normal in size and wall thickness. Global left ventricular systolic function is mildly reduced . Estimated LV EF  %. No obvious wall motion abnormality seen. Right Atrium Right atrium is normal in size. Right Ventricle Normal right ventricular size , mildly reduced function. Mitral Valve No obvious valvular abnormality seen. No evidence of mitral regurgitation. Aortic Valve No obvious valvular abnormality seen. No evidence of aortic insufficiency or stenosis. Tricuspid Valve No obvious valvular abnormality seen. Insignificant tricuspid regurgitation, unable to estimate RVSP. Pulmonic Valve Pulmonic valve was not well visualized.  No evidence of pulmonic insufficiency or stenosis. Pericardial Effusion No significant pericardial effusion is seen. Pleural Effusion No pleural effusion seen. Miscellaneous Normal aortic root dimension. IVC Increased diameter and impaired or no inspiratory variation indicating elevated RA filling pressure (i.e. CVP) . M-mode / 2D Measurements & Calculations:   LVIDd:4.74 cm(3.7 - 5.6 cm)      Aortic Root:3.3 cm(2.0 - 3.7 cm)  LVIDs:3.28 cm(2.2 - 4.0 cm)      LA Dimension: 3.5 cm(1.9 - 4.0 cm)  IVSd:1.05 cm(0.6 - 1.1 cm)       LA volume/Index: 40.7 ml /22m^2  LVPWd:0.93 cm(0.6 - 1.1 cm)  Fractional Shortenin.8 %      XR CHEST (SINGLE VIEW FRONTAL)    Result Date: 2022  EXAMINATION: ONE XRAY VIEW OF THE CHEST 2022 8:55 am COMPARISON: April 3, 2022 HISTORY: ORDERING SYSTEM PROVIDED HISTORY: pna TECHNOLOGIST PROVIDED HISTORY: pna Reason for Exam: pna FINDINGS: Stable position of the right internal jugular central venous catheter. Right infrahilar airspace opacity not significantly changed. No new focal lung abnormality. No sizable pleural effusion. No pneumothorax. Stable right infrahilar airspace opacity     XR CHEST (SINGLE VIEW FRONTAL)    Result Date: 4/3/2022  EXAMINATION: ONE XRAY VIEW OF THE CHEST 4/3/2022 5:51 am COMPARISON: 2022 HISTORY: ORDERING SYSTEM PROVIDED HISTORY: sob, pleurisy, cough TECHNOLOGIST PROVIDED HISTORY: sob, pleurisy, cough Reason for Exam: Shortness of breath, pleurisy, cough Additional signs and symptoms: Shortness of breath, pleurisy, cough Relevant Medical/Surgical History: Shortness of breath, pleurisy, cough FINDINGS: AP portable view of the chest time stamped at 528 hours demonstrates overlying cardiac monitoring electrodes. Heart size is stable. Right internal jugular catheter terminates in the distal superior vena cava. There has been worsening of a focal opacity at the right lung base. Minimal left basilar opacity is unchanged. No extrapleural air or overt edema. No gross effusions. Worsening opacity at the right base favoring airspace disease. Change in minimal left basilar opacity which may be related atelectasis. XR CHEST PORTABLE    Result Date: 4/22/2022  EXAMINATION: ONE XRAY VIEW OF THE CHEST 4/22/2022 6:29 am COMPARISON: 21 April 2022 HISTORY: ORDERING SYSTEM PROVIDED HISTORY: ETT placement TECHNOLOGIST PROVIDED HISTORY: ETT placement Reason for Exam: on vent FINDINGS: AP portable view of the chest time stamped at 623 hours demonstrates overlying cardiac monitoring electrodes, endotracheal tube terminating 4.8 cm above the nimesh and right internal jugular catheter terminating in the right atrium. Right-sided chest tube is again noted. Trace extrapleural air at the right apex persists. Subcutaneous emphysema along the right lateral chest wall is noted. Faint opacity is present at the right lung base suspicious for focal airspace disease. No mediastinal shift. Heart size is stable. Persistent small right apical pneumothorax. Support tubes and lines as above. Opacity at the right base. There is elevation right hemidiaphragm. Atelectasis is evident but superimposed airspace disease may be present. XR CHEST PORTABLE    Result Date: 4/21/2022  EXAMINATION: ONE XRAY VIEW OF THE CHEST 4/21/2022 11:57 am COMPARISON: 04/21/2022, 0625 hours. HISTORY: ORDERING SYSTEM PROVIDED HISTORY: Chest tube now clamped TECHNOLOGIST PROVIDED HISTORY: Chest tube now clamped Reason for Exam: chest tube now clamped FINDINGS: The ET tube was in satisfactory position, 4.5 cm above the nimesh. The NG tube was passed the gastric fundus. A right jugular central venous line was noted with the tip in the superior vena cava near its junction with the right atrium. A right-sided chest tube was noted. The proximal most opening is just out of the right lateral chest wall. No pneumothorax was noted. No cardiomegaly, pneumonia, interstitial edema or definite effusions were noted.  Mild hyper inflation was identified. The ET tube was in satisfactory position, 4.5 cm above the nimesh. The NG tube was past the gastric fundus. A right jugular central venous line was noted with the tip in the superior vena cava near its junction with the right atrium. No pneumothorax was identified. A right-sided chest tube was noted but the proximal most opening is just external to the right lateral chest wall. No cardiomegaly, pneumonia or interstitial edema. XR CHEST PORTABLE    Result Date: 4/21/2022  EXAMINATION: ONE XRAY VIEW OF THE CHEST 4/21/2022 6:30 am COMPARISON: 04/20/2022 HISTORY: ORDERING SYSTEM PROVIDED HISTORY: ETT placement TECHNOLOGIST PROVIDED HISTORY: ETT placement Reason for Exam: vent FINDINGS: Support tubes and lines are unchanged. The right chest tube side port is external to the chest wall which may predispose to air leak. Cardiac silhouette and mediastinal contours are unchanged. Calcified left hilar lymph nodes are noted. No pneumothorax. No new lung infiltrate. Aeration of the right lung base has improved. 1. The right chest tube side port is external to the chest wall which may predispose to an air leak. No pneumothorax. 2. Improved aeration of the right lung base, likely related to atelectasis. XR CHEST PORTABLE    Result Date: 4/20/2022  EXAMINATION: ONE XRAY VIEW OF THE CHEST 4/20/2022 11:52 am COMPARISON: None. HISTORY: ORDERING SYSTEM PROVIDED HISTORY: Chest tube now to waterseal TECHNOLOGIST PROVIDED HISTORY: Chest tube now to waterseal Reason for Exam: Chest tube now to waterseal FINDINGS: There is a right chest tube in place. There is no evidence of pneumothorax. Some apparent atelectasis noted in the the right lung base. ET tube is in good position. Tip of central line overlies the right atrium. Left lung appears normal.  Heart appears unremarkable. No evidence of pneumothorax. Some atelectasis in the right lung base.   Tip of centralized overlies the right atrium. It should be withdrawn by 6 cm. The findings were sent to the Radiology Results [address] at 12:21 pm on 4/20/2022 to be communicated to a licensed caregiver. XR CHEST PORTABLE    Result Date: 4/20/2022  EXAMINATION: ONE XRAY VIEW OF THE CHEST 4/20/2022 6:30 am COMPARISON: 04/19/2022 HISTORY: ORDERING SYSTEM PROVIDED HISTORY: ETT placement TECHNOLOGIST PROVIDED HISTORY: ETT placement Reason for Exam: ETT FINDINGS: The cardiac silhouette and mediastinal contours are stable. There is a right-sided chest tube with the side port noted external to the chest wall. Right internal jugular vein catheter, endotracheal tube, and orogastric tube are again noted. There is pulmonary vascular congestion. No pneumothorax. The patient is rotated towards the right. 1. Right chest tube side port is external to the chest wall which may predispose to an air leak. 2. Stable pulmonary vascular congestion. 3. No pneumothorax is identified. XR CHEST PORTABLE    Result Date: 4/19/2022  EXAMINATION: ONE X-RAY VIEW OF THE CHEST 4/18/2022 6:27 am COMPARISON: 04/17/2022 HISTORY: ORDERING SYSTEM PROVIDED HISTORY: ETT placement TECHNOLOGIST PROVIDED HISTORY: ETT placement Reason for Exam: ETT placement FINDINGS: The endotracheal tube, nasogastric tube, right IJ catheter and right-sided chest tube remain. The extreme lung apices are excluded from the examination. A pneumothorax is not identified. Right basilar consolidation has increased peripherally. Increased right basilar opacity may reflect fluid filling the large cavitary lesion at the right lung base. Pulmonary consolidation/increased pleural effusion or empyema are alternate considerations. XR CHEST PORTABLE    Result Date: 4/19/2022  EXAMINATION: ONE XRAY VIEW OF THE CHEST 4/19/2022 6:34 am COMPARISON: Chest radiograph performed 04/18/2022.  HISTORY: ORDERING SYSTEM PROVIDED HISTORY: ETT placement TECHNOLOGIST PROVIDED HISTORY: ETT placement Reason for Exam: on vent FINDINGS: There is right lower lung infiltrate. There is no pneumothorax. The mediastinal structures are unremarkable. The upper abdomen is unremarkable. The extrathoracic soft tissues are unremarkable. There is an endotracheal tube with the tip in the midtrachea. There is a right-sided chest tube. There is a right internal jugular central line with the tip at the cavoatrial junction. There is a gastric tube and the tip is not well visualized. Right lower lung infiltrate that is mildly improved. Support tubes as described above. XR CHEST PORTABLE    Result Date: 4/17/2022  EXAMINATION: ONE XRAY VIEW OF THE CHEST 4/17/2022 6:04 am COMPARISON: 04/16/2022, 1248 hours HISTORY: ORDERING SYSTEM PROVIDED HISTORY: ETT placement TECHNOLOGIST PROVIDED HISTORY: ETT placement Reason for Exam: ETT 72-year-old female with endotracheal tube placement FINDINGS: Endotracheal tube distal tip overlying the mid trachea approximately 4.8 cm above the level of the nimesh. Enteric tube traverses the GE junction with distal tip excluded from the field of view. Right IJ approach central venous catheter distal tip overlying the right atrium. Right-sided chest tube distal tip projects over the right hilum. Cardiac monitor leads overlie the chest. No obvious pneumothorax. No free air. Cardiac and mediastinal contours remain unchanged. Left lung is relatively clear. Persistent airspace disease at the right mid and right lower lung zones. Visualized osseous structures remain unchanged. Subcutaneous emphysema previously seen at the right lateral chest wall no longer identified. 1. Persistent airspace disease at the right mid and right lower lung zones. Follow-up is recommended to document resolution. 2. Tubes and right IJ line as detailed above.      XR CHEST PORTABLE    Result Date: 4/16/2022  EXAMINATION: ONE XRAY VIEW OF THE CHEST 4/16/2022 1:10 pm COMPARISON: 04/16/2022, 1213 hours HISTORY: ORDERING SYSTEM PROVIDED HISTORY: chest tube TECHNOLOGIST PROVIDED HISTORY: chest tube Reason for Exam: chest tube Additional signs and symptoms: chest tube and NG tube placement 51-year-old female with history of NG tube and chest tube placement FINDINGS: Portable supine view of the chest. Right-sided chest tube has been placed with distal tip projecting over the right infrahilar region. Right IJ approach central venous catheter distal tip overlying the cavoatrial junction, stable. Endotracheal tube distal tip overlying the mid trachea approximately 4.3 cm above the level of the nimesh. Enteric tube traverses the GE junction with distal tip projecting over the left mid abdomen likely within the stomach body. Left lung is clear. Pleural thickening and opacity involving the right mid and right lower lung zones. Subcutaneous emphysema along the right lateral chest wall. Cardiac and mediastinal contours remain unchanged. Atherosclerotic calcification of the thoracic aorta. No free air. There is re-expansion of the right lung compared with the prior study. Probable small residual inferolateral right pneumothorax component. 1. Tubes and right IJ line as detailed above. Re-expansion of the right lung compared with the prior study. There is likely a small residual right inferolateral pneumothorax component. 2. Pleural thickening and airspace opacity involving the right mid and right lower lung zones. Follow-up is recommended to document resolution. 3. Subcutaneous emphysema along the right lateral chest wall. XR CHEST PORTABLE    Result Date: 4/16/2022  EXAMINATION: ONE XRAY VIEW OF THE CHEST 4/16/2022 12:24 pm COMPARISON: None. HISTORY: ORDERING SYSTEM PROVIDED HISTORY: Intubation TECHNOLOGIST PROVIDED HISTORY: Intubation Reason for Exam: central line and ET placement FINDINGS: ET tube terminates 3 cm above the nimesh. Right line terminates in the SVC.  There is a relatively stable right-sided basilar pneumothorax. The remaining lungs are unchanged. Cardiac silhouette and osseous structures unchanged. Intubation as above. No significant change     XR CHEST PORTABLE    Result Date: 4/16/2022  EXAMINATION: ONE XRAY VIEW OF THE CHEST 4/16/2022 11:02 am COMPARISON: 04/05/2022 HISTORY: ORDERING SYSTEM PROVIDED HISTORY: SOB TECHNOLOGIST PROVIDED HISTORY: SOB Reason for Exam: SOB FINDINGS: There is a moderate loculated right basal pneumothorax. Cavitary lesion is noted in the right lung base. Left lung is unremarkable. Heart and mediastinal structures appear normal.  There is no evidence for any tension phenomena. Moderate loculated right basal pneumothorax. Evidence for tension. Cavitary lesion noted in the right lung base. .  Case discussed with Dr. Bairon Farooq. XR CHEST PORTABLE    Result Date: 4/1/2022  EXAMINATION: ONE XRAY VIEW OF THE CHEST 4/1/2022 4:01 pm COMPARISON: 04/01/2022 HISTORY: ORDERING SYSTEM PROVIDED HISTORY: central line placed TECHNOLOGIST PROVIDED HISTORY: central line placed Reason for Exam: Central line placed FINDINGS: There is a right internal jugular central line in place with distal tip overlying the superior vena cava. Previously noted right basal infiltrate is unchanged. Left lung appears clear. The heart and mediastinal structures appear normal.  There is no evidence of pneumothorax. Satisfactory position of right internal jugular central line. No change in the the right basal infiltrate. XR CHEST PORTABLE    Result Date: 4/1/2022  EXAMINATION: ONE XRAY VIEW OF THE CHEST 4/1/2022 1:22 pm COMPARISON: 06/17/2020. CT dated 10/15/2021. HISTORY: ORDERING SYSTEM PROVIDED HISTORY: dyspnea TECHNOLOGIST PROVIDED HISTORY: dyspnea Reason for Exam: Dyspnea Relevant Medical/Surgical History: Hx of COPD FINDINGS: The cardiac silhouette appears within normal limits.   There are bibasilar opacities, right greater than left which may reflect multifocal pneumonia in the correct clinical setting. No evidence of pleural effusion or pneumothorax is seen. Bibasilar opacities, right greater than left, which may reflect multifocal pneumonia. Follow-up to imaging resolution is recommended. CT CHEST PULMONARY EMBOLISM W CONTRAST    Result Date: 4/16/2022  EXAMINATION: CTA OF THE CHEST 4/16/2022 2:30 pm TECHNIQUE: CTA of the chest was performed after the administration of intravenous contrast.  Multiplanar reformatted images are provided for review. MIP images are provided for review. Dose modulation, iterative reconstruction, and/or weight based adjustment of the mA/kV was utilized to reduce the radiation dose to as low as reasonably achievable. COMPARISON: CT chest from 10/15/2021 HISTORY: ORDERING SYSTEM PROVIDED HISTORY: SOB TECHNOLOGIST PROVIDED HISTORY: SOB Decision Support Exception - unselect if not a suspected or confirmed emergency medical condition->Emergency Medical Condition (MA) Reason for Exam: sob Relevant Medical/Surgical History: intubated, septic, hx of PE 31-year-old female with shortness of breath FINDINGS: Pulmonary Arteries: No obvious filling defect in the main, right main, or left main pulmonary arteries. Evaluation of the segmental and subsegmental pulmonary arterial vasculature is limited due to respiratory motion suboptimal bolus timing, airspace disease, and streak artifact. There are areas of probable residual linear chronic clot within the right lower lobar pulmonary artery on image 111, series 2. Probable linear residual clot within the anterior right upper lobe pulmonary artery on image 83, series 2. Mediastinum: Atherosclerotic calcification of the aorta and branch vasculature. No dissection flap within the visualized thoracic aorta. No pericardial effusion. There is fluid in the superior pericardial recess. Enlarged precarinal lymph nodes remain unchanged on image 83, series 2. Right hilar lymphadenopathy is seen on image 96, series 2. Coronary artery disease. No left hilar or axillary lymphadenopathy. Lungs/pleura: Endotracheal tube distal tip within the lower trachea. Trachea and very proximal central airways appear patent. Narrowing of the right middle lobe airway into a region of partial consolidation/atelectasis/scarring. Opacification of the right lower lobar segmental airways. There is a thick-walled cavitary lesion in the right lower lobe measuring 5.5 x 7.3 cm in greatest AP and transverse dimensions on image 68, series 4. There is a dependent air-fluid level within this lesion. This area measures 7.6 cm in greatest craniocaudal extent on image 48, series 602. There is surrounding airspace disease. There is a gas and fluid containing multiloculated pleural collection or hydropneumothorax along the posterior margin of the right lung. Right sided chest tube distal tip along the anteromedial right lung. Subcutaneous emphysema along the right axilla and right lateral chest wall. Stellate pulmonary nodule in the lateral right upper lobe measuring 6 mm on image 32, series 4. Moderate to severe emphysema, dependent atelectasis and respiratory motion. Upper Abdomen: Fatty liver. NG tube is seen extending into the stomach body. Atherosclerotic calcification of the upper abdominal aorta and branch vasculature. Soft Tissues/Bones: Chronic anterior wedge compression deformities, unchanged from 10/15/2021 involving T5 and T6. Mild diffuse degenerative changes throughout the spine. 1. Linear filling defects in the anterior right upper lobe and right lower lobe pulmonary arteries likely representing residual/chronic clot material. No acute central filling defect/pulmonary embolus is evident. 2. Thick-walled cavitary lesion in the right lower lobe measuring up to 5.5 x 7.3 x 7.6 cm which could be related to TB or fungal disease or a necrotizing pneumonia. Underlying cavitary malignancy not entirely excluded.   There is surrounding airspace disease. There is also an associated multiloculated pleural collection or hydropneumothorax primarily along the posterior margin of the right lung. Right-sided chest tube distal tip along the anteromedial right pleura/lung. 3. Partial consolidation, atelectasis and/or infiltrate of the right middle lobe. 4. Stellate 6 mm lateral right upper lobe pulmonary nodule. Follow-up guidelines provided below. 5. Underlying moderate to severe emphysema. 6. Endotracheal and NG tubes as above. 7. Coronary artery disease. 8. Chronic anterior wedge compression deformities of T5 and T6. 9. Subcutaneous emphysema along the right axilla and right lateral chest wall likely related to chest tube. 10. Mediastinal and right hilar lymphadenopathy. RECOMMENDATIONS: 6 mm suspicious right solid pulmonary nodule within the upper lobe. Recommend a non-contrast Chest CT at 6-12 months, then another non-contrast Chest CT at 18-24 months. These guidelines do not apply to immunocompromised patients and patients with cancer. Follow up in patients with significant comorbidities as clinically warranted. For lung cancer screening, adhere to Lung-RADS guidelines. Reference: Radiology. 2017; 284(1):228-43.      VL Lower Extremity Bilateral Venous Duplex    Result Date: 4/18/2022    [facility]  Vascular Lower Extremities DVT Study Procedure   Patient Name   Tyrone Javed     Date of Study           04/18/2022                 BOBBY OROSCO   Date of Birth  1955  Gender                  Female   Age            74 year(s)  Race                       Room Number    2002   Corporate ID # [id number]   Patient Acct # [de-identified]   MR #           [id number]      Sonographer             Alicia Barrera RVT   Accession #    [id number]  Interpreting Physician  Domo Norris   Referring                  Referring Physician     Mel Fernandez  Nurse  Practitioner  Procedure Type of Study:   Veins: Lower Extremities DVT Study, Venous Scan Lower Bilateral. Indications for Study:R/O DVT. Patient Status: In Patient. Technical Quality:Adequate visualization. Conclusions   Summary   Bilateral:  No evidence of deep or superficial venous thrombosis. Signature   ----------------------------------------------------------------  Electronically signed by Michael Grover RVT(Sonographer) on  04/18/2022 07:45 AM  ----------------------------------------------------------------   ----------------------------------------------------------------  Electronically signed by Domo Norris(Interpreting physician)  on 04/18/2022 01:10 PM  ----------------------------------------------------------------  Findings:   Right Impression:                    Left Impression:  The common femoral, femoral,         The common femoral, femoral,  popliteal and tibial veins           popliteal and tibial veins  demonstrate normal compressibility   demonstrate normal compressibility  and augmentation. and augmentation. Normal compressibility of the great  Normal compressibility of the great  saphenous vein. saphenous vein. Normal compressibility of the small  Normal compressibility of the small  saphenous vein. saphenous vein. Velocities are measured in cm/s ; Diameters are measured in cm Right Lower Extremities DVT Study Measurements Right 2D Measurements +------------------------------------+----------+---------------+----------+ ! Location                            ! Visualized! Compressibility! Thrombosis! +------------------------------------+----------+---------------+----------+ ! Common Femoral                      !Yes       ! Yes            ! None      ! +------------------------------------+----------+---------------+----------+ ! Prox Femoral                        !Yes       ! Yes            ! None      ! +------------------------------------+----------+---------------+----------+ ! Mid Femoral                         !Yes       ! Yes !None      ! +------------------------------------+----------+---------------+----------+ ! Dist Femoral                        !Yes       ! Yes            ! None      ! +------------------------------------+----------+---------------+----------+ ! Deep Femoral                        !Yes       ! Yes            ! None      ! +------------------------------------+----------+---------------+----------+ ! Popliteal                           !Yes       ! Yes            ! None      ! +------------------------------------+----------+---------------+----------+ ! Sapheno Femoral Junction            ! Yes       ! Yes            ! None      ! +------------------------------------+----------+---------------+----------+ ! PTV                                 ! Yes       ! Yes            ! None      ! +------------------------------------+----------+---------------+----------+ ! Peroneal                            !Yes       ! Yes            ! None      ! +------------------------------------+----------+---------------+----------+ ! Gastroc                             ! Yes       ! Yes            ! None      ! +------------------------------------+----------+---------------+----------+ ! GSV Thigh                           ! Yes       ! Yes            ! None      ! +------------------------------------+----------+---------------+----------+ ! GSV Knee                            ! Yes       ! Yes            ! None      ! +------------------------------------+----------+---------------+----------+ ! GSV Ankle                           ! Yes       ! Yes            ! None      ! +------------------------------------+----------+---------------+----------+ ! SSV                                 ! Yes       ! Yes            ! None      ! +------------------------------------+----------+---------------+----------+ Left Lower Extremities DVT Study Measurements Left 2D Measurements +------------------------------------+----------+---------------+----------+ ! Location !Visualized! Compressibility! Thrombosis! +------------------------------------+----------+---------------+----------+ ! Common Femoral                      !Yes       ! Yes            ! None      ! +------------------------------------+----------+---------------+----------+ ! Prox Femoral                        !Yes       ! Yes            ! None      ! +------------------------------------+----------+---------------+----------+ ! Mid Femoral                         !Yes       ! Yes            ! None      ! +------------------------------------+----------+---------------+----------+ ! Dist Femoral                        !Yes       ! Yes            ! None      ! +------------------------------------+----------+---------------+----------+ ! Deep Femoral                        !Yes       ! Yes            ! None      ! +------------------------------------+----------+---------------+----------+ ! Popliteal                           !Yes       ! Yes            ! None      ! +------------------------------------+----------+---------------+----------+ ! Sapheno Femoral Junction            ! Yes       ! Yes            ! None      ! +------------------------------------+----------+---------------+----------+ ! PTV                                 ! Yes       ! Yes            ! None      ! +------------------------------------+----------+---------------+----------+ ! Peroneal                            !Yes       ! Yes            ! None      ! +------------------------------------+----------+---------------+----------+ ! Gastroc                             ! Yes       ! Yes            ! None      ! +------------------------------------+----------+---------------+----------+ ! GSV Thigh                           ! Yes       ! Yes            ! None      ! +------------------------------------+----------+---------------+----------+ ! GSV Knee                            ! Yes       ! Yes            ! None      ! +------------------------------------+----------+---------------+----------+ ! GSV Ankle                           ! Yes       ! Yes            ! None      ! +------------------------------------+----------+---------------+----------+ ! SSV                                 ! Yes       ! Yes            ! None      ! +------------------------------------+----------+---------------+----------+    FL MODIFIED BARIUM SWALLOW W VIDEO    Result Date: 4/4/2022  EXAMINATION: MODIFIED BARIUM SWALLOW WAS PERFORMED IN CONJUNCTION WITH SPEECH PATHOLOGY SERVICES TECHNIQUE: Fluoroscopic evaluation of the swallowing mechanism was performed using cineradiography with multiple consistency of barium product in conjunction with speech pathology services. FLUOROSCOPY DOSE AND TYPE OR TIME AND EXPOSURES: DAP 49.2 dGy cm squared COMPARISON: None HISTORY: ORDERING SYSTEM PROVIDED HISTORY: aspiration pna TECHNOLOGIST PROVIDED HISTORY: aspiration pna Reason for Exam: aspiration pneumonia FINDINGS: Premature vallecular spillage. There was trace penetration with straw with thin liquid. No aspiration. No aspiration. Trace penetration with straw with thin liquids. Please see separate speech pathology report for full discussion of findings and recommendations. RECOMMENDATIONS: Unavailable         Physical Examination:        Physical Exam  Constitutional:       Comments: Sedated, comfortable appearing   Cardiovascular:      Rate and Rhythm: Normal rate and regular rhythm. Pulmonary:      Breath sounds: Rhonchi present. Comments: On ventilator, FiO2 30%, PEEP 8  Abdominal:      General: Bowel sounds are normal. There is no distension. Palpations: Abdomen is soft. Tenderness: There is no abdominal tenderness. Musculoskeletal:      Right lower leg: No edema. Left lower leg: No edema.            Assessment:        Primary Problem  Sepsis [facility])    Active Hospital Problems    Diagnosis Date Noted    Hyperkalemia [E87.5] 04/21/2022 Priority: Medium    Acute systolic HF (heart failure) (HCC) [I50.21] 04/19/2022    Pneumothorax on right [J93.9]     HCAP (healthcare-associated pneumonia) [J18.9]     Sepsis ([facility] [A41.9] 04/16/2022    Acute on chronic respiratory failure ([facility] [J96.20] 04/16/2022    Cavitary lesion of lung [J98.4] 04/16/2022    History of DVT (deep vein thrombosis) [Z86.718] 04/16/2022    Pneumothorax, right [J93.9] 04/16/2022    Status post chest tube placement ([facility] [Z93.8] 04/16/2022    History of pulmonary embolus (PE) [Z86.711] 04/02/2022    Chronic respiratory failure with hypoxia (HCC) [J96.11] 08/05/2021    Compression fracture of body of thoracic vertebra ([facility] [S22.000A] 09/28/2020    Lung nodule [R91.1] 08/22/2018    Bipolar disorder ([facility] [F31.9]     Chronic obstructive pulmonary disease ([facility] [J44.9] 01/06/2016       Plan:        Sepsis due to pneumonia with abcess vs empyema, pseudomonas sp. Tachypnea, tachycardia  WBC 18>14>10>12.2>9.8 today  Pro-Branden >100 (>100 on repeat)    Lactate 2.8> 1.5  Chest x-ray: Moderate loculated right basal pneumothorax.  Evidence for tension.  Cavitary lesion noted in the right lung base  CT chest: Thick-walled cavitary lesion RLL.  Right chest tube in situ for posterior right lung or right pneumothorax. Infiltrates of the right middle lobe.  Stellate 6 mm lateral RUL nodule.  Mediastinal and right hilar lymphadenopathy  Received 1 L bolus  Now getting 1/2 NS at 15 cc/h  Initially placed on broad-spectrum antibiotic with cefepime, vancomycin  Critical care following  ID following  Respiratory cultures x2 (suctioned sputum and bronchial washing) gram-positive cocci in pairs, Pseudomonas   Chest tubes cultures grew Pseudomonas Aeruginosa .   On Merrem IV per ID       Acute on chronic respiratory failure 2/2 pneumothorax and pseudomonas pneumonia  History of severe COPD - 3 L home O2 baseline  CXR right pneumothorax on admission   S/p intubation and right chest tube placement  pH 7. 3, PCO2 of 41.3, HCO3 20 Initially   pH 7.4, PCO2 63.2, HCO3 41.7 Today  -Sedated with propofol  Currently on  Vent with FiO2 30% PEEP 8, unable to be weaned, continue weaning trials and sedation vacation as tolerated  Continue DuoNeb   Solumedrol 40mg q8h   Insulin sliding scale and lantus 18 Units twice daily     Acute systolic heart failure  Echo Estimated LV EF 45-50 %  Probnp 1000s   Lasix 20mg IV BID     Hx DVT/ PE: Eliquis was discontinued in 12/2021  History bipolar disorder: Trazodone, Risperidone HELD       Diet: NPO, on Tube feeds; had some hyperkalemia, improved, improving    GI ppx: Pepcid 20 mg twice daily IV  DVT ppx: Heparin 5000 units TID   Code status: Full code  Consults: pulm, ID  Dispo: Keep in ICU    Lynn Bardales MD  4/22/2022  1:37 PM     Attending Physician Statement  I have discussed the care of Michelle Bo and I have examined the patient myselft and taken ros and hpi , including pertinent history and exam findings,  with the resident. I have reviewed the key elements of all parts of the encounter with the resident. I agree with the assessment, plan and orders as documented by the resident.   Acute on chronic respiratory failure,  Sepsis due to underlying pneumonia  Spontaneous pneumothorax, status post chest tube in place,  ABG, labs, chest x-ray reviewed,  Systolic dysf,  lasix 30XJF iv  Still critically ill,  Mechanical ventilation,  Continue to monitor,  Settings reviewed,  ABG reviewed,  Hyperkalemia, 5.5,, -> 5.3  Critical care time spent 36 minutes    Electronically signed by Genna Wright MD

## 2022-04-23 ENCOUNTER — APPOINTMENT (OUTPATIENT)
Dept: GENERAL RADIOLOGY | Age: 67
DRG: 720 | End: 2022-04-23
Payer: COMMERCIAL

## 2022-04-23 LAB
ALLEN TEST: ABNORMAL
ANION GAP SERPL CALCULATED.3IONS-SCNC: 5 MMOL/L (ref 9–17)
BUN BLDV-MCNC: 47 MG/DL (ref 8–23)
CALCIUM SERPL-MCNC: 8.6 MG/DL (ref 8.6–10.4)
CARBOXYHEMOGLOBIN: 0.7 % (ref 0–5)
CHLORIDE BLD-SCNC: 99 MMOL/L (ref 98–107)
CO2: 39 MMOL/L (ref 20–31)
CREAT SERPL-MCNC: 0.45 MG/DL (ref 0.5–0.9)
FIO2: 30
GFR AFRICAN AMERICAN: >60 ML/MIN
GFR NON-AFRICAN AMERICAN: >60 ML/MIN
GFR SERPL CREATININE-BSD FRML MDRD: ABNORMAL ML/MIN/{1.73_M2}
GLUCOSE BLD-MCNC: 137 MG/DL (ref 65–105)
GLUCOSE BLD-MCNC: 140 MG/DL (ref 65–105)
GLUCOSE BLD-MCNC: 164 MG/DL (ref 65–105)
GLUCOSE BLD-MCNC: 166 MG/DL (ref 65–105)
GLUCOSE BLD-MCNC: 175 MG/DL (ref 70–99)
HCO3 ARTERIAL: 43.7 MMOL/L (ref 22–26)
HCT VFR BLD CALC: 27.5 % (ref 36–46)
HEMOGLOBIN: 8.9 G/DL (ref 12–16)
MCH RBC QN AUTO: 29.9 PG (ref 26–34)
MCHC RBC AUTO-ENTMCNC: 32.3 G/DL (ref 31–37)
MCV RBC AUTO: 92.5 FL (ref 80–100)
METHEMOGLOBIN: 0.9 % (ref 0–1.9)
MODE: ABNORMAL
O2 DEVICE/FLOW/%: ABNORMAL
O2 SAT, ARTERIAL: 93.2 % (ref 95–98)
PATIENT TEMP: 37.1
PCO2 ARTERIAL: 59.5 MMHG (ref 35–45)
PDW BLD-RTO: 16.4 % (ref 11.5–14.9)
PEEP/CPAP: 8
PH ARTERIAL: 7.47 (ref 7.35–7.45)
PLATELET # BLD: 363 K/UL (ref 150–450)
PMV BLD AUTO: 7.4 FL (ref 6–12)
PO2 ARTERIAL: 70.5 MMHG (ref 80–100)
POSITIVE BASE EXCESS, ART: 20 MMOL/L (ref 0–2)
POTASSIUM SERPL-SCNC: 4.9 MMOL/L (ref 3.7–5.3)
PROCALCITONIN: 5.74 NG/ML
PT. POSITION: ABNORMAL
RBC # BLD: 2.98 M/UL (ref 4–5.2)
SAMPLE SITE: ABNORMAL
SET RATE: 24
SODIUM BLD-SCNC: 143 MMOL/L (ref 135–144)
TEXT FOR RESPIRATORY: ABNORMAL
TOTAL RATE: 25
TRIGL SERPL-MCNC: 96 MG/DL
VT: 450
WBC # BLD: 9.2 K/UL (ref 3.5–11)

## 2022-04-23 PROCEDURE — 6370000000 HC RX 637 (ALT 250 FOR IP): Performed by: INTERNAL MEDICINE

## 2022-04-23 PROCEDURE — 6360000002 HC RX W HCPCS: Performed by: STUDENT IN AN ORGANIZED HEALTH CARE EDUCATION/TRAINING PROGRAM

## 2022-04-23 PROCEDURE — 2000000000 HC ICU R&B

## 2022-04-23 PROCEDURE — 82805 BLOOD GASES W/O2 SATURATION: CPT

## 2022-04-23 PROCEDURE — 71045 X-RAY EXAM CHEST 1 VIEW: CPT

## 2022-04-23 PROCEDURE — A4216 STERILE WATER/SALINE, 10 ML: HCPCS | Performed by: INTERNAL MEDICINE

## 2022-04-23 PROCEDURE — 6360000002 HC RX W HCPCS: Performed by: INTERNAL MEDICINE

## 2022-04-23 PROCEDURE — 36600 WITHDRAWAL OF ARTERIAL BLOOD: CPT

## 2022-04-23 PROCEDURE — 2580000003 HC RX 258: Performed by: STUDENT IN AN ORGANIZED HEALTH CARE EDUCATION/TRAINING PROGRAM

## 2022-04-23 PROCEDURE — 80048 BASIC METABOLIC PNL TOTAL CA: CPT

## 2022-04-23 PROCEDURE — 99232 SBSQ HOSP IP/OBS MODERATE 35: CPT | Performed by: NURSE PRACTITIONER

## 2022-04-23 PROCEDURE — 2500000003 HC RX 250 WO HCPCS: Performed by: STUDENT IN AN ORGANIZED HEALTH CARE EDUCATION/TRAINING PROGRAM

## 2022-04-23 PROCEDURE — 84478 ASSAY OF TRIGLYCERIDES: CPT

## 2022-04-23 PROCEDURE — 94761 N-INVAS EAR/PLS OXIMETRY MLT: CPT

## 2022-04-23 PROCEDURE — 6370000000 HC RX 637 (ALT 250 FOR IP)

## 2022-04-23 PROCEDURE — 82947 ASSAY GLUCOSE BLOOD QUANT: CPT

## 2022-04-23 PROCEDURE — 99291 CRITICAL CARE FIRST HOUR: CPT | Performed by: INTERNAL MEDICINE

## 2022-04-23 PROCEDURE — 6370000000 HC RX 637 (ALT 250 FOR IP): Performed by: STUDENT IN AN ORGANIZED HEALTH CARE EDUCATION/TRAINING PROGRAM

## 2022-04-23 PROCEDURE — 2700000000 HC OXYGEN THERAPY PER DAY

## 2022-04-23 PROCEDURE — 84145 PROCALCITONIN (PCT): CPT

## 2022-04-23 PROCEDURE — 94640 AIRWAY INHALATION TREATMENT: CPT

## 2022-04-23 PROCEDURE — 2580000003 HC RX 258: Performed by: INTERNAL MEDICINE

## 2022-04-23 PROCEDURE — 2500000003 HC RX 250 WO HCPCS: Performed by: INTERNAL MEDICINE

## 2022-04-23 PROCEDURE — 85027 COMPLETE CBC AUTOMATED: CPT

## 2022-04-23 PROCEDURE — 36415 COLL VENOUS BLD VENIPUNCTURE: CPT

## 2022-04-23 PROCEDURE — 94003 VENT MGMT INPAT SUBQ DAY: CPT

## 2022-04-23 RX ORDER — M-VIT,TX,IRON,MINS/CALC/FOLIC 27MG-0.4MG
1 TABLET ORAL DAILY
Status: DISCONTINUED | OUTPATIENT
Start: 2022-04-23 | End: 2022-05-03 | Stop reason: HOSPADM

## 2022-04-23 RX ORDER — THIAMINE HYDROCHLORIDE 100 MG/ML
100 INJECTION, SOLUTION INTRAMUSCULAR; INTRAVENOUS DAILY
Status: DISCONTINUED | OUTPATIENT
Start: 2022-04-23 | End: 2022-04-23 | Stop reason: CLARIF

## 2022-04-23 RX ADMIN — MINERAL OIL, PETROLATUM: 425; 568 OINTMENT OPHTHALMIC at 03:54

## 2022-04-23 RX ADMIN — ACETYLCYSTEINE 200 MG: 200 SOLUTION ORAL; RESPIRATORY (INHALATION) at 23:06

## 2022-04-23 RX ADMIN — INSULIN LISPRO 2 UNITS: 100 INJECTION, SOLUTION INTRAVENOUS; SUBCUTANEOUS at 08:48

## 2022-04-23 RX ADMIN — FOLIC ACID 1 MG: 5 INJECTION, SOLUTION INTRAMUSCULAR; INTRAVENOUS; SUBCUTANEOUS at 13:02

## 2022-04-23 RX ADMIN — ACETYLCYSTEINE 200 MG: 200 SOLUTION ORAL; RESPIRATORY (INHALATION) at 02:54

## 2022-04-23 RX ADMIN — ACETYLCYSTEINE 200 MG: 200 SOLUTION ORAL; RESPIRATORY (INHALATION) at 19:10

## 2022-04-23 RX ADMIN — POLYVINYL ALCOHOL 1 DROP: 14 SOLUTION/ DROPS OPHTHALMIC at 02:48

## 2022-04-23 RX ADMIN — SODIUM CHLORIDE, PRESERVATIVE FREE 10 ML: 5 INJECTION INTRAVENOUS at 08:40

## 2022-04-23 RX ADMIN — ALBUTEROL SULFATE 2.5 MG: 2.5 SOLUTION RESPIRATORY (INHALATION) at 10:51

## 2022-04-23 RX ADMIN — FUROSEMIDE 20 MG: 10 INJECTION, SOLUTION INTRAMUSCULAR; INTRAVENOUS at 08:38

## 2022-04-23 RX ADMIN — FUROSEMIDE 20 MG: 10 INJECTION, SOLUTION INTRAMUSCULAR; INTRAVENOUS at 18:11

## 2022-04-23 RX ADMIN — ALBUTEROL SULFATE 2.5 MG: 2.5 SOLUTION RESPIRATORY (INHALATION) at 15:51

## 2022-04-23 RX ADMIN — PROPOFOL 30 MCG/KG/MIN: 10 INJECTION, EMULSION INTRAVENOUS at 08:38

## 2022-04-23 RX ADMIN — INSULIN LISPRO 2 UNITS: 100 INJECTION, SOLUTION INTRAVENOUS; SUBCUTANEOUS at 20:03

## 2022-04-23 RX ADMIN — MINERAL OIL, PETROLATUM: 425; 568 OINTMENT OPHTHALMIC at 00:26

## 2022-04-23 RX ADMIN — ACETYLCYSTEINE 200 MG: 200 SOLUTION ORAL; RESPIRATORY (INHALATION) at 10:51

## 2022-04-23 RX ADMIN — SODIUM CHLORIDE, PRESERVATIVE FREE 10 ML: 5 INJECTION INTRAVENOUS at 19:49

## 2022-04-23 RX ADMIN — FENTANYL CITRATE 50 MCG: 50 INJECTION, SOLUTION INTRAMUSCULAR; INTRAVENOUS at 22:09

## 2022-04-23 RX ADMIN — MINERAL OIL, PETROLATUM: 425; 568 OINTMENT OPHTHALMIC at 08:40

## 2022-04-23 RX ADMIN — MULTIPLE VITAMINS W/ MINERALS TAB 1 TABLET: TAB at 13:03

## 2022-04-23 RX ADMIN — ALBUTEROL SULFATE 2.5 MG: 2.5 SOLUTION RESPIRATORY (INHALATION) at 07:00

## 2022-04-23 RX ADMIN — MINERAL OIL, PETROLATUM: 425; 568 OINTMENT OPHTHALMIC at 20:10

## 2022-04-23 RX ADMIN — MEROPENEM 1000 MG: 1 INJECTION, POWDER, FOR SOLUTION INTRAVENOUS at 03:53

## 2022-04-23 RX ADMIN — ALBUTEROL SULFATE 2.5 MG: 2.5 SOLUTION RESPIRATORY (INHALATION) at 19:09

## 2022-04-23 RX ADMIN — SODIUM CHLORIDE: 4.5 INJECTION, SOLUTION INTRAVENOUS at 00:26

## 2022-04-23 RX ADMIN — ACETYLCYSTEINE 200 MG: 200 SOLUTION ORAL; RESPIRATORY (INHALATION) at 15:51

## 2022-04-23 RX ADMIN — ATORVASTATIN CALCIUM 20 MG: 20 TABLET, FILM COATED ORAL at 08:39

## 2022-04-23 RX ADMIN — MEROPENEM 1000 MG: 1 INJECTION, POWDER, FOR SOLUTION INTRAVENOUS at 11:48

## 2022-04-23 RX ADMIN — INSULIN GLARGINE 18 UNITS: 100 INJECTION, SOLUTION SUBCUTANEOUS at 20:03

## 2022-04-23 RX ADMIN — HEPARIN SODIUM 5000 UNITS: 5000 INJECTION INTRAVENOUS; SUBCUTANEOUS at 13:03

## 2022-04-23 RX ADMIN — POLYVINYL ALCOHOL 1 DROP: 14 SOLUTION/ DROPS OPHTHALMIC at 22:52

## 2022-04-23 RX ADMIN — METHYLPREDNISOLONE SODIUM SUCCINATE 40 MG: 40 INJECTION, POWDER, LYOPHILIZED, FOR SOLUTION INTRAMUSCULAR; INTRAVENOUS at 08:38

## 2022-04-23 RX ADMIN — HEPARIN SODIUM 5000 UNITS: 5000 INJECTION INTRAVENOUS; SUBCUTANEOUS at 22:11

## 2022-04-23 RX ADMIN — ALBUTEROL SULFATE 2.5 MG: 2.5 SOLUTION RESPIRATORY (INHALATION) at 02:54

## 2022-04-23 RX ADMIN — PROPOFOL 30 MCG/KG/MIN: 10 INJECTION, EMULSION INTRAVENOUS at 03:57

## 2022-04-23 RX ADMIN — POLYVINYL ALCOHOL 1 DROP: 14 SOLUTION/ DROPS OPHTHALMIC at 06:04

## 2022-04-23 RX ADMIN — FENTANYL CITRATE 50 MCG: 50 INJECTION, SOLUTION INTRAMUSCULAR; INTRAVENOUS at 19:48

## 2022-04-23 RX ADMIN — ACETYLCYSTEINE 200 MG: 200 SOLUTION ORAL; RESPIRATORY (INHALATION) at 07:00

## 2022-04-23 RX ADMIN — MEROPENEM 1000 MG: 1 INJECTION, POWDER, FOR SOLUTION INTRAVENOUS at 19:50

## 2022-04-23 RX ADMIN — METHYLPREDNISOLONE SODIUM SUCCINATE 40 MG: 40 INJECTION, POWDER, LYOPHILIZED, FOR SOLUTION INTRAMUSCULAR; INTRAVENOUS at 00:26

## 2022-04-23 RX ADMIN — FAMOTIDINE 20 MG: 10 INJECTION INTRAVENOUS at 08:39

## 2022-04-23 RX ADMIN — FAMOTIDINE 20 MG: 10 INJECTION INTRAVENOUS at 20:03

## 2022-04-23 RX ADMIN — METHYLPREDNISOLONE SODIUM SUCCINATE 40 MG: 40 INJECTION, POWDER, LYOPHILIZED, FOR SOLUTION INTRAMUSCULAR; INTRAVENOUS at 17:01

## 2022-04-23 RX ADMIN — INSULIN GLARGINE 18 UNITS: 100 INJECTION, SOLUTION SUBCUTANEOUS at 08:48

## 2022-04-23 RX ADMIN — CHLORHEXIDINE GLUCONATE 0.12% ORAL RINSE 15 ML: 1.2 LIQUID ORAL at 19:56

## 2022-04-23 RX ADMIN — CHLORHEXIDINE GLUCONATE 0.12% ORAL RINSE 15 ML: 1.2 LIQUID ORAL at 08:39

## 2022-04-23 RX ADMIN — ALBUTEROL SULFATE 2.5 MG: 2.5 SOLUTION RESPIRATORY (INHALATION) at 23:06

## 2022-04-23 RX ADMIN — HEPARIN SODIUM 5000 UNITS: 5000 INJECTION INTRAVENOUS; SUBCUTANEOUS at 05:20

## 2022-04-23 ASSESSMENT — PULMONARY FUNCTION TESTS
PIF_VALUE: 18
PIF_VALUE: 27
PIF_VALUE: 18
PIF_VALUE: 28
PIF_VALUE: 18
PIF_VALUE: 37
PIF_VALUE: 43
PIF_VALUE: 26
PIF_VALUE: 18
PIF_VALUE: 33
PIF_VALUE: 33
PIF_VALUE: 29
PIF_VALUE: 17
PIF_VALUE: 18
PIF_VALUE: 29
PIF_VALUE: 44
PIF_VALUE: 18
PIF_VALUE: 17
PIF_VALUE: 18
PIF_VALUE: 28
PIF_VALUE: 18
PIF_VALUE: 28
PIF_VALUE: 18

## 2022-04-23 ASSESSMENT — PAIN SCALES - GENERAL
PAINLEVEL_OUTOF10: 4
PAINLEVEL_OUTOF10: 0
PAINLEVEL_OUTOF10: 0
PAINLEVEL_OUTOF10: 4

## 2022-04-23 NOTE — PLAN OF CARE
Problem: Non-Violent Restraints  Goal: Removal from restraints as soon as assessed to be safe  4/23/2022 0251 by Billy Lomax RN  Outcome: Progressing  Note: Attempts to pull at tubes when released. ROM assessed every 2 hours and visual safety checks completed hourly. Restraints maintained to preserve the patient's airway. Problem: Non-Violent Restraints  Goal: No harm/injury to patient while restraints in use  4/23/2022 0251 by Billy Lomax RN  Outcome: Progressing  Note: Attempts to pull at tubes when released. ROM assessed every 2 hours and visual safety checks completed hourly. Restraints maintained to preserve the patient's airway. Problem: Non-Violent Restraints  Goal: Patient's dignity will be maintained  4/23/2022 0251 by Billy Lomax RN  Outcome: Progressing     Problem: Gas Exchange - Impaired:  Goal: Levels of oxygenation will improve  Description: Levels of oxygenation will improve  4/23/2022 0251 by Billy Lomax RN  Outcome: Progressing     Problem: Skin Integrity - Impaired:  Goal: Will show no infection signs and symptoms  Description: Will show no infection signs and symptoms  4/23/2022 0251 by Billy Lomax RN  Outcome: Progressing     Problem: Skin Integrity - Impaired:  Goal: Absence of new skin breakdown  Description: Absence of new skin breakdown  4/23/2022 0251 by Billy Lomax RN  Outcome: Progressing  Note: Patient turned and repositioned every 2 hours and as needed for comfort. Skin kept clean and dry.      Problem: Falls - Risk of:  Goal: Absence of physical injury  Description: Absence of physical injury  4/23/2022 0251 by Billy Lomax RN  Outcome: Progressing     Problem: Breathing Pattern - Ineffective:  Goal: Ability to achieve and maintain a regular respiratory rate will improve  Description: Ability to achieve and maintain a regular respiratory rate will improve  4/23/2022 0251 by Fawn Newman SUE Robles  Outcome: Progressing     Problem: Skin Integrity:  Goal: Will show no infection signs and symptoms  Description: Will show no infection signs and symptoms  4/23/2022 0251 by Jessie Nguyen RN  Outcome: Progressing     Problem: Skin Integrity:  Goal: Absence of new skin breakdown  Description: Absence of new skin breakdown  4/23/2022 0251 by Jessie Nguyen RN  Outcome: Progressing     Problem: OXYGENATION/RESPIRATORY FUNCTION  Goal: Patient will maintain patent airway  4/23/2022 0251 by Jessie Nguyen RN  Outcome: Progressing     Problem: OXYGENATION/RESPIRATORY FUNCTION  Goal: Patient will achieve/maintain normal respiratory rate/effort  Description: Respiratory rate and effort will be within normal limits for the patient  4/23/2022 0251 by Jessie Nguyen RN  Outcome: Progressing     Problem: MECHANICAL VENTILATION  Goal: Oral health is maintained or improved  4/23/2022 0251 by Jessie Nguyen RN  Outcome: Progressing     Problem: MECHANICAL VENTILATION  Goal: ET tube will be managed safely  4/23/2022 0251 by Jessie Nguyen RN  Outcome: Progressing     Problem: MECHANICAL VENTILATION  Goal: Ability to express needs and understand communication  4/23/2022 0251 by Jessie Nguyen RN  Outcome: Progressing     Problem: MECHANICAL VENTILATION  Goal: Mobility/activity is maintained at optimum level for patient  4/23/2022 0251 by Jessie Nguyen RN  Outcome: Progressing     Problem: Nutrition  Goal: Optimal nutrition therapy  4/23/2022 0251 by Jessie Nguyen RN  Outcome: Progressing  Note: Pt tolerating tube feed. Problem: Safety - Medical Restraint  Goal: Remains free of injury from restraints (Restraint for Interference with Medical Device)  Description: INTERVENTIONS:  1. Determine that other, less restrictive measures have been tried or would not be effective before applying the restraint  2.  Evaluate the patient's condition at the time of restraint application  3. Inform patient/family regarding the reason for restraint  4.  Q2H: Monitor safety, psychosocial status, comfort, nutrition and hydration  4/23/2022 0251 by Adriane Peñaloza RN  Outcome: Progressing     Problem: Discharge Planning  Goal: Discharge to home or other facility with appropriate resources  4/23/2022 0251 by Adriane Peñaloza RN  Outcome: Progressing

## 2022-04-23 NOTE — PROGRESS NOTES
ICU Progress Note (Vent)   [facility]    Patient - Flor Doe,  Age - 74 y.o.    - 1955      Room Number -    MRN -  [id number]   [facility]  Date of Admission -  2022 10:25 AM    Events of Past 24 Hours   Patient intubated on vent support. Not really clear sensorium wise at this time. On a weaning trial.    Daughter Karina at bedside. Vitals    height is 5' 5\" (1.651 m) and weight is 183 lb 6.8 oz (83.2 kg). Her oral temperature is 99.2 °F (37.3 °C). Her blood pressure is 201/67 (abnormal) and her pulse is 86. Her respiration is 23 and oxygen saturation is 95%. Temperature Range: Temp: 99.2 °F (37.3 °C) Temp  Av °F (37.2 °C)  Min: 98.5 °F (36.9 °C)  Max: 99.3 °F (37.4 °C)  BP Range:  Systolic (99TTC), WRU:140 , Min:100 , EFA:713     Diastolic (16BJQ), BCT:64, Min:39, Max:67    Pulse Range: Pulse  Av.3  Min: 61  Max: 91  Respiration Range: Resp  Av.1  Min: 21  Max: 26  Current Pulse Ox[de-identified]  SpO2: 95 %  24HR Pulse Ox Range:  SpO2  Av.1 %  Min: 88 %  Max: 99 %  Oxygen Amount and Delivery: O2 Flow Rate (L/min): 6 L/min      Wt Readings from Last 3 Encounters:   22 183 lb 6.8 oz (83.2 kg)   22 183 lb (83 kg)   22 186 lb 1.1 oz (84.4 kg)     I/O       Intake/Output Summary (Last 24 hours) at 2022 1025  Last data filed at 2022 0600  Gross per 24 hour   Intake 2352.36 ml   Output 2250 ml   Net 102.36 ml     I/O last 3 completed shifts:   In: 7087 [I.V.:1226; NG/GT:2167; IV Piggyback:374.9]  Out: 3125 [Urine:2950; Stool:175]     DRAIN/TUBE OUTPUT:     Invasive Lines   ETT Day -   8  Right IJ day number 8      Mechanical Ventilation Data   SETTINGS (Comprehensive)  Vent Information  Ventilator ID: [id number]  Vent Mode: PS/CPAP  Additional Respiratory Assessments  Pulse: 86  Resp: 23  SpO2: 95 %  End Tidal CO2: 45 (%)  Position: Semi-Molina's  Humidification Source: HME  Cuff Pressure (cm H2O): 30 cm H2O       ABGs:   Lab Results   Component Value Date    PHART 7.474 04/23/2022    PO2ART 70.5 04/23/2022    WDY8CHS 59.5 04/23/2022       Lab Results   Component Value Date    MODE PRVC 04/23/2022         Medications   IV   sodium chloride 15 mL/hr at 04/23/22 0026    sodium chloride      dextrose      propofol Stopped (04/23/22 0852)      insulin glargine  18 Units SubCUTAneous BID    insulin lispro  0-12 Units SubCUTAneous Q6H    methylPREDNISolone  40 mg IntraVENous Q8H    polyethylene glycol  17 g Oral Daily    furosemide  20 mg IntraVENous BID    meropenem  1,000 mg IntraVENous Q8H    albuterol  2.5 mg Nebulization Q4H    acetylcysteine  200 mg Inhalation Q4H    sodium chloride flush  5-40 mL IntraVENous 2 times per day    [Held by provider] risperiDONE  1.5 mg Oral Q12H    [Held by provider] rivastigmine  4.5 mg Oral BID    atorvastatin  20 mg Oral Daily    [Held by provider] traZODone  50 mg Oral Nightly    polyvinyl alcohol  1 drop Both Eyes Q4H    And    artificial tears   Both Eyes Q4H    chlorhexidine  15 mL Mouth/Throat BID    famotidine (PEPCID) injection  20 mg IntraVENous BID    heparin (porcine)  5,000 Units SubCUTAneous 3 times per day       Diet/Nutrition   ADULT TUBE FEEDING; Nasogastric; Renal Formula; Continuous; 15; Yes; 10; Q 4 hours; 35; 60; Q 6 hours    Exam   VITALS    height is 5' 5\" (1.651 m) and weight is 183 lb 6.8 oz (83.2 kg). Her oral temperature is 99.2 °F (37.3 °C). Her blood pressure is 201/67 (abnormal) and her pulse is 86. Her respiration is 23 and oxygen saturation is 95%. Ventilator Settings (Basic)  Vent Mode: PS/CPAP Resp Rate (Set): 24 bmp/Vt (Set, mL): 450 mL/ /FiO2 : 40 %    Constitutional - Sedated  General Appearance  well developed, well nourished  HEENT - Life support devices in place (ET),normocephalic, atraumatic. PERRLA  Lungs - Chest expands equally, no wheezes, rales or rhonchi.   Cardiovascular - Heart sounds are normal.  normal rate and rhythm regular, no murmur, gallop or rub. Abdomen - soft, nontender, nondistended  Extremities - no cyanosis    Lab Results   CBC     Lab Results   Component Value Date    WBC 9.2 04/23/2022    RBC 2.98 04/23/2022    RBC 0-2 07/08/2021    HGB 8.9 04/23/2022    HCT 27.5 04/23/2022     04/23/2022    MCV 92.5 04/23/2022    MCH 29.9 04/23/2022    MCHC 32.3 04/23/2022    RDW 16.4 04/23/2022    NRBC 0 07/08/2021    METASPCT 1 04/02/2022    LYMPHOPCT 5 04/19/2022    LYMPHOPCT 12.1 07/08/2021    MONOPCT 2 04/19/2022    MONOPCT 15.1 07/08/2021    BASOPCT 0 04/19/2022    BASOPCT 0.8 07/08/2021    MONOSABS 0.26 04/19/2022    MONOSABS 0.4 07/08/2021    LYMPHSABS 0.66 04/19/2022    LYMPHSABS 0.3 07/08/2021    EOSABS 0.00 04/19/2022    EOSABS 0.1 07/08/2021    BASOSABS 0.00 04/19/2022    DIFFTYPE NOT REPORTED 12/20/2021       BMP   Lab Results   Component Value Date     04/23/2022    K 4.9 04/23/2022    CL 99 04/23/2022    CO2 39 04/23/2022    BUN 47 04/23/2022    CREATININE 0.45 04/23/2022    GLUCOSE 175 04/23/2022    GLUCOSE 127 07/08/2021    CALCIUM 8.6 04/23/2022       LFTS  Lab Results   Component Value Date    ALKPHOS 137 04/16/2022    ALT 25 04/16/2022    AST 19 04/16/2022    PROT 7.0 04/16/2022    PROT 5.7 07/08/2021    BILITOT 0.16 04/16/2022    BILIDIR 0.1 07/08/2021    IBILI 0.12 07/08/2019    LABALBU 2.8 04/16/2022    LABALBU 3.6 07/08/2021       INR  No results for input(s): PROTIME, INR in the last 72 hours. APTT  No results for input(s): APTT in the last 72 hours. Lactic Acid  Lab Results   Component Value Date    LACTA 1.5 04/19/2022    LACTA 1.2 04/02/2022        BNP   No results for input(s): BNP in the last 72 hours. Cultures       Radiology     Plain Films         This x-ray revealed  Endotracheal tube in decent position. The side-port of the chest tube appears to be part of the chest wall.   The rest of the tube the tip specifically intact    See actual reports for details    SYSTEM ASSESSMENT    Acute on chronic hypoxic and hypercapnic respiratory failure, intubated 4/16  Pseudomonas pneumonia  Right-sided pneumothorax  Right lower lobe cavitary lesions  Exudative pleural effusion on right, growing Pseudomonas  Low ejection fraction EF 45 to 50%, echo 4/18  History of pulmonary embolism and what appears to be chronic filling defects (subsegmental, most likely clinically inconsequential)  Severe stage IV COPD, FEV1 is 35% of predicted  Recent hospitalization for pneumonia  Elevated inflammatory markers including D-dimer and procalcitonin  Full CODE STATUS    Neuro   Try to use fentanyl for pain control. Try to minimize sedation as possible to see what she can do    Respiratory   Continue weaning trials as tolerated. Chest x-ray reveals no reported increase in pneumothorax. On Mucomyst and Proventil    Hemodynamics   On Lasix    Gastrointestinal/Nutrition       Renal       Infectious Disease   Patient on meropenem. Hematology/Oncology       Endocrine       Social/Spiritual/DNR/Disposition/Other     Prognosis not good. Continue to follow through the weekend. CODE STATUS may changed early next week.   I was told that the patient would not want trach and PEG    Critical Care Time   35 min    Electronically signed by Alejandro Richter MD on 4/23/2022 at 10:25 AM

## 2022-04-23 NOTE — PROGRESS NOTES
[address] [facility] Met[facility]    PROGRESS NOTE             4/23/2022    9:22 AM    Name:   Antoine Tran  MRN:     [id number]     [place]:      [de-identified]   Room:   2002/2002-01  IP Day:  7  Admit Date:  4/16/2022 10:25 AM    PCP:  Adriana Brown MD  Code Status:  Full Code    Subjective:     C/C:   Chief Complaint   Patient presents with    Shortness of Breath     Interval History Status: not changed. Patient sedated on propofol 30 mcg. Appears to be resting comfortably. Vent settings unchanged. Brief History:     The patient is a 76 y.o.  Non- / non  female sever COPD 3L O2 baseline, bipolar, Hx of DVT/ PE, migraines, who presents with Shortness of Breath and cough  Patient was recently discharge from [address] for mycoplasma pneumonia. She had a follow up appointment with PCP, patient was complaining of cough and chest pain, was started on Tessalon and nsaids for pain. However; she continued to be in distress and her daughter brought her to ED. She was hypoxic initially on 3L o2, was increased to 6L and continued to be hypoxic   Tachypneic, tachycardic  ABGs: pH 7.33, CO2 34, HCO3 18  WBC 18   Lactic 2.8> 1.5  Pancultures were sent  Chest x-ray: Moderate loculated right basal pneumothorax.  Evidence for tension.  Cavitary lesion noted in the right lung base  CT chest: Chronic clot material RUL, RLL.  Thick-walled cavitary lesion RLL.  Multiloculated pleural collection or hydropneumothorax posterior to the right lung, right chest tube in situ.  Infiltrates of the right middle lobe.  Stellate 6 mm lateral RUL nodule.  Mediastinal and right hilar lymphadenopathy  Chest tube was placed and patient was intubated.    Was given 1 L bolus, IV cefepime, vancomycin, 125 mg Solu-Medrol and she is admitted to the hospital for the management of acute hypoxic respiratory failure due to pneumothorax and possible lung infection     4/19: switched to meropenem      4/21: bronchial washing from bronchoscopy growing pseudomonas    Review of Systems:     Review of Systems   Unable to perform ROS: Intubated       Medications: Allergies:     Allergies   Allergen Reactions    Advil [Ibuprofen] Other (See Comments)     vomiting    Aleve [Naproxen] Other (See Comments)     vomiting    Antipyrine Other (See Comments)     unknown    Celecoxib Other (See Comments)    Codeine      headache    Fomepizole     Incruse Ellipta [Umeclidinium Bromide]      confusion    Other      GRASS, TREES, WEEDS    Rofecoxib Other (See Comments)     Unknown reaction    Salicylates      Unknown reaction     Strawberry Extract      HEADACHES    Sulfinpyrazone Other (See Comments)     Unknown reaction    Aspirin Nausea And Vomiting, Other (See Comments) and Nausea Only    Nsaids Nausea Only, Other (See Comments) and Nausea And Vomiting       Current Meds:   Scheduled Meds:    insulin glargine  18 Units SubCUTAneous BID    insulin lispro  0-12 Units SubCUTAneous Q6H    methylPREDNISolone  40 mg IntraVENous Q8H    polyethylene glycol  17 g Oral Daily    furosemide  20 mg IntraVENous BID    meropenem  1,000 mg IntraVENous Q8H    albuterol  2.5 mg Nebulization Q4H    acetylcysteine  200 mg Inhalation Q4H    sodium chloride flush  5-40 mL IntraVENous 2 times per day    [Held by provider] risperiDONE  1.5 mg Oral Q12H    [Held by provider] rivastigmine  4.5 mg Oral BID    atorvastatin  20 mg Oral Daily    [Held by provider] traZODone  50 mg Oral Nightly    polyvinyl alcohol  1 drop Both Eyes Q4H    And    artificial tears   Both Eyes Q4H    chlorhexidine  15 mL Mouth/Throat BID    famotidine (PEPCID) injection  20 mg IntraVENous BID    heparin (porcine)  5,000 Units SubCUTAneous 3 times per day     Continuous Infusions:    sodium chloride 15 mL/hr at 04/23/22 0026    sodium chloride      dextrose      propofol Stopped (04/23/22 0852)     PRN Meds: hydrALAZINE, sodium chloride flush, sodium chloride, sodium chloride flush, potassium chloride **OR** potassium alternative oral replacement **OR** potassium chloride, glucose, dextrose, glucagon (rDNA), dextrose, fentanNYL    Data:     Past Medical History:   has a past medical history of Abnormal EKG, TRAM (acute kidney injury) ([facility] Anxiety, Asthma, Bipolar disorder ([facility] [address] Cramps, extremity, Depression, Dilated bile duct, Headache, History of elective , Hyperlipidemia, Irritable bowel syndrome, Prolonged emergence from general anesthesia, Substance abuse ([facility] Unspecified sleep apnea, and Vision abnormalities. Social History:   reports that she quit smoking about 3 years ago. Her smoking use included cigarettes. She has a 84.00 pack-year smoking history. She has never used smokeless tobacco. She reports current alcohol use. She reports that she does not use drugs. Family History:   Family History   Problem Relation Age of Onset    Dementia Maternal Aunt     Kidney Disease Mother     Heart Attack Sister     Prostate Cancer Father     High Cholesterol Brother     Heart Attack Paternal Grandmother        Vitals:  BP (!) 201/67   Pulse 86   Temp 99.2 °F (37.3 °C) (Oral)   Resp 23   Ht 5' 5\" (1.651 m)   Wt 183 lb 6.8 oz (83.2 kg)   LMP 2013 (Exact Date)   SpO2 95%   BMI 30.52 kg/m²   Temp (24hrs), Av °F (37.2 °C), Min:98.5 °F (36.9 °C), Max:99.3 °F (37.4 °C)    Recent Labs     22  1416 22  1951 22  0136 22  0807   POCGLU 184* 197* 137* 164*       I/O(24Hr):     Intake/Output Summary (Last 24 hours) at 2022 0922  Last data filed at 2022 0600  Gross per 24 hour   Intake 2352.36 ml   Output 2250 ml   Net 102.36 ml       Labs:    CBC:   Lab Results   Component Value Date    WBC 9.2 2022    RBC 2.98 2022    RBC 0-2 2021    HGB 8.9 2022    HCT 27.5 2022    MCV 92.5 2022    MCH 29.9 04/23/2022    MCHC 32.3 04/23/2022    RDW 16.4 04/23/2022     04/23/2022    MPV 7.4 04/23/2022     BMP:    Lab Results   Component Value Date     04/23/2022    K 4.9 04/23/2022    CL 99 04/23/2022    CO2 39 04/23/2022    BUN 47 04/23/2022    LABALBU 2.8 04/16/2022    LABALBU 3.6 07/08/2021    CREATININE 0.45 04/23/2022    CALCIUM 8.6 04/23/2022    GFRAA >60 04/23/2022    LABGLOM >60 04/23/2022    GLUCOSE 175 04/23/2022    GLUCOSE 127 07/08/2021     ABG:    Lab Results   Component Value Date    PH 8.0 07/08/2021       Lab Results   Component Value Date/Time    SPECIAL 8ML GREEN/2ML ORANGE RIGHT IJ 04/16/2022 12:12 PM     Lab Results   Component Value Date/Time    CULTURE PENDING 04/18/2022 12:20 PM    CULTURE PSEUDOMONAS AERUGINOSA LIGHT GROWTH (A) 04/18/2022 12:20 PM    CULTURE NO NORMAL RAVEN 04/18/2022 12:20 PM         Radiology:    ECHO Complete 2D W Doppler W Color    Result Date: 4/18/2022  [facility] Transthoracic Echocardiography Report (TTE)  Patient Name Hermilo Bar     Date of Study               04/18/2022               BOBBY OROSCO   Date of      1955  Gender                      Female  Birth   Age          74 year(s)  Race                           Room Number  2002        Height:                     65 inch, 165.1 cm   Corporate ID [id number]    Weight:                     176 pounds, 79.8 kg  #   Patient Acct [de-identified]   BSA:          1.87 m^2      BMI:      29.29  #                                                              kg/m^2   MR #         [id number]      Sonographer                 Мария Stein   Accession #  [id number]  Interpreting Physician      Meghna Oliver 63   Fellow                   Referring Nurse                           Practitioner   Interpreting             Referring Physician         Selena Marti  Type of Study   TTE procedure:2D Echocardiogram, M-Mode, Doppler, Color Doppler.   Procedure Date Date: 04/18/2022 Start: 10:35 AM Study Location: [address] [place] [address] Technical Quality: Fair visualization Indications:Dyspnea/SOB, Respiratory Failure and Pulmonary embolus. History / Tech. Comments: COPD, HLD, Sleep apnea Patient Status: Inpatient Height: 65 inches Weight: 176 pounds BSA: 1.87 m^2 BMI: 29.29 kg/m^2 Rhythm: Sinus bradycardia HR: 57 bpm BP: 138/65 mmHg CONCLUSIONS Summary Left ventricle is normal in size and wall thickness. Global left ventricular systolic function appears mildly reduced. Estimated LV EF 45-50 %. No obvious wall motion abnormality seen. RV size appears normal mildly reduced function Left atrium is normal in size. No significant valvular regurgitation or stenosis seen. No significant pericardial effusion is seen. Normal aortic root dimension. Dilated IVC, impaired or no respiratory variations suggestive of elevate Rt atrial pressure Signature ----------------------------------------------------------------------------  Electronically signed by Giovanna Cosme(Interpreting physician) on  04/18/2022 05:06 PM ---------------------------------------------------------------------------- ----------------------------------------------------------------------------  Electronically signed by Мария Stein(Sonographer) on 04/18/2022 02:29  PM ---------------------------------------------------------------------------- FINDINGS Left Atrium Left atrium is normal in size. Left Ventricle Left ventricle is normal in size and wall thickness. Global left ventricular systolic function is mildly reduced . Estimated LV EF  %. No obvious wall motion abnormality seen. Right Atrium Right atrium is normal in size. Right Ventricle Normal right ventricular size , mildly reduced function. Mitral Valve No obvious valvular abnormality seen. No evidence of mitral regurgitation. Aortic Valve No obvious valvular abnormality seen. No evidence of aortic insufficiency or stenosis. Tricuspid Valve No obvious valvular abnormality seen. Insignificant tricuspid regurgitation, unable to estimate RVSP. Pulmonic Valve Pulmonic valve was not well visualized. No evidence of pulmonic insufficiency or stenosis. Pericardial Effusion No significant pericardial effusion is seen. Pleural Effusion No pleural effusion seen. Miscellaneous Normal aortic root dimension. IVC Increased diameter and impaired or no inspiratory variation indicating elevated RA filling pressure (i.e. CVP) . M-mode / 2D Measurements & Calculations:   LVIDd:4.74 cm(3.7 - 5.6 cm)      Aortic Root:3.3 cm(2.0 - 3.7 cm)  LVIDs:3.28 cm(2.2 - 4.0 cm)      LA Dimension: 3.5 cm(1.9 - 4.0 cm)  IVSd:1.05 cm(0.6 - 1.1 cm)       LA volume/Index: 40.7 ml /22m^2  LVPWd:0.93 cm(0.6 - 1.1 cm)  Fractional Shortenin.8 %      XR CHEST (SINGLE VIEW FRONTAL)    Result Date: 2022  EXAMINATION: ONE XRAY VIEW OF THE CHEST 2022 8:55 am COMPARISON: April 3, 2022 HISTORY: ORDERING SYSTEM PROVIDED HISTORY: pna TECHNOLOGIST PROVIDED HISTORY: pna Reason for Exam: pna FINDINGS: Stable position of the right internal jugular central venous catheter. Right infrahilar airspace opacity not significantly changed. No new focal lung abnormality. No sizable pleural effusion. No pneumothorax. Stable right infrahilar airspace opacity     XR CHEST (SINGLE VIEW FRONTAL)    Result Date: 4/3/2022  EXAMINATION: ONE XRAY VIEW OF THE CHEST 4/3/2022 5:51 am COMPARISON: 2022 HISTORY: ORDERING SYSTEM PROVIDED HISTORY: sob, pleurisy, cough TECHNOLOGIST PROVIDED HISTORY: sob, pleurisy, cough Reason for Exam: Shortness of breath, pleurisy, cough Additional signs and symptoms: Shortness of breath, pleurisy, cough Relevant Medical/Surgical History: Shortness of breath, pleurisy, cough FINDINGS: AP portable view of the chest time stamped at 528 hours demonstrates overlying cardiac monitoring electrodes. Heart size is stable. Right internal jugular catheter terminates in the distal superior vena cava.   There has been worsening of a focal opacity at the right lung base. Minimal left basilar opacity is unchanged. No extrapleural air or overt edema. No gross effusions. Worsening opacity at the right base favoring airspace disease. Change in minimal left basilar opacity which may be related atelectasis. XR CHEST PORTABLE    Result Date: 4/23/2022  EXAMINATION: ONE XRAY VIEW OF THE CHEST 4/23/2022 5:40 am COMPARISON: 04/22/2022 and 04/21/2022 HISTORY: ORDERING SYSTEM PROVIDED HISTORY: ETT placement TECHNOLOGIST PROVIDED HISTORY: ETT placement Reason for Exam: ETT placement Additional signs and symptoms: ETT placement Relevant Medical/Surgical History: ETT placement FINDINGS: Endotracheal tube tip is approximately 2 cm above the nimesh. Nasogastric tube continues into the stomach and off the exam.  Right PICC line is near the cavoatrial junction. The right chest tube is stable with the tip over the right upper lung but the side port outside the ribcage. Soft tissue emphysema in the right chest wall is redemonstrated and appears mildly increased. Some patchy opacities persist in the right base. Evaluation for right apical pneumothorax is suboptimal.  The left lung appears acceptable. Cardiac silhouette is not enlarged. The central pulmonary arteries appears stable. Limited evaluation of the right lateral ribs. 1.  Endotracheal tube, nasogastric tube, and right jugular catheter appear acceptable. The right chest tube side port is outside the ribcage. Correlate for functionality of the chest tube. 2.  Patchy opacities persist in the right lower chest.  The evaluation for small right apical pneumothorax is suboptimal on the current study.      XR CHEST PORTABLE    Result Date: 4/22/2022  EXAMINATION: ONE XRAY VIEW OF THE CHEST 4/22/2022 6:29 am COMPARISON: 21 April 2022 HISTORY: ORDERING SYSTEM PROVIDED HISTORY: ETT placement TECHNOLOGIST PROVIDED HISTORY: ETT placement Reason for Exam: on vent FINDINGS: AP portable view of the chest time stamped at 623 hours demonstrates overlying cardiac monitoring electrodes, endotracheal tube terminating 4.8 cm above the nimesh and right internal jugular catheter terminating in the right atrium. Right-sided chest tube is again noted. Trace extrapleural air at the right apex persists. Subcutaneous emphysema along the right lateral chest wall is noted. Faint opacity is present at the right lung base suspicious for focal airspace disease. No mediastinal shift. Heart size is stable. Persistent small right apical pneumothorax. Support tubes and lines as above. Opacity at the right base. There is elevation right hemidiaphragm. Atelectasis is evident but superimposed airspace disease may be present. XR CHEST PORTABLE    Result Date: 4/21/2022  EXAMINATION: ONE XRAY VIEW OF THE CHEST 4/21/2022 11:57 am COMPARISON: 04/21/2022, 0625 hours. HISTORY: ORDERING SYSTEM PROVIDED HISTORY: Chest tube now clamped TECHNOLOGIST PROVIDED HISTORY: Chest tube now clamped Reason for Exam: chest tube now clamped FINDINGS: The ET tube was in satisfactory position, 4.5 cm above the nimesh. The NG tube was passed the gastric fundus. A right jugular central venous line was noted with the tip in the superior vena cava near its junction with the right atrium. A right-sided chest tube was noted. The proximal most opening is just out of the right lateral chest wall. No pneumothorax was noted. No cardiomegaly, pneumonia, interstitial edema or definite effusions were noted. Mild hyper inflation was identified. The ET tube was in satisfactory position, 4.5 cm above the nimesh. The NG tube was past the gastric fundus. A right jugular central venous line was noted with the tip in the superior vena cava near its junction with the right atrium. No pneumothorax was identified. A right-sided chest tube was noted but the proximal most opening is just external to the right lateral chest wall.   No cardiomegaly, pneumonia or interstitial edema. XR CHEST PORTABLE    Result Date: 4/21/2022  EXAMINATION: ONE XRAY VIEW OF THE CHEST 4/21/2022 6:30 am COMPARISON: 04/20/2022 HISTORY: ORDERING SYSTEM PROVIDED HISTORY: ETT placement TECHNOLOGIST PROVIDED HISTORY: ETT placement Reason for Exam: vent FINDINGS: Support tubes and lines are unchanged. The right chest tube side port is external to the chest wall which may predispose to air leak. Cardiac silhouette and mediastinal contours are unchanged. Calcified left hilar lymph nodes are noted. No pneumothorax. No new lung infiltrate. Aeration of the right lung base has improved. 1. The right chest tube side port is external to the chest wall which may predispose to an air leak. No pneumothorax. 2. Improved aeration of the right lung base, likely related to atelectasis. XR CHEST PORTABLE    Result Date: 4/20/2022  EXAMINATION: ONE XRAY VIEW OF THE CHEST 4/20/2022 11:52 am COMPARISON: None. HISTORY: ORDERING SYSTEM PROVIDED HISTORY: Chest tube now to waterseal TECHNOLOGIST PROVIDED HISTORY: Chest tube now to waterseal Reason for Exam: Chest tube now to waterseal FINDINGS: There is a right chest tube in place. There is no evidence of pneumothorax. Some apparent atelectasis noted in the the right lung base. ET tube is in good position. Tip of central line overlies the right atrium. Left lung appears normal.  Heart appears unremarkable. No evidence of pneumothorax. Some atelectasis in the right lung base. Tip of centralized overlies the right atrium. It should be withdrawn by 6 cm. The findings were sent to the Radiology Results [id number] at 12:21 pm on 4/20/2022 to be communicated to a licensed caregiver.      XR CHEST PORTABLE    Result Date: 4/20/2022  EXAMINATION: ONE XRAY VIEW OF THE CHEST 4/20/2022 6:30 am COMPARISON: 04/19/2022 HISTORY: ORDERING SYSTEM PROVIDED HISTORY: ETT placement TECHNOLOGIST PROVIDED HISTORY: ETT placement Reason for Exam: ETT FINDINGS: The cardiac silhouette and mediastinal contours are stable. There is a right-sided chest tube with the side port noted external to the chest wall. Right internal jugular vein catheter, endotracheal tube, and orogastric tube are again noted. There is pulmonary vascular congestion. No pneumothorax. The patient is rotated towards the right. 1. Right chest tube side port is external to the chest wall which may predispose to an air leak. 2. Stable pulmonary vascular congestion. 3. No pneumothorax is identified. XR CHEST PORTABLE    Result Date: 4/19/2022  EXAMINATION: ONE X-RAY VIEW OF THE CHEST 4/18/2022 6:27 am COMPARISON: 04/17/2022 HISTORY: ORDERING SYSTEM PROVIDED HISTORY: ETT placement TECHNOLOGIST PROVIDED HISTORY: ETT placement Reason for Exam: ETT placement FINDINGS: The endotracheal tube, nasogastric tube, right IJ catheter and right-sided chest tube remain. The extreme lung apices are excluded from the examination. A pneumothorax is not identified. Right basilar consolidation has increased peripherally. Increased right basilar opacity may reflect fluid filling the large cavitary lesion at the right lung base. Pulmonary consolidation/increased pleural effusion or empyema are alternate considerations. XR CHEST PORTABLE    Result Date: 4/19/2022  EXAMINATION: ONE XRAY VIEW OF THE CHEST 4/19/2022 6:34 am COMPARISON: Chest radiograph performed 04/18/2022. HISTORY: ORDERING SYSTEM PROVIDED HISTORY: ETT placement TECHNOLOGIST PROVIDED HISTORY: ETT placement Reason for Exam: on vent FINDINGS: There is right lower lung infiltrate. There is no pneumothorax. The mediastinal structures are unremarkable. The upper abdomen is unremarkable. The extrathoracic soft tissues are unremarkable. There is an endotracheal tube with the tip in the midtrachea. There is a right-sided chest tube.  There is a right internal jugular central line with the tip at the cavoatrial junction. There is a gastric tube and the tip is not well visualized. Right lower lung infiltrate that is mildly improved. Support tubes as described above. XR CHEST PORTABLE    Result Date: 4/17/2022  EXAMINATION: ONE XRAY VIEW OF THE CHEST 4/17/2022 6:04 am COMPARISON: 04/16/2022, 1248 hours HISTORY: ORDERING SYSTEM PROVIDED HISTORY: ETT placement TECHNOLOGIST PROVIDED HISTORY: ETT placement Reason for Exam: ETT 73-year-old female with endotracheal tube placement FINDINGS: Endotracheal tube distal tip overlying the mid trachea approximately 4.8 cm above the level of the nimesh. Enteric tube traverses the GE junction with distal tip excluded from the field of view. Right IJ approach central venous catheter distal tip overlying the right atrium. Right-sided chest tube distal tip projects over the right hilum. Cardiac monitor leads overlie the chest. No obvious pneumothorax. No free air. Cardiac and mediastinal contours remain unchanged. Left lung is relatively clear. Persistent airspace disease at the right mid and right lower lung zones. Visualized osseous structures remain unchanged. Subcutaneous emphysema previously seen at the right lateral chest wall no longer identified. 1. Persistent airspace disease at the right mid and right lower lung zones. Follow-up is recommended to document resolution. 2. Tubes and right IJ line as detailed above. XR CHEST PORTABLE    Result Date: 4/16/2022  EXAMINATION: ONE XRAY VIEW OF THE CHEST 4/16/2022 1:10 pm COMPARISON: 04/16/2022, 1213 hours HISTORY: ORDERING SYSTEM PROVIDED HISTORY: chest tube TECHNOLOGIST PROVIDED HISTORY: chest tube Reason for Exam: chest tube Additional signs and symptoms: chest tube and NG tube placement 73-year-old female with history of NG tube and chest tube placement FINDINGS: Portable supine view of the chest. Right-sided chest tube has been placed with distal tip projecting over the right infrahilar region.  Right IJ approach central venous catheter distal tip overlying the cavoatrial junction, stable. Endotracheal tube distal tip overlying the mid trachea approximately 4.3 cm above the level of the nimesh. Enteric tube traverses the GE junction with distal tip projecting over the left mid abdomen likely within the stomach body. Left lung is clear. Pleural thickening and opacity involving the right mid and right lower lung zones. Subcutaneous emphysema along the right lateral chest wall. Cardiac and mediastinal contours remain unchanged. Atherosclerotic calcification of the thoracic aorta. No free air. There is re-expansion of the right lung compared with the prior study. Probable small residual inferolateral right pneumothorax component. 1. Tubes and right IJ line as detailed above. Re-expansion of the right lung compared with the prior study. There is likely a small residual right inferolateral pneumothorax component. 2. Pleural thickening and airspace opacity involving the right mid and right lower lung zones. Follow-up is recommended to document resolution. 3. Subcutaneous emphysema along the right lateral chest wall. XR CHEST PORTABLE    Result Date: 4/16/2022  EXAMINATION: ONE XRAY VIEW OF THE CHEST 4/16/2022 12:24 pm COMPARISON: None. HISTORY: ORDERING SYSTEM PROVIDED HISTORY: Intubation TECHNOLOGIST PROVIDED HISTORY: Intubation Reason for Exam: central line and ET placement FINDINGS: ET tube terminates 3 cm above the nimesh. Right line terminates in the SVC. There is a relatively stable right-sided basilar pneumothorax. The remaining lungs are unchanged. Cardiac silhouette and osseous structures unchanged. Intubation as above.   No significant change     XR CHEST PORTABLE    Result Date: 4/16/2022  EXAMINATION: ONE XRAY VIEW OF THE CHEST 4/16/2022 11:02 am COMPARISON: 04/05/2022 HISTORY: ORDERING SYSTEM PROVIDED HISTORY: SOB TECHNOLOGIST PROVIDED HISTORY: SOB Reason for Exam: SOB FINDINGS: There is a moderate loculated right basal pneumothorax. Cavitary lesion is noted in the right lung base. Left lung is unremarkable. Heart and mediastinal structures appear normal.  There is no evidence for any tension phenomena. Moderate loculated right basal pneumothorax. Evidence for tension. Cavitary lesion noted in the right lung base. .  Case discussed with Dr. Lola Kay. XR CHEST PORTABLE    Result Date: 4/1/2022  EXAMINATION: ONE XRAY VIEW OF THE CHEST 4/1/2022 4:01 pm COMPARISON: 04/01/2022 HISTORY: ORDERING SYSTEM PROVIDED HISTORY: central line placed TECHNOLOGIST PROVIDED HISTORY: central line placed Reason for Exam: Central line placed FINDINGS: There is a right internal jugular central line in place with distal tip overlying the superior vena cava. Previously noted right basal infiltrate is unchanged. Left lung appears clear. The heart and mediastinal structures appear normal.  There is no evidence of pneumothorax. Satisfactory position of right internal jugular central line. No change in the the right basal infiltrate. XR CHEST PORTABLE    Result Date: 4/1/2022  EXAMINATION: ONE XRAY VIEW OF THE CHEST 4/1/2022 1:22 pm COMPARISON: 06/17/2020. CT dated 10/15/2021. HISTORY: ORDERING SYSTEM PROVIDED HISTORY: dyspnea TECHNOLOGIST PROVIDED HISTORY: dyspnea Reason for Exam: Dyspnea Relevant Medical/Surgical History: Hx of COPD FINDINGS: The cardiac silhouette appears within normal limits. There are bibasilar opacities, right greater than left which may reflect multifocal pneumonia in the correct clinical setting. No evidence of pleural effusion or pneumothorax is seen. Bibasilar opacities, right greater than left, which may reflect multifocal pneumonia. Follow-up to imaging resolution is recommended.      CT CHEST PULMONARY EMBOLISM W CONTRAST    Result Date: 4/16/2022  EXAMINATION: CTA OF THE CHEST 4/16/2022 2:30 pm TECHNIQUE: CTA of the chest was performed after the administration of intravenous contrast.  Multiplanar reformatted images are provided for review. MIP images are provided for review. Dose modulation, iterative reconstruction, and/or weight based adjustment of the mA/kV was utilized to reduce the radiation dose to as low as reasonably achievable. COMPARISON: CT chest from 10/15/2021 HISTORY: ORDERING SYSTEM PROVIDED HISTORY: SOB TECHNOLOGIST PROVIDED HISTORY: SOB Decision Support Exception - unselect if not a suspected or confirmed emergency medical condition->Emergency Medical Condition (MA) Reason for Exam: sob Relevant Medical/Surgical History: intubated, septic, hx of PE 53-year-old female with shortness of breath FINDINGS: Pulmonary Arteries: No obvious filling defect in the main, right main, or left main pulmonary arteries. Evaluation of the segmental and subsegmental pulmonary arterial vasculature is limited due to respiratory motion suboptimal bolus timing, airspace disease, and streak artifact. There are areas of probable residual linear chronic clot within the right lower lobar pulmonary artery on image 111, series 2. Probable linear residual clot within the anterior right upper lobe pulmonary artery on image 83, series 2. Mediastinum: Atherosclerotic calcification of the aorta and branch vasculature. No dissection flap within the visualized thoracic aorta. No pericardial effusion. There is fluid in the superior pericardial recess. Enlarged precarinal lymph nodes remain unchanged on image 83, series 2. Right hilar lymphadenopathy is seen on image 96, series 2. Coronary artery disease. No left hilar or axillary lymphadenopathy. Lungs/pleura: Endotracheal tube distal tip within the lower trachea. Trachea and very proximal central airways appear patent. Narrowing of the right middle lobe airway into a region of partial consolidation/atelectasis/scarring. Opacification of the right lower lobar segmental airways.   There is a thick-walled cavitary lesion in the right lower lobe measuring 5.5 x 7.3 cm in greatest AP and transverse dimensions on image 68, series 4. There is a dependent air-fluid level within this lesion. This area measures 7.6 cm in greatest craniocaudal extent on image 48, series 602. There is surrounding airspace disease. There is a gas and fluid containing multiloculated pleural collection or hydropneumothorax along the posterior margin of the right lung. Right sided chest tube distal tip along the anteromedial right lung. Subcutaneous emphysema along the right axilla and right lateral chest wall. Stellate pulmonary nodule in the lateral right upper lobe measuring 6 mm on image 32, series 4. Moderate to severe emphysema, dependent atelectasis and respiratory motion. Upper Abdomen: Fatty liver. NG tube is seen extending into the stomach body. Atherosclerotic calcification of the upper abdominal aorta and branch vasculature. Soft Tissues/Bones: Chronic anterior wedge compression deformities, unchanged from 10/15/2021 involving T5 and T6. Mild diffuse degenerative changes throughout the spine. 1. Linear filling defects in the anterior right upper lobe and right lower lobe pulmonary arteries likely representing residual/chronic clot material. No acute central filling defect/pulmonary embolus is evident. 2. Thick-walled cavitary lesion in the right lower lobe measuring up to 5.5 x 7.3 x 7.6 cm which could be related to TB or fungal disease or a necrotizing pneumonia. Underlying cavitary malignancy not entirely excluded. There is surrounding airspace disease. There is also an associated multiloculated pleural collection or hydropneumothorax primarily along the posterior margin of the right lung. Right-sided chest tube distal tip along the anteromedial right pleura/lung. 3. Partial consolidation, atelectasis and/or infiltrate of the right middle lobe. 4. Stellate 6 mm lateral right upper lobe pulmonary nodule. Follow-up guidelines provided below.  5. Underlying moderate to severe emphysema. 6. Endotracheal and NG tubes as above. 7. Coronary artery disease. 8. Chronic anterior wedge compression deformities of T5 and T6. 9. Subcutaneous emphysema along the right axilla and right lateral chest wall likely related to chest tube. 10. Mediastinal and right hilar lymphadenopathy. RECOMMENDATIONS: 6 mm suspicious right solid pulmonary nodule within the upper lobe. Recommend a non-contrast Chest CT at 6-12 months, then another non-contrast Chest CT at 18-24 months. These guidelines do not apply to immunocompromised patients and patients with cancer. Follow up in patients with significant comorbidities as clinically warranted. For lung cancer screening, adhere to Lung-RADS guidelines. Reference: Radiology. 2017; 284(1):228-43. VL Lower Extremity Bilateral Venous Duplex    Result Date: 4/18/2022    [facility]  Vascular Lower Extremities DVT Study Procedure   Patient Name   Lenora King     Date of Study           04/18/2022                 BOBBY OROSCO   Date of Birth  1955  Gender                  Female   Age            74 year(s)  Race                       Room Number    2002   Corporate ID # [id number]   Patient Acct # [de-identified]   MR #           [id number]      Sonographer             Ruiz Carballo RVT   Accession #    [id number]  Interpreting Physician  Domo Norris   Referring                  Referring Physician     Aleja Owens  Nurse  Practitioner  Procedure Type of Study:   Veins: Lower Extremities DVT Study, Venous Scan Lower Bilateral.  Indications for Study:R/O DVT. Patient Status: In Patient. Technical Quality:Adequate visualization. Conclusions   Summary   Bilateral:  No evidence of deep or superficial venous thrombosis.    Signature   ----------------------------------------------------------------  Electronically signed by Ruiz Carballo RVT(Sonographer) on  04/18/2022 07:45 AM ----------------------------------------------------------------   ----------------------------------------------------------------  Electronically signed by [facility] physician)  on 04/18/2022 01:10 PM  ----------------------------------------------------------------  Findings:   Right Impression:                    Left Impression:  The common femoral, femoral,         The common femoral, femoral,  popliteal and tibial veins           popliteal and tibial veins  demonstrate normal compressibility   demonstrate normal compressibility  and augmentation. and augmentation. Normal compressibility of the great  Normal compressibility of the great  saphenous vein. saphenous vein. Normal compressibility of the small  Normal compressibility of the small  saphenous vein. saphenous vein. Velocities are measured in cm/s ; Diameters are measured in cm Right Lower Extremities DVT Study Measurements Right 2D Measurements +------------------------------------+----------+---------------+----------+ ! Location                            ! Visualized! Compressibility! Thrombosis! +------------------------------------+----------+---------------+----------+ ! Common Femoral                      !Yes       ! Yes            ! None      ! +------------------------------------+----------+---------------+----------+ ! Prox Femoral                        !Yes       ! Yes            ! None      ! +------------------------------------+----------+---------------+----------+ ! Mid Femoral                         !Yes       ! Yes            ! None      ! +------------------------------------+----------+---------------+----------+ ! Dist Femoral                        !Yes       ! Yes            ! None      ! +------------------------------------+----------+---------------+----------+ ! Deep Femoral                        !Yes       ! Yes            ! None      ! +------------------------------------+----------+---------------+----------+ ! Popliteal                           !Yes       ! Yes            ! None      ! +------------------------------------+----------+---------------+----------+ ! Sapheno Femoral Junction            ! Yes       ! Yes            ! None      ! +------------------------------------+----------+---------------+----------+ ! PTV                                 ! Yes       ! Yes            ! None      ! +------------------------------------+----------+---------------+----------+ ! Peroneal                            !Yes       ! Yes            ! None      ! +------------------------------------+----------+---------------+----------+ ! Gastroc                             ! Yes       ! Yes            ! None      ! +------------------------------------+----------+---------------+----------+ ! GSV Thigh                           ! Yes       ! Yes            ! None      ! +------------------------------------+----------+---------------+----------+ ! GSV Knee                            ! Yes       ! Yes            ! None      ! +------------------------------------+----------+---------------+----------+ ! GSV Ankle                           ! Yes       ! Yes            ! None      ! +------------------------------------+----------+---------------+----------+ ! SSV                                 ! Yes       ! Yes            ! None      ! +------------------------------------+----------+---------------+----------+ Left Lower Extremities DVT Study Measurements Left 2D Measurements +------------------------------------+----------+---------------+----------+ ! Location                            ! Visualized! Compressibility! Thrombosis! +------------------------------------+----------+---------------+----------+ ! Common Femoral                      !Yes       ! Yes            ! None      ! +------------------------------------+----------+---------------+----------+ ! James Femoral !Yes       !Yes            ! None      ! +------------------------------------+----------+---------------+----------+ ! Mid Femoral                         !Yes       ! Yes            ! None      ! +------------------------------------+----------+---------------+----------+ ! Dist Femoral                        !Yes       ! Yes            ! None      ! +------------------------------------+----------+---------------+----------+ ! Deep Femoral                        !Yes       ! Yes            ! None      ! +------------------------------------+----------+---------------+----------+ ! Popliteal                           !Yes       ! Yes            ! None      ! +------------------------------------+----------+---------------+----------+ ! Sapheno Femoral Junction            ! Yes       ! Yes            ! None      ! +------------------------------------+----------+---------------+----------+ ! PTV                                 ! Yes       ! Yes            ! None      ! +------------------------------------+----------+---------------+----------+ ! Peroneal                            !Yes       ! Yes            ! None      ! +------------------------------------+----------+---------------+----------+ ! Gastroc                             ! Yes       ! Yes            ! None      ! +------------------------------------+----------+---------------+----------+ ! GSV Thigh                           ! Yes       ! Yes            ! None      ! +------------------------------------+----------+---------------+----------+ ! GSV Knee                            ! Yes       ! Yes            ! None      ! +------------------------------------+----------+---------------+----------+ ! GSV Ankle                           ! Yes       ! Yes            ! None      ! +------------------------------------+----------+---------------+----------+ ! SSV                                 ! Yes       ! Yes            ! None      ! +------------------------------------+----------+---------------+----------+    FL MODIFIED BARIUM SWALLOW W VIDEO    Result Date: 4/4/2022  EXAMINATION: MODIFIED BARIUM SWALLOW WAS PERFORMED IN CONJUNCTION WITH SPEECH PATHOLOGY SERVICES TECHNIQUE: Fluoroscopic evaluation of the swallowing mechanism was performed using cineradiography with multiple consistency of barium product in conjunction with speech pathology services. FLUOROSCOPY DOSE AND TYPE OR TIME AND EXPOSURES: DAP 49.2 dGy cm squared COMPARISON: None HISTORY: ORDERING SYSTEM PROVIDED HISTORY: aspiration pna TECHNOLOGIST PROVIDED HISTORY: aspiration pna Reason for Exam: aspiration pneumonia FINDINGS: Premature vallecular spillage. There was trace penetration with straw with thin liquid. No aspiration. No aspiration. Trace penetration with straw with thin liquids. Please see separate speech pathology report for full discussion of findings and recommendations. RECOMMENDATIONS: Unavailable         Physical Examination:        Physical Exam  Constitutional:       General: She is not in acute distress. Appearance: She is not ill-appearing. Comments: Sedated, comfortable appearing, unresponsive   Cardiovascular:      Rate and Rhythm: Normal rate and regular rhythm. Pulmonary:      Breath sounds: Rhonchi and rales present. Abdominal:      General: Bowel sounds are normal. There is no distension. Musculoskeletal:      Right lower leg: No edema. Left lower leg: No edema.    Neurological:      Comments: Unresponsive, sedated           Assessment:        Primary Problem  Sepsis [facility])    Active Hospital Problems    Diagnosis Date Noted    Hyperkalemia [E87.5] 04/21/2022     Priority: Medium    Acute systolic HF (heart failure) (HCC) [I50.21] 04/19/2022    Pneumothorax on right [J93.9]     HCAP (healthcare-associated pneumonia) [J18.9]     Sepsis ([facility] [address] [A41.9] 04/16/2022    Acute on chronic respiratory failure ([facility] [address] [J96.20] 04/16/2022  Cavitary lesion of lung [J98.4] 04/16/2022    History of DVT (deep vein thrombosis) [Z86.718] 04/16/2022    Pneumothorax, right [J93.9] 04/16/2022    Status post chest tube placement ([address] [Z93.8] 04/16/2022    History of pulmonary embolus (PE) [Z86.711] 04/02/2022    Chronic respiratory failure with hypoxia (HCC) [J96.11] 08/05/2021    Compression fracture of body of thoracic vertebra ([facility] [address] [S22.000A] 09/28/2020    Lung nodule [R91.1] 08/22/2018    Bipolar disorder ([address] [F31.9]     Chronic obstructive pulmonary disease ([facility] [address] [J44.9] 01/06/2016       Plan:        Sepsis due to pneumonia with abcess vs empyema, pseudomonas sp. Tachypnea, tachycardia  WBC 18>14>10>12.2>9.8 today  Pro-Branden >100 (>100 on repeat), , Lactate 2.8> 1.5  Chest x-ray: Moderate loculated right basal pneumothorax.  Evidence for tension.  Cavitary lesion noted in the right lung base  CT chest: Thick-walled cavitary lesion RLL.  Right chest tube in situ for posterior right lung or right pneumothorax.  Infiltrates of the right middle lobe.  Stellate 6 mm lateral RUL nodule.  Mediastinal and right hilar lymphadenopathy  Received 1 L bolus  Now getting 1/2 NS at 15 cc/h  Initially placed on broad-spectrum antibiotic with cefepime, vancomycin  ID and Critical care following  Respiratory cultures x2 (suctioned sputum and bronchial washing) gram-positive cocci in pairs, Pseudomonas   Chest tubes cultures grew Pseudomonas Aeruginosa   On Merrem IV per ID        Acute on chronic respiratory failure 2/2 pneumothorax and pseudomonas pneumonia  History of severe COPD - 3 L home O2 baseline  CXR right pneumothorax on admission   S/p intubation and right chest tube placement  Sedated with propofol  Currently on  Vent with FiO2 30% PEEP 8, unable to be weaned, continue weaning trials and sedation vacation as tolerated - discussion with pt's daughter regarding monitoring over the weekend and if still unable to wean or not showing signs of improvement, consider withdrawal of care, as it was against patient's wishes to have trach/PEG  Continue solumedrol 40mg q8h      Acute systolic heart failure  Echo Estimated LV EF 45-50 %  Probnp 1000s   Lasix 20mg IV BID    Type II DM Insulin sliding scale and lantus 18 Units twice daily     Hx DVT/ PE: Eliquis was discontinued in 12/2021  History bipolar disorder: Trazodone, Risperidone HELD       Diet: NPO, on Tube feeds; had some hyperkalemia, resolved  GI ppx: Pepcid 20 mg twice daily IV  DVT ppx: Heparin 5000 units TID   Code status: Full code  Consults: pulm, ID  Dispo: referral sent to [facility], patient will not likely be discharged until next week    Ave Maradiaga MD  4/23/2022  9:22 AM       I have discussed the care of Garfield Leavitt , including pertinent history and exam findings,    today with the resident. I have seen and examined the patient and the key elements of all parts of the encounter have been performed by me . I agree with the assessment, plan and orders as documented by the resident. Principal Problem:    Sepsis ([address]  Active Problems:    Hyperkalemia    Pseudomonas aeruginosa infection    Elevated procalcitonin    Chronic obstructive pulmonary disease (HCC)    Bipolar disorder (HCC)    Lung nodule    Compression fracture of body of thoracic vertebra (HCC)    Chronic respiratory failure with hypoxia (HCC)    History of pulmonary embolus (PE)    Acute on chronic respiratory failure (HCC)    Cavitary lesion of lung    History of DVT (deep vein thrombosis)    Pneumothorax, right    Status post chest tube placement (HCC)    Pneumothorax on right    HCAP (healthcare-associated pneumonia)    Acute systolic HF (heart failure) ([address]  Resolved Problems:    * No resolved hospital problems. *        Overall  course ;                                   show no change over time.         Patient, tolerating weaning trial  No new complaints  Talk to daughter at bedside  Patient has history of COPD, heart failure with reduced ejection fraction, diabetes  Admitted with empyema of right lower lobe, has underlying lung abscess, pneumothorax required chest tube insertion  Growing Pseudomonas, on antibiotics  Patient daughter mentioned that she is alcoholic  Patient critically sick, CC time is 35-minute  High Chances of  clinical deterioration            Electronically signed by Millicent Moore MD

## 2022-04-23 NOTE — CONSULTS
General Surgery  Consult    PATIENT NAME: Michelle Bo  AGE: 74 y.o. MEDICAL RECORD NO. [id number]  DATE: 4/23/2022  SURGEON: Fox Peraza MD  PRIMARY CARE PHYSICIAN: Blanca Velázquez MD    Patient evaluated at the request of  Dr. Migel Jon   Reason for evaluation: chest tube eval and removal  Patient information was obtained from chart and nurse. History/Exam limitations:patient is intubated and sedated. Daughter at bedside as well.      IMPRESSION:     Patient Active Problem List   Diagnosis    Chronic obstructive pulmonary disease ([address]    Vitamin D deficiency    Anxiety    Asthma    Bipolar disorder ([address]    Depression    Hyperlipidemia with target LDL less than 100    Sleep apnea    Vision abnormalities    Status post hysteroscopy    Chronic headaches    IBS (irritable bowel syndrome)    Colon polyp    Collagenous colitis    Lung nodule    Left elbow pain    Dilated bile duct    Acute pain of left shoulder    Adhesive capsulitis of left shoulder    Leucopenia    Compression fracture of body of thoracic vertebra (HCC)    Age-related osteoporosis with current pathological fracture    Pulmonary embolism on right ([address]    Migraines    Paranoid behavior ([address]    Acute deep vein thrombosis (DVT) of right lower extremity (HCC)    Delirium    Chronic respiratory failure with hypoxia (HCC)    Major neurocognitive disorder ([address]    Pneumonia    Chronic respiratory failure with hypoxia, on home O2 therapy ([address]    COPD (chronic obstructive pulmonary disease) ([address]    TARM (acute kidney injury) ([address]    History of pulmonary embolus (PE)    Mycoplasma pneumonia    Iron deficiency anemia    Sepsis ([address]    Acute on chronic respiratory failure ([address]    Cavitary lesion of lung    History of DVT (deep vein thrombosis)    Pneumothorax, right    Status post chest tube placement (HCC)    Pneumothorax on right    HCAP (healthcare-associated pneumonia)    Acute systolic HF (heart failure) ([address]    Hyperkalemia    Pseudomonas aeruginosa infection    Elevated procalcitonin   1. 68yo F with R chest tube placed in ED. [place] eye not in the chest cavity. Air leak present in pleurovac likely related to chest tube eye being out of pleural cavity. 2. CXR reviewed. No discernable pneumothorax this AM. CT has been on waterseal.     3. Respiratory failure requiring mechanical ventilation. PLAN:   1. Chest tube removed at beside with no issues. Occlusive dressing placed. 2. Repeat CXR in 4 hrs and in AM.  3. Counseled daughter that if pneumothorax recurs she will need a new chest tube placed. 4. Please keep occlusive dressing over site for at least 48hrs. HISTORY:   History of Chief Complaint:    Moody Maher is a 74 y.o. female who presented with SOB. She was found to have a R pneumothorax on imaging in ED and chest tube was placed in ED. She has been mechanically ventilated and being treated for pneumonia. General surgery is consulted for evaluation of tube and possible chest tube removal. The chest tube sentinel eye is outside of the pleural cavity. There is a small air leak. CXR this morning did not demonstrate obvious pneumothorax. Past Medical History   has a past medical history of Abnormal EKG, TRAM (acute kidney injury) ([address] Anxiety, Asthma, Bipolar disorder ([address] COPD (chronic obstructive pulmonary disease) ([address] Cramps, extremity, Depression, Dilated bile duct, Headache, History of elective , Hyperlipidemia, Irritable bowel syndrome, Prolonged emergence from general anesthesia, Substance abuse ([address] Unspecified sleep apnea, and Vision abnormalities. Past Surgical History   has a past surgical history that includes Tonsillectomy and adenoidectomy (Bilateral); LASIK; Induced ; Ankle surgery; eye surgery (Bilateral); Vulva surgery; hysteroscopy (10/19/2016); Colonoscopy (02/15/2018); and Bronchoscopy (2022).   Medications  Prior to Admission medications    Medication Sig Start Date End Date Taking? Authorizing Provider   ibuprofen (ADVIL;MOTRIN) 400 MG tablet Take 1 tablet by mouth 2 times daily as needed for Pain Short term. Take with food.  4/14/22   Shelva Cabot, MD   lidocaine 4 % external patch Place 1 patch onto the skin daily 4/14/22 5/14/22  Shelva Cabot, MD   docusate sodium (COLACE) 100 MG capsule take 1 capsule by mouth twice a day 4/5/22   Shelva Cabot, MD   albuterol sulfate HFA (VENTOLIN HFA) 108 (90 Base) MCG/ACT inhaler Inhale 2 puffs into the lungs every 6 hours as needed for Wheezing    Historical Provider, MD   sodium chloride (OCEAN, BABY AYR) 0.65 % nasal spray 1 spray by Nasal route as needed for Congestion    Historical Provider, MD   olopatadine (PATANOL) 0.1 % ophthalmic solution Place 1 drop into both eyes 2 times daily    Historical Provider, MD   aspirin 81 MG EC tablet Take 81 mg by mouth daily    Historical Provider, MD   Calcium Carb-Cholecalciferol 600-500 MG-UNIT TABS Take 2 tablets by mouth daily    Historical Provider, MD   butalbital-acetaminophen-caffeine (FIORICET, ESGIC) -40 MG per tablet Take 1 tablet by mouth every 6 hours as needed for Headaches Indications: #25 for 30 days filled 2/4/22    Historical Provider, MD   topiramate (TOPAMAX) 50 MG tablet Take 100 mg by mouth at bedtime    Historical Provider, MD   rivastigmine (EXELON) 4.5 MG capsule take 1 capsule by mouth twice a day 3/29/22   Shelva Cabot, MD   acetaminophen (ACETAMINOPHEN EXTRA STRENGTH) 500 MG tablet take 2 tablets by mouth every 6 hours if needed for pain 3/22/22   Shelva Cabot, MD   EPINEPHrine (EPIPEN 2-SHARITA) 0.3 MG/0.3ML SOAJ injection Inject 0.3 mLs into the muscle once as needed (for allergic reaction) Use as directed for allergic reaction 3/22/22 3/22/22  Shelva Cabot, MD   vitamin D (ERGOCALCIFEROL) 1.25 MG (33708 UT) CAPS capsule take 1 capsule by mouth every week 2/28/22   Shelva Cabot, MD   ibandronate (BONIVA) 150 MG tablet Take 1 tablet by mouth every 30 days Take one (1) tablet once per month in the morning with a full glass of water, on an empty stomach, and do not take anything else by mouth or lie down for the next 30 minutes.  2/8/22   Juan Kendall MD   SPIRIVA RESPIMAT 2.5 MCG/ACT AERS inhaler inhale 2 puffs INTO THE LUNGS once daily 1/10/22   Juan Kendall MD   lidocaine (LMX) 4 % cream apply topically every 8 hours if needed 12/13/21   Juan Kendall MD   topiramate (TOPAMAX) 50 MG tablet Take 50 mg by mouth daily  12/7/21   Historical Provider, MD   vitamin B-12 (CYANOCOBALAMIN) 100 MCG tablet Take 50 mcg by mouth daily    Historical Provider, MD Jose C Jhaveri ELLIPTA 100-25 MCG/INH AEPB inhaler Inhale 1 puff into the lungs daily  8/13/21   Historical Provider, MD   simvastatin (ZOCOR) 20 MG tablet Take 1 tablet by mouth nightly 8/26/21   Juan Kendall MD   cetirizine (ZYRTEC) 10 MG tablet Take 10 mg by mouth daily  Patient not taking: Reported on 4/14/2022    Historical Provider, MD   fluticasone [facility]) 50 MCG/ACT nasal spray 2 sprays by Nasal route daily 8/5/21 8/5/22  Shirley Pina MD   risperiDONE (RISPERDAL) 0.5 MG tablet Take 3 tablets by mouth every 12 hours Per [facility] psych 8/5/21   Shirley Pina MD   traZODone (DESYREL) 50 MG tablet Take 50 mg by mouth nightly  7/28/21   Historical Provider, MD   albuterol (PROVENTIL) (2.5 MG/3ML) 0.083% nebulizer solution Take 3 mLs by nebulization 4 times daily 11/21/17   Eliseo Rivera MD    Scheduled Meds:   multivitamin  1 tablet Oral Daily    thiamine and folic acid IVPB  1 mg IntraVENous Daily    insulin glargine  18 Units SubCUTAneous BID    insulin lispro  0-12 Units SubCUTAneous Q6H    methylPREDNISolone  40 mg IntraVENous Q8H    polyethylene glycol  17 g Oral Daily    furosemide  20 mg IntraVENous BID    meropenem  1,000 mg IntraVENous Q8H    albuterol  2.5 mg Nebulization Q4H    acetylcysteine  200 mg Inhalation Q4H    sodium chloride flush 5-40 mL IntraVENous 2 times per day    [Held by provider] risperiDONE  1.5 mg Oral Q12H    [Held by provider] rivastigmine  4.5 mg Oral BID    atorvastatin  20 mg Oral Daily    [Held by provider] traZODone  50 mg Oral Nightly    polyvinyl alcohol  1 drop Both Eyes Q4H    And    artificial tears   Both Eyes Q4H    chlorhexidine  15 mL Mouth/Throat BID    famotidine (PEPCID) injection  20 mg IntraVENous BID    heparin (porcine)  5,000 Units SubCUTAneous 3 times per day     Continuous Infusions:   sodium chloride 15 mL/hr at 04/23/22 0026    sodium chloride      dextrose      propofol Stopped (04/23/22 0852)     PRN Meds:.hydrALAZINE, sodium chloride flush, sodium chloride, sodium chloride flush, potassium chloride **OR** potassium alternative oral replacement **OR** potassium chloride, glucose, dextrose, glucagon (rDNA), dextrose, fentanNYL  Allergies  is allergic to advil [ibuprofen], aleve [naproxen], antipyrine, celecoxib, codeine, fomepizole, incruse ellipta [umeclidinium bromide], other, rofecoxib, salicylates, strawberry extract, sulfinpyrazone, aspirin, and nsaids. Family History  family history includes Dementia in her maternal aunt; Heart Attack in her paternal grandmother and sister; High Cholesterol in her brother; Kidney Disease in her mother; Prostate Cancer in her father. Social History   reports that she quit smoking about 3 years ago. Her smoking use included cigarettes. She has a 84.00 pack-year smoking history. She has never used smokeless tobacco.   reports current alcohol use. reports no history of drug use.   Review of Systems  General Denies any fever or chills  HEENT  Denies any diplopia, tinnitus or vertigo  Resp Denies any shortness of breath, cough or wheezing  Cardiac Denies any chest pain, palpitations, claudication or edema  GI Denies any melena, hematochezia, hematemesis or pyrosis   Denies any frequency, urgency, hesitancy or incontinence  Heme Denies bruising or bleeding easily  Endocrine Denies any history of diabetes or thyroid disease  Neuro Denies any focal motor or sensory deficits    PHYSICAL:   VITALS:  height is 5' 5\" (1.651 m) and weight is 183 lb 6.8 oz (83.2 kg). Her oral temperature is 98.7 °F (37.1 °C). Her blood pressure is 149/65 (abnormal) and her pulse is 84. Her respiration is 23 and oxygen saturation is 98%. CONSTITUTIONAL: Awake. Intubated. On sedation. HEENT: Head is normocephalic, atraumatic. EOMI, PERRLA  NECK: Soft, trachea midline and straight  LUNGS: Chest expands equally bilaterally upon respiration, no accessory muscle used. Ausculation reveals no wheezes, rales or rhonchi. R chest tube in place. CARDIOVASCULAR: Heart sounds are normal.  Regular rate and rhythm without murmur, gallop or rub. ABDOMEN: soft, nontender, nondistended, no masses or organomegaly, no hernias palpable, no guarding or peritoneal signs. Bowel sounds are present in all four quadrants Scars are consistent with previous surgical history. NEUROLOGIC: CN II-XII are grossly intact. There are no focalizing motor or sensory deficits  EXTREMITIES: no cyanosis, clubbing or edema    LABS:     Recent Labs     04/21/22  0430 04/22/22  0429 04/23/22  0445   WBC 12.2* 9.8 9.2   HGB 8.6* 8.8* 8.9*   HCT 26.9* 27.1* 27.5*    356 363   * 142 143   K 5.5* 5.3 4.9    101 99   CO2 35* 37* 39*   BUN 39* 44* 47*   CREATININE 0.58 0.47* 0.45*   CALCIUM 8.1* 8.5* 8.6     No results for input(s): ALKPHOS, ALT, AST, BILITOT, BILIDIR, LABALBU, AMYLASE, LIPASE in the last 72 hours. Thank you for the interesting evaluation. Further recommendations to follow.     Chaim Tena MD  4/23/2022, 4:46 PM

## 2022-04-23 NOTE — PLAN OF CARE
Problem: Non-Violent Restraints  Goal: Removal from restraints as soon as assessed to be safe  4/23/2022 1625 by Anne Park RN  Outcome: Progressing  Note: Patient attempts to pull at lines and tubes when unrestranied, restraints continued      Problem: Falls - Risk of:  Goal: Absence of physical injury  Description: Absence of physical injury  4/23/2022 1625 by Anne Park RN  Outcome: Progressing  Note: Patient remains free from falls this shift, appropriate safety measures in place      Problem: Skin Integrity:  Goal: Will show no infection signs and symptoms  Description: Will show no infection signs and symptoms  4/23/2022 1625 by Anne Park RN  Outcome: Progressing  Note: Patient has scattered bruising and redness noted      Problem: OXYGENATION/RESPIRATORY FUNCTION  Goal: Patient will maintain patent airway  4/23/2022 1625 by Anne Park RN  Outcome: Progressing  Note: Patient remains orally intubated

## 2022-04-23 NOTE — PROGRESS NOTES
BRONCHOSPASM/BRONCHOCONSTRICTION     [x]         IMPROVE AERATION/BREATH SOUNDS  [x]   ADMINISTER BRONCHODILATOR THERAPY AS APPROPRIATE  [x]   ASSESS BREATH SOUNDS  []   IMPLEMENT AEROSOL/MDI PROTOCOL  [x]   PATIENT EDUCATION AS NEEDED    PROVIDE ADEQUATE OXYGENATION WITH ACCEPTABLE SP02/ABG'S    [x]  IDENTIFY APPROPRIATE OXYGEN THERAPY  [x]   MONITOR SP02/ABG'S AS NEEDED   [x]   PATIENT EDUCATION AS NEEDED    Pt continues to be on weaning trial, daughter at bedside

## 2022-04-23 NOTE — PROGRESS NOTES
[facility] -   Infectious diseases evaluation  admission date 4/16/2022    reason for consultation:   RLL Cavitary Lung Lesions    Impression :   Current:  · Right lower lobe cavitary lesions associated with multiloculated pleural fluid collection likely abscess with empyema status post chest tube with Pseudomonas growth on culture  · Sepsis secondary to above  · Elevated Procalcitonin  · Acute on chronic respiratory failure required intubation  · Right-sided pneumothorax  · Severe COPD  ·     Other:  ·   Discussion / summary of stay / plan of care   · BAL from 4/18 showing Pseudomonas Aeruginosa. AFB negative. Fungal cx pending. Recommendations   · Meropenem 1 gm IV every 8 hours, Day 5  · Follow CBC and renal function closely. · Recheck Procalcitonin. · Follow sputum cx. Adjust antibiotics. · Supportive care. · Discussed with daughter at the bedside, RN. Infection Control Recommendations   · Bronte Precautions    Antimicrobial Stewardship Recommendations   · Simplification of therapy  · Targeted therapy    Coordination ofOutpatient Care:   · Estimated Length of IV antimicrobials:  · Patient will need Midline / picc Catheter Insertion:   · Patient will need SNF:  · Patient will need outpatient wound care:     History of Present Illness:   Initial history:  Garfield Leavitt is a 74y.o.-year-old female who  presents to the hospital with worsening shortness of breath associated with cough. At the ER with tachypneic and hypoxic  Chest x-ray showed pneumothorax  The patient was in respiratory distress, was intubated. The patient is intubated, sedated, unable to provide history that was obtained from chart review. CT chest 4/16/2022 showed right lower lobe cavitary lesion with surrounding airspace disease and multiloculated pleural collection. The patient had chest tube placed with gram-negative rods growth on pleural fluid culture.   Respiratory culture grew gram-positive cocci in pairs  Initial WBC was 18.5, procalcitonin no more than 100  Respiratory panel with COVID-19 PCR was negative  Urine for Legionella and strep pneumo antigen negative    Interval changes  4/23/2022   T 99.2. CXR reviewed. Remains intubated. Currently weaning on 40% FIO2/P8. Arouses easily. Follows commands. R IJ TLC site clean. No kumari. Rectal pouch. Liquid stool. Patient Vitals for the past 8 hrs:   BP Temp Temp src Pulse Resp SpO2   04/23/22 0900 (!) 201/67 -- -- 86 23 95 %   04/23/22 0800 (!) 137/55 99.2 °F (37.3 °C) Oral 89 26 (!) 88 %   04/23/22 0700 (!) 182/55 -- -- 78 25 99 %   04/23/22 0600 (!) 145/52 -- -- 67 24 94 %   04/23/22 0500 (!) 136/52 -- -- 63 21 93 %   04/23/22 0400 (!) 143/51 99 °F (37.2 °C) Oral 64 21 93 %   04/23/22 0300 133/66 -- -- 72 26 98 %   04/23/22 0200 131/60 -- -- 61 24 94 %       Summary of relevant labs:  Labs:  Procalcitonin: > 100.00  WBC: 12.2-9.8-9.2    Micro:  4/16 Pleural fluid Pseudomonas Aeruginosa. pan Sensitive. Intermediate to Cipro. 4/16 Pseudomonas Aeruginosa. Sensitivity same as pleural fluid. Imaging:      I have personally reviewed the past medical history, past surgical history, medications, social history, and family history, and I haveupdated the database accordingly. Allergies:   Advil [ibuprofen], Aleve [naproxen], Antipyrine, Celecoxib, Codeine, Fomepizole, Incruse ellipta [umeclidinium bromide], Other, Rofecoxib, Salicylates, Strawberry extract, Sulfinpyrazone, Aspirin, and Nsaids     Review of Systems:     Review of Systems   Reason unable to perform ROS: Intubated. Physical Examination :       Physical Exam  Vitals and nursing note reviewed. Constitutional:       General: She is not in acute distress. Comments: Arouseable. Follows commands. HENT:      Head: Normocephalic and atraumatic.       Left Ear: External ear normal.      Nose: Nose normal.      Mouth/Throat:      Mouth: Mucous membranes are moist.      Pharynx: Oropharynx is clear. Eyes:      General: No scleral icterus. Conjunctiva/sclera: Conjunctivae normal.   Neck:      Comments: R IJ TLC site clean. Cardiovascular:      Rate and Rhythm: Normal rate and regular rhythm. Heart sounds: Normal heart sounds. Pulmonary:      Effort: Pulmonary effort is normal. No respiratory distress. Breath sounds: No wheezing. Comments: Intubated. Weaning. 40%FIO2/P8. Abdominal:      General: Bowel sounds are normal. There is no distension. Palpations: Abdomen is soft. Tenderness: There is no abdominal tenderness. Genitourinary:     Comments: No kumari. Musculoskeletal:      Right lower leg: No edema. Left lower leg: No edema. Skin:     General: Skin is warm and dry. Capillary Refill: Capillary refill takes less than 2 seconds. Findings: No rash. Neurological:      Comments: Arouseable. Follows commands.    Psychiatric:         Behavior: Behavior normal.         Past Medical History:     Past Medical History:   Diagnosis Date    Abnormal EKG     [facility]) [facility] 2022    Anxiety     Asthma     Bipolar disorder ([address]     SEVERE IN , UNISON    COPD [facility]ch[facility]) [facility]     Cramps, extremity     SEVERE LEG CRAMPS    Depression     Dilated bile duct     Headache     History of elective      Hyperlipidemia     Irritable bowel syndrome     Prolonged emergence from general anesthesia     SEVERE ISSUES WAKING UP    Substance abuse ([address]     street drugs when younger   Vivien Menarisa Unspecified sleep apnea     Vision abnormalities     glasses       Past Surgical  History:     Past Surgical History:   Procedure Laterality Date    ANKLE SURGERY      HAD SCREWS AND HARDWARE, THEN REMOVED    BRONCHOSCOPY  2022         COLONOSCOPY  02/15/2018    tubular adenoma    EYE SURGERY Bilateral     CATARACTS    HYSTEROSCOPY  10/19/2016    D & C    INDUCED       LASIK      bilateral    TONSILLECTOMY AND ADENOIDECTOMY Bilateral     VULVA SURGERY      HAD A BIOPSY AND REMOVAL OF       Medications:      insulin glargine  18 Units SubCUTAneous BID    insulin lispro  0-12 Units SubCUTAneous Q6H    methylPREDNISolone  40 mg IntraVENous Q8H    polyethylene glycol  17 g Oral Daily    furosemide  20 mg IntraVENous BID    meropenem  1,000 mg IntraVENous Q8H    albuterol  2.5 mg Nebulization Q4H    acetylcysteine  200 mg Inhalation Q4H    sodium chloride flush  5-40 mL IntraVENous 2 times per day    [Held by provider] risperiDONE  1.5 mg Oral Q12H    [Held by provider] rivastigmine  4.5 mg Oral BID    atorvastatin  20 mg Oral Daily    [Held by provider] traZODone  50 mg Oral Nightly    polyvinyl alcohol  1 drop Both Eyes Q4H    And    artificial tears   Both Eyes Q4H    chlorhexidine  15 mL Mouth/Throat BID    famotidine (PEPCID) injection  20 mg IntraVENous BID    heparin (porcine)  5,000 Units SubCUTAneous 3 times per day       Social History:     Social History     Socioeconomic History    Marital status:      Spouse name: Not on file    Number of children: Not on file    Years of education: Not on file    Highest education level: Not on file   Occupational History    Not on file   Tobacco Use    Smoking status: Former Smoker     Packs/day: 2.00     Years: 42.00     Pack years: 84.00     Types: Cigarettes     Quit date: 2018     Years since quitting: 3.4    Smokeless tobacco: Never Used   Substance and Sexual Activity    Alcohol use: Yes     Comment: yearly    Drug use: No    Sexual activity: Not Currently     Partners: Male     Birth control/protection: Post-menopausal   Other Topics Concern    Not on file   Social History Narrative    Not on file     Social Determinants of Health     Financial Resource Strain: High Risk    Difficulty of Paying Living Expenses: Very hard   Food Insecurity: No Food Insecurity    Worried About Running Out of Food in the Last Year: Never true    Ran Out of Food in the Last Year: Never true   Transportation Needs:     Lack of Transportation (Medical): Not on file    Lack of Transportation (Non-Medical): Not on file   Physical Activity:     Days of Exercise per Week: Not on file    Minutes of Exercise per Session: Not on file   Stress:     Feeling of Stress : Not on file   Social Connections:     Frequency of Communication with Friends and Family: Not on file    Frequency of Social Gatherings with Friends and Family: Not on file    Attends Hoahaoism Services: Not on file    Active Member of [address] [place] [address] [employer] or Organizations: Not on file    Attends Club or Organization Meetings: Not on file    Marital Status: Not on file   Intimate Partner Violence:     Fear of Current or Ex-Partner: Not on file    Emotionally Abused: Not on file    Physically Abused: Not on file    Sexually Abused: Not on file   Housing Stability:     Unable to Pay for Housing in the Last Year: Not on file    Number of [place] in the Last Year: Not on file    Unstable Housing in the Last Year: Not on file       Family History:     Family History   Problem Relation Age of Onset    Dementia Maternal Aunt     Kidney Disease Mother     Heart Attack Sister     Prostate Cancer Father     High Cholesterol Brother     Heart Attack Paternal Grandmother       Medical Decision Making:   I have independently reviewed/ordered the following labs:    CBC with Differential:   Recent Labs     04/22/22  0429 04/23/22  0445   WBC 9.8 9.2   HGB 8.8* 8.9*   HCT 27.1* 27.5*    363     BMP:  Recent Labs     04/22/22 0429 04/23/22  0445    143   K 5.3 4.9    99   CO2 37* 39*   BUN 44* 47*   CREATININE 0.47* 0.45*     Hepatic Function Panel: No results for input(s): PROT, LABALBU, BILIDIR, IBILI, BILITOT, ALKPHOS, ALT, AST in the last 72 hours. No results for input(s): RPR in the last 72 hours.   No results for input(s): HIV in the last 72 hours. No results for input(s): BC in the last 72 hours. Lab Results   Component Value Date    CREATININE 0.45 04/23/2022    GLUCOSE 175 04/23/2022    GLUCOSE 127 07/08/2021       Detailed results: Thank you for allowing us to participate in the care of this patient. Please call with questions. This note is created with the assistance of a speech recognition program.  While intending to generate adocument that actually reflects the content of the visit, the document can still have some errors including those of syntax and sound a like substitutions which may escape proof reading. It such instances, actual meaningcan be extrapolated by contextual diversion.     IVONNE Castorena - CNP  Office: [phone]  Perfect serve / office [phone]

## 2022-04-23 NOTE — PROGRESS NOTES
Upon assessment pt tachycardic, tachypneic, diaphoretic, and using accessory muscles to breathe. Pt placed back on full vent support per RT.

## 2022-04-23 NOTE — FLOWSHEET NOTE
No family present.       04/23/22 1422   Encounter Summary   Encounter Overview/Reason  Spiritual/Emotional Needs   Service Provided For: Patient   Referral/Consult From: Rounding   Complexity of Encounter Low   Spiritual/Emotional needs   Type Spiritual Support   Assessment/Intervention/Outcome   Intervention Prayer (assurance of)/[place]

## 2022-04-24 ENCOUNTER — APPOINTMENT (OUTPATIENT)
Dept: GENERAL RADIOLOGY | Age: 67
DRG: 720 | End: 2022-04-24
Payer: COMMERCIAL

## 2022-04-24 LAB
ALLEN TEST: ABNORMAL
ANION GAP SERPL CALCULATED.3IONS-SCNC: 6 MMOL/L (ref 9–17)
BUN BLDV-MCNC: 50 MG/DL (ref 8–23)
CALCIUM SERPL-MCNC: 9 MG/DL (ref 8.6–10.4)
CARBOXYHEMOGLOBIN: 0.5 % (ref 0–5)
CHLORIDE BLD-SCNC: 98 MMOL/L (ref 98–107)
CO2: 41 MMOL/L (ref 20–31)
CREAT SERPL-MCNC: 0.54 MG/DL (ref 0.5–0.9)
FIO2: 40
GFR AFRICAN AMERICAN: >60 ML/MIN
GFR NON-AFRICAN AMERICAN: >60 ML/MIN
GFR SERPL CREATININE-BSD FRML MDRD: ABNORMAL ML/MIN/{1.73_M2}
GLUCOSE BLD-MCNC: 167 MG/DL (ref 65–105)
GLUCOSE BLD-MCNC: 175 MG/DL (ref 65–105)
GLUCOSE BLD-MCNC: 182 MG/DL (ref 65–105)
GLUCOSE BLD-MCNC: 184 MG/DL (ref 65–105)
GLUCOSE BLD-MCNC: 202 MG/DL (ref 70–99)
HCO3 ARTERIAL: 44.8 MMOL/L (ref 22–26)
HCT VFR BLD CALC: 32.9 % (ref 36–46)
HEMOGLOBIN: 10.3 G/DL (ref 12–16)
MCH RBC QN AUTO: 29.5 PG (ref 26–34)
MCHC RBC AUTO-ENTMCNC: 31.3 G/DL (ref 31–37)
MCV RBC AUTO: 94.4 FL (ref 80–100)
METHEMOGLOBIN: 0.9 % (ref 0–1.9)
MODE: ABNORMAL
O2 DEVICE/FLOW/%: ABNORMAL
O2 SAT, ARTERIAL: 97.2 % (ref 95–98)
PCO2 ARTERIAL: 64.8 MMHG (ref 35–45)
PDW BLD-RTO: 17 % (ref 11.5–14.9)
PEEP/CPAP: 8
PH ARTERIAL: 7.45 (ref 7.35–7.45)
PLATELET # BLD: 476 K/UL (ref 150–450)
PMV BLD AUTO: 8.1 FL (ref 6–12)
PO2 ARTERIAL: 119 MMHG (ref 80–100)
POSITIVE BASE EXCESS, ART: 20.8 MMOL/L (ref 0–2)
POTASSIUM SERPL-SCNC: 4.6 MMOL/L (ref 3.7–5.3)
PT. POSITION: ABNORMAL
RBC # BLD: 3.48 M/UL (ref 4–5.2)
RESPIRATORY RATE: 24
SAMPLE SITE: ABNORMAL
SET RATE: 24
SODIUM BLD-SCNC: 145 MMOL/L (ref 135–144)
TEXT FOR RESPIRATORY: ABNORMAL
TOTAL RATE: 24
VT: 450
WBC # BLD: 12.6 K/UL (ref 3.5–11)

## 2022-04-24 PROCEDURE — 2500000003 HC RX 250 WO HCPCS: Performed by: STUDENT IN AN ORGANIZED HEALTH CARE EDUCATION/TRAINING PROGRAM

## 2022-04-24 PROCEDURE — 82805 BLOOD GASES W/O2 SATURATION: CPT

## 2022-04-24 PROCEDURE — 2580000003 HC RX 258: Performed by: INTERNAL MEDICINE

## 2022-04-24 PROCEDURE — 2500000003 HC RX 250 WO HCPCS: Performed by: INTERNAL MEDICINE

## 2022-04-24 PROCEDURE — 6360000002 HC RX W HCPCS: Performed by: STUDENT IN AN ORGANIZED HEALTH CARE EDUCATION/TRAINING PROGRAM

## 2022-04-24 PROCEDURE — 2700000000 HC OXYGEN THERAPY PER DAY

## 2022-04-24 PROCEDURE — 36600 WITHDRAWAL OF ARTERIAL BLOOD: CPT

## 2022-04-24 PROCEDURE — 80048 BASIC METABOLIC PNL TOTAL CA: CPT

## 2022-04-24 PROCEDURE — 6370000000 HC RX 637 (ALT 250 FOR IP)

## 2022-04-24 PROCEDURE — A4216 STERILE WATER/SALINE, 10 ML: HCPCS | Performed by: INTERNAL MEDICINE

## 2022-04-24 PROCEDURE — 6360000002 HC RX W HCPCS: Performed by: INTERNAL MEDICINE

## 2022-04-24 PROCEDURE — 6370000000 HC RX 637 (ALT 250 FOR IP): Performed by: STUDENT IN AN ORGANIZED HEALTH CARE EDUCATION/TRAINING PROGRAM

## 2022-04-24 PROCEDURE — 36415 COLL VENOUS BLD VENIPUNCTURE: CPT

## 2022-04-24 PROCEDURE — 99232 SBSQ HOSP IP/OBS MODERATE 35: CPT | Performed by: NURSE PRACTITIONER

## 2022-04-24 PROCEDURE — 2580000003 HC RX 258: Performed by: STUDENT IN AN ORGANIZED HEALTH CARE EDUCATION/TRAINING PROGRAM

## 2022-04-24 PROCEDURE — 71045 X-RAY EXAM CHEST 1 VIEW: CPT

## 2022-04-24 PROCEDURE — 82947 ASSAY GLUCOSE BLOOD QUANT: CPT

## 2022-04-24 PROCEDURE — 2000000000 HC ICU R&B

## 2022-04-24 PROCEDURE — 99291 CRITICAL CARE FIRST HOUR: CPT | Performed by: INTERNAL MEDICINE

## 2022-04-24 PROCEDURE — 85027 COMPLETE CBC AUTOMATED: CPT

## 2022-04-24 PROCEDURE — 94640 AIRWAY INHALATION TREATMENT: CPT

## 2022-04-24 PROCEDURE — 94761 N-INVAS EAR/PLS OXIMETRY MLT: CPT

## 2022-04-24 PROCEDURE — 94003 VENT MGMT INPAT SUBQ DAY: CPT

## 2022-04-24 PROCEDURE — 6370000000 HC RX 637 (ALT 250 FOR IP): Performed by: INTERNAL MEDICINE

## 2022-04-24 RX ORDER — DEXMEDETOMIDINE HYDROCHLORIDE 4 UG/ML
.1-1.5 INJECTION, SOLUTION INTRAVENOUS CONTINUOUS
Status: DISCONTINUED | OUTPATIENT
Start: 2022-04-24 | End: 2022-04-25

## 2022-04-24 RX ADMIN — MULTIPLE VITAMINS W/ MINERALS TAB 1 TABLET: TAB at 08:56

## 2022-04-24 RX ADMIN — METHYLPREDNISOLONE SODIUM SUCCINATE 40 MG: 40 INJECTION, POWDER, LYOPHILIZED, FOR SOLUTION INTRAMUSCULAR; INTRAVENOUS at 08:56

## 2022-04-24 RX ADMIN — FENTANYL CITRATE 50 MCG: 50 INJECTION, SOLUTION INTRAMUSCULAR; INTRAVENOUS at 16:01

## 2022-04-24 RX ADMIN — INSULIN GLARGINE 18 UNITS: 100 INJECTION, SOLUTION SUBCUTANEOUS at 20:12

## 2022-04-24 RX ADMIN — POLYVINYL ALCOHOL 1 DROP: 14 SOLUTION/ DROPS OPHTHALMIC at 03:07

## 2022-04-24 RX ADMIN — FENTANYL CITRATE 50 MCG: 50 INJECTION, SOLUTION INTRAMUSCULAR; INTRAVENOUS at 01:41

## 2022-04-24 RX ADMIN — ACETYLCYSTEINE 200 MG: 200 SOLUTION ORAL; RESPIRATORY (INHALATION) at 22:55

## 2022-04-24 RX ADMIN — INSULIN LISPRO 2 UNITS: 100 INJECTION, SOLUTION INTRAVENOUS; SUBCUTANEOUS at 20:12

## 2022-04-24 RX ADMIN — MINERAL OIL, PETROLATUM: 425; 568 OINTMENT OPHTHALMIC at 00:33

## 2022-04-24 RX ADMIN — FUROSEMIDE 20 MG: 10 INJECTION, SOLUTION INTRAMUSCULAR; INTRAVENOUS at 08:56

## 2022-04-24 RX ADMIN — HEPARIN SODIUM 5000 UNITS: 5000 INJECTION INTRAVENOUS; SUBCUTANEOUS at 22:22

## 2022-04-24 RX ADMIN — POLYVINYL ALCOHOL 1 DROP: 14 SOLUTION/ DROPS OPHTHALMIC at 05:44

## 2022-04-24 RX ADMIN — DEXMEDETOMIDINE HYDROCHLORIDE 0.2 MCG/KG/HR: 400 INJECTION INTRAVENOUS at 10:14

## 2022-04-24 RX ADMIN — MINERAL OIL, PETROLATUM: 425; 568 OINTMENT OPHTHALMIC at 04:03

## 2022-04-24 RX ADMIN — ALBUTEROL SULFATE 2.5 MG: 2.5 SOLUTION RESPIRATORY (INHALATION) at 07:02

## 2022-04-24 RX ADMIN — FENTANYL CITRATE 50 MCG: 50 INJECTION, SOLUTION INTRAMUSCULAR; INTRAVENOUS at 07:31

## 2022-04-24 RX ADMIN — INSULIN LISPRO 2 UNITS: 100 INJECTION, SOLUTION INTRAVENOUS; SUBCUTANEOUS at 01:47

## 2022-04-24 RX ADMIN — SODIUM CHLORIDE, PRESERVATIVE FREE 10 ML: 5 INJECTION INTRAVENOUS at 20:05

## 2022-04-24 RX ADMIN — CHLORHEXIDINE GLUCONATE 0.12% ORAL RINSE 15 ML: 1.2 LIQUID ORAL at 20:06

## 2022-04-24 RX ADMIN — SODIUM CHLORIDE: 4.5 INJECTION, SOLUTION INTRAVENOUS at 04:01

## 2022-04-24 RX ADMIN — ACETYLCYSTEINE 200 MG: 200 SOLUTION ORAL; RESPIRATORY (INHALATION) at 02:58

## 2022-04-24 RX ADMIN — FOLIC ACID 1 MG: 5 INJECTION, SOLUTION INTRAMUSCULAR; INTRAVENOUS; SUBCUTANEOUS at 09:05

## 2022-04-24 RX ADMIN — FENTANYL CITRATE 50 MCG: 50 INJECTION, SOLUTION INTRAMUSCULAR; INTRAVENOUS at 19:03

## 2022-04-24 RX ADMIN — SODIUM CHLORIDE, PRESERVATIVE FREE 10 ML: 5 INJECTION INTRAVENOUS at 07:30

## 2022-04-24 RX ADMIN — FENTANYL CITRATE 50 MCG: 50 INJECTION, SOLUTION INTRAMUSCULAR; INTRAVENOUS at 08:54

## 2022-04-24 RX ADMIN — ACETYLCYSTEINE 200 MG: 200 SOLUTION ORAL; RESPIRATORY (INHALATION) at 07:02

## 2022-04-24 RX ADMIN — INSULIN LISPRO 2 UNITS: 100 INJECTION, SOLUTION INTRAVENOUS; SUBCUTANEOUS at 08:30

## 2022-04-24 RX ADMIN — TRAZODONE HYDROCHLORIDE 50 MG: 50 TABLET ORAL at 20:11

## 2022-04-24 RX ADMIN — POLYVINYL ALCOHOL 1 DROP: 14 SOLUTION/ DROPS OPHTHALMIC at 22:27

## 2022-04-24 RX ADMIN — MEROPENEM 1000 MG: 1 INJECTION, POWDER, FOR SOLUTION INTRAVENOUS at 19:59

## 2022-04-24 RX ADMIN — INSULIN LISPRO 2 UNITS: 100 INJECTION, SOLUTION INTRAVENOUS; SUBCUTANEOUS at 13:32

## 2022-04-24 RX ADMIN — ATORVASTATIN CALCIUM 20 MG: 20 TABLET, FILM COATED ORAL at 08:56

## 2022-04-24 RX ADMIN — ACETYLCYSTEINE 200 MG: 200 SOLUTION ORAL; RESPIRATORY (INHALATION) at 19:17

## 2022-04-24 RX ADMIN — ALBUTEROL SULFATE 2.5 MG: 2.5 SOLUTION RESPIRATORY (INHALATION) at 14:42

## 2022-04-24 RX ADMIN — FAMOTIDINE 20 MG: 10 INJECTION INTRAVENOUS at 08:56

## 2022-04-24 RX ADMIN — POLYVINYL ALCOHOL 1 DROP: 14 SOLUTION/ DROPS OPHTHALMIC at 14:40

## 2022-04-24 RX ADMIN — FUROSEMIDE 20 MG: 10 INJECTION, SOLUTION INTRAMUSCULAR; INTRAVENOUS at 18:56

## 2022-04-24 RX ADMIN — MINERAL OIL, PETROLATUM: 425; 568 OINTMENT OPHTHALMIC at 08:57

## 2022-04-24 RX ADMIN — FENTANYL CITRATE 50 MCG: 50 INJECTION, SOLUTION INTRAMUSCULAR; INTRAVENOUS at 06:55

## 2022-04-24 RX ADMIN — CHLORHEXIDINE GLUCONATE 0.12% ORAL RINSE 15 ML: 1.2 LIQUID ORAL at 07:30

## 2022-04-24 RX ADMIN — POLYVINYL ALCOHOL 1 DROP: 14 SOLUTION/ DROPS OPHTHALMIC at 18:56

## 2022-04-24 RX ADMIN — RIVASTIGMINE TARTRATE 4.5 MG: 1.5 CAPSULE ORAL at 08:59

## 2022-04-24 RX ADMIN — MINERAL OIL, PETROLATUM: 425; 568 OINTMENT OPHTHALMIC at 17:26

## 2022-04-24 RX ADMIN — METHYLPREDNISOLONE SODIUM SUCCINATE 40 MG: 40 INJECTION, POWDER, LYOPHILIZED, FOR SOLUTION INTRAMUSCULAR; INTRAVENOUS at 16:08

## 2022-04-24 RX ADMIN — POLYVINYL ALCOHOL 1 DROP: 14 SOLUTION/ DROPS OPHTHALMIC at 11:09

## 2022-04-24 RX ADMIN — MEROPENEM 1000 MG: 1 INJECTION, POWDER, FOR SOLUTION INTRAVENOUS at 04:02

## 2022-04-24 RX ADMIN — ACETYLCYSTEINE 200 MG: 200 SOLUTION ORAL; RESPIRATORY (INHALATION) at 14:42

## 2022-04-24 RX ADMIN — ACETYLCYSTEINE 200 MG: 200 SOLUTION ORAL; RESPIRATORY (INHALATION) at 10:39

## 2022-04-24 RX ADMIN — ALBUTEROL SULFATE 2.5 MG: 2.5 SOLUTION RESPIRATORY (INHALATION) at 19:16

## 2022-04-24 RX ADMIN — INSULIN GLARGINE 18 UNITS: 100 INJECTION, SOLUTION SUBCUTANEOUS at 08:29

## 2022-04-24 RX ADMIN — HEPARIN SODIUM 5000 UNITS: 5000 INJECTION INTRAVENOUS; SUBCUTANEOUS at 05:39

## 2022-04-24 RX ADMIN — DEXMEDETOMIDINE HYDROCHLORIDE 0.2 MCG/KG/HR: 400 INJECTION INTRAVENOUS at 15:56

## 2022-04-24 RX ADMIN — ALBUTEROL SULFATE 2.5 MG: 2.5 SOLUTION RESPIRATORY (INHALATION) at 02:58

## 2022-04-24 RX ADMIN — RISPERIDONE 1.5 MG: 0.5 TABLET ORAL at 16:08

## 2022-04-24 RX ADMIN — DEXMEDETOMIDINE HYDROCHLORIDE 0.6 MCG/KG/HR: 400 INJECTION INTRAVENOUS at 13:46

## 2022-04-24 RX ADMIN — ALBUTEROL SULFATE 2.5 MG: 2.5 SOLUTION RESPIRATORY (INHALATION) at 10:39

## 2022-04-24 RX ADMIN — METHYLPREDNISOLONE SODIUM SUCCINATE 40 MG: 40 INJECTION, POWDER, LYOPHILIZED, FOR SOLUTION INTRAMUSCULAR; INTRAVENOUS at 00:27

## 2022-04-24 RX ADMIN — ALBUTEROL SULFATE 2.5 MG: 2.5 SOLUTION RESPIRATORY (INHALATION) at 22:55

## 2022-04-24 RX ADMIN — FENTANYL CITRATE 50 MCG: 50 INJECTION, SOLUTION INTRAMUSCULAR; INTRAVENOUS at 22:22

## 2022-04-24 RX ADMIN — DEXMEDETOMIDINE HYDROCHLORIDE 0.04 MCG/KG/HR: 400 INJECTION INTRAVENOUS at 13:30

## 2022-04-24 RX ADMIN — MINERAL OIL, PETROLATUM: 425; 568 OINTMENT OPHTHALMIC at 20:00

## 2022-04-24 RX ADMIN — MEROPENEM 1000 MG: 1 INJECTION, POWDER, FOR SOLUTION INTRAVENOUS at 11:44

## 2022-04-24 RX ADMIN — FAMOTIDINE 20 MG: 10 INJECTION INTRAVENOUS at 20:11

## 2022-04-24 RX ADMIN — MINERAL OIL, PETROLATUM: 425; 568 OINTMENT OPHTHALMIC at 13:09

## 2022-04-24 RX ADMIN — HEPARIN SODIUM 5000 UNITS: 5000 INJECTION INTRAVENOUS; SUBCUTANEOUS at 13:27

## 2022-04-24 RX ADMIN — RIVASTIGMINE TARTRATE 4.5 MG: 1.5 CAPSULE ORAL at 20:06

## 2022-04-24 ASSESSMENT — PULMONARY FUNCTION TESTS
PIF_VALUE: 36
PIF_VALUE: 39
PIF_VALUE: 18
PIF_VALUE: 30
PIF_VALUE: 44
PIF_VALUE: 36
PIF_VALUE: 34
PIF_VALUE: 32
PIF_VALUE: 35
PIF_VALUE: 18
PIF_VALUE: 18
PIF_VALUE: 17
PIF_VALUE: 17
PIF_VALUE: 32
PIF_VALUE: 18
PIF_VALUE: 18
PIF_VALUE: 30
PIF_VALUE: 31
PIF_VALUE: 18
PIF_VALUE: 29
PIF_VALUE: 29
PIF_VALUE: 17
PIF_VALUE: 32
PIF_VALUE: 33
PIF_VALUE: 18
PIF_VALUE: 30
PIF_VALUE: 49
PIF_VALUE: 28
PIF_VALUE: 18
PIF_VALUE: 30
PIF_VALUE: 18
PIF_VALUE: 31
PIF_VALUE: 57
PIF_VALUE: 29
PIF_VALUE: 35
PIF_VALUE: 29
PIF_VALUE: 18
PIF_VALUE: 23
PIF_VALUE: 44
PIF_VALUE: 18
PIF_VALUE: 29
PIF_VALUE: 33
PIF_VALUE: 18
PIF_VALUE: 29
PIF_VALUE: 18
PIF_VALUE: 27
PIF_VALUE: 18

## 2022-04-24 ASSESSMENT — PAIN SCALES - GENERAL
PAINLEVEL_OUTOF10: 0
PAINLEVEL_OUTOF10: 0
PAINLEVEL_OUTOF10: 4
PAINLEVEL_OUTOF10: 0
PAINLEVEL_OUTOF10: 7
PAINLEVEL_OUTOF10: 4
PAINLEVEL_OUTOF10: 4

## 2022-04-24 NOTE — PROGRESS NOTES
[facility] -   Infectious diseases evaluation  admission date 4/16/2022    reason for consultation:   RLL Cavitary Lung Lesions    Impression :   Current:  · Right lower lobe cavitary lesions associated with multiloculated pleural fluid collection likely abscess with empyema status post chest tube with Pseudomonas growth on culture  · Sepsis secondary to above  · Elevated Procalcitonin  · Acute on chronic respiratory failure required intubation  · Right-sided pneumothorax  · Severe COPD  ·     Other:  ·   Discussion / summary of stay / plan of care   · BAL from 4/18 showing Pseudomonas Aeruginosa. AFB negative. Fungal cx pending. Recommendations   · Meropenem 1 gm IV every 8 hours, Day 6  · Follow CBC and renal function closely. · Recheck Procalcitonin. · Follow sputum cx. Adjust antibiotics. · Supportive care. · Discussed with daughter at the bedside, RN. Infection Control Recommendations   · [place] Precautions    Antimicrobial Stewardship Recommendations   · Simplification of therapy  · Targeted therapy    Coordination ofOutpatient Care:   · Estimated Length of IV antimicrobials:  · Patient will need Midline / picc Catheter Insertion:   · Patient will need SNF:  · Patient will need outpatient wound care:     History of Present Illness:   Initial history:  Shauna Hutson is a 74y.o.-year-old female who  presents to the hospital with worsening shortness of breath associated with cough. At the ER with tachypneic and hypoxic  Chest x-ray showed pneumothorax  The patient was in respiratory distress, was intubated. The patient is intubated, sedated, unable to provide history that was obtained from chart review. CT chest 4/16/2022 showed right lower lobe cavitary lesion with surrounding airspace disease and multiloculated pleural collection. The patient had chest tube placed with gram-negative rods growth on pleural fluid culture.   Respiratory culture grew gram-positive cocci in pairs  Initial WBC was 18.5, procalcitonin no more than 100  Respiratory panel with COVID-19 PCR was negative  Urine for Legionella and strep pneumo antigen negative    Interval changes  4/24/2022   Afebrile. 24 hour T-max 99.5  Remains intubated. 30% FIO2/P8. Off sedation  Arouses easily. Follows commands. R IJ TLC site clean. No kumari. Rectal pouch. Liquid stool. WBC up to 12.6 today    Patient Vitals for the past 8 hrs:   BP Temp Temp src Pulse Resp SpO2   04/24/22 0800 127/82 -- -- 77 16 90 %   04/24/22 0730 (!) 167/59 98.5 °F (36.9 °C) Oral 81 23 (!) 81 %   04/24/22 0704 -- -- -- -- 24 97 %   04/24/22 0700 132/64 -- -- 73 24 94 %   04/24/22 0652 -- -- -- 100 27 96 %   04/24/22 0600 126/64 -- -- 59 24 98 %   04/24/22 0510 -- -- -- -- 24 97 %   04/24/22 0500 119/62 -- -- 69 24 98 %   04/24/22 0400 130/70 99.5 °F (37.5 °C) Oral 75 24 96 %   04/24/22 0300 (!) 163/80 -- -- 79 19 97 %   04/24/22 0258 -- -- -- 88 17 93 %   04/24/22 0200 116/71 -- -- 79 24 97 %   04/24/22 0100 138/71 -- -- 72 17 97 %       Summary of relevant labs:  Labs:  Procalcitonin: > 100.00-5.74  WBC: 12.2-9.8-9.2-12.6    Micro:  4/16 Pleural fluid Pseudomonas Aeruginosa. pan Sensitive. Intermediate to Cipro. 4/16 Pseudomonas Aeruginosa. Sensitivity same as pleural fluid. Imaging:      I have personally reviewed the past medical history, past surgical history, medications, social history, and family history, and I haveupdated the database accordingly. Allergies:   Advil [ibuprofen], Aleve [naproxen], Antipyrine, Celecoxib, Codeine, Fomepizole, Incruse ellipta [umeclidinium bromide], Other, Rofecoxib, Salicylates, Strawberry extract, Sulfinpyrazone, Aspirin, and Nsaids     Review of Systems:     Review of Systems   Reason unable to perform ROS: Intubated. Physical Examination :       Physical Exam  Vitals and nursing note reviewed. Constitutional:       General: She is not in acute distress. Comments: Arouseable. Follows commands. HENT:      Head: Normocephalic and atraumatic. Left Ear: External ear normal.      Nose: Nose normal.      Mouth/Throat:      Mouth: Mucous membranes are moist.      Pharynx: Oropharynx is clear. Eyes:      General: No scleral icterus. Conjunctiva/sclera: Conjunctivae normal.   Neck:      Comments: R IJ TLC site clean. Cardiovascular:      Rate and Rhythm: Normal rate and regular rhythm. Heart sounds: Normal heart sounds. Pulmonary:      Effort: Pulmonary effort is normal. No respiratory distress. Breath sounds: No wheezing. Comments: Intubated. Abdominal:      General: Bowel sounds are normal. There is no distension. Palpations: Abdomen is soft. Tenderness: There is no abdominal tenderness. Genitourinary:     Comments: No kumari. Musculoskeletal:      Right lower leg: No edema. Left lower leg: No edema. Skin:     General: Skin is warm and dry. Capillary Refill: Capillary refill takes less than 2 seconds. Findings: No rash. Neurological:      Comments: Arouseable. Follows commands.    Psychiatric:         Behavior: Behavior normal.         Past Medical History:     Past Medical History:   Diagnosis Date    Abnormal EKG     TRAM [facility]acute [facility]) ([facility] 2022    Anxiety     Asthma     Bipolar disorder ([facility]     SEVERE [place] , [place]    [facility]     Cramps, extremity     SEVERE LEG CRAMPS    Depression     Dilated bile duct     Headache     History of elective      Hyperlipidemia     Irritable bowel syndrome     Prolonged emergence from general anesthesia     SEVERE ISSUES WAKING UP    Substance abuse ([facility] [facility]     street drugs when younger    Unspecified sleep apnea     Vision abnormalities     glasses       Past Surgical  History:     Past Surgical History:   Procedure Laterality Date    ANKLE SURGERY      HAD SCREWS AND HARDWARE, THEN REMOVED  BRONCHOSCOPY  2022         COLONOSCOPY  02/15/2018    tubular adenoma    EYE SURGERY Bilateral     CATARACTS    HYSTEROSCOPY  10/19/2016    D & C    INDUCED       LASIK      bilateral    TONSILLECTOMY AND ADENOIDECTOMY Bilateral     VULVA SURGERY      HAD A BIOPSY AND REMOVAL OF       Medications:      multivitamin  1 tablet Oral Daily    thiamine and folic acid IVPB  1 mg IntraVENous Daily    insulin glargine  18 Units SubCUTAneous BID    insulin lispro  0-12 Units SubCUTAneous Q6H    methylPREDNISolone  40 mg IntraVENous Q8H    polyethylene glycol  17 g Oral Daily    furosemide  20 mg IntraVENous BID    meropenem  1,000 mg IntraVENous Q8H    albuterol  2.5 mg Nebulization Q4H    acetylcysteine  200 mg Inhalation Q4H    sodium chloride flush  5-40 mL IntraVENous 2 times per day    risperiDONE  1.5 mg Oral Q12H    rivastigmine  4.5 mg Oral BID    atorvastatin  20 mg Oral Daily    traZODone  50 mg Oral Nightly    polyvinyl alcohol  1 drop Both Eyes Q4H    And    artificial tears   Both Eyes Q4H    chlorhexidine  15 mL Mouth/Throat BID    famotidine (PEPCID) injection  20 mg IntraVENous BID    heparin (porcine)  5,000 Units SubCUTAneous 3 times per day       Social History:     Social History     Socioeconomic History    Marital status:      Spouse name: Not on file    Number of children: Not on file    Years of education: Not on file    Highest education level: Not on file   Occupational History    Not on file   Tobacco Use    Smoking status: Former Smoker     Packs/day: 2.00     Years: 42.00     Pack years: 84.00     Types: Cigarettes     Quit date: 2018     Years since quitting: 3.4    Smokeless tobacco: Never Used   Substance and Sexual Activity    Alcohol use: Yes     Comment: yearly    Drug use: No    Sexual activity: Not Currently     Partners: Male     Birth control/protection: Post-menopausal   Other Topics Concern    Not on file   Social History Narrative    Not on file     Social Determinants of Health     Financial Resource Strain: High Risk    Difficulty of Paying Living Expenses: Very hard   Food Insecurity: No Food Insecurity    Worried About Running Out of Food in the Last Year: Never true    [place] of Food in the Last Year: Never true   Transportation Needs:     Lack of Transportation (Medical): Not on file    Lack of Transportation (Non-Medical):  Not on file   Physical Activity:     Days of Exercise per Week: Not on file    Minutes of Exercise per Session: Not on file   Stress:     Feeling of Stress : Not on file   Social Connections:     Frequency of Communication with Friends and Family: Not on file    Frequency of Social Gatherings with Friends and Family: Not on file    Attends Zoroastrianism Services: Not on file    Active Member of [address] [place] [address] [employer] or Organizations: Not on file    Attends Club or Organization Meetings: Not on file    Marital Status: Not on file   Intimate Partner Violence:     Fear of Current or Ex-Partner: Not on file    Emotionally Abused: Not on file    Physically Abused: Not on file    Sexually Abused: Not on file   Housing Stability:     Unable to Pay for Housing in the Last Year: Not on file    Number of [place] in the Last Year: Not on file    Unstable Housing in the Last Year: Not on file       Family History:     Family History   Problem Relation Age of Onset    Dementia Maternal Aunt     Kidney Disease Mother     Heart Attack Sister     Prostate Cancer Father     High Cholesterol Brother     Heart Attack Paternal Grandmother       Medical Decision Making:   I have independently reviewed/ordered the following labs:    CBC with Differential:   Recent Labs     04/23/22 0445 04/24/22 0342   WBC 9.2 12.6*   HGB 8.9* 10.3*   HCT 27.5* 32.9*    476*     BMP:  Recent Labs     04/23/22 0445 04/24/22 0342    145*   K 4.9 4.6   CL 99 98   CO2 39* 41*   BUN 47* 50*   CREATININE 0.45* 0.54     Hepatic Function Panel: No results for input(s): PROT, LABALBU, BILIDIR, IBILI, BILITOT, ALKPHOS, ALT, AST in the last 72 hours. No results for input(s): RPR in the last 72 hours. No results for input(s): HIV in the last 72 hours. No results for input(s): BC in the last 72 hours. Lab Results   Component Value Date    CREATININE 0.54 04/24/2022    GLUCOSE 202 04/24/2022    GLUCOSE 127 07/08/2021       Detailed results: Thank you for allowing us to participate in the care of this patient. Please call with questions. This note is created with the assistance of a speech recognition program.  While intending to generate adocument that actually reflects the content of the visit, the document can still have some errors including those of syntax and sound a like substitutions which may escape proof reading. It such instances, actual meaningcan be extrapolated by contextual diversion.     IVONNE Lares Cap - CNP  Office: [phone]  Perfect serve / office [phone]

## 2022-04-24 NOTE — PROGRESS NOTES
Dr. Christelle Newby at bedside, discussed patient's condition, plan of care. Notified of loose stools. Miralax held.

## 2022-04-24 NOTE — PROGRESS NOTES
Patient restless, agitated, BP elevated, Respirations tachypnic. Precedex increased per verbal order Dr. Mateusz Young.

## 2022-04-24 NOTE — FLOWSHEET NOTE
04/23/22 2230   Treatment Team Notification   Team Member Name Dr. Jose Berry MD notified of chest xray results. Order received to retract ETT 2cm. RT notified.

## 2022-04-24 NOTE — PROGRESS NOTES
[address] [facility].    PROGRESS NOTE             4/24/2022    7:19 AM    Name:   Mia Carr  MRN:     [id number]     [place]:      [de-identified]   Room:   2002/2002-01  IP Day:  8  Admit Date:  4/16/2022 10:25 AM    PCP:  Toshia Munroe MD  Code Status:  Full Code    Subjective:     C/C:   Chief Complaint   Patient presents with    Shortness of Breath     Interval History Status: not changed. Patient seen and examined at bedside, no acute events overnight   Continues to be on ventilator  maintaining normal spo2, FiO2 30%   Sedation was resumed on evaluation   Weaning trial failed after 12 hours of CPAP  Continues to be tachypnic, low grade fever  Repeat trial this morning  WBC 12 today      Brief History:     The patient is a 67 y.o.  Non- / non  female sever COPD 3L O2 baseline, bipolar, Hx of DVT/ PE, migraines, who presents with Shortness of Breath and cough  Patient was recently discharge from [facility] for mycoplasma pneumonia. She had a follow up appointment with PCP, patient was complaining of cough and chest pain, was started on Tessalon and nsaids for pain. However; she continued to be in distress and her daughter brought her to ED. She was hypoxic initially on 3L o2, was increased to 6L and continued to be hypoxic   Tachypneic, tachycardic  ABGs: pH 7.33, CO2 34, HCO3 18  WBC 18   Lactic 2.8> 1.5  Pancultures were sent  Chest x-ray: Moderate loculated right basal pneumothorax.  Evidence for tension.  Cavitary lesion noted in the right lung base  CT chest: Chronic clot material RUL, RLL.  Thick-walled cavitary lesion RLL.  Multiloculated pleural collection or hydropneumothorax posterior to the right lung, right chest tube in situ.  Infiltrates of the right middle lobe.  Stellate 6 mm lateral RUL nodule.  Mediastinal and right hilar lymphadenopathy  Chest tube was placed and patient was intubated.    Was given 1 L bolus, IV cefepime, vancomycin, 125 mg Solu-Medrol and she is admitted to the hospital for the management of acute hypoxic respiratory failure due to pneumothorax and possible lung infection     4/19: switched to meropenem      4/21: bronchial washing from bronchoscopy growing pseudomonas    Review of Systems:     Review of Systems   Unable to perform ROS: Intubated       Medications: Allergies:     Allergies   Allergen Reactions    Advil [Ibuprofen] Other (See Comments)     vomiting    Aleve [Naproxen] Other (See Comments)     vomiting    Antipyrine Other (See Comments)     unknown    Celecoxib Other (See Comments)    Codeine      headache    Fomepizole     Incruse Ellipta [Umeclidinium Bromide]      confusion    Other      GRASS, TREES, WEEDS    Rofecoxib Other (See Comments)     Unknown reaction    Salicylates      Unknown reaction     Strawberry Extract      HEADACHES    Sulfinpyrazone Other (See Comments)     Unknown reaction    Aspirin Nausea And Vomiting, Other (See Comments) and Nausea Only    Nsaids Nausea Only, Other (See Comments) and Nausea And Vomiting       Current Meds:   Scheduled Meds:    multivitamin  1 tablet Oral Daily    thiamine and folic acid IVPB  1 mg IntraVENous Daily    insulin glargine  18 Units SubCUTAneous BID    insulin lispro  0-12 Units SubCUTAneous Q6H    methylPREDNISolone  40 mg IntraVENous Q8H    polyethylene glycol  17 g Oral Daily    furosemide  20 mg IntraVENous BID    meropenem  1,000 mg IntraVENous Q8H    albuterol  2.5 mg Nebulization Q4H    acetylcysteine  200 mg Inhalation Q4H    sodium chloride flush  5-40 mL IntraVENous 2 times per day    [Held by provider] risperiDONE  1.5 mg Oral Q12H    [Held by provider] rivastigmine  4.5 mg Oral BID    atorvastatin  20 mg Oral Daily    [Held by provider] traZODone  50 mg Oral Nightly    polyvinyl alcohol  1 drop Both Eyes Q4H    And    artificial tears   Both Eyes Q4H    chlorhexidine  15 mL Mouth/Throat BID    famotidine (PEPCID) injection  20 mg IntraVENous BID    heparin (porcine)  5,000 Units SubCUTAneous 3 times per day     Continuous Infusions:    sodium chloride 15 mL/hr at 22 0401    sodium chloride      dextrose      propofol Stopped (22 0852)     PRN Meds: hydrALAZINE, sodium chloride flush, sodium chloride, sodium chloride flush, potassium chloride **OR** potassium alternative oral replacement **OR** potassium chloride, glucose, dextrose, glucagon (rDNA), dextrose, fentanNYL    Data:     Past Medical History:   has a past medical history of Abnormal EKG, TRAM (acute kidney injury) ([facility] [address] Anxiety, Asthma, Bipolar disorder ([facility] [address] COPD (chronic obstructive pulmonary disease) ([facility] [address] Cramps, extremity, Depression, Dilated bile duct, Headache, History of elective , Hyperlipidemia, Irritable bowel syndrome, Prolonged emergence from general anesthesia, Substance abuse ([facility] [address] Unspecified sleep apnea, and Vision abnormalities. Social History:   reports that she quit smoking about 3 years ago. Her smoking use included cigarettes. She has a 84.00 pack-year smoking history. She has never used smokeless tobacco. She reports current alcohol use. She reports that she does not use drugs. Family History:   Family History   Problem Relation Age of Onset    Dementia Maternal Aunt     Kidney Disease Mother     Heart Attack Sister     Prostate Cancer Father     High Cholesterol Brother     Heart Attack Paternal Grandmother        Vitals:  /64   Pulse 73   Temp 99.5 °F (37.5 °C) (Oral)   Resp 24   Ht 5' 5\" (1.651 m)   Wt 183 lb 6.8 oz (83.2 kg)   LMP 2013 (Exact Date)   SpO2 97%   BMI 30.52 kg/m²   Temp (24hrs), Av.1 °F (37.3 °C), Min:98.5 °F (36.9 °C), Max:99.6 °F (37.6 °C)    Recent Labs     22  0807 22  1355 225 22  0143   POCGLU 164* 140* 166* 184*       I/O(24Hr):     Intake/Output Summary (Last 24 hours) at 2022 0719  Last data filed at 4/24/2022 0600  Gross per 24 hour   Intake 1923 ml   Output 3000 ml   Net -1077 ml       Labs:    CBC:   Lab Results   Component Value Date    WBC 12.6 04/24/2022    RBC 3.48 04/24/2022    RBC 0-2 07/08/2021    HGB 10.3 04/24/2022    HCT 32.9 04/24/2022    MCV 94.4 04/24/2022    MCH 29.5 04/24/2022    MCHC 31.3 04/24/2022    RDW 17.0 04/24/2022     04/24/2022    MPV 8.1 04/24/2022     BMP:    Lab Results   Component Value Date     04/24/2022    K 4.6 04/24/2022    CL 98 04/24/2022    CO2 41 04/24/2022    BUN 50 04/24/2022    LABALBU 2.8 04/16/2022    LABALBU 3.6 07/08/2021    CREATININE 0.54 04/24/2022    CALCIUM 9.0 04/24/2022    GFRAA >60 04/24/2022    LABGLOM >60 04/24/2022    GLUCOSE 202 04/24/2022    GLUCOSE 127 07/08/2021     ABG:    Lab Results   Component Value Date    PH 8.0 07/08/2021       Lab Results   Component Value Date/Time    SPECIAL 8ML GREEN/2ML ORANGE RIGHT IJ 04/16/2022 12:12 PM     Lab Results   Component Value Date/Time    CULTURE PENDING 04/18/2022 12:20 PM    CULTURE PSEUDOMONAS AERUGINOSA LIGHT GROWTH (A) 04/18/2022 12:20 PM    CULTURE NO NORMAL RAVEN 04/18/2022 12:20 PM         Radiology:    ECHO Complete 2D W Doppler W Color    Result Date: 4/18/2022  [address] [facility] Transthoracic Echocardiography Report (TTE)  Patient Name Lizzy Villavicencio     Date of Study               04/18/2022               BOBBY OROSCO   Date of      1955  Gender                      Female  Birth   Age          74 year(s)  Race                           Room Number  2002        Height:                     65 inch, 165.1 cm   Corporate ID [id number]    Weight:                     176 pounds, 79.8 kg  #   Patient Acct [de-identified]   BSA:          1.87 m^2      BMI:      29.29  #                                                              kg/m^2   MR #         [id number]      Sonographer                 Мария Stein   Accession #  [id number]  Interpreting Physician      Meghna Infante Fellow                   Referring Nurse                           Practitioner   Interpreting             Referring Physician         Selena Baca  Type of Study   TTE procedure:2D Echocardiogram, M-Mode, Doppler, Color Doppler. Procedure Date Date: 04/18/2022 Start: 10:35 AM Study Location: [address] [place] [address] Technical Quality: Fair visualization Indications:Dyspnea/SOB, Respiratory Failure and Pulmonary embolus. History / Tech. Comments: COPD, HLD, Sleep apnea Patient Status: Inpatient Height: 65 inches Weight: 176 pounds BSA: 1.87 m^2 BMI: 29.29 kg/m^2 Rhythm: Sinus bradycardia HR: 57 bpm BP: 138/65 mmHg CONCLUSIONS Summary Left ventricle is normal in size and wall thickness. Global left ventricular systolic function appears mildly reduced. Estimated LV EF 45-50 %. No obvious wall motion abnormality seen. RV size appears normal mildly reduced function Left atrium is normal in size. No significant valvular regurgitation or stenosis seen. No significant pericardial effusion is seen. Normal aortic root dimension. Dilated IVC, impaired or no respiratory variations suggestive of elevate Rt atrial pressure Signature ----------------------------------------------------------------------------  Electronically signed by Giovanna Cosme(Interpreting physician) on  04/18/2022 05:06 PM ---------------------------------------------------------------------------- ----------------------------------------------------------------------------  Electronically signed by Мария Stein(Sonographer) on 04/18/2022 02:29  PM ---------------------------------------------------------------------------- FINDINGS Left Atrium Left atrium is normal in size. Left Ventricle Left ventricle is normal in size and wall thickness. Global left ventricular systolic function is mildly reduced . Estimated LV EF  %. No obvious wall motion abnormality seen. Right Atrium Right atrium is normal in size.  Right Ventricle Normal right ventricular size , mildly reduced function. Mitral Valve No obvious valvular abnormality seen. No evidence of mitral regurgitation. Aortic Valve No obvious valvular abnormality seen. No evidence of aortic insufficiency or stenosis. Tricuspid Valve No obvious valvular abnormality seen. Insignificant tricuspid regurgitation, unable to estimate RVSP. Pulmonic Valve Pulmonic valve was not well visualized. No evidence of pulmonic insufficiency or stenosis. Pericardial Effusion No significant pericardial effusion is seen. Pleural Effusion No pleural effusion seen. Miscellaneous Normal aortic root dimension. IVC Increased diameter and impaired or no inspiratory variation indicating elevated RA filling pressure (i.e. CVP) . M-mode / 2D Measurements & Calculations:   LVIDd:4.74 cm(3.7 - 5.6 cm)      Aortic Root:3.3 cm(2.0 - 3.7 cm)  LVIDs:3.28 cm(2.2 - 4.0 cm)      LA Dimension: 3.5 cm(1.9 - 4.0 cm)  IVSd:1.05 cm(0.6 - 1.1 cm)       LA volume/Index: 40.7 ml /22m^2  LVPWd:0.93 cm(0.6 - 1.1 cm)  Fractional Shortenin.8 %      XR CHEST (SINGLE VIEW FRONTAL)    Result Date: 2022  EXAMINATION: ONE XRAY VIEW OF THE CHEST 2022 8:55 am COMPARISON: April 3, 2022 HISTORY: ORDERING SYSTEM PROVIDED HISTORY: pna TECHNOLOGIST PROVIDED HISTORY: pna Reason for Exam: pna FINDINGS: Stable position of the right internal jugular central venous catheter. Right infrahilar airspace opacity not significantly changed. No new focal lung abnormality. No sizable pleural effusion. No pneumothorax.      Stable right infrahilar airspace opacity     XR CHEST (SINGLE VIEW FRONTAL)    Result Date: 4/3/2022  EXAMINATION: ONE XRAY VIEW OF THE CHEST 4/3/2022 5:51 am COMPARISON: 2022 HISTORY: ORDERING SYSTEM PROVIDED HISTORY: sob, pleurisy, cough TECHNOLOGIST PROVIDED HISTORY: sob, pleurisy, cough Reason for Exam: Shortness of breath, pleurisy, cough Additional signs and symptoms: Shortness of breath, pleurisy, cough Relevant Medical/Surgical History: Shortness of breath, pleurisy, cough FINDINGS: AP portable view of the chest time stamped at 528 hours demonstrates overlying cardiac monitoring electrodes. Heart size is stable. Right internal jugular catheter terminates in the distal superior vena cava. There has been worsening of a focal opacity at the right lung base. Minimal left basilar opacity is unchanged. No extrapleural air or overt edema. No gross effusions. Worsening opacity at the right base favoring airspace disease. Change in minimal left basilar opacity which may be related atelectasis. XR CHEST PORTABLE    Result Date: 4/23/2022  EXAMINATION: ONE XRAY VIEW OF THE CHEST 4/23/2022 5:40 am COMPARISON: 04/22/2022 and 04/21/2022 HISTORY: ORDERING SYSTEM PROVIDED HISTORY: ETT placement TECHNOLOGIST PROVIDED HISTORY: ETT placement Reason for Exam: ETT placement Additional signs and symptoms: ETT placement Relevant Medical/Surgical History: ETT placement FINDINGS: Endotracheal tube tip is approximately 2 cm above the nimesh. Nasogastric tube continues into the stomach and off the exam.  Right PICC line is near the cavoatrial junction. The right chest tube is stable with the tip over the right upper lung but the side port outside the ribcage. Soft tissue emphysema in the right chest wall is redemonstrated and appears mildly increased. Some patchy opacities persist in the right base. Evaluation for right apical pneumothorax is suboptimal.  The left lung appears acceptable. Cardiac silhouette is not enlarged. The central pulmonary arteries appears stable. Limited evaluation of the right lateral ribs. 1.  Endotracheal tube, nasogastric tube, and right jugular catheter appear acceptable. The right chest tube side port is outside the ribcage. Correlate for functionality of the chest tube.  2.  Patchy opacities persist in the right lower chest.  The evaluation for small right apical pneumothorax is suboptimal on the current study. XR CHEST PORTABLE    Result Date: 4/22/2022  EXAMINATION: ONE XRAY VIEW OF THE CHEST 4/22/2022 6:29 am COMPARISON: 21 April 2022 HISTORY: ORDERING SYSTEM PROVIDED HISTORY: ETT placement TECHNOLOGIST PROVIDED HISTORY: ETT placement Reason for Exam: on vent FINDINGS: AP portable view of the chest time stamped at 623 hours demonstrates overlying cardiac monitoring electrodes, endotracheal tube terminating 4.8 cm above the nimesh and right internal jugular catheter terminating in the right atrium. Right-sided chest tube is again noted. Trace extrapleural air at the right apex persists. Subcutaneous emphysema along the right lateral chest wall is noted. Faint opacity is present at the right lung base suspicious for focal airspace disease. No mediastinal shift. Heart size is stable. Persistent small right apical pneumothorax. Support tubes and lines as above. Opacity at the right base. There is elevation right hemidiaphragm. Atelectasis is evident but superimposed airspace disease may be present. XR CHEST PORTABLE    Result Date: 4/21/2022  EXAMINATION: ONE XRAY VIEW OF THE CHEST 4/21/2022 11:57 am COMPARISON: 04/21/2022, 0625 hours. HISTORY: ORDERING SYSTEM PROVIDED HISTORY: Chest tube now clamped TECHNOLOGIST PROVIDED HISTORY: Chest tube now clamped Reason for Exam: chest tube now clamped FINDINGS: The ET tube was in satisfactory position, 4.5 cm above the nimesh. The NG tube was passed the gastric fundus. A right jugular central venous line was noted with the tip in the superior vena cava near its junction with the right atrium. A right-sided chest tube was noted. The proximal most opening is just out of the right lateral chest wall. No pneumothorax was noted. No cardiomegaly, pneumonia, interstitial edema or definite effusions were noted. Mild hyper inflation was identified. The ET tube was in satisfactory position, 4.5 cm above the nimesh.   The NG tube was past the gastric fundus. A right jugular central venous line was noted with the tip in the superior vena cava near its junction with the right atrium. No pneumothorax was identified. A right-sided chest tube was noted but the proximal most opening is just external to the right lateral chest wall. No cardiomegaly, pneumonia or interstitial edema. XR CHEST PORTABLE    Result Date: 4/21/2022  EXAMINATION: ONE XRAY VIEW OF THE CHEST 4/21/2022 6:30 am COMPARISON: 04/20/2022 HISTORY: ORDERING SYSTEM PROVIDED HISTORY: ETT placement TECHNOLOGIST PROVIDED HISTORY: ETT placement Reason for Exam: vent FINDINGS: Support tubes and lines are unchanged. The right chest tube side port is external to the chest wall which may predispose to air leak. Cardiac silhouette and mediastinal contours are unchanged. Calcified left hilar lymph nodes are noted. No pneumothorax. No new lung infiltrate. Aeration of the right lung base has improved. 1. The right chest tube side port is external to the chest wall which may predispose to an air leak. No pneumothorax. 2. Improved aeration of the right lung base, likely related to atelectasis. XR CHEST PORTABLE    Result Date: 4/20/2022  EXAMINATION: ONE XRAY VIEW OF THE CHEST 4/20/2022 11:52 am COMPARISON: None. HISTORY: ORDERING SYSTEM PROVIDED HISTORY: Chest tube now to waterseal TECHNOLOGIST PROVIDED HISTORY: Chest tube now to waterseal Reason for Exam: Chest tube now to waterseal FINDINGS: There is a right chest tube in place. There is no evidence of pneumothorax. Some apparent atelectasis noted in the the right lung base. ET tube is in good position. Tip of central line overlies the right atrium. Left lung appears normal.  Heart appears unremarkable. No evidence of pneumothorax. Some atelectasis in the right lung base. Tip of centralized overlies the right atrium. It should be withdrawn by 6 cm.  The findings were sent to the Radiology Results [address] at 12:21 pm on 4/20/2022 to be communicated to a licensed caregiver. XR CHEST PORTABLE    Result Date: 4/20/2022  EXAMINATION: ONE XRAY VIEW OF THE CHEST 4/20/2022 6:30 am COMPARISON: 04/19/2022 HISTORY: ORDERING SYSTEM PROVIDED HISTORY: ETT placement TECHNOLOGIST PROVIDED HISTORY: ETT placement Reason for Exam: ETT FINDINGS: The cardiac silhouette and mediastinal contours are stable. There is a right-sided chest tube with the side port noted external to the chest wall. Right internal jugular vein catheter, endotracheal tube, and orogastric tube are again noted. There is pulmonary vascular congestion. No pneumothorax. The patient is rotated towards the right. 1. Right chest tube side port is external to the chest wall which may predispose to an air leak. 2. Stable pulmonary vascular congestion. 3. No pneumothorax is identified. XR CHEST PORTABLE    Result Date: 4/19/2022  EXAMINATION: ONE X-RAY VIEW OF THE CHEST 4/18/2022 6:27 am COMPARISON: 04/17/2022 HISTORY: ORDERING SYSTEM PROVIDED HISTORY: ETT placement TECHNOLOGIST PROVIDED HISTORY: ETT placement Reason for Exam: ETT placement FINDINGS: The endotracheal tube, nasogastric tube, right IJ catheter and right-sided chest tube remain. The extreme lung apices are excluded from the examination. A pneumothorax is not identified. Right basilar consolidation has increased peripherally. Increased right basilar opacity may reflect fluid filling the large cavitary lesion at the right lung base. Pulmonary consolidation/increased pleural effusion or empyema are alternate considerations. XR CHEST PORTABLE    Result Date: 4/19/2022  EXAMINATION: ONE XRAY VIEW OF THE CHEST 4/19/2022 6:34 am COMPARISON: Chest radiograph performed 04/18/2022. HISTORY: ORDERING SYSTEM PROVIDED HISTORY: ETT placement TECHNOLOGIST PROVIDED HISTORY: ETT placement Reason for Exam: on vent FINDINGS: There is right lower lung infiltrate. There is no pneumothorax.   The mediastinal structures are unremarkable. The upper abdomen is unremarkable. The extrathoracic soft tissues are unremarkable. There is an endotracheal tube with the tip in the midtrachea. There is a right-sided chest tube. There is a right internal jugular central line with the tip at the cavoatrial junction. There is a gastric tube and the tip is not well visualized. Right lower lung infiltrate that is mildly improved. Support tubes as described above. XR CHEST PORTABLE    Result Date: 4/17/2022  EXAMINATION: ONE XRAY VIEW OF THE CHEST 4/17/2022 6:04 am COMPARISON: 04/16/2022, 1248 hours HISTORY: ORDERING SYSTEM PROVIDED HISTORY: ETT placement TECHNOLOGIST PROVIDED HISTORY: ETT placement Reason for Exam: ETT 53-year-old female with endotracheal tube placement FINDINGS: Endotracheal tube distal tip overlying the mid trachea approximately 4.8 cm above the level of the nimesh. Enteric tube traverses the GE junction with distal tip excluded from the field of view. Right IJ approach central venous catheter distal tip overlying the right atrium. Right-sided chest tube distal tip projects over the right hilum. Cardiac monitor leads overlie the chest. No obvious pneumothorax. No free air. Cardiac and mediastinal contours remain unchanged. Left lung is relatively clear. Persistent airspace disease at the right mid and right lower lung zones. Visualized osseous structures remain unchanged. Subcutaneous emphysema previously seen at the right lateral chest wall no longer identified. 1. Persistent airspace disease at the right mid and right lower lung zones. Follow-up is recommended to document resolution. 2. Tubes and right IJ line as detailed above.      XR CHEST PORTABLE    Result Date: 4/16/2022  EXAMINATION: ONE XRAY VIEW OF THE CHEST 4/16/2022 1:10 pm COMPARISON: 04/16/2022, 1213 hours HISTORY: ORDERING SYSTEM PROVIDED HISTORY: chest tube TECHNOLOGIST PROVIDED HISTORY: chest tube Reason for Exam: chest tube Additional signs and symptoms: chest tube and NG tube placement 56-year-old female with history of NG tube and chest tube placement FINDINGS: Portable supine view of the chest. Right-sided chest tube has been placed with distal tip projecting over the right infrahilar region. Right IJ approach central venous catheter distal tip overlying the cavoatrial junction, stable. Endotracheal tube distal tip overlying the mid trachea approximately 4.3 cm above the level of the nimesh. Enteric tube traverses the GE junction with distal tip projecting over the left mid abdomen likely within the stomach body. Left lung is clear. Pleural thickening and opacity involving the right mid and right lower lung zones. Subcutaneous emphysema along the right lateral chest wall. Cardiac and mediastinal contours remain unchanged. Atherosclerotic calcification of the thoracic aorta. No free air. There is re-expansion of the right lung compared with the prior study. Probable small residual inferolateral right pneumothorax component. 1. Tubes and right IJ line as detailed above. Re-expansion of the right lung compared with the prior study. There is likely a small residual right inferolateral pneumothorax component. 2. Pleural thickening and airspace opacity involving the right mid and right lower lung zones. Follow-up is recommended to document resolution. 3. Subcutaneous emphysema along the right lateral chest wall. XR CHEST PORTABLE    Result Date: 4/16/2022  EXAMINATION: ONE XRAY VIEW OF THE CHEST 4/16/2022 12:24 pm COMPARISON: None. HISTORY: ORDERING SYSTEM PROVIDED HISTORY: Intubation TECHNOLOGIST PROVIDED HISTORY: Intubation Reason for Exam: central line and ET placement FINDINGS: ET tube terminates 3 cm above the nimesh. Right line terminates in the SVC. There is a relatively stable right-sided basilar pneumothorax. The remaining lungs are unchanged. Cardiac silhouette and osseous structures unchanged.      Intubation as above. No significant change     XR CHEST PORTABLE    Result Date: 4/16/2022  EXAMINATION: ONE XRAY VIEW OF THE CHEST 4/16/2022 11:02 am COMPARISON: 04/05/2022 HISTORY: ORDERING SYSTEM PROVIDED HISTORY: SOB TECHNOLOGIST PROVIDED HISTORY: SOB Reason for Exam: SOB FINDINGS: There is a moderate loculated right basal pneumothorax. Cavitary lesion is noted in the right lung base. Left lung is unremarkable. Heart and mediastinal structures appear normal.  There is no evidence for any tension phenomena. Moderate loculated right basal pneumothorax. Evidence for tension. Cavitary lesion noted in the right lung base. .  Case discussed with Dr. Rigoberto Pino. XR CHEST PORTABLE    Result Date: 4/1/2022  EXAMINATION: ONE XRAY VIEW OF THE CHEST 4/1/2022 4:01 pm COMPARISON: 04/01/2022 HISTORY: ORDERING SYSTEM PROVIDED HISTORY: central line placed TECHNOLOGIST PROVIDED HISTORY: central line placed Reason for Exam: Central line placed FINDINGS: There is a right internal jugular central line in place with distal tip overlying the superior vena cava. Previously noted right basal infiltrate is unchanged. Left lung appears clear. The heart and mediastinal structures appear normal.  There is no evidence of pneumothorax. Satisfactory position of right internal jugular central line. No change in the the right basal infiltrate. XR CHEST PORTABLE    Result Date: 4/1/2022  EXAMINATION: ONE XRAY VIEW OF THE CHEST 4/1/2022 1:22 pm COMPARISON: 06/17/2020. CT dated 10/15/2021. HISTORY: ORDERING SYSTEM PROVIDED HISTORY: dyspnea TECHNOLOGIST PROVIDED HISTORY: dyspnea Reason for Exam: Dyspnea Relevant Medical/Surgical History: Hx of COPD FINDINGS: The cardiac silhouette appears within normal limits. There are bibasilar opacities, right greater than left which may reflect multifocal pneumonia in the correct clinical setting. No evidence of pleural effusion or pneumothorax is seen.      Bibasilar opacities, right greater than left, which may reflect multifocal pneumonia. Follow-up to imaging resolution is recommended. CT CHEST PULMONARY EMBOLISM W CONTRAST    Result Date: 4/16/2022  EXAMINATION: CTA OF THE CHEST 4/16/2022 2:30 pm TECHNIQUE: CTA of the chest was performed after the administration of intravenous contrast.  Multiplanar reformatted images are provided for review. MIP images are provided for review. Dose modulation, iterative reconstruction, and/or weight based adjustment of the mA/kV was utilized to reduce the radiation dose to as low as reasonably achievable. COMPARISON: CT chest from 10/15/2021 HISTORY: ORDERING SYSTEM PROVIDED HISTORY: SOB TECHNOLOGIST PROVIDED HISTORY: SOB Decision Support Exception - unselect if not a suspected or confirmed emergency medical condition->Emergency Medical Condition (MA) Reason for Exam: sob Relevant Medical/Surgical History: intubated, septic, hx of PE 44-year-old female with shortness of breath FINDINGS: Pulmonary Arteries: No obvious filling defect in the main, right main, or left main pulmonary arteries. Evaluation of the segmental and subsegmental pulmonary arterial vasculature is limited due to respiratory motion suboptimal bolus timing, airspace disease, and streak artifact. There are areas of probable residual linear chronic clot within the right lower lobar pulmonary artery on image 111, series 2. Probable linear residual clot within the anterior right upper lobe pulmonary artery on image 83, series 2. Mediastinum: Atherosclerotic calcification of the aorta and branch vasculature. No dissection flap within the visualized thoracic aorta. No pericardial effusion. There is fluid in the superior pericardial recess. Enlarged precarinal lymph nodes remain unchanged on image 83, series 2. Right hilar lymphadenopathy is seen on image 96, series 2. Coronary artery disease. No left hilar or axillary lymphadenopathy.  Lungs/pleura: Endotracheal tube distal tip within the lower trachea. Trachea and very proximal central airways appear patent. Narrowing of the right middle lobe airway into a region of partial consolidation/atelectasis/scarring. Opacification of the right lower lobar segmental airways. There is a thick-walled cavitary lesion in the right lower lobe measuring 5.5 x 7.3 cm in greatest AP and transverse dimensions on image 68, series 4. There is a dependent air-fluid level within this lesion. This area measures 7.6 cm in greatest craniocaudal extent on image 48, series 602. There is surrounding airspace disease. There is a gas and fluid containing multiloculated pleural collection or hydropneumothorax along the posterior margin of the right lung. Right sided chest tube distal tip along the anteromedial right lung. Subcutaneous emphysema along the right axilla and right lateral chest wall. Stellate pulmonary nodule in the lateral right upper lobe measuring 6 mm on image 32, series 4. Moderate to severe emphysema, dependent atelectasis and respiratory motion. Upper Abdomen: Fatty liver. NG tube is seen extending into the stomach body. Atherosclerotic calcification of the upper abdominal aorta and branch vasculature. Soft Tissues/Bones: Chronic anterior wedge compression deformities, unchanged from 10/15/2021 involving T5 and T6. Mild diffuse degenerative changes throughout the spine. 1. Linear filling defects in the anterior right upper lobe and right lower lobe pulmonary arteries likely representing residual/chronic clot material. No acute central filling defect/pulmonary embolus is evident. 2. Thick-walled cavitary lesion in the right lower lobe measuring up to 5.5 x 7.3 x 7.6 cm which could be related to TB or fungal disease or a necrotizing pneumonia. Underlying cavitary malignancy not entirely excluded. There is surrounding airspace disease.   There is also an associated multiloculated pleural collection or hydropneumothorax primarily along the posterior margin of the right lung. Right-sided chest tube distal tip along the anteromedial right pleura/lung. 3. Partial consolidation, atelectasis and/or infiltrate of the right middle lobe. 4. Stellate 6 mm lateral right upper lobe pulmonary nodule. Follow-up guidelines provided below. 5. Underlying moderate to severe emphysema. 6. Endotracheal and NG tubes as above. 7. Coronary artery disease. 8. Chronic anterior wedge compression deformities of T5 and T6. 9. Subcutaneous emphysema along the right axilla and right lateral chest wall likely related to chest tube. 10. Mediastinal and right hilar lymphadenopathy. RECOMMENDATIONS: 6 mm suspicious right solid pulmonary nodule within the upper lobe. Recommend a non-contrast Chest CT at 6-12 months, then another non-contrast Chest CT at 18-24 months. These guidelines do not apply to immunocompromised patients and patients with cancer. Follow up in patients with significant comorbidities as clinically warranted. For lung cancer screening, adhere to Lung-RADS guidelines. Reference: Radiology. 2017; 284(1):228-43. VL Lower Extremity Bilateral Venous Duplex    Result Date: 4/18/2022    [facility]  Vascular Lower Extremities DVT Study Procedure   Patient Name   Khan Morning     Date of Study           04/18/2022                 BOBBY OROSCO   Date of Birth  1955  Gender                  Female   Age            74 year(s)  Race                       Room Number    2002   Corporate ID # [id number]   Patient Acct # [de-identified]   MR #           [id number]      Sonographer             Swapnil Gramajo, [facility]   Accession #    [id number]  Interpreting Physician  Domo Norris   Referring                  Referring Physician     Allyssa Saucedo  Nurse  Practitioner  Procedure Type of Study:   Veins: Lower Extremities DVT Study, Venous Scan Lower Bilateral.  Indications for Study:R/O DVT. Patient Status: In Patient. Technical Quality:Adequate visualization.   Conclusions   Summary Bilateral:  No evidence of deep or superficial venous thrombosis. Signature   ----------------------------------------------------------------  Electronically signed by Corinna Marmolejo RVT(Sonographer) on  04/18/2022 07:45 AM  ----------------------------------------------------------------   ----------------------------------------------------------------  Electronically signed by Domo Norris(Interpreting physician)  on 04/18/2022 01:10 PM  ----------------------------------------------------------------  Findings:   Right Impression:                    Left Impression:  The common femoral, femoral,         The common femoral, femoral,  popliteal and tibial veins           popliteal and tibial veins  demonstrate normal compressibility   demonstrate normal compressibility  and augmentation. and augmentation. Normal compressibility of the great  Normal compressibility of the great  saphenous vein. saphenous vein. Normal compressibility of the small  Normal compressibility of the small  saphenous vein. saphenous vein. Velocities are measured in cm/s ; Diameters are measured in cm Right Lower Extremities DVT Study Measurements Right 2D Measurements +------------------------------------+----------+---------------+----------+ ! Location                            ! Visualized! Compressibility! Thrombosis! +------------------------------------+----------+---------------+----------+ ! Common Femoral                      !Yes       ! Yes            ! None      ! +------------------------------------+----------+---------------+----------+ ! Prox Femoral                        !Yes       ! Yes            ! None      ! +------------------------------------+----------+---------------+----------+ ! Mid Femoral                         !Yes       ! Yes            ! None      ! +------------------------------------+----------+---------------+----------+ ! Dist Femoral !Yes       !Yes            ! None      ! +------------------------------------+----------+---------------+----------+ ! Deep Femoral                        !Yes       ! Yes            ! None      ! +------------------------------------+----------+---------------+----------+ ! Popliteal                           !Yes       ! Yes            ! None      ! +------------------------------------+----------+---------------+----------+ ! Sapheno Femoral Junction            ! Yes       ! Yes            ! None      ! +------------------------------------+----------+---------------+----------+ ! PTV                                 ! Yes       ! Yes            ! None      ! +------------------------------------+----------+---------------+----------+ ! Peroneal                            !Yes       ! Yes            ! None      ! +------------------------------------+----------+---------------+----------+ ! Gastroc                             ! Yes       ! Yes            ! None      ! +------------------------------------+----------+---------------+----------+ ! GSV Thigh                           ! Yes       ! Yes            ! None      ! +------------------------------------+----------+---------------+----------+ ! GSV Knee                            ! Yes       ! Yes            ! None      ! +------------------------------------+----------+---------------+----------+ ! GSV Ankle                           ! Yes       ! Yes            ! None      ! +------------------------------------+----------+---------------+----------+ ! SSV                                 ! Yes       ! Yes            ! None      ! +------------------------------------+----------+---------------+----------+ Left Lower Extremities DVT Study Measurements Left 2D Measurements +------------------------------------+----------+---------------+----------+ ! Location                            ! Visualized! Compressibility! Thrombosis! +------------------------------------+----------+---------------+----------+ ! Common Femoral                      !Yes       ! Yes            ! None      ! +------------------------------------+----------+---------------+----------+ ! Prox Femoral                        !Yes       ! Yes            ! None      ! +------------------------------------+----------+---------------+----------+ ! Mid Femoral                         !Yes       ! Yes            ! None      ! +------------------------------------+----------+---------------+----------+ ! Dist Femoral                        !Yes       ! Yes            ! None      ! +------------------------------------+----------+---------------+----------+ ! Deep Femoral                        !Yes       ! Yes            ! None      ! +------------------------------------+----------+---------------+----------+ ! Popliteal                           !Yes       ! Yes            ! None      ! +------------------------------------+----------+---------------+----------+ ! Sapheno Femoral Junction            ! Yes       ! Yes            ! None      ! +------------------------------------+----------+---------------+----------+ ! PTV                                 ! Yes       ! Yes            ! None      ! +------------------------------------+----------+---------------+----------+ ! Peroneal                            !Yes       ! Yes            ! None      ! +------------------------------------+----------+---------------+----------+ ! Gastroc                             ! Yes       ! Yes            ! None      ! +------------------------------------+----------+---------------+----------+ ! GSV Thigh                           ! Yes       ! Yes            ! None      ! +------------------------------------+----------+---------------+----------+ ! GSV Knee                            ! Yes       ! Yes            ! None      ! +------------------------------------+----------+---------------+----------+ ! GSV Ankle !Yes       !Yes            ! None      ! +------------------------------------+----------+---------------+----------+ ! SSV                                 ! Yes       ! Yes            ! None      ! +------------------------------------+----------+---------------+----------+    FL MODIFIED BARIUM SWALLOW W VIDEO    Result Date: 4/4/2022  EXAMINATION: MODIFIED BARIUM SWALLOW WAS PERFORMED IN CONJUNCTION WITH SPEECH PATHOLOGY SERVICES TECHNIQUE: Fluoroscopic evaluation of the swallowing mechanism was performed using cineradiography with multiple consistency of barium product in conjunction with speech pathology services. FLUOROSCOPY DOSE AND TYPE OR TIME AND EXPOSURES: DAP 49.2 dGy cm squared COMPARISON: None HISTORY: ORDERING SYSTEM PROVIDED HISTORY: aspiration pna TECHNOLOGIST PROVIDED HISTORY: aspiration pna Reason for Exam: aspiration pneumonia FINDINGS: Premature vallecular spillage. There was trace penetration with straw with thin liquid. No aspiration. No aspiration. Trace penetration with straw with thin liquids. Please see separate speech pathology report for full discussion of findings and recommendations. RECOMMENDATIONS: Unavailable         Physical Examination:        Physical Exam  Constitutional:       General: She is not in acute distress. Appearance: She is not ill-appearing. Comments: Sedated, comfortable appearing, unresponsive   Cardiovascular:      Rate and Rhythm: Normal rate and regular rhythm. Pulmonary:      Breath sounds: Rhonchi and rales present. Abdominal:      General: Bowel sounds are normal. There is no distension. Musculoskeletal:      Right lower leg: No edema. Left lower leg: No edema.    Neurological:      Comments: Unresponsive, sedated           Assessment:        Primary Problem  Sepsis [facility])    Active Hospital Problems    Diagnosis Date Noted    Pseudomonas aeruginosa infection [A49.8]      Priority: Medium    Elevated procalcitonin [R79.89] Priority: Medium    Hyperkalemia [E87.5] 04/21/2022     Priority: Medium    Acute systolic HF (heart failure) (HCC) [I50.21] 04/19/2022    Pneumothorax on right [J93.9]     HCAP (healthcare-associated pneumonia) [J18.9]     Sepsis ([facility] [address] [A41.9] 04/16/2022    Acute on chronic respiratory failure ([facility] [address] [J96.20] 04/16/2022    Cavitary lesion of lung [J98.4] 04/16/2022    History of DVT (deep vein thrombosis) [Z86.718] 04/16/2022    Pneumothorax, right [J93.9] 04/16/2022    Status post chest tube placement ([facility] [address] [Z93.8] 04/16/2022    History of pulmonary embolus (PE) [Z86.711] 04/02/2022    Chronic respiratory failure with hypoxia (HCC) [J96.11] 08/05/2021    Compression fracture of body of thoracic vertebra ([facility] [address] [S22.000A] 09/28/2020    Lung nodule [R91.1] 08/22/2018    Bipolar disorder ([address] [F31.9]     Chronic obstructive pulmonary disease ([address] [J44.9] 01/06/2016       Plan:        Sepsis due to pneumonia with abcess vs empyema, pseudomonas sp. Tachypnea, tachycardia  WBC 18>>>12.6  Pro-Brandne >100 (>100 on repeat), , Lactate 2.8> 1.5  Chest x-ray: Moderate loculated right basal pneumothorax.  Evidence for tension.  Cavitary lesion noted in the right lung base  CT chest: Thick-walled cavitary lesion RLL.  Right chest tube in situ for posterior right lung or right pneumothorax.  Infiltrates of the right middle lobe.  Stellate 6 mm lateral RUL nodule.  Mediastinal and right hilar lymphadenopathy  Received 1 L bolus  Now getting 1/2 NS at 15 cc/h  Initially placed on broad-spectrum antibiotic with cefepime, vancomycin  ID and Critical care following  Respiratory cultures x2 (suctioned sputum and bronchial washing) gram-positive cocci in pairs, Pseudomonas   Chest tubes cultures grew Pseudomonas Aeruginosa   On Merrem IV day 6      Acute on chronic respiratory failure 2/2 pneumothorax and pseudomonas pneumonia  History of severe COPD - 3 L home O2 baseline  CXR right pneumothorax on admission   S/p intubation and right chest tube placement  Sedated with propofol  Currently on  Vent with FiO2 30% PEEP 8, unable to be weaned, continue weaning trials and sedation vacation as tolerated - discussion with pt's daughter regarding monitoring over the weekend and if still unable to wean or not showing signs of improvement, consider withdrawal of care, as it was against patient's wishes to have trach/PEG  Continue solumedrol 40mg q8h   Chest tube removed yesterday   Hard to wean off ventilator     Acute systolic heart failure  Echo Estimated LV EF 45-50 %  Probnp 1000s   Lasix 20mg IV BID    Type II DM Insulin sliding scale and lantus 18 Units twice daily     Hx DVT/ PE: Eliquis was discontinued in 12/2021  History bipolar disorder: Trazodone, Risperidone resume        Diet: NPO, on Tube feeds; had some hyperkalemia, resolved  GI ppx: Pepcid 20 mg twice daily IV  DVT ppx: Heparin 5000 units TID   Code status: Full code  Consults: pulm, ID  Dispo: referral sent to [facility], patient will not likely be discharged until next week    Marie Garza MD  4/24/2022  7:19 AM     I have discussed the care of Schuyler Tabor , including pertinent history and exam findings,    today with the resident. I have seen and examined the patient and the key elements of all parts of the encounter have been performed by me . I agree with the assessment, plan and orders as documented by the resident.      Principal Problem:    Sepsis ([facility] [address]  Active Problems:    Hyperkalemia    Pseudomonas aeruginosa infection    Elevated procalcitonin    Elevated C-reactive protein (CRP)    Chronic obstructive pulmonary disease (HCC)    Bipolar disorder (HCC)    Lung nodule    Compression fracture of body of thoracic vertebra (HCC)    Chronic respiratory failure with hypoxia (HCC)    History of pulmonary embolus (PE)    Acute on chronic respiratory failure (HCC)    Cavitary lesion of lung    History of DVT (deep vein thrombosis)    Pneumothorax, right Status post chest tube placement (HCC)    Pneumothorax on right    HCAP (healthcare-associated pneumonia)    Acute systolic HF (heart failure) ([facility] [address]  Resolved Problems:    * No resolved hospital problems. *        Overall  course ;                                   are improving over time.         Patient, undergoing weaning trial  Overall prognosis guarded  Chest tubes is removed           Electronically signed by Ayden Grijalva MD

## 2022-04-24 NOTE — CARE COORDINATION
ONGOING DISCHARGE PLANNING NOTE:    Writer reviewed LSW notes, and discharge plan is to discharge to [facility]   Currently on vent support.   Plans for discussion on withdrawing care tomorrow    Electronically signed by Shant Abbott RN on 4/24/2022 at 3:53 PM

## 2022-04-24 NOTE — PROGRESS NOTES
BRONCHOSPASM/BRONCHOCONSTRICTION     [x]         IMPROVE AERATION/BREATH SOUNDS  [x]   ADMINISTER BRONCHODILATOR THERAPY AS APPROPRIATE  [x]   ASSESS BREATH SOUNDS  []   IMPLEMENT AEROSOL/MDI PROTOCOL  [x]   PATIENT EDUCATION AS NEEDED    PROVIDE ADEQUATE OXYGENATION WITH ACCEPTABLE SP02/ABG'S    [x]  IDENTIFY APPROPRIATE OXYGEN THERAPY  [x]   MONITOR SP02/ABG'S AS NEEDED   [x]   PATIENT EDUCATION AS NEEDED    Pt currently on full support , pt placed back on fullsupport ay 1923 yesterday. Pt had been on cpap /ps for 12 hrs. Pt had increased WOB and became diaphoretic. Will attempt CPAP/PS this morning. Pt at this time is not on any sedation other than given fentanyl push  for pain. Diminished breath sounds through out .

## 2022-04-24 NOTE — PROGRESS NOTES
Dr.Bernardo glae on patient. Discussed patient's condition, restlessness, plan of care regarding wean trial/extubation/sedation. See orders.

## 2022-04-24 NOTE — PROGRESS NOTES
ICU Progress Note (Vent)   [facility]    Patient - Sosa Almendarez,  Age - 74 y.o.    - 1955      Room Number -    MRN -  [id number]   Acct # - [de-identified]  Date of Admission -  2022 10:25 AM    Events of Past 24 Hours   Patient appears to be resting. Gets extremely agitated with lowering sedation. Back on propofol. Bedside RN suggested Precedex. Good idea. Vitals    height is 5' 5\" (1.651 m) and weight is 183 lb 6.8 oz (83.2 kg). Her oral temperature is 98.5 °F (36.9 °C). Her blood pressure is 155/93 (abnormal) and her pulse is 81. Her respiration is 22 and oxygen saturation is 92%. Temperature Range: Temp: 98.5 °F (36.9 °C) Temp  Av °F (37.2 °C)  Min: 98.5 °F (36.9 °C)  Max: 99.6 °F (37.6 °C)  BP Range:  Systolic (86EAC), NBN:431 , Min:104 , YDR:059     Diastolic (39PCZ), VICK:92, Min:53, Max:93    Pulse Range: Pulse  Av.1  Min: 59  Max: 114  Respiration Range: Resp  Av.8  Min: 14  Max: 27  Current Pulse Ox[de-identified]  SpO2: 92 %  24HR Pulse Ox Range:  SpO2  Av.3 %  Min: 81 %  Max: 98 %  Oxygen Amount and Delivery: O2 Flow Rate (L/min): 6 L/min      Wt Readings from Last 3 Encounters:   22 183 lb 6.8 oz (83.2 kg)   22 183 lb (83 kg)   22 186 lb 1.1 oz (84.4 kg)     I/O       Intake/Output Summary (Last 24 hours) at 2022 0935  Last data filed at 2022 0730  Gross per 24 hour   Intake 1953 ml   Output 3000 ml   Net -1047 ml     I/O last 3 completed shifts:   In: 3178.6 [I.V.:544.6; CB/BL:6913; IV Piggyback:350]  Out:  [Urine:3450; Stool:500]     DRAIN/TUBE OUTPUT:     Invasive Lines   ETT Day -   9  Right IJ day #9       Mechanical Ventilation Data   SETTINGS (Comprehensive)  Vent Information  Ventilator ID: [id number]  Vent Mode: PRVC  Additional Respiratory Assessments  Pulse: 81  Resp: 22  SpO2: 92 %  End Tidal CO2: 27 (%)  Position: Semi-Molina's  Humidification Source: HME  Cuff Pressure (cm H2O): 30 cm H2O       ABGs:   Lab Results   Component Value Date    PHART 7.448 04/24/2022    PO2ART 119.0 04/24/2022    SDP1SVG 64.8 04/24/2022       Lab Results   Component Value Date    MODE [facility] 04/24/2022         Medications   IV   sodium chloride 15 mL/hr at 04/24/22 0401    sodium chloride      dextrose      propofol Stopped (04/23/22 0852)      multivitamin  1 tablet Oral Daily    thiamine and folic acid IVPB  1 mg IntraVENous Daily    insulin glargine  18 Units SubCUTAneous BID    insulin lispro  0-12 Units SubCUTAneous Q6H    methylPREDNISolone  40 mg IntraVENous Q8H    polyethylene glycol  17 g Oral Daily    furosemide  20 mg IntraVENous BID    meropenem  1,000 mg IntraVENous Q8H    albuterol  2.5 mg Nebulization Q4H    acetylcysteine  200 mg Inhalation Q4H    sodium chloride flush  5-40 mL IntraVENous 2 times per day    risperiDONE  1.5 mg Oral Q12H    rivastigmine  4.5 mg Oral BID    atorvastatin  20 mg Oral Daily    traZODone  50 mg Oral Nightly    polyvinyl alcohol  1 drop Both Eyes Q4H    And    artificial tears   Both Eyes Q4H    chlorhexidine  15 mL Mouth/Throat BID    famotidine (PEPCID) injection  20 mg IntraVENous BID    heparin (porcine)  5,000 Units SubCUTAneous 3 times per day       Diet/Nutrition   ADULT TUBE FEEDING; Nasogastric; Renal Formula; Continuous; 15; Yes; 10; Q 4 hours; 35; 60; Q 6 hours    Exam   VITALS    height is 5' 5\" (1.651 m) and weight is 183 lb 6.8 oz (83.2 kg). Her oral temperature is 98.5 °F (36.9 °C). Her blood pressure is 155/93 (abnormal) and her pulse is 81. Her respiration is 22 and oxygen saturation is 92%. Ventilator Settings (Basic)  Vent Mode: [facility] Resp Rate (Set): 24 bmp/Vt (Set, mL): 450 mL/ /FiO2 : 30 %    Constitutional - Sedated  General Appearance  well developed, well nourished  HEENT - Life support devices in place (ET, ),normocephalic, atraumatic. Lungs - Chest expands equally, no wheezes, rales or rhonchi.   Cardiovascular - Heart sounds are normal.  normal rate and rhythm regular, no murmur, gallop or rub. Abdomen - soft, nontender, ,   Extremities - no cyanosis,     Lab Results   CBC     Lab Results   Component Value Date    WBC 12.6 04/24/2022    RBC 3.48 04/24/2022    RBC 0-2 07/08/2021    HGB 10.3 04/24/2022    HCT 32.9 04/24/2022     04/24/2022    MCV 94.4 04/24/2022    MCH 29.5 04/24/2022    MCHC 31.3 04/24/2022    RDW 17.0 04/24/2022    NRBC 0 07/08/2021    METASPCT 1 04/02/2022    LYMPHOPCT 5 04/19/2022    LYMPHOPCT 12.1 07/08/2021    MONOPCT 2 04/19/2022    MONOPCT 15.1 07/08/2021    BASOPCT 0 04/19/2022    BASOPCT 0.8 07/08/2021    MONOSABS 0.26 04/19/2022    MONOSABS 0.4 07/08/2021    LYMPHSABS 0.66 04/19/2022    LYMPHSABS 0.3 07/08/2021    EOSABS 0.00 04/19/2022    EOSABS 0.1 07/08/2021    BASOSABS 0.00 04/19/2022    DIFFTYPE NOT REPORTED 12/20/2021       BMP   Lab Results   Component Value Date     04/24/2022    K 4.6 04/24/2022    CL 98 04/24/2022    CO2 41 04/24/2022    BUN 50 04/24/2022    CREATININE 0.54 04/24/2022    GLUCOSE 202 04/24/2022    GLUCOSE 127 07/08/2021    CALCIUM 9.0 04/24/2022       LFTS  Lab Results   Component Value Date    ALKPHOS 137 04/16/2022    ALT 25 04/16/2022    AST 19 04/16/2022    PROT 7.0 04/16/2022    PROT 5.7 07/08/2021    BILITOT 0.16 04/16/2022    BILIDIR 0.1 07/08/2021    IBILI 0.12 07/08/2019    LABALBU 2.8 04/16/2022    LABALBU 3.6 07/08/2021       INR  No results for input(s): PROTIME, INR in the last 72 hours. APTT  No results for input(s): APTT in the last 72 hours. Lactic Acid  Lab Results   Component Value Date    LACTA 1.5 04/19/2022    LACTA 1.2 04/02/2022        BNP   No results for input(s): BNP in the last 72 hours. Cultures     Light growth Pseudomonas on culture April 18  Radiology     Plain Films  Chest x-ray reveals endotracheal tube  In fair position. The thoracostomy tube has been removed.   See actual reports for details    SYSTEM ASSESSMENT    Acute on chronic hypoxic and hypercapnic respiratory failure, intubated 4/16  Pseudomonas pneumonia  Right-sided pneumothorax  Right lower lobe cavitary lesions  Exudative pleural effusion on right, growing Pseudomonas  Low ejection fraction EF 45 to 50%, echo 4/18  History of pulmonary embolism and what appears to be chronic filling defects (subsegmental, most likely clinically inconsequential)  Severe stage IV COPD, FEV1 is 35% of predicted  Recent hospitalization for pneumonia  Elevated inflammatory markers including D-dimer and procalcitonin  Full CODE STATUS        Neuro   Patient currently has issues with agitation. Hopefully the Precedex will help. Respiratory   Currently on vent support. Plans for discussion on withdrawing care tomorrow. On Mucomyst and Proventil. Chest tube. No residual gross pneumothorax. Hemodynamics   Not on any pressors. Gastrointestinal/Nutrition       Renal     Patient's creatinine 0.54  Infectious Disease     On meropenem. Hematology/Oncology   On subcu heparin for DVT prophylaxis    Endocrine       Social/Spiritual/DNR/Disposition/Other     Plans for rediscussion of CODE STATUS tomorrow. Supportive care to continue.     Critical Care Time   35 min    Electronically signed by Naye Duckworth MD on 4/24/2022 at 9:35 AM

## 2022-04-24 NOTE — PLAN OF CARE
Problem: Non-Violent Restraints  Goal: Removal from restraints as soon as assessed to be safe  4/24/2022 0436 by Karuna Alvarez RN  Outcome: Progressing  Note: Attempts to pull at tubes when released. ROM assessed every 2 hours and visual safety checks completed hourly. Restraints maintained to preserve the patient's airway. Problem: Non-Violent Restraints  Goal: No harm/injury to patient while restraints in use  4/24/2022 0436 by Karuna Alvarez RN  Outcome: Progressing  Note: Attempts to pull at tubes when released. ROM assessed every 2 hours and visual safety checks completed hourly. Restraints maintained to preserve the patient's airway. Problem: Non-Violent Restraints  Goal: Patient's dignity will be maintained  4/24/2022 0436 by Karuna Alvarez RN  Outcome: Progressing     Problem: Gas Exchange - Impaired:  Goal: Levels of oxygenation will improve  Description: Levels of oxygenation will improve  4/24/2022 0436 by Karuna Alvarez RN  Outcome: Progressing      Problem: Falls - Risk of:  Goal: Absence of physical injury  Description: Absence of physical injury  4/24/2022 0436 by Karuna Alvarez RN  Outcome: Progressing     Problem: Breathing Pattern - Ineffective:  Goal: Ability to achieve and maintain a regular respiratory rate will improve  Description: Ability to achieve and maintain a regular respiratory rate will improve  4/24/2022 0436 by Karuna Alvarez RN  Outcome: Progressing     Problem: Skin Integrity:  Goal: Absence of new skin breakdown  Description: Absence of new skin breakdown  4/24/2022 0436 by Karuna Alvarez RN  Outcome: Progressing  Note: Patient turned and repositioned every 2 hours and as needed for comfort. Skin kept clean and dry.       Problem: Skin Integrity:  Goal: Will show no infection signs and symptoms  Description: Will show no infection signs and symptoms  4/24/2022 0436 by Karuna Alvarez RN  Outcome: Progressing     Problem: OXYGENATION/RESPIRATORY FUNCTION  Goal: Patient will maintain patent airway  4/24/2022 0436 by Joselin Del Valle RN  Outcome: Progressing     Problem: OXYGENATION/RESPIRATORY FUNCTION  Goal: Patient will achieve/maintain normal respiratory rate/effort  Description: Respiratory rate and effort will be within normal limits for the patient  4/24/2022 0436 by Joselin Del Valle RN  Outcome: Progressing     Problem: MECHANICAL VENTILATION  Goal: Oral health is maintained or improved  4/24/2022 0436 by Joselin Del Valle RN  Outcome: Progressing     Problem: MECHANICAL VENTILATION  Goal: ET tube will be managed safely  4/24/2022 0436 by Joselin Del Valle RN  Outcome: Progressing     Problem: MECHANICAL VENTILATION  Goal: Ability to express needs and understand communication  4/24/2022 0436 by Joselin Del Valle RN  Outcome: Progressing     Problem: MECHANICAL VENTILATION  Goal: Mobility/activity is maintained at optimum level for patient  4/24/2022 0436 by Joselin Del Valle RN  Outcome: Progressing     Problem: Nutrition  Goal: Optimal nutrition therapy  4/24/2022 0436 by Joselin Del Valle RN  Outcome: Progressing  Note: Pt tolerating tube feed.      Problem: Discharge Planning  Goal: Discharge to home or other facility with appropriate resources  4/24/2022 0436 by Joselin Del Valle RN  Outcome: Progressing     Problem: Pain  Goal: Verbalizes/displays adequate comfort level or baseline comfort level  4/24/2022 0436 by Joselin Del Valle RN  Outcome: Progressing

## 2022-04-24 NOTE — PROGRESS NOTES
PROGRESS NOTE          PATIENT NAME: [id number]  RECORD NO. [id number]  DATE: 4/24/2022  SURGEON: Brandt Sewell  PRIMARY CARE PHYSICIAN: Jacinto Puente MD    HD: # 8    ASSESSMENT    Patient Active Problem List   Diagnosis    [facility] pulmonary disease [facility]    Vitamin D deficiency    Anxiety    Asthma    Bipolar disorder ([address]    Depression    Hyperlipidemia with target LDL less than 100    Sleep apnea    Vision abnormalities    Status post hysteroscopy    Chronic headaches    IBS (irritable bowel syndrome)    Colon polyp    Collagenous colitis    Lung nodule    Left elbow pain    Dilated bile duct    Acute pain of left shoulder    Adhesive capsulitis of left shoulder    Leucopenia    Compression fracture of body of thoracic vertebra ([facility])    Age-related osteoporosis with current pathological fracture    Pulmonary embolism on right ([address]    Migraines    Paranoid behavior ([facility])    Acute deep vein thrombosis (DVT) of right lower extremity ([facility])    Delirium    Chronic respiratory failure with hypoxia ([facility])    Major neurocognitive disorder ([address]    Pneumonia    Chronic respiratory failure with hypoxia, on home O2 therapy ([facility])    COPD (chronic obstructive pulmonary disease) ([address]    TRAM (acute kidney injury) ([address]    History of pulmonary embolus (PE)    Mycoplasma pneumonia    Iron deficiency anemia    Sepsis ([address]    Acute on chronic respiratory failure ([address]    Cavitary lesion of lung    History of DVT (deep vein thrombosis)    Pneumothorax, right    Status post chest tube placement ([facility])    Pneumothorax on right    HCAP (healthcare-associated pneumonia)    Acute systolic HF (heart failure) ([facility])    Hyperkalemia    Pseudomonas aeruginosa infection    Elevated procalcitonin       MEDICAL DECISION MAKING AND PLAN    73 yo F who has resp failure who is intubated sedated and required chest tube insertion on arrival due to pneumothorax.  Chest tube now removed. 1. Continue ICU care. 2. Chest tube removed without any issues. 3. Please call back if any surgical needs or concerns. Chief Complaint: intubated. Unable to obtain due to patients condition. SUBJECTIVE    Antoine Tran is doing the same today. No acute changes overnight. ICU team working on her sedation. Chest tube removed yesterday. Subsequent CXRs have not shown any pneumothorax. OBJECTIVE  VITALS: Temp: Temp: 98.5 °F (36.9 °C)Temp  Av °F (37.2 °C)  Min: 98.5 °F (36.9 °C)  Max: 99.6 °F (76.2 °C) BP Systolic (59SGD), LHH:045 , Min:104 , CVM:550   Diastolic (67AIQ), JYM:43, Min:53, Max:93   Pulse Pulse  Av.9  Min: 59  Max: 114 Resp Resp  Av.7  Min: 14  Max: 27 Pulse ox SpO2  Av.2 %  Min: 81 %  Max: 98 %  GENERAL: intubated, sedated. Eyes are open. NEURO: Eyes open. HEENT: normocephalic, atraumatic. ET tube in.   : deferred. LUNGS: CTAB. Equal chest rise. Dressing in place. HEART: normal rate and regular rhythm  ABDOMEN: soft, non-tender, non-distended, bowel sounds present in all 4 quadrants and no guarding or peritoneal signs present  EXTREMITY: no cyanosis, clubbing or edema    I/O last 3 completed shifts: In: 3178.6 [I.V.:544.6; QV/JJ:9012; IV Piggyback:350]  Out: 6396 [Urine:3450; Stool:500]    Drain/tube output: In: 1953 [I.V.:180; NG/GT:1573]  Out: 3000 [Urine:2600]    LAB:  CBC:   Recent Labs     22  0342   WBC 9.8 9.2 12.6*   HGB 8.8* 8.9* 10.3*   HCT 27.1* 27.5* 32.9*   MCV 93.5 92.5 94.4    363 476*     BMP:   Recent Labs     2223/22  0445 22  0342    143 145*   K 5.3 4.9 4.6    99 98   CO2 37* 39* 41*   BUN 44* 47* 50*   CREATININE 0.47* 0.45* 0.54   GLUCOSE 196* 175* 202*     COAGS: No results for input(s): APTT, PROT, INR in the last 72 hours. RADIOLOGY:  CXR: no pneumothorax demonstrated.        Fritzi Alpers, MD  22, 11:01 AM

## 2022-04-25 ENCOUNTER — APPOINTMENT (OUTPATIENT)
Dept: GENERAL RADIOLOGY | Age: 67
DRG: 720 | End: 2022-04-25
Payer: COMMERCIAL

## 2022-04-25 ENCOUNTER — APPOINTMENT (OUTPATIENT)
Dept: CT IMAGING | Age: 67
DRG: 720 | End: 2022-04-25
Payer: COMMERCIAL

## 2022-04-25 PROBLEM — J85.2 LUNG ABSCESS (HCC): Status: ACTIVE | Noted: 2022-04-25

## 2022-04-25 LAB
ALLEN TEST: ABNORMAL
ANION GAP SERPL CALCULATED.3IONS-SCNC: 6 MMOL/L (ref 9–17)
BUN BLDV-MCNC: 45 MG/DL (ref 8–23)
C-REACTIVE PROTEIN: 7.8 MG/L (ref 0–5)
CALCIUM SERPL-MCNC: 8.6 MG/DL (ref 8.6–10.4)
CARBOXYHEMOGLOBIN: 0.8 % (ref 0–5)
CHLORIDE BLD-SCNC: 95 MMOL/L (ref 98–107)
CO2: 38 MMOL/L (ref 20–31)
CREAT SERPL-MCNC: <0.4 MG/DL (ref 0.5–0.9)
EKG ATRIAL RATE: 83 BPM
EKG P AXIS: 91 DEGREES
EKG P-R INTERVAL: 122 MS
EKG Q-T INTERVAL: 344 MS
EKG QRS DURATION: 82 MS
EKG QTC CALCULATION (BAZETT): 404 MS
EKG R AXIS: -84 DEGREES
EKG T AXIS: 87 DEGREES
EKG VENTRICULAR RATE: 83 BPM
FIO2: 30
GFR AFRICAN AMERICAN: ABNORMAL ML/MIN
GFR NON-AFRICAN AMERICAN: ABNORMAL ML/MIN
GFR SERPL CREATININE-BSD FRML MDRD: ABNORMAL ML/MIN/{1.73_M2}
GLUCOSE BLD-MCNC: 136 MG/DL (ref 65–105)
GLUCOSE BLD-MCNC: 139 MG/DL (ref 65–105)
GLUCOSE BLD-MCNC: 154 MG/DL (ref 65–105)
GLUCOSE BLD-MCNC: 166 MG/DL (ref 65–105)
GLUCOSE BLD-MCNC: 229 MG/DL (ref 70–99)
HCO3 ARTERIAL: 44.6 MMOL/L (ref 22–26)
HCT VFR BLD CALC: 30.5 % (ref 36–46)
HEMOGLOBIN: 9.7 G/DL (ref 12–16)
MCH RBC QN AUTO: 29.8 PG (ref 26–34)
MCHC RBC AUTO-ENTMCNC: 31.7 G/DL (ref 31–37)
MCV RBC AUTO: 94.1 FL (ref 80–100)
METHEMOGLOBIN: 1 % (ref 0–1.9)
MODE: ABNORMAL
O2 DEVICE/FLOW/%: ABNORMAL
O2 SAT, ARTERIAL: 91.1 % (ref 95–98)
PATIENT TEMP: 37.1
PCO2 ARTERIAL: 60.2 MMHG (ref 35–45)
PDW BLD-RTO: 16.9 % (ref 11.5–14.9)
PEEP/CPAP: 8
PH ARTERIAL: 7.48 (ref 7.35–7.45)
PLATELET # BLD: 359 K/UL (ref 150–450)
PMV BLD AUTO: 8 FL (ref 6–12)
PO2 ARTERIAL: 63 MMHG (ref 80–100)
POSITIVE BASE EXCESS, ART: 21.1 MMOL/L (ref 0–2)
POTASSIUM SERPL-SCNC: 4.2 MMOL/L (ref 3.7–5.3)
PROCALCITONIN: 2.03 NG/ML
PT. POSITION: ABNORMAL
RBC # BLD: 3.25 M/UL (ref 4–5.2)
REASON FOR REJECTION: NORMAL
SAMPLE SITE: ABNORMAL
SEDIMENTATION RATE, ERYTHROCYTE: 34 MM/HR (ref 0–30)
SET RATE: 24
SODIUM BLD-SCNC: 139 MMOL/L (ref 135–144)
TEXT FOR RESPIRATORY: ABNORMAL
TOTAL RATE: 24
VT: 450
WBC # BLD: 11.5 K/UL (ref 3.5–11)
ZZ NTE CLEAN UP: ORDERED TEST: NORMAL
ZZ NTE WITH NAME CLEAN UP: SPECIMEN SOURCE: NORMAL

## 2022-04-25 PROCEDURE — 6370000000 HC RX 637 (ALT 250 FOR IP): Performed by: INTERNAL MEDICINE

## 2022-04-25 PROCEDURE — 99233 SBSQ HOSP IP/OBS HIGH 50: CPT | Performed by: INTERNAL MEDICINE

## 2022-04-25 PROCEDURE — 6360000002 HC RX W HCPCS: Performed by: INTERNAL MEDICINE

## 2022-04-25 PROCEDURE — 97162 PT EVAL MOD COMPLEX 30 MIN: CPT

## 2022-04-25 PROCEDURE — 2580000003 HC RX 258: Performed by: INTERNAL MEDICINE

## 2022-04-25 PROCEDURE — 6370000000 HC RX 637 (ALT 250 FOR IP)

## 2022-04-25 PROCEDURE — 6360000002 HC RX W HCPCS: Performed by: SURGERY

## 2022-04-25 PROCEDURE — 6360000002 HC RX W HCPCS: Performed by: STUDENT IN AN ORGANIZED HEALTH CARE EDUCATION/TRAINING PROGRAM

## 2022-04-25 PROCEDURE — 82805 BLOOD GASES W/O2 SATURATION: CPT

## 2022-04-25 PROCEDURE — 94003 VENT MGMT INPAT SUBQ DAY: CPT

## 2022-04-25 PROCEDURE — 32551 INSERTION OF CHEST TUBE: CPT

## 2022-04-25 PROCEDURE — 2500000003 HC RX 250 WO HCPCS: Performed by: INTERNAL MEDICINE

## 2022-04-25 PROCEDURE — 36415 COLL VENOUS BLD VENIPUNCTURE: CPT

## 2022-04-25 PROCEDURE — 94640 AIRWAY INHALATION TREATMENT: CPT

## 2022-04-25 PROCEDURE — A4216 STERILE WATER/SALINE, 10 ML: HCPCS | Performed by: INTERNAL MEDICINE

## 2022-04-25 PROCEDURE — 71250 CT THORAX DX C-: CPT

## 2022-04-25 PROCEDURE — 2700000000 HC OXYGEN THERAPY PER DAY

## 2022-04-25 PROCEDURE — 97110 THERAPEUTIC EXERCISES: CPT

## 2022-04-25 PROCEDURE — 84145 PROCALCITONIN (PCT): CPT

## 2022-04-25 PROCEDURE — 2580000003 HC RX 258: Performed by: STUDENT IN AN ORGANIZED HEALTH CARE EDUCATION/TRAINING PROGRAM

## 2022-04-25 PROCEDURE — 80048 BASIC METABOLIC PNL TOTAL CA: CPT

## 2022-04-25 PROCEDURE — 6370000000 HC RX 637 (ALT 250 FOR IP): Performed by: STUDENT IN AN ORGANIZED HEALTH CARE EDUCATION/TRAINING PROGRAM

## 2022-04-25 PROCEDURE — 82947 ASSAY GLUCOSE BLOOD QUANT: CPT

## 2022-04-25 PROCEDURE — 86140 C-REACTIVE PROTEIN: CPT

## 2022-04-25 PROCEDURE — 85652 RBC SED RATE AUTOMATED: CPT

## 2022-04-25 PROCEDURE — 71045 X-RAY EXAM CHEST 1 VIEW: CPT

## 2022-04-25 PROCEDURE — 94761 N-INVAS EAR/PLS OXIMETRY MLT: CPT

## 2022-04-25 PROCEDURE — 85027 COMPLETE CBC AUTOMATED: CPT

## 2022-04-25 PROCEDURE — 2000000000 HC ICU R&B

## 2022-04-25 PROCEDURE — 36600 WITHDRAWAL OF ARTERIAL BLOOD: CPT

## 2022-04-25 RX ORDER — FAMOTIDINE 20 MG/1
20 TABLET, FILM COATED ORAL 2 TIMES DAILY
Status: DISCONTINUED | OUTPATIENT
Start: 2022-04-25 | End: 2022-05-03 | Stop reason: HOSPADM

## 2022-04-25 RX ORDER — GAUZE BANDAGE 2" X 2"
100 BANDAGE TOPICAL DAILY
Status: DISCONTINUED | OUTPATIENT
Start: 2022-04-25 | End: 2022-05-03 | Stop reason: HOSPADM

## 2022-04-25 RX ORDER — FOLIC ACID 1 MG/1
1 TABLET ORAL DAILY
Status: DISCONTINUED | OUTPATIENT
Start: 2022-04-25 | End: 2022-05-03 | Stop reason: HOSPADM

## 2022-04-25 RX ORDER — FENTANYL CITRATE 50 UG/ML
100 INJECTION, SOLUTION INTRAMUSCULAR; INTRAVENOUS ONCE
Status: COMPLETED | OUTPATIENT
Start: 2022-04-25 | End: 2022-04-25

## 2022-04-25 RX ADMIN — MINERAL OIL, PETROLATUM: 425; 568 OINTMENT OPHTHALMIC at 20:45

## 2022-04-25 RX ADMIN — ALBUTEROL SULFATE 2.5 MG: 2.5 SOLUTION RESPIRATORY (INHALATION) at 11:49

## 2022-04-25 RX ADMIN — RIVASTIGMINE TARTRATE 4.5 MG: 1.5 CAPSULE ORAL at 09:00

## 2022-04-25 RX ADMIN — THIAMINE HCL TAB 100 MG 100 MG: 100 TAB at 10:56

## 2022-04-25 RX ADMIN — MEROPENEM 1000 MG: 1 INJECTION, POWDER, FOR SOLUTION INTRAVENOUS at 21:14

## 2022-04-25 RX ADMIN — CHLORHEXIDINE GLUCONATE 0.12% ORAL RINSE 15 ML: 1.2 LIQUID ORAL at 09:00

## 2022-04-25 RX ADMIN — MINERAL OIL, PETROLATUM: 425; 568 OINTMENT OPHTHALMIC at 09:00

## 2022-04-25 RX ADMIN — POLYVINYL ALCOHOL 1 DROP: 14 SOLUTION/ DROPS OPHTHALMIC at 21:45

## 2022-04-25 RX ADMIN — ALBUTEROL SULFATE 2.5 MG: 2.5 SOLUTION RESPIRATORY (INHALATION) at 15:07

## 2022-04-25 RX ADMIN — HEPARIN SODIUM 5000 UNITS: 5000 INJECTION INTRAVENOUS; SUBCUTANEOUS at 15:58

## 2022-04-25 RX ADMIN — ALBUTEROL SULFATE 2.5 MG: 2.5 SOLUTION RESPIRATORY (INHALATION) at 19:09

## 2022-04-25 RX ADMIN — INSULIN LISPRO 2 UNITS: 100 INJECTION, SOLUTION INTRAVENOUS; SUBCUTANEOUS at 21:22

## 2022-04-25 RX ADMIN — ALBUTEROL SULFATE 2.5 MG: 2.5 SOLUTION RESPIRATORY (INHALATION) at 22:37

## 2022-04-25 RX ADMIN — HEPARIN SODIUM 5000 UNITS: 5000 INJECTION INTRAVENOUS; SUBCUTANEOUS at 21:09

## 2022-04-25 RX ADMIN — FENTANYL CITRATE 100 MCG: 50 INJECTION, SOLUTION INTRAMUSCULAR; INTRAVENOUS at 14:33

## 2022-04-25 RX ADMIN — SODIUM CHLORIDE, PRESERVATIVE FREE 10 ML: 5 INJECTION INTRAVENOUS at 21:49

## 2022-04-25 RX ADMIN — ACETYLCYSTEINE 200 MG: 200 SOLUTION ORAL; RESPIRATORY (INHALATION) at 03:29

## 2022-04-25 RX ADMIN — FUROSEMIDE 20 MG: 10 INJECTION, SOLUTION INTRAMUSCULAR; INTRAVENOUS at 08:50

## 2022-04-25 RX ADMIN — RIVASTIGMINE TARTRATE 4.5 MG: 1.5 CAPSULE ORAL at 21:49

## 2022-04-25 RX ADMIN — POLYVINYL ALCOHOL 1 DROP: 14 SOLUTION/ DROPS OPHTHALMIC at 05:47

## 2022-04-25 RX ADMIN — FENTANYL CITRATE 50 MCG: 50 INJECTION, SOLUTION INTRAMUSCULAR; INTRAVENOUS at 23:54

## 2022-04-25 RX ADMIN — TRAZODONE HYDROCHLORIDE 50 MG: 50 TABLET ORAL at 21:09

## 2022-04-25 RX ADMIN — METHYLPREDNISOLONE SODIUM SUCCINATE 40 MG: 40 INJECTION, POWDER, LYOPHILIZED, FOR SOLUTION INTRAMUSCULAR; INTRAVENOUS at 00:01

## 2022-04-25 RX ADMIN — METHYLPREDNISOLONE SODIUM SUCCINATE 40 MG: 40 INJECTION, POWDER, LYOPHILIZED, FOR SOLUTION INTRAMUSCULAR; INTRAVENOUS at 15:59

## 2022-04-25 RX ADMIN — FUROSEMIDE 20 MG: 10 INJECTION, SOLUTION INTRAMUSCULAR; INTRAVENOUS at 18:33

## 2022-04-25 RX ADMIN — PROPOFOL 10 MCG/KG/MIN: 10 INJECTION, EMULSION INTRAVENOUS at 22:37

## 2022-04-25 RX ADMIN — FAMOTIDINE 20 MG: 10 INJECTION INTRAVENOUS at 08:50

## 2022-04-25 RX ADMIN — METHYLPREDNISOLONE SODIUM SUCCINATE 40 MG: 40 INJECTION, POWDER, LYOPHILIZED, FOR SOLUTION INTRAMUSCULAR; INTRAVENOUS at 23:54

## 2022-04-25 RX ADMIN — FENTANYL CITRATE 50 MCG: 50 INJECTION, SOLUTION INTRAMUSCULAR; INTRAVENOUS at 01:35

## 2022-04-25 RX ADMIN — FENTANYL CITRATE 50 MCG: 50 INJECTION, SOLUTION INTRAMUSCULAR; INTRAVENOUS at 09:33

## 2022-04-25 RX ADMIN — POLYVINYL ALCOHOL 1 DROP: 14 SOLUTION/ DROPS OPHTHALMIC at 03:01

## 2022-04-25 RX ADMIN — RISPERIDONE 1.5 MG: 0.5 TABLET ORAL at 15:58

## 2022-04-25 RX ADMIN — POLYVINYL ALCOHOL 1 DROP: 14 SOLUTION/ DROPS OPHTHALMIC at 12:15

## 2022-04-25 RX ADMIN — ALBUTEROL SULFATE 2.5 MG: 2.5 SOLUTION RESPIRATORY (INHALATION) at 03:29

## 2022-04-25 RX ADMIN — FAMOTIDINE 20 MG: 20 TABLET, FILM COATED ORAL at 21:09

## 2022-04-25 RX ADMIN — PROPOFOL 15 MCG/KG/MIN: 10 INJECTION, EMULSION INTRAVENOUS at 14:18

## 2022-04-25 RX ADMIN — PROPOFOL 5 MCG/KG/MIN: 10 INJECTION, EMULSION INTRAVENOUS at 10:47

## 2022-04-25 RX ADMIN — ACETYLCYSTEINE 200 MG: 200 SOLUTION ORAL; RESPIRATORY (INHALATION) at 11:49

## 2022-04-25 RX ADMIN — ACETYLCYSTEINE 200 MG: 200 SOLUTION ORAL; RESPIRATORY (INHALATION) at 19:09

## 2022-04-25 RX ADMIN — PROPOFOL 10 MCG/KG/MIN: 10 INJECTION, EMULSION INTRAVENOUS at 13:00

## 2022-04-25 RX ADMIN — FOLIC ACID 1 MG: 1 TABLET ORAL at 10:53

## 2022-04-25 RX ADMIN — ATORVASTATIN CALCIUM 20 MG: 20 TABLET, FILM COATED ORAL at 08:56

## 2022-04-25 RX ADMIN — INSULIN LISPRO 2 UNITS: 100 INJECTION, SOLUTION INTRAVENOUS; SUBCUTANEOUS at 08:53

## 2022-04-25 RX ADMIN — RISPERIDONE 1.5 MG: 0.5 TABLET ORAL at 04:13

## 2022-04-25 RX ADMIN — FENTANYL CITRATE 50 MCG: 50 INJECTION, SOLUTION INTRAMUSCULAR; INTRAVENOUS at 10:51

## 2022-04-25 RX ADMIN — MULTIPLE VITAMINS W/ MINERALS TAB 1 TABLET: TAB at 08:56

## 2022-04-25 RX ADMIN — MINERAL OIL, PETROLATUM: 425; 568 OINTMENT OPHTHALMIC at 18:30

## 2022-04-25 RX ADMIN — INSULIN GLARGINE 18 UNITS: 100 INJECTION, SOLUTION SUBCUTANEOUS at 08:53

## 2022-04-25 RX ADMIN — ACETYLCYSTEINE 200 MG: 200 SOLUTION ORAL; RESPIRATORY (INHALATION) at 15:07

## 2022-04-25 RX ADMIN — POLYVINYL ALCOHOL 1 DROP: 14 SOLUTION/ DROPS OPHTHALMIC at 18:54

## 2022-04-25 RX ADMIN — HEPARIN SODIUM 5000 UNITS: 5000 INJECTION INTRAVENOUS; SUBCUTANEOUS at 05:33

## 2022-04-25 RX ADMIN — SODIUM CHLORIDE: 4.5 INJECTION, SOLUTION INTRAVENOUS at 04:00

## 2022-04-25 RX ADMIN — MINERAL OIL, PETROLATUM: 425; 568 OINTMENT OPHTHALMIC at 14:01

## 2022-04-25 RX ADMIN — FENTANYL CITRATE 50 MCG: 50 INJECTION, SOLUTION INTRAMUSCULAR; INTRAVENOUS at 13:23

## 2022-04-25 RX ADMIN — CHLORHEXIDINE GLUCONATE 0.12% ORAL RINSE 15 ML: 1.2 LIQUID ORAL at 21:08

## 2022-04-25 RX ADMIN — FENTANYL CITRATE 50 MCG: 50 INJECTION, SOLUTION INTRAMUSCULAR; INTRAVENOUS at 08:49

## 2022-04-25 RX ADMIN — MINERAL OIL, PETROLATUM: 425; 568 OINTMENT OPHTHALMIC at 04:13

## 2022-04-25 RX ADMIN — FENTANYL CITRATE 50 MCG: 50 INJECTION, SOLUTION INTRAMUSCULAR; INTRAVENOUS at 06:04

## 2022-04-25 RX ADMIN — ACETYLCYSTEINE 200 MG: 200 SOLUTION ORAL; RESPIRATORY (INHALATION) at 07:03

## 2022-04-25 RX ADMIN — MEROPENEM 1000 MG: 1 INJECTION, POWDER, FOR SOLUTION INTRAVENOUS at 12:44

## 2022-04-25 RX ADMIN — INSULIN GLARGINE 18 UNITS: 100 INJECTION, SOLUTION SUBCUTANEOUS at 21:09

## 2022-04-25 RX ADMIN — ALBUTEROL SULFATE 2.5 MG: 2.5 SOLUTION RESPIRATORY (INHALATION) at 07:03

## 2022-04-25 RX ADMIN — ACETYLCYSTEINE 200 MG: 200 SOLUTION ORAL; RESPIRATORY (INHALATION) at 22:37

## 2022-04-25 RX ADMIN — METHYLPREDNISOLONE SODIUM SUCCINATE 40 MG: 40 INJECTION, POWDER, LYOPHILIZED, FOR SOLUTION INTRAMUSCULAR; INTRAVENOUS at 08:50

## 2022-04-25 RX ADMIN — MINERAL OIL, PETROLATUM: 425; 568 OINTMENT OPHTHALMIC at 00:02

## 2022-04-25 RX ADMIN — SODIUM CHLORIDE, PRESERVATIVE FREE 10 ML: 5 INJECTION INTRAVENOUS at 08:58

## 2022-04-25 RX ADMIN — POLYVINYL ALCOHOL 1 DROP: 14 SOLUTION/ DROPS OPHTHALMIC at 15:59

## 2022-04-25 RX ADMIN — MEROPENEM 1000 MG: 1 INJECTION, POWDER, FOR SOLUTION INTRAVENOUS at 04:17

## 2022-04-25 ASSESSMENT — PAIN SCALES - GENERAL
PAINLEVEL_OUTOF10: 4
PAINLEVEL_OUTOF10: 0
PAINLEVEL_OUTOF10: 2
PAINLEVEL_OUTOF10: 0
PAINLEVEL_OUTOF10: 0
PAINLEVEL_OUTOF10: 4
PAINLEVEL_OUTOF10: 0

## 2022-04-25 ASSESSMENT — PULMONARY FUNCTION TESTS
PIF_VALUE: 30
PIF_VALUE: 28
PIF_VALUE: 24
PIF_VALUE: 21
PIF_VALUE: 33
PIF_VALUE: 17
PIF_VALUE: 29
PIF_VALUE: 27
PIF_VALUE: 33
PIF_VALUE: 15
PIF_VALUE: 33
PIF_VALUE: 21
PIF_VALUE: 34
PIF_VALUE: 28
PIF_VALUE: 22
PIF_VALUE: 23
PIF_VALUE: 17
PIF_VALUE: 17
PIF_VALUE: 20
PIF_VALUE: 34
PIF_VALUE: 26
PIF_VALUE: 36
PIF_VALUE: 20
PIF_VALUE: 31
PIF_VALUE: 46
PIF_VALUE: 57
PIF_VALUE: 19
PIF_VALUE: 30
PIF_VALUE: 36
PIF_VALUE: 21
PIF_VALUE: 31
PIF_VALUE: 35
PIF_VALUE: 23
PIF_VALUE: 29
PIF_VALUE: 30
PIF_VALUE: 28
PIF_VALUE: 27
PIF_VALUE: 21
PIF_VALUE: 17
PIF_VALUE: 26
PIF_VALUE: 16
PIF_VALUE: 30
PIF_VALUE: 24
PIF_VALUE: 42

## 2022-04-25 NOTE — PROGRESS NOTES
PROGRESS NOTE          PATIENT NAME: [id number]  RECORD NO. [id number]  DATE: 4/25/2022  SURGEON: Arnold Ramos  PRIMARY CARE PHYSICIAN: Abner Gómez MD    HD: # 9    ASSESSMENT    Patient Active Problem List   Diagnosis    Chronic obstructive pulmonary disease ([address]    Vitamin D deficiency    Anxiety    Asthma    Bipolar disorder ([address]    Depression    Hyperlipidemia with target LDL less than 100    Sleep apnea    Vision abnormalities    Status post hysteroscopy    Chronic headaches    IBS (irritable bowel syndrome)    Colon polyp    Collagenous colitis    Lung nodule    Left elbow pain    Dilated bile duct    Acute pain of left shoulder    Adhesive capsulitis of left shoulder    Leucopenia    Compression fracture of body of thoracic vertebra (HCC)    Age-related osteoporosis with current pathological fracture    Pulmonary embolism on right ([address]    Migraines    Paranoid behavior ([address]    Acute deep vein thrombosis (DVT) of right lower extremity (HCC)    Delirium    [facility] respiratory failure with hypoxia ([facility])    Major neurocognitive disorder ([address]    Pneumonia    Chronic respiratory failure with hypoxia, on home O2 therapy ([address]    COPD (chronic obstructive pulmonary disease) ([address]    TRAM (acute kidney injury) ([address]    History of pulmonary embolus (PE)    Mycoplasma pneumonia    Iron deficiency anemia    Sepsis ([address]    Acute on chronic respiratory failure ([address]    Cavitary lesion of lung    History of DVT (deep vein thrombosis)    Pneumothorax, right    Status post chest tube placement (HCC)    Pneumothorax on right    HCAP (healthcare-associated pneumonia)    Acute systolic HF (heart failure) ([facility])    Hyperkalemia    Pseudomonas aeruginosa infection    Elevated procalcitonin    Elevated C-reactive protein (CRP)    Lung abscess (HCC)       MEDICAL DECISION MAKING AND PLAN    1. R Chest tube replaced at bedside. Good reexpansion on CXR.    2. If patient develops additional pneumothorax, may require IR consult for guided pigtail as there were loculations felt on chest tube insertion. 3. Continue CT to -40 suction for today. Chief Complaint: patient is intubated and sedated. SUBJECTIVE    Patient having increased work of breathing on ventilator today. Imaging revealed recurrent R pneumothorax. A R chest tube was placed at bedside with good rush of air. Remains on ventilator. OBJECTIVE  VITALS: Temp: Temp: 98.1 °F (36.7 °C)Temp  Av.2 °F (36.8 °C)  Min: 97.7 °F (36.5 °C)  Max: 99 °F (58.0 °C) BP Systolic (64DRP), KFM:742 , Min:80 , SBC:589   Diastolic (94JVO), DTV:19, Min:44, Max:117   Pulse Pulse  Av  Min: 55  Max: 103 Resp Resp  Av.8  Min: 11  Max: 31 Pulse ox SpO2  Av.2 %  Min: 89 %  Max: 99 %  GENERAL: alert, mild distress  NEURO: intubated, on some sedation, opens eyes  HEENT: normocephalic atraumatic  : deferred  LUNGS: clear to ausculation, without wheezes, rales or rhonci  HEART: normal rate and regular rhythm  ABDOMEN: soft, non-tender, non-distended, bowel sounds present in all 4 quadrants and no guarding or peritoneal signs present  EXTREMITY: no cyanosis, clubbing or edema    I/O last 3 completed shifts: In: 3302.6 [I.V.:673.6; NG/GT:2289; IV Piggyback:340]  Out: 3500 [Urine:2775; Emesis/NG output:100; Stool:625]    Drain/tube output:  In: 2400.6 [I.V.:493.6; NG/GT:1767]  Out: 2250 [ZSH:7577]    LAB:  CBC:   Recent Labs     22  0445 22  0342 22  0444   WBC 9.2 12.6* 11.5*   HGB 8.9* 10.3* 9.7*   HCT 27.5* 32.9* 30.5*   MCV 92.5 94.4 94.1    476* 359     BMP:   Recent Labs     22  0445 22  0342 22  0444    145* 139   K 4.9 4.6 4.2   CL 99 98 95*   CO2 39* 41* 38*   BUN 47* 50* 45*   CREATININE 0.45* 0.54 <0.40*   GLUCOSE 175* 202* 229*     COAGS: No results for input(s): APTT, PROT, INR in the last 72 hours.           Glenys Smith MD  22, 4:26 PM

## 2022-04-25 NOTE — PROGRESS NOTES
ICU Progress Note (Vent)   Pulmonary [facility] Critical Care Specialists    Patient - Nmico Alvarado,  Age - 74 y.o.    - 1955      Room Number -    MRN -  [id number]   [facility]  Date of Admission -  2022 10:25 AM    Events of Past 24 Hours   Patient intubated on vent support. She is able to interact although hard to understand because she is intubated. Family at bedside    Vitals    height is 5' 5\" (1.651 m) and weight is 183 lb 6.8 oz (83.2 kg). Her oral temperature is 98.1 °F (36.7 °C). Her blood pressure is 158/74 (abnormal) and her pulse is 76. Her respiration is 23 and oxygen saturation is 93%. Temperature Range: Temp: 98.1 °F (36.7 °C) Temp  Av.5 °F (36.9 °C)  Min: 97.7 °F (36.5 °C)  Max: 99.7 °F (37.6 °C)  BP Range:  Systolic (10YUF), CQS:307 , Min:72 , TOV:624     Diastolic (56OZB), IDL:52, Min:34, Max:77    Pulse Range: Pulse  Av.5  Min: 55  Max: 103  Respiration Range: Resp  Av.7  Min: 11  Max: 31  Current Pulse Ox[de-identified]  SpO2: 93 %  24HR Pulse Ox Range:  SpO2  Av.6 %  Min: 87 %  Max: 99 %  Oxygen Amount and Delivery: O2 Flow Rate (L/min): 6 L/min      Wt Readings from Last 3 Encounters:   22 183 lb 6.8 oz (83.2 kg)   22 183 lb (83 kg)   22 186 lb 1.1 oz (84.4 kg)     I/O       Intake/Output Summary (Last 24 hours) at 2022 1300  Last data filed at 2022 1100  Gross per 24 hour   Intake 2110.63 ml   Output 1200 ml   Net 910.63 ml     I/O last 3 completed shifts:   In: 3302.6 [I.V.:673.6; NG/GT:2289; IV Piggyback:340]  Out: 3500 [Urine:2775; Emesis/NG output:100; Stool:625]     DRAIN/TUBE OUTPUT:     Invasive Lines   ETT Day -   10  Right IJ day -10    Mechanical Ventilation Data   SETTINGS (Comprehensive)  Vent Information  Ventilator ID: [id number]  Vent Mode: PRVC  Additional Respiratory Assessments  Pulse: 76  Resp: 23  SpO2: 93 %  End Tidal CO2: 43 (%)  Position: Semi-Molina's  Humidification Source: HME  Cuff Pressure (cm H2O): 30 cm H2O       ABGs:   Lab Results   Component Value Date    PHART 7.478 04/25/2022    PO2ART 63.0 04/25/2022    NGK4ECI 60.2 04/25/2022       Lab Results   Component Value Date    MODE [facility] 04/25/2022         Medications   IV   dexmedetomidine Stopped (04/24/22 1758)    sodium chloride 15 mL/hr at 04/25/22 0400    sodium chloride      dextrose      propofol 5 mcg/kg/min (05/59/52 0921)      folic acid  1 mg Oral Daily    thiamine  100 mg Oral Daily    famotidine  20 mg Oral BID    multivitamin  1 tablet Oral Daily    insulin glargine  18 Units SubCUTAneous BID    insulin lispro  0-12 Units SubCUTAneous Q6H    methylPREDNISolone  40 mg IntraVENous Q8H    polyethylene glycol  17 g Oral Daily    furosemide  20 mg IntraVENous BID    meropenem  1,000 mg IntraVENous Q8H    albuterol  2.5 mg Nebulization Q4H    acetylcysteine  200 mg Inhalation Q4H    sodium chloride flush  5-40 mL IntraVENous 2 times per day    risperiDONE  1.5 mg Oral Q12H    rivastigmine  4.5 mg Oral BID    atorvastatin  20 mg Oral Daily    traZODone  50 mg Oral Nightly    polyvinyl alcohol  1 drop Both Eyes Q4H    And    artificial tears   Both Eyes Q4H    chlorhexidine  15 mL Mouth/Throat BID    heparin (porcine)  5,000 Units SubCUTAneous 3 times per day       Diet/Nutrition   ADULT TUBE FEEDING; Nasogastric; Renal Formula; Continuous; 15; Yes; 10; Q 4 hours; 35; 60; Q 6 hours    Exam   VITALS    height is 5' 5\" (1.651 m) and weight is 183 lb 6.8 oz (83.2 kg). Her oral temperature is 98.1 °F (36.7 °C). Her blood pressure is 158/74 (abnormal) and her pulse is 76. Her respiration is 23 and oxygen saturation is 93%. Ventilator Settings (Basic)  Vent Mode: PRVC Resp Rate (Set): 18 bmp/Vt (Set, mL): 450 mL/ /FiO2 : 30 %    Constitutional - Sedated  General Appearance  well developed, well nourished  HEENT - Life support devices in place (ET, ),normocephalic, atraumatic.    Lungs - Chest expands equally, no wheezes, rales or rhonchi. Cardiovascular - Heart sounds are normal.  normal rate and rhythm regular, no murmur, gallop or rub. Abdomen - soft, nontender, no masses or organomegal  Extremities - no cyanosis, clubbing     Lab Results   CBC     Lab Results   Component Value Date    WBC 11.5 04/25/2022    RBC 3.25 04/25/2022    RBC 0-2 07/08/2021    HGB 9.7 04/25/2022    HCT 30.5 04/25/2022     04/25/2022    MCV 94.1 04/25/2022    MCH 29.8 04/25/2022    MCHC 31.7 04/25/2022    RDW 16.9 04/25/2022    NRBC 0 07/08/2021    METASPCT 1 04/02/2022    LYMPHOPCT 5 04/19/2022    LYMPHOPCT 12.1 07/08/2021    MONOPCT 2 04/19/2022    MONOPCT 15.1 07/08/2021    BASOPCT 0 04/19/2022    BASOPCT 0.8 07/08/2021    MONOSABS 0.26 04/19/2022    MONOSABS 0.4 07/08/2021    LYMPHSABS 0.66 04/19/2022    LYMPHSABS 0.3 07/08/2021    EOSABS 0.00 04/19/2022    EOSABS 0.1 07/08/2021    BASOSABS 0.00 04/19/2022    DIFFTYPE NOT REPORTED 12/20/2021       BMP   Lab Results   Component Value Date     04/25/2022    K 4.2 04/25/2022    CL 95 04/25/2022    CO2 38 04/25/2022    BUN 45 04/25/2022    CREATININE <0.40 04/25/2022    GLUCOSE 229 04/25/2022    GLUCOSE 127 07/08/2021    CALCIUM 8.6 04/25/2022       LFTS  Lab Results   Component Value Date    ALKPHOS 137 04/16/2022    ALT 25 04/16/2022    AST 19 04/16/2022    PROT 7.0 04/16/2022    PROT 5.7 07/08/2021    BILITOT 0.16 04/16/2022    BILIDIR 0.1 07/08/2021    IBILI 0.12 07/08/2019    LABALBU 2.8 04/16/2022    LABALBU 3.6 07/08/2021       INR  No results for input(s): PROTIME, INR in the last 72 hours. APTT  No results for input(s): APTT in the last 72 hours. Lactic Acid  Lab Results   Component Value Date    LACTA 1.5 04/19/2022    LACTA 1.2 04/02/2022        BNP   No results for input(s): BNP in the last 72 hours.      Cultures   Pseudomonas on April 18 culture    Radiology     Plain Films  Chest x-ray revealed endotracheal tube in decent position    See actual reports for details    SYSTEM ASSESSMENT    Acute on chronic hypoxic and hypercapnic respiratory failure, intubated 4/16  Pseudomonas pneumonia  Right-sided pneumothorax  Right lower lobe cavitary lesions  Exudative pleural effusion on right, growing Pseudomonas  Low ejection fraction EF 45 to 50%, echo 4/18  History of pulmonary embolism and what appears to be chronic filling defects (subsegmental, most likely clinically inconsequential)  Severe stage IV COPD, FEV1 is 35% of predicted  Recent hospitalization for pneumonia  Elevated inflammatory markers including D-dimer and procalcitonin  Full CODE STATUS        Neuro   Problems with agitation. Precedex not working at this time. We will try both fentanyl and propofol to see if we can keep her calm. Respiratory   On full vent support. CT ordered by infectious disease. Pneumothorax present.`    On Mucomyst and Bentyl  Hemodynamics   Not in shock    Gastrointestinal/Nutrition       Renal   0.40    Infectious Disease   On Merrem. Hematology/Oncology       Endocrine   On Solu-Medrol. Social/Spiritual/DNR/Disposition/Other     Spoke to [place] and with family members present. At patient's bedside. This was done with bedside nurse multiple questions answered. Full code measures to continue. [address] (Child)   [phone] (Mobile  CT scan reveals pneumothorax. On right. Patient's daughter updated at bedside.   General surgery paged to evaluate patient for potential chest tube replacement      Critical Care Time   60 min    Electronically signed by Damon Benton MD on 4/25/2022 at 1:00 PM

## 2022-04-25 NOTE — CARE COORDINATION
ONGOING DISCHARGE PLAN:    Patient is alert and oriented x4. Remains intubated. Chest tube removed 4/24 . Bronch washing grew pseudomonas aeruginosa and is on IV merrem. ID following. Per IM resident note pt's dtr had told them that the patient would not want trach/peg. IV solumderol 40 mg IV Q8, IV lasix 20 mg BID. Referral sent to [facility] Pending sale to [facility] last week. Will continue to follow for additional discharge needs.     Electronically signed by Marylee Leitz, RN on 4/25/2022 at 1:35 PM

## 2022-04-25 NOTE — PROGRESS NOTES
Physical Therapy  Facility/Department: [address] [place] [address]  Physical Therapy Initial Assessment    Name: Aydee Amador  : 1955  MRN: [id number]  Date of Service: 2022    Discharge Recommendations:  [address]          Patient Diagnosis(es): The primary encounter diagnosis was Pneumothorax on right. Diagnoses of HCAP (healthcare-associated pneumonia) and Respiratory distress were also pertinent to this visit. Past Medical History:  has a past medical history of Abnormal EKG, TRAM (acute kidney injury) ([address] Anxiety, Asthma, Bipolar disorder ([address] COPD (chronic obstructive pulmonary disease) ([address] Cramps, extremity, Depression, Dilated bile duct, Headache, History of elective , Hyperlipidemia, Irritable bowel syndrome, Prolonged emergence from general anesthesia, Substance abuse ([address] Unspecified sleep apnea, and Vision abnormalities. Past Surgical History:  has a past surgical history that includes Tonsillectomy and adenoidectomy (Bilateral); LASIK; Induced ; Ankle surgery; eye surgery (Bilateral); Vulva surgery; hysteroscopy (10/19/2016); Colonoscopy (02/15/2018); and Bronchoscopy (2022). Assessment   Assessment: Pt shows decreased strength in both lower extremities and at this time she needs assistance for all functional mobility. Compared to her prior level of functional she shows a significant decline in funtional status and would benefit greatly from PT intervention. Treatment Diagnosis: impaired strength amd mobility  Specific Instructions for Next Treatment: ther exs as tolerated  Therapy Prognosis: Fair  Decision Making: Medium Complexity  History: Pt admitted from home due to complaints of shortness of breath and tachycardia  Exam: MMT, ROM and PT assessment   Clinical Presentation: Pt seemed very motivated but limited by fatigue, being intubated and on vent.   Barriers to Learning: poor endurance  Requires PT Follow-Up: Yes  Activity Tolerance  Activity Tolerance: Patient limited by fatigue     Plan   Plan  Plan: 2-3 times per week  Plan weeks: 2  Specific Instructions for Next Treatment: ther exs as tolerated  Current Treatment Recommendations: ROM,Strengthening     Restrictions  Restrictions/Precautions  Restrictions/Precautions: Contact Precautions,General Precautions (on ventilator at this time)  Required Braces or Orthoses?: No  Implants present? : Metal implants (plate in left ankle per daughter)     Subjective   General  Chart Reviewed: Yes  Patient assessed for rehabilitation services?: Yes  Response To Previous Treatment: Not applicable (PT eval done today)  Family / Caregiver Present: Yes (daughter Fiona Davis) and brother)  Referring Practitioner: Dr Hermilo Pack  Referral Date : 04/21/22  Diagnosis: Respioratory Distress  Follows Commands: Within Functional Limits  General Comment  Comments: Pt looked tired as she lay in bed in supine with the head of the bed raised.   Subjective  Subjective: Pt intubated and on vent hence unable to vocalize but makes eye contact and nods when asked qusetions         Social/Functional History  Information provided by the pt's daughter who was present in the room with the patient  Social/Functional History  Lives With:  (has been staying with daughter 5 days / wk &goes home 2 d/wk)  Type of Home: House  Home Layout: One level  Home Access: Stairs to enter without rails  Entrance Stairs - Number of Steps: 2  Bathroom Shower/Tub: Tub/Shower unit,Shower chair with back,Curtain  Bathroom Toilet: Standard  Bathroom Equipment: Hand-held shower  Bathroom Accessibility: Walker accessible  Home Equipment: Oxygen (nebulizer and concentrator for oxygen)  Has the patient had two or more falls in the past year or any fall with injury in the past year?: No  Receives Help From:  (daughter supervises her when she showers)  ADL Assistance:  (supervision needed only when pt showers)  Homemaking Assistance: Needs assistance (daughter does whatever pt cannot do)  Ambulation Assistance: Independent  Transfer Assistance: Independent  Active : No  Patient's  Info: Daughter drives to appointments and to grocery shopping  Mode of Transportation: Car  Vision/Hearing  Vision Exceptions: Wears glasses at all times; Cataracts  Hearing: Within functional limits    Cognition   Orientation  Overall Orientation Status:  (unable to assess since pt is intubated and on vent)  Cognition  Cognition Comment: unable to assess since pt is intubated and on vent     Objective               AROM RLE (degrees)  RLE AROM: WFL  AROM LLE (degrees)  LLE AROM : WFL  AROM RUE (degrees)  RUE AROM :  (unable to assess since pt deferred ROM and strength testing due to fatigue )  AROM LUE (degrees)  LUE AROM :  (unable to assess since pt deferred ROM and strength testing due to fatigue   )  Strength RLE  Strength RLE:  (grossly 3-/5)  Strength LLE  Strength LLE:  (grossly 3-/5)  Strength RUE  Strength RUE:  (unable to assess since pt deferred ROM and strength testing  due to fatigue )  Strength LUE  Strength LUE:  (unable to assess since pt deferred ROM and strength testing  due to fatigue )  Coordination  Movements Are Fluid And Coordinated: Yes  Motor Control  Gross Motor?: WFL  Sensation  Overall Sensation Status: WFL     Bed mobility  Bridging:  (unable to assess since pt deferred functional mobility tests  due to fatigue )  Transfers  Sit to Stand:  (unable at this time)  Ambulation  WB Status:  (pt unable to ambulate at this time)                                                                   AM-PAC Score     AM-PAC Inpatient Mobility without Stair Climbing Raw Score : 6 (04/25/22 1130)  AM-PAC Inpatient without Stair Climbing T-Scale Score : 26.48 (04/25/22 1130)  Mobility Inpatient CMS 0-100% Score: 92.18 (04/25/22 1130)  Mobility Inpatient without Stair CMS G-Code Modifier : CM (04/25/22 1130)       Goals  Short Term Goals  Time Frame for Short term goals: 4 sessions  Short term goal 1: Improve strength in both lower extremities to 4/5  Short term goal 2: Assess functional mobility and upper extremity when pt's condition improves and set appropriate goals  Patient Goals   Patient goals : Pt unable to voice any of her goals at this time since she is intubated and on vent       Therapy Time   Individual Concurrent Group Co-treatment   Time In 0955         Time Out 1030         Minutes Zulay [address], PT

## 2022-04-25 NOTE — FLOWSHEET NOTE
Writer received perfect serve message to speak to pt's daughter about POA. Daughter explained to writer that pt has [facility] POA filled out and was looking for help in completing other documents. Writer explained that chaplains can only assist in [address]. Daughter expressed understanding.        04/25/22 1221   Encounter Summary   Encounter Overview/Reason  Spiritual/Emotional Needs   Service Provided For: Family   Referral/Consult From: Nursing Supervisor/Manager   Last Encounter  04/25/22   Complexity of Encounter Moderate   Begin Time 1130   End Time  1200   Total Time Calculated 30 min   Spiritual/Emotional needs   Type Spiritual Support   Advance Care Planning   Type ACP conversation   Assessment/Intervention/Outcome   Assessment Calm   Intervention Active listening;Discussed illness injury and its impact;Sustaining Presence/Ministry of presence   Outcome Engaged in conversation;Expressed feelings, needs, and concerns

## 2022-04-25 NOTE — PROGRESS NOTES
Comprehensive Nutrition Assessment    Type and Reason for Visit:  Reassess    Nutrition Recommendations/Plan:   1. Increase Nepro to 38 ml/hr to better meet needs. Malnutrition Assessment:  Malnutrition Status:  No malnutrition (04/17/22 1434)    Context:  Acute Illness     Findings of the 6 clinical characteristics of malnutrition:  Energy Intake:  No significant decrease in energy intake  Weight Loss:  No significant weight loss     Body Fat Loss:  No significant body fat loss     Muscle Mass Loss:  No significant muscle mass loss    Fluid Accumulation:  No significant fluid accumulation     Strength:  Not Performed    Nutrition Assessment:    Pt is tolerating Nepro at 35 ml/hr but question if needs being met since weight is going down but with increased edema. Family wants to continue with full code status. Chest tube had been removed on 4-24. CT scan today shows right pneumothorax, surgery being paged for possible chest tube placement. Pt is alert & oriented but with any weaning becomes very anxious; Propofol started at 7.5 ml providing 198 kcals. Nutrition Related Findings:    Edema: +1 Generalized, +2 BUE's, +1 BLE's. BM-4-25. Labs & meds reviewed. Wound Type: None       Current Nutrition Intake & Therapies:    Average Meal Intake: NPO     ADULT TUBE FEEDING; Nasogastric; Renal Formula; Continuous; 15; Yes; 10; Q 4 hours; 35; 60; Q 6 hours  Current Tube Feeding (TF) Orders:  · Feeding Route: Nasogastric  · Formula: Renal Formula  · Schedule: Continuous  · Feeding Regimen: 38 ml  · Additives/Modulars:    · Water Flushes: 60 ml every 6 hours and per MD order 200 ml 4 times daily. · Current TF & Flush Orders Provides: 1642 kcals, 73 gm protein (doesn't include calories from Propofol)    Anthropometric Measures:  Height: 5' 5\" (165.1 cm)  Ideal Body Weight (IBW): 125 lbs (57 kg)    Admission Body Weight: 176 lb (79.8 kg)  Current Body Weight: 183 lb (83 kg), 140.8 % IBW.  Weight Source: Bed Scale  Current BMI (kg/m2): 30.5   BMI Categories: Overweight (BMI 25.0-29. 9)    Estimated Daily Nutrient Needs:  Energy Requirements Based On: Kcal/kg  Weight Used for Energy Requirements: Admission  Energy (kcal/day): 0272-5196 kcals based on 20-22 kcals/kg using adm wt 80.2 kg  Weight Used for Protein Requirements: Ideal  Protein (g/day): 74-85 gm protein based on 1.3-1.5 gm/kg using IBW     Nutrition Diagnosis:   · Inadequate oral intake related to impaired respiratory function as evidenced by NPO or clear liquid status due to medical condition,intubation      Nutrition Interventions:   Food and/or Nutrient Delivery: Modify Tube Feeding  Nutrition Education/Counseling: No recommendation at this time  Coordination of Nutrition Care: Continue to monitor while inpatient       Goals:  Previous Goal Met: Progressing toward Goal(s)  Goals: Meet at least 75% of estimated needs       Nutrition Monitoring and Evaluation:      Food/Nutrient Intake Outcomes: None Identified,Enteral Nutrition Intake/Tolerance  Physical Signs/Symptoms Outcomes: Biochemical Data,GI Status,Fluid Status or Edema,Hemodynamic Status,Nutrition Focused Physical Findings,Skin,Weight    Discharge Planning:    Enteral Nutrition     Asuncion Varma, [address] [facility]

## 2022-04-25 NOTE — PROCEDURES
Surgery Attending    Consent: obtained from daughter. Procedure : R chest tube insertion    The R chest was prepped and draped in the usual sterile fashion. 1% lidocaine and fentanyl push were used for sedation. A 1.5cm incison was made over the 6th intercostal space and dissection was carried down to the rib space. The pleural cavity was entered and a 24F tube was inserted into the pleural cavity. Good rush of air was achieved. Finger sweep demonstrated that the lung was stuck near where the previous chest tube was and there were some loculations. This made entry of the chest tube somewhat challenging. Once the chest tube was inserted appropriately, the tube was secured with suture and dressing was applied. Patient tolerated the procedure well. CXR demonstrated good reexpansion and good position of chest tube.      Brandi Lewis MD

## 2022-04-25 NOTE — FLOWSHEET NOTE
Patient's daughter and sister talking in hallway and updated writer. Talked about the \"roller coaster ride\" with her mom's condition. 04/25/22 1906   Encounter Summary   Encounter Overview/Reason  Spiritual/Emotional Needs   Service Provided For: Family   Referral/Consult From: Javier   Last Encounter  04/25/22   Complexity of Encounter Moderate   Spiritual/Emotional needs   Type Spiritual Support   Assessment/Intervention/Outcome   Assessment Calm   Intervention Active listening;Discussed illness injury and its impact; Explored Coping Skills/Resources;Sustaining Presence/Ministry of presence   Outcome Coping;Engaged in conversation;Receptive

## 2022-04-25 NOTE — PROGRESS NOTES
[facility] -   Infectious diseases evaluation  admission date 4/16/2022    reason for consultation:   Cavitary lung lesions  Impression :   Current:  · Right lower lobe cavitary lesions associated with multiloculated pleural fluid collection likely abscess with empyema status post chest tube with Pseudomonas growth on culture  · Sepsis secondary to above  · Acute on chronic respiratory failure required intubation  · Right-sided pneumothorax  · Severe COPD      Recommendations     · IV meropenem. · Repeat CT chest without contrast.  · Follow CBC and renal function  · Continue supportive care              History of Present Illness:   Initial history:  Kate Campbell is a 74y.o.-year-old female presents to the hospital with worsening shortness of breath associated with cough. At the ER with tachypneic and hypoxic  Chest x-ray showed pneumothorax  The patient was in respiratory distress, was intubated. The patient is intubated, sedated, unable to provide history that was obtained from chart review. CT chest 4/16/2022 showed right lower lobe cavitary lesion with surrounding airspace disease and multiloculated pleural collection. The patient had chest tube placed with gram-negative rods growth on pleural fluid culture. Respiratory culture grew gram-positive cocci in pairs  Initial WBC was 18.5, procalcitonin no more than 100  Respiratory panel with COVID-19 PCR was negative  Urine for Legionella and strep pneumo antigen negative    Interval changes  4/25/2022   She remains intubated, afebrile  Chest tube was removed  Chest x-ray from earlier today suggestive of small right pneumothorax  Status post bronchoscopy 4/18/2020 with Pseudomonas growth  Right IJ line, NG tube and Reynoso cath in place  Pseudomonas growth on pleural fluid culture.   Respiratory culture grew Pseudomonas aeruginosa  Chest x-ray from earlier today reviewed showed no pneumothorax with improved aeration of the right lung base. Patient Vitals for the past 8 hrs:   BP Temp Temp src Pulse Resp SpO2   04/25/22 0900 (!) 141/73 -- -- 100 24 90 %   04/25/22 0830 (!) 179/77 -- -- 103 24 (!) 89 %   04/25/22 0800 137/70 -- -- 89 23 --   04/25/22 0707 -- -- -- 83 24 96 %   04/25/22 0700 113/61 -- -- 67 11 92 %   04/25/22 0630 (!) 106/55 -- -- 64 19 92 %   04/25/22 0600 (!) 182/69 -- -- 83 (!) 31 95 %   04/25/22 0530 118/62 -- -- 77 25 95 %   04/25/22 0500 (!) 110/54 -- -- 67 18 95 %   04/25/22 0430 135/70 -- -- 71 27 96 %   04/25/22 0400 (!) 126/47 99 °F (37.2 °C) Oral 66 24 95 %   04/25/22 0330 (!) 102/45 -- -- 71 24 95 %   04/25/22 0300 (!) 187/67 -- -- 94 24 93 %   04/25/22 0230 (!) 97/48 -- -- 55 24 95 %   04/25/22 0200 (!) 94/49 -- -- 59 24 96 %   04/25/22 0130 (!) 186/62 -- -- 77 24 95 %             I have personally reviewed the past medical history, past surgical history, medications, social history, and family history, and I haveupdated the database accordingly. Allergies:   Advil [ibuprofen], Aleve [naproxen], Antipyrine, Celecoxib, Codeine, Fomepizole, Incruse ellipta [umeclidinium bromide], Other, Rofecoxib, Salicylates, Strawberry extract, Sulfinpyrazone, Aspirin, and Nsaids     Review of Systems:     Review of Systems  Intubated, unable to provide  Physical Examination :       Physical Exam  Constitutional:       Appearance: She is ill-appearing. Comments: Intubated   HENT:      Head: Normocephalic and atraumatic. Right Ear: External ear normal.      Left Ear: External ear normal.   Cardiovascular:      Rate and Rhythm: Normal rate. Abdominal:      General: There is no distension. Palpations: Abdomen is soft. Musculoskeletal:      Cervical back: Neck supple. No rigidity. Right lower leg: No edema. Left lower leg: No edema. Skin:     Coloration: Skin is not jaundiced. Findings: No bruising.          Past Medical History:     Past Medical History:   Diagnosis Date  Abnormal EKG     TRAM [facility]acute kidney injury) ([facility] [address] 2022    Anxiety     Asthma     Bipolar disorder ([facility] [address]     SEVERE IN , [place]    COPD (chronic obstructive pulmonary disease) (HCC)     Cramps, extremity     SEVERE LEG CRAMPS    Depression     Dilated bile duct     Headache     History of elective      Hyperlipidemia     Irritable bowel syndrome     Prolonged emergence from general anesthesia     SEVERE ISSUES WAKING UP    Substance abuse ([facility] [address]     street drugs when younger   Marissa Hernadez Unspecified sleep apnea     Vision abnormalities     glasses       Past Surgical  History:     Past Surgical History:   Procedure Laterality Date    ANKLE SURGERY      HAD SCREWS AND HARDWARE, THEN REMOVED    BRONCHOSCOPY  2022         COLONOSCOPY  02/15/2018    tubular adenoma    EYE SURGERY Bilateral     CATARACTS    HYSTEROSCOPY  10/19/2016    D & C    INDUCED       LASIK      bilateral    TONSILLECTOMY AND ADENOIDECTOMY Bilateral     VULVA SURGERY      HAD A BIOPSY AND REMOVAL OF       Medications:      folic acid  1 mg Oral Daily    thiamine  100 mg Oral Daily    famotidine  20 mg Oral BID    multivitamin  1 tablet Oral Daily    insulin glargine  18 Units SubCUTAneous BID    insulin lispro  0-12 Units SubCUTAneous Q6H    methylPREDNISolone  40 mg IntraVENous Q8H    polyethylene glycol  17 g Oral Daily    furosemide  20 mg IntraVENous BID    meropenem  1,000 mg IntraVENous Q8H    albuterol  2.5 mg Nebulization Q4H    acetylcysteine  200 mg Inhalation Q4H    sodium chloride flush  5-40 mL IntraVENous 2 times per day    risperiDONE  1.5 mg Oral Q12H    rivastigmine  4.5 mg Oral BID    atorvastatin  20 mg Oral Daily    traZODone  50 mg Oral Nightly    polyvinyl alcohol  1 drop Both Eyes Q4H    And    artificial tears   Both Eyes Q4H    chlorhexidine  15 mL Mouth/Throat BID    heparin (porcine)  5,000 Units SubCUTAneous 3 times per day       Social History:     Social History     Socioeconomic History    Marital status:      Spouse name: Not on file    Number of children: Not on file    Years of education: Not on file    Highest education level: Not on file   Occupational History    Not on file   Tobacco Use    Smoking status: Former Smoker     Packs/day: 2.00     Years: 42.00     Pack years: 84.00     Types: Cigarettes     Quit date: 11/1/2018     Years since quitting: 3.4    Smokeless tobacco: Never Used   Substance and Sexual Activity    Alcohol use: Yes     Comment: yearly    Drug use: No    Sexual activity: Not Currently     Partners: Male     Birth control/protection: Post-menopausal   Other Topics Concern    Not on file   Social History Narrative    Not on file     Social Determinants of Health     Financial Resource Strain: High Risk    Difficulty of Paying Living Expenses: Very hard   Food Insecurity: No Food Insecurity    Worried About Running Out of Food in the Last Year: Never true    Agustin of Food in the Last Year: Never true   Transportation Needs:     Lack of Transportation (Medical): Not on file    Lack of Transportation (Non-Medical):  Not on file   Physical Activity:     Days of Exercise per Week: Not on file    Minutes of Exercise per Session: Not on file   Stress:     Feeling of Stress : Not on file   Social Connections:     Frequency of Communication with Friends and Family: Not on file    Frequency of Social Gatherings with Friends and Family: Not on file    Attends Christian Services: Not on file    Active Member of Clubs or Organizations: Not on file    Attends Club or Organization Meetings: Not on file    Marital Status: Not on file   Intimate Partner Violence:     Fear of Current or Ex-Partner: Not on file    Emotionally Abused: Not on file    Physically Abused: Not on file    Sexually Abused: Not on file   Housing Stability:     Unable to Pay for Housing in the Last Year: Not on file    Number of [place] in the Last Year: Not on file    Unstable Housing in the Last Year: Not on file       Family History:     Family History   Problem Relation Age of Onset    Dementia Maternal Aunt     Kidney Disease Mother     Heart Attack Sister     Prostate Cancer Father     High Cholesterol Brother     Heart Attack Paternal Grandmother       Medical Decision Making:   I have independently reviewed/ordered the following labs:    CBC with Differential:   Recent Labs     04/24/22  0342 04/25/22  0444   WBC 12.6* 11.5*   HGB 10.3* 9.7*   HCT 32.9* 30.5*   * 359     BMP:  Recent Labs     04/24/22  0342 04/25/22  0444   * 139   K 4.6 4.2   CL 98 95*   CO2 41* 38*   BUN 50* 45*   CREATININE 0.54 <0.40*       Lab Results   Component Value Date    CREATININE <0.40 04/25/2022    GLUCOSE 229 04/25/2022    GLUCOSE 127 07/08/2021       Detailed results: Thank you for allowing us to participate in the care of this patient. Please call with questions. This note is created with the assistance of a speech recognition program.  While intending to generate adocument that actually reflects the content of the visit, the document can still have some errors including those of syntax and sound a like substitutions which may escape proof reading. It such instances, actual meaningcan be extrapolated by contextual diversion.     Keyla Pollack MD  Office: [phone]  Perfect serve / office [phone]

## 2022-04-25 NOTE — PLAN OF CARE
Problem: Non-Violent Restraints  Goal: Removal from restraints as soon as assessed to be safe  4/25/2022 0258 by Alverto Torres RN  Outcome: Progressing  Note: Attempts to pull at tubes when released. ROM assessed every 2 hours and visual safety checks completed hourly. Restraints maintained to preserve the patient's airway. Problem: Non-Violent Restraints  Goal: No harm/injury to patient while restraints in use  4/25/2022 0258 by Alverto Torres RN  Outcome: Progressing  Note: Attempts to pull at tubes when released. ROM assessed every 2 hours and visual safety checks completed hourly. Restraints maintained to preserve the patient's airway. Problem: Non-Violent Restraints  Goal: Patient's dignity will be maintained  4/25/2022 0258 by Alverto Torres RN  Outcome: Progressing     Problem: Gas Exchange - Impaired:  Goal: Levels of oxygenation will improve  Description: Levels of oxygenation will improve  4/25/2022 0258 by Alverto Torres RN  Outcome: Progressing     Problem: Falls - Risk of:  Goal: Absence of physical injury  Description: Absence of physical injury  4/25/2022 0258 by Alverto Torres RN  Outcome: Progressing     Problem: Breathing Pattern - Ineffective:  Goal: Ability to achieve and maintain a regular respiratory rate will improve  Description: Ability to achieve and maintain a regular respiratory rate will improve  4/25/2022 0258 by Alverto Torres RN  Outcome: Progressing     Problem: Skin Integrity:  Goal: Will show no infection signs and symptoms  Description: Will show no infection signs and symptoms  4/25/2022 0258 by Alverto Torres RN  Outcome: Progressing     Problem: Skin Integrity:  Goal: Absence of new skin breakdown  Description: Absence of new skin breakdown  4/25/2022 0258 by Alverto Torres RN  Outcome: Progressing  Note: Patient turned and repositioned every 2 hours and as needed for comfort.   Skin kept clean and dry.      Problem: OXYGENATION/RESPIRATORY FUNCTION  Goal: Patient will maintain patent airway  4/25/2022 0258 by Cindie Habermann, RN  Outcome: Progressing     Problem: OXYGENATION/RESPIRATORY FUNCTION  Goal: Patient will achieve/maintain normal respiratory rate/effort  Description: Respiratory rate and effort will be within normal limits for the patient  4/25/2022 0258 by Cindie Habermann, RN  Outcome: Progressing     Problem: MECHANICAL VENTILATION  Goal: Oral health is maintained or improved  4/25/2022 0258 by Cindie Habermann, RN  Outcome: Progressing     Problem: MECHANICAL VENTILATION  Goal: ET tube will be managed safely  4/25/2022 0258 by Cindie Habermann, RN  Outcome: Progressing     Problem: MECHANICAL VENTILATION  Goal: Ability to express needs and understand communication  4/25/2022 0258 by Cindie Habermann, RN  Outcome: Progressing     Problem: MECHANICAL VENTILATION  Goal: Mobility/activity is maintained at optimum level for patient  4/25/2022 0258 by Cindie Habermann, RN  Outcome: Progressing     Problem: Nutrition  Goal: Optimal nutrition therapy  4/25/2022 0258 by Cindie Habermann, RN  Outcome: Progressing  Note: Pt tolerating tube feed at goal.     Problem: Pain  Goal: Verbalizes/displays adequate comfort level or baseline comfort level  4/25/2022 0258 by Cindie Habermann, RN  Outcome: Progressing

## 2022-04-26 ENCOUNTER — APPOINTMENT (OUTPATIENT)
Dept: GENERAL RADIOLOGY | Age: 67
DRG: 720 | End: 2022-04-26
Payer: COMMERCIAL

## 2022-04-26 PROBLEM — E87.5 HYPERKALEMIA: Status: RESOLVED | Noted: 2022-04-21 | Resolved: 2022-04-26

## 2022-04-26 PROBLEM — J95.811: Status: ACTIVE | Noted: 2022-04-26

## 2022-04-26 LAB
ALLEN TEST: ABNORMAL
ANION GAP SERPL CALCULATED.3IONS-SCNC: 5 MMOL/L (ref 9–17)
BUN BLDV-MCNC: 41 MG/DL (ref 8–23)
CALCIUM SERPL-MCNC: 8.9 MG/DL (ref 8.6–10.4)
CARBOXYHEMOGLOBIN: 0.7 % (ref 0–5)
CHLORIDE BLD-SCNC: 95 MMOL/L (ref 98–107)
CO2: 41 MMOL/L (ref 20–31)
CREAT SERPL-MCNC: 0.43 MG/DL (ref 0.5–0.9)
FIO2: 30
GFR AFRICAN AMERICAN: >60 ML/MIN
GFR NON-AFRICAN AMERICAN: >60 ML/MIN
GFR SERPL CREATININE-BSD FRML MDRD: ABNORMAL ML/MIN/{1.73_M2}
GLUCOSE BLD-MCNC: 110 MG/DL (ref 70–99)
GLUCOSE BLD-MCNC: 152 MG/DL (ref 65–105)
GLUCOSE BLD-MCNC: 174 MG/DL (ref 65–105)
GLUCOSE BLD-MCNC: 183 MG/DL (ref 65–105)
GLUCOSE BLD-MCNC: 92 MG/DL (ref 65–105)
HCO3 ARTERIAL: 46.2 MMOL/L (ref 22–26)
HCT VFR BLD CALC: 31.2 % (ref 36–46)
HEMOGLOBIN: 9.8 G/DL (ref 12–16)
MCH RBC QN AUTO: 29 PG (ref 26–34)
MCHC RBC AUTO-ENTMCNC: 31.3 G/DL (ref 31–37)
MCV RBC AUTO: 92.5 FL (ref 80–100)
METHEMOGLOBIN: 0 % (ref 0–1.9)
MODE: ABNORMAL
O2 DEVICE/FLOW/%: ABNORMAL
O2 SAT, ARTERIAL: 94.9 % (ref 95–98)
PATIENT TEMP: 37
PCO2 ARTERIAL: 55.3 MMHG (ref 35–45)
PDW BLD-RTO: 17.1 % (ref 11.5–14.9)
PEEP/CPAP: 8
PH ARTERIAL: 7.53 (ref 7.35–7.45)
PLATELET # BLD: 396 K/UL (ref 150–450)
PMV BLD AUTO: 8.1 FL (ref 6–12)
PO2 ARTERIAL: 74.4 MMHG (ref 80–100)
POSITIVE BASE EXCESS, ART: 23.5 MMOL/L (ref 0–2)
POTASSIUM SERPL-SCNC: 4.3 MMOL/L (ref 3.7–5.3)
PT. POSITION: ABNORMAL
RBC # BLD: 3.37 M/UL (ref 4–5.2)
SAMPLE SITE: ABNORMAL
SET RATE: 18
SODIUM BLD-SCNC: 141 MMOL/L (ref 135–144)
TEXT FOR RESPIRATORY: ABNORMAL
TOTAL RATE: 18
VT: 450
WBC # BLD: 15.5 K/UL (ref 3.5–11)

## 2022-04-26 PROCEDURE — 6360000002 HC RX W HCPCS: Performed by: INTERNAL MEDICINE

## 2022-04-26 PROCEDURE — 99233 SBSQ HOSP IP/OBS HIGH 50: CPT | Performed by: INTERNAL MEDICINE

## 2022-04-26 PROCEDURE — 94761 N-INVAS EAR/PLS OXIMETRY MLT: CPT

## 2022-04-26 PROCEDURE — 82805 BLOOD GASES W/O2 SATURATION: CPT

## 2022-04-26 PROCEDURE — 6360000002 HC RX W HCPCS: Performed by: STUDENT IN AN ORGANIZED HEALTH CARE EDUCATION/TRAINING PROGRAM

## 2022-04-26 PROCEDURE — 6370000000 HC RX 637 (ALT 250 FOR IP): Performed by: INTERNAL MEDICINE

## 2022-04-26 PROCEDURE — 94640 AIRWAY INHALATION TREATMENT: CPT

## 2022-04-26 PROCEDURE — 94003 VENT MGMT INPAT SUBQ DAY: CPT

## 2022-04-26 PROCEDURE — 80048 BASIC METABOLIC PNL TOTAL CA: CPT

## 2022-04-26 PROCEDURE — 2580000003 HC RX 258: Performed by: STUDENT IN AN ORGANIZED HEALTH CARE EDUCATION/TRAINING PROGRAM

## 2022-04-26 PROCEDURE — 2580000003 HC RX 258: Performed by: INTERNAL MEDICINE

## 2022-04-26 PROCEDURE — 82947 ASSAY GLUCOSE BLOOD QUANT: CPT

## 2022-04-26 PROCEDURE — 85027 COMPLETE CBC AUTOMATED: CPT

## 2022-04-26 PROCEDURE — 2000000000 HC ICU R&B

## 2022-04-26 PROCEDURE — 6370000000 HC RX 637 (ALT 250 FOR IP): Performed by: STUDENT IN AN ORGANIZED HEALTH CARE EDUCATION/TRAINING PROGRAM

## 2022-04-26 PROCEDURE — 6370000000 HC RX 637 (ALT 250 FOR IP)

## 2022-04-26 PROCEDURE — 2700000000 HC OXYGEN THERAPY PER DAY

## 2022-04-26 PROCEDURE — 36600 WITHDRAWAL OF ARTERIAL BLOOD: CPT

## 2022-04-26 PROCEDURE — 2500000003 HC RX 250 WO HCPCS: Performed by: NURSE PRACTITIONER

## 2022-04-26 PROCEDURE — 71045 X-RAY EXAM CHEST 1 VIEW: CPT

## 2022-04-26 RX ADMIN — MINERAL OIL, PETROLATUM: 425; 568 OINTMENT OPHTHALMIC at 21:14

## 2022-04-26 RX ADMIN — CHLORHEXIDINE GLUCONATE 0.12% ORAL RINSE 15 ML: 1.2 LIQUID ORAL at 21:08

## 2022-04-26 RX ADMIN — CHLORHEXIDINE GLUCONATE 0.12% ORAL RINSE 15 ML: 1.2 LIQUID ORAL at 08:22

## 2022-04-26 RX ADMIN — INSULIN GLARGINE 18 UNITS: 100 INJECTION, SOLUTION SUBCUTANEOUS at 08:23

## 2022-04-26 RX ADMIN — POLYVINYL ALCOHOL 1 DROP: 14 SOLUTION/ DROPS OPHTHALMIC at 02:36

## 2022-04-26 RX ADMIN — ALBUTEROL SULFATE 2.5 MG: 2.5 SOLUTION RESPIRATORY (INHALATION) at 10:43

## 2022-04-26 RX ADMIN — ANTI-FUNGAL POWDER MICONAZOLE NITRATE TALC FREE: 1.42 POWDER TOPICAL at 21:17

## 2022-04-26 RX ADMIN — RIVASTIGMINE TARTRATE 4.5 MG: 1.5 CAPSULE ORAL at 08:21

## 2022-04-26 RX ADMIN — HEPARIN SODIUM 5000 UNITS: 5000 INJECTION INTRAVENOUS; SUBCUTANEOUS at 05:38

## 2022-04-26 RX ADMIN — ACETYLCYSTEINE 200 MG: 200 SOLUTION ORAL; RESPIRATORY (INHALATION) at 14:56

## 2022-04-26 RX ADMIN — ALBUTEROL SULFATE 2.5 MG: 2.5 SOLUTION RESPIRATORY (INHALATION) at 19:18

## 2022-04-26 RX ADMIN — HEPARIN SODIUM 5000 UNITS: 5000 INJECTION INTRAVENOUS; SUBCUTANEOUS at 21:08

## 2022-04-26 RX ADMIN — RIVASTIGMINE TARTRATE 4.5 MG: 1.5 CAPSULE ORAL at 21:16

## 2022-04-26 RX ADMIN — FENTANYL CITRATE 50 MCG: 50 INJECTION, SOLUTION INTRAMUSCULAR; INTRAVENOUS at 12:30

## 2022-04-26 RX ADMIN — PROPOFOL 5 MCG/KG/MIN: 10 INJECTION, EMULSION INTRAVENOUS at 08:03

## 2022-04-26 RX ADMIN — MULTIPLE VITAMINS W/ MINERALS TAB 1 TABLET: TAB at 08:21

## 2022-04-26 RX ADMIN — POLYVINYL ALCOHOL 1 DROP: 14 SOLUTION/ DROPS OPHTHALMIC at 05:38

## 2022-04-26 RX ADMIN — INSULIN LISPRO 2 UNITS: 100 INJECTION, SOLUTION INTRAVENOUS; SUBCUTANEOUS at 21:12

## 2022-04-26 RX ADMIN — ALBUTEROL SULFATE 2.5 MG: 2.5 SOLUTION RESPIRATORY (INHALATION) at 04:13

## 2022-04-26 RX ADMIN — INSULIN LISPRO 2 UNITS: 100 INJECTION, SOLUTION INTRAVENOUS; SUBCUTANEOUS at 08:23

## 2022-04-26 RX ADMIN — ANTI-FUNGAL POWDER MICONAZOLE NITRATE TALC FREE: 1.42 POWDER TOPICAL at 00:00

## 2022-04-26 RX ADMIN — ACETYLCYSTEINE 200 MG: 200 SOLUTION ORAL; RESPIRATORY (INHALATION) at 19:18

## 2022-04-26 RX ADMIN — POLYVINYL ALCOHOL 1 DROP: 14 SOLUTION/ DROPS OPHTHALMIC at 23:36

## 2022-04-26 RX ADMIN — SODIUM CHLORIDE, PRESERVATIVE FREE 10 ML: 5 INJECTION INTRAVENOUS at 21:17

## 2022-04-26 RX ADMIN — ALBUTEROL SULFATE 2.5 MG: 2.5 SOLUTION RESPIRATORY (INHALATION) at 23:33

## 2022-04-26 RX ADMIN — MINERAL OIL, PETROLATUM: 425; 568 OINTMENT OPHTHALMIC at 00:45

## 2022-04-26 RX ADMIN — MEROPENEM 1000 MG: 1 INJECTION, POWDER, FOR SOLUTION INTRAVENOUS at 12:15

## 2022-04-26 RX ADMIN — PROPOFOL 5 MCG/KG/MIN: 10 INJECTION, EMULSION INTRAVENOUS at 19:06

## 2022-04-26 RX ADMIN — TRAZODONE HYDROCHLORIDE 50 MG: 50 TABLET ORAL at 21:08

## 2022-04-26 RX ADMIN — ATORVASTATIN CALCIUM 20 MG: 20 TABLET, FILM COATED ORAL at 08:21

## 2022-04-26 RX ADMIN — ANTI-FUNGAL POWDER MICONAZOLE NITRATE TALC FREE: 1.42 POWDER TOPICAL at 08:22

## 2022-04-26 RX ADMIN — POLYETHYLENE GLYCOL 3350 17 G: 17 POWDER, FOR SOLUTION ORAL at 08:21

## 2022-04-26 RX ADMIN — MEROPENEM 1000 MG: 1 INJECTION, POWDER, FOR SOLUTION INTRAVENOUS at 21:13

## 2022-04-26 RX ADMIN — RISPERIDONE 1.5 MG: 0.5 TABLET ORAL at 04:48

## 2022-04-26 RX ADMIN — FUROSEMIDE 20 MG: 10 INJECTION, SOLUTION INTRAMUSCULAR; INTRAVENOUS at 08:21

## 2022-04-26 RX ADMIN — MEROPENEM 1000 MG: 1 INJECTION, POWDER, FOR SOLUTION INTRAVENOUS at 04:03

## 2022-04-26 RX ADMIN — METHYLPREDNISOLONE SODIUM SUCCINATE 40 MG: 40 INJECTION, POWDER, LYOPHILIZED, FOR SOLUTION INTRAMUSCULAR; INTRAVENOUS at 15:52

## 2022-04-26 RX ADMIN — POLYVINYL ALCOHOL 1 DROP: 14 SOLUTION/ DROPS OPHTHALMIC at 11:00

## 2022-04-26 RX ADMIN — HEPARIN SODIUM 5000 UNITS: 5000 INJECTION INTRAVENOUS; SUBCUTANEOUS at 15:51

## 2022-04-26 RX ADMIN — THIAMINE HCL TAB 100 MG 100 MG: 100 TAB at 08:21

## 2022-04-26 RX ADMIN — FENTANYL CITRATE 50 MCG: 50 INJECTION, SOLUTION INTRAMUSCULAR; INTRAVENOUS at 17:31

## 2022-04-26 RX ADMIN — INSULIN GLARGINE 18 UNITS: 100 INJECTION, SOLUTION SUBCUTANEOUS at 21:12

## 2022-04-26 RX ADMIN — INSULIN LISPRO 2 UNITS: 100 INJECTION, SOLUTION INTRAVENOUS; SUBCUTANEOUS at 16:05

## 2022-04-26 RX ADMIN — POLYVINYL ALCOHOL 1 DROP: 14 SOLUTION/ DROPS OPHTHALMIC at 15:00

## 2022-04-26 RX ADMIN — ACETYLCYSTEINE 200 MG: 200 SOLUTION ORAL; RESPIRATORY (INHALATION) at 10:43

## 2022-04-26 RX ADMIN — FAMOTIDINE 20 MG: 20 TABLET, FILM COATED ORAL at 21:08

## 2022-04-26 RX ADMIN — ACETYLCYSTEINE 200 MG: 200 SOLUTION ORAL; RESPIRATORY (INHALATION) at 23:33

## 2022-04-26 RX ADMIN — ALBUTEROL SULFATE 2.5 MG: 2.5 SOLUTION RESPIRATORY (INHALATION) at 07:08

## 2022-04-26 RX ADMIN — ACETYLCYSTEINE 200 MG: 200 SOLUTION ORAL; RESPIRATORY (INHALATION) at 04:12

## 2022-04-26 RX ADMIN — ALBUTEROL SULFATE 2.5 MG: 2.5 SOLUTION RESPIRATORY (INHALATION) at 14:57

## 2022-04-26 RX ADMIN — FAMOTIDINE 20 MG: 20 TABLET, FILM COATED ORAL at 08:21

## 2022-04-26 RX ADMIN — MINERAL OIL, PETROLATUM: 425; 568 OINTMENT OPHTHALMIC at 08:23

## 2022-04-26 RX ADMIN — FOLIC ACID 1 MG: 1 TABLET ORAL at 08:21

## 2022-04-26 RX ADMIN — RISPERIDONE 1.5 MG: 0.5 TABLET ORAL at 15:51

## 2022-04-26 RX ADMIN — METHYLPREDNISOLONE SODIUM SUCCINATE 40 MG: 40 INJECTION, POWDER, LYOPHILIZED, FOR SOLUTION INTRAMUSCULAR; INTRAVENOUS at 08:21

## 2022-04-26 RX ADMIN — FUROSEMIDE 20 MG: 10 INJECTION, SOLUTION INTRAMUSCULAR; INTRAVENOUS at 17:36

## 2022-04-26 RX ADMIN — ACETYLCYSTEINE 200 MG: 200 SOLUTION ORAL; RESPIRATORY (INHALATION) at 07:09

## 2022-04-26 RX ADMIN — MINERAL OIL, PETROLATUM: 425; 568 OINTMENT OPHTHALMIC at 17:36

## 2022-04-26 RX ADMIN — SODIUM CHLORIDE, PRESERVATIVE FREE 10 ML: 5 INJECTION INTRAVENOUS at 08:22

## 2022-04-26 ASSESSMENT — PULMONARY FUNCTION TESTS
PIF_VALUE: 15
PIF_VALUE: 14
PIF_VALUE: 15
PIF_VALUE: 19
PIF_VALUE: 14
PIF_VALUE: 19
PIF_VALUE: 15
PIF_VALUE: 15
PIF_VALUE: 11
PIF_VALUE: 19
PIF_VALUE: 20
PIF_VALUE: 21
PIF_VALUE: 12
PIF_VALUE: 12
PIF_VALUE: 22
PIF_VALUE: 13
PIF_VALUE: 25
PIF_VALUE: 10
PIF_VALUE: 33
PIF_VALUE: 15
PIF_VALUE: 15
PIF_VALUE: 12
PIF_VALUE: 12
PIF_VALUE: 16
PIF_VALUE: 34
PIF_VALUE: 15
PIF_VALUE: 20
PIF_VALUE: 14
PIF_VALUE: 14

## 2022-04-26 ASSESSMENT — PAIN SCALES - GENERAL
PAINLEVEL_OUTOF10: 0

## 2022-04-26 NOTE — CARE COORDINATION
ONGOING DISCHARGE PLAN:    Pt remains intubated. FIO2 30%. Chest tube had to be reinserted 4/25 for right side pneumothorax. IV merrem for +pseudomonas in pleural fluid. [facility] referral sent 4/21. Pt was current with [facility] prior to admit. Following for needs. Will continue to follow for additional discharge needs.     Electronically signed by Cruz Moura RN on 4/26/2022 at 12:57 PM

## 2022-04-26 NOTE — PROGRESS NOTES
Keep chest tube suction at 140 today; tomorrow if patient stable will decrease to -20.  Per Dr. Geronimo Christian

## 2022-04-26 NOTE — PROGRESS NOTES
PROGRESS NOTE          PATIENT NAME: [id number]  RECORD NO. [id number]  DATE: 4/26/2022  SURGEON: Geronimo Christian  PRIMARY CARE PHYSICIAN: Christoph Blanca MD    HD: # 10    ASSESSMENT    Patient Active Problem List   Diagnosis    Chronic obstructive pulmonary disease ([facility] [address]    Vitamin D deficiency    Anxiety    Asthma    Bipolar disorder ([address]    Depression    Hyperlipidemia with target LDL less than 100    Sleep apnea    Vision abnormalities    Status post hysteroscopy    Chronic headaches    IBS (irritable bowel syndrome)    Colon polyp    Collagenous colitis    Lung nodule    Left elbow pain    Dilated bile duct    Acute pain of left shoulder    Adhesive capsulitis of left shoulder    Leucopenia    Compression fracture of body of thoracic vertebra (HCC)    Age-related osteoporosis with current pathological fracture    Pulmonary embolism on right ([address]    Migraines    [facility]anoid behavior ([facility]    Acute deep vein thrombosis (DVT) of right lower extremity ([facility])    Delirium    Chronic respiratory failure with hypoxia ([facility])    Major neurocognitive disorder ([address]    Pneumonia    Chronic respiratory failure with hypoxia, on home O2 therapy ([facility])    COPD (chronic obstructive pulmonary disease) ([address]    TRAM (acute kidney injury) ([address]    History of pulmonary embolus (PE)    Mycoplasma pneumonia    Iron deficiency anemia    Sepsis ([address]    Acute on chronic respiratory failure ([address]    Cavitary lesion of lung    History of DVT (deep vein thrombosis)    Pneumothorax, right    Status post chest tube placement ([facility])    Pneumothorax on right    HCAP (healthcare-associated pneumonia)    Acute systolic HF (heart failure) ([facility])    Pseudomonas aeruginosa infection    Elevated procalcitonin    Elevated C-reactive protein (CRP)    Lung abscess (HCC)    Recurrent pneumothorax after chest tube removed       MEDICAL DECISION MAKING AND PLAN    1. Continue Chest tube to suction.  Suction dropped to -20 today. 2. Small residual pneump on CXR. Will maintain chest tube for now. 3. Will continue to follow. Chief Complaint: Intubated and sedated     SUBJECTIVE    Bjru Saez is the same as yesterday. The pulm/cc team is trying to wean her ventilator. CT in place. Airleak is improved today. Was on -40 suction yesterday and I decreased suction to -20. OBJECTIVE  VITALS: Temp: Temp: 98.4 °F (36.9 °C)Temp  Av.2 °F (37.3 °C)  Min: 98.4 °F (36.9 °C)  Max: 100.2 °F (20.2 °C) BP Systolic (06SLO), VPA:126 , Min:82 , SCS:173   Diastolic (28XNA), JCR:88, Min:30, Max:117   Pulse Pulse  Av.7  Min: 71  Max: 105 Resp Resp  Av.5  Min: 15  Max: 26 Pulse ox SpO2  Av.1 %  Min: 92 %  Max: 99 %  GENERAL: sedated, no distress  NEURO: intubated, some sedation. HEENT: normocephalic, atraumatic. : deferred   LUNGS: clear to ausculation, without wheezes, rales or rhonci. R Chest tube in place. No airleak appreciated on my examination. HEART: normal rate and regular rhythm  ABDOMEN: soft, non-tender, non-distended, bowel sounds present in all 4 quadrants and no guarding or peritoneal signs present  EXTREMITY: no cyanosis, clubbing or edema    I/O last 3 completed shifts: In: 3301.2 [I.V.:797.1; NG/GT:2134; IV Piggyback:370.1]  Out:  [Urine:1850; Emesis/NG output:60; Stool:50; Chest Tube:50]    Drain/tube output:  In: 2325.3 [I.V.:611.2; NG/GT:1484]  Out: 1250 [Urine:1200]    LAB:  CBC:   Recent Labs     22  0342 22  0444 22  0447   WBC 12.6* 11.5* 15.5*   HGB 10.3* 9.7* 9.8*   HCT 32.9* 30.5* 31.2*   MCV 94.4 94.1 92.5   * 359 396     BMP:   Recent Labs     22  0342 22  0444 22   * 139 141   K 4.6 4.2 4.3   CL 98 95* 95*   CO2 41* 38* 41*   BUN 50* 45* 41*   CREATININE 0.54 <0.40* 0.43*   GLUCOSE 202* 229* 110*     COAGS: No results for input(s): APTT, PROT, INR in the last 72 hours.     RADIOLOGY:  CXR: reviewed    Iris Marley Joshua Hager MD  4/26/22, 2:16 PM

## 2022-04-26 NOTE — PROGRESS NOTES
[address] [employer]    PROGRESS NOTE             4/26/2022    7:52 AM    Name:   Garfield Leavitt  MRN:     [id number]     [place]side:      [de-identified]   Room:   2002/2002-01  IP Day:  8  Admit Date:  4/16/2022 10:25 AM    PCP:  Mj Roman MD  Code Status:  Full Code    Subjective:     C/C:   Chief Complaint   Patient presents with    Shortness of Breath     Interval History Status: Worsened     Continues to be tachypneic, blood pressure soft low  Continues to be sedated with propofol 5 mcg/kg/min  Continues to be on ventilator FiO2 30, PRVC, PIP decreased to 14 PEEP   pH 7.53, PCO2 55, HCO3 46  WBC 15 trending up   CT chest-large right-sided pneumothorax, pleural fluid inferiorly and posteriorly containing septation and loculated, improving cavitary lesion. consult surgery who placed chest tube yesterday  Tube feeds Nepro increased to 38 mL/H  IV fluids KVO 0.45 NS running 15 mL/H       Brief History:     The patient is a 67 y.o.  Non- / non  female sever COPD 3L O2 baseline, bipolar, Hx of DVT/ PE, migraines, who presents with Shortness of Breath and cough  Patient was recently discharge from [facility] for mycoplasma pneumonia. She had a follow up appointment with PCP, patient was complaining of cough and chest pain, was started on Tessalon and nsaids for pain. However; she continued to be in distress and her daughter brought her to ED. She was hypoxic initially on 3L o2, was increased to 6L and continued to be hypoxic   Tachypneic, tachycardic  ABGs: pH 7.33, CO2 34, HCO3 18  WBC 18   Lactic 2.8> 1.5  Pancultures were sent  Chest x-ray: Moderate loculated right basal pneumothorax.  Evidence for tension.  Cavitary lesion noted in the right lung base  CT chest: Chronic clot material RUL, RLL.  Thick-walled cavitary lesion RLL.  Multiloculated pleural collection or hydropneumothorax posterior to the right lung, right chest tube in situ.  Infiltrates of the right middle lobe.  Stellate 6 mm lateral RUL nodule.  Mediastinal and right hilar lymphadenopathy  Chest tube was placed and patient was intubated. Was given 1 L bolus, IV cefepime, vancomycin, 125 mg Solu-Medrol and she is admitted to the hospital for the management of acute hypoxic respiratory failure due to pneumothorax and possible lung infection     4/19: switched to meropenem      4/21: bronchial washing from bronchoscopy growing pseudomonas    Review of Systems:     Review of Systems   Unable to perform ROS: Intubated       Medications: Allergies:     Allergies   Allergen Reactions    Advil [Ibuprofen] Other (See Comments)     vomiting    Aleve [Naproxen] Other (See Comments)     vomiting    Antipyrine Other (See Comments)     unknown    Celecoxib Other (See Comments)    Codeine      headache    Fomepizole     Incruse Ellipta [Umeclidinium Bromide]      confusion    Other      GRASS, TREES, WEEDS    Rofecoxib Other (See Comments)     Unknown reaction    Salicylates      Unknown reaction     Strawberry Extract      HEADACHES    Sulfinpyrazone Other (See Comments)     Unknown reaction    Aspirin Nausea And Vomiting, Other (See Comments) and Nausea Only    Nsaids Nausea Only, Other (See Comments) and Nausea And Vomiting       Current Meds:   Scheduled Meds:    folic acid  1 mg Oral Daily    thiamine  100 mg Oral Daily    famotidine  20 mg Oral BID    miconazole   Topical BID    multivitamin  1 tablet Oral Daily    insulin glargine  18 Units SubCUTAneous BID    insulin lispro  0-12 Units SubCUTAneous Q6H    methylPREDNISolone  40 mg IntraVENous Q8H    polyethylene glycol  17 g Oral Daily    furosemide  20 mg IntraVENous BID    meropenem  1,000 mg IntraVENous Q8H    albuterol  2.5 mg Nebulization Q4H    acetylcysteine  200 mg Inhalation Q4H    sodium chloride flush  5-40 mL IntraVENous 2 times per day    risperiDONE  1.5 mg Oral Q12H    rivastigmine  4.5 mg Oral BID    atorvastatin  20 mg Oral Daily    traZODone  50 mg Oral Nightly    polyvinyl alcohol  1 drop Both Eyes Q4H    And    artificial tears   Both Eyes Q4H    chlorhexidine  15 mL Mouth/Throat BID    heparin (porcine)  5,000 Units SubCUTAneous 3 times per day     Continuous Infusions:    sodium chloride 15 mL/hr at 22 0500    sodium chloride      dextrose      propofol 10 mcg/kg/min (22 0657)     PRN Meds: hydrALAZINE, sodium chloride flush, sodium chloride, sodium chloride flush, potassium chloride **OR** potassium alternative oral replacement **OR** potassium chloride, glucose, dextrose, glucagon (rDNA), dextrose, fentanNYL    Data:     Past Medical History:   has a past medical history of Abnormal EKG, TRAM (acute kidney injury) ([facility] [address] Anxiety, Asthma, Bipolar disorder ([facility] [address] COPD (chronic obstructive pulmonary disease) ([facility] [address] Cramps, extremity, Depression, Dilated bile duct, Headache, History of elective , Hyperlipidemia, Irritable bowel syndrome, Prolonged emergence from general anesthesia, Substance abuse ([facility] [address] Unspecified sleep apnea, and Vision abnormalities. Social History:   reports that she quit smoking about 3 years ago. Her smoking use included cigarettes. She has a 84.00 pack-year smoking history. She has never used smokeless tobacco. She reports current alcohol use. She reports that she does not use drugs.      Family History:   Family History   Problem Relation Age of Onset    Dementia Maternal Aunt     Kidney Disease Mother     Heart Attack Sister     Prostate Cancer Father     High Cholesterol Brother     Heart Attack Paternal Grandmother        Vitals:  BP (!) 94/42   Pulse 82   Temp 99.1 °F (37.3 °C) (Axillary)   Resp 21   Ht 5' 5\" (1.651 m)   Wt 183 lb 6.8 oz (83.2 kg)   LMP 2013 (Exact Date)   SpO2 93%   BMI 30.52 kg/m²   Temp (24hrs), Av.4 °F (37.4 °C), Min:98.9 °F (37.2 °C), Max:100.2 °F (37.9 °C)    Recent Labs     22  6562 04/25/22  1400 04/25/22  2120 04/26/22  0235   POCGLU 166* 139* 154* 92       I/O(24Hr):     Intake/Output Summary (Last 24 hours) at 4/26/2022 0752  Last data filed at 4/26/2022 0530  Gross per 24 hour   Intake 2125.31 ml   Output 1250 ml   Net 875.31 ml       Labs:    CBC:   Lab Results   Component Value Date    WBC 15.5 04/26/2022    RBC 3.37 04/26/2022    RBC 0-2 07/08/2021    HGB 9.8 04/26/2022    HCT 31.2 04/26/2022    MCV 92.5 04/26/2022    MCH 29.0 04/26/2022    MCHC 31.3 04/26/2022    RDW 17.1 04/26/2022     04/26/2022    MPV 8.1 04/26/2022     BMP:    Lab Results   Component Value Date     04/26/2022    K 4.3 04/26/2022    CL 95 04/26/2022    CO2 41 04/26/2022    BUN 41 04/26/2022    LABALBU 2.8 04/16/2022    LABALBU 3.6 07/08/2021    CREATININE 0.43 04/26/2022    CALCIUM 8.9 04/26/2022    GFRAA >60 04/26/2022    LABGLOM >60 04/26/2022    GLUCOSE 110 04/26/2022    GLUCOSE 127 07/08/2021     ABG:    Lab Results   Component Value Date    PH 8.0 07/08/2021       Lab Results   Component Value Date/Time    SPECIAL 8ML GREEN/2ML ORANGE RIGHT IJ 04/16/2022 12:12 PM     Lab Results   Component Value Date/Time    CULTURE YEAST ISOLATED IDENTIFICATION TO FOLLOW 04/18/2022 12:20 PM    CULTURE NO GROWTH 6 DAYS 04/18/2022 12:20 PM    CULTURE PSEUDOMONAS AERUGINOSA LIGHT GROWTH (A) 04/18/2022 12:20 PM    CULTURE NO NORMAL RAVEN 04/18/2022 12:20 PM         Radiology:    ECHO Complete 2D W Doppler W Color    Result Date: 4/18/2022  [facility] [facility] Transthoracic Echocardiography Report (TTE)  Patient Name Daniel Flavio     Date of Study               04/18/2022               BOBBY OROSCO   Date of      1955  Gender                      Female  Birth   Age          74 year(s)  Race                           Room Number  2002        Height:                     65 inch, 165.1 cm   Corporate ID [id number]    Weight:                     176 pounds, 79.8 kg  #   Patient Acct [de-identified]   BSA: 1.87 m^2      BMI:      29.29  #                                                              kg/m^2   MR #         [id number]      Sonographer                 Мария Stein   Accession #  [id number]  Interpreting Physician      Giovanna Cosme   Fellow                   Referring Nurse                           Practitioner   Interpreting             Referring Physician         Selena Schilling  Type of Study   TTE procedure:2D Echocardiogram, M-Mode, Doppler, Color Doppler. Procedure Date Date: 04/18/2022 Start: 10:35 AM Study Location: [address] [place] [address] Technical Quality: Fair visualization Indications:Dyspnea/SOB, Respiratory Failure and Pulmonary embolus. History / Tech. Comments: COPD, HLD, Sleep apnea Patient Status: Inpatient Height: 65 inches Weight: 176 pounds BSA: 1.87 m^2 BMI: 29.29 kg/m^2 Rhythm: Sinus bradycardia HR: 57 bpm BP: 138/65 mmHg CONCLUSIONS Summary Left ventricle is normal in size and wall thickness. Global left ventricular systolic function appears mildly reduced. Estimated LV EF 45-50 %. No obvious wall motion abnormality seen. RV size appears normal mildly reduced function Left atrium is normal in size. No significant valvular regurgitation or stenosis seen. No significant pericardial effusion is seen. Normal aortic root dimension. Dilated IVC, impaired or no respiratory variations suggestive of elevate Rt atrial pressure Signature ----------------------------------------------------------------------------  Electronically signed by Giovanna Cosme(Interpreting physician) on  04/18/2022 05:06 PM ---------------------------------------------------------------------------- ----------------------------------------------------------------------------  Electronically signed by Norm SteinSonographer) on 04/18/2022 02:29  PM ---------------------------------------------------------------------------- FINDINGS Left Atrium Left atrium is normal in size.  Left Ventricle Left ventricle is normal in size and wall thickness. Global left ventricular systolic function is mildly reduced . Estimated LV EF  %. No obvious wall motion abnormality seen. Right Atrium Right atrium is normal in size. Right Ventricle Normal right ventricular size , mildly reduced function. Mitral Valve No obvious valvular abnormality seen. No evidence of mitral regurgitation. Aortic Valve No obvious valvular abnormality seen. No evidence of aortic insufficiency or stenosis. Tricuspid Valve No obvious valvular abnormality seen. Insignificant tricuspid regurgitation, unable to estimate RVSP. Pulmonic Valve Pulmonic valve was not well visualized. No evidence of pulmonic insufficiency or stenosis. Pericardial Effusion No significant pericardial effusion is seen. Pleural Effusion No pleural effusion seen. Miscellaneous Normal aortic root dimension. IVC Increased diameter and impaired or no inspiratory variation indicating elevated RA filling pressure (i.e. CVP) . M-mode / 2D Measurements & Calculations:   LVIDd:4.74 cm(3.7 - 5.6 cm)      Aortic Root:3.3 cm(2.0 - 3.7 cm)  LVIDs:3.28 cm(2.2 - 4.0 cm)      LA Dimension: 3.5 cm(1.9 - 4.0 cm)  IVSd:1.05 cm(0.6 - 1.1 cm)       LA volume/Index: 40.7 ml /22m^2  LVPWd:0.93 cm(0.6 - 1.1 cm)  Fractional Shortenin.8 %      XR CHEST (SINGLE VIEW FRONTAL)    Result Date: 2022  EXAMINATION: ONE XRAY VIEW OF THE CHEST 2022 8:55 am COMPARISON: April 3, 2022 HISTORY: ORDERING SYSTEM PROVIDED HISTORY: pna TECHNOLOGIST PROVIDED HISTORY: linda Reason for Exam: pna FINDINGS: Stable position of the right internal jugular central venous catheter. Right infrahilar airspace opacity not significantly changed. No new focal lung abnormality. No sizable pleural effusion. No pneumothorax.      Stable right infrahilar airspace opacity     XR CHEST (SINGLE VIEW FRONTAL)    Result Date: 4/3/2022  EXAMINATION: ONE XRAY VIEW OF THE CHEST 4/3/2022 5:51 am COMPARISON: 2022 HISTORY: ORDERING SYSTEM PROVIDED HISTORY: sob, pleurisy, cough TECHNOLOGIST PROVIDED HISTORY: sob, pleurisy, cough Reason for Exam: Shortness of breath, pleurisy, cough Additional signs and symptoms: Shortness of breath, pleurisy, cough Relevant Medical/Surgical History: Shortness of breath, pleurisy, cough FINDINGS: AP portable view of the chest time stamped at 528 hours demonstrates overlying cardiac monitoring electrodes. Heart size is stable. Right internal jugular catheter terminates in the distal superior vena cava. There has been worsening of a focal opacity at the right lung base. Minimal left basilar opacity is unchanged. No extrapleural air or overt edema. No gross effusions. Worsening opacity at the right base favoring airspace disease. Change in minimal left basilar opacity which may be related atelectasis. XR CHEST PORTABLE    Result Date: 4/23/2022  EXAMINATION: ONE XRAY VIEW OF THE CHEST 4/23/2022 5:40 am COMPARISON: 04/22/2022 and 04/21/2022 HISTORY: ORDERING SYSTEM PROVIDED HISTORY: ETT placement TECHNOLOGIST PROVIDED HISTORY: ETT placement Reason for Exam: ETT placement Additional signs and symptoms: ETT placement Relevant Medical/Surgical History: ETT placement FINDINGS: Endotracheal tube tip is approximately 2 cm above the nimesh. Nasogastric tube continues into the stomach and off the exam.  Right PICC line is near the cavoatrial junction. The right chest tube is stable with the tip over the right upper lung but the side port outside the ribcage. Soft tissue emphysema in the right chest wall is redemonstrated and appears mildly increased. Some patchy opacities persist in the right base. Evaluation for right apical pneumothorax is suboptimal.  The left lung appears acceptable. Cardiac silhouette is not enlarged. The central pulmonary arteries appears stable. Limited evaluation of the right lateral ribs. 1.  Endotracheal tube, nasogastric tube, and right jugular catheter appear acceptable. The right chest tube side port is outside the ribcage. Correlate for functionality of the chest tube. 2.  Patchy opacities persist in the right lower chest.  The evaluation for small right apical pneumothorax is suboptimal on the current study. XR CHEST PORTABLE    Result Date: 4/22/2022  EXAMINATION: ONE XRAY VIEW OF THE CHEST 4/22/2022 6:29 am COMPARISON: 21 April 2022 HISTORY: ORDERING SYSTEM PROVIDED HISTORY: ETT placement TECHNOLOGIST PROVIDED HISTORY: ETT placement Reason for Exam: on vent FINDINGS: AP portable view of the chest time stamped at 623 hours demonstrates overlying cardiac monitoring electrodes, endotracheal tube terminating 4.8 cm above the nimesh and right internal jugular catheter terminating in the right atrium. Right-sided chest tube is again noted. Trace extrapleural air at the right apex persists. Subcutaneous emphysema along the right lateral chest wall is noted. Faint opacity is present at the right lung base suspicious for focal airspace disease. No mediastinal shift. Heart size is stable. Persistent small right apical pneumothorax. Support tubes and lines as above. Opacity at the right base. There is elevation right hemidiaphragm. Atelectasis is evident but superimposed airspace disease may be present. XR CHEST PORTABLE    Result Date: 4/21/2022  EXAMINATION: ONE XRAY VIEW OF THE CHEST 4/21/2022 11:57 am COMPARISON: 04/21/2022, 0625 hours. HISTORY: ORDERING SYSTEM PROVIDED HISTORY: Chest tube now clamped TECHNOLOGIST PROVIDED HISTORY: Chest tube now clamped Reason for Exam: chest tube now clamped FINDINGS: The ET tube was in satisfactory position, 4.5 cm above the nimesh. The NG tube was passed the gastric fundus. A right jugular central venous line was noted with the tip in the superior vena cava near its junction with the right atrium. A right-sided chest tube was noted. The proximal most opening is just out of the right lateral chest wall.   No pneumothorax was noted. No cardiomegaly, pneumonia, interstitial edema or definite effusions were noted. Mild hyper inflation was identified. The ET tube was in satisfactory position, 4.5 cm above the nimesh. The NG tube was past the gastric fundus. A right jugular central venous line was noted with the tip in the superior vena cava near its junction with the right atrium. No pneumothorax was identified. A right-sided chest tube was noted but the proximal most opening is just external to the right lateral chest wall. No cardiomegaly, pneumonia or interstitial edema. XR CHEST PORTABLE    Result Date: 4/21/2022  EXAMINATION: ONE XRAY VIEW OF THE CHEST 4/21/2022 6:30 am COMPARISON: 04/20/2022 HISTORY: ORDERING SYSTEM PROVIDED HISTORY: ETT placement TECHNOLOGIST PROVIDED HISTORY: ETT placement Reason for Exam: vent FINDINGS: Support tubes and lines are unchanged. The right chest tube side port is external to the chest wall which may predispose to air leak. Cardiac silhouette and mediastinal contours are unchanged. Calcified left hilar lymph nodes are noted. No pneumothorax. No new lung infiltrate. Aeration of the right lung base has improved. 1. The right chest tube side port is external to the chest wall which may predispose to an air leak. No pneumothorax. 2. Improved aeration of the right lung base, likely related to atelectasis. XR CHEST PORTABLE    Result Date: 4/20/2022  EXAMINATION: ONE XRAY VIEW OF THE CHEST 4/20/2022 11:52 am COMPARISON: None. HISTORY: ORDERING SYSTEM PROVIDED HISTORY: Chest tube now to waterseal TECHNOLOGIST PROVIDED HISTORY: Chest tube now to waterseal Reason for Exam: Chest tube now to waterseal FINDINGS: There is a right chest tube in place. There is no evidence of pneumothorax. Some apparent atelectasis noted in the the right lung base. ET tube is in good position. Tip of central line overlies the right atrium. Left lung appears normal.  Heart appears unremarkable.      No evidence of pneumothorax. Some atelectasis in the right lung base. Tip of centralized overlies the right atrium. It should be withdrawn by 6 cm. The findings were sent to the Radiology Results [address] at 12:21 pm on 4/20/2022 to be communicated to a licensed caregiver. XR CHEST PORTABLE    Result Date: 4/20/2022  EXAMINATION: ONE XRAY VIEW OF THE CHEST 4/20/2022 6:30 am COMPARISON: 04/19/2022 HISTORY: ORDERING SYSTEM PROVIDED HISTORY: ETT placement TECHNOLOGIST PROVIDED HISTORY: ETT placement Reason for Exam: ETT FINDINGS: The cardiac silhouette and mediastinal contours are stable. There is a right-sided chest tube with the side port noted external to the chest wall. Right internal jugular vein catheter, endotracheal tube, and orogastric tube are again noted. There is pulmonary vascular congestion. No pneumothorax. The patient is rotated towards the right. 1. Right chest tube side port is external to the chest wall which may predispose to an air leak. 2. Stable pulmonary vascular congestion. 3. No pneumothorax is identified. XR CHEST PORTABLE    Result Date: 4/19/2022  EXAMINATION: ONE X-RAY VIEW OF THE CHEST 4/18/2022 6:27 am COMPARISON: 04/17/2022 HISTORY: ORDERING SYSTEM PROVIDED HISTORY: ETT placement TECHNOLOGIST PROVIDED HISTORY: ETT placement Reason for Exam: ETT placement FINDINGS: The endotracheal tube, nasogastric tube, right IJ catheter and right-sided chest tube remain. The extreme lung apices are excluded from the examination. A pneumothorax is not identified. Right basilar consolidation has increased peripherally. Increased right basilar opacity may reflect fluid filling the large cavitary lesion at the right lung base. Pulmonary consolidation/increased pleural effusion or empyema are alternate considerations.      XR CHEST PORTABLE    Result Date: 4/19/2022  EXAMINATION: ONE XRAY VIEW OF THE CHEST 4/19/2022 6:34 am COMPARISON: Chest radiograph performed 04/18/2022. HISTORY: ORDERING SYSTEM PROVIDED HISTORY: ETT placement TECHNOLOGIST PROVIDED HISTORY: ETT placement Reason for Exam: on vent FINDINGS: There is right lower lung infiltrate. There is no pneumothorax. The mediastinal structures are unremarkable. The upper abdomen is unremarkable. The extrathoracic soft tissues are unremarkable. There is an endotracheal tube with the tip in the midtrachea. There is a right-sided chest tube. There is a right internal jugular central line with the tip at the cavoatrial junction. There is a gastric tube and the tip is not well visualized. Right lower lung infiltrate that is mildly improved. Support tubes as described above. XR CHEST PORTABLE    Result Date: 4/17/2022  EXAMINATION: ONE XRAY VIEW OF THE CHEST 4/17/2022 6:04 am COMPARISON: 04/16/2022, 1248 hours HISTORY: ORDERING SYSTEM PROVIDED HISTORY: ETT placement TECHNOLOGIST PROVIDED HISTORY: ETT placement Reason for Exam: ETT 47-year-old female with endotracheal tube placement FINDINGS: Endotracheal tube distal tip overlying the mid trachea approximately 4.8 cm above the level of the nimesh. Enteric tube traverses the GE junction with distal tip excluded from the field of view. Right IJ approach central venous catheter distal tip overlying the right atrium. Right-sided chest tube distal tip projects over the right hilum. Cardiac monitor leads overlie the chest. No obvious pneumothorax. No free air. Cardiac and mediastinal contours remain unchanged. Left lung is relatively clear. Persistent airspace disease at the right mid and right lower lung zones. Visualized osseous structures remain unchanged. Subcutaneous emphysema previously seen at the right lateral chest wall no longer identified. 1. Persistent airspace disease at the right mid and right lower lung zones. Follow-up is recommended to document resolution. 2. Tubes and right IJ line as detailed above.      XR CHEST PORTABLE    Result Date: 4/16/2022  EXAMINATION: ONE XRAY VIEW OF THE CHEST 4/16/2022 1:10 pm COMPARISON: 04/16/2022, 1213 hours HISTORY: ORDERING SYSTEM PROVIDED HISTORY: chest tube TECHNOLOGIST PROVIDED HISTORY: chest tube Reason for Exam: chest tube Additional signs and symptoms: chest tube and NG tube placement 72-year-old female with history of NG tube and chest tube placement FINDINGS: Portable supine view of the chest. Right-sided chest tube has been placed with distal tip projecting over the right infrahilar region. Right IJ approach central venous catheter distal tip overlying the cavoatrial junction, stable. Endotracheal tube distal tip overlying the mid trachea approximately 4.3 cm above the level of the nimesh. Enteric tube traverses the GE junction with distal tip projecting over the left mid abdomen likely within the stomach body. Left lung is clear. Pleural thickening and opacity involving the right mid and right lower lung zones. Subcutaneous emphysema along the right lateral chest wall. Cardiac and mediastinal contours remain unchanged. Atherosclerotic calcification of the thoracic aorta. No free air. There is re-expansion of the right lung compared with the prior study. Probable small residual inferolateral right pneumothorax component. 1. Tubes and right IJ line as detailed above. Re-expansion of the right lung compared with the prior study. There is likely a small residual right inferolateral pneumothorax component. 2. Pleural thickening and airspace opacity involving the right mid and right lower lung zones. Follow-up is recommended to document resolution. 3. Subcutaneous emphysema along the right lateral chest wall. XR CHEST PORTABLE    Result Date: 4/16/2022  EXAMINATION: ONE XRAY VIEW OF THE CHEST 4/16/2022 12:24 pm COMPARISON: None.  HISTORY: ORDERING SYSTEM PROVIDED HISTORY: Intubation TECHNOLOGIST PROVIDED HISTORY: Intubation Reason for Exam: central line and ET placement FINDINGS: ET tube terminates 3 cm above the nimesh. Right line terminates in the SVC. There is a relatively stable right-sided basilar pneumothorax. The remaining lungs are unchanged. Cardiac silhouette and osseous structures unchanged. Intubation as above. No significant change     XR CHEST PORTABLE    Result Date: 4/16/2022  EXAMINATION: ONE XRAY VIEW OF THE CHEST 4/16/2022 11:02 am COMPARISON: 04/05/2022 HISTORY: ORDERING SYSTEM PROVIDED HISTORY: SOB TECHNOLOGIST PROVIDED HISTORY: SOB Reason for Exam: SOB FINDINGS: There is a moderate loculated right basal pneumothorax. Cavitary lesion is noted in the right lung base. Left lung is unremarkable. Heart and mediastinal structures appear normal.  There is no evidence for any tension phenomena. Moderate loculated right basal pneumothorax. Evidence for tension. Cavitary lesion noted in the right lung base. .  Case discussed with Dr. Deonte Simon. XR CHEST PORTABLE    Result Date: 4/1/2022  EXAMINATION: ONE XRAY VIEW OF THE CHEST 4/1/2022 4:01 pm COMPARISON: 04/01/2022 HISTORY: ORDERING SYSTEM PROVIDED HISTORY: central line placed TECHNOLOGIST PROVIDED HISTORY: central line placed Reason for Exam: Central line placed FINDINGS: There is a right internal jugular central line in place with distal tip overlying the superior vena cava. Previously noted right basal infiltrate is unchanged. Left lung appears clear. The heart and mediastinal structures appear normal.  There is no evidence of pneumothorax. Satisfactory position of right internal jugular central line. No change in the the right basal infiltrate. XR CHEST PORTABLE    Result Date: 4/1/2022  EXAMINATION: ONE XRAY VIEW OF THE CHEST 4/1/2022 1:22 pm COMPARISON: 06/17/2020. CT dated 10/15/2021.  HISTORY: ORDERING SYSTEM PROVIDED HISTORY: dyspnea TECHNOLOGIST PROVIDED HISTORY: dyspnea Reason for Exam: Dyspnea Relevant Medical/Surgical History: Hx of COPD FINDINGS: The cardiac silhouette appears within normal limits. There are bibasilar opacities, right greater than left which may reflect multifocal pneumonia in the correct clinical setting. No evidence of pleural effusion or pneumothorax is seen. Bibasilar opacities, right greater than left, which may reflect multifocal pneumonia. Follow-up to imaging resolution is recommended. CT CHEST PULMONARY EMBOLISM W CONTRAST    Result Date: 4/16/2022  EXAMINATION: CTA OF THE CHEST 4/16/2022 2:30 pm TECHNIQUE: CTA of the chest was performed after the administration of intravenous contrast.  Multiplanar reformatted images are provided for review. MIP images are provided for review. Dose modulation, iterative reconstruction, and/or weight based adjustment of the mA/kV was utilized to reduce the radiation dose to as low as reasonably achievable. COMPARISON: CT chest from 10/15/2021 HISTORY: ORDERING SYSTEM PROVIDED HISTORY: SOB TECHNOLOGIST PROVIDED HISTORY: SOB Decision Support Exception - unselect if not a suspected or confirmed emergency medical condition->Emergency Medical Condition (MA) Reason for Exam: sob Relevant Medical/Surgical History: intubated, septic, hx of PE 44-year-old female with shortness of breath FINDINGS: Pulmonary Arteries: No obvious filling defect in the main, right main, or left main pulmonary arteries. Evaluation of the segmental and subsegmental pulmonary arterial vasculature is limited due to respiratory motion suboptimal bolus timing, airspace disease, and streak artifact. There are areas of probable residual linear chronic clot within the right lower lobar pulmonary artery on image 111, series 2. Probable linear residual clot within the anterior right upper lobe pulmonary artery on image 83, series 2. Mediastinum: Atherosclerotic calcification of the aorta and branch vasculature. No dissection flap within the visualized thoracic aorta. No pericardial effusion. There is fluid in the superior pericardial recess.  Enlarged precarinal lymph nodes remain unchanged on image 83, series 2. Right hilar lymphadenopathy is seen on image 96, series 2. Coronary artery disease. No left hilar or axillary lymphadenopathy. Lungs/pleura: Endotracheal tube distal tip within the lower trachea. Trachea and very proximal central airways appear patent. Narrowing of the right middle lobe airway into a region of partial consolidation/atelectasis/scarring. Opacification of the right lower lobar segmental airways. There is a thick-walled cavitary lesion in the right lower lobe measuring 5.5 x 7.3 cm in greatest AP and transverse dimensions on image 68, series 4. There is a dependent air-fluid level within this lesion. This area measures 7.6 cm in greatest craniocaudal extent on image 48, series 602. There is surrounding airspace disease. There is a gas and fluid containing multiloculated pleural collection or hydropneumothorax along the posterior margin of the right lung. Right sided chest tube distal tip along the anteromedial right lung. Subcutaneous emphysema along the right axilla and right lateral chest wall. Stellate pulmonary nodule in the lateral right upper lobe measuring 6 mm on image 32, series 4. Moderate to severe emphysema, dependent atelectasis and respiratory motion. Upper Abdomen: Fatty liver. NG tube is seen extending into the stomach body. Atherosclerotic calcification of the upper abdominal aorta and branch vasculature. Soft Tissues/Bones: Chronic anterior wedge compression deformities, unchanged from 10/15/2021 involving T5 and T6. Mild diffuse degenerative changes throughout the spine. 1. Linear filling defects in the anterior right upper lobe and right lower lobe pulmonary arteries likely representing residual/chronic clot material. No acute central filling defect/pulmonary embolus is evident.  2. Thick-walled cavitary lesion in the right lower lobe measuring up to 5.5 x 7.3 x 7.6 cm which could be related to TB or fungal disease or a necrotizing pneumonia. Underlying cavitary malignancy not entirely excluded. There is surrounding airspace disease. There is also an associated multiloculated pleural collection or hydropneumothorax primarily along the posterior margin of the right lung. Right-sided chest tube distal tip along the anteromedial right pleura/lung. 3. Partial consolidation, atelectasis and/or infiltrate of the right middle lobe. 4. Stellate 6 mm lateral right upper lobe pulmonary nodule. Follow-up guidelines provided below. 5. Underlying moderate to severe emphysema. 6. Endotracheal and NG tubes as above. 7. Coronary artery disease. 8. Chronic anterior wedge compression deformities of T5 and T6. 9. Subcutaneous emphysema along the right axilla and right lateral chest wall likely related to chest tube. 10. Mediastinal and right hilar lymphadenopathy. RECOMMENDATIONS: 6 mm suspicious right solid pulmonary nodule within the upper lobe. Recommend a non-contrast Chest CT at 6-12 months, then another non-contrast Chest CT at 18-24 months. These guidelines do not apply to immunocompromised patients and patients with cancer. Follow up in patients with significant comorbidities as clinically warranted. For lung cancer screening, adhere to Lung-RADS guidelines. Reference: Radiology. 2017; 284(1):228-43.      VL Lower Extremity Bilateral Venous Duplex    Result Date: 4/18/2022    [facility]  Vascular Lower Extremities DVT Study Procedure   Patient Name   Daniel Muniz     Date of Study           04/18/2022                 BOBBY OROSCO   Date of Birth  1955  Gender                  Female   Age            74 year(s)  Race                       Room Number    2002   Corporate ID # [id number]   Patient Acct # [de-identified]   MR #           [id number]      Sonographer             Cyndi Garcia, T   Accession #    [id number]  Interpreting Physician  oDmo Norris   Referring                  Referring Physician     Keanu Hernandez Nurse  Practitioner  Procedure Type of Study:   Veins: Lower Extremities DVT Study, Venous Scan Lower Bilateral.  Indications for Study:R/O DVT. Patient Status: In Patient. Technical Quality:Adequate visualization. Conclusions   Summary   Bilateral:  No evidence of deep or superficial venous thrombosis. Signature   ----------------------------------------------------------------  Electronically signed by Anisha Enriquez RVT(Sonographer) on  04/18/2022 07:45 AM  ----------------------------------------------------------------   ----------------------------------------------------------------  Electronically signed by Domo Norris(Interpreting physician)  on 04/18/2022 01:10 PM  ----------------------------------------------------------------  Findings:   Right Impression:                    Left Impression:  The common femoral, femoral,         The common femoral, femoral,  popliteal and tibial veins           popliteal and tibial veins  demonstrate normal compressibility   demonstrate normal compressibility  and augmentation. and augmentation. Normal compressibility of the great  Normal compressibility of the great  saphenous vein. saphenous vein. Normal compressibility of the small  Normal compressibility of the small  saphenous vein. saphenous vein. Velocities are measured in cm/s ; Diameters are measured in cm Right Lower Extremities DVT Study Measurements Right 2D Measurements +------------------------------------+----------+---------------+----------+ ! Location                            ! Visualized! Compressibility! Thrombosis! +------------------------------------+----------+---------------+----------+ ! Common Femoral                      !Yes       ! Yes            ! None      ! +------------------------------------+----------+---------------+----------+ ! Prox Femoral                        !Yes       ! Yes            ! None      ! +------------------------------------+----------+---------------+----------+ ! Mid Femoral                         !Yes       ! Yes            ! None      ! +------------------------------------+----------+---------------+----------+ ! Dist Femoral                        !Yes       ! Yes            ! None      ! +------------------------------------+----------+---------------+----------+ ! Deep Femoral                        !Yes       ! Yes            ! None      ! +------------------------------------+----------+---------------+----------+ ! Popliteal                           !Yes       ! Yes            ! None      ! +------------------------------------+----------+---------------+----------+ ! Sapheno Femoral Junction            ! Yes       ! Yes            ! None      ! +------------------------------------+----------+---------------+----------+ ! PTV                                 ! Yes       ! Yes            ! None      ! +------------------------------------+----------+---------------+----------+ ! Peroneal                            !Yes       ! Yes            ! None      ! +------------------------------------+----------+---------------+----------+ ! Gastroc                             ! Yes       ! Yes            ! None      ! +------------------------------------+----------+---------------+----------+ ! GSV Thigh                           ! Yes       ! Yes            ! None      ! +------------------------------------+----------+---------------+----------+ ! GSV Knee                            ! Yes       ! Yes            ! None      ! +------------------------------------+----------+---------------+----------+ ! GSV Ankle                           ! Yes       ! Yes            ! None      ! +------------------------------------+----------+---------------+----------+ ! SSV                                 ! Yes       ! Yes            ! None      ! +------------------------------------+----------+---------------+----------+ Left Lower Extremities DVT Study Measurements Left 2D Measurements +------------------------------------+----------+---------------+----------+ ! Location                            ! Visualized! Compressibility! Thrombosis! +------------------------------------+----------+---------------+----------+ ! Common Femoral                      !Yes       ! Yes            ! None      ! +------------------------------------+----------+---------------+----------+ ! Prox Femoral                        !Yes       ! Yes            ! None      ! +------------------------------------+----------+---------------+----------+ ! Mid Femoral                         !Yes       ! Yes            ! None      ! +------------------------------------+----------+---------------+----------+ ! Dist Femoral                        !Yes       ! Yes            ! None      ! +------------------------------------+----------+---------------+----------+ ! Deep Femoral                        !Yes       ! Yes            ! None      ! +------------------------------------+----------+---------------+----------+ ! Popliteal                           !Yes       ! Yes            ! None      ! +------------------------------------+----------+---------------+----------+ ! Sapheno Femoral Junction            ! Yes       ! Yes            ! None      ! +------------------------------------+----------+---------------+----------+ ! PTV                                 ! Yes       ! Yes            ! None      ! +------------------------------------+----------+---------------+----------+ ! Peroneal                            !Yes       ! Yes            ! None      ! +------------------------------------+----------+---------------+----------+ ! Gastroc                             ! Yes       ! Yes            ! None      ! +------------------------------------+----------+---------------+----------+ ! GSV Jamie                           ! Yes       ! Yes            ! None      ! +------------------------------------+----------+---------------+----------+ ! GSV Knee                            ! Yes       ! Yes            ! None      ! +------------------------------------+----------+---------------+----------+ ! GSV Ankle                           ! Yes       ! Yes            ! None      ! +------------------------------------+----------+---------------+----------+ ! SSV                                 ! Yes       ! Yes            ! None      ! +------------------------------------+----------+---------------+----------+    FL MODIFIED BARIUM SWALLOW W VIDEO    Result Date: 4/4/2022  EXAMINATION: MODIFIED BARIUM SWALLOW WAS PERFORMED IN CONJUNCTION WITH SPEECH PATHOLOGY SERVICES TECHNIQUE: Fluoroscopic evaluation of the swallowing mechanism was performed using cineradiography with multiple consistency of barium product in conjunction with speech pathology services. FLUOROSCOPY DOSE AND TYPE OR TIME AND EXPOSURES: DAP 49.2 dGy cm squared COMPARISON: None HISTORY: ORDERING SYSTEM PROVIDED HISTORY: aspiration pna TECHNOLOGIST PROVIDED HISTORY: aspiration pna Reason for Exam: aspiration pneumonia FINDINGS: Premature vallecular spillage. There was trace penetration with straw with thin liquid. No aspiration. No aspiration. Trace penetration with straw with thin liquids. Please see separate speech pathology report for full discussion of findings and recommendations. RECOMMENDATIONS: Unavailable         Physical Examination:        Physical Exam  Constitutional:       General: She is not in acute distress. Appearance: She is not ill-appearing. Comments: Sedated, comfortable appearing, unresponsive   Cardiovascular:      Rate and Rhythm: Normal rate and regular rhythm. Pulmonary:      Breath sounds: Wheezing and rales present.       Comments: Chest tube in place  Draining around 50ml   Continues to be intubated and sedated   Abdominal:      General: Bowel sounds are normal. There is no distension. Musculoskeletal:      Right lower leg: No edema. Left lower leg: No edema. Neurological:      Comments: Unresponsive, sedated           Assessment:        Primary Problem  Sepsis [facility])    Active Hospital Problems    Diagnosis Date Noted    Lung abscess ([facility] [address] [J85.2] 04/25/2022     Priority: Medium    Elevated C-reactive protein (CRP) [R79.82]      Priority: Medium    Pseudomonas aeruginosa infection [A49.8]      Priority: Medium    Elevated procalcitonin [R79.89]      Priority: Medium    Hyperkalemia [E87.5] 04/21/2022     Priority: Medium    Acute systolic HF (heart failure) (HCC) [I50.21] 04/19/2022    Pneumothorax on right [J93.9]     HCAP (healthcare-associated pneumonia) [J18.9]     Sepsis ([facility] [address] [A41.9] 04/16/2022    Acute on chronic respiratory failure ([address] [J96.20] 04/16/2022    Cavitary lesion of lung [J98.4] 04/16/2022    History of DVT (deep vein thrombosis) [Z86.718] 04/16/2022    Pneumothorax, right [J93.9] 04/16/2022    Status post chest tube placement ([facility] [address] [Z93.8] 04/16/2022    History of pulmonary embolus (PE) [Z86.711] 04/02/2022    Chronic respiratory failure with hypoxia (HCC) [J96.11] 08/05/2021    Compression fracture of body of thoracic vertebra ([facility] [address] [S22.000A] 09/28/2020    Lung nodule [R91.1] 08/22/2018    Bipolar disorder ([address] [F31.9]     Chronic obstructive pulmonary disease ([facility] [address] [J44.9] 01/06/2016       Plan:        Sepsis due to pneumonia with abcess vs empyema, pseudomonas sp. Tachypnea, tachycardia  WBC 18>>>12.6> 11.5>15.5  Pro-Branden >100 (>100 on repeat), , Lactate 2.8> 1.5  Chest x-ray: Moderate loculated right basal pneumothorax.  Evidence for tension.  Cavitary lesion noted in the right lung base  CT chest: Thick-walled cavitary lesion RLL. Right pneumothorax.  Infiltrates of the right middle lobe.  Stellate 6 mm lateral RUL nodule.  Mediastinal and right hilar lymphadenopathy  Iv fluids: 1/2 NS at 15 cc/h  ID and Critical care following  Respiratory cultures and chest tube fluids culture growing pseudomonas   On Merrem IV day 8  -Repeat CT chest showed large pneumothorax, improving cavitary lesion    Acute on chronic respiratory failure 2/2 recurrent pneumothorax after chest tube removal and Pseudomonas pneumonia  -History of severe COPD - 3 L home O2 baseline  -CXR right pneumothorax on admission   -S/p intubation and right chest tube placement  -Sedated with propofol  -On a Vent with FiO2 30% PEEP 8  -Solumedrol 40mg q8h   -Repeat CT chest showed large pneumothorax, improving cavitary lesion  -Surgery consult given  -Chest tube inserted     Acute systolic heart failure - Improved   Echo Estimated LV EF 45-50 %  Probnp 1000s   Lasix 20mg IV BID    Type II DM Insulin sliding scale and lantus 18 Units twice daily     Hx DVT/ PE: Eliquis was discontinued in 12/2021  History bipolar disorder: Trazodone, Risperidone resumed     Diet: NPO, on Tube feeds 38 mL/h  GI ppx: Pepcid 20 mg twice daily IV  DVT ppx: Heparin 5000 units TID   Code status: Full code  Consults: pulm, ID  Dispo: referral sent to [facility], keep in ICU    Glory Gilmore MD  4/26/2022  7:52 AM     Attestation and add on       I have discussed the care of Nimco Alvarado , including pertinent history and exam findings,      4/26/22    with the resident. I have seen and examined the patient and the key elements of all parts of the encounter have been performed by me . I agree with the assessment, plan and orders as documented by the resident.      Principal Problem:    Sepsis ([address]  Active Problems:    Pseudomonas aeruginosa infection    Elevated procalcitonin    Elevated C-reactive protein (CRP)    Lung abscess (HCC)    Recurrent pneumothorax after chest tube removed    Chronic obstructive pulmonary disease (HCC)    Bipolar disorder (HCC)    Lung nodule    Compression fracture of body of thoracic vertebra (HCC)    Chronic respiratory failure with hypoxia (HCC)    History of pulmonary embolus (PE)    Acute on chronic respiratory failure (HCC)    Cavitary lesion of lung    History of DVT (deep vein thrombosis)    Pneumothorax, right    Status post chest tube placement (HCC)    Pneumothorax on right    HCAP (healthcare-associated pneumonia)    Acute systolic HF (heart failure) ([facility] [address]  Resolved Problems:    Hyperkalemia        ''''''''''       MD JUNO Seaman[address], [address] [place] [address].    Phone [phone]   Fax: [phone]  Answering Service: [phone]

## 2022-04-26 NOTE — PLAN OF CARE
Problem: Non-Violent Restraints  Goal: Removal from restraints as soon as assessed to be safe  Outcome: Progressing  Goal: No harm/injury to patient while restraints in use  Outcome: Progressing  Goal: Patient's dignity will be maintained  Outcome: Progressing     Problem: Gas Exchange - Impaired:  Goal: Levels of oxygenation will improve  Outcome: Progressing     Problem: Skin Integrity - Impaired:  Goal: Will show no infection signs and symptoms  Outcome: Progressing  Goal: Absence of new skin breakdown  Outcome: Progressing     Problem: Falls - Risk of:  Goal: Absence of physical injury  Outcome: Progressing     Problem: Breathing Pattern - Ineffective:  Goal: Ability to achieve and maintain a regular respiratory rate will improve  Outcome: Progressing     Problem: Skin Integrity:  Goal: Will show no infection signs and symptoms  Outcome: Progressing  Goal: Absence of new skin breakdown  Outcome: Progressing     Problem: OXYGENATION/RESPIRATORY FUNCTION  Goal: Patient will maintain patent airway  Outcome: Progressing  Goal: Patient will achieve/maintain normal respiratory rate/effort  Outcome: Progressing     Problem: MECHANICAL VENTILATION  Goal: Oral health is maintained or improved  Outcome: Progressing  Goal: ET tube will be managed safely  Outcome: Progressing  Goal: Ability to express needs and understand communication  Outcome: Progressing  Goal: Mobility/activity is maintained at optimum level for patient  Outcome: Progressing     Problem: SKIN INTEGRITY  Goal: Skin integrity is maintained or improved  Outcome: Progressing     Problem: Nutrition  Goal: Optimal nutrition therapy  Outcome: Progressing     Problem: Discharge Planning  Goal: Discharge to home or other facility with appropriate resources  Outcome: Progressing     Problem: Safety - Medical Restraint  Goal: Remains free of injury from restraints (Restraint for Interference with Medical Device)  Outcome: Progressing     Problem: ABCDS Injury Assessment  Goal: Absence of physical injury  Outcome: Progressing     Problem: Pain  Goal: Verbalizes/displays adequate comfort level or baseline comfort level  Outcome: Progressing

## 2022-04-26 NOTE — PROGRESS NOTES
[facility] -   Infectious diseases evaluation  admission date 4/16/2022    reason for consultation:   Cavitary lung lesions  Impression :   Current:  · Right lower lobe cavitary lesions associated with multiloculated pleural fluid collection likely abscess with empyema status post chest tube with Pseudomonas growth on culture  · Sepsis secondary to above  · Acute on chronic respiratory failure required intubation  · Right-sided pneumothorax  · Severe COPD      Recommendations     · IV meropenem. · Repeat CT chest without contrast.  · Follow CBC and renal function  · Continue supportive care              History of Present Illness:   Initial history:  Kasey Arana is a 74y.o.-year-old female presents to the hospital with worsening shortness of breath associated with cough. At the ER with tachypneic and hypoxic  Chest x-ray showed pneumothorax  The patient was in respiratory distress, was intubated. The patient is intubated, sedated, unable to provide history that was obtained from chart review. CT chest 4/16/2022 showed right lower lobe cavitary lesion with surrounding airspace disease and multiloculated pleural collection. The patient had chest tube placed with gram-negative rods growth on pleural fluid culture. Respiratory culture grew gram-positive cocci in pairs  Initial WBC was 18.5, procalcitonin no more than 100  Respiratory panel with COVID-19 PCR was negative  Urine for Legionella and strep pneumo antigen negative    Interval changes  4/26/2022   She remains intubated, afebrile  Ct chest yesterday reviewed showed mod/large right pneumothorax and pleural fluid, the cavitary lesion is smaller compared to previous exam.  Chest tube to suction. Status post bronchoscopy 4/18/2020 with Pseudomonas growth  Right IJ line, NG tube and Reynoso cath in place  Pseudomonas growth on pleural fluid culture.   Respiratory culture grew Pseudomonas aeruginosa  Chest x-ray from earlier today reviewed showed no pneumothorax with improved aeration of the right lung base. Patient Vitals for the past 8 hrs:   BP Temp Temp src Pulse Resp SpO2   04/26/22 1400 (!) 93/42 -- -- 73 19 94 %   04/26/22 1330 (!) 96/37 -- -- 72 18 92 %   04/26/22 1200 (!) 108/38 98.4 °F (36.9 °C) Oral 76 17 92 %   04/26/22 1100 (!) 110/36 -- -- 71 18 99 %   04/26/22 1045 -- -- -- 76 18 94 %   04/26/22 1030 118/85 -- -- 78 20 95 %   04/26/22 1000 (!) 117/46 -- -- 72 18 94 %   04/26/22 0900 (!) 116/51 -- -- 75 21 94 %   04/26/22 0800 (!) 84/47 99.4 °F (37.4 °C) Oral 92 24 93 %   04/26/22 0710 (!) 94/42 -- -- 82 21 93 %   04/26/22 0709 -- -- -- 80 18 94 %   04/26/22 0700 (!) 84/45 -- -- 84 23 93 %   04/26/22 0630 (!) 110/48 -- -- 84 21 93 %             I have personally reviewed the past medical history, past surgical history, medications, social history, and family history, and I haveupdated the database accordingly. Allergies:   Advil [ibuprofen], Aleve [naproxen], Antipyrine, Celecoxib, Codeine, Fomepizole, Incruse ellipta [umeclidinium bromide], Other, Rofecoxib, Salicylates, Strawberry extract, Sulfinpyrazone, Aspirin, and Nsaids     Review of Systems:     Review of Systems  Intubated, unable to provide  Physical Examination :       Physical Exam  Constitutional:       Appearance: She is ill-appearing. Comments: Intubated   HENT:      Head: Normocephalic and atraumatic. Right Ear: External ear normal.      Left Ear: External ear normal.   Cardiovascular:      Rate and Rhythm: Normal rate. Abdominal:      General: There is no distension. Palpations: Abdomen is soft. Musculoskeletal:      Cervical back: Neck supple. No rigidity. Right lower leg: No edema. Left lower leg: No edema. Skin:     Coloration: Skin is not jaundiced. Findings: No bruising.          Past Medical History:     Past Medical History:   Diagnosis Date    Abnormal EKG     TRAM (acute kidney injury) ([facility] 2022    Anxiety     Asthma     Bipolar disorder ([facility]     SEVERE IN 2003, [place]    COPD (chronic obstructive pulmonary disease) (HCC)     Cramps, extremity     SEVERE LEG CRAMPS    Depression     Dilated bile duct     Headache     History of elective      Hyperlipidemia     Irritable bowel syndrome     Prolonged emergence from general anesthesia     SEVERE ISSUES WAKING UP    Substance abuse ([facility]     street drugs when younger   Jenniffer Cervantes Unspecified sleep apnea     Vision abnormalities     glasses       Past Surgical  History:     Past Surgical History:   Procedure Laterality Date    ANKLE SURGERY      HAD SCREWS AND HARDWARE, THEN REMOVED    BRONCHOSCOPY  2022         COLONOSCOPY  02/15/2018    tubular adenoma    EYE SURGERY Bilateral     CATARACTS    HYSTEROSCOPY  10/19/2016    D & C    INDUCED       LASIK      bilateral    TONSILLECTOMY AND ADENOIDECTOMY Bilateral     VULVA SURGERY      HAD A BIOPSY AND REMOVAL OF       Medications:      folic acid  1 mg Oral Daily    thiamine  100 mg Oral Daily    famotidine  20 mg Oral BID    miconazole   Topical BID    multivitamin  1 tablet Oral Daily    insulin glargine  18 Units SubCUTAneous BID    insulin lispro  0-12 Units SubCUTAneous Q6H    methylPREDNISolone  40 mg IntraVENous Q8H    polyethylene glycol  17 g Oral Daily    furosemide  20 mg IntraVENous BID    meropenem  1,000 mg IntraVENous Q8H    albuterol  2.5 mg Nebulization Q4H    acetylcysteine  200 mg Inhalation Q4H    sodium chloride flush  5-40 mL IntraVENous 2 times per day    risperiDONE  1.5 mg Oral Q12H    rivastigmine  4.5 mg Oral BID    atorvastatin  20 mg Oral Daily    traZODone  50 mg Oral Nightly    polyvinyl alcohol  1 drop Both Eyes Q4H    And    artificial tears   Both Eyes Q4H    chlorhexidine  15 mL Mouth/Throat BID    heparin (porcine)  5,000 Units SubCUTAneous 3 times per day       Social History:     Social History Socioeconomic History    Marital status:      Spouse name: Not on file    Number of children: Not on file    Years of education: Not on file    Highest education level: Not on file   Occupational History    Not on file   Tobacco Use    Smoking status: Former Smoker     Packs/day: 2.00     Years: 42.00     Pack years: 84.00     Types: Cigarettes     Quit date: 11/1/2018     Years since quitting: 3.4    Smokeless tobacco: Never Used   Substance and Sexual Activity    Alcohol use: Yes     Comment: yearly    Drug use: No    Sexual activity: Not Currently     Partners: Male     Birth control/protection: Post-menopausal   Other Topics Concern    Not on file   Social History Narrative    Not on file     Social Determinants of Health     Financial Resource Strain: High Risk    Difficulty of Paying Living Expenses: Very hard   Food Insecurity: No Food Insecurity    Worried About Running Out of Food in the Last Year: Never true    Agustin of Food in the Last Year: Never true   Transportation Needs:     Lack of Transportation (Medical): Not on file    Lack of Transportation (Non-Medical):  Not on file   Physical Activity:     Days of Exercise per Week: Not on file    Minutes of Exercise per Session: Not on file   Stress:     Feeling of Stress : Not on file   Social Connections:     Frequency of Communication with Friends and Family: Not on file    Frequency of Social Gatherings with Friends and Family: Not on file    Attends Alevism Services: Not on file    Active Member of Clubs or Organizations: Not on file    Attends Club or Organization Meetings: Not on file    Marital Status: Not on file   Intimate Partner Violence:     Fear of Current or Ex-Partner: Not on file    Emotionally Abused: Not on file    Physically Abused: Not on file    Sexually Abused: Not on file   Housing Stability:     Unable to Pay for Housing in the Last Year: Not on file    Number of [place] in the Last Year: Not on file    Unstable Housing in the Last Year: Not on file       Family History:     Family History   Problem Relation Age of Onset    Dementia Maternal Aunt     Kidney Disease Mother     Heart Attack Sister     Prostate Cancer Father     High Cholesterol Brother     Heart Attack Paternal Grandmother       Medical Decision Making:   I have independently reviewed/ordered the following labs:    CBC with Differential:   Recent Labs     04/25/22  0444 04/26/22 0447   WBC 11.5* 15.5*   HGB 9.7* 9.8*   HCT 30.5* 31.2*    396     BMP:  Recent Labs     04/25/22 0444 04/26/22 0447    141   K 4.2 4.3   CL 95* 95*   CO2 38* 41*   BUN 45* 41*   CREATININE <0.40* 0.43*       Lab Results   Component Value Date    CREATININE 0.43 04/26/2022    GLUCOSE 110 04/26/2022    GLUCOSE 127 07/08/2021       Detailed results: Thank you for allowing us to participate in the care of this patient. Please call with questions. This note is created with the assistance of a speech recognition program.  While intending to generate adocument that actually reflects the content of the visit, the document can still have some errors including those of syntax and sound a like substitutions which may escape proof reading. It such instances, actual meaningcan be extrapolated by contextual diversion.     Mark Sadler MD  Office: [phone]  Perfect serve / office [phone]

## 2022-04-26 NOTE — PROGRESS NOTES
ICU Progress Note (Vent)   [facility]    Patient - Aydee Amador,  Age - 74 y.o.    - 1955      Room Number -    MR[facility]  [id number]   [facility]  Date of Admission -  2022 10:25 AM    Events of Past 24 Hours   Patient currently intubated vent support. She is able to understand. Patient's daughter Cherri Found at bedside. Vitals    height is 5' 5\" (1.651 m) and weight is 183 lb 6.8 oz (83.2 kg). Her oral temperature is 99.4 °F (37.4 °C). Her blood pressure is 110/36 (abnormal) and her pulse is 71. Her respiration is 18 and oxygen saturation is 99%. Temperature Range: Temp: 99.4 °F (37.4 °C) Temp  Av.4 °F (37.4 °C)  Min: 98.9 °F (37.2 °C)  Max: 100.2 °F (37.9 °C)  BP Range:  Systolic (94UQH), KXD:417 , Min:82 , ZAV:480     Diastolic (74BSN), NEO:28, Min:30, Max:117    Pulse Range: Pulse  Av.9  Min: 71  Max: 105  Respiration Range: Resp  Av.7  Min: 15  Max: 26  Current Pulse Ox[de-identified]  SpO2: 99 %  24HR Pulse Ox Range:  SpO2  Av.2 %  Min: 93 %  Max: 99 %  Oxygen Amount and Delivery: O2 Flow Rate (L/min): 6 L/min      Wt Readings from Last 3 Encounters:   22 183 lb 6.8 oz (83.2 kg)   22 183 lb (83 kg)   22 186 lb 1.1 oz (84.4 kg)     I/O       Intake/Output Summary (Last 24 hours) at 2022 1203  Last data filed at 2022 0530  Gross per 24 hour   Intake 1925.31 ml   Output 1250 ml   Net 675.31 ml     I/O last 3 completed shifts:   In: 3301.2 [I.V.:797.1; NG/GT:2134; IV Piggyback:370.1]  Out:  [Urine:1850; Emesis/NG output:60; Stool:50; Chest Tube:50]     DRAIN/TUBE OUTPUT:     Invasive Lines   ETT Day -   11  Right IJ day  11       Mechanical Ventilation Data   SETTINGS (Comprehensive)  Vent Information  Ventilator ID: [id number]  Vent Mode: PRVC  Additional Respiratory Assessments  Pulse: 71  Resp: 18  SpO2: 99 %  End Tidal CO2: 39 (%)  Position: Semi-Molina's  Humidification Source: HME  Cuff Pressure (cm H2O): 30 cm H2O       ABGs:   Lab Results   Component Value Date    PHART 7.530 04/26/2022    PO2ART 74.4 04/26/2022    NMX3CWM 55.3 04/26/2022       Lab Results   Component Value Date    MODE PRVC 04/26/2022         Medications   IV   sodium chloride 15 mL/hr at 04/26/22 0500    sodium chloride      dextrose      propofol 5 mcg/kg/min (51/46/76 7103)      folic acid  1 mg Oral Daily    thiamine  100 mg Oral Daily    famotidine  20 mg Oral BID    miconazole   Topical BID    multivitamin  1 tablet Oral Daily    insulin glargine  18 Units SubCUTAneous BID    insulin lispro  0-12 Units SubCUTAneous Q6H    methylPREDNISolone  40 mg IntraVENous Q8H    polyethylene glycol  17 g Oral Daily    furosemide  20 mg IntraVENous BID    meropenem  1,000 mg IntraVENous Q8H    albuterol  2.5 mg Nebulization Q4H    acetylcysteine  200 mg Inhalation Q4H    sodium chloride flush  5-40 mL IntraVENous 2 times per day    risperiDONE  1.5 mg Oral Q12H    rivastigmine  4.5 mg Oral BID    atorvastatin  20 mg Oral Daily    traZODone  50 mg Oral Nightly    polyvinyl alcohol  1 drop Both Eyes Q4H    And    artificial tears   Both Eyes Q4H    chlorhexidine  15 mL Mouth/Throat BID    heparin (porcine)  5,000 Units SubCUTAneous 3 times per day       Diet/Nutrition   ADULT TUBE FEEDING; Nasogastric; Renal Formula; Continuous; 15; Yes; 10; Q 4 hours; 35; 60; Q 6 hours    Exam   VITALS    height is 5' 5\" (1.651 m) and weight is 183 lb 6.8 oz (83.2 kg). Her oral temperature is 99.4 °F (37.4 °C). Her blood pressure is 110/36 (abnormal) and her pulse is 71. Her respiration is 18 and oxygen saturation is 99%. Ventilator Settings (Basic)  Vent Mode: PRVC Resp Rate (Set): 18 bmp/Vt (Set, mL): 450 mL/ /FiO2 : 30 %    Constitutional - Sedated  General Appearance  well developed, well nourished  HEENT - Life support devices in place (ET, ),normocephalic, atraumatic.    Lungs - Chest expands equally, no wheezes, rales or rhonchi. Cardiovascular - Heart sounds are normal.  normal rate and rhythm regular, no murmur, gallop or rub. Abdomen - soft, nontender  Extremities - no cyanosis     Lab Results   CBC     Lab Results   Component Value Date    WBC 15.5 04/26/2022    RBC 3.37 04/26/2022    RBC 0-2 07/08/2021    HGB 9.8 04/26/2022    HCT 31.2 04/26/2022     04/26/2022    MCV 92.5 04/26/2022    MCH 29.0 04/26/2022    MCHC 31.3 04/26/2022    RDW 17.1 04/26/2022    NRBC 0 07/08/2021    METASPCT 1 04/02/2022    LYMPHOPCT 5 04/19/2022    LYMPHOPCT 12.1 07/08/2021    MONOPCT 2 04/19/2022    MONOPCT 15.1 07/08/2021    BASOPCT 0 04/19/2022    BASOPCT 0.8 07/08/2021    MONOSABS 0.26 04/19/2022    MONOSABS 0.4 07/08/2021    LYMPHSABS 0.66 04/19/2022    LYMPHSABS 0.3 07/08/2021    EOSABS 0.00 04/19/2022    EOSABS 0.1 07/08/2021    BASOSABS 0.00 04/19/2022    DIFFTYPE NOT REPORTED 12/20/2021       BMP   Lab Results   Component Value Date     04/26/2022    K 4.3 04/26/2022    CL 95 04/26/2022    CO2 41 04/26/2022    BUN 41 04/26/2022    CREATININE 0.43 04/26/2022    GLUCOSE 110 04/26/2022    GLUCOSE 127 07/08/2021    CALCIUM 8.9 04/26/2022       LFTS  Lab Results   Component Value Date    ALKPHOS 137 04/16/2022    ALT 25 04/16/2022    AST 19 04/16/2022    PROT 7.0 04/16/2022    PROT 5.7 07/08/2021    BILITOT 0.16 04/16/2022    BILIDIR 0.1 07/08/2021    IBILI 0.12 07/08/2019    LABALBU 2.8 04/16/2022    LABALBU 3.6 07/08/2021       INR  No results for input(s): PROTIME, INR in the last 72 hours. APTT  No results for input(s): APTT in the last 72 hours. Lactic Acid  Lab Results   Component Value Date    LACTA 1.5 04/19/2022    LACTA 1.2 04/02/2022        BNP   No results for input(s): BNP in the last 72 hours. Cultures     Pseudomonas chest tube April 16  Radiology     Plain Films  Chest x-ray reveals near if not complete resolution of pneumothorax on right. Endotracheal tube in adequate position.       SYSTEM ASSESSMENT    Acute on chronic hypoxic and hypercapnic respiratory failure, intubated 4/16  Pseudomonas pneumonia  Right-sided pneumothorax  Right lower lobe cavitary lesions  Exudative pleural effusion on right, growing Pseudomonas  Low ejection fraction EF 45 to 50%, echo 4/18  History of pulmonary embolism and what appears to be chronic filling defects (subsegmental, most likely clinically inconsequential)  Severe stage IV COPD, FEV1 is 35% of predicted  Recent hospitalization for pneumonia  Elevated inflammatory markers including D-dimer and procalcitonin  Full CODE STATUS      Neuro   Patient currently intubated on vent support. Trying to keep patient on even keel sedation wise. Hopefully we can    Respiratory   We will see if we can tolerate weaning trial--- she does look frail however  On vent support. Thoracostomy tube to suction.   There is bubbling in the Pleur-evac  Hemodynamics     Not in shock  Gastrointestinal/Nutrition       Renal     Patient creatinine 0.43   Infectious Disease     On meropenem  Hematology/Oncology       Endocrine   Decrease Solu-Medrol to 30 every 8 hour  Social/Spiritual/DNR/Disposition/Other     Updated patient's daughter,Calli at bedside, explained to her the purpose of the chest tube and how it is functioning    Critical Care Time   45 min    Electronically signed by Lang Da Silva MD on 4/26/2022 at 12:03 PM

## 2022-04-26 NOTE — FLOWSHEET NOTE
04/26/22 1333   Encounter Summary   Encounter Overview/Reason  Volunteer Encounter   Service Provided For: Family   Referral/Consult From: [facility]   Last Encounter  04/26/22   Complexity of Encounter Low   Begin Time 0120   End Time  0130   Total Time Calculated 10 min   Encounter    Type Family Care

## 2022-04-27 ENCOUNTER — APPOINTMENT (OUTPATIENT)
Dept: GENERAL RADIOLOGY | Age: 67
DRG: 720 | End: 2022-04-27
Payer: COMMERCIAL

## 2022-04-27 LAB
ALLEN TEST: ABNORMAL
ANION GAP SERPL CALCULATED.3IONS-SCNC: 7 MMOL/L (ref 9–17)
BUN BLDV-MCNC: 37 MG/DL (ref 8–23)
CALCIUM SERPL-MCNC: 8.3 MG/DL (ref 8.6–10.4)
CARBOXYHEMOGLOBIN: 1 % (ref 0–5)
CHLORIDE BLD-SCNC: 93 MMOL/L (ref 98–107)
CO2: 37 MMOL/L (ref 20–31)
CREAT SERPL-MCNC: <0.4 MG/DL (ref 0.5–0.9)
FIO2: 30
GFR AFRICAN AMERICAN: ABNORMAL ML/MIN
GFR NON-AFRICAN AMERICAN: ABNORMAL ML/MIN
GFR SERPL CREATININE-BSD FRML MDRD: ABNORMAL ML/MIN/{1.73_M2}
GLUCOSE BLD-MCNC: 112 MG/DL (ref 65–105)
GLUCOSE BLD-MCNC: 125 MG/DL (ref 65–105)
GLUCOSE BLD-MCNC: 159 MG/DL (ref 65–105)
GLUCOSE BLD-MCNC: 173 MG/DL (ref 65–105)
GLUCOSE BLD-MCNC: 203 MG/DL (ref 70–99)
HCO3 ARTERIAL: 43.7 MMOL/L (ref 22–26)
HCT VFR BLD CALC: 29.3 % (ref 36–46)
HEMOGLOBIN: 9.3 G/DL (ref 12–16)
MCH RBC QN AUTO: 29.1 PG (ref 26–34)
MCHC RBC AUTO-ENTMCNC: 31.5 G/DL (ref 31–37)
MCV RBC AUTO: 92.2 FL (ref 80–100)
METHEMOGLOBIN: 0.1 % (ref 0–1.9)
MODE: ABNORMAL
O2 DEVICE/FLOW/%: ABNORMAL
O2 SAT, ARTERIAL: 94.3 % (ref 95–98)
PATIENT TEMP: 37.1
PCO2 ARTERIAL: 56.5 MMHG (ref 35–45)
PDW BLD-RTO: 17.6 % (ref 11.5–14.9)
PEEP/CPAP: 8
PH ARTERIAL: 7.5 (ref 7.35–7.45)
PLATELET # BLD: 349 K/UL (ref 150–450)
PMV BLD AUTO: 8.4 FL (ref 6–12)
PO2 ARTERIAL: 75.6 MMHG (ref 80–100)
POSITIVE BASE EXCESS, ART: 20.4 MMOL/L (ref 0–2)
POTASSIUM SERPL-SCNC: 4.3 MMOL/L (ref 3.7–5.3)
PT. POSITION: ABNORMAL
RBC # BLD: 3.18 M/UL (ref 4–5.2)
SAMPLE SITE: ABNORMAL
SET RATE: 18
SODIUM BLD-SCNC: 137 MMOL/L (ref 135–144)
TEXT FOR RESPIRATORY: ABNORMAL
TOTAL RATE: 19
TRIGL SERPL-MCNC: 86 MG/DL
VT: 450
WBC # BLD: 15.4 K/UL (ref 3.5–11)

## 2022-04-27 PROCEDURE — 6360000002 HC RX W HCPCS: Performed by: INTERNAL MEDICINE

## 2022-04-27 PROCEDURE — 85027 COMPLETE CBC AUTOMATED: CPT

## 2022-04-27 PROCEDURE — 82947 ASSAY GLUCOSE BLOOD QUANT: CPT

## 2022-04-27 PROCEDURE — 36415 COLL VENOUS BLD VENIPUNCTURE: CPT

## 2022-04-27 PROCEDURE — 6370000000 HC RX 637 (ALT 250 FOR IP): Performed by: STUDENT IN AN ORGANIZED HEALTH CARE EDUCATION/TRAINING PROGRAM

## 2022-04-27 PROCEDURE — 2000000000 HC ICU R&B

## 2022-04-27 PROCEDURE — 6370000000 HC RX 637 (ALT 250 FOR IP)

## 2022-04-27 PROCEDURE — 6360000002 HC RX W HCPCS: Performed by: STUDENT IN AN ORGANIZED HEALTH CARE EDUCATION/TRAINING PROGRAM

## 2022-04-27 PROCEDURE — 94640 AIRWAY INHALATION TREATMENT: CPT

## 2022-04-27 PROCEDURE — 2580000003 HC RX 258: Performed by: INTERNAL MEDICINE

## 2022-04-27 PROCEDURE — 82805 BLOOD GASES W/O2 SATURATION: CPT

## 2022-04-27 PROCEDURE — 80048 BASIC METABOLIC PNL TOTAL CA: CPT

## 2022-04-27 PROCEDURE — 99233 SBSQ HOSP IP/OBS HIGH 50: CPT | Performed by: INTERNAL MEDICINE

## 2022-04-27 PROCEDURE — 71045 X-RAY EXAM CHEST 1 VIEW: CPT

## 2022-04-27 PROCEDURE — 36600 WITHDRAWAL OF ARTERIAL BLOOD: CPT

## 2022-04-27 PROCEDURE — 84478 ASSAY OF TRIGLYCERIDES: CPT

## 2022-04-27 PROCEDURE — 94761 N-INVAS EAR/PLS OXIMETRY MLT: CPT

## 2022-04-27 PROCEDURE — 2700000000 HC OXYGEN THERAPY PER DAY

## 2022-04-27 PROCEDURE — 94003 VENT MGMT INPAT SUBQ DAY: CPT

## 2022-04-27 PROCEDURE — 6370000000 HC RX 637 (ALT 250 FOR IP): Performed by: INTERNAL MEDICINE

## 2022-04-27 PROCEDURE — 2580000003 HC RX 258: Performed by: STUDENT IN AN ORGANIZED HEALTH CARE EDUCATION/TRAINING PROGRAM

## 2022-04-27 RX ADMIN — ALBUTEROL SULFATE 2.5 MG: 2.5 SOLUTION RESPIRATORY (INHALATION) at 14:51

## 2022-04-27 RX ADMIN — MINERAL OIL, PETROLATUM: 425; 568 OINTMENT OPHTHALMIC at 20:57

## 2022-04-27 RX ADMIN — FENTANYL CITRATE 50 MCG: 50 INJECTION, SOLUTION INTRAMUSCULAR; INTRAVENOUS at 07:46

## 2022-04-27 RX ADMIN — ACETYLCYSTEINE 200 MG: 200 SOLUTION ORAL; RESPIRATORY (INHALATION) at 14:52

## 2022-04-27 RX ADMIN — ALBUTEROL SULFATE 2.5 MG: 2.5 SOLUTION RESPIRATORY (INHALATION) at 23:31

## 2022-04-27 RX ADMIN — TRAZODONE HYDROCHLORIDE 50 MG: 50 TABLET ORAL at 20:47

## 2022-04-27 RX ADMIN — ATORVASTATIN CALCIUM 20 MG: 20 TABLET, FILM COATED ORAL at 07:54

## 2022-04-27 RX ADMIN — ACETYLCYSTEINE 200 MG: 200 SOLUTION ORAL; RESPIRATORY (INHALATION) at 03:09

## 2022-04-27 RX ADMIN — INSULIN GLARGINE 18 UNITS: 100 INJECTION, SOLUTION SUBCUTANEOUS at 20:48

## 2022-04-27 RX ADMIN — ANTI-FUNGAL POWDER MICONAZOLE NITRATE TALC FREE: 1.42 POWDER TOPICAL at 08:24

## 2022-04-27 RX ADMIN — POLYVINYL ALCOHOL 1 DROP: 14 SOLUTION/ DROPS OPHTHALMIC at 06:11

## 2022-04-27 RX ADMIN — MEROPENEM 1000 MG: 1 INJECTION, POWDER, FOR SOLUTION INTRAVENOUS at 04:04

## 2022-04-27 RX ADMIN — METHYLPREDNISOLONE SODIUM SUCCINATE 40 MG: 40 INJECTION, POWDER, LYOPHILIZED, FOR SOLUTION INTRAMUSCULAR; INTRAVENOUS at 16:46

## 2022-04-27 RX ADMIN — METHYLPREDNISOLONE SODIUM SUCCINATE 40 MG: 40 INJECTION, POWDER, LYOPHILIZED, FOR SOLUTION INTRAMUSCULAR; INTRAVENOUS at 07:58

## 2022-04-27 RX ADMIN — THIAMINE HCL TAB 100 MG 100 MG: 100 TAB at 07:54

## 2022-04-27 RX ADMIN — PROPOFOL 10 MCG/KG/MIN: 10 INJECTION, EMULSION INTRAVENOUS at 18:23

## 2022-04-27 RX ADMIN — MULTIPLE VITAMINS W/ MINERALS TAB 1 TABLET: TAB at 07:54

## 2022-04-27 RX ADMIN — HEPARIN SODIUM 5000 UNITS: 5000 INJECTION INTRAVENOUS; SUBCUTANEOUS at 06:11

## 2022-04-27 RX ADMIN — HEPARIN SODIUM 5000 UNITS: 5000 INJECTION INTRAVENOUS; SUBCUTANEOUS at 20:48

## 2022-04-27 RX ADMIN — ACETYLCYSTEINE 200 MG: 200 SOLUTION ORAL; RESPIRATORY (INHALATION) at 10:31

## 2022-04-27 RX ADMIN — MEROPENEM 1000 MG: 1 INJECTION, POWDER, FOR SOLUTION INTRAVENOUS at 12:22

## 2022-04-27 RX ADMIN — FENTANYL CITRATE 50 MCG: 50 INJECTION, SOLUTION INTRAMUSCULAR; INTRAVENOUS at 09:11

## 2022-04-27 RX ADMIN — POLYETHYLENE GLYCOL 3350 17 G: 17 POWDER, FOR SOLUTION ORAL at 08:21

## 2022-04-27 RX ADMIN — SODIUM CHLORIDE: 4.5 INJECTION, SOLUTION INTRAVENOUS at 15:57

## 2022-04-27 RX ADMIN — RISPERIDONE 1.5 MG: 0.5 TABLET ORAL at 04:03

## 2022-04-27 RX ADMIN — ANTI-FUNGAL POWDER MICONAZOLE NITRATE TALC FREE: 1.42 POWDER TOPICAL at 20:47

## 2022-04-27 RX ADMIN — ALBUTEROL SULFATE 2.5 MG: 2.5 SOLUTION RESPIRATORY (INHALATION) at 06:56

## 2022-04-27 RX ADMIN — INSULIN LISPRO 2 UNITS: 100 INJECTION, SOLUTION INTRAVENOUS; SUBCUTANEOUS at 08:01

## 2022-04-27 RX ADMIN — FUROSEMIDE 20 MG: 10 INJECTION, SOLUTION INTRAMUSCULAR; INTRAVENOUS at 18:21

## 2022-04-27 RX ADMIN — FUROSEMIDE 20 MG: 10 INJECTION, SOLUTION INTRAMUSCULAR; INTRAVENOUS at 07:46

## 2022-04-27 RX ADMIN — FOLIC ACID 1 MG: 1 TABLET ORAL at 07:54

## 2022-04-27 RX ADMIN — SODIUM CHLORIDE, PRESERVATIVE FREE 10 ML: 5 INJECTION INTRAVENOUS at 20:47

## 2022-04-27 RX ADMIN — ACETYLCYSTEINE 200 MG: 200 SOLUTION ORAL; RESPIRATORY (INHALATION) at 18:55

## 2022-04-27 RX ADMIN — CHLORHEXIDINE GLUCONATE 0.12% ORAL RINSE 15 ML: 1.2 LIQUID ORAL at 08:00

## 2022-04-27 RX ADMIN — FAMOTIDINE 20 MG: 20 TABLET, FILM COATED ORAL at 20:47

## 2022-04-27 RX ADMIN — ALBUTEROL SULFATE 2.5 MG: 2.5 SOLUTION RESPIRATORY (INHALATION) at 18:55

## 2022-04-27 RX ADMIN — MINERAL OIL, PETROLATUM: 425; 568 OINTMENT OPHTHALMIC at 01:04

## 2022-04-27 RX ADMIN — SODIUM CHLORIDE, PRESERVATIVE FREE 10 ML: 5 INJECTION INTRAVENOUS at 08:00

## 2022-04-27 RX ADMIN — FAMOTIDINE 20 MG: 20 TABLET, FILM COATED ORAL at 07:54

## 2022-04-27 RX ADMIN — ALBUTEROL SULFATE 2.5 MG: 2.5 SOLUTION RESPIRATORY (INHALATION) at 10:31

## 2022-04-27 RX ADMIN — METHYLPREDNISOLONE SODIUM SUCCINATE 40 MG: 40 INJECTION, POWDER, LYOPHILIZED, FOR SOLUTION INTRAMUSCULAR; INTRAVENOUS at 00:24

## 2022-04-27 RX ADMIN — POLYVINYL ALCOHOL 1 DROP: 14 SOLUTION/ DROPS OPHTHALMIC at 02:24

## 2022-04-27 RX ADMIN — FENTANYL CITRATE 50 MCG: 50 INJECTION, SOLUTION INTRAMUSCULAR; INTRAVENOUS at 01:08

## 2022-04-27 RX ADMIN — HEPARIN SODIUM 5000 UNITS: 5000 INJECTION INTRAVENOUS; SUBCUTANEOUS at 14:52

## 2022-04-27 RX ADMIN — ACETYLCYSTEINE 200 MG: 200 SOLUTION ORAL; RESPIRATORY (INHALATION) at 23:31

## 2022-04-27 RX ADMIN — MINERAL OIL, PETROLATUM: 425; 568 OINTMENT OPHTHALMIC at 04:03

## 2022-04-27 RX ADMIN — ALBUTEROL SULFATE 2.5 MG: 2.5 SOLUTION RESPIRATORY (INHALATION) at 03:09

## 2022-04-27 RX ADMIN — ACETYLCYSTEINE 200 MG: 200 SOLUTION ORAL; RESPIRATORY (INHALATION) at 06:56

## 2022-04-27 RX ADMIN — INSULIN LISPRO 2 UNITS: 100 INJECTION, SOLUTION INTRAVENOUS; SUBCUTANEOUS at 20:47

## 2022-04-27 RX ADMIN — INSULIN GLARGINE 18 UNITS: 100 INJECTION, SOLUTION SUBCUTANEOUS at 08:00

## 2022-04-27 RX ADMIN — CHLORHEXIDINE GLUCONATE 0.12% ORAL RINSE 15 ML: 1.2 LIQUID ORAL at 20:47

## 2022-04-27 RX ADMIN — MEROPENEM 1000 MG: 1 INJECTION, POWDER, FOR SOLUTION INTRAVENOUS at 20:46

## 2022-04-27 RX ADMIN — RISPERIDONE 1.5 MG: 0.5 TABLET ORAL at 14:51

## 2022-04-27 ASSESSMENT — PULMONARY FUNCTION TESTS
PIF_VALUE: 21
PIF_VALUE: 25
PIF_VALUE: 9
PIF_VALUE: 25
PIF_VALUE: 11
PIF_VALUE: 22
PIF_VALUE: 30
PIF_VALUE: 20
PIF_VALUE: 10
PIF_VALUE: 37
PIF_VALUE: 10
PIF_VALUE: 12
PIF_VALUE: 9
PIF_VALUE: 24
PIF_VALUE: 31
PIF_VALUE: 21
PIF_VALUE: 10
PIF_VALUE: 16
PIF_VALUE: 12
PIF_VALUE: 22
PIF_VALUE: 34
PIF_VALUE: 23

## 2022-04-27 ASSESSMENT — PAIN SCALES - GENERAL: PAINLEVEL_OUTOF10: 0

## 2022-04-27 ASSESSMENT — PAIN DESCRIPTION - LOCATION: LOCATION: CHEST

## 2022-04-27 ASSESSMENT — PAIN DESCRIPTION - FREQUENCY: FREQUENCY: CONTINUOUS

## 2022-04-27 ASSESSMENT — PAIN DESCRIPTION - ORIENTATION: ORIENTATION: RIGHT

## 2022-04-27 ASSESSMENT — PAIN DESCRIPTION - ONSET: ONSET: ON-GOING

## 2022-04-27 NOTE — PLAN OF CARE
Problem: Breathing Pattern - Ineffective:  Goal: Ability to achieve and maintain a regular respiratory rate will improve  Description: Ability to achieve and maintain a regular respiratory rate will improve  4/27/2022 1905 by Main Torres RCP  Outcome: Progressing     Problem: OXYGENATION/RESPIRATORY FUNCTION  Goal: Patient will maintain patent airway  4/27/2022 1905 by Main Torres RCP  Outcome: Progressing     Problem: OXYGENATION/RESPIRATORY FUNCTION  Goal: Patient will achieve/maintain normal respiratory rate/effort  Description: Respiratory rate and effort will be within normal limits for the patient  4/27/2022 1905 by Main Torres RCP  Outcome: Progressing     Problem: MECHANICAL VENTILATION  Goal: Oral health is maintained or improved  4/27/2022 1905 by Main Torres RCP  Outcome: Progressing     Problem: MECHANICAL VENTILATION  Goal: ET tube will be managed safely  4/27/2022 1905 by Main Torres RCP  Outcome: Progressing

## 2022-04-27 NOTE — PROGRESS NOTES
[facility] -   Infectious diseases evaluation  admission date 4/16/2022    reason for consultation:   Cavitary lung lesions  Impression :   Current:  · Right lower lobe cavitary lesions associated with multiloculated pleural fluid collection likely abscess with empyema status post chest tube with Pseudomonas growth on culture  · Sepsis secondary to above  · Acute on chronic respiratory failure required intubation  · Right-sided pneumothorax  · Severe COPD      Recommendations     · IV meropenem. · Yeast growth on bowel likely colonization  · Follow CBC and renal function  · Continue supportive care              History of Present Illness:   Initial history:  Aime Roberson is a 74y.o.-year-old female presents to the hospital with worsening shortness of breath associated with cough. At the ER with tachypneic and hypoxic  Chest x-ray showed pneumothorax  The patient was in respiratory distress, was intubated. The patient is intubated, sedated, unable to provide history that was obtained from chart review. CT chest 4/16/2022 showed right lower lobe cavitary lesion with surrounding airspace disease and multiloculated pleural collection. The patient had chest tube placed with gram-negative rods growth on pleural fluid culture. Respiratory culture grew gram-positive cocci in pairs  Initial WBC was 18.5, procalcitonin no more than 100  Respiratory panel with COVID-19 PCR was negative  Urine for Legionella and strep pneumo antigen negative    Interval changes  4/27/2022   She remains intubated, afebrile, awake, responds to simple commands. Ct chest from 4/25 showed mod/large right pneumothorax and pleural fluid, the cavitary lesion is smaller compared to previous exam.  Chest tube was reinserted 4/25/2022 .   Status post bronchoscopy 4/18/2020 with Pseudomonas growth  Right IJ line, NG tube and Reynoso cath in place  Pseudomonas growth on pleural fluid culture. Respiratory culture grew Pseudomonas aeruginosa and yeast  Chest x-ray from earlier today reviewed showed no pneumothorax with improved aeration of the right lung base. Patient Vitals for the past 8 hrs:   BP Temp Temp src Pulse Resp SpO2   04/27/22 1234 (!) 91/31 -- -- 74 20 93 %   04/27/22 1200 (!) 84/44 98.5 °F (36.9 °C) Oral 69 19 92 %   04/27/22 1100 (!) 99/42 -- -- 77 18 91 %   04/27/22 1032 -- -- -- 75 18 92 %   04/27/22 0745 -- 99 °F (37.2 °C) Oral -- -- --   04/27/22 0730 (!) 190/67 -- -- 103 25 90 %   04/27/22 0659 -- -- -- 89 28 91 %             I have personally reviewed the past medical history, past surgical history, medications, social history, and family history, and I haveupdated the database accordingly. Allergies:   Advil [ibuprofen], Aleve [naproxen], Antipyrine, Celecoxib, Codeine, Fomepizole, Incruse ellipta [umeclidinium bromide], Other, Rofecoxib, Salicylates, Strawberry extract, Sulfinpyrazone, Aspirin, and Nsaids     Review of Systems:     Review of Systems  Intubated, unable to provide  Physical Examination :       Physical Exam  Constitutional:       Appearance: She is ill-appearing. Comments: Intubated   HENT:      Head: Normocephalic and atraumatic. Right Ear: External ear normal.      Left Ear: External ear normal.   Cardiovascular:      Rate and Rhythm: Normal rate. Abdominal:      General: There is no distension. Palpations: Abdomen is soft. Musculoskeletal:      Cervical back: Neck supple. No rigidity. Right lower leg: No edema. Left lower leg: No edema. Skin:     Coloration: Skin is not jaundiced. Findings: No bruising.          Past Medical History:     Past Medical History:   Diagnosis Date    Abnormal EKG     TRAM (acute kidney injury) ([facility] 4/2/2022    Anxiety     Asthma     Bipolar disorder ([facility] [address]     SEVERE IN 2003, [place]    COPD (chronic obstructive pulmonary disease) (HCC)     Cramps, extremity     SEVERE LEG CRAMPS  Depression     Dilated bile duct     Headache     History of elective      Hyperlipidemia     Irritable bowel syndrome     Prolonged emergence from general anesthesia     SEVERE ISSUES WAKING UP    Substance abuse ([address]     street drugs when younger   Pushpa Me Unspecified sleep apnea     Vision abnormalities     glasses       Past Surgical  History:     Past Surgical History:   Procedure Laterality Date    ANKLE SURGERY      HAD SCREWS AND HARDWARE, THEN REMOVED    BRONCHOSCOPY  2022         COLONOSCOPY  02/15/2018    tubular adenoma    EYE SURGERY Bilateral     CATARACTS    HYSTEROSCOPY  10/19/2016    D & C    INDUCED       LASIK      bilateral    TONSILLECTOMY AND ADENOIDECTOMY Bilateral     VULVA SURGERY      HAD A BIOPSY AND REMOVAL OF       Medications:      folic acid  1 mg Oral Daily    thiamine  100 mg Oral Daily    famotidine  20 mg Oral BID    miconazole   Topical BID    multivitamin  1 tablet Oral Daily    insulin glargine  18 Units SubCUTAneous BID    insulin lispro  0-12 Units SubCUTAneous Q6H    methylPREDNISolone  40 mg IntraVENous Q8H    polyethylene glycol  17 g Oral Daily    furosemide  20 mg IntraVENous BID    meropenem  1,000 mg IntraVENous Q8H    albuterol  2.5 mg Nebulization Q4H    acetylcysteine  200 mg Inhalation Q4H    sodium chloride flush  5-40 mL IntraVENous 2 times per day    risperiDONE  1.5 mg Oral Q12H    atorvastatin  20 mg Oral Daily    traZODone  50 mg Oral Nightly    polyvinyl alcohol  1 drop Both Eyes Q4H    And    artificial tears   Both Eyes Q4H    chlorhexidine  15 mL Mouth/Throat BID    heparin (porcine)  5,000 Units SubCUTAneous 3 times per day       Social History:     Social History     Socioeconomic History    Marital status:      Spouse name: Not on file    Number of children: Not on file    Years of education: Not on file    Highest education level: Not on file   Occupational History    Not on file Tobacco Use    Smoking status: Former Smoker     Packs/day: 2.00     Years: 42.00     Pack years: 84.00     Types: Cigarettes     Quit date: 11/1/2018     Years since quitting: 3.4    Smokeless tobacco: Never Used   Substance and Sexual Activity    Alcohol use: Yes     Comment: yearly    Drug use: No    Sexual activity: Not Currently     Partners: Male     Birth control/protection: Post-menopausal   Other Topics Concern    Not on file   Social History Narrative    Not on file     Social Determinants of Health     Financial Resource Strain: High Risk    Difficulty of Paying Living Expenses: Very hard   Food Insecurity: No Food Insecurity    Worried About Running Out of Food in the Last Year: Never true    Agustin of Food in the Last Year: Never true   Transportation Needs:     Lack of Transportation (Medical): Not on file    Lack of Transportation (Non-Medical):  Not on file   Physical Activity:     Days of Exercise per Week: Not on file    Minutes of Exercise per Session: Not on file   Stress:     Feeling of Stress : Not on file   Social Connections:     Frequency of Communication with Friends and Family: Not on file    Frequency of Social Gatherings with Friends and Family: Not on file    Attends Oriental orthodox Services: Not on file    Active Member of [address] [place] [address] [employer] or Organizations: Not on file    Attends Club or Organization Meetings: Not on file    Marital Status: Not on file   Intimate Partner Violence:     Fear of Current or Ex-Partner: Not on file    Emotionally Abused: Not on file    Physically Abused: Not on file    Sexually Abused: Not on file   Housing Stability:     Unable to Pay for Housing in the Last Year: Not on file    Number of [place] in the Last Year: Not on file    Unstable Housing in the Last Year: Not on file       Family History:     Family History   Problem Relation Age of Onset    Dementia Maternal Aunt     Kidney Disease Mother     Heart Attack Sister     Prostate Cancer Father     High Cholesterol Brother     Heart Attack Paternal Grandmother       Medical Decision Making:   I have independently reviewed/ordered the following labs:    CBC with Differential:   Recent Labs     04/26/22  0447 04/27/22 0449   WBC 15.5* 15.4*   HGB 9.8* 9.3*   HCT 31.2* 29.3*    349     BMP:  Recent Labs     04/26/22 0447 04/27/22 0449    137   K 4.3 4.3   CL 95* 93*   CO2 41* 37*   BUN 41* 37*   CREATININE 0.43* <0.40*       Lab Results   Component Value Date    CREATININE <0.40 04/27/2022    GLUCOSE 203 04/27/2022    GLUCOSE 127 07/08/2021       Detailed results: Thank you for allowing us to participate in the care of this patient. Please call with questions. This note is created with the assistance of a speech recognition program.  While intending to generate adocument that actually reflects the content of the visit, the document can still have some errors including those of syntax and sound a like substitutions which may escape proof reading. It such instances, actual meaningcan be extrapolated by contextual diversion.     Teresa Garner MD  Office: [phone]  Perfect serve / office [phone]

## 2022-04-27 NOTE — PROGRESS NOTES
Patient had increase BP, HR and respirations during assessment. Diprivan increased to 10 mcg. Fentanyl was given for C/O Rt. Chest pain. ETT suctioned for increased thick yellow secretions. Rt. Side chest swollen with increased crepitus noted. Writer has been in contact with Dr. Fabian Brand. He was notified of patient's condition. Chest xray was completed this am. Dr. Fabian Brand will view.

## 2022-04-27 NOTE — CARE COORDINATION
ONGOING DISCHARGE PLAN:    Patient remains intubated. CT placed 4/26 for pneumothorax on right. Following for VNS/SNF/LTACH depending on how patients condition goes. Pt has Ariana VNS at home already. IV Merrem, IV lasix    Will continue to follow for additional discharge needs.     Electronically signed by Robert Leonard RN on 4/27/2022 at 2:09 PM

## 2022-04-27 NOTE — PROGRESS NOTES
Dr. Ligia Angulo rounding at bedside. Discussed patient's condition, increased sedation, elevated BP, HR, pain medication, sedation and plan of care. See physician orders.

## 2022-04-27 NOTE — PROGRESS NOTES
BRONCHOSPASM/BRONCHOCONSTRICTION     [x]         IMPROVE AERATION/BREATH SOUNDS  [x]   ADMINISTER BRONCHODILATOR THERAPY AS APPROPRIATE  [x]   ASSESS BREATH SOUNDS  []   IMPLEMENT AEROSOL/MDI PROTOCOL  [x]   PATIENT EDUCATION AS NEEDED    MOBILIZE SECRETIONS    [x]   ASSESS BREATH SOUNDS  [x]   ASSESS SPUTUM PRODUCTION  []   COUGH AND DEEP BREATHING  []  IMPLEMENT SECRETION MANAGEMENT PROTOCOL  []   PATIENT EDUCATION AS NEEDED    PROVIDE ADEQUATE OXYGENATION WITH ACCEPTABLE SP02/ABG'S    [x]  IDENTIFY APPROPRIATE OXYGEN THERAPY  [x]   MONITOR SP02/ABG'S AS NEEDED   []   PATIENT EDUCATION AS NEEDED    MECHANICAL VENTILATION     [x]  PROVIDE OPTIMAL VENTILATION  [x]   ASSESS FOR EXTUBATION READINESS  [x]   ASSESS FOR WEANING READINESS  [x]  EXTUBATE AS TOLERATED  [x]  IMPLEMENT ADULT MECHANICAL VENTILATION PROTOCOL  [x]  MAINTAIN ADEQUATE OXYGENATION  [x]  PERFORM SPONTANEOUS WEANING TRIAL AS TOLERATED

## 2022-04-27 NOTE — PROGRESS NOTES
ICU Progress Note (Vent)   [facility]    Patient - Antoine Tran,  Age - 74 y.o.    - 1955      Room Number -    MRN -  [id number]   [facility] # - [de-identified]  Date of Admission -  2022 10:25 AM    Events of Past 24 Hours   Patient intubated on vent support. She appears to be able to open her eyes to voice. Patient's sister present at bedside who traveled in from [address] [place] [address]    height is 5' 5\" (1.651 m) and weight is 183 lb 6.8 oz (83.2 kg). Her oral temperature is 99 °F (37.2 °C). Her blood pressure is 190/67 (abnormal) and her pulse is 75. Her respiration is 18 and oxygen saturation is 92%. Temperature Range: Temp: 99 °F (37.2 °C) Temp  Av.8 °F (37.1 °C)  Min: 98.4 °F (36.9 °C)  Max: 99.3 °F (37.4 °C)  BP Range:  Systolic (54QIM), PQV:126 , Min:92 , TJO:360     Diastolic (56GBT), WSE:22, Min:35, Max:67    Pulse Range: Pulse  Av.1  Min: 68  Max: 103  Respiration Range: Resp  Av.1  Min: 17  Max: 28  Current Pulse Ox[de-identified]  SpO2: 92 %  24HR Pulse Ox Range:  SpO2  Av.3 %  Min: 90 %  Max: 97 %  Oxygen Amount and Delivery: O2 Flow Rate (L/min): 6 L/min      Wt Readings from Last 3 Encounters:   22 183 lb 6.8 oz (83.2 kg)   22 183 lb (83 kg)   22 186 lb 1.1 oz (84.4 kg)     I/O       Intake/Output Summary (Last 24 hours) at 2022 1103  Last data filed at 2022 0658  Gross per 24 hour   Intake 1839.16 ml   Output 2009 ml   Net -169.84 ml     I/O last 3 completed shifts: In: 3154.5 [I.V.:459.4; OW/T; IV Piggyback:233.1]  Out: 5269 [Urine:2750; Chest Tube:104]     DRAIN/TUBE OUTPUT:     Invasive Lines   ETT Day -   12  Right IJ day #12.       Mechanical Ventilation Data   SETTINGS (Comprehensive)  Vent Information  Ventilator Day(s): 6  Ventilator ID: [id number]  Vent Mode: PRVC  Additional Respiratory Assessments  Pulse: 75  Resp: 18  SpO2: 92 %  End Tidal CO2: 34 (%)  Position: Semi-Molina's  Humidification Source: [facility]  Cuff Pressure (cm H2O): 28 cm H2O       ABGs:   Lab Results   Component Value Date    PHART 7.497 04/27/2022    PO2ART 75.6 04/27/2022    AAU8MFF 56.5 04/27/2022       Lab Results   Component Value Date    MODE [facility] 04/27/2022         Medications   IV   sodium chloride 15 mL/hr at 04/26/22 0500    sodium chloride      dextrose      propofol 5 mcg/kg/min (01/44/44 6674)      folic acid  1 mg Oral Daily    thiamine  100 mg Oral Daily    famotidine  20 mg Oral BID    miconazole   Topical BID    multivitamin  1 tablet Oral Daily    insulin glargine  18 Units SubCUTAneous BID    insulin lispro  0-12 Units SubCUTAneous Q6H    methylPREDNISolone  40 mg IntraVENous Q8H    polyethylene glycol  17 g Oral Daily    furosemide  20 mg IntraVENous BID    meropenem  1,000 mg IntraVENous Q8H    albuterol  2.5 mg Nebulization Q4H    acetylcysteine  200 mg Inhalation Q4H    sodium chloride flush  5-40 mL IntraVENous 2 times per day    risperiDONE  1.5 mg Oral Q12H    atorvastatin  20 mg Oral Daily    traZODone  50 mg Oral Nightly    polyvinyl alcohol  1 drop Both Eyes Q4H    And    artificial tears   Both Eyes Q4H    chlorhexidine  15 mL Mouth/Throat BID    heparin (porcine)  5,000 Units SubCUTAneous 3 times per day       Diet/Nutrition   ADULT TUBE FEEDING; Nasogastric; Renal Formula; Continuous; 15; Yes; 10; Q 4 hours; 35; 60; Q 6 hours    Exam   VITALS    height is 5' 5\" (1.651 m) and weight is 183 lb 6.8 oz (83.2 kg). Her oral temperature is 99 °F (37.2 °C). Her blood pressure is 190/67 (abnormal) and her pulse is 75. Her respiration is 18 and oxygen saturation is 92%. Ventilator Settings (Basic)  Vent Mode: PRV Resp Rate (Set): 18 bmp/Vt (Set, mL): 450 mL/ /FiO2 : 30 %    Constitutional - Sedated  General Appearance  well developed, well nourished  HEENT - Life support devices in place (ET, ),normocephalic, atraumatic. Lungs - Chest expands equally, no wheezes, rales or rhonchi. Increased subcu emphysema in the right anterior area  Cardiovascular - Heart sounds are normal.  normal rate and rhythm regular, no murmur, gallop or rub. Abdomen - soft, nontender,  Extremities - no cyanosis    Lab Results   CBC     Lab Results   Component Value Date    WBC 15.4 04/27/2022    RBC 3.18 04/27/2022    RBC 0-2 07/08/2021    HGB 9.3 04/27/2022    HCT 29.3 04/27/2022     04/27/2022    MCV 92.2 04/27/2022    MCH 29.1 04/27/2022    MCHC 31.5 04/27/2022    RDW 17.6 04/27/2022    NRBC 0 07/08/2021    METASPCT 1 04/02/2022    LYMPHOPCT 5 04/19/2022    LYMPHOPCT 12.1 07/08/2021    MONOPCT 2 04/19/2022    MONOPCT 15.1 07/08/2021    BASOPCT 0 04/19/2022    BASOPCT 0.8 07/08/2021    MONOSABS 0.26 04/19/2022    MONOSABS 0.4 07/08/2021    LYMPHSABS 0.66 04/19/2022    LYMPHSABS 0.3 07/08/2021    EOSABS 0.00 04/19/2022    EOSABS 0.1 07/08/2021    BASOSABS 0.00 04/19/2022    DIFFTYPE NOT REPORTED 12/20/2021       BMP   Lab Results   Component Value Date     04/27/2022    K 4.3 04/27/2022    CL 93 04/27/2022    CO2 37 04/27/2022    BUN 37 04/27/2022    CREATININE <0.40 04/27/2022    GLUCOSE 203 04/27/2022    GLUCOSE 127 07/08/2021    CALCIUM 8.3 04/27/2022       LFTS  Lab Results   Component Value Date    ALKPHOS 137 04/16/2022    ALT 25 04/16/2022    AST 19 04/16/2022    PROT 7.0 04/16/2022    PROT 5.7 07/08/2021    BILITOT 0.16 04/16/2022    BILIDIR 0.1 07/08/2021    IBILI 0.12 07/08/2019    LABALBU 2.8 04/16/2022    LABALBU 3.6 07/08/2021       INR  No results for input(s): PROTIME, INR in the last 72 hours. APTT  No results for input(s): APTT in the last 72 hours. Lactic Acid  Lab Results   Component Value Date    LACTA 1.5 04/19/2022    LACTA 1.2 04/02/2022        BNP   No results for input(s): BNP in the last 72 hours. Cultures       Radiology     Plain Films         Chest x-ray reveals endotracheal tube in fair position. I do not see any gross pneumothorax currently.     See actual reports for details    SYSTEM ASSESSMENT       Acute on chronic hypoxic and hypercapnic respiratory failure, intubated 4/16  Pseudomonas pneumonia  Right-sided pneumothorax  Right lower lobe cavitary lesions  Exudative pleural effusion on right, growing Pseudomonas  Low ejection fraction EF 45 to 50%, echo 4/18  History of pulmonary embolism and what appears to be chronic filling defects (subsegmental, most likely clinically inconsequential)  Severe stage IV COPD, FEV1 is 35% of predicted  Recent hospitalization for pneumonia  Elevated inflammatory markers including D-dimer and procalcitonin  Full CODE STATUS      Neuro       Respiratory   Currently on vent support. Chest tube to suction. There is an air leak. I was able to collaborate with general surgery. Appreciate their efforts. On Mucomyst and Proventil. Hemodynamics   Not in shock. Gastrointestinal/Nutrition   Only on enteric feeds    Renal   Creatinine less than 0.40. Infectious Disease   White count 15.4  Merrem h  Hematology/Oncology     On heparin for DVT prophylaxis. .  On heated hemoglobin stable 9.3    Endocrine       Social/Spiritual/DNR/Disposition/Other     I did update patient's sister present at bedside.   She voiced appreciation of the update and the ICU staff care    Critical Care Time   45  min    Electronically signed by Joel Weaver MD on 4/27/2022 at 11:03 AM

## 2022-04-27 NOTE — PROGRESS NOTES
PROGRESS NOTE          PATIENT NAME: [id number]  RECORD NO. [id number]  DATE: 4/27/2022  SURGEON: Ai Olea  PRIMARY CARE PHYSICIAN: Jose Chilel MD    HD: # 11    ASSESSMENT    Patient Active Problem List   Diagnosis    Chronic obstructive pulmonary disease ([facility] [address]    Vitamin D deficiency    Anxiety    Asthma    Bipolar disorder ([facility] [address]    Depression    Hyperlipidemia with target LDL less than 100    Sleep apnea    Vision abnormalities    Status post hysteroscopy    Chronic headaches    IBS (irritable bowel syndrome)    Colon polyp    Collagenous colitis    Lung nodule    Left elbow pain    Dilated bile duct    Acute pain of left shoulder    Adhesive capsulitis of left shoulder    Leucopenia    Compression fracture of body of thoracic vertebra (HCC)    Age-related osteoporosis with current pathological fracture    Pulmonary embolism on right ([facility])    Migraines    Paranoid behavior ([facility])    Acute deep vein thrombosis (DVT) of right lower extremity ([facility])    Delirium    Chronic respiratory failure with hypoxia (HCC)    Major neurocognitive disorder (HCC)    Pneumonia    Chronic respiratory failure with hypoxia, on home O2 therapy ([facility])    COPD (chronic obstructive pulmonary disease) ([facility])    TRAM (acute kidney injury) ([facility] [address]    History of pulmonary embolus (PE)    Mycoplasma pneumonia    Iron deficiency anemia    Sepsis ([facility] [address]    Acute on chronic respiratory failure (HCC)    Cavitary lesion of lung    History of DVT (deep vein thrombosis)    Pneumothorax, right    Status post chest tube placement ([facility])    Pneumothorax on right    HCAP (healthcare-associated pneumonia)    Acute systolic HF (heart failure) ([facility])    Pseudomonas aeruginosa infection    Elevated procalcitonin    Elevated C-reactive protein (CRP)    Lung abscess (HCC)    Recurrent pneumothorax after chest tube removed       MEDICAL DECISION MAKING AND PLAN    Maintain chest tube to suction. Daily CXR. Will continue to monitor.            Chief Complaint: patient is intubated and sedated. SUBJECTIVE    Cliff Samara is about the same as yesterday. I was notified that there was perceived to be more subcutaneous emphysema today. Her CXR however does not show a pneumothorax. CT has remained on suction. She remains on stable vent settings. Discussed plan with Dr. Michel Escobar today and will continue to monitor and maintain chest tube to suction. OBJECTIVE  VITALS: Temp: Temp: 98.7 °F (37.1 °C)Temp  Av.8 °F (37.1 °C)  Min: 98.5 °F (36.9 °C)  Max: 99.3 °F (52.2 °C) BP Systolic (91XGA), UBK:698 , Min:84 , T   Diastolic (80EXA), UVR:13, Min:18, Max:67   Pulse Pulse  Av.5  Min: 66  Max: 103 Resp Resp  Av.4  Min: 16  Max: 28 Pulse ox SpO2  Av.8 %  Min: 90 %  Max: 98 %  GENERAL: alert, no distress  NEURO: arousable. Sleepy on exam.   HEENT: normocephalic, atraumatic. : deferred  LUNGS: clear to ausculation, without wheezes, rales or rhonci. Minimal crepitus palpated. No significant air leak appreciated on the R chest tube. HEART: normal rate and regular rhythm  ABDOMEN: soft, non-tender, non-distended, bowel sounds present in all 4 quadrants, and no guarding or peritoneal signs present  EXTREMITY: no cyanosis, clubbing or edema    I/O last 3 completed shifts: In: 3154.5 [I.V.:459.4; BS/AV:2355; IV Piggyback:233.1]  Out: 2185 [Urine:2750; Chest Tube:104]    Drain/tube output:  In: 2389.2 [I.V.:235.1; NG/GT:2051]  Out: 2809 [Urine:2750]    LAB:  CBC:   Recent Labs     22  0444 22  0447 22  044   WBC 11.5* 15.5* 15.4*   HGB 9.7* 9.8* 9.3*   HCT 30.5* 31.2* 29.3*   MCV 94.1 92.5 92.2    396 349     BMP:   Recent Labs     22  0444 22  0447 22    141 137   K 4.2 4.3 4.3   CL 95* 95* 93*   CO2 38* 41* 37*   BUN 45* 41* 37*   CREATININE <0.40* 0.43* <0.40*   GLUCOSE 229* 110* 203*     COAGS: No results for input(s): APTT, PROT, INR in the last 72 hours.         Ralph Hunter, MD  4/27/22, 6:19 PM

## 2022-04-27 NOTE — FLOWSHEET NOTE
Calli provided update to writer and introduced writer to pt's sister and brother who are here from out of town. Chaplains will continue to provide support as decisions are made. 04/27/22 1333   Encounter Summary   Encounter Overview/Reason  Spiritual/Emotional Needs   Service Provided For: Family   Referral/Consult From: [facility] Children;Family members   Last Encounter  04/27/22   Complexity of Encounter Moderate   Encounter    Type Follow up   Spiritual/Emotional needs   Type Spiritual Support   Assessment/Intervention/Outcome   Assessment Calm   Intervention Active listening;Discussed illness injury and its impact; Explored Coping Skills/Resources;Sustaining Presence/Ministry of presence

## 2022-04-27 NOTE — PROGRESS NOTES
[address]    PROGRESS NOTE             4/27/2022    7:20 AM    Name:   Bj Saez  MRN:     [id number]     [place]:      [de-identified]   Room:   2002/2002-01  IP Day:  6  Admit Date:  4/16/2022 10:25 AM    PCP:  Rigoberto Meeks MD  Code Status:  Full Code    Subjective:     C/C:   Chief Complaint   Patient presents with    Shortness of Breath     Interval History Status: No change    Continues to be tachypneic, blood pressure soft low  Received 1 dose fentanyl overnight  RN reports increased secretion, tachycardia, will DC rivastigmine  Continues to be sedated with propofol 5 mcg/kg/min increased to 10  Continues to be on ventilator FiO2 30, PRVC, PIP increased to 40, PEEP 8  CPAP for 1 hour patient became short of breath and place back on ventilator   pH 7.49, PCO2 56, HCO3 43  WBC 15.5>15.4   CT chest Yesterday - large right-sided pneumothorax, pleural fluid inferiorly and posteriorly containing septation and loculated, improving cavitary lesion. consult surgery who placed chest tube yesterday  Tube feeds 35 mL/H tolerating well   IV fluids KVO 0.45 NS running 15 mL/H       Brief History:     The patient is a 67 y.o.  Non- / non  female sever COPD 3L O2 baseline, bipolar, Hx of DVT/ PE, migraines, who presents with Shortness of Breath and cough  Patient was recently discharge from [facility] for mycoplasma pneumonia. She had a follow up appointment with PCP, patient was complaining of cough and chest pain, was started on Tessalon and nsaids for pain. However; she continued to be in distress and her daughter brought her to ED. She was hypoxic initially on 3L o2, was increased to 6L and continued to be hypoxic   Tachypneic, tachycardic  ABGs: pH 7.33, CO2 34, HCO3 18  WBC 18   Lactic 2.8> 1.5  Pancultures were sent  Chest x-ray: Moderate loculated right basal pneumothorax.  Evidence for tension.  Cavitary lesion noted in the right lung base  CT chest: Chronic clot material RUL, RLL.  Thick-walled cavitary lesion RLL.  Multiloculated pleural collection or hydropneumothorax posterior to the right lung, right chest tube in situ.  Infiltrates of the right middle lobe.  Stellate 6 mm lateral RUL nodule.  Mediastinal and right hilar lymphadenopathy  Chest tube was placed and patient was intubated. Was given 1 L bolus, IV cefepime, vancomycin, 125 mg Solu-Medrol and she is admitted to the hospital for the management of acute hypoxic respiratory failure due to pneumothorax and possible lung infection     4/19: switched to meropenem      4/21: bronchial washing from bronchoscopy growing pseudomonas    4/26: Recurrent right sided pneumonthorax shown in repeat CT. Chest tube reinserted by surgery. Review of Systems:     Review of Systems   Unable to perform ROS: Intubated       Medications: Allergies:     Allergies   Allergen Reactions    Advil [Ibuprofen] Other (See Comments)     vomiting    Aleve [Naproxen] Other (See Comments)     vomiting    Antipyrine Other (See Comments)     unknown    Celecoxib Other (See Comments)    Codeine      headache    Fomepizole     Incruse Ellipta [Umeclidinium Bromide]      confusion    Other      GRASS, TREES, WEEDS    Rofecoxib Other (See Comments)     Unknown reaction    Salicylates      Unknown reaction     Strawberry Extract      HEADACHES    Sulfinpyrazone Other (See Comments)     Unknown reaction    Aspirin Nausea And Vomiting, Other (See Comments) and Nausea Only    Nsaids Nausea Only, Other (See Comments) and Nausea And Vomiting       Current Meds:   Scheduled Meds:    folic acid  1 mg Oral Daily    thiamine  100 mg Oral Daily    famotidine  20 mg Oral BID    miconazole   Topical BID    multivitamin  1 tablet Oral Daily    insulin glargine  18 Units SubCUTAneous BID    insulin lispro  0-12 Units SubCUTAneous Q6H    methylPREDNISolone  40 mg IntraVENous Q8H    polyethylene glycol  17 g Oral Daily    furosemide  20 mg IntraVENous BID    meropenem  1,000 mg IntraVENous Q8H    albuterol  2.5 mg Nebulization Q4H    acetylcysteine  200 mg Inhalation Q4H    sodium chloride flush  5-40 mL IntraVENous 2 times per day    risperiDONE  1.5 mg Oral Q12H    rivastigmine  4.5 mg Oral BID    atorvastatin  20 mg Oral Daily    traZODone  50 mg Oral Nightly    polyvinyl alcohol  1 drop Both Eyes Q4H    And    artificial tears   Both Eyes Q4H    chlorhexidine  15 mL Mouth/Throat BID    heparin (porcine)  5,000 Units SubCUTAneous 3 times per day     Continuous Infusions:    sodium chloride 15 mL/hr at 22 0500    sodium chloride      dextrose      propofol 5 mcg/kg/min (22 1906)     PRN Meds: hydrALAZINE, sodium chloride flush, sodium chloride, sodium chloride flush, potassium chloride **OR** potassium alternative oral replacement **OR** potassium chloride, glucose, dextrose, glucagon (rDNA), dextrose, fentanNYL    Data:     Past Medical History:   has a past medical history of Abnormal EKG, TRAM (acute kidney injury) ([facility] [address] Anxiety, Asthma, Bipolar disorder ([facility] [address] COPD (chronic obstructive pulmonary disease) ([facility] [address] Cramps, extremity, Depression, Dilated bile duct, Headache, History of elective , Hyperlipidemia, Irritable bowel syndrome, Prolonged emergence from general anesthesia, Substance abuse ([facility] [address] Unspecified sleep apnea, and Vision abnormalities. Social History:   reports that she quit smoking about 3 years ago. Her smoking use included cigarettes. She has a 84.00 pack-year smoking history. She has never used smokeless tobacco. She reports current alcohol use. She reports that she does not use drugs.      Family History:   Family History   Problem Relation Age of Onset    Dementia Maternal Aunt     Kidney Disease Mother     Heart Attack Sister     Prostate Cancer Father     High Cholesterol Brother     Heart Attack Paternal Grandmother        Vitals:  BP (!) 110/36   Pulse 89   Temp 98.5 °F (36.9 °C) (Oral)   Resp 28   Ht 5' 5\" (1.651 m)   Wt 183 lb 6.8 oz (83.2 kg)   LMP 2013 (Exact Date)   SpO2 91%   BMI 30.52 kg/m²   Temp (24hrs), Av.9 °F (37.2 °C), Min:98.4 °F (36.9 °C), Max:99.4 °F (37.4 °C)    Recent Labs     22  0746 22  1600 22  1947 22  0223   POCGLU 152* 174* 183* 125*       I/O(24Hr):     Intake/Output Summary (Last 24 hours) at 2022 0720  Last data filed at 2022 0992  Gross per 24 hour   Intake 2039.16 ml   Output 2009 ml   Net 30.16 ml       Labs:    CBC:   Lab Results   Component Value Date    WBC 15.4 2022    RBC 3.18 2022    RBC 0-2 2021    HGB 9.3 2022    HCT 29.3 2022    MCV 92.2 2022    MCH 29.1 2022    MCHC 31.5 2022    RDW 17.6 2022     2022    MPV 8.4 2022     BMP:    Lab Results   Component Value Date     2022    K 4.3 2022    CL 93 2022    CO2 37 2022    BUN 37 2022    LABALBU 2.8 2022    LABALBU 3.6 2021    CREATININE <0.40 2022    CALCIUM 8.3 2022    GFRAA Can not be calculated 2022    LABGLOM Can not be calculated 2022    GLUCOSE 203 2022    GLUCOSE 127 2021     ABG:    Lab Results   Component Value Date    PH 8.0 2021       Lab Results   Component Value Date/Time    SPECIAL 8ML GREEN/2ML ORANGE RIGHT IJ 2022 12:12 PM     Lab Results   Component Value Date/Time    CULTURE YEAST ISOLATED IDENTIFICATION TO FOLLOW 2022 12:20 PM    CULTURE NO GROWTH 6 DAYS 2022 12:20 PM    CULTURE PSEUDOMONAS AERUGINOSA LIGHT GROWTH (A) 2022 12:20 PM    CULTURE NO NORMAL RAVEN 2022 12:20 PM         Radiology:    ECHO Complete 2D W Doppler W Color    Result Date: 2022  [address] Transthoracic Echocardiography Report (TTE)  Patient Name Anne Leone     Date of Study               2022 BOBBY OROSCO   Date of      1955  Gender                      Female  Birth   Age          74 year(s)  Race                           Room Number  2002        Height:                     65 inch, 165.1 cm   Corporate ID [id number]    Weight:                     176 pounds, 79.8 kg  #   Patient Acct [de-identified]   BSA:          1.87 m^2      BMI:      29.29  #                                                              kg/m^2   MR #         [id number]      Sonographer                 Мария Stein   Accession #  [id number]  Interpreting Physician      Giovanna Cosme   Fellow                   Referring Nurse                           Practitioner   Interpreting             Referring Physician         Selena Nguyen  Type of Study   TTE procedure:2D Echocardiogram, M-Mode, Doppler, Color Doppler. Procedure Date Date: 04/18/2022 Start: 10:35 AM Study Location: [facility] Technical Quality: Fair visualization Indications:Dyspnea/SOB, Respiratory Failure and Pulmonary embolus. History / Tech. Comments: COPD, HLD, Sleep apnea Patient Status: Inpatient Height: 65 inches Weight: 176 pounds BSA: 1.87 m^2 BMI: 29.29 kg/m^2 Rhythm: Sinus bradycardia HR: 57 bpm BP: 138/65 mmHg CONCLUSIONS Summary Left ventricle is normal in size and wall thickness. Global left ventricular systolic function appears mildly reduced. Estimated LV EF 45-50 %. No obvious wall motion abnormality seen. RV size appears normal mildly reduced function Left atrium is normal in size. No significant valvular regurgitation or stenosis seen. No significant pericardial effusion is seen. Normal aortic root dimension.  Dilated IVC, impaired or no respiratory variations suggestive of elevate Rt atrial pressure Signature ----------------------------------------------------------------------------  Electronically signed by Giovanna Cosme(Interpreting physician) on  04/18/2022 05:06 PM ---------------------------------------------------------------------------- ----------------------------------------------------------------------------  Electronically signed by Мария Stein(Sonographer) on 2022 02:29  PM ---------------------------------------------------------------------------- FINDINGS Left Atrium Left atrium is normal in size. Left Ventricle Left ventricle is normal in size and wall thickness. Global left ventricular systolic function is mildly reduced . Estimated LV EF  %. No obvious wall motion abnormality seen. Right Atrium Right atrium is normal in size. Right Ventricle Normal right ventricular size , mildly reduced function. Mitral Valve No obvious valvular abnormality seen. No evidence of mitral regurgitation. Aortic Valve No obvious valvular abnormality seen. No evidence of aortic insufficiency or stenosis. Tricuspid Valve No obvious valvular abnormality seen. Insignificant tricuspid regurgitation, unable to estimate RVSP. Pulmonic Valve Pulmonic valve was not well visualized. No evidence of pulmonic insufficiency or stenosis. Pericardial Effusion No significant pericardial effusion is seen. Pleural Effusion No pleural effusion seen. Miscellaneous Normal aortic root dimension. IVC Increased diameter and impaired or no inspiratory variation indicating elevated RA filling pressure (i.e. CVP) .  M-mode / 2D Measurements & Calculations:   LVIDd:4.74 cm(3.7 - 5.6 cm)      Aortic Root:3.3 cm(2.0 - 3.7 cm)  LVIDs:3.28 cm(2.2 - 4.0 cm)      LA Dimension: 3.5 cm(1.9 - 4.0 cm)  IVSd:1.05 cm(0.6 - 1.1 cm)       LA volume/Index: 40.7 ml /22m^2  LVPWd:0.93 cm(0.6 - 1.1 cm)  Fractional Shortenin.8 %      XR CHEST (SINGLE VIEW FRONTAL)    Result Date: 2022  EXAMINATION: ONE XRAY VIEW OF THE CHEST 2022 8:55 am COMPARISON: April 3, 2022 HISTORY: ORDERING SYSTEM PROVIDED HISTORY: pna TECHNOLOGIST PROVIDED HISTORY: pna Reason for Exam: pna FINDINGS: Stable position of the right internal jugular central venous catheter. Right infrahilar airspace opacity not significantly changed. No new focal lung abnormality. No sizable pleural effusion. No pneumothorax. Stable right infrahilar airspace opacity     XR CHEST (SINGLE VIEW FRONTAL)    Result Date: 4/3/2022  EXAMINATION: ONE XRAY VIEW OF THE CHEST 4/3/2022 5:51 am COMPARISON: 1 April 2022 HISTORY: ORDERING SYSTEM PROVIDED HISTORY: sob, pleurisy, cough TECHNOLOGIST PROVIDED HISTORY: sob, pleurisy, cough Reason for Exam: Shortness of breath, pleurisy, cough Additional signs and symptoms: Shortness of breath, pleurisy, cough Relevant Medical/Surgical History: Shortness of breath, pleurisy, cough FINDINGS: AP portable view of the chest time stamped at 528 hours demonstrates overlying cardiac monitoring electrodes. Heart size is stable. Right internal jugular catheter terminates in the distal superior vena cava. There has been worsening of a focal opacity at the right lung base. Minimal left basilar opacity is unchanged. No extrapleural air or overt edema. No gross effusions. Worsening opacity at the right base favoring airspace disease. Change in minimal left basilar opacity which may be related atelectasis. XR CHEST PORTABLE    Result Date: 4/23/2022  EXAMINATION: ONE XRAY VIEW OF THE CHEST 4/23/2022 5:40 am COMPARISON: 04/22/2022 and 04/21/2022 HISTORY: ORDERING SYSTEM PROVIDED HISTORY: ETT placement TECHNOLOGIST PROVIDED HISTORY: ETT placement Reason for Exam: ETT placement Additional signs and symptoms: ETT placement Relevant Medical/Surgical History: ETT placement FINDINGS: Endotracheal tube tip is approximately 2 cm above the nimesh. Nasogastric tube continues into the stomach and off the exam.  Right PICC line is near the cavoatrial junction. The right chest tube is stable with the tip over the right upper lung but the side port outside the ribcage.   Soft tissue emphysema in the right chest wall is redemonstrated and appears mildly increased. Some patchy opacities persist in the right base. Evaluation for right apical pneumothorax is suboptimal.  The left lung appears acceptable. Cardiac silhouette is not enlarged. The central pulmonary arteries appears stable. Limited evaluation of the right lateral ribs. 1.  Endotracheal tube, nasogastric tube, and right jugular catheter appear acceptable. The right chest tube side port is outside the ribcage. Correlate for functionality of the chest tube. 2.  Patchy opacities persist in the right lower chest.  The evaluation for small right apical pneumothorax is suboptimal on the current study. XR CHEST PORTABLE    Result Date: 4/22/2022  EXAMINATION: ONE XRAY VIEW OF THE CHEST 4/22/2022 6:29 am COMPARISON: 21 April 2022 HISTORY: ORDERING SYSTEM PROVIDED HISTORY: ETT placement TECHNOLOGIST PROVIDED HISTORY: ETT placement Reason for Exam: on vent FINDINGS: AP portable view of the chest time stamped at 623 hours demonstrates overlying cardiac monitoring electrodes, endotracheal tube terminating 4.8 cm above the nimesh and right internal jugular catheter terminating in the right atrium. Right-sided chest tube is again noted. Trace extrapleural air at the right apex persists. Subcutaneous emphysema along the right lateral chest wall is noted. Faint opacity is present at the right lung base suspicious for focal airspace disease. No mediastinal shift. Heart size is stable. Persistent small right apical pneumothorax. Support tubes and lines as above. Opacity at the right base. There is elevation right hemidiaphragm. Atelectasis is evident but superimposed airspace disease may be present. XR CHEST PORTABLE    Result Date: 4/21/2022  EXAMINATION: ONE XRAY VIEW OF THE CHEST 4/21/2022 11:57 am COMPARISON: 04/21/2022, 0625 hours.  HISTORY: ORDERING SYSTEM PROVIDED HISTORY: Chest tube now clamped TECHNOLOGIST PROVIDED HISTORY: Chest tube now clamped Reason for Exam: chest tube now clamped FINDINGS: The ET tube was in satisfactory position, 4.5 cm above the nimesh. The NG tube was passed the gastric fundus. A right jugular central venous line was noted with the tip in the superior vena cava near its junction with the right atrium. A right-sided chest tube was noted. The proximal most opening is just out of the right lateral chest wall. No pneumothorax was noted. No cardiomegaly, pneumonia, interstitial edema or definite effusions were noted. Mild hyper inflation was identified. The ET tube was in satisfactory position, 4.5 cm above the nimesh. The NG tube was past the gastric fundus. A right jugular central venous line was noted with the tip in the superior vena cava near its junction with the right atrium. No pneumothorax was identified. A right-sided chest tube was noted but the proximal most opening is just external to the right lateral chest wall. No cardiomegaly, pneumonia or interstitial edema. XR CHEST PORTABLE    Result Date: 4/21/2022  EXAMINATION: ONE XRAY VIEW OF THE CHEST 4/21/2022 6:30 am COMPARISON: 04/20/2022 HISTORY: ORDERING SYSTEM PROVIDED HISTORY: ETT placement TECHNOLOGIST PROVIDED HISTORY: ETT placement Reason for Exam: vent FINDINGS: Support tubes and lines are unchanged. The right chest tube side port is external to the chest wall which may predispose to air leak. Cardiac silhouette and mediastinal contours are unchanged. Calcified left hilar lymph nodes are noted. No pneumothorax. No new lung infiltrate. Aeration of the right lung base has improved. 1. The right chest tube side port is external to the chest wall which may predispose to an air leak. No pneumothorax. 2. Improved aeration of the right lung base, likely related to atelectasis. XR CHEST PORTABLE    Result Date: 4/20/2022  EXAMINATION: ONE XRAY VIEW OF THE CHEST 4/20/2022 11:52 am COMPARISON: None.  HISTORY: ORDERING SYSTEM PROVIDED HISTORY: Chest tube now to [facility] TECHNOLOGIST PROVIDED HISTORY: Chest tube now to waterseal Reason for Exam: Chest tube now to waterseal FINDINGS: There is a right chest tube in place. There is no evidence of pneumothorax. Some apparent atelectasis noted in the the right lung base. ET tube is in good position. Tip of central line overlies the right atrium. Left lung appears normal.  Heart appears unremarkable. No evidence of pneumothorax. Some atelectasis in the right lung base. Tip of centralized overlies the right atrium. It should be withdrawn by 6 cm. The findings were sent to the Radiology Results [address] at 12:21 pm on 4/20/2022 to be communicated to a licensed caregiver. XR CHEST PORTABLE    Result Date: 4/20/2022  EXAMINATION: ONE XRAY VIEW OF THE CHEST 4/20/2022 6:30 am COMPARISON: 04/19/2022 HISTORY: ORDERING SYSTEM PROVIDED HISTORY: ETT placement TECHNOLOGIST PROVIDED HISTORY: ETT placement Reason for Exam: ETT FINDINGS: The cardiac silhouette and mediastinal contours are stable. There is a right-sided chest tube with the side port noted external to the chest wall. Right internal jugular vein catheter, endotracheal tube, and orogastric tube are again noted. There is pulmonary vascular congestion. No pneumothorax. The patient is rotated towards the right. 1. Right chest tube side port is external to the chest wall which may predispose to an air leak. 2. Stable pulmonary vascular congestion. 3. No pneumothorax is identified. XR CHEST PORTABLE    Result Date: 4/19/2022  EXAMINATION: ONE X-RAY VIEW OF THE CHEST 4/18/2022 6:27 am COMPARISON: 04/17/2022 HISTORY: ORDERING SYSTEM PROVIDED HISTORY: ETT placement TECHNOLOGIST PROVIDED HISTORY: ETT placement Reason for Exam: ETT placement FINDINGS: The endotracheal tube, nasogastric tube, right IJ catheter and right-sided chest tube remain. The extreme lung apices are excluded from the examination. A pneumothorax is not identified.   Right basilar consolidation has increased peripherally. Increased right basilar opacity may reflect fluid filling the large cavitary lesion at the right lung base. Pulmonary consolidation/increased pleural effusion or empyema are alternate considerations. XR CHEST PORTABLE    Result Date: 4/19/2022  EXAMINATION: ONE XRAY VIEW OF THE CHEST 4/19/2022 6:34 am COMPARISON: Chest radiograph performed 04/18/2022. HISTORY: ORDERING SYSTEM PROVIDED HISTORY: ETT placement TECHNOLOGIST PROVIDED HISTORY: ETT placement Reason for Exam: on vent FINDINGS: There is right lower lung infiltrate. There is no pneumothorax. The mediastinal structures are unremarkable. The upper abdomen is unremarkable. The extrathoracic soft tissues are unremarkable. There is an endotracheal tube with the tip in the midtrachea. There is a right-sided chest tube. There is a right internal jugular central line with the tip at the cavoatrial junction. There is a gastric tube and the tip is not well visualized. Right lower lung infiltrate that is mildly improved. Support tubes as described above. XR CHEST PORTABLE    Result Date: 4/17/2022  EXAMINATION: ONE XRAY VIEW OF THE CHEST 4/17/2022 6:04 am COMPARISON: 04/16/2022, 1248 hours HISTORY: ORDERING SYSTEM PROVIDED HISTORY: ETT placement TECHNOLOGIST PROVIDED HISTORY: ETT placement Reason for Exam: ETT 79-year-old female with endotracheal tube placement FINDINGS: Endotracheal tube distal tip overlying the mid trachea approximately 4.8 cm above the level of the nimesh. Enteric tube traverses the GE junction with distal tip excluded from the field of view. Right IJ approach central venous catheter distal tip overlying the right atrium. Right-sided chest tube distal tip projects over the right hilum. Cardiac monitor leads overlie the chest. No obvious pneumothorax. No free air. Cardiac and mediastinal contours remain unchanged. Left lung is relatively clear.   Persistent airspace disease at the right mid and right lower lung zones. Visualized osseous structures remain unchanged. Subcutaneous emphysema previously seen at the right lateral chest wall no longer identified. 1. Persistent airspace disease at the right mid and right lower lung zones. Follow-up is recommended to document resolution. 2. Tubes and right IJ line as detailed above. XR CHEST PORTABLE    Result Date: 4/16/2022  EXAMINATION: ONE XRAY VIEW OF THE CHEST 4/16/2022 1:10 pm COMPARISON: 04/16/2022, 1213 hours HISTORY: ORDERING SYSTEM PROVIDED HISTORY: chest tube TECHNOLOGIST PROVIDED HISTORY: chest tube Reason for Exam: chest tube Additional signs and symptoms: chest tube and NG tube placement 44-year-old female with history of NG tube and chest tube placement FINDINGS: Portable supine view of the chest. Right-sided chest tube has been placed with distal tip projecting over the right infrahilar region. Right IJ approach central venous catheter distal tip overlying the cavoatrial junction, stable. Endotracheal tube distal tip overlying the mid trachea approximately 4.3 cm above the level of the nimesh. Enteric tube traverses the GE junction with distal tip projecting over the left mid abdomen likely within the stomach body. Left lung is clear. Pleural thickening and opacity involving the right mid and right lower lung zones. Subcutaneous emphysema along the right lateral chest wall. Cardiac and mediastinal contours remain unchanged. Atherosclerotic calcification of the thoracic aorta. No free air. There is re-expansion of the right lung compared with the prior study. Probable small residual inferolateral right pneumothorax component. 1. Tubes and right IJ line as detailed above. Re-expansion of the right lung compared with the prior study. There is likely a small residual right inferolateral pneumothorax component. 2. Pleural thickening and airspace opacity involving the right mid and right lower lung zones.   Follow-up is recommended to document resolution. 3. Subcutaneous emphysema along the right lateral chest wall. XR CHEST PORTABLE    Result Date: 4/16/2022  EXAMINATION: ONE XRAY VIEW OF THE CHEST 4/16/2022 12:24 pm COMPARISON: None. HISTORY: ORDERING SYSTEM PROVIDED HISTORY: Intubation TECHNOLOGIST PROVIDED HISTORY: Intubation Reason for Exam: central line and ET placement FINDINGS: ET tube terminates 3 cm above the nimesh. Right line terminates in the SVC. There is a relatively stable right-sided basilar pneumothorax. The remaining lungs are unchanged. Cardiac silhouette and osseous structures unchanged. Intubation as above. No significant change     XR CHEST PORTABLE    Result Date: 4/16/2022  EXAMINATION: ONE XRAY VIEW OF THE CHEST 4/16/2022 11:02 am COMPARISON: 04/05/2022 HISTORY: ORDERING SYSTEM PROVIDED HISTORY: SOB TECHNOLOGIST PROVIDED HISTORY: SOB Reason for Exam: SOB FINDINGS: There is a moderate loculated right basal pneumothorax. Cavitary lesion is noted in the right lung base. Left lung is unremarkable. Heart and mediastinal structures appear normal.  There is no evidence for any tension phenomena. Moderate loculated right basal pneumothorax. Evidence for tension. Cavitary lesion noted in the right lung base. .  Case discussed with Dr. Beverly Tovar. XR CHEST PORTABLE    Result Date: 4/1/2022  EXAMINATION: ONE XRAY VIEW OF THE CHEST 4/1/2022 4:01 pm COMPARISON: 04/01/2022 HISTORY: ORDERING SYSTEM PROVIDED HISTORY: central line placed TECHNOLOGIST PROVIDED HISTORY: central line placed Reason for Exam: Central line placed FINDINGS: There is a right internal jugular central line in place with distal tip overlying the superior vena cava. Previously noted right basal infiltrate is unchanged. Left lung appears clear. The heart and mediastinal structures appear normal.  There is no evidence of pneumothorax. Satisfactory position of right internal jugular central line.   No change in the the right basal infiltrate. XR CHEST PORTABLE    Result Date: 4/1/2022  EXAMINATION: ONE XRAY VIEW OF THE CHEST 4/1/2022 1:22 pm COMPARISON: 06/17/2020. CT dated 10/15/2021. HISTORY: ORDERING SYSTEM PROVIDED HISTORY: dyspnea TECHNOLOGIST PROVIDED HISTORY: dyspnea Reason for Exam: Dyspnea Relevant Medical/Surgical History: Hx of COPD FINDINGS: The cardiac silhouette appears within normal limits. There are bibasilar opacities, right greater than left which may reflect multifocal pneumonia in the correct clinical setting. No evidence of pleural effusion or pneumothorax is seen. Bibasilar opacities, right greater than left, which may reflect multifocal pneumonia. Follow-up to imaging resolution is recommended. CT CHEST PULMONARY EMBOLISM W CONTRAST    Result Date: 4/16/2022  EXAMINATION: CTA OF THE CHEST 4/16/2022 2:30 pm TECHNIQUE: CTA of the chest was performed after the administration of intravenous contrast.  Multiplanar reformatted images are provided for review. MIP images are provided for review. Dose modulation, iterative reconstruction, and/or weight based adjustment of the mA/kV was utilized to reduce the radiation dose to as low as reasonably achievable. COMPARISON: CT chest from 10/15/2021 HISTORY: ORDERING SYSTEM PROVIDED HISTORY: SOB TECHNOLOGIST PROVIDED HISTORY: SOB Decision Support Exception - unselect if not a suspected or confirmed emergency medical condition->Emergency Medical Condition (MA) Reason for Exam: sob Relevant Medical/Surgical History: intubated, septic, hx of PE 46-year-old female with shortness of breath FINDINGS: Pulmonary Arteries: No obvious filling defect in the main, right main, or left main pulmonary arteries. Evaluation of the segmental and subsegmental pulmonary arterial vasculature is limited due to respiratory motion suboptimal bolus timing, airspace disease, and streak artifact.  There are areas of probable residual linear chronic clot within the right lower lobar pulmonary artery on image 111, series 2. Probable linear residual clot within the anterior right upper lobe pulmonary artery on image 83, series 2. Mediastinum: Atherosclerotic calcification of the aorta and branch vasculature. No dissection flap within the visualized thoracic aorta. No pericardial effusion. There is fluid in the superior pericardial recess. Enlarged precarinal lymph nodes remain unchanged on image 83, series 2. Right hilar lymphadenopathy is seen on image 96, series 2. Coronary artery disease. No left hilar or axillary lymphadenopathy. Lungs/pleura: Endotracheal tube distal tip within the lower trachea. Trachea and very proximal central airways appear patent. Narrowing of the right middle lobe airway into a region of partial consolidation/atelectasis/scarring. Opacification of the right lower lobar segmental airways. There is a thick-walled cavitary lesion in the right lower lobe measuring 5.5 x 7.3 cm in greatest AP and transverse dimensions on image 68, series 4. There is a dependent air-fluid level within this lesion. This area measures 7.6 cm in greatest craniocaudal extent on image 48, series 602. There is surrounding airspace disease. There is a gas and fluid containing multiloculated pleural collection or hydropneumothorax along the posterior margin of the right lung. Right sided chest tube distal tip along the anteromedial right lung. Subcutaneous emphysema along the right axilla and right lateral chest wall. Stellate pulmonary nodule in the lateral right upper lobe measuring 6 mm on image 32, series 4. Moderate to severe emphysema, dependent atelectasis and respiratory motion. Upper Abdomen: Fatty liver. NG tube is seen extending into the stomach body. Atherosclerotic calcification of the upper abdominal aorta and branch vasculature. Soft Tissues/Bones: Chronic anterior wedge compression deformities, unchanged from 10/15/2021 involving T5 and T6.   Mild diffuse degenerative changes throughout the spine. 1. Linear filling defects in the anterior right upper lobe and right lower lobe pulmonary arteries likely representing residual/chronic clot material. No acute central filling defect/pulmonary embolus is evident. 2. Thick-walled cavitary lesion in the right lower lobe measuring up to 5.5 x 7.3 x 7.6 cm which could be related to TB or fungal disease or a necrotizing pneumonia. Underlying cavitary malignancy not entirely excluded. There is surrounding airspace disease. There is also an associated multiloculated pleural collection or hydropneumothorax primarily along the posterior margin of the right lung. Right-sided chest tube distal tip along the anteromedial right pleura/lung. 3. Partial consolidation, atelectasis and/or infiltrate of the right middle lobe. 4. Stellate 6 mm lateral right upper lobe pulmonary nodule. Follow-up guidelines provided below. 5. Underlying moderate to severe emphysema. 6. Endotracheal and NG tubes as above. 7. Coronary artery disease. 8. Chronic anterior wedge compression deformities of T5 and T6. 9. Subcutaneous emphysema along the right axilla and right lateral chest wall likely related to chest tube. 10. Mediastinal and right hilar lymphadenopathy. RECOMMENDATIONS: 6 mm suspicious right solid pulmonary nodule within the upper lobe. Recommend a non-contrast Chest CT at 6-12 months, then another non-contrast Chest CT at 18-24 months. These guidelines do not apply to immunocompromised patients and patients with cancer. Follow up in patients with significant comorbidities as clinically warranted. For lung cancer screening, adhere to Lung-RADS guidelines. Reference: Radiology. 2017; 284(1):228-43.      VL Lower Extremity Bilateral Venous Duplex    Result Date: 4/18/2022    [facility]  Vascular Lower Extremities DVT Study Procedure   Patient Name   Can Carter     Date of Study           04/18/2022                 BOBBY OROSCO   Date of Birth 1955  Gender                  Female   Age            74 year(s)  Race                       Room Number    2002   Corporate ID # [id number]   Patient Acct # [de-identified]   MR #           [id number]      Sonographer             Arianna Desai RVT   Accession #    [id number]  Interpreting Physician  Domo Norris   Referring                  Referring Physician     Cristino Gee  Nurse  Practitioner  Procedure Type of Study:   Veins: Lower Extremities DVT Study, Venous Scan Lower Bilateral.  Indications for Study:R/O DVT. Patient Status: In Patient. Technical Quality:Adequate visualization. Conclusions   Summary   Bilateral:  No evidence of deep or superficial venous thrombosis. Signature   ----------------------------------------------------------------  Electronically signed by Arianna Desai RVT(Sonographer) on  04/18/2022 07:45 AM  ----------------------------------------------------------------   ----------------------------------------------------------------  Electronically signed by Domo Norris(Interpreting physician)  on 04/18/2022 01:10 PM  ----------------------------------------------------------------  Findings:   Right Impression:                    Left Impression:  The common femoral, femoral,         The common femoral, femoral,  popliteal and tibial veins           popliteal and tibial veins  demonstrate normal compressibility   demonstrate normal compressibility  and augmentation. and augmentation. Normal compressibility of the great  Normal compressibility of the great  saphenous vein. saphenous vein. Normal compressibility of the small  Normal compressibility of the small  saphenous vein. saphenous vein. Velocities are measured in cm/s ; Diameters are measured in cm Right Lower Extremities DVT Study Measurements Right 2D Measurements +------------------------------------+----------+---------------+----------+ ! Location !Visualized! Compressibility! Thrombosis! +------------------------------------+----------+---------------+----------+ ! Common Femoral                      !Yes       ! Yes            ! None      ! +------------------------------------+----------+---------------+----------+ ! Prox Femoral                        !Yes       ! Yes            ! None      ! +------------------------------------+----------+---------------+----------+ ! Mid Femoral                         !Yes       ! Yes            ! None      ! +------------------------------------+----------+---------------+----------+ ! Dist Femoral                        !Yes       ! Yes            ! None      ! +------------------------------------+----------+---------------+----------+ ! Deep Femoral                        !Yes       ! Yes            ! None      ! +------------------------------------+----------+---------------+----------+ ! Popliteal                           !Yes       ! Yes            ! None      ! +------------------------------------+----------+---------------+----------+ ! Sapheno Femoral Junction            ! Yes       ! Yes            ! None      ! +------------------------------------+----------+---------------+----------+ ! PTV                                 ! Yes       ! Yes            ! None      ! +------------------------------------+----------+---------------+----------+ ! Peroneal                            !Yes       ! Yes            ! None      ! +------------------------------------+----------+---------------+----------+ ! Gastroc                             ! Yes       ! Yes            ! None      ! +------------------------------------+----------+---------------+----------+ ! GSV Thigh                           ! Yes       ! Yes            ! None      ! +------------------------------------+----------+---------------+----------+ ! GSV Knee                            ! Yes       ! Yes            ! None      ! +------------------------------------+----------+---------------+----------+ ! GSV Ankle                           ! Yes       ! Yes            ! None      ! +------------------------------------+----------+---------------+----------+ ! SSV                                 ! Yes       ! Yes            ! None      ! +------------------------------------+----------+---------------+----------+ Left Lower Extremities DVT Study Measurements Left 2D Measurements +------------------------------------+----------+---------------+----------+ ! Location                            ! Visualized! Compressibility! Thrombosis! +------------------------------------+----------+---------------+----------+ ! Common Femoral                      !Yes       ! Yes            ! None      ! +------------------------------------+----------+---------------+----------+ ! Prox Femoral                        !Yes       ! Yes            ! None      ! +------------------------------------+----------+---------------+----------+ ! Mid Femoral                         !Yes       ! Yes            ! None      ! +------------------------------------+----------+---------------+----------+ ! Dist Femoral                        !Yes       ! Yes            ! None      ! +------------------------------------+----------+---------------+----------+ ! Deep Femoral                        !Yes       ! Yes            ! None      ! +------------------------------------+----------+---------------+----------+ ! Popliteal                           !Yes       ! Yes            ! None      ! +------------------------------------+----------+---------------+----------+ ! Sapheno Femoral Junction            ! Yes       ! Yes            ! None      ! +------------------------------------+----------+---------------+----------+ ! PTV                                 ! Yes       ! Yes            ! None      ! +------------------------------------+----------+---------------+----------+ ! Dai !Yes       !Yes            ! None      ! +------------------------------------+----------+---------------+----------+ ! Gastroc                             ! Yes       ! Yes            ! None      ! +------------------------------------+----------+---------------+----------+ ! GSV Thigh                           ! Yes       ! Yes            ! None      ! +------------------------------------+----------+---------------+----------+ ! GSV Knee                            ! Yes       ! Yes            ! None      ! +------------------------------------+----------+---------------+----------+ ! GSV Ankle                           ! Yes       ! Yes            ! None      ! +------------------------------------+----------+---------------+----------+ ! SSV                                 ! Yes       ! Yes            ! None      ! +------------------------------------+----------+---------------+----------+    FL MODIFIED BARIUM SWALLOW W VIDEO    Result Date: 4/4/2022  EXAMINATION: MODIFIED BARIUM SWALLOW WAS PERFORMED IN CONJUNCTION WITH SPEECH PATHOLOGY SERVICES TECHNIQUE: Fluoroscopic evaluation of the swallowing mechanism was performed using cineradiography with multiple consistency of barium product in conjunction with speech pathology services. FLUOROSCOPY DOSE AND TYPE OR TIME AND EXPOSURES: DAP 49.2 dGy cm squared COMPARISON: None HISTORY: ORDERING SYSTEM PROVIDED HISTORY: aspiration pna TECHNOLOGIST PROVIDED HISTORY: aspiration pna Reason for Exam: aspiration pneumonia FINDINGS: Premature vallecular spillage. There was trace penetration with straw with thin liquid. No aspiration. No aspiration. Trace penetration with straw with thin liquids. Please see separate speech pathology report for full discussion of findings and recommendations. RECOMMENDATIONS: Unavailable         Physical Examination:        Physical Exam  Constitutional:       General: She is not in acute distress. Appearance: She is not ill-appearing.       Comments: Sedated, comfortable appearing, unresponsive   Cardiovascular:      Rate and Rhythm: Normal rate and regular rhythm. Pulmonary:      Breath sounds: Wheezing and rales present. Comments: Chest tube in place  Draining around 50ml   Continues to be intubated and sedated   Abdominal:      General: Bowel sounds are normal. There is no distension. Musculoskeletal:      Right lower leg: No edema. Left lower leg: No edema. Neurological:      Comments: Unresponsive, sedated           Assessment:        Primary Problem  Sepsis [facility])    Active Hospital Problems    Diagnosis Date Noted    Recurrent pneumothorax after chest tube removed [J95.811] 04/26/2022     Priority: Medium    Lung abscess ([facility] [J85.2] 04/25/2022     Priority: Medium    Elevated C-reactive protein (CRP) [R79.82]      Priority: Medium    Pseudomonas aeruginosa infection [A49.8]      Priority: Medium    Elevated procalcitonin [R79.89]      Priority: Medium    Acute systolic HF (heart failure) (HCC) [I50.21] 04/19/2022    Pneumothorax on right [J93.9]     HCAP (healthcare-associated pneumonia) [J18.9]     Sepsis ([facility] [A41.9] 04/16/2022    Acute on chronic respiratory failure ([facility] [J96.20] 04/16/2022    Cavitary lesion of lung [J98.4] 04/16/2022    History of DVT (deep vein thrombosis) [Z86.718] 04/16/2022    Pneumothorax, right [J93.9] 04/16/2022    Status post chest tube placement ([address] [Z93.8] 04/16/2022    History of pulmonary embolus (PE) [Z86.711] 04/02/2022    Chronic respiratory failure with hypoxia (HCC) [J96.11] 08/05/2021    Compression fracture of body of thoracic vertebra ([facility] [S22.000A] 09/28/2020    Lung nodule [R91.1] 08/22/2018    Bipolar disorder ([address] [F31.9]     Chronic obstructive pulmonary disease ([facility] [J44.9] 01/06/2016       Plan:        Sepsis due to pneumonia with abcess vs empyema, pseudomonas sp.    Tachypnea, tachycardia  WBC 18>>>12.6> 11.5>15.5>15.4  Pro-Branden >100 (>100 on repeat), , Lactate 2.8> 1.5  Chest x-ray: Moderate loculated right basal pneumothorax.  Evidence for tension.  Cavitary lesion noted in the right lung base  CT chest: Thick-walled cavitary lesion RLL. Right pneumothorax. Infiltrates of the right middle lobe.  Stellate 6 mm lateral RUL nodule.  Mediastinal and right hilar lymphadenopathy  Iv fluids: 1/2 NS at 15 cc/h  ID and Critical care following  Respiratory cultures and chest tube fluids culture growing pseudomonas   On Merrem IV day 9  -Repeat CT chest showed large pneumothorax, improving cavitary lesion    Acute on chronic respiratory failure 2/2 recurrent pneumothorax after chest tube removal and Pseudomonas pneumonia  -History of severe COPD - 3 L home O2 baseline  -CXR right pneumothorax on admission   -S/p intubation and right chest tube placement  -Sedated with propofol  -On a Vent with FiO2 30% PEEP 8  -Solumedrol 40mg q8h   -Repeat CT chest showed large pneumothorax, improving cavitary lesion  -Surgery consult given  -Chest tube inserted 2/09    Acute systolic heart failure - Improved   Echo Estimated LV EF 45-50 %  Probnp 1000s   Lasix 20mg IV BID    Type II DM Insulin sliding scale and lantus 18 Units twice daily     Hx DVT/ PE: Eliquis was discontinued in 12/2021  History bipolar disorder: Trazodone, Risperidone resumed     Diet: NPO, on Tube feeds 38 mL/h  GI ppx: Pepcid 20 mg twice daily IV  DVT ppx: Heparin 5000 units TID   Code status: Full code  Consults: pulm, ID  Dispo: referral sent to [facility], keep in ICU    Todd Riley MD  4/27/2022  7:20 AM     Attestation and add on       I have discussed the care of Delorise Delay , including pertinent history and exam findings,      4/27/22    with the resident. I have seen and examined the patient and the key elements of all parts of the encounter have been performed by me . I agree with the assessment, plan and orders as documented by the resident.      Principal Problem:    Sepsis ([address]  Active Problems: Pseudomonas aeruginosa infection    Elevated procalcitonin    Elevated C-reactive protein (CRP)    Lung abscess (HCC)    Recurrent pneumothorax after chest tube removed    Chronic obstructive pulmonary disease (HCC)    Bipolar disorder (HCC)    Lung nodule    Compression fracture of body of thoracic vertebra (HCC)    Chronic respiratory failure with hypoxia (HCC)    History of pulmonary embolus (PE)    Acute on chronic respiratory failure (HCC)    Cavitary lesion of lung    History of DVT (deep vein thrombosis)    Pneumothorax, right    Status post chest tube placement (HCC)    Pneumothorax on right    HCAP (healthcare-associated pneumonia)    Acute systolic HF (heart failure) ([facility]  Resolved Problems:    Hyperkalemia        ''''has crepitus   Discussed with Dr Josefa Lei'''''  criticaly ill , on vent'       Fabian Roy [address] [place] [address].    Phone [phone]   Fax: [phone]  Answering Service: [phone]

## 2022-04-27 NOTE — PLAN OF CARE
Problem: Non-Violent Restraints  Goal: Removal from restraints as soon as assessed to be safe  4/27/2022 1852 by Precious Mensah RN  Outcome: Progressing  4/27/2022 0529 by Micheal Bridges RN  Outcome: Progressing  Goal: No harm/injury to patient while restraints in use  4/27/2022 1852 by Precious Mensah RN  Outcome: Progressing  4/27/2022 0529 by Micheal Bridges RN  Outcome: Progressing  Goal: Patient's dignity will be maintained  4/27/2022 1852 by Precious Mensah RN  Outcome: Progressing  4/27/2022 0529 by Micheal Bridges RN  Outcome: Progressing     Problem: Gas Exchange - Impaired:  Goal: Levels of oxygenation will improve  4/27/2022 1852 by Precious Mensah RN  Outcome: Progressing  4/27/2022 0529 by Micheal Bridges RN  Outcome: Progressing     Problem: Skin Integrity - Impaired:  Goal: Will show no infection signs and symptoms  4/27/2022 1852 by Precious Mensah RN  Outcome: Progressing  4/27/2022 0529 by Micheal Bridges RN  Outcome: Progressing  Note: Patient has no signs or symptoms of infection. Will continue to monitor. Goal: Absence of new skin breakdown  4/27/2022 1852 by Precious Mensah RN  Outcome: Progressing  4/27/2022 0529 by Micheal Bridges RN  Outcome: Progressing  Note: Skin assessment as charted. Problem: Falls - Risk of:  Goal: Absence of physical injury  4/27/2022 1852 by Precious Mensah RN  Outcome: Progressing  4/27/2022 0529 by Micheal Bridges RN  Outcome: Progressing  Note: No injury this shift. Patient safety maintained.       Problem: Skin Integrity:  Goal: Will show no infection signs and symptoms  4/27/2022 1852 by Precious Mensah RN  Outcome: Progressing  4/27/2022 0529 by Micheal Bridges RN  Outcome: Progressing  Goal: Absence of new skin breakdown  4/27/2022 1852 by Precious Mensah RN  Outcome: Progressing  4/27/2022 0529 by Micheal Bridges RN  Outcome: Progressing     Problem: OXYGENATION/RESPIRATORY FUNCTION  Goal: Patient will maintain patent airway  4/27/2022 1852 by Precious Mensah, RN  Outcome: Progressing  4/27/2022 0529 by Binu Burdick RN  Outcome: Progressing  Note: Patent airway maintained. Patient remains orally intubated and on ventilator.    Goal: Patient will achieve/maintain normal respiratory rate/effort  4/27/2022 1852 by Michael Salgado RN  Outcome: Progressing  4/27/2022 0529 by Binu Burdick RN  Outcome: Progressing     Problem: MECHANICAL VENTILATION  Goal: Oral health is maintained or improved  4/27/2022 1852 by Michael Salgaod RN  Outcome: Progressing  4/27/2022 0529 by Binu Burdick RN  Outcome: Progressing  Goal: ET tube will be managed safely  4/27/2022 1852 by Michael Salgado RN  Outcome: Progressing  4/27/2022 0529 by Binu Burdick RN  Outcome: Progressing  Goal: Ability to express needs and understand communication  4/27/2022 1852 by Michael Salgado RN  Outcome: Progressing  4/27/2022 0529 by Binu Burdick RN  Outcome: Progressing  Goal: Mobility/activity is maintained at optimum level for patient  4/27/2022 1852 by Michael Salgado RN  Outcome: Progressing  4/27/2022 0529 by Binu Burdick RN  Outcome: Progressing     Problem: SKIN INTEGRITY  Goal: Skin integrity is maintained or improved  4/27/2022 1852 by Michael Salgado RN  Outcome: Progressing  4/27/2022 0529 by Binu Burdick RN  Outcome: Progressing     Problem: Nutrition  Goal: Optimal nutrition therapy  4/27/2022 1852 by Michael Salgado RN  Outcome: Progressing  4/27/2022 0529 by Binu Burdick RN  Outcome: Progressing     Problem: Discharge Planning  Goal: Discharge to home or other facility with appropriate resources  4/27/2022 1852 by Michael Salgado RN  Outcome: Progressing  4/27/2022 0529 by Binu Burdick RN  Outcome: Progressing     Problem: Pain  Goal: Verbalizes/displays adequate comfort level or baseline comfort level  4/27/2022 1852 by Michael Salgado RN  Outcome: Progressing  4/27/2022 0529 by Binu Burdick RN  Outcome: Progressing

## 2022-04-27 NOTE — PROGRESS NOTES
Physical Therapy        Physical Therapy Cancel Note      DATE: 2022    NAME: Yann Dobson  MRN: [id number]   : 1955      Patient not seen this date for Physical Therapy due to:    Patient is not appropriate for PT evaluation/treatment at this time d/t abnormal vitals and pt is symptomatic. Per RN, hold treatment until tomorrow. Will continue to follow for PT needs.       Electronically signed by Yesi Blank PTA on 2022 at 3:39 PM

## 2022-04-27 NOTE — PROGRESS NOTES
Dr. Dallas Oas rounding at bedside. Chest tube in good position, No pneumo, aware of crepitus. No new orders.

## 2022-04-27 NOTE — PLAN OF CARE
Problem: Skin Integrity - Impaired:  Goal: Will show no infection signs and symptoms  Description: Will show no infection signs and symptoms  4/27/2022 0529 by Gloria Villalobos RN  Outcome: Progressing  Note: Patient has no signs or symptoms of infection. Will continue to monitor. 4/26/2022 1922 by Annette Hester RN  Outcome: Progressing     Problem: Skin Integrity - Impaired:  Goal: Absence of new skin breakdown  Description: Absence of new skin breakdown  4/27/2022 0529 by Gloria Villalobos RN  Outcome: Progressing  Note: Skin assessment as charted. 4/26/2022 1922 by Annette Hester RN  Outcome: Progressing     Problem: Falls - Risk of:  Goal: Absence of physical injury  Description: Absence of physical injury  4/27/2022 0529 by Gloria Villalobos RN  Outcome: Progressing  Note: No injury this shift. Patient safety maintained. 4/26/2022 1922 by Annette Hester RN  Outcome: Progressing     Problem: OXYGENATION/RESPIRATORY FUNCTION  Goal: Patient will maintain patent airway  4/27/2022 0529 by Gloria Villalobos RN  Outcome: Progressing  Note: Patent airway maintained. Patient remains orally intubated and on ventilator.    4/26/2022 1922 by Annette Hester RN  Outcome: Progressing

## 2022-04-28 ENCOUNTER — APPOINTMENT (OUTPATIENT)
Dept: GENERAL RADIOLOGY | Age: 67
DRG: 720 | End: 2022-04-28
Payer: COMMERCIAL

## 2022-04-28 PROBLEM — T81.82XA: Status: ACTIVE | Noted: 2022-04-28

## 2022-04-28 LAB
ALLEN TEST: ABNORMAL
ANION GAP SERPL CALCULATED.3IONS-SCNC: 5 MMOL/L (ref 9–17)
BUN BLDV-MCNC: 34 MG/DL (ref 8–23)
CALCIUM SERPL-MCNC: 8.7 MG/DL (ref 8.6–10.4)
CARBOXYHEMOGLOBIN: 0.7 % (ref 0–5)
CHLORIDE BLD-SCNC: 93 MMOL/L (ref 98–107)
CO2: 37 MMOL/L (ref 20–31)
CREAT SERPL-MCNC: <0.4 MG/DL (ref 0.5–0.9)
FIO2: 30
GFR AFRICAN AMERICAN: ABNORMAL ML/MIN
GFR NON-AFRICAN AMERICAN: ABNORMAL ML/MIN
GFR SERPL CREATININE-BSD FRML MDRD: ABNORMAL ML/MIN/{1.73_M2}
GLUCOSE BLD-MCNC: 154 MG/DL (ref 65–105)
GLUCOSE BLD-MCNC: 166 MG/DL (ref 65–105)
GLUCOSE BLD-MCNC: 172 MG/DL (ref 70–99)
HCO3 ARTERIAL: 41.3 MMOL/L (ref 22–26)
HCT VFR BLD CALC: 30.7 % (ref 36–46)
HEMOGLOBIN: 9.7 G/DL (ref 12–16)
MCH RBC QN AUTO: 29.3 PG (ref 26–34)
MCHC RBC AUTO-ENTMCNC: 31.6 G/DL (ref 31–37)
MCV RBC AUTO: 92.8 FL (ref 80–100)
METHEMOGLOBIN: 1 % (ref 0–1.9)
MODE: ABNORMAL
O2 DEVICE/FLOW/%: ABNORMAL
O2 SAT, ARTERIAL: 93.1 % (ref 95–98)
PATIENT TEMP: 37
PCO2 ARTERIAL: 60.6 MMHG (ref 35–45)
PDW BLD-RTO: 17.2 % (ref 11.5–14.9)
PEEP/CPAP: 8
PH ARTERIAL: 7.44 (ref 7.35–7.45)
PLATELET # BLD: 397 K/UL (ref 150–450)
PMV BLD AUTO: 8.5 FL (ref 6–12)
PO2 ARTERIAL: 77.6 MMHG (ref 80–100)
POSITIVE BASE EXCESS, ART: 17.1 MMOL/L (ref 0–2)
POTASSIUM SERPL-SCNC: 4.2 MMOL/L (ref 3.7–5.3)
PT. POSITION: ABNORMAL
RBC # BLD: 3.31 M/UL (ref 4–5.2)
RESPIRATORY RATE: 18
SAMPLE SITE: ABNORMAL
SET RATE: 18
SODIUM BLD-SCNC: 135 MMOL/L (ref 135–144)
TOTAL RATE: 18
VT: 450
WBC # BLD: 16 K/UL (ref 3.5–11)

## 2022-04-28 PROCEDURE — 2700000000 HC OXYGEN THERAPY PER DAY

## 2022-04-28 PROCEDURE — 2580000003 HC RX 258: Performed by: INTERNAL MEDICINE

## 2022-04-28 PROCEDURE — 80048 BASIC METABOLIC PNL TOTAL CA: CPT

## 2022-04-28 PROCEDURE — 6370000000 HC RX 637 (ALT 250 FOR IP): Performed by: STUDENT IN AN ORGANIZED HEALTH CARE EDUCATION/TRAINING PROGRAM

## 2022-04-28 PROCEDURE — 2000000000 HC ICU R&B

## 2022-04-28 PROCEDURE — 94640 AIRWAY INHALATION TREATMENT: CPT

## 2022-04-28 PROCEDURE — 71045 X-RAY EXAM CHEST 1 VIEW: CPT

## 2022-04-28 PROCEDURE — 6370000000 HC RX 637 (ALT 250 FOR IP)

## 2022-04-28 PROCEDURE — 6360000002 HC RX W HCPCS: Performed by: INTERNAL MEDICINE

## 2022-04-28 PROCEDURE — 99233 SBSQ HOSP IP/OBS HIGH 50: CPT | Performed by: INTERNAL MEDICINE

## 2022-04-28 PROCEDURE — 82805 BLOOD GASES W/O2 SATURATION: CPT

## 2022-04-28 PROCEDURE — 85027 COMPLETE CBC AUTOMATED: CPT

## 2022-04-28 PROCEDURE — 2580000003 HC RX 258: Performed by: STUDENT IN AN ORGANIZED HEALTH CARE EDUCATION/TRAINING PROGRAM

## 2022-04-28 PROCEDURE — 94761 N-INVAS EAR/PLS OXIMETRY MLT: CPT

## 2022-04-28 PROCEDURE — 6360000002 HC RX W HCPCS: Performed by: STUDENT IN AN ORGANIZED HEALTH CARE EDUCATION/TRAINING PROGRAM

## 2022-04-28 PROCEDURE — 94003 VENT MGMT INPAT SUBQ DAY: CPT

## 2022-04-28 PROCEDURE — 82947 ASSAY GLUCOSE BLOOD QUANT: CPT

## 2022-04-28 PROCEDURE — 6370000000 HC RX 637 (ALT 250 FOR IP): Performed by: INTERNAL MEDICINE

## 2022-04-28 PROCEDURE — 36415 COLL VENOUS BLD VENIPUNCTURE: CPT

## 2022-04-28 PROCEDURE — 36600 WITHDRAWAL OF ARTERIAL BLOOD: CPT

## 2022-04-28 RX ORDER — FENTANYL CITRATE 50 UG/ML
50 INJECTION, SOLUTION INTRAMUSCULAR; INTRAVENOUS
Status: DISCONTINUED | OUTPATIENT
Start: 2022-04-28 | End: 2022-05-03 | Stop reason: HOSPADM

## 2022-04-28 RX ORDER — FENTANYL CITRATE 50 UG/ML
25 INJECTION, SOLUTION INTRAMUSCULAR; INTRAVENOUS
Status: DISCONTINUED | OUTPATIENT
Start: 2022-04-28 | End: 2022-05-03 | Stop reason: HOSPADM

## 2022-04-28 RX ADMIN — METHYLPREDNISOLONE SODIUM SUCCINATE 40 MG: 40 INJECTION, POWDER, LYOPHILIZED, FOR SOLUTION INTRAMUSCULAR; INTRAVENOUS at 08:56

## 2022-04-28 RX ADMIN — INSULIN GLARGINE 18 UNITS: 100 INJECTION, SOLUTION SUBCUTANEOUS at 08:57

## 2022-04-28 RX ADMIN — HEPARIN SODIUM 5000 UNITS: 5000 INJECTION INTRAVENOUS; SUBCUTANEOUS at 15:00

## 2022-04-28 RX ADMIN — MEROPENEM 1000 MG: 1 INJECTION, POWDER, FOR SOLUTION INTRAVENOUS at 20:47

## 2022-04-28 RX ADMIN — METHYLPREDNISOLONE SODIUM SUCCINATE 40 MG: 40 INJECTION, POWDER, LYOPHILIZED, FOR SOLUTION INTRAMUSCULAR; INTRAVENOUS at 00:25

## 2022-04-28 RX ADMIN — PROPOFOL 5 MCG/KG/MIN: 10 INJECTION, EMULSION INTRAVENOUS at 04:43

## 2022-04-28 RX ADMIN — MINERAL OIL, PETROLATUM: 425; 568 OINTMENT OPHTHALMIC at 09:09

## 2022-04-28 RX ADMIN — FUROSEMIDE 20 MG: 10 INJECTION, SOLUTION INTRAMUSCULAR; INTRAVENOUS at 18:27

## 2022-04-28 RX ADMIN — SODIUM CHLORIDE, PRESERVATIVE FREE 10 ML: 5 INJECTION INTRAVENOUS at 21:48

## 2022-04-28 RX ADMIN — ALBUTEROL SULFATE 2.5 MG: 2.5 SOLUTION RESPIRATORY (INHALATION) at 03:24

## 2022-04-28 RX ADMIN — CHLORHEXIDINE GLUCONATE 0.12% ORAL RINSE 15 ML: 1.2 LIQUID ORAL at 08:56

## 2022-04-28 RX ADMIN — POLYVINYL ALCOHOL 1 DROP: 14 SOLUTION/ DROPS OPHTHALMIC at 08:45

## 2022-04-28 RX ADMIN — ALBUTEROL SULFATE 2.5 MG: 2.5 SOLUTION RESPIRATORY (INHALATION) at 22:56

## 2022-04-28 RX ADMIN — HEPARIN SODIUM 5000 UNITS: 5000 INJECTION INTRAVENOUS; SUBCUTANEOUS at 04:50

## 2022-04-28 RX ADMIN — FENTANYL CITRATE 50 MCG: 50 INJECTION, SOLUTION INTRAMUSCULAR; INTRAVENOUS at 08:49

## 2022-04-28 RX ADMIN — ALBUTEROL SULFATE 2.5 MG: 2.5 SOLUTION RESPIRATORY (INHALATION) at 19:29

## 2022-04-28 RX ADMIN — ACETYLCYSTEINE 200 MG: 200 SOLUTION ORAL; RESPIRATORY (INHALATION) at 07:09

## 2022-04-28 RX ADMIN — RISPERIDONE 1.5 MG: 0.5 TABLET ORAL at 03:37

## 2022-04-28 RX ADMIN — FENTANYL CITRATE 25 MCG: 50 INJECTION, SOLUTION INTRAMUSCULAR; INTRAVENOUS at 20:48

## 2022-04-28 RX ADMIN — FAMOTIDINE 20 MG: 20 TABLET, FILM COATED ORAL at 21:46

## 2022-04-28 RX ADMIN — ANTI-FUNGAL POWDER MICONAZOLE NITRATE TALC FREE: 1.42 POWDER TOPICAL at 09:09

## 2022-04-28 RX ADMIN — ACETYLCYSTEINE 200 MG: 200 SOLUTION ORAL; RESPIRATORY (INHALATION) at 15:06

## 2022-04-28 RX ADMIN — SODIUM CHLORIDE: 4.5 INJECTION, SOLUTION INTRAVENOUS at 03:24

## 2022-04-28 RX ADMIN — RISPERIDONE 1.5 MG: 0.5 TABLET ORAL at 18:00

## 2022-04-28 RX ADMIN — FENTANYL CITRATE 50 MCG: 50 INJECTION, SOLUTION INTRAMUSCULAR; INTRAVENOUS at 04:48

## 2022-04-28 RX ADMIN — ACETYLCYSTEINE 200 MG: 200 SOLUTION ORAL; RESPIRATORY (INHALATION) at 03:24

## 2022-04-28 RX ADMIN — FENTANYL CITRATE 50 MCG: 50 INJECTION, SOLUTION INTRAMUSCULAR; INTRAVENOUS at 00:30

## 2022-04-28 RX ADMIN — POLYETHYLENE GLYCOL 3350 17 G: 17 POWDER, FOR SOLUTION ORAL at 09:10

## 2022-04-28 RX ADMIN — INSULIN LISPRO 2 UNITS: 100 INJECTION, SOLUTION INTRAVENOUS; SUBCUTANEOUS at 09:00

## 2022-04-28 RX ADMIN — MULTIPLE VITAMINS W/ MINERALS TAB 1 TABLET: TAB at 08:57

## 2022-04-28 RX ADMIN — ACETYLCYSTEINE 200 MG: 200 SOLUTION ORAL; RESPIRATORY (INHALATION) at 10:42

## 2022-04-28 RX ADMIN — ACETYLCYSTEINE 200 MG: 200 SOLUTION ORAL; RESPIRATORY (INHALATION) at 19:29

## 2022-04-28 RX ADMIN — INSULIN LISPRO 2 UNITS: 100 INJECTION, SOLUTION INTRAVENOUS; SUBCUTANEOUS at 02:10

## 2022-04-28 RX ADMIN — FAMOTIDINE 20 MG: 20 TABLET, FILM COATED ORAL at 08:57

## 2022-04-28 RX ADMIN — SODIUM CHLORIDE, PRESERVATIVE FREE 10 ML: 5 INJECTION INTRAVENOUS at 09:10

## 2022-04-28 RX ADMIN — THIAMINE HCL TAB 100 MG 100 MG: 100 TAB at 08:57

## 2022-04-28 RX ADMIN — ATORVASTATIN CALCIUM 20 MG: 20 TABLET, FILM COATED ORAL at 08:56

## 2022-04-28 RX ADMIN — MINERAL OIL, PETROLATUM: 425; 568 OINTMENT OPHTHALMIC at 12:40

## 2022-04-28 RX ADMIN — ALBUTEROL SULFATE 2.5 MG: 2.5 SOLUTION RESPIRATORY (INHALATION) at 15:06

## 2022-04-28 RX ADMIN — FUROSEMIDE 20 MG: 10 INJECTION, SOLUTION INTRAMUSCULAR; INTRAVENOUS at 08:57

## 2022-04-28 RX ADMIN — MEROPENEM 1000 MG: 1 INJECTION, POWDER, FOR SOLUTION INTRAVENOUS at 03:38

## 2022-04-28 RX ADMIN — ALBUTEROL SULFATE 2.5 MG: 2.5 SOLUTION RESPIRATORY (INHALATION) at 07:09

## 2022-04-28 RX ADMIN — HEPARIN SODIUM 5000 UNITS: 5000 INJECTION INTRAVENOUS; SUBCUTANEOUS at 21:46

## 2022-04-28 RX ADMIN — ANTI-FUNGAL POWDER MICONAZOLE NITRATE TALC FREE: 1.42 POWDER TOPICAL at 21:46

## 2022-04-28 RX ADMIN — MEROPENEM 1000 MG: 1 INJECTION, POWDER, FOR SOLUTION INTRAVENOUS at 12:29

## 2022-04-28 RX ADMIN — METHYLPREDNISOLONE SODIUM SUCCINATE 40 MG: 40 INJECTION, POWDER, LYOPHILIZED, FOR SOLUTION INTRAMUSCULAR; INTRAVENOUS at 16:54

## 2022-04-28 RX ADMIN — ALBUTEROL SULFATE 2.5 MG: 2.5 SOLUTION RESPIRATORY (INHALATION) at 10:42

## 2022-04-28 RX ADMIN — FOLIC ACID 1 MG: 1 TABLET ORAL at 08:57

## 2022-04-28 ASSESSMENT — PULMONARY FUNCTION TESTS
PIF_VALUE: 12
PIF_VALUE: 10
PIF_VALUE: 9
PIF_VALUE: 9
PIF_VALUE: 12
PIF_VALUE: 15
PIF_VALUE: 12
PIF_VALUE: 20
PIF_VALUE: 15
PIF_VALUE: 12
PIF_VALUE: 10
PIF_VALUE: 11

## 2022-04-28 ASSESSMENT — PAIN SCALES - WONG BAKER
WONGBAKER_NUMERICALRESPONSE: 0
WONGBAKER_NUMERICALRESPONSE: 0

## 2022-04-28 ASSESSMENT — PAIN DESCRIPTION - DESCRIPTORS
DESCRIPTORS: ACHING;DISCOMFORT
DESCRIPTORS: OTHER (COMMENT)

## 2022-04-28 ASSESSMENT — PAIN DESCRIPTION - ORIENTATION
ORIENTATION: OTHER (COMMENT)
ORIENTATION: RIGHT

## 2022-04-28 ASSESSMENT — PAIN DESCRIPTION - PROGRESSION

## 2022-04-28 ASSESSMENT — PAIN DESCRIPTION - LOCATION
LOCATION: BACK;OTHER (COMMENT)
LOCATION: BACK
LOCATION: BACK
LOCATION: CHEST

## 2022-04-28 ASSESSMENT — PAIN DESCRIPTION - ONSET: ONSET: ON-GOING

## 2022-04-28 ASSESSMENT — PAIN SCALES - GENERAL
PAINLEVEL_OUTOF10: 0
PAINLEVEL_OUTOF10: 6
PAINLEVEL_OUTOF10: 4
PAINLEVEL_OUTOF10: 2
PAINLEVEL_OUTOF10: 4

## 2022-04-28 ASSESSMENT — PAIN - FUNCTIONAL ASSESSMENT: PAIN_FUNCTIONAL_ASSESSMENT: PREVENTS OR INTERFERES SOME ACTIVE ACTIVITIES AND ADLS

## 2022-04-28 ASSESSMENT — PAIN DESCRIPTION - FREQUENCY: FREQUENCY: CONTINUOUS

## 2022-04-28 NOTE — CARE COORDINATION
ONGOING DISCHARGE PLAN:    Patient remains in ICU   Patient remains on the Vent   On iv atb   Chest Tube  Daily CXR   Spoke to [facility] from [place] she can not start the pre cert until patient is off the vent. Will continue to follow for additional discharge needs.     Electronically signed by Navya Cunningham RN on 4/28/2022 at 12:08 PM

## 2022-04-28 NOTE — PROGRESS NOTES
ICU Progress Note (Vent)   [facility]    Patient - Homer Hoover,  Age - 74 y.o.    - 1955      Room Number -    MRN -  [id number]   Acct # - [de-identified]  Date of Admission -  2022 10:25 AM    Events of Past 24 Hours   Patient currently is intubated on vent support. Was able to tolerate a weaning trial at the time of my visit. Appears to be able to interact    Vitals    height is 5' 5\" (1.651 m) and weight is 183 lb 6.8 oz (83.2 kg). Her oral temperature is 98.6 °F (37 °C). Her blood pressure is 118/56 (abnormal) and her pulse is 86. Her respiration is 17 and oxygen saturation is 98%. Temperature Range: Temp: 98.6 °F (37 °C) Temp  Av.6 °F (37 °C)  Min: 98.5 °F (36.9 °C)  Max: 98.7 °F (37.1 °C)  BP Range:  Systolic (53BIO), DWB:881 , Min:84 , IGP:877     Diastolic (95BFU), GAV:07, Min:18, Max:89    Pulse Range: Pulse  Av.5  Min: 66  Max: 103  Respiration Range: Resp  Av.2  Min: 16  Max: 30  Current Pulse Ox[de-identified]  SpO2: 98 %  24HR Pulse Ox Range:  SpO2  Av.7 %  Min: 90 %  Max: 98 %  Oxygen Amount and Delivery: O2 Flow Rate (L/min): 6 L/min      Wt Readings from Last 3 Encounters:   22 183 lb 6.8 oz (83.2 kg)   22 183 lb (83 kg)   22 186 lb 1.1 oz (84.4 kg)     I/O       Intake/Output Summary (Last 24 hours) at 2022 1148  Last data filed at 2022 0800  Gross per 24 hour   Intake 1852. 45 ml   Output 935 ml   Net 917.45 ml     I/O last 3 completed shifts: In: 2788.5 [I.V.:368.8; NG/GT:2121; IV Piggyback:298.6]  Out: 1361 [Urine:2450;  Chest Tube:65]     DRAIN/TUBE OUTPUT:     Invasive Lines   ETT Day -   13  Right IJ day 13    Mechanical Ventilation Data   SETTINGS (Comprehensive)  Vent Information  Ventilator Day(s): 6  Ventilator ID: [id number]  Vent Mode: PS/CPAP  Additional Respiratory Assessments  Pulse: 86  Resp: 17  SpO2: 98 %  End Tidal CO2: 29 (%)  Position: Semi-Molina's  Humidification Source: HME  Subglottic Suction Done: Yes  Cuff Pressure (cm H2O): 30 cm H2O       ABGs:   Lab Results   Component Value Date    PHART 7.442 04/28/2022    PO2ART 77.6 04/28/2022    KXE8IHR 60.6 04/28/2022       Lab Results   Component Value Date    MODE [facility] 04/28/2022         Medications   IV   sodium chloride 15 mL/hr at 04/28/22 0324    sodium chloride      dextrose      propofol 5 mcg/kg/min (40/35/95 9259)      folic acid  1 mg Oral Daily    thiamine  100 mg Oral Daily    famotidine  20 mg Oral BID    miconazole   Topical BID    multivitamin  1 tablet Oral Daily    insulin glargine  18 Units SubCUTAneous BID    insulin lispro  0-12 Units SubCUTAneous Q6H    methylPREDNISolone  40 mg IntraVENous Q8H    polyethylene glycol  17 g Oral Daily    furosemide  20 mg IntraVENous BID    meropenem  1,000 mg IntraVENous Q8H    albuterol  2.5 mg Nebulization Q4H    acetylcysteine  200 mg Inhalation Q4H    sodium chloride flush  5-40 mL IntraVENous 2 times per day    risperiDONE  1.5 mg Oral Q12H    atorvastatin  20 mg Oral Daily    traZODone  50 mg Oral Nightly    polyvinyl alcohol  1 drop Both Eyes Q4H    And    artificial tears   Both Eyes Q4H    chlorhexidine  15 mL Mouth/Throat BID    heparin (porcine)  5,000 Units SubCUTAneous 3 times per day       Diet/Nutrition   ADULT TUBE FEEDING; Nasogastric; Renal Formula; Continuous; 15; Yes; 10; Q 4 hours; 35; 60; Q 6 hours    Exam   VITALS    height is 5' 5\" (1.651 m) and weight is 183 lb 6.8 oz (83.2 kg). Her oral temperature is 98.6 °F (37 °C). Her blood pressure is 118/56 (abnormal) and her pulse is 86. Her respiration is 17 and oxygen saturation is 98%. Ventilator Settings (Basic)  Vent Mode: PS/CPAP Resp Rate (Set): 18 bmp/Vt (Set, mL): 450 mL/ /FiO2 : 30 %    Constitutional -intubated on vent support  General Appearance  HEENT - Life support devices in place (ET, ),normocephalic, atraumatic.    Lungs - Chest expands equally, no wheezes, rales or rhonchi. Cardiovascular - Heart sounds are normal.  normal rate and rhythm regular, no murmur, gallop or rub. Abdomen - soft, nontender,   Extremities - no cyanosis    Lab Results   CBC     Lab Results   Component Value Date    WBC 16.0 04/28/2022    RBC 3.31 04/28/2022    RBC 0-2 07/08/2021    HGB 9.7 04/28/2022    HCT 30.7 04/28/2022     04/28/2022    MCV 92.8 04/28/2022    MCH 29.3 04/28/2022    MCHC 31.6 04/28/2022    RDW 17.2 04/28/2022    NRBC 0 07/08/2021    METASPCT 1 04/02/2022    LYMPHOPCT 5 04/19/2022    LYMPHOPCT 12.1 07/08/2021    MONOPCT 2 04/19/2022    MONOPCT 15.1 07/08/2021    BASOPCT 0 04/19/2022    BASOPCT 0.8 07/08/2021    MONOSABS 0.26 04/19/2022    MONOSABS 0.4 07/08/2021    LYMPHSABS 0.66 04/19/2022    LYMPHSABS 0.3 07/08/2021    EOSABS 0.00 04/19/2022    EOSABS 0.1 07/08/2021    BASOSABS 0.00 04/19/2022    DIFFTYPE NOT REPORTED 12/20/2021       BMP   Lab Results   Component Value Date     04/28/2022    K 4.2 04/28/2022    CL 93 04/28/2022    CO2 37 04/28/2022    BUN 34 04/28/2022    CREATININE <0.40 04/28/2022    GLUCOSE 172 04/28/2022    GLUCOSE 127 07/08/2021    CALCIUM 8.7 04/28/2022       LFTS  Lab Results   Component Value Date    ALKPHOS 137 04/16/2022    ALT 25 04/16/2022    AST 19 04/16/2022    PROT 7.0 04/16/2022    PROT 5.7 07/08/2021    BILITOT 0.16 04/16/2022    BILIDIR 0.1 07/08/2021    IBILI 0.12 07/08/2019    LABALBU 2.8 04/16/2022    LABALBU 3.6 07/08/2021       INR  No results for input(s): PROTIME, INR in the last 72 hours. APTT  No results for input(s): APTT in the last 72 hours. Lactic Acid  Lab Results   Component Value Date    LACTA 1.5 04/19/2022    LACTA 1.2 04/02/2022        BNP   No results for input(s): BNP in the last 72 hours. Cultures       Radiology     Plain Films         Patient endotracheal tube in fair position. Subcu emphysema appreciated on film.   On the right side    See actual reports for details    SYSTEM ASSESSMENT    Acute on chronic hypoxic and hypercapnic respiratory failure, intubated 4/16  Pseudomonas pneumonia  Right-sided pneumothorax  Right lower lobe cavitary lesions  Exudative pleural effusion on right, growing Pseudomonas  Low ejection fraction EF 45 to 50%, echo 4/18  History of pulmonary embolism and what appears to be chronic filling defects (subsegmental, most likely clinically inconsequential)  Severe stage IV COPD, FEV1 is 35% of predicted  Recent hospitalization for pneumonia  Elevated inflammatory markers including D-dimer and procalcitonin  Full CODE STATUS          Neuro   Minimizing sedation    Respiratory   Patient tolerated weaning trial at time of this note.   Continue to follow, hope to extubate soon    Chest tube on waterseal.  General surgery following    On Mucomyst and Proventil    Hemodynamics   Not on any pressors    Gastrointestinal/Nutrition       Renal   Creatinine less than 0.4    Infectious Disease   On meropenem--- infectious disease following    Hematology/Oncology   Subcu heparin DVT prophylaxis    Endocrine       Social/Spiritual/DNR/Disposition/Other     Hope to extubate soon    Critical Care Time   0 min    Electronically signed by Reggie Carlos MD on 4/28/2022 at 11:48 AM

## 2022-04-28 NOTE — PLAN OF CARE
Problem: Non-Violent Restraints  Goal: Removal from restraints as soon as assessed to be safe  4/28/2022 1147 by Анна Lewis RN  Outcome: Not Progressing  Note: Remains on minimal sedation and intubated  4/28/2022 2781 by Curt Rosales RN  Outcome: Progressing     Problem: Non-Violent Restraints  Goal: No harm/injury to patient while restraints in use  4/28/2022 1147 by Анна Lewis RN  Outcome: Progressing  4/28/2022 0628 by Curt Rosales RN  Outcome: Progressing  Goal: Patient's dignity will be maintained  4/28/2022 1147 by Анна Lewis RN  Outcome: Progressing  4/28/2022 0628 by Curt Rosales RN  Outcome: Progressing     Problem: Gas Exchange - Impaired:  Goal: Levels of oxygenation will improve  Description: Levels of oxygenation will improve  4/28/2022 1147 by Анна Lewis RN  Outcome: Progressing  4/28/2022 0628 by Curt Rosales RN  Outcome: Progressing     Problem: Skin Integrity - Impaired:  Goal: Will show no infection signs and symptoms  Description: Will show no infection signs and symptoms  4/28/2022 1147 by Анна Lewis RN  Outcome: Progressing  4/28/2022 0628 by Curt Rosales RN  Outcome: Progressing  Goal: Absence of new skin breakdown  Description: Absence of new skin breakdown  4/28/2022 1147 by Анна Lewis RN  Outcome: Progressing  4/28/2022 0628 by Curt Rosales RN  Outcome: Progressing  Note: Skin assessment as charted. Problem: Falls - Risk of:  Goal: Absence of physical injury  Description: Absence of physical injury  4/28/2022 1147 by Анна Lewis RN  Outcome: Progressing  4/28/2022 0628 by Curt Rosales RN  Outcome: Progressing  Note: No injury this shift. Patient safety maintained.       Problem: Breathing Pattern - Ineffective:  Goal: Ability to achieve and maintain a regular respiratory rate will improve  Description: Ability to achieve and maintain a regular respiratory rate will improve  4/28/2022 1147 by Анна Lewis RN  Outcome: Progressing  4/28/2022 0038 by Braulio Eisenmenger, RN  Outcome: Progressing     Problem: Skin Integrity:  Goal: Will show no infection signs and symptoms  Description: Will show no infection signs and symptoms  4/28/2022 1147 by Nelli Adams RN  Outcome: Progressing  4/28/2022 0628 by Braulio Eisenmenger, RN  Outcome: Progressing  Goal: Absence of new skin breakdown  Description: Absence of new skin breakdown  4/28/2022 1147 by Nelli Adams RN  Outcome: Progressing  4/28/2022 0628 by Braulio Eisenmenger, RN  Outcome: Progressing     Problem: OXYGENATION/RESPIRATORY FUNCTION  Goal: Patient will maintain patent airway  4/28/2022 1147 by Nelli Adams RN  Outcome: Progressing  Note:  On weaning trial since 0855 and has been able to maintain WNL O2 levels, WOB and RR.  4/28/2022 0628 by Braulio Eisenmenger, RN  Outcome: Progressing  Goal: Patient will achieve/maintain normal respiratory rate/effort  Description: Respiratory rate and effort will be within normal limits for the patient  4/28/2022 1147 by Nelli Adams RN  Outcome: Progressing  4/28/2022 0628 by Braulio Eisenmenger, RN  Outcome: Progressing     Problem: MECHANICAL VENTILATION  Goal: Oral health is maintained or improved  4/28/2022 1147 by Nelli Adams RN  Outcome: Progressing  4/28/2022 0628 by Braulio Eisenmenger, RN  Outcome: Progressing  Goal: ET tube will be managed safely  4/28/2022 1147 by Nelli Adams RN  Outcome: Progressing  4/28/2022 0628 by Braulio Eisenmenger, RN  Outcome: Progressing  Goal: Ability to express needs and understand communication  4/28/2022 1147 by Nelli Adams RN  Outcome: Progressing  Note: Patient able to answer questions appropriately by nodding Yes/No  4/28/2022 0628 by Braulio Eisenmenger, RN  Outcome: Progressing  Goal: Mobility/activity is maintained at optimum level for patient  4/28/2022 1147 by Nelli Adams RN  Outcome: Progressing  4/28/2022 0628 by Braulio Eisenmenger, RN  Outcome: Progressing     Problem: SKIN INTEGRITY  Goal: Skin integrity is maintained or improved  4/28/2022 1147 by Brenden Dawson RN  Outcome: Progressing  4/28/2022 0628 by Herve Johnson RN  Outcome: Progressing     Problem: Nutrition  Goal: Optimal nutrition therapy  4/28/2022 1147 by Brenden Dawson RN  Outcome: Progressing  Note: Tolerating TF  4/28/2022 0628 by Herve Johnson RN  Outcome: Progressing     Problem: Discharge Planning  Goal: Discharge to home or other facility with appropriate resources  4/28/2022 1147 by Brenden Dawson RN  Outcome: Progressing  4/28/2022 0628 by Herve Johnson RN  Outcome: Progressing     Problem: Safety - Medical Restraint  Goal: Remains free of injury from restraints (Restraint for Interference with Medical Device)  Description: INTERVENTIONS:  1. Determine that other, less restrictive measures have been tried or would not be effective before applying the restraint  2. Evaluate the patient's condition at the time of restraint application  3. Inform patient/family regarding the reason for restraint  4.  Q2H: Monitor safety, psychosocial status, comfort, nutrition and hydration  4/28/2022 1147 by Brenden Dawson RN  Outcome: Progressing  4/28/2022 0628 by Herve Johnson RN  Outcome: Progressing     Problem: Pain  Goal: Verbalizes/displays adequate comfort level or baseline comfort level  4/28/2022 1147 by Brenden Dawson RN  Outcome: Progressing  4/28/2022 0628 by Herve Johnson RN  Outcome: Progressing     Problem: ABCDS Injury Assessment  Goal: Absence of physical injury  4/28/2022 1147 by Brenden Dawson RN  Outcome: Progressing  4/28/2022 0628 by Herve Johnson RN  Outcome: Progressing

## 2022-04-28 NOTE — PROGRESS NOTES
Comprehensive Nutrition Assessment    Type and Reason for Visit:  Reassess    Nutrition Recommendations/Plan:   1. Pt extubated this afternoon, follow for nutrition plan of care. Malnutrition Assessment:  Malnutrition Status:  No malnutrition (04/17/22 1434)    Context:  Acute Illness     Findings of the 6 clinical characteristics of malnutrition:  Energy Intake:  No significant decrease in energy intake  Weight Loss:  No significant weight loss     Body Fat Loss:  No significant body fat loss     Muscle Mass Loss:  No significant muscle mass loss    Fluid Accumulation:  No significant fluid accumulation     Strength:  Not Performed    Nutrition Assessment:    Pt extubated, follow for nutrition plan of care. Nutrition Related Findings:    Edema: +1 Generalized, +2 BUE's, +1 BLE's. BM-4-25. Labs & meds reviewed. Wound Type: None       Current Nutrition Intake & Therapies:    Average Meal Intake: NPO     No diet orders on file  Current Tube Feeding (TF) Orders:  · Feeding Route: Nasogastric  · Formula: Renal Formula  · Schedule: Continuous  · Feeding Regimen: 38 ml  · Water Flushes: 60 ml every 6 hours and per MD order 200 ml 4 times daily. · Current TF & Flush Orders Provides: 1642 kcals, 73 gm protein (doesn't include calories from Propofol)      Anthropometric Measures:  Height: 5' 5\" (165.1 cm)  Ideal Body Weight (IBW): 125 lbs (57 kg)    Admission Body Weight: 176 lb (79.8 kg)  Current Body Weight: 183 lb (83 kg), 140.8 % IBW. Weight Source: Bed Scale  Current BMI (kg/m2): 30.5   BMI Categories: Overweight (BMI 25.0-29. 9)    Estimated Daily Nutrient Needs:  Energy Requirements Based On: Kcal/kg  Weight Used for Energy Requirements: Admission  Energy (kcal/day): 5167-7282 kcals based on 20-22 kcals/kg using adm wt 80.2 kg  Weight Used for Protein Requirements: Ideal  Protein (g/day): 74-85 gm protein based on 1.3-1.5 gm/kg using IBW     Nutrition Diagnosis:   · Inadequate oral intake related to impaired respiratory function as evidenced by NPO or clear liquid status due to medical condition,intubation      Nutrition Interventions:   Food and/or Nutrient Delivery: Continue Current Tube Feeding  Nutrition Education/Counseling: No recommendation at this time  Coordination of Nutrition Care: Continue to monitor while inpatient       Goals:  Previous Goal Met: Progressing toward Goal(s)  Goals: Meet at least 75% of estimated needs       Nutrition Monitoring and Evaluation:      Food/Nutrient Intake Outcomes: None Identified,Enteral Nutrition Intake/Tolerance  Physical Signs/Symptoms Outcomes: Biochemical Data,GI Status,Fluid Status or Edema,Hemodynamic Status,Nutrition Focused Physical Findings,Skin,Weight    Discharge Planning:    Enteral Nutrition     Sharri Matias, [address] [id number]

## 2022-04-28 NOTE — FLOWSHEET NOTE
Patient was extubated and sitting up in bed, talking to [facility] who expressed relief and hope. We spoke of the love and prayers that had surrounded patient these past days. 04/28/22 1965   Encounter Summary   Encounter Overview/Reason  Spiritual/Emotional Needs   Service Provided For: Patient and family together   Referral/Consult From: Javier   Last Encounter  04/28/22   Complexity of Encounter Moderate   Spiritual/Emotional needs   Type Spiritual Support   Assessment/Intervention/Outcome   Assessment Calm   Intervention Active listening;Discussed illness injury and its impact; Explored/Affirmed feelings, thoughts, concerns;Prayer (assurance of)/[place];Sustaining Presence/Ministry of presence   Outcome Coping;Engaged in conversation;Expressed feelings, needs, and concerns;Expressed Gratitude; Restored Hope

## 2022-04-28 NOTE — PLAN OF CARE
Problem: Skin Integrity - Impaired:  Goal: Absence of new skin breakdown  Description: Absence of new skin breakdown  4/28/2022 0628 by Herve Johnson RN  Outcome: Progressing  Note: Skin assessment as charted. 4/27/2022 1852 by Darling Velez RN  Outcome: Progressing     Problem: Falls - Risk of:  Goal: Absence of physical injury  Description: Absence of physical injury  4/28/2022 5761 by Herve Johnson RN  Outcome: Progressing  Note: No injury this shift. Patient safety maintained.    4/27/2022 1852 by Darling Velez RN  Outcome: Progressing

## 2022-04-28 NOTE — PROGRESS NOTES
[facility] -   Infectious diseases evaluation  admission date 4/16/2022    reason for consultation:   Cavitary lung lesions  Impression :   Current:  · Right lower lobe cavitary lesions associated with multiloculated pleural fluid collection likely abscess with empyema status post chest tube with Pseudomonas growth on culture  · Sepsis secondary to above  · Acute on chronic respiratory failure required intubation  · Right-sided pneumothorax  · Severe COPD      Recommendations     · IV meropenem. · Yeast growth on bowel likely colonization  · Follow CBC and renal function  · Continue supportive care  · Discussed with nursing staff              History of Present Illness:   Initial history:  Shelby Lopez is a 74y.o.-year-old female presents to the hospital with worsening shortness of breath associated with cough. At the ER with tachypneic and hypoxic  Chest x-ray showed pneumothorax  The patient was in respiratory distress, was intubated. The patient is intubated, sedated, unable to provide history that was obtained from chart review. CT chest 4/16/2022 showed right lower lobe cavitary lesion with surrounding airspace disease and multiloculated pleural collection. The patient had chest tube placed   Initial WBC was 18.5, procalcitonin no more than 100  Respiratory panel with COVID-19 PCR was negative  Urine for Legionella and strep pneumo antigen negative  Status post bronchoscopy 4/18/2020 with Pseudomonas and yeast growth on culture   Chest tube was removed  Ct chest from 4/25 showed mod/large right pneumothorax and pleural fluid, the cavitary lesion is smaller compared to previous exam.  Chest tube was reinserted 4/25/2022 . Pseudomonas growth on pleural fluid culture. Interval changes  4/28/2022   The patient remains intubated, awake, response to commands, tolerating weaning trial, no reported fever.   Patient Vitals for the past 8 hrs:   BP Temp Temp src Pulse Resp SpO2   22 1200 (!) 117/36 98.4 °F (36.9 °C) Oral 80 18 93 %   22 1100 (!) 118/56 -- -- 86 17 98 %   22 1043 -- -- -- 75 16 96 %   22 1000 (!) 99/49 -- -- 67 16 96 %   22 0919 -- -- -- -- 18 --   22 0900 (!) 115/53 -- -- 78 16 95 %   22 0856 -- -- -- 78 18 97 %   22 0800 (!) 117/44 98.6 °F (37 °C) Oral 75 17 96 %   22 0709 -- -- -- 81 18 95 %             I have personally reviewed the past medical history, past surgical history, medications, social history, and family history, and I haveupdated the database accordingly. Allergies:   Advil [ibuprofen], Aleve [naproxen], Antipyrine, Celecoxib, Codeine, Fomepizole, Incruse ellipta [umeclidinium bromide], Other, Rofecoxib, Salicylates, Strawberry extract, Sulfinpyrazone, Aspirin, and Nsaids     Review of Systems:     Review of Systems  Intubated, unable to provide  Physical Examination :       Physical Exam  Constitutional:       Appearance: She is ill-appearing. Comments: Intubated   HENT:      Head: Normocephalic and atraumatic. Right Ear: External ear normal.      Left Ear: External ear normal.   Cardiovascular:      Rate and Rhythm: Normal rate. Abdominal:      General: There is no distension. Palpations: Abdomen is soft. Musculoskeletal:      Cervical back: Neck supple. No rigidity. Right lower leg: No edema. Left lower leg: No edema. Skin:     Coloration: Skin is not jaundiced. Findings: No bruising.          Past Medical History:     Past Medical History:   Diagnosis Date    Abnormal EKG     [facility]    Anxiety     Asthma     Bipolar disorder ([facility]     [place]ERE [place] , [place]    COPD (chronic obstructive pulmonary disease) (HCC)     Cramps, extremity     SEVERE LEG CRAMPS    Depression     Dilated bile duct     Headache     History of elective      Hyperlipidemia     Irritable bowel syndrome     Prolonged emergence from general anesthesia     SEVERE ISSUES WAKING UP    Substance abuse ([facility]     street drugs when younger    Unspecified sleep apnea     Vision abnormalities     glasses       Past Surgical  History:     Past Surgical History:   Procedure Laterality Date    ANKLE SURGERY      HAD SCREWS AND HARDWARE, THEN REMOVED    BRONCHOSCOPY  2022         COLONOSCOPY  02/15/2018    tubular adenoma    EYE SURGERY Bilateral     CATARACTS    HYSTEROSCOPY  10/19/2016    D & C    INDUCED       LASIK      bilateral    TONSILLECTOMY AND ADENOIDECTOMY Bilateral     VULVA SURGERY      HAD A BIOPSY AND REMOVAL OF       Medications:      folic acid  1 mg Oral Daily    thiamine  100 mg Oral Daily    famotidine  20 mg Oral BID    miconazole   Topical BID    multivitamin  1 tablet Oral Daily    insulin glargine  18 Units SubCUTAneous BID    insulin lispro  0-12 Units SubCUTAneous Q6H    methylPREDNISolone  40 mg IntraVENous Q8H    polyethylene glycol  17 g Oral Daily    furosemide  20 mg IntraVENous BID    meropenem  1,000 mg IntraVENous Q8H    albuterol  2.5 mg Nebulization Q4H    acetylcysteine  200 mg Inhalation Q4H    sodium chloride flush  5-40 mL IntraVENous 2 times per day    risperiDONE  1.5 mg Oral Q12H    atorvastatin  20 mg Oral Daily    traZODone  50 mg Oral Nightly    polyvinyl alcohol  1 drop Both Eyes Q4H    And    artificial tears   Both Eyes Q4H    chlorhexidine  15 mL Mouth/Throat BID    heparin (porcine)  5,000 Units SubCUTAneous 3 times per day       Social History:     Social History     Socioeconomic History    Marital status:      Spouse name: Not on file    Number of children: Not on file    Years of education: Not on file    Highest education level: Not on file   Occupational History    Not on file   Tobacco Use    Smoking status: Former Smoker     Packs/day: 2.00     Years: 42.00     Pack years: 84.00     Types: Cigarettes     Quit date: 2018 Years since quitting: 3.4    Smokeless tobacco: Never Used   Substance and Sexual Activity    Alcohol use: Yes     Comment: yearly    Drug use: No    Sexual activity: Not Currently     Partners: Male     Birth control/protection: Post-menopausal   Other Topics Concern    Not on file   Social History Narrative    Not on file     Social Determinants of Health     Financial Resource Strain: High Risk    Difficulty of Paying Living Expenses: Very hard   Food Insecurity: No Food Insecurity    Worried About Running Out of Food in the Last Year: Never true    Agustin of Food in the Last Year: Never true   Transportation Needs:     Lack of Transportation (Medical): Not on file    Lack of Transportation (Non-Medical):  Not on file   Physical Activity:     Days of Exercise per Week: Not on file    Minutes of Exercise per Session: Not on file   Stress:     Feeling of Stress : Not on file   Social Connections:     Frequency of Communication with Friends and Family: Not on file    Frequency of Social Gatherings with Friends and Family: Not on file    Attends Jew Services: Not on file    Active Member of [address] [place] [address] or Organizations: Not on file    Attends Club or Organization Meetings: Not on file    Marital Status: Not on file   Intimate Partner Violence:     Fear of Current or Ex-Partner: Not on file    Emotionally Abused: Not on file    Physically Abused: Not on file    Sexually Abused: Not on file   Housing Stability:     Unable to Pay for Housing in the Last Year: Not on file    Number of [place] in the Last Year: Not on file    Unstable Housing in the Last Year: Not on file       Family History:     Family History   Problem Relation Age of Onset    Dementia Maternal Aunt     Kidney Disease Mother     Heart Attack Sister     Prostate Cancer Father     High Cholesterol Brother     Heart Attack Paternal Grandmother       Medical Decision Making:   I have independently reviewed/ordered the following labs:    CBC with Differential:   Recent Labs     04/27/22  0449 04/28/22 0439   WBC 15.4* 16.0*   HGB 9.3* 9.7*   HCT 29.3* 30.7*    397     BMP:  Recent Labs     04/27/22  0449 04/28/22 0439    135   K 4.3 4.2   CL 93* 93*   CO2 37* 37*   BUN 37* 34*   CREATININE <0.40* <0.40*       Lab Results   Component Value Date    CREATININE <0.40 04/28/2022    GLUCOSE 172 04/28/2022    GLUCOSE 127 07/08/2021       Detailed results: Thank you for allowing us to participate in the care of this patient. Please call with questions. This note is created with the assistance of a speech recognition program.  While intending to generate adocument that actually reflects the content of the visit, the document can still have some errors including those of syntax and sound a like substitutions which may escape proof reading. It such instances, actual meaningcan be extrapolated by contextual diversion.     Michelle Alvarez MD  Office: [phone]  Perfect serve / office [phone]

## 2022-04-28 NOTE — PROGRESS NOTES
Order obtained for extubation. SpO2 of 93 on 30% FiO2. Patient extubated and placed on her home level of 3 liters/min via nasal cannula. Post extubation SpO2 is 96% with HR  71 bpm and RR 21 breaths/min. Patient had strong cough that was productive of thick, clear  sputum. Extubation was well tolerated by the patient.    Breath Sounds: diminished throughout    [facility]   2:59PM

## 2022-04-28 NOTE — PROGRESS NOTES
[address] [employer]    PROGRESS NOTE             4/28/2022    7:54 AM    Name:   Mia Carr  MRN:     [id number]     [place]:      [de-identified]   Room:   2002/2002-01  IP Day:  12  Admit Date:  4/16/2022 10:25 AM    PCP:  Toshia Munroe MD  Code Status:  Full Code    Subjective:     C/C:   Chief Complaint   Patient presents with    Shortness of Breath     Interval History Status: Improving     Patient seen and examined at beside, no acute events overnight, chest tube draining 20ml overnight, was place on water seal today. CXR: No pneumothorax, repeat at noon  Surgery evaluated yesterday, no change so far   Vital signs are stable  Secretion improved   Continues to be sedated with propofol 5 mcg/kg/min  Continues to be on ventilator FiO2 30 PEEP 8  pH 7.44, PCO2 60, HCO3 41  Continue wean of ventilator as tolerated   WBC 15.5>15.4>16  Tube feeds 35 mL/H tolerating well   IV fluids KVO 0.45 NS running 15 mL/H  Fentanyl prn   On IV merrem        Brief History:     The patient is a 67 y.o.  Non- / non  female sever COPD 3L O2 baseline, bipolar, Hx of DVT/ PE, migraines, who presents with Shortness of Breath and cough  Patient was recently discharge from [facility] for mycoplasma pneumonia. She had a follow up appointment with PCP, patient was complaining of cough and chest pain, was started on Tessalon and nsaids for pain. However; she continued to be in distress and her daughter brought her to ED. She was hypoxic initially on 3L o2, was increased to 6L and continued to be hypoxic   Tachypneic, tachycardic  ABGs: pH 7.33, CO2 34, HCO3 18  WBC 18   Lactic 2.8> 1.5  Pancultures were sent  Chest x-ray: Moderate loculated right basal pneumothorax.  Evidence for tension.  Cavitary lesion noted in the right lung base  CT chest: Chronic clot material RUL, RLL.  Thick-walled cavitary lesion RLL.   Multiloculated pleural collection or hydropneumothorax posterior to the right lung, right chest tube in situ.  Infiltrates of the right middle lobe.  Stellate 6 mm lateral RUL nodule.  Mediastinal and right hilar lymphadenopathy  Chest tube was placed and patient was intubated. Was given 1 L bolus, IV cefepime, vancomycin, 125 mg Solu-Medrol and she is admitted to the hospital for the management of acute hypoxic respiratory failure due to pneumothorax and possible lung infection     4/19: switched to meropenem      4/21: bronchial washing from bronchoscopy growing pseudomonas    4/26: Recurrent right sided pneumonthorax shown in repeat CT. Chest tube reinserted by surgery. Review of Systems:     Review of Systems   Unable to perform ROS: Intubated       Medications: Allergies:     Allergies   Allergen Reactions    Advil [Ibuprofen] Other (See Comments)     vomiting    Aleve [Naproxen] Other (See Comments)     vomiting    Antipyrine Other (See Comments)     unknown    Celecoxib Other (See Comments)    Codeine      headache    Fomepizole     Incruse Ellipta [Umeclidinium Bromide]      confusion    Other      GRASS, TREES, WEEDS    Rofecoxib Other (See Comments)     Unknown reaction    Salicylates      Unknown reaction     Strawberry Extract      HEADACHES    Sulfinpyrazone Other (See Comments)     Unknown reaction    Aspirin Nausea And Vomiting, Other (See Comments) and Nausea Only    Nsaids Nausea Only, Other (See Comments) and Nausea And Vomiting       Current Meds:   Scheduled Meds:    folic acid  1 mg Oral Daily    thiamine  100 mg Oral Daily    famotidine  20 mg Oral BID    miconazole   Topical BID    multivitamin  1 tablet Oral Daily    insulin glargine  18 Units SubCUTAneous BID    insulin lispro  0-12 Units SubCUTAneous Q6H    methylPREDNISolone  40 mg IntraVENous Q8H    polyethylene glycol  17 g Oral Daily    furosemide  20 mg IntraVENous BID    meropenem  1,000 mg IntraVENous Q8H    albuterol  2.5 mg Nebulization Q4H    acetylcysteine  200 mg Inhalation Q4H    sodium chloride flush  5-40 mL IntraVENous 2 times per day    risperiDONE  1.5 mg Oral Q12H    atorvastatin  20 mg Oral Daily    traZODone  50 mg Oral Nightly    polyvinyl alcohol  1 drop Both Eyes Q4H    And    artificial tears   Both Eyes Q4H    chlorhexidine  15 mL Mouth/Throat BID    heparin (porcine)  5,000 Units SubCUTAneous 3 times per day     Continuous Infusions:    sodium chloride 15 mL/hr at 22 0324    sodium chloride      dextrose      propofol 5 mcg/kg/min (22 0443)     PRN Meds: hydrALAZINE, sodium chloride flush, sodium chloride, sodium chloride flush, potassium chloride **OR** potassium alternative oral replacement **OR** potassium chloride, glucose, dextrose, glucagon (rDNA), dextrose, fentanNYL    Data:     Past Medical History:   has a past medical history of Abnormal EKG, TRAM (acute kidney injury) ([facility] [address] Anxiety, Asthma, Bipolar disorder ([facility] [address] COPD (chronic obstructive pulmonary disease) ([facility] [address] Cramps, extremity, Depression, Dilated bile duct, Headache, History of elective , Hyperlipidemia, Irritable bowel syndrome, Prolonged emergence from general anesthesia, Substance abuse ([facility] [address] Unspecified sleep apnea, and Vision abnormalities. Social History:   reports that she quit smoking about 3 years ago. Her smoking use included cigarettes. She has a 84.00 pack-year smoking history. She has never used smokeless tobacco. She reports current alcohol use. She reports that she does not use drugs.      Family History:   Family History   Problem Relation Age of Onset    Dementia Maternal Aunt     Kidney Disease Mother     Heart Attack Sister     Prostate Cancer Father     High Cholesterol Brother     Heart Attack Paternal Grandmother        Vitals:  BP (!) 147/89   Pulse 81   Temp 98.5 °F (36.9 °C) (Oral)   Resp 18   Ht 5' 5\" (1.651 m)   Wt 183 lb 6.8 oz (83.2 kg)   LMP 2013 (Exact Date)   SpO2 95%   BMI 30.52 kg/m²   Temp (24hrs), Av.6 °F (37 °C), Min:98.5 °F (36.9 °C), Max:98.7 °F (37.1 °C)    Recent Labs     22  0757 22  1405 22  0207   POCGLU 173* 112* 159* 154*       I/O(24Hr): Intake/Output Summary (Last 24 hours) at 2022 0754  Last data filed at 2022 0612  Gross per 24 hour   Intake 1822. 45 ml   Output 1735 ml   Net 87.45 ml       Labs:    CBC:   Lab Results   Component Value Date    WBC 16.0 2022    RBC 3.31 2022    RBC 0-2 2021    HGB 9.7 2022    HCT 30.7 2022    MCV 92.8 2022    MCH 29.3 2022    MCHC 31.6 2022    RDW 17.2 2022     2022    MPV 8.5 2022     BMP:    Lab Results   Component Value Date     2022    K 4.2 2022    CL 93 2022    CO2 37 2022    BUN 34 2022    LABALBU 2.8 2022    LABALBU 3.6 2021    CREATININE <0.40 2022    CALCIUM 8.7 2022    GFRAA Can not be calculated 2022    LABGLOM Can not be calculated 2022    GLUCOSE 172 2022    GLUCOSE 127 2021     ABG:    Lab Results   Component Value Date    PH 8.0 2021       Lab Results   Component Value Date/Time    SPECIAL 8ML GREEN/2ML ORANGE RIGHT IJ 2022 12:12 PM     Lab Results   Component Value Date/Time    CULTURE YEAST ISOLATED IDENTIFICATION TO FOLLOW 2022 12:20 PM    CULTURE NO GROWTH 6 DAYS 2022 12:20 PM    CULTURE PSEUDOMONAS AERUGINOSA LIGHT GROWTH (A) 2022 12:20 PM    CULTURE NO NORMAL RAVEN 2022 12:20 PM         Radiology:    ECHO Complete 2D W Doppler W Color    Result Date: 2022  [facility] Transthoracic Echocardiography Report (TTE)  Patient Name Jennifer Oviedo     Date of Study               2022               BOBBY OROSCO   Date of      1955  Gender                      Female  Birth   Age          74 year(s)  Race                           Room Number 2002        Height:                     65 inch, 165.1 cm   Corporate ID [id number]    Weight:                     176 pounds, 79.8 kg  #   Patient Acct [de-identified]   BSA:          1.87 m^2      BMI:      29.29  #                                                              kg/m^2   MR #         [id number]      Sonographer                 Мария Stein   Accession #  [id number]  Interpreting Physician      Giovanna Cosme   Fellow                   Referring Nurse                           Practitioner   Interpreting             Referring Physician         Selena Xiao  Type of Study   TTE procedure:2D Echocardiogram, M-Mode, Doppler, Color Doppler. Procedure Date Date: 04/18/2022 Start: 10:35 AM Study Location: [facility] Technical Quality: Fair visualization Indications:Dyspnea/SOB, Respiratory Failure and Pulmonary embolus. History / Tech. Comments: COPD, HLD, Sleep apnea Patient Status: Inpatient Height: 65 inches Weight: 176 pounds BSA: 1.87 m^2 BMI: 29.29 kg/m^2 Rhythm: Sinus bradycardia HR: 57 bpm BP: 138/65 mmHg CONCLUSIONS Summary Left ventricle is normal in size and wall thickness. Global left ventricular systolic function appears mildly reduced. Estimated LV EF 45-50 %. No obvious wall motion abnormality seen. RV size appears normal mildly reduced function Left atrium is normal in size. No significant valvular regurgitation or stenosis seen. No significant pericardial effusion is seen. Normal aortic root dimension.  Dilated IVC, impaired or no respiratory variations suggestive of elevate Rt atrial pressure Signature ----------------------------------------------------------------------------  Electronically signed by Giovanna Cosme(Interpreting physician) on  04/18/2022 05:06 PM ---------------------------------------------------------------------------- ----------------------------------------------------------------------------  Electronically signed by Norm SteinSonographer) on 2022 02:29  PM ---------------------------------------------------------------------------- FINDINGS Left Atrium Left atrium is normal in size. Left Ventricle Left ventricle is normal in size and wall thickness. Global left ventricular systolic function is mildly reduced . Estimated LV EF  %. No obvious wall motion abnormality seen. Right Atrium Right atrium is normal in size. Right Ventricle Normal right ventricular size , mildly reduced function. Mitral Valve No obvious valvular abnormality seen. No evidence of mitral regurgitation. Aortic Valve No obvious valvular abnormality seen. No evidence of aortic insufficiency or stenosis. Tricuspid Valve No obvious valvular abnormality seen. Insignificant tricuspid regurgitation, unable to estimate RVSP. Pulmonic Valve Pulmonic valve was not well visualized. No evidence of pulmonic insufficiency or stenosis. Pericardial Effusion No significant pericardial effusion is seen. Pleural Effusion No pleural effusion seen. Miscellaneous Normal aortic root dimension. IVC Increased diameter and impaired or no inspiratory variation indicating elevated RA filling pressure (i.e. CVP) . M-mode / 2D Measurements & Calculations:   LVIDd:4.74 cm(3.7 - 5.6 cm)      Aortic Root:3.3 cm(2.0 - 3.7 cm)  LVIDs:3.28 cm(2.2 - 4.0 cm)      LA Dimension: 3.5 cm(1.9 - 4.0 cm)  IVSd:1.05 cm(0.6 - 1.1 cm)       LA volume/Index: 40.7 ml /22m^2  LVPWd:0.93 cm(0.6 - 1.1 cm)  Fractional Shortenin.8 %      XR CHEST (SINGLE VIEW FRONTAL)    Result Date: 2022  EXAMINATION: ONE XRAY VIEW OF THE CHEST 2022 8:55 am COMPARISON: April 3, 2022 HISTORY: ORDERING SYSTEM PROVIDED HISTORY: pna TECHNOLOGIST PROVIDED HISTORY: pna Reason for Exam: pna FINDINGS: Stable position of the right internal jugular central venous catheter. Right infrahilar airspace opacity not significantly changed. No new focal lung abnormality. No sizable pleural effusion. No pneumothorax.      Stable right infrahilar airspace opacity     XR CHEST (SINGLE VIEW FRONTAL)    Result Date: 4/3/2022  EXAMINATION: ONE XRAY VIEW OF THE CHEST 4/3/2022 5:51 am COMPARISON: 1 April 2022 HISTORY: ORDERING SYSTEM PROVIDED HISTORY: sob, pleurisy, cough TECHNOLOGIST PROVIDED HISTORY: sob, pleurisy, cough Reason for Exam: Shortness of breath, pleurisy, cough Additional signs and symptoms: Shortness of breath, pleurisy, cough Relevant Medical/Surgical History: Shortness of breath, pleurisy, cough FINDINGS: AP portable view of the chest time stamped at 528 hours demonstrates overlying cardiac monitoring electrodes. Heart size is stable. Right internal jugular catheter terminates in the distal superior vena cava. There has been worsening of a focal opacity at the right lung base. Minimal left basilar opacity is unchanged. No extrapleural air or overt edema. No gross effusions. Worsening opacity at the right base favoring airspace disease. Change in minimal left basilar opacity which may be related atelectasis. XR CHEST PORTABLE    Result Date: 4/23/2022  EXAMINATION: ONE XRAY VIEW OF THE CHEST 4/23/2022 5:40 am COMPARISON: 04/22/2022 and 04/21/2022 HISTORY: ORDERING SYSTEM PROVIDED HISTORY: ETT placement TECHNOLOGIST PROVIDED HISTORY: ETT placement Reason for Exam: ETT placement Additional signs and symptoms: ETT placement Relevant Medical/Surgical History: ETT placement FINDINGS: Endotracheal tube tip is approximately 2 cm above the nimesh. Nasogastric tube continues into the stomach and off the exam.  Right PICC line is near the cavoatrial junction. The right chest tube is stable with the tip over the right upper lung but the side port outside the ribcage. Soft tissue emphysema in the right chest wall is redemonstrated and appears mildly increased. Some patchy opacities persist in the right base. Evaluation for right apical pneumothorax is suboptimal.  The left lung appears acceptable. Cardiac silhouette is not enlarged.   The central pulmonary arteries appears stable. Limited evaluation of the right lateral ribs. 1.  Endotracheal tube, nasogastric tube, and right jugular catheter appear acceptable. The right chest tube side port is outside the ribcage. Correlate for functionality of the chest tube. 2.  Patchy opacities persist in the right lower chest.  The evaluation for small right apical pneumothorax is suboptimal on the current study. XR CHEST PORTABLE    Result Date: 4/22/2022  EXAMINATION: ONE XRAY VIEW OF THE CHEST 4/22/2022 6:29 am COMPARISON: 21 April 2022 HISTORY: ORDERING SYSTEM PROVIDED HISTORY: ETT placement TECHNOLOGIST PROVIDED HISTORY: ETT placement Reason for Exam: on vent FINDINGS: AP portable view of the chest time stamped at 623 hours demonstrates overlying cardiac monitoring electrodes, endotracheal tube terminating 4.8 cm above the nimesh and right internal jugular catheter terminating in the right atrium. Right-sided chest tube is again noted. Trace extrapleural air at the right apex persists. Subcutaneous emphysema along the right lateral chest wall is noted. Faint opacity is present at the right lung base suspicious for focal airspace disease. No mediastinal shift. Heart size is stable. Persistent small right apical pneumothorax. Support tubes and lines as above. Opacity at the right base. There is elevation right hemidiaphragm. Atelectasis is evident but superimposed airspace disease may be present. XR CHEST PORTABLE    Result Date: 4/21/2022  EXAMINATION: ONE XRAY VIEW OF THE CHEST 4/21/2022 11:57 am COMPARISON: 04/21/2022, 0625 hours. HISTORY: ORDERING SYSTEM PROVIDED HISTORY: Chest tube now clamped TECHNOLOGIST PROVIDED HISTORY: Chest tube now clamped Reason for Exam: chest tube now clamped FINDINGS: The ET tube was in satisfactory position, 4.5 cm above the nimesh. The NG tube was passed the gastric fundus.   A right jugular central venous line was noted with the tip in the superior vena cava near its junction with the right atrium. A right-sided chest tube was noted. The proximal most opening is just out of the right lateral chest wall. No pneumothorax was noted. No cardiomegaly, pneumonia, interstitial edema or definite effusions were noted. Mild hyper inflation was identified. The ET tube was in satisfactory position, 4.5 cm above the nimesh. The NG tube was past the gastric fundus. A right jugular central venous line was noted with the tip in the superior vena cava near its junction with the right atrium. No pneumothorax was identified. A right-sided chest tube was noted but the proximal most opening is just external to the right lateral chest wall. No cardiomegaly, pneumonia or interstitial edema. XR CHEST PORTABLE    Result Date: 4/21/2022  EXAMINATION: ONE XRAY VIEW OF THE CHEST 4/21/2022 6:30 am COMPARISON: 04/20/2022 HISTORY: ORDERING SYSTEM PROVIDED HISTORY: ETT placement TECHNOLOGIST PROVIDED HISTORY: ETT placement Reason for Exam: vent FINDINGS: Support tubes and lines are unchanged. The right chest tube side port is external to the chest wall which may predispose to air leak. Cardiac silhouette and mediastinal contours are unchanged. Calcified left hilar lymph nodes are noted. No pneumothorax. No new lung infiltrate. Aeration of the right lung base has improved. 1. The right chest tube side port is external to the chest wall which may predispose to an air leak. No pneumothorax. 2. Improved aeration of the right lung base, likely related to atelectasis. XR CHEST PORTABLE    Result Date: 4/20/2022  EXAMINATION: ONE XRAY VIEW OF THE CHEST 4/20/2022 11:52 am COMPARISON: None. HISTORY: ORDERING SYSTEM PROVIDED HISTORY: Chest tube now to waterseal TECHNOLOGIST PROVIDED HISTORY: Chest tube now to waterseal Reason for Exam: Chest tube now to waterseal FINDINGS: There is a right chest tube in place. There is no evidence of pneumothorax.  Some apparent atelectasis noted in the the right lung base. ET tube is in good position. Tip of central line overlies the right atrium. Left lung appears normal.  Heart appears unremarkable. No evidence of pneumothorax. Some atelectasis in the right lung base. Tip of centralized overlies the right atrium. It should be withdrawn by 6 cm. The findings were sent to the Radiology Results [address] at 12:21 pm on 4/20/2022 to be communicated to a licensed caregiver. XR CHEST PORTABLE    Result Date: 4/20/2022  EXAMINATION: ONE XRAY VIEW OF THE CHEST 4/20/2022 6:30 am COMPARISON: 04/19/2022 HISTORY: ORDERING SYSTEM PROVIDED HISTORY: ETT placement TECHNOLOGIST PROVIDED HISTORY: ETT placement Reason for Exam: ETT FINDINGS: The cardiac silhouette and mediastinal contours are stable. There is a right-sided chest tube with the side port noted external to the chest wall. Right internal jugular vein catheter, endotracheal tube, and orogastric tube are again noted. There is pulmonary vascular congestion. No pneumothorax. The patient is rotated towards the right. 1. Right chest tube side port is external to the chest wall which may predispose to an air leak. 2. Stable pulmonary vascular congestion. 3. No pneumothorax is identified. XR CHEST PORTABLE    Result Date: 4/19/2022  EXAMINATION: ONE X-RAY VIEW OF THE CHEST 4/18/2022 6:27 am COMPARISON: 04/17/2022 HISTORY: ORDERING SYSTEM PROVIDED HISTORY: ETT placement TECHNOLOGIST PROVIDED HISTORY: ETT placement Reason for Exam: ETT placement FINDINGS: The endotracheal tube, nasogastric tube, right IJ catheter and right-sided chest tube remain. The extreme lung apices are excluded from the examination. A pneumothorax is not identified. Right basilar consolidation has increased peripherally. Increased right basilar opacity may reflect fluid filling the large cavitary lesion at the right lung base.   Pulmonary consolidation/increased pleural effusion or empyema are alternate considerations. XR CHEST PORTABLE    Result Date: 4/19/2022  EXAMINATION: ONE XRAY VIEW OF THE CHEST 4/19/2022 6:34 am COMPARISON: Chest radiograph performed 04/18/2022. HISTORY: ORDERING SYSTEM PROVIDED HISTORY: ETT placement TECHNOLOGIST PROVIDED HISTORY: ETT placement Reason for Exam: on vent FINDINGS: There is right lower lung infiltrate. There is no pneumothorax. The mediastinal structures are unremarkable. The upper abdomen is unremarkable. The extrathoracic soft tissues are unremarkable. There is an endotracheal tube with the tip in the midtrachea. There is a right-sided chest tube. There is a right internal jugular central line with the tip at the cavoatrial junction. There is a gastric tube and the tip is not well visualized. Right lower lung infiltrate that is mildly improved. Support tubes as described above. XR CHEST PORTABLE    Result Date: 4/17/2022  EXAMINATION: ONE XRAY VIEW OF THE CHEST 4/17/2022 6:04 am COMPARISON: 04/16/2022, 1248 hours HISTORY: ORDERING SYSTEM PROVIDED HISTORY: ETT placement TECHNOLOGIST PROVIDED HISTORY: ETT placement Reason for Exam: ETT 46-year-old female with endotracheal tube placement FINDINGS: Endotracheal tube distal tip overlying the mid trachea approximately 4.8 cm above the level of the nimesh. Enteric tube traverses the GE junction with distal tip excluded from the field of view. Right IJ approach central venous catheter distal tip overlying the right atrium. Right-sided chest tube distal tip projects over the right hilum. Cardiac monitor leads overlie the chest. No obvious pneumothorax. No free air. Cardiac and mediastinal contours remain unchanged. Left lung is relatively clear. Persistent airspace disease at the right mid and right lower lung zones. Visualized osseous structures remain unchanged. Subcutaneous emphysema previously seen at the right lateral chest wall no longer identified.      1. Persistent airspace disease at the right mid and right lower lung zones. Follow-up is recommended to document resolution. 2. Tubes and right IJ line as detailed above. XR CHEST PORTABLE    Result Date: 4/16/2022  EXAMINATION: ONE XRAY VIEW OF THE CHEST 4/16/2022 1:10 pm COMPARISON: 04/16/2022, 1213 hours HISTORY: ORDERING SYSTEM PROVIDED HISTORY: chest tube TECHNOLOGIST PROVIDED HISTORY: chest tube Reason for Exam: chest tube Additional signs and symptoms: chest tube and NG tube placement 33-year-old female with history of NG tube and chest tube placement FINDINGS: Portable supine view of the chest. Right-sided chest tube has been placed with distal tip projecting over the right infrahilar region. Right IJ approach central venous catheter distal tip overlying the cavoatrial junction, stable. Endotracheal tube distal tip overlying the mid trachea approximately 4.3 cm above the level of the nimesh. Enteric tube traverses the GE junction with distal tip projecting over the left mid abdomen likely within the stomach body. Left lung is clear. Pleural thickening and opacity involving the right mid and right lower lung zones. Subcutaneous emphysema along the right lateral chest wall. Cardiac and mediastinal contours remain unchanged. Atherosclerotic calcification of the thoracic aorta. No free air. There is re-expansion of the right lung compared with the prior study. Probable small residual inferolateral right pneumothorax component. 1. Tubes and right IJ line as detailed above. Re-expansion of the right lung compared with the prior study. There is likely a small residual right inferolateral pneumothorax component. 2. Pleural thickening and airspace opacity involving the right mid and right lower lung zones. Follow-up is recommended to document resolution. 3. Subcutaneous emphysema along the right lateral chest wall.      XR CHEST PORTABLE    Result Date: 4/16/2022  EXAMINATION: ONE XRAY VIEW OF THE CHEST 4/16/2022 12:24 pm COMPARISON: None. HISTORY: ORDERING SYSTEM PROVIDED HISTORY: Intubation TECHNOLOGIST PROVIDED HISTORY: Intubation Reason for Exam: central line and ET placement FINDINGS: ET tube terminates 3 cm above the nimesh. Right line terminates in the SVC. There is a relatively stable right-sided basilar pneumothorax. The remaining lungs are unchanged. Cardiac silhouette and osseous structures unchanged. Intubation as above. No significant change     XR CHEST PORTABLE    Result Date: 4/16/2022  EXAMINATION: ONE XRAY VIEW OF THE CHEST 4/16/2022 11:02 am COMPARISON: 04/05/2022 HISTORY: ORDERING SYSTEM PROVIDED HISTORY: SOB TECHNOLOGIST PROVIDED HISTORY: SOB Reason for Exam: SOB FINDINGS: There is a moderate loculated right basal pneumothorax. Cavitary lesion is noted in the right lung base. Left lung is unremarkable. Heart and mediastinal structures appear normal.  There is no evidence for any tension phenomena. Moderate loculated right basal pneumothorax. Evidence for tension. Cavitary lesion noted in the right lung base. .  Case discussed with Dr. Nilda Jones. XR CHEST PORTABLE    Result Date: 4/1/2022  EXAMINATION: ONE XRAY VIEW OF THE CHEST 4/1/2022 4:01 pm COMPARISON: 04/01/2022 HISTORY: ORDERING SYSTEM PROVIDED HISTORY: central line placed TECHNOLOGIST PROVIDED HISTORY: central line placed Reason for Exam: Central line placed FINDINGS: There is a right internal jugular central line in place with distal tip overlying the superior vena cava. Previously noted right basal infiltrate is unchanged. Left lung appears clear. The heart and mediastinal structures appear normal.  There is no evidence of pneumothorax. Satisfactory position of right internal jugular central line. No change in the the right basal infiltrate. XR CHEST PORTABLE    Result Date: 4/1/2022  EXAMINATION: ONE XRAY VIEW OF THE CHEST 4/1/2022 1:22 pm COMPARISON: 06/17/2020. CT dated 10/15/2021.  HISTORY: ORDERING SYSTEM PROVIDED HISTORY: dyspnea TECHNOLOGIST PROVIDED HISTORY: dyspnea Reason for Exam: Dyspnea Relevant Medical/Surgical History: Hx of COPD FINDINGS: The cardiac silhouette appears within normal limits. There are bibasilar opacities, right greater than left which may reflect multifocal pneumonia in the correct clinical setting. No evidence of pleural effusion or pneumothorax is seen. Bibasilar opacities, right greater than left, which may reflect multifocal pneumonia. Follow-up to imaging resolution is recommended. CT CHEST PULMONARY EMBOLISM W CONTRAST    Result Date: 4/16/2022  EXAMINATION: CTA OF THE CHEST 4/16/2022 2:30 pm TECHNIQUE: CTA of the chest was performed after the administration of intravenous contrast.  Multiplanar reformatted images are provided for review. MIP images are provided for review. Dose modulation, iterative reconstruction, and/or weight based adjustment of the mA/kV was utilized to reduce the radiation dose to as low as reasonably achievable. COMPARISON: CT chest from 10/15/2021 HISTORY: ORDERING SYSTEM PROVIDED HISTORY: SOB TECHNOLOGIST PROVIDED HISTORY: SOB Decision Support Exception - unselect if not a suspected or confirmed emergency medical condition->Emergency Medical Condition (MA) Reason for Exam: sob Relevant Medical/Surgical History: intubated, septic, hx of PE 44-year-old female with shortness of breath FINDINGS: Pulmonary Arteries: No obvious filling defect in the main, right main, or left main pulmonary arteries. Evaluation of the segmental and subsegmental pulmonary arterial vasculature is limited due to respiratory motion suboptimal bolus timing, airspace disease, and streak artifact. There are areas of probable residual linear chronic clot within the right lower lobar pulmonary artery on image 111, series 2. Probable linear residual clot within the anterior right upper lobe pulmonary artery on image 83, series 2.  Mediastinum: Atherosclerotic calcification of the aorta and branch vasculature. No dissection flap within the visualized thoracic aorta. No pericardial effusion. There is fluid in the superior pericardial recess. Enlarged precarinal lymph nodes remain unchanged on image 83, series 2. Right hilar lymphadenopathy is seen on image 96, series 2. Coronary artery disease. No left hilar or axillary lymphadenopathy. Lungs/pleura: Endotracheal tube distal tip within the lower trachea. Trachea and very proximal central airways appear patent. Narrowing of the right middle lobe airway into a region of partial consolidation/atelectasis/scarring. Opacification of the right lower lobar segmental airways. There is a thick-walled cavitary lesion in the right lower lobe measuring 5.5 x 7.3 cm in greatest AP and transverse dimensions on image 68, series 4. There is a dependent air-fluid level within this lesion. This area measures 7.6 cm in greatest craniocaudal extent on image 48, series 602. There is surrounding airspace disease. There is a gas and fluid containing multiloculated pleural collection or hydropneumothorax along the posterior margin of the right lung. Right sided chest tube distal tip along the anteromedial right lung. Subcutaneous emphysema along the right axilla and right lateral chest wall. Stellate pulmonary nodule in the lateral right upper lobe measuring 6 mm on image 32, series 4. Moderate to severe emphysema, dependent atelectasis and respiratory motion. Upper Abdomen: Fatty liver. NG tube is seen extending into the stomach body. Atherosclerotic calcification of the upper abdominal aorta and branch vasculature. Soft Tissues/Bones: Chronic anterior wedge compression deformities, unchanged from 10/15/2021 involving T5 and T6. Mild diffuse degenerative changes throughout the spine.      1. Linear filling defects in the anterior right upper lobe and right lower lobe pulmonary arteries likely representing residual/chronic clot material. No acute central filling defect/pulmonary embolus is evident. 2. Thick-walled cavitary lesion in the right lower lobe measuring up to 5.5 x 7.3 x 7.6 cm which could be related to TB or fungal disease or a necrotizing pneumonia. Underlying cavitary malignancy not entirely excluded. There is surrounding airspace disease. There is also an associated multiloculated pleural collection or hydropneumothorax primarily along the posterior margin of the right lung. Right-sided chest tube distal tip along the anteromedial right pleura/lung. 3. Partial consolidation, atelectasis and/or infiltrate of the right middle lobe. 4. Stellate 6 mm lateral right upper lobe pulmonary nodule. Follow-up guidelines provided below. 5. Underlying moderate to severe emphysema. 6. Endotracheal and NG tubes as above. 7. Coronary artery disease. 8. Chronic anterior wedge compression deformities of T5 and T6. 9. Subcutaneous emphysema along the right axilla and right lateral chest wall likely related to chest tube. 10. Mediastinal and right hilar lymphadenopathy. RECOMMENDATIONS: 6 mm suspicious right solid pulmonary nodule within the upper lobe. Recommend a non-contrast Chest CT at 6-12 months, then another non-contrast Chest CT at 18-24 months. These guidelines do not apply to immunocompromised patients and patients with cancer. Follow up in patients with significant comorbidities as clinically warranted. For lung cancer screening, adhere to Lung-RADS guidelines. Reference: Radiology. 2017; 284(1):228-43.      VL Lower Extremity Bilateral Venous Duplex    Result Date: 4/18/2022    [facility]  Vascular Lower Extremities DVT Study Procedure   Patient Name   Trista Verduzco     Date of Study           04/18/2022                 BOBBY OROSCO   Date of Birth  1955  Gender                  Female   Age            74 year(s)  Race                       Room Number    2002   Corporate ID # [id number]   Patient Acct # [de-identified]   MR #           [id number] Sonographer             Aisha Spatz, RVT   Accession #    [id number]  Interpreting Physician  Domo Norris   Referring                  Referring Physician     Isabella Bello  Nurse  Practitioner  Procedure Type of Study:   Veins: Lower Extremities DVT Study, Venous Scan Lower Bilateral.  Indications for Study:R/O DVT. Patient Status: In Patient. Technical Quality:Adequate visualization. Conclusions   Summary   Bilateral:  No evidence of deep or superficial venous thrombosis. Signature   ----------------------------------------------------------------  Electronically signed by Aisha Spatz, RVT(Sonographer) on  04/18/2022 07:45 AM  ----------------------------------------------------------------   ----------------------------------------------------------------  Electronically signed by Domo Norris(Interpreting physician)  on 04/18/2022 01:10 PM  ----------------------------------------------------------------  Findings:   Right Impression:                    Left Impression:  The common femoral, femoral,         The common femoral, femoral,  popliteal and tibial veins           popliteal and tibial veins  demonstrate normal compressibility   demonstrate normal compressibility  and augmentation. and augmentation. Normal compressibility of the great  Normal compressibility of the great  saphenous vein. saphenous vein. Normal compressibility of the small  Normal compressibility of the small  saphenous vein. saphenous vein. Velocities are measured in cm/s ; Diameters are measured in cm Right Lower Extremities DVT Study Measurements Right 2D Measurements +------------------------------------+----------+---------------+----------+ ! Location                            ! Visualized! Compressibility! Thrombosis! +------------------------------------+----------+---------------+----------+ ! Common Femoral                      !Yes       ! Yes !None      ! +------------------------------------+----------+---------------+----------+ ! Prox Femoral                        !Yes       ! Yes            ! None      ! +------------------------------------+----------+---------------+----------+ ! Mid Femoral                         !Yes       ! Yes            ! None      ! +------------------------------------+----------+---------------+----------+ ! Dist Femoral                        !Yes       ! Yes            ! None      ! +------------------------------------+----------+---------------+----------+ ! Deep Femoral                        !Yes       ! Yes            ! None      ! +------------------------------------+----------+---------------+----------+ ! Popliteal                           !Yes       ! Yes            ! None      ! +------------------------------------+----------+---------------+----------+ ! Sapheno Femoral Junction            ! Yes       ! Yes            ! None      ! +------------------------------------+----------+---------------+----------+ ! PTV                                 ! Yes       ! Yes            ! None      ! +------------------------------------+----------+---------------+----------+ ! Peroneal                            !Yes       ! Yes            ! None      ! +------------------------------------+----------+---------------+----------+ ! Gastroc                             ! Yes       ! Yes            ! None      ! +------------------------------------+----------+---------------+----------+ ! GSV Thigh                           ! Yes       ! Yes            ! None      ! +------------------------------------+----------+---------------+----------+ ! GSV Knee                            ! Yes       ! Yes            ! None      ! +------------------------------------+----------+---------------+----------+ ! GSV Ankle                           ! Yes       ! Yes            ! None      ! +------------------------------------+----------+---------------+----------+ ! SSV !Yes       !Yes            ! None      ! +------------------------------------+----------+---------------+----------+ Left Lower Extremities DVT Study Measurements Left 2D Measurements +------------------------------------+----------+---------------+----------+ ! Location                            ! Visualized! Compressibility! Thrombosis! +------------------------------------+----------+---------------+----------+ ! Common Femoral                      !Yes       ! Yes            ! None      ! +------------------------------------+----------+---------------+----------+ ! Prox Femoral                        !Yes       ! Yes            ! None      ! +------------------------------------+----------+---------------+----------+ ! Mid Femoral                         !Yes       ! Yes            ! None      ! +------------------------------------+----------+---------------+----------+ ! Dist Femoral                        !Yes       ! Yes            ! None      ! +------------------------------------+----------+---------------+----------+ ! Deep Femoral                        !Yes       ! Yes            ! None      ! +------------------------------------+----------+---------------+----------+ ! Popliteal                           !Yes       ! Yes            ! None      ! +------------------------------------+----------+---------------+----------+ ! Sapheno Femoral Junction            ! Yes       ! Yes            ! None      ! +------------------------------------+----------+---------------+----------+ ! PTV                                 ! Yes       ! Yes            ! None      ! +------------------------------------+----------+---------------+----------+ ! Peroneal                            !Yes       ! Yes            ! None      ! +------------------------------------+----------+---------------+----------+ ! Gastroc                             ! Yes       ! Yes            ! None      ! +------------------------------------+----------+---------------+----------+ ! GSV Thigh                           ! Yes       ! Yes            ! None      ! +------------------------------------+----------+---------------+----------+ ! GSV Knee                            ! Yes       ! Yes            ! None      ! +------------------------------------+----------+---------------+----------+ ! GSV Ankle                           ! Yes       ! Yes            ! None      ! +------------------------------------+----------+---------------+----------+ ! SSV                                 ! Yes       ! Yes            ! None      ! +------------------------------------+----------+---------------+----------+    FL MODIFIED BARIUM SWALLOW W VIDEO    Result Date: 4/4/2022  EXAMINATION: MODIFIED BARIUM SWALLOW WAS PERFORMED IN CONJUNCTION WITH SPEECH PATHOLOGY SERVICES TECHNIQUE: Fluoroscopic evaluation of the swallowing mechanism was performed using cineradiography with multiple consistency of barium product in conjunction with speech pathology services. FLUOROSCOPY DOSE AND TYPE OR TIME AND EXPOSURES: DAP 49.2 dGy cm squared COMPARISON: None HISTORY: ORDERING SYSTEM PROVIDED HISTORY: aspiration pna TECHNOLOGIST PROVIDED HISTORY: aspiration pna Reason for Exam: aspiration pneumonia FINDINGS: Premature vallecular spillage. There was trace penetration with straw with thin liquid. No aspiration. No aspiration. Trace penetration with straw with thin liquids. Please see separate speech pathology report for full discussion of findings and recommendations. RECOMMENDATIONS: Unavailable         Physical Examination:        Physical Exam  Constitutional:       General: She is not in acute distress. Appearance: She is not ill-appearing. Comments: Sedated, comfortable appearing, unresponsive   Cardiovascular:      Rate and Rhythm: Normal rate and regular rhythm. Pulmonary:      Breath sounds: Wheezing and rales present.       Comments: Chest tube in place  Draining around 50ml   Continues to be intubated and sedated   Abdominal:      General: Bowel sounds are normal. There is no distension. Musculoskeletal:      Right lower leg: No edema. Left lower leg: No edema. Neurological:      Comments: Unresponsive, sedated           Assessment:        Primary Problem  Sepsis [facility])    Active Hospital Problems    Diagnosis Date Noted    Recurrent pneumothorax after chest tube removed [J95.811] 04/26/2022     Priority: Medium    Lung abscess ([address] [J85.2] 04/25/2022     Priority: Medium    Elevated C-reactive protein (CRP) [R79.82]      Priority: Medium    Pseudomonas aeruginosa infection [A49.8]      Priority: Medium    Elevated procalcitonin [R79.89]      Priority: Medium    Acute systolic HF (heart failure) (HCC) [I50.21] 04/19/2022    Pneumothorax on right [J93.9]     HCAP (healthcare-associated pneumonia) [J18.9]     Sepsis ([facility] [A41.9] 04/16/2022    Acute on chronic respiratory failure ([facility] [J96.20] 04/16/2022    Cavitary lesion of lung [J98.4] 04/16/2022    History of DVT (deep vein thrombosis) [Z86.718] 04/16/2022    Pneumothorax, right [J93.9] 04/16/2022    Status post chest tube placement ([address] [Z93.8] 04/16/2022    History of pulmonary embolus (PE) [Z86.711] 04/02/2022    Chronic respiratory failure with hypoxia (HCC) [J96.11] 08/05/2021    Compression fracture of body of thoracic vertebra ([facility] [S22.000A] 09/28/2020    Lung nodule [R91.1] 08/22/2018    Bipolar disorder ([address] [F31.9]     Chronic obstructive pulmonary disease ([facility] [J44.9] 01/06/2016       Plan:        Sepsis due to pneumonia with abcess vs empyema, pseudomonas sp. Tachypnea, tachycardia  WBC 18>>>12.6> 11.5>15.5>15.4>16  Pro-Branden >100 (>100 on repeat), , Lactate 2.8> 1.5  Chest x-ray:  Moderate loculated right basal pneumothorax.  Evidence for tension.  Cavitary lesion noted in the right lung base  CT chest: Thick-walled cavitary lesion RLL.Right pneumothorax. Infiltrates of the right middle lobe.  Stellate 6 mm lateral RUL nodule.  Mediastinal and right hilar lymphadenopathy  Iv fluids: 1/2 NS at 15 cc/h  ID and Critical care following  Respiratory cultures and chest tube fluids culture growing pseudomonas   On Merrem IV day 10  -Repeat CT chest showed large pneumothorax, improving cavitary lesion    Acute on chronic respiratory failure 2/2 recurrent pneumothorax after chest tube removal and Pseudomonas pneumonia  -History of severe COPD - 3 L home O2 baseline  -CXR right pneumothorax on admission   -S/p intubation and right chest tube placement 4/16  -Sedated with propofol  -On a Vent with FiO2 30% PEEP 8  -Solumedrol 40mg q8h   -Repeat CT chest showed large pneumothorax, improving cavitary lesion  -Surgery consult   -Chest tube inserted 4/25  -Daily CXR     Acute systolic heart failure - Improved   Echo Estimated LV EF 45-50 %  Probnp 1000s   Lasix 20mg IV BID    Type II DM Insulin sliding scale and lantus 18 Units twice daily     Hx DVT/ PE: Eliquis was discontinued in 12/2021  History bipolar disorder: Trazodone, Risperidone resumed     Diet: NPO, on Tube feeds 35 mL/h  GI ppx: Pepcid 20 mg twice daily IV  DVT ppx: Heparin 5000 units TID   Code status: Full code  Consults: pulm, ID  Dispo: referral sent to [facility] AT [facility] in ICU    Kalyani Pulido MD  4/28/2022  7:54 AM     Attestation and add on       I have discussed the care of Cliff Pascual , including pertinent history and exam findings,      4/28/22    with the resident. I have seen and examined the patient and the key elements of all parts of the encounter have been performed by me . I agree with the assessment, plan and orders as documented by the resident.      Principal Problem:    Sepsis ([address]  Active Problems:    Pseudomonas aeruginosa infection    Elevated procalcitonin    Elevated C-reactive protein (CRP)    Lung abscess (HCC)    Recurrent pneumothorax after chest tube removed Postprocedural subcutaneous emphysema    Chronic obstructive pulmonary disease (HCC)    Bipolar disorder (HCC)    Lung nodule    Compression fracture of body of thoracic vertebra (HCC)    Chronic respiratory failure with hypoxia (HCC)    History of pulmonary embolus (PE)    Acute on chronic respiratory failure ([address]    Cavitary lesion of lung    History of DVT (deep vein thrombosis)    Pneumothorax, right    Status post chest tube placement (HCC)    Pneumothorax on right    HCAP (healthcare-associated pneumonia)    Acute systolic HF (heart failure) ([address]  Resolved Problems:    Hyperkalemia        ''''''''''       MD JUNO Gross[address], [address] [place] [address].    Phone [phone]   Fax: [phone]  Answering Service: [phone]

## 2022-04-28 NOTE — PROGRESS NOTES
PROGRESS NOTE          PATIENT NAME: [id number]  RECORD NO. [id number]  DATE: 4/28/2022  SURGEON: Delfino Sanders  PRIMARY CARE PHYSICIAN: Eunice Pino MD    HD: # 12    ASSESSMENT    Patient Active Problem List   Diagnosis    Chronic obstructive pulmonary disease ([address]    Vitamin D deficiency    Anxiety    Asthma    Bipolar disorder ([address]    Depression    Hyperlipidemia with target LDL less than 100    Sleep apnea    Vision abnormalities    Status post hysteroscopy    Chronic headaches    IBS (irritable bowel syndrome)    Colon polyp    Collagenous colitis    Lung nodule    Left elbow pain    Dilated bile duct    Acute pain of left shoulder    Adhesive capsulitis of left shoulder    Leucopenia    Compression fracture of body of thoracic vertebra (HCC)    Age-related osteoporosis with current pathological fracture    Pulmonary embolism on right ([address]    Migraines    [facility]anoid behavior ([facility])    Acute deep vein thrombosis (DVT) of right lower extremity ([facility])    Delirium    Chronic respiratory failure with hypoxia (HCC)    Major neurocognitive disorder ([address]    Pneumonia    Chronic respiratory failure with hypoxia, on home O2 therapy ([address]    COPD (chronic obstructive pulmonary disease) ([address]    TRAM (acute kidney injury) ([address]    History of pulmonary embolus (PE)    Mycoplasma pneumonia    Iron deficiency anemia    Sepsis ([address]    Acute on chronic respiratory failure ([address]    Cavitary lesion of lung    History of DVT (deep vein thrombosis)    Pneumothorax, right    Status post chest tube placement ([facility])    Pneumothorax on right    HCAP (healthcare-associated pneumonia)    Acute systolic HF (heart failure) (HCC)    Pseudomonas aeruginosa infection    Elevated procalcitonin    Elevated C-reactive protein (CRP)    Lung abscess (HCC)    Recurrent pneumothorax after chest tube removed    Postprocedural subcutaneous emphysema       MEDICAL DECISION MAKING AND PLAN    1. Chest tube placed on waterseal today. 2. Repeat CXR in 6 hours. Chief Complaint: intubated and sedated. SUBJECTIVE    No changes overnight. R chest tube in place. Remains intubated. No air leak. Subcutaneous emphysema is stable. OBJECTIVE  VITALS: Temp: Temp: 98.5 °F (36.9 °C)Temp  Av.6 °F (37 °C)  Min: 98.5 °F (36.9 °C)  Max: 98.7 °F (03.0 °C) BP Systolic (61PXK), RFQ:820 , Min:84 , ZGN:666   Diastolic (51WRY), UUP:58, Min:18, Max:89   Pulse Pulse  Av.7  Min: 66  Max: 103 Resp Resp  Av.6  Min: 16  Max: 30 Pulse ox SpO2  Av.5 %  Min: 90 %  Max: 98 %  GENERAL: alert, no distress  NEURO: no focal deficits  HEENT: normocephalic, atraumatic. : deferred  LUNGS: clear to ausculation, without wheezes, rales or rhonci. R chest tube in place. No airleak appreciated. HEART: normal rate and regular rhythm  ABDOMEN: soft, non-tender, non-distended, bowel sounds present in all 4 quadrants and no guarding or peritoneal signs present  EXTREMITY: no cyanosis, clubbing or edema    I/O last 3 completed shifts: In: 2788.5 [I.V.:368.8; NG/GT:2121; IV Piggyback:298.6]  Out: 4598 [Urine:2450; Chest Tube:65]    Drain/tube output: In: 1972.5 [I.V.:368.8; NG/GT:1305]  Out: 8587 [Urine:1700]    LAB:  CBC:   Recent Labs     22   WBC 15.5* 15.4* 16.0*   HGB 9.8* 9.3* 9.7*   HCT 31.2* 29.3* 30.7*   MCV 92.5 92.2 92.8    349 397     BMP:   Recent Labs     04/26/22  0447 04/27/22  0449 04/28/22  0439    137 135   K 4.3 4.3 4.2   CL 95* 93* 93*   CO2 41* 37* 37*   BUN 41* 37* 34*   CREATININE 0.43* <0.40* <0.40*   GLUCOSE 110* 203* 172*     COAGS: No results for input(s): APTT, PROT, INR in the last 72 hours.       Rosaura Landry MD  22, 11:03 AM

## 2022-04-29 ENCOUNTER — APPOINTMENT (OUTPATIENT)
Dept: CT IMAGING | Age: 67
DRG: 720 | End: 2022-04-29
Payer: COMMERCIAL

## 2022-04-29 ENCOUNTER — APPOINTMENT (OUTPATIENT)
Dept: GENERAL RADIOLOGY | Age: 67
DRG: 720 | End: 2022-04-29
Payer: COMMERCIAL

## 2022-04-29 LAB
ANION GAP SERPL CALCULATED.3IONS-SCNC: 8 MMOL/L (ref 9–17)
BUN BLDV-MCNC: 29 MG/DL (ref 8–23)
CALCIUM SERPL-MCNC: 8.1 MG/DL (ref 8.6–10.4)
CHLORIDE BLD-SCNC: 94 MMOL/L (ref 98–107)
CO2: 37 MMOL/L (ref 20–31)
CREAT SERPL-MCNC: <0.4 MG/DL (ref 0.5–0.9)
GFR AFRICAN AMERICAN: ABNORMAL ML/MIN
GFR NON-AFRICAN AMERICAN: ABNORMAL ML/MIN
GFR SERPL CREATININE-BSD FRML MDRD: ABNORMAL ML/MIN/{1.73_M2}
GLUCOSE BLD-MCNC: 119 MG/DL (ref 65–105)
GLUCOSE BLD-MCNC: 134 MG/DL (ref 65–105)
GLUCOSE BLD-MCNC: 160 MG/DL (ref 65–105)
GLUCOSE BLD-MCNC: 186 MG/DL (ref 65–105)
GLUCOSE BLD-MCNC: 258 MG/DL (ref 65–105)
GLUCOSE BLD-MCNC: 269 MG/DL (ref 65–105)
GLUCOSE BLD-MCNC: 64 MG/DL (ref 70–99)
GLUCOSE BLD-MCNC: 67 MG/DL (ref 65–105)
GLUCOSE BLD-MCNC: 79 MG/DL (ref 65–105)
HCT VFR BLD CALC: 28.9 % (ref 36–46)
HEMOGLOBIN: 9.3 G/DL (ref 12–16)
MCH RBC QN AUTO: 29.8 PG (ref 26–34)
MCHC RBC AUTO-ENTMCNC: 32.3 G/DL (ref 31–37)
MCV RBC AUTO: 92.4 FL (ref 80–100)
PDW BLD-RTO: 17.5 % (ref 11.5–14.9)
PLATELET # BLD: 385 K/UL (ref 150–450)
PMV BLD AUTO: 8.5 FL (ref 6–12)
POTASSIUM SERPL-SCNC: 3.9 MMOL/L (ref 3.7–5.3)
RBC # BLD: 3.13 M/UL (ref 4–5.2)
SODIUM BLD-SCNC: 139 MMOL/L (ref 135–144)
WBC # BLD: 13.3 K/UL (ref 3.5–11)

## 2022-04-29 PROCEDURE — 2700000000 HC OXYGEN THERAPY PER DAY

## 2022-04-29 PROCEDURE — 2000000000 HC ICU R&B

## 2022-04-29 PROCEDURE — 85027 COMPLETE CBC AUTOMATED: CPT

## 2022-04-29 PROCEDURE — 2580000003 HC RX 258: Performed by: INTERNAL MEDICINE

## 2022-04-29 PROCEDURE — 6360000002 HC RX W HCPCS: Performed by: INTERNAL MEDICINE

## 2022-04-29 PROCEDURE — 6370000000 HC RX 637 (ALT 250 FOR IP): Performed by: INTERNAL MEDICINE

## 2022-04-29 PROCEDURE — 99291 CRITICAL CARE FIRST HOUR: CPT | Performed by: INTERNAL MEDICINE

## 2022-04-29 PROCEDURE — 80048 BASIC METABOLIC PNL TOTAL CA: CPT

## 2022-04-29 PROCEDURE — 2580000003 HC RX 258: Performed by: STUDENT IN AN ORGANIZED HEALTH CARE EDUCATION/TRAINING PROGRAM

## 2022-04-29 PROCEDURE — 2500000003 HC RX 250 WO HCPCS

## 2022-04-29 PROCEDURE — 82947 ASSAY GLUCOSE BLOOD QUANT: CPT

## 2022-04-29 PROCEDURE — 36415 COLL VENOUS BLD VENIPUNCTURE: CPT

## 2022-04-29 PROCEDURE — 6370000000 HC RX 637 (ALT 250 FOR IP): Performed by: STUDENT IN AN ORGANIZED HEALTH CARE EDUCATION/TRAINING PROGRAM

## 2022-04-29 PROCEDURE — 94640 AIRWAY INHALATION TREATMENT: CPT

## 2022-04-29 PROCEDURE — 6360000002 HC RX W HCPCS: Performed by: STUDENT IN AN ORGANIZED HEALTH CARE EDUCATION/TRAINING PROGRAM

## 2022-04-29 PROCEDURE — 6370000000 HC RX 637 (ALT 250 FOR IP)

## 2022-04-29 PROCEDURE — 99233 SBSQ HOSP IP/OBS HIGH 50: CPT | Performed by: INTERNAL MEDICINE

## 2022-04-29 PROCEDURE — 94761 N-INVAS EAR/PLS OXIMETRY MLT: CPT

## 2022-04-29 PROCEDURE — 71045 X-RAY EXAM CHEST 1 VIEW: CPT

## 2022-04-29 PROCEDURE — 71250 CT THORAX DX C-: CPT

## 2022-04-29 RX ORDER — METHYLPREDNISOLONE SODIUM SUCCINATE 40 MG/ML
30 INJECTION, POWDER, LYOPHILIZED, FOR SOLUTION INTRAMUSCULAR; INTRAVENOUS EVERY 8 HOURS
Status: DISCONTINUED | OUTPATIENT
Start: 2022-04-29 | End: 2022-04-30

## 2022-04-29 RX ORDER — INSULIN LISPRO 100 [IU]/ML
0-3 INJECTION, SOLUTION INTRAVENOUS; SUBCUTANEOUS NIGHTLY
Status: DISCONTINUED | OUTPATIENT
Start: 2022-04-29 | End: 2022-05-03 | Stop reason: HOSPADM

## 2022-04-29 RX ORDER — INSULIN LISPRO 100 [IU]/ML
0-6 INJECTION, SOLUTION INTRAVENOUS; SUBCUTANEOUS
Status: DISCONTINUED | OUTPATIENT
Start: 2022-04-29 | End: 2022-05-03 | Stop reason: HOSPADM

## 2022-04-29 RX ORDER — INSULIN GLARGINE 100 [IU]/ML
12 INJECTION, SOLUTION SUBCUTANEOUS NIGHTLY
Status: DISCONTINUED | OUTPATIENT
Start: 2022-04-30 | End: 2022-04-29

## 2022-04-29 RX ADMIN — METHYLPREDNISOLONE SODIUM SUCCINATE 40 MG: 40 INJECTION, POWDER, LYOPHILIZED, FOR SOLUTION INTRAMUSCULAR; INTRAVENOUS at 09:47

## 2022-04-29 RX ADMIN — POLYETHYLENE GLYCOL 3350 17 G: 17 POWDER, FOR SOLUTION ORAL at 09:47

## 2022-04-29 RX ADMIN — ALBUTEROL SULFATE 2.5 MG: 2.5 SOLUTION RESPIRATORY (INHALATION) at 03:28

## 2022-04-29 RX ADMIN — HEPARIN SODIUM 5000 UNITS: 5000 INJECTION INTRAVENOUS; SUBCUTANEOUS at 20:59

## 2022-04-29 RX ADMIN — FAMOTIDINE 20 MG: 20 TABLET, FILM COATED ORAL at 09:46

## 2022-04-29 RX ADMIN — FAMOTIDINE 20 MG: 20 TABLET, FILM COATED ORAL at 20:59

## 2022-04-29 RX ADMIN — MULTIPLE VITAMINS W/ MINERALS TAB 1 TABLET: TAB at 09:46

## 2022-04-29 RX ADMIN — ALBUTEROL SULFATE 2.5 MG: 2.5 SOLUTION RESPIRATORY (INHALATION) at 14:52

## 2022-04-29 RX ADMIN — RISPERIDONE 1.5 MG: 0.5 TABLET ORAL at 16:54

## 2022-04-29 RX ADMIN — FUROSEMIDE 20 MG: 10 INJECTION, SOLUTION INTRAMUSCULAR; INTRAVENOUS at 09:47

## 2022-04-29 RX ADMIN — ALBUTEROL SULFATE 2.5 MG: 2.5 SOLUTION RESPIRATORY (INHALATION) at 07:01

## 2022-04-29 RX ADMIN — ANTI-FUNGAL POWDER MICONAZOLE NITRATE TALC FREE: 1.42 POWDER TOPICAL at 21:03

## 2022-04-29 RX ADMIN — HYDROCORTISONE SODIUM SUCCINATE 50 MG: 100 INJECTION, POWDER, FOR SOLUTION INTRAMUSCULAR; INTRAVENOUS at 16:52

## 2022-04-29 RX ADMIN — INSULIN LISPRO 1 UNITS: 100 INJECTION, SOLUTION INTRAVENOUS; SUBCUTANEOUS at 16:54

## 2022-04-29 RX ADMIN — ANTI-FUNGAL POWDER MICONAZOLE NITRATE TALC FREE: 1.42 POWDER TOPICAL at 09:50

## 2022-04-29 RX ADMIN — INSULIN LISPRO 2 UNITS: 100 INJECTION, SOLUTION INTRAVENOUS; SUBCUTANEOUS at 20:59

## 2022-04-29 RX ADMIN — FOLIC ACID 1 MG: 1 TABLET ORAL at 09:46

## 2022-04-29 RX ADMIN — HEPARIN SODIUM 5000 UNITS: 5000 INJECTION INTRAVENOUS; SUBCUTANEOUS at 05:28

## 2022-04-29 RX ADMIN — FENTANYL CITRATE 25 MCG: 50 INJECTION, SOLUTION INTRAMUSCULAR; INTRAVENOUS at 14:01

## 2022-04-29 RX ADMIN — METHYLPREDNISOLONE SODIUM SUCCINATE 30 MG: 40 INJECTION, POWDER, FOR SOLUTION INTRAMUSCULAR; INTRAVENOUS at 18:56

## 2022-04-29 RX ADMIN — ACETYLCYSTEINE 200 MG: 200 SOLUTION ORAL; RESPIRATORY (INHALATION) at 07:02

## 2022-04-29 RX ADMIN — SODIUM CHLORIDE, PRESERVATIVE FREE 10 ML: 5 INJECTION INTRAVENOUS at 09:50

## 2022-04-29 RX ADMIN — FUROSEMIDE 20 MG: 10 INJECTION, SOLUTION INTRAMUSCULAR; INTRAVENOUS at 18:57

## 2022-04-29 RX ADMIN — MEROPENEM 1000 MG: 1 INJECTION, POWDER, FOR SOLUTION INTRAVENOUS at 11:56

## 2022-04-29 RX ADMIN — ATORVASTATIN CALCIUM 20 MG: 20 TABLET, FILM COATED ORAL at 09:46

## 2022-04-29 RX ADMIN — RISPERIDONE 1.5 MG: 0.5 TABLET ORAL at 06:40

## 2022-04-29 RX ADMIN — HEPARIN SODIUM 5000 UNITS: 5000 INJECTION INTRAVENOUS; SUBCUTANEOUS at 14:00

## 2022-04-29 RX ADMIN — Medication 12.5 G: at 01:16

## 2022-04-29 RX ADMIN — THIAMINE HCL TAB 100 MG 100 MG: 100 TAB at 09:46

## 2022-04-29 RX ADMIN — ALBUTEROL SULFATE 2.5 MG: 2.5 SOLUTION RESPIRATORY (INHALATION) at 10:34

## 2022-04-29 RX ADMIN — ALBUTEROL SULFATE 2.5 MG: 2.5 SOLUTION RESPIRATORY (INHALATION) at 23:02

## 2022-04-29 RX ADMIN — SODIUM CHLORIDE: 4.5 INJECTION, SOLUTION INTRAVENOUS at 04:04

## 2022-04-29 RX ADMIN — ALBUTEROL SULFATE 2.5 MG: 2.5 SOLUTION RESPIRATORY (INHALATION) at 19:13

## 2022-04-29 RX ADMIN — Medication 12.5 G: at 03:45

## 2022-04-29 RX ADMIN — SODIUM CHLORIDE, PRESERVATIVE FREE 10 ML: 5 INJECTION INTRAVENOUS at 21:03

## 2022-04-29 RX ADMIN — MEROPENEM 1000 MG: 1 INJECTION, POWDER, FOR SOLUTION INTRAVENOUS at 20:05

## 2022-04-29 RX ADMIN — METHYLPREDNISOLONE SODIUM SUCCINATE 40 MG: 40 INJECTION, POWDER, LYOPHILIZED, FOR SOLUTION INTRAMUSCULAR; INTRAVENOUS at 01:10

## 2022-04-29 RX ADMIN — MEROPENEM 1000 MG: 1 INJECTION, POWDER, FOR SOLUTION INTRAVENOUS at 04:03

## 2022-04-29 ASSESSMENT — PAIN DESCRIPTION - PROGRESSION

## 2022-04-29 ASSESSMENT — ENCOUNTER SYMPTOMS
COUGH: 1
ABDOMINAL PAIN: 0
SHORTNESS OF BREATH: 0

## 2022-04-29 ASSESSMENT — PAIN SCALES - GENERAL: PAINLEVEL_OUTOF10: 10

## 2022-04-29 ASSESSMENT — PAIN DESCRIPTION - ORIENTATION: ORIENTATION: RIGHT

## 2022-04-29 ASSESSMENT — PAIN DESCRIPTION - LOCATION: LOCATION: CHEST

## 2022-04-29 ASSESSMENT — PAIN DESCRIPTION - DESCRIPTORS: DESCRIPTORS: DISCOMFORT

## 2022-04-29 NOTE — PROGRESS NOTES
Comprehensive Nutrition Assessment    Type and Reason for Visit:  Reassess    Nutrition Recommendations/Plan:   1. Start Regular diet, follow for tolerance. Malnutrition Assessment:  Malnutrition Status:  No malnutrition (04/17/22 1434)    Context:  Acute Illness     Findings of the 6 clinical characteristics of malnutrition:  Energy Intake:  No significant decrease in energy intake  Weight Loss:  No significant weight loss     Body Fat Loss:  No significant body fat loss     Muscle Mass Loss:  No significant muscle mass loss    Fluid Accumulation:  No significant fluid accumulation     Strength:  Not Performed    Nutrition Assessment:    Pt has been tolerating clear liquid diet, RN said that Dr. Justin Martinez ok with advancing diet to whatever pt feels comfortable with. Writer talked with pt and family and pt said she wants meatloaf & mashed potatoes. Asked if she would like any dessert and her face lit up and said Yes, offered her brownie she was yes and with ice-cream.    Nutrition Related Findings:    Edema: +1 non-pitting all extremities. BM-4-27. Labs & meds reviewed. Wound Type: None       Current Nutrition Intake & Therapies:    Average Meal Intake: NPO     ADULT DIET; Regular; No Drinking Straws    Anthropometric Measures:  Height: 5' 5\" (165.1 cm)  Ideal Body Weight (IBW): 125 lbs (57 kg)    Admission Body Weight: 176 lb (79.8 kg)  Current Body Weight: 183 lb (83 kg), 140.8 % IBW. Weight Source: Bed Scale  Current BMI (kg/m2): 30.5   BMI Categories: Overweight (BMI 25.0-29. 9)    Estimated Daily Nutrient Needs:  Energy Requirements Based On: Kcal/kg  Weight Used for Energy Requirements: Admission  Energy (kcal/day): 0133-5126 kcals based on 20-22 kcals/kg using adm wt 80.2 kg  Weight Used for Protein Requirements: Ideal  Protein (g/day): 74-85 gm protein based on 1.3-1.5 gm/kg using IBW     Nutrition Diagnosis:   · Inadequate oral intake related to impaired respiratory function as evidenced by NPO or clear liquid status due to medical condition,intubation      Nutrition Interventions:   Food and/or Nutrient Delivery: Continue Current Diet  Nutrition Education/Counseling: No recommendation at this time  Coordination of Nutrition Care: Continue to monitor while inpatient       Goals:  Previous Goal Met: No Progress toward Goal(s)  Goals: Meet at least 75% of estimated needs       Nutrition Monitoring and Evaluation:      Food/Nutrient Intake Outcomes: Food and Nutrient Intake  Physical Signs/Symptoms Outcomes: Biochemical Data,GI Status,Fluid Status or Edema,Hemodynamic Status,Nutrition Focused Physical Findings,Skin,Weight    Discharge Planning:     Too soon to determine     Lilly York, [address] [place] [address],   [phone]

## 2022-04-29 NOTE — PROGRESS NOTES
ICU Progress Note (Non-Vent)  [facility]    Patient - Andres Lim,  Age - 74 y.o.    - 1955      Room Number -    N -  064602   [facility]  Date of Admission -  2022 10:25 AM    Events of Past 24 Hours   Patient seen with daughter present bedside. Off vent support. She told me,\" I am doing better\"      Vitals    height is 5' 5\" (1.651 m) and weight is 183 lb 6.8 oz (83.2 kg). Her axillary temperature is 98.8 °F (37.1 °C). Her blood pressure is 97/35 (abnormal) and her pulse is 64. Her respiration is 18 and oxygen saturation is 96%.        Temperature Range: Temp: 98.8 °F (37.1 °C) Temp  Av.5 °F (36.9 °C)  Min: 98.2 °F (36.8 °C)  Max: 98.8 °F (37.1 °C)  BP Range:  Systolic (28BPX), YKR:408 , Min:91 , YCJ:085     Diastolic (74IOC), PLP:93, Min:26, Max:94    Pulse Range: Pulse  Av.9  Min: 62  Max: 87  Respiration Range: Resp  Av.9  Min: 16  Max: 26  Current Pulse Ox[de-identified]  SpO2: 96 %  24HR Pulse Ox Range:  SpO2  Av %  Min: 91 %  Max: 99 %  Oxygen Amount and Delivery: O2 Flow Rate (L/min): 3 L/min    Wt Readings from Last 3 Encounters:   22 183 lb 6.8 oz (83.2 kg)   22 183 lb (83 kg)   22 186 lb 1.1 oz (84.4 kg)     I/O       Intake/Output Summary (Last 24 hours) at 2022 1058  Last data filed at 2022 0558  Gross per 24 hour   Intake 1177.01 ml   Output 1085 ml   Net 92.01 ml     DRAIN/TUBE OUTPUT       Invasive Lines   ICP PRESSURE RANGE  No data recorded  CVP PRESSURE RANGE  No data recorded      Medications      methylPREDNISolone  30 mg IntraVENous E6W    folic acid  1 mg Oral Daily    thiamine  100 mg Oral Daily    famotidine  20 mg Oral BID    miconazole   Topical BID    multivitamin  1 tablet Oral Daily    insulin glargine  18 Units SubCUTAneous BID    insulin lispro  0-12 Units SubCUTAneous Q6H    polyethylene glycol  17 g Oral Daily    furosemide  20 mg IntraVENous BID    meropenem  1,000 mg IntraVENous Q8H    albuterol  2.5 mg Nebulization Q4H    sodium chloride flush  5-40 mL IntraVENous 2 times per day    risperiDONE  1.5 mg Oral Q12H    atorvastatin  20 mg Oral Daily    traZODone  50 mg Oral Nightly    heparin (porcine)  5,000 Units SubCUTAneous 3 times per day     fentanNYL **OR** fentanNYL, hydrALAZINE, sodium chloride flush, sodium chloride, sodium chloride flush, potassium chloride **OR** potassium alternative oral replacement **OR** potassium chloride, glucose, dextrose, glucagon (rDNA), dextrose  IV Drips/Infusions   sodium chloride 15 mL/hr at 04/29/22 0404    sodium chloride      dextrose         Diet/Nutrition   ADULT DIET; Clear Liquid; No Drinking Straws    Exam      Constitutional - Alert, arousable  General Appearance  well developed, well nourished  HEENT -normocephalic, atraumatic. Lungs - Chest expands equally, no wheezes, rales or rhonchi. Cardiovascular - Heart sounds are normal.  normal rate and rhythm regular, no murmur, gallop or rub.   Abdomen - soft, nontender,     Skin - no bruising or bleeding  Extremities - no cyanosis, clubbing     Lab Results   CBC     Lab Results   Component Value Date    WBC 13.3 04/29/2022    RBC 3.13 04/29/2022    RBC 0-2 07/08/2021    HGB 9.3 04/29/2022    HCT 28.9 04/29/2022     04/29/2022    MCV 92.4 04/29/2022    MCH 29.8 04/29/2022    MCHC 32.3 04/29/2022    RDW 17.5 04/29/2022    NRBC 0 07/08/2021    METASPCT 1 04/02/2022    LYMPHOPCT 5 04/19/2022    LYMPHOPCT 12.1 07/08/2021    MONOPCT 2 04/19/2022    MONOPCT 15.1 07/08/2021    BASOPCT 0 04/19/2022    BASOPCT 0.8 07/08/2021    MONOSABS 0.26 04/19/2022    MONOSABS 0.4 07/08/2021    LYMPHSABS 0.66 04/19/2022    LYMPHSABS 0.3 07/08/2021    EOSABS 0.00 04/19/2022    EOSABS 0.1 07/08/2021    BASOSABS 0.00 04/19/2022    DIFFTYPE NOT REPORTED 12/20/2021       BMP   Lab Results   Component Value Date  04/29/2022    K 3.9 04/29/2022    CL 94 04/29/2022    CO2 37 04/29/2022    BUN 29 04/29/2022    CREATININE <0.40 04/29/2022    GLUCOSE 64 04/29/2022    GLUCOSE 127 07/08/2021       LFTS  Lab Results   Component Value Date    ALKPHOS 137 04/16/2022    ALT 25 04/16/2022    AST 19 04/16/2022    PROT 7.0 04/16/2022    PROT 5.7 07/08/2021    BILITOT 0.16 04/16/2022    BILIDIR 0.1 07/08/2021    IBILI 0.12 07/08/2019    LABALBU 2.8 04/16/2022    LABALBU 3.6 07/08/2021       ABG ABGs:   Lab Results   Component Value Date    PHART 7.442 04/28/2022    PO2ART 77.6 04/28/2022    FSZ6VXR 60.6 04/28/2022       Lab Results   Component Value Date    MODE PRVC 04/28/2022         INR  No results for input(s): PROTIME, INR in the last 72 hours. APTT  No results for input(s): APTT in the last 72 hours. Lactic Acid  Lab Results   Component Value Date    LACTA 1.5 04/19/2022    LACTA 1.2 04/02/2022        BNP   No results for input(s): BNP in the last 72 hours. Cultures   Pseudomonas aeruginosa light growth on bronc washings April 18    Radiology     CXR      Chest x-ray reveals endotracheal tube in decent position. No gross new infiltrates      SYSTEMS ASSESSMENT    Acute on chronic hypoxic and hypercapnic respiratory failure, intubated 4/16  Pseudomonas pneumonia  Right-sided patient chest tube was replaced on April 25  Right lower lobe cavitary lesions  Exudative pleural effusion on right, growing Pseudomonas  Low ejection fraction EF 45 to 50%, echo 4/18  History of pulmonary embolism and what appears to be chronic filling defects (subsegmental, most likely clinically inconsequential)  Severe stage IV COPD, FEV1 is 35% of predicted  Recent hospitalization for pneumonia  Elevated inflammatory markers including D-dimer and procalcitonin    Full CODE STATUS    Neuro   She appears alert and lucid    Respiratory   O2 saturations 96% on 3 L. Weaning O2 down as tolerated. Continue with Proventil.   Chest tube was replaced April 25, now on waterseal, general surgery following--Dr. Vernadine Cogan team    Cardiovascular   Currently on Lasix 20 mg twice daily. Patient has ejection fraction 45-50%     Gastrointestinal   Trying to advance diet    Renal     Patient creatinine 0.40  Infectious Disease   Patient on Merrem. The stop date is May 2.     Hematology/Oncology   Lungs subcu every 8 hour for DVT prophylaxis    Endocrine       Social/Spiritual/DNR/Disposition/Other     Updated patient's daughter Bhavik Ibarra, and patient's sister from [place], at [address] Time   0 min    Electronically signed by Joel Weaver MD on 4/29/2022 at 10:58 AM

## 2022-04-29 NOTE — PROGRESS NOTES
Blood glucose recheck 67. Patient alert,Dextrose 50% 12.5g given at this time. Will continue to monitor.

## 2022-04-29 NOTE — PROGRESS NOTES
[address] [facility]    PROGRESS NOTE             4/29/2022    11:10 AM    Name:   Sosa Almendarez  MRN:     [id number]     [place]:      [de-identified]   Room:   2002/2002-01  IP Day:  15  Admit Date:  4/16/2022 10:25 AM    PCP:  Ivory Oliveros MD  Code Status:  Full Code    Subjective:     C/C:   Chief Complaint   Patient presents with    Shortness of Breath     Interval History Status: improved. Patient successfully extubated yesterday evening, on 3L NC O2. Had 2  hypoglycemic episodes overnight. This morning, patient states she is feeling \"all right. \" She is on 3 L nasal cannula oxygen, which is her baseline prior to admission. Denies shortness of breath, chest pain aside from some discomfort at chest tube insertion site. Feels weak but has been working with speech, occupational, and physical therapies. Brief History:     The patient is a 76 y.o.  Non- / non  female sever COPD 3L O2 baseline, bipolar, Hx of DVT/ PE, migraines, who presents with Shortness of Breath and cough  Patient was recently discharge from [address] for mycoplasma pneumonia. She had a follow up appointment with PCP, patient was complaining of cough and chest pain, was started on Tessalon and nsaids for pain. However; she continued to be in distress and her daughter brought her to ED. She was hypoxic initially on 3L o2, was increased to 6L and continued to be hypoxic   Tachypneic, tachycardic  ABGs: pH 7.33, CO2 34, HCO3 18  WBC 18   Lactic 2.8> 1.5  Pancultures were sent  Chest x-ray: Moderate loculated right basal pneumothorax.  Evidence for tension.  Cavitary lesion noted in the right lung base  CT chest: Chronic clot material RUL, RLL.  Thick-walled cavitary lesion RLL.  Multiloculated pleural collection or hydropneumothorax posterior to the right lung, right chest tube in situ.  Infiltrates of the right middle lobe.  Stellate 6 mm lateral RUL nodule.  Mediastinal and right hilar lymphadenopathy  Chest tube was placed and patient was intubated.   Was given 1 L bolus, IV cefepime, vancomycin, 125 mg Solu-Medrol and she is admitted to the hospital for the management of acute hypoxic respiratory failure due to pneumothorax and possible lung infection     4/19: switched to meropenem      4/21: bronchial washing from bronchoscopy growing pseudomonas    4/24: chest tube removed     4/25: Recurrent right sided pneumonthorax shown on repeat CT. Chest tube reinserted by surgery. 4/28: successfully extubated    4/29: on 3L NC O2    Review of Systems:     Review of Systems   Constitutional: Negative for chills and fever. Respiratory: Positive for cough. Negative for shortness of breath. Cardiovascular: Negative for chest pain. Gastrointestinal: Negative for abdominal pain. Neurological: Negative for dizziness, light-headedness and headaches. All other systems reviewed and are negative. Medications: Allergies:     Allergies   Allergen Reactions    Advil [Ibuprofen] Other (See Comments)     vomiting    Aleve [Naproxen] Other (See Comments)     vomiting    Antipyrine Other (See Comments)     unknown    Celecoxib Other (See Comments)    Codeine      headache    Fomepizole     Incruse Ellipta [Umeclidinium Bromide]      confusion    Other      GRASS, TREES, WEEDS    Rofecoxib Other (See Comments)     Unknown reaction    Salicylates      Unknown reaction     Strawberry Extract      HEADACHES    Sulfinpyrazone Other (See Comments)     Unknown reaction    Aspirin Nausea And Vomiting, Other (See Comments) and Nausea Only    Nsaids Nausea Only, Other (See Comments) and Nausea And Vomiting       Current Meds:   Scheduled Meds:    methylPREDNISolone  30 mg IntraVENous Q8H    [START ON 4/30/2022] insulin glargine  12 Units SubCUTAneous Nightly    insulin lispro  0-6 Units SubCUTAneous TID WC    insulin lispro  0-3 Units SubCUTAneous Nightly    folic acid  1 mg Oral Daily    thiamine  100 mg Oral Daily    famotidine  20 mg Oral BID    miconazole   Topical BID    multivitamin  1 tablet Oral Daily    polyethylene glycol  17 g Oral Daily    furosemide  20 mg IntraVENous BID    meropenem  1,000 mg IntraVENous Q8H    albuterol  2.5 mg Nebulization Q4H    sodium chloride flush  5-40 mL IntraVENous 2 times per day    risperiDONE  1.5 mg Oral Q12H    atorvastatin  20 mg Oral Daily    traZODone  50 mg Oral Nightly    heparin (porcine)  5,000 Units SubCUTAneous 3 times per day     Continuous Infusions:    sodium chloride 15 mL/hr at 22 0404    sodium chloride      dextrose       PRN Meds: fentanNYL **OR** fentanNYL, hydrALAZINE, sodium chloride flush, sodium chloride, sodium chloride flush, potassium chloride **OR** potassium alternative oral replacement **OR** potassium chloride, glucose, dextrose, glucagon (rDNA), dextrose    Data:     Past Medical History:   has a past medical history of Abnormal EKG, TRAM (acute kidney injury) ([facility] [address] Anxiety, Asthma, Bipolar disorder ([facility] [address] COPD (chronic obstructive pulmonary disease) ([facility] [address] Cramps, extremity, Depression, Dilated bile duct, Headache, History of elective , Hyperlipidemia, Irritable bowel syndrome, Prolonged emergence from general anesthesia, Substance abuse ([facility] [address] Unspecified sleep apnea, and Vision abnormalities. Social History:   reports that she quit smoking about 3 years ago. Her smoking use included cigarettes. She has a 84.00 pack-year smoking history. She has never used smokeless tobacco. She reports current alcohol use. She reports that she does not use drugs.      Family History:   Family History   Problem Relation Age of Onset    Dementia Maternal Aunt     Kidney Disease Mother     Heart Attack Sister     Prostate Cancer Father     High Cholesterol Brother     Heart Attack Paternal Grandmother        Vitals:  BP (!) 97/35   Pulse 64   Temp 98.8 °F (37.1 °C) (Axillary)   Resp 18   Ht 5' 5\" (1.651 m)   Wt 183 lb 6.8 oz (83.2 kg)   LMP 2013 (Exact Date)   SpO2 96%   BMI 30.52 kg/m²   Temp (24hrs), Av.5 °F (36.9 °C), Min:98.2 °F (36.8 °C), Max:98.8 °F (37.1 °C)    Recent Labs     22  0135 22  0343 22  0423 22  1052   POCGLU 119* 67 134* 186*       I/O(24Hr):     Intake/Output Summary (Last 24 hours) at 2022 1110  Last data filed at 2022 0558  Gross per 24 hour   Intake 1177.01 ml   Output 1085 ml   Net 92.01 ml       Labs:    CBC with Differential:    Lab Results   Component Value Date    WBC 13.3 2022    RBC 3.13 2022    RBC 0-2 2021    HGB 9.3 2022    HCT 28.9 2022     2022    MCV 92.4 2022    MCH 29.8 2022    MCHC 32.3 2022    RDW 17.5 2022    NRBC 0 2021    METASPCT 1 2022    LYMPHOPCT 5 2022    LYMPHOPCT 12.1 2021    MONOPCT 2 2022    MONOPCT 15.1 2021    BASOPCT 0 2022    BASOPCT 0.8 2021    MONOSABS 0.26 2022    MONOSABS 0.4 2021    LYMPHSABS 0.66 2022    LYMPHSABS 0.3 2021    EOSABS 0.00 2022    EOSABS 0.1 2021    BASOSABS 0.00 2022    DIFFTYPE NOT REPORTED 2021     BMP:    Lab Results   Component Value Date     2022    K 3.9 2022    CL 94 2022    CO2 37 2022    BUN 29 2022    LABALBU 2.8 2022    LABALBU 3.6 2021    CREATININE <0.40 2022    CALCIUM 8.1 2022    GFRAA Can not be calculated 2022    LABGLOM Can not be calculated 2022    GLUCOSE 64 2022    GLUCOSE 127 2021       Lab Results   Component Value Date/Time    SPECIAL 8ML GREEN/2ML ORANGE RIGHT IJ 2022 12:12 PM     Lab Results   Component Value Date/Time    CULTURE YEAST ISOLATED IDENTIFICATION TO FOLLOW 2022 12:20 PM    CULTURE NO GROWTH 6 DAYS 2022 12:20 PM    CULTURE PSEUDOMONAS AERUGINOSA LIGHT GROWTH (A) 04/18/2022 12:20 PM    CULTURE NO NORMAL RAVEN 04/18/2022 12:20 PM         Radiology:    ECHO Complete 2D W Doppler W Color    Result Date: 4/18/2022  [facility] Transthoracic Echocardiography Report (TTE)  Patient Name Trista Verduzco     Date of Study               04/18/2022               BOBBY OROSCO   Date of      1955  Gender                      Female  Birth   Age          74 year(s)  Race                           Room Number  2002        Height:                     65 inch, 165.1 cm   Corporate ID [id number]    Weight:                     176 pounds, 79.8 kg  #   Patient Acct [de-identified]   BSA:          1.87 m^2      BMI:      29.29  #                                                              kg/m^2   MR #         [id number]      Sonographer                 Мария Stein   Accession #  [id number]  Interpreting Physician      Giovanna Cosme   Fellow                   Referring Nurse                           Practitioner   Interpreting             Referring Physician         Selena Shaw  Type of Study   TTE procedure:2D Echocardiogram, M-Mode, Doppler, Color Doppler. Procedure Date Date: 04/18/2022 Start: 10:35 AM Study Location: [address] [place] [address] Technical Quality: Fair visualization Indications:Dyspnea/SOB, Respiratory Failure and Pulmonary embolus. History / Tech. Comments: COPD, HLD, Sleep apnea Patient Status: Inpatient Height: 65 inches Weight: 176 pounds BSA: 1.87 m^2 BMI: 29.29 kg/m^2 Rhythm: Sinus bradycardia HR: 57 bpm BP: 138/65 mmHg CONCLUSIONS Summary Left ventricle is normal in size and wall thickness. Global left ventricular systolic function appears mildly reduced. Estimated LV EF 45-50 %. No obvious wall motion abnormality seen. RV size appears normal mildly reduced function Left atrium is normal in size. No significant valvular regurgitation or stenosis seen. No significant pericardial effusion is seen. Normal aortic root dimension.  Dilated IVC, impaired or no respiratory variations suggestive of elevate Rt atrial pressure Signature ----------------------------------------------------------------------------  Electronically signed by Giovanna Cosme(Interpreting physician) on  2022 05:06 PM ---------------------------------------------------------------------------- ----------------------------------------------------------------------------  Electronically signed by Мария Stein(Sonographer) on 2022 02:29  PM ---------------------------------------------------------------------------- FINDINGS Left Atrium Left atrium is normal in size. Left Ventricle Left ventricle is normal in size and wall thickness. Global left ventricular systolic function is mildly reduced . Estimated LV EF  %. No obvious wall motion abnormality seen. Right Atrium Right atrium is normal in size. Right Ventricle Normal right ventricular size , mildly reduced function. Mitral Valve No obvious valvular abnormality seen. No evidence of mitral regurgitation. Aortic Valve No obvious valvular abnormality seen. No evidence of aortic insufficiency or stenosis. Tricuspid Valve No obvious valvular abnormality seen. Insignificant tricuspid regurgitation, unable to estimate RVSP. Pulmonic Valve Pulmonic valve was not well visualized. No evidence of pulmonic insufficiency or stenosis. Pericardial Effusion No significant pericardial effusion is seen. Pleural Effusion No pleural effusion seen. Miscellaneous Normal aortic root dimension. IVC Increased diameter and impaired or no inspiratory variation indicating elevated RA filling pressure (i.e. CVP) .  M-mode / 2D Measurements & Calculations:   LVIDd:4.74 cm(3.7 - 5.6 cm)      Aortic Root:3.3 cm(2.0 - 3.7 cm)  LVIDs:3.28 cm(2.2 - 4.0 cm)      LA Dimension: 3.5 cm(1.9 - 4.0 cm)  IVSd:1.05 cm(0.6 - 1.1 cm)       LA volume/Index: 40.7 ml /22m^2  LVPWd:0.93 cm(0.6 - 1.1 cm)  Fractional Shortenin.8 %      XR CHEST (SINGLE VIEW FRONTAL)    Result Date: 4/25/2022  EXAMINATION: ONE XRAY VIEW OF THE CHEST 4/25/2022 11:53 am COMPARISON: 04/25/2022, 04/24/2022, 04/23/2022 HISTORY: ORDERING SYSTEM PROVIDED HISTORY: chest tube placement TECHNOLOGIST PROVIDED HISTORY: chest tube placement Reason for Exam: chest tube placement FINDINGS: Interval placement of a large-bore thoracostomy tube terminating over the medial right lung base. There appears to be good lung re-expansion with only small volume residual pneumothorax. Increased patchy opacities along the right lung base. Left lung is grossly clear on a background of emphysematous change. Cardiomediastinal contours are within normal limits and unchanged. Subcutaneous emphysema along the partially visualized right lateral chest wall and along the base of the neck on the right. The tip of the ET tube is 4 cm above the nimesh. Enteric tube tip is subdiaphragmatic in location coursing beyond the inferior field of view. Right internal jugular central venous catheter terminates over the superior cavoatrial junction. Large bore right-sided thoracostomy tube terminating over the medial right lung base. Good lung re-expansion with only small volume residual pneumothorax. XR CHEST (SINGLE VIEW FRONTAL)    Result Date: 4/5/2022  EXAMINATION: ONE XRAY VIEW OF THE CHEST 4/5/2022 8:55 am COMPARISON: April 3, 2022 HISTORY: ORDERING SYSTEM PROVIDED HISTORY: pna TECHNOLOGIST PROVIDED HISTORY: pna Reason for Exam: pna FINDINGS: Stable position of the right internal jugular central venous catheter. Right infrahilar airspace opacity not significantly changed. No new focal lung abnormality. No sizable pleural effusion. No pneumothorax.      Stable right infrahilar airspace opacity     XR CHEST (SINGLE VIEW FRONTAL)    Result Date: 4/3/2022  EXAMINATION: ONE XRAY VIEW OF THE CHEST 4/3/2022 5:51 am COMPARISON: 1 April 2022 HISTORY: ORDERING SYSTEM PROVIDED HISTORY: sob, pleurisy, cough TECHNOLOGIST PROVIDED HISTORY: sob, pleurisy, cough Reason for Exam: Shortness of breath, pleurisy, cough Additional signs and symptoms: Shortness of breath, pleurisy, cough Relevant Medical/Surgical History: Shortness of breath, pleurisy, cough FINDINGS: AP portable view of the chest time stamped at 528 hours demonstrates overlying cardiac monitoring electrodes. Heart size is stable. Right internal jugular catheter terminates in the distal superior vena cava. There has been worsening of a focal opacity at the right lung base. Minimal left basilar opacity is unchanged. No extrapleural air or overt edema. No gross effusions. Worsening opacity at the right base favoring airspace disease. Change in minimal left basilar opacity which may be related atelectasis. CT CHEST WO CONTRAST    Result Date: 4/25/2022  EXAMINATION: CT OF THE CHEST WITHOUT CONTRAST 4/25/2022 11:29 am TECHNIQUE: CT of the chest was performed without the administration of intravenous contrast. Multiplanar reformatted images are provided for review. Dose modulation, iterative reconstruction, and/or weight based adjustment of the mA/kV was utilized to reduce the radiation dose to as low as reasonably achievable. COMPARISON: CT chest performed 04/16/2022. HISTORY: ORDERING SYSTEM PROVIDED HISTORY: Empyema TECHNOLOGIST PROVIDED HISTORY: Empyema Reason for Exam: Empyema per order Additional signs and symptoms: pt. intubated Relevant Medical/Surgical History: pt. unable to tolerate arms over head for exam FINDINGS: Mediastinum: Soft tissues of the thoracic inlet are unremarkable. The thoracic aorta is normal in caliber. Main pulmonary artery is normal in caliber. There is no pericardial effusion. There is no mediastinal or hilar adenopathy. There is an endotracheal tube with the tip in the midtrachea. Lungs/pleura: There is underlying emphysematous change. Left lung is clear. There is a moderate to large right-sided pneumothorax.   There are few scattered similar nodular foci within the upper aspect of the right lung. There is pleural fluid containing septations and scattered air pockets. There is a thick-walled cavitary area that is smaller compared to prior exam measuring approximately 4.3 x 4.3 cm. Upper Abdomen: The upper abdomen is unremarkable. Soft Tissues/Bones: There is subcutaneous emphysema along the right lateral chest wall. There is no axillary adenopathy. There is no acute osseous abnormality. Moderate to large right-sided pneumothorax. Pleural fluid inferiorly and posteriorly containing septations and loculated areas of air or cavitation. The thick-walled cavitary lesion previously seen is smaller compared to prior examination. Similar pulmonary nodules primarily in the right upper lobe. Underlying emphysematous change. Subcutaneous emphysema along the right lateral chest wall. XR CHEST PORTABLE    Result Date: 4/29/2022  EXAMINATION: ONE XRAY VIEW OF THE CHEST 4/29/2022 5:02 am COMPARISON: 04/28/2022 HISTORY: ORDERING SYSTEM PROVIDED HISTORY: Acute respiratory failure TECHNOLOGIST PROVIDED HISTORY: Acute respiratory failure Reason for Exam: Acute respiratory failure Additional signs and symptoms: Acute respiratory failure Relevant Medical/Surgical History: Acute respiratory failure FINDINGS: Endotracheal tube and nasogastric tube have been removed. Right internal jugular central venous catheter extends to expected location of cavoatrial junction. Multifocal heterogeneous opacity throughout the right lung is grossly similar to prior. Right chest tube extends to the medial right hemithorax. No gross pneumothorax. Cardiac and mediastinal silhouettes are unchanged. Soft tissue emphysema is seen at the right chest wall and axilla. No substantial interval change in multifocal right-sided airspace disease as compared to prior.      XR CHEST PORTABLE    Result Date: 4/28/2022  EXAMINATION: ONE XRAY VIEW OF THE CHEST 4/28/2022 2:33 pm COMPARISON: AP chest/20 HISTORY: ORDERING SYSTEM PROVIDED HISTORY: Chest tube re-exam. Pneumothorax TECHNOLOGIST PROVIDED HISTORY: Chest tube re-exam. Pneumothorax Reason for Exam: Chest tube re-exam. Pneumothorax History of asthma, COPD, substance abuse kidney injury. FINDINGS: Right IJ line tip position stable at the cavoatrial junction. ETT tip position unchanged approximately 3.3 cm above the nimesh. Enteric tube tip and side hole remain below left hemidiaphragm. Overlying ECG monitor leads gown snaps. Unchanged right-sided subcutaneous emphysema and chest tube entering 5-C6 with its tip overlying the hilum. Cardiomediastinal shadow stable. Scattered parenchymal densities right mid lower lung and slight basilar atelectasis or scarring. No new pulmonary finding. No sizable pleural effusion or pneumothorax. Bones appear unchanged. Unchanged support and right-sided chest tubes; otherwise stable findings, as above. XR CHEST PORTABLE    Result Date: 4/28/2022  EXAMINATION: ONE XRAY VIEW OF THE CHEST 4/28/2022 6:08 am COMPARISON: Chest radiograph performed 04/27/2022. HISTORY: ORDERING SYSTEM PROVIDED HISTORY: Acute respiratory failure TECHNOLOGIST PROVIDED HISTORY: Acute respiratory failure Reason for Exam: ETT FINDINGS: There are trace effusions. There is no definite pneumothorax. The mediastinal structures are stable. The upper abdomen unremarkable. There is extensive subcutaneous emphysema along the right lateral chest wall and within the right breast.  There is a similar right-sided chest tube. There is endotracheal tube with tip in the midtrachea. There is a gastric tube and the tip is not visualized. There is a right internal jugular line that is stable. Trace effusions. No definite pneumothorax. Stable support tubes.  Subcutaneous emphysema along the right lateral chest wall and within the right breast.     XR CHEST PORTABLE    Result Date: 4/27/2022  EXAMINATION: ONE XRAY VIEW OF THE CHEST 4/27/2022 6:06 am COMPARISON: AP chest from 04/26/2022 HISTORY: ORDERING SYSTEM PROVIDED HISTORY: Acute respiratory failure TECHNOLOGIST PROVIDED HISTORY: Acute respiratory failure Reason for Exam: respiratory failure History of COPD, and acute on chronic respiratory failure. FINDINGS: ETT tip position stable, ~ 2.6 cm above nimesh. Enteric tube tip/side hole remain below left hemidiaphragm. Right IJ central line tip position stable upper RA. Right-sided chest tube position stable, entering lateral chest wall with medial tip abutting mid mediastinal pleura. Right-sided subcutaneous emphysema, unchanged. Overlying ECG monitor leads. Cardiomediastinal shadow stable. Increased right lung opacity, especially lower 1/2 right hemithorax, much could relate to superimposed breast soft tissue. Infiltrate/loculated pleural effusion should also be considered. Slightly increased right basilar atelectasis. No blunting right CP angle. Bones stable. Stable support and right-sided chest tube. Increased opacities right lung, more lower and lateral in location, much of which could be related to superimposed right breast tissue. Infiltrate/loculated pleural fluid should also be considered. Some increased atelectasis right base suspected. RECOMMENDATION: Continued chest x-ray follow-up (later today if respiratory failure/deteriorating clinical status suspected). XR CHEST PORTABLE    Result Date: 4/26/2022  EXAMINATION: ONE XRAY VIEW OF THE CHEST 4/25/2022 5:51 am COMPARISON: 04/24/2022 HISTORY: ORDERING SYSTEM PROVIDED HISTORY: Acute respiratory failure. TECHNOLOGIST PROVIDED HISTORY: Acute respiratory failure. Reason for Exam: Acute respiratory failure. Additional signs and symptoms: Acute respiratory failure. Relevant Medical/Surgical History: Acute respiratory failure. FINDINGS: Support lines and tubes are similar to the prior study. The endotracheal tube tip is 5 cm above the nimesh.  The heart is normal in size for technique. No definite pleural effusion is seen. Suspected tiny right pneumothorax, along the lower chest wall laterally. Mild bibasilar airspace opacities again noted. Soft tissue emphysema is again seen along the right chest wall and neck. Suspected small right pneumothorax. Similar mild bibasilar airspace opacities. Support lines and tubes appear stable. XR CHEST PORTABLE    Result Date: 4/26/2022  EXAMINATION: ONE XRAY VIEW OF THE CHEST 4/26/2022 6:35 am COMPARISON: Chest radiograph performed 04/25/2022. HISTORY: ORDERING SYSTEM PROVIDED HISTORY: Acute respiratory failure TECHNOLOGIST PROVIDED HISTORY: Acute respiratory failure Reason for Exam: Acute resp failure, ETT FINDINGS: There is chronic pulmonary change. There are trace effusions. There is no pneumothorax. The mediastinal structures are unremarkable. The upper abdomen is unremarkable. The extrathoracic soft tissues are unremarkable. There is endotracheal tube with tip in the midtrachea. There is a gastric tube and the tip is not well visualized. There is a right internal jugular line with the tip near the cavoatrial junction. There is a right-sided chest tube with subcutaneous emphysema along the right lateral chest wall. Chronic pulmonary change. Support tubes as described above. Subcutaneous emphysema along the right lateral chest wall. XR CHEST PORTABLE    Result Date: 4/24/2022  EXAMINATION: ONE XRAY VIEW OF THE CHEST 4/24/2022 5:51 am COMPARISON: 04/23/2022 HISTORY: ORDERING SYSTEM PROVIDED HISTORY: Acute respiratory failure TECHNOLOGIST PROVIDED HISTORY: Acute respiratory failure Reason for Exam: Acute respiratory failure Additional signs and symptoms: Acute respiratory failure Relevant Medical/Surgical History: Acute respiratory failure FINDINGS: Right central venous catheter. Endotracheal tube and nasogastric tube are stable. Right chest wall subcutaneous emphysema is noted.   No significant pneumothorax is detected. Mild right lower lobe atelectasis and small right pleural effusion have improved. Improving small right pleural effusion and right lower lobe atelectasis. The lines and tubes are stable. XR CHEST PORTABLE    Result Date: 4/23/2022  EXAMINATION: ONE XRAY VIEW OF THE CHEST 4/23/2022 9:29 pm COMPARISON: 4/23/2022 HISTORY: ORDERING SYSTEM PROVIDED HISTORY: chest tube removal TECHNOLOGIST PROVIDED HISTORY: chest tube removal Reason for Exam: chest tube removal Additional signs and symptoms: chest tube removal Relevant Medical/Surgical History: chest tube removal FINDINGS: Right chest wall subcutaneous emphysema is identified. Endotracheal tube is located 0.7 cm above the nimesh. Suggest retraction by 2 cm. Nasogastric tube traverses the diaphragm. There is a right IJ catheter with the tip in the SVC. There has been removal of the right chest tube without evidence of significant pneumothorax. Small right pleural effusion and right lower lobe atelectasis are not significantly changed. No acute osseous abnormality is identified. Interval removal of right chest tube without evidence of significant pneumothorax. Endotracheal tube located 0.7 cm above the nimesh. Suggest retraction by 2 cm. Small right pleural effusion and right lower lobe atelectasis are unchanged. XR CHEST PORTABLE    Result Date: 4/23/2022  EXAMINATION: ONE XRAY VIEW OF THE CHEST 4/23/2022 5:40 am COMPARISON: 04/22/2022 and 04/21/2022 HISTORY: ORDERING SYSTEM PROVIDED HISTORY: ETT placement TECHNOLOGIST PROVIDED HISTORY: ETT placement Reason for Exam: ETT placement Additional signs and symptoms: ETT placement Relevant Medical/Surgical History: ETT placement FINDINGS: Endotracheal tube tip is approximately 2 cm above the nimesh. Nasogastric tube continues into the stomach and off the exam.  Right PICC line is near the cavoatrial junction.   The right chest tube is stable with the tip over the right upper lung but the side port outside the ribcage. Soft tissue emphysema in the right chest wall is redemonstrated and appears mildly increased. Some patchy opacities persist in the right base. Evaluation for right apical pneumothorax is suboptimal.  The left lung appears acceptable. Cardiac silhouette is not enlarged. The central pulmonary arteries appears stable. Limited evaluation of the right lateral ribs. 1.  Endotracheal tube, nasogastric tube, and right jugular catheter appear acceptable. The right chest tube side port is outside the ribcage. Correlate for functionality of the chest tube. 2.  Patchy opacities persist in the right lower chest.  The evaluation for small right apical pneumothorax is suboptimal on the current study. XR CHEST PORTABLE    Result Date: 4/22/2022  EXAMINATION: ONE XRAY VIEW OF THE CHEST 4/22/2022 6:29 am COMPARISON: 21 April 2022 HISTORY: ORDERING SYSTEM PROVIDED HISTORY: ETT placement TECHNOLOGIST PROVIDED HISTORY: ETT placement Reason for Exam: on vent FINDINGS: AP portable view of the chest time stamped at 623 hours demonstrates overlying cardiac monitoring electrodes, endotracheal tube terminating 4.8 cm above the nimesh and right internal jugular catheter terminating in the right atrium. Right-sided chest tube is again noted. Trace extrapleural air at the right apex persists. Subcutaneous emphysema along the right lateral chest wall is noted. Faint opacity is present at the right lung base suspicious for focal airspace disease. No mediastinal shift. Heart size is stable. Persistent small right apical pneumothorax. Support tubes and lines as above. Opacity at the right base. There is elevation right hemidiaphragm. Atelectasis is evident but superimposed airspace disease may be present. XR CHEST PORTABLE    Result Date: 4/21/2022  EXAMINATION: ONE XRAY VIEW OF THE CHEST 4/21/2022 11:57 am COMPARISON: 04/21/2022, 0625 hours.  HISTORY: ORDERING SYSTEM PROVIDED HISTORY: Chest tube now clamped TECHNOLOGIST PROVIDED HISTORY: Chest tube now clamped Reason for Exam: chest tube now clamped FINDINGS: The ET tube was in satisfactory position, 4.5 cm above the nimesh. The NG tube was passed the gastric fundus. A right jugular central venous line was noted with the tip in the superior vena cava near its junction with the right atrium. A right-sided chest tube was noted. The proximal most opening is just out of the right lateral chest wall. No pneumothorax was noted. No cardiomegaly, pneumonia, interstitial edema or definite effusions were noted. Mild hyper inflation was identified. The ET tube was in satisfactory position, 4.5 cm above the nimesh. The NG tube was past the gastric fundus. A right jugular central venous line was noted with the tip in the superior vena cava near its junction with the right atrium. No pneumothorax was identified. A right-sided chest tube was noted but the proximal most opening is just external to the right lateral chest wall. No cardiomegaly, pneumonia or interstitial edema. XR CHEST PORTABLE    Result Date: 4/21/2022  EXAMINATION: ONE XRAY VIEW OF THE CHEST 4/21/2022 6:30 am COMPARISON: 04/20/2022 HISTORY: ORDERING SYSTEM PROVIDED HISTORY: ETT placement TECHNOLOGIST PROVIDED HISTORY: ETT placement Reason for Exam: vent FINDINGS: Support tubes and lines are unchanged. The right chest tube side port is external to the chest wall which may predispose to air leak. Cardiac silhouette and mediastinal contours are unchanged. Calcified left hilar lymph nodes are noted. No pneumothorax. No new lung infiltrate. Aeration of the right lung base has improved. 1. The right chest tube side port is external to the chest wall which may predispose to an air leak. No pneumothorax. 2. Improved aeration of the right lung base, likely related to atelectasis.      XR CHEST PORTABLE    Result Date: 4/20/2022  EXAMINATION: ONE XRAY VIEW OF THE CHEST 4/20/2022 11:52 am COMPARISON: None. HISTORY: ORDERING SYSTEM PROVIDED HISTORY: Chest tube now to waterseal TECHNOLOGIST PROVIDED HISTORY: Chest tube now to waterseal Reason for Exam: Chest tube now to waterseal FINDINGS: There is a right chest tube in place. There is no evidence of pneumothorax. Some apparent atelectasis noted in the the right lung base. ET tube is in good position. Tip of central line overlies the right atrium. Left lung appears normal.  Heart appears unremarkable. No evidence of pneumothorax. Some atelectasis in the right lung base. Tip of centralized overlies the right atrium. It should be withdrawn by 6 cm. The findings were sent to the Radiology Results [id number] at 12:21 pm on 4/20/2022 to be communicated to a licensed caregiver. XR CHEST PORTABLE    Result Date: 4/20/2022  EXAMINATION: ONE XRAY VIEW OF THE CHEST 4/20/2022 6:30 am COMPARISON: 04/19/2022 HISTORY: ORDERING SYSTEM PROVIDED HISTORY: ETT placement TECHNOLOGIST PROVIDED HISTORY: ETT placement Reason for Exam: ETT FINDINGS: The cardiac silhouette and mediastinal contours are stable. There is a right-sided chest tube with the side port noted external to the chest wall. Right internal jugular vein catheter, endotracheal tube, and orogastric tube are again noted. There is pulmonary vascular congestion. No pneumothorax. The patient is rotated towards the right. 1. Right chest tube side port is external to the chest wall which may predispose to an air leak. 2. Stable pulmonary vascular congestion. 3. No pneumothorax is identified. XR CHEST PORTABLE    Result Date: 4/19/2022  EXAMINATION: ONE X-RAY VIEW OF THE CHEST 4/18/2022 6:27 am COMPARISON: 04/17/2022 HISTORY: ORDERING SYSTEM PROVIDED HISTORY: ETT placement TECHNOLOGIST PROVIDED HISTORY: ETT placement Reason for Exam: ETT placement FINDINGS: The endotracheal tube, nasogastric tube, right IJ catheter and right-sided chest tube remain.   The extreme lung apices are excluded from the examination. A pneumothorax is not identified. Right basilar consolidation has increased peripherally. Increased right basilar opacity may reflect fluid filling the large cavitary lesion at the right lung base. Pulmonary consolidation/increased pleural effusion or empyema are alternate considerations. XR CHEST PORTABLE    Result Date: 4/19/2022  EXAMINATION: ONE XRAY VIEW OF THE CHEST 4/19/2022 6:34 am COMPARISON: Chest radiograph performed 04/18/2022. HISTORY: ORDERING SYSTEM PROVIDED HISTORY: ETT placement TECHNOLOGIST PROVIDED HISTORY: ETT placement Reason for Exam: on vent FINDINGS: There is right lower lung infiltrate. There is no pneumothorax. The mediastinal structures are unremarkable. The upper abdomen is unremarkable. The extrathoracic soft tissues are unremarkable. There is an endotracheal tube with the tip in the midtrachea. There is a right-sided chest tube. There is a right internal jugular central line with the tip at the cavoatrial junction. There is a gastric tube and the tip is not well visualized. Right lower lung infiltrate that is mildly improved. Support tubes as described above. XR CHEST PORTABLE    Result Date: 4/17/2022  EXAMINATION: ONE XRAY VIEW OF THE CHEST 4/17/2022 6:04 am COMPARISON: 04/16/2022, 1248 hours HISTORY: ORDERING SYSTEM PROVIDED HISTORY: ETT placement TECHNOLOGIST PROVIDED HISTORY: ETT placement Reason for Exam: ETT 80-year-old female with endotracheal tube placement FINDINGS: Endotracheal tube distal tip overlying the mid trachea approximately 4.8 cm above the level of the nimesh. Enteric tube traverses the GE junction with distal tip excluded from the field of view. Right IJ approach central venous catheter distal tip overlying the right atrium. Right-sided chest tube distal tip projects over the right hilum. Cardiac monitor leads overlie the chest. No obvious pneumothorax. No free air.   Cardiac and mediastinal contours remain unchanged. Left lung is relatively clear. Persistent airspace disease at the right mid and right lower lung zones. Visualized osseous structures remain unchanged. Subcutaneous emphysema previously seen at the right lateral chest wall no longer identified. 1. Persistent airspace disease at the right mid and right lower lung zones. Follow-up is recommended to document resolution. 2. Tubes and right IJ line as detailed above. XR CHEST PORTABLE    Result Date: 4/16/2022  EXAMINATION: ONE XRAY VIEW OF THE CHEST 4/16/2022 1:10 pm COMPARISON: 04/16/2022, 1213 hours HISTORY: ORDERING SYSTEM PROVIDED HISTORY: chest tube TECHNOLOGIST PROVIDED HISTORY: chest tube Reason for Exam: chest tube Additional signs and symptoms: chest tube and NG tube placement 79-year-old female with history of NG tube and chest tube placement FINDINGS: Portable supine view of the chest. Right-sided chest tube has been placed with distal tip projecting over the right infrahilar region. Right IJ approach central venous catheter distal tip overlying the cavoatrial junction, stable. Endotracheal tube distal tip overlying the mid trachea approximately 4.3 cm above the level of the nimesh. Enteric tube traverses the GE junction with distal tip projecting over the left mid abdomen likely within the stomach body. Left lung is clear. Pleural thickening and opacity involving the right mid and right lower lung zones. Subcutaneous emphysema along the right lateral chest wall. Cardiac and mediastinal contours remain unchanged. Atherosclerotic calcification of the thoracic aorta. No free air. There is re-expansion of the right lung compared with the prior study. Probable small residual inferolateral right pneumothorax component. 1. Tubes and right IJ line as detailed above. Re-expansion of the right lung compared with the prior study. There is likely a small residual right inferolateral pneumothorax component.  2. Pleural thickening and airspace opacity involving the right mid and right lower lung zones. Follow-up is recommended to document resolution. 3. Subcutaneous emphysema along the right lateral chest wall. XR CHEST PORTABLE    Result Date: 4/16/2022  EXAMINATION: ONE XRAY VIEW OF THE CHEST 4/16/2022 12:24 pm COMPARISON: None. HISTORY: ORDERING SYSTEM PROVIDED HISTORY: Intubation TECHNOLOGIST PROVIDED HISTORY: Intubation Reason for Exam: central line and ET placement FINDINGS: ET tube terminates 3 cm above the nimesh. Right line terminates in the SVC. There is a relatively stable right-sided basilar pneumothorax. The remaining lungs are unchanged. Cardiac silhouette and osseous structures unchanged. Intubation as above. No significant change     XR CHEST PORTABLE    Result Date: 4/16/2022  EXAMINATION: ONE XRAY VIEW OF THE CHEST 4/16/2022 11:02 am COMPARISON: 04/05/2022 HISTORY: ORDERING SYSTEM PROVIDED HISTORY: SOB TECHNOLOGIST PROVIDED HISTORY: SOB Reason for Exam: SOB FINDINGS: There is a moderate loculated right basal pneumothorax. Cavitary lesion is noted in the right lung base. Left lung is unremarkable. Heart and mediastinal structures appear normal.  There is no evidence for any tension phenomena. Moderate loculated right basal pneumothorax. Evidence for tension. Cavitary lesion noted in the right lung base. .  Case discussed with Dr. Jimmy Poole. XR CHEST PORTABLE    Result Date: 4/1/2022  EXAMINATION: ONE XRAY VIEW OF THE CHEST 4/1/2022 4:01 pm COMPARISON: 04/01/2022 HISTORY: ORDERING SYSTEM PROVIDED HISTORY: central line placed TECHNOLOGIST PROVIDED HISTORY: central line placed Reason for Exam: Central line placed FINDINGS: There is a right internal jugular central line in place with distal tip overlying the superior vena cava. Previously noted right basal infiltrate is unchanged. Left lung appears clear. The heart and mediastinal structures appear normal.  There is no evidence of pneumothorax. Satisfactory position of right internal jugular central line. No change in the the right basal infiltrate. XR CHEST PORTABLE    Result Date: 4/1/2022  EXAMINATION: ONE XRAY VIEW OF THE CHEST 4/1/2022 1:22 pm COMPARISON: 06/17/2020. CT dated 10/15/2021. HISTORY: ORDERING SYSTEM PROVIDED HISTORY: dyspnea TECHNOLOGIST PROVIDED HISTORY: dyspnea Reason for Exam: Dyspnea Relevant Medical/Surgical History: Hx of COPD FINDINGS: The cardiac silhouette appears within normal limits. There are bibasilar opacities, right greater than left which may reflect multifocal pneumonia in the correct clinical setting. No evidence of pleural effusion or pneumothorax is seen. Bibasilar opacities, right greater than left, which may reflect multifocal pneumonia. Follow-up to imaging resolution is recommended. CT CHEST PULMONARY EMBOLISM W CONTRAST    Result Date: 4/16/2022  EXAMINATION: CTA OF THE CHEST 4/16/2022 2:30 pm TECHNIQUE: CTA of the chest was performed after the administration of intravenous contrast.  Multiplanar reformatted images are provided for review. MIP images are provided for review. Dose modulation, iterative reconstruction, and/or weight based adjustment of the mA/kV was utilized to reduce the radiation dose to as low as reasonably achievable. COMPARISON: CT chest from 10/15/2021 HISTORY: ORDERING SYSTEM PROVIDED HISTORY: SOB TECHNOLOGIST PROVIDED HISTORY: SOB Decision Support Exception - unselect if not a suspected or confirmed emergency medical condition->Emergency Medical Condition (MA) Reason for Exam: sob Relevant Medical/Surgical History: intubated, septic, hx of PE 73-year-old female with shortness of breath FINDINGS: Pulmonary Arteries: No obvious filling defect in the main, right main, or left main pulmonary arteries.  Evaluation of the segmental and subsegmental pulmonary arterial vasculature is limited due to respiratory motion suboptimal bolus timing, airspace disease, and streak artifact. There are areas of probable residual linear chronic clot within the right lower lobar pulmonary artery on image 111, series 2. Probable linear residual clot within the anterior right upper lobe pulmonary artery on image 83, series 2. Mediastinum: Atherosclerotic calcification of the aorta and branch vasculature. No dissection flap within the visualized thoracic aorta. No pericardial effusion. There is fluid in the superior pericardial recess. Enlarged precarinal lymph nodes remain unchanged on image 83, series 2. Right hilar lymphadenopathy is seen on image 96, series 2. Coronary artery disease. No left hilar or axillary lymphadenopathy. Lungs/pleura: Endotracheal tube distal tip within the lower trachea. Trachea and very proximal central airways appear patent. Narrowing of the right middle lobe airway into a region of partial consolidation/atelectasis/scarring. Opacification of the right lower lobar segmental airways. There is a thick-walled cavitary lesion in the right lower lobe measuring 5.5 x 7.3 cm in greatest AP and transverse dimensions on image 68, series 4. There is a dependent air-fluid level within this lesion. This area measures 7.6 cm in greatest craniocaudal extent on image 48, series 602. There is surrounding airspace disease. There is a gas and fluid containing multiloculated pleural collection or hydropneumothorax along the posterior margin of the right lung. Right sided chest tube distal tip along the anteromedial right lung. Subcutaneous emphysema along the right axilla and right lateral chest wall. Stellate pulmonary nodule in the lateral right upper lobe measuring 6 mm on image 32, series 4. Moderate to severe emphysema, dependent atelectasis and respiratory motion. Upper Abdomen: Fatty liver. NG tube is seen extending into the stomach body. Atherosclerotic calcification of the upper abdominal aorta and branch vasculature.  Soft Tissues/Bones: Chronic anterior wedge compression deformities, unchanged from 10/15/2021 involving T5 and T6. Mild diffuse degenerative changes throughout the spine. 1. Linear filling defects in the anterior right upper lobe and right lower lobe pulmonary arteries likely representing residual/chronic clot material. No acute central filling defect/pulmonary embolus is evident. 2. Thick-walled cavitary lesion in the right lower lobe measuring up to 5.5 x 7.3 x 7.6 cm which could be related to TB or fungal disease or a necrotizing pneumonia. Underlying cavitary malignancy not entirely excluded. There is surrounding airspace disease. There is also an associated multiloculated pleural collection or hydropneumothorax primarily along the posterior margin of the right lung. Right-sided chest tube distal tip along the anteromedial right pleura/lung. 3. Partial consolidation, atelectasis and/or infiltrate of the right middle lobe. 4. Stellate 6 mm lateral right upper lobe pulmonary nodule. Follow-up guidelines provided below. 5. Underlying moderate to severe emphysema. 6. Endotracheal and NG tubes as above. 7. Coronary artery disease. 8. Chronic anterior wedge compression deformities of T5 and T6. 9. Subcutaneous emphysema along the right axilla and right lateral chest wall likely related to chest tube. 10. Mediastinal and right hilar lymphadenopathy. RECOMMENDATIONS: 6 mm suspicious right solid pulmonary nodule within the upper lobe. Recommend a non-contrast Chest CT at 6-12 months, then another non-contrast Chest CT at 18-24 months. These guidelines do not apply to immunocompromised patients and patients with cancer. Follow up in patients with significant comorbidities as clinically warranted. For lung cancer screening, adhere to Lung-RADS guidelines. Reference: Radiology. 2017; 284(1):228-43.      VL Lower Extremity Bilateral Venous Duplex    Result Date: 4/18/2022    [facility]  Vascular Lower Extremities DVT Study Procedure   Patient Name   Rody Meneses     Date of Study           04/18/2022                 BOBBY OROSCO   Date of Birth  1955  Gender                  Female   Age            74 year(s)  Race                       Room Number    2002   Corporate ID # [id number]   Patient Acct # [de-identified]   MR #           [id number]      Sonographer             Corinna Marmolejo RVT   Accession #    [id number]  Interpreting Physician  Domo Norris   Referring                  Referring Physician     Octavio Chamber  Nurse  Practitioner  Procedure Type of Study:   Veins: Lower Extremities DVT Study, Venous Scan Lower Bilateral.  Indications for Study:R/O DVT. Patient Status: In Patient. Technical Quality:Adequate visualization. Conclusions   Summary   Bilateral:  No evidence of deep or superficial venous thrombosis. Signature   ----------------------------------------------------------------  Electronically signed by Corinna Marmolejo RVT(Sonographer) on  04/18/2022 07:45 AM  ----------------------------------------------------------------   ----------------------------------------------------------------  Electronically signed by Domo Norris(Interpreting physician)  on 04/18/2022 01:10 PM  ----------------------------------------------------------------  Findings:   Right Impression:                    Left Impression:  The common femoral, femoral,         The common femoral, femoral,  popliteal and tibial veins           popliteal and tibial veins  demonstrate normal compressibility   demonstrate normal compressibility  and augmentation. and augmentation. Normal compressibility of the great  Normal compressibility of the great  saphenous vein. saphenous vein. Normal compressibility of the small  Normal compressibility of the small  saphenous vein. saphenous vein.   Velocities are measured in cm/s ; Diameters are measured in cm Right Lower Extremities DVT Study Measurements Right 2D Measurements +------------------------------------+----------+---------------+----------+ ! Location                            ! Visualized! Compressibility! Thrombosis! +------------------------------------+----------+---------------+----------+ ! Common Femoral                      !Yes       ! Yes            ! None      ! +------------------------------------+----------+---------------+----------+ ! Prox Femoral                        !Yes       ! Yes            ! None      ! +------------------------------------+----------+---------------+----------+ ! Mid Femoral                         !Yes       ! Yes            ! None      ! +------------------------------------+----------+---------------+----------+ ! Dist Femoral                        !Yes       ! Yes            ! None      ! +------------------------------------+----------+---------------+----------+ ! Deep Femoral                        !Yes       ! Yes            ! None      ! +------------------------------------+----------+---------------+----------+ ! Popliteal                           !Yes       ! Yes            ! None      ! +------------------------------------+----------+---------------+----------+ ! Sapheno Femoral Junction            ! Yes       ! Yes            ! None      ! +------------------------------------+----------+---------------+----------+ ! PTV                                 ! Yes       ! Yes            ! None      ! +------------------------------------+----------+---------------+----------+ ! Peroneal                            !Yes       ! Yes            ! None      ! +------------------------------------+----------+---------------+----------+ ! Gastroc                             ! Yes       ! Yes            ! None      ! +------------------------------------+----------+---------------+----------+ ! GSV Thigh                           ! Yes       ! Yes            ! None      ! +------------------------------------+----------+---------------+----------+ ! GSV Knee !Yes       !Yes            ! None      ! +------------------------------------+----------+---------------+----------+ ! GSV Ankle                           ! Yes       ! Yes            ! None      ! +------------------------------------+----------+---------------+----------+ ! SSV                                 ! Yes       ! Yes            ! None      ! +------------------------------------+----------+---------------+----------+ Left Lower Extremities DVT Study Measurements Left 2D Measurements +------------------------------------+----------+---------------+----------+ ! Location                            ! Visualized! Compressibility! Thrombosis! +------------------------------------+----------+---------------+----------+ ! Common Femoral                      !Yes       ! Yes            ! None      ! +------------------------------------+----------+---------------+----------+ ! Prox Femoral                        !Yes       ! Yes            ! None      ! +------------------------------------+----------+---------------+----------+ ! Mid Femoral                         !Yes       ! Yes            ! None      ! +------------------------------------+----------+---------------+----------+ ! Dist Femoral                        !Yes       ! Yes            ! None      ! +------------------------------------+----------+---------------+----------+ ! Deep Femoral                        !Yes       ! Yes            ! None      ! +------------------------------------+----------+---------------+----------+ ! Popliteal                           !Yes       ! Yes            ! None      ! +------------------------------------+----------+---------------+----------+ ! Sapheno Femoral Junction            ! Yes       ! Yes            ! None      ! +------------------------------------+----------+---------------+----------+ ! PTV                                 ! Yes       ! Yes            ! None      ! +------------------------------------+----------+---------------+----------+ ! Peroneal                            !Yes       ! Yes            ! None      ! +------------------------------------+----------+---------------+----------+ ! Gastroc                             ! Yes       ! Yes            ! None      ! +------------------------------------+----------+---------------+----------+ ! GSV Thigh                           ! Yes       ! Yes            ! None      ! +------------------------------------+----------+---------------+----------+ ! GSV Knee                            ! Yes       ! Yes            ! None      ! +------------------------------------+----------+---------------+----------+ ! GSV Ankle                           ! Yes       ! Yes            ! None      ! +------------------------------------+----------+---------------+----------+ ! SSV                                 ! Yes       ! Yes            ! None      ! +------------------------------------+----------+---------------+----------+    FL MODIFIED BARIUM SWALLOW W VIDEO    Result Date: 4/4/2022  EXAMINATION: MODIFIED BARIUM SWALLOW WAS PERFORMED IN CONJUNCTION WITH SPEECH PATHOLOGY SERVICES TECHNIQUE: Fluoroscopic evaluation of the swallowing mechanism was performed using cineradiography with multiple consistency of barium product in conjunction with speech pathology services. FLUOROSCOPY DOSE AND TYPE OR TIME AND EXPOSURES: DAP 49.2 dGy cm squared COMPARISON: None HISTORY: ORDERING SYSTEM PROVIDED HISTORY: aspiration pna TECHNOLOGIST PROVIDED HISTORY: aspiration pna Reason for Exam: aspiration pneumonia FINDINGS: Premature vallecular spillage. There was trace penetration with straw with thin liquid. No aspiration. No aspiration. Trace penetration with straw with thin liquids. Please see separate speech pathology report for full discussion of findings and recommendations.  RECOMMENDATIONS: Unavailable         Physical Examination:        Physical Exam  Constitutional:       Appearance: Normal appearance. Eyes:      General: No scleral icterus. Extraocular Movements: Extraocular movements intact. Conjunctiva/sclera: Conjunctivae normal.   Cardiovascular:      Rate and Rhythm: Normal rate and regular rhythm. Pulmonary:      Effort: Pulmonary effort is normal.      Breath sounds: Normal breath sounds. No wheezing or rales. Abdominal:      General: Bowel sounds are normal. There is no distension. Palpations: Abdomen is soft. Tenderness: There is no abdominal tenderness. Musculoskeletal:      Right lower leg: No edema. Left lower leg: No edema. Neurological:      General: No focal deficit present. Mental Status: She is alert and oriented to person, place, and time.    Psychiatric:         Mood and Affect: Mood normal.         Behavior: Behavior normal.           Assessment:        Primary Problem  Sepsis [facility])    Active Hospital Problems    Diagnosis Date Noted    Postprocedural subcutaneous emphysema [T81.82XA] 04/28/2022     Priority: Medium    Recurrent pneumothorax after chest tube removed [J95.811] 04/26/2022     Priority: Medium    Lung abscess ([facility] [J85.2] 04/25/2022     Priority: Medium    Elevated C-reactive protein (CRP) [R79.82]      Priority: Medium    Pseudomonas aeruginosa infection [A49.8]      Priority: Medium    Elevated procalcitonin [R79.89]      Priority: Medium    Acute systolic HF (heart failure) (HCC) [I50.21] 04/19/2022    Pneumothorax on right [J93.9]     HCAP (healthcare-associated pneumonia) [J18.9]     Sepsis ([address] [A41.9] 04/16/2022    Acute on chronic respiratory failure ([address] [J96.20] 04/16/2022    Cavitary lesion of lung [J98.4] 04/16/2022    History of DVT (deep vein thrombosis) [Z86.718] 04/16/2022    Pneumothorax, right [J93.9] 04/16/2022    Status post chest tube placement ([address] [Z93.8] 04/16/2022    History of pulmonary embolus (PE) [Z86.711] 04/02/2022    Chronic respiratory failure with hypoxia ([facility] [J96.11] 08/05/2021    Compression fracture of body of thoracic vertebra ([facility] [S22.000A] 09/28/2020    Lung nodule [R91.1] 08/22/2018    Bipolar disorder ([facility] [F31.9]     Chronic obstructive pulmonary disease ([facility] [J44.9] 01/06/2016       Plan:        Sepsis due to pneumonia with abcess vs empyema, pseudomonas sp.   Tachypnea, tachycardia  WBC 18>>>12.6> 11.5>15.5>15.4>16  Pro-Branden >100 (>100 on repeat), , Lactate 2.8> 1.5  Chest x-ray on admission: Moderate loculated right basal pneumothorax.  Evidence for tension.  Cavitary lesion noted in the right lung base  CT chest on admission: Thick-walled cavitary lesion RLL. Right pneumothorax.  Infiltrates of the right middle lobe.  Stellate 6 mm lateral RUL nodule.  Mediastinal and right hilar lymphadenopathy  Iv fluids: 1/2 NS at 15 cc/h  ID and Critical care following  Respiratory cultures and chest tube fluids culture growing pseudomonas   On Merrem IV day 11  Repeat CT chest showed large pneumothorax, improving cavitary lesion     Acute on chronic respiratory failure 2/2 recurrent pneumothorax after chest tube removal and Pseudomonas pneumonia  History of severe COPD - 3 L home O2 baseline  CXR right pneumothorax on admission   S/p intubation and right chest tube placement 4/16; extubated 4/28  Solumedrol 40mg q8h > 30 Q8h  Surgery consult   Chest tube reinserted 4/25 due to recurrent pneumo  Daily CXR - stable appearance today     Acute systolic heart failure - Improved   Echo Estimated LV EF 45-50 %  Probnp 1000s on admission  Lasix 20mg IV BID     Hyperglycemia; last A1c 6.0, insulin sliding scale and lantus 18 Units twice daily; was getting tube feeds and doing well with this regimen, but had hypoglycemia x2 overnight 4/28-29, on clear liquid diet now, will decrease to lantus 12 units nightly and low dose sliding scale     Hx DVT/ PE: Eliquis was discontinued in 12/2021  History bipolar disorder: Trazodone, Risperidone resumed     Diet: clear liquids, no straws, advancing as tolerated  GI ppx: Pepcid 20 mg twice daily IV  DVT ppx: Heparin 5000 units TID   Code status: Full code  Consults: pulm, ID, gen surg  Dispo: referral sent to Lilly Worthington MD  4/29/2022  11:10 AM       Attending Physician Statement  I have discussed the care of Azul Delay and I have examined the patient myselft and taken ros and hpi , including pertinent history and exam findings,  with the resident. I have reviewed the key elements of all parts of the encounter with the resident. I agree with the assessment, plan and orders as documented by the resident. Spent 35 minutes in reviewing data/medicines/talking to patient/family,  explaining and answering all the questions. Chronically ill,  Acute respiratory failure, extubated,  Pneumothorax, chest tube was reinserted due to worsening pneumothorax,  Pulmonary critical care on board,  .   Care time spent 36    Electronically signed by Marely Bueno MD

## 2022-04-29 NOTE — PROGRESS NOTES
[facility] -   Infectious diseases evaluation  admission date 4/16/2022    reason for consultation:   Cavitary lung lesions  Impression :   Current:  · Right lower lobe cavitary lesions associated with multiloculated pleural fluid collection likely abscess with empyema status post chest tube with Pseudomonas and yeast growth on culture  · Sepsis secondary to above  · Acute on chronic respiratory failure required intubation  · Right-sided pneumothorax  · Severe COPD      Recommendations     · IV meropenem day 14. · Repeat CT chest without contrast  · Yeast growth on bowel likely colonization  · Follow CBC and renal function  · Continue supportive care  · Discussed with nursing staff              History of Present Illness:   Initial history:  Magan Prescott is a 74y.o.-year-old female presents to the hospital with worsening shortness of breath associated with cough. At the ER with tachypneic and hypoxic  Chest x-ray showed pneumothorax  The patient was in respiratory distress, was intubated. The patient is intubated, sedated, unable to provide history that was obtained from chart review. CT chest 4/16/2022 showed right lower lobe cavitary lesion with surrounding airspace disease and multiloculated pleural collection. The patient had chest tube placed   Initial WBC was 18.5, procalcitonin no more than 100  Respiratory panel with COVID-19 PCR was negative  Urine for Legionella and strep pneumo antigen negative  Status post bronchoscopy 4/18/2020 with Pseudomonas and yeast growth on culture   Chest tube was removed  Ct chest from 4/25 showed mod/large right pneumothorax and pleural fluid, the cavitary lesion is smaller compared to previous exam.  Chest tube was reinserted 4/25/2022 . Pseudomonas growth on pleural fluid culture.     Interval changes  4/29/2022   She was extubated, afebrile, complaining of pain at right chest tube site, occasional cough and generalized fatigue, no other complaints  Chest x-ray from earlier today reviewed ,no pneumothorax, no change in multifocal right-sided airspace disease. Chest tube in place to waterseal with small air leak. WBC 13.3  Patient Vitals for the past 8 hrs:   BP Temp Temp src Pulse Resp SpO2   04/29/22 0702 -- -- -- -- 18 92 %   04/29/22 0700 (!) 97/35 -- -- 64 18 92 %   04/29/22 0600 (!) 99/37 -- -- 68 18 93 %   04/29/22 0500 (!) 99/33 -- -- 74 19 91 %   04/29/22 0400 (!) 118/26 98.8 °F (37.1 °C) Axillary 71 18 94 %   04/29/22 0328 -- -- -- -- 18 97 %   04/29/22 0300 (!) 96/41 -- -- 62 18 96 %             I have personally reviewed the past medical history, past surgical history, medications, social history, and family history, and I haveupdated the database accordingly. Allergies:   Advil [ibuprofen], Aleve [naproxen], Antipyrine, Celecoxib, Codeine, Fomepizole, Incruse ellipta [umeclidinium bromide], Other, Rofecoxib, Salicylates, Strawberry extract, Sulfinpyrazone, Aspirin, and Nsaids     Review of Systems:     Review of Systems  As per history of present illness, other than above 12 system review was negative  Physical Examination :       Physical Exam  Constitutional:       Appearance: She is ill-appearing. Comments: Intubated   HENT:      Head: Normocephalic and atraumatic. Right Ear: External ear normal.      Left Ear: External ear normal.   Cardiovascular:      Rate and Rhythm: Normal rate. Abdominal:      General: There is no distension. Palpations: Abdomen is soft. Musculoskeletal:      Cervical back: Neck supple. No rigidity. Right lower leg: No edema. Left lower leg: No edema. Skin:     Coloration: Skin is not jaundiced. Findings: No bruising.          Past Medical History:     Past Medical History:   Diagnosis Date    Abnormal EKG     TRAM (acute kidney injury) ([facility] 4/2/2022    Anxiety     Asthma     Bipolar disorder ([facility]     SEVERE IN 2003, [place]    COPD (chronic obstructive pulmonary disease) (HCC)     Cramps, extremity     SEVERE LEG CRAMPS    Depression     Dilated bile duct     Headache     History of elective      Hyperlipidemia     Irritable bowel syndrome     Prolonged emergence from general anesthesia     SEVERE ISSUES WAKING UP    Substance abuse ([address]     street drugs when younger   [place] Unspecified sleep apnea     Vision abnormalities     glasses       Past Surgical  History:     Past Surgical History:   Procedure Laterality Date    ANKLE SURGERY      HAD SCREWS AND HARDWARE, THEN REMOVED    BRONCHOSCOPY  2022         COLONOSCOPY  02/15/2018    tubular adenoma    EYE SURGERY Bilateral     CATARACTS    HYSTEROSCOPY  10/19/2016    D & C    INDUCED       LASIK      bilateral    TONSILLECTOMY AND ADENOIDECTOMY Bilateral     VULVA SURGERY      HAD A BIOPSY AND REMOVAL OF       Medications:      methylPREDNISolone  30 mg IntraVENous I9V    folic acid  1 mg Oral Daily    thiamine  100 mg Oral Daily    famotidine  20 mg Oral BID    miconazole   Topical BID    multivitamin  1 tablet Oral Daily    insulin glargine  18 Units SubCUTAneous BID    insulin lispro  0-12 Units SubCUTAneous Q6H    polyethylene glycol  17 g Oral Daily    furosemide  20 mg IntraVENous BID    meropenem  1,000 mg IntraVENous Q8H    albuterol  2.5 mg Nebulization Q4H    sodium chloride flush  5-40 mL IntraVENous 2 times per day    risperiDONE  1.5 mg Oral Q12H    atorvastatin  20 mg Oral Daily    traZODone  50 mg Oral Nightly    heparin (porcine)  5,000 Units SubCUTAneous 3 times per day       Social History:     Social History     Socioeconomic History    Marital status:      Spouse name: Not on file    Number of children: Not on file    Years of education: Not on file    Highest education level: Not on file   Occupational History    Not on file   Tobacco Use    Smoking status: Former Smoker     Packs/day: 2.00     Years: 42.00     Pack years: 84.00     Types: Cigarettes     Quit date: 11/1/2018     Years since quitting: 3.4    Smokeless tobacco: Never Used   Substance and Sexual Activity    Alcohol use: Yes     Comment: yearly    Drug use: No    Sexual activity: Not Currently     Partners: Male     Birth control/protection: Post-menopausal   Other Topics Concern    Not on file   Social History Narrative    Not on file     Social Determinants of Health     Financial Resource Strain: High Risk    Difficulty of Paying Living Expenses: Very hard   Food Insecurity: No Food Insecurity    Worried About Running Out of Food in the Last Year: Never true    Agustin of Food in the Last Year: Never true   Transportation Needs:     Lack of Transportation (Medical): Not on file    Lack of Transportation (Non-Medical):  Not on file   Physical Activity:     Days of Exercise per Week: Not on file    Minutes of Exercise per Session: Not on file   Stress:     Feeling of Stress : Not on file   Social Connections:     Frequency of Communication with Friends and Family: Not on file    Frequency of Social Gatherings with Friends and Family: Not on file    Attends Anabaptist Services: Not on file    Active Member of [address] [place] [address] [place] Group or Organizations: Not on file    Attends Club or Organization Meetings: Not on file    Marital Status: Not on file   Intimate Partner Violence:     Fear of Current or Ex-Partner: Not on file    Emotionally Abused: Not on file    Physically Abused: Not on file    Sexually Abused: Not on file   Housing Stability:     Unable to Pay for Housing in the Last Year: Not on file    Number of [place] in the Last Year: Not on file    Unstable Housing in the Last Year: Not on file       Family History:     Family History   Problem Relation Age of Onset    Dementia Maternal Aunt     Kidney Disease Mother     Heart Attack Sister     Prostate Cancer Father     High Cholesterol Brother     Heart Attack Paternal Grandmother       Medical Decision Making:   I have independently reviewed/ordered the following labs:    CBC with Differential:   Recent Labs     04/28/22  0439 04/29/22  0343   WBC 16.0* 13.3*   HGB 9.7* 9.3*   HCT 30.7* 28.9*    385     BMP:  Recent Labs     04/28/22  0439 04/29/22  0343    139   K 4.2 3.9   CL 93* 94*   CO2 37* 37*   BUN 34* 29*   CREATININE <0.40* <0.40*       Lab Results   Component Value Date    CREATININE <0.40 04/29/2022    GLUCOSE 64 04/29/2022    GLUCOSE 127 07/08/2021       Detailed results: Thank you for allowing us to participate in the care of this patient. Please call with questions. This note is created with the assistance of a speech recognition program.  While intending to generate adocument that actually reflects the content of the visit, the document can still have some errors including those of syntax and sound a like substitutions which may escape proof reading. It such instances, actual meaningcan be extrapolated by contextual diversion.     Bryan Alvarado MD  Office: [phone]  Perfect serve / office [phone]

## 2022-04-29 NOTE — PROGRESS NOTES
Patient Blood glucose 43. Patient Drowsy but appropriate. Dextrose 50% 12.5g given at this time. Will continue to monitor.

## 2022-04-29 NOTE — CARE COORDINATION
ONGOING DISCHARGE PLAN:    Precert started for [facility] today. Pt extubated, wearing 3L NC - SP02 94% , wears 3.5 L NC @ home. Portable and concentrator. [facility] has order for nebulizer. Current with [facility]. On IV Merrem until May2  Soul 30 Q8 hrs. Lasix IV BID    Patient has chest tube. Removed 4/25 and reinsterted 4/25 d/t R pneumo. Will continue to follow for additional discharge needs.     Electronically signed by Marta Collazo RN on 4/29/2022 at 12:03 PM

## 2022-04-29 NOTE — PROGRESS NOTES
PROGRESS NOTE          PATIENT NAME: [id number]  RECORD NO. [id number]  DATE: 4/29/2022  SURGEON: Zan Harris  PRIMARY CARE PHYSICIAN: Margret Magallanes MD    HD: # 15    ASSESSMENT    Patient Active Problem List   Diagnosis    Chronic obstructive pulmonary disease ([address]    Vitamin D deficiency    Anxiety    Asthma    Bipolar disorder ([address]    Depression    Hyperlipidemia with target LDL less than 100    Sleep apnea    Vision abnormalities    Status post hysteroscopy    Chronic headaches    IBS (irritable bowel syndrome)    Colon polyp    Collagenous colitis    Lung nodule    Left elbow pain    Dilated bile duct    Acute pain of left shoulder    Adhesive capsulitis of left shoulder    Leucopenia    Compression fracture of body of thoracic vertebra (HCC)    Age-related osteoporosis with current pathological fracture    Pulmonary embolism on right ([address]    Migraines    Paranoid behavior ([facility])    Acute deep vein thrombosis (DVT) of right lower extremity ([facility])    Delirium    Chronic respiratory failure with hypoxia ([facility])    Major neurocognitive disorder ([address]    Pneumonia    Chronic respiratory failure with hypoxia, on home O2 therapy ([facility])    COPD (chronic obstructive pulmonary disease) ([address]    TRAM (acute kidney injury) ([address]    History of pulmonary embolus (PE)    Mycoplasma pneumonia    Iron deficiency anemia    Sepsis ([address]    Acute on chronic respiratory failure ([address]    Cavitary lesion of lung    History of DVT (deep vein thrombosis)    Pneumothorax, right    Status post chest tube placement ([facility])    Pneumothorax on right    HCAP (healthcare-associated pneumonia)    Acute systolic HF (heart failure) (HCC)    Pseudomonas aeruginosa infection    Elevated procalcitonin    Elevated C-reactive protein (CRP)    Lung abscess (HCC)    Recurrent pneumothorax after chest tube removed    Postprocedural subcutaneous emphysema       MEDICAL DECISION MAKING AND PLAN    1. Small persistent air leak when the patient coughs. No pneumothorax on CXR. Continue chest tube to waterseal for now. Will possibly DC tomorrow. 2. Extubated successfully. 3. Encourage deep breathing and coughing. Chief Complaint: \"im short of breath\"    [address] is doing better this morning. She is resting comfortably and was extubated. She has a good cough. R chest tube is in place. CXR reviewed. No pneumothorax. + small air leak remain when she coughs. OBJECTIVE  VITALS: Temp: Temp: 98.8 °F (37.1 °C)Temp  Av.6 °F (37 °C)  Min: 98.2 °F (36.8 °C)  Max: 98.8 °F (13.7 °C) BP Systolic (10ENZ), SRO:815 , Min:91 , GRV:398   Diastolic (54YHO), PTV:32, Min:26, Max:94   Pulse Pulse  Av.8  Min: 62  Max: 87 Resp Resp  Av.6  Min: 16  Max: 26 Pulse ox SpO2  Av.1 %  Min: 91 %  Max: 99 %  GENERAL: alert, no distress  NEURO: no focal neuro deficits. HEENT: normocephalic, atraumatic. : deferred. LUNGS: clear to ausculation, without wheezes, rales or rhonci. R chest tube in place. HEART: normal rate and regular rhythm  ABDOMEN: soft, non-tender, non-distended, bowel sounds present in all 4 quadrants and no guarding or peritoneal signs present  EXTREMITY: no cyanosis, clubbing or edema    I/O last 3 completed shifts: In:  [I.V.:687.4; NG/GT:811; IV Piggyback:459.6]  Out:  [Urine:1900; Chest Tube:55]    Drain/tube output:   In: 3957 [I.V.:687.4; NG/GT:60]  Out: 1085 [Urine:1050]    LAB:  CBC:   Recent Labs     22  0449 22  0439 22  0343   WBC 15.4* 16.0* 13.3*   HGB 9.3* 9.7* 9.3*   HCT 29.3* 30.7* 28.9*   MCV 92.2 92.8 92.4    397 385     BMP:   Recent Labs     22  0449 22  0439 22  0343    135 139   K 4.3 4.2 3.9   CL 93* 93* 94*   CO2 37* 37* 37*   BUN 37* 34* 29*   CREATININE <0.40* <0.40* <0.40*   GLUCOSE 203* 172* 64*     COAGS: No results for input(s): APTT, PROT, INR in the last 72 hours.    RADIOLOGY:  CXR:       Rosaura Landry MD  4/29/22, 7:58 AM

## 2022-04-29 NOTE — PLAN OF CARE
Problem: Non-Violent Restraints  Goal: Removal from restraints as soon as assessed to be safe  Outcome: Progressing  Goal: No harm/injury to patient while restraints in use  Outcome: Progressing  Goal: Patient's dignity will be maintained  Outcome: Progressing     Problem: Gas Exchange - Impaired:  Goal: Levels of oxygenation will improve  Outcome: Progressing     Problem: Skin Integrity - Impaired:  Goal: Will show no infection signs and symptoms  Outcome: Progressing  Goal: Absence of new skin breakdown  Outcome: Progressing     Problem: Falls - Risk of:  Goal: Absence of physical injury  Outcome: Progressing     Problem: Breathing Pattern - Ineffective:  Goal: Ability to achieve and maintain a regular respiratory rate will improve  Outcome: Progressing     Problem: Skin Integrity:  Goal: Will show no infection signs and symptoms  Outcome: Progressing  Goal: Absence of new skin breakdown  Outcome: Progressing     Problem: OXYGENATION/RESPIRATORY FUNCTION  Goal: Patient will maintain patent airway  Outcome: Progressing  Goal: Patient will achieve/maintain normal respiratory rate/effort  Outcome: Progressing     Problem: MECHANICAL VENTILATION  Goal: Oral health is maintained or improved  Outcome: Progressing  Goal: ET tube will be managed safely  Outcome: Progressing  Goal: Ability to express needs and understand communication  Outcome: Progressing  Goal: Mobility/activity is maintained at optimum level for patient  Outcome: Progressing     Problem: SKIN INTEGRITY  Goal: Skin integrity is maintained or improved  Outcome: Progressing     Problem: Nutrition  Goal: Optimal nutrition therapy  Outcome: Progressing     Problem: Discharge Planning  Goal: Discharge to home or other facility with appropriate resources  Outcome: Progressing     Problem: Safety - Medical Restraint  Goal: Remains free of injury from restraints (Restraint for Interference with Medical Device)  Outcome: Progressing     Problem: Pain  Goal: Verbalizes/displays adequate comfort level or baseline comfort level  Outcome: Progressing     Problem: ABCDS Injury Assessment  Goal: Absence of physical injury  Outcome: Progressing

## 2022-04-30 ENCOUNTER — APPOINTMENT (OUTPATIENT)
Dept: GENERAL RADIOLOGY | Age: 67
DRG: 720 | End: 2022-04-30
Payer: COMMERCIAL

## 2022-04-30 LAB
ANION GAP SERPL CALCULATED.3IONS-SCNC: 5 MMOL/L (ref 9–17)
BUN BLDV-MCNC: 24 MG/DL (ref 8–23)
CALCIUM SERPL-MCNC: 8 MG/DL (ref 8.6–10.4)
CHLORIDE BLD-SCNC: 98 MMOL/L (ref 98–107)
CO2: 37 MMOL/L (ref 20–31)
CORTISOL: 13.9 UG/DL (ref 2.7–18.4)
CREAT SERPL-MCNC: <0.4 MG/DL (ref 0.5–0.9)
GFR AFRICAN AMERICAN: ABNORMAL ML/MIN
GFR NON-AFRICAN AMERICAN: ABNORMAL ML/MIN
GFR SERPL CREATININE-BSD FRML MDRD: ABNORMAL ML/MIN/{1.73_M2}
GLUCOSE BLD-MCNC: 121 MG/DL (ref 65–105)
GLUCOSE BLD-MCNC: 123 MG/DL (ref 70–99)
GLUCOSE BLD-MCNC: 153 MG/DL (ref 65–105)
GLUCOSE BLD-MCNC: 178 MG/DL (ref 65–105)
GLUCOSE BLD-MCNC: 288 MG/DL (ref 65–105)
HCT VFR BLD CALC: 29.2 % (ref 36–46)
HEMOGLOBIN: 9.3 G/DL (ref 12–16)
MCH RBC QN AUTO: 29.5 PG (ref 26–34)
MCHC RBC AUTO-ENTMCNC: 31.8 G/DL (ref 31–37)
MCV RBC AUTO: 92.8 FL (ref 80–100)
PDW BLD-RTO: 17.1 % (ref 11.5–14.9)
PLATELET # BLD: 386 K/UL (ref 150–450)
PMV BLD AUTO: 8.2 FL (ref 6–12)
POTASSIUM SERPL-SCNC: 3.6 MMOL/L (ref 3.7–5.3)
RBC # BLD: 3.14 M/UL (ref 4–5.2)
SODIUM BLD-SCNC: 140 MMOL/L (ref 135–144)
WBC # BLD: 9.5 K/UL (ref 3.5–11)

## 2022-04-30 PROCEDURE — 82947 ASSAY GLUCOSE BLOOD QUANT: CPT

## 2022-04-30 PROCEDURE — 71045 X-RAY EXAM CHEST 1 VIEW: CPT

## 2022-04-30 PROCEDURE — 6360000002 HC RX W HCPCS: Performed by: INTERNAL MEDICINE

## 2022-04-30 PROCEDURE — 6370000000 HC RX 637 (ALT 250 FOR IP): Performed by: STUDENT IN AN ORGANIZED HEALTH CARE EDUCATION/TRAINING PROGRAM

## 2022-04-30 PROCEDURE — 82533 TOTAL CORTISOL: CPT

## 2022-04-30 PROCEDURE — 2580000003 HC RX 258: Performed by: INTERNAL MEDICINE

## 2022-04-30 PROCEDURE — 6360000002 HC RX W HCPCS: Performed by: STUDENT IN AN ORGANIZED HEALTH CARE EDUCATION/TRAINING PROGRAM

## 2022-04-30 PROCEDURE — 6370000000 HC RX 637 (ALT 250 FOR IP)

## 2022-04-30 PROCEDURE — 2700000000 HC OXYGEN THERAPY PER DAY

## 2022-04-30 PROCEDURE — 94640 AIRWAY INHALATION TREATMENT: CPT

## 2022-04-30 PROCEDURE — 85027 COMPLETE CBC AUTOMATED: CPT

## 2022-04-30 PROCEDURE — 80048 BASIC METABOLIC PNL TOTAL CA: CPT

## 2022-04-30 PROCEDURE — 2060000000 HC ICU INTERMEDIATE R&B

## 2022-04-30 PROCEDURE — 6370000000 HC RX 637 (ALT 250 FOR IP): Performed by: INTERNAL MEDICINE

## 2022-04-30 PROCEDURE — 99233 SBSQ HOSP IP/OBS HIGH 50: CPT | Performed by: INTERNAL MEDICINE

## 2022-04-30 PROCEDURE — 2580000003 HC RX 258: Performed by: STUDENT IN AN ORGANIZED HEALTH CARE EDUCATION/TRAINING PROGRAM

## 2022-04-30 PROCEDURE — 94761 N-INVAS EAR/PLS OXIMETRY MLT: CPT

## 2022-04-30 RX ORDER — METHYLPREDNISOLONE SODIUM SUCCINATE 40 MG/ML
40 INJECTION, POWDER, LYOPHILIZED, FOR SOLUTION INTRAMUSCULAR; INTRAVENOUS EVERY 12 HOURS
Status: DISCONTINUED | OUTPATIENT
Start: 2022-04-30 | End: 2022-05-02

## 2022-04-30 RX ORDER — MIDODRINE HYDROCHLORIDE 5 MG/1
5 TABLET ORAL 3 TIMES DAILY PRN
Status: DISCONTINUED | OUTPATIENT
Start: 2022-04-30 | End: 2022-05-03 | Stop reason: HOSPADM

## 2022-04-30 RX ORDER — OXYCODONE HYDROCHLORIDE AND ACETAMINOPHEN 5; 325 MG/1; MG/1
1 TABLET ORAL EVERY 4 HOURS PRN
Status: DISCONTINUED | OUTPATIENT
Start: 2022-04-30 | End: 2022-05-03 | Stop reason: HOSPADM

## 2022-04-30 RX ADMIN — INSULIN LISPRO 1 UNITS: 100 INJECTION, SOLUTION INTRAVENOUS; SUBCUTANEOUS at 20:20

## 2022-04-30 RX ADMIN — SODIUM CHLORIDE: 4.5 INJECTION, SOLUTION INTRAVENOUS at 04:24

## 2022-04-30 RX ADMIN — FUROSEMIDE 20 MG: 10 INJECTION, SOLUTION INTRAMUSCULAR; INTRAVENOUS at 18:44

## 2022-04-30 RX ADMIN — ANTI-FUNGAL POWDER MICONAZOLE NITRATE TALC FREE: 1.42 POWDER TOPICAL at 09:03

## 2022-04-30 RX ADMIN — RISPERIDONE 1.5 MG: 0.5 TABLET ORAL at 16:27

## 2022-04-30 RX ADMIN — ALBUTEROL SULFATE 2.5 MG: 2.5 SOLUTION RESPIRATORY (INHALATION) at 03:02

## 2022-04-30 RX ADMIN — ALBUTEROL SULFATE 2.5 MG: 2.5 SOLUTION RESPIRATORY (INHALATION) at 07:16

## 2022-04-30 RX ADMIN — FOLIC ACID 1 MG: 1 TABLET ORAL at 08:58

## 2022-04-30 RX ADMIN — METHYLPREDNISOLONE SODIUM SUCCINATE 40 MG: 40 INJECTION, POWDER, FOR SOLUTION INTRAMUSCULAR; INTRAVENOUS at 20:18

## 2022-04-30 RX ADMIN — FENTANYL CITRATE 25 MCG: 50 INJECTION, SOLUTION INTRAMUSCULAR; INTRAVENOUS at 09:24

## 2022-04-30 RX ADMIN — ATORVASTATIN CALCIUM 20 MG: 20 TABLET, FILM COATED ORAL at 08:58

## 2022-04-30 RX ADMIN — MEROPENEM 1000 MG: 1 INJECTION, POWDER, FOR SOLUTION INTRAVENOUS at 13:06

## 2022-04-30 RX ADMIN — POLYETHYLENE GLYCOL 3350 17 G: 17 POWDER, FOR SOLUTION ORAL at 08:58

## 2022-04-30 RX ADMIN — FUROSEMIDE 20 MG: 10 INJECTION, SOLUTION INTRAMUSCULAR; INTRAVENOUS at 08:58

## 2022-04-30 RX ADMIN — HEPARIN SODIUM 5000 UNITS: 5000 INJECTION INTRAVENOUS; SUBCUTANEOUS at 23:09

## 2022-04-30 RX ADMIN — ALBUTEROL SULFATE 2.5 MG: 2.5 SOLUTION RESPIRATORY (INHALATION) at 15:35

## 2022-04-30 RX ADMIN — MEROPENEM 1000 MG: 1 INJECTION, POWDER, FOR SOLUTION INTRAVENOUS at 20:32

## 2022-04-30 RX ADMIN — FAMOTIDINE 20 MG: 20 TABLET, FILM COATED ORAL at 08:58

## 2022-04-30 RX ADMIN — FENTANYL CITRATE 50 MCG: 50 INJECTION, SOLUTION INTRAMUSCULAR; INTRAVENOUS at 20:17

## 2022-04-30 RX ADMIN — INSULIN LISPRO 3 UNITS: 100 INJECTION, SOLUTION INTRAVENOUS; SUBCUTANEOUS at 13:08

## 2022-04-30 RX ADMIN — METHYLPREDNISOLONE SODIUM SUCCINATE 30 MG: 40 INJECTION, POWDER, FOR SOLUTION INTRAMUSCULAR; INTRAVENOUS at 02:41

## 2022-04-30 RX ADMIN — SODIUM CHLORIDE: 9 INJECTION, SOLUTION INTRAVENOUS at 20:31

## 2022-04-30 RX ADMIN — HEPARIN SODIUM 5000 UNITS: 5000 INJECTION INTRAVENOUS; SUBCUTANEOUS at 06:17

## 2022-04-30 RX ADMIN — INSULIN LISPRO 1 UNITS: 100 INJECTION, SOLUTION INTRAVENOUS; SUBCUTANEOUS at 17:16

## 2022-04-30 RX ADMIN — ALBUTEROL SULFATE 2.5 MG: 2.5 SOLUTION RESPIRATORY (INHALATION) at 23:38

## 2022-04-30 RX ADMIN — SODIUM CHLORIDE: 4.5 INJECTION, SOLUTION INTRAVENOUS at 20:35

## 2022-04-30 RX ADMIN — THIAMINE HCL TAB 100 MG 100 MG: 100 TAB at 08:58

## 2022-04-30 RX ADMIN — METHYLPREDNISOLONE SODIUM SUCCINATE 30 MG: 40 INJECTION, POWDER, FOR SOLUTION INTRAMUSCULAR; INTRAVENOUS at 09:40

## 2022-04-30 RX ADMIN — MEROPENEM 1000 MG: 1 INJECTION, POWDER, FOR SOLUTION INTRAVENOUS at 04:25

## 2022-04-30 RX ADMIN — SODIUM CHLORIDE, PRESERVATIVE FREE 10 ML: 5 INJECTION INTRAVENOUS at 20:19

## 2022-04-30 RX ADMIN — FAMOTIDINE 20 MG: 20 TABLET, FILM COATED ORAL at 20:19

## 2022-04-30 RX ADMIN — ALBUTEROL SULFATE 2.5 MG: 2.5 SOLUTION RESPIRATORY (INHALATION) at 10:46

## 2022-04-30 RX ADMIN — RISPERIDONE 1.5 MG: 0.5 TABLET ORAL at 04:23

## 2022-04-30 RX ADMIN — HEPARIN SODIUM 5000 UNITS: 5000 INJECTION INTRAVENOUS; SUBCUTANEOUS at 13:09

## 2022-04-30 RX ADMIN — MULTIPLE VITAMINS W/ MINERALS TAB 1 TABLET: TAB at 08:58

## 2022-04-30 RX ADMIN — SODIUM CHLORIDE, PRESERVATIVE FREE 10 ML: 5 INJECTION INTRAVENOUS at 09:03

## 2022-04-30 RX ADMIN — ALBUTEROL SULFATE 2.5 MG: 2.5 SOLUTION RESPIRATORY (INHALATION) at 21:05

## 2022-04-30 ASSESSMENT — PAIN DESCRIPTION - PROGRESSION
CLINICAL_PROGRESSION: GRADUALLY IMPROVING

## 2022-04-30 ASSESSMENT — PAIN SCALES - GENERAL
PAINLEVEL_OUTOF10: 0
PAINLEVEL_OUTOF10: 8
PAINLEVEL_OUTOF10: 0

## 2022-04-30 ASSESSMENT — ENCOUNTER SYMPTOMS
ABDOMINAL PAIN: 0
SHORTNESS OF BREATH: 0
COUGH: 1

## 2022-04-30 ASSESSMENT — PAIN DESCRIPTION - LOCATION: LOCATION: CHEST

## 2022-04-30 NOTE — PROGRESS NOTES
ICU Progress Note (Non-Vent)  [facility]    Patient - Samuel Liu,  Age - 74 y.o.    - 1955      Room Number -    MRN -  [id number]   Acct # - [de-identified]  Date of Admission -  2022 10:25 AM    Events of Past 24 Hours   Alert, recognizes me, is very tearful    Vitals    height is 5' 5\" (1.651 m) and weight is 183 lb 6.8 oz (83.2 kg). Her oral temperature is 97.2 °F (36.2 °C). Her blood pressure is 113/44 (abnormal) and her pulse is 98. Her respiration is 20 and oxygen saturation is 96%.        Temperature Range: Temp: 97.2 °F (36.2 °C) Temp  Av °F (36.7 °C)  Min: 97.2 °F (36.2 °C)  Max: 98.6 °F (37 °C)  BP Range:  Systolic (76SJR), YHQ:208 , Min:91 , ATQ:259     Diastolic (53ZHC), CFT:16, Min:26, Max:54    Pulse Range: Pulse  Av.6  Min: 61  Max: 98  Respiration Range: Resp  Av.4  Min: 15  Max: 33  Current Pulse Ox[de-identified]  SpO2: 96 %  24HR Pulse Ox Range:  SpO2  Av.7 %  Min: 91 %  Max: 96 %  Oxygen Amount and Delivery: O2 Flow Rate (L/min): 3 L/min    Wt Readings from Last 3 Encounters:   22 183 lb 6.8 oz (83.2 kg)   22 183 lb (83 kg)   22 186 lb 1.1 oz (84.4 kg)     I/O       Intake/Output Summary (Last 24 hours) at 2022 1347  Last data filed at 2022 7224  Gross per 24 hour   Intake 712.37 ml   Output 1150 ml   Net -437.63 ml     DRAIN/TUBE OUTPUT       Invasive Lines   ICP PRESSURE RANGE  No data recorded  CVP PRESSURE RANGE  No data recorded      Medications      methylPREDNISolone  30 mg IntraVENous Q8H    insulin lispro  0-6 Units SubCUTAneous TID WC    insulin lispro  0-3 Units SubCUTAneous Nightly    folic acid  1 mg Oral Daily    thiamine  100 mg Oral Daily    famotidine  20 mg Oral BID    miconazole   Topical BID    multivitamin  1 tablet Oral Daily    polyethylene glycol  17 g Oral Daily    furosemide  20 mg IntraVENous BID  meropenem  1,000 mg IntraVENous Q8H    albuterol  2.5 mg Nebulization Q4H    sodium chloride flush  5-40 mL IntraVENous 2 times per day    risperiDONE  1.5 mg Oral Q12H    atorvastatin  20 mg Oral Daily    traZODone  50 mg Oral Nightly    heparin (porcine)  5,000 Units SubCUTAneous 3 times per day     midodrine, fentanNYL **OR** fentanNYL, hydrALAZINE, sodium chloride flush, sodium chloride, sodium chloride flush, potassium chloride **OR** potassium alternative oral replacement **OR** potassium chloride, glucose, dextrose, glucagon (rDNA), dextrose  IV Drips/Infusions   sodium chloride 15 mL/hr at 04/30/22 0424    sodium chloride      dextrose         Diet/Nutrition   ADULT DIET; Regular; No Drinking Straws    Exam      Constitutional - Alert, arousable  General Appearance pale   HEENT -normocephalic, atraumatic. PERRLA  Lungs -diminished poor air movement   Cardiovascular - Heart sounds are normal.  normal rate and rhythm regular, no murmur, gallop or rub. Abdomen - soft, nontender, nondistended, no masses or organomegaly  Neurologic - CN II-XII are grossly intact.  There are no focal motor deficits  Skin - no bruising or bleeding  Extremities - no cyanosis, clubbing or edema    Lab Results   CBC     Lab Results   Component Value Date    WBC 9.5 04/30/2022    RBC 3.14 04/30/2022    RBC 0-2 07/08/2021    HGB 9.3 04/30/2022    HCT 29.2 04/30/2022     04/30/2022    MCV 92.8 04/30/2022    MCH 29.5 04/30/2022    MCHC 31.8 04/30/2022    RDW 17.1 04/30/2022    NRBC 0 07/08/2021    METASPCT 1 04/02/2022    LYMPHOPCT 5 04/19/2022    LYMPHOPCT 12.1 07/08/2021    MONOPCT 2 04/19/2022    MONOPCT 15.1 07/08/2021    BASOPCT 0 04/19/2022    BASOPCT 0.8 07/08/2021    MONOSABS 0.26 04/19/2022    MONOSABS 0.4 07/08/2021    LYMPHSABS 0.66 04/19/2022    LYMPHSABS 0.3 07/08/2021    EOSABS 0.00 04/19/2022    EOSABS 0.1 07/08/2021    BASOSABS 0.00 04/19/2022    DIFFTYPE NOT REPORTED 12/20/2021       BMP   Lab Results Component Value Date     04/30/2022    K 3.6 04/30/2022    CL 98 04/30/2022    CO2 37 04/30/2022    BUN 24 04/30/2022    CREATININE <0.40 04/30/2022    GLUCOSE 123 04/30/2022    GLUCOSE 127 07/08/2021       LFTS  Lab Results   Component Value Date    ALKPHOS 137 04/16/2022    ALT 25 04/16/2022    AST 19 04/16/2022    PROT 7.0 04/16/2022    PROT 5.7 07/08/2021    BILITOT 0.16 04/16/2022    BILIDIR 0.1 07/08/2021    IBILI 0.12 07/08/2019    LABALBU 2.8 04/16/2022    LABALBU 3.6 07/08/2021       ABG ABGs:   Lab Results   Component Value Date    PHART 7.442 04/28/2022    PO2ART 77.6 04/28/2022    XVL6WHA 60.6 04/28/2022       Lab Results   Component Value Date    MODE PRVC 04/28/2022         INR  No results for input(s): PROTIME, INR in the last 72 hours. APTT  No results for input(s): APTT in the last 72 hours. Lactic Acid  Lab Results   Component Value Date    LACTA 1.5 04/19/2022    LACTA 1.2 04/02/2022        BNP   No results for input(s): BNP in the last 72 hours.      Cultures       Radiology     CXR  No significant pneumothorax after chest tube has been removed        SYSTEMS ASSESSMENT    Acute on chronic hypoxic and hypercapnic respiratory failure, intubated 4/16; extubated 4/28  Pseudomonas pneumonia  Right-sided patient chest tube was replaced on April 25 removed 4/29  Right lower lobe cavitary lesions  Exudative pleural effusion on right, growing Pseudomonas  Low ejection fraction EF 45 to 50%, echo 4/18  History of pulmonary embolism and what appears to be chronic filling defects (subsegmental, most likely clinically inconsequential)  Severe stage IV COPD, FEV1 is 35% of predicted  Recent hospitalization for pneumonia  Elevated inflammatory markers including D-dimer and procalcitonin  Full CODE STATUS    She is not so emotional, suspect that she may be slightly confused and also has suffered some psychological trauma from being on the ventilator  Continue bronchodilators every 4 hours  Continue IV Lasix  Decrease Solu-Medrol to 40 every 12  Chest tube removed without any further issues  Would like to avoid any positive pressure as that might make her pneumothorax worse so we will discontinue CPAP  I discussed with her CODE STATUS, she still wants to be full code but I told her that she barely survived and she has told me in the past she does not want tracheostomy. Continue Merrem  PT/OT  Will need to discuss with patient and daughter Ulysess Form goals of therapy.   Transfer to progressive unit    Critical Care Time   0 min    Electronically signed by Cas Farrell MD on 4/30/2022 at 1:47 PM

## 2022-04-30 NOTE — PROGRESS NOTES
[facility] -   Infectious diseases evaluation  admission date 4/16/2022    reason for consultation:   Cavitary lung lesions  Impression :   Current:  · Right lower lobe cavitary lesions associated with multiloculated pleural fluid collection likely abscesses with empyema status post chest tube with Pseudomonas and yeast growth on culture  · Sepsis secondary to above  · Acute on chronic respiratory failure required intubation  · Right-sided pneumothorax  · Severe COPD      Recommendations     · IV meropenem through 5/14/2022  · Midline on discharge  · Repeat CT chest without contrast from yesterday reviewed showed resolving pneumothorax, no evidence of loculated effusion, multiple small cavitary lesions. · Yeast growth on bronchoscopy follow for further identification  · Follow CBC and renal function  · Continue supportive care  · Follow-up CT chest in 3 weeks              History of Present Illness:   Initial history:  Natasha Freeman is a 74y.o.-year-old female presents to the hospital with worsening shortness of breath associated with cough. At the ER with tachypneic and hypoxic  Chest x-ray showed pneumothorax  The patient was in respiratory distress, was intubated. CT chest 4/16/2022 showed right lower lobe cavitary lesion with surrounding airspace disease and multiloculated pleural collection. The patient had chest tube placed   Initial WBC was 18.5, procalcitonin no more than 100  Respiratory panel with COVID-19 PCR was negative  Urine for Legionella and strep pneumo antigen negative  Status post bronchoscopy 4/18/2020 with Pseudomonas and yeast growth on culture   Chest tube was removed  Ct chest from 4/25 showed mod/large right pneumothorax and pleural fluid, the cavitary lesion is smaller compared to previous exam.  Chest tube was reinserted 4/25/2022 . Pseudomonas growth on pleural fluid culture.   She was extubated 4/28/2022  Interval changes  4/30/2022 She is feeling better, comfortable on oxygen per nasal cannula, complaining of pain around chest tube site, denied nausea or vomiting, no other complaints    Patient Vitals for the past 8 hrs:   BP Temp Temp src Pulse Resp SpO2   04/30/22 1050 -- -- -- -- 20 96 %   04/30/22 0900 (!) 113/44 -- -- 98 (!) 33 --   04/30/22 0800 (!) 107/38 97.2 °F (36.2 °C) Oral 68 16 --   04/30/22 0716 -- -- -- -- 16 94 %   04/30/22 0700 (!) 104/35 -- -- 65 18 --   04/30/22 0600 (!) 101/38 -- -- 67 18 93 %   04/30/22 0500 (!) 120/49 -- -- 71 17 93 %   04/30/22 0400 (!) 102/38 98.3 °F (36.8 °C) Oral 61 18 96 %   04/30/22 0300 (!) 104/35 -- -- 66 16 96 %             I have personally reviewed the past medical history, past surgical history, medications, social history, and family history, and I haveupdated the database accordingly. Allergies:   Advil [ibuprofen], Aleve [naproxen], Antipyrine, Celecoxib, Codeine, Fomepizole, Incruse ellipta [umeclidinium bromide], Other, Rofecoxib, Salicylates, Strawberry extract, Sulfinpyrazone, Aspirin, and Nsaids     Review of Systems:     Review of Systems  As per history of present illness, other than above 12 system review was negative  Physical Examination :       Physical Exam  Constitutional:       Appearance: She is ill-appearing. Comments: Intubated   HENT:      Head: Normocephalic and atraumatic. Right Ear: External ear normal.      Left Ear: External ear normal.   Cardiovascular:      Rate and Rhythm: Normal rate. Abdominal:      General: There is no distension. Palpations: Abdomen is soft. Musculoskeletal:      Cervical back: Neck supple. No rigidity. Right lower leg: No edema. Left lower leg: No edema. Skin:     Coloration: Skin is not jaundiced. Findings: No bruising.          Past Medical History:     Past Medical History:   Diagnosis Date    Abnormal EKG     TRAM (acute kidney injury) ([facility] [address] 4/2/2022    Anxiety     Asthma     Bipolar disorder ([address]     SEVERE IN 2003, [place]    COPD (chronic obstructive pulmonary disease) (HCC)     Cramps, extremity     SEVERE LEG CRAMPS    Depression     Dilated bile duct     Headache     History of elective      Hyperlipidemia     Irritable bowel syndrome     Prolonged emergence from general anesthesia     SEVERE ISSUES WAKING UP    Substance abuse ([address]     street drugs when younger   [facility] Unspecified sleep apnea     Vision abnormalities     glasses       Past Surgical  History:     Past Surgical History:   Procedure Laterality Date    ANKLE SURGERY      HAD SCREWS AND HARDWARE, THEN REMOVED    BRONCHOSCOPY  2022         COLONOSCOPY  02/15/2018    tubular adenoma    EYE SURGERY Bilateral     CATARACTS    HYSTEROSCOPY  10/19/2016    D & C    INDUCED       LASIK      bilateral    TONSILLECTOMY AND ADENOIDECTOMY Bilateral     VULVA SURGERY      HAD A BIOPSY AND REMOVAL OF       Medications:      methylPREDNISolone  30 mg IntraVENous Q8H    insulin lispro  0-6 Units SubCUTAneous TID WC    insulin lispro  0-3 Units SubCUTAneous Nightly    folic acid  1 mg Oral Daily    thiamine  100 mg Oral Daily    famotidine  20 mg Oral BID    miconazole   Topical BID    multivitamin  1 tablet Oral Daily    polyethylene glycol  17 g Oral Daily    furosemide  20 mg IntraVENous BID    meropenem  1,000 mg IntraVENous Q8H    albuterol  2.5 mg Nebulization Q4H    sodium chloride flush  5-40 mL IntraVENous 2 times per day    risperiDONE  1.5 mg Oral Q12H    atorvastatin  20 mg Oral Daily    traZODone  50 mg Oral Nightly    heparin (porcine)  5,000 Units SubCUTAneous 3 times per day       Social History:     Social History     Socioeconomic History    Marital status:      Spouse name: Not on file    Number of children: Not on file    Years of education: Not on file    Highest education level: Not on file   Occupational History    Not on file   Tobacco Use    Smoking status: Former Smoker     Packs/day: 2.00     Years: 42.00     Pack years: 84.00     Types: Cigarettes     Quit date: 11/1/2018     Years since quitting: 3.4    Smokeless tobacco: Never Used   Substance and Sexual Activity    Alcohol use: Yes     Comment: yearly    Drug use: No    Sexual activity: Not Currently     Partners: Male     Birth control/protection: Post-menopausal   Other Topics Concern    Not on file   Social History Narrative    Not on file     Social Determinants of Health     Financial Resource Strain: High Risk    Difficulty of Paying Living Expenses: Very hard   Food Insecurity: No Food Insecurity    Worried About Running Out of Food in the Last Year: Never true    Agustin of Food in the Last Year: Never true   Transportation Needs:     Lack of Transportation (Medical): Not on file    Lack of Transportation (Non-Medical):  Not on file   Physical Activity:     Days of Exercise per Week: Not on file    Minutes of Exercise per Session: Not on file   Stress:     Feeling of Stress : Not on file   Social Connections:     Frequency of Communication with Friends and Family: Not on file    Frequency of Social Gatherings with Friends and Family: Not on file    Attends Mandaeism Services: Not on file    Active Member of [address] [place] [address] [employer] or Organizations: Not on file    Attends Club or Organization Meetings: Not on file    Marital Status: Not on file   Intimate Partner Violence:     Fear of Current or Ex-Partner: Not on file    Emotionally Abused: Not on file    Physically Abused: Not on file    Sexually Abused: Not on file   Housing Stability:     Unable to Pay for Housing in the Last Year: Not on file    Number of [place] in the Last Year: Not on file    Unstable Housing in the Last Year: Not on file       Family History:     Family History   Problem Relation Age of Onset    Dementia Maternal Aunt     Kidney Disease Mother     Heart Attack Sister     Prostate Cancer Father     High Cholesterol Brother     Heart Attack Paternal Grandmother       Medical Decision Making:   I have independently reviewed/ordered the following labs:    CBC with Differential:   Recent Labs     04/29/22  0343 04/30/22 0429   WBC 13.3* 9.5   HGB 9.3* 9.3*   HCT 28.9* 29.2*    386     BMP:  Recent Labs     04/29/22  0343 04/30/22 0429    140   K 3.9 3.6*   CL 94* 98   CO2 37* 37*   BUN 29* 24*   CREATININE <0.40* <0.40*       Lab Results   Component Value Date    CREATININE <0.40 04/30/2022    GLUCOSE 123 04/30/2022    GLUCOSE 127 07/08/2021       Detailed results: Thank you for allowing us to participate in the care of this patient. Please call with questions. This note is created with the assistance of a speech recognition program.  While intending to generate adocument that actually reflects the content of the visit, the document can still have some errors including those of syntax and sound a like substitutions which may escape proof reading. It such instances, actual meaningcan be extrapolated by contextual diversion.     Teresa Garner MD  Office: [phone]  Perfect serve / office [phone]

## 2022-04-30 NOTE — PROGRESS NOTES
PROGRESS NOTE          PATIENT NAME: [id number]  RECORD NO. [id number]  DATE: 4/30/2022  SURGEON: Rosalia Tavarez  PRIMARY CARE PHYSICIAN: Esau Muniz MD    HD: # 15    ASSESSMENT    Patient Active Problem List   Diagnosis    Chronic obstructive pulmonary disease ([address]    Vitamin D deficiency    Anxiety    Asthma    Bipolar disorder ([address]    Depression    Hyperlipidemia with target LDL less than 100    Sleep apnea    Vision abnormalities    Status post hysteroscopy    Chronic headaches    IBS (irritable bowel syndrome)    Colon polyp    Collagenous colitis    Lung nodule    Left elbow pain    Dilated bile duct    Acute pain of left shoulder    Adhesive capsulitis of left shoulder    Leucopenia    Compression fracture of body of thoracic vertebra (HCC)    Age-related osteoporosis with current pathological fracture    Pulmonary embolism on right ([address]    Migraines    Paranoid behavior (HCC)    Acute deep vein thrombosis (DVT) of right lower extremity (HCC)    Delirium    Chronic respiratory failure with hypoxia (HCC)    Major neurocognitive disorder ([address]    Pneumonia    Chronic respiratory failure with hypoxia, on home O2 therapy ([address]    COPD (chronic obstructive pulmonary disease) ([address]    TRAM (acute kidney injury) ([address]    History of pulmonary embolus (PE)    Mycoplasma pneumonia    Iron deficiency anemia    Sepsis ([address]    Acute on chronic respiratory failure ([address]    Cavitary lesion of lung    History of DVT (deep vein thrombosis)    Pneumothorax, right    Status post chest tube placement (HCC)    Pneumothorax on right    HCAP (healthcare-associated pneumonia)    Acute systolic HF (heart failure) (HCC)    Pseudomonas aeruginosa infection    Elevated procalcitonin    Elevated C-reactive protein (CRP)    Lung abscess (HCC)    Recurrent pneumothorax after chest tube removed    Postprocedural subcutaneous emphysema       MEDICAL DECISION MAKING AND PLAN    1. Chest tube removed. 2. STAT cxr post removal and 6hrs later. 3. Will follow. Chief Complaint: \"my chest hurts\"    SUBJECTIVE    Rebekah Serve is doing well. No major issues overnight. R chest tube is in place. Tiny air leak in atrium that is intermittent. Chest tube has been in all week. Tolerated waterseal for 2 days. Plan for removal today. CT chest reviewed yest and no pneumothorax however she does have some loculation and cavitary lesion in posterior chest.       OBJECTIVE  VITALS: Temp: Temp: 97.2 °F (36.2 °C)Temp  Av °F (36.7 °C)  Min: 97.2 °F (36.2 °C)  Max: 98.6 °F (37 °C) BP Systolic (30PQR), QXQ:616 , Min:91 , RQC:352   Diastolic (14ZUM), JCX:97, Min:26, Max:54   Pulse Pulse  Av.8  Min: 61  Max: 98 Resp Resp  Av.4  Min: 15  Max: 33 Pulse ox SpO2  Av.7 %  Min: 91 %  Max: 96 %  GENERAL: alert, no distress  NEURO: awake, no focal defecits  HEENT: normocephalic, atraumatic. : deferred  LUNGS: clear to ausculation, without wheezes, rales or rhonci  HEART: normal rate and regular rhythm  ABDOMEN: soft, non-tender, non-distended, bowel sounds present in all 4 quadrants and no guarding or peritoneal signs present  EXTREMITY: no cyanosis, clubbing or edema    I/O last 3 completed shifts: In: 1045.6 [I.V.:532.9; IV Piggyback:512.7]  Out: 0369 [Urine:2500; Chest Tube:5]    Drain/tube output: In: 712.4 [I.V.:362.7]  Out: 1850 [Urine:1850]    LAB:  CBC:   Recent Labs     22  0439 22  0343 22  0429   WBC 16.0* 13.3* 9.5   HGB 9.7* 9.3* 9.3*   HCT 30.7* 28.9* 29.2*   MCV 92.8 92.4 92.8    385 386     BMP:   Recent Labs     22  0439 22  0343 22  0429    139 140   K 4.2 3.9 3.6*   CL 93* 94* 98   CO2 37* 37* 37*   BUN 34* 29* 24*   CREATININE <0.40* <0.40* <0.40*   GLUCOSE 172* 64* 123*     COAGS: No results for input(s): APTT, PROT, INR in the last 72 hours.           Rosaura Landry MD  22, 12:03 PM

## 2022-04-30 NOTE — PLAN OF CARE
Problem: Non-Violent Restraints  Goal: Removal from restraints as soon as assessed to be safe  4/29/2022 1853 by Meng White RN  Outcome: Progressing     Problem: Non-Violent Restraints  Goal: No harm/injury to patient while restraints in use  4/29/2022 1853 by Meng White RN  Outcome: Progressing     Problem: Non-Violent Restraints  Goal: Patient's dignity will be maintained  4/29/2022 1853 by Meng White RN  Outcome: Progressing     Problem: Gas Exchange - Impaired:  Goal: Levels of oxygenation will improve  Description: Levels of oxygenation will improve  4/29/2022 1853 by Meng White RN  Outcome: Progressing     Problem: Skin Integrity - Impaired:  Goal: Will show no infection signs and symptoms  Description: Will show no infection signs and symptoms  4/29/2022 1853 by Meng White RN  Outcome: Progressing     Problem: Falls - Risk of:  Goal: Absence of physical injury  Description: Absence of physical injury  4/29/2022 1853 by Meng White RN  Outcome: Progressing     Problem: Breathing Pattern - Ineffective:  Goal: Ability to achieve and maintain a regular respiratory rate will improve  Description: Ability to achieve and maintain a regular respiratory rate will improve  4/29/2022 1853 by Meng Whiet RN  Outcome: Progressing     Problem: Skin Integrity:  Goal: Will show no infection signs and symptoms  Description: Will show no infection signs and symptoms  4/29/2022 1853 by Meng White RN  Outcome: Progressing     Problem: Skin Integrity:  Goal: Absence of new skin breakdown  Description: Absence of new skin breakdown  4/29/2022 1853 by Meng White RN  Outcome: Progressing     Problem: OXYGENATION/RESPIRATORY FUNCTION  Goal: Patient will maintain patent airway  4/29/2022 1853 by Meng White RN  Outcome: Progressing

## 2022-04-30 NOTE — PROGRESS NOTES
[address]    PROGRESS NOTE             4/30/2022    10:14 AM    Name:   Aime Roberson  MRN:     [id number]     [place]:      [de-identified]   Room:   2002/2002-01  IP Day:  15  Admit Date:  4/16/2022 10:25 AM    PCP:  Mary Rice MD  Code Status:  Full Code    Subjective:     C/C:   Chief Complaint   Patient presents with    Shortness of Breath     Interval History Status: improved. Patient doing significantly better today. Eating breakfast with some difficulty but does have weakness from deconditioning. Denies any concerns at this time, has some pain with coughing and pain around chest tube site. No shortness of breath. Feels well. Brief History:     The patient is a 76 y.o.  Non- / non  female sever COPD 3L O2 baseline, bipolar, Hx of DVT/ PE, migraines, who presents with Shortness of Breath and cough  Patient was recently discharge from [address] for mycoplasma pneumonia. She had a follow up appointment with PCP, patient was complaining of cough and chest pain, was started on Tessalon and nsaids for pain. However; she continued to be in distress and her daughter brought her to ED. She was hypoxic initially on 3L o2, was increased to 6L and continued to be hypoxic   Tachypneic, tachycardic  ABGs: pH 7.33, CO2 34, HCO3 18  WBC 18   Lactic 2.8> 1.5  Pancultures were sent  Chest x-ray: Moderate loculated right basal pneumothorax.  Evidence for tension.  Cavitary lesion noted in the right lung base  CT chest: Chronic clot material RUL, RLL.  Thick-walled cavitary lesion RLL.   Multiloculated pleural collection or hydropneumothorax posterior to the right lung, right chest tube in situ.  Infiltrates of the right middle lobe.  Stellate 6 mm lateral RUL nodule.  Mediastinal and right hilar lymphadenopathy  Chest tube was placed and patient was intubated.   Was given 1 L bolus, IV cefepime, vancomycin, 125 mg Solu-Medrol and she is admitted to the hospital for the management of acute hypoxic respiratory failure due to pneumothorax and possible lung infection     4/19: switched to meropenem      4/21: bronchial washing from bronchoscopy growing pseudomonas     4/24: chest tube removed     4/25: Recurrent right sided pneumonthorax shown on repeat CT. Chest tube reinserted by surgery.      4/28: successfully extubated     4/29: on 3L NC O2    Review of Systems:     Review of Systems   Constitutional: Negative for chills and fever. Respiratory: Positive for cough. Negative for shortness of breath. Cardiovascular: Positive for chest pain (only with coughing/near chest tube insertion site). Gastrointestinal: Negative for abdominal pain. All other systems reviewed and are negative. Medications: Allergies:     Allergies   Allergen Reactions    Advil [Ibuprofen] Other (See Comments)     vomiting    Aleve [Naproxen] Other (See Comments)     vomiting    Antipyrine Other (See Comments)     unknown    Celecoxib Other (See Comments)    Codeine      headache    Fomepizole     Incruse Ellipta [Umeclidinium Bromide]      confusion    Other      GRASS, TREES, WEEDS    Rofecoxib Other (See Comments)     Unknown reaction    Salicylates      Unknown reaction     Strawberry Extract      HEADACHES    Sulfinpyrazone Other (See Comments)     Unknown reaction    Aspirin Nausea And Vomiting, Other (See Comments) and Nausea Only    Nsaids Nausea Only, Other (See Comments) and Nausea And Vomiting       Current Meds:   Scheduled Meds:    methylPREDNISolone  30 mg IntraVENous Q8H    insulin lispro  0-6 Units SubCUTAneous TID WC    insulin lispro  0-3 Units SubCUTAneous Nightly    folic acid  1 mg Oral Daily    thiamine  100 mg Oral Daily    famotidine  20 mg Oral BID    miconazole   Topical BID    multivitamin  1 tablet Oral Daily    polyethylene glycol  17 g Oral Daily    furosemide  20 mg IntraVENous BID  meropenem  1,000 mg IntraVENous Q8H    albuterol  2.5 mg Nebulization Q4H    sodium chloride flush  5-40 mL IntraVENous 2 times per day    risperiDONE  1.5 mg Oral Q12H    atorvastatin  20 mg Oral Daily    traZODone  50 mg Oral Nightly    heparin (porcine)  5,000 Units SubCUTAneous 3 times per day     Continuous Infusions:    sodium chloride 15 mL/hr at 22 0424    sodium chloride      dextrose       PRN Meds: midodrine, fentanNYL **OR** fentanNYL, hydrALAZINE, sodium chloride flush, sodium chloride, sodium chloride flush, potassium chloride **OR** potassium alternative oral replacement **OR** potassium chloride, glucose, dextrose, glucagon (rDNA), dextrose    Data:     Past Medical History:   has a past medical history of Abnormal EKG, TRAM (acute kidney injury) ([facility] Anxiety, Asthma, Bipolar disorder ([facility] [facility] COPD (chronic obstructive pulmonary disease) ([facility] [address] [facility] Cramps, extremity, Depression, Dilated bile duct, Headache, History of elective , Hyperlipidemia, Irritable bowel syndrome, Prolonged emergence from general anesthesia, Substance abuse ([facility] [address] Unspecified sleep apnea, and Vision abnormalities. Social History:   reports that she quit smoking about 3 years ago. Her smoking use included cigarettes. She has a 84.00 pack-year smoking history. She has never used smokeless tobacco. She reports current alcohol use. She reports that she does not use drugs.      Family History:   Family History   Problem Relation Age of Onset    Dementia Maternal Aunt     Kidney Disease Mother     Heart Attack Sister     Prostate Cancer Father     High Cholesterol Brother     Heart Attack Paternal Grandmother        Vitals:  BP (!) 113/44   Pulse 98   Temp 97.2 °F (36.2 °C) (Oral)   Resp (!) 33   Ht 5' 5\" (1.651 m)   Wt 183 lb 6.8 oz (83.2 kg)   LMP 2013 (Exact Date)   SpO2 94%   BMI 30.52 kg/m²   Temp (24hrs), Av.1 °F (36.7 °C), Min:97.2 °F (36.2 °C), Max:98.6 °F (37 °C)    Recent Labs 04/29/22  1622 04/29/22 1951 04/29/22 2036 04/30/22  0727   POCGLU 160* 269* 258* 121*       I/O(24Hr):     Intake/Output Summary (Last 24 hours) at 4/30/2022 1014  Last data filed at 4/30/2022 0633  Gross per 24 hour   Intake 712.37 ml   Output 1850 ml   Net -1137.63 ml       Labs:    CBC:   Lab Results   Component Value Date    WBC 9.5 04/30/2022    RBC 3.14 04/30/2022    RBC 0-2 07/08/2021    HGB 9.3 04/30/2022    HCT 29.2 04/30/2022    MCV 92.8 04/30/2022    MCH 29.5 04/30/2022    MCHC 31.8 04/30/2022    RDW 17.1 04/30/2022     04/30/2022    MPV 8.2 04/30/2022     BMP:    Lab Results   Component Value Date     04/30/2022    K 3.6 04/30/2022    CL 98 04/30/2022    CO2 37 04/30/2022    BUN 24 04/30/2022    LABALBU 2.8 04/16/2022    LABALBU 3.6 07/08/2021    CREATININE <0.40 04/30/2022    CALCIUM 8.0 04/30/2022    GFRAA Can not be calculated 04/30/2022    LABGLOM Can not be calculated 04/30/2022    GLUCOSE 123 04/30/2022    GLUCOSE 127 07/08/2021       Lab Results   Component Value Date/Time    SPECIAL 8ML GREEN/2ML ORANGE RIGHT IJ 04/16/2022 12:12 PM     Lab Results   Component Value Date/Time    CULTURE YEAST ISOLATED IDENTIFICATION TO FOLLOW 04/18/2022 12:20 PM    CULTURE NO GROWTH 6 DAYS 04/18/2022 12:20 PM    CULTURE PSEUDOMONAS AERUGINOSA LIGHT GROWTH (A) 04/18/2022 12:20 PM    CULTURE NO NORMAL RAVEN 04/18/2022 12:20 PM         Radiology:    ECHO Complete 2D W Doppler W Color    Result Date: 4/18/2022  [address] Transthoracic Echocardiography Report (TTE)  Patient Name Chris West     Date of Study               04/18/2022               BOBBY KWESI   Date of      1955  Gender                      Female  Birth   Age          74 year(s)  Race                           Room Number  2002        Height:                     65 inch, 165.1 cm   Corporate ID [id number]    Weight:                     176 pounds, 79.8 kg  #   Patient Acct [de-identified]   BSA:          1.87 m^2 BMI:      29.29  #                                                              kg/m^2   MR #         [id number]      Sonographer                 Мария Stein   Accession #  [id number]  Interpreting Physician      Giovanna Cosme   Fellow                   Referring Nurse                           Practitioner   Interpreting             Referring Physician         Selena Shaw  Type of Study   TTE procedure:2D Echocardiogram, M-Mode, Doppler, Color Doppler. Procedure Date Date: 04/18/2022 Start: 10:35 AM Study Location: [address] [place] [address] Technical Quality: Fair visualization Indications:Dyspnea/SOB, Respiratory Failure and Pulmonary embolus. History / Tech. Comments: COPD, HLD, Sleep apnea Patient Status: Inpatient Height: 65 inches Weight: 176 pounds BSA: 1.87 m^2 BMI: 29.29 kg/m^2 Rhythm: Sinus bradycardia HR: 57 bpm BP: 138/65 mmHg CONCLUSIONS Summary Left ventricle is normal in size and wall thickness. Global left ventricular systolic function appears mildly reduced. Estimated LV EF 45-50 %. No obvious wall motion abnormality seen. RV size appears normal mildly reduced function Left atrium is normal in size. No significant valvular regurgitation or stenosis seen. No significant pericardial effusion is seen. Normal aortic root dimension. Dilated IVC, impaired or no respiratory variations suggestive of elevate Rt atrial pressure Signature ----------------------------------------------------------------------------  Electronically signed by Giovanna Cosme(Interpreting physician) on  04/18/2022 05:06 PM ---------------------------------------------------------------------------- ----------------------------------------------------------------------------  Electronically signed by Norm SteinSonographer) on 04/18/2022 02:29  PM ---------------------------------------------------------------------------- FINDINGS Left Atrium Left atrium is normal in size.  Left Ventricle Left ventricle is normal in size and wall thickness. Global left ventricular systolic function is mildly reduced . Estimated LV EF  %. No obvious wall motion abnormality seen. Right Atrium Right atrium is normal in size. Right Ventricle Normal right ventricular size , mildly reduced function. Mitral Valve No obvious valvular abnormality seen. No evidence of mitral regurgitation. Aortic Valve No obvious valvular abnormality seen. No evidence of aortic insufficiency or stenosis. Tricuspid Valve No obvious valvular abnormality seen. Insignificant tricuspid regurgitation, unable to estimate RVSP. Pulmonic Valve Pulmonic valve was not well visualized. No evidence of pulmonic insufficiency or stenosis. Pericardial Effusion No significant pericardial effusion is seen. Pleural Effusion No pleural effusion seen. Miscellaneous Normal aortic root dimension. IVC Increased diameter and impaired or no inspiratory variation indicating elevated RA filling pressure (i.e. CVP) . M-mode / 2D Measurements & Calculations:   LVIDd:4.74 cm(3.7 - 5.6 cm)      Aortic Root:3.3 cm(2.0 - 3.7 cm)  LVIDs:3.28 cm(2.2 - 4.0 cm)      LA Dimension: 3.5 cm(1.9 - 4.0 cm)  IVSd:1.05 cm(0.6 - 1.1 cm)       LA volume/Index: 40.7 ml /22m^2  LVPWd:0.93 cm(0.6 - 1.1 cm)  Fractional Shortenin.8 %      XR CHEST (SINGLE VIEW FRONTAL)    Result Date: 2022  EXAMINATION: ONE XRAY VIEW OF THE CHEST 2022 11:53 am COMPARISON: 2022, 2022, 2022 HISTORY: ORDERING SYSTEM PROVIDED HISTORY: chest tube placement TECHNOLOGIST PROVIDED HISTORY: chest tube placement Reason for Exam: chest tube placement FINDINGS: Interval placement of a large-bore thoracostomy tube terminating over the medial right lung base. There appears to be good lung re-expansion with only small volume residual pneumothorax. Increased patchy opacities along the right lung base. Left lung is grossly clear on a background of emphysematous change.   Cardiomediastinal contours are within normal limits and unchanged. Subcutaneous emphysema along the partially visualized right lateral chest wall and along the base of the neck on the right. The tip of the ET tube is 4 cm above the nimesh. Enteric tube tip is subdiaphragmatic in location coursing beyond the inferior field of view. Right internal jugular central venous catheter terminates over the superior cavoatrial junction. Large bore right-sided thoracostomy tube terminating over the medial right lung base. Good lung re-expansion with only small volume residual pneumothorax. XR CHEST (SINGLE VIEW FRONTAL)    Result Date: 4/5/2022  EXAMINATION: ONE XRAY VIEW OF THE CHEST 4/5/2022 8:55 am COMPARISON: April 3, 2022 HISTORY: ORDERING SYSTEM PROVIDED HISTORY: pna TECHNOLOGIST PROVIDED HISTORY: pna Reason for Exam: pna FINDINGS: Stable position of the right internal jugular central venous catheter. Right infrahilar airspace opacity not significantly changed. No new focal lung abnormality. No sizable pleural effusion. No pneumothorax. Stable right infrahilar airspace opacity     XR CHEST (SINGLE VIEW FRONTAL)    Result Date: 4/3/2022  EXAMINATION: ONE XRAY VIEW OF THE CHEST 4/3/2022 5:51 am COMPARISON: 1 April 2022 HISTORY: ORDERING SYSTEM PROVIDED HISTORY: sob, pleurisy, cough TECHNOLOGIST PROVIDED HISTORY: sob, pleurisy, cough Reason for Exam: Shortness of breath, pleurisy, cough Additional signs and symptoms: Shortness of breath, pleurisy, cough Relevant Medical/Surgical History: Shortness of breath, pleurisy, cough FINDINGS: AP portable view of the chest time stamped at 528 hours demonstrates overlying cardiac monitoring electrodes. Heart size is stable. Right internal jugular catheter terminates in the distal superior vena cava. There has been worsening of a focal opacity at the right lung base. Minimal left basilar opacity is unchanged. No extrapleural air or overt edema. No gross effusions.      Worsening opacity at the right base favoring airspace disease. Change in minimal left basilar opacity which may be related atelectasis. CT CHEST WO CONTRAST    Result Date: 4/29/2022  EXAMINATION: CT OF THE CHEST WITHOUT CONTRAST 4/29/2022 1:47 pm TECHNIQUE: CT of the chest was performed without the administration of intravenous contrast. Multiplanar reformatted images are provided for review. Dose modulation, iterative reconstruction, and/or weight based adjustment of the mA/kV was utilized to reduce the radiation dose to as low as reasonably achievable. COMPARISON: 04/25/2022 HISTORY: ORDERING SYSTEM PROVIDED HISTORY: Loculated pleural effusion TECHNOLOGIST PROVIDED HISTORY: Loculated pleural effusion Reason for Exam: pt sob. pt has chest tube on right side d/t pleural effusion. FINDINGS: CT chest: Lines and tubes:  None. Lungs and Airways and Pleura:  Central airways are patent. Multiple small cavitary lesions within the right upper lobe and right lower lobe with the largest cavitary lesion noted within the basilar aspect of the right lower lobe may now measuring 5.2 by 4.3 cm and demonstrates decreased thickening of the wall and more centralized cavitation. Findings are likely related to infectious/inflammatory etiology. Moderate emphysematous changes of the lungs. Previously noted right-sided pneumothorax has significantly resolved. There is only small locules of air remaining within the small layering right pleural effusion suggestive of a small right-sided hydropneumothorax. No evidence of loculated effusion. Lymph nodes: No pathologically enlarged mediastinal, hilar, lower cervical, or chest wall lymph nodes. Cardiovascular and Mediastinum: No acute aortic pathology. Cardiac chamber sizes appear to measure within normal limits on this non ECG gated study. No pericardial effusion. The thyroid gland is unremarkable. The esophagus is unremarkable.  Bones/Soft tissues: Unchanged chronic inferior endplate compression fracture T5 and T6 with 30% height loss. .  No definite suspicious lytic or blastic foci. Persistent right chest wall soft tissue emphysema. Visualized upper abdomen: Unremarkable. Previously noted right-sided pneumothorax has significantly resolved. There is only small locules of air remaining within the small layering right pleural effusion suggestive of a small right-sided hydropneumothorax. No evidence of loculated effusion. Persistent right chest wall soft tissue emphysema. Multiple small cavitary lesions within the right upper lobe and right lower lobe with the largest cavitary lesion noted within the basilar aspect of the right lower lobe may now measuring 5.2 by 4.3 cm and demonstrates decreased thickening of the wall and more centralized cavitation. Findings are likely related to infectious/inflammatory etiology. Moderate emphysematous changes of the lungs. Unchanged chronic inferior compression fracture of T5 and T6.     CT CHEST WO CONTRAST    Result Date: 4/25/2022  EXAMINATION: CT OF THE CHEST WITHOUT CONTRAST 4/25/2022 11:29 am TECHNIQUE: CT of the chest was performed without the administration of intravenous contrast. Multiplanar reformatted images are provided for review. Dose modulation, iterative reconstruction, and/or weight based adjustment of the mA/kV was utilized to reduce the radiation dose to as low as reasonably achievable. COMPARISON: CT chest performed 04/16/2022. HISTORY: ORDERING SYSTEM PROVIDED HISTORY: Empyema TECHNOLOGIST PROVIDED HISTORY: Empyema Reason for Exam: Empyema per order Additional signs and symptoms: pt. intubated Relevant Medical/Surgical History: pt. unable to tolerate arms over head for exam FINDINGS: Mediastinum: Soft tissues of the thoracic inlet are unremarkable. The thoracic aorta is normal in caliber. Main pulmonary artery is normal in caliber. There is no pericardial effusion. There is no mediastinal or hilar adenopathy.   There is an endotracheal tube with the tip in the midtrachea. Lungs/pleura: There is underlying emphysematous change. Left lung is clear. There is a moderate to large right-sided pneumothorax. There are few scattered similar nodular foci within the upper aspect of the right lung. There is pleural fluid containing septations and scattered air pockets. There is a thick-walled cavitary area that is smaller compared to prior exam measuring approximately 4.3 x 4.3 cm. Upper Abdomen: The upper abdomen is unremarkable. Soft Tissues/Bones: There is subcutaneous emphysema along the right lateral chest wall. There is no axillary adenopathy. There is no acute osseous abnormality. Moderate to large right-sided pneumothorax. Pleural fluid inferiorly and posteriorly containing septations and loculated areas of air or cavitation. The thick-walled cavitary lesion previously seen is smaller compared to prior examination. Similar pulmonary nodules primarily in the right upper lobe. Underlying emphysematous change. Subcutaneous emphysema along the right lateral chest wall. XR CHEST PORTABLE    Result Date: 4/29/2022  EXAMINATION: ONE XRAY VIEW OF THE CHEST 4/29/2022 5:02 am COMPARISON: 04/28/2022 HISTORY: ORDERING SYSTEM PROVIDED HISTORY: Acute respiratory failure TECHNOLOGIST PROVIDED HISTORY: Acute respiratory failure Reason for Exam: Acute respiratory failure Additional signs and symptoms: Acute respiratory failure Relevant Medical/Surgical History: Acute respiratory failure FINDINGS: Endotracheal tube and nasogastric tube have been removed. Right internal jugular central venous catheter extends to expected location of cavoatrial junction. Multifocal heterogeneous opacity throughout the right lung is grossly similar to prior. Right chest tube extends to the medial right hemithorax. No gross pneumothorax. Cardiac and mediastinal silhouettes are unchanged. Soft tissue emphysema is seen at the right chest wall and axilla.      No substantial interval change in multifocal right-sided airspace disease as compared to prior. XR CHEST PORTABLE    Result Date: 4/28/2022  EXAMINATION: ONE XRAY VIEW OF THE CHEST 4/28/2022 2:33 pm COMPARISON: AP chest/20 HISTORY: ORDERING SYSTEM PROVIDED HISTORY: Chest tube re-exam. Pneumothorax TECHNOLOGIST PROVIDED HISTORY: Chest tube re-exam. Pneumothorax Reason for Exam: Chest tube re-exam. Pneumothorax History of asthma, COPD, substance abuse kidney injury. FINDINGS: Right IJ line tip position stable at the cavoatrial junction. ETT tip position unchanged approximately 3.3 cm above the nimesh. Enteric tube tip and side hole remain below left hemidiaphragm. Overlying ECG monitor leads gown snaps. Unchanged right-sided subcutaneous emphysema and chest tube entering 5-C6 with its tip overlying the hilum. Cardiomediastinal shadow stable. Scattered parenchymal densities right mid lower lung and slight basilar atelectasis or scarring. No new pulmonary finding. No sizable pleural effusion or pneumothorax. Bones appear unchanged. Unchanged support and right-sided chest tubes; otherwise stable findings, as above. XR CHEST PORTABLE    Result Date: 4/28/2022  EXAMINATION: ONE XRAY VIEW OF THE CHEST 4/28/2022 6:08 am COMPARISON: Chest radiograph performed 04/27/2022. HISTORY: ORDERING SYSTEM PROVIDED HISTORY: Acute respiratory failure TECHNOLOGIST PROVIDED HISTORY: Acute respiratory failure Reason for Exam: ETT FINDINGS: There are trace effusions. There is no definite pneumothorax. The mediastinal structures are stable. The upper abdomen unremarkable. There is extensive subcutaneous emphysema along the right lateral chest wall and within the right breast.  There is a similar right-sided chest tube. There is endotracheal tube with tip in the midtrachea. There is a gastric tube and the tip is not visualized. There is a right internal jugular line that is stable. Trace effusions. No definite pneumothorax. Stable support tubes. Subcutaneous emphysema along the right lateral chest wall and within the right breast.     XR CHEST PORTABLE    Result Date: 4/27/2022  EXAMINATION: ONE XRAY VIEW OF THE CHEST 4/27/2022 6:06 am COMPARISON: AP chest from 04/26/2022 HISTORY: ORDERING SYSTEM PROVIDED HISTORY: Acute respiratory failure TECHNOLOGIST PROVIDED HISTORY: Acute respiratory failure Reason for Exam: respiratory failure History of COPD, and acute on chronic respiratory failure. FINDINGS: ETT tip position stable, ~ 2.6 cm above nimesh. Enteric tube tip/side hole remain below left hemidiaphragm. Right IJ central line tip position stable upper RA. Right-sided chest tube position stable, entering lateral chest wall with medial tip abutting mid mediastinal pleura. Right-sided subcutaneous emphysema, unchanged. Overlying ECG monitor leads. Cardiomediastinal shadow stable. Increased right lung opacity, especially lower 1/2 right hemithorax, much could relate to superimposed breast soft tissue. Infiltrate/loculated pleural effusion should also be considered. Slightly increased right basilar atelectasis. No blunting right CP angle. Bones stable. Stable support and right-sided chest tube. Increased opacities right lung, more lower and lateral in location, much of which could be related to superimposed right breast tissue. Infiltrate/loculated pleural fluid should also be considered. Some increased atelectasis right base suspected. RECOMMENDATION: Continued chest x-ray follow-up (later today if respiratory failure/deteriorating clinical status suspected). XR CHEST PORTABLE    Result Date: 4/26/2022  EXAMINATION: ONE XRAY VIEW OF THE CHEST 4/25/2022 5:51 am COMPARISON: 04/24/2022 HISTORY: ORDERING SYSTEM PROVIDED HISTORY: Acute respiratory failure. TECHNOLOGIST PROVIDED HISTORY: Acute respiratory failure. Reason for Exam: Acute respiratory failure. Additional signs and symptoms: Acute respiratory failure.  Relevant Medical/Surgical History: Acute respiratory failure. FINDINGS: Support lines and tubes are similar to the prior study. The endotracheal tube tip is 5 cm above the nimesh. The heart is normal in size for technique. No definite pleural effusion is seen. Suspected tiny right pneumothorax, along the lower chest wall laterally. Mild bibasilar airspace opacities again noted. Soft tissue emphysema is again seen along the right chest wall and neck. Suspected small right pneumothorax. Similar mild bibasilar airspace opacities. Support lines and tubes appear stable. XR CHEST PORTABLE    Result Date: 4/26/2022  EXAMINATION: ONE XRAY VIEW OF THE CHEST 4/26/2022 6:35 am COMPARISON: Chest radiograph performed 04/25/2022. HISTORY: ORDERING SYSTEM PROVIDED HISTORY: Acute respiratory failure TECHNOLOGIST PROVIDED HISTORY: Acute respiratory failure Reason for Exam: Acute resp failure, ETT FINDINGS: There is chronic pulmonary change. There are trace effusions. There is no pneumothorax. The mediastinal structures are unremarkable. The upper abdomen is unremarkable. The extrathoracic soft tissues are unremarkable. There is endotracheal tube with tip in the midtrachea. There is a gastric tube and the tip is not well visualized. There is a right internal jugular line with the tip near the cavoatrial junction. There is a right-sided chest tube with subcutaneous emphysema along the right lateral chest wall. Chronic pulmonary change. Support tubes as described above. Subcutaneous emphysema along the right lateral chest wall.      XR CHEST PORTABLE    Result Date: 4/24/2022  EXAMINATION: ONE XRAY VIEW OF THE CHEST 4/24/2022 5:51 am COMPARISON: 04/23/2022 HISTORY: ORDERING SYSTEM PROVIDED HISTORY: Acute respiratory failure TECHNOLOGIST PROVIDED HISTORY: Acute respiratory failure Reason for Exam: Acute respiratory failure Additional signs and symptoms: Acute respiratory failure Relevant Medical/Surgical History: Acute respiratory failure FINDINGS: Right central venous catheter. Endotracheal tube and nasogastric tube are stable. Right chest wall subcutaneous emphysema is noted. No significant pneumothorax is detected. Mild right lower lobe atelectasis and small right pleural effusion have improved. Improving small right pleural effusion and right lower lobe atelectasis. The lines and tubes are stable. XR CHEST PORTABLE    Result Date: 4/23/2022  EXAMINATION: ONE XRAY VIEW OF THE CHEST 4/23/2022 9:29 pm COMPARISON: 4/23/2022 HISTORY: ORDERING SYSTEM PROVIDED HISTORY: chest tube removal TECHNOLOGIST PROVIDED HISTORY: chest tube removal Reason for Exam: chest tube removal Additional signs and symptoms: chest tube removal Relevant Medical/Surgical History: chest tube removal FINDINGS: Right chest wall subcutaneous emphysema is identified. Endotracheal tube is located 0.7 cm above the nimesh. Suggest retraction by 2 cm. Nasogastric tube traverses the diaphragm. There is a right IJ catheter with the tip in the SVC. There has been removal of the right chest tube without evidence of significant pneumothorax. Small right pleural effusion and right lower lobe atelectasis are not significantly changed. No acute osseous abnormality is identified. Interval removal of right chest tube without evidence of significant pneumothorax. Endotracheal tube located 0.7 cm above the nimesh. Suggest retraction by 2 cm. Small right pleural effusion and right lower lobe atelectasis are unchanged. XR CHEST PORTABLE    Result Date: 4/23/2022  EXAMINATION: ONE XRAY VIEW OF THE CHEST 4/23/2022 5:40 am COMPARISON: 04/22/2022 and 04/21/2022 HISTORY: ORDERING SYSTEM PROVIDED HISTORY: ETT placement TECHNOLOGIST PROVIDED HISTORY: ETT placement Reason for Exam: ETT placement Additional signs and symptoms: ETT placement Relevant Medical/Surgical History: ETT placement FINDINGS: Endotracheal tube tip is approximately 2 cm above the nimesh.   Nasogastric tube continues into the stomach and off the exam.  Right PICC line is near the cavoatrial junction. The right chest tube is stable with the tip over the right upper lung but the side port outside the ribcage. Soft tissue emphysema in the right chest wall is redemonstrated and appears mildly increased. Some patchy opacities persist in the right base. Evaluation for right apical pneumothorax is suboptimal.  The left lung appears acceptable. Cardiac silhouette is not enlarged. The central pulmonary arteries appears stable. Limited evaluation of the right lateral ribs. 1.  Endotracheal tube, nasogastric tube, and right jugular catheter appear acceptable. The right chest tube side port is outside the ribcage. Correlate for functionality of the chest tube. 2.  Patchy opacities persist in the right lower chest.  The evaluation for small right apical pneumothorax is suboptimal on the current study. XR CHEST PORTABLE    Result Date: 4/22/2022  EXAMINATION: ONE XRAY VIEW OF THE CHEST 4/22/2022 6:29 am COMPARISON: 21 April 2022 HISTORY: ORDERING SYSTEM PROVIDED HISTORY: ETT placement TECHNOLOGIST PROVIDED HISTORY: ETT placement Reason for Exam: on vent FINDINGS: AP portable view of the chest time stamped at 623 hours demonstrates overlying cardiac monitoring electrodes, endotracheal tube terminating 4.8 cm above the nimesh and right internal jugular catheter terminating in the right atrium. Right-sided chest tube is again noted. Trace extrapleural air at the right apex persists. Subcutaneous emphysema along the right lateral chest wall is noted. Faint opacity is present at the right lung base suspicious for focal airspace disease. No mediastinal shift. Heart size is stable. Persistent small right apical pneumothorax. Support tubes and lines as above. Opacity at the right base. There is elevation right hemidiaphragm. Atelectasis is evident but superimposed airspace disease may be present.      XR CHEST PORTABLE    Result Date: 4/21/2022  EXAMINATION: ONE XRAY VIEW OF THE CHEST 4/21/2022 11:57 am COMPARISON: 04/21/2022, 0625 hours. HISTORY: ORDERING SYSTEM PROVIDED HISTORY: Chest tube now clamped TECHNOLOGIST PROVIDED HISTORY: Chest tube now clamped Reason for Exam: chest tube now clamped FINDINGS: The ET tube was in satisfactory position, 4.5 cm above the nimesh. The NG tube was passed the gastric fundus. A right jugular central venous line was noted with the tip in the superior vena cava near its junction with the right atrium. A right-sided chest tube was noted. The proximal most opening is just out of the right lateral chest wall. No pneumothorax was noted. No cardiomegaly, pneumonia, interstitial edema or definite effusions were noted. Mild hyper inflation was identified. The ET tube was in satisfactory position, 4.5 cm above the nimesh. The NG tube was past the gastric fundus. A right jugular central venous line was noted with the tip in the superior vena cava near its junction with the right atrium. No pneumothorax was identified. A right-sided chest tube was noted but the proximal most opening is just external to the right lateral chest wall. No cardiomegaly, pneumonia or interstitial edema. XR CHEST PORTABLE    Result Date: 4/21/2022  EXAMINATION: ONE XRAY VIEW OF THE CHEST 4/21/2022 6:30 am COMPARISON: 04/20/2022 HISTORY: ORDERING SYSTEM PROVIDED HISTORY: ETT placement TECHNOLOGIST PROVIDED HISTORY: ETT placement Reason for Exam: vent FINDINGS: Support tubes and lines are unchanged. The right chest tube side port is external to the chest wall which may predispose to air leak. Cardiac silhouette and mediastinal contours are unchanged. Calcified left hilar lymph nodes are noted. No pneumothorax. No new lung infiltrate. Aeration of the right lung base has improved. 1. The right chest tube side port is external to the chest wall which may predispose to an air leak. No pneumothorax. 2. Improved aeration of the right lung base, likely related to atelectasis. XR CHEST PORTABLE    Result Date: 4/20/2022  EXAMINATION: ONE XRAY VIEW OF THE CHEST 4/20/2022 11:52 am COMPARISON: None. HISTORY: ORDERING SYSTEM PROVIDED HISTORY: Chest tube now to waterseal TECHNOLOGIST PROVIDED HISTORY: Chest tube now to waterseal Reason for Exam: Chest tube now to waterseal FINDINGS: There is a right chest tube in place. There is no evidence of pneumothorax. Some apparent atelectasis noted in the the right lung base. ET tube is in good position. Tip of central line overlies the right atrium. Left lung appears normal.  Heart appears unremarkable. No evidence of pneumothorax. Some atelectasis in the right lung base. Tip of centralized overlies the right atrium. It should be withdrawn by 6 cm. The findings were sent to the Radiology Results [address] at 12:21 pm on 4/20/2022 to be communicated to a licensed caregiver. XR CHEST PORTABLE    Result Date: 4/20/2022  EXAMINATION: ONE XRAY VIEW OF THE CHEST 4/20/2022 6:30 am COMPARISON: 04/19/2022 HISTORY: ORDERING SYSTEM PROVIDED HISTORY: ETT placement TECHNOLOGIST PROVIDED HISTORY: ETT placement Reason for Exam: ETT FINDINGS: The cardiac silhouette and mediastinal contours are stable. There is a right-sided chest tube with the side port noted external to the chest wall. Right internal jugular vein catheter, endotracheal tube, and orogastric tube are again noted. There is pulmonary vascular congestion. No pneumothorax. The patient is rotated towards the right. 1. Right chest tube side port is external to the chest wall which may predispose to an air leak. 2. Stable pulmonary vascular congestion. 3. No pneumothorax is identified.      XR CHEST PORTABLE    Result Date: 4/19/2022  EXAMINATION: ONE X-RAY VIEW OF THE CHEST 4/18/2022 6:27 am COMPARISON: 04/17/2022 HISTORY: ORDERING SYSTEM PROVIDED HISTORY: ETT placement TECHNOLOGIST PROVIDED HISTORY: ETT placement Reason for Exam: ETT placement FINDINGS: The endotracheal tube, nasogastric tube, right IJ catheter and right-sided chest tube remain. The extreme lung apices are excluded from the examination. A pneumothorax is not identified. Right basilar consolidation has increased peripherally. Increased right basilar opacity may reflect fluid filling the large cavitary lesion at the right lung base. Pulmonary consolidation/increased pleural effusion or empyema are alternate considerations. XR CHEST PORTABLE    Result Date: 4/19/2022  EXAMINATION: ONE XRAY VIEW OF THE CHEST 4/19/2022 6:34 am COMPARISON: Chest radiograph performed 04/18/2022. HISTORY: ORDERING SYSTEM PROVIDED HISTORY: ETT placement TECHNOLOGIST PROVIDED HISTORY: ETT placement Reason for Exam: on vent FINDINGS: There is right lower lung infiltrate. There is no pneumothorax. The mediastinal structures are unremarkable. The upper abdomen is unremarkable. The extrathoracic soft tissues are unremarkable. There is an endotracheal tube with the tip in the midtrachea. There is a right-sided chest tube. There is a right internal jugular central line with the tip at the cavoatrial junction. There is a gastric tube and the tip is not well visualized. Right lower lung infiltrate that is mildly improved. Support tubes as described above. XR CHEST PORTABLE    Result Date: 4/17/2022  EXAMINATION: ONE XRAY VIEW OF THE CHEST 4/17/2022 6:04 am COMPARISON: 04/16/2022, 1248 hours HISTORY: ORDERING SYSTEM PROVIDED HISTORY: ETT placement TECHNOLOGIST PROVIDED HISTORY: ETT placement Reason for Exam: ETT 73-year-old female with endotracheal tube placement FINDINGS: Endotracheal tube distal tip overlying the mid trachea approximately 4.8 cm above the level of the nimesh. Enteric tube traverses the GE junction with distal tip excluded from the field of view. Right IJ approach central venous catheter distal tip overlying the right atrium. Right-sided chest tube distal tip projects over the right hilum. Cardiac monitor leads overlie the chest. No obvious pneumothorax. No free air. Cardiac and mediastinal contours remain unchanged. Left lung is relatively clear. Persistent airspace disease at the right mid and right lower lung zones. Visualized osseous structures remain unchanged. Subcutaneous emphysema previously seen at the right lateral chest wall no longer identified. 1. Persistent airspace disease at the right mid and right lower lung zones. Follow-up is recommended to document resolution. 2. Tubes and right IJ line as detailed above. XR CHEST PORTABLE    Result Date: 4/16/2022  EXAMINATION: ONE XRAY VIEW OF THE CHEST 4/16/2022 1:10 pm COMPARISON: 04/16/2022, 1213 hours HISTORY: ORDERING SYSTEM PROVIDED HISTORY: chest tube TECHNOLOGIST PROVIDED HISTORY: chest tube Reason for Exam: chest tube Additional signs and symptoms: chest tube and NG tube placement 72-year-old female with history of NG tube and chest tube placement FINDINGS: Portable supine view of the chest. Right-sided chest tube has been placed with distal tip projecting over the right infrahilar region. Right IJ approach central venous catheter distal tip overlying the cavoatrial junction, stable. Endotracheal tube distal tip overlying the mid trachea approximately 4.3 cm above the level of the nimesh. Enteric tube traverses the GE junction with distal tip projecting over the left mid abdomen likely within the stomach body. Left lung is clear. Pleural thickening and opacity involving the right mid and right lower lung zones. Subcutaneous emphysema along the right lateral chest wall. Cardiac and mediastinal contours remain unchanged. Atherosclerotic calcification of the thoracic aorta. No free air. There is re-expansion of the right lung compared with the prior study. Probable small residual inferolateral right pneumothorax component.      1. Tubes and right IJ line as detailed above. Re-expansion of the right lung compared with the prior study. There is likely a small residual right inferolateral pneumothorax component. 2. Pleural thickening and airspace opacity involving the right mid and right lower lung zones. Follow-up is recommended to document resolution. 3. Subcutaneous emphysema along the right lateral chest wall. XR CHEST PORTABLE    Result Date: 4/16/2022  EXAMINATION: ONE XRAY VIEW OF THE CHEST 4/16/2022 12:24 pm COMPARISON: None. HISTORY: ORDERING SYSTEM PROVIDED HISTORY: Intubation TECHNOLOGIST PROVIDED HISTORY: Intubation Reason for Exam: central line and ET placement FINDINGS: ET tube terminates 3 cm above the nimesh. Right line terminates in the SVC. There is a relatively stable right-sided basilar pneumothorax. The remaining lungs are unchanged. Cardiac silhouette and osseous structures unchanged. Intubation as above. No significant change     XR CHEST PORTABLE    Result Date: 4/16/2022  EXAMINATION: ONE XRAY VIEW OF THE CHEST 4/16/2022 11:02 am COMPARISON: 04/05/2022 HISTORY: ORDERING SYSTEM PROVIDED HISTORY: SOB TECHNOLOGIST PROVIDED HISTORY: SOB Reason for Exam: SOB FINDINGS: There is a moderate loculated right basal pneumothorax. Cavitary lesion is noted in the right lung base. Left lung is unremarkable. Heart and mediastinal structures appear normal.  There is no evidence for any tension phenomena. Moderate loculated right basal pneumothorax. Evidence for tension. Cavitary lesion noted in the right lung base. .  Case discussed with Dr. Roya Graves. XR CHEST PORTABLE    Result Date: 4/1/2022  EXAMINATION: ONE XRAY VIEW OF THE CHEST 4/1/2022 4:01 pm COMPARISON: 04/01/2022 HISTORY: ORDERING SYSTEM PROVIDED HISTORY: central line placed TECHNOLOGIST PROVIDED HISTORY: central line placed Reason for Exam: Central line placed FINDINGS: There is a right internal jugular central line in place with distal tip overlying the superior vena cava. Previously noted right basal infiltrate is unchanged. Left lung appears clear. The heart and mediastinal structures appear normal.  There is no evidence of pneumothorax. Satisfactory position of right internal jugular central line. No change in the the right basal infiltrate. XR CHEST PORTABLE    Result Date: 4/1/2022  EXAMINATION: ONE XRAY VIEW OF THE CHEST 4/1/2022 1:22 pm COMPARISON: 06/17/2020. CT dated 10/15/2021. HISTORY: ORDERING SYSTEM PROVIDED HISTORY: dyspnea TECHNOLOGIST PROVIDED HISTORY: dyspnea Reason for Exam: Dyspnea Relevant Medical/Surgical History: Hx of COPD FINDINGS: The cardiac silhouette appears within normal limits. There are bibasilar opacities, right greater than left which may reflect multifocal pneumonia in the correct clinical setting. No evidence of pleural effusion or pneumothorax is seen. Bibasilar opacities, right greater than left, which may reflect multifocal pneumonia. Follow-up to imaging resolution is recommended. CT CHEST PULMONARY EMBOLISM W CONTRAST    Result Date: 4/16/2022  EXAMINATION: CTA OF THE CHEST 4/16/2022 2:30 pm TECHNIQUE: CTA of the chest was performed after the administration of intravenous contrast.  Multiplanar reformatted images are provided for review. MIP images are provided for review. Dose modulation, iterative reconstruction, and/or weight based adjustment of the mA/kV was utilized to reduce the radiation dose to as low as reasonably achievable. COMPARISON: CT chest from 10/15/2021 HISTORY: ORDERING SYSTEM PROVIDED HISTORY: SOB TECHNOLOGIST PROVIDED HISTORY: SOB Decision Support Exception - unselect if not a suspected or confirmed emergency medical condition->Emergency Medical Condition (MA) Reason for Exam: sob Relevant Medical/Surgical History: intubated, septic, hx of PE 24-year-old female with shortness of breath FINDINGS: Pulmonary Arteries: No obvious filling defect in the main, right main, or left main pulmonary arteries. Evaluation of the segmental and subsegmental pulmonary arterial vasculature is limited due to respiratory motion suboptimal bolus timing, airspace disease, and streak artifact. There are areas of probable residual linear chronic clot within the right lower lobar pulmonary artery on image 111, series 2. Probable linear residual clot within the anterior right upper lobe pulmonary artery on image 83, series 2. Mediastinum: Atherosclerotic calcification of the aorta and branch vasculature. No dissection flap within the visualized thoracic aorta. No pericardial effusion. There is fluid in the superior pericardial recess. Enlarged precarinal lymph nodes remain unchanged on image 83, series 2. Right hilar lymphadenopathy is seen on image 96, series 2. Coronary artery disease. No left hilar or axillary lymphadenopathy. Lungs/pleura: Endotracheal tube distal tip within the lower trachea. Trachea and very proximal central airways appear patent. Narrowing of the right middle lobe airway into a region of partial consolidation/atelectasis/scarring. Opacification of the right lower lobar segmental airways. There is a thick-walled cavitary lesion in the right lower lobe measuring 5.5 x 7.3 cm in greatest AP and transverse dimensions on image 68, series 4. There is a dependent air-fluid level within this lesion. This area measures 7.6 cm in greatest craniocaudal extent on image 48, series 602. There is surrounding airspace disease. There is a gas and fluid containing multiloculated pleural collection or hydropneumothorax along the posterior margin of the right lung. Right sided chest tube distal tip along the anteromedial right lung. Subcutaneous emphysema along the right axilla and right lateral chest wall. Stellate pulmonary nodule in the lateral right upper lobe measuring 6 mm on image 32, series 4. Moderate to severe emphysema, dependent atelectasis and respiratory motion. Upper Abdomen: Fatty liver.   NG tube is seen extending into the stomach body. Atherosclerotic calcification of the upper abdominal aorta and branch vasculature. Soft Tissues/Bones: Chronic anterior wedge compression deformities, unchanged from 10/15/2021 involving T5 and T6. Mild diffuse degenerative changes throughout the spine. 1. Linear filling defects in the anterior right upper lobe and right lower lobe pulmonary arteries likely representing residual/chronic clot material. No acute central filling defect/pulmonary embolus is evident. 2. Thick-walled cavitary lesion in the right lower lobe measuring up to 5.5 x 7.3 x 7.6 cm which could be related to TB or fungal disease or a necrotizing pneumonia. Underlying cavitary malignancy not entirely excluded. There is surrounding airspace disease. There is also an associated multiloculated pleural collection or hydropneumothorax primarily along the posterior margin of the right lung. Right-sided chest tube distal tip along the anteromedial right pleura/lung. 3. Partial consolidation, atelectasis and/or infiltrate of the right middle lobe. 4. Stellate 6 mm lateral right upper lobe pulmonary nodule. Follow-up guidelines provided below. 5. Underlying moderate to severe emphysema. 6. Endotracheal and NG tubes as above. 7. Coronary artery disease. 8. Chronic anterior wedge compression deformities of T5 and T6. 9. Subcutaneous emphysema along the right axilla and right lateral chest wall likely related to chest tube. 10. Mediastinal and right hilar lymphadenopathy. RECOMMENDATIONS: 6 mm suspicious right solid pulmonary nodule within the upper lobe. Recommend a non-contrast Chest CT at 6-12 months, then another non-contrast Chest CT at 18-24 months. These guidelines do not apply to immunocompromised patients and patients with cancer. Follow up in patients with significant comorbidities as clinically warranted. For lung cancer screening, adhere to Lung-RADS guidelines. Reference: Radiology. 2017; 284(1):228-43. VL Lower Extremity Bilateral Venous Duplex    Result Date: 4/18/2022    [facility]  Vascular Lower Extremities DVT Study Procedure   Patient Name   Law Stovall     Date of Study           04/18/2022                 BOBBY OROSCO   Date of Birth  1955  Gender                  Female   Age            74 year(s)  Race                       Room Number    2002   Corporate ID # [id number]   Patient Acct # [de-identified]   MR #           [id number]      Sonographer             Krzysztof Perez RVT   Accession #    [id number]  Interpreting Physician  Domo Norris   Referring                  Referring Physician     Annalise Campbell  Nurse  Practitioner  Procedure Type of Study:   Veins: Lower Extremities DVT Study, Venous Scan Lower Bilateral.  Indications for Study:R/O DVT. Patient Status: In Patient. Technical Quality:Adequate visualization. Conclusions   Summary   Bilateral:  No evidence of deep or superficial venous thrombosis. Signature   ----------------------------------------------------------------  Electronically signed by Krzysztof Perez RVT(Sonographer) on  04/18/2022 07:45 AM  ----------------------------------------------------------------   ----------------------------------------------------------------  Electronically signed by Domo Norris(Interpreting physician)  on 04/18/2022 01:10 PM  ----------------------------------------------------------------  Findings:   Right Impression:                    Left Impression:  The common femoral, femoral,         The common femoral, femoral,  popliteal and tibial veins           popliteal and tibial veins  demonstrate normal compressibility   demonstrate normal compressibility  and augmentation. and augmentation. Normal compressibility of the great  Normal compressibility of the great  saphenous vein. saphenous vein. Normal compressibility of the small  Normal compressibility of the small  saphenous vein. saphenous vein. Velocities are measured in cm/s ; Diameters are measured in cm Right Lower Extremities DVT Study Measurements Right 2D Measurements +------------------------------------+----------+---------------+----------+ ! Location                            ! Visualized! Compressibility! Thrombosis! +------------------------------------+----------+---------------+----------+ ! Common Femoral                      !Yes       ! Yes            ! None      ! +------------------------------------+----------+---------------+----------+ ! Prox Femoral                        !Yes       ! Yes            ! None      ! +------------------------------------+----------+---------------+----------+ ! Mid Femoral                         !Yes       ! Yes            ! None      ! +------------------------------------+----------+---------------+----------+ ! Dist Femoral                        !Yes       ! Yes            ! None      ! +------------------------------------+----------+---------------+----------+ ! Deep Femoral                        !Yes       ! Yes            ! None      ! +------------------------------------+----------+---------------+----------+ ! Popliteal                           !Yes       ! Yes            ! None      ! +------------------------------------+----------+---------------+----------+ ! Sapheno Femoral Junction            ! Yes       ! Yes            ! None      ! +------------------------------------+----------+---------------+----------+ ! PTV                                 ! Yes       ! Yes            ! None      ! +------------------------------------+----------+---------------+----------+ ! Peroneal                            !Yes       ! Yes            ! None      ! +------------------------------------+----------+---------------+----------+ ! Gastroc                             ! Yes       ! Yes            ! None      ! +------------------------------------+----------+---------------+----------+ ! ANGELICA Thigh !Yes       !Yes            ! None      ! +------------------------------------+----------+---------------+----------+ ! GSV Knee                            ! Yes       ! Yes            ! None      ! +------------------------------------+----------+---------------+----------+ ! GSV Ankle                           ! Yes       ! Yes            ! None      ! +------------------------------------+----------+---------------+----------+ ! SSV                                 ! Yes       ! Yes            ! None      ! +------------------------------------+----------+---------------+----------+ Left Lower Extremities DVT Study Measurements Left 2D Measurements +------------------------------------+----------+---------------+----------+ ! Location                            ! Visualized! Compressibility! Thrombosis! +------------------------------------+----------+---------------+----------+ ! Common Femoral                      !Yes       ! Yes            ! None      ! +------------------------------------+----------+---------------+----------+ ! Prox Femoral                        !Yes       ! Yes            ! None      ! +------------------------------------+----------+---------------+----------+ ! Mid Femoral                         !Yes       ! Yes            ! None      ! +------------------------------------+----------+---------------+----------+ ! Dist Femoral                        !Yes       ! Yes            ! None      ! +------------------------------------+----------+---------------+----------+ ! Deep Femoral                        !Yes       ! Yes            ! None      ! +------------------------------------+----------+---------------+----------+ ! Popliteal                           !Yes       ! Yes            ! None      ! +------------------------------------+----------+---------------+----------+ ! Sapheno Femoral Junction            ! Yes       ! Yes            ! None      ! +------------------------------------+----------+---------------+----------+ ! PTV                                 ! Yes       ! Yes            ! None      ! +------------------------------------+----------+---------------+----------+ ! Peroneal                            !Yes       ! Yes            ! None      ! +------------------------------------+----------+---------------+----------+ ! Gastroc                             ! Yes       ! Yes            ! None      ! +------------------------------------+----------+---------------+----------+ ! GSV Thigh                           ! Yes       ! Yes            ! None      ! +------------------------------------+----------+---------------+----------+ ! GSV Knee                            ! Yes       ! Yes            ! None      ! +------------------------------------+----------+---------------+----------+ ! GSV Ankle                           ! Yes       ! Yes            ! None      ! +------------------------------------+----------+---------------+----------+ ! SSV                                 ! Yes       ! Yes            ! None      ! +------------------------------------+----------+---------------+----------+    FL MODIFIED BARIUM SWALLOW W VIDEO    Result Date: 4/4/2022  EXAMINATION: MODIFIED BARIUM SWALLOW WAS PERFORMED IN CONJUNCTION WITH SPEECH PATHOLOGY SERVICES TECHNIQUE: Fluoroscopic evaluation of the swallowing mechanism was performed using cineradiography with multiple consistency of barium product in conjunction with speech pathology services. FLUOROSCOPY DOSE AND TYPE OR TIME AND EXPOSURES: DAP 49.2 dGy cm squared COMPARISON: None HISTORY: ORDERING SYSTEM PROVIDED HISTORY: aspiration pna TECHNOLOGIST PROVIDED HISTORY: aspiration pna Reason for Exam: aspiration pneumonia FINDINGS: Premature vallecular spillage. There was trace penetration with straw with thin liquid. No aspiration. No aspiration. Trace penetration with straw with thin liquids.  Please see separate speech pathology report for full discussion of findings and recommendations. RECOMMENDATIONS: Unavailable         Physical Examination:        Physical Exam  Constitutional:       General: She is not in acute distress. Appearance: Normal appearance. She is not ill-appearing. Comments: Eating breakfast   Eyes:      General: No scleral icterus. Extraocular Movements: Extraocular movements intact. Conjunctiva/sclera: Conjunctivae normal.   Cardiovascular:      Rate and Rhythm: Normal rate and regular rhythm. Pulmonary:      Effort: Pulmonary effort is normal. No respiratory distress. Breath sounds: Normal breath sounds. No wheezing, rhonchi or rales. Abdominal:      General: Bowel sounds are normal. There is no distension. Palpations: Abdomen is soft. Tenderness: There is no abdominal tenderness. Musculoskeletal:      Right lower leg: No edema. Left lower leg: No edema. Comments: Mild left upper extremity swelling, denies any pain   Neurological:      General: No focal deficit present. Mental Status: She is alert and oriented to person, place, and time. Psychiatric:         Mood and Affect: Mood normal.         Behavior: Behavior normal.         Thought Content:  Thought content normal.         Assessment:        Primary Problem  Sepsis [facility])    Active Hospital Problems    Diagnosis Date Noted    Postprocedural subcutaneous emphysema [T81.82XA] 04/28/2022     Priority: Medium    Recurrent pneumothorax after chest tube removed [J95.811] 04/26/2022     Priority: Medium    Lung abscess ([address] [J85.2] 04/25/2022     Priority: Medium    Elevated C-reactive protein (CRP) [R79.82]      Priority: Medium    Pseudomonas aeruginosa infection [A49.8]      Priority: Medium    Elevated procalcitonin [R79.89]      Priority: Medium    Acute systolic HF (heart failure) (HCC) [I50.21] 04/19/2022    Pneumothorax on right [J93.9]     HCAP (healthcare-associated pneumonia) [J18.9]  Sepsis ([facility] [A41.9] 04/16/2022    Acute on chronic respiratory failure ([facility] [J96.20] 04/16/2022    Cavitary lesion of lung [J98.4] 04/16/2022    History of DVT (deep vein thrombosis) [Z86.718] 04/16/2022    Pneumothorax, right [J93.9] 04/16/2022    Status post chest tube placement ([facility] [Z93.8] 04/16/2022    History of pulmonary embolus (PE) [Z86.711] 04/02/2022    Chronic respiratory failure with hypoxia (HCC) [J96.11] 08/05/2021    Compression fracture of body of thoracic vertebra ([facility] [S22.000A] 09/28/2020    Lung nodule [R91.1] 08/22/2018    Bipolar disorder ([facility] [F31.9]     Chronic obstructive pulmonary disease ([facility] [J44.9] 01/06/2016       Plan:        Sepsis due to pneumonia with abcess vs empyema, pseudomonas sp.   Tachypnea, tachycardia  WBC 18>12.6> 11.5>15.5>15.4>16>9.5  Pro-Branden >100 (>100 on repeat), , Lactate 2.8> 1.5  Chest x-ray on admission: Moderate loculated right basal pneumothorax.  Evidence for tension.  Cavitary lesion noted in the right lung base  CT chest on admission: Thick-walled cavitary lesion RLL. Right pneumothorax.  Infiltrates of the right middle lobe.  Stellate 6 mm lateral RUL nodule.  Mediastinal and right hilar lymphadenopathy  Iv fluids: 1/2 NS at 15 cc/h  ID and Critical care following  Respiratory cultures and chest tube fluids grew pseudomonas   On Merrem IV OKI 65  Repeat CT chest showed large pneumothorax, improving cavitary lesion  Midodrine TID PRN added for SBP <90     Acute on chronic respiratory failure 2/2 recurrent pneumothorax after chest tube removal and Pseudomonas pneumonia  History of severe COPD - 3 L home O2 baseline, back on baseline O2  CXR right pneumothorax on admission   S/p intubation and right chest tube placement 4/16; extubated 4/28  Solumedrol 40mg q8h > 30 Q8h  Surgery consult  Chest tube reinserted 4/25 due to recurrent pneumothorax  Daily CXR - no significant interval change today     Acute systolic heart failure - Improved Echo Estimated LV EF 45-50 %  Probnp 1000s on admission  Lasix 20mg IV BID     Hyperglycemia; last A1c 6.0, insulin sliding scale and lantus 18 Units twice daily; was getting tube feeds and doing well with this regimen, but had hypoglycemia x2 overnight 4/28-29, now has a PO diet, continue low dose sliding scale insulin     Hx DVT/ PE: Eliquis was discontinued in 12/2021  History bipolar disorder: Trazodone, Risperidone resumed     Diet: regular   GI ppx: Pepcid 20 mg twice daily IV  DVT ppx: Heparin 5000 units TID   Code status: Full code  Consults: pulm, ID, gen surg  Dispo: referral sent to Candi Hogan MD  4/30/2022  10:14 AM       Attending Physician Statement  I have discussed the care of Michelle Bo and I have examined the patient myselft and taken ros and hpi , including pertinent history and exam findings,  with the resident. I have reviewed the key elements of all parts of the encounter with the resident. I agree with the assessment, plan and orders as documented by the resident. Spent 35 minutes in reviewing data/medicines/talking to patient/family,  explaining and answering all the questions.         Electronically signed by Genna Wright MD

## 2022-04-30 NOTE — PLAN OF CARE
Problem: Gas Exchange - Impaired:  Goal: Levels of oxygenation will improve  4/30/2022 0636 by Neo Gurrola RN  Outcome: Progressing  4/29/2022 1853 by Agusto Heart RN  Outcome: Progressing     Problem: Skin Integrity - Impaired:  Goal: Will show no infection signs and symptoms  4/30/2022 0636 by Neo Gurrola RN  Outcome: Progressing  4/29/2022 1853 by Agusto Heart RN  Outcome: Progressing  Goal: Absence of new skin breakdown  4/30/2022 0636 by Neo Gurrola RN  Outcome: Progressing  4/29/2022 1853 by Agusto Heart RN  Outcome: Progressing     Problem: Falls - Risk of:  Goal: Absence of physical injury  4/30/2022 0636 by Neo Gurrola RN  Outcome: Progressing  4/29/2022 1853 by Agusto Heart RN  Outcome: Progressing     Problem: Breathing Pattern - Ineffective:  Goal: Ability to achieve and maintain a regular respiratory rate will improve  4/30/2022 0636 by Neo Gurrola RN  Outcome: Progressing  4/29/2022 1853 by Agusto Heart RN  Outcome: Progressing     Problem: Skin Integrity:  Goal: Will show no infection signs and symptoms  4/30/2022 0636 by Neo Gurrola RN  Outcome: Progressing  4/29/2022 1853 by Agusto Heart RN  Outcome: Progressing  Goal: Absence of new skin breakdown  4/30/2022 0636 by Neo Gurrola RN  Outcome: Progressing  4/29/2022 1853 by Agusto Heart RN  Outcome: Progressing     Problem: OXYGENATION/RESPIRATORY FUNCTION  Goal: Patient will maintain patent airway  4/30/2022 0636 by Neo Gurrola RN  Outcome: Progressing  4/29/2022 1853 by Agusto Heart RN  Outcome: Progressing  Goal: Patient will achieve/maintain normal respiratory rate/effort  4/30/2022 0636 by Neo Gurrola RN  Outcome: Progressing  4/29/2022 1853 by Agusto Heart RN  Outcome: Progressing     Problem: MECHANICAL VENTILATION  Goal: Oral health is maintained or improved  4/30/2022 0636 by Neo Gurrola RN  Outcome: Progressing  4/29/2022 1853 by Enio Nazario RN  Outcome: Progressing  Goal: ET tube will be managed safely  4/30/2022 0636 by Trevor Finch RN  Outcome: Progressing  4/29/2022 1853 by Enio Nazario RN  Outcome: Progressing  Goal: Ability to express needs and understand communication  4/30/2022 0636 by Trevor Finch RN  Outcome: Progressing  4/29/2022 1853 by Enio Nazario RN  Outcome: Progressing  Goal: Mobility/activity is maintained at optimum level for patient  4/30/2022 0636 by Trevor Finch RN  Outcome: Progressing  4/29/2022 1853 by Enio Nazario RN  Outcome: Progressing     Problem: SKIN INTEGRITY  Goal: Skin integrity is maintained or improved  4/30/2022 0636 by Trevor Finch RN  Outcome: Progressing  4/29/2022 1853 by Enio Nazario RN  Outcome: Progressing     Problem: Nutrition  Goal: Optimal nutrition therapy  4/30/2022 0636 by Trevor Finch RN  Outcome: Progressing  4/29/2022 1853 by Enio Nazario RN  Outcome: Progressing     Problem: Discharge Planning  Goal: Discharge to home or other facility with appropriate resources  4/30/2022 0636 by Trevor Finch RN  Outcome: Progressing  4/29/2022 1853 by Enio Nazario RN  Outcome: Progressing     Problem: Pain  Goal: Verbalizes/displays adequate comfort level or baseline comfort level  4/30/2022 0636 by Trevor Finch RN  Outcome: Progressing  4/29/2022 1853 by Enio Nazario RN  Outcome: Progressing     Problem: ABCDS Injury Assessment  Goal: Absence of physical injury  4/30/2022 0636 by Trevor Finch RN  Outcome: Progressing  4/29/2022 1853 by Enio Nazario RN  Outcome: Progressing

## 2022-04-30 NOTE — CARE COORDINATION
ONGOING DISCHARGE PLAN:    Patient is alert and oriented x4. Spoke with patient regarding discharge plan and patient confirms that plan is to go to [place]. Precert started 8/62. PM&R placed if patient is not accepted at [place]. Right Chest tube removed today. Extubated 4/29 Now on O2 @3lpm NC, satting 96%. IV solumedrol 30 mg Q8. IV merrem . Per ID note patient will need IV Merrem until 5/14/2022. Pt will need midline prior to discharge. Will continue to follow for needs.   Electronically signed by Carlos Manuel Grover RN on 4/30/2022 at 12:30 PM

## 2022-05-01 ENCOUNTER — APPOINTMENT (OUTPATIENT)
Dept: GENERAL RADIOLOGY | Age: 67
DRG: 720 | End: 2022-05-01
Payer: COMMERCIAL

## 2022-05-01 LAB
ANION GAP SERPL CALCULATED.3IONS-SCNC: 7 MMOL/L (ref 9–17)
BUN BLDV-MCNC: 28 MG/DL (ref 8–23)
CALCIUM SERPL-MCNC: 7.8 MG/DL (ref 8.6–10.4)
CHLORIDE BLD-SCNC: 98 MMOL/L (ref 98–107)
CO2: 33 MMOL/L (ref 20–31)
CREAT SERPL-MCNC: <0.4 MG/DL (ref 0.5–0.9)
CULTURE: NORMAL
CULTURE: NORMAL
GFR AFRICAN AMERICAN: ABNORMAL ML/MIN
GFR NON-AFRICAN AMERICAN: ABNORMAL ML/MIN
GFR SERPL CREATININE-BSD FRML MDRD: ABNORMAL ML/MIN/{1.73_M2}
GLUCOSE BLD-MCNC: 121 MG/DL (ref 65–105)
GLUCOSE BLD-MCNC: 139 MG/DL (ref 70–99)
GLUCOSE BLD-MCNC: 186 MG/DL (ref 65–105)
GLUCOSE BLD-MCNC: 204 MG/DL (ref 65–105)
GLUCOSE BLD-MCNC: 236 MG/DL (ref 65–105)
GLUCOSE BLD-MCNC: 43 MG/DL (ref 65–105)
HCT VFR BLD CALC: 29.7 % (ref 36–46)
HEMOGLOBIN: 9.7 G/DL (ref 12–16)
MCH RBC QN AUTO: 29.9 PG (ref 26–34)
MCHC RBC AUTO-ENTMCNC: 32.5 G/DL (ref 31–37)
MCV RBC AUTO: 91.9 FL (ref 80–100)
PDW BLD-RTO: 17.1 % (ref 11.5–14.9)
PLATELET # BLD: 329 K/UL (ref 150–450)
PMV BLD AUTO: 7.7 FL (ref 6–12)
POTASSIUM SERPL-SCNC: 4 MMOL/L (ref 3.7–5.3)
RBC # BLD: 3.23 M/UL (ref 4–5.2)
REASON FOR REJECTION: NORMAL
SODIUM BLD-SCNC: 138 MMOL/L (ref 135–144)
SPECIMEN DESCRIPTION: NORMAL
WBC # BLD: 10.3 K/UL (ref 3.5–11)
ZZ NTE CLEAN UP: ORDERED TEST: NORMAL
ZZ NTE WITH NAME CLEAN UP: SPECIMEN SOURCE: NORMAL

## 2022-05-01 PROCEDURE — 6370000000 HC RX 637 (ALT 250 FOR IP): Performed by: STUDENT IN AN ORGANIZED HEALTH CARE EDUCATION/TRAINING PROGRAM

## 2022-05-01 PROCEDURE — 6360000002 HC RX W HCPCS: Performed by: INTERNAL MEDICINE

## 2022-05-01 PROCEDURE — 2580000003 HC RX 258: Performed by: INTERNAL MEDICINE

## 2022-05-01 PROCEDURE — 99232 SBSQ HOSP IP/OBS MODERATE 35: CPT | Performed by: INTERNAL MEDICINE

## 2022-05-01 PROCEDURE — 71045 X-RAY EXAM CHEST 1 VIEW: CPT

## 2022-05-01 PROCEDURE — 94640 AIRWAY INHALATION TREATMENT: CPT

## 2022-05-01 PROCEDURE — 80048 BASIC METABOLIC PNL TOTAL CA: CPT

## 2022-05-01 PROCEDURE — 2580000003 HC RX 258: Performed by: STUDENT IN AN ORGANIZED HEALTH CARE EDUCATION/TRAINING PROGRAM

## 2022-05-01 PROCEDURE — 2700000000 HC OXYGEN THERAPY PER DAY

## 2022-05-01 PROCEDURE — 36415 COLL VENOUS BLD VENIPUNCTURE: CPT

## 2022-05-01 PROCEDURE — 6370000000 HC RX 637 (ALT 250 FOR IP)

## 2022-05-01 PROCEDURE — 6370000000 HC RX 637 (ALT 250 FOR IP): Performed by: INTERNAL MEDICINE

## 2022-05-01 PROCEDURE — 94761 N-INVAS EAR/PLS OXIMETRY MLT: CPT

## 2022-05-01 PROCEDURE — 2060000000 HC ICU INTERMEDIATE R&B

## 2022-05-01 PROCEDURE — 85027 COMPLETE CBC AUTOMATED: CPT

## 2022-05-01 PROCEDURE — 6360000002 HC RX W HCPCS: Performed by: STUDENT IN AN ORGANIZED HEALTH CARE EDUCATION/TRAINING PROGRAM

## 2022-05-01 RX ADMIN — SODIUM CHLORIDE: 9 INJECTION, SOLUTION INTRAVENOUS at 20:16

## 2022-05-01 RX ADMIN — INSULIN LISPRO 2 UNITS: 100 INJECTION, SOLUTION INTRAVENOUS; SUBCUTANEOUS at 17:17

## 2022-05-01 RX ADMIN — OXYCODONE HYDROCHLORIDE AND ACETAMINOPHEN 1 TABLET: 5; 325 TABLET ORAL at 18:51

## 2022-05-01 RX ADMIN — INSULIN LISPRO 1 UNITS: 100 INJECTION, SOLUTION INTRAVENOUS; SUBCUTANEOUS at 22:25

## 2022-05-01 RX ADMIN — FOLIC ACID 1 MG: 1 TABLET ORAL at 08:53

## 2022-05-01 RX ADMIN — SODIUM CHLORIDE: 4.5 INJECTION, SOLUTION INTRAVENOUS at 20:12

## 2022-05-01 RX ADMIN — FUROSEMIDE 20 MG: 10 INJECTION, SOLUTION INTRAMUSCULAR; INTRAVENOUS at 08:52

## 2022-05-01 RX ADMIN — MEROPENEM 1000 MG: 1 INJECTION, POWDER, FOR SOLUTION INTRAVENOUS at 11:57

## 2022-05-01 RX ADMIN — MULTIPLE VITAMINS W/ MINERALS TAB 1 TABLET: TAB at 08:53

## 2022-05-01 RX ADMIN — FAMOTIDINE 20 MG: 20 TABLET, FILM COATED ORAL at 08:53

## 2022-05-01 RX ADMIN — FAMOTIDINE 20 MG: 20 TABLET, FILM COATED ORAL at 22:25

## 2022-05-01 RX ADMIN — METHYLPREDNISOLONE SODIUM SUCCINATE 40 MG: 40 INJECTION, POWDER, FOR SOLUTION INTRAMUSCULAR; INTRAVENOUS at 21:29

## 2022-05-01 RX ADMIN — POLYETHYLENE GLYCOL 3350 17 G: 17 POWDER, FOR SOLUTION ORAL at 08:52

## 2022-05-01 RX ADMIN — THIAMINE HCL TAB 100 MG 100 MG: 100 TAB at 08:53

## 2022-05-01 RX ADMIN — FENTANYL CITRATE 25 MCG: 50 INJECTION, SOLUTION INTRAMUSCULAR; INTRAVENOUS at 21:29

## 2022-05-01 RX ADMIN — FUROSEMIDE 20 MG: 10 INJECTION, SOLUTION INTRAMUSCULAR; INTRAVENOUS at 17:17

## 2022-05-01 RX ADMIN — MEROPENEM 1000 MG: 1 INJECTION, POWDER, FOR SOLUTION INTRAVENOUS at 04:04

## 2022-05-01 RX ADMIN — ALBUTEROL SULFATE 2.5 MG: 2.5 SOLUTION RESPIRATORY (INHALATION) at 03:44

## 2022-05-01 RX ADMIN — FENTANYL CITRATE 50 MCG: 50 INJECTION, SOLUTION INTRAMUSCULAR; INTRAVENOUS at 13:47

## 2022-05-01 RX ADMIN — SODIUM CHLORIDE: 9 INJECTION, SOLUTION INTRAVENOUS at 11:56

## 2022-05-01 RX ADMIN — METHYLPREDNISOLONE SODIUM SUCCINATE 40 MG: 40 INJECTION, POWDER, FOR SOLUTION INTRAMUSCULAR; INTRAVENOUS at 08:52

## 2022-05-01 RX ADMIN — ATORVASTATIN CALCIUM 20 MG: 20 TABLET, FILM COATED ORAL at 08:53

## 2022-05-01 RX ADMIN — INSULIN LISPRO 1 UNITS: 100 INJECTION, SOLUTION INTRAVENOUS; SUBCUTANEOUS at 11:51

## 2022-05-01 RX ADMIN — HEPARIN SODIUM 5000 UNITS: 5000 INJECTION INTRAVENOUS; SUBCUTANEOUS at 05:33

## 2022-05-01 RX ADMIN — MEROPENEM 1000 MG: 1 INJECTION, POWDER, FOR SOLUTION INTRAVENOUS at 20:18

## 2022-05-01 RX ADMIN — ALBUTEROL SULFATE 2.5 MG: 2.5 SOLUTION RESPIRATORY (INHALATION) at 15:27

## 2022-05-01 RX ADMIN — ALBUTEROL SULFATE 2.5 MG: 2.5 SOLUTION RESPIRATORY (INHALATION) at 23:47

## 2022-05-01 RX ADMIN — ALBUTEROL SULFATE 2.5 MG: 2.5 SOLUTION RESPIRATORY (INHALATION) at 18:59

## 2022-05-01 RX ADMIN — HEPARIN SODIUM 5000 UNITS: 5000 INJECTION INTRAVENOUS; SUBCUTANEOUS at 15:39

## 2022-05-01 RX ADMIN — ALBUTEROL SULFATE 2.5 MG: 2.5 SOLUTION RESPIRATORY (INHALATION) at 07:53

## 2022-05-01 RX ADMIN — ANTI-FUNGAL POWDER MICONAZOLE NITRATE TALC FREE: 1.42 POWDER TOPICAL at 08:58

## 2022-05-01 RX ADMIN — SODIUM CHLORIDE, PRESERVATIVE FREE 10 ML: 5 INJECTION INTRAVENOUS at 08:53

## 2022-05-01 RX ADMIN — HEPARIN SODIUM 5000 UNITS: 5000 INJECTION INTRAVENOUS; SUBCUTANEOUS at 21:29

## 2022-05-01 RX ADMIN — RISPERIDONE 1.5 MG: 0.5 TABLET ORAL at 04:04

## 2022-05-01 RX ADMIN — RISPERIDONE 1.5 MG: 0.5 TABLET ORAL at 15:40

## 2022-05-01 ASSESSMENT — PAIN SCALES - WONG BAKER: WONGBAKER_NUMERICALRESPONSE: 0

## 2022-05-01 ASSESSMENT — PAIN DESCRIPTION - LOCATION
LOCATION: OTHER (COMMENT)
LOCATION: CHEST

## 2022-05-01 ASSESSMENT — PAIN DESCRIPTION - DESCRIPTORS
DESCRIPTORS: PRESSURE
DESCRIPTORS: ACHING

## 2022-05-01 ASSESSMENT — PAIN - FUNCTIONAL ASSESSMENT: PAIN_FUNCTIONAL_ASSESSMENT: PREVENTS OR INTERFERES SOME ACTIVE ACTIVITIES AND ADLS

## 2022-05-01 ASSESSMENT — ENCOUNTER SYMPTOMS
VOMITING: 0
SHORTNESS OF BREATH: 0
SORE THROAT: 0
NAUSEA: 0
ABDOMINAL PAIN: 0

## 2022-05-01 ASSESSMENT — PAIN SCALES - GENERAL
PAINLEVEL_OUTOF10: 10
PAINLEVEL_OUTOF10: 0
PAINLEVEL_OUTOF10: 8
PAINLEVEL_OUTOF10: 8

## 2022-05-01 ASSESSMENT — PAIN DESCRIPTION - ORIENTATION: ORIENTATION: RIGHT

## 2022-05-01 NOTE — PROGRESS NOTES
[facility] -   Infectious diseases evaluation  admission date 4/16/2022    reason for consultation:   Cavitary lung lesions  Impression :   Current:  · Right lower lobe cavitary lesions associated with multiloculated pleural fluid collection likely abscesses with empyema status post chest tube with Pseudomonas and yeast growth on culture  · Sepsis secondary to above  · Acute on chronic respiratory failure required intubation  · Right-sided pneumothorax  · Severe COPD      Recommendations     · IV meropenem through 5/14/2022  · Midline on discharge  · Repeat CT chest without contrast from 4/29/2022 showed resolving pneumothorax, no evidence of loculated effusion, multiple small cavitary lesions. · CANDIDA DUBLINIENSIS growth on bronchoscopy likely colonization. · Follow CBC and renal function  · Continue supportive care  · Follow-up CT chest in 3 weeks              History of Present Illness:   Initial history:  Ying Nichols is a 74y.o.-year-old female presents to the hospital with worsening shortness of breath associated with cough. At the ER with tachypneic and hypoxic  Chest x-ray showed pneumothorax  The patient was in respiratory distress, was intubated. CT chest 4/16/2022 showed right lower lobe cavitary lesion with surrounding airspace disease and multiloculated pleural collection. The patient had chest tube placed   Initial WBC was 18.5, procalcitonin no more than 100  Respiratory panel with COVID-19 PCR was negative  Urine for Legionella and strep pneumo antigen negative  Status post bronchoscopy 4/18/2020 with Pseudomonas and yeast growth on culture   Chest tube was removed  Ct chest from 4/25 showed mod/large right pneumothorax and pleural fluid, the cavitary lesion is smaller compared to previous exam.  Chest tube was reinserted 4/25/2022 . Pseudomonas growth on pleural fluid culture.   She was extubated 4/28/2022  Interval changes  5/1/2022   She is complaining of right chest wall pain, chest tube was removed, no new complaints. Chest tube was removed 4/30/2021  Chest x-ray from earlier today reviewed showed no pneumothorax    Patient Vitals for the past 8 hrs:   BP Temp Temp src Pulse Resp SpO2   05/01/22 1245 -- -- -- -- -- 96 %   05/01/22 0753 -- -- -- -- 16 95 %   05/01/22 0645 119/60 98.5 °F (36.9 °C) Oral 78 16 96 %             I have personally reviewed the past medical history, past surgical history, medications, social history, and family history, and I haveupdated the database accordingly. Allergies:   Advil [ibuprofen], Aleve [naproxen], Antipyrine, Celecoxib, Codeine, Fomepizole, Incruse ellipta [umeclidinium bromide], Other, Rofecoxib, Salicylates, Strawberry extract, Sulfinpyrazone, Aspirin, and Nsaids     Review of Systems:     Review of Systems  As per history of present illness, other than above 12 system review was negative  Physical Examination :       Physical Exam  Constitutional:       General: She is not in acute distress. Appearance: Normal appearance. HENT:      Head: Normocephalic and atraumatic. Right Ear: External ear normal.      Left Ear: External ear normal.   Eyes:      General: No scleral icterus. Conjunctiva/sclera: Conjunctivae normal.   Cardiovascular:      Rate and Rhythm: Normal rate. Pulmonary:      Effort: Pulmonary effort is normal. No respiratory distress. Abdominal:      General: There is no distension. Palpations: Abdomen is soft. Musculoskeletal:      Cervical back: Neck supple. No rigidity. Right lower leg: No edema. Left lower leg: No edema. Skin:     Coloration: Skin is not jaundiced. Findings: No bruising. Neurological:      General: No focal deficit present. Mental Status: She is oriented to person, place, and time.          Past Medical History:     Past Medical History:   Diagnosis Date    Abnormal EKG     TRAM (acute kidney [facility]) ([facility] 4/2/2022    Anxiety     Asthma     Bipolar disorder ([facility]     [place] , [place]    COPD (chronic obstructive pulmonary disease) ([facility]     Cramps, extremity     SEVERE LEG CRAMPS    Depression     Dilated bile duct     Headache     History of elective      Hyperlipidemia     Irritable bowel syndrome     Prolonged emergence from general anesthesia     SEVERE ISSUES WAKING UP    Substance abuse ([facility]     street drugs when younger   [place] Unspecified sleep apnea     Vision abnormalities     glasses       Past Surgical  History:     Past Surgical History:   Procedure Laterality Date    ANKLE SURGERY      HAD SCREWS AND HARDWARE, THEN REMOVED    BRONCHOSCOPY  2022         COLONOSCOPY  02/15/2018    tubular adenoma    EYE SURGERY Bilateral     CATARACTS    HYSTEROSCOPY  10/19/2016    D & C    INDUCED       LASIK      bilateral    TONSILLECTOMY AND ADENOIDECTOMY Bilateral     VULVA SURGERY      HAD A BIOPSY AND REMOVAL OF       Medications:      methylPREDNISolone  40 mg IntraVENous Q12H    insulin lispro  0-6 Units SubCUTAneous TID WC    insulin lispro  0-3 Units SubCUTAneous Nightly    folic acid  1 mg Oral Daily    thiamine  100 mg Oral Daily    famotidine  20 mg Oral BID    miconazole   Topical BID    multivitamin  1 tablet Oral Daily    polyethylene glycol  17 g Oral Daily    furosemide  20 mg IntraVENous BID    meropenem  1,000 mg IntraVENous Q8H    albuterol  2.5 mg Nebulization Q4H    sodium chloride flush  5-40 mL IntraVENous 2 times per day    risperiDONE  1.5 mg Oral Q12H    atorvastatin  20 mg Oral Daily    traZODone  50 mg Oral Nightly    heparin (porcine)  5,000 Units SubCUTAneous 3 times per day       Social History:     Social History     Socioeconomic History    Marital status:      Spouse name: Not on file    Number of children: Not on file    Years of education: Not on file    Highest education level: Not on file   Occupational History    Not on file   Tobacco Use    Smoking status: Former Smoker     Packs/day: 2.00     Years: 42.00     Pack years: 84.00     Types: Cigarettes     Quit date: 11/1/2018     Years since quitting: 3.4    Smokeless tobacco: Never Used   Substance and Sexual Activity    Alcohol use: Yes     Comment: yearly    Drug use: No    Sexual activity: Not Currently     Partners: Male     Birth control/protection: Post-menopausal   Other Topics Concern    Not on file   Social History Narrative    Not on file     Social Determinants of Health     Financial Resource Strain: High Risk    Difficulty of Paying Living Expenses: Very hard   Food Insecurity: No Food Insecurity    Worried About Running Out of Food in the Last Year: Never true    Agustin of Food in the Last Year: Never true   Transportation Needs:     Lack of Transportation (Medical): Not on file    Lack of Transportation (Non-Medical):  Not on file   Physical Activity:     Days of Exercise per Week: Not on file    Minutes of Exercise per Session: Not on file   Stress:     Feeling of Stress : Not on file   Social Connections:     Frequency of Communication with Friends and Family: Not on file    Frequency of Social Gatherings with Friends and Family: Not on file    Attends Temple Services: Not on file    Active Member of [address] [place] [address] [employer] or Organizations: Not on file    Attends Club or Organization Meetings: Not on file    Marital Status: Not on file   Intimate Partner Violence:     Fear of Current or Ex-Partner: Not on file    Emotionally Abused: Not on file    Physically Abused: Not on file    Sexually Abused: Not on file   Housing Stability:     Unable to Pay for Housing in the Last Year: Not on file    Number of [place] in the Last Year: Not on file    Unstable Housing in the Last Year: Not on file       Family History:     Family History   Problem Relation Age of Onset    Dementia Maternal Aunt     Kidney Disease Mother     Heart Attack Sister    Jenniffer Cervantes Prostate Cancer Father     High Cholesterol Brother     Heart Attack Paternal Grandmother       Medical Decision Making:   I have independently reviewed/ordered the following labs:    CBC with Differential:   Recent Labs     04/30/22  0429 05/01/22  0654   WBC 9.5 10.3   HGB 9.3* 9.7*   HCT 29.2* 29.7*    329     BMP:  Recent Labs     04/30/22  0429 05/01/22  0538    138   K 3.6* 4.0   CL 98 98   CO2 37* 33*   BUN 24* 28*   CREATININE <0.40* <0.40*       Lab Results   Component Value Date    CREATININE <0.40 05/01/2022    GLUCOSE 139 05/01/2022    GLUCOSE 127 07/08/2021       Detailed results: Thank you for allowing us to participate in the care of this patient. Please call with questions. This note is created with the assistance of a speech recognition program.  While intending to generate adocument that actually reflects the content of the visit, the document can still have some errors including those of syntax and sound a like substitutions which may escape proof reading. It such instances, actual meaningcan be extrapolated by contextual diversion.     Raisa Borges MD  Office: [phone]  Perfect serve / office [phone]

## 2022-05-01 NOTE — PLAN OF CARE
Problem: Skin Integrity - Impaired:  Goal: Will show no infection signs and symptoms  Description: Will show no infection signs and symptoms  Outcome: Progressing     Problem: Falls - Risk of:  Goal: Absence of physical injury  Description: Absence of physical injury  Outcome: Progressing     Problem: Breathing Pattern - Ineffective:  Goal: Ability to achieve and maintain a regular respiratory rate will improve  Description: Ability to achieve and maintain a regular respiratory rate will improve  Outcome: Progressing     Problem: Skin Integrity:  Goal: Will show no infection signs and symptoms  Description: Will show no infection signs and symptoms  Outcome: Progressing

## 2022-05-01 NOTE — PROGRESS NOTES
Surgery Attending    Chest tube removed yesterday. CXR today reviewed and no pneumothorax. Please continue occlusive dressing for 3 days. Skin sutures can then be removed afterwards. Will sign off. Please call with any questions or concerns.     Eros Lopez MD

## 2022-05-01 NOTE — PROGRESS NOTES
Pulmonary Progress Note  [facility]      Patient - Moody Maher,  Age - 74 y.o.    - 1955      Room Number - 2115/2115-01   N -  [id number]   [facility]  Date of Admission -  2022 10:25 AM        Consulting Mike Pace MD  Primary Care Physician - Shelva Cabot, MD     SUBJECTIVE   Is comfortable, getting fed by daughter    Acacia Bucio    height is 5' 5\" (1.651 m) and weight is 174 lb 14.4 oz (79.3 kg). Her oral temperature is 98.5 °F (36.9 °C). Her blood pressure is 119/60 and her pulse is 78. Her respiration is 16 and oxygen saturation is 96%. Body mass index is 29.1 kg/m². Temperature Range: Temp: 98.5 °F (36.9 °C) Temp  Av.3 °F (36.8 °C)  Min: 97.9 °F (36.6 °C)  Max: 98.5 °F (36.9 °C)  BP Range:  Systolic (59VQK), TFF:700 , Min:117 , NHV:817     Diastolic (42ZCO), JHN:27, Min:56, Max:71    Pulse Range: Pulse  Av.5  Min: 74  Max: 84  Respiration Range: Resp  Av.8  Min: 16  Max: 18  Current Pulse Ox[de-identified]  SpO2: 96 %  24HR Pulse Ox Range:  SpO2  Av.4 %  Min: 95 %  Max: 96 %  Oxygen Amount and Delivery: O2 Flow Rate (L/min): 3 L/min    Wt Readings from Last 3 Encounters:   22 174 lb 14.4 oz (79.3 kg)   22 183 lb (83 kg)   22 186 lb 1.1 oz (84.4 kg)       I/O (24 Hours)    Intake/Output Summary (Last 24 hours) at 2022 9784  Last data filed at 2022 1002  Gross per 24 hour   Intake 240 ml   Output 200 ml   Net 40 ml       EXAM     General Appearance  Awake, alert, oriented, in no acute distress  HEENT - normocephalic, atraumatic.  []  Mallampati  [] Crowded airway   [] Macroglossia  []  Retrognathia  [] Micrognathia  []  Normal tongue size []  Normal Bite  [] Tru sign positive    Neck - Supple,  trachea midline   Lungs -diminished with occasional scattered wheezes  Cardiovascular - Heart sounds are normal.  Regular rate and rhythm Abdomen - Soft, nontender, nondistended, no masses or organomegaly  Neurologic - There are no focal motor or sensory deficits  Skin - No bruising or bleeding  Extremities - No clubbing, cyanosis, edema    MEDS      methylPREDNISolone  40 mg IntraVENous Q12H    insulin lispro  0-6 Units SubCUTAneous TID WC    insulin lispro  0-3 Units SubCUTAneous Nightly    folic acid  1 mg Oral Daily    thiamine  100 mg Oral Daily    famotidine  20 mg Oral BID    miconazole   Topical BID    multivitamin  1 tablet Oral Daily    polyethylene glycol  17 g Oral Daily    furosemide  20 mg IntraVENous BID    meropenem  1,000 mg IntraVENous Q8H    albuterol  2.5 mg Nebulization Q4H    sodium chloride flush  5-40 mL IntraVENous 2 times per day    risperiDONE  1.5 mg Oral Q12H    atorvastatin  20 mg Oral Daily    traZODone  50 mg Oral Nightly    heparin (porcine)  5,000 Units SubCUTAneous 3 times per day      sodium chloride 15 mL/hr at 04/30/22 2035    sodium chloride 15 mL/hr at 05/01/22 1156    dextrose       midodrine, oxyCODONE-acetaminophen, fentanNYL **OR** fentanNYL, hydrALAZINE, sodium chloride flush, sodium chloride, sodium chloride flush, potassium chloride **OR** potassium alternative oral replacement **OR** potassium chloride, glucose, dextrose, glucagon (rDNA), dextrose    LABS   CBC   Recent Labs     05/01/22  0654   WBC 10.3   HGB 9.7*   HCT 29.7*   MCV 91.9        BMP:   Lab Results   Component Value Date     05/01/2022    K 4.0 05/01/2022    CL 98 05/01/2022    CO2 33 05/01/2022    BUN 28 05/01/2022    LABALBU 2.8 04/16/2022    LABALBU 3.6 07/08/2021    CREATININE <0.40 05/01/2022    CALCIUM 7.8 05/01/2022    GFRAA Can not be calculated 05/01/2022    LABGLOM Can not be calculated 05/01/2022     ABGs:  Lab Results   Component Value Date    PHART 7.442 04/28/2022    PO2ART 77.6 04/28/2022    NOX4VXM 60.6 04/28/2022      Lab Results   Component Value Date    MODE PRVC 04/28/2022     Ionized Calcium:  No results found for: IONCA  Magnesium:    Lab Results   Component Value Date    MG 2.3 04/16/2022     Phosphorus:  No results found for: PHOS     LIVER PROFILE No results for input(s): AST, ALT, LIPASE, BILIDIR, BILITOT, ALKPHOS in the last 72 hours. Invalid input(s): AMYLASE,  ALB  INR No results for input(s): INR in the last 72 hours.   PTT   Lab Results   Component Value Date    APTT 33.6 04/16/2022         RADIOLOGY     (See actual reports for details)    ASSESSMENT/PLAN   Acute on chronic hypoxic and hypercapnic respiratory failure, intubated 4/16; extubated 4/28  Pseudomonas pneumonia  Right-sided patient chest tube was replaced on April 25 removed 4/29  Right lower lobe cavitary lesions  Exudative pleural effusion on right, growing Pseudomonas  Low ejection fraction EF 45 to 50%, echo 4/18  History of pulmonary embolism and what appears to be chronic filling defects (subsegmental, most likely clinically inconsequential)  Severe stage IV COPD, FEV1 is 35% of predicted  Recent hospitalization for pneumonia  Elevated inflammatory markers including D-dimer and procalcitonin  Full CODE STATUS    Continue bronchodilators  Continue Solu-Medrol 40 every 12  Discontinue central line, may need midline given antibiotics to continue through May 14  Will need follow-up CT of chest in approximately 4 to 6 weeks  Family interested in acute rehab  Electronically signed by Nigel Mendoza MD on 5/1/2022 at 5:58 PM

## 2022-05-01 NOTE — PROGRESS NOTES
[address]    PROGRESS NOTE             5/1/2022    10:53 AM    Name:   Michelle Bo  MRN:     [id number]     [place]:      [de-identified]   Room:   2115/2115-01  IP Day:  13  Admit Date:  4/16/2022 10:25 AM    PCP:  Blanca Velázquez MD  Code Status:  Full Code    Subjective:     C/C:   Chief Complaint   Patient presents with    Shortness of Breath     Interval History Status: improved. Patient seen and examined bedside. Doing well. Denying shortness of breath or chest pain at this time. No acute events overnight. Tolerating PO. Brief History:     The patient is a 76 y.o.  Non- / non  female sever COPD 3L O2 baseline, bipolar, Hx of DVT/ PE, migraines, who presents with Shortness of Breath and cough  Patient was recently discharge from [address] for mycoplasma pneumonia. She had a follow up appointment with PCP, patient was complaining of cough and chest pain, was started on Tessalon and nsaids for pain. However; she continued to be in distress and her daughter brought her to ED. She was hypoxic initially on 3L o2, was increased to 6L and continued to be hypoxic   Tachypneic, tachycardic  ABGs: pH 7.33, CO2 34, HCO3 18  WBC 18   Lactic 2.8> 1.5  Pancultures were sent  Chest x-ray: Moderate loculated right basal pneumothorax.  Evidence for tension.  Cavitary lesion noted in the right lung base  CT chest: Chronic clot material RUL, RLL.  Thick-walled cavitary lesion RLL.   Multiloculated pleural collection or hydropneumothorax posterior to the right lung, right chest tube in situ.  Infiltrates of the right middle lobe.  Stellate 6 mm lateral RUL nodule.  Mediastinal and right hilar lymphadenopathy  Chest tube was placed and patient was intubated.   Was given 1 L bolus, IV cefepime, vancomycin, 125 mg Solu-Medrol and she is admitted to the hospital for the management of acute hypoxic respiratory failure due to pneumothorax and possible lung infection     4/19: switched to meropenem      4/21: bronchial washing from bronchoscopy growing pseudomonas     4/24: chest tube removed     4/25: Recurrent right sided pneumonthorax shown on repeat CT. Chest tube reinserted by surgery.      4/28: successfully extubated     4/29: on 3L NC O2    04/30: Transferred to [facility]. [facility] referral placed and pm&R consult    Review of Systems:     Review of Systems   Constitutional: Negative for activity change and fever. HENT: Negative for congestion and sore throat. Respiratory: Negative for shortness of breath. Cardiovascular: Negative for chest pain and leg swelling. Gastrointestinal: Negative for abdominal pain, nausea and vomiting. Genitourinary: Negative for difficulty urinating and dysuria. Neurological: Negative for syncope and headaches. Psychiatric/Behavioral: Negative for agitation and behavioral problems. Medications: Allergies:     Allergies   Allergen Reactions    Advil [Ibuprofen] Other (See Comments)     vomiting    Aleve [Naproxen] Other (See Comments)     vomiting    Antipyrine Other (See Comments)     unknown    Celecoxib Other (See Comments)    Codeine      headache    Fomepizole     Incruse Ellipta [Umeclidinium Bromide]      confusion    Other      GRASS, TREES, WEEDS    Rofecoxib Other (See Comments)     Unknown reaction    Salicylates      Unknown reaction     Strawberry Extract      HEADACHES    Sulfinpyrazone Other (See Comments)     Unknown reaction    Aspirin Nausea And Vomiting, Other (See Comments) and Nausea Only    Nsaids Nausea Only, Other (See Comments) and Nausea And Vomiting       Current Meds:   Scheduled Meds:    methylPREDNISolone  40 mg IntraVENous Q12H    insulin lispro  0-6 Units SubCUTAneous TID WC    insulin lispro  0-3 Units SubCUTAneous Nightly    folic acid  1 mg Oral Daily    thiamine  100 mg Oral Daily    famotidine  20 mg Oral BID    miconazole Topical BID    multivitamin  1 tablet Oral Daily    polyethylene glycol  17 g Oral Daily    furosemide  20 mg IntraVENous BID    meropenem  1,000 mg IntraVENous Q8H    albuterol  2.5 mg Nebulization Q4H    sodium chloride flush  5-40 mL IntraVENous 2 times per day    risperiDONE  1.5 mg Oral Q12H    atorvastatin  20 mg Oral Daily    traZODone  50 mg Oral Nightly    heparin (porcine)  5,000 Units SubCUTAneous 3 times per day     Continuous Infusions:    sodium chloride 15 mL/hr at 22    sodium chloride 10 mL/hr at 22    dextrose       PRN Meds: midodrine, oxyCODONE-acetaminophen, fentanNYL **OR** fentanNYL, hydrALAZINE, sodium chloride flush, sodium chloride, sodium chloride flush, potassium chloride **OR** potassium alternative oral replacement **OR** potassium chloride, glucose, dextrose, glucagon (rDNA), dextrose    Data:     Past Medical History:   has a past medical history of Abnormal EKG, TRAM (acute kidney injury) ([facility] [address] Anxiety, Asthma, Bipolar disorder ([facility] [address] COPD (chronic obstructive pulmonary disease) ([facility] [address] Cramps, extremity, Depression, Dilated bile duct, Headache, History of elective , Hyperlipidemia, Irritable bowel syndrome, Prolonged emergence from general anesthesia, Substance abuse ([facility] [address] Unspecified sleep apnea, and Vision abnormalities. Social History:   reports that she quit smoking about 3 years ago. Her smoking use included cigarettes. She has a 84.00 pack-year smoking history. She has never used smokeless tobacco. She reports current alcohol use. She reports that she does not use drugs.      Family History:   Family History   Problem Relation Age of Onset    Dementia Maternal Aunt     Kidney Disease Mother     Heart Attack Sister     Prostate Cancer Father     High Cholesterol Brother     Heart Attack Paternal Grandmother        Vitals:  /60   Pulse 78   Temp 98.5 °F (36.9 °C) (Oral)   Resp 16   Ht 5' 5\" (1.651 m)   Wt 174 lb 14.4 oz (79.3 kg)   LMP 2013 (Exact Date)   SpO2 95%   BMI 29.10 kg/m²   Temp (24hrs), Av.2 °F (36.8 °C), Min:97.9 °F (36.6 °C), Max:98.5 °F (36.9 °C)    Recent Labs     22  1134 22  1649 22  0555   POCGLU 288* 178* 153* 121*       I/O(24Hr):     Intake/Output Summary (Last 24 hours) at 2022 1053  Last data filed at 2022 1002  Gross per 24 hour   Intake 240 ml   Output 1100 ml   Net -860 ml       Labs:  [unfilled]    Lab Results   Component Value Date/Time    SPECIAL 8ML GREEN/2ML ORANGE RIGHT IJ 2022 12:12 PM     Lab Results   Component Value Date/Time    CULTURE YEAST ISOLATED IDENTIFICATION TO FOLLOW 2022 12:20 PM    CULTURE Identified as : JOSE JUAN DUBLINIENSIS 2022 12:20 PM    CULTURE NO GROWTH 6 DAYS 2022 12:20 PM    CULTURE PSEUDOMONAS AERUGINOSA LIGHT GROWTH (A) 2022 12:20 PM    CULTURE NO NORMAL RAVEN 2022 12:20 PM       [unfilled]    Radiology:    ECHO Complete 2D W Doppler W Color    Result Date: 2022  [address] [facility] Transthoracic Echocardiography Report (TTE)  Patient Name Ricci Lundborg     Date of Study               2022               BOBBY OROSCO   Date of      1955  Gender                      Female  Birth   Age          74 year(s)  Race                           Room Number          Height:                     65 inch, 165.1 cm   Corporate ID [id number]    Weight:                     176 pounds, 79.8 kg  #   Patient Acct [de-identified]   BSA:          1.87 m^2      BMI:      29.29  #                                                              kg/m^2   MR #         [id number]      Sonographer                 Мария Stein   Accession #  [id number]  Interpreting Physician      Meghna Oliver 63   Fellow                   Referring Nurse                           Practitioner   Interpreting             Referring Physician         Jane Downey,  Selena  Type of Study   TTE procedure:2D Echocardiogram, M-Mode, Doppler, Color Doppler. Procedure Date Date: 04/18/2022 Start: 10:35 AM Study Location: [address] [place] [address] Technical Quality: Fair visualization Indications:Dyspnea/SOB, Respiratory Failure and Pulmonary embolus. History / Tech. Comments: COPD, HLD, Sleep apnea Patient Status: Inpatient Height: 65 inches Weight: 176 pounds BSA: 1.87 m^2 BMI: 29.29 kg/m^2 Rhythm: Sinus bradycardia HR: 57 bpm BP: 138/65 mmHg CONCLUSIONS Summary Left ventricle is normal in size and wall thickness. Global left ventricular systolic function appears mildly reduced. Estimated LV EF 45-50 %. No obvious wall motion abnormality seen. RV size appears normal mildly reduced function Left atrium is normal in size. No significant valvular regurgitation or stenosis seen. No significant pericardial effusion is seen. Normal aortic root dimension. Dilated IVC, impaired or no respiratory variations suggestive of elevate Rt atrial pressure Signature ----------------------------------------------------------------------------  Electronically signed by Giovanna Cosme(Interpreting physician) on  04/18/2022 05:06 PM ---------------------------------------------------------------------------- ----------------------------------------------------------------------------  Electronically signed by Мария Stein(Sonographer) on 04/18/2022 02:29  PM ---------------------------------------------------------------------------- FINDINGS Left Atrium Left atrium is normal in size. Left Ventricle Left ventricle is normal in size and wall thickness. Global left ventricular systolic function is mildly reduced . Estimated LV EF  %. No obvious wall motion abnormality seen. Right Atrium Right atrium is normal in size. Right Ventricle Normal right ventricular size , mildly reduced function. Mitral Valve No obvious valvular abnormality seen. No evidence of mitral regurgitation. Aortic Valve No obvious valvular abnormality seen.  No evidence of aortic insufficiency or stenosis. Tricuspid Valve No obvious valvular abnormality seen. Insignificant tricuspid regurgitation, unable to estimate RVSP. Pulmonic Valve Pulmonic valve was not well visualized. No evidence of pulmonic insufficiency or stenosis. Pericardial Effusion No significant pericardial effusion is seen. Pleural Effusion No pleural effusion seen. Miscellaneous Normal aortic root dimension. IVC Increased diameter and impaired or no inspiratory variation indicating elevated RA filling pressure (i.e. CVP) . M-mode / 2D Measurements & Calculations:   LVIDd:4.74 cm(3.7 - 5.6 cm)      Aortic Root:3.3 cm(2.0 - 3.7 cm)  LVIDs:3.28 cm(2.2 - 4.0 cm)      LA Dimension: 3.5 cm(1.9 - 4.0 cm)  IVSd:1.05 cm(0.6 - 1.1 cm)       LA volume/Index: 40.7 ml /22m^2  LVPWd:0.93 cm(0.6 - 1.1 cm)  Fractional Shortenin.8 %      XR CHEST (SINGLE VIEW FRONTAL)    Result Date: 2022  EXAMINATION: ONE XRAY VIEW OF THE CHEST 2022 11:53 am COMPARISON: 2022, 2022, 2022 HISTORY: ORDERING SYSTEM PROVIDED HISTORY: chest tube placement TECHNOLOGIST PROVIDED HISTORY: chest tube placement Reason for Exam: chest tube placement FINDINGS: Interval placement of a large-bore thoracostomy tube terminating over the medial right lung base. There appears to be good lung re-expansion with only small volume residual pneumothorax. Increased patchy opacities along the right lung base. Left lung is grossly clear on a background of emphysematous change. Cardiomediastinal contours are within normal limits and unchanged. Subcutaneous emphysema along the partially visualized right lateral chest wall and along the base of the neck on the right. The tip of the ET tube is 4 cm above the nimesh. Enteric tube tip is subdiaphragmatic in location coursing beyond the inferior field of view. Right internal jugular central venous catheter terminates over the superior cavoatrial junction.      Large bore right-sided thoracostomy tube terminating over the medial right lung base. Good lung re-expansion with only small volume residual pneumothorax. XR CHEST (SINGLE VIEW FRONTAL)    Result Date: 4/5/2022  EXAMINATION: ONE XRAY VIEW OF THE CHEST 4/5/2022 8:55 am COMPARISON: April 3, 2022 HISTORY: ORDERING SYSTEM PROVIDED HISTORY: pna TECHNOLOGIST PROVIDED HISTORY: pna Reason for Exam: pna FINDINGS: Stable position of the right internal jugular central venous catheter. Right infrahilar airspace opacity not significantly changed. No new focal lung abnormality. No sizable pleural effusion. No pneumothorax. Stable right infrahilar airspace opacity     XR CHEST (SINGLE VIEW FRONTAL)    Result Date: 4/3/2022  EXAMINATION: ONE XRAY VIEW OF THE CHEST 4/3/2022 5:51 am COMPARISON: 1 April 2022 HISTORY: ORDERING SYSTEM PROVIDED HISTORY: sob, pleurisy, cough TECHNOLOGIST PROVIDED HISTORY: sob, pleurisy, cough Reason for Exam: Shortness of breath, pleurisy, cough Additional signs and symptoms: Shortness of breath, pleurisy, cough Relevant Medical/Surgical History: Shortness of breath, pleurisy, cough FINDINGS: AP portable view of the chest time stamped at 528 hours demonstrates overlying cardiac monitoring electrodes. Heart size is stable. Right internal jugular catheter terminates in the distal superior vena cava. There has been worsening of a focal opacity at the right lung base. Minimal left basilar opacity is unchanged. No extrapleural air or overt edema. No gross effusions. Worsening opacity at the right base favoring airspace disease. Change in minimal left basilar opacity which may be related atelectasis. CT CHEST WO CONTRAST    Result Date: 4/29/2022  EXAMINATION: CT OF THE CHEST WITHOUT CONTRAST 4/29/2022 1:47 pm TECHNIQUE: CT of the chest was performed without the administration of intravenous contrast. Multiplanar reformatted images are provided for review.  Dose modulation, iterative reconstruction, and/or weight based adjustment of the mA/kV was utilized to reduce the radiation dose to as low as reasonably achievable. COMPARISON: 04/25/2022 HISTORY: ORDERING SYSTEM PROVIDED HISTORY: Loculated pleural effusion TECHNOLOGIST PROVIDED HISTORY: Loculated pleural effusion Reason for Exam: pt sob. pt has chest tube on right side d/t pleural effusion. FINDINGS: CT chest: Lines and tubes:  None. Lungs and Airways and Pleura:  Central airways are patent. Multiple small cavitary lesions within the right upper lobe and right lower lobe with the largest cavitary lesion noted within the basilar aspect of the right lower lobe may now measuring 5.2 by 4.3 cm and demonstrates decreased thickening of the wall and more centralized cavitation. Findings are likely related to infectious/inflammatory etiology. Moderate emphysematous changes of the lungs. Previously noted right-sided pneumothorax has significantly resolved. There is only small locules of air remaining within the small layering right pleural effusion suggestive of a small right-sided hydropneumothorax. No evidence of loculated effusion. Lymph nodes: No pathologically enlarged mediastinal, hilar, lower cervical, or chest wall lymph nodes. Cardiovascular and Mediastinum: No acute aortic pathology. Cardiac chamber sizes appear to measure within normal limits on this non ECG gated study. No pericardial effusion. The thyroid gland is unremarkable. The esophagus is unremarkable. Bones/Soft tissues: Unchanged chronic inferior endplate compression fracture T5 and T6 with 30% height loss. .  No definite suspicious lytic or blastic foci. Persistent right chest wall soft tissue emphysema. Visualized upper abdomen: Unremarkable. Previously noted right-sided pneumothorax has significantly resolved. There is only small locules of air remaining within the small layering right pleural effusion suggestive of a small right-sided hydropneumothorax. No evidence of loculated effusion. Persistent right chest wall soft tissue emphysema. Multiple small cavitary lesions within the right upper lobe and right lower lobe with the largest cavitary lesion noted within the basilar aspect of the right lower lobe may now measuring 5.2 by 4.3 cm and demonstrates decreased thickening of the wall and more centralized cavitation. Findings are likely related to infectious/inflammatory etiology. Moderate emphysematous changes of the lungs. Unchanged chronic inferior compression fracture of T5 and T6.     CT CHEST WO CONTRAST    Result Date: 4/25/2022  EXAMINATION: CT OF THE CHEST WITHOUT CONTRAST 4/25/2022 11:29 am TECHNIQUE: CT of the chest was performed without the administration of intravenous contrast. Multiplanar reformatted images are provided for review. Dose modulation, iterative reconstruction, and/or weight based adjustment of the mA/kV was utilized to reduce the radiation dose to as low as reasonably achievable. COMPARISON: CT chest performed 04/16/2022. HISTORY: ORDERING SYSTEM PROVIDED HISTORY: Empyema TECHNOLOGIST PROVIDED HISTORY: Empyema Reason for Exam: Empyema per order Additional signs and symptoms: pt. intubated Relevant Medical/Surgical History: pt. unable to tolerate arms over head for exam FINDINGS: Mediastinum: Soft tissues of the thoracic inlet are unremarkable. The thoracic aorta is normal in caliber. Main pulmonary artery is normal in caliber. There is no pericardial effusion. There is no mediastinal or hilar adenopathy. There is an endotracheal tube with the tip in the midtrachea. Lungs/pleura: There is underlying emphysematous change. Left lung is clear. There is a moderate to large right-sided pneumothorax. There are few scattered similar nodular foci within the upper aspect of the right lung. There is pleural fluid containing septations and scattered air pockets. There is a thick-walled cavitary area that is smaller compared to prior exam measuring approximately 4.3 x 4.3 cm. Upper Abdomen: The upper abdomen is unremarkable. Soft Tissues/Bones: There is subcutaneous emphysema along the right lateral chest wall. There is no axillary adenopathy. There is no acute osseous abnormality. Moderate to large right-sided pneumothorax. Pleural fluid inferiorly and posteriorly containing septations and loculated areas of air or cavitation. The thick-walled cavitary lesion previously seen is smaller compared to prior examination. Similar pulmonary nodules primarily in the right upper lobe. Underlying emphysematous change. Subcutaneous emphysema along the right lateral chest wall. XR CHEST PORTABLE    Result Date: 5/1/2022  EXAMINATION: ONE XRAY VIEW OF THE CHEST 5/1/2022 8:39 am COMPARISON: 04/30/2022 HISTORY: ORDERING SYSTEM PROVIDED HISTORY: post chest tube removal TECHNOLOGIST PROVIDED HISTORY: post chest tube removal Reason for Exam: post chest tube removal FINDINGS: Right jugular central venous catheter remains in place. Heart size stable. No new airspace disease. No pneumothorax. Soft tissue gas again seen right chest wall. Stable chest, soft tissue gas again seen in the right chest wall, no pneumothorax     XR CHEST PORTABLE    Result Date: 4/30/2022  EXAMINATION: ONE XRAY VIEW OF THE CHEST 4/30/2022 5:06 pm COMPARISON: 04/30/2022 at 12:41 HISTORY: ORDERING SYSTEM PROVIDED HISTORY: chest tube removal TECHNOLOGIST PROVIDED HISTORY: chest tube removal FINDINGS: Right jugular central venous catheter remains in place. Heart size stable. No pneumothorax. Soft tissue gas right chest wall. No pneumothorax No acute cardiopulmonary process     XR CHEST PORTABLE    Result Date: 4/30/2022  EXAMINATION: ONE XRAY VIEW OF THE CHEST 4/30/2022 12:26 pm COMPARISON: AP chest from 04/30/2022 HISTORY: ORDERING SYSTEM PROVIDED HISTORY: chest tube removal TECHNOLOGIST PROVIDED HISTORY: chest tube removal Additional history of asthma, COPD, substance abuse, and kidney injury.  FINDINGS: Previous right-sided chest tube no longer seen. Stable or decreased right sided subcutaneous emphysema. Overlying ECG monitor leads and gown snaps. Right IJ central line tip position stable lower SVC. Cardiomediastinal shadow WNL and appropriate for slight rotation to the right. Left lung and costophrenic angle clear. Unchanged right upper nodular and less well defined right lower parenchymal densities, but no pneumothorax. Bones stable. Interval removal right-sided chest tube. No pneumothorax identified. No other interval change. Unchanged or slightly decreased right-sided subcutaneous emphysema. XR CHEST PORTABLE    Result Date: 4/30/2022  EXAMINATION: ONE XRAY VIEW OF THE CHEST 4/30/2022 6:08 am COMPARISON: 04/29/2022, 04/28/2022 HISTORY: ORDERING SYSTEM PROVIDED HISTORY: Acute respiratory failure TECHNOLOGIST PROVIDED HISTORY: Acute respiratory failure Reason for Exam: Acute respiratory failure Additional signs and symptoms: Acute respiratory failure Relevant Medical/Surgical History: Acute respiratory failure FINDINGS: Right chest tube remains in place. No pneumothorax identified. Right internal jugular line unchanged in position. Interstitial and ground-glass opacities in the right lung are again demonstrated without appreciable change. No new airspace disease or effusion identified. Similar appearance of subcutaneous emphysema. No significant interval change. XR CHEST PORTABLE    Result Date: 4/29/2022  EXAMINATION: ONE XRAY VIEW OF THE CHEST 4/29/2022 5:02 am COMPARISON: 04/28/2022 HISTORY: ORDERING SYSTEM PROVIDED HISTORY: Acute respiratory failure TECHNOLOGIST PROVIDED HISTORY: Acute respiratory failure Reason for Exam: Acute respiratory failure Additional signs and symptoms: Acute respiratory failure Relevant Medical/Surgical History: Acute respiratory failure FINDINGS: Endotracheal tube and nasogastric tube have been removed.   Right internal jugular central venous catheter extends to expected location of cavoatrial junction. Multifocal heterogeneous opacity throughout the right lung is grossly similar to prior. Right chest tube extends to the medial right hemithorax. No gross pneumothorax. Cardiac and mediastinal silhouettes are unchanged. Soft tissue emphysema is seen at the right chest wall and axilla. No substantial interval change in multifocal right-sided airspace disease as compared to prior. XR CHEST PORTABLE    Result Date: 4/28/2022  EXAMINATION: ONE XRAY VIEW OF THE CHEST 4/28/2022 2:33 pm COMPARISON: AP chest/20 HISTORY: ORDERING SYSTEM PROVIDED HISTORY: Chest tube re-exam. Pneumothorax TECHNOLOGIST PROVIDED HISTORY: Chest tube re-exam. Pneumothorax Reason for Exam: Chest tube re-exam. Pneumothorax History of asthma, COPD, substance abuse kidney injury. FINDINGS: Right IJ line tip position stable at the cavoatrial junction. ETT tip position unchanged approximately 3.3 cm above the nimesh. Enteric tube tip and side hole remain below left hemidiaphragm. Overlying ECG monitor leads gown snaps. Unchanged right-sided subcutaneous emphysema and chest tube entering 5-C6 with its tip overlying the hilum. Cardiomediastinal shadow stable. Scattered parenchymal densities right mid lower lung and slight basilar atelectasis or scarring. No new pulmonary finding. No sizable pleural effusion or pneumothorax. Bones appear unchanged. Unchanged support and right-sided chest tubes; otherwise stable findings, as above. XR CHEST PORTABLE    Result Date: 4/28/2022  EXAMINATION: ONE XRAY VIEW OF THE CHEST 4/28/2022 6:08 am COMPARISON: Chest radiograph performed 04/27/2022. HISTORY: ORDERING SYSTEM PROVIDED HISTORY: Acute respiratory failure TECHNOLOGIST PROVIDED HISTORY: Acute respiratory failure Reason for Exam: ETT FINDINGS: There are trace effusions. There is no definite pneumothorax. The mediastinal structures are stable. The upper abdomen unremarkable.   There is extensive subcutaneous emphysema along the right lateral chest wall and within the right breast.  There is a similar right-sided chest tube. There is endotracheal tube with tip in the midtrachea. There is a gastric tube and the tip is not visualized. There is a right internal jugular line that is stable. Trace effusions. No definite pneumothorax. Stable support tubes. Subcutaneous emphysema along the right lateral chest wall and within the right breast.     XR CHEST PORTABLE    Result Date: 4/27/2022  EXAMINATION: ONE XRAY VIEW OF THE CHEST 4/27/2022 6:06 am COMPARISON: AP chest from 04/26/2022 HISTORY: ORDERING SYSTEM PROVIDED HISTORY: Acute respiratory failure TECHNOLOGIST PROVIDED HISTORY: Acute respiratory failure Reason for Exam: respiratory failure History of COPD, and acute on chronic respiratory failure. FINDINGS: ETT tip position stable, ~ 2.6 cm above nimesh. Enteric tube tip/side hole remain below left hemidiaphragm. Right IJ central line tip position stable upper RA. Right-sided chest tube position stable, entering lateral chest wall with medial tip abutting mid mediastinal pleura. Right-sided subcutaneous emphysema, unchanged. Overlying ECG monitor leads. Cardiomediastinal shadow stable. Increased right lung opacity, especially lower 1/2 right hemithorax, much could relate to superimposed breast soft tissue. Infiltrate/loculated pleural effusion should also be considered. Slightly increased right basilar atelectasis. No blunting right CP angle. Bones stable. Stable support and right-sided chest tube. Increased opacities right lung, more lower and lateral in location, much of which could be related to superimposed right breast tissue. Infiltrate/loculated pleural fluid should also be considered. Some increased atelectasis right base suspected. RECOMMENDATION: Continued chest x-ray follow-up (later today if respiratory failure/deteriorating clinical status suspected).      XR CHEST PORTABLE    Result Date: 4/26/2022  EXAMINATION: ONE XRAY VIEW OF THE CHEST 4/25/2022 5:51 am COMPARISON: 04/24/2022 HISTORY: ORDERING SYSTEM PROVIDED HISTORY: Acute respiratory failure. TECHNOLOGIST PROVIDED HISTORY: Acute respiratory failure. Reason for Exam: Acute respiratory failure. Additional signs and symptoms: Acute respiratory failure. Relevant Medical/Surgical History: Acute respiratory failure. FINDINGS: Support lines and tubes are similar to the prior study. The endotracheal tube tip is 5 cm above the nimesh. The heart is normal in size for technique. No definite pleural effusion is seen. Suspected tiny right pneumothorax, along the lower chest wall laterally. Mild bibasilar airspace opacities again noted. Soft tissue emphysema is again seen along the right chest wall and neck. Suspected small right pneumothorax. Similar mild bibasilar airspace opacities. Support lines and tubes appear stable. XR CHEST PORTABLE    Result Date: 4/26/2022  EXAMINATION: ONE XRAY VIEW OF THE CHEST 4/26/2022 6:35 am COMPARISON: Chest radiograph performed 04/25/2022. HISTORY: ORDERING SYSTEM PROVIDED HISTORY: Acute respiratory failure TECHNOLOGIST PROVIDED HISTORY: Acute respiratory failure Reason for Exam: Acute resp failure, ETT FINDINGS: There is chronic pulmonary change. There are trace effusions. There is no pneumothorax. The mediastinal structures are unremarkable. The upper abdomen is unremarkable. The extrathoracic soft tissues are unremarkable. There is endotracheal tube with tip in the midtrachea. There is a gastric tube and the tip is not well visualized. There is a right internal jugular line with the tip near the cavoatrial junction. There is a right-sided chest tube with subcutaneous emphysema along the right lateral chest wall. Chronic pulmonary change. Support tubes as described above. Subcutaneous emphysema along the right lateral chest wall.      XR CHEST PORTABLE    Result Date: 4/24/2022  EXAMINATION: ONE XRAY VIEW OF THE CHEST 4/24/2022 5:51 am COMPARISON: 04/23/2022 HISTORY: ORDERING SYSTEM PROVIDED HISTORY: Acute respiratory failure TECHNOLOGIST PROVIDED HISTORY: Acute respiratory failure Reason for Exam: Acute respiratory failure Additional signs and symptoms: Acute respiratory failure Relevant Medical/Surgical History: Acute respiratory failure FINDINGS: Right central venous catheter. Endotracheal tube and nasogastric tube are stable. Right chest wall subcutaneous emphysema is noted. No significant pneumothorax is detected. Mild right lower lobe atelectasis and small right pleural effusion have improved. Improving small right pleural effusion and right lower lobe atelectasis. The lines and tubes are stable. XR CHEST PORTABLE    Result Date: 4/23/2022  EXAMINATION: ONE XRAY VIEW OF THE CHEST 4/23/2022 9:29 pm COMPARISON: 4/23/2022 HISTORY: ORDERING SYSTEM PROVIDED HISTORY: chest tube removal TECHNOLOGIST PROVIDED HISTORY: chest tube removal Reason for Exam: chest tube removal Additional signs and symptoms: chest tube removal Relevant Medical/Surgical History: chest tube removal FINDINGS: Right chest wall subcutaneous emphysema is identified. Endotracheal tube is located 0.7 cm above the nimesh. Suggest retraction by 2 cm. Nasogastric tube traverses the diaphragm. There is a right IJ catheter with the tip in the SVC. There has been removal of the right chest tube without evidence of significant pneumothorax. Small right pleural effusion and right lower lobe atelectasis are not significantly changed. No acute osseous abnormality is identified. Interval removal of right chest tube without evidence of significant pneumothorax. Endotracheal tube located 0.7 cm above the nimesh. Suggest retraction by 2 cm. Small right pleural effusion and right lower lobe atelectasis are unchanged.      XR CHEST PORTABLE    Result Date: 4/23/2022  EXAMINATION: ONE XRAY VIEW OF THE CHEST 4/23/2022 5:40 am COMPARISON: 04/22/2022 and 04/21/2022 HISTORY: ORDERING SYSTEM PROVIDED HISTORY: ETT placement TECHNOLOGIST PROVIDED HISTORY: ETT placement Reason for Exam: ETT placement Additional signs and symptoms: ETT placement Relevant Medical/Surgical History: ETT placement FINDINGS: Endotracheal tube tip is approximately 2 cm above the nimesh. Nasogastric tube continues into the stomach and off the exam.  Right PICC line is near the cavoatrial junction. The right chest tube is stable with the tip over the right upper lung but the side port outside the ribcage. Soft tissue emphysema in the right chest wall is redemonstrated and appears mildly increased. Some patchy opacities persist in the right base. Evaluation for right apical pneumothorax is suboptimal.  The left lung appears acceptable. Cardiac silhouette is not enlarged. The central pulmonary arteries appears stable. Limited evaluation of the right lateral ribs. 1.  Endotracheal tube, nasogastric tube, and right jugular catheter appear acceptable. The right chest tube side port is outside the ribcage. Correlate for functionality of the chest tube. 2.  Patchy opacities persist in the right lower chest.  The evaluation for small right apical pneumothorax is suboptimal on the current study. XR CHEST PORTABLE    Result Date: 4/22/2022  EXAMINATION: ONE XRAY VIEW OF THE CHEST 4/22/2022 6:29 am COMPARISON: 21 April 2022 HISTORY: ORDERING SYSTEM PROVIDED HISTORY: ETT placement TECHNOLOGIST PROVIDED HISTORY: ETT placement Reason for Exam: on vent FINDINGS: AP portable view of the chest time stamped at 623 hours demonstrates overlying cardiac monitoring electrodes, endotracheal tube terminating 4.8 cm above the nimesh and right internal jugular catheter terminating in the right atrium. Right-sided chest tube is again noted. Trace extrapleural air at the right apex persists. Subcutaneous emphysema along the right lateral chest wall is noted. Faint opacity is present at the right lung base suspicious for focal airspace disease. No mediastinal shift. Heart size is stable. Persistent small right apical pneumothorax. Support tubes and lines as above. Opacity at the right base. There is elevation right hemidiaphragm. Atelectasis is evident but superimposed airspace disease may be present. XR CHEST PORTABLE    Result Date: 4/21/2022  EXAMINATION: ONE XRAY VIEW OF THE CHEST 4/21/2022 11:57 am COMPARISON: 04/21/2022, 0625 hours. HISTORY: ORDERING SYSTEM PROVIDED HISTORY: Chest tube now clamped TECHNOLOGIST PROVIDED HISTORY: Chest tube now clamped Reason for Exam: chest tube now clamped FINDINGS: The ET tube was in satisfactory position, 4.5 cm above the nimesh. The NG tube was passed the gastric fundus. A right jugular central venous line was noted with the tip in the superior vena cava near its junction with the right atrium. A right-sided chest tube was noted. The proximal most opening is just out of the right lateral chest wall. No pneumothorax was noted. No cardiomegaly, pneumonia, interstitial edema or definite effusions were noted. Mild hyper inflation was identified. The ET tube was in satisfactory position, 4.5 cm above the nimesh. The NG tube was past the gastric fundus. A right jugular central venous line was noted with the tip in the superior vena cava near its junction with the right atrium. No pneumothorax was identified. A right-sided chest tube was noted but the proximal most opening is just external to the right lateral chest wall. No cardiomegaly, pneumonia or interstitial edema. XR CHEST PORTABLE    Result Date: 4/21/2022  EXAMINATION: ONE XRAY VIEW OF THE CHEST 4/21/2022 6:30 am COMPARISON: 04/20/2022 HISTORY: ORDERING SYSTEM PROVIDED HISTORY: ETT placement TECHNOLOGIST PROVIDED HISTORY: ETT placement Reason for Exam: vent FINDINGS: Support tubes and lines are unchanged.   The right chest tube side port is external to the chest wall which may predispose to air leak. Cardiac silhouette and mediastinal contours are unchanged. Calcified left hilar lymph nodes are noted. No pneumothorax. No new lung infiltrate. Aeration of the right lung base has improved. 1. The right chest tube side port is external to the chest wall which may predispose to an air leak. No pneumothorax. 2. Improved aeration of the right lung base, likely related to atelectasis. XR CHEST PORTABLE    Result Date: 4/20/2022  EXAMINATION: ONE XRAY VIEW OF THE CHEST 4/20/2022 11:52 am COMPARISON: None. HISTORY: ORDERING SYSTEM PROVIDED HISTORY: Chest tube now to waterseal TECHNOLOGIST PROVIDED HISTORY: Chest tube now to waterseal Reason for Exam: Chest tube now to waterseal FINDINGS: There is a right chest tube in place. There is no evidence of pneumothorax. Some apparent atelectasis noted in the the right lung base. ET tube is in good position. Tip of central line overlies the right atrium. Left lung appears normal.  Heart appears unremarkable. No evidence of pneumothorax. Some atelectasis in the right lung base. Tip of centralized overlies the right atrium. It should be withdrawn by 6 cm. The findings were sent to the Radiology Results [address] at 12:21 pm on 4/20/2022 to be communicated to a licensed caregiver. XR CHEST PORTABLE    Result Date: 4/20/2022  EXAMINATION: ONE XRAY VIEW OF THE CHEST 4/20/2022 6:30 am COMPARISON: 04/19/2022 HISTORY: ORDERING SYSTEM PROVIDED HISTORY: ETT placement TECHNOLOGIST PROVIDED HISTORY: ETT placement Reason for Exam: ETT FINDINGS: The cardiac silhouette and mediastinal contours are stable. There is a right-sided chest tube with the side port noted external to the chest wall. Right internal jugular vein catheter, endotracheal tube, and orogastric tube are again noted. There is pulmonary vascular congestion. No pneumothorax. The patient is rotated towards the right.      1. Right chest tube side port is external to the chest wall which may predispose to an air leak. 2. Stable pulmonary vascular congestion. 3. No pneumothorax is identified. XR CHEST PORTABLE    Result Date: 4/19/2022  EXAMINATION: ONE X-RAY VIEW OF THE CHEST 4/18/2022 6:27 am COMPARISON: 04/17/2022 HISTORY: ORDERING SYSTEM PROVIDED HISTORY: ETT placement TECHNOLOGIST PROVIDED HISTORY: ETT placement Reason for Exam: ETT placement FINDINGS: The endotracheal tube, nasogastric tube, right IJ catheter and right-sided chest tube remain. The extreme lung apices are excluded from the examination. A pneumothorax is not identified. Right basilar consolidation has increased peripherally. Increased right basilar opacity may reflect fluid filling the large cavitary lesion at the right lung base. Pulmonary consolidation/increased pleural effusion or empyema are alternate considerations. XR CHEST PORTABLE    Result Date: 4/19/2022  EXAMINATION: ONE XRAY VIEW OF THE CHEST 4/19/2022 6:34 am COMPARISON: Chest radiograph performed 04/18/2022. HISTORY: ORDERING SYSTEM PROVIDED HISTORY: ETT placement TECHNOLOGIST PROVIDED HISTORY: ETT placement Reason for Exam: on vent FINDINGS: There is right lower lung infiltrate. There is no pneumothorax. The mediastinal structures are unremarkable. The upper abdomen is unremarkable. The extrathoracic soft tissues are unremarkable. There is an endotracheal tube with the tip in the midtrachea. There is a right-sided chest tube. There is a right internal jugular central line with the tip at the cavoatrial junction. There is a gastric tube and the tip is not well visualized. Right lower lung infiltrate that is mildly improved. Support tubes as described above.      XR CHEST PORTABLE    Result Date: 4/17/2022  EXAMINATION: ONE XRAY VIEW OF THE CHEST 4/17/2022 6:04 am COMPARISON: 04/16/2022, 1248 hours HISTORY: ORDERING SYSTEM PROVIDED HISTORY: ETT placement TECHNOLOGIST PROVIDED HISTORY: ETT placement Reason for Exam: ETT 51-year-old female with endotracheal tube placement FINDINGS: Endotracheal tube distal tip overlying the mid trachea approximately 4.8 cm above the level of the nimesh. Enteric tube traverses the GE junction with distal tip excluded from the field of view. Right IJ approach central venous catheter distal tip overlying the right atrium. Right-sided chest tube distal tip projects over the right hilum. Cardiac monitor leads overlie the chest. No obvious pneumothorax. No free air. Cardiac and mediastinal contours remain unchanged. Left lung is relatively clear. Persistent airspace disease at the right mid and right lower lung zones. Visualized osseous structures remain unchanged. Subcutaneous emphysema previously seen at the right lateral chest wall no longer identified. 1. Persistent airspace disease at the right mid and right lower lung zones. Follow-up is recommended to document resolution. 2. Tubes and right IJ line as detailed above. XR CHEST PORTABLE    Result Date: 4/16/2022  EXAMINATION: ONE XRAY VIEW OF THE CHEST 4/16/2022 1:10 pm COMPARISON: 04/16/2022, 1213 hours HISTORY: ORDERING SYSTEM PROVIDED HISTORY: chest tube TECHNOLOGIST PROVIDED HISTORY: chest tube Reason for Exam: chest tube Additional signs and symptoms: chest tube and NG tube placement 51-year-old female with history of NG tube and chest tube placement FINDINGS: Portable supine view of the chest. Right-sided chest tube has been placed with distal tip projecting over the right infrahilar region. Right IJ approach central venous catheter distal tip overlying the cavoatrial junction, stable. Endotracheal tube distal tip overlying the mid trachea approximately 4.3 cm above the level of the nimesh. Enteric tube traverses the GE junction with distal tip projecting over the left mid abdomen likely within the stomach body. Left lung is clear.   Pleural thickening and opacity involving the right mid and right lower lung zones. Subcutaneous emphysema along the right lateral chest wall. Cardiac and mediastinal contours remain unchanged. Atherosclerotic calcification of the thoracic aorta. No free air. There is re-expansion of the right lung compared with the prior study. Probable small residual inferolateral right pneumothorax component. 1. Tubes and right IJ line as detailed above. Re-expansion of the right lung compared with the prior study. There is likely a small residual right inferolateral pneumothorax component. 2. Pleural thickening and airspace opacity involving the right mid and right lower lung zones. Follow-up is recommended to document resolution. 3. Subcutaneous emphysema along the right lateral chest wall. XR CHEST PORTABLE    Result Date: 4/16/2022  EXAMINATION: ONE XRAY VIEW OF THE CHEST 4/16/2022 12:24 pm COMPARISON: None. HISTORY: ORDERING SYSTEM PROVIDED HISTORY: Intubation TECHNOLOGIST PROVIDED HISTORY: Intubation Reason for Exam: central line and ET placement FINDINGS: ET tube terminates 3 cm above the nimesh. Right line terminates in the SVC. There is a relatively stable right-sided basilar pneumothorax. The remaining lungs are unchanged. Cardiac silhouette and osseous structures unchanged. Intubation as above. No significant change     XR CHEST PORTABLE    Result Date: 4/16/2022  EXAMINATION: ONE XRAY VIEW OF THE CHEST 4/16/2022 11:02 am COMPARISON: 04/05/2022 HISTORY: ORDERING SYSTEM PROVIDED HISTORY: SOB TECHNOLOGIST PROVIDED HISTORY: SOB Reason for Exam: SOB FINDINGS: There is a moderate loculated right basal pneumothorax. Cavitary lesion is noted in the right lung base. Left lung is unremarkable. Heart and mediastinal structures appear normal.  There is no evidence for any tension phenomena. Moderate loculated right basal pneumothorax. Evidence for tension. Cavitary lesion noted in the right lung base. .  Case discussed with Dr. Akilah Palomares.      XR CHEST PORTABLE    Result Date: 4/1/2022  EXAMINATION: ONE XRAY VIEW OF THE CHEST 4/1/2022 4:01 pm COMPARISON: 04/01/2022 HISTORY: ORDERING SYSTEM PROVIDED HISTORY: central line placed TECHNOLOGIST PROVIDED HISTORY: central line placed Reason for Exam: Central line placed FINDINGS: There is a right internal jugular central line in place with distal tip overlying the superior vena cava. Previously noted right basal infiltrate is unchanged. Left lung appears clear. The heart and mediastinal structures appear normal.  There is no evidence of pneumothorax. Satisfactory position of right internal jugular central line. No change in the the right basal infiltrate. XR CHEST PORTABLE    Result Date: 4/1/2022  EXAMINATION: ONE XRAY VIEW OF THE CHEST 4/1/2022 1:22 pm COMPARISON: 06/17/2020. CT dated 10/15/2021. HISTORY: ORDERING SYSTEM PROVIDED HISTORY: dyspnea TECHNOLOGIST PROVIDED HISTORY: dyspnea Reason for Exam: Dyspnea Relevant Medical/Surgical History: Hx of COPD FINDINGS: The cardiac silhouette appears within normal limits. There are bibasilar opacities, right greater than left which may reflect multifocal pneumonia in the correct clinical setting. No evidence of pleural effusion or pneumothorax is seen. Bibasilar opacities, right greater than left, which may reflect multifocal pneumonia. Follow-up to imaging resolution is recommended. CT CHEST PULMONARY EMBOLISM W CONTRAST    Result Date: 4/16/2022  EXAMINATION: CTA OF THE CHEST 4/16/2022 2:30 pm TECHNIQUE: CTA of the chest was performed after the administration of intravenous contrast.  Multiplanar reformatted images are provided for review. MIP images are provided for review. Dose modulation, iterative reconstruction, and/or weight based adjustment of the mA/kV was utilized to reduce the radiation dose to as low as reasonably achievable.  COMPARISON: CT chest from 10/15/2021 HISTORY: ORDERING SYSTEM PROVIDED HISTORY: SOB TECHNOLOGIST PROVIDED HISTORY: SOB Decision Support Exception - unselect if not a suspected or confirmed emergency medical condition->Emergency Medical Condition (MA) Reason for Exam: sob Relevant Medical/Surgical History: intubated, septic, hx of PE 33-year-old female with shortness of breath FINDINGS: Pulmonary Arteries: No obvious filling defect in the main, right main, or left main pulmonary arteries. Evaluation of the segmental and subsegmental pulmonary arterial vasculature is limited due to respiratory motion suboptimal bolus timing, airspace disease, and streak artifact. There are areas of probable residual linear chronic clot within the right lower lobar pulmonary artery on image 111, series 2. Probable linear residual clot within the anterior right upper lobe pulmonary artery on image 83, series 2. Mediastinum: Atherosclerotic calcification of the aorta and branch vasculature. No dissection flap within the visualized thoracic aorta. No pericardial effusion. There is fluid in the superior pericardial recess. Enlarged precarinal lymph nodes remain unchanged on image 83, series 2. Right hilar lymphadenopathy is seen on image 96, series 2. Coronary artery disease. No left hilar or axillary lymphadenopathy. Lungs/pleura: Endotracheal tube distal tip within the lower trachea. Trachea and very proximal central airways appear patent. Narrowing of the right middle lobe airway into a region of partial consolidation/atelectasis/scarring. Opacification of the right lower lobar segmental airways. There is a thick-walled cavitary lesion in the right lower lobe measuring 5.5 x 7.3 cm in greatest AP and transverse dimensions on image 68, series 4. There is a dependent air-fluid level within this lesion. This area measures 7.6 cm in greatest craniocaudal extent on image 48, series 602. There is surrounding airspace disease.   There is a gas and fluid containing multiloculated pleural collection or hydropneumothorax along the posterior margin of the right lung. Right sided chest tube distal tip along the anteromedial right lung. Subcutaneous emphysema along the right axilla and right lateral chest wall. Stellate pulmonary nodule in the lateral right upper lobe measuring 6 mm on image 32, series 4. Moderate to severe emphysema, dependent atelectasis and respiratory motion. Upper Abdomen: Fatty liver. NG tube is seen extending into the stomach body. Atherosclerotic calcification of the upper abdominal aorta and branch vasculature. Soft Tissues/Bones: Chronic anterior wedge compression deformities, unchanged from 10/15/2021 involving T5 and T6. Mild diffuse degenerative changes throughout the spine. 1. Linear filling defects in the anterior right upper lobe and right lower lobe pulmonary arteries likely representing residual/chronic clot material. No acute central filling defect/pulmonary embolus is evident. 2. Thick-walled cavitary lesion in the right lower lobe measuring up to 5.5 x 7.3 x 7.6 cm which could be related to TB or fungal disease or a necrotizing pneumonia. Underlying cavitary malignancy not entirely excluded. There is surrounding airspace disease. There is also an associated multiloculated pleural collection or hydropneumothorax primarily along the posterior margin of the right lung. Right-sided chest tube distal tip along the anteromedial right pleura/lung. 3. Partial consolidation, atelectasis and/or infiltrate of the right middle lobe. 4. Stellate 6 mm lateral right upper lobe pulmonary nodule. Follow-up guidelines provided below. 5. Underlying moderate to severe emphysema. 6. Endotracheal and NG tubes as above. 7. Coronary artery disease. 8. Chronic anterior wedge compression deformities of T5 and T6. 9. Subcutaneous emphysema along the right axilla and right lateral chest wall likely related to chest tube. 10. Mediastinal and right hilar lymphadenopathy.  RECOMMENDATIONS: 6 mm suspicious right solid pulmonary nodule within the upper lobe. Recommend a non-contrast Chest CT at 6-12 months, then another non-contrast Chest CT at 18-24 months. These guidelines do not apply to immunocompromised patients and patients with cancer. Follow up in patients with significant comorbidities as clinically warranted. For lung cancer screening, adhere to Lung-RADS guidelines. Reference: Radiology. 2017; 284(1):228-43. VL Lower Extremity Bilateral Venous Duplex    Result Date: 4/18/2022    [facility]  Vascular Lower Extremities DVT Study Procedure   Patient Name   Bill Costello     Date of Study           04/18/2022                 BOBBY OROSCO   Date of Birth  1955  Gender                  Female   Age            74 year(s)  Race                       Room Number    2002   Corporate ID # [id number]   Patient Acct # [de-identified]   MR #           [id number]      Sonographer             Swapnil Gramajo RVT   Accession #    [id number]  Interpreting Physician  Domo Norris   Referring                  Referring Physician     Allyssa Saucedo  Nurse  Practitioner  Procedure Type of Study:   Veins: Lower Extremities DVT Study, Venous Scan Lower Bilateral.  Indications for Study:R/O DVT. Patient Status: In Patient. Technical Quality:Adequate visualization. Conclusions   Summary   Bilateral:  No evidence of deep or superficial venous thrombosis.    Signature   ----------------------------------------------------------------  Electronically signed by Swapnil Gramajo RVT(Sonographer) on  04/18/2022 07:45 AM  ----------------------------------------------------------------   ----------------------------------------------------------------  Electronically signed by Anju patten)  on 04/18/2022 01:10 PM  ----------------------------------------------------------------  Findings:   Right Impression:                    Left Impression:  The common femoral, femoral,         The common femoral, femoral,  popliteal and tibial veins           popliteal and tibial veins  demonstrate normal compressibility   demonstrate normal compressibility  and augmentation. and augmentation. Normal compressibility of the great  Normal compressibility of the great  saphenous vein. saphenous vein. Normal compressibility of the small  Normal compressibility of the small  saphenous vein. saphenous vein. Velocities are measured in cm/s ; Diameters are measured in cm Right Lower Extremities DVT Study Measurements Right 2D Measurements +------------------------------------+----------+---------------+----------+ ! Location                            ! Visualized! Compressibility! Thrombosis! +------------------------------------+----------+---------------+----------+ ! Common Femoral                      !Yes       ! Yes            ! None      ! +------------------------------------+----------+---------------+----------+ ! Prox Femoral                        !Yes       ! Yes            ! None      ! +------------------------------------+----------+---------------+----------+ ! Mid Femoral                         !Yes       ! Yes            ! None      ! +------------------------------------+----------+---------------+----------+ ! Dist Femoral                        !Yes       ! Yes            ! None      ! +------------------------------------+----------+---------------+----------+ ! Deep Femoral                        !Yes       ! Yes            ! None      ! +------------------------------------+----------+---------------+----------+ ! Popliteal                           !Yes       ! Yes            ! None      ! +------------------------------------+----------+---------------+----------+ ! Sapheno Femoral Junction            ! Yes       ! Yes            ! None      ! +------------------------------------+----------+---------------+----------+ ! PTV                                 ! Yes       ! Yes            ! None      ! +------------------------------------+----------+---------------+----------+ ! Peroneal                            !Yes       ! Yes            ! None      ! +------------------------------------+----------+---------------+----------+ ! Gastroc                             ! Yes       ! Yes            ! None      ! +------------------------------------+----------+---------------+----------+ ! GSV Thigh                           ! Yes       ! Yes            ! None      ! +------------------------------------+----------+---------------+----------+ ! GSV Knee                            ! Yes       ! Yes            ! None      ! +------------------------------------+----------+---------------+----------+ ! GSV Ankle                           ! Yes       ! Yes            ! None      ! +------------------------------------+----------+---------------+----------+ ! SSV                                 ! Yes       ! Yes            ! None      ! +------------------------------------+----------+---------------+----------+ Left Lower Extremities DVT Study Measurements Left 2D Measurements +------------------------------------+----------+---------------+----------+ ! Location                            ! Visualized! Compressibility! Thrombosis! +------------------------------------+----------+---------------+----------+ ! Common Femoral                      !Yes       ! Yes            ! None      ! +------------------------------------+----------+---------------+----------+ ! Prox Femoral                        !Yes       ! Yes            ! None      ! +------------------------------------+----------+---------------+----------+ ! Mid Femoral                         !Yes       ! Yes            ! None      ! +------------------------------------+----------+---------------+----------+ ! Dist Femoral                        !Yes       ! Yes            ! None      ! +------------------------------------+----------+---------------+----------+ ! Deep Femoral !Yes       !Yes            ! None      ! +------------------------------------+----------+---------------+----------+ ! Popliteal                           !Yes       ! Yes            ! None      ! +------------------------------------+----------+---------------+----------+ ! Sapheno Femoral Junction            ! Yes       ! Yes            ! None      ! +------------------------------------+----------+---------------+----------+ ! PTV                                 ! Yes       ! Yes            ! None      ! +------------------------------------+----------+---------------+----------+ ! Peroneal                            !Yes       ! Yes            ! None      ! +------------------------------------+----------+---------------+----------+ ! Gastroc                             ! Yes       ! Yes            ! None      ! +------------------------------------+----------+---------------+----------+ ! GSV Thigh                           ! Yes       ! Yes            ! None      ! +------------------------------------+----------+---------------+----------+ ! GSV Knee                            ! Yes       ! Yes            ! None      ! +------------------------------------+----------+---------------+----------+ ! GSV Ankle                           ! Yes       ! Yes            ! None      ! +------------------------------------+----------+---------------+----------+ ! SSV                                 ! Yes       ! Yes            ! None      ! +------------------------------------+----------+---------------+----------+    FL MODIFIED BARIUM SWALLOW W VIDEO    Result Date: 4/4/2022  EXAMINATION: MODIFIED BARIUM SWALLOW WAS PERFORMED IN CONJUNCTION WITH SPEECH PATHOLOGY SERVICES TECHNIQUE: Fluoroscopic evaluation of the swallowing mechanism was performed using cineradiography with multiple consistency of barium product in conjunction with speech pathology services.  FLUOROSCOPY DOSE AND TYPE OR TIME AND EXPOSURES: DAP 49.2 dGy cm squared COMPARISON: None HISTORY: ORDERING SYSTEM PROVIDED HISTORY: aspiration pna TECHNOLOGIST PROVIDED HISTORY: aspiration pna Reason for Exam: aspiration pneumonia FINDINGS: Premature vallecular spillage. There was trace penetration with straw with thin liquid. No aspiration. No aspiration. Trace penetration with straw with thin liquids. Please see separate speech pathology report for full discussion of findings and recommendations. RECOMMENDATIONS: Unavailable       Physical Examination:        Physical Exam  Vitals and nursing note reviewed. Constitutional:       General: She is not in acute distress. Appearance: She is not ill-appearing or toxic-appearing. Cardiovascular:      Rate and Rhythm: Normal rate and regular rhythm. Heart sounds: No murmur heard. Pulmonary:      Effort: No respiratory distress. Breath sounds: Normal breath sounds. No wheezing. Abdominal:      Palpations: Abdomen is soft. Tenderness: There is no abdominal tenderness. There is no guarding or rebound. Musculoskeletal:         General: No tenderness. Right lower leg: No edema. Left lower leg: No edema. Neurological:      Mental Status: She is alert and oriented to person, place, and time. Sensory: No sensory deficit. Comments: Is able to move all extremities at this time. Sensations grossly intact.    Psychiatric:         Mood and Affect: Mood normal.         Behavior: Behavior normal.         Assessment:        Primary Problem  Sepsis [facility])    Active Hospital Problems    Diagnosis Date Noted    Postprocedural subcutaneous emphysema [T81.82XA] 04/28/2022     Priority: Medium    Recurrent pneumothorax after chest tube removed [J95.811] 04/26/2022     Priority: Medium    Lung abscess ([address] [J85.2] 04/25/2022     Priority: Medium    Elevated C-reactive protein (CRP) [R79.82]      Priority: Medium    Pseudomonas aeruginosa infection [A49.8]      Priority: Medium    Elevated procalcitonin [R79.89] Priority: Medium    Acute systolic HF (heart failure) (HCC) [I50.21] 04/19/2022    Pneumothorax on right [J93.9]     HCAP (healthcare-associated pneumonia) [J18.9]     Sepsis ([facility] [A41.9] 04/16/2022    Acute on chronic respiratory failure ([facility] [J96.20] 04/16/2022    Cavitary lesion of lung [J98.4] 04/16/2022    History of DVT (deep vein thrombosis) [Z86.718] 04/16/2022    Pneumothorax, right [J93.9] 04/16/2022    Status post chest tube placement ([facility] [Z93.8] 04/16/2022    History of pulmonary embolus (PE) [Z86.711] 04/02/2022    Chronic respiratory failure with hypoxia (HCC) [J96.11] 08/05/2021    Compression fracture of body of thoracic vertebra ([facility] [S22.000A] 09/28/2020    Lung nodule [R91.1] 08/22/2018    Bipolar disorder ([facility] [F31.9]     Chronic obstructive pulmonary disease ([facility] [J44.9] 01/06/2016     Plan:        Sepsis due to pneumonia with abcess vs empyema, pseudomonas sp. - improved  WBC 18>12.6> 11.5>15.5>15.4>16>9.5>10.3  Chest x-ray on admission: Moderate loculated right basal pneumothorax.  Evidence for tension.  Cavitary lesion noted in the right lung base  CT chest on admission: Thick-walled cavitary lesion RLL. Right pneumothorax. Infiltrates of the right middle lobe.  Stellate 6 mm lateral RUL nodule.  Mediastinal and right hilar lymphadenopathy  Iv fluids: 1/2 NS at 15 cc/h  ID and Critical care following   Merrem till 05/14/2022   Will need midline on discharge   Follow up CT chest in 3 weeks  Midodrine TID PRN added for SBP <90     Acute on chronic respiratory failure 2/2 recurrent pneumothorax after chest tube removal and Pseudomonas pneumonia - improving  History of severe COPD - 3 L home O2 baseline, back on baseline O2  CXR right pneumothorax on admission   S/p intubation and right chest tube placement 4/16; extubated 4/28  Solumedrol 40mg q12h  Surgery consult   Chest tube removed. Remove dressing in 3 days. Surgery signed off.   Daily CXR - no significant interval change today      Acute systolic heart failure - Improved   Echo Estimated LV EF 45-50 %  Probnp 1000s on admission  Lasix 20mg IV BID     Hyperglycemia; last A1c 6.0, insulin sliding scale  Regular diet as tolerated     Hx DVT/ PE: Eliquis was discontinued in 12/2021  History bipolar disorder: Trazodone, Risperidone resumed    Diet: regular   GI ppx: Pepcid 20 mg twice daily IV  DVT ppx: Heparin 5000 units TID   Code status: Full code  Consults: pulm, ID, gen surg  Dispo: referral sent to [address] [place] [address] consult. May require acute rehab. Will await their recommendations at this time. Jhon Beltre MD  5/1/2022  10:53 AM       Attending Physician Statement  I have discussed the care of Natasha Pete and I have examined the patient myselft and taken ros and hpi , including pertinent history and exam findings,  with the resident. I have reviewed the key elements of all parts of the encounter with the resident. I agree with the assessment, plan and orders as documented by the resident. Spent 35 minutes in reviewing data/medicines/talking to patient/family,  explaining and answering all the questions.         Electronically signed by Jac Mesa MD

## 2022-05-01 NOTE — PROGRESS NOTES
Rn spoke with Dr. Melissa Esposito for clarification to remove R IJ, okay to remove, pt has peripheral access right wrist.

## 2022-05-01 NOTE — PROGRESS NOTES
Transfer of Care    Ms. Mini Lee, 74year old female, with previous medical history of COPD on 3 L O2 at home, Bipolar, hx of DVT/PE, migraines. Patient had initially presented with shortness of breath and cough on 04/16/2022. Patient has had extensive hospital stay. Found to be pseudomonas positive on bronchial wash. Required intubation and ICU stay. Required chest tube insertions as well. Patient is vitally stable at this time is on home O2 requirement, with no chest tube in place. Patient was initially on cefepime, flagyl, and vanc. Switched to [facility] only from 04/19.     4/21: bronchial washing from bronchoscopy growing pseudomonas     4/24: chest tube removed     4/25: Recurrent right sided pneumonthorax shown on repeat CT. Chest tube reinserted by surgery.      4/28: successfully extubated     4/29: on 3L NC O2     04/30: Transferred to [facility]. [facility] referral placed and PM&R consult. Plan as of now:    Sepsis due to pneumonia with abcess vs empyema, pseudomonas sp. - improved  WBC 18>12.6> 11.5>15.5>15.4>16>9.5>10.3  Chest x-ray on admission: Moderate loculated right basal pneumothorax.  Evidence for tension.  Cavitary lesion noted in the right lung base  CT chest on admission: Thick-walled cavitary lesion RLL. Right pneumothorax.  Infiltrates of the right middle lobe.  Stellate 6 mm lateral RUL nodule.  Mediastinal and right hilar lymphadenopathy  Iv fluids: 1/2 NS at 15 cc/h  ID and Critical care following              Merrem till 05/14/2022              Will need midline on discharge              Follow up CT chest in 3 weeks  Midodrine TID PRN added for SBP <90     Acute on chronic respiratory failure 2/2 recurrent pneumothorax after chest tube removal and Pseudomonas pneumonia - improving  History of severe COPD - 3 L home O2 baseline, back on baseline O2  CXR right pneumothorax on admission   S/p intubation and right chest tube placement 4/16; extubated 4/28  Solumedrol 40mg q12h  Surgery consult              Chest tube removed. Remove dressing in 3 days. Surgery signed off. Daily CXR - no significant interval change today     Dispo: referral sent to [facility]. Pre-cert started on 70/31. PM&R consult if patient not accepted at [facility].

## 2022-05-02 LAB
ANION GAP SERPL CALCULATED.3IONS-SCNC: 3 MMOL/L (ref 9–17)
BUN BLDV-MCNC: 25 MG/DL (ref 8–23)
CALCIUM SERPL-MCNC: 7.7 MG/DL (ref 8.6–10.4)
CHLORIDE BLD-SCNC: 96 MMOL/L (ref 98–107)
CO2: 34 MMOL/L (ref 20–31)
CREAT SERPL-MCNC: <0.4 MG/DL (ref 0.5–0.9)
GFR AFRICAN AMERICAN: ABNORMAL ML/MIN
GFR NON-AFRICAN AMERICAN: ABNORMAL ML/MIN
GFR SERPL CREATININE-BSD FRML MDRD: ABNORMAL ML/MIN/{1.73_M2}
GLUCOSE BLD-MCNC: 110 MG/DL (ref 65–105)
GLUCOSE BLD-MCNC: 173 MG/DL (ref 65–105)
GLUCOSE BLD-MCNC: 225 MG/DL (ref 65–105)
GLUCOSE BLD-MCNC: 234 MG/DL (ref 70–99)
GLUCOSE BLD-MCNC: 241 MG/DL (ref 65–105)
HCT VFR BLD CALC: 28.6 % (ref 36–46)
HEMOGLOBIN: 9.4 G/DL (ref 12–16)
MCH RBC QN AUTO: 30.5 PG (ref 26–34)
MCHC RBC AUTO-ENTMCNC: 33 G/DL (ref 31–37)
MCV RBC AUTO: 92.6 FL (ref 80–100)
PDW BLD-RTO: 17.4 % (ref 11.5–14.9)
PLATELET # BLD: 316 K/UL (ref 150–450)
PMV BLD AUTO: 7.9 FL (ref 6–12)
POTASSIUM SERPL-SCNC: 4.9 MMOL/L (ref 3.7–5.3)
RBC # BLD: 3.09 M/UL (ref 4–5.2)
SODIUM BLD-SCNC: 133 MMOL/L (ref 135–144)
WBC # BLD: 9.9 K/UL (ref 3.5–11)

## 2022-05-02 PROCEDURE — 97110 THERAPEUTIC EXERCISES: CPT

## 2022-05-02 PROCEDURE — 6370000000 HC RX 637 (ALT 250 FOR IP): Performed by: STUDENT IN AN ORGANIZED HEALTH CARE EDUCATION/TRAINING PROGRAM

## 2022-05-02 PROCEDURE — 2580000003 HC RX 258: Performed by: INTERNAL MEDICINE

## 2022-05-02 PROCEDURE — 6370000000 HC RX 637 (ALT 250 FOR IP): Performed by: INTERNAL MEDICINE

## 2022-05-02 PROCEDURE — 36415 COLL VENOUS BLD VENIPUNCTURE: CPT

## 2022-05-02 PROCEDURE — 97166 OT EVAL MOD COMPLEX 45 MIN: CPT

## 2022-05-02 PROCEDURE — 6360000002 HC RX W HCPCS: Performed by: INTERNAL MEDICINE

## 2022-05-02 PROCEDURE — 99253 IP/OBS CNSLTJ NEW/EST LOW 45: CPT | Performed by: PHYSICAL MEDICINE & REHABILITATION

## 2022-05-02 PROCEDURE — 82947 ASSAY GLUCOSE BLOOD QUANT: CPT

## 2022-05-02 PROCEDURE — 97164 PT RE-EVAL EST PLAN CARE: CPT

## 2022-05-02 PROCEDURE — 2700000000 HC OXYGEN THERAPY PER DAY

## 2022-05-02 PROCEDURE — 94761 N-INVAS EAR/PLS OXIMETRY MLT: CPT

## 2022-05-02 PROCEDURE — 2580000003 HC RX 258: Performed by: STUDENT IN AN ORGANIZED HEALTH CARE EDUCATION/TRAINING PROGRAM

## 2022-05-02 PROCEDURE — 80048 BASIC METABOLIC PNL TOTAL CA: CPT

## 2022-05-02 PROCEDURE — 2060000000 HC ICU INTERMEDIATE R&B

## 2022-05-02 PROCEDURE — 99232 SBSQ HOSP IP/OBS MODERATE 35: CPT | Performed by: INTERNAL MEDICINE

## 2022-05-02 PROCEDURE — 6360000002 HC RX W HCPCS: Performed by: STUDENT IN AN ORGANIZED HEALTH CARE EDUCATION/TRAINING PROGRAM

## 2022-05-02 PROCEDURE — 97530 THERAPEUTIC ACTIVITIES: CPT

## 2022-05-02 PROCEDURE — 94640 AIRWAY INHALATION TREATMENT: CPT

## 2022-05-02 PROCEDURE — 6370000000 HC RX 637 (ALT 250 FOR IP)

## 2022-05-02 PROCEDURE — 85027 COMPLETE CBC AUTOMATED: CPT

## 2022-05-02 RX ORDER — FUROSEMIDE 20 MG/1
20 TABLET ORAL 2 TIMES DAILY
Status: DISCONTINUED | OUTPATIENT
Start: 2022-05-02 | End: 2022-05-03 | Stop reason: HOSPADM

## 2022-05-02 RX ADMIN — FOLIC ACID 1 MG: 1 TABLET ORAL at 08:56

## 2022-05-02 RX ADMIN — METHYLPREDNISOLONE SODIUM SUCCINATE 40 MG: 40 INJECTION, POWDER, FOR SOLUTION INTRAMUSCULAR; INTRAVENOUS at 08:56

## 2022-05-02 RX ADMIN — FENTANYL CITRATE 50 MCG: 50 INJECTION, SOLUTION INTRAMUSCULAR; INTRAVENOUS at 16:06

## 2022-05-02 RX ADMIN — MEROPENEM 1000 MG: 1 INJECTION, POWDER, FOR SOLUTION INTRAVENOUS at 21:24

## 2022-05-02 RX ADMIN — RISPERIDONE 1.5 MG: 0.5 TABLET ORAL at 03:33

## 2022-05-02 RX ADMIN — MEROPENEM 1000 MG: 1 INJECTION, POWDER, FOR SOLUTION INTRAVENOUS at 11:26

## 2022-05-02 RX ADMIN — ALBUTEROL SULFATE 2.5 MG: 2.5 SOLUTION RESPIRATORY (INHALATION) at 11:24

## 2022-05-02 RX ADMIN — FUROSEMIDE 20 MG: 20 TABLET ORAL at 17:07

## 2022-05-02 RX ADMIN — MULTIPLE VITAMINS W/ MINERALS TAB 1 TABLET: TAB at 08:56

## 2022-05-02 RX ADMIN — PREDNISONE 30 MG: 20 TABLET ORAL at 17:10

## 2022-05-02 RX ADMIN — ATORVASTATIN CALCIUM 20 MG: 20 TABLET, FILM COATED ORAL at 08:56

## 2022-05-02 RX ADMIN — HEPARIN SODIUM 5000 UNITS: 5000 INJECTION INTRAVENOUS; SUBCUTANEOUS at 21:24

## 2022-05-02 RX ADMIN — RISPERIDONE 1.5 MG: 0.5 TABLET ORAL at 15:13

## 2022-05-02 RX ADMIN — FUROSEMIDE 20 MG: 10 INJECTION, SOLUTION INTRAMUSCULAR; INTRAVENOUS at 08:56

## 2022-05-02 RX ADMIN — HEPARIN SODIUM 5000 UNITS: 5000 INJECTION INTRAVENOUS; SUBCUTANEOUS at 15:13

## 2022-05-02 RX ADMIN — THIAMINE HCL TAB 100 MG 100 MG: 100 TAB at 08:56

## 2022-05-02 RX ADMIN — FAMOTIDINE 20 MG: 20 TABLET, FILM COATED ORAL at 08:56

## 2022-05-02 RX ADMIN — ALBUTEROL SULFATE 2.5 MG: 2.5 SOLUTION RESPIRATORY (INHALATION) at 03:40

## 2022-05-02 RX ADMIN — FENTANYL CITRATE 50 MCG: 50 INJECTION, SOLUTION INTRAMUSCULAR; INTRAVENOUS at 08:50

## 2022-05-02 RX ADMIN — MEROPENEM 1000 MG: 1 INJECTION, POWDER, FOR SOLUTION INTRAVENOUS at 03:36

## 2022-05-02 RX ADMIN — INSULIN LISPRO 1 UNITS: 100 INJECTION, SOLUTION INTRAVENOUS; SUBCUTANEOUS at 21:24

## 2022-05-02 RX ADMIN — ANTI-FUNGAL POWDER MICONAZOLE NITRATE TALC FREE: 1.42 POWDER TOPICAL at 08:57

## 2022-05-02 RX ADMIN — SODIUM CHLORIDE, PRESERVATIVE FREE 10 ML: 5 INJECTION INTRAVENOUS at 08:52

## 2022-05-02 RX ADMIN — FAMOTIDINE 20 MG: 20 TABLET, FILM COATED ORAL at 21:24

## 2022-05-02 RX ADMIN — ANTI-FUNGAL POWDER MICONAZOLE NITRATE TALC FREE: 1.42 POWDER TOPICAL at 21:28

## 2022-05-02 RX ADMIN — ALBUTEROL SULFATE 2.5 MG: 2.5 SOLUTION RESPIRATORY (INHALATION) at 15:22

## 2022-05-02 RX ADMIN — FENTANYL CITRATE 50 MCG: 50 INJECTION, SOLUTION INTRAMUSCULAR; INTRAVENOUS at 12:34

## 2022-05-02 RX ADMIN — ALBUTEROL SULFATE 2.5 MG: 2.5 SOLUTION RESPIRATORY (INHALATION) at 08:13

## 2022-05-02 RX ADMIN — INSULIN LISPRO 2 UNITS: 100 INJECTION, SOLUTION INTRAVENOUS; SUBCUTANEOUS at 17:07

## 2022-05-02 RX ADMIN — TRAZODONE HYDROCHLORIDE 50 MG: 50 TABLET ORAL at 21:24

## 2022-05-02 RX ADMIN — POLYETHYLENE GLYCOL 3350 17 G: 17 POWDER, FOR SOLUTION ORAL at 08:56

## 2022-05-02 RX ADMIN — FENTANYL CITRATE 50 MCG: 50 INJECTION, SOLUTION INTRAMUSCULAR; INTRAVENOUS at 19:16

## 2022-05-02 RX ADMIN — FENTANYL CITRATE 25 MCG: 50 INJECTION, SOLUTION INTRAMUSCULAR; INTRAVENOUS at 03:31

## 2022-05-02 RX ADMIN — ALBUTEROL SULFATE 2.5 MG: 2.5 SOLUTION RESPIRATORY (INHALATION) at 21:49

## 2022-05-02 RX ADMIN — INSULIN LISPRO 1 UNITS: 100 INJECTION, SOLUTION INTRAVENOUS; SUBCUTANEOUS at 08:58

## 2022-05-02 RX ADMIN — HEPARIN SODIUM 5000 UNITS: 5000 INJECTION INTRAVENOUS; SUBCUTANEOUS at 06:22

## 2022-05-02 RX ADMIN — FENTANYL CITRATE 50 MCG: 50 INJECTION, SOLUTION INTRAMUSCULAR; INTRAVENOUS at 22:26

## 2022-05-02 ASSESSMENT — PAIN SCALES - GENERAL
PAINLEVEL_OUTOF10: 7
PAINLEVEL_OUTOF10: 10
PAINLEVEL_OUTOF10: 10
PAINLEVEL_OUTOF10: 8
PAINLEVEL_OUTOF10: 10

## 2022-05-02 ASSESSMENT — PAIN DESCRIPTION - PAIN TYPE: TYPE: ACUTE PAIN

## 2022-05-02 ASSESSMENT — PAIN DESCRIPTION - ORIENTATION: ORIENTATION: RIGHT

## 2022-05-02 ASSESSMENT — PAIN DESCRIPTION - FREQUENCY: FREQUENCY: CONTINUOUS

## 2022-05-02 ASSESSMENT — PAIN DESCRIPTION - ONSET: ONSET: ON-GOING

## 2022-05-02 ASSESSMENT — PAIN DESCRIPTION - LOCATION: LOCATION: CHEST

## 2022-05-02 ASSESSMENT — PAIN DESCRIPTION - DESCRIPTORS: DESCRIPTORS: ACHING

## 2022-05-02 ASSESSMENT — PAIN - FUNCTIONAL ASSESSMENT: PAIN_FUNCTIONAL_ASSESSMENT: PREVENTS OR INTERFERES SOME ACTIVE ACTIVITIES AND ADLS

## 2022-05-02 NOTE — CARE COORDINATION
ONGOING DISCHARGE PLAN:    Plan remains for possible DC to [facility] was started on 2/18. As of this writing, it is still pending. PM & R consult, placed. Pending Updated Therapy notes. Per ID notes, needs IV Merrem, until 5/14. PO Prednisone started today. Currently sating 95% on 3LNC. Will continue to follow for additional discharge needs.     Electronically signed by Malina Philip RN on 5/2/2022 at 3:32 PM

## 2022-05-02 NOTE — PROGRESS NOTES
Physical Therapy  Facility/Department: [facility] AND [facility] PROGRESSIVE CARE  Physical Therapy Re- Assessment    Name: Natasha Freeman  : 1955  MRN: [id number]  Date of Service: 2022    Discharge Recommendations:  Patient would benefit from continued therapy after discharge          Patient Diagnosis(es): The primary encounter diagnosis was Pneumothorax on right. Diagnoses of HCAP (healthcare-associated pneumonia) and Respiratory distress were also pertinent to this visit. Past Medical History:  has a past medical history of Abnormal EKG, TRAM (acute kidney injury) ([address] Anxiety, Asthma, Bipolar disorder ([address] COPD (chronic obstructive pulmonary disease) ([address] Cramps, extremity, Depression, Dilated bile duct, Headache, History of elective , Hyperlipidemia, Irritable bowel syndrome, Prolonged emergence from general anesthesia, Substance abuse ([address] Unspecified sleep apnea, and Vision abnormalities. Past Surgical History:  has a past surgical history that includes Tonsillectomy and adenoidectomy (Bilateral); LASIK; Induced ; Ankle surgery; eye surgery (Bilateral); Vulva surgery; hysteroscopy (10/19/2016); Colonoscopy (02/15/2018); and Bronchoscopy (2022). Assessment   Assessment: Pt shows decreased strength in both lower extremities and at this time she needs assistance for all functional mobility. Compared to her prior level of functional she shows a significant decline in funtional status and would benefit greatly from PT intervention. At this time pt with decreased tolerance to activity/therapy.   (Compared to her prior level of functional she shows a si)  Treatment Diagnosis: impaired strength amd mobility  Specific Instructions for Next Treatment: Attempt catarino dsouza to initiate standing tolerance and transfers, as she improves strength, transion to rolling walker  Therapy Prognosis: Fair  Decision Making: High Complexity  History: Pt admitted from home due to complaints of shortness of breath and tachycardia  Exam: MMT, ROM andmobility assessment  Barriers to Learning: poor endurance  Requires PT Follow-Up: Yes  Activity Tolerance  Activity Tolerance: Patient limited by fatigue;Patient limited by endurance     Plan   Plan  Plan: 5-7 times per week  Plan weeks: 2  Specific Instructions for Next Treatment: Attempt catarino dsouza to initiate standing tolerance and transfers, as she improves strength, transion to rolling walker  Current Treatment Recommendations: ROM,Strengthening,Balance training,Functional mobility training,Endurance training,Gait training,Safety education & training,Patient/Caregiver education & training,Equipment evaluation, education, & procurement  Safety Devices  Type of Devices: Bed alarm in place,All fall risk precautions in place,Call light within reach,Gait belt,Left in bed,Nurse notified     Restrictions  Restrictions/Precautions  Restrictions/Precautions: General Precautions,Fall Risk  Required Braces or Orthoses?: No  Implants present? :  (Pt reports metal removed from L ankle)     Subjective   Pain: Pt denies pain at this time  General  Chart Reviewed: Yes  Patient assessed for rehabilitation services?: Yes  Additional Pertinent Hx: Pt admitted to [facility] on 316/22. 72-year-old male with history of severe COPD 3 L O2 baseline, bipolar disorder, history of DVT/PE, migraines admitted with increased shortness of breath and cough recently discharged from [facility] for mycoplasma pneumonia, and ER oxygen increased to 6 L and continued to be hypoxic chest x-ray showed moderate loculated right basilar pneumothorax arterial lesion in the right lung base CT chest showed chronic clot material right upper lobe and right lower lobe multilocular pleural collections or hydropneumothorax posterior right lung, has chest tube was place , removed 4/29. Pt was also intuabted  while in acute care, extunbated 4/28/22.  Pt underwent bronchial washings with bronchoscopy on 4/21 found to have Pseudomonas , ID following for sepsis. Family / Caregiver Present: Yes (daughter Flora Mcguire) and brother)  Referring Practitioner: Dr Purvi Way  Referral Date : 04/21/22  Diagnosis: Respioratory Distress  Follows Commands: Within Functional Limits  Subjective  Subjective: Pt reports fatique, \"forgive me hat I say, I might not zach to answer everything right\". Social/Functional History  Social/Functional History  Lives With: Daughter  Type of Home: Apartment  Home Layout: Two level,Bed/Bath upstairs,1/2 bath on main level (pt reports stays on couch)  Home Access: Stairs to enter without rails  Entrance Stairs - Number of Steps: 1 RENETTA; steps to second floor with HR pt unsure of how many steps/ side of HR  Bathroom Shower/Tub: Tub/Shower unit,Shower chair with back  Bathroom Toilet: Standard  Bathroom Equipment: Hand-held shower  Bathroom Accessibility: Walker accessible  Home Equipment: Oxygen,Rollator (24/7 on 3L)  Has the patient had two or more falls in the past year or any fall with injury in the past year?: No  Receives Help From:  (daughter supervises her when she showers)  ADL Assistance: Independent  Homemaking Assistance: Needs assistance (Shares with daughter)  Ambulation Assistance: Independent  Transfer Assistance: Independent  Active : No  Patient's  Info: Daughter drives to appointments and to grocery shopping  Mode of Transportation: Car  IADL Comments: Pt repots sleeping on couch at her daughter house and her house. Additional Comments: Pt reports that she stays with her daughter 5 days a week and she also has a home that she says at 2 days a week. Pt reports that daughter works nights. Pt's home is one story home with 3 RENETTA with no HR to enter; can hold onto side of house; Bathroom/bedroom on main floor; tub shower unit and pt takes shower chair between houses; standard height toilet.    Vision/Hearing  Vision Exceptions: Wears glasses for reading  Hearing: Within functional limits Cognition   Orientation  Overall Orientation Status: Within Functional Limits     Objective   Pulse: 80  Heart Rate Source: Monitor  BP: 125/65  BP Location: Right upper arm  Patient Position: Semi fowlers  MAP (Calculated): 85  Resp: 18  SpO2: 96 %  O2 Device: Nasal cannula              AROM RLE (degrees)  RLE General AROM: AAROM Hip/Knee WFL,  R ankle DF lacks 15 degrees from neutral.  AROM LLE (degrees)  LLE General AROM: AAROM Hip/knee WFL, DF to neutral wiht stretching  AROM RUE (degrees)  RUE General AROM: See OT eval  AROM LUE (degrees)  LUE General AROM: See OT eval.  Strength RLE  Comment: Grossly 2-/5 Hip extension, 2/5 Knee extension, 2-/5 Knee flexion, DF 1/5  Strength LLE  Comment: Gorssly 2/5 Hip/knee extension/knee flexion, DF 2-/5  Strength RUE  Comment: See OT eval.  Strength LUE  Comment: See OT eval.   Motor Control  Gross Motor?: Exceptions  Sensation  Overall Sensation Status: WFL     Bed mobility  Rolling to Right: Moderate assistance  Supine to Sit: Moderate assistance  Sit to Supine: 2 Person assistance; Moderate assistance  Scooting: Maximal assistance (scooting at EOB-seated.)  Comment: Pt very weak, needs extra time for mobility. Needs a lot of encouragement  initially, but will comply and particiaption with education/benefit of therapy. Transfers  Sit to Stand: 2 Person Assistance;Maximum Assistance  Stand to sit: 2 Person Assistance;Maximum Assistance  Comment: Two atttempts for sit<>stand at EOB, 1st attempt stood of r2 to 3 secomds, 2nd attempt stood 6 to 7 seconds. Pt fatiques very easily. Sao2  lowest 96%, HR highest 114 with activity.    Ambulation  WB Status:  (pt unable to ambulate at this time)     Balance  Posture: Fair  Sitting - Static: Fair;+  Sitting - Dynamic: Fair  Standing - Static: Poor (Standing with rolling walker)  Standing - Dynamic: Poor;-           OutComes Score                                                  AM-PAC Score             Goals  Short Term Goals  Time Frame for Short term goals: 10 visits  Short term goal 1: Pt able to roll side to side in bed with bed rail at min A. Short term goal 2: Pt able to perform supine>sit min A   Short term goal 3: Pt able to perform sit to supine max a  Short term goal 4: Pt able to perform sit<>sntad mod Ax 2 from bed height, max a x 2 from lower chair height  Short Term Goal 6: Pt to tolerate sitting in a chiar for 2 to 3 hours to improve endurance. Short Term Goal 7: Pt able to transfers at mod a x 2 with RW, take 3 to 4 steps. Short Term Goal 8: Pt to tolerate standing with RW or catarino stedy for 2 to 3 minutes to imporve B LE strength  Patient Goals   Patient goals : Wants to get stronger.        Therapy Time   Individual Concurrent Group Co-treatment   Time In 1002         Time Out 1041         Minutes 39         Timed Code Treatment Minutes: 25 Minutes       Arabella Ames, PT

## 2022-05-02 NOTE — CARE COORDINATION
Writer spoke to Servando from [address] [place] is still pending, she will continue to follow today. PM& R consult is ordered. Will follow.

## 2022-05-02 NOTE — PROGRESS NOTES
Occupational Therapy  Facility/Department: [address] [place] [address]  Occupational Therapy Initial Assessment    Name: Flaco Chao  : 1955  MRN: [id number]  Date of Service: 2022    Discharge Recommendations:  Patient would benefit from continued therapy after discharge  OT Equipment Recommendations  Other: TBD       Patient Diagnosis(es): The primary encounter diagnosis was Pneumothorax on right. Diagnoses of HCAP (healthcare-associated pneumonia) and Respiratory distress were also pertinent to this visit. Past Medical History:  has a past medical history of Abnormal EKG, TRAM (acute kidney injury) ([address] Anxiety, Asthma, Bipolar disorder ([address] COPD (chronic obstructive pulmonary disease) ([facility] Cramps, extremity, Depression, Dilated bile duct, Headache, History of elective , Hyperlipidemia, Irritable bowel syndrome, Prolonged emergence from general anesthesia, Substance abuse ([facility] Unspecified sleep apnea, and Vision abnormalities. Past Surgical History:  has a past surgical history that includes Tonsillectomy and adenoidectomy (Bilateral); LASIK; Induced ; Ankle surgery; eye surgery (Bilateral); Vulva surgery; hysteroscopy (10/19/2016); Colonoscopy (02/15/2018); and Bronchoscopy (2022). Treatment Diagnosis: Impaired self care status      Assessment   Performance deficits / Impairments: Decreased ADL status; Decreased functional mobility ; Decreased ROM; Decreased strength;Decreased safe awareness;Decreased endurance;Decreased balance;Decreased high-level IADLs;Decreased fine motor control  Treatment Diagnosis: Impaired self care status  Prognosis: Fair  Decision Making: Medium Complexity  REQUIRES OT FOLLOW-UP: Yes  Activity Tolerance  Activity Tolerance: Patient limited by fatigue;Patient limited by pain        Plan   Plan  Times per Week: 4-6  Current Treatment Recommendations: Self-Care / ADL,Strengthening,Balance training,ROM,Functional mobility training,Endurance training,Pain management,Safety education & training,Patient/Caregiver education & training,Equipment evaluation, education, & procurement     Restrictions  Restrictions/Precautions  Restrictions/Precautions: General Precautions,Fall Risk  Required Braces or Orthoses?: No  Implants present? :  (Pt reports metal removed from L ankle)    Subjective   General  Chart Reviewed: Yes  Patient assessed for rehabilitation services?: Yes  Family / Caregiver Present: No  Diagnosis: Respiratory Distress  Subjective  Subjective: Pt resting in bed upon arrival. Pt reports increased fatigue. General Comment  Comments: Ok per RN for OT/PT eval  Pain: Pt denies pain at this time  Social/Functional History  Social/Functional History  Lives With: Daughter  Type of Home: Apartment  Home Layout: Two level,Bed/Bath upstairs,1/2 bath on main level (pt reports stays on couch)  Home Access: Stairs to enter without rails  Entrance Stairs - Number of Steps: 1 RENETTA; steps to second floor with HR pt unsure of how many steps/ side of HR  Bathroom Shower/Tub: Tub/Shower unit,Shower chair with back  Bathroom Toilet: Standard  Bathroom Equipment: Hand-held shower  Bathroom Accessibility: Walker accessible  Home Equipment: Oxygen,Rollator (24/7 on 3L)  Has the patient had two or more falls in the past year or any fall with injury in the past year?: No  Receives Help From:  (daughter supervises her when she showers)  ADL Assistance: Independent  Homemaking Assistance: Needs assistance (Shares with daughter)  Ambulation Assistance: Independent  Transfer Assistance: Independent  Active : No  Patient's  Info: Daughter drives to appointments and to grocery shopping  Mode of Transportation: Car  IADL Comments: Pt repots sleeping on couch at her daughter house and her house. Additional Comments: Pt reports that she stays with her daughter 5 days a week and she also has a home that she says at 2 days a week. Pt reports that daughter works nights.  Pt's home is one story home with 3 RENETTA with no HR to enter; can hold onto side of house; Bathroom/bedroom on main floor; tub shower unit and pt takes shower chair between houses; standard height toilet. Objective   Pulse: 80  Heart Rate Source: Monitor  BP: 125/65  BP Location: Right upper arm  Patient Position: Semi fowlers  MAP (Calculated): 85  Resp: 18  SpO2: 95 %  O2 Device: Nasal cannula  Vision Exceptions: Wears glasses for reading  Hearing: Within functional limits          Safety Devices  Type of Devices: Bed alarm in place; All fall risk precautions in place;Call light within reach;Gait belt;Left in bed;Nurse notified           ADL  Feeding: Setup  Grooming: Setup  UE Bathing: Minimal assistance  LE Bathing: Maximum assistance  UE Dressing: Minimal assistance  LE Dressing: Dependent/Total  Toileting: Dependent/Total  Toileting Skilled Clinical Factors: External catheter/brief  Additional Comments: ADL scores based on clinical reasoning and skilled observation unless otherwise noted. Pt currenlty limited due to decreased strength, ROM, balance, activity tolerance, tremors, and pain impacting safety and independence with self care tasks  Tone RUE  RUE Tone: Normotonic  Tone LUE  LUE Tone: Normotonic  Coordination  Movements Are Fluid And Coordinated: No  Coordination and Movement Description: Gross motor impairments;Right UE;Left UE;Tremors     Bed mobility  Rolling to Right: Moderate assistance  Supine to Sit: Moderate assistance  Sit to Supine: 2 Person assistance; Moderate assistance  Scooting: Maximal assistance  Comment: increased time with verbal cues for sequencing. Max encouragement initially.    Transfers  Sit to stand: 2 Person assistance;Maximum assistance  Stand to sit: 2 Person assistance;Maximum assistance  Transfer Comments: Verbal cues for hand placement and safety     Cognition  Overall Cognitive Status: Exceptions        Sensation  Overall Sensation Status: [facility]         Education Given To: Patient  Education Provided: Role of Therapy;Plan of Care;Transfer Training  Education Method: Verbal  Education Outcome: Verbalized understanding;Continued education needed  LUE AROM (degrees)  LUE AROM : Exceptions  LUE General AROM: ~80 degrees shoulder flexion; otherwise WFL with increased time  Left Hand AROM (degrees)  Left Hand AROM: WFL  RUE AROM (degrees)  RUE AROM : Exceptions  RUE General AROM: ~80 degrees shoulder flexion; otherwise WFL with increased time  Right Hand AROM (degrees)  Right Hand AROM: WFL  LUE Strength  L Hand General: 3+/5  RUE Strength  R Hand General: 3+/5                  AM-PAC Score        AM-PAC Inpatient Daily Activity Raw Score: 13 (05/02/22 1743)  AM-PAC Inpatient ADL T-Scale Score : 32.03 (05/02/22 1743)  ADL Inpatient CMS 0-100% Score: 63.03 (05/02/22 1743)  ADL Inpatient CMS G-Code Modifier : CL (05/02/22 1743)    Goals  Short Term Goals  Time Frame for Short term goals: By discharge  Short Term Goal 1: Pt will complete lower body dressing with mod A and Good safety with use of AE as needed  Short Term Goal 2: Pt will complete functional transfers during self care tasks with with mod A x1-2 with Good safety  Short Term Goal 3: Pt will tolerate standing 1-2 minutes during functional activity of choice while maintaining SpO2 above 90%  Short Term Goal 4: Pt will verbalize/demonstrate Good understanding of energy conservation strategies to increase safety and independence self care and mobility  Short Term Goal 5: Pt will participate in 15+ minutes of therapeutic exercises/functional activities to increase safety and indendence with self care and mobility       Therapy Time   Individual Concurrent Group Co-treatment   Time In 1002         Time Out 1041         Minutes 39         Timed Code Treatment Minutes: 25 Minutes       Julia Yap OT

## 2022-05-02 NOTE — PLAN OF CARE
Problem: Gas Exchange - Impaired:  Goal: Levels of oxygenation will improve  Description: Levels of oxygenation will improve  Outcome: Progressing     Problem: Skin Integrity - Impaired:  Goal: Will show no infection signs and symptoms  Description: Will show no infection signs and symptoms  5/2/2022 0404 by Wander Ding RN  Outcome: Progressing     Problem: Skin Integrity - Impaired:  Goal: Absence of new skin breakdown  Description: Absence of new skin breakdown  Outcome: Progressing     Problem: Falls - Risk of:  Goal: Absence of physical injury  Description: Absence of physical injury  5/2/2022 0404 by Wander Ding RN  Outcome: Progressing     Problem: Breathing Pattern - Ineffective:  Goal: Ability to achieve and maintain a regular respiratory rate will improve  Description: Ability to achieve and maintain a regular respiratory rate will improve  5/2/2022 0404 by Wander Ding RN  Outcome: Progressing     Problem: Skin Integrity:  Goal: Will show no infection signs and symptoms  Description: Will show no infection signs and symptoms  5/2/2022 0404 by Wander Ding RN  Outcome: Progressing     Problem: Skin Integrity:  Goal: Absence of new skin breakdown  Description: Absence of new skin breakdown  Outcome: Progressing     Problem: OXYGENATION/RESPIRATORY FUNCTION  Goal: Patient will maintain patent airway  Outcome: Progressing

## 2022-05-02 NOTE — PROGRESS NOTES
Pulmonary Progress Note  [facility]      Patient - Aime Roberson,  Age - 74 y.o.    - 1955      Room Number - 2115/2115-01   N -  [id number]   [facility]  Date of Admission -  2022 10:25 Kim Zhong MD  Primary Care Physician - Mary Rice MD     SUBJECTIVE   Patient appears to be doing okay all things considered. Very alert and lucid. OBJECTIVE   VITALS    height is 5' 5\" (1.651 m) and weight is 175 lb 3.2 oz (79.5 kg). Her oral temperature is 98.2 °F (36.8 °C). Her blood pressure is 125/65 and her pulse is 80. Her respiration is 18 and oxygen saturation is 96%. Body mass index is 29.15 kg/m². Temperature Range: Temp: 98.2 °F (36.8 °C) Temp  Av.2 °F (36.8 °C)  Min: 97.5 °F (36.4 °C)  Max: 98.6 °F (37 °C)  BP Range:  Systolic (20QSC), JAQ:155 , Min:108 , PDN:268     Diastolic (55RHT), SWP:79, Min:58, Max:75    Pulse Range: Pulse  Av.8  Min: 64  Max: 84  Respiration Range: Resp  Av  Min: 16  Max: 20  Current Pulse Ox[de-identified]  SpO2: 96 %  24HR Pulse Ox Range:  SpO2  Av.9 %  Min: 94 %  Max: 99 %  Oxygen Amount and Delivery: O2 Flow Rate (L/min): 3 L/min    Wt Readings from Last 3 Encounters:   22 175 lb 3.2 oz (79.5 kg)   22 183 lb (83 kg)   22 186 lb 1.1 oz (84.4 kg)       I/O (24 Hours)    Intake/Output Summary (Last 24 hours) at 2022 1522  Last data filed at 2022 0846  Gross per 24 hour   Intake 100 ml   Output 850 ml   Net -750 ml       EXAM     General Appearance  Awake, alert, oriented, in no acute distress  HEENT - normocephalic, atraumatic. Neck - Supple,  trachea midline   Lungs -coarse breath sounds posteriorly. Some subcu emphysema appreciated crepitus  Heart Exam:PMI normal. No lifts, heaves, or thrills. RRR. No murmurs, clicks, gallops, or rubs  Abdomen Exam: Abdomen soft, non-tender. BS normal.  Extremity Exam: No signs of cyanosis.     MEDS      furosemide  20 mg Oral BID    predniSONE  30 mg Oral Daily    insulin lispro  0-6 Units SubCUTAneous TID WC    insulin lispro  0-3 Units SubCUTAneous Nightly    folic acid  1 mg Oral Daily    thiamine  100 mg Oral Daily    famotidine  20 mg Oral BID    miconazole   Topical BID    multivitamin  1 tablet Oral Daily    polyethylene glycol  17 g Oral Daily    meropenem  1,000 mg IntraVENous Q8H    albuterol  2.5 mg Nebulization Q4H    sodium chloride flush  5-40 mL IntraVENous 2 times per day    risperiDONE  1.5 mg Oral Q12H    atorvastatin  20 mg Oral Daily    traZODone  50 mg Oral Nightly    heparin (porcine)  5,000 Units SubCUTAneous 3 times per day      sodium chloride 10 mL/hr at 05/01/22 2016    dextrose       midodrine, oxyCODONE-acetaminophen, fentanNYL **OR** fentanNYL, hydrALAZINE, sodium chloride flush, sodium chloride, sodium chloride flush, potassium chloride **OR** potassium alternative oral replacement **OR** potassium chloride, glucose, dextrose, glucagon (rDNA), dextrose    LABS   CBC   Recent Labs     05/02/22  0519   WBC 9.9   HGB 9.4*   HCT 28.6*   MCV 92.6        BMP:   Lab Results   Component Value Date     05/02/2022    K 4.9 05/02/2022    CL 96 05/02/2022    CO2 34 05/02/2022    BUN 25 05/02/2022    LABALBU 2.8 04/16/2022    LABALBU 3.6 07/08/2021    CREATININE <0.40 05/02/2022    CALCIUM 7.7 05/02/2022    GFRAA Can not be calculated 05/02/2022    LABGLOM Can not be calculated 05/02/2022     ABGs:  Lab Results   Component Value Date    PHART 7.442 04/28/2022    PO2ART 77.6 04/28/2022    KBU1DIH 60.6 04/28/2022      Lab Results   Component Value Date    MODE PRVC 04/28/2022     Ionized Calcium:  No results found for: IONCA  Magnesium:    Lab Results   Component Value Date    MG 2.3 04/16/2022     Phosphorus:  No results found for: PHOS     LIVER PROFILE No results for input(s): AST, ALT, LIPASE, BILIDIR, BILITOT, ALKPHOS in the last 72 hours.     Invalid input(s): AMYLASE,  ALB  INR No results for input(s): INR in the last 72 hours.   PTT   Lab Results   Component Value Date    APTT 33.6 04/16/2022         RADIOLOGY     (See actual reports for details)    ASSESSMENT/PLAN     Patient Active Problem List   Diagnosis    Chronic obstructive pulmonary disease ([address]    Vitamin D deficiency    Anxiety    Asthma    Bipolar disorder ([address]    Depression    Hyperlipidemia with target LDL less than 100    Sleep apnea    Vision abnormalities    Status post hysteroscopy    Chronic headaches    IBS (irritable bowel syndrome)    Colon polyp    Collagenous colitis    Lung nodule    Left elbow pain    Dilated bile duct    Acute pain of left shoulder    Adhesive capsulitis of left shoulder    Leucopenia    Compression fracture of body of thoracic vertebra (HCC)    Age-related osteoporosis with current pathological fracture    Pulmonary embolism on right ([address]    Migraines    Paranoid behavior ([address]    Acute deep vein thrombosis (DVT) of right lower extremity ([facility])    Delirium    Chronic respiratory failure with hypoxia ([facility])    Major neurocognitive disorder ([address]    Pneumonia    Chronic respiratory failure with hypoxia, on home O2 therapy ([address]    COPD (chronic obstructive pulmonary disease) ([address]    TRAM (acute kidney injury) ([address]    History of pulmonary embolus (PE)    Mycoplasma pneumonia    Iron deficiency anemia    Sepsis ([address]    Acute on chronic respiratory failure ([address]    Cavitary lesion of lung    History of DVT (deep vein thrombosis)    Pneumothorax, right    Status post chest tube placement (HCC)    Pneumothorax on right    HCAP (healthcare-associated pneumonia)    Acute systolic HF (heart failure) ([facility])    Pseudomonas aeruginosa infection    Elevated procalcitonin    Elevated C-reactive protein (CRP)    Lung abscess (HCC)    Recurrent pneumothorax after chest tube removed    Postprocedural subcutaneous emphysema     ASSESSMENT/PLAN Acute on chronic hypoxic and hypercapnic respiratory failure, intubated 4/16; extubated 4/28  Pseudomonas pneumonia  Right-sided patient chest tube was replaced on April 25 removed 4/29  Right lower lobe cavitary lesions  Exudative pleural effusion on right, growing Pseudomonas  Low ejection fraction EF 45 to 50%, echo 4/18  History of pulmonary embolism and what appears to be chronic filling defects (subsegmental, most likely clinically inconsequential)  Severe stage IV COPD, FEV1 is 35% of predicted  Recent hospitalization for pneumonia  Elevated inflammatory markers including D-dimer and procalcitonin  Full CODE STATUS    [facility] [place]. On Proventil  On subcu heparin for DVT prophylaxis  I am switching IV Solu-Medrol to 30 mg of prednisone daily. Multiple questions from patient answered by me regarding post chest tube pain. Patient's sister from [place] present and appreciated the update.       Electronically signed by Lang Da Silva MD on 5/2/2022 at 3:22 PM

## 2022-05-02 NOTE — PLAN OF CARE
Problem: Gas Exchange - Impaired:  Goal: Levels of oxygenation will improve  Description: Levels of oxygenation will improve  Outcome: Not Progressing     Problem: Skin Integrity - Impaired:  Goal: Will show no infection signs and symptoms  Description: Will show no infection signs and symptoms  Outcome: Not Progressing     Problem: Falls - Risk of:  Goal: Absence of physical injury  Description: Absence of physical injury  Outcome: Not Progressing     Problem: Skin Integrity:  Goal: Will show no infection signs and symptoms  Description: Will show no infection signs and symptoms  Outcome: Not Progressing     Problem: MECHANICAL VENTILATION  Goal: Oral health is maintained or improved  Outcome: Not Progressing

## 2022-05-02 NOTE — PROGRESS NOTES
[facility]    Progress Note  Chart Reviewed: Yes  Patient assessed for rehabilitation services?: Yes  Additional Pertinent Hx: Pt [facility] to [facility] on 316/22. 82-year-old male with history of severe COPD 3 L O2 baseline, bipolar disorder, history of DVT/PE, migraines admitted with increased shortness of breath and cough recently discharged from [facility] for mycoplasma pneumonia, and ER oxygen increased to 6 L and continued to be hypoxic chest x-ray showed moderate loculated right basilar pneumothorax arterial lesion in the right lung base CT chest showed chronic clot material right upper lobe and right lower lobe multilocular pleural collections or hydropneumothorax posterior right lung, has chest tube was place , removed 4/29. Pt was also intuabted  while in acute care, extunbated 4/28/22. Pt underwent bronchial washings with bronchoscopy on 4/21 found to have Pseudomonas , ID following for sepsis.    Family / Caregiver Present: Yes (daughter Nolberto Green) and brother)  Referring Practitioner: Dr Narayan Oliveira  Referral Date : 04/21/22  Diagnosis: Respioratory Distress  5/2/2022    2:27 PM    Name:   Yann Dobson  MRN:     [id number]     [place]:      [de-identified]   Room:   [address] [place] [address] Day:  12  Admit Date:  4/16/2022 10:25 AM    PCP:   Esau Muniz MD  Code Status:  Full Code    Subjective:     C/C:   Chief Complaint   Patient presents with    Shortness of Breath     Principal Problem:    Sepsis ([address]  Active Problems:    Pseudomonas aeruginosa infection    Elevated procalcitonin    Elevated C-reactive protein (CRP)    Lung abscess ([address]    Recurrent pneumothorax after chest tube removed    Postprocedural subcutaneous emphysema    Chronic obstructive pulmonary disease ([address]    Bipolar disorder ([address]    Lung nodule    Compression fracture of body of thoracic vertebra ([address]    Chronic respiratory failure with hypoxia ([address]    History of pulmonary embolus (PE) Acute on chronic respiratory failure (HCC)    Cavitary lesion of lung    History of DVT (deep vein thrombosis)    Pneumothorax, right    Status post chest tube placement (HCC)    Pneumothorax on right    HCAP (healthcare-associated pneumonia)    Acute systolic HF (heart failure) ([facility]  Resolved Problems:    Hyperkalemia      . · No new symptoms  · Vitals stable . Cardiopulmonary stable . 1. Continue current rx ,      Obie Vaz       Ms. Kael Horta, 74year old female, with previous medical history of COPD on 3 L O2 at home, Bipolar, hx of DVT/PE, migraines. Patient had initially presented with shortness of breath and cough on 04/16/2022. Patient has had extensive hospital stay. Found to be pseudomonas positive on bronchial wash. Required intubation and ICU stay. Required chest tube insertions as well. Patient is vitally stable at this time is on home O2 requirement, with no chest tube in place.     Patient was initially on cefepime, flagyl, and vanc. Switched to [facility] only from 04/19.     4/21: bronchial washing from bronchoscopy growing pseudomonas     4/24: chest tube removed     4/25: Recurrent right sided pneumonthorax shown on repeat CT. Chest tube reinserted by surgery.      4/28: successfully extubated     4/29: on 3L NC O2     04/30: Transferred to [facility]. [facility] referral placed and PM&R consult.     Plan as of now:     Sepsis due to pneumonia with abcess vs empyema, pseudomonas sp. - improved  WBC 18>12.6> 11.5>15.5>15.4>16>9.5>10.3  Chest x-ray on admission: Moderate loculated right basal pneumothorax.  Evidence for tension.  Cavitary lesion noted in the right lung base  CT chest on admission: Thick-walled cavitary lesion RLL. Right pneumothorax.  Infiltrates of the right middle lobe.  Stellate 6 mm lateral RUL nodule.  Mediastinal and right hilar lymphadenopathy  Iv fluids: 1/2 NS at 15 cc/h  ID and Critical care following              Merrem till 05/14/2022              Will need midline on discharge              Follow up CT chest in 3 weeks  Midodrine TID PRN added for SBP <90     Acute on chronic respiratory failure 2/2 recurrent pneumothorax after chest tube removal and Pseudomonas pneumonia - improving  History of severe COPD - 3 L home O2 baseline, back on baseline O2  CXR right pneumothorax on admission   S/p intubation and right chest tube placement 4/16; extubated 4/28  Solumedrol 40mg q12h  Surgery consult              Chest tube removed. Remove dressing in 3 days.             [id number] signed off. Daily CXR - no significant interval change today      Dispo: referral sent to [facility]. Pre-cert started on 61/93. PM&R consult if patient not accepted at [facility].                    Significant last 24 hr data reviewed ;   Vitals:    05/02/22 0745 05/02/22 1003 05/02/22 1100 05/02/22 1215   BP:    125/65   Pulse:    80   Resp:    18   Temp:    98.2 °F (36.8 °C)   TempSrc:    Oral   SpO2: 98% 97% 98% 96%   Weight:       Height:          Recent Results (from the past 24 hour(s))   POC Glucose Fingerstick    Collection Time: 05/01/22  4:05 PM   Result Value Ref Range    POC Glucose 204 (H) 65 - 105 mg/dL   POC Glucose Fingerstick    Collection Time: 05/01/22  8:09 PM   Result Value Ref Range    POC Glucose 236 (H) 65 - 105 mg/dL   Basic Metabolic Panel w/ Reflex to MG    Collection Time: 05/02/22  5:19 AM   Result Value Ref Range    Glucose 234 (H) 70 - 99 mg/dL    BUN 25 (H) 8 - 23 mg/dL    CREATININE <0.40 (L) 0.50 - 0.90 mg/dL    Calcium 7.7 (L) 8.6 - 10.4 mg/dL    Sodium 133 (L) 135 - 144 mmol/L    Potassium 4.9 3.7 - 5.3 mmol/L    Chloride 96 (L) 98 - 107 mmol/L    CO2 34 (H) 20 - 31 mmol/L    Anion Gap 3 (L) 9 - 17 mmol/L    GFR Non-African American Can not be calculated >60 mL/min    GFR  Can not be calculated >60 mL/min    GFR Comment         CBC    Collection Time: 05/02/22  5:19 AM   Result Value Ref Range    WBC 9.9 3.5 - 11.0 k/uL RBC 3.09 (L) 4.0 - 5.2 m/uL    Hemoglobin 9.4 (L) 12.0 - 16.0 g/dL    Hematocrit 28.6 (L) 36 - 46 %    MCV 92.6 80 - 100 fL    MCH 30.5 26 - 34 pg    MCHC 33.0 31 - 37 g/dL    RDW 17.4 (H) 11.5 - 14.9 %    Platelets 939 433 - 101 k/uL    MPV 7.9 6.0 - 12.0 fL   POC Glucose Fingerstick    Collection Time: 05/02/22  6:30 AM   Result Value Ref Range    POC Glucose 173 (H) 65 - 105 mg/dL   POC Glucose Fingerstick    Collection Time: 05/02/22 11:12 AM   Result Value Ref Range    POC Glucose 110 (H) 65 - 105 mg/dL     Recent Labs     05/01/22  1056 05/01/22  1605 05/01/22 2009 05/02/22  0630 05/02/22  1112   POCGLU 186* 204* 236* 173* 110*        XR CHEST PORTABLE    Result Date: 5/1/2022  EXAMINATION: ONE XRAY VIEW OF THE CHEST 5/1/2022 8:39 am COMPARISON: 04/30/2022 HISTORY: ORDERING SYSTEM PROVIDED HISTORY: post chest tube removal TECHNOLOGIST PROVIDED HISTORY: post chest tube removal Reason for Exam: post chest tube removal FINDINGS: Right jugular central venous catheter remains in place. Heart size stable. No new airspace disease. No pneumothorax. Soft tissue gas again seen right chest wall. Stable chest, soft tissue gas again seen in the right chest wall, no pneumothorax     XR CHEST PORTABLE    Result Date: 4/30/2022  EXAMINATION: ONE XRAY VIEW OF THE CHEST 4/30/2022 5:06 pm COMPARISON: 04/30/2022 at 12:41 HISTORY: ORDERING SYSTEM PROVIDED HISTORY: chest tube removal TECHNOLOGIST PROVIDED HISTORY: chest tube removal FINDINGS: Right jugular central venous catheter remains in place. Heart size stable. No pneumothorax. Soft tissue gas right chest wall.      No pneumothorax No acute cardiopulmonary process             On admission         Review of Systems:     Constitutional:  negative for chills, fevers, sweats  Respiratory:  negative for cough, dyspnea on exertion, hemoptysis, shortness of breath, wheezing  Cardiovascular:  negative for chest pain, chest pressure/discomfort, lower extremity edema, palpitations  Gastrointestinal:  negative for abdominal pain, constipation, diarrhea, nausea, vomiting  Neurological:  negative for dizziness, headache  Data:     Past Medical History:  no change     Social History:  no change    Family History: @no change    Vitals:      I/O (24Hr): Intake/Output Summary (Last 24 hours) at 2022 1427  Last data filed at 2022 0846  Gross per 24 hour   Intake 100 ml   Output 850 ml   Net -750 ml       Labs:    Radiology:    Medications:      Allergies:      Current Meds:   Scheduled Meds:    methylPREDNISolone  40 mg IntraVENous Q12H    insulin lispro  0-6 Units SubCUTAneous TID WC    insulin lispro  0-3 Units SubCUTAneous Nightly    folic acid  1 mg Oral Daily    thiamine  100 mg Oral Daily    famotidine  20 mg Oral BID    miconazole   Topical BID    multivitamin  1 tablet Oral Daily    polyethylene glycol  17 g Oral Daily    furosemide  20 mg IntraVENous BID    meropenem  1,000 mg IntraVENous Q8H    albuterol  2.5 mg Nebulization Q4H    sodium chloride flush  5-40 mL IntraVENous 2 times per day    risperiDONE  1.5 mg Oral Q12H    atorvastatin  20 mg Oral Daily    traZODone  50 mg Oral Nightly    heparin (porcine)  5,000 Units SubCUTAneous 3 times per day     Continuous Infusions:    sodium chloride 15 mL/hr at 22    sodium chloride 10 mL/hr at 22 2016    dextrose       PRN Meds: midodrine, oxyCODONE-acetaminophen, fentanNYL **OR** fentanNYL, hydrALAZINE, sodium chloride flush, sodium chloride, sodium chloride flush, potassium chloride **OR** potassium alternative oral replacement **OR** potassium chloride, glucose, dextrose, glucagon (rDNA), dextrose      Physical Examination:        /65   Pulse 80   Temp 98.2 °F (36.8 °C) (Oral)   Resp 18   Ht 5' 5\" (1.651 m)   Wt 175 lb 3.2 oz (79.5 kg)   LMP 2013 (Exact Date)   SpO2 96%   BMI 29.15 kg/m²   Temp (24hrs), Av.2 °F (36.8 °C), Min:97.5 °F (36.4 °C), Max:98.6 °F (37 °C)    Recent Labs     05/01/22  1605 05/01/22 2009 05/02/22  0630 05/02/22  1112   POCGLU 204* 236* 173* 110*       Intake/Output Summary (Last 24 hours) at 5/2/2022 1427  Last data filed at 5/2/2022 0846  Gross per 24 hour   Intake 100 ml   Output 850 ml   Net -750 ml       General Appearance:  alert, well appearing, and in no acute distress  Mental status:   Head:  normocephalic, atraumatic. Eye: no icterus, redness, pupils equal and reactive, extraocular eye movements intact, conjunctiva clear  Ear: normal external ear, no discharge, hearing intact  Nose:  no drainage noted  Mouth: mucous membranes moist  Neck: supple, no carotid bruits, thyroid not palpable  Lungs: Bilateral equal air entry, clear to ausculation, no wheezing, rales or rhonchi, normal effort  Cardiovascular: normal rate, regular rhythm, no murmur, gallop, rub.   Abdomen: Soft, nontender, nondistended, normal bowel sounds, no hepatomegaly or splenomegaly  Neurologic: There are no new focal motor or sensory deficits,   Skin: No gross lesions, rashes, bruising or bleeding on exposed skin area  Extremities:  peripheral pulses palpable, no pedal edema or calf pain with palpation  Psych:             Assessment:        Primary Problem  Sepsis (HCC)    Principal Problem:    Sepsis ([address]  Active Problems:    Pseudomonas aeruginosa infection    Elevated procalcitonin    Elevated C-reactive protein (CRP)    Lung abscess (HCC)    Recurrent pneumothorax after chest tube removed    Postprocedural subcutaneous emphysema    Chronic obstructive pulmonary disease (HCC)    Bipolar disorder (HCC)    Lung nodule    Compression fracture of body of thoracic vertebra (HCC)    Chronic respiratory failure with hypoxia (HCC)    History of pulmonary embolus (PE)    Acute on chronic respiratory failure (HCC)    Cavitary lesion of lung    History of DVT (deep vein thrombosis)    Pneumothorax, right    Status post chest tube placement (HCC)    Pneumothorax on right    HCAP (healthcare-associated pneumonia)    Acute systolic HF (heart failure) ([address]  Resolved Problems:    Hyperkalemia       Plan:          5/2/22    ·       . Hospital Problems           Last Modified POA    * (Principal) Sepsis ([address] 4/16/2022 Yes    Pseudomonas aeruginosa infection 4/23/2022 Yes    Elevated procalcitonin 4/23/2022 Yes    Elevated C-reactive protein (CRP) 4/24/2022 Yes    Lung abscess ([address] 4/25/2022 Yes    Recurrent pneumothorax after chest tube removed 4/26/2022 Yes    Postprocedural subcutaneous emphysema 4/28/2022 Yes    Chronic obstructive pulmonary disease ([address] 4/16/2022 Yes    Bipolar disorder ([address] 4/16/2022 Yes    Lung nodule 4/16/2022 Yes    Compression fracture of body of thoracic vertebra (HCC) 4/16/2022 Yes    Chronic respiratory failure with hypoxia ([address] 4/16/2022 Yes    History of pulmonary embolus (PE) 4/16/2022 Yes    Acute on chronic respiratory failure ([address] 4/16/2022 Yes    Cavitary lesion of lung 4/16/2022 Yes    History of DVT (deep vein thrombosis) 4/16/2022 Yes    Pneumothorax, right 4/16/2022 Yes    Status post chest tube placement ([address] 4/16/2022 Yes    Pneumothorax on right 4/18/2022 Yes    HCAP (healthcare-associated pneumonia) 4/18/2022 Yes    Acute systolic HF (heart failure) ([address] 4/19/2022 Yes         CHANGE TO ORAL LASIX . Thanks for consulting us . Will monitor vitals and clinical course , and  Optimize therapy  as needed .            Aracelis Camacho MD

## 2022-05-02 NOTE — CONSULTS
Physical Medicine & Rehabilitation  Consult Note      Admitting Physician: Camden Gonzalez MD    Primary Care Provider: Joelle Calvo MD     Reason for Consult:  Acute Inpatient Rehabilitation    Chief Complaint: Shortness of breath    History of Present Illness:  Referring Provider is requesting an evaluation for appropriate placement upon discharge from acute care. Ms. Flaco Chao is a 74 y. o.female who was admitted to [facility] on 4/16/2022 with Shortness of Breath    72-year-old male with history of severe COPD 3 L O2 baseline, bipolar disorder, history of DVT/PE, migraines admitted with increased shortness of breath and cough recently discharged from [facility] for mycoplasma pneumonia, and ER oxygen increased to 6 L and continued to be hypoxic chest x-ray showed moderate loculated right basilar pneumothorax arterial lesion in the right lung base CT chest showed chronic clot material right upper lobe and right lower lobe multilocular pleural collections or hydropneumothorax posterior right lung, has chest tube in place 6 mm lateral right upper lobe nodule    General surgery-chest tube removed 4/31, chest x-ray revealed no pneumothorax continue occlusive dressing for 3 days signed off    ID- continue IV meropenem through 5/14 repeat CT chest without contrast reviewed showed resolving pneumothorax no evidence of loculated effusion use gross on bronchoscopy follow-up for further identification follow-up CT chest in 3 weeks    Internal medicine underwent bronchial washings with bronchoscopy on 4/21 found to have Pseudomonas extubated 4/28 [facility] referral 4/30, sepsis due to pneumonia with abscess versus empyema improved, midodrine for low blood pressure, acute on chronic respiratory failure secondary recurrent pneumonia history of severe COPD 3 L baseline.   Patient currently back to baseline pre-CERT started for [facility] AT [facility] 4/29    Pulmonary-continue bronchodilators, continue Solu-Medrol 40 mg every 12 hours May need midline for antibiotics through May 14, follow-up CT chest 4 to 6 weeks        Radiology:  XR CHEST (SINGLE VIEW FRONTAL)    Result Date: 4/25/2022  Large bore right-sided thoracostomy tube terminating over the medial right lung base. Good lung re-expansion with only small volume residual pneumothorax. CT CHEST WO CONTRAST    Result Date: 4/29/2022  Previously noted right-sided pneumothorax has significantly resolved. There is only small locules of air remaining within the small layering right pleural effusion suggestive of a small right-sided hydropneumothorax. No evidence of loculated effusion. Persistent right chest wall soft tissue emphysema. Multiple small cavitary lesions within the right upper lobe and right lower lobe with the largest cavitary lesion noted within the basilar aspect of the right lower lobe may now measuring 5.2 by 4.3 cm and demonstrates decreased thickening of the wall and more centralized cavitation. Findings are likely related to infectious/inflammatory etiology. Moderate emphysematous changes of the lungs. Unchanged chronic inferior compression fracture of T5 and T6.     CT CHEST WO CONTRAST    Result Date: 4/25/2022  Moderate to large right-sided pneumothorax. Pleural fluid inferiorly and posteriorly containing septations and loculated areas of air or cavitation. The thick-walled cavitary lesion previously seen is smaller compared to prior examination. Similar pulmonary nodules primarily in the right upper lobe. Underlying emphysematous change. Subcutaneous emphysema along the right lateral chest wall. XR CHEST PORTABLE    Result Date: 5/1/2022  Stable chest, soft tissue gas again seen in the right chest wall, no pneumothorax     XR CHEST PORTABLE    Result Date: 4/30/2022  No pneumothorax No acute cardiopulmonary process     XR CHEST PORTABLE    Result Date: 4/30/2022  Interval removal right-sided chest tube. No pneumothorax identified.   No other interval change. Unchanged or slightly decreased right-sided subcutaneous emphysema. XR CHEST PORTABLE    Result Date: 4/30/2022  No significant interval change. XR CHEST PORTABLE    Result Date: 4/29/2022  No substantial interval change in multifocal right-sided airspace disease as compared to prior. XR CHEST PORTABLE    Result Date: 4/28/2022  Unchanged support and right-sided chest tubes; otherwise stable findings, as above. XR CHEST PORTABLE    Result Date: 4/28/2022  Trace effusions. No definite pneumothorax. Stable support tubes. Subcutaneous emphysema along the right lateral chest wall and within the right breast.     XR CHEST PORTABLE    Result Date: 4/27/2022  Stable support and right-sided chest tube. Increased opacities right lung, more lower and lateral in location, much of which could be related to superimposed right breast tissue. Infiltrate/loculated pleural fluid should also be considered. Some increased atelectasis right base suspected. RECOMMENDATION: Continued chest x-ray follow-up (later today if respiratory failure/deteriorating clinical status suspected). XR CHEST PORTABLE    Result Date: 4/26/2022  Chronic pulmonary change. Support tubes as described above. Subcutaneous emphysema along the right lateral chest wall.      Review of Systems:  Constitutional: negative for anorexia, chills, fatigue, fevers, sweats and weight loss  Eyes: negative for redness and visual disturbance  Ears, nose, mouth, throat, and face: negative for earaches, sore throat and tinnitus  Respiratory: negative for cough and shortness of breath  Cardiovascular: negative for chest pain, dyspnea and palpitations  Gastrointestinal: negative for abdominal pain, change in bowel habits, constipation, nausea and vomiting  Genitourinary:negative for dysuria, frequency, hesitancy and urinary incontinence  Integument/breast: negative for pruritus and rash  Musculoskeletal:negative for muscle weakness and stiff joints  Neurological: negative for dizziness, headaches and weakness  Behavioral/Psych: negative for decreased appetite, depression and fatigue    Functional History:  PTA: Independent with all activities.     Current:  PT:        Restrictions/Precautions: Contact Precautions,General Precautions (on ventilator at this time)  Implants present? : Metal implants (plate in left ankle per daughter)   Transfers  Sit to Stand:  (unable at this time)  WB Status:  (pt unable to ambulate at this time)    AROM RLE (degrees)  RLE AROM: WFL  AROM LLE (degrees)  LLE AROM : WFL  AROM RUE (degrees)  RUE AROM :  (unable to assess since pt deferred ROM and strength testing due to fatigue )  AROM LUE (degrees)  LUE AROM :  (unable to assess since pt deferred ROM and strength testing due to fatigue   )  Strength RLE  Strength RLE:  (grossly 3-/5)  Strength LLE  Strength LLE:  (grossly 3-/5)  Strength RUE  Strength RUE:  (unable to assess since pt deferred ROM and strength testing  due to fatigue )  Strength LUE  Strength LUE:  (unable to assess since pt deferred ROM and strength testing  due to fatigue )  Coordination  Movements Are Fluid And Coordinated: Yes  Motor Control  Gross Motor?: WFL  Sensation  Overall Sensation Status: WFL  Bed mobility  Bridging:  (unable to assess since pt deferred functional mobility tests  due to fatigue )                   OT: Pending  ST:      Past Medical History:        Diagnosis Date    Abnormal EKG     TRAM (acute kidney injury) ([facility] [place] [address] 2022    Anxiety     Asthma     Bipolar disorder ([facility] [place] [address]     SEVERE [place] , [place]    COPD (chronic obstructive pulmonary disease) (HCC)     Cramps, extremity     SEVERE LEG CRAMPS    Depression     Dilated bile duct     Headache     History of elective      Hyperlipidemia     Irritable bowel syndrome     Prolonged emergence from general anesthesia     SEVERE ISSUES WAKING UP    Substance abuse ([address]     street drugs when younger   [place] Unspecified sleep apnea     Vision abnormalities     glasses       Past Surgical History:        Procedure Laterality Date    ANKLE SURGERY      HAD SCREWS AND HARDWARE, THEN REMOVED    BRONCHOSCOPY  2022         COLONOSCOPY  02/15/2018    tubular adenoma    EYE SURGERY Bilateral     CATARACTS    HYSTEROSCOPY  10/19/2016    D & C    INDUCED       LASIK      bilateral    TONSILLECTOMY AND ADENOIDECTOMY Bilateral     VULVA SURGERY      HAD A BIOPSY AND REMOVAL OF       Allergies:     Allergies   Allergen Reactions    Advil [Ibuprofen] Other (See Comments)     vomiting    Aleve [Naproxen] Other (See Comments)     vomiting    Antipyrine Other (See Comments)     unknown    Celecoxib Other (See Comments)    Codeine      headache    Fomepizole     Incruse Ellipta [Umeclidinium Bromide]      confusion    Other      GRASS, TREES, WEEDS    Rofecoxib Other (See Comments)     Unknown reaction    Salicylates      Unknown reaction     Strawberry Extract      HEADACHES    Sulfinpyrazone Other (See Comments)     Unknown reaction    Aspirin Nausea And Vomiting, Other (See Comments) and Nausea Only    Nsaids Nausea Only, Other (See Comments) and Nausea And Vomiting        Current Medications:   Current Facility-Administered Medications: midodrine (PROAMATINE) tablet 5 mg, 5 mg, Oral, TID PRN  methylPREDNISolone sodium (SOLU-MEDROL) injection 40 mg, 40 mg, IntraVENous, Q12H  oxyCODONE-acetaminophen (PERCOCET) 5-325 MG per tablet 1 tablet, 1 tablet, Oral, Q4H PRN  insulin lispro (HUMALOG) injection vial 0-6 Units, 0-6 Units, SubCUTAneous, TID WC  insulin lispro (HUMALOG) injection vial 0-3 Units, 0-3 Units, SubCUTAneous, Nightly  fentaNYL (SUBLIMAZE) injection 25 mcg, 25 mcg, IntraVENous, Q3H PRN **OR** fentaNYL (SUBLIMAZE) injection 50 mcg, 50 mcg, IntraVENous, C7S PRN  folic acid (FOLVITE) tablet 1 mg, 1 mg, Oral, Daily  thiamine mononitrate tablet 100 mg, 100 mg, Oral, Daily  famotidine (PEPCID) tablet 20 mg, 20 mg, Oral, BID  miconazole (MICOTIN) 2 % powder, , Topical, BID  therapeutic multivitamin-minerals 1 tablet, 1 tablet, Oral, Daily  0.45 % sodium chloride infusion, , IntraVENous, Continuous  hydrALAZINE (APRESOLINE) injection 10 mg, 10 mg, IntraVENous, Q4H PRN  polyethylene glycol (GLYCOLAX) packet 17 g, 17 g, Oral, Daily  furosemide (LASIX) injection 20 mg, 20 mg, IntraVENous, BID  [COMPLETED] meropenem (MERREM) 1,000 mg in sodium chloride 0.9 % 100 mL IVPB (mini-bag), 1,000 mg, IntraVENous, Once **FOLLOWED BY** meropenem (MERREM) 1,000 mg in sodium chloride 0.9 % 100 mL IVPB (mini-bag), 1,000 mg, IntraVENous, Q8H  albuterol (PROVENTIL) nebulizer solution 2.5 mg, 2.5 mg, Nebulization, Q4H  sodium chloride flush 0.9 % injection 5-40 mL, 5-40 mL, IntraVENous, 2 times per day  sodium chloride flush 0.9 % injection 5-40 mL, 5-40 mL, IntraVENous, PRN  0.9 % sodium chloride infusion, , IntraVENous, PRN  sodium chloride flush 0.9 % injection 10 mL, 10 mL, IntraVENous, PRN  risperiDONE (RISPERDAL) tablet 1.5 mg, 1.5 mg, Oral, Q12H  atorvastatin (LIPITOR) tablet 20 mg, 20 mg, Oral, Daily  traZODone (DESYREL) tablet 50 mg, 50 mg, Oral, Nightly  potassium chloride (KLOR-CON M) extended release tablet 40 mEq, 40 mEq, Oral, PRN **OR** potassium bicarb-citric acid (EFFER-K) effervescent tablet 40 mEq, 40 mEq, Oral, PRN **OR** potassium chloride 10 mEq/100 mL IVPB (Peripheral Line), 10 mEq, IntraVENous, PRN  glucose (GLUTOSE) 40 % oral gel 15 g, 15 g, Oral, PRN  dextrose 50 % IV solution, 12.5 g, IntraVENous, PRN  glucagon (rDNA) injection 1 mg, 1 mg, IntraMUSCular, PRN  dextrose 5 % solution, 100 mL/hr, IntraVENous, PRN  heparin (porcine) injection 5,000 Units, 5,000 Units, SubCUTAneous, 3 times per day    Social History:  Social History     Socioeconomic History    Marital status:      Spouse name: Not on file    Number of children: Not on file    Years of education: Not on file    Highest education level: Not on file   Occupational History    Not on file   Tobacco Use    Smoking status: Former Smoker     Packs/day: 2.00     Years: 42.00     Pack years: 84.00     Types: Cigarettes     Quit date: 11/1/2018     Years since quitting: 3.5    Smokeless tobacco: Never Used   Substance and Sexual Activity    Alcohol use: Yes     Comment: yearly    Drug use: No    Sexual activity: Not Currently     Partners: Male     Birth control/protection: Post-menopausal   Other Topics Concern    Not on file   Social History Narrative    Not on file     Social Determinants of Health     Financial Resource Strain: High Risk    Difficulty of Paying Living Expenses: Very hard   Food Insecurity: No Food Insecurity    Worried About Running Out of Food in the Last Year: Never true    Agustin of Food in the Last Year: Never true   Transportation Needs:     Lack of Transportation (Medical): Not on file    Lack of Transportation (Non-Medical):  Not on file   Physical Activity:     Days of Exercise per Week: Not on file    Minutes of Exercise per Session: Not on file   Stress:     Feeling of Stress : Not on file   Social Connections:     Frequency of Communication with Friends and Family: Not on file    Frequency of Social Gatherings with Friends and Family: Not on file    Attends Advent Services: Not on file    Active Member of [address] [place] [address] [employer] or Organizations: Not on file    Attends Club or Organization Meetings: Not on file    Marital Status: Not on file   Intimate Partner Violence:     Fear of Current or Ex-Partner: Not on file    Emotionally Abused: Not on file    Physically Abused: Not on file    Sexually Abused: Not on file   Housing Stability:     Unable to Pay for Housing in the Last Year: Not on file    Number of [place] in the Last Year: Not on file    Unstable Housing in the Last Year: Not on file     Information provided by the pt's daughter who was present in the room with the patient (per PT note)  Social/Functional History  Lives With:  (has been staying with daughter 5 days / wk &goes home 2 d/wk)  Type of Home: House  Home Layout: One level  Home Access: Stairs to enter without rails  Entrance Stairs - Number of Steps: 2  Bathroom Shower/Tub: Tub/Shower unit,Shower chair with back,Curtain  Bathroom Toilet: Standard  Bathroom Equipment: Hand-held shower  Bathroom Accessibility: Walker accessible  Home Equipment: Oxygen (nebulizer and concentrator for oxygen)  Has the patient had two or more falls in the past year or any fall with injury in the past year?: No  Receives Help From:  (daughter supervises her when she showers)  ADL Assistance:  (supervision needed only when pt showers)  Homemaking Assistance: Needs assistance (daughter does whatever pt cannot do)  Ambulation Assistance: Independent  Transfer Assistance: Independent  Active : No  Patient's  Info: Daughter drives to appointments and to grocery shopping  Mode of Transportation: Car    Family History:       Problem Relation Age of Onset    Dementia Maternal Aunt     Kidney Disease Mother     Heart Attack Sister     Prostate Cancer Father     High Cholesterol Brother     Heart Attack Paternal Grandmother            Physical Exam:    /75   Pulse 64   Temp 98.6 °F (37 °C) (Oral)   Resp 20   Ht 5' 5\" (1.651 m)   Wt 175 lb 3.2 oz (79.5 kg)   LMP 05/20/2013 (Exact Date)   SpO2 98%   BMI 29.15 kg/m²     General appearance: alert, appears stated age, cooperative, and no distress, on 3 L O2  HEENT: Normocephalic, without obvious abnormality, atraumatic               Eyes: conjunctivae clear. Throat: tongue normal.               Neck:  symmetrical, trachea midline. Pulm: clear to auscultation bilaterally. On 3 L O2  Cardiac: regular rate and rhythm, no murmur.   Abdomen: soft, non-tender; bowel sounds normal.  MSK: extremities normal, atraumatic, no edema, normal tone  Mental status/Psych: Alert, orientedX3, thought content appropriate. Knew year, president and location, follows command  Skin:     Neuro:    Sensory: Intact in BUE and BLE to soft and pin sensation. Motor: Muscle tone and bulk are normal bilaterally. No pronator drift. .  B 2-3/5 lower extremities, 3-4/5 upper extremity        Diagnostics:  CBC   Lab Results   Component Value Date    WBC 9.9 05/02/2022    RBC 3.09 05/02/2022    RBC 0-2 07/08/2021    HGB 9.4 05/02/2022    HCT 28.6 05/02/2022    MCV 92.6 05/02/2022    RDW 17.4 05/02/2022     05/02/2022     BMP    Lab Results   Component Value Date     05/02/2022    K 4.9 05/02/2022    CL 96 05/02/2022    CO2 34 05/02/2022    BUN 25 05/02/2022     Uric Acid  No components found for: URIC  VITAMIN B12 No components found for: B12  PT/INR  No results found for: PTINR      Impression: Ms. Keyonna Lima is a 74 y.o. female with a history of Sepsis ([facility] [address]    1. Deconditioning due to sepsis secondary pneumonia-status post stent, extubated 4/28-IV methylprednisolone 40 mg every 12 hours-clarify oral and taper, Proventil nebulizer, IV meropenem through 5/14  2. Severe COPD baseline 3 L O2  3. Hypertension/hyperlipidemia-Lipitor, currently on IV Lasix, ProAmatine for low blood pressure  4. Bipolar disorder  5. History DVT/PE  6. Migraines  7. Pain-Percocet  8. 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 GERD-Pepcid  11. Anemia-hemoglobin 9.4    Recommendations:  1. Diagnosis: Sepsis, chronic COPD  2. Therapy: Therapy notes 4/25 while on vent, x-ray 4/28 await post extubation therapy evaluation  3. Medical  Necessity: As above  4. Support: Clarify, patient notes daughter available 24/7-clarify  5. Rehab recommendation: Pending updated therapy notes post extubation- will follow, noted pre-CERT started for [facility] awaiting response    Clarify prednisone taper    6. DVT proph: Subcu heparin    It was my pleasure to evaluate Keyonna Lima today.   Please call with questions. Marisabel Orozco. Quoc Morrison MD          This note is created with the assistance of a speech recognition program.  While intending to generate a document that actually reflects the content of the visit, the document can still have some errors including those of syntax and sound a like substitutions which may escape proof reading.   In such instances, actual meaning can be extrapolated by contextual diversion

## 2022-05-03 ENCOUNTER — APPOINTMENT (OUTPATIENT)
Dept: INTERVENTIONAL RADIOLOGY/VASCULAR | Age: 67
DRG: 720 | End: 2022-05-03
Payer: COMMERCIAL

## 2022-05-03 ENCOUNTER — HOSPITAL ENCOUNTER (OUTPATIENT)
Dept: MEDSURG UNIT | Age: 67
Discharge: NURSING, REHAB OR LONG-TERM CARE | End: 2022-05-03
Attending: INTERNAL MEDICINE | Admitting: INTERNAL MEDICINE

## 2022-05-03 VITALS
RESPIRATION RATE: 18 BRPM | OXYGEN SATURATION: 96 % | HEIGHT: 65 IN | HEART RATE: 80 BPM | DIASTOLIC BLOOD PRESSURE: 67 MMHG | WEIGHT: 174.9 LBS | BODY MASS INDEX: 29.14 KG/M2 | SYSTOLIC BLOOD PRESSURE: 121 MMHG | TEMPERATURE: 99.3 F

## 2022-05-03 LAB
ANION GAP SERPL CALCULATED.3IONS-SCNC: 2 MMOL/L (ref 9–17)
BUN BLDV-MCNC: 22 MG/DL (ref 8–23)
CALCIUM SERPL-MCNC: 7.9 MG/DL (ref 8.6–10.4)
CHLORIDE BLD-SCNC: 97 MMOL/L (ref 98–107)
CO2: 36 MMOL/L (ref 20–31)
CREAT SERPL-MCNC: <0.4 MG/DL (ref 0.5–0.9)
GFR AFRICAN AMERICAN: ABNORMAL ML/MIN
GFR NON-AFRICAN AMERICAN: ABNORMAL ML/MIN
GFR SERPL CREATININE-BSD FRML MDRD: ABNORMAL ML/MIN/{1.73_M2}
GLUCOSE BLD-MCNC: 103 MG/DL (ref 65–105)
GLUCOSE BLD-MCNC: 144 MG/DL (ref 70–99)
GLUCOSE BLD-MCNC: 155 MG/DL (ref 65–105)
GLUCOSE BLD-MCNC: 204 MG/DL (ref 65–105)
HCT VFR BLD CALC: 29.9 % (ref 36–46)
HEMOGLOBIN: 9.7 G/DL (ref 12–16)
MCH RBC QN AUTO: 30.1 PG (ref 26–34)
MCHC RBC AUTO-ENTMCNC: 32.5 G/DL (ref 31–37)
MCV RBC AUTO: 92.6 FL (ref 80–100)
PDW BLD-RTO: 17.1 % (ref 11.5–14.9)
PLATELET # BLD: 314 K/UL (ref 150–450)
PMV BLD AUTO: 7.5 FL (ref 6–12)
POTASSIUM SERPL-SCNC: 4.5 MMOL/L (ref 3.7–5.3)
RBC # BLD: 3.23 M/UL (ref 4–5.2)
SODIUM BLD-SCNC: 135 MMOL/L (ref 135–144)
WBC # BLD: 9.8 K/UL (ref 3.5–11)

## 2022-05-03 PROCEDURE — C1751 CATH, INF, PER/CENT/MIDLINE: HCPCS

## 2022-05-03 PROCEDURE — 36573 INSJ PICC RS&I 5 YR+: CPT

## 2022-05-03 PROCEDURE — 6370000000 HC RX 637 (ALT 250 FOR IP): Performed by: STUDENT IN AN ORGANIZED HEALTH CARE EDUCATION/TRAINING PROGRAM

## 2022-05-03 PROCEDURE — 6360000002 HC RX W HCPCS: Performed by: STUDENT IN AN ORGANIZED HEALTH CARE EDUCATION/TRAINING PROGRAM

## 2022-05-03 PROCEDURE — 2580000003 HC RX 258: Performed by: INTERNAL MEDICINE

## 2022-05-03 PROCEDURE — 6370000000 HC RX 637 (ALT 250 FOR IP): Performed by: INTERNAL MEDICINE

## 2022-05-03 PROCEDURE — 05HB33Z INSERTION OF INFUSION DEVICE INTO RIGHT BASILIC VEIN, PERCUTANEOUS APPROACH: ICD-10-PCS | Performed by: INTERNAL MEDICINE

## 2022-05-03 PROCEDURE — 6360000002 HC RX W HCPCS: Performed by: INTERNAL MEDICINE

## 2022-05-03 PROCEDURE — 6370000000 HC RX 637 (ALT 250 FOR IP)

## 2022-05-03 PROCEDURE — 94761 N-INVAS EAR/PLS OXIMETRY MLT: CPT

## 2022-05-03 PROCEDURE — 82947 ASSAY GLUCOSE BLOOD QUANT: CPT

## 2022-05-03 PROCEDURE — 94640 AIRWAY INHALATION TREATMENT: CPT

## 2022-05-03 PROCEDURE — 99232 SBSQ HOSP IP/OBS MODERATE 35: CPT | Performed by: INTERNAL MEDICINE

## 2022-05-03 PROCEDURE — 2700000000 HC OXYGEN THERAPY PER DAY

## 2022-05-03 PROCEDURE — 80048 BASIC METABOLIC PNL TOTAL CA: CPT

## 2022-05-03 PROCEDURE — 99239 HOSP IP/OBS DSCHRG MGMT >30: CPT | Performed by: INTERNAL MEDICINE

## 2022-05-03 PROCEDURE — 36415 COLL VENOUS BLD VENIPUNCTURE: CPT

## 2022-05-03 PROCEDURE — 85027 COMPLETE CBC AUTOMATED: CPT

## 2022-05-03 RX ORDER — M-VIT,TX,IRON,MINS/CALC/FOLIC 27MG-0.4MG
1 TABLET ORAL DAILY
Qty: 30 TABLET | Refills: 0 | Status: SHIPPED | OUTPATIENT
Start: 2022-05-04 | End: 2022-06-30

## 2022-05-03 RX ORDER — RISPERIDONE 0.5 MG/1
1.5 TABLET, FILM COATED ORAL EVERY 12 HOURS
Qty: 60 TABLET | Refills: 3 | Status: SHIPPED | OUTPATIENT
Start: 2022-05-03

## 2022-05-03 RX ORDER — FUROSEMIDE 20 MG/1
20 TABLET ORAL 2 TIMES DAILY
Qty: 60 TABLET | Refills: 3 | Status: SHIPPED | OUTPATIENT
Start: 2022-05-03

## 2022-05-03 RX ORDER — MIDODRINE HYDROCHLORIDE 5 MG/1
5 TABLET ORAL 3 TIMES DAILY PRN
Qty: 90 TABLET | Refills: 3 | Status: SHIPPED | OUTPATIENT
Start: 2022-05-03 | End: 2022-06-30

## 2022-05-03 RX ORDER — POTASSIUM CHLORIDE 20 MEQ/1
40 TABLET, EXTENDED RELEASE ORAL PRN
Qty: 60 TABLET | Refills: 3 | Status: SHIPPED | OUTPATIENT
Start: 2022-05-03 | End: 2022-06-30

## 2022-05-03 RX ORDER — POLYETHYLENE GLYCOL 3350 17 G/17G
17 POWDER, FOR SOLUTION ORAL DAILY
Qty: 527 G | Refills: 1 | Status: SHIPPED | OUTPATIENT
Start: 2022-05-04 | End: 2022-06-03

## 2022-05-03 RX ORDER — INSULIN LISPRO 100 [IU]/ML
0-3 INJECTION, SOLUTION INTRAVENOUS; SUBCUTANEOUS NIGHTLY
Qty: 1 EACH | Refills: 0 | Status: SHIPPED | OUTPATIENT
Start: 2022-05-03 | End: 2022-06-30 | Stop reason: DRUGHIGH

## 2022-05-03 RX ORDER — BENZONATATE 100 MG/1
100 CAPSULE ORAL 3 TIMES DAILY PRN
Qty: 21 CAPSULE | Refills: 0 | Status: SHIPPED | OUTPATIENT
Start: 2022-05-03 | End: 2022-05-10

## 2022-05-03 RX ORDER — FOLIC ACID 1 MG/1
1 TABLET ORAL DAILY
Qty: 30 TABLET | Refills: 3 | Status: SHIPPED | OUTPATIENT
Start: 2022-05-04

## 2022-05-03 RX ORDER — OXYCODONE HYDROCHLORIDE AND ACETAMINOPHEN 5; 325 MG/1; MG/1
1 TABLET ORAL EVERY 4 HOURS PRN
Qty: 18 TABLET | Refills: 0 | Status: SHIPPED | OUTPATIENT
Start: 2022-05-03 | End: 2022-05-06

## 2022-05-03 RX ORDER — FAMOTIDINE 20 MG/1
20 TABLET, FILM COATED ORAL 2 TIMES DAILY
Qty: 60 TABLET | Refills: 3 | Status: SHIPPED | OUTPATIENT
Start: 2022-05-03

## 2022-05-03 RX ORDER — NICOTINE POLACRILEX 4 MG
15 LOZENGE BUCCAL PRN
Qty: 45 G | Refills: 1 | Status: SHIPPED | OUTPATIENT
Start: 2022-05-03

## 2022-05-03 RX ORDER — HYDRALAZINE HYDROCHLORIDE 20 MG/ML
10 INJECTION INTRAMUSCULAR; INTRAVENOUS EVERY 4 HOURS PRN
Qty: 1 EACH | Refills: 0 | Status: SHIPPED | OUTPATIENT
Start: 2022-05-03 | End: 2022-06-30

## 2022-05-03 RX ORDER — PREDNISONE 10 MG/1
30 TABLET ORAL DAILY
Qty: 30 TABLET | Refills: 0 | Status: SHIPPED | OUTPATIENT
Start: 2022-05-04 | End: 2022-05-14

## 2022-05-03 RX ORDER — THIAMINE MONONITRATE (VIT B1) 100 MG
100 TABLET ORAL DAILY
Qty: 30 TABLET | Refills: 0 | Status: SHIPPED | OUTPATIENT
Start: 2022-05-04 | End: 2022-06-30

## 2022-05-03 RX ADMIN — THIAMINE HCL TAB 100 MG 100 MG: 100 TAB at 08:53

## 2022-05-03 RX ADMIN — ALBUTEROL SULFATE 2.5 MG: 2.5 SOLUTION RESPIRATORY (INHALATION) at 07:39

## 2022-05-03 RX ADMIN — PREDNISONE 30 MG: 20 TABLET ORAL at 08:53

## 2022-05-03 RX ADMIN — ALBUTEROL SULFATE 2.5 MG: 2.5 SOLUTION RESPIRATORY (INHALATION) at 14:37

## 2022-05-03 RX ADMIN — RISPERIDONE 1.5 MG: 0.5 TABLET ORAL at 12:00

## 2022-05-03 RX ADMIN — MEROPENEM 1000 MG: 1 INJECTION, POWDER, FOR SOLUTION INTRAVENOUS at 12:00

## 2022-05-03 RX ADMIN — ANTI-FUNGAL POWDER MICONAZOLE NITRATE TALC FREE: 1.42 POWDER TOPICAL at 08:54

## 2022-05-03 RX ADMIN — INSULIN LISPRO 1 UNITS: 100 INJECTION, SOLUTION INTRAVENOUS; SUBCUTANEOUS at 08:54

## 2022-05-03 RX ADMIN — INSULIN LISPRO 2 UNITS: 100 INJECTION, SOLUTION INTRAVENOUS; SUBCUTANEOUS at 17:02

## 2022-05-03 RX ADMIN — ATORVASTATIN CALCIUM 20 MG: 20 TABLET, FILM COATED ORAL at 08:53

## 2022-05-03 RX ADMIN — HEPARIN SODIUM 5000 UNITS: 5000 INJECTION INTRAVENOUS; SUBCUTANEOUS at 15:13

## 2022-05-03 RX ADMIN — FOLIC ACID 1 MG: 1 TABLET ORAL at 08:53

## 2022-05-03 RX ADMIN — FUROSEMIDE 20 MG: 20 TABLET ORAL at 17:02

## 2022-05-03 RX ADMIN — FENTANYL CITRATE 50 MCG: 50 INJECTION, SOLUTION INTRAMUSCULAR; INTRAVENOUS at 15:23

## 2022-05-03 RX ADMIN — FAMOTIDINE 20 MG: 20 TABLET, FILM COATED ORAL at 08:53

## 2022-05-03 RX ADMIN — FUROSEMIDE 20 MG: 20 TABLET ORAL at 08:53

## 2022-05-03 RX ADMIN — ALBUTEROL SULFATE 2.5 MG: 2.5 SOLUTION RESPIRATORY (INHALATION) at 11:17

## 2022-05-03 RX ADMIN — FENTANYL CITRATE 50 MCG: 50 INJECTION, SOLUTION INTRAMUSCULAR; INTRAVENOUS at 19:17

## 2022-05-03 RX ADMIN — POLYETHYLENE GLYCOL 3350 17 G: 17 POWDER, FOR SOLUTION ORAL at 08:54

## 2022-05-03 RX ADMIN — ALBUTEROL SULFATE 2.5 MG: 2.5 SOLUTION RESPIRATORY (INHALATION) at 03:50

## 2022-05-03 RX ADMIN — HEPARIN SODIUM 5000 UNITS: 5000 INJECTION INTRAVENOUS; SUBCUTANEOUS at 05:39

## 2022-05-03 RX ADMIN — FENTANYL CITRATE 50 MCG: 50 INJECTION, SOLUTION INTRAMUSCULAR; INTRAVENOUS at 10:35

## 2022-05-03 RX ADMIN — MULTIPLE VITAMINS W/ MINERALS TAB 1 TABLET: TAB at 08:53

## 2022-05-03 ASSESSMENT — PAIN SCALES - GENERAL
PAINLEVEL_OUTOF10: 10
PAINLEVEL_OUTOF10: 8
PAINLEVEL_OUTOF10: 4
PAINLEVEL_OUTOF10: 10

## 2022-05-03 NOTE — CARE COORDINATION
Continuity of Care Form    Patient Name: Cliff Pascual   :  1955  MRN:  [id number]    Admit date:  2022  Discharge date:  5/3/22    Code Status Order: Full Code   Advance Directives:      Admitting Physician:  Betzy Paredes MD  PCP: Abner Gómez MD    Discharging Nurse:   [address] Unit/Room#:   Discharging Unit Phone Number:     Emergency Contact:   Extended Emergency Contact Information  Primary Emergency Contact: Brannon Pallas *hipaa,Calli  Address: [address] [place] [address] Phone: [phone]  Work Phone: [phone]  Mobile Phone: [phone]  Relation: Child    Past Surgical History:  Past Surgical History:   Procedure Laterality Date    ANKLE SURGERY      HAD SCREWS AND HARDWARE, THEN REMOVED    COLONOSCOPY  02/15/2018    tubular adenoma    EYE SURGERY Bilateral     CATARACTS    HYSTEROSCOPY  10/19/2016    D & C    INDUCED       LASIK      bilateral    TONSILLECTOMY AND ADENOIDECTOMY Bilateral     VULVA SURGERY      HAD A BIOPSY AND REMOVAL OF       Immunization History:   Immunization History   Administered Date(s) Administered    COVID-19, Versherifa Racquel, Primary or Immunocompromised, PF, 100mcg/0.5mL 2021, 2021, 2022    Influenza Virus Vaccine 10/15/2018    Influenza, MDCK Quadv, IM, PF (Flucelvax 2 yrs and older) 2020, 2021    Influenza, Blank Seema, IM, PF (6 mo and older Fluzone, Flulaval, Fluarix, and 3 yrs and older Afluria) 10/03/2016, 2017, 2018, 10/30/2019    Pneumococcal Conjugate 13-valent ([id number]) 2021    Pneumococcal Polysaccharide ([id number]) 2016, 2021    Tdap (Boostrix, Adacel) 2016    Zoster Recombinant (Shingrix) 2018, 2018, 2018       Active Problems:  Patient Active Problem List   Diagnosis Code    [facility] J44.9    Vitamin D deficiency E55.9    Anxiety F41.9    Asthma J45.909  Bipolar disorder ([facility] F31.9    Depression F32. A    Hyperlipidemia with target LDL less than 100 E78.5    Sleep apnea G47.30    Vision abnormalities H53.9    Status post hysteroscopy Z98.890    Chronic headaches R51.9, G89.29    IBS (irritable bowel syndrome) K58.9    Colon polyp K63.5    Collagenous colitis K52.831    Lung nodule R91.1    Left elbow pain M25.522    Dilated bile duct K83.8    Acute pain of left shoulder M25.512    Adhesive capsulitis of left shoulder M75.02    Leucopenia D72.819    Compression fracture of body of thoracic vertebra ([facility]) S22.000A    Age-related osteoporosis with current pathological fracture M80.00XA    Pulmonary embolism on right ([facility]) I26.99    Migraines G43.909    Paranoid behavior ([facility]) F22    Acute deep vein thrombosis (DVT) of right lower extremity ([facility]) I82.401    Delirium R41.0    Chronic respiratory failure with hypoxia ([facility]) J96.11    Major neurocognitive disorder ([facility]) F03.90    Pneumonia J18.9    Chronic respiratory failure with hypoxia, on home O2 therapy ([facility]) J96.11, Z99.81    COPD (chronic obstructive pulmonary disease) ([facility]) J44.9    TRAM (acute kidney injury) ([facility]) N17.9    History of pulmonary embolus (PE) Z86.711    Mycoplasma pneumonia J15.7    Iron deficiency anemia D50.9    Sepsis ([facility]) A41.9    Acute on chronic respiratory failure ([facility]) J96.20    Cavitary lesion of lung J98.4    History of DVT (deep vein thrombosis) Z86.718    Pneumothorax, right J93.9    Status post chest tube placement ([facility]) Z93.8       Isolation/Infection:   Isolation            No Isolation          Patient Infection Status       Infection Onset Added Last Indicated Last Indicated By Review Planned Expiration Resolved Resolved By    None active    Resolved    COVID-19 (Rule Out) 04/16/22 04/16/22 04/16/22 Respiratory Panel, Molecular, with COVID-19 (Restricted: peds pts or suitable admitted adults) (Ordered)   04/17/22 Rule-Out Test Resulted COVID-19 (Rule Out) 22 COVID-19 & Influenza Combo (Ordered)   22 Rule-Out Test Resulted    COVID-19 (Rule Out) 22 COVID-19 & Influenza Combo (Ordered)   22 Rule-Out Test Resulted    COVID-19 (Rule Out) 21 COVID-19, Rapid (Ordered)   21             Nurse Assessment:  Last Vital Signs: BP (!) 114/52   Pulse 87   Temp 97.5 °F (36.4 °C) (Axillary)   Resp 25   Ht 5' 5\" (1.651 m)   Wt 176 lb 12.9 oz (80.2 kg)   LMP 2013 (Exact Date)   SpO2 97%   BMI 29.42 kg/m²     Last documented pain score (0-10 scale): Pain Level: 4  Last Weight:   Wt Readings from Last 1 Encounters:   22 176 lb 12.9 oz (80.2 kg)     Mental Status:  oriented and alert    IV Access:  - midline, right arm    Nursing Mobility/ADLs:  Walking   Dependent  Transfer  Dependent  Bathing  Assisted  Dressing  Assisted  Toileting  Dependent  Feeding  Independent  Med Admin  Assisted  Med Delivery   whole    Wound Care Documentation and Therapy:        Elimination:  Continence:   · Bowel: Yes  · Bladder: Yes  Urinary Catheter: None   Colostomy/Ileostomy/Ileal Conduit: No       Date of Last BM:     Intake/Output Summary (Last 24 hours) at 2022 1625  Last data filed at 2022 1443  Gross per 24 hour   Intake 1591.69 ml   Output 1190 ml   Net 401.69 ml     I/O last 3 completed shifts: In: 2611.7 [I.V.:2313.9; IV Piggyback:297.8]  Out: 5635 [Urine:875; Emesis/NG output:200; Chest Tube:265]    Safety Concerns:      At Risk for Falls    Impairments/Disabilities:      Vision    Nutrition Therapy:  Current Nutrition Therapy:   - Oral Diet:  General    Routes of Feeding: Oral  Liquids: No Restrictions NO STRAWS  Daily Fluid Restriction: no  Last Modified Barium Swallow with Video (Video Swallowing Test): not done    Treatments at the Time of Hospital Discharge:   Respiratory Treatments:   Oxygen Therapy:  is on oxygen at 3 L/min per nasal cannula. Ventilator:    - No ventilator support    Rehab Therapies: Physical Therapy and Occupational Therapy  Weight Bearing Status/Restrictions: No weight bearing restrictions  Other Medical Equipment (for information only, NOT a DME order):  SC  Other Treatments: Skilled Nursing Assessment Per Protocol. Medication Education & Monitoring. Midline Care Per Protocol. Per Dr. Hieu Harding: IV Merrem, 1000 MG, Q 8 hours, Until 5/14/22. LABS: CBC, BUN, Cr weekly while on IV antibiotics. Patient's personal belongings (please select all that are sent with patient):  Clothing, phone    RN SIGNATURE:  Electronically signed by Lefty Mathur RN on 5/3/22 at 4:07 PM EDT    CASE MANAGEMENT/SOCIAL WORK SECTION    Inpatient Status Date: 4/16/22    Readmission Risk Assessment Score:  Readmission Risk              Risk of Unplanned Readmission:  23           Discharging to Facility/ Agency   · [facility]   · [address] [facility], [address]  · Phone: [phone]  · Fax: [phone]    Please refer patient to [address] [place] [address] when discharged from your facility for home health services. ·   ·   ·   · [address] [place] [address]  · [address]  · P: [phone]  · F: [phone]     Dialysis Facility (if applicable)   · Name:  · Address:  · Dialysis Schedule:  · Phone:  · Fax:    / signature: Electronically signed by Joselin Reed RN on 4/17/22 at 4:26 PM EDT    PHYSICIAN SECTION    Prognosis: Fair    Condition at Discharge: Stable    Rehab Potential (if transferring to Rehab): Fair    Recommended Labs or Other Treatments After Discharge: see above    Physician Certification: I certify the above information and transfer of Heather Branch  is necessary for the continuing treatment of the diagnosis listed and that she requires LTAC for less 30 days.      Update Admission H&P: No change in H&P    PHYSICIAN SIGNATURE:  Electronically signed by Hortencia Love MD on 5/3/22 at 3:50 PM EDT    Medication List      START taking these medications    START taking these medications   famotidine 20 MG tablet  Commonly known as: PEPCID  Take 1 tablet by mouth 2 times daily   folic acid 1 MG tablet  Commonly known as: FOLVITE  Take 1 tablet by mouth daily  Start taking on: May 4, 2022   furosemide 20 MG tablet  Commonly known as: LASIX  Take 1 tablet by mouth 2 times daily   glucose 40 % Gel  Commonly known as: GLUTOSE  Take 37.5 mLs by mouth as needed (low blood glucose)   hydrALAZINE 20 MG/ML injection  Commonly known as: APRESOLINE  Infuse 0.5 mLs intravenously every 4 hours as needed (For systolic blood pressure greater than 499 or diastolic greater than 737)   insulin lispro 100 UNIT/ML Soln injection vial  Commonly known as: HUMALOG  Inject 0-3 Units into the skin nightly   meropenem  infusion  Commonly known as: MERREM  Infuse 1,000 mg intravenously every 8 hours for 11 days Compound per protocol. miconazole 2 % powder  Commonly known as: MICOTIN  Apply topically 2 times daily. midodrine 5 MG tablet  Commonly known as: PROAMATINE  Take 1 tablet by mouth 3 times daily as needed (for SBP <90)   oxyCODONE-acetaminophen 5-325 MG per tablet  Commonly known as: PERCOCET  Take 1 tablet by mouth every 4 hours as needed for Pain for up to 3 days. polyethylene glycol 17 g packet  Commonly known as: GLYCOLAX  Take 17 g by mouth daily  Start taking on: May 4, 2022   potassium chloride 20 MEQ extended release tablet  Commonly known as: KLOR-CON M  Take 2 tablets by mouth as needed (Per Potassium Replacement Protocol)   predniSONE 10 MG tablet  Commonly known as: DELTASONE  Take 3 tablets by mouth daily for 10 days  Start taking on: May 4, 2022   therapeutic multivitamin-minerals tablet  Take 1 tablet by mouth daily  Start taking on: May 4, 2022   vitamin B-1 100 MG tablet  Commonly known as: THIAMINE  Take 1 tablet by mouth daily  Start taking on:  May 4, 2022     CHANGE how you take these medications    CHANGE how you take these medications   risperiDONE 0.5 MG tablet  Commonly known as: RISPERDAL  Take 3 tablets by mouth every 12 hours  What changed: additional instructions   Ventolin  (90 Base) MCG/ACT inhaler  Generic drug: albuterol sulfate HFA  Inhale 2 puffs into the lungs every 6 hours as needed for Wheezing  What changed: Another medication with the same name was removed. Continue taking this medication, and follow the directions you see here.      CONTINUE taking these medications    CONTINUE taking these medications   benzonatate 100 MG capsule  Commonly known as: Tessalon Perles  Take 1 capsule by mouth 3 times daily as needed for Cough   Breo Ellipta 100-25 MCG/INH Aepb inhaler  Generic drug: fluticasone-vilanterol  Inhale 1 puff into the lungs daily   Calcium Carb-Cholecalciferol 600-500 MG-UNIT Tabs  Take 2 tablets by mouth daily   cetirizine 10 MG tablet  Commonly known as: ZYRTEC  Take 10 mg by mouth daily   docusate sodium 100 MG capsule  Commonly known as: COLACE  take 1 capsule by mouth twice a day   fluticasone 50 MCG/ACT nasal spray  Commonly known as: Flonase  2 sprays by Nasal route daily   * lidocaine 4 % cream  Commonly known as: LMX  apply topically every 8 hours if needed   * lidocaine 4 % external patch  Place 1 patch onto the skin daily   olopatadine 0.1 % ophthalmic solution  Commonly known as: PATANOL  Place 1 drop into both eyes 2 times daily   simvastatin 20 MG tablet  Commonly known as: ZOCOR  Take 1 tablet by mouth nightly   sodium chloride 0.65 % nasal spray  Commonly known as: OCEAN, BABY AYR  1 spray by Nasal route as needed for Congestion   Spiriva Respimat 2.5 MCG/ACT Aers inhaler  Generic drug: tiotropium  inhale 2 puffs INTO THE LUNGS once daily   * topiramate 50 MG tablet  Commonly known as: TOPAMAX  Take 100 mg by mouth at bedtime   * topiramate 50 MG tablet  Commonly known as: TOPAMAX  Take 50 mg by mouth daily   traZODone 50 MG tablet  Commonly known as: DESYREL  Take 50 mg by mouth nightly   vitamin B-12 100 MCG tablet  Commonly known as: CYANOCOBALAMIN  Take 50 mcg by mouth daily   vitamin D 1.25 MG (64175 UT) Caps capsule  Commonly known as: ERGOCALCIFEROL  take 1 capsule by mouth every week    (very important)  * This list has 4 medication(s) that are the same as other medications prescribed for you. Read the directions carefully, and ask your doctor or other care provider to review them with you.        STOP taking these medications    STOP taking these medications   acetaminophen 500 MG tablet  Commonly known as: Acetaminophen Extra Strength   aspirin 81 MG EC tablet   butalbital-acetaminophen-caffeine -40 MG per tablet  Commonly known as: FIORICET, ESGIC   EPINEPHrine 0.3 MG/0.3ML Soaj injection  Commonly known as: EpiPen 2-Tucker   ibandronate 150 MG tablet  Commonly known as: Boniva   ibuprofen 400 MG tablet  Commonly known as: ADVIL;MOTRIN   rivastigmine 4.5 MG capsule  Commonly known as: EXELON   Where to Get Your Medications      These medications were sent to [facility] [address]  [address] [facility] [address]  Hours: [facility] (Mon-Fri 9AM-5:30PM) Phone: [phone]        miconazole 2 % powder         midodrine 5 MG tablet         polyethylene glycol 17 g packet         potassium chloride 20 MEQ extended release tablet         predniSONE 10 MG tablet         risperiDONE 0.5 MG tablet         therapeutic multivitamin-minerals tablet         vitamin B-1 100 MG tablet      You can get these medications from any pharmacy    Bring a paper prescription for each of these medications       benzonatate 100 MG capsule         famotidine 20 MG tablet         folic acid 1 MG tablet         furosemide 20 MG tablet         glucose 40 % Gel         hydrALAZINE 20 MG/ML injection         insulin lispro 100 UNIT/ML Soln injection vial         meropenem  infusion         oxyCODONE-acetaminophen 5-325 MG per tablet

## 2022-05-03 NOTE — CARE COORDINATION
DISCHARGE PLANNING NOTE:    Faxed [facility] and d/c med list to [facility] at [phone].     Electronically signed by Wendy Olguin RN on 5/3/22 at 4:09 PM EDT

## 2022-05-03 NOTE — DISCHARGE SUMMARY
[place] [address] Internal Medicine    Discharge Summary     Patient ID: Zack Barahona  :  1955   MRN: [id number]     ACCOUNT:  [de-identified]   Patient's PCP: Bart Contreras MD  Admit Date: 2022   Discharge Date: 5/3/2022    Length of Stay: 17  Code Status:  Full Code  Admitting Physician: Jama Higuera MD  Discharge Physician: Jama Higuera MD     Active Discharge Diagnoses:     Primary Problem  Sepsis [facility])      [place] Problems    Diagnosis Date Noted    Postprocedural subcutaneous emphysema [facility]in[facility] 2022     Priority: Medium    Recurrent pneumothorax after chest tube removed [J95.811] 2022     Priority: Medium    Lung abscess ([address] [J85.2] 2022     Priority: Medium    Elevated C-reactive protein (CRP) [R79.82]      Priority: Medium    Pseudomonas aeruginosa infection [A49.8]      Priority: Medium    Elevated procalcitonin [R79.89]      Priority: Medium    Acute systolic HF (heart failure) ([address] [I50.21] 2022    Pneumothorax on right [J93.9]     HCAP (healthcare-associated pneumonia) [J18.9]     Sepsis ([address] [A41.9] 2022    Acute on chronic respiratory failure ([address] [J96.20] 2022    Cavitary lesion of lung [J98.4] 2022    History of DVT (deep vein thrombosis) [Z86.718] 2022    Pneumothorax, right [J93.9] 2022    Status post chest tube placement ([address] [Z93.8] 2022    History of pulmonary embolus (PE) [Z86.711] 2022    Chronic respiratory failure with hypoxia ([address] [J96.11] 2021    Compression fracture of body of thoracic vertebra ([address] [S22.000A] 2020    Lung nodule [R91.1] 2018    Bipolar disorder ([address] [F31.9]     Chronic obstructive pulmonary disease ([address] [J44.9] 2016       Admission Condition:  fair     Discharged Condition: fair    Hospital Stay:     Hospital Course:  Zack Barahona is a 74 y.o. female who was admitted for the management of Sepsis ([facility] , presented to ER with Shortness of Breath    reason for consultation:   Cavitary lung lesions  Impression :   Current:  · Right lower lobe cavitary lesions associated with multiloculated pleural fluid collection likely abscesses with empyema status post chest tube with Pseudomonas and yeast growth on culture  · Sepsis secondary to above  · Acute on chronic respiratory failure required intubation  · Right-sided pneumothorax  · Severe COPD        Recommendations      · IV meropenem through 5/14/2022  · Midline   · CANDIDA DUBLINIENSIS growth on bronchoscopy likely colonization. · Follow CBC and BUN/creatinine weekly while on antibiotics  · Continue supportive care  · No objection for discharge from infectious disease point of view  · Follow-up CT chest in 3-4 weeks                    History of Present Illness:   Initial history:  Michelle Bo is a 74y.o.-year-old female presents to the hospital with worsening shortness of breath associated with cough. At the ER with tachypneic and hypoxic  Chest x-ray showed pneumothorax  The patient was in respiratory distress, was intubated. CT chest 4/16/2022 showed right lower lobe cavitary lesion with surrounding airspace disease and multiloculated pleural collection. The patient had chest tube placed   Initial WBC was 18.5, procalcitonin no more than 100  Respiratory panel with COVID-19 PCR was negative  Urine for Legionella and strep pneumo antigen negative  Status post bronchoscopy 4/18/2020 with Pseudomonas and yeast growth on culture   Chest tube was removed  Ct chest from 4/25 showed mod/large right pneumothorax and pleural fluid, the cavitary lesion is smaller compared to previous exam.  Chest tube was reinserted 4/25/2022 . Pseudomonas growth on pleural fluid culture.   She was extubated 4/28/2022  Repeat CT chest without contrast from 4/29/2022 showed resolving pneumothorax, no evidence of loculated effusion, multiple small cavitary lesions. Interval changes  5/3/2022   She is comfortable, complaining of right chest wall pain, on oxygen by nasal cannula, occasional cough, denied increased shortness of breath  Chest tube was removed 4/30/2021  Chest x-ray from 5/1/2022  showed no pneumothorax             Significant therapeutic interventions:     Significant Diagnostic Studies:   Labs / Micro:        ,     Radiology:    ECHO Complete 2D W Doppler W Color    Result Date: 4/18/2022  1604 [facility] Transthoracic Echocardiography Report (TTE)  Patient Name Addie Heck     Date of Study               04/18/2022               BOBBY OROSCO   Date of      1955  Gender                      Female  Birth   Age          74 year(s)  Race                           Room Number  2002        Height:                     65 inch, 165.1 cm   Corporate ID [id number]    Weight:                     176 pounds, 79.8 kg  #   Patient Acct [de-identified]   BSA:          1.87 m^2      BMI:      29.29  #                                                              kg/m^2   MR #         [id number]      Sonographer                 Мария Stein   Accession #  [id number]  Interpreting Physician      Giovanna Cosme   Fellow                   Referring Nurse                           Practitioner   Interpreting             Referring Physician         Selena Monsivais  Type of Study   TTE procedure:2D Echocardiogram, M-Mode, Doppler, Color Doppler. Procedure Date Date: 04/18/2022 Start: 10:35 AM Study Location: [address] [place] [address] Technical Quality: Fair visualization Indications:Dyspnea/SOB, Respiratory Failure and Pulmonary embolus. History / Tech. Comments: COPD, HLD, Sleep apnea Patient Status: Inpatient Height: 65 inches Weight: 176 pounds BSA: 1.87 m^2 BMI: 29.29 kg/m^2 Rhythm: Sinus bradycardia HR: 57 bpm BP: 138/65 mmHg CONCLUSIONS Summary Left ventricle is normal in size and wall thickness.  Global left ventricular systolic function appears mildly reduced. Estimated LV EF 45-50 %. No obvious wall motion abnormality seen. RV size appears normal mildly reduced function Left atrium is normal in size. No significant valvular regurgitation or stenosis seen. No significant pericardial effusion is seen. Normal aortic root dimension. Dilated IVC, impaired or no respiratory variations suggestive of elevate Rt atrial pressure Signature ----------------------------------------------------------------------------  Electronically signed by Giovanna Cosme(Interpreting physician) on  04/18/2022 05:06 PM ---------------------------------------------------------------------------- ----------------------------------------------------------------------------  Electronically signed by Мария Stein(Sonographer) on 04/18/2022 02:29  PM ---------------------------------------------------------------------------- FINDINGS Left Atrium Left atrium is normal in size. Left Ventricle Left ventricle is normal in size and wall thickness. Global left ventricular systolic function is mildly reduced . Estimated LV EF  %. No obvious wall motion abnormality seen. Right Atrium Right atrium is normal in size. Right Ventricle Normal right ventricular size , mildly reduced function. Mitral Valve No obvious valvular abnormality seen. No evidence of mitral regurgitation. Aortic Valve No obvious valvular abnormality seen. No evidence of aortic insufficiency or stenosis. Tricuspid Valve No obvious valvular abnormality seen. Insignificant tricuspid regurgitation, unable to estimate RVSP. Pulmonic Valve Pulmonic valve was not well visualized. No evidence of pulmonic insufficiency or stenosis. Pericardial Effusion No significant pericardial effusion is seen. Pleural Effusion No pleural effusion seen. Miscellaneous Normal aortic root dimension. IVC Increased diameter and impaired or no inspiratory variation indicating elevated RA filling pressure (i.e. CVP) .  M-mode / 2D Measurements & Calculations:   LVIDd:4.74 cm(3.7 - 5.6 cm)      Aortic Root:3.3 cm(2.0 - 3.7 cm)  LVIDs:3.28 cm(2.2 - 4.0 cm)      LA Dimension: 3.5 cm(1.9 - 4.0 cm)  IVSd:1.05 cm(0.6 - 1.1 cm)       LA volume/Index: 40.7 ml /22m^2  LVPWd:0.93 cm(0.6 - 1.1 cm)  Fractional Shortenin.8 %      XR CHEST (SINGLE VIEW FRONTAL)    Result Date: 2022  EXAMINATION: ONE XRAY VIEW OF THE CHEST 2022 11:53 am COMPARISON: 2022, 2022, 2022 HISTORY: ORDERING SYSTEM PROVIDED HISTORY: chest tube placement TECHNOLOGIST PROVIDED HISTORY: chest tube placement Reason for Exam: chest tube placement FINDINGS: Interval placement of a large-bore thoracostomy tube terminating over the medial right lung base. There appears to be good lung re-expansion with only small volume residual pneumothorax. Increased patchy opacities along the right lung base. Left lung is grossly clear on a background of emphysematous change. Cardiomediastinal contours are within normal limits and unchanged. Subcutaneous emphysema along the partially visualized right lateral chest wall and along the base of the neck on the right. The tip of the ET tube is 4 cm above the nimesh. Enteric tube tip is subdiaphragmatic in location coursing beyond the inferior field of view. Right internal jugular central venous catheter terminates over the superior cavoatrial junction. Large bore right-sided thoracostomy tube terminating over the medial right lung base. Good lung re-expansion with only small volume residual pneumothorax. XR CHEST (SINGLE VIEW FRONTAL)    Result Date: 2022  EXAMINATION: ONE XRAY VIEW OF THE CHEST 2022 8:55 am COMPARISON: April 3, 2022 HISTORY: ORDERING SYSTEM PROVIDED HISTORY: pna TECHNOLOGIST PROVIDED HISTORY: pna Reason for Exam: pna FINDINGS: Stable position of the right internal jugular central venous catheter. Right infrahilar airspace opacity not significantly changed. No new focal lung abnormality.   No sizable pleural effusion. No pneumothorax. Stable right infrahilar airspace opacity     CT CHEST WO CONTRAST    Result Date: 4/29/2022  EXAMINATION: CT OF THE CHEST WITHOUT CONTRAST 4/29/2022 1:47 pm TECHNIQUE: CT of the chest was performed without the administration of intravenous contrast. Multiplanar reformatted images are provided for review. Dose modulation, iterative reconstruction, and/or weight based adjustment of the mA/kV was utilized to reduce the radiation dose to as low as reasonably achievable. COMPARISON: 04/25/2022 HISTORY: ORDERING SYSTEM PROVIDED HISTORY: Loculated pleural effusion TECHNOLOGIST PROVIDED HISTORY: Loculated pleural effusion Reason for Exam: pt sob. pt has chest tube on right side d/t pleural effusion. FINDINGS: CT chest: Lines and tubes:  None. Lungs and Airways and Pleura:  Central airways are patent. Multiple small cavitary lesions within the right upper lobe and right lower lobe with the largest cavitary lesion noted within the basilar aspect of the right lower lobe may now measuring 5.2 by 4.3 cm and demonstrates decreased thickening of the wall and more centralized cavitation. Findings are likely related to infectious/inflammatory etiology. Moderate emphysematous changes of the lungs. Previously noted right-sided pneumothorax has significantly resolved. There is only small locules of air remaining within the small layering right pleural effusion suggestive of a small right-sided hydropneumothorax. No evidence of loculated effusion. Lymph nodes: No pathologically enlarged mediastinal, hilar, lower cervical, or chest wall lymph nodes. Cardiovascular and Mediastinum: No acute aortic pathology. Cardiac chamber sizes appear to measure within normal limits on this non ECG gated study. No pericardial effusion. The thyroid gland is unremarkable. The esophagus is unremarkable. Bones/Soft tissues: Unchanged chronic inferior endplate compression fracture T5 and T6 with 30% height loss. Julisa Mcnamara   No definite suspicious lytic or blastic foci. Persistent right chest wall soft tissue emphysema. Visualized upper abdomen: Unremarkable. Previously noted right-sided pneumothorax has significantly resolved. There is only small locules of air remaining within the small layering right pleural effusion suggestive of a small right-sided hydropneumothorax. No evidence of loculated effusion. Persistent right chest wall soft tissue emphysema. Multiple small cavitary lesions within the right upper lobe and right lower lobe with the largest cavitary lesion noted within the basilar aspect of the right lower lobe may now measuring 5.2 by 4.3 cm and demonstrates decreased thickening of the wall and more centralized cavitation. Findings are likely related to infectious/inflammatory etiology. Moderate emphysematous changes of the lungs. Unchanged chronic inferior compression fracture of T5 and T6.     CT CHEST WO CONTRAST    Result Date: 4/25/2022  EXAMINATION: CT OF THE CHEST WITHOUT CONTRAST 4/25/2022 11:29 am TECHNIQUE: CT of the chest was performed without the administration of intravenous contrast. Multiplanar reformatted images are provided for review. Dose modulation, iterative reconstruction, and/or weight based adjustment of the mA/kV was utilized to reduce the radiation dose to as low as reasonably achievable. COMPARISON: CT chest performed 04/16/2022. HISTORY: ORDERING SYSTEM PROVIDED HISTORY: Empyema TECHNOLOGIST PROVIDED HISTORY: Empyema Reason for Exam: Empyema per order Additional signs and symptoms: pt. intubated Relevant Medical/Surgical History: pt. unable to tolerate arms over head for exam FINDINGS: Mediastinum: Soft tissues of the thoracic inlet are unremarkable. The thoracic aorta is normal in caliber. Main pulmonary artery is normal in caliber. There is no pericardial effusion. There is no mediastinal or hilar adenopathy. There is an endotracheal tube with the tip in the midtrachea.  Lungs/pleura: There is underlying emphysematous change. Left lung is clear. There is a moderate to large right-sided pneumothorax. There are few scattered similar nodular foci within the upper aspect of the right lung. There is pleural fluid containing septations and scattered air pockets. There is a thick-walled cavitary area that is smaller compared to prior exam measuring approximately 4.3 x 4.3 cm. Upper Abdomen: The upper abdomen is unremarkable. Soft Tissues/Bones: There is subcutaneous emphysema along the right lateral chest wall. There is no axillary adenopathy. There is no acute osseous abnormality. Moderate to large right-sided pneumothorax. Pleural fluid inferiorly and posteriorly containing septations and loculated areas of air or cavitation. The thick-walled cavitary lesion previously seen is smaller compared to prior examination. Similar pulmonary nodules primarily in the right upper lobe. Underlying emphysematous change. Subcutaneous emphysema along the right lateral chest wall. IR FLUORO GUIDED CVA DEVICE PLMT/REPLACE/REMOVAL    Result Date: 5/3/2022  PROCEDURE: ULTRASOUND GUIDED VASCULAR ACCESS. MIDLINE PLACEMENT 5/3/2022. HISTORY: ORDERING SYSTEM PROVIDED HISTORY: midline TECHNOLOGIST PROVIDED HISTORY: midline SEDATION: None FLUOROSCOPY DOSE AND TYPE OR TIME AND EXPOSURES: None TECHNIQUE AND FINDINGS: This procedure was performed by the radiology nurse supervised by Dr. Jodie Lopez. Informed consent was obtained after a detailed explanation of the procedure including risks, benefits, and alternatives. Universal protocol was observed. The right arm was prepped and draped in sterile fashion using maximum sterile barrier technique. Local anesthesia was achieved with lidocaine. A micropuncture needle was used to access the right brachial vein using ultrasound guidance. An ultrasound image demonstrating patency of the vein with needle tip located within it. An image was obtained and stored in PACs.  A 0.018 guidewire was used to place a peel-a-way sheath and 5.5 [place] 15 cm dual-lumen midline catheter was placed with its tip directed towards the region of the axillary vein. The catheter flushed easily and there was a good blood return. The catheter was secured to the skin. The patient tolerated the procedure well and there were no immediate complications. EBL: Minimal     Successful ultrasound guided midline placement     XR CHEST PORTABLE    Result Date: 5/1/2022  EXAMINATION: ONE XRAY VIEW OF THE CHEST 5/1/2022 8:39 am COMPARISON: 04/30/2022 HISTORY: ORDERING SYSTEM PROVIDED HISTORY: post chest tube removal TECHNOLOGIST PROVIDED HISTORY: post chest tube removal Reason for Exam: post chest tube removal FINDINGS: Right jugular central venous catheter remains in place. Heart size stable. No new airspace disease. No pneumothorax. Soft tissue gas again seen right chest wall. Stable chest, soft tissue gas again seen in the right chest wall, no pneumothorax     XR CHEST PORTABLE    Result Date: 4/30/2022  EXAMINATION: ONE XRAY VIEW OF THE CHEST 4/30/2022 5:06 pm COMPARISON: 04/30/2022 at 12:41 HISTORY: ORDERING SYSTEM PROVIDED HISTORY: chest tube removal TECHNOLOGIST PROVIDED HISTORY: chest tube removal FINDINGS: Right jugular central venous catheter remains in place. Heart size stable. No pneumothorax. Soft tissue gas right chest wall. No pneumothorax No acute cardiopulmonary process     XR CHEST PORTABLE    Result Date: 4/30/2022  EXAMINATION: ONE XRAY VIEW OF THE CHEST 4/30/2022 12:26 pm COMPARISON: AP chest from 04/30/2022 HISTORY: ORDERING SYSTEM PROVIDED HISTORY: chest tube removal TECHNOLOGIST PROVIDED HISTORY: chest tube removal Additional history of asthma, COPD, substance abuse, and kidney injury. FINDINGS: Previous right-sided chest tube no longer seen. Stable or decreased right sided subcutaneous emphysema. Overlying ECG monitor leads and gown snaps.  Right IJ central line tip position stable lower SVC. Cardiomediastinal shadow WNL and appropriate for slight rotation to the right. Left lung and costophrenic angle clear. Unchanged right upper nodular and less well defined right lower parenchymal densities, but no pneumothorax. Bones stable. Interval removal right-sided chest tube. No pneumothorax identified. No other interval change. Unchanged or slightly decreased right-sided subcutaneous emphysema. XR CHEST PORTABLE    Result Date: 4/30/2022  EXAMINATION: ONE XRAY VIEW OF THE CHEST 4/30/2022 6:08 am COMPARISON: 04/29/2022, 04/28/2022 HISTORY: ORDERING SYSTEM PROVIDED HISTORY: Acute respiratory failure TECHNOLOGIST PROVIDED HISTORY: Acute respiratory failure Reason for Exam: Acute respiratory failure Additional signs and symptoms: Acute respiratory failure Relevant Medical/Surgical History: Acute respiratory failure FINDINGS: Right chest tube remains in place. No pneumothorax identified. Right internal jugular line unchanged in position. Interstitial and ground-glass opacities in the right lung are again demonstrated without appreciable change. No new airspace disease or effusion identified. Similar appearance of subcutaneous emphysema. No significant interval change. XR CHEST PORTABLE    Result Date: 4/29/2022  EXAMINATION: ONE XRAY VIEW OF THE CHEST 4/29/2022 5:02 am COMPARISON: 04/28/2022 HISTORY: ORDERING SYSTEM PROVIDED HISTORY: Acute respiratory failure TECHNOLOGIST PROVIDED HISTORY: Acute respiratory failure Reason for Exam: Acute respiratory failure Additional signs and symptoms: Acute respiratory failure Relevant Medical/Surgical History: Acute respiratory failure FINDINGS: Endotracheal tube and nasogastric tube have been removed. Right internal jugular central venous catheter extends to expected location of cavoatrial junction. Multifocal heterogeneous opacity throughout the right lung is grossly similar to prior.   Right chest tube extends to the medial right hemithorax. No gross pneumothorax. Cardiac and mediastinal silhouettes are unchanged. Soft tissue emphysema is seen at the right chest wall and axilla. No substantial interval change in multifocal right-sided airspace disease as compared to prior. XR CHEST PORTABLE    Result Date: 4/28/2022  EXAMINATION: ONE XRAY VIEW OF THE CHEST 4/28/2022 2:33 pm COMPARISON: AP chest/20 HISTORY: ORDERING SYSTEM PROVIDED HISTORY: Chest tube re-exam. Pneumothorax TECHNOLOGIST PROVIDED HISTORY: Chest tube re-exam. Pneumothorax Reason for Exam: Chest tube re-exam. Pneumothorax History of asthma, COPD, substance abuse kidney injury. FINDINGS: Right IJ line tip position stable at the cavoatrial junction. ETT tip position unchanged approximately 3.3 cm above the nimesh. Enteric tube tip and side hole remain below left hemidiaphragm. Overlying ECG monitor leads gown snaps. Unchanged right-sided subcutaneous emphysema and chest tube entering 5-C6 with its tip overlying the hilum. Cardiomediastinal shadow stable. Scattered parenchymal densities right mid lower lung and slight basilar atelectasis or scarring. No new pulmonary finding. No sizable pleural effusion or pneumothorax. Bones appear unchanged. Unchanged support and right-sided chest tubes; otherwise stable findings, as above. XR CHEST PORTABLE    Result Date: 4/28/2022  EXAMINATION: ONE XRAY VIEW OF THE CHEST 4/28/2022 6:08 am COMPARISON: Chest radiograph performed 04/27/2022. HISTORY: ORDERING SYSTEM PROVIDED HISTORY: Acute respiratory failure TECHNOLOGIST PROVIDED HISTORY: Acute respiratory failure Reason for Exam: ETT FINDINGS: There are trace effusions. There is no definite pneumothorax. The mediastinal structures are stable. The upper abdomen unremarkable. There is extensive subcutaneous emphysema along the right lateral chest wall and within the right breast.  There is a similar right-sided chest tube.   There is endotracheal tube with tip in the midtrachea. There is a gastric tube and the tip is not visualized. There is a right internal jugular line that is stable. Trace effusions. No definite pneumothorax. Stable support tubes. Subcutaneous emphysema along the right lateral chest wall and within the right breast.     XR CHEST PORTABLE    Result Date: 4/27/2022  EXAMINATION: ONE XRAY VIEW OF THE CHEST 4/27/2022 6:06 am COMPARISON: AP chest from 04/26/2022 HISTORY: ORDERING SYSTEM PROVIDED HISTORY: Acute respiratory failure TECHNOLOGIST PROVIDED HISTORY: Acute respiratory failure Reason for Exam: respiratory failure History of COPD, and acute on chronic respiratory failure. FINDINGS: ETT tip position stable, ~ 2.6 cm above nimesh. Enteric tube tip/side hole remain below left hemidiaphragm. Right IJ central line tip position stable upper RA. Right-sided chest tube position stable, entering lateral chest wall with medial tip abutting mid mediastinal pleura. Right-sided subcutaneous emphysema, unchanged. Overlying ECG monitor leads. Cardiomediastinal shadow stable. Increased right lung opacity, especially lower 1/2 right hemithorax, much could relate to superimposed breast soft tissue. Infiltrate/loculated pleural effusion should also be considered. Slightly increased right basilar atelectasis. No blunting right CP angle. Bones stable. Stable support and right-sided chest tube. Increased opacities right lung, more lower and lateral in location, much of which could be related to superimposed right breast tissue. Infiltrate/loculated pleural fluid should also be considered. Some increased atelectasis right base suspected. RECOMMENDATION: Continued chest x-ray follow-up (later today if respiratory failure/deteriorating clinical status suspected). XR CHEST PORTABLE    Result Date: 4/26/2022  EXAMINATION: ONE XRAY VIEW OF THE CHEST 4/25/2022 5:51 am COMPARISON: 04/24/2022 HISTORY: ORDERING SYSTEM PROVIDED HISTORY: Acute respiratory failure. TECHNOLOGIST PROVIDED HISTORY: Acute respiratory failure. Reason for Exam: Acute respiratory failure. Additional signs and symptoms: Acute respiratory failure. Relevant Medical/Surgical History: Acute respiratory failure. FINDINGS: Support lines and tubes are similar to the prior study. The endotracheal tube tip is 5 cm above the nimesh. The heart is normal in size for technique. No definite pleural effusion is seen. Suspected tiny right pneumothorax, along the lower chest wall laterally. Mild bibasilar airspace opacities again noted. Soft tissue emphysema is again seen along the right chest wall and neck. Suspected small right pneumothorax. Similar mild bibasilar airspace opacities. Support lines and tubes appear stable. XR CHEST PORTABLE    Result Date: 4/26/2022  EXAMINATION: ONE XRAY VIEW OF THE CHEST 4/26/2022 6:35 am COMPARISON: Chest radiograph performed 04/25/2022. HISTORY: ORDERING SYSTEM PROVIDED HISTORY: Acute respiratory failure TECHNOLOGIST PROVIDED HISTORY: Acute respiratory failure Reason for Exam: Acute resp failure, ETT FINDINGS: There is chronic pulmonary change. There are trace effusions. There is no pneumothorax. The mediastinal structures are unremarkable. The upper abdomen is unremarkable. The extrathoracic soft tissues are unremarkable. There is endotracheal tube with tip in the midtrachea. There is a gastric tube and the tip is not well visualized. There is a right internal jugular line with the tip near the cavoatrial junction. There is a right-sided chest tube with subcutaneous emphysema along the right lateral chest wall. Chronic pulmonary change. Support tubes as described above. Subcutaneous emphysema along the right lateral chest wall.      XR CHEST PORTABLE    Result Date: 4/24/2022  EXAMINATION: ONE XRAY VIEW OF THE CHEST 4/24/2022 5:51 am COMPARISON: 04/23/2022 HISTORY: ORDERING SYSTEM PROVIDED HISTORY: Acute respiratory failure TECHNOLOGIST PROVIDED HISTORY: Acute respiratory failure Reason for Exam: Acute respiratory failure Additional signs and symptoms: Acute respiratory failure Relevant Medical/Surgical History: Acute respiratory failure FINDINGS: Right central venous catheter. Endotracheal tube and nasogastric tube are stable. Right chest wall subcutaneous emphysema is noted. No significant pneumothorax is detected. Mild right lower lobe atelectasis and small right pleural effusion have improved. Improving small right pleural effusion and right lower lobe atelectasis. The lines and tubes are stable. XR CHEST PORTABLE    Result Date: 4/23/2022  EXAMINATION: ONE XRAY VIEW OF THE CHEST 4/23/2022 9:29 pm COMPARISON: 4/23/2022 HISTORY: ORDERING SYSTEM PROVIDED HISTORY: chest tube removal TECHNOLOGIST PROVIDED HISTORY: chest tube removal Reason for Exam: chest tube removal Additional signs and symptoms: chest tube removal Relevant Medical/Surgical History: chest tube removal FINDINGS: Right chest wall subcutaneous emphysema is identified. Endotracheal tube is located 0.7 cm above the nimesh. Suggest retraction by 2 cm. Nasogastric tube traverses the diaphragm. There is a right IJ catheter with the tip in the SVC. There has been removal of the right chest tube without evidence of significant pneumothorax. Small right pleural effusion and right lower lobe atelectasis are not significantly changed. No acute osseous abnormality is identified. Interval removal of right chest tube without evidence of significant pneumothorax. Endotracheal tube located 0.7 cm above the nimesh. Suggest retraction by 2 cm. Small right pleural effusion and right lower lobe atelectasis are unchanged.      XR CHEST PORTABLE    Result Date: 4/23/2022  EXAMINATION: ONE XRAY VIEW OF THE CHEST 4/23/2022 5:40 am COMPARISON: 04/22/2022 and 04/21/2022 HISTORY: ORDERING SYSTEM PROVIDED HISTORY: ETT placement TECHNOLOGIST PROVIDED HISTORY: ETT placement Reason for Exam: ETT placement Additional signs and symptoms: ETT placement Relevant Medical/Surgical History: ETT placement FINDINGS: Endotracheal tube tip is approximately 2 cm above the nimesh. Nasogastric tube continues into the stomach and off the exam.  Right PICC line is near the cavoatrial junction. The right chest tube is stable with the tip over the right upper lung but the side port outside the ribcage. Soft tissue emphysema in the right chest wall is redemonstrated and appears mildly increased. Some patchy opacities persist in the right base. Evaluation for right apical pneumothorax is suboptimal.  The left lung appears acceptable. Cardiac silhouette is not enlarged. The central pulmonary arteries appears stable. Limited evaluation of the right lateral ribs. 1.  Endotracheal tube, nasogastric tube, and right jugular catheter appear acceptable. The right chest tube side port is outside the ribcage. Correlate for functionality of the chest tube. 2.  Patchy opacities persist in the right lower chest.  The evaluation for small right apical pneumothorax is suboptimal on the current study. XR CHEST PORTABLE    Result Date: 4/22/2022  EXAMINATION: ONE XRAY VIEW OF THE CHEST 4/22/2022 6:29 am COMPARISON: 21 April 2022 HISTORY: ORDERING SYSTEM PROVIDED HISTORY: ETT placement TECHNOLOGIST PROVIDED HISTORY: ETT placement Reason for Exam: on vent FINDINGS: AP portable view of the chest time stamped at 623 hours demonstrates overlying cardiac monitoring electrodes, endotracheal tube terminating 4.8 cm above the nimesh and right internal jugular catheter terminating in the right atrium. Right-sided chest tube is again noted. Trace extrapleural air at the right apex persists. Subcutaneous emphysema along the right lateral chest wall is noted. Faint opacity is present at the right lung base suspicious for focal airspace disease. No mediastinal shift. Heart size is stable. Persistent small right apical pneumothorax.   Support tubes and lines as above. Opacity at the right base. There is elevation right hemidiaphragm. Atelectasis is evident but superimposed airspace disease may be present. XR CHEST PORTABLE    Result Date: 4/21/2022  EXAMINATION: ONE XRAY VIEW OF THE CHEST 4/21/2022 11:57 am COMPARISON: 04/21/2022, 0625 hours. HISTORY: ORDERING SYSTEM PROVIDED HISTORY: Chest tube now clamped TECHNOLOGIST PROVIDED HISTORY: Chest tube now clamped Reason for Exam: chest tube now clamped FINDINGS: The ET tube was in satisfactory position, 4.5 cm above the nimesh. The NG tube was passed the gastric fundus. A right jugular central venous line was noted with the tip in the superior vena cava near its junction with the right atrium. A right-sided chest tube was noted. The proximal most opening is just out of the right lateral chest wall. No pneumothorax was noted. No cardiomegaly, pneumonia, interstitial edema or definite effusions were noted. Mild hyper inflation was identified. The ET tube was in satisfactory position, 4.5 cm above the nimesh. The NG tube was past the gastric fundus. A right jugular central venous line was noted with the tip in the superior vena cava near its junction with the right atrium. No pneumothorax was identified. A right-sided chest tube was noted but the proximal most opening is just external to the right lateral chest wall. No cardiomegaly, pneumonia or interstitial edema. XR CHEST PORTABLE    Result Date: 4/21/2022  EXAMINATION: ONE XRAY VIEW OF THE CHEST 4/21/2022 6:30 am COMPARISON: 04/20/2022 HISTORY: ORDERING SYSTEM PROVIDED HISTORY: ETT placement TECHNOLOGIST PROVIDED HISTORY: ETT placement Reason for Exam: vent FINDINGS: Support tubes and lines are unchanged. The right chest tube side port is external to the chest wall which may predispose to air leak. Cardiac silhouette and mediastinal contours are unchanged. Calcified left hilar lymph nodes are noted. No pneumothorax.   No new lung infiltrate. Aeration of the right lung base has improved. 1. The right chest tube side port is external to the chest wall which may predispose to an air leak. No pneumothorax. 2. Improved aeration of the right lung base, likely related to atelectasis. XR CHEST PORTABLE    Result Date: 4/20/2022  EXAMINATION: ONE XRAY VIEW OF THE CHEST 4/20/2022 11:52 am COMPARISON: None. HISTORY: ORDERING SYSTEM PROVIDED HISTORY: Chest tube now to waterseal TECHNOLOGIST PROVIDED HISTORY: Chest tube now to waterseal Reason for Exam: Chest tube now to waterseal FINDINGS: There is a right chest tube in place. There is no evidence of pneumothorax. Some apparent atelectasis noted in the the right lung base. ET tube is in good position. Tip of central line overlies the right atrium. Left lung appears normal.  Heart appears unremarkable. No evidence of pneumothorax. Some atelectasis in the right lung base. Tip of centralized overlies the right atrium. It should be withdrawn by 6 cm. The findings were sent to the Radiology Results [address] at 12:21 pm on 4/20/2022 to be communicated to a licensed caregiver. XR CHEST PORTABLE    Result Date: 4/20/2022  EXAMINATION: ONE XRAY VIEW OF THE CHEST 4/20/2022 6:30 am COMPARISON: 04/19/2022 HISTORY: ORDERING SYSTEM PROVIDED HISTORY: ETT placement TECHNOLOGIST PROVIDED HISTORY: ETT placement Reason for Exam: ETT FINDINGS: The cardiac silhouette and mediastinal contours are stable. There is a right-sided chest tube with the side port noted external to the chest wall. Right internal jugular vein catheter, endotracheal tube, and orogastric tube are again noted. There is pulmonary vascular congestion. No pneumothorax. The patient is rotated towards the right. 1. Right chest tube side port is external to the chest wall which may predispose to an air leak. 2. Stable pulmonary vascular congestion. 3. No pneumothorax is identified.      XR CHEST PORTABLE    Result Date: 4/19/2022  EXAMINATION: ONE X-RAY VIEW OF THE CHEST 4/18/2022 6:27 am COMPARISON: 04/17/2022 HISTORY: ORDERING SYSTEM PROVIDED HISTORY: ETT placement TECHNOLOGIST PROVIDED HISTORY: ETT placement Reason for Exam: ETT placement FINDINGS: The endotracheal tube, nasogastric tube, right IJ catheter and right-sided chest tube remain. The extreme lung apices are excluded from the examination. A pneumothorax is not identified. Right basilar consolidation has increased peripherally. Increased right basilar opacity may reflect fluid filling the large cavitary lesion at the right lung base. Pulmonary consolidation/increased pleural effusion or empyema are alternate considerations. XR CHEST PORTABLE    Result Date: 4/19/2022  EXAMINATION: ONE XRAY VIEW OF THE CHEST 4/19/2022 6:34 am COMPARISON: Chest radiograph performed 04/18/2022. HISTORY: ORDERING SYSTEM PROVIDED HISTORY: ETT placement TECHNOLOGIST PROVIDED HISTORY: ETT placement Reason for Exam: on vent FINDINGS: There is right lower lung infiltrate. There is no pneumothorax. The mediastinal structures are unremarkable. The upper abdomen is unremarkable. The extrathoracic soft tissues are unremarkable. There is an endotracheal tube with the tip in the midtrachea. There is a right-sided chest tube. There is a right internal jugular central line with the tip at the cavoatrial junction. There is a gastric tube and the tip is not well visualized. Right lower lung infiltrate that is mildly improved. Support tubes as described above.      XR CHEST PORTABLE    Result Date: 4/17/2022  EXAMINATION: ONE XRAY VIEW OF THE CHEST 4/17/2022 6:04 am COMPARISON: 04/16/2022, 1248 hours HISTORY: ORDERING SYSTEM PROVIDED HISTORY: ETT placement TECHNOLOGIST PROVIDED HISTORY: ETT placement Reason for Exam: ETT 53-year-old female with endotracheal tube placement FINDINGS: Endotracheal tube distal tip overlying the mid trachea approximately 4.8 cm above the level of the nimesh. Enteric tube traverses the GE junction with distal tip excluded from the field of view. Right IJ approach central venous catheter distal tip overlying the right atrium. Right-sided chest tube distal tip projects over the right hilum. Cardiac monitor leads overlie the chest. No obvious pneumothorax. No free air. Cardiac and mediastinal contours remain unchanged. Left lung is relatively clear. Persistent airspace disease at the right mid and right lower lung zones. Visualized osseous structures remain unchanged. Subcutaneous emphysema previously seen at the right lateral chest wall no longer identified. 1. Persistent airspace disease at the right mid and right lower lung zones. Follow-up is recommended to document resolution. 2. Tubes and right IJ line as detailed above. XR CHEST PORTABLE    Result Date: 4/16/2022  EXAMINATION: ONE XRAY VIEW OF THE CHEST 4/16/2022 1:10 pm COMPARISON: 04/16/2022, 1213 hours HISTORY: ORDERING SYSTEM PROVIDED HISTORY: chest tube TECHNOLOGIST PROVIDED HISTORY: chest tube Reason for Exam: chest tube Additional signs and symptoms: chest tube and NG tube placement 44-year-old female with history of NG tube and chest tube placement FINDINGS: Portable supine view of the chest. Right-sided chest tube has been placed with distal tip projecting over the right infrahilar region. Right IJ approach central venous catheter distal tip overlying the cavoatrial junction, stable. Endotracheal tube distal tip overlying the mid trachea approximately 4.3 cm above the level of the nimesh. Enteric tube traverses the GE junction with distal tip projecting over the left mid abdomen likely within the stomach body. Left lung is clear. Pleural thickening and opacity involving the right mid and right lower lung zones. Subcutaneous emphysema along the right lateral chest wall. Cardiac and mediastinal contours remain unchanged. Atherosclerotic calcification of the thoracic aorta.   No free air.  There is re-expansion of the right lung compared with the prior study. Probable small residual inferolateral right pneumothorax component. 1. Tubes and right IJ line as detailed above. Re-expansion of the right lung compared with the prior study. There is likely a small residual right inferolateral pneumothorax component. 2. Pleural thickening and airspace opacity involving the right mid and right lower lung zones. Follow-up is recommended to document resolution. 3. Subcutaneous emphysema along the right lateral chest wall. XR CHEST PORTABLE    Result Date: 4/16/2022  EXAMINATION: ONE XRAY VIEW OF THE CHEST 4/16/2022 12:24 pm COMPARISON: None. HISTORY: ORDERING SYSTEM PROVIDED HISTORY: Intubation TECHNOLOGIST PROVIDED HISTORY: Intubation Reason for Exam: central line and ET placement FINDINGS: ET tube terminates 3 cm above the nimesh. Right line terminates in the SVC. There is a relatively stable right-sided basilar pneumothorax. The remaining lungs are unchanged. Cardiac silhouette and osseous structures unchanged. Intubation as above. No significant change     XR CHEST PORTABLE    Result Date: 4/16/2022  EXAMINATION: ONE XRAY VIEW OF THE CHEST 4/16/2022 11:02 am COMPARISON: 04/05/2022 HISTORY: ORDERING SYSTEM PROVIDED HISTORY: SOB TECHNOLOGIST PROVIDED HISTORY: SOB Reason for Exam: SOB FINDINGS: There is a moderate loculated right basal pneumothorax. Cavitary lesion is noted in the right lung base. Left lung is unremarkable. Heart and mediastinal structures appear normal.  There is no evidence for any tension phenomena. Moderate loculated right basal pneumothorax. Evidence for tension. Cavitary lesion noted in the right lung base. .  Case discussed with Dr. Bairon Farooq.      CT CHEST PULMONARY EMBOLISM W CONTRAST    Result Date: 4/16/2022  EXAMINATION: CTA OF THE CHEST 4/16/2022 2:30 pm TECHNIQUE: CTA of the chest was performed after the administration of intravenous contrast. Multiplanar reformatted images are provided for review. MIP images are provided for review. Dose modulation, iterative reconstruction, and/or weight based adjustment of the mA/kV was utilized to reduce the radiation dose to as low as reasonably achievable. COMPARISON: CT chest from 10/15/2021 HISTORY: ORDERING SYSTEM PROVIDED HISTORY: SOB TECHNOLOGIST PROVIDED HISTORY: SOB Decision Support Exception - unselect if not a suspected or confirmed emergency medical condition->Emergency Medical Condition (MA) Reason for Exam: sob Relevant Medical/Surgical History: intubated, septic, hx of PE 82-year-old female with shortness of breath FINDINGS: Pulmonary Arteries: No obvious filling defect in the main, right main, or left main pulmonary arteries. Evaluation of the segmental and subsegmental pulmonary arterial vasculature is limited due to respiratory motion suboptimal bolus timing, airspace disease, and streak artifact. There are areas of probable residual linear chronic clot within the right lower lobar pulmonary artery on image 111, series 2. Probable linear residual clot within the anterior right upper lobe pulmonary artery on image 83, series 2. Mediastinum: Atherosclerotic calcification of the aorta and branch vasculature. No dissection flap within the visualized thoracic aorta. No pericardial effusion. There is fluid in the superior pericardial recess. Enlarged precarinal lymph nodes remain unchanged on image 83, series 2. Right hilar lymphadenopathy is seen on image 96, series 2. Coronary artery disease. No left hilar or axillary lymphadenopathy. Lungs/pleura: Endotracheal tube distal tip within the lower trachea. Trachea and very proximal central airways appear patent. Narrowing of the right middle lobe airway into a region of partial consolidation/atelectasis/scarring. Opacification of the right lower lobar segmental airways.   There is a thick-walled cavitary lesion in the right lower lobe measuring 5.5 x 7.3 cm in greatest AP and transverse dimensions on image 68, series 4. There is a dependent air-fluid level within this lesion. This area measures 7.6 cm in greatest craniocaudal extent on image 48, series 602. There is surrounding airspace disease. There is a gas and fluid containing multiloculated pleural collection or hydropneumothorax along the posterior margin of the right lung. Right sided chest tube distal tip along the anteromedial right lung. Subcutaneous emphysema along the right axilla and right lateral chest wall. Stellate pulmonary nodule in the lateral right upper lobe measuring 6 mm on image 32, series 4. Moderate to severe emphysema, dependent atelectasis and respiratory motion. Upper Abdomen: Fatty liver. NG tube is seen extending into the stomach body. Atherosclerotic calcification of the upper abdominal aorta and branch vasculature. Soft Tissues/Bones: Chronic anterior wedge compression deformities, unchanged from 10/15/2021 involving T5 and T6. Mild diffuse degenerative changes throughout the spine. 1. Linear filling defects in the anterior right upper lobe and right lower lobe pulmonary arteries likely representing residual/chronic clot material. No acute central filling defect/pulmonary embolus is evident. 2. Thick-walled cavitary lesion in the right lower lobe measuring up to 5.5 x 7.3 x 7.6 cm which could be related to TB or fungal disease or a necrotizing pneumonia. Underlying cavitary malignancy not entirely excluded. There is surrounding airspace disease. There is also an associated multiloculated pleural collection or hydropneumothorax primarily along the posterior margin of the right lung. Right-sided chest tube distal tip along the anteromedial right pleura/lung. 3. Partial consolidation, atelectasis and/or infiltrate of the right middle lobe. 4. Stellate 6 mm lateral right upper lobe pulmonary nodule. Follow-up guidelines provided below.  5. Underlying moderate to severe emphysema. 6. Endotracheal and NG tubes as above. 7. Coronary artery disease. 8. Chronic anterior wedge compression deformities of T5 and T6. 9. Subcutaneous emphysema along the right axilla and right lateral chest wall likely related to chest tube. 10. Mediastinal and right hilar lymphadenopathy. RECOMMENDATIONS: 6 mm suspicious right solid pulmonary nodule within the upper lobe. Recommend a non-contrast Chest CT at 6-12 months, then another non-contrast Chest CT at 18-24 months. These guidelines do not apply to immunocompromised patients and patients with cancer. Follow up in patients with significant comorbidities as clinically warranted. For lung cancer screening, adhere to Lung-RADS guidelines. Reference: Radiology. 2017; 284(1):228-43. VL Lower Extremity Bilateral Venous Duplex    Result Date: 4/18/2022    [facility]  Vascular Lower Extremities DVT Study Procedure   Patient Name   Maricruz hSane     Date of Study           04/18/2022                 BOBBY OROSCO   Date of Birth  1955  Gender                  Female   Age            74 year(s)  Race                       Room Number    2002   Corporate ID # [id number]   Patient Acct # [de-identified]   MR #           [id number]      Sonographer             Audie Cuello RVT   Accession #    [id number]  Interpreting Physician  Domo Norris   Referring                  Referring Physician     Jonathan Armstrong  Nurse  Practitioner  Procedure Type of Study:   Veins: Lower Extremities DVT Study, Venous Scan Lower Bilateral.  Indications for Study:R/O DVT. Patient Status: In Patient. Technical Quality:Adequate visualization. Conclusions   Summary   Bilateral:  No evidence of deep or superficial venous thrombosis.    Signature   ----------------------------------------------------------------  Electronically signed by Audie Cuello RVT(Sonographer) on  04/18/2022 07:45 AM  ---------------------------------------------------------------- ----------------------------------------------------------------  Electronically signed by [facility] physician)  on 04/18/2022 01:10 PM  ----------------------------------------------------------------  Findings:   Right Impression:                    Left Impression:  The common femoral, femoral,         The common femoral, femoral,  popliteal and tibial veins           popliteal and tibial veins  demonstrate normal compressibility   demonstrate normal compressibility  and augmentation. and augmentation. Normal compressibility of the great  Normal compressibility of the great  saphenous vein. saphenous vein. Normal compressibility of the small  Normal compressibility of the small  saphenous vein. saphenous vein. Velocities are measured in cm/s ; Diameters are measured in cm Right Lower Extremities DVT Study Measurements Right 2D Measurements +------------------------------------+----------+---------------+----------+ ! Location                            ! Visualized! Compressibility! Thrombosis! +------------------------------------+----------+---------------+----------+ ! Common Femoral                      !Yes       ! Yes            ! None      ! +------------------------------------+----------+---------------+----------+ ! Prox Femoral                        !Yes       ! Yes            ! None      ! +------------------------------------+----------+---------------+----------+ ! Mid Femoral                         !Yes       ! Yes            ! None      ! +------------------------------------+----------+---------------+----------+ ! Dist Femoral                        !Yes       ! Yes            ! None      ! +------------------------------------+----------+---------------+----------+ ! Deep Femoral                        !Yes       ! Yes            ! None      ! +------------------------------------+----------+---------------+----------+ ! Popliteal !Yes       !Yes            ! None      ! +------------------------------------+----------+---------------+----------+ ! Sapheno Femoral Junction            ! Yes       ! Yes            ! None      ! +------------------------------------+----------+---------------+----------+ ! PTV                                 ! Yes       ! Yes            ! None      ! +------------------------------------+----------+---------------+----------+ ! Peroneal                            !Yes       ! Yes            ! None      ! +------------------------------------+----------+---------------+----------+ ! Gastroc                             ! Yes       ! Yes            ! None      ! +------------------------------------+----------+---------------+----------+ ! GSV Thigh                           ! Yes       ! Yes            ! None      ! +------------------------------------+----------+---------------+----------+ ! GSV Knee                            ! Yes       ! Yes            ! None      ! +------------------------------------+----------+---------------+----------+ ! GSV Ankle                           ! Yes       ! Yes            ! None      ! +------------------------------------+----------+---------------+----------+ ! SSV                                 ! Yes       ! Yes            ! None      ! +------------------------------------+----------+---------------+----------+ Left Lower Extremities DVT Study Measurements Left 2D Measurements +------------------------------------+----------+---------------+----------+ ! Location                            ! Visualized! Compressibility! Thrombosis! +------------------------------------+----------+---------------+----------+ ! Common Femoral                      !Yes       ! Yes            ! None      ! +------------------------------------+----------+---------------+----------+ ! Prox Femoral                        !Yes       ! Yes            ! None      ! +------------------------------------+----------+---------------+----------+ ! Mid Femoral                         !Yes       ! Yes            ! None      ! +------------------------------------+----------+---------------+----------+ ! Dist Femoral                        !Yes       ! Yes            ! None      ! +------------------------------------+----------+---------------+----------+ ! Deep Femoral                        !Yes       ! Yes            ! None      ! +------------------------------------+----------+---------------+----------+ ! Popliteal                           !Yes       ! Yes            ! None      ! +------------------------------------+----------+---------------+----------+ ! Sapheno Femoral Junction            ! Yes       ! Yes            ! None      ! +------------------------------------+----------+---------------+----------+ ! PTV                                 ! Yes       ! Yes            ! None      ! +------------------------------------+----------+---------------+----------+ ! Peroneal                            !Yes       ! Yes            ! None      ! +------------------------------------+----------+---------------+----------+ ! Gastroc                             ! Yes       ! Yes            ! None      ! +------------------------------------+----------+---------------+----------+ ! GSV Thigh                           ! Yes       ! Yes            ! None      ! +------------------------------------+----------+---------------+----------+ ! GSV Knee                            ! Yes       ! Yes            ! None      ! +------------------------------------+----------+---------------+----------+ ! GSV Ankle                           ! Yes       ! Yes            ! None      ! +------------------------------------+----------+---------------+----------+ ! SSV                                 ! Yes       ! Yes            ! None      ! +------------------------------------+----------+---------------+----------+    FL MODIFIED BARIUM SWALLOW W VIDEO    Result Date: 4/4/2022  EXAMINATION: MODIFIED BARIUM SWALLOW WAS PERFORMED IN CONJUNCTION WITH SPEECH PATHOLOGY SERVICES TECHNIQUE: Fluoroscopic evaluation of the swallowing mechanism was performed using cineradiography with multiple consistency of barium product in conjunction with speech pathology services. FLUOROSCOPY DOSE AND TYPE OR TIME AND EXPOSURES: DAP 49.2 dGy cm squared COMPARISON: None HISTORY: ORDERING SYSTEM PROVIDED HISTORY: aspiration pna TECHNOLOGIST PROVIDED HISTORY: aspiration pna Reason for Exam: aspiration pneumonia FINDINGS: Premature vallecular spillage. There was trace penetration with straw with thin liquid. No aspiration. No aspiration. Trace penetration with straw with thin liquids. Please see separate speech pathology report for full discussion of findings and recommendations. RECOMMENDATIONS: Unavailable         Consultations:    Consults:     Final Specialist Recommendations/Findings:   IP CONSULT TO PULMONOLOGY  IP CONSULT TO INTERNAL MEDICINE  IP CONSULT TO DIETITIAN  IP CONSULT TO INFECTIOUS DISEASES  IP CONSULT TO GENERAL SURGERY  IP CONSULT TO PHYSICAL MEDICINE REHAB  IP CONSULT TO SOCIAL WORK      The patient was seen and examined on day of discharge and this discharge summary is in conjunction with any daily progress note from day of discharge. Discharge plan:     Disposition: Home    Physician Follow Up:     [address] [facility]  [phone]    They will call you at home to set up a time to come to your house.     Daniel Fan MD  [facility]  [address] [facility] [address] [phone]             Requiring Further Evaluation/Follow Up POST HOSPITALIZATION/Incidental Findings:    Diet: cardiac diet    Activity: As tolerated    Instructions to Patient:     Discharge Medications:      Medication List      START taking these medications    famotidine 20 MG tablet  Commonly known as: PEPCID  Take 1 tablet by mouth 2 times daily     folic acid 1 MG tablet  Commonly known as: FOLVITE  Take 1 tablet by mouth daily  Start taking on: May 4, 2022     furosemide 20 MG tablet  Commonly known as: LASIX  Take 1 tablet by mouth 2 times daily     glucose 40 % Gel  Commonly known as: GLUTOSE  Take 37.5 mLs by mouth as needed (low blood glucose)     hydrALAZINE 20 MG/ML injection  Commonly known as: APRESOLINE  Infuse 0.5 mLs intravenously every 4 hours as needed (For systolic blood pressure greater than 772 or diastolic greater than 875)     insulin lispro 100 UNIT/ML Soln injection vial  Commonly known as: HUMALOG  Inject 0-3 Units into the skin nightly     meropenem  infusion  Commonly known as: MERREM  Infuse 1,000 mg intravenously every 8 hours for 11 days Compound per protocol. miconazole 2 % powder  Commonly known as: MICOTIN  Apply topically 2 times daily. midodrine 5 MG tablet  Commonly known as: PROAMATINE  Take 1 tablet by mouth 3 times daily as needed (for SBP <90)     oxyCODONE-acetaminophen 5-325 MG per tablet  Commonly known as: PERCOCET  Take 1 tablet by mouth every 4 hours as needed for Pain for up to 3 days. polyethylene glycol 17 g packet  Commonly known as: GLYCOLAX  Take 17 g by mouth daily  Start taking on: May 4, 2022     potassium chloride 20 MEQ extended release tablet  Commonly known as: KLOR-CON M  Take 2 tablets by mouth as needed (Per Potassium Replacement Protocol)     predniSONE 10 MG tablet  Commonly known as: DELTASONE  Take 3 tablets by mouth daily for 10 days  Start taking on: May 4, 2022     therapeutic multivitamin-minerals tablet  Take 1 tablet by mouth daily  Start taking on: May 4, 2022     vitamin B-1 100 MG tablet  Commonly known as: THIAMINE  Take 1 tablet by mouth daily  Start taking on:  May 4, 2022        CHANGE how you take these medications    risperiDONE 0.5 MG tablet  Commonly known as: RISPERDAL  Take 3 tablets by mouth every 12 hours  What changed: additional instructions     Ventolin  (90 Base) MCG/ACT inhaler  Generic drug: albuterol sulfate HFA  What changed: Another medication with the same name was removed. Continue taking this medication, and follow the directions you see here. CONTINUE taking these medications    benzonatate 100 MG capsule  Commonly known as: Tessalon Perles  Take 1 capsule by mouth 3 times daily as needed for Cough     Breo Ellipta 100-25 MCG/INH Aepb inhaler  Generic drug: fluticasone-vilanterol     Calcium Carb-Cholecalciferol 600-500 MG-UNIT Tabs     cetirizine 10 MG tablet  Commonly known as: ZYRTEC     docusate sodium 100 MG capsule  Commonly known as: COLACE  take 1 capsule by mouth twice a day     fluticasone 50 MCG/ACT nasal spray  Commonly known as: Flonase  2 sprays by Nasal route daily     * lidocaine 4 % cream  Commonly known as: LMX  apply topically every 8 hours if needed     * lidocaine 4 % external patch  Place 1 patch onto the skin daily     olopatadine 0.1 % ophthalmic solution  Commonly known as: PATANOL     simvastatin 20 MG tablet  Commonly known as: ZOCOR  Take 1 tablet by mouth nightly     sodium chloride 0.65 % nasal spray  Commonly known as: OCEAN, BABY AYR     Spiriva Respimat 2.5 MCG/ACT Aers inhaler  Generic drug: tiotropium  inhale 2 puffs INTO THE LUNGS once daily     * topiramate 50 MG tablet  Commonly known as: TOPAMAX     * topiramate 50 MG tablet  Commonly known as: TOPAMAX     traZODone 50 MG tablet  Commonly known as: DESYREL     vitamin B-12 100 MCG tablet  Commonly known as: CYANOCOBALAMIN     vitamin D 1.25 MG (82660 UT) Caps capsule  Commonly known as: ERGOCALCIFEROL  take 1 capsule by mouth every week         * This list has 4 medication(s) that are the same as other medications prescribed for you. Read the directions carefully, and ask your doctor or other care provider to review them with you.             STOP taking these medications    acetaminophen 500 MG tablet  Commonly known as: Acetaminophen Extra Strength     aspirin 81 MG EC tablet     butalbital-acetaminophen-caffeine -40 MG per tablet  Commonly known as: FIORICET, ESGIC     EPINEPHrine 0.3 MG/0.3ML Soaj injection  Commonly known as: EpiPen 2-Tucker     ibandronate 150 MG tablet  Commonly known as: Boniva     ibuprofen 400 MG tablet  Commonly known as: ADVIL;MOTRIN     rivastigmine 4.5 MG capsule  Commonly known as: EXELON           Where to Get Your Medications      These medications were sent to [facility]  [address]  [address], [address] [facility] [address]    Hours: [facility] (Mon-Fri 9AM-5:30PM) Phone: [phone]   · miconazole 2 % powder  · midodrine 5 MG tablet  · polyethylene glycol 17 g packet  · potassium chloride 20 MEQ extended release tablet  · predniSONE 10 MG tablet  · risperiDONE 0.5 MG tablet  · therapeutic multivitamin-minerals tablet  · vitamin B-1 100 MG tablet     You can get these medications from any pharmacy    Bring a paper prescription for each of these medications  · benzonatate 100 MG capsule  · famotidine 20 MG tablet  · folic acid 1 MG tablet  · furosemide 20 MG tablet  · glucose 40 % Gel  · hydrALAZINE 20 MG/ML injection  · insulin lispro 100 UNIT/ML Soln injection vial  · meropenem  infusion  · oxyCODONE-acetaminophen 5-325 MG per tablet         Time Spent on discharge is  35 mins in patient examination, evaluation, counseling as well as medication reconciliation, prescriptions for required medications, discharge plan and follow up. Electronically signed by   Lee Wilcox MD  5/3/2022  3:56 PM      Thank you Dr. Thomas Parker MD for the opportunity to be involved in this patient's care.

## 2022-05-03 NOTE — PROGRESS NOTES
[facility] -   Infectious diseases evaluation  admission date 4/16/2022    reason for consultation:   Cavitary lung lesions  Impression :   Current:  · Right lower lobe cavitary lesions associated with multiloculated pleural fluid collection likely abscesses with empyema status post chest tube with Pseudomonas and yeast growth on culture  · Sepsis secondary to above  · Acute on chronic respiratory failure required intubation  · Right-sided pneumothorax  · Severe COPD      Recommendations     · IV meropenem through 5/14/2022  · Midline   · CANDIDA DUBLINIENSIS growth on bronchoscopy likely colonization. · Follow CBC and BUN/creatinine weekly while on antibiotics  · Continue supportive care  · No objection for discharge from infectious disease point of view  · Follow-up CT chest in 3-4 weeks              History of Present Illness:   Initial history:  Zack Barahona is a 74y.o.-year-old female presents to the hospital with worsening shortness of breath associated with cough. At the ER with tachypneic and hypoxic  Chest x-ray showed pneumothorax  The patient was in respiratory distress, was intubated. CT chest 4/16/2022 showed right lower lobe cavitary lesion with surrounding airspace disease and multiloculated pleural collection. The patient had chest tube placed   Initial WBC was 18.5, procalcitonin no more than 100  Respiratory panel with COVID-19 PCR was negative  Urine for Legionella and strep pneumo antigen negative  Status post bronchoscopy 4/18/2020 with Pseudomonas and yeast growth on culture   Chest tube was removed  Ct chest from 4/25 showed mod/large right pneumothorax and pleural fluid, the cavitary lesion is smaller compared to previous exam.  Chest tube was reinserted 4/25/2022 . Pseudomonas growth on pleural fluid culture.   She was extubated 4/28/2022  Repeat CT chest without contrast from 4/29/2022 showed resolving pneumothorax, no evidence of loculated effusion, multiple small cavitary lesions. Interval changes  5/3/2022   She is comfortable, complaining of right chest wall pain, on oxygen by nasal cannula, occasional cough, denied increased shortness of breath  Chest tube was removed 4/30/2021  Chest x-ray from 5/1/2022  showed no pneumothorax    Patient Vitals for the past 8 hrs:   BP Temp Temp src Pulse Resp SpO2   05/03/22 0800 (!) 121/59 98.1 °F (36.7 °C) Oral 66 18 96 %   05/03/22 0739 -- -- -- -- 18 99 %   05/03/22 0350 -- -- -- -- 16 97 %             I have personally reviewed the past medical history, past surgical history, medications, social history, and family history, and I haveupdated the database accordingly. Allergies:   Advil [ibuprofen], Aleve [naproxen], Antipyrine, Celecoxib, Codeine, Fomepizole, Incruse ellipta [umeclidinium bromide], Other, Rofecoxib, Salicylates, Strawberry extract, Sulfinpyrazone, Aspirin, and Nsaids     Review of Systems:     Review of Systems  As per history of present illness, other than above 12 system review was negative  Physical Examination :       Physical Exam  Constitutional:       General: She is not in acute distress. Appearance: Normal appearance. HENT:      Head: Normocephalic and atraumatic. Right Ear: External ear normal.      Left Ear: External ear normal.   Eyes:      General: No scleral icterus. Conjunctiva/sclera: Conjunctivae normal.   Cardiovascular:      Rate and Rhythm: Normal rate. Pulmonary:      Effort: Pulmonary effort is normal. No respiratory distress. Abdominal:      General: There is no distension. Palpations: Abdomen is soft. Musculoskeletal:      Cervical back: Neck supple. No rigidity. Right lower leg: No edema. Left lower leg: No edema. Skin:     Coloration: Skin is not jaundiced. Findings: No bruising. Neurological:      General: No focal deficit present. Mental Status: She is oriented to person, place, and time. Past Medical History:     Past Medical History:   Diagnosis Date    Abnormal EKG     TRAM [facility]acute kidney [facility]) ([facility] 2022    Anxiety     Asthma     Bipolar disorder ([facility]     SEVERE IN , UNISON    COPD (chronic obstructive pulmonary disease) (HCC)     Cramps, extremity     SEVERE LEG CRAMPS    Depression     Dilated bile duct     Headache     History of elective      Hyperlipidemia     Irritable bowel syndrome     Prolonged emergence from general anesthesia     SEVERE ISSUES WAKING UP    Substance abuse ([facility]     street drugs when younger   Rachel Solum Unspecified sleep apnea     Vision abnormalities     glasses       Past Surgical  History:     Past Surgical History:   Procedure Laterality Date    ANKLE SURGERY      HAD SCREWS AND HARDWARE, THEN REMOVED    BRONCHOSCOPY  2022         COLONOSCOPY  02/15/2018    tubular adenoma    EYE SURGERY Bilateral     CATARACTS    HYSTEROSCOPY  10/19/2016    D & C    INDUCED       LASIK      bilateral    TONSILLECTOMY AND ADENOIDECTOMY Bilateral     VULVA SURGERY      HAD A BIOPSY AND REMOVAL OF       Medications:      meropenem  1,000 mg IntraVENous Q8H    furosemide  20 mg Oral BID    predniSONE  30 mg Oral Daily    insulin lispro  0-6 Units SubCUTAneous TID WC    insulin lispro  0-3 Units SubCUTAneous Nightly    folic acid  1 mg Oral Daily    thiamine  100 mg Oral Daily    famotidine  20 mg Oral BID    miconazole   Topical BID    multivitamin  1 tablet Oral Daily    polyethylene glycol  17 g Oral Daily    albuterol  2.5 mg Nebulization Q4H    sodium chloride flush  5-40 mL IntraVENous 2 times per day    risperiDONE  1.5 mg Oral Q12H    atorvastatin  20 mg Oral Daily    traZODone  50 mg Oral Nightly    heparin (porcine)  5,000 Units SubCUTAneous 3 times per day       Social History:     Social History     Socioeconomic History    Marital status:      Spouse name: Not on file    Number of children: Not on file    Years of education: Not on file    Highest education level: Not on file   Occupational History    Not on file   Tobacco Use    Smoking status: Former Smoker     Packs/day: 2.00     Years: 42.00     Pack years: 84.00     Types: Cigarettes     Quit date: 11/1/2018     Years since quitting: 3.5    Smokeless tobacco: Never Used   Substance and Sexual Activity    Alcohol use: Yes     Comment: yearly    Drug use: No    Sexual activity: Not Currently     Partners: Male     Birth control/protection: Post-menopausal   Other Topics Concern    Not on file   Social History Narrative    Not on file     Social Determinants of Health     Financial Resource Strain: High Risk    Difficulty of Paying Living Expenses: Very hard   Food Insecurity: No Food Insecurity    Worried About Running Out of Food in the Last Year: Never true    Agustin of Food in the Last Year: Never true   Transportation Needs:     Lack of Transportation (Medical): Not on file    Lack of Transportation (Non-Medical):  Not on file   Physical Activity:     Days of Exercise per Week: Not on file    Minutes of Exercise per Session: Not on file   Stress:     Feeling of Stress : Not on file   Social Connections:     Frequency of Communication with Friends and Family: Not on file    Frequency of Social Gatherings with Friends and Family: Not on file    Attends Yarsani Services: Not on file    Active Member of [address] [place] [address] or Organizations: Not on file    Attends Club or Organization Meetings: Not on file    Marital Status: Not on file   Intimate Partner Violence:     Fear of Current or Ex-Partner: Not on file    Emotionally Abused: Not on file    Physically Abused: Not on file    Sexually Abused: Not on file   Housing Stability:     Unable to Pay for Housing in the Last Year: Not on file    Number of [place] in the Last Year: Not on file    Unstable Housing in the Last Year: Not on file       Family History:     Family History   Problem Relation Age of Onset    Dementia Maternal Aunt     Kidney Disease Mother     Heart Attack Sister     Prostate Cancer Father     High Cholesterol Brother     Heart Attack Paternal Grandmother       Medical Decision Making:   I have independently reviewed/ordered the following labs:    CBC with Differential:   Recent Labs     05/02/22  0519 05/03/22  0510   WBC 9.9 9.8   HGB 9.4* 9.7*   HCT 28.6* 29.9*    314     BMP:  Recent Labs     05/02/22  0519 05/03/22  0510   * 135   K 4.9 4.5   CL 96* 97*   CO2 34* 36*   BUN 25* 22   CREATININE <0.40* <0.40*       Lab Results   Component Value Date    CREATININE <0.40 05/03/2022    GLUCOSE 144 05/03/2022    GLUCOSE 127 07/08/2021       Detailed results: Thank you for allowing us to participate in the care of this patient. Please call with questions. This note is created with the assistance of a speech recognition program.  While intending to generate adocument that actually reflects the content of the visit, the document can still have some errors including those of syntax and sound a like substitutions which may escape proof reading. It such instances, actual meaningcan be extrapolated by contextual diversion.     Alfredo Colon MD  Office: [phone]  Perfect serve / office [phone]

## 2022-05-03 NOTE — PROGRESS NOTES
Pt discharged via stretcher per [facility] to [facility]. Pt's belongings sent home with sister. Discharge paperwork given to [facility] transporters. Pt is no acute distress at this time.

## 2022-05-03 NOTE — CARE COORDINATION
Writer spoke to Servando, from [facility], still waiting on Pre- Cert. She will follow & will update writer.

## 2022-05-03 NOTE — CARE COORDINATION
Writer was informed by Melvin Moy, from [facility], that they did receive approval from [facility] & they can accept pt. Today. Writer set transport w/ [facility] for today at 7:45 PM.    Informed, Nursing to call report to : [phone].

## 2022-05-03 NOTE — CARE COORDINATION
ONGOING DISCHARGE PLAN:    Plan remains for possible DC to [facility] was started on 2/45. As of this writing, it is STILL pending. Per Servando, from [facility], she is hoping to have answer today. Pt. May no longer,meet criteria for admission.     PM & R consult, placed. Appears Second option would be ARU VS SNF, [facility]. LSW is following for this.      Per ID notes, needs IV Merrem, until 5/14. Midline placed today.     Remains on PO Prednisone.     Currently sating 95% on 3LNC. Will continue to follow for additional discharge needs.     Electronically signed by Mandy Bateman RN on 5/3/2022 at 1:52 PM

## 2022-05-03 NOTE — CARE COORDINATION
ANNA met with daughter to update her on pt's discharge plans. ANNA informed pt that  is still waiting to hear back from [facility] with a decision. Daughter wanted SW to identify other potential skilled nursing facility placements as a backup. SW provided daughter with information on [facility], [facility], and [facility]. Daughter informed ANNA that [facility]. ANNA will continue to follow.     Electronically signed by Karime Funes on 5/3/2022 at 12:02 PM

## 2022-05-03 NOTE — PROGRESS NOTES
Pulmonary Progress Note  Pulmonary [facility] Care Specialists      Patient - Heather Branch,  Age - 74 y.o.    - 1955      Room Number - 2115/2115-01   N -  [id number]   [facility]  Date of Admission -  2022 10:25 Mirian Hernandez MD  Primary Care Physician - Jacinto Puente MD     SUBJECTIVE   Patient resting quietly. On O2 nasal cath. Patient has been accepted to the [facility] nursing [facility]    OBJECTIVE   VITALS    height is 5' 5\" (1.651 m) and weight is 174 lb 14.4 oz (79.3 kg). Her oral temperature is 99.3 °F (37.4 °C). Her blood pressure is 121/67 and her pulse is 80. Her respiration is 18 and oxygen saturation is 96%. Body mass index is 29.1 kg/m². Temperature Range: Temp: 99.3 °F (37.4 °C) Temp  Av.5 °F (36.9 °C)  Min: 98.1 °F (36.7 °C)  Max: 99.3 °F (37.4 °C)  BP Range:  Systolic (48KPX), RAB:968 , Min:116 , WPQ:901     Diastolic (55KOM), BTQ:61, Min:54, Max:67    Pulse Range: Pulse  Av.5  Min: 66  Max: 80  Respiration Range: Resp  Av.2  Min: 16  Max: 20  Current Pulse Ox[de-identified]  SpO2: 96 %  24HR Pulse Ox Range:  SpO2  Av.6 %  Min: 95 %  Max: 99 %  Oxygen Amount and Delivery: O2 Flow Rate (L/min): 3 L/min    Wt Readings from Last 3 Encounters:   22 174 lb 14.4 oz (79.3 kg)   22 183 lb (83 kg)   22 186 lb 1.1 oz (84.4 kg)       I/O (24 Hours)    Intake/Output Summary (Last 24 hours) at 5/3/2022 1608  Last data filed at 5/3/2022 1542  Gross per 24 hour   Intake --   Output 1600 ml   Net -1600 ml       EXAM     General Appearance  Awake, alert, oriented, in no acute distress  HEENT - normocephalic, atraumatic. Neck - Supple,  trachea midline   Lungs -coarse breath sounds no crackles rales   Heart Exam:PMI normal. No lifts, heaves, or thrills. RRR. No murmurs, clicks, gallops, or rubs  Abdomen Exam: Abdomen soft, non-tender. BS normal.   Extremity Exam: No signs of cyanosis.     MEDS  meropenem  1,000 mg IntraVENous Q8H    furosemide  20 mg Oral BID    predniSONE  30 mg Oral Daily    insulin lispro  0-6 Units SubCUTAneous TID WC    insulin lispro  0-3 Units SubCUTAneous Nightly    folic acid  1 mg Oral Daily    thiamine  100 mg Oral Daily    famotidine  20 mg Oral BID    miconazole   Topical BID    multivitamin  1 tablet Oral Daily    polyethylene glycol  17 g Oral Daily    albuterol  2.5 mg Nebulization Q4H    sodium chloride flush  5-40 mL IntraVENous 2 times per day    risperiDONE  1.5 mg Oral Q12H    atorvastatin  20 mg Oral Daily    traZODone  50 mg Oral Nightly    heparin (porcine)  5,000 Units SubCUTAneous 3 times per day      sodium chloride 10 mL/hr at 05/01/22 2016    dextrose       midodrine, oxyCODONE-acetaminophen, fentanNYL **OR** fentanNYL, hydrALAZINE, sodium chloride flush, sodium chloride, sodium chloride flush, potassium chloride **OR** potassium alternative oral replacement **OR** potassium chloride, glucose, dextrose, glucagon (rDNA), dextrose    LABS   CBC   Recent Labs     05/03/22  0510   WBC 9.8   HGB 9.7*   HCT 29.9*   MCV 92.6        BMP:   Lab Results   Component Value Date     05/03/2022    K 4.5 05/03/2022    CL 97 05/03/2022    CO2 36 05/03/2022    BUN 22 05/03/2022    LABALBU 2.8 04/16/2022    LABALBU 3.6 07/08/2021    CREATININE <0.40 05/03/2022    CALCIUM 7.9 05/03/2022    GFRAA Can not be calculated 05/03/2022    LABGLOM Can not be calculated 05/03/2022     ABGs:  Lab Results   Component Value Date    PHART 7.442 04/28/2022    PO2ART 77.6 04/28/2022    ZNR7XPB 60.6 04/28/2022      Lab Results   Component Value Date    MODE PRVC 04/28/2022     Ionized Calcium:  No results found for: IONCA  Magnesium:    Lab Results   Component Value Date    MG 2.3 04/16/2022     Phosphorus:  No results found for: PHOS     LIVER PROFILE No results for input(s): AST, ALT, LIPASE, BILIDIR, BILITOT, ALKPHOS in the last 72 hours.     Invalid input(s): AMYLASE,  ALB  INR No results for input(s): INR in the last 72 hours.   PTT   Lab Results   Component Value Date    APTT 33.6 04/16/2022         RADIOLOGY     (See actual reports for details)    ASSESSMENT/PLAN     Patient Active Problem List   Diagnosis    Chronic obstructive pulmonary disease ([address]    Vitamin D deficiency    Anxiety    Asthma    Bipolar disorder ([address]    Depression    Hyperlipidemia with target LDL less than 100    Sleep apnea    Vision abnormalities    Status post hysteroscopy    Chronic headaches    IBS (irritable bowel syndrome)    Colon polyp    Collagenous colitis    Lung nodule    Left elbow pain    Dilated bile duct    Acute pain of left shoulder    Adhesive capsulitis of left shoulder    Leucopenia    Compression fracture of body of thoracic vertebra (HCC)    Age-related osteoporosis with current pathological fracture    Pulmonary embolism on right ([address]    Migraines    Paranoid behavior ([address]    Acute deep vein thrombosis (DVT) of right lower extremity (HCC)    Delirium    Chronic respiratory failure with hypoxia ([facility]    Major neurocognitive disorder ([address]    Pneumonia    Chronic respiratory failure with hypoxia, on home O2 therapy ([address]    COPD (chronic obstructive pulmonary disease) ([address]    TRAM (acute kidney injury) ([address]    History of pulmonary embolus (PE)    Mycoplasma pneumonia    Iron deficiency anemia    Sepsis ([address]    Acute on chronic respiratory failure ([address]    Cavitary lesion of lung    History of DVT (deep vein thrombosis)    Pneumothorax, right    Status post chest tube placement ([facility])    Pneumothorax on right    HCAP (healthcare-associated pneumonia)    Acute systolic HF (heart failure) ([facility])    Pseudomonas aeruginosa infection    Elevated procalcitonin    Elevated C-reactive protein (CRP)    Lung abscess (HCC)    Recurrent pneumothorax after chest tube removed    Postprocedural subcutaneous emphysema    ASSESSMENT/PLAN   Acute on chronic hypoxic and hypercapnic respiratory failure, intubated 4/16; extubated 4/28  Pseudomonas pneumonia  Right-sided patient chest tube was replaced on April 25 removed 4/29  Right lower lobe cavitary lesions  Exudative pleural effusion on right, growing Pseudomonas  Low ejection fraction EF 45 to 50%, echo 4/18  History of pulmonary embolism and what appears to be chronic filling defects (subsegmental, most likely clinically inconsequential)  Severe stage IV COPD, FEV1 is 35% of predicted  Recent hospitalization for pneumonia  Elevated inflammatory markers including D-dimer and procalcitonin  Full CODE STATUS    Patient currently stable. Plans for transfer. Patient has plans for continuing the IV meropenem through May 14. They believe that the Candida D is a colonization on bronchoscopy.     Recommending a repeat CT scan in 3 to 4 weeks  Electronically signed by Lazarus Barrow MD on 5/3/2022 at 4:08 PM

## 2022-05-03 NOTE — PLAN OF CARE
Problem: Gas Exchange - Impaired:  Goal: Levels of oxygenation will improve  Description: Levels of oxygenation will improve  5/3/2022 0027 by Ritu Mendoza RN  Outcome: Progressing     Problem: Skin Integrity - Impaired:  Goal: Will show no infection signs and symptoms  Description: Will show no infection signs and symptoms  5/3/2022 0027 by Ritu Mendoza RN  Outcome: Progressing     Problem: Skin Integrity - Impaired:  Goal: Absence of new skin breakdown  Description: Absence of new skin breakdown  5/3/2022 0027 by Ritu Mendoza RN  Outcome: Progressing     Problem: Falls - Risk of:  Goal: Absence of physical injury  Description: Absence of physical injury  5/3/2022 0027 by Ritu Mendoza RN  Outcome: Progressing     Problem: Breathing Pattern - Ineffective:  Goal: Ability to achieve and maintain a regular respiratory rate will improve  Description: Ability to achieve and maintain a regular respiratory rate will improve  5/3/2022 0027 by Ritu Mendoza RN  Outcome: Progressing     Problem: Skin Integrity:  Goal: Will show no infection signs and symptoms  Description: Will show no infection signs and symptoms  5/3/2022 0027 by Ritu Mendoza RN  Outcome: Progressing     Problem: Skin Integrity:  Goal: Absence of new skin breakdown  Description: Absence of new skin breakdown  5/3/2022 0027 by Ritu Mendoza RN  Outcome: Progressing     Problem: OXYGENATION/RESPIRATORY FUNCTION  Goal: Patient will maintain patent airway  5/3/2022 0027 by Ritu Mendoza RN  Outcome: Progressing     Problem: OXYGENATION/RESPIRATORY FUNCTION  Goal: Patient will achieve/maintain normal respiratory rate/effort  Description: Respiratory rate and effort will be within normal limits for the patient  5/3/2022 0027 by Ritu Mendoza RN  Outcome: Progressing     Problem: SKIN INTEGRITY  Goal: Skin integrity is maintained or improved  5/3/2022 0027 by Ritu Mendoza RN  Outcome: Progressing     Problem: Nutrition  Goal: Optimal nutrition therapy  5/3/2022 0027 by Jason Marin RN  Outcome: Progressing     Problem: Pain  Goal: Verbalizes/displays adequate comfort level or baseline comfort level  5/3/2022 0027 by Jason Marin RN  Outcome: Progressing     Problem: ABCDS Injury Assessment  Goal: Absence of physical injury  5/3/2022 0027 by Jason Marin RN  Outcome: Progressing

## 2022-05-05 ENCOUNTER — TELEPHONE (OUTPATIENT)
Dept: FAMILY MEDICINE CLINIC | Age: 67
End: 2022-05-05

## 2022-05-05 NOTE — TELEPHONE ENCOUNTER
[facility] ([facility]) ED Follow up Call    Reason for ED visit:  SEPSIS  SPOKE WITH DAUGHTER ROE WHO INFORMED ME PATIENT WAS PLACED INTO [facility] THEY WILL CONTACT THE OFFICE ONCE PATIENT COMES HOME       FU appts/Provider:    Future Appointments   Date Time Provider Percy Jennifer   6/22/2022  1:30 PM Kadeem Fair MD [address] [place] [address]   7/13/2022 12:00 PM Kelli Moseley MD [facility] [address] [facility]

## 2022-05-19 LAB
SARS-COV-2, RAPID: NOT DETECTED
SPECIMEN DESCRIPTION: NORMAL

## 2022-05-19 PROCEDURE — 87635 SARS-COV-2 COVID-19 AMP PRB: CPT

## 2022-05-24 ENCOUNTER — HOSPITAL ENCOUNTER (OUTPATIENT)
Age: 67
Setting detail: SPECIMEN
Discharge: HOME | End: 2022-05-24

## 2022-05-24 LAB
ANION GAP SERPL CALCULATED.3IONS-SCNC: 8 MMOL/L (ref 9–17)
BUN BLDV-MCNC: 23 MG/DL (ref 8–23)
CALCIUM SERPL-MCNC: 8.4 MG/DL (ref 8.6–10.4)
CHLORIDE BLD-SCNC: 95 MMOL/L (ref 98–107)
CO2: 33 MMOL/L (ref 20–31)
CREAT SERPL-MCNC: 0.38 MG/DL (ref 0.5–0.9)
GFR AFRICAN AMERICAN: >60 ML/MIN
GFR NON-AFRICAN AMERICAN: >60 ML/MIN
GFR SERPL CREATININE-BSD FRML MDRD: ABNORMAL ML/MIN/{1.73_M2}
GLUCOSE BLD-MCNC: 201 MG/DL (ref 70–99)
HCT VFR BLD CALC: 28.8 % (ref 36.3–47.1)
HEMOGLOBIN: 8.6 G/DL (ref 11.9–15.1)
MCH RBC QN AUTO: 29.8 PG (ref 25.2–33.5)
MCHC RBC AUTO-ENTMCNC: 29.9 G/DL (ref 28.4–34.8)
MCV RBC AUTO: 99.7 FL (ref 82.6–102.9)
NRBC AUTOMATED: 0.5 PER 100 WBC
PDW BLD-RTO: 18.6 % (ref 11.8–14.4)
PLATELET # BLD: 341 K/UL (ref 138–453)
PMV BLD AUTO: 10.4 FL (ref 8.1–13.5)
POTASSIUM SERPL-SCNC: 4.6 MMOL/L (ref 3.7–5.3)
PRO-BNP: 461 PG/ML
RBC # BLD: 2.89 M/UL (ref 3.95–5.11)
SODIUM BLD-SCNC: 136 MMOL/L (ref 135–144)
WBC # BLD: 16.6 K/UL (ref 3.5–11.3)

## 2022-05-24 PROCEDURE — 80048 BASIC METABOLIC PNL TOTAL CA: CPT

## 2022-05-24 PROCEDURE — 85027 COMPLETE CBC AUTOMATED: CPT

## 2022-05-24 PROCEDURE — P9603 ONE-WAY ALLOW PRORATED MILES: HCPCS

## 2022-05-24 PROCEDURE — 36415 COLL VENOUS BLD VENIPUNCTURE: CPT

## 2022-05-24 PROCEDURE — 83880 ASSAY OF NATRIURETIC PEPTIDE: CPT

## 2022-05-26 ENCOUNTER — HOSPITAL ENCOUNTER (OUTPATIENT)
Age: 67
Setting detail: SPECIMEN
Discharge: HOME | End: 2022-05-26

## 2022-05-26 LAB
HCT VFR BLD CALC: 30.7 % (ref 36.3–47.1)
HEMOGLOBIN: 8.9 G/DL (ref 11.9–15.1)
MCH RBC QN AUTO: 29 PG (ref 25.2–33.5)
MCHC RBC AUTO-ENTMCNC: 29 G/DL (ref 28.4–34.8)
MCV RBC AUTO: 100 FL (ref 82.6–102.9)
NRBC AUTOMATED: 1.1 PER 100 WBC
PDW BLD-RTO: 18.9 % (ref 11.8–14.4)
PLATELET # BLD: 323 K/UL (ref 138–453)
PMV BLD AUTO: 10.3 FL (ref 8.1–13.5)
RBC # BLD: 3.07 M/UL (ref 3.95–5.11)
WBC # BLD: 12.3 K/UL (ref 3.5–11.3)

## 2022-05-26 PROCEDURE — 36415 COLL VENOUS BLD VENIPUNCTURE: CPT

## 2022-05-26 PROCEDURE — P9603 ONE-WAY ALLOW PRORATED MILES: HCPCS

## 2022-05-26 PROCEDURE — 85027 COMPLETE CBC AUTOMATED: CPT

## 2022-06-03 ENCOUNTER — HOSPITAL ENCOUNTER (OUTPATIENT)
Age: 67
Setting detail: SPECIMEN
Discharge: HOME | End: 2022-06-03

## 2022-06-03 LAB
ANION GAP SERPL CALCULATED.3IONS-SCNC: 14 MMOL/L (ref 9–17)
BUN BLDV-MCNC: 15 MG/DL (ref 8–23)
CALCIUM SERPL-MCNC: 8.9 MG/DL (ref 8.6–10.4)
CHLORIDE BLD-SCNC: 96 MMOL/L (ref 98–107)
CO2: 28 MMOL/L (ref 20–31)
CREAT SERPL-MCNC: 0.37 MG/DL (ref 0.5–0.9)
ESTIMATED AVERAGE GLUCOSE: 140 MG/DL
GFR AFRICAN AMERICAN: >60 ML/MIN
GFR NON-AFRICAN AMERICAN: >60 ML/MIN
GFR SERPL CREATININE-BSD FRML MDRD: ABNORMAL ML/MIN/{1.73_M2}
GLUCOSE BLD-MCNC: 108 MG/DL (ref 70–99)
HBA1C MFR BLD: 6.5 % (ref 4–6)
HCT VFR BLD CALC: 29.5 % (ref 36.3–47.1)
HEMOGLOBIN: 8.8 G/DL (ref 11.9–15.1)
MCH RBC QN AUTO: 29.5 PG (ref 25.2–33.5)
MCHC RBC AUTO-ENTMCNC: 29.8 G/DL (ref 28.4–34.8)
MCV RBC AUTO: 99 FL (ref 82.6–102.9)
NRBC AUTOMATED: 0.2 PER 100 WBC
PDW BLD-RTO: 18.7 % (ref 11.8–14.4)
PLATELET # BLD: 259 K/UL (ref 138–453)
PMV BLD AUTO: 10 FL (ref 8.1–13.5)
POTASSIUM SERPL-SCNC: 3.7 MMOL/L (ref 3.7–5.3)
RBC # BLD: 2.98 M/UL (ref 3.95–5.11)
SODIUM BLD-SCNC: 138 MMOL/L (ref 135–144)
WBC # BLD: 11.1 K/UL (ref 3.5–11.3)

## 2022-06-03 PROCEDURE — 85027 COMPLETE CBC AUTOMATED: CPT

## 2022-06-03 PROCEDURE — 80048 BASIC METABOLIC PNL TOTAL CA: CPT

## 2022-06-03 PROCEDURE — P9603 ONE-WAY ALLOW PRORATED MILES: HCPCS

## 2022-06-03 PROCEDURE — 83036 HEMOGLOBIN GLYCOSYLATED A1C: CPT

## 2022-06-03 PROCEDURE — 36415 COLL VENOUS BLD VENIPUNCTURE: CPT

## 2022-06-06 LAB
CULTURE: NORMAL
DIRECT EXAM: NORMAL
SPECIMEN DESCRIPTION: NORMAL

## 2022-06-20 ENCOUNTER — TELEPHONE (OUTPATIENT)
Dept: ONCOLOGY | Age: 67
End: 2022-06-20

## 2022-06-20 NOTE — TELEPHONE ENCOUNTER
Received call from daughter stating pt is currently at [facility] rehabilitation, facility will call to reschedule follow up at a later date    Electronically signed by Jeremy Leo on 6/20/2022 at 9:33 AM

## 2022-06-29 ENCOUNTER — HOSPITAL ENCOUNTER (OUTPATIENT)
Age: 67
Setting detail: SPECIMEN
Discharge: HOME | End: 2022-06-29

## 2022-06-29 ENCOUNTER — HOSPITAL ENCOUNTER (OUTPATIENT)
Dept: CT IMAGING | Age: 67
Discharge: HOME | DRG: 197 | End: 2022-07-01
Payer: COMMERCIAL

## 2022-06-29 DIAGNOSIS — J85.1 ABSCESS OF LOWER LOBE OF RIGHT LUNG WITH PNEUMONIA (HCC): ICD-10-CM

## 2022-06-29 LAB — PRO-BNP: 117 PG/ML

## 2022-06-29 PROCEDURE — 71250 CT THORAX DX C-: CPT

## 2022-06-29 PROCEDURE — 36415 COLL VENOUS BLD VENIPUNCTURE: CPT

## 2022-06-29 PROCEDURE — 83880 ASSAY OF NATRIURETIC PEPTIDE: CPT

## 2022-06-29 PROCEDURE — P9603 ONE-WAY ALLOW PRORATED MILES: HCPCS

## 2022-06-30 ENCOUNTER — HOSPITAL ENCOUNTER (OUTPATIENT)
Dept: VASCULAR LAB | Age: 67
Discharge: HOME | DRG: 197 | End: 2022-06-30
Payer: COMMERCIAL

## 2022-06-30 ENCOUNTER — APPOINTMENT (OUTPATIENT)
Dept: CT IMAGING | Age: 67
DRG: 197 | End: 2022-06-30
Payer: COMMERCIAL

## 2022-06-30 ENCOUNTER — HOSPITAL ENCOUNTER (INPATIENT)
Age: 67
LOS: 6 days | Discharge: NURSING, REHAB OR LONG-TERM CARE | DRG: 197 | End: 2022-07-06
Attending: EMERGENCY MEDICINE | Admitting: INTERNAL MEDICINE
Payer: COMMERCIAL

## 2022-06-30 DIAGNOSIS — R52 PAIN: ICD-10-CM

## 2022-06-30 DIAGNOSIS — R60.0 EDEMA OF LEFT LOWER LEG: ICD-10-CM

## 2022-06-30 DIAGNOSIS — I82.403 LEG DVT (DEEP VENOUS THROMBOEMBOLISM), ACUTE, BILATERAL (HCC): Primary | ICD-10-CM

## 2022-06-30 LAB
ABSOLUTE EOS #: 0.3 K/UL (ref 0–0.4)
ABSOLUTE LYMPH #: 0.6 K/UL (ref 1–4.8)
ABSOLUTE MONO #: 0.5 K/UL (ref 0.1–1.3)
ALBUMIN SERPL-MCNC: 2.9 G/DL (ref 3.5–5.2)
ALP BLD-CCNC: 92 U/L (ref 35–104)
ALT SERPL-CCNC: 12 U/L (ref 5–33)
ANION GAP SERPL CALCULATED.3IONS-SCNC: 9 MMOL/L (ref 9–17)
AST SERPL-CCNC: 13 U/L
BASOPHILS # BLD: 1 % (ref 0–2)
BASOPHILS ABSOLUTE: 0 K/UL (ref 0–0.2)
BILIRUB SERPL-MCNC: 0.18 MG/DL (ref 0.3–1.2)
BUN BLDV-MCNC: 12 MG/DL (ref 8–23)
CALCIUM SERPL-MCNC: 9.2 MG/DL (ref 8.6–10.4)
CHLORIDE BLD-SCNC: 96 MMOL/L (ref 98–107)
CO2: 33 MMOL/L (ref 20–31)
CREAT SERPL-MCNC: <0.4 MG/DL (ref 0.5–0.9)
EOSINOPHILS RELATIVE PERCENT: 5 % (ref 0–4)
GFR AFRICAN AMERICAN: ABNORMAL ML/MIN
GFR NON-AFRICAN AMERICAN: ABNORMAL ML/MIN
GFR SERPL CREATININE-BSD FRML MDRD: ABNORMAL ML/MIN/{1.73_M2}
GLUCOSE BLD-MCNC: 102 MG/DL (ref 70–99)
GLUCOSE BLD-MCNC: 183 MG/DL (ref 65–105)
HCT VFR BLD CALC: 29.6 % (ref 36–46)
HEMOGLOBIN: 9.4 G/DL (ref 12–16)
INFLUENZA A: NOT DETECTED
INFLUENZA B: NOT DETECTED
INR BLD: 1
LYMPHOCYTES # BLD: 12 % (ref 24–44)
MCH RBC QN AUTO: 28.3 PG (ref 26–34)
MCHC RBC AUTO-ENTMCNC: 31.7 G/DL (ref 31–37)
MCV RBC AUTO: 89.3 FL (ref 80–100)
MONOCYTES # BLD: 10 % (ref 1–7)
PARTIAL THROMBOPLASTIN TIME: 28.4 SEC (ref 24–36)
PDW BLD-RTO: 18.6 % (ref 11.5–14.9)
PLATELET # BLD: 324 K/UL (ref 150–450)
PMV BLD AUTO: 7.2 FL (ref 6–12)
POTASSIUM SERPL-SCNC: 3.8 MMOL/L (ref 3.7–5.3)
PRO-BNP: 124 PG/ML
PROTHROMBIN TIME: 13 SEC (ref 11.8–14.6)
RBC # BLD: 3.31 M/UL (ref 4–5.2)
SARS-COV-2 RNA, RT PCR: NOT DETECTED
SEG NEUTROPHILS: 72 % (ref 36–66)
SEGMENTED NEUTROPHILS ABSOLUTE COUNT: 3.3 K/UL (ref 1.3–9.1)
SODIUM BLD-SCNC: 138 MMOL/L (ref 135–144)
SOURCE: NORMAL
SPECIMEN DESCRIPTION: NORMAL
TOTAL PROTEIN: 6.8 G/DL (ref 6.4–8.3)
TROPONIN, HIGH SENSITIVITY: 21 NG/L (ref 0–14)
WBC # BLD: 4.6 K/UL (ref 3.5–11)

## 2022-06-30 PROCEDURE — 83880 ASSAY OF NATRIURETIC PEPTIDE: CPT

## 2022-06-30 PROCEDURE — 6360000002 HC RX W HCPCS: Performed by: INTERNAL MEDICINE

## 2022-06-30 PROCEDURE — 71260 CT THORAX DX C+: CPT

## 2022-06-30 PROCEDURE — 99285 EMERGENCY DEPT VISIT HI MDM: CPT

## 2022-06-30 PROCEDURE — 36415 COLL VENOUS BLD VENIPUNCTURE: CPT

## 2022-06-30 PROCEDURE — 6360000004 HC RX CONTRAST MEDICATION: Performed by: EMERGENCY MEDICINE

## 2022-06-30 PROCEDURE — 2060000000 HC ICU INTERMEDIATE R&B

## 2022-06-30 PROCEDURE — 82947 ASSAY GLUCOSE BLOOD QUANT: CPT

## 2022-06-30 PROCEDURE — 85730 THROMBOPLASTIN TIME PARTIAL: CPT

## 2022-06-30 PROCEDURE — 96374 THER/PROPH/DIAG INJ IV PUSH: CPT

## 2022-06-30 PROCEDURE — 85610 PROTHROMBIN TIME: CPT

## 2022-06-30 PROCEDURE — 80053 COMPREHEN METABOLIC PANEL: CPT

## 2022-06-30 PROCEDURE — 2580000003 HC RX 258: Performed by: EMERGENCY MEDICINE

## 2022-06-30 PROCEDURE — 84484 ASSAY OF TROPONIN QUANT: CPT

## 2022-06-30 PROCEDURE — 87636 SARSCOV2 & INF A&B AMP PRB: CPT

## 2022-06-30 PROCEDURE — 85025 COMPLETE CBC W/AUTO DIFF WBC: CPT

## 2022-06-30 PROCEDURE — 2700000000 HC OXYGEN THERAPY PER DAY

## 2022-06-30 PROCEDURE — 94640 AIRWAY INHALATION TREATMENT: CPT

## 2022-06-30 PROCEDURE — 6370000000 HC RX 637 (ALT 250 FOR IP): Performed by: NURSE PRACTITIONER

## 2022-06-30 PROCEDURE — 2580000003 HC RX 258: Performed by: INTERNAL MEDICINE

## 2022-06-30 PROCEDURE — 93005 ELECTROCARDIOGRAM TRACING: CPT | Performed by: EMERGENCY MEDICINE

## 2022-06-30 PROCEDURE — 93970 EXTREMITY STUDY: CPT

## 2022-06-30 PROCEDURE — 6370000000 HC RX 637 (ALT 250 FOR IP): Performed by: INTERNAL MEDICINE

## 2022-06-30 PROCEDURE — 96375 TX/PRO/DX INJ NEW DRUG ADDON: CPT

## 2022-06-30 RX ORDER — ACETAMINOPHEN 325 MG/1
650 TABLET ORAL EVERY 4 HOURS PRN
Status: DISCONTINUED | OUTPATIENT
Start: 2022-06-30 | End: 2022-07-06 | Stop reason: HOSPADM

## 2022-06-30 RX ORDER — DOCUSATE SODIUM 100 MG/1
100 CAPSULE, LIQUID FILLED ORAL 2 TIMES DAILY
Status: DISCONTINUED | OUTPATIENT
Start: 2022-06-30 | End: 2022-07-06 | Stop reason: HOSPADM

## 2022-06-30 RX ORDER — INSULIN LISPRO 100 [IU]/ML
0-6 INJECTION, SOLUTION INTRAVENOUS; SUBCUTANEOUS NIGHTLY
Status: DISCONTINUED | OUTPATIENT
Start: 2022-06-30 | End: 2022-07-06 | Stop reason: HOSPADM

## 2022-06-30 RX ORDER — ONDANSETRON 4 MG/1
4 TABLET, FILM COATED ORAL EVERY 6 HOURS PRN
COMMUNITY

## 2022-06-30 RX ORDER — MONTELUKAST SODIUM 10 MG/1
10 TABLET ORAL NIGHTLY
COMMUNITY

## 2022-06-30 RX ORDER — 0.9 % SODIUM CHLORIDE 0.9 %
80 INTRAVENOUS SOLUTION INTRAVENOUS ONCE
Status: COMPLETED | OUTPATIENT
Start: 2022-06-30 | End: 2022-06-30

## 2022-06-30 RX ORDER — BUDESONIDE AND FORMOTEROL FUMARATE DIHYDRATE 160; 4.5 UG/1; UG/1
2 AEROSOL RESPIRATORY (INHALATION) 2 TIMES DAILY
Status: DISCONTINUED | OUTPATIENT
Start: 2022-06-30 | End: 2022-07-06 | Stop reason: HOSPADM

## 2022-06-30 RX ORDER — BENZOCAINE/MENTHOL 6 MG-10 MG
LOZENGE MUCOUS MEMBRANE EVERY 6 HOURS PRN
Status: DISCONTINUED | OUTPATIENT
Start: 2022-06-30 | End: 2022-07-06 | Stop reason: HOSPADM

## 2022-06-30 RX ORDER — LIDOCAINE 4 G/G
1 PATCH TOPICAL DAILY PRN
COMMUNITY

## 2022-06-30 RX ORDER — SODIUM CHLORIDE 9 MG/ML
INJECTION, SOLUTION INTRAVENOUS PRN
Status: DISCONTINUED | OUTPATIENT
Start: 2022-06-30 | End: 2022-07-06 | Stop reason: HOSPADM

## 2022-06-30 RX ORDER — SODIUM CHLORIDE 0.9 % (FLUSH) 0.9 %
10 SYRINGE (ML) INJECTION PRN
Status: DISCONTINUED | OUTPATIENT
Start: 2022-06-30 | End: 2022-07-06 | Stop reason: HOSPADM

## 2022-06-30 RX ORDER — ATORVASTATIN CALCIUM 20 MG/1
20 TABLET, FILM COATED ORAL NIGHTLY
COMMUNITY

## 2022-06-30 RX ORDER — MONTELUKAST SODIUM 10 MG/1
10 TABLET ORAL NIGHTLY
Status: DISCONTINUED | OUTPATIENT
Start: 2022-06-30 | End: 2022-07-06 | Stop reason: HOSPADM

## 2022-06-30 RX ORDER — COCOA BUTTER, PHENYLEPHRINE HYDROCHLORIDE 2211; 6.25 MG/1; MG/1
1 SUPPOSITORY RECTAL EVERY 6 HOURS PRN
COMMUNITY

## 2022-06-30 RX ORDER — HEPARIN SODIUM 5000 [USP'U]/ML
5000 INJECTION, SOLUTION INTRAVENOUS; SUBCUTANEOUS EVERY 8 HOURS SCHEDULED
Status: ON HOLD | COMMUNITY
End: 2022-07-05 | Stop reason: HOSPADM

## 2022-06-30 RX ORDER — SODIUM CHLORIDE 0.9 % (FLUSH) 0.9 %
5-40 SYRINGE (ML) INJECTION EVERY 12 HOURS SCHEDULED
Status: DISCONTINUED | OUTPATIENT
Start: 2022-06-30 | End: 2022-07-06 | Stop reason: HOSPADM

## 2022-06-30 RX ORDER — GUAIFENESIN 600 MG/1
600 TABLET, EXTENDED RELEASE ORAL 2 TIMES DAILY
COMMUNITY

## 2022-06-30 RX ORDER — TRAZODONE HYDROCHLORIDE 50 MG/1
50 TABLET ORAL NIGHTLY
Status: DISCONTINUED | OUTPATIENT
Start: 2022-06-30 | End: 2022-06-30

## 2022-06-30 RX ORDER — ATORVASTATIN CALCIUM 20 MG/1
20 TABLET, FILM COATED ORAL NIGHTLY
Status: DISCONTINUED | OUTPATIENT
Start: 2022-06-30 | End: 2022-07-06 | Stop reason: HOSPADM

## 2022-06-30 RX ORDER — AMOXICILLIN 250 MG
2 CAPSULE ORAL DAILY
COMMUNITY

## 2022-06-30 RX ORDER — SENNA AND DOCUSATE SODIUM 50; 8.6 MG/1; MG/1
2 TABLET, FILM COATED ORAL DAILY
Status: DISCONTINUED | OUTPATIENT
Start: 2022-06-30 | End: 2022-07-06 | Stop reason: HOSPADM

## 2022-06-30 RX ORDER — SODIUM CHLORIDE 0.9 % (FLUSH) 0.9 %
5-40 SYRINGE (ML) INJECTION PRN
Status: DISCONTINUED | OUTPATIENT
Start: 2022-06-30 | End: 2022-07-06 | Stop reason: HOSPADM

## 2022-06-30 RX ORDER — ENOXAPARIN SODIUM 100 MG/ML
1 INJECTION SUBCUTANEOUS 2 TIMES DAILY
Status: DISCONTINUED | OUTPATIENT
Start: 2022-06-30 | End: 2022-07-01

## 2022-06-30 RX ORDER — OMEPRAZOLE 20 MG/1
20 CAPSULE, DELAYED RELEASE ORAL DAILY
COMMUNITY

## 2022-06-30 RX ORDER — METHYLPREDNISOLONE SODIUM SUCCINATE 125 MG/2ML
60 INJECTION, POWDER, LYOPHILIZED, FOR SOLUTION INTRAMUSCULAR; INTRAVENOUS EVERY 6 HOURS
Status: DISCONTINUED | OUTPATIENT
Start: 2022-06-30 | End: 2022-07-01

## 2022-06-30 RX ORDER — MORPHINE SULFATE 2 MG/ML
2 INJECTION, SOLUTION INTRAMUSCULAR; INTRAVENOUS EVERY 4 HOURS PRN
Status: DISCONTINUED | OUTPATIENT
Start: 2022-06-30 | End: 2022-07-06 | Stop reason: HOSPADM

## 2022-06-30 RX ORDER — GUAIFENESIN 600 MG/1
600 TABLET, EXTENDED RELEASE ORAL 2 TIMES DAILY
Status: DISCONTINUED | OUTPATIENT
Start: 2022-06-30 | End: 2022-07-06 | Stop reason: HOSPADM

## 2022-06-30 RX ORDER — FOLIC ACID 1 MG/1
1 TABLET ORAL DAILY
Status: DISCONTINUED | OUTPATIENT
Start: 2022-06-30 | End: 2022-07-06 | Stop reason: HOSPADM

## 2022-06-30 RX ORDER — INSULIN LISPRO 100 [IU]/ML
INJECTION, SOLUTION INTRAVENOUS; SUBCUTANEOUS
COMMUNITY

## 2022-06-30 RX ORDER — RISPERIDONE 1 MG/1
1.5 TABLET, FILM COATED ORAL EVERY 12 HOURS
Status: DISCONTINUED | OUTPATIENT
Start: 2022-06-30 | End: 2022-07-06 | Stop reason: HOSPADM

## 2022-06-30 RX ORDER — DIAPER,BRIEF,INFANT-TODD,DISP
EACH MISCELLANEOUS EVERY 6 HOURS PRN
COMMUNITY

## 2022-06-30 RX ORDER — ALBUTEROL SULFATE 2.5 MG/3ML
2.5 SOLUTION RESPIRATORY (INHALATION) EVERY 4 HOURS
Status: DISCONTINUED | OUTPATIENT
Start: 2022-06-30 | End: 2022-07-06 | Stop reason: HOSPADM

## 2022-06-30 RX ORDER — PANTOPRAZOLE SODIUM 40 MG/1
40 TABLET, DELAYED RELEASE ORAL
Status: DISCONTINUED | OUTPATIENT
Start: 2022-07-01 | End: 2022-07-06 | Stop reason: HOSPADM

## 2022-06-30 RX ORDER — INSULIN LISPRO 100 [IU]/ML
0-12 INJECTION, SOLUTION INTRAVENOUS; SUBCUTANEOUS
Status: DISCONTINUED | OUTPATIENT
Start: 2022-07-01 | End: 2022-07-06 | Stop reason: HOSPADM

## 2022-06-30 RX ORDER — OXYCODONE HYDROCHLORIDE AND ACETAMINOPHEN 5; 325 MG/1; MG/1
1-2 TABLET ORAL EVERY 6 HOURS PRN
Status: ON HOLD | COMMUNITY
End: 2022-07-06 | Stop reason: HOSPADM

## 2022-06-30 RX ORDER — HYDROCORTISONE ACETATE 25 MG/1
25 SUPPOSITORY RECTAL EVERY 6 HOURS PRN
Status: DISCONTINUED | OUTPATIENT
Start: 2022-06-30 | End: 2022-07-06 | Stop reason: HOSPADM

## 2022-06-30 RX ORDER — HYDROCORTISONE ACETATE 25 MG/1
25 SUPPOSITORY RECTAL EVERY 6 HOURS PRN
Status: DISCONTINUED | OUTPATIENT
Start: 2022-06-30 | End: 2022-06-30 | Stop reason: SDUPTHER

## 2022-06-30 RX ORDER — TRAZODONE HYDROCHLORIDE 50 MG/1
50 TABLET ORAL NIGHTLY PRN
Status: DISCONTINUED | OUTPATIENT
Start: 2022-07-01 | End: 2022-07-06 | Stop reason: HOSPADM

## 2022-06-30 RX ORDER — FLUTICASONE PROPIONATE 50 MCG
2 SPRAY, SUSPENSION (ML) NASAL DAILY
Status: DISCONTINUED | OUTPATIENT
Start: 2022-06-30 | End: 2022-07-06 | Stop reason: HOSPADM

## 2022-06-30 RX ORDER — 0.9 % SODIUM CHLORIDE 0.9 %
1000 INTRAVENOUS SOLUTION INTRAVENOUS ONCE
Status: COMPLETED | OUTPATIENT
Start: 2022-06-30 | End: 2022-06-30

## 2022-06-30 RX ORDER — FAMOTIDINE 20 MG/1
20 TABLET, FILM COATED ORAL 2 TIMES DAILY
Status: DISCONTINUED | OUTPATIENT
Start: 2022-06-30 | End: 2022-07-06 | Stop reason: HOSPADM

## 2022-06-30 RX ORDER — ONDANSETRON 4 MG/1
4 TABLET, ORALLY DISINTEGRATING ORAL EVERY 8 HOURS PRN
Status: DISCONTINUED | OUTPATIENT
Start: 2022-06-30 | End: 2022-07-06 | Stop reason: HOSPADM

## 2022-06-30 RX ORDER — DEXTROSE MONOHYDRATE 50 MG/ML
100 INJECTION, SOLUTION INTRAVENOUS PRN
Status: DISCONTINUED | OUTPATIENT
Start: 2022-06-30 | End: 2022-07-06 | Stop reason: HOSPADM

## 2022-06-30 RX ORDER — ONDANSETRON 2 MG/ML
4 INJECTION INTRAMUSCULAR; INTRAVENOUS EVERY 6 HOURS PRN
Status: DISCONTINUED | OUTPATIENT
Start: 2022-06-30 | End: 2022-07-06 | Stop reason: HOSPADM

## 2022-06-30 RX ORDER — ALBUTEROL SULFATE 2.5 MG/3ML
2.5 SOLUTION RESPIRATORY (INHALATION) 4 TIMES DAILY
Status: ON HOLD | COMMUNITY
End: 2022-07-05 | Stop reason: HOSPADM

## 2022-06-30 RX ORDER — ACETAMINOPHEN 325 MG/1
650 TABLET ORAL EVERY 6 HOURS PRN
COMMUNITY

## 2022-06-30 RX ORDER — ENOXAPARIN SODIUM 100 MG/ML
1 INJECTION SUBCUTANEOUS ONCE
Status: DISCONTINUED | OUTPATIENT
Start: 2022-06-30 | End: 2022-06-30

## 2022-06-30 RX ORDER — POLYETHYLENE GLYCOL 3350 17 G/17G
17 POWDER, FOR SOLUTION ORAL DAILY PRN
COMMUNITY

## 2022-06-30 RX ADMIN — METHYLPREDNISOLONE SODIUM SUCCINATE 60 MG: 125 INJECTION, POWDER, FOR SOLUTION INTRAMUSCULAR; INTRAVENOUS at 23:23

## 2022-06-30 RX ADMIN — SODIUM CHLORIDE, PRESERVATIVE FREE 10 ML: 5 INJECTION INTRAVENOUS at 22:01

## 2022-06-30 RX ADMIN — MONTELUKAST 10 MG: 10 TABLET, FILM COATED ORAL at 22:09

## 2022-06-30 RX ADMIN — SODIUM CHLORIDE 1000 ML: 9 INJECTION, SOLUTION INTRAVENOUS at 15:35

## 2022-06-30 RX ADMIN — IOPAMIDOL 75 ML: 755 INJECTION, SOLUTION INTRAVENOUS at 17:55

## 2022-06-30 RX ADMIN — FAMOTIDINE 20 MG: 20 TABLET ORAL at 22:09

## 2022-06-30 RX ADMIN — BUDESONIDE AND FORMOTEROL FUMARATE DIHYDRATE 2 PUFF: 160; 4.5 AEROSOL RESPIRATORY (INHALATION) at 20:47

## 2022-06-30 RX ADMIN — DOCUSATE SODIUM 100 MG: 100 CAPSULE, LIQUID FILLED ORAL at 22:09

## 2022-06-30 RX ADMIN — INSULIN LISPRO 1 UNITS: 100 INJECTION, SOLUTION INTRAVENOUS; SUBCUTANEOUS at 22:18

## 2022-06-30 RX ADMIN — GUAIFENESIN 600 MG: 600 TABLET, EXTENDED RELEASE ORAL at 22:09

## 2022-06-30 RX ADMIN — MORPHINE SULFATE 2 MG: 2 INJECTION, SOLUTION INTRAMUSCULAR; INTRAVENOUS at 22:00

## 2022-06-30 RX ADMIN — RISPERIDONE 1.5 MG: 1 TABLET ORAL at 22:09

## 2022-06-30 RX ADMIN — METHYLPREDNISOLONE SODIUM SUCCINATE 60 MG: 125 INJECTION, POWDER, FOR SOLUTION INTRAMUSCULAR; INTRAVENOUS at 17:17

## 2022-06-30 RX ADMIN — SODIUM CHLORIDE, PRESERVATIVE FREE 10 ML: 5 INJECTION INTRAVENOUS at 17:56

## 2022-06-30 RX ADMIN — SODIUM CHLORIDE 80 ML: 9 INJECTION, SOLUTION INTRAVENOUS at 17:56

## 2022-06-30 RX ADMIN — ENOXAPARIN SODIUM 80 MG: 100 INJECTION SUBCUTANEOUS at 20:44

## 2022-06-30 RX ADMIN — MORPHINE SULFATE 2 MG: 2 INJECTION, SOLUTION INTRAMUSCULAR; INTRAVENOUS at 17:17

## 2022-06-30 RX ADMIN — ATORVASTATIN CALCIUM 20 MG: 20 TABLET, FILM COATED ORAL at 22:09

## 2022-06-30 ASSESSMENT — PAIN DESCRIPTION - LOCATION
LOCATION: FOOT;LEG
LOCATION: OTHER (COMMENT)

## 2022-06-30 ASSESSMENT — PAIN SCALES - GENERAL
PAINLEVEL_OUTOF10: 6
PAINLEVEL_OUTOF10: 10

## 2022-06-30 ASSESSMENT — PAIN DESCRIPTION - ORIENTATION: ORIENTATION: RIGHT;LEFT

## 2022-06-30 ASSESSMENT — PAIN - FUNCTIONAL ASSESSMENT
PAIN_FUNCTIONAL_ASSESSMENT: 0-10
PAIN_FUNCTIONAL_ASSESSMENT: NONE - DENIES PAIN

## 2022-06-30 ASSESSMENT — ENCOUNTER SYMPTOMS
SHORTNESS OF BREATH: 0
ABDOMINAL PAIN: 0
COLOR CHANGE: 0
EYE PAIN: 0
BACK PAIN: 0

## 2022-06-30 ASSESSMENT — PAIN DESCRIPTION - DESCRIPTORS: DESCRIPTORS: ACHING

## 2022-06-30 NOTE — ED NOTES
Report given to Naya Feliciano RN from U. Report method by phone   The following was reviewed with receiving RN:   Current vital signs:  BP (!) 104/58   Pulse 81   Temp 98.1 °F (36.7 °C)   Resp 28   Ht 5' 5\" (1.651 m)   Wt 169 lb (76.7 kg)   LMP 05/20/2013 (Exact Date)   SpO2 93%   BMI 28.12 kg/m²                MEWS Score: 2     Any medication or safety alerts were reviewed. Any pending diagnostics and notifications were also reviewed, as well as any safety concerns or issues, abnormal labs, abnormal imaging, and abnormal assessment findings. Questions were answered.             Itzel Negrete RN  06/30/22 [id number]

## 2022-06-30 NOTE — CONSULTS
[facility] PULMONARY & CRITICAL CARE SPECIALISTS   CONSULT NOTE:      DATE OF CONSULT 6/30/2022    REASON FOR CONSULTATION:  Dyspnea and bilateral DVT      PCP Humza Her MD     CHIEF COMPLAINT: Leg swelling shortness of breath    HISTORY OF PRESENT ILLNESS:     Luis Daniel Smiley is a pleasant 68-year-old female with stage IV severe COPD with an FEV1 of 35% of predicted. She is normally on 4 L nasal cannula, Spiriva, Breo, and albuterol. She had a long hospitalization in mid April through early May when she presented with acute on chronic hypoxic and hypercapnic respiratory failure was intubated on April 16 and extubated April 28. She was found to have Pseudomonas pneumonia with right lower lobe abscess. She had right lower lobe cavitary lesion and an exudative pleural effusion that grew out Pseudomonas. I had performed bronchoscopy on her at that time. She was eventually discharged and has been at [facility]. She saw our nurse practitioner on Ruth 10 and follow-up. She had finished a course of IV meropenem for the Pseudomonas abscess on May 14. When she had seen our nurse practitioner in the office, she was still short of breath with minimal activity and very weak. For some reason she had been on Advair Spiriva and DuoNeb's. This was changed by nurse practitioner toward normal pulmonary medications. However, she has noticed increasing lower extremity edema accompanied by dyspnea over the last several days. She actually had a CT of the chest done yesterday which was under noncontrast orders due to need for follow-up of her cavitary lesion. I did review the CT of the chest and overall it has improved. There are still some residual infiltrates in the right upper lobe primarily. However, due to her lower extremity swelling and pain, lower extremity venous Dopplers were ordered. My partner received a call today they were positive bilaterally.   I then called over to the emergency room and told the ER staff that she was going to be coming in most likely had to be admitted given her severe COPD. I asked that a repeat CT of the chest should be ordered with pulmonary embolism protocol. I also asked that she be given Lovenox 1 mg/kg twice daily as soon as possible. She denies any chest pain, has her usual chronic cough but has noticed more dyspnea. She denies any hemoptysis currently but said about 2 weeks ago she had some. She denies any fevers, chills, nausea or vomiting. She can quit smoking in 2021 but previously, had an 84+ pack year history of smoking. He had a previous history of PE and DVT in 2021 treated with Eliquis for 6 months. COVID test that was done today was once again negative. ALLERGIES:  Allergies   Allergen Reactions    Advil [Ibuprofen] Other (See Comments)     vomiting    Aleve [Naproxen] Other (See Comments)     vomiting    Antipyrine Other (See Comments)     unknown    Celecoxib Other (See Comments)    Codeine      headache    Fomepizole     Incruse Ellipta [Umeclidinium Bromide]      confusion    Other      GRASS, TREES, WEEDS    Rofecoxib Other (See Comments)     Unknown reaction    Salicylates      Unknown reaction     Strawberry Extract      HEADACHES    Sulfinpyrazone Other (See Comments)     Unknown reaction    Aspirin Nausea And Vomiting, Other (See Comments) and Nausea Only    Nsaids Nausea Only, Other (See Comments) and Nausea And Vomiting       HOME MEDICATIONS:  Not in a hospital admission.       PAST MEDICAL HISTORY:  Past Medical History:   Diagnosis Date    Abnormal EKG     [facility]) [facility] 2022    Anxiety     Asthma     Bipolar disorder ([facility]     SEVERE IN , [place]    COPD (chronic obstructive pulmonary disease) (HCC)     Cramps, extremity     SEVERE LEG CRAMPS    Depression     Dilated bile duct     Headache     History of elective      Hyperlipidemia     Irritable bowel syndrome     Prolonged emergence from general anesthesia     SEVERE ISSUES WAKING UP    Substance abuse ([facility]     street drugs when younger    Unspecified sleep apnea     Vision abnormalities     glasses       PAST SURGICAL HISTORY:  Past Surgical History:   Procedure Laterality Date    ANKLE SURGERY      HAD SCREWS AND HARDWARE, THEN REMOVED    BRONCHOSCOPY  2022         COLONOSCOPY  02/15/2018    tubular adenoma    EYE SURGERY Bilateral     CATARACTS    HYSTEROSCOPY  10/19/2016    D & C    INDUCED       LASIK      bilateral    TONSILLECTOMY AND ADENOIDECTOMY Bilateral     VULVA SURGERY      HAD A BIOPSY AND REMOVAL OF          SOCIAL HISTORY:  Social History     Socioeconomic History    Marital status:      Spouse name: Not on file    Number of children: Not on file    Years of education: Not on file    Highest education level: Not on file   Occupational History    Not on file   Tobacco Use    Smoking status: Former Smoker     Packs/day: 2.00     Years: 42.00     Pack years: 84.00     Types: Cigarettes     Quit date: 2018     Years since quitting: 3.6    Smokeless tobacco: Never Used   Substance and Sexual Activity    Alcohol use: Yes     Comment: yearly    Drug use: No    Sexual activity: Not Currently     Partners: Male     Birth control/protection: Post-menopausal   Other Topics Concern    Not on file   Social History Narrative    Not on file     Social Determinants of Health     Financial Resource Strain:     Difficulty of Paying Living Expenses: Not on file   Food Insecurity:     Worried About [address] in the Last Year: Not on file    Agustin of Food in the Last Year: Not on file   Transportation Needs:     Lack of Transportation (Medical): Not on file    Lack of Transportation (Non-Medical):  Not on file   Physical Activity:     Days of Exercise per Week: Not on file    Minutes of Exercise per Session: Not on file   Stress:     Feeling of Stress : Not on file   Social Connections:     Frequency of Communication with Friends and Family: Not on file    Frequency of Social Gatherings with Friends and Family: Not on file    Attends Tenriism Services: Not on file    Active Member of Clubs or Organizations: Not on file    Attends Club or Organization Meetings: Not on file    Marital Status: Not on file   Intimate Partner Violence:     Fear of Current or Ex-Partner: Not on file    Emotionally Abused: Not on file    Physically Abused: Not on file    Sexually Abused: Not on file   Housing Stability:     Unable to Pay for Housing in the Last Year: Not on file    Number of [place] in the Last Year: Not on file    Unstable Housing in the Last Year: Not on file       FAMILY HISTORY:  Family History   Problem Relation Age of Onset    Dementia Maternal Aunt     Kidney Disease Mother     Heart Attack Sister     Prostate Cancer Father     High Cholesterol Brother     Heart Attack Paternal Grandmother        REVIEW OF SYSTEMS:  All other systems reviewed and are negative. PHYSICAL EXAM:  Vital Signs Blood pressure 101/63, pulse 75, resp. rate 20, height 5' 5\" (1.651 m), weight 169 lb (76.7 kg), last menstrual period 05/20/2013, SpO2 99 %, not currently breastfeeding. Oxygen Amount and Delivery: O2 Flow Rate (L/min): 4 L/min    Admission Weight Weight: 169 lb (76.7 kg)    General Appearance   Elderly ill-appearing female  Head  Normocephalic, without obvious abnormality, atraumatic    Eyes  conjunctivae/corneas clear. PERRL, EOM's intact.     ENT cavity is clear without thrush  Neck  no adenopathy, no carotid bruit, no JVD, supple, symmetrical, trachea midline and thyroid not enlarged, symmetric, no tenderness/mass/nodules  Lungs scattered wheezes and rhonchi with crackles at the bases  Heart: regular rate and rhythm, S1, S2 normal, no murmur, click, rub or gallop  Abdomen  soft, non-tender; bowel sounds normal; no masses,  no organomegaly  Extremities  Bilateral swelling but left lower extremity swollen and warm to the touch compared with the right  Skin  Skin color, texture, turgor normal. No rashes or lesions  Neurologic: Alert and oriented X 3, normal strength and tone. Imaging  CT of chest for pulmonary embolism protocol is pending      New CAT scan on the left that was done yesterday without contrast compared with her previous CT of the chest done on her last admission in April.       Lab Review  CBC     Lab Results   Component Value Date/Time    WBC 4.6 06/30/2022 03:31 PM    RBC 3.31 06/30/2022 03:31 PM    RBC 0-2 07/08/2021 03:00 PM    HGB 9.4 06/30/2022 03:31 PM    HCT 29.6 06/30/2022 03:31 PM     06/30/2022 03:31 PM    MCV 89.3 06/30/2022 03:31 PM    MCH 28.3 06/30/2022 03:31 PM    MCHC 31.7 06/30/2022 03:31 PM    RDW 18.6 06/30/2022 03:31 PM    NRBC 0 07/08/2021 11:52 AM    METASPCT 1 04/02/2022 04:24 AM    LYMPHOPCT 12 06/30/2022 03:31 PM    LYMPHOPCT 12.1 07/08/2021 11:52 AM    MONOPCT 10 06/30/2022 03:31 PM    MONOPCT 15.1 07/08/2021 11:52 AM    BASOPCT 1 06/30/2022 03:31 PM    BASOPCT 0.8 07/08/2021 11:52 AM    MONOSABS 0.50 06/30/2022 03:31 PM    MONOSABS 0.4 07/08/2021 11:52 AM    LYMPHSABS 0.60 06/30/2022 03:31 PM    LYMPHSABS 0.3 07/08/2021 11:52 AM    EOSABS 0.30 06/30/2022 03:31 PM    EOSABS 0.1 07/08/2021 11:52 AM    BASOSABS 0.00 06/30/2022 03:31 PM    DIFFTYPE NOT REPORTED 12/20/2021 12:34 PM       BMP   Lab Results   Component Value Date/Time     06/30/2022 03:31 PM    K 3.8 06/30/2022 03:31 PM    CL 96 06/30/2022 03:31 PM    CO2 33 06/30/2022 03:31 PM    BUN 12 06/30/2022 03:31 PM    CREATININE <0.40 06/30/2022 03:31 PM    GLUCOSE 102 06/30/2022 03:31 PM    GLUCOSE 127 07/08/2021 11:52 AM    CALCIUM 9.2 06/30/2022 03:31 PM       LFTS  Lab Results   Component Value Date/Time    ALKPHOS 92 06/30/2022 03:31 PM    ALT 12 06/30/2022 03:31 PM    AST 13 06/30/2022 03:31 PM    PROT 6.8 06/30/2022 03:31 PM    PROT 5.7 07/08/2021 11:52 AM    BILITOT 0.18 06/30/2022 03:31 PM    BILIDIR 0.1 07/08/2021 11:52 AM    IBILI 0.12 07/08/2019 10:02 AM    LABALBU 2.9 06/30/2022 03:31 PM    LABALBU 3.6 07/08/2021 11:52 AM       INR  Recent Labs     06/30/22  1531   PROTIME 13.0   INR 1.0       APTT  Recent Labs     06/30/22  1531   APTT 28.4       Lactic Acid  Lab Results   Component Value Date/Time    LACTA 1.5 04/19/2022 09:53 AM    LACTA 1.2 04/02/2022 04:24 AM        PRO-BNP   Recent Labs     06/29/22  0748 06/30/22  1531   PROBNP 117 124           ABGs:   Lab Results   Component Value Date/Time    PHART 7.442 04/28/2022 05:31 AM    PO2ART 77.6 04/28/2022 05:31 AM    CTC1ASB 60.6 04/28/2022 05:31 AM       Lab Results   Component Value Date/Time    MODE PRVC 04/28/2022 05:31 AM         Impression    Bilateral DVT acute  Acute exacerbation of severe stage IV COPD  Chronic hypoxic and hypercapnic respiratory failure  History of Pseudomonas pneumonia with cavitary lesion  History of pneumothorax on the right  History of previous pulmonary embolism/DVT in June 2021 treated with Eliquis for 6 months  DNR CCA no intubation      Plan:      Admit to stepdown unit or if serious abnormalities on CAT scan of the chest would consider ICU intermediate if they have a bed  Lovenox 1 mg/kg twice daily with transition to Eliquis  Will need to be on lifelong anticoagulation  IV Solu-Medrol 60 every 6  Place her on albuterol aerosols every 4 hours  Continue her home medications (Spiriva and Symbicort substituted here)  Okay for morphine for pain control, watch for oversedation  Recommendations after CT of the chest is reviewed from today. However, at this juncture I do not feel the need for additional IV antibiotics. Discussed with her CODE STATUS in the presence of her daughter.   She now wishes to be DNR CCA no intubation and that has been filled out her request (previously she was full code)

## 2022-06-30 NOTE — H&P
[address] [facility]     HISTORY AND PHYSICAL EXAMINATION            Date:   7/1/2022  Patientname:  Cristina Smith  Date of admission:  6/30/2022  2:21 PM  MRN:   [id number]  Account:  [de-identified]  YOB: 1955  PCP:    Darrin Yung MD  Room:   2091/2091-01  Code Status:    DNR-CCA    CHIEF COMPLAINT     Chief Complaint   Patient presents with    Leg Pain     Bilateral DVT       HISTORY OF PRESENT ILLNESS  (Character, Onset, Location, Duration,  Exacerbating/RelievingFactors, Radiation,   Associated Symptoms, Severity )      The patient is a 74 y.o.  female, with a history of DVT, systolic heart failure, asthma, bipolar disorder, COPD, PE, lung abscess, and recurrent pneumothorax, who presents with leg pain - bilateral DVT. According to patient, she noted increased edema in her bilateral lower extremities on 6/26 and states that on 6/28, the edema in her left leg had progressed all the way up into her thigh. States that she was sent for outpatient Doppler studies earlier today, which revealed DVTs in bilateral lower extremities. She was then sent to the ED to evaluate for pulmonary embolism. Symptoms are associated with pain in her left leg and right shin. Denies fever, chills, chest pain, cough, abdominal pain, nausea, vomiting, diarrhea, and urinary symptoms. Denies shortness of breath and increased oxygen needs. Patient chronically uses O2 at 4 L minute per nasal cannula around-the-clock. There are no aggravating or alleviating factors. Symptoms are reported as constant and moderate. Patient reports past history of DVT and PE but states she no longer takes anticoagulation. Reports that she was hospitalized in April for severe lung infection and is currently receiving rehabilitative services at [facility].     PAST MEDICAL HISTORY   Patient  has a past medical history of Abnormal EKG, TRAM (acute kidney injury) ([facility] [address] Anxiety, Asthma, Bipolar disorder ([facility] [address] COPD (chronic obstructive pulmonary disease) ([facility] [address] Cramps, extremity, Depression, Dilated bile duct, Headache, History of elective , Hyperlipidemia, Irritable bowel syndrome, Prolonged emergence from general anesthesia, Substance abuse ([facility] [address] Unspecified sleep apnea, and Vision abnormalities. PAST SURGICAL HISTORY    Patient  has a past surgical history that includes Tonsillectomy and adenoidectomy (Bilateral); LASIK; Induced ; Ankle surgery; eye surgery (Bilateral); Vulva surgery; hysteroscopy (10/19/2016); Colonoscopy (02/15/2018); and Bronchoscopy (2022). FAMILY HISTORY    Patient family history includes Dementia in her maternal aunt; Heart Attack in her paternal grandmother and sister; High Cholesterol in her brother; Kidney Disease in her mother; Prostate Cancer in her father. SOCIAL HISTORY    Patient  reports that she quit smoking about 3 years ago. Her smoking use included cigarettes. She has a 84.00 pack-year smoking history. She has never used smokeless tobacco. She reports current alcohol use. She reports that she does not use drugs. HOME MEDICATIONS        Prior to Admission medications    Medication Sig Start Date End Date Taking?  Authorizing Provider   acetaminophen (TYLENOL) 325 MG tablet Take 650 mg by mouth every 6 hours as needed for Pain or Fever   Yes Historical Provider, MD   albuterol (PROVENTIL) (2.5 MG/3ML) 0.083% nebulizer solution Take 2.5 mg by nebulization 4 times daily   Yes Historical Provider, MD   atorvastatin (LIPITOR) 20 MG tablet Take 20 mg by mouth at bedtime   Yes Historical Provider, MD   guaiFENesin (MUCINEX) 600 MG extended release tablet Take 600 mg by mouth 2 times daily   Yes Historical Provider, MD   heparin, porcine, 5000 UNIT/ML injection Inject 5,000 Units into the skin every 8 hours   Yes Historical Provider, MD   insulin lispro, 1 Unit Dial, (HUMALOG KWIKPEN) 100 UNIT/ML SOPN Inject into the skin 4 times daily (before meals and nightly) Per sliding scale:  151-200 = 2 units  201-250 = 4 units  251-300 = 6 units  301-350 = 8 units  351-400 = 10 units   Yes Historical Provider, MD   lidocaine 4 % external patch Place 1 patch onto the skin daily as needed (lower back pain)    Yes Historical Provider, MD   magnesium hydroxide (MILK OF MAGNESIA) 400 MG/5ML suspension Take 30 mLs by mouth daily as needed for Constipation (at bedtime if no BM in 3 days)   Yes Historical Provider, MD   polyethylene glycol (GLYCOLAX) 17 GM/SCOOP powder Take 17 g by mouth daily as needed (constipation)   Yes Historical Provider, MD   omeprazole (PRILOSEC) 20 MG delayed release capsule Take 20 mg by mouth daily   Yes Historical Provider, MD   ondansetron (ZOFRAN) 4 MG tablet Take 4 mg by mouth every 6 hours as needed for Nausea or Vomiting   Yes Historical Provider, MD   oxyCODONE-acetaminophen (PERCOCET) 5-325 MG per tablet Take 1-2 tablets by mouth every 6 hours as needed for Pain.    Yes Historical Provider, MD   hydrocortisone 1 % cream Apply topically every 6 hours as needed (hemorrhoids)   Yes Historical Provider, MD   phenylephrine-cocoa butter (PREPARATION H) 0.25-88.44 % Place 1 suppository rectally every 6 hours as needed for Hemorrhoids   Yes Historical Provider, MD   senna-docusate (PERICOLACE) 8.6-50 MG per tablet Take 2 tablets by mouth daily   Yes Historical Provider, MD   montelukast (SINGULAIR) 10 MG tablet Take 10 mg by mouth nightly   Yes Historical Provider, MD   glucose (GLUTOSE) 40 % GEL Take 37.5 mLs by mouth as needed (low blood glucose) 5/3/22   Jhon Jett MD   risperiDONE (RISPERDAL) 0.5 MG tablet Take 3 tablets by mouth every 12 hours 5/3/22   Jhon Jett MD   furosemide (LASIX) 20 MG tablet Take 1 tablet by mouth 2 times daily 5/3/22   Jhon Jett MD   folic acid (FOLVITE) 1 MG tablet Take 1 tablet by mouth daily 5/4/22   Jhon Jett MD   famotidine (PEPCID) 20 MG tablet Take 1 tablet by mouth 2 times daily 5/3/22 Sylvia Turcios MD   docusate sodium (COLACE) 100 MG capsule take 1 capsule by mouth twice a day 4/5/22   Pop Tan MD   albuterol sulfate HFA (VENTOLIN HFA) 108 (90 Base) MCG/ACT inhaler Inhale 2 puffs into the lungs every 6 hours as needed for Wheezing    Historical Provider, MD   SPIRIVA RESPIMAT 2.5 MCG/ACT AERS inhaler inhale 2 puffs INTO THE LUNGS once daily 1/10/22   Pop Tan MD   BREO ELLIPTA 100-25 MCG/INH AEPB inhaler Inhale 1 puff into the lungs daily  8/13/21   Historical Provider, MD   fluticasone [facility]) 50 MCG/ACT nasal spray 2 sprays by Nasal route daily 8/5/21 8/5/22  Bay Gardiner MD   traZODone (DESYREL) 50 MG tablet Take 50 mg by mouth nightly  7/28/21   Historical Provider, MD       ALLERGIES      Advil [ibuprofen], Aleve [naproxen], Antipyrine, Celecoxib, Codeine, Fomepizole, Incruse ellipta [umeclidinium bromide], Other, Rofecoxib, Salicylates, Strawberry extract, Sulfinpyrazone, Aspirin, and Nsaids    REVIEW OF SYSTEMS     Review of Systems   Constitutional: Negative for chills, diaphoresis and fever. HENT: Negative for congestion and sore throat. Respiratory: Negative for cough, shortness of breath and wheezing. Cardiovascular: Positive for leg swelling (Left greater than right). Negative for chest pain and palpitations. Gastrointestinal: Negative for abdominal pain, constipation, diarrhea, nausea and vomiting. Genitourinary: Negative for dysuria, frequency and urgency. Musculoskeletal: Positive for myalgias (Pain in left lower extremity). Negative for back pain. Skin: Negative for rash. Neurological: Negative for dizziness, weakness and headaches. Psychiatric/Behavioral: The patient is not nervous/anxious. PHYSICAL EXAM      BP (!) 100/54   Pulse 82   Temp 98.1 °F (36.7 °C) (Oral)   Resp 20   Ht 5' 5\" (1.651 m)   Wt 169 lb (76.7 kg)   LMP 05/20/2013 (Exact Date)   SpO2 94%   BMI 28.12 kg/m²  Body mass index is 28.12 kg/m².       Physical Exam  Constitutional:       General: She is not in acute distress. Appearance: She is well-developed. She is not diaphoretic. HENT:      Head: Normocephalic and atraumatic. Eyes:      Conjunctiva/sclera: Conjunctivae normal.      Pupils: Pupils are equal, round, and reactive to light. Neck:      Trachea: No tracheal deviation. Cardiovascular:      Rate and Rhythm: Normal rate and regular rhythm. Heart sounds: Normal heart sounds. No murmur heard. No friction rub. No gallop. Pulmonary:      Effort: Pulmonary effort is normal. No respiratory distress. Breath sounds: No wheezing or rales. Comments: Breath sounds diminished in posterior bases bilaterally  Chest:      Chest wall: No tenderness. Abdominal:      General: Bowel sounds are normal. There is no distension. Palpations: Abdomen is soft. Tenderness: There is no abdominal tenderness. There is no guarding. Musculoskeletal:         General: No tenderness. Normal range of motion. Cervical back: Normal range of motion and neck supple. Right lower leg: Edema (1+) present. Left lower leg: Edema (2+) present. Comments: Noted to have footdrop bilaterally   Lymphadenopathy:      Cervical: No cervical adenopathy. Skin:     General: Skin is warm and dry. Coloration: Skin is not pale. Findings: No erythema or rash. Neurological:      Mental Status: She is alert and oriented to person, place, and time. Motor: No seizure activity. Coordination: Coordination normal.   Psychiatric:         Behavior: Behavior normal.         Thought Content:  Thought content normal.       DIAGNOSTICS      EKG:   EKG 12 Lead [[id number]]    Collected: 06/30/22 1452    Updated: 06/30/22 1453     Ventricular Rate 85 BPM    Atrial Rate 85 BPM    P-R Interval 142 ms    QRS Duration 94 ms    Q-T Interval 362 ms    QTc Calculation (Bazett) 430 ms    P Axis 56 degrees    R Axis -110 degrees    T Axis 49 degrees Narrative:     Normal sinus rhythm   Right superior axis deviation   Pulmonary disease pattern   Incomplete right bundle branch block   Right ventricular hypertrophy   Abnormal ECG   When compared with ECG of 23-APR-2022 10:32,   T wave amplitude has decreased in Inferior leads   T wave amplitude has decreased in Anterolateral leads     Labs:  CBC:   Recent Labs     06/30/22  1531   WBC 4.6   HGB 9.4*        BMP:    Recent Labs     06/30/22  1531      K 3.8   CL 96*   CO2 33*   BUN 12   CREATININE <0.40*   GLUCOSE 102*     S. Calcium:  Recent Labs     06/30/22  1531   CALCIUM 9.2     S. Ionized Calcium:No results for input(s): IONCA in the last 72 hours. S. Magnesium:No results for input(s): MG in the last 72 hours. S. Phosphorus:No results for input(s): PHOS in the last 72 hours. S. Glucose:  Recent Labs     06/30/22  2042   POCGLU 183*     Glycosylated hemoglobin A1C:   Lab Results   Component Value Date/Time    LABA1C 6.5 06/03/2022 07:16 AM     Hepatic:   Recent Labs     06/30/22  1531   AST 13   ALT 12   ALKPHOS 92     CARDIAC ENZY:   Recent Labs     06/30/22  1531   TROPHS 21*     INR:   Recent Labs     06/30/22  1531   INR 1.0     BNP:   Recent Labs     06/29/22  0748 06/30/22  1531   PROBNP 117 124      ABGs: No results for input(s): PH, PCO2, PO2, HCO3, O2SAT in the last 72 hours. Lipids: No results for input(s): CHOL, TRIG, HDL, LDL, LDLCALC in the last 72 hours. Pancreatic functions:No results for input(s): LIPASE, AMYLASE in the last 72 hours. Alveria Ashley: No results for input(s): LACTA in the last 72 hours. Thyroid functions:   Lab Results   Component Value Date/Time    TSH 0.17 04/04/2022 04:42 AM      U/A:No results for input(s): NITRITE, COLORU, WBCUA, RBCUA, MUCUS, BACTERIA, CLARITYU, SPECGRAV, LEUKOCYTESUR, BLOODU, GLUCOSEU, AMORPHOUS in the last 72 hours.     Invalid input(s): [address]     06/30/22  1530   COVID19 Not Detected     Imaging/Diagonstics:     CT CHEST WO CONTRAST    Result Date: 6/30/2022  EXAMINATION: CT OF THE CHEST WITHOUT CONTRAST 6/29/2022 1:49 pm TECHNIQUE: CT of the chest was performed without the administration of intravenous contrast. Multiplanar reformatted images are provided for review. Automated exposure control, iterative reconstruction, and/or weight based adjustment of the mA/kV was utilized to reduce the radiation dose to as low as reasonably achievable. COMPARISON: CT scan of the chest from 04/29/2022 HISTORY: ORDERING SYSTEM PROVIDED HISTORY: Abscess of lower lobe of right lung with pneumonia [facility]) TECHNOLOGIST PROVIDED HISTORY: Reason for Exam: Abscess of lower lobe of right lung with pneumonia. on o2 Long history of tobacco abuse (84 pack-year), asthma, COPD, cavitary lesions, PE, lung abscess, and recurrent pneumothorax. FINDINGS: Mediastinum: Previous right IJ central line removed. Unchanged mild mediastinal lymphadenopathy, largest nodes precarinal (slice 54, series 7-27 mm short axis) and right paratracheal (slice 39, series 2-9 mm short axis). Unchanged calcified nodes mediastinum and left hilum. Moderate PAH) main pulmonary artery caliber 35 mm), unchanged. No acute abnormality thoracic aorta, cardiac chambers or pericardial structures. No acute finding thyroid or esophagus. Lungs/pleura: New pleural based consolidative focus medial right upper lobe (at the site of prior small cystic foci), with some internal lucency measuring 2.7 x 2.3 cm (best seen slice 23, series 4). Similar 1.6 x 2.6 cm pleural based triangular-shaped consolidative focus left upper lobe with internal lucency (slice 31, series 4). Some of the multiple small cavitary lesions elsewhere in the right lung appear smaller; new right upper lobe irregular nodular densities, however, identified, best seen slice 40, series 4 and in the superior segment, slice 59, series 4.   Right lower lobe 5.2 x 4.4 cm cavitary lesion now measures 3.9 x 3.6 cm (slice 78, series 4), with decreased wall thickening and adjacent pleuroparenchymal changes also noted, with persistent pleural thickening and subpleural lucencies. Extensive underlying emphysematous changes again identified with a similar appearance. No pneumothorax. No other additional acute infiltrate or other new nodule. No left pleural effusion. Additional scattered fibrotic or chronic appearing changes. Upper Abdomen: No acute abnormality in the upper abdomen. Soft Tissues/Bones: No acute bony or soft tissue abnormality. Unchanged compression fractures T5 and T6, scoliosis and osteopenia. Previous right-sided chest tube is been removed and previous right subcutaneous emphysema has resolved. Interval removal right chest tube and resolution right-sided subcutaneous emphysema. No recurrent pneumothorax. Interval improvement additional nodular/cavitary lesions and decrease in the size of the largest lower lobe cavitary lesion/abscess. Interval appearance new small pleural based consolidative foci both upper lobes and of a few scattered new nodular parenchymal abnormalities, best seen RUL. Findings suggest only a partial response to treatment for presumed infectious or inflammatory processes. Underlying unchanged emphysema and additional chronic findings, as detailed above. RECOMMENDATIONS: Follow-up chest CT in 3-4 months, assuming clinical stability or improvement. CT CHEST PULMONARY EMBOLISM W CONTRAST    Result Date: 6/30/2022  EXAMINATION: CTA OF THE CHEST 6/30/2022 5:48 pm TECHNIQUE: CTA of the chest was performed after the administration of intravenous contrast.  Multiplanar reformatted images are provided for review. MIP images are provided for review. Automated exposure control, iterative reconstruction, and/or weight based adjustment of the mA/kV was utilized to reduce the radiation dose to as low as reasonably achievable.  COMPARISON: Chest CT examination of 06/29/2022 HISTORY: ORDERING SYSTEM PROVIDED HISTORY: r/o pe TECHNOLOGIST PROVIDED HISTORY: r/o pe Decision Support Exception - unselect if not a suspected or confirmed emergency medical condition->Emergency Medical Condition (MA) Reason for Exam: r/o pe Additional signs and symptoms: bilateral DVT Relevant Medical/Surgical History: wears O2 at home, copd, chf FINDINGS: Pulmonary Arteries: Pulmonary arteries are adequately opacified for evaluation. No evidence of intraluminal filling defect to suggest pulmonary embolism. Main pulmonary artery is normal in caliber. Mediastinum: No evidence of pathologically enlarged mediastinal lymphadenopathy. Unchanged subcentimeter mediastinal lymphadenopathy. The heart and pericardium demonstrate no acute abnormality. There is no acute abnormality of the thoracic aorta. Lungs/pleura: Focal consolidative changes within right upper lobe and left upper lobe and superior segment of the right lower lobe and multiple tree in bud nodular densities and cavitary changes within both lungs highly suggestive of multilobar infectious/inflammatory condition. Findings are unchanged since recent chest CT. The largest cavitary lesion is noted within the right lower lobe and appears unchanged since prior examination. Consolidative changes within the right lower lobe may be due to atelectasis. No evidence of pleural effusion or pneumothorax. Moderate emphysematous changes within the lungs. Upper Abdomen: Limited images of the upper abdomen are unremarkable. Soft Tissues/Bones: No acute bone or soft tissue abnormality. Unchanged compression fracture of T5 and T6 and scoliosis. No evidence of pulmonary embolism. Focal consolidative changes within right upper lobe and left upper lobe and superior segment of the right lower lobe and multiple tree in bud nodular densities and cavitary changes within both lungs highly suggestive of multilobar infectious/inflammatory condition. Findings are unchanged since recent chest CT.  The largest cavitary lesion is noted within the right lower lobe and appears unchanged since prior examination. Although this lesion may be due to infectious/inflammatory condition, neoplastic etiologies cannot be excluded. Unchanged emphysematous changes within the lungs. RECOMMENDATIONS: Unavailable     VL DUP LOWER EXTREMITY VENOUS LEFT    Result Date: 6/30/2022    [address] [place] [address]  Vascular Lower Extremities DVT Study Procedure   Patient Name   Johnny Crystal     Date of Study           06/30/2022                 [facility]   Date of Birth  1955  Gender                  Female   Age            74 year(s)  Race                       Room Number    OP   Corporate ID # [id number]   Patient Acct # [de-identified]   MR #           [id number]      Sonographer             Shan Barajas RVT   Accession #    [id number]  Interpreting Physician  Domo Wilks   Referring                  Referring Physician     Sander Shea. [address]  Nurse  Practitioner  Procedure Type of Study:   Veins: Lower Extremities DVT Study, Venous Scan Lower Bilateral.  Indications for Study:Pain and swelling. Patient Status:Out Patient. Technical Quality:Limited visualization. Limitation reason:Swelling.   - Critical Result:Dr Christiano Peterson, o/c for Marlin King. Conclusions   Summary   Technically limited visualization. Acute deep vein thrombosis of the right leg involving the common femoral,  femoral and popliteal veins. Acute deep vein thrombosis of the left leg involving the common femoral,  femoral, popliteal and gastrocnemius veins. Superficial thrombophlebitis of the lower extremity involving the  sapheno-femoral region bilaterally.    Signature   ----------------------------------------------------------------  Electronically signed by Shan Barajas RVT(Sonographer) on  06/30/2022 02:59 PM  ----------------------------------------------------------------   ----------------------------------------------------------------  Electronically signed by Domo Wilks(Interpreting physician)  on 06/30/2022 05:23 PM  ----------------------------------------------------------------  Findings:   Right Impression:                Left Impression:  The common femoral, femoral and  The common femoral, femoral, deep femoral  popliteal veins and              and popliteal veins, one gastroc and one  saphenofemoral junction are      peroneal vein and the saphenofemoral  dilated and non-compressible     junction are dilated and non-compressible  with hypoechoic echoes. with hypoechoic echoes. Limited visualization of the     Limited visualization of the posterior  posterior tibial and peroneal    tibial and peroneal veins. veins. The proximal portion of the great  Normal compressibility of the    saphenous vein is dilated and  great saphenous vein. non-compressible with hypoechoic echoes. .   Normal compressibility of the    Normal compressibility of the small  small saphenous vein. saphenous vein. Velocities are measured in cm/s ; Diameters are measured in cm Right Lower Extremities DVT Study Measurements Right 2D Measurements +------------------------------------+----------+---------------+----------+ ! Location                            ! Visualized! Compressibility! Thrombosis! +------------------------------------+----------+---------------+----------+ ! Common Femoral                      !Yes       ! No             !          ! +------------------------------------+----------+---------------+----------+ ! Prox Femoral                        !Yes       ! No             !          ! +------------------------------------+----------+---------------+----------+ ! Mid Femoral                         !Yes       ! No             !          ! +------------------------------------+----------+---------------+----------+ ! Dist Femoral                        !Yes       ! No             !          ! +------------------------------------+----------+---------------+----------+ ! Deep Femoral                        !Yes       ! Yes            ! None      ! +------------------------------------+----------+---------------+----------+ ! Popliteal                           !Yes       ! No             !          ! +------------------------------------+----------+---------------+----------+ ! Sapheno Femoral Junction            ! Yes       ! No             !          ! +------------------------------------+----------+---------------+----------+ ! PTV                                 ! Partial   !Yes            ! None      ! +------------------------------------+----------+---------------+----------+ ! Peroneal                            !Partial   !Yes            ! None      ! +------------------------------------+----------+---------------+----------+ ! Gastroc                             ! Yes       ! Yes            ! None      ! +------------------------------------+----------+---------------+----------+ ! GSV Thigh                           ! Yes       ! Yes            ! None      ! +------------------------------------+----------+---------------+----------+ ! GSV Knee                            ! Yes       ! Yes            ! None      ! +------------------------------------+----------+---------------+----------+ ! GSV Ankle                           ! Yes       ! Yes            ! None      ! +------------------------------------+----------+---------------+----------+ ! SSV                                 ! Yes       ! Yes            ! None      ! +------------------------------------+----------+---------------+----------+ Left Lower Extremities DVT Study Measurements Left 2D Measurements +------------------------------------+----------+---------------+----------+ ! Location                            ! Visualized! Compressibility! Thrombosis! +------------------------------------+----------+---------------+----------+ ! Common Femoral !Yes       !No             !          ! +------------------------------------+----------+---------------+----------+ ! Prox Femoral                        !Yes       ! No             !          ! +------------------------------------+----------+---------------+----------+ ! Mid Femoral                         !Yes       ! No             !          ! +------------------------------------+----------+---------------+----------+ ! Dist Femoral                        !Yes       ! No             !          ! +------------------------------------+----------+---------------+----------+ ! Deep Femoral                        !Yes       ! No             !          ! +------------------------------------+----------+---------------+----------+ ! Popliteal                           !Yes       ! No             !          ! +------------------------------------+----------+---------------+----------+ ! Sapheno Femoral Junction            ! Yes       ! No             !          ! +------------------------------------+----------+---------------+----------+ ! PTV                                 ! Partial   !Yes            ! None      ! +------------------------------------+----------+---------------+----------+ ! Peroneal                            !Partial   !No             !          ! +------------------------------------+----------+---------------+----------+ ! Gastroc                             ! Yes       ! No             !          ! +------------------------------------+----------+---------------+----------+ ! GSV Thigh                           ! Yes       ! No             !          ! +------------------------------------+----------+---------------+----------+ ! GSV Knee                            ! Yes       ! Yes            ! None      ! +------------------------------------+----------+---------------+----------+ ! GSV Ankle                           ! Yes       ! Yes            ! None      ! +------------------------------------+----------+---------------+----------+ ! SSV                                 ! Yes       ! Yes            ! None      ! +------------------------------------+----------+---------------+----------+    ASSESSMENT  and  PLAN     Principal Problem:    Leg DVT (deep venous thromboembolism), acute, bilateral (HCC)  Active Problems:    Chronic obstructive pulmonary disease with acute exacerbation (HCC)  Resolved Problems:    * No resolved hospital problems. *    Plan:    Acute, bilateral Leg DVT  -Patient with prior history of DVT presents with pain and swelling of BLE  -Outpatient doppler studies positive for acute DVTs BLE  -Lovenox 1mg/kg initiated in ED  --Continue on admission  -Per pulmonology-plan to transition to [facility]  -- Will need lifelong anticoagulation    -CT chest to evaluate for pulmonary embolism  -- Results show -no evidence of pulmonary embolism  -- Focal consolidative changes RUL and MOUNA and superior segment of the RLL  -- Multiple tree-in-bud nodular densities and cavitary changes  --- Unchanged from recent CT chest  -Results reviewed her pulmonologist  -- Does not feel additional IV antibiotics are needed at this time  -Rapid swab negative for influenza a and B and COVID-19    COPD Exacerbation  -IV Solumedrol 60 mg every 6 hours  -Aerosols every 4 hours  -Continue home inhalers and Mucinex    Consultations:     IP CONSULT TO PULMONOLOGY  IP CONSULT TO INTERNAL MEDICINE      IVONNE Murcia CNP   7/1/2022  3:40 AM    [address], [address] [place] [address]. Phone [phone]     Attending Physician Statement  I have discussed the care of Oumou Mojica and I have examined the patient myselft and taken ros and hpi , including pertinent history and exam findings,  with the resident. I have reviewed the key elements of all parts of the encounter with the resident.   I agree with the assessment, plan and orders as documented by the resident.   44-year-old lady with a history of chronic respiratory failure secondary to COPD on home oxygen since 2017 quit smoking in 2017 history of DVT and PE was not on any anticoagulation  Patient has been bedbound for last few years since last ICU admission when she was intubated and ventilated  Admitted with bilateral lower extremity swelling left more than right diagnosed with a extensive deep vein thrombosis with extensive leg edema  Her risk factors are being bedbound  Patient has lost 10 pounds in last few months rule out abdominal pelvic malignancy  Will do CT abdomen pelvis with IV and oral contrast  Pulmonary consulted input noted and appreciated  Patient has multiple risk factors including chronic respiratory failure cavitary lung lesion that needs a work-up for malignancy now extensive DVT and patient is bedbound high risk of mortality and morbidity requiring inpatient progressive bed admission    Electronically signed by Fabi Verdugo MD

## 2022-06-30 NOTE — ED PROVIDER NOTES
EMERGENCY DEPARTMENT ENCOUNTER    Pt Name: Yael Matta  MRN: [id number]  [place] 1955  Date of evaluation: 22  CHIEF COMPLAINT       Chief Complaint   Patient presents with    Leg Pain     Bilateral DVT     HISTORY OF PRESENT ILLNESS   79-year-old female presents for complaints of left leg pain. Patient reports been having left leg pain for the last couple days noticed increased swelling as well. Patient went for a outpatient DVT study today and was found to have new DVTs in the both lower extremities, patient was sent for evaluation for possible PE. Patient currently denying any shortness of breath or chest pain. Patient reports that she normally wears 4 L of O2 and has had not needed to go up. The history is provided by the patient. REVIEW OF SYSTEMS     Review of Systems   Constitutional: Negative for fever. HENT: Negative for congestion and ear pain. Eyes: Negative for pain. Respiratory: Negative for shortness of breath. Cardiovascular: Positive for leg swelling. Negative for chest pain and palpitations. Gastrointestinal: Negative for abdominal pain. Genitourinary: Negative for dysuria and flank pain. Musculoskeletal: Negative for back pain. Left Leg Pain   Skin: Negative for color change. Neurological: Negative for numbness and headaches. Psychiatric/Behavioral: Negative for confusion. All other systems reviewed and are negative.     PASTMEDICAL HISTORY     Past Medical History:   Diagnosis Date    Abnormal EKG     TRAM [facility]acute kidney injury) ([facility] 2022    Anxiety     Asthma     Bipolar disorder ([facility]     SEVERE [place] , [place]    COPD (chronic obstructive pulmonary disease) (HCC)     Cramps, extremity     SEVERE LEG CRAMPS    Depression     Dilated bile duct     Headache     History of elective      Hyperlipidemia     Irritable bowel syndrome     Prolonged emergence from general anesthesia     SEVERE ISSUES WAKING UP    Substance abuse ([facility]     street drugs when younger    Unspecified sleep apnea     Vision abnormalities     glasses     Past Problem List  Patient Active Problem List   Diagnosis Code    Chronic obstructive pulmonary disease ([facility]) J44.9    Vitamin D deficiency E55.9    Anxiety F41.9    Asthma J45.909    Bipolar disorder ([facility] F31.9    Depression F32. A    Hyperlipidemia with target LDL less than 100 E78.5    Sleep apnea G47.30    Vision abnormalities H53.9    Status post hysteroscopy Z98.890    Chronic headaches R51.9, G89.29    IBS (irritable bowel syndrome) K58.9    Colon polyp K63.5    Collagenous colitis K52.831    Lung nodule R91.1    Left elbow pain M25.522    Dilated bile duct K83.8    Acute pain of left shoulder M25.512    Adhesive capsulitis of left shoulder M75.02    Leucopenia D72.819    Compression fracture of body of thoracic vertebra ([facility]) S22.000A    Age-related osteoporosis with current pathological fracture M80.00XA    Pulmonary embolism on right ([facility]) I26.99    Migraines G43.909    Paranoid behavior ([facility]) F22    Acute deep vein thrombosis (DVT) of right lower extremity ([facility]) I82.401    Delirium R41.0    Chronic respiratory failure with hypoxia ([facility]) J96.11    Major neurocognitive disorder ([facility]) F03.90    Pneumonia J18.9    Chronic respiratory failure with hypoxia, on home O2 therapy ([facility]) J96.11, Z99.81    COPD (chronic obstructive pulmonary disease) ([facility]) J44.9    TRAM (acute kidney injury) ([facility]) N17.9    History of pulmonary embolus (PE) Z86.711    Mycoplasma pneumonia J15.7    Iron deficiency anemia D50.9    Sepsis ([facility]) A41.9    Acute on chronic respiratory failure ([facility]) J96.20    Cavitary lesion of lung J98.4    History of DVT (deep vein thrombosis) Z86.718    Pneumothorax, right J93.9    Status post chest tube placement ([facility]) Z93.8    Pneumothorax on right J93.9    HCAP (healthcare-associated pneumonia) A27.7    Acute systolic HF (heart failure) [facility] I50.21    Pseudomonas aeruginosa infection A49.8    Elevated procalcitonin R79.89    Elevated C-reactive protein (CRP) R79.82    Lung abscess (HCC) J85.2    Recurrent pneumothorax after chest tube removed J95.811    Postprocedural subcutaneous emphysema T81.82XA    Leg DVT (deep venous thromboembolism), acute, bilateral (HCC) I82.403     SURGICAL HISTORY       Past Surgical History:   Procedure Laterality Date    ANKLE SURGERY      HAD SCREWS AND HARDWARE, THEN REMOVED    BRONCHOSCOPY  2022         COLONOSCOPY  02/15/2018    tubular adenoma    EYE SURGERY Bilateral     CATARACTS    HYSTEROSCOPY  10/19/2016    D & C    INDUCED       LASIK      bilateral    TONSILLECTOMY AND ADENOIDECTOMY Bilateral     VULVA SURGERY      HAD A BIOPSY AND REMOVAL OF     CURRENT MEDICATIONS       Previous Medications    ACETAMINOPHEN (TYLENOL) 325 MG TABLET    Take 650 mg by mouth every 6 hours as needed for Pain or Fever    ALBUTEROL (PROVENTIL) (2.5 MG/3ML) 0.083% NEBULIZER SOLUTION    Take 2.5 mg by nebulization 4 times daily    ALBUTEROL SULFATE HFA (VENTOLIN HFA) 108 (90 BASE) MCG/ACT INHALER    Inhale 2 puffs into the lungs every 6 hours as needed for Wheezing    ATORVASTATIN (LIPITOR) 20 MG TABLET    Take 20 mg by mouth at bedtime    BREO ELLIPTA 100-25 MCG/INH AEPB INHALER    Inhale 1 puff into the lungs daily     DOCUSATE SODIUM (COLACE) 100 MG CAPSULE    take 1 capsule by mouth twice a day    FAMOTIDINE (PEPCID) 20 MG TABLET    Take 1 tablet by mouth 2 times daily    FLUTICASONE (FLONASE) 50 MCG/ACT NASAL SPRAY    2 sprays by Nasal route daily    FOLIC ACID (FOLVITE) 1 MG TABLET    Take 1 tablet by mouth daily    FUROSEMIDE (LASIX) 20 MG TABLET    Take 1 tablet by mouth 2 times daily    GLUCOSE (GLUTOSE) 40 % GEL    Take 37.5 mLs by mouth as needed (low blood glucose)    GUAIFENESIN (MUCINEX) 600 MG EXTENDED RELEASE TABLET    Take 600 mg by mouth 2 times daily    HEPARIN, PORCINE, 5000 UNIT/ML INJECTION    Inject 5,000 Units into the skin every 8 hours    HYDROCORTISONE 1 % CREAM    Apply topically every 6 hours as needed (hemorrhoids)    INSULIN LISPRO, 1 UNIT DIAL, (HUMALOG KWIKPEN) 100 UNIT/ML SOPN    Inject into the skin 4 times daily (before meals and nightly) Per sliding scale:  151-200 = 2 units  201-250 = 4 units  251-300 = 6 units  301-350 = 8 units  351-400 = 10 units    LIDOCAINE 4 % EXTERNAL PATCH    Place 1 patch onto the skin daily as needed (lower back pain)     MAGNESIUM HYDROXIDE (MILK OF MAGNESIA) 400 MG/5ML SUSPENSION    Take 30 mLs by mouth daily as needed for Constipation (at bedtime if no BM in 3 days)    MONTELUKAST (SINGULAIR) 10 MG TABLET    Take 10 mg by mouth nightly    OMEPRAZOLE (PRILOSEC) 20 MG DELAYED RELEASE CAPSULE    Take 20 mg by mouth daily    ONDANSETRON (ZOFRAN) 4 MG TABLET    Take 4 mg by mouth every 6 hours as needed for Nausea or Vomiting    OXYCODONE-ACETAMINOPHEN (PERCOCET) 5-325 MG PER TABLET    Take 1-2 tablets by mouth every 6 hours as needed for Pain. PHENYLEPHRINE-COCOA BUTTER (PREPARATION H) 0.25-88.44 %    Place 1 suppository rectally every 6 hours as needed for Hemorrhoids    POLYETHYLENE GLYCOL (GLYCOLAX) 17 GM/SCOOP POWDER    Take 17 g by mouth daily as needed (constipation)    RISPERIDONE (RISPERDAL) 0.5 MG TABLET    Take 3 tablets by mouth every 12 hours    SENNA-DOCUSATE (PERICOLACE) 8.6-50 MG PER TABLET    Take 2 tablets by mouth daily    SPIRIVA RESPIMAT 2.5 MCG/ACT AERS INHALER    inhale 2 puffs INTO THE LUNGS once daily    TRAZODONE (DESYREL) 50 MG TABLET    Take 50 mg by mouth nightly      ALLERGIES     is allergic to advil [ibuprofen], aleve [naproxen], antipyrine, celecoxib, codeine, fomepizole, incruse ellipta [umeclidinium bromide], other, rofecoxib, salicylates, strawberry extract, sulfinpyrazone, aspirin, and nsaids. FAMILY HISTORY     She indicated that her mother is . She indicated that her father is .  She back: Normal range of motion. No spinous process tenderness or muscular tenderness. Left lower le+ Edema present. Skin:     General: Skin is warm and dry. Capillary Refill: Capillary refill takes less than 2 seconds. Neurological:      General: No focal deficit present. Mental Status: She is alert and oriented to person, place, and time. Cranial Nerves: Cranial nerves are intact. Sensory: Sensation is intact. Motor: Motor function is intact. Psychiatric:         Mood and Affect: Mood normal.         Thought Content: Thought content does not include homicidal or suicidal ideation. MEDICAL DECISION MAKIN-year-old female presents for complaints of left leg pain. Patient had a outpatient DVT study performed and was found to have bilateral DVTs sent for evaluation for possible PE and admission for anticoagulation. We will check labs and imaging    Labs reviewed and unremarkable, CT was negative for PE    Findings are not showing concern for inflammatory process or for consistent with prior CT we will defer treatment at this time, as imaging is unchanged patient not requiring any extra oxygen at this time    Spoke with Dr. Donnal Aase who accepts admission with pulmonology consult. Patient demonstrates understanding and agreement with the plan, was given the opportunity to ask questions, and these questions were answered to the best of the provided information at this time. VS stable for transfer. This dictation was prepared using [employer] voice recognition software.          CRITICAL CARE:       PROCEDURES:    Procedures    DIAGNOSTIC RESULTS   EKG:All EKG's are interpreted by the Emergency Department Physician who either signs or Co-signs this chart in the absence of a cardiologist.    Sinus rhythm, rate of 85, right axis, incomplete right bundle, no ST segment elevation or depression, nonspecific T wave changes    RADIOLOGY:All plain film, CT, MRI, and formal ultrasound images (except ED bedside ultrasound) are read by the radiologist, see reports below, unless otherwisenoted in MDM or here. CT CHEST PULMONARY EMBOLISM W CONTRAST   Final Result   No evidence of pulmonary embolism. Focal consolidative changes within right upper lobe and left upper lobe and   superior segment of the right lower lobe and multiple tree in bud nodular   densities and cavitary changes within both lungs highly suggestive of   multilobar infectious/inflammatory condition. Findings are unchanged since   recent chest CT. The largest cavitary lesion is noted within the right lower lobe and appears   unchanged since prior examination. Although this lesion may be due to   infectious/inflammatory condition, neoplastic etiologies cannot be excluded. Unchanged emphysematous changes within the lungs. RECOMMENDATIONS:   Unavailable           LABS: All lab results were reviewed by myself, and all abnormals are listed below.   Labs Reviewed   CBC WITH AUTO DIFFERENTIAL - Abnormal; Notable for the following components:       Result Value    RBC 3.31 (*)     Hemoglobin 9.4 (*)     Hematocrit 29.6 (*)     RDW 18.6 (*)     Seg Neutrophils 72 (*)     Lymphocytes 12 (*)     Monocytes 10 (*)     Eosinophils % 5 (*)     Absolute Lymph # 0.60 (*)     All other components within normal limits   COMPREHENSIVE METABOLIC PANEL W/ REFLEX TO MG FOR LOW K - Abnormal; Notable for the following components:    Glucose 102 (*)     CREATININE <0.40 (*)     Chloride 96 (*)     CO2 33 (*)     Total Bilirubin 0.18 (*)     Albumin 2.9 (*)     All other components within normal limits   TROPONIN - Abnormal; Notable for the following components:    Troponin, High Sensitivity 21 (*)     All other components within normal limits   COVID-19 & INFLUENZA COMBO   BRAIN NATRIURETIC PEPTIDE   PROTIME-INR   APTT   TROPONIN   CBC WITH AUTO DIFFERENTIAL   BASIC METABOLIC PANEL       EMERGENCY DEPARTMENTCOURSE: Vitals:    Vitals:    06/30/22 1423 06/30/22 1537   BP: 113/69 101/63   Pulse: 87 75   Resp: 24 20   SpO2: 100% 99%   Weight: 169 lb (76.7 kg)    Height: 5' 5\" (1.651 m)        The patient was given the following medications while in the emergency department:  Orders Placed This Encounter   Medications    0.9 % sodium chloride bolus    DISCONTD: enoxaparin (LOVENOX) injection 80 mg     Order Specific Question:   Indication of Use     Answer:   Treatment-DVT/PE    methylPREDNISolone sodium (SOLU-MEDROL) injection 60 mg    morphine (PF) injection 2 mg    albuterol (PROVENTIL) nebulizer solution 2.5 mg     Order Specific Question:   Initiate RT Bronchodilator Protocol     Answer:   No    budesonide-formoterol (SYMBICORT) 160-4.5 MCG/ACT inhaler 2 puff    tiotropium (SPIRIVA RESPIMAT) 2.5 MCG/ACT inhaler 2 puff    enoxaparin (LOVENOX) injection 80 mg     Order Specific Question:   Indication of Use     Answer:   Treatment-DVT/PE    0.9 % sodium chloride bolus    sodium chloride flush 0.9 % injection 10 mL    iopamidol (ISOVUE-370) 76 % injection 75 mL     CONSULTS:  IP CONSULT TO PULMONOLOGY  IP CONSULT TO INTERNAL MEDICINE    FINAL IMPRESSION      1. Leg DVT (deep venous thromboembolism), acute, bilateral (HCC)          DISPOSITION/PLAN   DISPOSITION        PATIENT REFERRED TO:  No follow-up provider specified. DISCHARGE MEDICATIONS:  New Prescriptions    No medications on file     The care is provided during an unprecedented national emergency due to the novel coronavirus, COVID 19.   [address], DO                    [address],   06/30/22 1969

## 2022-07-01 ENCOUNTER — APPOINTMENT (OUTPATIENT)
Dept: CT IMAGING | Age: 67
DRG: 197 | End: 2022-07-01
Payer: COMMERCIAL

## 2022-07-01 PROBLEM — J44.1 CHRONIC OBSTRUCTIVE PULMONARY DISEASE WITH ACUTE EXACERBATION (HCC): Status: ACTIVE | Noted: 2022-05-03

## 2022-07-01 LAB
EKG ATRIAL RATE: 85 BPM
EKG P AXIS: 56 DEGREES
EKG P-R INTERVAL: 142 MS
EKG Q-T INTERVAL: 362 MS
EKG QRS DURATION: 94 MS
EKG QTC CALCULATION (BAZETT): 430 MS
EKG R AXIS: -110 DEGREES
EKG T AXIS: 49 DEGREES
EKG VENTRICULAR RATE: 85 BPM
GLUCOSE BLD-MCNC: 138 MG/DL (ref 65–105)
GLUCOSE BLD-MCNC: 163 MG/DL (ref 65–105)
GLUCOSE BLD-MCNC: 209 MG/DL (ref 65–105)
GLUCOSE BLD-MCNC: 270 MG/DL (ref 65–105)

## 2022-07-01 PROCEDURE — 6360000002 HC RX W HCPCS: Performed by: INTERNAL MEDICINE

## 2022-07-01 PROCEDURE — 94640 AIRWAY INHALATION TREATMENT: CPT

## 2022-07-01 PROCEDURE — 99223 1ST HOSP IP/OBS HIGH 75: CPT | Performed by: INTERNAL MEDICINE

## 2022-07-01 PROCEDURE — 2700000000 HC OXYGEN THERAPY PER DAY

## 2022-07-01 PROCEDURE — 2580000003 HC RX 258: Performed by: EMERGENCY MEDICINE

## 2022-07-01 PROCEDURE — 2580000003 HC RX 258: Performed by: INTERNAL MEDICINE

## 2022-07-01 PROCEDURE — 6370000000 HC RX 637 (ALT 250 FOR IP): Performed by: INTERNAL MEDICINE

## 2022-07-01 PROCEDURE — 6360000004 HC RX CONTRAST MEDICATION: Performed by: INTERNAL MEDICINE

## 2022-07-01 PROCEDURE — 6370000000 HC RX 637 (ALT 250 FOR IP): Performed by: NURSE PRACTITIONER

## 2022-07-01 PROCEDURE — 82947 ASSAY GLUCOSE BLOOD QUANT: CPT

## 2022-07-01 PROCEDURE — 94761 N-INVAS EAR/PLS OXIMETRY MLT: CPT

## 2022-07-01 PROCEDURE — 2060000000 HC ICU INTERMEDIATE R&B

## 2022-07-01 PROCEDURE — 93010 ELECTROCARDIOGRAM REPORT: CPT | Performed by: INTERNAL MEDICINE

## 2022-07-01 PROCEDURE — 74177 CT ABD & PELVIS W/CONTRAST: CPT

## 2022-07-01 RX ORDER — LIDOCAINE 4 G/G
1 PATCH TOPICAL DAILY PRN
Status: DISCONTINUED | OUTPATIENT
Start: 2022-07-01 | End: 2022-07-06 | Stop reason: HOSPADM

## 2022-07-01 RX ORDER — FUROSEMIDE 20 MG/1
20 TABLET ORAL 2 TIMES DAILY
Status: DISCONTINUED | OUTPATIENT
Start: 2022-07-01 | End: 2022-07-06 | Stop reason: HOSPADM

## 2022-07-01 RX ORDER — METHYLPREDNISOLONE SODIUM SUCCINATE 40 MG/ML
40 INJECTION, POWDER, LYOPHILIZED, FOR SOLUTION INTRAMUSCULAR; INTRAVENOUS EVERY 8 HOURS
Status: DISCONTINUED | OUTPATIENT
Start: 2022-07-01 | End: 2022-07-03

## 2022-07-01 RX ADMIN — MORPHINE SULFATE 2 MG: 2 INJECTION, SOLUTION INTRAMUSCULAR; INTRAVENOUS at 10:16

## 2022-07-01 RX ADMIN — SODIUM CHLORIDE, PRESERVATIVE FREE 10 ML: 5 INJECTION INTRAVENOUS at 13:26

## 2022-07-01 RX ADMIN — PANTOPRAZOLE SODIUM 40 MG: 40 TABLET, DELAYED RELEASE ORAL at 06:05

## 2022-07-01 RX ADMIN — IOHEXOL 50 ML: 240 INJECTION, SOLUTION INTRATHECAL; INTRAVASCULAR; INTRAVENOUS; ORAL at 10:30

## 2022-07-01 RX ADMIN — ALBUTEROL SULFATE 2.5 MG: 2.5 SOLUTION RESPIRATORY (INHALATION) at 07:46

## 2022-07-01 RX ADMIN — MONTELUKAST 10 MG: 10 TABLET, FILM COATED ORAL at 20:07

## 2022-07-01 RX ADMIN — TIOTROPIUM BROMIDE INHALATION SPRAY 2 PUFF: 3.12 SPRAY, METERED RESPIRATORY (INHALATION) at 07:45

## 2022-07-01 RX ADMIN — FOLIC ACID 1 MG: 1 TABLET ORAL at 08:25

## 2022-07-01 RX ADMIN — SODIUM CHLORIDE 80 ML: 9 INJECTION, SOLUTION INTRAVENOUS at 13:25

## 2022-07-01 RX ADMIN — FUROSEMIDE 20 MG: 20 TABLET ORAL at 08:26

## 2022-07-01 RX ADMIN — MORPHINE SULFATE 2 MG: 2 INJECTION, SOLUTION INTRAMUSCULAR; INTRAVENOUS at 23:35

## 2022-07-01 RX ADMIN — SODIUM CHLORIDE, PRESERVATIVE FREE 10 ML: 5 INJECTION INTRAVENOUS at 08:26

## 2022-07-01 RX ADMIN — INSULIN LISPRO 4 UNITS: 100 INJECTION, SOLUTION INTRAVENOUS; SUBCUTANEOUS at 11:42

## 2022-07-01 RX ADMIN — HYDROCORTISONE ACETATE 25 MG: 25 SUPPOSITORY RECTAL at 18:33

## 2022-07-01 RX ADMIN — HYDROCORTISONE ACETATE 25 MG: 25 SUPPOSITORY RECTAL at 05:56

## 2022-07-01 RX ADMIN — ALBUTEROL SULFATE 2.5 MG: 2.5 SOLUTION RESPIRATORY (INHALATION) at 15:50

## 2022-07-01 RX ADMIN — DOCUSATE SODIUM 100 MG: 100 CAPSULE, LIQUID FILLED ORAL at 08:25

## 2022-07-01 RX ADMIN — INSULIN LISPRO 2 UNITS: 100 INJECTION, SOLUTION INTRAVENOUS; SUBCUTANEOUS at 08:13

## 2022-07-01 RX ADMIN — ALBUTEROL SULFATE 2.5 MG: 2.5 SOLUTION RESPIRATORY (INHALATION) at 03:43

## 2022-07-01 RX ADMIN — DOCUSATE SODIUM 100 MG: 100 CAPSULE, LIQUID FILLED ORAL at 20:07

## 2022-07-01 RX ADMIN — ALBUTEROL SULFATE 2.5 MG: 2.5 SOLUTION RESPIRATORY (INHALATION) at 19:25

## 2022-07-01 RX ADMIN — ALBUTEROL SULFATE 2.5 MG: 2.5 SOLUTION RESPIRATORY (INHALATION) at 23:34

## 2022-07-01 RX ADMIN — GUAIFENESIN 600 MG: 600 TABLET, EXTENDED RELEASE ORAL at 20:06

## 2022-07-01 RX ADMIN — DOCUSATE SODIUM 50 MG AND SENNOSIDES 8.6 MG 2 TABLET: 8.6; 5 TABLET, FILM COATED ORAL at 08:40

## 2022-07-01 RX ADMIN — MORPHINE SULFATE 2 MG: 2 INJECTION, SOLUTION INTRAMUSCULAR; INTRAVENOUS at 06:05

## 2022-07-01 RX ADMIN — BUDESONIDE AND FORMOTEROL FUMARATE DIHYDRATE 2 PUFF: 160; 4.5 AEROSOL RESPIRATORY (INHALATION) at 19:32

## 2022-07-01 RX ADMIN — ATORVASTATIN CALCIUM 20 MG: 20 TABLET, FILM COATED ORAL at 20:07

## 2022-07-01 RX ADMIN — FAMOTIDINE 20 MG: 20 TABLET ORAL at 08:25

## 2022-07-01 RX ADMIN — MORPHINE SULFATE 2 MG: 2 INJECTION, SOLUTION INTRAMUSCULAR; INTRAVENOUS at 17:20

## 2022-07-01 RX ADMIN — ALBUTEROL SULFATE 2.5 MG: 2.5 SOLUTION RESPIRATORY (INHALATION) at 00:23

## 2022-07-01 RX ADMIN — HYDROCORTISONE ACETATE 25 MG: 25 SUPPOSITORY RECTAL at 00:12

## 2022-07-01 RX ADMIN — RISPERIDONE 1.5 MG: 1 TABLET ORAL at 08:26

## 2022-07-01 RX ADMIN — FUROSEMIDE 20 MG: 20 TABLET ORAL at 17:20

## 2022-07-01 RX ADMIN — METHYLPREDNISOLONE SODIUM SUCCINATE 60 MG: 125 INJECTION, POWDER, FOR SOLUTION INTRAMUSCULAR; INTRAVENOUS at 05:52

## 2022-07-01 RX ADMIN — GUAIFENESIN 600 MG: 600 TABLET, EXTENDED RELEASE ORAL at 08:26

## 2022-07-01 RX ADMIN — HYDROCORTISONE ACETATE 25 MG: 25 SUPPOSITORY RECTAL at 23:35

## 2022-07-01 RX ADMIN — METHYLPREDNISOLONE SODIUM SUCCINATE 60 MG: 125 INJECTION, POWDER, FOR SOLUTION INTRAMUSCULAR; INTRAVENOUS at 12:07

## 2022-07-01 RX ADMIN — RISPERIDONE 1.5 MG: 1 TABLET ORAL at 20:07

## 2022-07-01 RX ADMIN — INSULIN LISPRO 3 UNITS: 100 INJECTION, SOLUTION INTRAVENOUS; SUBCUTANEOUS at 21:23

## 2022-07-01 RX ADMIN — METHYLPREDNISOLONE SODIUM SUCCINATE 40 MG: 40 INJECTION, POWDER, FOR SOLUTION INTRAMUSCULAR; INTRAVENOUS at 20:12

## 2022-07-01 RX ADMIN — APIXABAN 10 MG: 5 TABLET, FILM COATED ORAL at 20:04

## 2022-07-01 RX ADMIN — SODIUM CHLORIDE, PRESERVATIVE FREE 10 ML: 5 INJECTION INTRAVENOUS at 20:13

## 2022-07-01 RX ADMIN — ENOXAPARIN SODIUM 80 MG: 100 INJECTION SUBCUTANEOUS at 08:24

## 2022-07-01 RX ADMIN — FAMOTIDINE 20 MG: 20 TABLET ORAL at 20:07

## 2022-07-01 RX ADMIN — BUDESONIDE AND FORMOTEROL FUMARATE DIHYDRATE 2 PUFF: 160; 4.5 AEROSOL RESPIRATORY (INHALATION) at 07:44

## 2022-07-01 ASSESSMENT — PAIN DESCRIPTION - LOCATION
LOCATION: BUTTOCKS
LOCATION: LEG
LOCATION: LEG;BUTTOCKS
LOCATION: LEG

## 2022-07-01 ASSESSMENT — ENCOUNTER SYMPTOMS
SORE THROAT: 0
VOMITING: 0
COUGH: 0
ABDOMINAL PAIN: 0
SHORTNESS OF BREATH: 0
DIARRHEA: 0
WHEEZING: 0
NAUSEA: 0
BACK PAIN: 0
CONSTIPATION: 0

## 2022-07-01 ASSESSMENT — PAIN SCALES - GENERAL
PAINLEVEL_OUTOF10: 8
PAINLEVEL_OUTOF10: 8
PAINLEVEL_OUTOF10: 9
PAINLEVEL_OUTOF10: 8

## 2022-07-01 ASSESSMENT — PAIN DESCRIPTION - ORIENTATION
ORIENTATION: RIGHT;LEFT
ORIENTATION: RIGHT;LEFT

## 2022-07-01 NOTE — PROGRESS NOTES
"Pt called w/ path results from thyroid biopsy.    Shows \"Atypia of undetermined significance\"    I recommend he see endocrinology. He is willing to see.    Please help pt schedule an endocrine visit next available.   " Pulmonary Progress Note  [facility]      Patient - Silver Tran,  Age - 74 y.o.    - 1955      Room Number - /-01   MRN -  [id number]   [facility] # - [de-identified]  Date of Admission -  2022  2:21 Wiliam Garcia MD  Primary Care Physician - Louise Aldrich MD     SUBJECTIVE   She seems to be less dyspneic; was complaining of left flank pain and so CT scan of the abdomen has been ordered do not have those results. CT of chest negative for pulmonary embolism    OBJECTIVE   VITALS    height is 5' 5\" (1.651 m) and weight is 186 lb 4.6 oz (84.5 kg). Her oral temperature is 98.1 °F (36.7 °C). Her blood pressure is 112/62 and her pulse is 90. Her respiration is 18 and oxygen saturation is 94%. Body mass index is 31 kg/m². Temperature Range: Temp: 98.1 °F (36.7 °C) Temp  Av.3 °F (36.8 °C)  Min: 97.9 °F (36.6 °C)  Max: 99.3 °F (37.4 °C)  BP Range:  Systolic (46ASM), SGZ:357 , Min:100 , PPL:603     Diastolic (74AZF), JQZ:38, Min:46, Max:69    Pulse Range: Pulse  Av.6  Min: 68  Max: 90  Respiration Range: Resp  Av.2  Min: 16  Max: 28  Current Pulse Ox[de-identified]  SpO2: 94 %  24HR Pulse Ox Range:  SpO2  Av.4 %  Min: 91 %  Max: 100 %  Oxygen Amount and Delivery: O2 Flow Rate (L/min): 4 L/min    Wt Readings from Last 3 Encounters:   22 186 lb 4.6 oz (84.5 kg)   22 174 lb 14.4 oz (79.3 kg)   22 183 lb (83 kg)       I/O (24 Hours)    Intake/Output Summary (Last 24 hours) at 2022 1347  Last data filed at 2022 1259  Gross per 24 hour   Intake 690 ml   Output 450 ml   Net 240 ml       EXAM     General Appearance  Awake, alert, oriented, in no acute distress  HEENT - normocephalic, atraumatic.  []  Mallampati  [] Crowded airway   [] Macroglossia  []  Retrognathia  [] Micrognathia  []  Normal tongue size []  Normal Bite  [] [place] sign positive    Neck - Supple, trachea midline   Lungs -diminished less wheezes  Cardiovascular - Heart sounds are normal.  Regular rate and rhythm   Abdomen - Soft, nontender, nondistended, no masses or organomegaly  Neurologic - There are no focal motor or sensory deficits  Skin - No bruising or bleeding  Extremities - No clubbing, cyanosis, edema    MEDS      furosemide  20 mg Oral BID    methylPREDNISolone  60 mg IntraVENous Q6H    albuterol  2.5 mg Nebulization Q4H    budesonide-formoterol  2 puff Inhalation BID    tiotropium  2 puff Inhalation Daily    enoxaparin  1 mg/kg SubCUTAneous BID    sodium chloride flush  5-40 mL IntraVENous 2 times per day    sennosides-docusate sodium  2 tablet Oral Daily    risperiDONE  1.5 mg Oral Q12H    pantoprazole  40 mg Oral QAM AC    montelukast  10 mg Oral Nightly    atorvastatin  20 mg Oral Nightly    docusate sodium  100 mg Oral BID    famotidine  20 mg Oral BID    fluticasone  2 spray Nasal Daily    folic acid  1 mg Oral Daily    guaiFENesin  600 mg Oral BID    insulin lispro  0-12 Units SubCUTAneous TID WC    insulin lispro  0-6 Units SubCUTAneous Nightly      sodium chloride 80 mL (07/01/22 1325)    dextrose       lidocaine, morphine, sodium chloride flush, sodium chloride, acetaminophen, ondansetron **OR** ondansetron, sodium chloride flush, hydrocortisone-aloe, glucose, dextrose bolus **OR** dextrose bolus, glucagon (rDNA), dextrose, traZODone, hydrocortisone    LABS   CBC   Recent Labs     06/30/22  1531   WBC 4.6   HGB 9.4*   HCT 29.6*   MCV 89.3        BMP:   Lab Results   Component Value Date/Time     06/30/2022 03:31 PM    K 3.8 06/30/2022 03:31 PM    CL 96 06/30/2022 03:31 PM    CO2 33 06/30/2022 03:31 PM    BUN 12 06/30/2022 03:31 PM    LABALBU 2.9 06/30/2022 03:31 PM    LABALBU 3.6 07/08/2021 11:52 AM    CREATININE <0.40 06/30/2022 03:31 PM    CALCIUM 9.2 06/30/2022 03:31 PM    GFRAA Can not be calculated 06/30/2022 03:31 PM    LABGLOM Can not be

## 2022-07-01 NOTE — PROGRESS NOTES
Pt arrived as out patient for Venous Doppler order for LLE by [facility] with 2 transport persons on 6/30/22. Left lower extremity was positive for DVT as was proximal right lower extremity, therefore right leg was scanned and found to also be positive for extensive DVT. Susan Boyer NP was contacted by [facility] and asked for a Bilateral Venous Doppler order. She replied \"OK I asked staff to send order. \" As of 7/1/22, order has not been received. Pt was taken to ED by Vascular tech and 2 transport persons to be admitted per Dr Barney Smith.  (On call for Anastasiya Amaya)

## 2022-07-01 NOTE — PROGRESS NOTES
Patient admitted per cart to room 2091. Patient complains of bilateral leg pain. VS taken and telemetry applied. Bed alarm set. Oriented to room.

## 2022-07-01 NOTE — PROGRESS NOTES
[facility]    OCCUPATIONAL THERAPY MISSED TREATMENT NOTE   INPATIENT   Date: 22  Patient Name: Awidla Middleton       Room:   MRN: [id number]   Account #: [de-identified]    : 1955  (74 y.o.)  Gender: female                 REASON FOR MISSED TREATMENT:  22 OT Eval deferred as pt is Positive for BLE DVTs and has not been on Therapeutic Anticoagulants for a full 24/hr cycle. Anticoags started 8PM on 22. OT to follow and re-attempt as able and appropriate.   -   Testing       Anthony Cantrell OTR/L

## 2022-07-01 NOTE — CARE COORDINATION
CASE MANAGEMENT NOTE:    Admission Date:  6/30/2022 Rose Marie Roberson is a 74 y.o.  female    Admitted for : Leg DVT (deep venous thromboembolism), acute, bilateral ([facility] [address] [I82.403]    Met with:  Patient    PCP:  Gilberto Livingston                                Insurance:  Macey Cohen      Is patient alert and oriented at time of discussion:  Yes    Current Residence/ Living Arrangements:  in nursing home   [facility]          SNF needed: Yes- SW notified to return to [address] [place] [address] and list provided: NA       Is patient currently receiving oral anticoagulation therapy? No    Is the Patient an [facility] with Readmission Risk Score greater than 14%? No  If yes, pt needs a follow up appointment made within 7 days. Family Members/Caregivers that pt would like involved in their care:    Yes    If yes, list name here:  Jim Cruz    Transportation Provider:  Ambulance/ambulette             Discharge Plan:  7/1/22 Torsten Ashton From [facility] , [address] notified to return . Admitted with DVT, on lovenox 80 mg BID, IV solumedrol 60 mg Q6. CT abdomen/pelvis. Consult for Pulm , PT/Ot eval. LYDIA NEEDS TO BE SIGNED/COMPLETED. Following for needs.  //JF             Electronically signed by: Madie Ray RN on 7/1/2022 at 10:58 AM

## 2022-07-01 NOTE — ACP (ADVANCE CARE PLANNING)
Advance Care Planning     Advance Care Planning Activator (Inpatient)  Conversation Note      Date of ACP Conversation: 7/1/2022     Conversation Conducted with: Patient with Decision Making Capacity    ACP Activator: Diana García RN        Health Care Decision Maker:     Current Designated Health Care Decision Maker:     Primary Decision Maker: Krishsolomon Ragland - Valerie - [phone]  Click here to complete Healthcare Decision Makers including section of the Healthcare Decision Maker Relationship (ie \"Primary\")  Today we documented Decision Maker(s) consistent with Legal Next of Kin hierarchy. Care Preferences    Ventilation: \"If you were in your present state of health and suddenly became very ill and were unable to breathe on your own, what would your preference be about the use of a ventilator (breathing machine) if it were available to you? \"      Would the patient desire the use of ventilator (breathing machine)?: no    \"If your health worsens and it becomes clear that your chance of recovery is unlikely, what would your preference be about the use of a ventilator (breathing machine) if it were available to you? \"     Would the patient desire the use of ventilator (breathing machine)?: No      Resuscitation  \"CPR works best to restart the heart when there is a sudden event, like a heart attack, in someone who is otherwise healthy. Unfortunately, CPR does not typically restart the heart for people who have serious health conditions or who are very sick. \"    \"In the event your heart stopped as a result of an underlying serious health condition, would you want attempts to be made to restart your heart (answer \"yes\" for attempt to resuscitate) or would you prefer a natural death (answer \"no\" for do not attempt to resuscitate)? \" no       [] Yes   [] No   Educated Patient / Chon Infante regarding differences between Advance Directives and portable DNR orders.     Length of ACP Conversation in minutes: Conversation Outcomes:  [] ACP discussion completed  [] Existing advance directive reviewed with patient; no changes to patient's previously recorded wishes  [] New Advance Directive completed  [] Portable Do Not Rescitate prepared for Provider review and signature  [] POLST/POST/MOLST/MOST prepared for Provider review and signature      Follow-up plan:    [] Schedule follow-up conversation to continue planning  [] Referred individual to Provider for additional questions/concerns   [] Advised patient/agent/surrogate to review completed ACP document and update if needed with changes in condition, patient preferences or care setting    [] This note routed to one or more involved healthcare providers    Pt had [facility] signed in ER this admit with Dr Myra Vargas.

## 2022-07-01 NOTE — PROGRESS NOTES
Physical Therapy        Physical Therapy Cancel Note      DATE: 2022    NAME: Kristine Frausto  MRN: [id number]   : 1955      Patient not seen this date for Physical Therapy due to:  22 PT Eval deferred as pt is Positive for BLE DVTs and has not been on Therapeutic Anticoagulants for a full 24/hr cycle. Anticoags started 8PM on 22. PT to follow and check back tomorrow .     Electronically signed by Sindi Rodriguez PT on 2022 at 12:06 PM

## 2022-07-01 NOTE — CARE COORDINATION
Patient is from [facility]. Patient is not a bed-hold. Patient will need authorization to return to facility.  Electronically signed by Sanjana Chiang on 7/1/2022 at 3:03 PM

## 2022-07-01 NOTE — PLAN OF CARE
Problem: Pain  Goal: Verbalizes/displays adequate comfort level or baseline comfort level  7/1/2022 1701 by Selin Echeverria RN  Outcome: Progressing  Flowsheets (Taken 7/1/2022 1701)  Verbalizes/displays adequate comfort level or baseline comfort level: Administer analgesics based on type and severity of pain and evaluate response     Problem: Skin/Tissue Integrity  Goal: Absence of new skin breakdown  Description: 1. Monitor for areas of redness and/or skin breakdown  2. Assess vascular access sites hourly  3. Every 4-6 hours minimum:  Change oxygen saturation probe site  4. Every 4-6 hours:  If on nasal continuous positive airway pressure, respiratory therapy assess nares and determine need for appliance change or resting period. 7/1/2022 1701 by Selin Echeverria RN  Outcome: Progressing  Note: No evidence of new skin breakdown. Repositioned as necessary. Problem: Safety - Adult  Goal: Free from fall injury  Outcome: Progressing  Note: Free from falls this shift, bed in lowest position, bed alarm on, non skid socks on, call light within reach, hourly rounding performed, patient instructed to call out for assistance getting out of bed.

## 2022-07-01 NOTE — DISCHARGE INSTR - COC
Continuity of Care Form    Patient Name: Jacqui Copeland   :  1955  MRN:  [id number]    Admit date:  2022  Discharge date:  22    Code Status Order: DNR-CCA   Advance Directives:      Admitting Physician:  Yu Simmons MD  PCP: hCristoph Watts MD    Discharging Nurse:Harshal Vincent Unit/Room#: 2091/2091-01  Discharging Unit Phone Number:[phone]    Emergency Contact:   Extended Emergency Contact Information  Primary Emergency Contact: Evaristo Pascual *hipaa,Calli  Address: EDIE Yu, [address] [place] [address] Phone: [phone]  Work Phone: [phone]  Mobile Phone: [phone]  Relation: Child    Past Surgical History:  Past Surgical History:   Procedure Laterality Date    ANKLE SURGERY      HAD SCREWS AND HARDWARE, THEN REMOVED    BRONCHOSCOPY  2022         COLONOSCOPY  02/15/2018    tubular adenoma    EYE SURGERY Bilateral     CATARACTS    HYSTEROSCOPY  10/19/2016    D & C    INDUCED       LASIK      bilateral    TONSILLECTOMY AND ADENOIDECTOMY Bilateral     VULVA SURGERY      HAD A BIOPSY AND REMOVAL OF       Immunization History:   Immunization History   Administered Date(s) Administered    COVID-19, MODERNA BLUE border, Primary or Immunocompromised, (age 12y+), IM, 100 mcg/0.5mL 2021, 2021, 2022    Influenza Virus Vaccine 10/15/2018    Influenza, MDCK Quadv, IM, PF (Flucelvax 2 yrs and older) 2020, 2021    Influenza, Quadv, IM, PF (6 mo and older Fluzone, Flulaval, Fluarix, and 3 yrs and older Afluria) 10/03/2016, 2017, 2018, 10/30/2019    Pneumococcal Conjugate 13-valent ([id number]) 2021    Pneumococcal Polysaccharide ([id number]) 2016, 2021    Tdap (Boostrix, Adacel) 2016    Zoster Recombinant (Shingrix) 2018, 2018, 2018       Active Problems:  Patient Active Problem List   Diagnosis Code    Chronic obstructive pulmonary disease ([facility] J44.9    Vitamin D deficiency E55.9    Anxiety F41.9    Asthma J45.909    Bipolar disorder ([address] F31.9    Depression F32. A    Hyperlipidemia with target LDL less than 100 E78.5    Sleep apnea G47.30    Vision abnormalities H53.9    Status post hysteroscopy Z98.890    Chronic headaches R51.9, G89.29    IBS (irritable bowel syndrome) K58.9    Colon polyp K63.5    Collagenous colitis K52.831    Lung nodule R91.1    Left elbow pain M25.522    Dilated bile duct K83.8    Acute pain of left shoulder M25.512    Adhesive capsulitis of left shoulder M75.02    Leucopenia D72.819    Compression fracture of body of thoracic vertebra ([facility]) S22.000A    Age-related osteoporosis with current pathological fracture M80.00XA    Pulmonary embolism on right ([facility]) I26.99    Migraines G43.909    Paranoid behavior ([facility]) F22    Acute deep vein thrombosis (DVT) of right lower extremity ([facility]) I82.401    Delirium R41.0    Chronic respiratory failure with hypoxia ([facility]) J96.11    Major neurocognitive disorder ([facility]) F03.90    Pneumonia J18.9    Chronic respiratory failure with hypoxia, on home O2 therapy ([facility]) J96.11, Z99.81    COPD (chronic obstructive pulmonary disease) ([facility]) J44.9    TRAM (acute kidney injury) ([facility]) N17.9    History of pulmonary embolus (PE) Z86.711    Mycoplasma pneumonia J15.7    Iron deficiency anemia D50.9    Sepsis ([facility]) A41.9    Acute on chronic respiratory failure ([facility]) J96.20    Cavitary lesion of lung J98.4    History of DVT (deep vein thrombosis) Z86.718    Pneumothorax, right J93.9    Status post chest tube placement ([facility]) Z93.8    Pneumothorax on right J93.9    HCAP (healthcare-associated pneumonia) T92.0    Acute systolic HF (heart failure) ([facility]) I50.21    Pseudomonas aeruginosa infection A49.8    Elevated procalcitonin R79.89    Elevated C-reactive protein (CRP) R79.82    Lung abscess ([facility]) J85.2    Recurrent pneumothorax after chest tube removed J95.811    Postprocedural subcutaneous emphysema T81.82XA    Leg DVT (deep venous thromboembolism), acute, bilateral ([facility] [address] I82.403    Chronic obstructive pulmonary disease with acute exacerbation (HCC) J44.1       Isolation/Infection:   Isolation            No Isolation          Patient Infection Status       Infection Onset Added Last Indicated Last Indicated By Review Planned Expiration Resolved Resolved By    None active    Resolved    COVID-19 (Rule Out) 22 COVID-19 & Influenza Combo (Ordered)   22 Rule-Out Test Resulted    COVID-19 (Rule Out) 22 COVID-19, Rapid (Ordered)   22 Rule-Out Test Resulted    COVID-19 (Rule Out) 22 Respiratory Panel, Molecular, with COVID-19 (Restricted: peds pts or suitable admitted adults) (Ordered)   22 Rule-Out Test Resulted    COVID-19 (Rule Out) 22 COVID-19 & Influenza Combo (Ordered)   22 Rule-Out Test Resulted    COVID-19 (Rule Out) 22 COVID-19 & Influenza Combo (Ordered)   22 Rule-Out Test Resulted    COVID-19 (Rule Out) 21 COVID-19, Rapid (Ordered)   21             Nurse Assessment:  Last Vital Signs: BP (!) 106/46   Pulse 76   Temp 97.9 °F (36.6 °C) (Oral)   Resp 20   Ht 5' 5\" (1.651 m)   Wt 186 lb 4.6 oz (84.5 kg)   LMP 2013 (Exact Date)   SpO2 91%   BMI 31.00 kg/m²     Last documented pain score (0-10 scale): Pain Level: 8  Last Weight:   Wt Readings from Last 1 Encounters:   22 186 lb 4.6 oz (84.5 kg)     Mental Status:  oriented and alert    IV Access:  - None    Nursing Mobility/ADLs:  Walking   Assisted  Transfer  Assisted  Bathing  Assisted  Dressing  Assisted  Toileting  Assisted  Feeding  Independent  Med Admin  Independent  Med Delivery   whole    Wound Care Documentation and Therapy:  Wound 22 Chest Lateral;Right (Active)   Number of days: 68        Elimination:  Continence:    Bowel: No  Bladder: No  Urinary Catheter: None Colostomy/Ileostomy/Ileal Conduit: No       Date of Last BM: 7/6/2022    Intake/Output Summary (Last 24 hours) at 7/1/2022 1104  Last data filed at 7/1/2022 0829  Gross per 24 hour   Intake 690 ml   Output 100 ml   Net 590 ml     I/O last 3 completed shifts: In: 450 [P.O.:450]  Out: 100 [Urine:100]    Safety Concerns: At Risk for Falls and History of Seizures    Impairments/Disabilities:      None    Nutrition Therapy:  Current Nutrition Therapy:   - Oral Diet:  General    Routes of Feeding: Oral  Liquids: No Restrictions  Daily Fluid Restriction: no  Last Modified Barium Swallow with Video (Video Swallowing Test): not done    Treatments at the Time of Hospital Discharge:   Respiratory Treatments: none  Oxygen Therapy:  is on oxygen at 4 L/min per nasal cannula. Ventilator:    - No ventilator support    Rehab Therapies: Physical Therapy and Occupational Therapy  Weight Bearing Status/Restrictions: No weight bearing restrictions  Other Medical Equipment (for information only, NOT a DME order):  walker  Other Treatments: Skilled Nursing assessment and monitoring. Medication education and monitoring per protocol. Patient's personal belongings (please select all that are sent with patient):  None    RN SIGNATURE:  Electronically signed by Asya Clement RN on 7/6/22 at 3:21 PM EDT    CASE MANAGEMENT/SOCIAL WORK SECTION    Inpatient Status Date: 6/30/2022    Readmission Risk Assessment Score:  Readmission Risk              Risk of Unplanned Readmission:  35           Discharging to Facility/ Agency   [address], [address] [place] [address]  Phone [phone]  Fax [phone]  Evening fax [phone]      [address]   [address], [facility]  P: [phone]  Referral made while at [facility] to follow at discharge.        Dialysis Facility (if applicable)   Name:  Address:  Dialysis Schedule:  Phone:  Fax:    / signature: Electronically signed by Isadora Jasmine RN on 7/1/22 at 11:05 AM EDT    PHYSICIAN SECTION    Prognosis: Fair    Condition at Discharge: Stable    Rehab Potential (if transferring to Rehab): Fair    Recommended Labs or Other Treatments After Discharge:     Physician Certification: I certify the above information and transfer of Sadie Pelaez  is necessary for the continuing treatment of the diagnosis listed and that she requires [place] for greater 30 days.      Update Admission H&P: No change in H&P    PHYSICIAN SIGNATURE:  Electronically signed by Rossy Garsia MD on 7/6/22 at 12:35 PM EDT

## 2022-07-01 NOTE — PLAN OF CARE
Problem: Discharge Planning  Goal: Discharge to home or other facility with appropriate resources  Outcome: Progressing     Problem: Pain  Goal: Verbalizes/displays adequate comfort level or baseline comfort level  Outcome: Progressing  Note: Patient complains of bilateral leg pain. Legs positioned comfortably. Medicated with morphine with relief. Problem: ABCDS Injury Assessment  Goal: Absence of physical injury  Outcome: Progressing  Note: Patient weak. Unable to ambulate. Alert and oriented. Bed alarm on. Problem: Skin/Tissue Integrity  Goal: Absence of new skin breakdown  Description: 1. Monitor for areas of redness and/or skin breakdown  2. Assess vascular access sites hourly  3. Every 4-6 hours minimum:  Change oxygen saturation probe site  4. Every 4-6 hours:  If on nasal continuous positive airway pressure, respiratory therapy assess nares and determine need for appliance change or resting period. Outcome: Progressing  Note: Buttocks slightly reddened. Assisted with turning and repositioning. Problem: Respiratory - Adult  Goal: Achieves optimal ventilation and oxygenation  Outcome: Progressing  Note: Patient short of breath with exertion oxygen remains on with oxygen sat in mid 90s. Solumedrol given as ordered. Lung sounds diminisahed.

## 2022-07-02 LAB
ABSOLUTE EOS #: 0 K/UL (ref 0–0.4)
ABSOLUTE LYMPH #: 0.7 K/UL (ref 1–4.8)
ABSOLUTE MONO #: 0.4 K/UL (ref 0.1–1.3)
ANION GAP SERPL CALCULATED.3IONS-SCNC: 8 MMOL/L (ref 9–17)
BASOPHILS # BLD: 0 % (ref 0–2)
BASOPHILS ABSOLUTE: 0 K/UL (ref 0–0.2)
BUN BLDV-MCNC: 16 MG/DL (ref 8–23)
CALCIUM SERPL-MCNC: 9.4 MG/DL (ref 8.6–10.4)
CHLORIDE BLD-SCNC: 100 MMOL/L (ref 98–107)
CO2: 29 MMOL/L (ref 20–31)
CREAT SERPL-MCNC: 0.4 MG/DL (ref 0.5–0.9)
EOSINOPHILS RELATIVE PERCENT: 0 % (ref 0–4)
GFR AFRICAN AMERICAN: >60 ML/MIN
GFR NON-AFRICAN AMERICAN: >60 ML/MIN
GFR SERPL CREATININE-BSD FRML MDRD: ABNORMAL ML/MIN/{1.73_M2}
GLUCOSE BLD-MCNC: 146 MG/DL (ref 70–99)
GLUCOSE BLD-MCNC: 150 MG/DL (ref 65–105)
GLUCOSE BLD-MCNC: 198 MG/DL (ref 65–105)
GLUCOSE BLD-MCNC: 216 MG/DL (ref 65–105)
GLUCOSE BLD-MCNC: 257 MG/DL (ref 65–105)
HCT VFR BLD CALC: 27.2 % (ref 36–46)
HEMOGLOBIN: 8.5 G/DL (ref 12–16)
LYMPHOCYTES # BLD: 6 % (ref 24–44)
MCH RBC QN AUTO: 28.7 PG (ref 26–34)
MCHC RBC AUTO-ENTMCNC: 31.4 G/DL (ref 31–37)
MCV RBC AUTO: 91.5 FL (ref 80–100)
MONOCYTES # BLD: 4 % (ref 1–7)
PDW BLD-RTO: 19 % (ref 11.5–14.9)
PLATELET # BLD: 306 K/UL (ref 150–450)
PMV BLD AUTO: 7 FL (ref 6–12)
POTASSIUM SERPL-SCNC: 4.7 MMOL/L (ref 3.7–5.3)
RBC # BLD: 2.98 M/UL (ref 4–5.2)
SEG NEUTROPHILS: 90 % (ref 36–66)
SEGMENTED NEUTROPHILS ABSOLUTE COUNT: 10 K/UL (ref 1.3–9.1)
SODIUM BLD-SCNC: 137 MMOL/L (ref 135–144)
WBC # BLD: 11.1 K/UL (ref 3.5–11)

## 2022-07-02 PROCEDURE — 99233 SBSQ HOSP IP/OBS HIGH 50: CPT | Performed by: INTERNAL MEDICINE

## 2022-07-02 PROCEDURE — 97530 THERAPEUTIC ACTIVITIES: CPT

## 2022-07-02 PROCEDURE — 94669 MECHANICAL CHEST WALL OSCILL: CPT

## 2022-07-02 PROCEDURE — 2700000000 HC OXYGEN THERAPY PER DAY

## 2022-07-02 PROCEDURE — 80048 BASIC METABOLIC PNL TOTAL CA: CPT

## 2022-07-02 PROCEDURE — 94664 DEMO&/EVAL PT USE INHALER: CPT

## 2022-07-02 PROCEDURE — 6370000000 HC RX 637 (ALT 250 FOR IP): Performed by: NURSE PRACTITIONER

## 2022-07-02 PROCEDURE — 6360000002 HC RX W HCPCS: Performed by: INTERNAL MEDICINE

## 2022-07-02 PROCEDURE — 94761 N-INVAS EAR/PLS OXIMETRY MLT: CPT

## 2022-07-02 PROCEDURE — 85025 COMPLETE CBC W/AUTO DIFF WBC: CPT

## 2022-07-02 PROCEDURE — 6370000000 HC RX 637 (ALT 250 FOR IP): Performed by: INTERNAL MEDICINE

## 2022-07-02 PROCEDURE — 2060000000 HC ICU INTERMEDIATE R&B

## 2022-07-02 PROCEDURE — 97163 PT EVAL HIGH COMPLEX 45 MIN: CPT

## 2022-07-02 PROCEDURE — 97167 OT EVAL HIGH COMPLEX 60 MIN: CPT

## 2022-07-02 PROCEDURE — 82947 ASSAY GLUCOSE BLOOD QUANT: CPT

## 2022-07-02 PROCEDURE — 2580000003 HC RX 258: Performed by: INTERNAL MEDICINE

## 2022-07-02 PROCEDURE — 94640 AIRWAY INHALATION TREATMENT: CPT

## 2022-07-02 PROCEDURE — 36415 COLL VENOUS BLD VENIPUNCTURE: CPT

## 2022-07-02 RX ADMIN — DOCUSATE SODIUM 50 MG AND SENNOSIDES 8.6 MG 2 TABLET: 8.6; 5 TABLET, FILM COATED ORAL at 08:14

## 2022-07-02 RX ADMIN — METHYLPREDNISOLONE SODIUM SUCCINATE 40 MG: 40 INJECTION, POWDER, FOR SOLUTION INTRAMUSCULAR; INTRAVENOUS at 04:04

## 2022-07-02 RX ADMIN — INSULIN LISPRO 2 UNITS: 100 INJECTION, SOLUTION INTRAVENOUS; SUBCUTANEOUS at 13:04

## 2022-07-02 RX ADMIN — BUDESONIDE AND FORMOTEROL FUMARATE DIHYDRATE 2 PUFF: 160; 4.5 AEROSOL RESPIRATORY (INHALATION) at 07:09

## 2022-07-02 RX ADMIN — FLUTICASONE PROPIONATE 2 SPRAY: 50 SPRAY, METERED NASAL at 08:13

## 2022-07-02 RX ADMIN — INSULIN LISPRO 6 UNITS: 100 INJECTION, SOLUTION INTRAVENOUS; SUBCUTANEOUS at 17:54

## 2022-07-02 RX ADMIN — MORPHINE SULFATE 2 MG: 2 INJECTION, SOLUTION INTRAMUSCULAR; INTRAVENOUS at 13:03

## 2022-07-02 RX ADMIN — MONTELUKAST 10 MG: 10 TABLET, FILM COATED ORAL at 20:09

## 2022-07-02 RX ADMIN — FAMOTIDINE 20 MG: 20 TABLET ORAL at 20:09

## 2022-07-02 RX ADMIN — APIXABAN 10 MG: 5 TABLET, FILM COATED ORAL at 08:14

## 2022-07-02 RX ADMIN — ALBUTEROL SULFATE 2.5 MG: 2.5 SOLUTION RESPIRATORY (INHALATION) at 10:46

## 2022-07-02 RX ADMIN — ALBUTEROL SULFATE 2.5 MG: 2.5 SOLUTION RESPIRATORY (INHALATION) at 20:19

## 2022-07-02 RX ADMIN — FUROSEMIDE 20 MG: 20 TABLET ORAL at 08:14

## 2022-07-02 RX ADMIN — RISPERIDONE 1.5 MG: 1 TABLET ORAL at 20:07

## 2022-07-02 RX ADMIN — SODIUM CHLORIDE, PRESERVATIVE FREE 10 ML: 5 INJECTION INTRAVENOUS at 13:03

## 2022-07-02 RX ADMIN — PANTOPRAZOLE SODIUM 40 MG: 40 TABLET, DELAYED RELEASE ORAL at 04:05

## 2022-07-02 RX ADMIN — FOLIC ACID 1 MG: 1 TABLET ORAL at 08:15

## 2022-07-02 RX ADMIN — APIXABAN 10 MG: 5 TABLET, FILM COATED ORAL at 20:08

## 2022-07-02 RX ADMIN — ALBUTEROL SULFATE 2.5 MG: 2.5 SOLUTION RESPIRATORY (INHALATION) at 06:56

## 2022-07-02 RX ADMIN — BUDESONIDE AND FORMOTEROL FUMARATE DIHYDRATE 2 PUFF: 160; 4.5 AEROSOL RESPIRATORY (INHALATION) at 20:29

## 2022-07-02 RX ADMIN — MORPHINE SULFATE 2 MG: 2 INJECTION, SOLUTION INTRAMUSCULAR; INTRAVENOUS at 23:59

## 2022-07-02 RX ADMIN — FAMOTIDINE 20 MG: 20 TABLET ORAL at 08:13

## 2022-07-02 RX ADMIN — GUAIFENESIN 600 MG: 600 TABLET, EXTENDED RELEASE ORAL at 20:07

## 2022-07-02 RX ADMIN — INSULIN LISPRO 2 UNITS: 100 INJECTION, SOLUTION INTRAVENOUS; SUBCUTANEOUS at 20:11

## 2022-07-02 RX ADMIN — DOCUSATE SODIUM 100 MG: 100 CAPSULE, LIQUID FILLED ORAL at 08:14

## 2022-07-02 RX ADMIN — FUROSEMIDE 20 MG: 20 TABLET ORAL at 17:53

## 2022-07-02 RX ADMIN — METHYLPREDNISOLONE SODIUM SUCCINATE 40 MG: 40 INJECTION, POWDER, FOR SOLUTION INTRAMUSCULAR; INTRAVENOUS at 13:03

## 2022-07-02 RX ADMIN — METHYLPREDNISOLONE SODIUM SUCCINATE 40 MG: 40 INJECTION, POWDER, FOR SOLUTION INTRAMUSCULAR; INTRAVENOUS at 20:10

## 2022-07-02 RX ADMIN — MORPHINE SULFATE 2 MG: 2 INJECTION, SOLUTION INTRAMUSCULAR; INTRAVENOUS at 04:21

## 2022-07-02 RX ADMIN — DOCUSATE SODIUM 100 MG: 100 CAPSULE, LIQUID FILLED ORAL at 20:08

## 2022-07-02 RX ADMIN — HYDROCORTISONE ACETATE 25 MG: 25 SUPPOSITORY RECTAL at 05:37

## 2022-07-02 RX ADMIN — ATORVASTATIN CALCIUM 20 MG: 20 TABLET, FILM COATED ORAL at 20:09

## 2022-07-02 RX ADMIN — ALBUTEROL SULFATE 2.5 MG: 2.5 SOLUTION RESPIRATORY (INHALATION) at 14:58

## 2022-07-02 RX ADMIN — RISPERIDONE 1.5 MG: 1 TABLET ORAL at 08:15

## 2022-07-02 RX ADMIN — SODIUM CHLORIDE, PRESERVATIVE FREE 10 ML: 5 INJECTION INTRAVENOUS at 08:16

## 2022-07-02 RX ADMIN — MORPHINE SULFATE 2 MG: 2 INJECTION, SOLUTION INTRAMUSCULAR; INTRAVENOUS at 17:53

## 2022-07-02 RX ADMIN — TIOTROPIUM BROMIDE INHALATION SPRAY 2 PUFF: 3.12 SPRAY, METERED RESPIRATORY (INHALATION) at 07:05

## 2022-07-02 RX ADMIN — INSULIN LISPRO 2 UNITS: 100 INJECTION, SOLUTION INTRAVENOUS; SUBCUTANEOUS at 08:23

## 2022-07-02 RX ADMIN — GUAIFENESIN 600 MG: 600 TABLET, EXTENDED RELEASE ORAL at 08:14

## 2022-07-02 RX ADMIN — HYDROCORTISONE ACETATE 25 MG: 25 SUPPOSITORY RECTAL at 13:19

## 2022-07-02 RX ADMIN — SODIUM CHLORIDE, PRESERVATIVE FREE 10 ML: 5 INJECTION INTRAVENOUS at 20:10

## 2022-07-02 RX ADMIN — ALBUTEROL SULFATE 2.5 MG: 2.5 SOLUTION RESPIRATORY (INHALATION) at 03:50

## 2022-07-02 ASSESSMENT — PAIN DESCRIPTION - ORIENTATION
ORIENTATION: LEFT;RIGHT
ORIENTATION: RIGHT;LEFT
ORIENTATION: LEFT;RIGHT
ORIENTATION: RIGHT;LEFT
ORIENTATION: RIGHT;LEFT

## 2022-07-02 ASSESSMENT — PAIN - FUNCTIONAL ASSESSMENT
PAIN_FUNCTIONAL_ASSESSMENT: PREVENTS OR INTERFERES SOME ACTIVE ACTIVITIES AND ADLS
PAIN_FUNCTIONAL_ASSESSMENT: PREVENTS OR INTERFERES SOME ACTIVE ACTIVITIES AND ADLS

## 2022-07-02 ASSESSMENT — PAIN SCALES - GENERAL
PAINLEVEL_OUTOF10: 8
PAINLEVEL_OUTOF10: 9
PAINLEVEL_OUTOF10: 5
PAINLEVEL_OUTOF10: 9
PAINLEVEL_OUTOF10: 5
PAINLEVEL_OUTOF10: 9

## 2022-07-02 ASSESSMENT — PAIN DESCRIPTION - LOCATION
LOCATION: LEG;RECTUM
LOCATION: LEG
LOCATION: LEG
LOCATION: RECTUM;LEG

## 2022-07-02 ASSESSMENT — PAIN DESCRIPTION - DESCRIPTORS: DESCRIPTORS: DISCOMFORT

## 2022-07-02 NOTE — PROGRESS NOTES
[place] [address] Internal Medicine    Progress Note     7/2/2022    8:51 AM    Name:   Miguel Angel Alva  MRN:     [id number]     [place]:      [de-identified]   Room:   2091/2091-01  IP Day:  2  Admit Date:  6/30/2022  2:21 PM    PCP:   Cranford Riedel, MD  Code Status:  DNR-CCA    Subjective:     C/C:   Chief Complaint   Patient presents with    Leg Pain     Bilateral DVT     Principal Problem:    Leg DVT (deep venous thromboembolism), acute, bilateral ([address]  Active Problems:    Chronic obstructive pulmonary disease with acute exacerbation ([facility]  Resolved Problems:    * No resolved hospital problems.  *      Patient with past medical history of asthma, diabetes, CHF, bipolar disease, advanced COPD, history of PE, history of lung abscess, admitted with bilateral DVT  Patient initially started on heparin drip which was later transitioned to Eliquis  Underwent CT abdomen pelvis, not concerning for malignancy  Evaluated by pulmonologist  CT chest seen concerning for cavitary lesion right lower lobe  Not on antibiotics  On IV steroid dose is getting tapered  Chronic hypoxic respiratory failure on 4 L of oxygen               Recent Results (from the past 24 hour(s))   POC Glucose Fingerstick    Collection Time: 07/01/22 11:32 AM   Result Value Ref Range    POC Glucose 209 (H) 65 - 105 mg/dL   POC Glucose Fingerstick    Collection Time: 07/01/22  4:20 PM   Result Value Ref Range    POC Glucose 138 (H) 65 - 105 mg/dL   POC Glucose Fingerstick    Collection Time: 07/01/22  8:51 PM   Result Value Ref Range    POC Glucose 270 (H) 65 - 105 mg/dL   CBC with Auto Differential    Collection Time: 07/02/22  5:42 AM   Result Value Ref Range    WBC 11.1 (H) 3.5 - 11.0 k/uL    RBC 2.98 (L) 4.0 - 5.2 m/uL    Hemoglobin 8.5 (L) 12.0 - 16.0 g/dL    Hematocrit 27.2 (L) 36 - 46 %    MCV 91.5 80 - 100 fL    MCH 28.7 26 - 34 pg    MCHC 31.4 31 - 37 g/dL    RDW 19.0 (H) 11.5 - 14.9 %    Platelets 056 488 - 110 k/uL    MPV 7.0 6.0 - 12.0 fL    Seg Neutrophils 90 (H) 36 - 66 %    Lymphocytes 6 (L) 24 - 44 %    Monocytes 4 1 - 7 %    Eosinophils % 0 0 - 4 %    Basophils 0 0 - 2 %    Segs Absolute 10.00 (H) 1.3 - 9.1 k/uL    Absolute Lymph # 0.70 (L) 1.0 - 4.8 k/uL    Absolute Mono # 0.40 0.1 - 1.3 k/uL    Absolute Eos # 0.00 0.0 - 0.4 k/uL    Basophils Absolute 0.00 0.0 - 0.2 k/uL   Basic Metabolic Panel    Collection Time: 07/02/22  5:42 AM   Result Value Ref Range    Glucose 146 (H) 70 - 99 mg/dL    BUN 16 8 - 23 mg/dL    CREATININE 0.40 (L) 0.50 - 0.90 mg/dL    Calcium 9.4 8.6 - 10.4 mg/dL    Sodium 137 135 - 144 mmol/L    Potassium 4.7 3.7 - 5.3 mmol/L    Chloride 100 98 - 107 mmol/L    CO2 29 20 - 31 mmol/L    Anion Gap 8 (L) 9 - 17 mmol/L    GFR Non-African American >60 >60 mL/min    GFR African American >60 >60 mL/min    GFR Comment         POC Glucose Fingerstick    Collection Time: 07/02/22  6:19 AM   Result Value Ref Range    POC Glucose 150 (H) 65 - 105 mg/dL     Recent Labs     07/01/22  0652 07/01/22  1132 07/01/22  1620 07/01/22  2051 07/02/22  0619   POCGLU 163* 209* 138* 270* 150*        CT ABDOMEN PELVIS W IV CONTRAST Additional Contrast? Oral    Result Date: 7/1/2022  EXAMINATION: CT OF THE ABDOMEN AND PELVIS WITH CONTRAST 7/1/2022 10:17 am TECHNIQUE: CT of the abdomen and pelvis was performed with the administration of intravenous contrast. Multiplanar reformatted images are provided for review. Automated exposure control, iterative reconstruction, and/or weight based adjustment of the mA/kV was utilized to reduce the radiation dose to as low as reasonably achievable.  COMPARISON: Chest CT 06/30/2022 and 04/29/2022 HISTORY: ORDERING SYSTEM PROVIDED HISTORY: rule out pelvic mass ,pt has luisa dvt TECHNOLOGIST PROVIDED HISTORY: rule out pelvic mass ,pt has luisa dvt Reason for Exam: r/o pelvic mass, bilateral DVTs Additional signs and symptoms: Possible pelvic mass, c/o bilateral leg pain due to DVTs Relevant Medical/Surgical History: IBS FINDINGS: Lower Chest: Partially visualized cavitary lesion in the right lung base with adjacent consolidative opacities and atelectasis characterized to advantage on recent chest CT. Heart size is within normal limits. No pericardial effusion. Organs: Liver is within normal limits for attenuation without any suspicious focal hepatic mass. Gallbladder appears within normal limits. Trace intrahepatic biliary ductal dilatation. Dilated distal common bile duct up to 2 cm with smooth tapering to normal caliber at the proximal common bile duct to the ampulla. No pericholecystic inflammatory changes. Spleen is normal in size and attenuation. Pancreas and adrenal glands are within normal limits. Kidneys enhance symmetrically and normally without hydronephrosis or perinephric stranding. GI/Bowel: The bowel is normal in caliber without obstruction. Volume of colonic stool suggests constipation. Normal appendix. Pelvis: Excreting contrast opacifies the otherwise unremarkable urinary bladder. Diminutive postmenopausal female pelvic organs. 0.9 cm water attenuation left adnexal cyst which requires no follow-up. Peritoneum/Retroperitoneum: No free fluid, free air, or lymphadenopathy. Atherosclerotic aortoiliac arteries with minimal distal infrarenal aortic ectasia up to 2 cm but no aneurysm. Partially occlusive low-density thrombus is present in the right femoral vein which appears to extend to the level of the proximal external iliac at the internal/external iliac bifurcation. Occlusive thrombus is present in the left femoral vein with partially occlusive thrombus extending into the saphenofemoral junction. There is partially occlusive thrombus in the left external iliac vein which extends to the proximal external iliac at level of the internal/external bifurcation. There is no definite extrinsic mass impression on these venous structures, nor on the IVC.  Bones/Soft Tissues: Diffuse osseous demineralization. Only mild degenerative changes in the spine with transitional anatomy at the lumbosacral junction with partial lumbarization of S1 on the right. 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 Multifocal small likely hematomas along the subcutaneous fat of the anterior abdominal wall which appears to be related to medication injection, the largest spanning 1.7 cm. Mild fat stranding is present along the superolateral upper thighs, left more so than right, presumably due to the aforementioned venous findings. Partially occlusive thrombus in the right femoral vein extending proximally in the right external iliac vein to the level of the internal/external iliac venous bifurcation. Occlusive thrombus in the left femoral vein with partially occlusive thrombus extending into both the saphenofemoral junction and proximally into the left external iliac vein to the level of the internal/external iliac vein bifurcation. No definite propagation of thrombus into the common iliac veins or IVC, and no extrinsic mass impression of the pelvic venous structures. Fusiform dilatation of the common bile duct with morphology suggestive of a possible underlying choledochal cyst rather than biliary obstruction, though clinical correlation is required. Volume of colonic stool suggests constipation. Correlate clinically. Numerous small hematomas in the subcutaneous fat related to medication injection, the largest measuring 1.7 cm. Partially visualized cavitary lesion with adjacent consolidation and atelectasis in the posterior right lung base, characterized to advantage on earlier chest CT. Please see that report for findings.      CT CHEST PULMONARY EMBOLISM W CONTRAST    Result Date: 6/30/2022  EXAMINATION: CTA OF THE CHEST 6/30/2022 5:48 pm TECHNIQUE: CTA of the chest was performed after the administration of intravenous contrast.  Multiplanar reformatted images are provided for review. MIP images are provided for review. Automated exposure control, iterative reconstruction, and/or weight based adjustment of the mA/kV was utilized to reduce the radiation dose to as low as reasonably achievable. COMPARISON: Chest CT examination of 06/29/2022 HISTORY: ORDERING SYSTEM PROVIDED HISTORY: r/o pe TECHNOLOGIST PROVIDED HISTORY: r/o pe Decision Support Exception - unselect if not a suspected or confirmed emergency medical condition->Emergency Medical Condition (MA) Reason for Exam: r/o pe Additional signs and symptoms: bilateral DVT Relevant Medical/Surgical History: wears O2 at home, copd, chf FINDINGS: Pulmonary Arteries: Pulmonary arteries are adequately opacified for evaluation. No evidence of intraluminal filling defect to suggest pulmonary embolism. Main pulmonary artery is normal in caliber. Mediastinum: No evidence of pathologically enlarged mediastinal lymphadenopathy. Unchanged subcentimeter mediastinal lymphadenopathy. The heart and pericardium demonstrate no acute abnormality. There is no acute abnormality of the thoracic aorta. Lungs/pleura: Focal consolidative changes within right upper lobe and left upper lobe and superior segment of the right lower lobe and multiple tree in bud nodular densities and cavitary changes within both lungs highly suggestive of multilobar infectious/inflammatory condition. Findings are unchanged since recent chest CT. The largest cavitary lesion is noted within the right lower lobe and appears unchanged since prior examination. Consolidative changes within the right lower lobe may be due to atelectasis. No evidence of pleural effusion or pneumothorax. Moderate emphysematous changes within the lungs. Upper Abdomen: Limited images of the upper abdomen are unremarkable. Soft Tissues/Bones: No acute bone or soft tissue abnormality. Unchanged compression fracture of T5 and T6 and scoliosis. No evidence of pulmonary embolism. Focal consolidative changes within right upper lobe and left upper lobe and superior segment of the right lower lobe and multiple tree in bud nodular densities and cavitary changes within both lungs highly suggestive of multilobar infectious/inflammatory condition. Findings are unchanged since recent chest CT. The largest cavitary lesion is noted within the right lower lobe and appears unchanged since prior examination. Although this lesion may be due to infectious/inflammatory condition, neoplastic etiologies cannot be excluded. Unchanged emphysematous changes within the lungs. RECOMMENDATIONS: Unavailable     VL DUP LOWER EXTREMITY VENOUS LEFT    Result Date: 6/30/2022    [facility]  Vascular Lower Extremities DVT Study Procedure   Patient Name   Renetta Campbell     Date of Study           06/30/2022                 [facility]   Date of Birth  1955  Gender                  Female   Age            74 year(s)  Race                       Room Number    OP   Corporate ID # [id number]   Patient Acct # [de-identified]   MR #           [id number]      Sonographer             Lee Serafinpaula, [facility]   Accession #    [id number]  Interpreting Physician  Domo Wilks   Referring                  Referring Physician     Brice Mcdermott. Quincy Bryant  Nurse  Practitioner  Procedure Type of Study:   Veins: Lower Extremities DVT Study, Venous Scan Lower Bilateral.  Indications for Study:Pain and swelling. Patient Status:Out Patient. Technical Quality:Limited visualization. Limitation reason:Swelling.   - Critical Result:Dr Shahid Olguin, o/c for Domenica Rivera. Conclusions   Summary   Technically limited visualization. Acute deep vein thrombosis of the right leg involving the common femoral,  femoral and popliteal veins. Acute deep vein thrombosis of the left leg involving the common femoral,  femoral, popliteal and gastrocnemius veins. Superficial thrombophlebitis of the lower extremity involving the  sapheno-femoral region bilaterally. Signature   ----------------------------------------------------------------  Electronically signed by Jorden Barrera RVT(Sonographer) on  06/30/2022 02:59 PM  ----------------------------------------------------------------   ----------------------------------------------------------------  Electronically signed by Domo Wilks(Interpreting physician)  on 06/30/2022 05:23 PM  ----------------------------------------------------------------  Findings:   Right Impression:                Left Impression:  The common femoral, femoral and  The common femoral, femoral, deep femoral  popliteal veins and              and popliteal veins, one gastroc and one  saphenofemoral junction are      peroneal vein and the saphenofemoral  dilated and non-compressible     junction are dilated and non-compressible  with hypoechoic echoes. with hypoechoic echoes. Limited visualization of the     Limited visualization of the posterior  posterior tibial and peroneal    tibial and peroneal veins. veins. The proximal portion of the great  Normal compressibility of the    saphenous vein is dilated and  great saphenous vein. non-compressible with hypoechoic echoes. .   Normal compressibility of the    Normal compressibility of the small  small saphenous vein. saphenous vein. Velocities are measured in cm/s ; Diameters are measured in cm Right Lower Extremities DVT Study Measurements Right 2D Measurements +------------------------------------+----------+---------------+----------+ ! Location                            ! Visualized! Compressibility! Thrombosis! +------------------------------------+----------+---------------+----------+ ! Common Femoral                      !Yes       ! No             !          ! +------------------------------------+----------+---------------+----------+ ! Prox Femoral                        !Yes       ! No             !          ! +------------------------------------+----------+---------------+----------+ ! Mid Femoral                         !Yes       ! No             !          ! +------------------------------------+----------+---------------+----------+ ! Dist Femoral                        !Yes       ! No             !          ! +------------------------------------+----------+---------------+----------+ ! Deep Femoral                        !Yes       ! Yes            ! None      ! +------------------------------------+----------+---------------+----------+ ! Popliteal                           !Yes       ! No             !          ! +------------------------------------+----------+---------------+----------+ ! Sapheno Femoral Junction            ! Yes       ! No             !          ! +------------------------------------+----------+---------------+----------+ ! PTV                                 ! Partial   !Yes            ! None      ! +------------------------------------+----------+---------------+----------+ ! Peroneal                            !Partial   !Yes            ! None      ! +------------------------------------+----------+---------------+----------+ ! Gastroc                             ! Yes       ! Yes            ! None      ! +------------------------------------+----------+---------------+----------+ ! GSV Thigh                           ! Yes       ! Yes            ! None      ! +------------------------------------+----------+---------------+----------+ ! GSV Knee                            ! Yes       ! Yes            ! None      ! +------------------------------------+----------+---------------+----------+ ! GSV Ankle                           ! Yes       ! Yes            ! None      ! +------------------------------------+----------+---------------+----------+ ! SSV                                 ! Yes       ! Yes            ! None      ! +------------------------------------+----------+---------------+----------+ Left Lower Extremities +------------------------------------+----------+---------------+----------+ ! GSV Knee                            ! Yes       ! Yes            ! None      ! +------------------------------------+----------+---------------+----------+ ! GSV Ankle                           ! Yes       ! Yes            ! None      ! +------------------------------------+----------+---------------+----------+ ! SSV                                 ! Yes       ! Yes            ! None      ! +------------------------------------+----------+---------------+----------+            On admission         Review of Systems:     As recorded in HPI          Physical Examination:        Vitals:    07/02/22 0352 07/02/22 0656 07/02/22 0700 07/02/22 0705   BP:   107/61    Pulse:  64 57 69   Resp:  20 16 22   Temp:   98 °F (36.7 °C)    TempSrc:   Oral    SpO2: 96% 97% 97% 98%   Weight:       Height:           Recent Labs     07/01/22  1132 07/01/22  1620 07/01/22 2051 07/02/22  0619   POCGLU 209* 138* 270* 150*       Intake/Output Summary (Last 24 hours) at 7/2/2022 0851  Last data filed at 7/2/2022 0816  Gross per 24 hour   Intake 5 ml   Output 850 ml   Net -845 ml       General Appearance:  alert, well appearing, and in no acute distress  Mental status:   Head:  normocephalic, atraumatic. Eye: no icterus, redness, pupils equal and reactive, extraocular eye movements intact, conjunctiva clear  Ear: normal external ear, no discharge, hearing intact  Nose:  no drainage noted  Mouth: mucous membranes moist  Neck: supple, no carotid bruits, thyroid not palpable  Lungs: Air entry better decreased, no rales, on oxygen  Cardiovascular: normal rate, regular rhythm, no murmur, gallop, rub.   Abdomen: Soft, nontender, nondistended, normal bowel sounds, no hepatomegaly or splenomegaly  Neurologic: There are no new focal motor or sensory deficits,   Skin: No gross lesions, rashes, bruising or bleeding on exposed skin area  Extremities:  peripheral pulses palpable, no pedal edema or calf pain with palpation  Psych:             Data:     PLabs:    BMP:   Recent Labs     06/30/22  1531 07/02/22  0542    137   K 3.8 4.7   CO2 33* 29   BUN 12 16   CREATININE <0.40* 0.40*   LABGLOM Can not be calculated >60   GLUCOSE 102* 146*                 Medications: Allergies:     Allergies   Allergen Reactions    Advil [Ibuprofen] Other (See Comments)     vomiting    Aleve [Naproxen] Other (See Comments)     vomiting    Antipyrine Other (See Comments)     unknown    Celecoxib Other (See Comments)    Codeine      headache    Fomepizole     Incruse Ellipta [Umeclidinium Bromide]      confusion    Other      GRASS, TREES, WEEDS    Rofecoxib Other (See Comments)     Unknown reaction    Salicylates      Unknown reaction     Strawberry Extract      HEADACHES    Sulfinpyrazone Other (See Comments)     Unknown reaction    Aspirin Nausea And Vomiting, Other (See Comments) and Nausea Only    Nsaids Nausea Only, Other (See Comments) and Nausea And Vomiting       Current Meds:   Scheduled Meds:    furosemide  20 mg Oral BID    methylPREDNISolone  40 mg IntraVENous Q8H    apixaban  10 mg Oral BID    Followed by   Emilee Betancourt ON 7/8/2022] apixaban  5 mg Oral BID    albuterol  2.5 mg Nebulization Q4H    budesonide-formoterol  2 puff Inhalation BID    tiotropium  2 puff Inhalation Daily    sodium chloride flush  5-40 mL IntraVENous 2 times per day    sennosides-docusate sodium  2 tablet Oral Daily    risperiDONE  1.5 mg Oral Q12H    pantoprazole  40 mg Oral QAM AC    montelukast  10 mg Oral Nightly    atorvastatin  20 mg Oral Nightly    docusate sodium  100 mg Oral BID    famotidine  20 mg Oral BID    fluticasone  2 spray Nasal Daily    folic acid  1 mg Oral Daily    guaiFENesin  600 mg Oral BID    insulin lispro  0-12 Units SubCUTAneous TID WC    insulin lispro  0-6 Units SubCUTAneous Nightly     Continuous Infusions:    sodium chloride 80 mL (07/01/22 1325)    dextrose PRN Meds: lidocaine, morphine, sodium chloride flush, sodium chloride, acetaminophen, ondansetron **OR** ondansetron, sodium chloride flush, hydrocortisone-aloe, glucose, dextrose bolus **OR** dextrose bolus, glucagon (rDNA), dextrose, traZODone, hydrocortisone          Assessment:        Primary Problem  Leg DVT (deep venous thromboembolism), acute, bilateral (HCC)    Principal Problem:    Leg DVT (deep venous thromboembolism), acute, bilateral (HCC)  Active Problems:    Chronic obstructive pulmonary disease with acute exacerbation (HCC)  Resolved Problems:    * No resolved hospital problems. *       Plan:          7/2/22    · Admitted with bilateral DVT, in legs, on anticoagulation this the second episode, likely will need long-term anticoagulation  · Advanced COPD, chronic hypoxic respiratory failure on home oxygen patient is getting treated with IV Solu-Medrol, does not feel that she is close to baseline  · Heart failure with reduced ejection fraction, EF is 45 to 50%, on Lasix, compensated  · Diabetes, controlled  · On DVT prophylaxis with Eliquis  · Patient wants to go back to nursing facility she came from nursing home, will reach out to  and work on discharge planning  · Pulmonary medicine following      . Hospital Problems           Last Modified POA    * (Principal) Leg DVT (deep venous thromboembolism), acute, bilateral ([facility] [address] 6/30/2022 Yes    Chronic obstructive pulmonary disease with acute exacerbation ([facility] [address] 7/1/2022 Yes                         Thanks for consulting us . Will monitor vitals and clinical course , and  Optimize therapy  as needed .            Dav Arias MD  7/2/2022

## 2022-07-02 NOTE — PROGRESS NOTES
Pulmonary Progress Note  [facility]      Patient - Leeann Armstrong,  Age - 74 y.o.    - 1955      Room Number - 1/-01   N -  [id number]   [facility]  Date of Admission -  2022  2:21 Hermilo Be MD  Primary Care Physician - Margy Bellamy MD     SUBJECTIVE   She says that she is thought about CODE STATUS once again, and now wants to be full code. She is fully aware of the risks involved. I advised her multiple times that being a full code is probably not in her best interest given the severity of her COPD. She is also complaining of rectal pain I asked her to discuss this with her primary service    OBJECTIVE   VITALS    height is 5' 5\" (1.651 m) and weight is 186 lb 4.6 oz (84.5 kg). Her oral temperature is 98 °F (36.7 °C). Her blood pressure is 107/61 and her pulse is 69. Her respiration is 22 and oxygen saturation is 98%. Body mass index is 31 kg/m². Temperature Range: Temp: 98 °F (36.7 °C) Temp  Av.2 °F (36.8 °C)  Min: 98 °F (36.7 °C)  Max: 98.6 °F (37 °C)  BP Range:  Systolic (65GSJ), CRV:671 , Min:106 , DTJ:792     Diastolic (63XKC), FOI:17, Min:61, Max:68    Pulse Range: Pulse  Av.1  Min: 57  Max: 90  Respiration Range: Resp  Av.9  Min: 16  Max: 22  Current Pulse Ox[de-identified]  SpO2: 98 %  24HR Pulse Ox Range:  SpO2  Av.5 %  Min: 94 %  Max: 98 %  Oxygen Amount and Delivery: O2 Flow Rate (L/min): 4 L/min    Wt Readings from Last 3 Encounters:   22 186 lb 4.6 oz (84.5 kg)   22 174 lb 14.4 oz (79.3 kg)   22 183 lb (83 kg)       I/O (24 Hours)    Intake/Output Summary (Last 24 hours) at 2022 0951  Last data filed at 2022 0816  Gross per 24 hour   Intake 5 ml   Output 850 ml   Net -845 ml       EXAM     General Appearance  Awake, alert, oriented, in no acute distress  HEENT - normocephalic, atraumatic.  []  Mallampati  [] Crowded airway   [] Macroglossia  []  Retrognathia  [] Micrognathia  []  Normal tongue size []  Normal Bite  [] Nye sign positive    Neck - Supple,  trachea midline   Lungs -diminished poor air movement  Cardiovascular - Heart sounds are normal.  Regular rate and rhythm   Abdomen - Soft, nontender, nondistended, no masses or organomegaly  Neurologic - There are no focal motor or sensory deficits  Skin - No bruising or bleeding  Extremities - No clubbing, cyanosis,++ L>>R edema    MEDS      furosemide  20 mg Oral BID    methylPREDNISolone  40 mg IntraVENous Q8H    apixaban  10 mg Oral BID    Followed by   Reanna Rides ON 7/8/2022] apixaban  5 mg Oral BID    albuterol  2.5 mg Nebulization Q4H    budesonide-formoterol  2 puff Inhalation BID    tiotropium  2 puff Inhalation Daily    sodium chloride flush  5-40 mL IntraVENous 2 times per day    sennosides-docusate sodium  2 tablet Oral Daily    risperiDONE  1.5 mg Oral Q12H    pantoprazole  40 mg Oral QAM AC    montelukast  10 mg Oral Nightly    atorvastatin  20 mg Oral Nightly    docusate sodium  100 mg Oral BID    famotidine  20 mg Oral BID    fluticasone  2 spray Nasal Daily    folic acid  1 mg Oral Daily    guaiFENesin  600 mg Oral BID    insulin lispro  0-12 Units SubCUTAneous TID     insulin lispro  0-6 Units SubCUTAneous Nightly      sodium chloride 80 mL (07/01/22 1325)    dextrose       lidocaine, morphine, sodium chloride flush, sodium chloride, acetaminophen, ondansetron **OR** ondansetron, sodium chloride flush, hydrocortisone-aloe, glucose, dextrose bolus **OR** dextrose bolus, glucagon (rDNA), dextrose, traZODone, hydrocortisone    LABS   CBC   Recent Labs     07/02/22  0542   WBC 11.1*   HGB 8.5*   HCT 27.2*   MCV 91.5        BMP:   Lab Results   Component Value Date/Time     07/02/2022 05:42 AM    K 4.7 07/02/2022 05:42 AM     07/02/2022 05:42 AM    CO2 29 07/02/2022 05:42 AM    BUN 16 07/02/2022 05:42 AM LABALBU 2.9 06/30/2022 03:31 PM    LABALBU 3.6 07/08/2021 11:52 AM    CREATININE 0.40 07/02/2022 05:42 AM    CALCIUM 9.4 07/02/2022 05:42 AM    GFRAA >60 07/02/2022 05:42 AM    LABGLOM >60 07/02/2022 05:42 AM     ABGs:  Lab Results   Component Value Date/Time    PHART 7.442 04/28/2022 05:31 AM    PO2ART 77.6 04/28/2022 05:31 AM    QVQ5OTR 60.6 04/28/2022 05:31 AM      Lab Results   Component Value Date/Time    MODE PRVC 04/28/2022 05:31 AM     Ionized Calcium:  No results found for: IONCA  Magnesium:    Lab Results   Component Value Date/Time    MG 2.3 04/16/2022 10:42 AM     Phosphorus:  No results found for: PHOS     LIVER PROFILE   Recent Labs     06/30/22  1531   AST 13   ALT 12   BILITOT 0.18*   ALKPHOS 92     INR   Recent Labs     06/30/22  1531   INR 1.0     PTT   Lab Results   Component Value Date    APTT 28.4 06/30/2022         RADIOLOGY     (See actual reports for details)    ASSESSMENT/PLAN     Bilateral DVT acute, without pulmonary embolism  Acute exacerbation of severe stage IV COPD  Chronic hypoxic and hypercapnic respiratory failure  History of Pseudomonas pneumonia with cavitary lesion  History of pneumothorax on the right  History of previous pulmonary embolism/DVT in June 2021 treated with Eliquis for 6 months  Full code    CODE STATUS switch to full  Continue pulmonary bronchodilators  No change in IV steroids  Remains on Eliquis  Will need ECF  Updated daughter Lázaro Woodall on CODE STATUS change  We will go ahead and get palliative care to see her. This is the third time she has changed her CODE STATUS and I feel uncomfortable making her a DNR CCA if she goes back and forth.   Electronically signed by John Oreilly MD on 7/2/2022 at 9:51 AM

## 2022-07-02 NOTE — PROGRESS NOTES
Physical Therapy  Facility/Department: [facility] PROGRESSIVE CARE  Physical Therapy Initial Assessment    Name: Andrea Ornelas  : 1955  MRN: [id number]  Date of Service: 2022    Discharge Recommendations:  Patient would benefit from continued therapy after discharge          Patient Diagnosis(es): The encounter diagnosis was Leg DVT (deep venous thromboembolism), acute, bilateral ([address] Past Medical History:  has a past medical history of Abnormal EKG, TRAM (acute kidney injury) ([address] Anxiety, Asthma, Bipolar disorder ([address] COPD (chronic obstructive pulmonary disease) ([address] Cramps, extremity, Depression, Dilated bile duct, Headache, History of elective , Hyperlipidemia, Irritable bowel syndrome, Prolonged emergence from general anesthesia, Substance abuse ([address] Unspecified sleep apnea, and Vision abnormalities. Past Surgical History:  has a past surgical history that includes Tonsillectomy and adenoidectomy (Bilateral); LASIK; Induced ; Ankle surgery; eye surgery (Bilateral); Vulva surgery; hysteroscopy (10/19/2016); Colonoscopy (02/15/2018); and Bronchoscopy (2022). Assessment   Assessment: The patient presents with significant BLE weakness and ROM deficits with contractures in bilateral ankles and feet. Pt also demonstrates impaired endurance 2* poor lung function. The patient requires SBA for bed mobility and Total/dependent Ax2 using Kavita Navneet stedy for all transfers; Gait is unsafe to attempt at this time. Pt is unsafe to return to prior living arrangements at home and will greatly benefit from continued PT services in inpatient setting to address identified deficits. Treatment Diagnosis: Impaired functional mobility 2* Bilateral DVT, Bilateral Foot and ankle contractures. Specific Instructions for Next Treatment: Calf-ankle-foot stretching and ROM with proper resting position supports and Education;  Functional strengthening and standing with use of catarino stedy  Therapy Prognosis: Fair  Decision Making: High Complexity  History: Pt had complicated hospital Admission to [facility] in April 2022 with pulmonary pathologies including mycoplasma pneumonia, Pneumothorax, Cavitary lesion in R lower Lobe of lung, and associated dyspnea and respiratory failure < - has been significantly debilitated since April hospital encounter. Exam: ROM, MMT, Balance, and functional mobility assessments  Clinical Presentation: pt is alert, cooperative, and pleasant; motivated to improve functionally  Requires PT Follow-Up: Yes  Activity Tolerance  Activity Tolerance: Patient limited by fatigue;Patient limited by pain; Patient limited by endurance     Plan   Plan  Plan: 5-7 times per week  Specific Instructions for Next Treatment: Calf-ankle-foot stretching and ROM with proper resting position supports and Education; Functional strengthening and standing with use of catarino stedy  Current Treatment Recommendations: Strengthening,ROM,Balance training,Functional mobility training,Transfer training,Endurance training,Gait training,Pain management,Safety education & training,Positioning,Therapeutic activities  Safety Devices  Type of Devices: Call light within reach,Gait belt,Patient at risk for falls,Left in chair,Nurse notified (Nurse Мария notified to obtain [facility]; Dtr in room)     Restrictions  Restrictions/Precautions  Restrictions/Precautions: Fall Risk,General Precautions,Contact Precautions,Up as Tolerated  Required Braces or Orthoses?: No  Implants present? : Metal implants (plate in left ankle )  Position Activity Restriction  Other position/activity restrictions: OT/PT eval and treat     Subjective   Pain: pt reports pain in bilateral ankles as well as anal/rectal pain (possible hemorrhoids?) ; pt does not formally rate pain. Pain is aggravated by Mobility and exertion.   General  Chart Reviewed: Yes  Patient assessed for rehabilitation services?: Yes  Family / Caregiver Present: Yes (Pt's Dtr Arrives at end of session)  Referring Practitioner: Jeremie Torres CNP  Referral Date : 06/30/22  Diagnosis: Bilateral LE DVTs  Follows Commands: Within Functional Limits  Subjective  Subjective: Nurse Мария and Pt are agreeable to participation and mobility with PT/OT; pt reports \"I have rolled ankles in both ankles. \" and \"I think I have a wound forming on my butt, right in the middle\" Pt expresses that she does not like being at [facility]. Social/Functional History  Social/Functional History  Lives With:  (has been staying with daughter 5 days / wk &goes home 2 d/wk)  Type of Home: House  Home Layout: One level  Home Access: Stairs to enter without rails  Entrance Stairs - Number of Steps: 2  Bathroom Shower/Tub: Tub/Shower unit,Curtain,Shower chair with back  Bathroom Toilet: Standard  Bathroom Equipment: Shower chair,Hand-held shower  Bathroom Accessibility: Walker accessible  Home Equipment: Oxygen (nebulizer and concentrator for oxygen)  Has the patient had two or more falls in the past year or any fall with injury in the past year?: No  Receives Help From: Family  ADL Assistance: Needs assistance (daughter supervises her when she showers)  Homemaking Assistance: Needs assistance (daughter does whatever pt cannot do)  Homemaking Responsibilities: Yes  Ambulation Assistance: Independent  Transfer Assistance: Independent  Active : No  Patient's  Info: Daughter drives to appointments and to grocery shopping  Mode of Transportation: Car  Occupation: Unemployed  IADL Comments: pt sleeps on couch at home  Additional Comments: Sister lives down the road; pt's Dtr works 3rd shift. Prior to falling ill the patient was staying with her Dtr so she could be assisted more readily. Vision/Hearing  Vision  Vision: Impaired  Vision Exceptions: Wears glasses at all times; Cataracts  Hearing  Hearing: Within functional limits    Cognition   Orientation  Overall Orientation Status: Within Normal Limits  Orientation Level: Oriented X4  Cognition  Overall Cognitive Status: WNL     Objective   Heart Rate: 75  Heart Rate Source: Monitor  BP: (!) 122/58  BP Location: Right upper arm  BP Method: Automatic  Patient Position: Up in chair  MAP (Calculated): 79.33  Resp: 16  SpO2: 97 %  O2 Device: Nasal cannula  Comment: pt SpO2 remains at or above 92%; pt demonstrates significant SOB and dyspnea with accessory muscle recruitment during mobility      Observation/Palpation  Observation: Peripheral IV R antecubital; Midline double lumen R basiliac; external urinary catheter. Pt in bed upon arrival and agreeable to end session up in chair to eat lunch. Bilateral PF and foot contractures with bilateral Ankle inversion/supination with standing/weight bearing. Bilateral Foot Drop         PROM RLE (degrees)  RLE General PROM: Bilateral Foot/ankle contractures  AROM RLE (degrees)  RLE AROM: Exceptions  RLE General AROM: Bilateral Foot/ankle contractures; Partial limitation in hip/knee flexion AROM 2* weakness   PROM LLE (degrees)  LLE General PROM: Bilateral Foot/ankle contractures; Partial limitation in hip/knee flexion AROM 2* weakness   AROM LLE (degrees)  LLE AROM : Exceptions  LLE General AROM: Bilateral Foot/ankle contractures;  AROM RUE (degrees)  RUE AROM :  (See OT)  AROM LUE (degrees)  LUE AROM :  (See OT)  Strength RLE  Strength RLE: Exception  Comment: Grossly  3- to 3/5; except Ankle DF 2-/5  Strength LLE  Strength LLE: Exception  Comment: Grossly 3- to 3/5; except Ankle DF 2-/5  Strength RUE  Strength RUE:  (See OT)  Strength LUE  Strength LUE:  (See OT)     Sensation  Overall Sensation Status: Impaired (Pt reports diminished Sensation in BLEs)     Bed mobility  Supine to Sit: Stand by assistance  Sit to Supine: Unable to assess (up in chair at end of session)  Scooting: Stand by assistance  Bed Mobility Comments: HOB partially elevated with use of bed rails;  Pt requires short seated rest break at EOB to recover breathing displaying Dyspnea during and post tasks. Transfers  Sit to Stand: Dependent/Total (MaxAx2 with catarino stedy)  Stand to sit: Dependent/Total (MaxAx2 with catarino stedy)  Bed to Chair: Dependent/Total (MaxAx2 with catarino stedy)  Stand Pivot Transfers: Dependent/Total (MaxAx2 with catarino stedy)  Comment: Pt is total/dependent x2 with catarino stedy; Dyspnea during transfer tasks. Poor eccentric control upon lowering to chair. pt with limited tolerance for standing in catarino stedy with BUE support on bar necessary to maintain balance with Mod-MaxAx2 to maintain standing balance, pt with trunk leaning far over bar to accomplish/maintain standing ~10 seconds   Ambulation  Comments: ORALIA ambulation; pt reports she has not been walking much at all and has maybe taken 1-2 steps about a week ago. Unsafe to attempt amb d/t significant BLE weakness and poor standing balance. Stairs/Curb  Stairs?: No     Balance  Posture: Poor  Sitting - Static: Fair  Sitting - Dynamic: Fair;-  Standing - Static: Poor;+ (Catarino stedy)  Standing - Dynamic:  (ORALIA)  Comments: Standing balance assessed with Cristal dsouza; pt unable to stand fully erect; 2 person A for standing balance  Exercise Treatment: STS transfers; challenged standing balance however limited to ~10 seconds x2        AM-PAC Score  AM-PAC Inpatient Mobility Raw Score : 10 (07/02/22 1107)  AM-PAC Inpatient T-Scale Score : 32.29 (07/02/22 1107)  Mobility Inpatient CMS 0-100% Score: 76.75 (07/02/22 1107)  Mobility Inpatient CMS G-Code Modifier : CL (07/02/22 1107)          Goals  Short Term Goals  Time Frame for Short term goals: 7 days  Short term goal 1: pt to demo all bed mobility Mod I  Short term goal 2: pt to tolerate standing with improved upright posture x5 minutes to improve tolerance for standing activities, ADLs, and eventually walking.    Short term goal 3: pt to tolerate 25-30 minutes of therapeutic exercise/activity with demo'd knowledge of need for therapeutic rest breaks to improve tolerance for further therapy and daily activity/ADLs  Short term goal 4: pt to improve BLE strength by 1/2 MMG to improve safety/independence with mobility   Short term goal 5: pt to improve Bilat Ankle DF ROM by 5-10 degrees to improve joint mechanics for safer standing/walking. Additional Goals?: Yes  Short Term Goal 6: Pt to ambulate 5' with 2 A and RW (whether it be side-stepping or fwd walking with chair follow) to improve mobility status. Patient Goals   Patient goals : I want to walk       Education  Patient Education  Education Given To: Patient  Education Provided: Role of Therapy;Plan of Care;Transfer Training  Education Method: Verbal;Teach Back  Barriers to Learning: None  Education Outcome: Verbalized understanding;Demonstrated understanding;Continued education needed      Therapy Time   Individual Concurrent Group Co-treatment   Time In 1107         Time Out 1158         Minutes 51         Timed Code Treatment Minutes: [address] [facility], PT   . Electronically signed by Nivia Jovel PT on 7/2/2022 at 6:13 PM

## 2022-07-02 NOTE — PLAN OF CARE
Problem: Discharge Planning  Goal: Discharge to home or other facility with appropriate resources  7/2/2022 0014 by Marissa Mathias RN  Outcome: Progressing  7/1/2022 1701 by Ara Doran RN  Outcome: Progressing     Problem: Pain  Goal: Verbalizes/displays adequate comfort level or baseline comfort level  7/2/2022 0014 by Marissa Mathias RN  Outcome: Progressing  Note: Patient with bilateral leg pain. Medicated with morphine with relief. Positioned comfortably. 7/1/2022 1701 by Selin Echeverria RN  Outcome: Progressing  Flowsheets (Taken 7/1/2022 1701)  Verbalizes/displays adequate comfort level or baseline comfort level: Administer analgesics based on type and severity of pain and evaluate response     Problem: ABCDS Injury Assessment  Goal: Absence of physical injury  7/2/2022 0014 by Marissa Mathias RN  Outcome: Progressing  Note: Patient weak and nonambulatory. Bed alarm on. Alert and oriented. Making no attempt to get out of bed per self. 7/1/2022 1701 by Selin Echeverria RN  Outcome: Progressing     Problem: Skin/Tissue Integrity  Goal: Absence of new skin breakdown  Description: 1. Monitor for areas of redness and/or skin breakdown  2. Assess vascular access sites hourly  3. Every 4-6 hours minimum:  Change oxygen saturation probe site  4. Every 4-6 hours:  If on nasal continuous positive airway pressure, respiratory therapy assess nares and determine need for appliance change or resting period. 7/2/2022 0014 by Marissa Mathias RN  Outcome: Progressing  Note: Buttocks and coccyx reddened. Assisted with repositioning. Zinc cream applied. 7/1/2022 1701 by Selin Echeverria RN  Outcome: Progressing  Note: No evidence of new skin breakdown. Repositioned as necessary. Problem: Respiratory - Adult  Goal: Achieves optimal ventilation and oxygenation  7/2/2022 0014 by Marissa Mathias RN  Outcome: Progressing  Note: Lungs continue decreased. Occasional nonproductive cough. Slightly short of breath with exertion. Solumedrol given as ordered. 7/1/2022 1701 by Marnie Gaines RN  Outcome: Progressing     Problem: Safety - Adult  Goal: Free from fall injury  7/2/2022 0014 by Serena Cordon RN  Outcome: Progressing  Note: Bed alarm on. Remains free from injury. 7/1/2022 1701 by Selin Echeverria RN  Outcome: Progressing  Note: Free from falls this shift, bed in lowest position, bed alarm on, non skid socks on, call light within reach, hourly rounding performed, patient instructed to call out for assistance getting out of bed.

## 2022-07-02 NOTE — PROGRESS NOTES
Occupational Therapy  Facility/Department: [address] [place] [address]  Occupational Therapy Initial Assessment    Name: Simran Pham  : 1955  MRN: [id number]  Date of Service: 2022    Discharge Recommendations:  Patient would benefit from continued therapy after discharge          Patient Diagnosis(es): The encounter diagnosis was Leg DVT (deep venous thromboembolism), acute, bilateral ([address] Past Medical History:  has a past medical history of Abnormal EKG, TRAM (acute kidney injury) ([address] Anxiety, Asthma, Bipolar disorder ([address] COPD (chronic obstructive pulmonary disease) ([address] Cramps, extremity, Depression, Dilated bile duct, Headache, History of elective , Hyperlipidemia, Irritable bowel syndrome, Prolonged emergence from general anesthesia, Substance abuse ([facility] Unspecified sleep apnea, and Vision abnormalities. Past Surgical History:  has a past surgical history that includes Tonsillectomy and adenoidectomy (Bilateral); LASIK; Induced ; Ankle surgery; eye surgery (Bilateral); Vulva surgery; hysteroscopy (10/19/2016); Colonoscopy (02/15/2018); and Bronchoscopy (2022). Treatment Diagnosis: Impaired self-care status      Assessment   Performance deficits / Impairments: Decreased functional mobility ; Decreased ADL status; Decreased strength;Decreased endurance;Decreased sensation;Decreased balance;Decreased high-level IADLs;Decreased fine motor control;Decreased coordination  Treatment Diagnosis: Impaired self-care status  Prognosis: Fair  Decision Making: High Complexity  REQUIRES OT FOLLOW-UP: Yes  Activity Tolerance  Activity Tolerance: Patient limited by fatigue  Safety Devices  Safety Devices in place: Yes  Type of devices: All fall risk precautions in place;Call light within reach; Positioning belt;Patient at risk for falls; Left in chair;Nurse notified        Plan   Plan  Times per Week: 5-7  Current Treatment Recommendations: Strengthening,Balance training,Functional mobility training,Endurance training,Pain management,Safety education & training,Patient/Caregiver education & training,Equipment evaluation, education, & procurement,Positioning,Self-Care / ADL,Home management training,Coordination training     Restrictions  Restrictions/Precautions  Restrictions/Precautions: Fall Risk,General Precautions,Contact Precautions,Up as Tolerated  Required Braces or Orthoses?: No  Implants present? : Metal implants (plate in left ankle )  Position Activity Restriction  Other position/activity restrictions: OT/PT eval and treat    Subjective   General  Chart Reviewed: Yes,Orders,Progress Notes,History and Physical,Imaging,Labs  Patient assessed for rehabilitation services?: Yes  Additional Pertinent Hx: The patient is a 74 y.o.  female, with a history of DVT, systolic heart failure, asthma, bipolar disorder, COPD, PE, lung abscess, and recurrent pneumothorax, who presents with leg pain - bilateral DVT. According to patient, she noted increased edema in her bilateral lower extremities on 6/26 and states that on 6/28, the edema in her left leg had progressed all the way up into her thigh. States that she was sent for outpatient Doppler studies earlier today, which revealed DVTs in bilateral lower extremities. She was then sent to the ED to evaluate for pulmonary embolism. Symptoms are associated with pain in her left leg and right shin. Denies fever, chills, chest pain, cough, abdominal pain, nausea, vomiting, diarrhea, and urinary symptoms. Denies shortness of breath and increased oxygen needs. Patient chronically uses O2 at 4 L minute per nasal cannula around-the-clock. There are no aggravating or alleviating factors. Symptoms are reported as constant and moderate. Patient reports past history of DVT and PE but states she no longer takes anticoagulation.   Reports that she was hospitalized in April for severe lung infection and is currently receiving rehabilitative services at [facility]. Family / Caregiver Present: No  Referring Practitioner: IVONNE Vasques CNP  Diagnosis: Leg DVT (deep venous thromboembolism), acute, bilateral  Subjective  Subjective: \"I don't know if I can stand, I haven't been up in a few weeks\"  General Comment  Comments: Okay for OT/PT eval per RN Мария     Social/Functional History  Social/Functional History  Lives With:  (has been staying with daughter 5 days / wk &goes home 2 d/wk)  Type of Home: House  Home Layout: One level  Home Access: Stairs to enter without rails  Entrance Stairs - Number of Steps: 2  Bathroom Shower/Tub: Tub/Shower unit,Curtain,Shower chair with back  Bathroom Toilet: Standard  Bathroom Equipment: Shower chair,Hand-held shower  Bathroom Accessibility: Walker accessible  Home Equipment: Oxygen (nebulizer and concentrator for oxygen)  Has the patient had two or more falls in the past year or any fall with injury in the past year?: No  Receives Help From: Family  ADL Assistance: Needs assistance (daughter supervises her when she showers)  Homemaking Assistance: Needs assistance (daughter does whatever pt cannot do)  Homemaking Responsibilities: Yes  Ambulation Assistance: Independent  Transfer Assistance: Independent  Active : No  Patient's  Info: Daughter drives to appointments and to grocery shopping  Mode of Transportation: Car  Occupation: Unemployed  IADL Comments: pt sleeps on couch at home  Additional Comments: Sister lives down the road; pt's Dtr works 3rd shift. Prior to falling ill the patient was staying with her Dtr so she could be assisted more readily.         Objective   Heart Rate: 80  Heart Rate Source: Monitor  BP: (!) 122/58  BP Location: Right upper arm  BP Method: Automatic  Patient Position: Up in chair  MAP (Calculated): 79.33  Resp: 18  SpO2: 97 %  O2 Device: Nasal cannula       Orientation  Overall Orientation Status: Within Functional Limits  Observation/Palpation  Observation: Peripheral IV R antecubital; Midline double lumen R basiliac; external urinary catheter           AROM: Within functional limits  Strength: Generally decreased, functional  Coordination: Generally decreased, functional  Tone: Normal  Sensation: Impaired  ADL  Feeding: Modified independent   Grooming: Minimal assistance  UE Bathing: Minimal assistance  LE Bathing: Maximum assistance  UE Dressing: Minimal assistance  LE Dressing: Maximum assistance  Toileting: Maximum assistance  Additional Comments: ADL scores based on skilled observation and clinical reasoning unless otherwise noted. Pt limited by decreased sensation, balance, strength, activity tolerance and endurance. These factors limit pts ability to safely and independently complete self-care and functional mobility. Tone RUE  RUE Tone: Normotonic  Tone LUE  LUE Tone: Normotonic  Coordination  Movements Are Fluid And Coordinated: No  Coordination and Movement Description: Decreased speed;Decreased accuracy; Right UE;Left UE;Fine motor impairments  Activity Tolerance  Activity Tolerance: Patient limited by fatigue;Patient limited by endurance  Bed mobility  Supine to Sit: Stand by assistance  Sit to Supine: Unable to assess (Pt left up in chair)  Scooting: Stand by assistance  Bed Mobility Comments: HOB lowered  Transfers  Sit to stand: 2 Person assistance;Maximum assistance  Stand to sit: 2 Person assistance;Maximum assistance  Transfer Comments: Ivan dsouza     Orientation  Overall Orientation Status: Within Normal Limits  Orientation Level: Oriented X4  Perception  Overall Perceptual Status: WFL     Sensation  Overall Sensation Status: Impaired (Pt reports diminished Sensation in BLEs)            LUE AROM (degrees)  LUE AROM : WFL  Left Hand AROM (degrees)  Left Hand AROM: WFL  RUE AROM (degrees)  RUE AROM : WFL  Right Hand AROM (degrees)  Right Hand AROM: WFL  LUE Strength  Gross LUE Strength: WFL  L Hand General: 4-/5  LUE Strength Comment: Grossly 4-/5  RUE Strength  Gross RUE Strength: WFL  R Hand General: 4-/5  RUE Strength Comment: Grossly 4-/5    AM-PAC Score        AM-PAC Inpatient Daily Activity Raw Score: 16 (07/02/22 1346)  AM-PAC Inpatient ADL T-Scale Score : 35.96 (07/02/22 1346)  ADL Inpatient CMS 0-100% Score: 53.32 (07/02/22 1346)  ADL Inpatient CMS G-Code Modifier : CK (07/02/22 1346)    Goals  Short Term Goals  Time Frame for Short term goals: By discharge  Short Term Goal 1: Pt will perform UB ADLs with SBA and good safety  Short Term Goal 2: Pt will perform LB ADLs with Min A, good safety and use of AE as needed  Short Term Goal 3: Pt will perform functional transfers/mobility with Min A, good safety and use of least restrictive device  Short Term Goal 4: Pt will tolerate standing for 5+ minutes, Min A, with 0-1 UE support and no LOB to improve engagement in ADLs/IADLs  Short Term Goal 5: Pt will participate in 15+ minutes of therapeutic exercise/functional activity to improve safety and independence with self-care and functional mobility  Additional Goals?: Yes  Short Term Goal 6: Pt will demonstrate proper breathing techniques to maintain SpO2 > 90% during ADL completion with Min cues or less   Short Term Goal 7: Pt will be educated on and explore use of DME/AE and modified techniques for increasing ease and independence with ADLs upon d/c  Short Term Goal 8: Pt will verbalize/demonstrate good understanding fall prevention/home safety/EC WS techniques for increased safety and independence with self-care  Patient Goals   Patient goals :  \"To eventually go home\"       Therapy Time   Individual Concurrent Group Co-treatment   Time In 1107         Time Out 1158         Minutes 51         Timed Code Treatment Minutes: 30 Minutes       Rasheed Tomas OTR/L

## 2022-07-02 NOTE — CARE COORDINATION
ONGOING DISCHARGE PLAN:    Reviewed chart and  confirms that plan is still return to [facility]. Per LSW notes pt will need Auth prior to returning. LM with  from admission at [facility] to follow up on this. New Palliative consult. Will continue to follow for additional discharge needs.     Electronically signed by Peter Ramirez RN on 7/2/2022 at 12:46 PM     Spoke with Olena Dexter from [address] [place] [address] Drive is pending    Electronically signed by Peter Ramirez RN on 7/2/2022 at 1:48 PM

## 2022-07-03 LAB
ABSOLUTE EOS #: 0 K/UL (ref 0–0.4)
ABSOLUTE LYMPH #: 0.7 K/UL (ref 1–4.8)
ABSOLUTE MONO #: 0.5 K/UL (ref 0.1–1.3)
ANION GAP SERPL CALCULATED.3IONS-SCNC: 8 MMOL/L (ref 9–17)
BASOPHILS # BLD: 0 % (ref 0–2)
BASOPHILS ABSOLUTE: 0 K/UL (ref 0–0.2)
BUN BLDV-MCNC: 16 MG/DL (ref 8–23)
CALCIUM SERPL-MCNC: 9.2 MG/DL (ref 8.6–10.4)
CHLORIDE BLD-SCNC: 101 MMOL/L (ref 98–107)
CO2: 28 MMOL/L (ref 20–31)
CREAT SERPL-MCNC: <0.4 MG/DL (ref 0.5–0.9)
EOSINOPHILS RELATIVE PERCENT: 0 % (ref 0–4)
GFR AFRICAN AMERICAN: ABNORMAL ML/MIN
GFR NON-AFRICAN AMERICAN: ABNORMAL ML/MIN
GFR SERPL CREATININE-BSD FRML MDRD: ABNORMAL ML/MIN/{1.73_M2}
GLUCOSE BLD-MCNC: 124 MG/DL (ref 65–105)
GLUCOSE BLD-MCNC: 125 MG/DL (ref 70–99)
GLUCOSE BLD-MCNC: 159 MG/DL (ref 65–105)
GLUCOSE BLD-MCNC: 193 MG/DL (ref 65–105)
GLUCOSE BLD-MCNC: 229 MG/DL (ref 65–105)
HCT VFR BLD CALC: 29 % (ref 36–46)
HEMOGLOBIN: 8.9 G/DL (ref 12–16)
LYMPHOCYTES # BLD: 7 % (ref 24–44)
MCH RBC QN AUTO: 28.5 PG (ref 26–34)
MCHC RBC AUTO-ENTMCNC: 30.6 G/DL (ref 31–37)
MCV RBC AUTO: 93.2 FL (ref 80–100)
MONOCYTES # BLD: 5 % (ref 1–7)
PDW BLD-RTO: 19.5 % (ref 11.5–14.9)
PLATELET # BLD: 318 K/UL (ref 150–450)
PMV BLD AUTO: 7.1 FL (ref 6–12)
POTASSIUM SERPL-SCNC: 4.7 MMOL/L (ref 3.7–5.3)
RBC # BLD: 3.11 M/UL (ref 4–5.2)
SEG NEUTROPHILS: 88 % (ref 36–66)
SEGMENTED NEUTROPHILS ABSOLUTE COUNT: 8.2 K/UL (ref 1.3–9.1)
SODIUM BLD-SCNC: 137 MMOL/L (ref 135–144)
WBC # BLD: 9.4 K/UL (ref 3.5–11)

## 2022-07-03 PROCEDURE — 6370000000 HC RX 637 (ALT 250 FOR IP): Performed by: INTERNAL MEDICINE

## 2022-07-03 PROCEDURE — 94669 MECHANICAL CHEST WALL OSCILL: CPT

## 2022-07-03 PROCEDURE — 94761 N-INVAS EAR/PLS OXIMETRY MLT: CPT

## 2022-07-03 PROCEDURE — 2700000000 HC OXYGEN THERAPY PER DAY

## 2022-07-03 PROCEDURE — 94640 AIRWAY INHALATION TREATMENT: CPT

## 2022-07-03 PROCEDURE — 6370000000 HC RX 637 (ALT 250 FOR IP): Performed by: NURSE PRACTITIONER

## 2022-07-03 PROCEDURE — 6360000002 HC RX W HCPCS: Performed by: INTERNAL MEDICINE

## 2022-07-03 PROCEDURE — 1200000000 HC SEMI PRIVATE

## 2022-07-03 PROCEDURE — 36415 COLL VENOUS BLD VENIPUNCTURE: CPT

## 2022-07-03 PROCEDURE — 2580000003 HC RX 258: Performed by: INTERNAL MEDICINE

## 2022-07-03 PROCEDURE — 85025 COMPLETE CBC W/AUTO DIFF WBC: CPT

## 2022-07-03 PROCEDURE — 82947 ASSAY GLUCOSE BLOOD QUANT: CPT

## 2022-07-03 PROCEDURE — 80048 BASIC METABOLIC PNL TOTAL CA: CPT

## 2022-07-03 PROCEDURE — 99233 SBSQ HOSP IP/OBS HIGH 50: CPT | Performed by: INTERNAL MEDICINE

## 2022-07-03 RX ORDER — METHYLPREDNISOLONE SODIUM SUCCINATE 40 MG/ML
40 INJECTION, POWDER, LYOPHILIZED, FOR SOLUTION INTRAMUSCULAR; INTRAVENOUS EVERY 12 HOURS
Status: DISCONTINUED | OUTPATIENT
Start: 2022-07-04 | End: 2022-07-04

## 2022-07-03 RX ORDER — HYDROCORTISONE 25 MG/G
CREAM TOPICAL 2 TIMES DAILY
Status: DISCONTINUED | OUTPATIENT
Start: 2022-07-03 | End: 2022-07-06 | Stop reason: HOSPADM

## 2022-07-03 RX ADMIN — APIXABAN 10 MG: 5 TABLET, FILM COATED ORAL at 22:25

## 2022-07-03 RX ADMIN — SODIUM CHLORIDE, PRESERVATIVE FREE 10 ML: 5 INJECTION INTRAVENOUS at 12:27

## 2022-07-03 RX ADMIN — METHYLPREDNISOLONE SODIUM SUCCINATE 40 MG: 40 INJECTION, POWDER, FOR SOLUTION INTRAMUSCULAR; INTRAVENOUS at 05:07

## 2022-07-03 RX ADMIN — FUROSEMIDE 20 MG: 20 TABLET ORAL at 18:12

## 2022-07-03 RX ADMIN — METHYLPREDNISOLONE SODIUM SUCCINATE 40 MG: 40 INJECTION, POWDER, FOR SOLUTION INTRAMUSCULAR; INTRAVENOUS at 12:27

## 2022-07-03 RX ADMIN — BUDESONIDE AND FORMOTEROL FUMARATE DIHYDRATE 2 PUFF: 160; 4.5 AEROSOL RESPIRATORY (INHALATION) at 08:02

## 2022-07-03 RX ADMIN — ALBUTEROL SULFATE 2.5 MG: 2.5 SOLUTION RESPIRATORY (INHALATION) at 11:26

## 2022-07-03 RX ADMIN — PANTOPRAZOLE SODIUM 40 MG: 40 TABLET, DELAYED RELEASE ORAL at 05:07

## 2022-07-03 RX ADMIN — INSULIN LISPRO 1 UNITS: 100 INJECTION, SOLUTION INTRAVENOUS; SUBCUTANEOUS at 22:26

## 2022-07-03 RX ADMIN — GUAIFENESIN 600 MG: 600 TABLET, EXTENDED RELEASE ORAL at 22:26

## 2022-07-03 RX ADMIN — HYDROCORTISONE ACETATE 25 MG: 25 SUPPOSITORY RECTAL at 08:43

## 2022-07-03 RX ADMIN — MORPHINE SULFATE 2 MG: 2 INJECTION, SOLUTION INTRAMUSCULAR; INTRAVENOUS at 18:11

## 2022-07-03 RX ADMIN — ALBUTEROL SULFATE 2.5 MG: 2.5 SOLUTION RESPIRATORY (INHALATION) at 15:48

## 2022-07-03 RX ADMIN — MONTELUKAST 10 MG: 10 TABLET, FILM COATED ORAL at 22:26

## 2022-07-03 RX ADMIN — ALBUTEROL SULFATE 2.5 MG: 2.5 SOLUTION RESPIRATORY (INHALATION) at 23:57

## 2022-07-03 RX ADMIN — ALBUTEROL SULFATE 2.5 MG: 2.5 SOLUTION RESPIRATORY (INHALATION) at 20:20

## 2022-07-03 RX ADMIN — INSULIN LISPRO 4 UNITS: 100 INJECTION, SOLUTION INTRAVENOUS; SUBCUTANEOUS at 18:13

## 2022-07-03 RX ADMIN — FOLIC ACID 1 MG: 1 TABLET ORAL at 08:46

## 2022-07-03 RX ADMIN — FAMOTIDINE 20 MG: 20 TABLET ORAL at 22:26

## 2022-07-03 RX ADMIN — RISPERIDONE 1.5 MG: 1 TABLET ORAL at 08:43

## 2022-07-03 RX ADMIN — SODIUM CHLORIDE, PRESERVATIVE FREE 10 ML: 5 INJECTION INTRAVENOUS at 18:11

## 2022-07-03 RX ADMIN — SODIUM CHLORIDE, PRESERVATIVE FREE 10 ML: 5 INJECTION INTRAVENOUS at 08:43

## 2022-07-03 RX ADMIN — ALBUTEROL SULFATE 2.5 MG: 2.5 SOLUTION RESPIRATORY (INHALATION) at 07:53

## 2022-07-03 RX ADMIN — APIXABAN 10 MG: 5 TABLET, FILM COATED ORAL at 08:46

## 2022-07-03 RX ADMIN — GUAIFENESIN 600 MG: 600 TABLET, EXTENDED RELEASE ORAL at 08:46

## 2022-07-03 RX ADMIN — HYDROCORTISONE ACETATE 25 MG: 25 SUPPOSITORY RECTAL at 18:12

## 2022-07-03 RX ADMIN — SODIUM CHLORIDE, PRESERVATIVE FREE 10 ML: 5 INJECTION INTRAVENOUS at 22:26

## 2022-07-03 RX ADMIN — DOCUSATE SODIUM 100 MG: 100 CAPSULE, LIQUID FILLED ORAL at 22:26

## 2022-07-03 RX ADMIN — FAMOTIDINE 20 MG: 20 TABLET ORAL at 08:46

## 2022-07-03 RX ADMIN — INSULIN LISPRO 2 UNITS: 100 INJECTION, SOLUTION INTRAVENOUS; SUBCUTANEOUS at 12:28

## 2022-07-03 RX ADMIN — TIOTROPIUM BROMIDE INHALATION SPRAY 2 PUFF: 3.12 SPRAY, METERED RESPIRATORY (INHALATION) at 08:02

## 2022-07-03 RX ADMIN — FUROSEMIDE 20 MG: 20 TABLET ORAL at 08:46

## 2022-07-03 RX ADMIN — DOCUSATE SODIUM 100 MG: 100 CAPSULE, LIQUID FILLED ORAL at 08:43

## 2022-07-03 RX ADMIN — HYDROCORTISONE ACETATE 25 MG: 25 SUPPOSITORY RECTAL at 00:01

## 2022-07-03 RX ADMIN — DOCUSATE SODIUM 50 MG AND SENNOSIDES 8.6 MG 2 TABLET: 8.6; 5 TABLET, FILM COATED ORAL at 08:46

## 2022-07-03 RX ADMIN — ATORVASTATIN CALCIUM 20 MG: 20 TABLET, FILM COATED ORAL at 22:26

## 2022-07-03 RX ADMIN — MORPHINE SULFATE 2 MG: 2 INJECTION, SOLUTION INTRAMUSCULAR; INTRAVENOUS at 08:47

## 2022-07-03 RX ADMIN — BUDESONIDE AND FORMOTEROL FUMARATE DIHYDRATE 2 PUFF: 160; 4.5 AEROSOL RESPIRATORY (INHALATION) at 20:20

## 2022-07-03 ASSESSMENT — PAIN DESCRIPTION - DESCRIPTORS
DESCRIPTORS: ACHING;DISCOMFORT
DESCRIPTORS: DISCOMFORT

## 2022-07-03 ASSESSMENT — PAIN SCALES - GENERAL
PAINLEVEL_OUTOF10: 7
PAINLEVEL_OUTOF10: 8

## 2022-07-03 ASSESSMENT — PAIN - FUNCTIONAL ASSESSMENT: PAIN_FUNCTIONAL_ASSESSMENT: PREVENTS OR INTERFERES SOME ACTIVE ACTIVITIES AND ADLS

## 2022-07-03 ASSESSMENT — PAIN DESCRIPTION - LOCATION
LOCATION: LEG
LOCATION: LEG;RECTUM

## 2022-07-03 ASSESSMENT — PAIN DESCRIPTION - ORIENTATION
ORIENTATION: RIGHT;LEFT
ORIENTATION: LEFT;RIGHT

## 2022-07-03 NOTE — PROGRESS NOTES
Pulmonary Progress Note  [facility]      Patient - Tamera Chang,  Age - 74 y.o.    - 1955      Room Number - -01   N -  [id number]   [facility]  Date of Admission -  2022  2:21 David Ramos MD  Primary Care Physician - Lilliam Jackson MD     SUBJECTIVE   Says that she is still having intermittent episodes of difficulty breathing but overall main complaint is weakness    OBJECTIVE   VITALS    height is 5' 5\" (1.651 m) and weight is 186 lb 4.6 oz (84.5 kg). Her oral temperature is 98.3 °F (36.8 °C). Her blood pressure is 110/59 (abnormal) and her pulse is 84. Her respiration is 16 and oxygen saturation is 96%. Body mass index is 31 kg/m². Temperature Range: Temp: 98.3 °F (36.8 °C) Temp  Av.2 °F (36.8 °C)  Min: 97.8 °F (36.6 °C)  Max: 98.5 °F (36.9 °C)  BP Range:  Systolic (03LHD), YOS:663 , Min:106 , BJS:519     Diastolic (82VTD), SSZ:20, Min:57, Max:62    Pulse Range: Pulse  Av.8  Min: 59  Max: 84  Respiration Range: Resp  Av.9  Min: 16  Max: 18  Current Pulse Ox[de-identified]  SpO2: 96 %  24HR Pulse Ox Range:  SpO2  Av.6 %  Min: 95 %  Max: 98 %  Oxygen Amount and Delivery: O2 Flow Rate (L/min): 4 L/min    Wt Readings from Last 3 Encounters:   22 186 lb 4.6 oz (84.5 kg)   22 174 lb 14.4 oz (79.3 kg)   22 183 lb (83 kg)       I/O (24 Hours)    Intake/Output Summary (Last 24 hours) at 7/3/2022 1257  Last data filed at 7/3/2022 0807  Gross per 24 hour   Intake 381 ml   Output 1100 ml   Net -719 ml       EXAM     General Appearance  Awake, alert, oriented, in no acute distress  HEENT - normocephalic, atraumatic.  []  Mallampati  [] Crowded airway   [] Macroglossia  []  Retrognathia  [] Micrognathia  []  Normal tongue size []  Normal Bite  [] [place] sign positive    Neck - Supple,  trachea midline   Lungs -diminished without wheezes  Cardiovascular - Heart sounds are normal.  Regular rate and rhythm   Abdomen - Soft, nontender, nondistended, no masses or organomegaly  Neurologic - There are no focal motor or sensory deficits  Skin - No bruising or bleeding  Extremities - No clubbing, cyanosis, ++edema    MEDS      hydrocortisone   Rectal BID    furosemide  20 mg Oral BID    methylPREDNISolone  40 mg IntraVENous Q8H    apixaban  10 mg Oral BID    Followed by   Michaelle Tomlinson ON 7/8/2022] apixaban  5 mg Oral BID    albuterol  2.5 mg Nebulization Q4H    budesonide-formoterol  2 puff Inhalation BID    tiotropium  2 puff Inhalation Daily    sodium chloride flush  5-40 mL IntraVENous 2 times per day    sennosides-docusate sodium  2 tablet Oral Daily    risperiDONE  1.5 mg Oral Q12H    pantoprazole  40 mg Oral QAM AC    montelukast  10 mg Oral Nightly    atorvastatin  20 mg Oral Nightly    docusate sodium  100 mg Oral BID    famotidine  20 mg Oral BID    fluticasone  2 spray Nasal Daily    folic acid  1 mg Oral Daily    guaiFENesin  600 mg Oral BID    insulin lispro  0-12 Units SubCUTAneous TID WC    insulin lispro  0-6 Units SubCUTAneous Nightly      sodium chloride 80 mL (07/01/22 1325)    dextrose       lidocaine, morphine, sodium chloride flush, sodium chloride, acetaminophen, ondansetron **OR** ondansetron, sodium chloride flush, hydrocortisone-aloe, glucose, dextrose bolus **OR** dextrose bolus, glucagon (rDNA), dextrose, traZODone, hydrocortisone    LABS   CBC   Recent Labs     07/03/22  0611   WBC 9.4   HGB 8.9*   HCT 29.0*   MCV 93.2        BMP:   Lab Results   Component Value Date/Time     07/03/2022 06:11 AM    K 4.7 07/03/2022 06:11 AM     07/03/2022 06:11 AM    CO2 28 07/03/2022 06:11 AM    BUN 16 07/03/2022 06:11 AM    LABALBU 2.9 06/30/2022 03:31 PM    LABALBU 3.6 07/08/2021 11:52 AM    CREATININE <0.40 07/03/2022 06:11 AM    CALCIUM 9.2 07/03/2022 06:11 AM    GFRAA Can not be calculated 07/03/2022 06:11 AM    LABGLOM Can not be calculated 07/03/2022 06:11 AM     ABGs:  Lab Results   Component Value Date/Time    PHART 7.442 04/28/2022 05:31 AM    PO2ART 77.6 04/28/2022 05:31 AM    IWP3MNA 60.6 04/28/2022 05:31 AM      Lab Results   Component Value Date/Time    MODE PRVC 04/28/2022 05:31 AM     Ionized Calcium:  No results found for: IONCA  Magnesium:    Lab Results   Component Value Date/Time    MG 2.3 04/16/2022 10:42 AM     Phosphorus:  No results found for: PHOS     LIVER PROFILE   Recent Labs     06/30/22  1531   AST 13   ALT 12   BILITOT 0.18*   ALKPHOS 92     INR   Recent Labs     06/30/22  1531   INR 1.0     PTT   Lab Results   Component Value Date    APTT 28.4 06/30/2022         RADIOLOGY     (See actual reports for details)    ASSESSMENT/PLAN   Bilateral DVT acute, without pulmonary embolism  Acute exacerbation of severe stage IV COPD  Chronic hypoxic and hypercapnic respiratory failure  History of Pseudomonas pneumonia with cavitary lesion  History of pneumothorax on the right  History of previous pulmonary embolism/DVT in June 2021 treated with Eliquis for 6 months  Full code    Continue Eliquis  Wean Solu-Medrol  No objection to starting precertification for ECF  Continue respiratory treatments  Electronically signed by Viola Dent MD on 7/3/2022 at 12:57 PM

## 2022-07-03 NOTE — FLOWSHEET NOTE
07/03/22 1425   Encounter Summary   Encounter Overview/Reason  Spiritual/Emotional Needs   Service Provided For: Patient   Referral/Consult From: Palliative Care   Last Encounter  07/03/22   Complexity of Encounter Low   Spiritual/Emotional needs   Type Spiritual Support   Palliative Care   Type Palliative Care, Initial/Spiritual Assessment   Assessment/Intervention/Outcome   Assessment Unable to assess  (Sleeping)   Intervention Prayer (assurance of)/[place]

## 2022-07-03 NOTE — PROGRESS NOTES
[place] [address] Internal Medicine    Progress Note     7/3/2022    8:34 AM    Name:   Tamera Chang  MRN:     [id number]     [place]:      [de-identified]   Room:   2091/2091-01   Day:  3  Admit Date:  6/30/2022  2:21 PM    PCP:   Lilliam Jackson MD  Code Status:  Full Code    Subjective:     C/C:   Chief Complaint   Patient presents with    Leg Pain     Bilateral DVT     Principal Problem:    Leg DVT (deep venous thromboembolism), acute, bilateral ([address]  Active Problems:    Chronic obstructive pulmonary disease with acute exacerbation ([address]  Resolved Problems:    * No resolved hospital problems.  *      Patient with past medical history of asthma, diabetes, CHF, bipolar disease, advanced COPD, history of PE, history of lung abscess, admitted with bilateral DVT  Patient initially started on heparin drip which was later transitioned to Eliquis  Underwent CT abdomen pelvis, not concerning for malignancy  Evaluated by pulmonologist  CT chest seen concerning for cavitary lesion right lower lobe  Not on antibiotics  On IV steroid dose is getting tapered  Chronic hypoxic respiratory failure on 4 L of oxygen  7/3  Patient, clinically doing better  Patient, has chronic pain in her rectal area, at least for last 8 weeks, no complaint of blood with the stools  Pain is, intermittent nature, burning variety, has no relation with passing stools             Recent Results (from the past 24 hour(s))   POC Glucose Fingerstick    Collection Time: 07/02/22 11:28 AM   Result Value Ref Range    POC Glucose 198 (H) 65 - 105 mg/dL   POC Glucose Fingerstick    Collection Time: 07/02/22  4:13 PM   Result Value Ref Range    POC Glucose 257 (H) 65 - 105 mg/dL   POC Glucose Fingerstick    Collection Time: 07/02/22  7:58 PM   Result Value Ref Range    POC Glucose 216 (H) 65 - 105 mg/dL   CBC with Auto Differential    Collection Time: 07/03/22  6:11 AM   Result Value Ref Range    WBC 9.4 3.5 - 11.0 k/uL    RBC 3.11 (L) 4.0 - 5.2 m/uL    Hemoglobin 8.9 (L) 12.0 - 16.0 g/dL    Hematocrit 29.0 (L) 36 - 46 %    MCV 93.2 80 - 100 fL    MCH 28.5 26 - 34 pg    MCHC 30.6 (L) 31 - 37 g/dL    RDW 19.5 (H) 11.5 - 14.9 %    Platelets 645 414 - 318 k/uL    MPV 7.1 6.0 - 12.0 fL    Seg Neutrophils 88 (H) 36 - 66 %    Lymphocytes 7 (L) 24 - 44 %    Monocytes 5 1 - 7 %    Eosinophils % 0 0 - 4 %    Basophils 0 0 - 2 %    Segs Absolute 8.20 1.3 - 9.1 k/uL    Absolute Lymph # 0.70 (L) 1.0 - 4.8 k/uL    Absolute Mono # 0.50 0.1 - 1.3 k/uL    Absolute Eos # 0.00 0.0 - 0.4 k/uL    Basophils Absolute 0.00 0.0 - 0.2 k/uL   Basic Metabolic Panel    Collection Time: 07/03/22  6:11 AM   Result Value Ref Range    Glucose 125 (H) 70 - 99 mg/dL    BUN 16 8 - 23 mg/dL    CREATININE <0.40 (L) 0.50 - 0.90 mg/dL    Calcium 9.2 8.6 - 10.4 mg/dL    Sodium 137 135 - 144 mmol/L    Potassium 4.7 3.7 - 5.3 mmol/L    Chloride 101 98 - 107 mmol/L    CO2 28 20 - 31 mmol/L    Anion Gap 8 (L) 9 - 17 mmol/L    GFR Non-African American Can not be calculated >60 mL/min    GFR  Can not be calculated >60 mL/min    GFR Comment         POC Glucose Fingerstick    Collection Time: 07/03/22  6:24 AM   Result Value Ref Range    POC Glucose 124 (H) 65 - 105 mg/dL     Recent Labs     07/02/22  0619 07/02/22  1128 07/02/22  1613 07/02/22  1958 07/03/22  0624   POCGLU 150* 198* 257* 216* 124*        CT ABDOMEN PELVIS W IV CONTRAST Additional Contrast? Oral    Result Date: 7/1/2022  EXAMINATION: CT OF THE ABDOMEN AND PELVIS WITH CONTRAST 7/1/2022 10:17 am TECHNIQUE: CT of the abdomen and pelvis was performed with the administration of intravenous contrast. Multiplanar reformatted images are provided for review. Automated exposure control, iterative reconstruction, and/or weight based adjustment of the mA/kV was utilized to reduce the radiation dose to as low as reasonably achievable.  COMPARISON: Chest CT 06/30/2022 and 04/29/2022 HISTORY: ORDERING SYSTEM PROVIDED HISTORY: rule out pelvic mass ,pt has luisa dvt TECHNOLOGIST PROVIDED HISTORY: rule out pelvic mass ,pt has luisa dvt Reason for Exam: r/o pelvic mass, bilateral DVTs Additional signs and symptoms: Possible pelvic mass, c/o bilateral leg pain due to DVTs Relevant Medical/Surgical History: IBS FINDINGS: Lower Chest: Partially visualized cavitary lesion in the right lung base with adjacent consolidative opacities and atelectasis characterized to advantage on recent chest CT. Heart size is within normal limits. No pericardial effusion. Organs: Liver is within normal limits for attenuation without any suspicious focal hepatic mass. Gallbladder appears within normal limits. Trace intrahepatic biliary ductal dilatation. Dilated distal common bile duct up to 2 cm with smooth tapering to normal caliber at the proximal common bile duct to the ampulla. No pericholecystic inflammatory changes. Spleen is normal in size and attenuation. Pancreas and adrenal glands are within normal limits. Kidneys enhance symmetrically and normally without hydronephrosis or perinephric stranding. GI/Bowel: The bowel is normal in caliber without obstruction. Volume of colonic stool suggests constipation. Normal appendix. Pelvis: Excreting contrast opacifies the otherwise unremarkable urinary bladder. Diminutive postmenopausal female pelvic organs. 0.9 cm water attenuation left adnexal cyst which requires no follow-up. Peritoneum/Retroperitoneum: No free fluid, free air, or lymphadenopathy. Atherosclerotic aortoiliac arteries with minimal distal infrarenal aortic ectasia up to 2 cm but no aneurysm. Partially occlusive low-density thrombus is present in the right femoral vein which appears to extend to the level of the proximal external iliac at the internal/external iliac bifurcation. Occlusive thrombus is present in the left femoral vein with partially occlusive thrombus extending into the saphenofemoral junction.   There is partially occlusive thrombus in the left external iliac vein which extends to the proximal external iliac at level of the internal/external bifurcation. There is no definite extrinsic mass impression on these venous structures, nor on the IVC. Bones/Soft Tissues: Diffuse osseous demineralization. Only mild degenerative changes in the spine with transitional anatomy at the lumbosacral junction with partial lumbarization of S1 on the right. 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 Multifocal small likely hematomas along the subcutaneous fat of the anterior abdominal wall which appears to be related to medication injection, the largest spanning 1.7 cm. Mild fat stranding is present along the superolateral upper thighs, left more so than right, presumably due to the aforementioned venous findings. Partially occlusive thrombus in the right femoral vein extending proximally in the right external iliac vein to the level of the internal/external iliac venous bifurcation. Occlusive thrombus in the left femoral vein with partially occlusive thrombus extending into both the saphenofemoral junction and proximally into the left external iliac vein to the level of the internal/external iliac vein bifurcation. No definite propagation of thrombus into the common iliac veins or IVC, and no extrinsic mass impression of the pelvic venous structures. Fusiform dilatation of the common bile duct with morphology suggestive of a possible underlying choledochal cyst rather than biliary obstruction, though clinical correlation is required. Volume of colonic stool suggests constipation. Correlate clinically. Numerous small hematomas in the subcutaneous fat related to medication injection, the largest measuring 1.7 cm. Partially visualized cavitary lesion with adjacent consolidation and atelectasis in the posterior right lung base, characterized to advantage on earlier chest CT.   Please see that report for findings. CT CHEST PULMONARY EMBOLISM W CONTRAST    Result Date: 6/30/2022  EXAMINATION: CTA OF THE CHEST 6/30/2022 5:48 pm TECHNIQUE: CTA of the chest was performed after the administration of intravenous contrast.  Multiplanar reformatted images are provided for review. MIP images are provided for review. Automated exposure control, iterative reconstruction, and/or weight based adjustment of the mA/kV was utilized to reduce the radiation dose to as low as reasonably achievable. COMPARISON: Chest CT examination of 06/29/2022 HISTORY: ORDERING SYSTEM PROVIDED HISTORY: r/o pe TECHNOLOGIST PROVIDED HISTORY: r/o pe Decision Support Exception - unselect if not a suspected or confirmed emergency medical condition->Emergency Medical Condition (MA) Reason for Exam: r/o pe Additional signs and symptoms: bilateral DVT Relevant Medical/Surgical History: wears O2 at home, copd, chf FINDINGS: Pulmonary Arteries: Pulmonary arteries are adequately opacified for evaluation. No evidence of intraluminal filling defect to suggest pulmonary embolism. Main pulmonary artery is normal in caliber. Mediastinum: No evidence of pathologically enlarged mediastinal lymphadenopathy. Unchanged subcentimeter mediastinal lymphadenopathy. The heart and pericardium demonstrate no acute abnormality. There is no acute abnormality of the thoracic aorta. Lungs/pleura: Focal consolidative changes within right upper lobe and left upper lobe and superior segment of the right lower lobe and multiple tree in bud nodular densities and cavitary changes within both lungs highly suggestive of multilobar infectious/inflammatory condition. Findings are unchanged since recent chest CT. The largest cavitary lesion is noted within the right lower lobe and appears unchanged since prior examination. Consolidative changes within the right lower lobe may be due to atelectasis. No evidence of pleural effusion or pneumothorax.   Moderate emphysematous changes within the lungs. Upper Abdomen: Limited images of the upper abdomen are unremarkable. Soft Tissues/Bones: No acute bone or soft tissue abnormality. Unchanged compression fracture of T5 and T6 and scoliosis. No evidence of pulmonary embolism. Focal consolidative changes within right upper lobe and left upper lobe and superior segment of the right lower lobe and multiple tree in bud nodular densities and cavitary changes within both lungs highly suggestive of multilobar infectious/inflammatory condition. Findings are unchanged since recent chest CT. The largest cavitary lesion is noted within the right lower lobe and appears unchanged since prior examination. Although this lesion may be due to infectious/inflammatory condition, neoplastic etiologies cannot be excluded. Unchanged emphysematous changes within the lungs. RECOMMENDATIONS: Unavailable     VL DUP LOWER EXTREMITY VENOUS LEFT    Result Date: 6/30/2022    [facility]  Vascular Lower Extremities DVT Study Procedure   Patient Name   Smith Wong     Date of Study           06/30/2022                 [facility]   Date of Birth  1955  Gender                  Female   Age            74 year(s)  Race                       Room Number    OP   Corporate ID # [id number]   Patient Acct # [de-identified]   MR #           [id number]      Sonographer             Yaya West, [facility]   Accession #    [id number]  Interpreting Physician  Domo Wilks   Referring                  Referring Physician     Hussein Smith. [address]  Nurse  Practitioner  Procedure Type of Study:   Veins: Lower Extremities DVT Study, Venous Scan Lower Bilateral.  Indications for Study:Pain and swelling. Patient Status:Out Patient. Technical Quality:Limited visualization. Limitation reason:Swelling.   - Critical Result:Dr Sandie Chapman, o/c for Kiya Quiñones. Conclusions   Summary   Technically limited visualization.   Acute deep vein thrombosis of the right leg involving the common femoral,  femoral and popliteal veins. Acute deep vein thrombosis of the left leg involving the common femoral,  femoral, popliteal and gastrocnemius veins. Superficial thrombophlebitis of the lower extremity involving the  sapheno-femoral region bilaterally. Signature   ----------------------------------------------------------------  Electronically signed by Pk Wilson RVT(Sonographer) on  06/30/2022 02:59 PM  ----------------------------------------------------------------   ----------------------------------------------------------------  Electronically signed by Domo Wilks(Interpreting physician)  on 06/30/2022 05:23 PM  ----------------------------------------------------------------  Findings:   Right Impression:                Left Impression:  The common femoral, femoral and  The common femoral, femoral, deep femoral  popliteal veins and              and popliteal veins, one gastroc and one  saphenofemoral junction are      peroneal vein and the saphenofemoral  dilated and non-compressible     junction are dilated and non-compressible  with hypoechoic echoes. with hypoechoic echoes. Limited visualization of the     Limited visualization of the posterior  posterior tibial and peroneal    tibial and peroneal veins. veins. The proximal portion of the great  Normal compressibility of the    saphenous vein is dilated and  great saphenous vein. non-compressible with hypoechoic echoes. .   Normal compressibility of the    Normal compressibility of the small  small saphenous vein. saphenous vein. Velocities are measured in cm/s ; Diameters are measured in cm Right Lower Extremities DVT Study Measurements Right 2D Measurements +------------------------------------+----------+---------------+----------+ ! Location                            ! Visualized! Compressibility! Thrombosis! +------------------------------------+----------+---------------+----------+ ! Common Femoral                      !Yes       ! No             !          ! +------------------------------------+----------+---------------+----------+ ! Prox Femoral                        !Yes       ! No             !          ! +------------------------------------+----------+---------------+----------+ ! Mid Femoral                         !Yes       ! No             !          ! +------------------------------------+----------+---------------+----------+ ! Dist Femoral                        !Yes       ! No             !          ! +------------------------------------+----------+---------------+----------+ ! Deep Femoral                        !Yes       ! Yes            ! None      ! +------------------------------------+----------+---------------+----------+ ! Popliteal                           !Yes       ! No             !          ! +------------------------------------+----------+---------------+----------+ ! Sapheno Femoral Junction            ! Yes       ! No             !          ! +------------------------------------+----------+---------------+----------+ ! PTV                                 ! Partial   !Yes            ! None      ! +------------------------------------+----------+---------------+----------+ ! Peroneal                            !Partial   !Yes            ! None      ! +------------------------------------+----------+---------------+----------+ ! Gastroc                             ! Yes       ! Yes            ! None      ! +------------------------------------+----------+---------------+----------+ ! GSV Thigh                           ! Yes       ! Yes            ! None      ! +------------------------------------+----------+---------------+----------+ ! GSV Knee                            ! Yes       ! Yes            ! None      ! +------------------------------------+----------+---------------+----------+ ! GSV Ankle !Yes       !Yes            ! None      ! +------------------------------------+----------+---------------+----------+ ! SSV                                 ! Yes       ! Yes            ! None      ! +------------------------------------+----------+---------------+----------+ Left Lower Extremities DVT Study Measurements Left 2D Measurements +------------------------------------+----------+---------------+----------+ ! Location                            ! Visualized! Compressibility! Thrombosis! +------------------------------------+----------+---------------+----------+ ! Common Femoral                      !Yes       ! No             !          ! +------------------------------------+----------+---------------+----------+ ! Prox Femoral                        !Yes       ! No             !          ! +------------------------------------+----------+---------------+----------+ ! Mid Femoral                         !Yes       ! No             !          ! +------------------------------------+----------+---------------+----------+ ! Dist Femoral                        !Yes       ! No             !          ! +------------------------------------+----------+---------------+----------+ ! Deep Femoral                        !Yes       ! No             !          ! +------------------------------------+----------+---------------+----------+ ! Popliteal                           !Yes       ! No             !          ! +------------------------------------+----------+---------------+----------+ ! Sapheno Femoral Junction            ! Yes       ! No             !          ! +------------------------------------+----------+---------------+----------+ ! PTV                                 ! Partial   !Yes            ! None      ! +------------------------------------+----------+---------------+----------+ ! Perdale                            !Partial   !No             !          ! +------------------------------------+----------+---------------+----------+ ! Gastroc                             ! Yes       ! No             !          ! +------------------------------------+----------+---------------+----------+ ! GSV Thigh                           ! Yes       ! No             !          ! +------------------------------------+----------+---------------+----------+ ! GSV Knee                            ! Yes       ! Yes            ! None      ! +------------------------------------+----------+---------------+----------+ ! GSV Ankle                           ! Yes       ! Yes            ! None      ! +------------------------------------+----------+---------------+----------+ ! SSV                                 ! Yes       ! Yes            ! None      ! +------------------------------------+----------+---------------+----------+            On admission         Review of Systems:     As recorded in HPI          Physical Examination:        Vitals:    07/02/22 2021 07/02/22 2355 07/03/22 0645 07/03/22 0753   BP:  (!) 106/57 (!) 115/59    Pulse: 75 64 59    Resp: 18 16 18    Temp:  98 °F (36.7 °C) 97.8 °F (36.6 °C)    TempSrc:   Oral    SpO2: 95% 98% 98% 96%   Weight:       Height:           Recent Labs     07/02/22  1128 07/02/22  1613 07/02/22  1958 07/03/22  0624   POCGLU 198* 257* 216* 124*       Intake/Output Summary (Last 24 hours) at 7/3/2022 0834  Last data filed at 7/3/2022 7497  Gross per 24 hour   Intake 736 ml   Output 1100 ml   Net -364 ml       General Appearance:  alert, well appearing, and in no acute distress  Mental status:   Head:  normocephalic, atraumatic.   Eye: no icterus, redness, pupils equal and reactive, extraocular eye movements intact, conjunctiva clear  Ear: normal external ear, no discharge, hearing intact  Nose:  no drainage noted  Mouth: mucous membranes moist  Neck: supple, no carotid bruits, thyroid not palpable  Lungs: Air entry better decreased, no rales, on oxygen  Cardiovascular: normal rate, regular rhythm, no murmur, gallop, rub. Abdomen: Soft, nontender, nondistended, normal bowel sounds, no hepatomegaly or splenomegaly  Rectal exam done, no external hemorrhoids, no internal hemorrhoids, did not notice any bleeding  Neurologic: There are no new focal motor or sensory deficits,   Skin: No gross lesions, rashes, bruising or bleeding on exposed skin area  Extremities:  peripheral pulses palpable, no pedal edema or calf pain with palpation  Psych:             Data:     PLabs:    BMP:   Recent Labs     07/02/22  0542 07/03/22  0611    137   K 4.7 4.7   CO2 29 28   BUN 16 16   CREATININE 0.40* <0.40*   LABGLOM >60 Can not be calculated   GLUCOSE 146* 125*                 Medications: Allergies:     Allergies   Allergen Reactions    Advil [Ibuprofen] Other (See Comments)     vomiting    Aleve [Naproxen] Other (See Comments)     vomiting    Antipyrine Other (See Comments)     unknown    Celecoxib Other (See Comments)    Codeine      headache    Fomepizole     Incruse Ellipta [Umeclidinium Bromide]      confusion    Other      GRASS, TREES, WEEDS    Rofecoxib Other (See Comments)     Unknown reaction    Salicylates      Unknown reaction     Strawberry Extract      HEADACHES    Sulfinpyrazone Other (See Comments)     Unknown reaction    Aspirin Nausea And Vomiting, Other (See Comments) and Nausea Only    Nsaids Nausea Only, Other (See Comments) and Nausea And Vomiting       Current Meds:   Scheduled Meds:    furosemide  20 mg Oral BID    methylPREDNISolone  40 mg IntraVENous Q8H    apixaban  10 mg Oral BID    Followed by   Donnie Barron ON 7/8/2022] apixaban  5 mg Oral BID    albuterol  2.5 mg Nebulization Q4H    budesonide-formoterol  2 puff Inhalation BID    tiotropium  2 puff Inhalation Daily    sodium chloride flush  5-40 mL IntraVENous 2 times per day    sennosides-docusate sodium  2 tablet Oral Daily    risperiDONE  1.5 mg Oral Q12H    pantoprazole  40 mg Oral QAM AC    montelukast  10 mg Oral Nightly    atorvastatin  20 mg Oral Nightly    docusate sodium  100 mg Oral BID    famotidine  20 mg Oral BID    fluticasone  2 spray Nasal Daily    folic acid  1 mg Oral Daily    guaiFENesin  600 mg Oral BID    insulin lispro  0-12 Units SubCUTAneous TID WC    insulin lispro  0-6 Units SubCUTAneous Nightly     Continuous Infusions:    sodium chloride 80 mL (07/01/22 1325)    dextrose       PRN Meds: lidocaine, morphine, sodium chloride flush, sodium chloride, acetaminophen, ondansetron **OR** ondansetron, sodium chloride flush, hydrocortisone-aloe, glucose, dextrose bolus **OR** dextrose bolus, glucagon (rDNA), dextrose, traZODone, hydrocortisone          Assessment:        Primary Problem  Leg DVT (deep venous thromboembolism), acute, bilateral (HCC)    Principal Problem:    Leg DVT (deep venous thromboembolism), acute, bilateral (HCC)  Active Problems:    Chronic obstructive pulmonary disease with acute exacerbation (HCC)  Resolved Problems:    * No resolved hospital problems.  *       Plan:          7/2/22    · Admitted with bilateral DVT, in legs, on anticoagulation this the second episode, likely will need long-term anticoagulation  · Advanced COPD, chronic hypoxic respiratory failure on home oxygen patient is getting treated with IV Solu-Medrol, does not feel that she is close to baseline  · Heart failure with reduced ejection fraction, EF is 45 to 50%, on Lasix, compensated  · Diabetes, controlled  · On DVT prophylaxis with Eliquis  · Patient wants to go back to nursing facility she came from nursing home, will reach out to  and work on discharge planning  · Pulmonary medicine following    7/3  Will need precertification to go back to [facility] as per nursing report  Transfer patient out to [facility] 5 floor  On IV steroids  Blood sugar control  Has chronic rectal pain, rectal exam done, could not appreciate hemorrhoids  Will start on hydrocortisone cream  Patient need to follow-up with GI which is already established as outpatient      . Hospital Problems           Last Modified POA    * (Principal) Leg DVT (deep venous thromboembolism), acute, bilateral ([address] 6/30/2022 Yes    Chronic obstructive pulmonary disease with acute exacerbation ([facility] 7/1/2022 Yes                         Thanks for consulting us . Will monitor vitals and clinical course , and  Optimize therapy  as needed .            Lars Vazquez MD  7/3/2022

## 2022-07-03 NOTE — PLAN OF CARE
Problem: ABCDS Injury Assessment  Goal: Absence of physical injury  Outcome: Progressing     Problem: Skin/Tissue Integrity  Goal: Absence of new skin breakdown  Description: 1. Monitor for areas of redness and/or skin breakdown  2. Assess vascular access sites hourly  3. Every 4-6 hours minimum:  Change oxygen saturation probe site  4. Every 4-6 hours:  If on nasal continuous positive airway pressure, respiratory therapy assess nares and determine need for appliance change or resting period.   Outcome: Progressing     Problem: Respiratory - Adult  Goal: Achieves optimal ventilation and oxygenation  Outcome: Progressing     Problem: Safety - Adult  Goal: Free from fall injury  Outcome: Progressing

## 2022-07-03 NOTE — CARE COORDINATION
ONGOING DISCHARGE PLANNING NOTE:    Writer reviewed LSW notes, and discharge plan is [id number] pending.     Electronically signed by Desirae Griffith RN on 7/3/2022 at 11:24 AM

## 2022-07-04 LAB
GLUCOSE BLD-MCNC: 128 MG/DL (ref 65–105)
GLUCOSE BLD-MCNC: 152 MG/DL (ref 65–105)
GLUCOSE BLD-MCNC: 220 MG/DL (ref 65–105)
GLUCOSE BLD-MCNC: 257 MG/DL (ref 65–105)

## 2022-07-04 PROCEDURE — 1200000000 HC SEMI PRIVATE

## 2022-07-04 PROCEDURE — 2580000003 HC RX 258: Performed by: INTERNAL MEDICINE

## 2022-07-04 PROCEDURE — 2700000000 HC OXYGEN THERAPY PER DAY

## 2022-07-04 PROCEDURE — 6360000002 HC RX W HCPCS: Performed by: INTERNAL MEDICINE

## 2022-07-04 PROCEDURE — 94640 AIRWAY INHALATION TREATMENT: CPT

## 2022-07-04 PROCEDURE — 97530 THERAPEUTIC ACTIVITIES: CPT

## 2022-07-04 PROCEDURE — 6370000000 HC RX 637 (ALT 250 FOR IP): Performed by: INTERNAL MEDICINE

## 2022-07-04 PROCEDURE — 94761 N-INVAS EAR/PLS OXIMETRY MLT: CPT

## 2022-07-04 PROCEDURE — 97110 THERAPEUTIC EXERCISES: CPT

## 2022-07-04 PROCEDURE — 6370000000 HC RX 637 (ALT 250 FOR IP): Performed by: NURSE PRACTITIONER

## 2022-07-04 PROCEDURE — 99232 SBSQ HOSP IP/OBS MODERATE 35: CPT | Performed by: INTERNAL MEDICINE

## 2022-07-04 PROCEDURE — 82947 ASSAY GLUCOSE BLOOD QUANT: CPT

## 2022-07-04 RX ORDER — PREDNISONE 20 MG/1
40 TABLET ORAL DAILY
Status: DISCONTINUED | OUTPATIENT
Start: 2022-07-04 | End: 2022-07-06 | Stop reason: HOSPADM

## 2022-07-04 RX ADMIN — GUAIFENESIN 600 MG: 600 TABLET, EXTENDED RELEASE ORAL at 20:00

## 2022-07-04 RX ADMIN — FUROSEMIDE 20 MG: 20 TABLET ORAL at 07:50

## 2022-07-04 RX ADMIN — ALBUTEROL SULFATE 2.5 MG: 2.5 SOLUTION RESPIRATORY (INHALATION) at 06:51

## 2022-07-04 RX ADMIN — FAMOTIDINE 20 MG: 20 TABLET ORAL at 07:50

## 2022-07-04 RX ADMIN — RISPERIDONE 1.5 MG: 1 TABLET ORAL at 00:24

## 2022-07-04 RX ADMIN — RISPERIDONE 1.5 MG: 1 TABLET ORAL at 20:00

## 2022-07-04 RX ADMIN — ALBUTEROL SULFATE 2.5 MG: 2.5 SOLUTION RESPIRATORY (INHALATION) at 03:16

## 2022-07-04 RX ADMIN — FUROSEMIDE 20 MG: 20 TABLET ORAL at 17:26

## 2022-07-04 RX ADMIN — GUAIFENESIN 600 MG: 600 TABLET, EXTENDED RELEASE ORAL at 07:50

## 2022-07-04 RX ADMIN — BUDESONIDE AND FORMOTEROL FUMARATE DIHYDRATE 2 PUFF: 160; 4.5 AEROSOL RESPIRATORY (INHALATION) at 07:10

## 2022-07-04 RX ADMIN — ALBUTEROL SULFATE 2.5 MG: 2.5 SOLUTION RESPIRATORY (INHALATION) at 15:52

## 2022-07-04 RX ADMIN — BUDESONIDE AND FORMOTEROL FUMARATE DIHYDRATE 2 PUFF: 160; 4.5 AEROSOL RESPIRATORY (INHALATION) at 19:05

## 2022-07-04 RX ADMIN — MONTELUKAST 10 MG: 10 TABLET, FILM COATED ORAL at 20:00

## 2022-07-04 RX ADMIN — INSULIN LISPRO 4 UNITS: 100 INJECTION, SOLUTION INTRAVENOUS; SUBCUTANEOUS at 16:48

## 2022-07-04 RX ADMIN — SODIUM CHLORIDE, PRESERVATIVE FREE 10 ML: 5 INJECTION INTRAVENOUS at 20:00

## 2022-07-04 RX ADMIN — ATORVASTATIN CALCIUM 20 MG: 20 TABLET, FILM COATED ORAL at 20:00

## 2022-07-04 RX ADMIN — MORPHINE SULFATE 2 MG: 2 INJECTION, SOLUTION INTRAMUSCULAR; INTRAVENOUS at 15:04

## 2022-07-04 RX ADMIN — METHYLPREDNISOLONE SODIUM SUCCINATE 40 MG: 40 INJECTION, POWDER, FOR SOLUTION INTRAMUSCULAR; INTRAVENOUS at 12:11

## 2022-07-04 RX ADMIN — SODIUM CHLORIDE, PRESERVATIVE FREE 10 ML: 5 INJECTION INTRAVENOUS at 09:16

## 2022-07-04 RX ADMIN — INSULIN LISPRO 2 UNITS: 100 INJECTION, SOLUTION INTRAVENOUS; SUBCUTANEOUS at 07:51

## 2022-07-04 RX ADMIN — APIXABAN 10 MG: 5 TABLET, FILM COATED ORAL at 20:00

## 2022-07-04 RX ADMIN — DOCUSATE SODIUM 50 MG AND SENNOSIDES 8.6 MG 2 TABLET: 8.6; 5 TABLET, FILM COATED ORAL at 07:50

## 2022-07-04 RX ADMIN — DOCUSATE SODIUM 100 MG: 100 CAPSULE, LIQUID FILLED ORAL at 20:00

## 2022-07-04 RX ADMIN — ALBUTEROL SULFATE 2.5 MG: 2.5 SOLUTION RESPIRATORY (INHALATION) at 23:57

## 2022-07-04 RX ADMIN — FOLIC ACID 1 MG: 1 TABLET ORAL at 07:50

## 2022-07-04 RX ADMIN — METHYLPREDNISOLONE SODIUM SUCCINATE 40 MG: 40 INJECTION, POWDER, FOR SOLUTION INTRAMUSCULAR; INTRAVENOUS at 00:25

## 2022-07-04 RX ADMIN — MORPHINE SULFATE 2 MG: 2 INJECTION, SOLUTION INTRAMUSCULAR; INTRAVENOUS at 09:11

## 2022-07-04 RX ADMIN — PANTOPRAZOLE SODIUM 40 MG: 40 TABLET, DELAYED RELEASE ORAL at 05:55

## 2022-07-04 RX ADMIN — INSULIN LISPRO 3 UNITS: 100 INJECTION, SOLUTION INTRAVENOUS; SUBCUTANEOUS at 20:37

## 2022-07-04 RX ADMIN — MORPHINE SULFATE 2 MG: 2 INJECTION, SOLUTION INTRAMUSCULAR; INTRAVENOUS at 00:24

## 2022-07-04 RX ADMIN — APIXABAN 10 MG: 5 TABLET, FILM COATED ORAL at 07:50

## 2022-07-04 RX ADMIN — RISPERIDONE 1.5 MG: 1 TABLET ORAL at 09:07

## 2022-07-04 RX ADMIN — FAMOTIDINE 20 MG: 20 TABLET ORAL at 20:00

## 2022-07-04 RX ADMIN — HYDROCORTISONE ACETATE 25 MG: 25 SUPPOSITORY RECTAL at 15:11

## 2022-07-04 RX ADMIN — PREDNISONE 40 MG: 20 TABLET ORAL at 17:26

## 2022-07-04 RX ADMIN — ALBUTEROL SULFATE 2.5 MG: 2.5 SOLUTION RESPIRATORY (INHALATION) at 10:40

## 2022-07-04 RX ADMIN — ALBUTEROL SULFATE 2.5 MG: 2.5 SOLUTION RESPIRATORY (INHALATION) at 19:06

## 2022-07-04 RX ADMIN — TIOTROPIUM BROMIDE INHALATION SPRAY 2 PUFF: 3.12 SPRAY, METERED RESPIRATORY (INHALATION) at 07:10

## 2022-07-04 RX ADMIN — DOCUSATE SODIUM 100 MG: 100 CAPSULE, LIQUID FILLED ORAL at 07:50

## 2022-07-04 ASSESSMENT — PAIN SCALES - GENERAL
PAINLEVEL_OUTOF10: 9
PAINLEVEL_OUTOF10: 9

## 2022-07-04 ASSESSMENT — PAIN DESCRIPTION - DESCRIPTORS: DESCRIPTORS: ACHING

## 2022-07-04 ASSESSMENT — PAIN DESCRIPTION - LOCATION: LOCATION: LEG

## 2022-07-04 ASSESSMENT — PAIN DESCRIPTION - ORIENTATION: ORIENTATION: RIGHT;LEFT

## 2022-07-04 NOTE — PLAN OF CARE
Problem: Discharge Planning  Goal: Discharge to home or other facility with appropriate resources  Outcome: Progressing     Problem: Pain  Goal: Verbalizes/displays adequate comfort level or baseline comfort level  7/4/2022 0426 by Sadaf Patel RN  Outcome: Progressing     Problem: ABCDS Injury Assessment  Goal: Absence of physical injury  Outcome: Progressing     Problem: Skin/Tissue Integrity  Goal: Absence of new skin breakdown  Description: 1. Monitor for areas of redness and/or skin breakdown  2. Assess vascular access sites hourly  3. Every 4-6 hours minimum:  Change oxygen saturation probe site  4. Every 4-6 hours:  If on nasal continuous positive airway pressure, respiratory therapy assess nares and determine need for appliance change or resting period.   7/4/2022 0426 by Sadaf Patel RN  Outcome: Progressing     Problem: Respiratory - Adult  Goal: Achieves optimal ventilation and oxygenation  Outcome: Progressing     Problem: Safety - Adult  Goal: Free from fall injury  7/4/2022 0426 by Sadaf Patel RN  Outcome: Progressing

## 2022-07-04 NOTE — PROGRESS NOTES
Physical Therapy  Facility/Department: [facility] MED SURG      Physical Therapy Daily Note  NAME: Gregorio Morgan  : 1955 (54 y.o.)  MRN: [id number]  CODE STATUS: Full Code    Date of Service: 22      Past Medical History:   Diagnosis Date    Abnormal EKG     [facility] 2022    Anxiety     Asthma     Bipolar disorder ([facility]     SEVERE [place] , [place]    [facility]chronic obstructive pulmonary disease) ([facility]     Cramps, extremity     SEVERE LEG CRAMPS    Depression     Dilated bile duct     Headache     History of elective      Hyperlipidemia     Irritable bowel syndrome     Prolonged emergence from general anesthesia     SEVERE ISSUES WAKING UP    Substance abuse ([facility]     street drugs when younger   Derrek Lobe Unspecified sleep apnea     Vision abnormalities     glasses     Past Surgical History:   Procedure Laterality Date    ANKLE SURGERY      HAD SCREWS AND HARDWARE, THEN REMOVED    BRONCHOSCOPY  2022         COLONOSCOPY  02/15/2018    tubular adenoma    EYE SURGERY Bilateral     CATARACTS    HYSTEROSCOPY  10/19/2016    D & C    INDUCED       LASIK      bilateral    TONSILLECTOMY AND ADENOIDECTOMY Bilateral     VULVA SURGERY      HAD A BIOPSY AND REMOVAL OF       Chart Reviewed: Yes  Patient assessed for rehabilitation services?: Yes  Family / Caregiver Present: No  Referring Practitioner: Ryan CORONADO CNP  Referral Date : 22  Diagnosis: Bilateral LE DVTs  General Comment  Comments: (P) SUE trammell for PT. Increased time required due to patients high anxiety during mobility. Educated patient on catarino lianna support, benefits of PT, benefits of mobility. Max encouragement for positivity and motivation to continue PT. Restrictions:  Restrictions/Precautions: Fall Risk;General Precautions;Contact Precautions; Up as Tolerated     SUBJECTIVE  Subjective: Patient pleasant and agreeable to PT.    Pain: 9/10 in B LE.   OBJECTIVE  Vision  Vision: Impaired  Vision Exceptions: Wears glasses at all times; Cataracts  Cognition  Overall Cognitive Status: WNL  Sensation  Overall Sensation Status: Impaired (Pt reports diminished Sensation in BLEs)    Functional Mobility  Bed mobility  Supine to Sit: Stand by assistance  Sit to Supine: Unable to assess (up in chair at end of session)  Scooting: Stand by assistance  Bed Mobility Comments: HOB partially elevated with use of bed rails; Pt requires short seated rest break at EOB to recover breathing displaying Dyspnea during and post tasks. Transfers  Sit to Stand: (P) Dependent/Total (catarino stedy)  Stand to sit: (P) Dependent/Total ( catarino stedy)  Comment: (P) Pt is total/dependent with catarino stedy; Dyspnea during transfer tasks. Patient with limited tolerance for standing in catarino stedy with BUE support on bar necessary to maintain balance. Sit<>stand 2x for ~6 seconds each, patient declines more trials. Balance  Posture: Poor  Sitting - Static: Fair  Sitting - Dynamic: Fair;-  Standing - Static: Poor;+ William Jamil stedy)  Comments: (P) Standing balance assessed with Sancho dsouza. Patient unable to stand fully erect. Environmental Mobility  Ambulation  Comments: (P) ORALIA amblation   Stairs/Curb  Stairs?: No    PT Exercises  Exercise Treatment: (P) STS transfers; challenged standing balance however limited to ~6 seconds x2  A/AROM Exercises: (P) Seated B LE exercises 10x A/AAROM    ASSESSMENT  Vitals  Heart Rate: 90  BP: (!) 117/55  BP Location: Left upper arm  BP Method: Automatic  MAP (Calculated): 75.67  SpO2: 96 %  O2 Device: Nasal cannula (4 L O2)    Activity Tolerance  Activity Tolerance: Patient limited by fatigue;Patient limited by pain; Patient limited by endurance  Activity Tolerance Comments: (P) Patient limited by high anxiety and fear of falling. Assessment  Assessment: The patient presents with significant BLE weakness and ROM deficits with contractures in bilateral ankles and feet.  Pt also demonstrates impaired endurance 2* poor lung function. The patient requires SBA for bed mobility and Total/dependent Ax2 using Marinell Kluver stedy for all transfers; Gait is unsafe to attempt at this time. Pt is unsafe to return to prior living arrangements at home and will greatly benefit from continued PT services in inpatient setting to address identified deficits. Performance Deficits/Impairments: Decreased functional mobility ; Decreased ADL status; Decreased ROM; Decreased body mechanics; Decreased tolerance to work activity; Decreased strength;Decreased endurance;Decreased sensation;Decreased balance; Increased pain;Decreased posture  Treatment Diagnosis: Impaired functional mobility 2* Bilateral DVT, Bilateral Foot and ankle contractures. Therapy Prognosis: Fair  Decision Making: High Complexity  History: Pt had complicated hospital Admission to [facility] in April 2022 with pulmonary pathologies including mycoplasma pneumonia, Pneumothorax, Cavitary lesion in R lower Lobe of lung, and associated dyspnea and respiratory failure < - has been significantly debilitated since April hospital encounter. Exam: ROM, MMT, Balance, and functional mobility assessments  Clinical Presentation: pt is alert, cooperative, and pleasant; motivated to improve functionally  Treatment Initiated : Functional bed mobility and transfer training  Discharge Recommendations: Patient would benefit from continued therapy after discharge  PT D/C Equipment  Equipment Needed: No  GOALS  Patient Goals   Patient goals : I want to walk  Short Term Goals  Time Frame for Short term goals: 7 days  Short term goal 1: pt to demo all bed mobility Mod I  Short term goal 2: pt to tolerate standing with improved upright posture x5 minutes to improve tolerance for standing activities, ADLs, and eventually walking.    Short term goal 3: pt to tolerate 25-30 minutes of therapeutic exercise/activity with demo'd knowledge of need for therapeutic rest breaks to improve tolerance for further therapy and daily activity/ADLs  Short term goal 4: pt to improve BLE strength by 1/2 MMG to improve safety/independence with mobility   Short term goal 5: pt to improve Bilat Ankle DF ROM by 5-10 degrees to improve joint mechanics for safer standing/walking. Additional Goals?: Yes  Short Term Goal 6: Pt to ambulate 5' with 2 A and RW (whether it be side-stepping or fwd walking with chair follow) to improve mobility status. PLAN OF CARE  Frequency: 1-2 treatment sessions per day, 5-7 days per week  Plan  Plan: 5-7 times per week  Specific Instructions for Next Treatment: Calf-ankle-foot stretching and ROM with proper resting position supports and Education; Functional strengthening and standing with use of catarino stedy  Current Treatment Recommendations: Strengthening;ROM;Balance training;Functional mobility training;Transfer training; Endurance training;Gait training;Pain management; Safety education & training;Positioning; Therapeutic activities  Safety Devices  Type of Devices: (P) Call light within reach;Gait belt;Patient at risk for falls; Left in chair;Nurse notified  Therapy Time   Individual Concurrent Group Co-treatment   Time In 0807         Time Out 1082         Minutes [address] [place] [address], 07/04/22 at 9:10 AM

## 2022-07-04 NOTE — PLAN OF CARE
Problem: Discharge Planning  Goal: Discharge to home or other facility with appropriate resources  7/4/2022 1516 by Josefa Mcgregor RN  Outcome: Progressing  Flowsheets (Taken 7/4/2022 0807)  Discharge to home or other facility with appropriate resources: Identify barriers to discharge with patient and caregiver  7/4/2022 0426 by Suma Duran RN  Outcome: Progressing     Problem: Pain  Goal: Verbalizes/displays adequate comfort level or baseline comfort level  7/4/2022 1516 by Josefa Mcgregor RN  Outcome: Progressing  7/4/2022 0426 by Suma Duran RN  Outcome: Progressing     Problem: ABCDS Injury Assessment  Goal: Absence of physical injury  7/4/2022 1516 by Josefa Mcgregor RN  Outcome: Progressing  Flowsheets (Taken 7/4/2022 1003)  Absence of Physical Injury: Implement safety measures based on patient assessment  7/4/2022 0426 by Suma Duran RN  Outcome: Progressing     Problem: Skin/Tissue Integrity  Goal: Absence of new skin breakdown  Description: 1. Monitor for areas of redness and/or skin breakdown  2. Assess vascular access sites hourly  3. Every 4-6 hours minimum:  Change oxygen saturation probe site  4. Every 4-6 hours:  If on nasal continuous positive airway pressure, respiratory therapy assess nares and determine need for appliance change or resting period.   7/4/2022 1516 by Josefa Mcgregor RN  Outcome: Progressing  7/4/2022 0426 by Suma Duran RN  Outcome: Progressing     Problem: Respiratory - Adult  Goal: Achieves optimal ventilation and oxygenation  7/4/2022 1516 by Josefa Mcgregor RN  Outcome: Progressing  Flowsheets (Taken 7/4/2022 0807)  Achieves optimal ventilation and oxygenation:   Assess for changes in respiratory status   Assess for changes in mentation and behavior   Position to facilitate oxygenation and minimize respiratory effort  7/4/2022 0426 by Suma Duran RN  Outcome: Progressing     Problem: Safety - Adult  Goal: Free from fall injury  7/4/2022 [address] by Aaron Portillo RN  Outcome: Progressing  Flowsheets (Taken 7/4/2022 1003)  Free From Fall Injury: Instruct family/caregiver on patient safety  7/4/2022 0426 by Matthew Perrin RN  Outcome: Progressing

## 2022-07-04 NOTE — PROGRESS NOTES
Pt transferred to [facility] from PCU. Vitals taken and pt taken out of recliner and placed in bed.

## 2022-07-04 NOTE — CARE COORDINATION
ANNA spoke with Alexx Ellison at [facility] regarding pt discharge plan. Chikis informed ANNA that a pre-cert was started on, 6/30 and is still pending. ANNA will continue to follow for authorization.

## 2022-07-04 NOTE — PROGRESS NOTES
Pt transferred to [facility] per chair with belongings, meds, and chart. O2/NC at 4L continue. Pt A+Ox4. No acute distress noted. CTA and nurse met pt in room. Call light within reach.

## 2022-07-04 NOTE — PROGRESS NOTES
Pulmonary Progress Note  [facility]      Patient - Mich Orellana,  Age - 74 y.o.    - 1955      Room Number - -01   N -  [id number]   [facility]  Date of Admission -  2022  2:21 Reanna Camejo MD  Primary Care Physician - Daljit Vargas MD     SUBJECTIVE   Looks about the same denies any worsening dyspnea. Coughed up a little bit of blood yesterday mixed with phlegm  OBJECTIVE   VITALS    height is 5' 5\" (1.651 m) and weight is 186 lb 4.6 oz (84.5 kg). Her temperature is 98.1 °F (36.7 °C). Her blood pressure is 117/55 (abnormal) and her pulse is 90. Her respiration is 18 and oxygen saturation is 99%. Body mass index is 31 kg/m². Temperature Range: Temp: 98.1 °F (36.7 °C) Temp  Av.9 °F (36.6 °C)  Min: 97.7 °F (36.5 °C)  Max: 98.1 °F (36.7 °C)  BP Range:  Systolic (72QVF), WUL:499 , Min:111 , XWW:838     Diastolic (18IRA), NWN:53, Min:55, Max:63    Pulse Range: Pulse  Av.1  Min: 57  Max: 90  Respiration Range: Resp  Av.2  Min: 16  Max: 20  Current Pulse Ox[de-identified]  SpO2: 99 %  24HR Pulse Ox Range:  SpO2  Av.6 %  Min: 94 %  Max: 99 %  Oxygen Amount and Delivery: O2 Flow Rate (L/min): 4 L/min    Wt Readings from Last 3 Encounters:   22 186 lb 4.6 oz (84.5 kg)   22 174 lb 14.4 oz (79.3 kg)   22 183 lb (83 kg)       I/O (24 Hours)    Intake/Output Summary (Last 24 hours) at 2022 1236  Last data filed at 2022 0601  Gross per 24 hour   Intake --   Output 850 ml   Net -850 ml       EXAM     General Appearance  Awake, alert, oriented, in no acute distress  HEENT - normocephalic, atraumatic.  []  Mallampati  [] Crowded airway   [] Macroglossia  []  Retrognathia  [] Micrognathia  []  Normal tongue size []  Normal Bite  [] Prairie sign positive    Neck - Supple,  trachea midline   Lungs -diminished without wheezes  Cardiovascular - Heart sounds are normal.  Regular rate and rhythm   Abdomen - Soft, nontender, nondistended, no masses or organomegaly  Neurologic - There are no focal motor or sensory deficits  Skin - No bruising or bleeding  Extremities - No clubbing, cyanosis, decreased bilateral edema, left greater than right    MEDS      hydrocortisone   Rectal BID    methylPREDNISolone  40 mg IntraVENous Q12H    furosemide  20 mg Oral BID    apixaban  10 mg Oral BID    Followed by   Orlando Granadosolic ON 7/8/2022] apixaban  5 mg Oral BID    albuterol  2.5 mg Nebulization Q4H    budesonide-formoterol  2 puff Inhalation BID    tiotropium  2 puff Inhalation Daily    sodium chloride flush  5-40 mL IntraVENous 2 times per day    sennosides-docusate sodium  2 tablet Oral Daily    risperiDONE  1.5 mg Oral Q12H    pantoprazole  40 mg Oral QAM AC    montelukast  10 mg Oral Nightly    atorvastatin  20 mg Oral Nightly    docusate sodium  100 mg Oral BID    famotidine  20 mg Oral BID    fluticasone  2 spray Nasal Daily    folic acid  1 mg Oral Daily    guaiFENesin  600 mg Oral BID    insulin lispro  0-12 Units SubCUTAneous TID WC    insulin lispro  0-6 Units SubCUTAneous Nightly      sodium chloride 80 mL (07/01/22 1325)    dextrose       lidocaine, morphine, sodium chloride flush, sodium chloride, acetaminophen, ondansetron **OR** ondansetron, sodium chloride flush, hydrocortisone-aloe, glucose, dextrose bolus **OR** dextrose bolus, glucagon (rDNA), dextrose, traZODone, hydrocortisone    LABS   CBC   Recent Labs     07/03/22  0611   WBC 9.4   HGB 8.9*   HCT 29.0*   MCV 93.2        BMP:   Lab Results   Component Value Date/Time     07/03/2022 06:11 AM    K 4.7 07/03/2022 06:11 AM     07/03/2022 06:11 AM    CO2 28 07/03/2022 06:11 AM    BUN 16 07/03/2022 06:11 AM    LABALBU 2.9 06/30/2022 03:31 PM    LABALBU 3.6 07/08/2021 11:52 AM    CREATININE <0.40 07/03/2022 06:11 AM    CALCIUM 9.2 07/03/2022 06:11 AM    GFRAA Can not be calculated 07/03/2022 06:11 AM    LABGLOM Can not be calculated 07/03/2022 06:11 AM     ABGs:  Lab Results   Component Value Date/Time    PHART 7.442 04/28/2022 05:31 AM    PO2ART 77.6 04/28/2022 05:31 AM    WAD8NDD 60.6 04/28/2022 05:31 AM      Lab Results   Component Value Date/Time    MODE PRVC 04/28/2022 05:31 AM     Ionized Calcium:  No results found for: IONCA  Magnesium:    Lab Results   Component Value Date/Time    MG 2.3 04/16/2022 10:42 AM     Phosphorus:  No results found for: PHOS     LIVER PROFILE No results for input(s): AST, ALT, LIPASE, BILIDIR, BILITOT, ALKPHOS in the last 72 hours. Invalid input(s): AMYLASE,  ALB  INR No results for input(s): INR in the last 72 hours.   PTT   Lab Results   Component Value Date    APTT 28.4 06/30/2022         RADIOLOGY     (See actual reports for details)    ASSESSMENT/PLAN   Bilateral DVT acute, without pulmonary embolism  Acute exacerbation of severe stage IV COPD  Chronic hypoxic and hypercapnic respiratory failure  History of Pseudomonas pneumonia with cavitary lesion  History of pneumothorax on the right  History of previous pulmonary embolism/DVT in June 2021 treated with Eliquis for 6 months  Full code    Continue Eliquis, will need lifelong therapy  Switch to oral prednisone  Continue bronchodilators  Awaiting placement back at [facility]  Electronically signed by Ethel Goldstein MD on 7/4/2022 at 12:36 PM

## 2022-07-04 NOTE — PROGRESS NOTES
[place] [address] Internal Medicine    Progress Note  Chart Reviewed: Yes  Patient assessed for rehabilitation services?: Yes  Family / Caregiver Present: No  Referring Practitioner: Debby CORONADO CNP  Referral Date : 06/30/22  Diagnosis: Bilateral LE DVTs  General Comment  Comments: SUE trammell for PT. Increased time required due to patients high anxiety during mobility. 7/4/2022    10:46 AM    Name:   Simran Pham  MRN:     [id number]     [place]:      [de-identified]   Room:   2057/2057-01  IP Day:  4  Admit Date:  6/30/2022  2:21 PM    PCP:   Vero Chambers MD  Code Status:  Full Code    Subjective:     C/C:   Chief Complaint   Patient presents with    Leg Pain     Bilateral DVT     Principal Problem:    Leg DVT (deep venous thromboembolism), acute, bilateral ([address]  Active Problems:    Chronic obstructive pulmonary disease with acute exacerbation ([address]  Resolved Problems:    * No resolved hospital problems.  *      Patient with past medical history of asthma, diabetes, CHF, bipolar disease, advanced COPD, history of PE, history of lung abscess, admitted with bilateral DVT  Patient initially started on heparin drip which was later transitioned to Eliquis  Underwent CT abdomen pelvis, not concerning for malignancy  Evaluated by pulmonologist  CT chest seen concerning for cavitary lesion right lower lobe  Not on antibiotics  On IV steroid dose is getting tapered  Chronic hypoxic respiratory failure on 4 L of oxygen  7/3  Patient, clinically doing better  Patient, has chronic pain in her rectal area, at least for last 8 weeks, no complaint of blood with the stools  Pain is, intermittent nature, burning variety, has no relation with passing stools             Recent Results (from the past 24 hour(s))   POC Glucose Fingerstick    Collection Time: 07/03/22 11:24 AM   Result Value Ref Range    POC Glucose 193 (H) 65 - 105 mg/dL   POC Glucose Fingerstick    Collection Time: 07/03/22  4:28 PM Result Value Ref Range    POC Glucose 229 (H) 65 - 105 mg/dL   POC Glucose Fingerstick    Collection Time: 07/03/22  8:09 PM   Result Value Ref Range    POC Glucose 159 (H) 65 - 105 mg/dL   POC Glucose Fingerstick    Collection Time: 07/04/22  6:10 AM   Result Value Ref Range    POC Glucose 152 (H) 65 - 105 mg/dL     Recent Labs     07/03/22  0624 07/03/22  1124 07/03/22  1628 07/03/22 2009 07/04/22  0610   POCGLU 124* 193* 229* 159* 152*        CT ABDOMEN PELVIS W IV CONTRAST Additional Contrast? Oral    Result Date: 7/1/2022  EXAMINATION: CT OF THE ABDOMEN AND PELVIS WITH CONTRAST 7/1/2022 10:17 am TECHNIQUE: CT of the abdomen and pelvis was performed with the administration of intravenous contrast. Multiplanar reformatted images are provided for review. Automated exposure control, iterative reconstruction, and/or weight based adjustment of the mA/kV was utilized to reduce the radiation dose to as low as reasonably achievable. COMPARISON: Chest CT 06/30/2022 and 04/29/2022 HISTORY: ORDERING SYSTEM PROVIDED HISTORY: rule out pelvic mass ,pt has luisa dvt TECHNOLOGIST PROVIDED HISTORY: rule out pelvic mass ,pt has luisa dvt Reason for Exam: r/o pelvic mass, bilateral DVTs Additional signs and symptoms: Possible pelvic mass, c/o bilateral leg pain due to DVTs Relevant Medical/Surgical History: IBS FINDINGS: Lower Chest: Partially visualized cavitary lesion in the right lung base with adjacent consolidative opacities and atelectasis characterized to advantage on recent chest CT. Heart size is within normal limits. No pericardial effusion. Organs: Liver is within normal limits for attenuation without any suspicious focal hepatic mass. Gallbladder appears within normal limits. Trace intrahepatic biliary ductal dilatation. Dilated distal common bile duct up to 2 cm with smooth tapering to normal caliber at the proximal common bile duct to the ampulla. No pericholecystic inflammatory changes.   Spleen is normal in size and attenuation. Pancreas and adrenal glands are within normal limits. Kidneys enhance symmetrically and normally without hydronephrosis or perinephric stranding. GI/Bowel: The bowel is normal in caliber without obstruction. Volume of colonic stool suggests constipation. Normal appendix. Pelvis: Excreting contrast opacifies the otherwise unremarkable urinary bladder. Diminutive postmenopausal female pelvic organs. 0.9 cm water attenuation left adnexal cyst which requires no follow-up. Peritoneum/Retroperitoneum: No free fluid, free air, or lymphadenopathy. Atherosclerotic aortoiliac arteries with minimal distal infrarenal aortic ectasia up to 2 cm but no aneurysm. Partially occlusive low-density thrombus is present in the right femoral vein which appears to extend to the level of the proximal external iliac at the internal/external iliac bifurcation. Occlusive thrombus is present in the left femoral vein with partially occlusive thrombus extending into the saphenofemoral junction. There is partially occlusive thrombus in the left external iliac vein which extends to the proximal external iliac at level of the internal/external bifurcation. There is no definite extrinsic mass impression on these venous structures, nor on the IVC. Bones/Soft Tissues: Diffuse osseous demineralization. Only mild degenerative changes in the spine with transitional anatomy at the lumbosacral junction with partial lumbarization of S1 on the right. 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 Multifocal small likely hematomas along the subcutaneous fat of the anterior abdominal wall which appears to be related to medication injection, the largest spanning 1.7 cm. Mild fat stranding is present along the superolateral upper thighs, left more so than right, presumably due to the aforementioned venous findings.      Partially occlusive thrombus in the right femoral vein extending proximally in the right external iliac vein to the level of the internal/external iliac venous bifurcation. Occlusive thrombus in the left femoral vein with partially occlusive thrombus extending into both the saphenofemoral junction and proximally into the left external iliac vein to the level of the internal/external iliac vein bifurcation. No definite propagation of thrombus into the common iliac veins or IVC, and no extrinsic mass impression of the pelvic venous structures. Fusiform dilatation of the common bile duct with morphology suggestive of a possible underlying choledochal cyst rather than biliary obstruction, though clinical correlation is required. Volume of colonic stool suggests constipation. Correlate clinically. Numerous small hematomas in the subcutaneous fat related to medication injection, the largest measuring 1.7 cm. Partially visualized cavitary lesion with adjacent consolidation and atelectasis in the posterior right lung base, characterized to advantage on earlier chest CT. Please see that report for findings. CT CHEST PULMONARY EMBOLISM W CONTRAST    Result Date: 6/30/2022  EXAMINATION: CTA OF THE CHEST 6/30/2022 5:48 pm TECHNIQUE: CTA of the chest was performed after the administration of intravenous contrast.  Multiplanar reformatted images are provided for review. MIP images are provided for review. Automated exposure control, iterative reconstruction, and/or weight based adjustment of the mA/kV was utilized to reduce the radiation dose to as low as reasonably achievable.  COMPARISON: Chest CT examination of 06/29/2022 HISTORY: ORDERING SYSTEM PROVIDED HISTORY: r/o pe TECHNOLOGIST PROVIDED HISTORY: r/o pe Decision Support Exception - unselect if not a suspected or confirmed emergency medical condition->Emergency Medical Condition (MA) Reason for Exam: r/o pe Additional signs and symptoms: bilateral DVT Relevant Medical/Surgical History: wears O2 at home, copd, chf FINDINGS: Pulmonary Arteries: Pulmonary arteries are adequately opacified for evaluation. No evidence of intraluminal filling defect to suggest pulmonary embolism. Main pulmonary artery is normal in caliber. Mediastinum: No evidence of pathologically enlarged mediastinal lymphadenopathy. Unchanged subcentimeter mediastinal lymphadenopathy. The heart and pericardium demonstrate no acute abnormality. There is no acute abnormality of the thoracic aorta. Lungs/pleura: Focal consolidative changes within right upper lobe and left upper lobe and superior segment of the right lower lobe and multiple tree in bud nodular densities and cavitary changes within both lungs highly suggestive of multilobar infectious/inflammatory condition. Findings are unchanged since recent chest CT. The largest cavitary lesion is noted within the right lower lobe and appears unchanged since prior examination. Consolidative changes within the right lower lobe may be due to atelectasis. No evidence of pleural effusion or pneumothorax. Moderate emphysematous changes within the lungs. Upper Abdomen: Limited images of the upper abdomen are unremarkable. Soft Tissues/Bones: No acute bone or soft tissue abnormality. Unchanged compression fracture of T5 and T6 and scoliosis. No evidence of pulmonary embolism. Focal consolidative changes within right upper lobe and left upper lobe and superior segment of the right lower lobe and multiple tree in bud nodular densities and cavitary changes within both lungs highly suggestive of multilobar infectious/inflammatory condition. Findings are unchanged since recent chest CT. The largest cavitary lesion is noted within the right lower lobe and appears unchanged since prior examination. Although this lesion may be due to infectious/inflammatory condition, neoplastic etiologies cannot be excluded. Unchanged emphysematous changes within the lungs.  RECOMMENDATIONS: Unavailable     VL DUP LOWER EXTREMITY VENOUS LEFT    Result Date: 6/30/2022    [address] [place] [address]  Vascular Lower Extremities DVT Study Procedure   Patient Name   Franci Mayo     Date of Study           06/30/2022                 [address]   Date of Birth  1955  Gender                  Female   Age            74 year(s)  Race                       Room Number    OP   Corporate ID # [id number]   Patient Acct # [de-identified]   MR #           [id number]      Sonographer             Rosanna Ibarra RVT   Accession #    [id number]  Interpreting Physician  Domo Wilks   Referring                  Referring Physician     Miguel Angel Posadas [address] [place] [address] Way  Nurse  Practitioner  Procedure Type of Study:   Veins: Lower Extremities DVT Study, Venous Scan Lower Bilateral.  Indications for Study:Pain and swelling. Patient Status:Out Patient. Technical Quality:Limited visualization. Limitation reason:Swelling.   - Critical Result:Dr Guille Mcdaniel, o/c for Leanne Barnes. Conclusions   Summary   Technically limited visualization. Acute deep vein thrombosis of the right leg involving the common femoral,  femoral and popliteal veins. Acute deep vein thrombosis of the left leg involving the common femoral,  femoral, popliteal and gastrocnemius veins. Superficial thrombophlebitis of the lower extremity involving the  sapheno-femoral region bilaterally.    Signature   ----------------------------------------------------------------  Electronically signed by Rosanna Ibarra RVT(Sonographer) on  06/30/2022 02:59 PM  ----------------------------------------------------------------   ----------------------------------------------------------------  Electronically signed by Domo Wilks(Interpreting physician)  on 06/30/2022 05:23 PM  ----------------------------------------------------------------  Findings:   Right Impression:                Left Impression:  The common femoral, femoral and  The common femoral, femoral, deep femoral  popliteal veins and              and popliteal veins, one gastroc and one  saphenofemoral junction are      peroneal vein and the saphenofemoral  dilated and non-compressible     junction are dilated and non-compressible  with hypoechoic echoes. with hypoechoic echoes. Limited visualization of the     Limited visualization of the posterior  posterior tibial and peroneal    tibial and peroneal veins. veins. The proximal portion of the great  Normal compressibility of the    saphenous vein is dilated and  great saphenous vein. non-compressible with hypoechoic echoes. .   Normal compressibility of the    Normal compressibility of the small  small saphenous vein. saphenous vein. Velocities are measured in cm/s ; Diameters are measured in cm Right Lower Extremities DVT Study Measurements Right 2D Measurements +------------------------------------+----------+---------------+----------+ ! Location                            ! Visualized! Compressibility! Thrombosis! +------------------------------------+----------+---------------+----------+ ! Common Femoral                      !Yes       ! No             !          ! +------------------------------------+----------+---------------+----------+ ! Prox Femoral                        !Yes       ! No             !          ! +------------------------------------+----------+---------------+----------+ ! Mid Femoral                         !Yes       ! No             !          ! +------------------------------------+----------+---------------+----------+ ! Dist Femoral                        !Yes       ! No             !          ! +------------------------------------+----------+---------------+----------+ ! Deep Femoral                        !Yes       ! Yes            ! None      ! +------------------------------------+----------+---------------+----------+ ! Popliteal                           !Yes       ! No             !          ! +------------------------------------+----------+---------------+----------+ ! Sapheno Femoral Junction            ! Yes       ! No             !          ! +------------------------------------+----------+---------------+----------+ ! PTV                                 ! Partial   !Yes            ! None      ! +------------------------------------+----------+---------------+----------+ ! Peroneal                            !Partial   !Yes            ! None      ! +------------------------------------+----------+---------------+----------+ ! Gastroc                             ! Yes       ! Yes            ! None      ! +------------------------------------+----------+---------------+----------+ ! GSV Thigh                           ! Yes       ! Yes            ! None      ! +------------------------------------+----------+---------------+----------+ ! GSV Knee                            ! Yes       ! Yes            ! None      ! +------------------------------------+----------+---------------+----------+ ! GSV Ankle                           ! Yes       ! Yes            ! None      ! +------------------------------------+----------+---------------+----------+ ! SSV                                 ! Yes       ! Yes            ! None      ! +------------------------------------+----------+---------------+----------+ Left Lower Extremities DVT Study Measurements Left 2D Measurements +------------------------------------+----------+---------------+----------+ ! Location                            ! Visualized! Compressibility! Thrombosis! +------------------------------------+----------+---------------+----------+ ! Common Femoral                      !Yes       ! No             !          ! +------------------------------------+----------+---------------+----------+ ! Prox Femoral                        !Yes       ! No             !          ! +------------------------------------+----------+---------------+----------+ ! Mid Femoral !Yes       !No             !          ! +------------------------------------+----------+---------------+----------+ ! Dist Femoral                        !Yes       ! No             !          ! +------------------------------------+----------+---------------+----------+ ! Deep Femoral                        !Yes       ! No             !          ! +------------------------------------+----------+---------------+----------+ ! Popliteal                           !Yes       ! No             !          ! +------------------------------------+----------+---------------+----------+ ! Sapheno Femoral Junction            ! Yes       ! No             !          ! +------------------------------------+----------+---------------+----------+ ! PTV                                 ! Partial   !Yes            ! None      ! +------------------------------------+----------+---------------+----------+ ! Peroneal                            !Partial   !No             !          ! +------------------------------------+----------+---------------+----------+ ! Gastroc                             ! Yes       ! No             !          ! +------------------------------------+----------+---------------+----------+ ! GSV Thigh                           ! Yes       ! No             !          ! +------------------------------------+----------+---------------+----------+ ! GSV Knee                            ! Yes       ! Yes            ! None      ! +------------------------------------+----------+---------------+----------+ ! GSV Ankle                           ! Yes       ! Yes            ! None      ! +------------------------------------+----------+---------------+----------+ ! SSV                                 ! Yes       ! Yes            ! None      ! +------------------------------------+----------+---------------+----------+            On admission         Review of Systems:     As recorded in HPI          Physical Examination:        Vitals:    07/04/22 7294 07/04/22 0652 07/04/22 0807 07/04/22 1041   BP: (!) 111/58  (!) 117/55    Pulse: 57 59 90    Resp: 18 18  18   Temp: 98.1 °F (36.7 °C)      TempSrc:       SpO2: 98% 99% 96% 99%   Weight:       Height:           Recent Labs     07/03/22  1124 07/03/22  1628 07/03/22  2009 07/04/22  0610   POCGLU 193* 229* 159* 152*       Intake/Output Summary (Last 24 hours) at 7/4/2022 1046  Last data filed at 7/4/2022 0601  Gross per 24 hour   Intake --   Output 850 ml   Net -850 ml       General Appearance:  alert, well appearing, and in no acute distress  Mental status:   Head:  normocephalic, atraumatic. Eye: no icterus, redness, pupils equal and reactive, extraocular eye movements intact, conjunctiva clear  Ear: normal external ear, no discharge, hearing intact  Nose:  no drainage noted  Mouth: mucous membranes moist  Neck: supple, no carotid bruits, thyroid not palpable  Lungs: Air entry better decreased, no rales, on oxygen  Cardiovascular: normal rate, regular rhythm, no murmur, gallop, rub. Abdomen: Soft, nontender, nondistended, normal bowel sounds, no hepatomegaly or splenomegaly  Rectal exam done, no external hemorrhoids, no internal hemorrhoids, did not notice any bleeding  Neurologic: There are no new focal motor or sensory deficits,   Skin: No gross lesions, rashes, bruising or bleeding on exposed skin area  Extremities:  peripheral pulses palpable, no pedal edema or calf pain with palpation  Psych:             Data:     PLabs:    BMP:   Recent Labs     07/02/22  0542 07/03/22  0611    137   K 4.7 4.7   CO2 29 28   BUN 16 16   CREATININE 0.40* <0.40*   LABGLOM >60 Can not be calculated   GLUCOSE 146* 125*                 Medications: Allergies:     Allergies   Allergen Reactions    Advil [Ibuprofen] Other (See Comments)     vomiting    Aleve [Naproxen] Other (See Comments)     vomiting    Antipyrine Other (See Comments)     unknown    Celecoxib Other (See Comments)    Codeine      headache    exacerbation ([address]  Resolved Problems:    * No resolved hospital problems. *       Plan:          7/2/22    · Admitted with bilateral DVT, in legs, on anticoagulation this the second episode, likely will need long-term anticoagulation  · Advanced COPD, chronic hypoxic respiratory failure on home oxygen patient is getting treated with IV Solu-Medrol, does not feel that she is close to baseline  · Heart failure with reduced ejection fraction, EF is 45 to 50%, on Lasix, compensated  · Diabetes, controlled  · On DVT prophylaxis with Eliquis  · Patient wants to go back to nursing facility she came from nursing home, will reach out to  and work on discharge planning  · Pulmonary medicine following    7/3  Will need precertification to go back to [facility] as per nursing report  Transfer patient out to [facility] floor  On IV steroids  Blood sugar control  Has chronic rectal pain, rectal exam done, could not appreciate hemorrhoids  Will start on hydrocortisone cream  Patient need to follow-up with GI which is already established as outpatient    7/4  Clinically doing better  Awaiting placement      . Hospital Problems           Last Modified POA    * (Principal) Leg DVT (deep venous thromboembolism), acute, bilateral ([address] 6/30/2022 Yes    Chronic obstructive pulmonary disease with acute exacerbation ([address] 7/1/2022 Yes                         Thanks for consulting us . Will monitor vitals and clinical course , and  Optimize therapy  as needed .            Louie Ragland MD  7/4/2022

## 2022-07-05 LAB
ABSOLUTE EOS #: 0 K/UL (ref 0–0.4)
ABSOLUTE LYMPH #: 1 K/UL (ref 1–4.8)
ABSOLUTE MONO #: 0.7 K/UL (ref 0.1–1.3)
ANION GAP SERPL CALCULATED.3IONS-SCNC: 6 MMOL/L (ref 9–17)
BASOPHILS # BLD: 0 % (ref 0–2)
BASOPHILS ABSOLUTE: 0 K/UL (ref 0–0.2)
BUN BLDV-MCNC: 15 MG/DL (ref 8–23)
CALCIUM SERPL-MCNC: 8.9 MG/DL (ref 8.6–10.4)
CHLORIDE BLD-SCNC: 98 MMOL/L (ref 98–107)
CO2: 32 MMOL/L (ref 20–31)
CREAT SERPL-MCNC: 0.4 MG/DL (ref 0.5–0.9)
EOSINOPHILS RELATIVE PERCENT: 0 % (ref 0–4)
GFR AFRICAN AMERICAN: >60 ML/MIN
GFR NON-AFRICAN AMERICAN: >60 ML/MIN
GFR SERPL CREATININE-BSD FRML MDRD: ABNORMAL ML/MIN/{1.73_M2}
GLUCOSE BLD-MCNC: 119 MG/DL (ref 65–105)
GLUCOSE BLD-MCNC: 140 MG/DL (ref 65–105)
GLUCOSE BLD-MCNC: 158 MG/DL (ref 70–99)
GLUCOSE BLD-MCNC: 189 MG/DL (ref 65–105)
GLUCOSE BLD-MCNC: 195 MG/DL (ref 65–105)
HCT VFR BLD CALC: 29.1 % (ref 36–46)
HEMOGLOBIN: 9.3 G/DL (ref 12–16)
LYMPHOCYTES # BLD: 11 % (ref 24–44)
MCH RBC QN AUTO: 29 PG (ref 26–34)
MCHC RBC AUTO-ENTMCNC: 31.8 G/DL (ref 31–37)
MCV RBC AUTO: 91.1 FL (ref 80–100)
MONOCYTES # BLD: 9 % (ref 1–7)
PDW BLD-RTO: 19.1 % (ref 11.5–14.9)
PLATELET # BLD: 363 K/UL (ref 150–450)
PMV BLD AUTO: 7.1 FL (ref 6–12)
POTASSIUM SERPL-SCNC: 3.8 MMOL/L (ref 3.7–5.3)
RBC # BLD: 3.2 M/UL (ref 4–5.2)
SEG NEUTROPHILS: 80 % (ref 36–66)
SEGMENTED NEUTROPHILS ABSOLUTE COUNT: 7 K/UL (ref 1.3–9.1)
SODIUM BLD-SCNC: 136 MMOL/L (ref 135–144)
WBC # BLD: 8.7 K/UL (ref 3.5–11)

## 2022-07-05 PROCEDURE — 2700000000 HC OXYGEN THERAPY PER DAY

## 2022-07-05 PROCEDURE — 6370000000 HC RX 637 (ALT 250 FOR IP): Performed by: INTERNAL MEDICINE

## 2022-07-05 PROCEDURE — 6370000000 HC RX 637 (ALT 250 FOR IP): Performed by: NURSE PRACTITIONER

## 2022-07-05 PROCEDURE — 36415 COLL VENOUS BLD VENIPUNCTURE: CPT

## 2022-07-05 PROCEDURE — 94640 AIRWAY INHALATION TREATMENT: CPT

## 2022-07-05 PROCEDURE — 99232 SBSQ HOSP IP/OBS MODERATE 35: CPT | Performed by: INTERNAL MEDICINE

## 2022-07-05 PROCEDURE — 97530 THERAPEUTIC ACTIVITIES: CPT

## 2022-07-05 PROCEDURE — 1200000000 HC SEMI PRIVATE

## 2022-07-05 PROCEDURE — 85025 COMPLETE CBC W/AUTO DIFF WBC: CPT

## 2022-07-05 PROCEDURE — 80048 BASIC METABOLIC PNL TOTAL CA: CPT

## 2022-07-05 PROCEDURE — 2580000003 HC RX 258: Performed by: INTERNAL MEDICINE

## 2022-07-05 PROCEDURE — 6360000002 HC RX W HCPCS: Performed by: INTERNAL MEDICINE

## 2022-07-05 PROCEDURE — 94761 N-INVAS EAR/PLS OXIMETRY MLT: CPT

## 2022-07-05 PROCEDURE — 82947 ASSAY GLUCOSE BLOOD QUANT: CPT

## 2022-07-05 RX ORDER — PREDNISONE 10 MG/1
TABLET ORAL
Qty: 31 TABLET | Refills: 0
Start: 2022-07-05 | End: 2022-10-19 | Stop reason: ALTCHOICE

## 2022-07-05 RX ORDER — ALBUTEROL SULFATE 90 UG/1
2 AEROSOL, METERED RESPIRATORY (INHALATION) EVERY 4 HOURS PRN
Qty: 18 G | Refills: 3
Start: 2022-07-05

## 2022-07-05 RX ORDER — ALBUTEROL SULFATE 2.5 MG/3ML
2.5 SOLUTION RESPIRATORY (INHALATION) EVERY 4 HOURS
Qty: 120 EACH | Refills: 3
Start: 2022-07-05

## 2022-07-05 RX ORDER — ALBUTEROL SULFATE 2.5 MG/3ML
2.5 SOLUTION RESPIRATORY (INHALATION) EVERY 4 HOURS PRN
Status: DISCONTINUED | OUTPATIENT
Start: 2022-07-05 | End: 2022-07-06 | Stop reason: HOSPADM

## 2022-07-05 RX ORDER — BISACODYL 10 MG
10 SUPPOSITORY, RECTAL RECTAL DAILY PRN
Status: DISCONTINUED | OUTPATIENT
Start: 2022-07-05 | End: 2022-07-06 | Stop reason: HOSPADM

## 2022-07-05 RX ADMIN — MORPHINE SULFATE 2 MG: 2 INJECTION, SOLUTION INTRAMUSCULAR; INTRAVENOUS at 17:38

## 2022-07-05 RX ADMIN — APIXABAN 10 MG: 5 TABLET, FILM COATED ORAL at 20:49

## 2022-07-05 RX ADMIN — FUROSEMIDE 20 MG: 20 TABLET ORAL at 08:53

## 2022-07-05 RX ADMIN — ALBUTEROL SULFATE 2.5 MG: 2.5 SOLUTION RESPIRATORY (INHALATION) at 23:14

## 2022-07-05 RX ADMIN — ALBUTEROL SULFATE 2.5 MG: 2.5 SOLUTION RESPIRATORY (INHALATION) at 06:58

## 2022-07-05 RX ADMIN — BUDESONIDE AND FORMOTEROL FUMARATE DIHYDRATE 2 PUFF: 160; 4.5 AEROSOL RESPIRATORY (INHALATION) at 07:01

## 2022-07-05 RX ADMIN — APIXABAN 10 MG: 5 TABLET, FILM COATED ORAL at 08:53

## 2022-07-05 RX ADMIN — PANTOPRAZOLE SODIUM 40 MG: 40 TABLET, DELAYED RELEASE ORAL at 06:16

## 2022-07-05 RX ADMIN — FAMOTIDINE 20 MG: 20 TABLET ORAL at 08:53

## 2022-07-05 RX ADMIN — DOCUSATE SODIUM 100 MG: 100 CAPSULE, LIQUID FILLED ORAL at 08:53

## 2022-07-05 RX ADMIN — GUAIFENESIN 600 MG: 600 TABLET, EXTENDED RELEASE ORAL at 20:49

## 2022-07-05 RX ADMIN — ALBUTEROL SULFATE 2.5 MG: 2.5 SOLUTION RESPIRATORY (INHALATION) at 19:25

## 2022-07-05 RX ADMIN — INSULIN LISPRO 2 UNITS: 100 INJECTION, SOLUTION INTRAVENOUS; SUBCUTANEOUS at 12:47

## 2022-07-05 RX ADMIN — ALBUTEROL SULFATE 2.5 MG: 2.5 SOLUTION RESPIRATORY (INHALATION) at 03:53

## 2022-07-05 RX ADMIN — INSULIN LISPRO 2 UNITS: 100 INJECTION, SOLUTION INTRAVENOUS; SUBCUTANEOUS at 17:34

## 2022-07-05 RX ADMIN — TIOTROPIUM BROMIDE INHALATION SPRAY 2 PUFF: 3.12 SPRAY, METERED RESPIRATORY (INHALATION) at 07:01

## 2022-07-05 RX ADMIN — MORPHINE SULFATE 2 MG: 2 INJECTION, SOLUTION INTRAMUSCULAR; INTRAVENOUS at 06:16

## 2022-07-05 RX ADMIN — PREDNISONE 40 MG: 20 TABLET ORAL at 08:53

## 2022-07-05 RX ADMIN — ATORVASTATIN CALCIUM 20 MG: 20 TABLET, FILM COATED ORAL at 20:49

## 2022-07-05 RX ADMIN — FAMOTIDINE 20 MG: 20 TABLET ORAL at 20:49

## 2022-07-05 RX ADMIN — FUROSEMIDE 20 MG: 20 TABLET ORAL at 17:35

## 2022-07-05 RX ADMIN — MONTELUKAST 10 MG: 10 TABLET, FILM COATED ORAL at 20:48

## 2022-07-05 RX ADMIN — ALBUTEROL SULFATE 2.5 MG: 2.5 SOLUTION RESPIRATORY (INHALATION) at 15:00

## 2022-07-05 RX ADMIN — GUAIFENESIN 600 MG: 600 TABLET, EXTENDED RELEASE ORAL at 08:53

## 2022-07-05 RX ADMIN — DOCUSATE SODIUM 100 MG: 100 CAPSULE, LIQUID FILLED ORAL at 20:49

## 2022-07-05 RX ADMIN — RISPERIDONE 1.5 MG: 1 TABLET ORAL at 08:56

## 2022-07-05 RX ADMIN — DOCUSATE SODIUM 50 MG AND SENNOSIDES 8.6 MG 2 TABLET: 8.6; 5 TABLET, FILM COATED ORAL at 08:53

## 2022-07-05 RX ADMIN — SODIUM CHLORIDE, PRESERVATIVE FREE 10 ML: 5 INJECTION INTRAVENOUS at 21:00

## 2022-07-05 RX ADMIN — ALBUTEROL SULFATE 2.5 MG: 2.5 SOLUTION RESPIRATORY (INHALATION) at 10:56

## 2022-07-05 RX ADMIN — HYDROCORTISONE ACETATE 25 MG: 25 SUPPOSITORY RECTAL at 01:46

## 2022-07-05 RX ADMIN — INSULIN LISPRO 1 UNITS: 100 INJECTION, SOLUTION INTRAVENOUS; SUBCUTANEOUS at 20:54

## 2022-07-05 RX ADMIN — HYDROCORTISONE: 25 CREAM TOPICAL at 07:52

## 2022-07-05 RX ADMIN — RISPERIDONE 1.5 MG: 1 TABLET ORAL at 20:51

## 2022-07-05 RX ADMIN — BUDESONIDE AND FORMOTEROL FUMARATE DIHYDRATE 2 PUFF: 160; 4.5 AEROSOL RESPIRATORY (INHALATION) at 19:26

## 2022-07-05 RX ADMIN — FOLIC ACID 1 MG: 1 TABLET ORAL at 08:53

## 2022-07-05 ASSESSMENT — PAIN - FUNCTIONAL ASSESSMENT
PAIN_FUNCTIONAL_ASSESSMENT: PREVENTS OR INTERFERES SOME ACTIVE ACTIVITIES AND ADLS
PAIN_FUNCTIONAL_ASSESSMENT: ACTIVITIES ARE NOT PREVENTED

## 2022-07-05 ASSESSMENT — PAIN DESCRIPTION - ORIENTATION
ORIENTATION: RIGHT;LEFT
ORIENTATION: RIGHT;LEFT

## 2022-07-05 ASSESSMENT — PAIN DESCRIPTION - DESCRIPTORS
DESCRIPTORS: ACHING
DESCRIPTORS: ACHING;POUNDING

## 2022-07-05 ASSESSMENT — PAIN SCALES - GENERAL
PAINLEVEL_OUTOF10: 5
PAINLEVEL_OUTOF10: 9

## 2022-07-05 ASSESSMENT — PAIN DESCRIPTION - LOCATION
LOCATION: LEG
LOCATION: LEG

## 2022-07-05 NOTE — CARE COORDINATION
ANNA spoke to Cliffton Riding from [facility] today to get updates on pt. Zita informed SW that the precert is actually started today, 7/5 not 6/30.     Electronically signed by TAMIKO Ontiveros on 7/5/2022 at 8:48 AM

## 2022-07-05 NOTE — ACP (ADVANCE CARE PLANNING)
Advance Care Planning     Advance Care Planning Activator (Inpatient)  Conversation Note      Date of ACP Conversation: 7/5/2022     Conversation Conducted with: Patient with Decision Making Capacity    ACP Activator: Karolyn Johnson RN        Health Care Decision Maker:  Kenzie Polanco relates she is  and has only one child- Calli. Kenzie Polanco also relates that her daughter has [facility] paperwork as well. Requested daughter to bring in to hospital or she can email to palliative care nurse Maximus Haynes and we will scan into electronic record. Current Designated Health Care Decision Maker:     Primary Decision Maker: [address] way - [phone]    Today we documented Decision Maker(s) consistent with Legal Next of Kin hierarchy. Care Preferences    Ventilation: \"If you were in your present state of health and suddenly became very ill and were unable to breathe on your own, what would your preference be about the use of a ventilator (breathing machine) if it were available to you? \"      Would the patient desire the use of ventilator (breathing machine)?: no    \"If your health worsens and it becomes clear that your chance of recovery is unlikely, what would your preference be about the use of a ventilator (breathing machine) if it were available to you? \"     Would the patient desire the use of ventilator (breathing machine)?: No      Resuscitation  \"CPR works best to restart the heart when there is a sudden event, like a heart attack, in someone who is otherwise healthy. Unfortunately, CPR does not typically restart the heart for people who have serious health conditions or who are very sick. \"    \"In the event your heart stopped as a result of an underlying serious health condition, would you want attempts to be made to restart your heart (answer \"yes\" for attempt to resuscitate) or would you prefer a natural death (answer \"no\" for do not attempt to resuscitate)? \" no     Discussed patient's expressed wishes with Dr. Salvador Puentes. Dr. Salvador Puentes discussed code status with patient and new ACP document was put into place. [x] Yes   [] No   Educated Patient / Anisa Marie regarding differences between Advance Directives and portable DNR orders. Length of ACP Conversation in minutes:      Conversation Outcomes:  [x] ACP discussion completed  [] Existing advance directive reviewed with patient; no changes to patient's previously recorded wishes  [x] New Advance Directive completed per Dr. Ayden Diaz no intubation. [] Portable Do Not Rescitate prepared for Provider review and signature  [] POLST/POST/MOLST/MOST prepared for Provider review and signature      Follow-up plan:    ` X called and update given to daughter Paris Milligan  [] Schedule follow-up conversation to continue planning  [] Referred individual to Provider for additional questions/concerns   [] Advised patient/agent/surrogate to review completed ACP document and update if needed with changes in condition, patient preferences or care setting    [] This note routed to one or more involved healthcare providers    Paris Milligan is sole primary decision maker per [place] Next of Pitkari Lori. Parisalejandra Milligan relates she has [employer] paperwork as well. Teresa Aguirre will bring in [employer] paperwork or will email it to writer so it can be scanned into electronic record. Code Status changed per Dr. Salvador Puentes to [facility] intubation/ventilation.

## 2022-07-05 NOTE — CONSULTS
Palliative Care Inpatient Consult    NAME:  [id number]  RECORD NUMBER:  [id number]  AGE: 74 y.o. GENDER: female  : 1955  TODAY'S DATE:  2022    Reasons for Consultation:    Provision of information regarding PC and/or hospice philosophies  Complex, time-intensive communication and interdisciplinary psychosocial support  Clarification of goals of care and/or assistance with difficult decision-making  Guidance in regards to resources and transition(s)    Code Status: Full Code changed to [facility] no intubation after our discussion and Dr. Kd Saleem updated and spoke with patient. Summary:  Met with Eulalia Tellesing, introduced palliative care services and support. Pt relates she does not want cpr, intubation or breathing machine. Pt requests no heroic measures. Spoke with Dr. Kd Saleem and she placed order for [facility] no intubation. [facility] paperwork filled out and placed on chart. DNRCCA no intubation arm band placed on patient. Pt is receptive to following with Palliative Care Services when she goes back to [facility]. Spoke with [facility] palliative care, they follow patients at [facility], Referral sent to [address] [place] [address] will contact patient once discharged back to [facility]. Eulalia Small relates she has no spouse and only one child. She relates that she thinks [facility] paperwork is filled out naming her only daughter as [facility]. Daughter would also be primary decision maker according to [address] [place] [address] Dr Lira of Yeni Sandoval. Members of PC team contributing to this consultation are :  Carlos Pedersen RN    History of Present Illness     The patient is a 74 y.o. Non- / non  female who presents with Leg Pain (Bilateral DVT)    Referred to Palliative Care by   [x] Physician   [] Nursing  [] Family Request   [] Other:       She was admitted to the primary service for Leg DVT (deep venous thromboembolism), acute, bilateral ([facility] [I82.403].  Her hospital course has been associated with Leg DVT (deep venous thromboembolism), acute, bilateral ([facility] The patient has a complicated medical history and has been hospitalized since 6/30/2022  2:21 PM.    Active Hospital Problems    Diagnosis Date Noted    Leg DVT (deep venous thromboembolism), acute, bilateral ([facility] [I82.403] 06/30/2022     Priority: Medium    Chronic obstructive pulmonary disease with acute exacerbation ([facility] [J44.1] 05/03/2022     Priority: Medium       Data        Code Status: DNR-CCA  No intubation. ADVANCED CARE PLANNING:  Patient has capacity for medical decisions: yes  Health Care Power of : yes  Living Will: yes     Personal, Social, and Family History  Marital Status:   Living situation:nursing home  Carla/Spiritual History:   Pt relates she met with  here, she has no specific carla/spiritual requests. Psychological Distress: moderate  Does patient understand diagnosis/treatment? yes  Does family/caregiver understand diagnosis/treatment?  yes    Assessment        Palliative Performance Scale:    ___100% Full ambulation; normal activity and work; no evidence of disease; able to do own self care; normal intake; fully conscious  ___90% Full ambulation; normal activity and work; some evidence of disease; able to do own self care; normal intake; fully conscious  ___80% Full ambulation; normal activity with effort; some evidence of disease; able to do own self care; normal or reduced intake; fully conscious  ___70% Ambulation reduced; unable to perform normal job/work; significant disease; able to do own self care; normal or reduced intake; fully conscious  ___60%  Ambulation reduced; cannot do hobbies/housework; significant disease; occasional assist; intake normal or reduced; fully conscious/some confusion  ___50%  Mainly sit/lie; can't do any work; extensive disease; considerable assist; intake normal or reduced; fully conscious/some confusion  __x_40%  Mainly in bed; extensive disease; mainly assist; intake normal or reduced; fully conscious/ some confusion   ___30%  Bed bound; extensive disease; total care; intake reduced; fully conscious/some confusion  ___20%  Bed bound; extensive disease; total care; intake minimal; drowsy/coma  ___10%  Bed bound; extensive disease; total care; mouth care only; drowsy/coma  ___0       Death       Risk Assessments:    Deloris Risk Score: [unfilled]    Readmission Risk Score: 25.8 ( )        1 Year Mortality Risk Score: @1YEARMORTALITYRISK@     Plan        Palliative Interaction:  Reviewed Elaina's course of hospitalization. Update received from bedside nurse Kyung Nicole. Visited with Robina Mendoza at her bedside. Active listening and support offered. Explained that I am with palliative care and what that resource has to offer patients with chronic conditions. Robina Mendoza is alert, oriented and talkative. She became tearful at times during our conversations. We discussed palliative care following her outpatient at [facility]. Robina Mendoza relates she would like palliative care provider to follow her at [facility]. We discussed the three code status. Robina Mendoza expressed that she would not want cpr or to be put back on a ventilator. We discussed that her wishes seem to align with [facility] intubation. Pt expresses she wants to continue active treatment, but no cpr and no breathing machine. I discussed with Dr. Mirian Ace and orders were placed after she spoke with patient as well. Dr. Mirian Ace notified of patient's wanting to have palliative care follow her outpatient at [facility] and is agreeable. Robina Mendoza relates that her daughter has [address] for her. I  Explained that even without [address] paperwork her daughter would be her primary decision maker per [place] Next of Yeni Sandoval. Robina Mendoza would like me to talk with her daughter about what we talked about today. I called and spoke with Vivienne Gallardo. Introduced myself and my role in palliative care. I explained what Robina Mendoza and I had talked about. I let her know that the code status for Arleth Linder was changed per her request by Dr. Javon Amezcua to [facility] no intubation/ventilation, and that she qualifies for Palliative care services that can come to patient or patient can go to palliative care office. Explained the difference between palliative care and hospice to [facility]. [facility] is agreeable with her mom's wishes for [facility] no intubation/ventilation and is also in agreement with wanting palliative care to see her at [facility]. Akash Siemens our palliative care office phone number. Encouraged her to call as needed. Elizabethaimee Ana Laura know that I will contact [phone] care to see if they can follow up with patient once she goes back to [facility]. Requested that Calli bring in [facility] paperwork or email me paperwork so we can attach it to her medical record. She relates she will. Updated Arleth Linder that I spoke with her daughter and she is agreeable with Palliative care at [facility], she is agreeable with what her mom wants regarding code status. Geovanna Turner know that I  sent referral to [id number] [facility]. They will reach out to set up visit once she is back at [facility]. Arleth Linder is appreciative. Education/support to staff  Education/support to family  Education/support to patient  Discharge planning/helping to coordinate care  Communications with primary service  Providing support for coping/adaptation/distress of family  Providing support for coping/adaptation/distress of patient  Continue with current plan of care  Code status clarified: Full Code  Code status clarified: [facility]  Code status clarified: [facility]  Palliative care orders introduced  Validating patient/family distress  Continued communication updates  Recognizing, reflecting, and empathizing with family members' anticipatory grief  Recognizing, reflecting, and empathizing with the patient's anticipatory grief  discussed palliaitve care in outpatient setting. palliative care can come to patient at [facility]. Daughter and patient both interested in having palliative care when she goes back to [facility]. Principle Problem/Diagnosis:  Leg DVT (deep venous thromboembolism), acute, bilateral ([address] [I82.403]    Goals of care evaluation:  The patient goals of care are live longer, improve or maintain function/quality of life and provide comfort care/support/palliation/relieve suffering   Goals of care discussed with:    [] Patient independently    [x] Patient and Family    [] Family or Healthcare DPOA independently    [] Unable to discuss with patient, family/DPOA not present    Code Status  DNR-CCA  No intubation  Other recommendations:  Patient and daughter are interested in palliative care following her at [facility]. Please call with any palliative questions or concerns. Palliative Care Team is available via perfect serve or via phone - [phone]. Palliative Care will continue to follow Ms. Lopez's care as needed. Thank you for allowing Palliative Care to participate in the care of Ms. Juan Jose Hoover .     Electronically signed by   Azeem Givens RN  Palliative Care Team  on 7/5/2022 at 2:29 PM    Palliative care office: [phone]

## 2022-07-05 NOTE — PLAN OF CARE
Problem: Discharge Planning  Goal: Discharge to home or other facility with appropriate resources  Outcome: Progressing  Flowsheets (Taken 7/5/2022 0857)  Discharge to home or other facility with appropriate resources:   Identify barriers to discharge with patient and caregiver   Identify discharge learning needs (meds, wound care, etc)     Problem: Pain  Goal: Verbalizes/displays adequate comfort level or baseline comfort level  Outcome: Progressing     Problem: ABCDS Injury Assessment  Goal: Absence of physical injury  Outcome: Progressing  Flowsheets (Taken 7/5/2022 1636)  Absence of Physical Injury: Implement safety measures based on patient assessment     Problem: Skin/Tissue Integrity  Goal: Absence of new skin breakdown  Description: 1. Monitor for areas of redness and/or skin breakdown  2. Assess vascular access sites hourly  3. Every 4-6 hours minimum:  Change oxygen saturation probe site  4. Every 4-6 hours:  If on nasal continuous positive airway pressure, respiratory therapy assess nares and determine need for appliance change or resting period.   Outcome: Progressing     Problem: Respiratory - Adult  Goal: Achieves optimal ventilation and oxygenation  Outcome: Progressing  Flowsheets (Taken 7/5/2022 0857)  Achieves optimal ventilation and oxygenation:   Assess for changes in respiratory status   Position to facilitate oxygenation and minimize respiratory effort     Problem: Safety - Adult  Goal: Free from fall injury  Outcome: Progressing  Flowsheets (Taken 7/5/2022 1636)  Free From Fall Injury: Instruct family/caregiver on patient safety

## 2022-07-05 NOTE — PROGRESS NOTES
ABGs:  Lab Results   Component Value Date/Time    PHART 7.442 04/28/2022 05:31 AM    PO2ART 77.6 04/28/2022 05:31 AM    TDV2MPQ 60.6 04/28/2022 05:31 AM      Lab Results   Component Value Date/Time    MODE PRVC 04/28/2022 05:31 AM     Ionized Calcium:  No results found for: IONCA  Magnesium:    Lab Results   Component Value Date/Time    MG 2.3 04/16/2022 10:42 AM     Phosphorus:  No results found for: PHOS     LIVER PROFILE No results for input(s): AST, ALT, LIPASE, BILIDIR, BILITOT, ALKPHOS in the last 72 hours. Invalid input(s): AMYLASE,  ALB  INR No results for input(s): INR in the last 72 hours.   PTT   Lab Results   Component Value Date    APTT 28.4 06/30/2022         RADIOLOGY     (See actual reports for details)    ASSESSMENT/PLAN     Bilateral DVT acute, without pulmonary embolism  Acute exacerbation of severe stage IV COPD  Chronic hypoxic and hypercapnic respiratory failure  History of Pseudomonas pneumonia with cavitary lesion  History of pneumothorax on the right  History of previous pulmonary embolism/DVT in June 2021 treated with Eliquis for 6 months  Full code    Continue aerosols every 4 hours, I do not think that she is more bronchospastic  We will also order as needed aerosols  Remains on Eliquis  Awaiting precertification and placement to ECF, apparently not started last week    Electronically signed by Da Atkins MD on 7/5/2022 at 10:34 AM

## 2022-07-05 NOTE — FLOWSHEET NOTE
Patient sitting in bed eating her lunch. Patient engaged in conversation, talked about what was happening in her life. Patient report having good support from her adult children. SC will continue to offer spiritual and emotional support as needed. 07/05/22 1100   Encounter Summary   Encounter Overview/Reason  Spiritual/Emotional Needs   Service Provided For: Patient   Referral/Consult From: Palliative Care   Support System Children   Last Encounter  07/05/22   Complexity of Encounter Low   Begin Time 1100   End Time  1115   Total Time Calculated 15 min   Spiritual/Emotional needs   Type Spiritual Support   Palliative Care   Type Palliative Care, Follow-up   Assessment/Intervention/Outcome   Assessment Calm;Coping   Intervention Active listening;Discussed illness injury and its impact; Explored/Affirmed feelings, thoughts, concerns;Read/Provided Scripture;Sustaining Presence/Ministry of presence;Prayer (assurance of)/[place]   Outcome Coping;Engaged in conversation;Expressed Gratitude;Receptive   Visited by Husam Ceballos

## 2022-07-05 NOTE — PROGRESS NOTES
[facility] Internal Medicine    Progress Note    7/5/2022    1:35 PM    Name:   Giorgio Avelar  MRN:     [id number]     Evetteide:      [de-identified]   Room:   2057/2057-01  IP Day:  5  Admit Date:  6/30/2022  2:21 PM    PCP:   Andree Awan MD  Code Status:  Full Code    Subjective:     C/C:   Chief Complaint   Patient presents with    Leg Pain     Bilateral DVT         HPI:     56-year-old female admitted with bilateral DVT, recurrent thrombosis  History of asthma, type 2 diabetes, CHF, bipolar disorder, advanced COPD, prior PE  Initially started on heparin infusion switched to Eliquis  CT chest concerning for cavitary lesion right lower lobe- pulmonology was consulted    Review of Systems:     Positive for shortness of breath, denies cough  Denies chest pain or palpitations  Denies abdominal pain, diarrhea vomiting  Denies any new numbness tremors or weakness. Medications: Allergies:     Allergies   Allergen Reactions    Advil [Ibuprofen] Other (See Comments)     vomiting    Aleve [Naproxen] Other (See Comments)     vomiting    Antipyrine Other (See Comments)     unknown    Celecoxib Other (See Comments)    Codeine      headache    Fomepizole     Incruse Ellipta [Umeclidinium Bromide]      confusion    Other      GRASS, TREES, WEEDS    Rofecoxib Other (See Comments)     Unknown reaction    Salicylates      Unknown reaction     Strawberry Extract      HEADACHES    Sulfinpyrazone Other (See Comments)     Unknown reaction    Aspirin Nausea And Vomiting, Other (See Comments) and Nausea Only    Nsaids Nausea Only, Other (See Comments) and Nausea And Vomiting       Current Meds:   Scheduled Meds:    predniSONE  40 mg Oral Daily    hydrocortisone   Rectal BID    furosemide  20 mg Oral BID    apixaban  10 mg Oral BID    Followed by   Donnie Barron ON 7/8/2022] apixaban  5 mg Oral BID    albuterol  2.5 mg Nebulization Q4H    budesonide-formoterol  2 puff Inhalation BID  tiotropium  2 puff Inhalation Daily    sodium chloride flush  5-40 mL IntraVENous 2 times per day    sennosides-docusate sodium  2 tablet Oral Daily    risperiDONE  1.5 mg Oral Q12H    pantoprazole  40 mg Oral QAM AC    montelukast  10 mg Oral Nightly    atorvastatin  20 mg Oral Nightly    docusate sodium  100 mg Oral BID    famotidine  20 mg Oral BID    fluticasone  2 spray Nasal Daily    folic acid  1 mg Oral Daily    guaiFENesin  600 mg Oral BID    insulin lispro  0-12 Units SubCUTAneous TID     insulin lispro  0-6 Units SubCUTAneous Nightly     Continuous Infusions:    sodium chloride 80 mL (22 1325)    dextrose       PRN Meds: albuterol, lidocaine, morphine, sodium chloride flush, sodium chloride, acetaminophen, ondansetron **OR** ondansetron, sodium chloride flush, hydrocortisone-aloe, glucose, dextrose bolus **OR** dextrose bolus, glucagon (rDNA), dextrose, traZODone, hydrocortisone    Data:     Past Medical History:   has a past medical history of Abnormal EKG, TRAM (acute kidney injury) ([facility] Anxiety, Asthma, Bipolar disorder ([facility] COPD (chronic obstructive pulmonary disease) ([facility] Cramps, extremity, Depression, Dilated bile duct, Headache, History of elective , Hyperlipidemia, Irritable bowel syndrome, Prolonged emergence from general anesthesia, Substance abuse ([facility] Unspecified sleep apnea, and Vision abnormalities. Social History:   reports that she quit smoking about 3 years ago. Her smoking use included cigarettes. She has a 84.00 pack-year smoking history. She has never used smokeless tobacco. She reports current alcohol use. She reports that she does not use drugs.      Family History:   Family History   Problem Relation Age of Onset    Dementia Maternal Aunt     Kidney Disease Mother     Heart Attack Sister     Prostate Cancer Father     High Cholesterol Brother     Heart Attack Paternal Grandmother        Vitals:  /63   Pulse 82   Temp 98.5 °F (36.9 °C) (Oral)   Resp 18   Ht 5' 5\" (1.651 m)   Wt 186 lb 4.6 oz (84.5 kg)   LMP 2013 (Exact Date)   SpO2 98%   BMI 31.00 kg/m²   Temp (24hrs), Av.8 °F (37.1 °C), Min:98.5 °F (36.9 °C), Max:99 °F (37.2 °C)    Recent Labs     22  1604 22  0616 22  1149   POCGLU 220* 257* 119* 140*       I/O (24Hr): Intake/Output Summary (Last 24 hours) at 2022 1335  Last data filed at 2022 0200  Gross per 24 hour   Intake --   Output 1700 ml   Net -1700 ml       Labs:    Lab Results   Component Value Date    WBC 8.7 2022    HGB 9.3 (L) 2022    HCT 29.1 (L) 2022    MCV 91.1 2022     2022     Lab Results   Component Value Date/Time     2022 05:42 AM    K 3.8 2022 05:42 AM    CL 98 2022 05:42 AM    CO2 32 2022 05:42 AM    BUN 15 2022 05:42 AM    CREATININE 0.40 2022 05:42 AM    GLUCOSE 158 2022 05:42 AM    GLUCOSE 127 2021 11:52 AM    CALCIUM 8.9 2022 05:42 AM          Lab Results   Component Value Date/Time    SPECIAL 8ML GREEN/2ML ORANGE RIGHT IJ 2022 12:12 PM     Lab Results   Component Value Date/Time    CULTURE YEAST ISOLATED IDENTIFICATION TO FOLLOW 2022 12:20 PM    CULTURE Identified as : JOSE JUAN DUBLINIENSIS 2022 12:20 PM    CULTURE NO GROWTH 48 DAYS 2022 12:20 PM    CULTURE PSEUDOMONAS AERUGINOSA LIGHT GROWTH (A) 2022 12:20 PM    CULTURE NO NORMAL RAVEN 2022 12:20 PM         Radiology:    Recent data reviewed    Physical Examination:        General appearance:  alert, cooperative and no distress  Eyes: Anicteric sclera. Pupils are equally round and reactive to light. Extraocular movements are intact.   Lungs:  clear to auscultation bilaterally, normal effort  Heart:  regular rate and rhythm, no murmur  Abdomen:  soft, nontender, nondistended, normal bowel sounds, no masses, hepatomegaly, splenomegaly  Extremities: b/l leg swelling,

## 2022-07-05 NOTE — PROGRESS NOTES
Occupational Therapy  Facility/Department: [facility] MED SURG  Daily Treatment Note  NAME: Rose Marie Roberson  : 1955  MRN: [id number]    Date of Service: 2022    Discharge Recommendations:  Patient would benefit from continued therapy after discharge         Patient Diagnosis(es): The encounter diagnosis was Leg DVT (deep venous thromboembolism), acute, bilateral ([address] Assessment    Activity Tolerance: Patient limited by endurance  Discharge Recommendations: Patient would benefit from continued therapy after discharge      Plan   Plan  Times per Week: 5-7  Current Treatment Recommendations: Strengthening;Balance training;Functional mobility training; Endurance training;Pain management; Safety education & training;Patient/Caregiver education & training;Equipment evaluation, education, & procurement;Positioning;Self-Care / ADL; Home management training;Coordination training     Restrictions       Subjective   Subjective  Subjective: \"They want me to stand with out anything\" Pt voices unhappiness with SNF encouraging her to stand without lift equipment. RN Isela villavicencio tx. Orientation  Overall Orientation Status: Within Normal Limits  Cognition  Overall Cognitive Status: WNL        Objective    Vitals     Bed Mobility Training  Supine to Sit: Stand-by assistance  Sit to Supine: Stand-by assistance  Scooting: Assist X2;Stand-by assistance (SBA to EOB, X2 to HOB 2* fatigue)  Balance  Sitting: Without support  Standing: With support  Transfer Training  Sit to Stand: Assist X2;Minimum assistance (in [place] steady)  Stand to Sit: Contact-guard assistance (VC for sitting slowly. F return)     ADL  Feeding: Modified independent   Grooming: Minimal assistance  UE Bathing: Minimal assistance  LE Bathing: Maximum assistance  UE Dressing: Minimal assistance  LE Dressing: Maximum assistance (max A for socks)  Toileting: Dependent/Total (external catheter.  declines use of toilet this AM. )  Additional Comments: ADL scores based on skilled observation and clinical reasoning unless otherwise noted. Pt limited by decreased sensation, balance, strength, activity tolerance and endurance. These factors limit pts ability to safely and independently complete self-care and functional mobility. OT Exercises  Static Standing Balance Exercises: AYAN and STEPHANY WILCOX facilitated pt in standing at [address] [place] [address] steady for increased engagement in ADL/IADLs. Pt stands on [place]ino steady for 9:15 with active standing (without suupport from paddles) for 23 and 26 secs. Requries VC for upright posture, declines to correct while standing upright without support stating \"I can't\"                 Goals  Short Term Goals  Time Frame for Short term goals: By discharge  Short Term Goal 1: Pt will perform UB ADLs with SBA and good safety  Short Term Goal 2: Pt will perform LB ADLs with Min A, good safety and use of AE as needed  Short Term Goal 3: Pt will perform functional transfers/mobility with Min A, good safety and use of least restrictive device  Short Term Goal 4: Pt will tolerate standing for 5+ minutes, Min A, with 0-1 UE support and no LOB to improve engagement in ADLs/IADLs  Short Term Goal 5: Pt will participate in 15+ minutes of therapeutic exercise/functional activity to improve safety and independence with self-care and functional mobility  Additional Goals?: Yes  Short Term Goal 6: Pt will demonstrate proper breathing techniques to maintain SpO2 > 90% during ADL completion with Min cues or less   Short Term Goal 7: Pt will be educated on and explore use of DME/AE and modified techniques for increasing ease and independence with ADLs upon d/c  Short Term Goal 8: Pt will verbalize/demonstrate good understanding fall prevention/home safety/EC WS techniques for increased safety and independence with self-care  Patient Goals   Patient goals :  \"To eventually go home\"       Therapy Time   Individual Concurrent Group Co-treatment   Time In [facility] 82         Time Out 2365

## 2022-07-05 NOTE — PROGRESS NOTES
Writer called malka about pt wanting dulcolax for after BM to relieve internal hemorrhage. He stated put in prn nightly.

## 2022-07-05 NOTE — PROGRESS NOTES
Physical Therapy  [address]    Date: 22  Patient Name: Rose Marie Roberson       Room: 9868/1921-69  MRN: [id number]   Account: [de-identified]   : 1955  (74 y.o.) Gender: female     Referring Practitioner: IVONNE Hyde CNP  Diagnosis: Leg DVT (deep venous thromboembolism), acute, bilateral  Past Medical History:  has a past medical history of Abnormal EKG, TRAM (acute kidney injury) ([address] Anxiety, Asthma, Bipolar disorder ([address] COPD (chronic obstructive pulmonary disease) ([address] Cramps, extremity, Depression, Dilated bile duct, Headache, History of elective , Hyperlipidemia, Irritable bowel syndrome, Prolonged emergence from general anesthesia, Substance abuse ([facility] [address] [facility] Unspecified sleep apnea, and Vision abnormalities. Past Surgical History:   has a past surgical history that includes Tonsillectomy and adenoidectomy (Bilateral); LASIK; Induced ; Ankle surgery; eye surgery (Bilateral); Vulva surgery; hysteroscopy (10/19/2016); Colonoscopy (02/15/2018); and Bronchoscopy (2022). Restrictions/Precautions  Restrictions/Precautions: Fall Risk;General Precautions;Contact Precautions; Up as Tolerated  Required Braces or Orthoses?: No  Implants present? : Metal implants (plate in left ankle )    Subjective: Patient laying in bed upon arrival; agreeable to therapy   Comments: SUE Claire approved therapy; co-treat with [place]        Pain Assessment: None - Denies Pain     Oxygen Therapy  SpO2: 98 %  Pulse Oximetry Type: Intermittent  Pulse Oximeter Device Mode: Intermittent  Pulse Oximeter Device Location: Finger  O2 Device: Nasal cannula  O2 Flow Rate (L/min): 4 L/min        Bed Mobility  Rolling: Stand by assistance  Supine to Sit: Stand by assistance  Sit to Supine: Stand by assistance  Scooting: Stand by assistance;Maximal assistance;2 Person assistance (SBA to EOB;  Max A x2 to [facility])  Bed mobility  Scooting: Stand by assistance;Maximal assistance;2 dre'd knowledge of need for therapeutic rest breaks to improve tolerance for further therapy and daily activity/ADLs  Short term goal 4: pt to improve BLE strength by 1/2 MMG to improve safety/independence with mobility   Short term goal 5: pt to improve Bilat Ankle DF ROM by 5-10 degrees to improve joint mechanics for safer standing/walking. Additional Goals?: Yes  Short Term Goal 6: Pt to ambulate 5' with 2 A and RW (whether it be side-stepping or fwd walking with chair follow) to improve mobility status.         07/05/22 1312   PT Individual Minutes   Time In 0816   Time Out 5538   Minutes 39       Electronically signed by Ginger Ge PTA on 7/5/22 at 1:22 PM EDT

## 2022-07-06 VITALS
TEMPERATURE: 98.2 F | DIASTOLIC BLOOD PRESSURE: 56 MMHG | BODY MASS INDEX: 28.16 KG/M2 | HEIGHT: 65 IN | HEART RATE: 82 BPM | WEIGHT: 169 LBS | SYSTOLIC BLOOD PRESSURE: 100 MMHG | OXYGEN SATURATION: 96 % | RESPIRATION RATE: 16 BRPM

## 2022-07-06 DIAGNOSIS — K64.9 HEMORRHOIDS, UNSPECIFIED HEMORRHOID TYPE: Primary | ICD-10-CM

## 2022-07-06 LAB
GLUCOSE BLD-MCNC: 117 MG/DL (ref 65–105)
GLUCOSE BLD-MCNC: 88 MG/DL (ref 65–105)

## 2022-07-06 PROCEDURE — 82947 ASSAY GLUCOSE BLOOD QUANT: CPT

## 2022-07-06 PROCEDURE — 99239 HOSP IP/OBS DSCHRG MGMT >30: CPT | Performed by: INTERNAL MEDICINE

## 2022-07-06 PROCEDURE — 2700000000 HC OXYGEN THERAPY PER DAY

## 2022-07-06 PROCEDURE — 94640 AIRWAY INHALATION TREATMENT: CPT

## 2022-07-06 PROCEDURE — 6370000000 HC RX 637 (ALT 250 FOR IP): Performed by: INTERNAL MEDICINE

## 2022-07-06 PROCEDURE — 94669 MECHANICAL CHEST WALL OSCILL: CPT

## 2022-07-06 PROCEDURE — 6360000002 HC RX W HCPCS: Performed by: INTERNAL MEDICINE

## 2022-07-06 PROCEDURE — 97535 SELF CARE MNGMENT TRAINING: CPT

## 2022-07-06 PROCEDURE — 94761 N-INVAS EAR/PLS OXIMETRY MLT: CPT

## 2022-07-06 PROCEDURE — 6370000000 HC RX 637 (ALT 250 FOR IP): Performed by: NURSE PRACTITIONER

## 2022-07-06 PROCEDURE — 97530 THERAPEUTIC ACTIVITIES: CPT

## 2022-07-06 PROCEDURE — 2580000003 HC RX 258: Performed by: INTERNAL MEDICINE

## 2022-07-06 RX ORDER — HYDROCORTISONE 25 MG/G
CREAM TOPICAL
Qty: 1 EACH | Refills: 2 | Status: SHIPPED | OUTPATIENT
Start: 2022-07-06 | End: 2022-07-07 | Stop reason: SDUPTHER

## 2022-07-06 RX ORDER — HYDROCORTISONE 25 MG/G
CREAM TOPICAL
Qty: 1 EACH | Refills: 0 | Status: SHIPPED | OUTPATIENT
Start: 2022-07-06 | End: 2022-07-06 | Stop reason: SDUPTHER

## 2022-07-06 RX ADMIN — ALBUTEROL SULFATE 2.5 MG: 2.5 SOLUTION RESPIRATORY (INHALATION) at 03:14

## 2022-07-06 RX ADMIN — GUAIFENESIN 600 MG: 600 TABLET, EXTENDED RELEASE ORAL at 08:33

## 2022-07-06 RX ADMIN — SODIUM CHLORIDE, PRESERVATIVE FREE 10 ML: 5 INJECTION INTRAVENOUS at 09:45

## 2022-07-06 RX ADMIN — BUDESONIDE AND FORMOTEROL FUMARATE DIHYDRATE 2 PUFF: 160; 4.5 AEROSOL RESPIRATORY (INHALATION) at 07:11

## 2022-07-06 RX ADMIN — ALBUTEROL SULFATE 2.5 MG: 2.5 SOLUTION RESPIRATORY (INHALATION) at 11:04

## 2022-07-06 RX ADMIN — FAMOTIDINE 20 MG: 20 TABLET ORAL at 08:33

## 2022-07-06 RX ADMIN — FOLIC ACID 1 MG: 1 TABLET ORAL at 08:32

## 2022-07-06 RX ADMIN — TIOTROPIUM BROMIDE INHALATION SPRAY 2 PUFF: 3.12 SPRAY, METERED RESPIRATORY (INHALATION) at 07:07

## 2022-07-06 RX ADMIN — ALBUTEROL SULFATE 2.5 MG: 2.5 SOLUTION RESPIRATORY (INHALATION) at 07:01

## 2022-07-06 RX ADMIN — DOCUSATE SODIUM 100 MG: 100 CAPSULE, LIQUID FILLED ORAL at 08:33

## 2022-07-06 RX ADMIN — ALBUTEROL SULFATE 2.5 MG: 2.5 SOLUTION RESPIRATORY (INHALATION) at 14:32

## 2022-07-06 RX ADMIN — MORPHINE SULFATE 2 MG: 2 INJECTION, SOLUTION INTRAMUSCULAR; INTRAVENOUS at 04:06

## 2022-07-06 RX ADMIN — FUROSEMIDE 20 MG: 20 TABLET ORAL at 08:33

## 2022-07-06 RX ADMIN — RISPERIDONE 1.5 MG: 1 TABLET ORAL at 10:36

## 2022-07-06 RX ADMIN — DOCUSATE SODIUM 50 MG AND SENNOSIDES 8.6 MG 2 TABLET: 8.6; 5 TABLET, FILM COATED ORAL at 08:32

## 2022-07-06 RX ADMIN — APIXABAN 10 MG: 5 TABLET, FILM COATED ORAL at 08:33

## 2022-07-06 RX ADMIN — MORPHINE SULFATE 2 MG: 2 INJECTION, SOLUTION INTRAMUSCULAR; INTRAVENOUS at 15:47

## 2022-07-06 RX ADMIN — HYDROCORTISONE: 25 CREAM TOPICAL at 09:41

## 2022-07-06 RX ADMIN — PREDNISONE 40 MG: 20 TABLET ORAL at 08:33

## 2022-07-06 ASSESSMENT — PAIN SCALES - GENERAL
PAINLEVEL_OUTOF10: 9
PAINLEVEL_OUTOF10: 8
PAINLEVEL_OUTOF10: 7
PAINLEVEL_OUTOF10: 9
PAINLEVEL_OUTOF10: 9

## 2022-07-06 ASSESSMENT — PAIN DESCRIPTION - DESCRIPTORS
DESCRIPTORS: ACHING
DESCRIPTORS: DISCOMFORT;CRUSHING;CRAMPING
DESCRIPTORS: ACHING

## 2022-07-06 ASSESSMENT — PAIN DESCRIPTION - ORIENTATION
ORIENTATION: LEFT

## 2022-07-06 ASSESSMENT — PAIN DESCRIPTION - LOCATION
LOCATION: LEG
LOCATION: LEG
LOCATION: FOOT
LOCATION: LEG

## 2022-07-06 ASSESSMENT — PAIN DESCRIPTION - PAIN TYPE: TYPE: CHRONIC PAIN

## 2022-07-06 NOTE — TELEPHONE ENCOUNTER
Is the patient supposed to use it BID- pharmacy calling again to clarify    Instructions state to use BID however only a quantity of 1 is being sent- how many days should she use it and how many times per day? No

## 2022-07-06 NOTE — CARE COORDINATION
Pt is going to [facility] at 5:00pm via [facility] on 7/6/22. SW notified nursing staff and pt.

## 2022-07-06 NOTE — DISCHARGE SUMMARY
[place] [address] Internal Medicine    Discharge Summary     Patient ID: Gregorio Morgan  :  1955   MRN: [id number]     ACCOUNT:  [de-identified]   Patient's PCP: Debbie Bo MD  Admit Date: 2022   Discharge Date: 2022  Length of Stay: 6  Code Status:  DNR-CCA  Admitting Physician: Emmy Millan MD  Discharge Physician: Andreia Smith MD     Active Discharge Diagnoses:     Primary Problem  Leg DVT (deep venous thromboembolism), acute, bilateral [facility])      [facility] Problems    Diagnosis Date Noted    Leg DVT (deep venous thromboembolism), acute, bilateral ([facility] [address] [I82.403] 2022     Priority: Medium    Chronic obstructive pulmonary disease with acute exacerbation ([facility] [address] [J44.1] 2022     Priority: Medium       Admission Condition:  fair     Discharged Condition: fair    Hospital Stay:     Hospital Course:  Gregorio Morgan is a 74 y.o. female who was admitted for the management of Leg DVT (deep venous thromboembolism), acute, bilateral ([facility] [address] , presented to ER with Leg Pain (Bilateral DVT)    77-year-old female admitted with bilateral DVT, recurrent thrombosis  History of asthma, type 2 diabetes, CHF, bipolar disorder, advanced COPD, prior PE  Initially started on heparin infusion switched to Eliquis    1. Bilateral leg DVT- long-term Eliquis  2. Acute exacerbation of severe COPD chronic hypoxic hypercapnic respiratory failure on home oxygen- treated with IV Solu-Medrol, now switched to oral  3. Heart failure with reduced EF-45 to 50% on last echo currently compensated  4. Type 2 diabetes currently controlled  5.  Chronic rectal pain- continue with hydrocortisone cream, will need outpatient GI follow-up      Significant therapeutic interventions: As above    Significant Diagnostic Studies:   Labs / Micro:    Lab Results   Component Value Date    WBC 8.7 2022    HGB 9.3 (L) 2022    HCT 29.1 (L) 2022    MCV 91.1 2022  07/05/2022          Lab Results   Component Value Date/Time     07/05/2022 05:42 AM    K 3.8 07/05/2022 05:42 AM    CL 98 07/05/2022 05:42 AM    CO2 32 07/05/2022 05:42 AM    BUN 15 07/05/2022 05:42 AM    CREATININE 0.40 07/05/2022 05:42 AM    GLUCOSE 158 07/05/2022 05:42 AM    GLUCOSE 127 07/08/2021 11:52 AM    CALCIUM 8.9 07/05/2022 05:42 AM          Radiology:    CT CHEST WO CONTRAST    Result Date: 6/30/2022  EXAMINATION: CT OF THE CHEST WITHOUT CONTRAST 6/29/2022 1:49 pm TECHNIQUE: CT of the chest was performed without the administration of intravenous contrast. Multiplanar reformatted images are provided for review. Automated exposure control, iterative reconstruction, and/or weight based adjustment of the mA/kV was utilized to reduce the radiation dose to as low as reasonably achievable. COMPARISON: CT scan of the chest from 04/29/2022 HISTORY: ORDERING SYSTEM PROVIDED HISTORY: Abscess of lower lobe of right lung with pneumonia [facility]) TECHNOLOGIST PROVIDED HISTORY: Reason for Exam: Abscess of lower lobe of right lung with pneumonia. on o2 Long history of tobacco abuse (84 pack-year), asthma, COPD, cavitary lesions, PE, lung abscess, and recurrent pneumothorax. FINDINGS: Mediastinum: Previous right IJ central line removed. Unchanged mild mediastinal lymphadenopathy, largest nodes precarinal (slice 54, series 8-23 mm short axis) and right paratracheal (slice 39, series 2-9 mm short axis). Unchanged calcified nodes mediastinum and left hilum. Moderate PAH) main pulmonary artery caliber 35 mm), unchanged. No acute abnormality thoracic aorta, cardiac chambers or pericardial structures. No acute finding thyroid or esophagus. Lungs/pleura: New pleural based consolidative focus medial right upper lobe (at the site of prior small cystic foci), with some internal lucency measuring 2.7 x 2.3 cm (best seen slice 23, series 4).   Similar 1.6 x 2.6 cm pleural based triangular-shaped consolidative focus left upper lobe with internal lucency (slice 31, series 4). Some of the multiple small cavitary lesions elsewhere in the right lung appear smaller; new right upper lobe irregular nodular densities, however, identified, best seen slice 40, series 4 and in the superior segment, slice 59, series 4. Right lower lobe 5.2 x 4.4 cm cavitary lesion now measures 3.9 x 3.6 cm (slice 78, series 4), with decreased wall thickening and adjacent pleuroparenchymal changes also noted, with persistent pleural thickening and subpleural lucencies. Extensive underlying emphysematous changes again identified with a similar appearance. No pneumothorax. No other additional acute infiltrate or other new nodule. No left pleural effusion. Additional scattered fibrotic or chronic appearing changes. Upper Abdomen: No acute abnormality in the upper abdomen. Soft Tissues/Bones: No acute bony or soft tissue abnormality. Unchanged compression fractures T5 and T6, scoliosis and osteopenia. Previous right-sided chest tube is been removed and previous right subcutaneous emphysema has resolved. Interval removal right chest tube and resolution right-sided subcutaneous emphysema. No recurrent pneumothorax. Interval improvement additional nodular/cavitary lesions and decrease in the size of the largest lower lobe cavitary lesion/abscess. Interval appearance new small pleural based consolidative foci both upper lobes and of a few scattered new nodular parenchymal abnormalities, best seen RUL. Findings suggest only a partial response to treatment for presumed infectious or inflammatory processes. Underlying unchanged emphysema and additional chronic findings, as detailed above. RECOMMENDATIONS: Follow-up chest CT in 3-4 months, assuming clinical stability or improvement.      CT ABDOMEN PELVIS W IV CONTRAST Additional Contrast? Oral    Result Date: 7/1/2022  EXAMINATION: CT OF THE ABDOMEN AND PELVIS WITH CONTRAST 7/1/2022 10:17 am TECHNIQUE: CT of the abdomen and pelvis was performed with the administration of intravenous contrast. Multiplanar reformatted images are provided for review. Automated exposure control, iterative reconstruction, and/or weight based adjustment of the mA/kV was utilized to reduce the radiation dose to as low as reasonably achievable. COMPARISON: Chest CT 06/30/2022 and 04/29/2022 HISTORY: ORDERING SYSTEM PROVIDED HISTORY: rule out pelvic mass ,pt has luisa dvt TECHNOLOGIST PROVIDED HISTORY: rule out pelvic mass ,pt has luisa dvt Reason for Exam: r/o pelvic mass, bilateral DVTs Additional signs and symptoms: Possible pelvic mass, c/o bilateral leg pain due to DVTs Relevant Medical/Surgical History: IBS FINDINGS: Lower Chest: Partially visualized cavitary lesion in the right lung base with adjacent consolidative opacities and atelectasis characterized to advantage on recent chest CT. Heart size is within normal limits. No pericardial effusion. Organs: Liver is within normal limits for attenuation without any suspicious focal hepatic mass. Gallbladder appears within normal limits. Trace intrahepatic biliary ductal dilatation. Dilated distal common bile duct up to 2 cm with smooth tapering to normal caliber at the proximal common bile duct to the ampulla. No pericholecystic inflammatory changes. Spleen is normal in size and attenuation. Pancreas and adrenal glands are within normal limits. Kidneys enhance symmetrically and normally without hydronephrosis or perinephric stranding. GI/Bowel: The bowel is normal in caliber without obstruction. Volume of colonic stool suggests constipation. Normal appendix. Pelvis: Excreting contrast opacifies the otherwise unremarkable urinary bladder. Diminutive postmenopausal female pelvic organs. 0.9 cm water attenuation left adnexal cyst which requires no follow-up. Peritoneum/Retroperitoneum: No free fluid, free air, or lymphadenopathy.  Atherosclerotic aortoiliac arteries with minimal distal though clinical correlation is required. Volume of colonic stool suggests constipation. Correlate clinically. Numerous small hematomas in the subcutaneous fat related to medication injection, the largest measuring 1.7 cm. Partially visualized cavitary lesion with adjacent consolidation and atelectasis in the posterior right lung base, characterized to advantage on earlier chest CT. Please see that report for findings. CT CHEST PULMONARY EMBOLISM W CONTRAST    Result Date: 6/30/2022  EXAMINATION: CTA OF THE CHEST 6/30/2022 5:48 pm TECHNIQUE: CTA of the chest was performed after the administration of intravenous contrast.  Multiplanar reformatted images are provided for review. MIP images are provided for review. Automated exposure control, iterative reconstruction, and/or weight based adjustment of the mA/kV was utilized to reduce the radiation dose to as low as reasonably achievable. COMPARISON: Chest CT examination of 06/29/2022 HISTORY: ORDERING SYSTEM PROVIDED HISTORY: r/o pe TECHNOLOGIST PROVIDED HISTORY: r/o pe Decision Support Exception - unselect if not a suspected or confirmed emergency medical condition->Emergency Medical Condition (MA) Reason for Exam: r/o pe Additional signs and symptoms: bilateral DVT Relevant Medical/Surgical History: wears O2 at home, copd, chf FINDINGS: Pulmonary Arteries: Pulmonary arteries are adequately opacified for evaluation. No evidence of intraluminal filling defect to suggest pulmonary embolism. Main pulmonary artery is normal in caliber. Mediastinum: No evidence of pathologically enlarged mediastinal lymphadenopathy. Unchanged subcentimeter mediastinal lymphadenopathy. The heart and pericardium demonstrate no acute abnormality. There is no acute abnormality of the thoracic aorta. Lungs/pleura:  Focal consolidative changes within right upper lobe and left upper lobe and superior segment of the right lower lobe and multiple tree in bud nodular densities and cavitary changes within both lungs highly suggestive of multilobar infectious/inflammatory condition. Findings are unchanged since recent chest CT. The largest cavitary lesion is noted within the right lower lobe and appears unchanged since prior examination. Consolidative changes within the right lower lobe may be due to atelectasis. No evidence of pleural effusion or pneumothorax. Moderate emphysematous changes within the lungs. Upper Abdomen: Limited images of the upper abdomen are unremarkable. Soft Tissues/Bones: No acute bone or soft tissue abnormality. Unchanged compression fracture of T5 and T6 and scoliosis. No evidence of pulmonary embolism. Focal consolidative changes within right upper lobe and left upper lobe and superior segment of the right lower lobe and multiple tree in bud nodular densities and cavitary changes within both lungs highly suggestive of multilobar infectious/inflammatory condition. Findings are unchanged since recent chest CT. The largest cavitary lesion is noted within the right lower lobe and appears unchanged since prior examination. Although this lesion may be due to infectious/inflammatory condition, neoplastic etiologies cannot be excluded. Unchanged emphysematous changes within the lungs. RECOMMENDATIONS: Unavailable     VL DUP LOWER EXTREMITY VENOUS LEFT    Result Date: 6/30/2022    [facility]  Vascular Lower Extremities DVT Study Procedure   Patient Name   Lee Read     Date of Study           06/30/2022                 [facility]   Date of Birth  1955  Gender                  Female   Age            74 year(s)  Race                       Room Number    OP   Corporate ID # [id number]   Patient Acct # [de-identified]   MR #           [id number]      Sonographer             Kimo Weaver RVT   Accession #    [id number]  Interpreting Physician  Domo Wilks   Referring                  Referring Physician     Christelle Roque.  [facility]  Nurse  Practitioner  Procedure Type small saphenous vein. saphenous vein. Velocities are measured in cm/s ; Diameters are measured in cm Right Lower Extremities DVT Study Measurements Right 2D Measurements +------------------------------------+----------+---------------+----------+ ! Location                            ! Visualized! Compressibility! Thrombosis! +------------------------------------+----------+---------------+----------+ ! Common Femoral                      !Yes       ! No             !          ! +------------------------------------+----------+---------------+----------+ ! Prox Femoral                        !Yes       ! No             !          ! +------------------------------------+----------+---------------+----------+ ! Mid Femoral                         !Yes       ! No             !          ! +------------------------------------+----------+---------------+----------+ ! Dist Femoral                        !Yes       ! No             !          ! +------------------------------------+----------+---------------+----------+ ! Deep Femoral                        !Yes       ! Yes            ! None      ! +------------------------------------+----------+---------------+----------+ ! Popliteal                           !Yes       ! No             !          ! +------------------------------------+----------+---------------+----------+ ! Sapheno Femoral Junction            ! Yes       ! No             !          ! +------------------------------------+----------+---------------+----------+ ! PTV                                 ! Partial   !Yes            ! None      ! +------------------------------------+----------+---------------+----------+ ! Peroneal                            !Partial   !Yes            ! None      ! +------------------------------------+----------+---------------+----------+ ! Gastroc                             ! Yes       ! Yes            ! None      ! +------------------------------------+----------+---------------+----------+ ! GSV Thigh                           ! Yes       ! Yes            ! None      ! +------------------------------------+----------+---------------+----------+ ! GSV Knee                            ! Yes       ! Yes            ! None      ! +------------------------------------+----------+---------------+----------+ ! GSV Ankle                           ! Yes       ! Yes            ! None      ! +------------------------------------+----------+---------------+----------+ ! SSV                                 ! Yes       ! Yes            ! None      ! +------------------------------------+----------+---------------+----------+ Left Lower Extremities DVT Study Measurements Left 2D Measurements +------------------------------------+----------+---------------+----------+ ! Location                            ! Visualized! Compressibility! Thrombosis! +------------------------------------+----------+---------------+----------+ ! Common Femoral                      !Yes       ! No             !          ! +------------------------------------+----------+---------------+----------+ ! Prox Femoral                        !Yes       ! No             !          ! +------------------------------------+----------+---------------+----------+ ! Mid Femoral                         !Yes       ! No             !          ! +------------------------------------+----------+---------------+----------+ ! Dist Femoral                        !Yes       ! No             !          ! +------------------------------------+----------+---------------+----------+ ! Deep Femoral                        !Yes       ! No             !          ! +------------------------------------+----------+---------------+----------+ ! Popliteal                           !Yes       ! No             !          ! +------------------------------------+----------+---------------+----------+ ! Sapheno Femoral Junction rectal cream  Commonly known as: ANUSOL-HC  1     predniSONE 10 MG tablet  Commonly known as: DELTASONE  Take 40 mg for 3 days, then 30 mg for 3 days, then 20 mg for 3 days, then 10 mg for 4 days then stop. CHANGE how you take these medications    * albuterol sulfate  (90 Base) MCG/ACT inhaler  Commonly known as: Ventolin HFA  Inhale 2 puffs into the lungs every 4 hours as needed for Wheezing  What changed: when to take this     * albuterol (2.5 MG/3ML) 0.083% nebulizer solution  Commonly known as: PROVENTIL  Take 3 mLs by nebulization every 4 hours  What changed: when to take this         * This list has 2 medication(s) that are the same as other medications prescribed for you. Read the directions carefully, and ask your doctor or other care provider to review them with you.             CONTINUE taking these medications    acetaminophen 325 MG tablet  Commonly known as: TYLENOL     atorvastatin 20 MG tablet  Commonly known as: LIPITOR     Breo Ellipta 100-25 MCG/INH Aepb inhaler  Generic drug: fluticasone-vilanterol     docusate sodium 100 MG capsule  Commonly known as: COLACE  take 1 capsule by mouth twice a day     famotidine 20 MG tablet  Commonly known as: PEPCID  Take 1 tablet by mouth 2 times daily     fluticasone 50 MCG/ACT nasal spray  Commonly known as: Flonase  2 sprays by Nasal route daily     folic acid 1 MG tablet  Commonly known as: FOLVITE  Take 1 tablet by mouth daily     furosemide 20 MG tablet  Commonly known as: LASIX  Take 1 tablet by mouth 2 times daily     glucose 40 % Gel  Commonly known as: GLUTOSE  Take 37.5 mLs by mouth as needed (low blood glucose)     guaiFENesin 600 MG extended release tablet  Commonly known as: MUCINEX     HumaLOG KwikPen 100 UNIT/ML Sopn  Generic drug: insulin lispro (1 Unit Dial)     hydrocortisone 1 % cream     lidocaine 4 % external patch     magnesium hydroxide 400 MG/5ML suspension  Commonly known as: MILK OF MAGNESIA     montelukast 10 MG tablet  Commonly known as: SINGULAIR     omeprazole 20 MG delayed release capsule  Commonly known as: PRILOSEC     ondansetron 4 MG tablet  Commonly known as: ZOFRAN     polyethylene glycol 17 GM/SCOOP powder  Commonly known as: GLYCOLAX     Preparation H 0.25-88.44 %  Generic drug: phenylephrine-cocoa butter     risperiDONE 0.5 MG tablet  Commonly known as: RISPERDAL  Take 3 tablets by mouth every 12 hours     senna-docusate 8.6-50 MG per tablet  Commonly known as: PERICOLACE     Spiriva Respimat 2.5 MCG/ACT Aers inhaler  Generic drug: tiotropium  inhale 2 puffs INTO THE LUNGS once daily     traZODone 50 MG tablet  Commonly known as: DESYREL        STOP taking these medications    heparin (porcine) 5000 UNIT/ML injection     oxyCODONE-acetaminophen 5-325 MG per tablet  Commonly known as: PERCOCET        ASK your doctor about these medications    * apixaban 5 MG Tabs tablet  Commonly known as: ELIQUIS  Take 2 tablets by mouth 2 times daily for 6 days  Ask about: Which instructions should I use? * apixaban 5 MG Tabs tablet  Commonly known as: Eliquis  Take 1 tablet by mouth 2 times daily  Ask about: Which instructions should I use? * apixaban 5 MG Tabs tablet  Commonly known as: ELIQUIS  Take 2 tablets by mouth 2 times daily for 4 doses  Ask about: Which instructions should I use? * apixaban 5 MG Tabs tablet  Commonly known as: ELIQUIS  Take 1 tablet by mouth 2 times daily  Start taking on: July 8, 2022  Ask about: Which instructions should I use? * This list has 4 medication(s) that are the same as other medications prescribed for you. Read the directions carefully, and ask your doctor or other care provider to review them with you.                Where to Get Your Medications      These medications were sent to [facility]  [address], [address]    Phone: [phone]   · apixaban 5 MG Tabs tablet  · apixaban 5 MG Tabs tablet  · apixaban 5 MG Tabs tablet  · apixaban 5 MG Tabs tablet  · hydrocortisone 2.5 % Crea rectal cream     Information about where to get these medications is not yet available    Ask your nurse or doctor about these medications  · albuterol (2.5 MG/3ML) 0.083% nebulizer solution  · albuterol sulfate  (90 Base) MCG/ACT inhaler  · predniSONE 10 MG tablet         Time Spent on discharge is  35 mins in patient examination, evaluation, counseling as well as medication reconciliation, prescriptions for required medications, discharge plan and follow up. Electronically signed by   Shannan Camacho MD  7/6/2022  12:35 PM      Thank you Dr. Steven Noble MD for the opportunity to be involved in this patient's care.

## 2022-07-06 NOTE — DISCHARGE INSTR - DIET

## 2022-07-06 NOTE — PROGRESS NOTES
Writer called [facility] to give report on pt transfer and didn't get an answer will try again before she leaves.

## 2022-07-06 NOTE — PROGRESS NOTES
Palliative Care:  Notified  that referral was sent [address] per patient and daughters request.     to place this information on LYDIA for writer. Josephine 43 spoke with [facility] and they have received referral for Arelis Dan.       [address] [facility] Coordinator  Blu Finley BSN, [facility]  [address] [phone]

## 2022-07-06 NOTE — PROGRESS NOTES
Pulmonary Progress Note  [facility]      Patient - Sanam Coffey,  Age - 74 y.o.    - 1955      Room Number - -01   N -  [id number]   [facility]  Date of Admission -  2022  2:21 Melanie Goyal MD  Primary Care Physician - Renetta Weaver MD     SUBJECTIVE   Sleeping but easily arousable and appropriate, says that her quality sleep overall is poor    OBJECTIVE   VITALS    height is 5' 5\" (1.651 m) and weight is 169 lb (76.7 kg). Her oral temperature is 98.2 °F (36.8 °C). Her blood pressure is 110/66 and her pulse is 62. Her respiration is 20 and oxygen saturation is 98%. Body mass index is 28.12 kg/m². Temperature Range: Temp: 98.2 °F (36.8 °C) Temp  Av.2 °F (36.8 °C)  Min: 98.2 °F (36.8 °C)  Max: 98.2 °F (36.8 °C)  BP Range:  Systolic (69TLU), BE , Min:110 , IXA:763     Diastolic (69ZAC), YWM:00, Min:54, Max:66    Pulse Range: Pulse  Av.1  Min: 61  Max: 83  Respiration Range: Resp  Av.1  Min: 16  Max: 20  Current Pulse Ox[de-identified]  SpO2: 98 %  24HR Pulse Ox Range:  SpO2  Av.9 %  Min: 97 %  Max: 98 %  Oxygen Amount and Delivery: O2 Flow Rate (L/min): 4 L/min    Wt Readings from Last 3 Encounters:   22 169 lb (76.7 kg)   22 174 lb 14.4 oz (79.3 kg)   22 183 lb (83 kg)       I/O (24 Hours)    Intake/Output Summary (Last 24 hours) at 2022 0931  Last data filed at 2022 0650  Gross per 24 hour   Intake 360 ml   Output 1925 ml   Net -1565 ml       EXAM     General Appearance  Awake, alert, oriented, in no acute distress  HEENT - normocephalic, atraumatic.  []  Mallampati  [] Crowded airway   [] Macroglossia  []  Retrognathia  [] Micrognathia  []  Normal tongue size []  Normal Bite  [] [place] sign positive    Neck - Supple,  trachea midline   Lungs -diminished but no wheezes  Cardiovascular - Heart sounds are normal.  Regular rate and rhythm   Abdomen - Soft, nontender, nondistended, no masses or organomegaly  Neurologic - There are no focal motor or sensory deficits  Skin - No bruising or bleeding  Extremities - No clubbing, cyanosis, edema    MEDS      predniSONE  40 mg Oral Daily    hydrocortisone   Rectal BID    furosemide  20 mg Oral BID    apixaban  10 mg Oral BID    Followed by   Becca Valle ON 7/8/2022] apixaban  5 mg Oral BID    albuterol  2.5 mg Nebulization Q4H    budesonide-formoterol  2 puff Inhalation BID    tiotropium  2 puff Inhalation Daily    sodium chloride flush  5-40 mL IntraVENous 2 times per day    sennosides-docusate sodium  2 tablet Oral Daily    risperiDONE  1.5 mg Oral Q12H    pantoprazole  40 mg Oral QAM AC    montelukast  10 mg Oral Nightly    atorvastatin  20 mg Oral Nightly    docusate sodium  100 mg Oral BID    famotidine  20 mg Oral BID    fluticasone  2 spray Nasal Daily    folic acid  1 mg Oral Daily    guaiFENesin  600 mg Oral BID    insulin lispro  0-12 Units SubCUTAneous TID WC    insulin lispro  0-6 Units SubCUTAneous Nightly      sodium chloride 80 mL (07/01/22 1325)    dextrose       albuterol, bisacodyl, lidocaine, morphine, sodium chloride flush, sodium chloride, acetaminophen, ondansetron **OR** ondansetron, sodium chloride flush, hydrocortisone-aloe, glucose, dextrose bolus **OR** dextrose bolus, glucagon (rDNA), dextrose, traZODone, hydrocortisone    LABS   CBC   Recent Labs     07/05/22  0542   WBC 8.7   HGB 9.3*   HCT 29.1*   MCV 91.1        BMP:   Lab Results   Component Value Date/Time     07/05/2022 05:42 AM    K 3.8 07/05/2022 05:42 AM    CL 98 07/05/2022 05:42 AM    CO2 32 07/05/2022 05:42 AM    BUN 15 07/05/2022 05:42 AM    LABALBU 2.9 06/30/2022 03:31 PM    LABALBU 3.6 07/08/2021 11:52 AM    CREATININE 0.40 07/05/2022 05:42 AM    CALCIUM 8.9 07/05/2022 05:42 AM    GFRAA >60 07/05/2022 05:42 AM    LABGLOM >60 07/05/2022 05:42 AM     ABGs:  Lab Results Component Value Date/Time    PHART 7.442 04/28/2022 05:31 AM    PO2ART 77.6 04/28/2022 05:31 AM    FBN3FQF 60.6 04/28/2022 05:31 AM      Lab Results   Component Value Date/Time    MODE PRVC 04/28/2022 05:31 AM     Ionized Calcium:  No results found for: IONCA  Magnesium:    Lab Results   Component Value Date/Time    MG 2.3 04/16/2022 10:42 AM     Phosphorus:  No results found for: PHOS     LIVER PROFILE No results for input(s): AST, ALT, LIPASE, BILIDIR, BILITOT, ALKPHOS in the last 72 hours. Invalid input(s): AMYLASE,  ALB  INR No results for input(s): INR in the last 72 hours.   PTT   Lab Results   Component Value Date    APTT 28.4 06/30/2022         RADIOLOGY     (See actual reports for details)    ASSESSMENT/PLAN     Bilateral DVT acute, without pulmonary embolism  Acute exacerbation of severe stage IV COPD  Chronic hypoxic and hypercapnic respiratory failure  History of Pseudomonas pneumonia with cavitary lesion  History of pneumothorax on the right  History of previous pulmonary embolism/DVT in June 2021 treated with Eliquis for 6 months  Full code    Awaiting precertification for ECF  No objection to discharge  Pulmonary medications reconciled  Continue mobilization and rehab (she is worried that she will not get her but needed rehab at the [facility])    Electronically signed by Suad Vance MD on 7/6/2022 at 9:31 AM

## 2022-07-06 NOTE — FLOWSHEET NOTE
Patient talked about the care she received at the [facility] she is residing in for rehab; patient frustrated with this facility and is worried about the care she is going to receive when she is discharged back there later today; patient is vocal about her needs and is a good advocate for herself; patient talked about her medical issues and what she feels are unrealistic expectations being placed on her in rehab; patient talked about chronic medical issues and states she has \"been through the mill\"; patient welcomed prayer; listening presence and support;     07/06/22 1500   Encounter Summary   Encounter Overview/Reason  Spiritual/Emotional Needs;Palliative Care   Service Provided For: Patient   Referral/Consult From: Palliative Care;Rounding   Support System Children   Last Encounter  07/06/22   Complexity of Encounter Moderate   Begin Time 1435   End Time  1505   Total Time Calculated 30 min   Spiritual/Emotional needs   Type Spiritual Support   Palliative Care   Type Palliative Care, Follow-up   Assessment/Intervention/Outcome   Assessment Anxious; Coping; Hopeful;Powerlessness   Intervention Active listening;Discussed illness injury and its impact; Discussed relationship with God;Explored/Affirmed feelings, thoughts, concerns;Prayer (assurance of)/[place];Sustaining Presence/Ministry of presence   Outcome Coping;Engaged in conversation;Expressed feelings, needs, and concerns;Expressed Gratitude;Receptive

## 2022-07-06 NOTE — PROGRESS NOTES
Pt is being discharged. Removed her IV. And packed up her belongings and she is getting picked up by [facility].

## 2022-07-06 NOTE — PROGRESS NOTES
assistance;Assist X1 (in catarino steady, continous R ankle supported 2* ankle inverts in stand)  Stand to Sit: Contact-guard assistance (VC for sitting slowly. F return)     ADL  Feeding: Modified independent   Grooming: Minimal assistance  UE Bathing: Minimal assistance  LE Bathing: Maximum assistance  UE Dressing: Minimal assistance  LE Dressing: Maximum assistance (max A for socks)  Toileting: Dependent/Total (external catheter. declines use of toilet this AM. )  Additional Comments: ADL scores based on skilled observation and clinical reasoning unless otherwise noted. Pt limited by decreased sensation, balance, strength, activity tolerance and endurance. These factors limit pts ability to safely and independently complete self-care and functional mobility. pt educated on AE to increase independence during LB dressing. ie reacher, sock aid. OT Exercises  Static Standing Balance Exercises: BOTELLO and STEPHANY WILCOX facilitated pt in standing at [address] [place] [address] steady for increased engagement in ADL/IADLs. Pt stands on catarino steady for 9:15 with active standing (without suupport from paddles) for 23 and 26 secs. Requries VC for upright posture, declines to correct while standing upright without support stating \"I can't\"      Safety Devices  Type of Devices: Call light within reach;Gait belt;Patient at risk for falls; Left in chair;Nurse notified          Goals  Short Term Goals  Time Frame for Short term goals: By discharge  Short Term Goal 1: Pt will perform UB ADLs with SBA and good safety  Short Term Goal 2: Pt will perform LB ADLs with Min A, good safety and use of AE as needed  Short Term Goal 3: Pt will perform functional transfers/mobility with Min A, good safety and use of least restrictive device  Short Term Goal 4: Pt will tolerate standing for 5+ minutes, Min A, with 0-1 UE support and no LOB to improve engagement in ADLs/IADLs  Short Term Goal 5: Pt will participate in 15+ minutes of therapeutic exercise/functional activity to improve safety and independence with self-care and functional mobility  Additional Goals?: Yes  Short Term Goal 6: Pt will demonstrate proper breathing techniques to maintain SpO2 > 90% during ADL completion with Min cues or less   Short Term Goal 7: Pt will be educated on and explore use of DME/AE and modified techniques for increasing ease and independence with ADLs upon d/c  Short Term Goal 8: Pt will verbalize/demonstrate good understanding fall prevention/home safety/EC WS techniques for increased safety and independence with self-care  Patient Goals   Patient goals :  \"To eventually go home\"       Therapy Time   Individual Concurrent Group Co-treatment   Time In [address] [place] [address]         Time Out 1022         Minutes [address] [facility]

## 2022-07-06 NOTE — PROGRESS NOTES
Physical Therapy  [address]    Date: 22  Patient Name: Ara Almonte       Room: 2929/5800-27  MRN: [id number]   Account: [de-identified]   : 1955  (74 y.o.) Gender: female     Referring Practitioner: IVONNE Cormier CNP  Diagnosis: Leg DVT (deep venous thromboembolism), acute, bilateral  Past Medical History:  has a past medical history of Abnormal EKG, TRAM (acute kidney injury) ([address] Anxiety, Asthma, Bipolar disorder ([address] COPD (chronic obstructive pulmonary disease) ([address] Cramps, extremity, Depression, Dilated bile duct, Headache, History of elective , Hyperlipidemia, Irritable bowel syndrome, Prolonged emergence from general anesthesia, Substance abuse ([facility] Unspecified sleep apnea, and Vision abnormalities. Past Surgical History:   has a past surgical history that includes Tonsillectomy and adenoidectomy (Bilateral); LASIK; Induced ; Ankle surgery; eye surgery (Bilateral); Vulva surgery; hysteroscopy (10/19/2016); Colonoscopy (02/15/2018); and Bronchoscopy (2022). Overall Orientation Status: Within Normal Limits  Restrictions/Precautions  Restrictions/Precautions: Fall Risk;General Precautions;Contact Precautions; Up as Tolerated  Required Braces or Orthoses?: No  Implants present? : Metal implants (plate in left ankle )  Position Activity Restriction  Other position/activity restrictions: OT/PT eval and treat    Subjective: Patient laying in bed upon arrival; agreeable to therapy   Comments: SUE [place] approved therapy; co-treat with Pk Louise        Pain Assessment: 0-10  Pain Level: 9  Pain Location: Leg  Pain Orientation: Left  Pain Descriptors: Aching     Oxygen Therapy  SpO2: 95 %  Pulse Oximetry Type:  Intermittent  Pulse Oximeter Device Mode: Intermittent  Pulse Oximeter Device Location: Finger  O2 Device: Nasal cannula  O2 Flow Rate (L/min): 4 L/min          Bed Mobility:   Bed Mobility  Rolling: Stand by assistance  Supine to Sit: Stand by assistance  Scooting: Stand by assistance  Bed mobility  Scooting: Stand by assistance    Transfers:  Sit to Stand: Minimal Assistance  Stand to sit: Contact guard assistance  Bed to Chair: Dependent/Total (catarino steady )              Ambulation  Comments: ORALIA amblation         Stairs/Curb  Stairs?: No    EXERCISES    Other exercises?: Yes  Other exercises 1: bed mobility   Other exercises 2: seated EOB ~ 5 minutes SBA   Other exercises 3: STS perfromed with sarar steady x3. Patient able to correct posture this date and stand up without having to rest forarms on catarino steady. Other exercises 4: Standing tolernaces; 30 seconds, 40 seconds, and 2 minutes            Activity Tolerance: Patient limited by endurance  Activity Tolerance Comments: Patient limited by high anxiety and fear of falling. Current Treatment Recommendations: Strengthening,ROM,Balance training,Functional mobility training,Transfer training,Endurance training,Gait training,Pain management,Safety education & training,Positioning,Therapeutic activities    Conditions Requiring Skilled Therapeutic Intervention  Treatment Diagnosis: Impaired functional mobility 2* Bilateral DVT, Bilateral Foot and ankle contractures. History: Pt had complicated hospital Admission to [facility] in April 2022 with pulmonary pathologies including mycoplasma pneumonia, Pneumothorax, Cavitary lesion in R lower Lobe of lung, and associated dyspnea and respiratory failure < - has been significantly debilitated since April hospital encounter. Discharge Recommendations: Patient would benefit from continued therapy after discharge    Goals  Short Term Goals  Time Frame for Short term goals: 7 days  Short term goal 1: pt to demo all bed mobility Mod I  Short term goal 2: pt to tolerate standing with improved upright posture x5 minutes to improve tolerance for standing activities, ADLs, and eventually walking.    Short term goal 3: pt to tolerate 25-30 minutes of therapeutic exercise/activity with demo'd knowledge of need for therapeutic rest breaks to improve tolerance for further therapy and daily activity/ADLs  Short term goal 4: pt to improve BLE strength by 1/2 MMG to improve safety/independence with mobility   Short term goal 5: pt to improve Bilat Ankle DF ROM by 5-10 degrees to improve joint mechanics for safer standing/walking. Additional Goals?: Yes  Short Term Goal 6: Pt to ambulate 5' with 2 A and RW (whether it be side-stepping or fwd walking with chair follow) to improve mobility status.         07/06/22 1026   PT Individual Minutes   Time In 7871   Time Out 1023   Minutes 25       Electronically signed by Coleen Luevano PTA on 7/6/22 at 10:42 AM EDT

## 2022-07-06 NOTE — PLAN OF CARE
Problem: Discharge Planning  Goal: Discharge to home or other facility with appropriate resources  Outcome: Completed  Flowsheets (Taken 7/6/2022 0836)  Discharge to home or other facility with appropriate resources:   Identify discharge learning needs (meds, wound care, etc)   Identify barriers to discharge with patient and caregiver     Problem: Pain  Goal: Verbalizes/displays adequate comfort level or baseline comfort level  Outcome: Completed     Problem: ABCDS Injury Assessment  Goal: Absence of physical injury  Outcome: Completed  Flowsheets (Taken 7/6/2022 1234)  Absence of Physical Injury: Implement safety measures based on patient assessment     Problem: Skin/Tissue Integrity  Goal: Absence of new skin breakdown  Description: 1. Monitor for areas of redness and/or skin breakdown  2. Assess vascular access sites hourly  3. Every 4-6 hours minimum:  Change oxygen saturation probe site  4. Every 4-6 hours:  If on nasal continuous positive airway pressure, respiratory therapy assess nares and determine need for appliance change or resting period.   Outcome: Completed     Problem: Respiratory - Adult  Goal: Achieves optimal ventilation and oxygenation  7/6/2022 1522 by Ama Rae RN  Outcome: Completed  Flowsheets (Taken 7/6/2022 0836)  Achieves optimal ventilation and oxygenation:   Encourage broncho-pulmonary hygiene including cough, deep breathe, incentive spirometry   Assess for changes in respiratory status  7/6/2022 0200 by Carl Dietz RN  Outcome: Progressing  Flowsheets (Taken 7/6/2022 0200)  Achieves optimal ventilation and oxygenation: Encourage broncho-pulmonary hygiene including cough, deep breathe, incentive spirometry     Problem: Safety - Adult  Goal: Free from fall injury  Outcome: Completed  Flowsheets (Taken 7/6/2022 1234)  Free From Fall Injury: Instruct family/caregiver on patient safety

## 2022-07-07 RX ORDER — HYDROCORTISONE 25 MG/G
CREAM TOPICAL
Qty: 84 G | Refills: 2 | Status: SHIPPED | OUTPATIENT
Start: 2022-07-07

## 2022-07-07 NOTE — TELEPHONE ENCOUNTER
Re pended medication with the correct number of Grams that come in a tube for the patient and filled out sig stating to use every 6 hours as needed, please review if this is correct and then send to pharmacy

## 2022-07-08 ENCOUNTER — HOSPITAL ENCOUNTER (OUTPATIENT)
Age: 67
Setting detail: SPECIMEN
Discharge: HOME | End: 2022-07-08
Payer: COMMERCIAL

## 2022-07-08 LAB
ANION GAP SERPL CALCULATED.3IONS-SCNC: 10 MMOL/L (ref 9–17)
BUN BLDV-MCNC: 15 MG/DL (ref 8–23)
CALCIUM SERPL-MCNC: 8.6 MG/DL (ref 8.6–10.4)
CHLORIDE BLD-SCNC: 96 MMOL/L (ref 98–107)
CO2: 32 MMOL/L (ref 20–31)
CREAT SERPL-MCNC: 0.44 MG/DL (ref 0.5–0.9)
GFR AFRICAN AMERICAN: >60 ML/MIN
GFR NON-AFRICAN AMERICAN: >60 ML/MIN
GFR SERPL CREATININE-BSD FRML MDRD: ABNORMAL ML/MIN/{1.73_M2}
GLUCOSE BLD-MCNC: 105 MG/DL (ref 70–99)
HCT VFR BLD CALC: 33 % (ref 36.3–47.1)
HEMOGLOBIN: 9.5 G/DL (ref 11.9–15.1)
MCH RBC QN AUTO: 28.4 PG (ref 25.2–33.5)
MCHC RBC AUTO-ENTMCNC: 28.8 G/DL (ref 28.4–34.8)
MCV RBC AUTO: 98.8 FL (ref 82.6–102.9)
NRBC AUTOMATED: 0 PER 100 WBC
PDW BLD-RTO: 17.9 % (ref 11.8–14.4)
PLATELET # BLD: 340 K/UL (ref 138–453)
PMV BLD AUTO: 9.3 FL (ref 8.1–13.5)
POTASSIUM SERPL-SCNC: 3.3 MMOL/L (ref 3.7–5.3)
RBC # BLD: 3.34 M/UL (ref 3.95–5.11)
SODIUM BLD-SCNC: 138 MMOL/L (ref 135–144)
WBC # BLD: 7.5 K/UL (ref 3.5–11.3)

## 2022-07-08 PROCEDURE — 36415 COLL VENOUS BLD VENIPUNCTURE: CPT

## 2022-07-08 PROCEDURE — 85027 COMPLETE CBC AUTOMATED: CPT

## 2022-07-08 PROCEDURE — P9603 ONE-WAY ALLOW PRORATED MILES: HCPCS

## 2022-07-08 PROCEDURE — 80048 BASIC METABOLIC PNL TOTAL CA: CPT

## 2022-07-15 ENCOUNTER — HOSPITAL ENCOUNTER (OUTPATIENT)
Age: 67
Setting detail: SPECIMEN
Discharge: HOME | End: 2022-07-15

## 2022-07-15 LAB — POTASSIUM SERPL-SCNC: 3.9 MMOL/L (ref 3.7–5.3)

## 2022-07-15 PROCEDURE — P9603 ONE-WAY ALLOW PRORATED MILES: HCPCS

## 2022-07-15 PROCEDURE — 84132 ASSAY OF SERUM POTASSIUM: CPT

## 2022-07-15 PROCEDURE — 36415 COLL VENOUS BLD VENIPUNCTURE: CPT

## 2022-07-26 ENCOUNTER — HOSPITAL ENCOUNTER (OUTPATIENT)
Age: 67
Setting detail: SPECIMEN
Discharge: HOME | End: 2022-07-26

## 2022-07-26 LAB
ANION GAP SERPL CALCULATED.3IONS-SCNC: 10 MMOL/L (ref 9–17)
BUN BLDV-MCNC: 16 MG/DL (ref 8–23)
CALCIUM SERPL-MCNC: 8.6 MG/DL (ref 8.6–10.4)
CHLORIDE BLD-SCNC: 97 MMOL/L (ref 98–107)
CO2: 34 MMOL/L (ref 20–31)
CREAT SERPL-MCNC: 0.47 MG/DL (ref 0.5–0.9)
GFR AFRICAN AMERICAN: >60 ML/MIN
GFR NON-AFRICAN AMERICAN: >60 ML/MIN
GFR SERPL CREATININE-BSD FRML MDRD: ABNORMAL ML/MIN/{1.73_M2}
GLUCOSE BLD-MCNC: 89 MG/DL (ref 70–99)
HCT VFR BLD CALC: 32.8 % (ref 36.3–47.1)
HEMOGLOBIN: 9.5 G/DL (ref 11.9–15.1)
MCH RBC QN AUTO: 28.3 PG (ref 25.2–33.5)
MCHC RBC AUTO-ENTMCNC: 29 G/DL (ref 28.4–34.8)
MCV RBC AUTO: 97.6 FL (ref 82.6–102.9)
NRBC AUTOMATED: 0 PER 100 WBC
PDW BLD-RTO: 17.3 % (ref 11.8–14.4)
PLATELET # BLD: 265 K/UL (ref 138–453)
PMV BLD AUTO: 9.8 FL (ref 8.1–13.5)
POTASSIUM SERPL-SCNC: 3.6 MMOL/L (ref 3.7–5.3)
PRO-BNP: 70 PG/ML
RBC # BLD: 3.36 M/UL (ref 3.95–5.11)
SODIUM BLD-SCNC: 141 MMOL/L (ref 135–144)
WBC # BLD: 4.4 K/UL (ref 3.5–11.3)

## 2022-07-26 PROCEDURE — P9603 ONE-WAY ALLOW PRORATED MILES: HCPCS

## 2022-07-26 PROCEDURE — 36415 COLL VENOUS BLD VENIPUNCTURE: CPT

## 2022-07-26 PROCEDURE — 85027 COMPLETE CBC AUTOMATED: CPT

## 2022-07-26 PROCEDURE — 83880 ASSAY OF NATRIURETIC PEPTIDE: CPT

## 2022-07-26 PROCEDURE — 80048 BASIC METABOLIC PNL TOTAL CA: CPT

## 2022-08-16 ENCOUNTER — HOSPITAL ENCOUNTER (OUTPATIENT)
Age: 67
Setting detail: SPECIMEN
Discharge: HOME | End: 2022-08-16

## 2022-08-16 LAB
ANION GAP SERPL CALCULATED.3IONS-SCNC: 8 MMOL/L (ref 9–17)
BUN BLDV-MCNC: 10 MG/DL (ref 8–23)
CALCIUM SERPL-MCNC: 8.7 MG/DL (ref 8.6–10.4)
CHLORIDE BLD-SCNC: 99 MMOL/L (ref 98–107)
CO2: 32 MMOL/L (ref 20–31)
CREAT SERPL-MCNC: 0.47 MG/DL (ref 0.5–0.9)
GFR AFRICAN AMERICAN: >60 ML/MIN
GFR NON-AFRICAN AMERICAN: >60 ML/MIN
GFR SERPL CREATININE-BSD FRML MDRD: ABNORMAL ML/MIN/{1.73_M2}
GLUCOSE BLD-MCNC: 82 MG/DL (ref 70–99)
POTASSIUM SERPL-SCNC: 3.9 MMOL/L (ref 3.7–5.3)
PRO-BNP: 102 PG/ML
SODIUM BLD-SCNC: 139 MMOL/L (ref 135–144)

## 2022-08-16 PROCEDURE — 36415 COLL VENOUS BLD VENIPUNCTURE: CPT

## 2022-08-16 PROCEDURE — P9603 ONE-WAY ALLOW PRORATED MILES: HCPCS

## 2022-08-16 PROCEDURE — 83880 ASSAY OF NATRIURETIC PEPTIDE: CPT

## 2022-08-16 PROCEDURE — 80048 BASIC METABOLIC PNL TOTAL CA: CPT

## 2022-08-25 ENCOUNTER — HOSPITAL ENCOUNTER (OUTPATIENT)
Age: 67
Setting detail: SPECIMEN
Discharge: HOME | End: 2022-08-25

## 2022-08-25 LAB
ANION GAP SERPL CALCULATED.3IONS-SCNC: 7 MMOL/L (ref 9–17)
BUN BLDV-MCNC: 11 MG/DL (ref 8–23)
CALCIUM SERPL-MCNC: 8.5 MG/DL (ref 8.6–10.4)
CHLORIDE BLD-SCNC: 101 MMOL/L (ref 98–107)
CO2: 32 MMOL/L (ref 20–31)
CREAT SERPL-MCNC: 0.58 MG/DL (ref 0.5–0.9)
GFR AFRICAN AMERICAN: >60 ML/MIN
GFR NON-AFRICAN AMERICAN: >60 ML/MIN
GFR SERPL CREATININE-BSD FRML MDRD: ABNORMAL ML/MIN/{1.73_M2}
GLUCOSE BLD-MCNC: 94 MG/DL (ref 70–99)
HCT VFR BLD CALC: 31.2 % (ref 36.3–47.1)
HEMOGLOBIN: 9.5 G/DL (ref 11.9–15.1)
MCH RBC QN AUTO: 27.5 PG (ref 25.2–33.5)
MCHC RBC AUTO-ENTMCNC: 30.4 G/DL (ref 28.4–34.8)
MCV RBC AUTO: 90.2 FL (ref 82.6–102.9)
NRBC AUTOMATED: 0 PER 100 WBC
PDW BLD-RTO: 16.6 % (ref 11.8–14.4)
PLATELET # BLD: 224 K/UL (ref 138–453)
PMV BLD AUTO: 10.2 FL (ref 8.1–13.5)
POTASSIUM SERPL-SCNC: 3.9 MMOL/L (ref 3.7–5.3)
RBC # BLD: 3.46 M/UL (ref 3.95–5.11)
SODIUM BLD-SCNC: 140 MMOL/L (ref 135–144)
WBC # BLD: 2.4 K/UL (ref 3.5–11.3)

## 2022-08-25 PROCEDURE — 85027 COMPLETE CBC AUTOMATED: CPT

## 2022-08-25 PROCEDURE — P9603 ONE-WAY ALLOW PRORATED MILES: HCPCS

## 2022-08-25 PROCEDURE — 80048 BASIC METABOLIC PNL TOTAL CA: CPT

## 2022-08-25 PROCEDURE — 36415 COLL VENOUS BLD VENIPUNCTURE: CPT

## 2022-10-19 ENCOUNTER — OFFICE VISIT (OUTPATIENT)
Dept: ONCOLOGY | Age: 67
End: 2022-10-19
Payer: COMMERCIAL

## 2022-10-19 ENCOUNTER — TELEPHONE (OUTPATIENT)
Dept: ONCOLOGY | Age: 67
End: 2022-10-19

## 2022-10-19 VITALS
RESPIRATION RATE: 18 BRPM | OXYGEN SATURATION: 98 % | DIASTOLIC BLOOD PRESSURE: 76 MMHG | HEART RATE: 86 BPM | TEMPERATURE: 98.3 F | WEIGHT: 199.6 LBS | BODY MASS INDEX: 33.26 KG/M2 | HEIGHT: 65 IN | SYSTOLIC BLOOD PRESSURE: 109 MMHG

## 2022-10-19 DIAGNOSIS — D72.819 LEUKOPENIA, UNSPECIFIED TYPE: ICD-10-CM

## 2022-10-19 DIAGNOSIS — Z87.891 PERSONAL HISTORY OF NICOTINE DEPENDENCE: ICD-10-CM

## 2022-10-19 DIAGNOSIS — D64.9 ANEMIA, UNSPECIFIED TYPE: ICD-10-CM

## 2022-10-19 DIAGNOSIS — I26.99 PULMONARY EMBOLISM ON RIGHT (HCC): Primary | ICD-10-CM

## 2022-10-19 PROCEDURE — G8399 PT W/DXA RESULTS DOCUMENT: HCPCS | Performed by: INTERNAL MEDICINE

## 2022-10-19 PROCEDURE — G8428 CUR MEDS NOT DOCUMENT: HCPCS | Performed by: INTERNAL MEDICINE

## 2022-10-19 PROCEDURE — 1090F PRES/ABSN URINE INCON ASSESS: CPT | Performed by: INTERNAL MEDICINE

## 2022-10-19 PROCEDURE — 1036F TOBACCO NON-USER: CPT | Performed by: INTERNAL MEDICINE

## 2022-10-19 PROCEDURE — 99211 OFF/OP EST MAY X REQ PHY/QHP: CPT | Performed by: INTERNAL MEDICINE

## 2022-10-19 PROCEDURE — G8484 FLU IMMUNIZE NO ADMIN: HCPCS | Performed by: INTERNAL MEDICINE

## 2022-10-19 PROCEDURE — 3017F COLORECTAL CA SCREEN DOC REV: CPT | Performed by: INTERNAL MEDICINE

## 2022-10-19 PROCEDURE — 99214 OFFICE O/P EST MOD 30 MIN: CPT | Performed by: INTERNAL MEDICINE

## 2022-10-19 PROCEDURE — G8417 CALC BMI ABV UP PARAM F/U: HCPCS | Performed by: INTERNAL MEDICINE

## 2022-10-19 PROCEDURE — 1123F ACP DISCUSS/DSCN MKR DOCD: CPT | Performed by: INTERNAL MEDICINE

## 2022-10-19 RX ORDER — OXYCODONE HYDROCHLORIDE AND ACETAMINOPHEN 5; 325 MG/1; MG/1
1 TABLET ORAL EVERY 6 HOURS PRN
COMMUNITY

## 2022-10-19 RX ORDER — ALENDRONATE SODIUM 70 MG/1
70 TABLET ORAL
COMMUNITY

## 2022-10-19 RX ORDER — CETIRIZINE HYDROCHLORIDE 10 MG/1
10 TABLET ORAL DAILY
COMMUNITY

## 2022-10-19 RX ORDER — DOXYCYCLINE HYCLATE 100 MG/1
100 CAPSULE ORAL 2 TIMES DAILY
COMMUNITY

## 2022-10-19 RX ORDER — SERTRALINE HYDROCHLORIDE 25 MG/1
25 TABLET, FILM COATED ORAL DAILY
COMMUNITY

## 2022-10-19 RX ORDER — BUTALBITAL, ACETAMINOPHEN AND CAFFEINE 50; 325; 40 MG/1; MG/1; MG/1
1 TABLET ORAL EVERY 4 HOURS PRN
COMMUNITY

## 2022-10-19 RX ORDER — BENZONATATE 200 MG/1
200 CAPSULE ORAL 3 TIMES DAILY PRN
COMMUNITY

## 2022-10-19 RX ORDER — TOPIRAMATE 25 MG/1
25 CAPSULE, COATED PELLETS ORAL 2 TIMES DAILY
COMMUNITY

## 2022-10-19 NOTE — PROGRESS NOTES
Fritzi Cushing                                                                                                                  10/19/2022  MRN:   [id number]  YOB: 1955  PCP:                           Lei Heller MD  Referring Physician: No ref. 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 o.female is a  female who is admitted to the hospital for Admitted to hospital with chief complains of shortness of breath. Patient started noticing shortness of breath a week ago. She does have baseline COPD on home O2. Patient has significant history of tobacco dependence. Patient also noted swelling of her right leg. Patient shortness of breath worsened and she presented to the ER. CT chest done shows a large right pulmonary embolism in the main stem pulmonary artery and also in the right upper lobe. Patient continues to smoke cigarettes. Patient has been started on Lovenox. She has been transitioned to Eliquis. Her history includes menorrhagia and hormone birth control premenopausal.   She was fully vaccinated for COVID 05/05/2021. Interim History:    Patient presents to the clinic for a post hospital discharge follow-up visit. Since last office visit patient was hospitalized multiple times with pneumonia, pneumothorax as well as a DVT. CTA from July shows a DVT. Patient has been restarted on anticoagulation and has been tolerating it well. Continues to be on risperidone.     During this visit patient's allergy, social, medical, surgical history and medications were reviewed and updated.     Past Medical History:   Past Medical History:   Diagnosis Date    Abnormal EKG     TRAM (acute kidney injury) ([facility] [address] 2022    Anxiety     Asthma     Bipolar disorder ([facility] [address]     SEVERE IN , [place]    COPD (chronic obstructive pulmonary disease) (HCC)     Cramps, extremity     SEVERE LEG CRAMPS    Depression     Dilated bile duct     Headache     History of elective      Hyperlipidemia     Irritable bowel syndrome     Prolonged emergence from general anesthesia     SEVERE ISSUES WAKING UP    Substance [facility] ([facility]     street drugs when younger    Unspecified sleep apnea     Vision abnormalities     glasses       Past Surgical History:     Past Surgical History:   Procedure Laterality Date    ANKLE SURGERY      HAD SCREWS AND HARDWARE, THEN REMOVED    BRONCHOSCOPY  2022         COLONOSCOPY  02/15/2018    tubular adenoma    EYE SURGERY Bilateral     CATARACTS    HYSTEROSCOPY  10/19/2016    D & C    INDUCED       LASIK      bilateral    TONSILLECTOMY AND ADENOIDECTOMY Bilateral     VULVA SURGERY      HAD A BIOPSY AND REMOVAL OF       Patient Family Social History:     Social History     Socioeconomic History    Marital status:    Tobacco Use    Smoking status: Former     Packs/day: 2.00     Years: 42.00     Pack years: 84.00     Types: Cigarettes     Quit date: 2018     Years since quitting: 3.9    Smokeless tobacco: Never   Substance and Sexual Activity    Alcohol use: Yes     Comment: yearly    Drug use: No    Sexual activity: Not Currently     Partners: Male     Birth control/protection: Post-menopausal     Family History   Problem Relation Age of Onset    Dementia Maternal Aunt     Kidney Disease Mother     Heart Attack Sister     Prostate Cancer Father     High Cholesterol Brother     Heart Attack Paternal Grandmother        Current Medications:     Current Outpatient Medications   Medication Sig Dispense 400 MG/5ML suspension Take 30 mLs by mouth daily as needed for Constipation (at bedtime if no BM in 3 days)      polyethylene glycol (GLYCOLAX) 17 GM/SCOOP powder Take 17 g by mouth daily as needed (constipation)      omeprazole (PRILOSEC) 20 MG delayed release capsule Take 20 mg by mouth daily      ondansetron (ZOFRAN) 4 MG tablet Take 4 mg by mouth every 6 hours as needed for Nausea or Vomiting      hydrocortisone 1 % cream Apply topically every 6 hours as needed (hemorrhoids)      phenylephrine-cocoa butter (PREPARATION H) 0.25-88.44 % Place 1 suppository rectally every 6 hours as needed for Hemorrhoids      senna-docusate (PERICOLACE) 8.6-50 MG per tablet Take 2 tablets by mouth daily      montelukast (SINGULAIR) 10 MG tablet Take 10 mg by mouth nightly      glucose (GLUTOSE) 40 % GEL Take 37.5 mLs by mouth as needed (low blood glucose) 45 g 1    risperiDONE (RISPERDAL) 0.5 MG tablet Take 3 tablets by mouth every 12 hours 60 tablet 3    furosemide (LASIX) 20 MG tablet Take 1 tablet by mouth 2 times daily 60 tablet 3    folic acid (FOLVITE) 1 MG tablet Take 1 tablet by mouth daily 30 tablet 3    famotidine (PEPCID) 20 MG tablet Take 1 tablet by mouth 2 times daily 60 tablet 3    docusate sodium (COLACE) 100 MG capsule take 1 capsule by mouth twice a day 60 capsule 3    SPIRIVA RESPIMAT 2.5 MCG/ACT AERS inhaler inhale 2 puffs INTO THE LUNGS once daily 4 g 1    BREO ELLIPTA 100-25 MCG/INH AEPB inhaler Inhale 1 puff into the lungs daily       fluticasone (FLONASE) 50 MCG/ACT nasal spray 2 sprays by Nasal route daily 1 Bottle 3    traZODone (DESYREL) 50 MG tablet Take 50 mg by mouth nightly        No current facility-administered medications for this visit.        Allergies:   Advil [ibuprofen], Aleve [naproxen], Antipyrine, Celecoxib, Codeine, Fomepizole, Incruse ellipta [umeclidinium bromide], Other, Rofecoxib, Salicylates, Strawberry extract, Sulfinpyrazone, Aspirin, and Nsaids    Review of Systems: Constitutional: No fever or chills. No night sweats, no weight loss   Eyes: No eye discharge, double vision, or eye pain   HEENT: negative for sore mouth, sore throat, hoarseness and voice change   Respiratory: negative for cough , sputum, dyspnea, wheezing, hemoptysis, chest pain   Cardiovascular: negative for chest pain, dyspnea, palpitations, orthopnea, PND   Gastrointestinal: negative for nausea, vomiting, diarrhea, constipation, abdominal pain, Dysphagia, hematemesis and hematochezia   Genitourinary: negative for frequency, dysuria, nocturia, urinary incontinence, and hematuria   Integument: negative for rash, skin lesions, bruises.    Hematologic/Lymphatic: negative for easy bruising, bleeding, lymphadenopathy, or petechiae   Endocrine: negative for heat or cold intolerance,weight changes, change in bowel habits and hair loss   Musculoskeletal: negative for myalgias, arthralgias, pain, joint swelling,and bone pain   Neurological: negative for headaches, dizziness, seizures, weakness, numbness        Physical Exam:    Vitals: /76   Pulse 86   Temp 98.3 °F (36.8 °C) (Oral)   Resp 18   Ht 5' 5\" (1.651 m)   Wt 199 lb 9.6 oz (90.5 kg) Comment: 10/13/22 @ [facility]  LMP 05/20/2013 (Exact Date)   SpO2 98% Comment: 3l/NC  BMI 33.22 kg/m²   General appearance - well appearing, no in pain or distress  Mental status - AAO X3  Eyes - pupils equal and reactive, extraocular eye movements intact  Mouth - mucous membranes moist, pharynx normal without lesions  Neck - supple, no significant adenopathy  Lymphatics - no palpable lymphadenopathy, no hepatosplenomegaly  Chest - clear to auscultation, no wheezes, rales or rhonchi, symmetric air entry  Heart - normal rate, regular rhythm, normal S1, S2, no murmurs  Abdomen - soft, nontender, nondistended, no masses or organomegaly  Neurological - alert, oriented, normal speech, no focal findings or movement disorder noted  Extremities - peripheral pulses normal, no pedal edema, no clubbing or cyanosis  Skin - normal coloration and turgor, no rashes, no suspicious skin lesions noted       DATA:  Lab Results   Component Value Date    WBC 2.4 (L) 08/25/2022    HGB 9.5 (L) 08/25/2022    HCT 31.2 (L) 08/25/2022    MCV 90.2 08/25/2022     08/25/2022       Chemistry        Component Value Date/Time     08/25/2022 0724    K 3.9 08/25/2022 0724     08/25/2022 0724    CO2 32 (H) 08/25/2022 0724    BUN 11 08/25/2022 0724    CREATININE 0.58 08/25/2022 0724        Component Value Date/Time    CALCIUM 8.5 (L) 08/25/2022 0724    ALKPHOS 92 06/30/2022 1531    AST 13 06/30/2022 1531    ALT 12 06/30/2022 1531    BILITOT 0.18 (L) 06/30/2022 1531        Impression   Emphysematous changes. No evidence of significant change when compared to the previous study of June 17, 2021. Ill-defined 19 mm solid nodular irregular opacity in the superior segment of   the right lower lobe does not appear significantly changed since the previous   study or the study in 2019. Recommend continued lung cancer screening. Impression:  PE, DVT, provoked from smoking and sedentary lifestyle-6/2021  Eliquis-6/21 1 through 12/21  Recurrent DVT, provoked from illness in decreased mobility-6/2022  Eliquis restarted  Leukopenia secondary to risperidone  Anemia  HX smoking, quit  HX COPD, on oxygen    Plan:  Personally reviewed results of lab work-up and other relevant clinical data. Patient has recurrent VTE even though I believe it is provoked considering patient's risk factors and history of recurrent VTE I believe patient will benefit from long-term anticoagulation. She is tolerating Eliquis well. Continue Eliquis  Continue monitor cell counts. Prior work-up suggested leukopenia secondary risperidone. Patient continues to be on risperidone for mental illness treatment. Patient testing for factor V Leyden mutation and prothrombin gene mutation is negative.   Patient was counseled on risk factor associated venous thromboembolism. Continue age-appropriate screening studies  Cytopenias likely secondary to risperidone. Recommend continued surveillance of cell counts  Recommend annual CT screening for lung  Return to clinic in 6 months. Tere Gifford MD    This note is created with the assistance of a speech recognition program.  While intending to generate a document that actually reflects the content of the visit, the document can still have some errors including those of syntax and sound a like substitutions which may escape proof reading. It such instances, actual meaning can be extrapolated by contextual diversion.

## 2022-10-19 NOTE — TELEPHONE ENCOUNTER
AVS from 10/19/22      Rv in 6 monhts with labs prior     Rv scheduled for 4/26 @ 1:30 pm     Pt will have labs drawn one week prior to RV    Pt was given AVS and appointment schedule    Electronically signed by Gm Downey on 10/19/2022 at 1:36 PM

## 2022-11-15 ENCOUNTER — HOSPITAL ENCOUNTER (OUTPATIENT)
Age: 67
Setting detail: SPECIMEN
Discharge: HOME | End: 2022-11-15
Payer: MEDICAID

## 2022-11-15 LAB
ANION GAP SERPL CALCULATED.3IONS-SCNC: 5 MMOL/L (ref 9–17)
BUN BLDV-MCNC: 16 MG/DL (ref 8–23)
CALCIUM SERPL-MCNC: 8.6 MG/DL (ref 8.6–10.4)
CHLORIDE BLD-SCNC: 103 MMOL/L (ref 98–107)
CO2: 30 MMOL/L (ref 20–31)
CREAT SERPL-MCNC: 0.47 MG/DL (ref 0.5–0.9)
GFR SERPL CREATININE-BSD FRML MDRD: >60 ML/MIN/1.73M2
GLUCOSE BLD-MCNC: 83 MG/DL (ref 70–99)
HCT VFR BLD CALC: 30.3 % (ref 36.3–47.1)
HEMOGLOBIN: 8.7 G/DL (ref 11.9–15.1)
MCH RBC QN AUTO: 26.9 PG (ref 25.2–33.5)
MCHC RBC AUTO-ENTMCNC: 28.7 G/DL (ref 28.4–34.8)
MCV RBC AUTO: 93.8 FL (ref 82.6–102.9)
NRBC AUTOMATED: 0 PER 100 WBC
PDW BLD-RTO: 18.1 % (ref 11.8–14.4)
PLATELET # BLD: 379 K/UL (ref 138–453)
PMV BLD AUTO: 9.9 FL (ref 8.1–13.5)
POTASSIUM SERPL-SCNC: 3.9 MMOL/L (ref 3.7–5.3)
PRO-BNP: 138 PG/ML
RBC # BLD: 3.23 M/UL (ref 3.95–5.11)
SODIUM BLD-SCNC: 138 MMOL/L (ref 135–144)
WBC # BLD: 4.1 K/UL (ref 3.5–11.3)

## 2022-11-15 PROCEDURE — 85027 COMPLETE CBC AUTOMATED: CPT

## 2022-11-15 PROCEDURE — 36415 COLL VENOUS BLD VENIPUNCTURE: CPT

## 2022-11-15 PROCEDURE — P9603 ONE-WAY ALLOW PRORATED MILES: HCPCS

## 2022-11-15 PROCEDURE — 83880 ASSAY OF NATRIURETIC PEPTIDE: CPT

## 2022-11-15 PROCEDURE — 80048 BASIC METABOLIC PNL TOTAL CA: CPT

## 2022-12-12 ENCOUNTER — TELEPHONE (OUTPATIENT)
Dept: INFUSION THERAPY | Age: 67
End: 2022-12-12

## 2022-12-12 NOTE — TELEPHONE ENCOUNTER
Pt daughter phoned and wanted us to know her mother is at [facility]. They did doppler studies on her and it shows she has blood clots and they would like her to see Vascular. She would like to make sure Dr. Ru Mcrae knows and agrees with this. Instructed her to have [facility] contact Dr Ru Mcrae to see what he would like to do. Karina verbalizes understanding and will pass it on to the [place]. [facility] called and spoke to Dr Ru Mcrae. Scans copied and will have Dr Ru Mcrae review them on 12/13 per his conversation with [facility].

## 2022-12-30 ENCOUNTER — HOSPITAL ENCOUNTER (OUTPATIENT)
Age: 67
Discharge: HOME | End: 2022-12-30
Payer: MEDICAID

## 2022-12-30 ENCOUNTER — OFFICE VISIT (OUTPATIENT)
Dept: ONCOLOGY | Age: 67
End: 2022-12-30
Payer: MEDICAID

## 2022-12-30 VITALS
BODY MASS INDEX: 36.18 KG/M2 | WEIGHT: 217.4 LBS | DIASTOLIC BLOOD PRESSURE: 79 MMHG | TEMPERATURE: 96.9 F | SYSTOLIC BLOOD PRESSURE: 123 MMHG | HEART RATE: 87 BPM

## 2022-12-30 DIAGNOSIS — I26.99 PULMONARY EMBOLISM ON RIGHT (HCC): ICD-10-CM

## 2022-12-30 DIAGNOSIS — Z86.718 HISTORY OF DVT (DEEP VEIN THROMBOSIS): Primary | ICD-10-CM

## 2022-12-30 DIAGNOSIS — J98.4 CAVITARY LESION OF LUNG: ICD-10-CM

## 2022-12-30 DIAGNOSIS — D72.819 LEUKOPENIA, UNSPECIFIED TYPE: ICD-10-CM

## 2022-12-30 DIAGNOSIS — D72.819 LEUKOPENIA, UNSPECIFIED TYPE: Primary | ICD-10-CM

## 2022-12-30 DIAGNOSIS — D64.9 ANEMIA, UNSPECIFIED TYPE: ICD-10-CM

## 2022-12-30 LAB
ABSOLUTE EOS #: 0.2 K/UL (ref 0–0.4)
ABSOLUTE LYMPH #: 0.7 K/UL (ref 1–4.8)
ABSOLUTE MONO #: 0.4 K/UL (ref 0.1–1.2)
ALBUMIN SERPL-MCNC: 3.4 G/DL (ref 3.5–5.2)
ALBUMIN/GLOBULIN RATIO: 0.9 (ref 1–2.5)
ALP BLD-CCNC: 96 U/L (ref 35–104)
ALT SERPL-CCNC: 8 U/L (ref 5–33)
ANION GAP SERPL CALCULATED.3IONS-SCNC: 8 MMOL/L (ref 9–17)
AST SERPL-CCNC: 11 U/L
BASOPHILS # BLD: 0 % (ref 0–2)
BASOPHILS ABSOLUTE: 0 K/UL (ref 0–0.2)
BILIRUB SERPL-MCNC: 0.2 MG/DL (ref 0.3–1.2)
BUN BLDV-MCNC: 13 MG/DL (ref 8–23)
CALCIUM SERPL-MCNC: 8.8 MG/DL (ref 8.6–10.4)
CHLORIDE BLD-SCNC: 101 MMOL/L (ref 98–107)
CO2: 31 MMOL/L (ref 20–31)
CREAT SERPL-MCNC: 0.45 MG/DL (ref 0.5–0.9)
EOSINOPHILS RELATIVE PERCENT: 5 % (ref 1–4)
GFR SERPL CREATININE-BSD FRML MDRD: >60 ML/MIN/1.73M2
GLUCOSE BLD-MCNC: 128 MG/DL (ref 70–99)
HCT VFR BLD CALC: 33.7 % (ref 36–46)
HEMOGLOBIN: 10.4 G/DL (ref 12–16)
LYMPHOCYTES # BLD: 19 % (ref 24–44)
MCH RBC QN AUTO: 27.3 PG (ref 26–34)
MCHC RBC AUTO-ENTMCNC: 31 G/DL (ref 31–37)
MCV RBC AUTO: 87.9 FL (ref 80–100)
MONOCYTES # BLD: 9 % (ref 2–11)
PDW BLD-RTO: 18.1 % (ref 12.5–15.4)
PLATELET # BLD: 331 K/UL (ref 140–450)
PMV BLD AUTO: 6.8 FL (ref 6–12)
POTASSIUM SERPL-SCNC: 3.9 MMOL/L (ref 3.7–5.3)
RBC # BLD: 3.83 M/UL (ref 4–5.2)
SEG NEUTROPHILS: 67 % (ref 36–66)
SEGMENTED NEUTROPHILS ABSOLUTE COUNT: 2.6 K/UL (ref 1.8–7.7)
SODIUM BLD-SCNC: 140 MMOL/L (ref 135–144)
TOTAL PROTEIN: 7.2 G/DL (ref 6.4–8.3)
WBC # BLD: 4 K/UL (ref 3.5–11)

## 2022-12-30 PROCEDURE — 80053 COMPREHEN METABOLIC PANEL: CPT

## 2022-12-30 PROCEDURE — 83550 IRON BINDING TEST: CPT

## 2022-12-30 PROCEDURE — 85025 COMPLETE CBC W/AUTO DIFF WBC: CPT

## 2022-12-30 PROCEDURE — 82728 ASSAY OF FERRITIN: CPT

## 2022-12-30 PROCEDURE — 99211 OFF/OP EST MAY X REQ PHY/QHP: CPT | Performed by: INTERNAL MEDICINE

## 2022-12-30 PROCEDURE — 82746 ASSAY OF FOLIC ACID SERUM: CPT

## 2022-12-30 PROCEDURE — 82607 VITAMIN B-12: CPT

## 2022-12-30 PROCEDURE — 36415 COLL VENOUS BLD VENIPUNCTURE: CPT

## 2022-12-30 PROCEDURE — 83540 ASSAY OF IRON: CPT

## 2022-12-30 NOTE — PROGRESS NOTES
Freddie Stapleton                                                                                                                  12/30/2022  MRN:   [id number]  YOB: 1955  PCP:                           Drew Hernandez MD  Referring Physician: No ref. 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 o.female is a  female who is admitted to the hospital for Admitted to hospital with chief complains of shortness of breath. Patient started noticing shortness of breath a week ago. She does have baseline COPD on home O2. Patient has significant history of tobacco dependence. Patient also noted swelling of her right leg. Patient shortness of breath worsened and she presented to the ER. CT chest done shows a large right pulmonary embolism in the main stem pulmonary artery and also in the right upper lobe. Patient continues to smoke cigarettes. Patient has been started on Lovenox. She has been transitioned to Eliquis. Her history includes menorrhagia and hormone birth control premenopausal.   She was fully vaccinated for COVID 05/05/2021. Interim History:    Patient presents to the clinic for follow up with anticoagulation and lab work.  She is currently residing in nursing facility and is planning longer term stay with plan to move to different facility, she has not been receiving therapy services and her ability to walk has declined. She has been taking Eliquis as directed with no negative effects. She has had ongoing leg pain and had recent US doppler study. She is using compressive stockings daily and taking Lasix, swelling persists and painful to touch, stable. She remains on 4L of oxygen and continues to follow with pulmonology. She has been referred to vascular for consultation. She has had right shoulder pain affecting her range of motion, she denies any bruising. During this visit patient's allergy, social, medical, surgical history and medications were reviewed and updated.     Past Medical History:   Past Medical History:   Diagnosis Date    Abnormal EKG     TRAM (acute kidney injury) ([facility] [address] 2022    Anxiety     Asthma     Bipolar disorder ([facility] [address]     SEVERE IN , [place]    COPD (chronic obstructive pulmonary disease) (HCC)     Cramps, extremity     SEVERE LEG CRAMPS    Depression     Dilated bile duct     Headache     History of elective      Hyperlipidemia     Irritable bowel syndrome     Prolonged emergence from general anesthesia     SEVERE ISSUES WAKING UP    Substance abuse ([facility] [address]     street drugs when younger    Unspecified sleep apnea     Vision abnormalities     glasses       Past Surgical History:     Past Surgical History:   Procedure Laterality Date    ANKLE SURGERY      HAD SCREWS AND HARDWARE, THEN REMOVED    BRONCHOSCOPY  2022         COLONOSCOPY  02/15/2018    tubular adenoma    EYE SURGERY Bilateral     CATARACTS    HYSTEROSCOPY  10/19/2016    D & C    INDUCED       LASIK      bilateral    TONSILLECTOMY AND ADENOIDECTOMY Bilateral     VULVA SURGERY      HAD A BIOPSY AND REMOVAL OF       Patient Family Social History:     Social History     Socioeconomic History    Marital status:      Spouse name: None    Number of children: None    Years of education: None    Highest education level: None   Tobacco Use    Smoking status: Former     Packs/day: 2.00     Years: 42.00     Pack years: 84.00     Types: Cigarettes     Quit date: 2018     Years since quittin.1    Smokeless tobacco: Never   Substance and Sexual Activity    Alcohol use: Yes     Comment: yearly    Drug use: No    Sexual activity: Not Currently     Partners: Male     Birth control/protection: Post-menopausal     Family History   Problem Relation Age of Onset    Dementia Maternal Aunt     Kidney Disease Mother     Heart Attack Sister     Prostate Cancer Father     High Cholesterol Brother     Heart Attack Paternal Grandmother        Current Medications:     Current Outpatient Medications   Medication Sig Dispense Refill    alendronate (FOSAMAX) 70 MG tablet Take 70 mg by mouth every 7 days Every Monday      benzonatate (TESSALON) 200 MG capsule Take 200 mg by mouth 3 times daily as needed for Cough      butalbital-acetaminophen-caffeine (FIORICET, ESGIC) -40 MG per tablet Take 1 tablet by mouth every 4 hours as needed for Headaches      cetirizine (ZYRTEC) 10 MG tablet Take 10 mg by mouth daily      doxycycline hyclate (VIBRAMYCIN) 100 MG capsule Take 100 mg by mouth 2 times daily For 7 days for lung infection      oxyCODONE-acetaminophen (PERCOCET) 5-325 MG per tablet Take 1 tablet by mouth every 6 hours as needed for Pain.       topiramate (TOPAMAX SPRINKLE) 25 MG capsule Take 25 mg by mouth 2 times daily      sertraline (ZOLOFT) 25 MG tablet Take 25 mg by mouth daily      hydrocortisone (ANUSOL-HC) 2.5 % CREA rectal cream Use topically every 6 hours as needed 84 g 2    apixaban (ELIQUIS) 5 MG TABS tablet Take 1 tablet by mouth 2 times daily 60 tablet 0    albuterol sulfate HFA (VENTOLIN HFA) 108 (90 Base) MCG/ACT inhaler Inhale 2 puffs into the lungs every 4 hours as needed for Wheezing 18 g 3    albuterol (PROVENTIL) (2.5 MG/3ML) 0.083% nebulizer solution Take 3 mLs by nebulization every 4 hours (Patient taking differently: Take by nebulization every 4 hours) 120 each 3    acetaminophen (TYLENOL) 325 MG tablet Take 650 mg by mouth every 6 hours as needed for Pain or Fever      atorvastatin (LIPITOR) 20 MG tablet Take 20 mg by mouth at bedtime      guaiFENesin (MUCINEX) 600 MG extended release tablet Take 600 mg by mouth 2 times daily      insulin lispro, 1 Unit Dial, (HUMALOG/ADMELOG) 100 UNIT/ML SOPN Inject into the skin 4 times daily (before meals and nightly) Per sliding scale:  151-200 = 2 units  201-250 = 4 units  251-300 = 6 units  301-350 = 8 units  351-400 = 10 units      lidocaine 4 % external patch Place 1 patch onto the skin daily as needed (lower back pain)       magnesium hydroxide (MILK OF MAGNESIA) 400 MG/5ML suspension Take 30 mLs by mouth daily as needed for Constipation (at bedtime if no BM in 3 days)      polyethylene glycol (GLYCOLAX) 17 GM/SCOOP powder Take 17 g by mouth daily as needed (constipation)      omeprazole (PRILOSEC) 20 MG delayed release capsule Take 20 mg by mouth daily      ondansetron (ZOFRAN) 4 MG tablet Take 4 mg by mouth every 6 hours as needed for Nausea or Vomiting      hydrocortisone 1 % cream Apply topically every 6 hours as needed (hemorrhoids)      phenylephrine-cocoa butter (PREPARATION H) 0.25-88.44 % Place 1 suppository rectally every 6 hours as needed for Hemorrhoids      senna-docusate (PERICOLACE) 8.6-50 MG per tablet Take 2 tablets by mouth daily      montelukast (SINGULAIR) 10 MG tablet Take 10 mg by mouth nightly      glucose (GLUTOSE) 40 % GEL Take 37.5 mLs by mouth as needed (low blood glucose) 45 g 1    risperiDONE (RISPERDAL) 0.5 MG tablet Take 3 tablets by mouth every 12 hours 60 tablet 3    furosemide (LASIX) 20 MG tablet Take 1 tablet by mouth 2 times daily 60 tablet 3    folic acid (FOLVITE) 1 MG tablet Take 1 tablet by mouth daily 30 tablet 3    famotidine (PEPCID) 20 MG tablet Take 1 tablet by mouth 2 times daily 60 tablet 3    docusate sodium (COLACE) 100 MG capsule take 1 capsule by mouth twice a day 60 capsule 3    SPIRIVA RESPIMAT 2.5 MCG/ACT AERS inhaler inhale 2 puffs INTO THE LUNGS once daily 4 g 1    BREO ELLIPTA 100-25 MCG/INH AEPB inhaler Inhale 1 puff into the lungs daily       traZODone (DESYREL) 50 MG tablet Take 50 mg by mouth nightly       fluticasone (FLONASE) 50 MCG/ACT nasal spray 2 sprays by Nasal route daily 1 Bottle 3     No current facility-administered medications for this visit. Allergies:   Advil [ibuprofen], Aleve [naproxen], Antipyrine, Celecoxib, Codeine, Fomepizole, Incruse ellipta [umeclidinium bromide], Other, Rofecoxib, Salicylates, Strawberry extract, Sulfinpyrazone, Aspirin, and Nsaids    Review of Systems:    Constitutional: No fever or chills. No night sweats, no weight loss   Eyes: No eye discharge, double vision, or eye pain   HEENT: negative for sore mouth, sore throat, hoarseness and voice change   Respiratory: negative for cough , sputum, dyspnea, wheezing, hemoptysis, chest pain   Cardiovascular: negative for chest pain, dyspnea, palpitations, orthopnea, PND   Gastrointestinal: negative for nausea, vomiting, diarrhea, constipation, abdominal pain, Dysphagia, hematemesis and hematochezia   Genitourinary: negative for frequency, dysuria, nocturia, urinary incontinence, and hematuria   Integument: negative for rash, skin lesions, bruises.    Hematologic/Lymphatic: negative for easy bruising, bleeding, lymphadenopathy, or petechiae   Endocrine: negative for heat or cold intolerance,weight changes, change in bowel habits and hair loss   Musculoskeletal: negative for myalgias, arthralgias, joint swelling,and bone pain +bilateral leg pain +right shoulder pain limiting motion   Neurological: negative for headaches, dizziness, seizures, weakness, numbness        Physical Exam:    Vitals: /79   Pulse 87   Temp 96.9 °F (36.1 °C) (Temporal)   Wt 217 lb 6.4 oz (98.6 kg)   LMP 05/20/2013 (Exact Date)   BMI 36.18 kg/m²   General appearance - well appearing, no in pain or distress  Mental status - AAO X3  Eyes - pupils equal and reactive, extraocular eye movements intact  Mouth - mucous membranes moist, pharynx normal without lesions  Neck - supple, no significant adenopathy  Lymphatics - no palpable lymphadenopathy, no hepatosplenomegaly  Chest - clear to auscultation, no wheezes, rales or rhonchi, symmetric air entry  Heart - normal rate, regular rhythm, normal S1, S2, no murmurs  Abdomen - soft, nontender, nondistended, no masses or organomegaly  Neurological - alert, oriented, normal speech, no focal findings or movement disorder noted  Extremities - peripheral pulses normal, no pedal edema, no clubbing or cyanosis +right shoulder point tenderness, unable to lift arm +right leg edema - upper   Skin - normal coloration and turgor, no rashes, no suspicious skin lesions noted       DATA:  Lab Results   Component Value Date    WBC 4.0 12/30/2022    HGB 10.4 (L) 12/30/2022    HCT 33.7 (L) 12/30/2022    MCV 87.9 12/30/2022     12/30/2022       Chemistry        Component Value Date/Time     12/30/2022 1448    K 3.9 12/30/2022 1448     12/30/2022 1448    CO2 31 12/30/2022 1448    BUN 13 12/30/2022 1448    CREATININE 0.45 (L) 12/30/2022 1448        Component Value Date/Time    CALCIUM 8.8 12/30/2022 1448    ALKPHOS 96 12/30/2022 1448    AST 11 12/30/2022 1448    ALT 8 12/30/2022 1448    BILITOT 0.2 (L) 12/30/2022 1448                Impression:  PE, DVT, provoked from smoking and sedentary lifestyle-6/2021  Eliquis-6/21 1 through 12/21  Recurrent DVT, provoked from illness in decreased mobility-6/2022  Eliquis restarted  Leukopenia secondary to risperidone  Anemia  HX smoking, quit  HX COPD, on oxygen    Plan:  Her lab work was reviewed, she is anemic, counts and electrolytes in range otherwise. We discussed her recent doppler study which showed DVT but without comparison we are unsure if acute or chronic.  I explained differentiation is important for ongoing management and that her pain can be related to either scenario. We will plan to continue with Eliquis unchanged. We discussed follow up with pulmonology. We reviewed plan for vascular consultation. I am ordering CT of the chest to assess. Return in 3 weeks. Scribe Attestation   This note was created by Cristiana Waite acting as scribe for the physician signing this note  Electronically Signed  Cristiana Waite, 12/30/2022  Scribe, [employer]. Attending Attestation   Note was reviewed and edited. I am in agreement with the note as entered    Belkys Brandon MD      This note is created with the assistance of a speech recognition program.  While intending to generate a document that actually reflects the content of the visit, the document can still have some errors including those of syntax and sound a like substitutions which may escape proof reading. It such instances, actual meaning can be extrapolated by contextual diversion.

## 2022-12-31 LAB
FERRITIN: 35 NG/ML (ref 13–150)
FOLATE: >20 NG/ML
IRON SATURATION: 12 % (ref 20–55)
IRON: 35 UG/DL (ref 37–145)
TOTAL IRON BINDING CAPACITY: 297 UG/DL (ref 250–450)
UNSATURATED IRON BINDING CAPACITY: 262 UG/DL (ref 112–347)
VITAMIN B-12: 898 PG/ML (ref 232–1245)

## 2023-01-03 ENCOUNTER — TELEPHONE (OUTPATIENT)
Dept: ONCOLOGY | Age: 68
End: 2023-01-03

## 2023-01-03 NOTE — TELEPHONE ENCOUNTER
AVS from 12/30/22      Ct chest   Rv on 1/20       Ct scheduled for 1/9 @ 2:30 pm     Rv scheduled for 1/20 @ 2:30 pm     Pt was given AVS and appointment schedule    Electronically signed by Guadalupe Meza on 1/3/2023 at 7:50 AM

## 2023-01-10 ENCOUNTER — INITIAL CONSULT (OUTPATIENT)
Dept: VASCULAR SURGERY | Age: 68
End: 2023-01-10
Payer: MEDICAID

## 2023-01-10 VITALS
DIASTOLIC BLOOD PRESSURE: 65 MMHG | RESPIRATION RATE: 17 BRPM | OXYGEN SATURATION: 100 % | SYSTOLIC BLOOD PRESSURE: 111 MMHG | BODY MASS INDEX: 36.15 KG/M2 | HEART RATE: 76 BPM | HEIGHT: 65 IN | TEMPERATURE: 97.6 F | WEIGHT: 217 LBS

## 2023-01-10 DIAGNOSIS — I82.403 LEG DVT (DEEP VENOUS THROMBOEMBOLISM), ACUTE, BILATERAL (HCC): Primary | ICD-10-CM

## 2023-01-10 DIAGNOSIS — I26.99 PULMONARY EMBOLISM ON RIGHT (HCC): ICD-10-CM

## 2023-01-10 PROCEDURE — 1123F ACP DISCUSS/DSCN MKR DOCD: CPT | Performed by: STUDENT IN AN ORGANIZED HEALTH CARE EDUCATION/TRAINING PROGRAM

## 2023-01-10 PROCEDURE — 99204 OFFICE O/P NEW MOD 45 MIN: CPT | Performed by: STUDENT IN AN ORGANIZED HEALTH CARE EDUCATION/TRAINING PROGRAM

## 2023-01-10 ASSESSMENT — ENCOUNTER SYMPTOMS
CHEST TIGHTNESS: 0
VOICE CHANGE: 0
COLOR CHANGE: 0
EYE PAIN: 0
TROUBLE SWALLOWING: 0
ABDOMINAL PAIN: 0
ABDOMINAL DISTENTION: 0
VOMITING: 0
COUGH: 0

## 2023-01-10 NOTE — PROGRESS NOTES
[place] [id number] [facility] [address] [place] [id number]  Dept: [phone]     Patient: Judith Donnelly  : 1955  MRN: [id number]  DOS: 1/10/2023    HPI:  Judith Donnelly is a 74 y.o. female who comes to the office regarding lower extremity swelling, right worse than left. Currently uses wheelchair. She has history of PE and DVT involving prolong ICU care at [facility] about 6 months ago. Has not been able to walk since. Is on life long anticoagulation. Recent past history of tobacco use. Requires several liters of oxygen. No claudication, rest pain or nonhealing lower extremity wounds. She underwent a lower extremity venous duplex on 2022. I reviewed these images myself, she had bilateral DVT of the common femoral, femoral and popliteal veins. Since hospital discharge has been at a SNF. Already owns a pair of compression stockings, uses them inconsistently.     Past Medical History:   Diagnosis Date    Abnormal EKG     TRAM (acute kidney injury) ([address] 2022    Anxiety     Asthma     Bipolar disorder ([address]     SEVERE IN , [place]    COPD (chronic obstructive pulmonary disease) (HCC)     Cramps, extremity     SEVERE LEG CRAMPS    Depression     Dilated bile duct     Headache     History of elective      Hyperlipidemia     Irritable bowel syndrome     Prolonged emergence from general anesthesia     SEVERE ISSUES WAKING UP    Substance abuse ([address]     street drugs when younger    Unspecified sleep apnea     Vision abnormalities     glasses     Family History   Problem Relation Age of Onset    Dementia Maternal Aunt     Kidney Disease Mother     Heart Attack Sister     Prostate Cancer Father     High Cholesterol Brother     Heart Attack Paternal Grandmother       Social History     Socioeconomic History    Marital status:      Spouse name: Not on file    Number of children: Not on file    Years of education: Not on file    Highest education level: Not on file   Occupational History    Not on file   Tobacco Use    Smoking status: Former     Packs/day: 2.00     Years: 42.00     Pack years: 84.00     Types: Cigarettes     Quit date: 2018     Years since quittin.1    Smokeless tobacco: Never   Substance and Sexual Activity    Alcohol use: Yes     Comment: yearly    Drug use: No    Sexual activity: Not Currently     Partners: Male     Birth control/protection: Post-menopausal   Other Topics Concern    Not on file   Social History Narrative    Not on file     Social Determinants of Health     Financial Resource Strain: Not on file   Food Insecurity: Not on file   Transportation Needs: Not on file   Physical Activity: Not on file   Stress: Not on file   Social Connections: Not on file   Intimate Partner Violence: Not on file   Housing Stability: Not on file      Past Surgical History:   Procedure Laterality Date    ANKLE SURGERY      HAD SCREWS AND HARDWARE, [facility]  2022         COLONOSCOPY  02/15/2018    tubular adenoma    EYE SURGERY Bilateral     CATARACTS    HYSTEROSCOPY  10/19/2016    D & C    INDUCED       LASIK      bilateral    TONSILLECTOMY AND ADENOIDECTOMY Bilateral     VULVA SURGERY      HAD A BIOPSY AND REMOVAL OF      Review of Systems   Constitutional:  Negative for activity change, fever and unexpected weight change. HENT:  Negative for trouble swallowing and voice change. Eyes:  Negative for pain and visual disturbance. Respiratory:  Negative for cough and chest tightness. Requires oxygen for shortness of breath   Cardiovascular:  Positive for leg swelling. Negative for chest pain and palpitations. Gastrointestinal:  Negative for abdominal distention, abdominal pain and vomiting. Endocrine: Negative for cold intolerance and heat intolerance. Genitourinary:  Negative for dysuria, flank pain and hematuria. Musculoskeletal:  Negative for joint swelling and neck pain. Skin:  Negative for color change and rash. Allergic/Immunologic: Negative for immunocompromised state. Neurological:  Negative for syncope, speech difficulty, weakness, numbness and headaches. Hematological:  Negative for adenopathy. Psychiatric/Behavioral:  Negative for behavioral problems and suicidal ideas. Vitals:    01/10/23 1155   BP: 111/65   Site: Left Upper Arm   Position: Sitting   Cuff Size: Medium Adult   Pulse: 76   Resp: 17   Temp: 97.6 °F (36.4 °C)   TempSrc: Temporal   SpO2: 100%   Weight: 217 lb (98.4 kg)   Height: 5' 5\" (1.651 m)          Physical Exam  Constitutional:       General: She is not in acute distress. HENT:      Mouth/Throat:      Mouth: Mucous membranes are moist.      Pharynx: Oropharynx is clear. Eyes:      General: No scleral icterus. Extraocular Movements: Extraocular movements intact. Conjunctiva/sclera: Conjunctivae normal.   Cardiovascular:      Rate and Rhythm: Normal rate and regular rhythm. Heart sounds: No murmur heard. Comments: Has bilateral palpable pedal pulses. No out of proportion leg swelling, mild bilateral leg swelling. Motor/sensory intact in both feet. Patient has trouble moving her bilateral ankles. Pulmonary:      Effort: No respiratory distress. Breath sounds: No rales. Abdominal:      General: There is no distension. Palpations: There is no mass. Tenderness: There is no abdominal tenderness. There is no guarding. Musculoskeletal:      Cervical back: No rigidity or tenderness. Lymphadenopathy:      Cervical: No cervical adenopathy. Skin:     Coloration: Skin is not jaundiced. Findings: No rash. Neurological:      General: No focal deficit present. Mental Status: She is alert and oriented to person, place, and time. Cranial Nerves: No cranial nerve deficit.    Psychiatric:         Mood and Affect: Mood normal. Assessment:  1. Leg DVT (deep venous thromboembolism), acute, bilateral ([address]    2. Pulmonary embolism on right [facility])      Plan:  Ms. Sabrina Thomason is a 77-year-old female with history of multiple DVTs and PE on lifelong anticoagulation. Its been 6 months since her last DVT and at this point there are chronic DVTs. I do not recommend any intervention for them specifically at this time since they are not acute. However for her leg swelling I will obtain a venous duplex to evaluate for venous reflux. If she has significant reflux then she may be a candidate for vein ablation. She will wear her compression stockings daily and elevate legs when possible. She will follow up with me in 3 months.     Electronically signed by:  Nellie Pinedo MD

## 2023-01-17 ENCOUNTER — HOSPITAL ENCOUNTER (OUTPATIENT)
Age: 68
Setting detail: SPECIMEN
Discharge: HOME | End: 2023-01-17
Payer: MEDICAID

## 2023-01-17 ENCOUNTER — HOSPITAL ENCOUNTER (OUTPATIENT)
Dept: CT IMAGING | Age: 68
Discharge: HOME | End: 2023-01-19
Payer: MEDICAID

## 2023-01-17 DIAGNOSIS — I26.99 PULMONARY EMBOLISM ON RIGHT (HCC): ICD-10-CM

## 2023-01-17 DIAGNOSIS — J98.4 CAVITARY LESION OF LUNG: ICD-10-CM

## 2023-01-17 DIAGNOSIS — Z86.718 HISTORY OF DVT (DEEP VEIN THROMBOSIS): ICD-10-CM

## 2023-01-17 LAB
ABSOLUTE EOS #: 0.32 K/UL (ref 0–0.44)
ABSOLUTE IMMATURE GRANULOCYTE: <0.03 K/UL (ref 0–0.3)
ABSOLUTE LYMPH #: 0.9 K/UL (ref 1.1–3.7)
ABSOLUTE MONO #: 0.69 K/UL (ref 0.1–1.2)
ALBUMIN SERPL-MCNC: 3.4 G/DL (ref 3.5–5.2)
ALBUMIN/GLOBULIN RATIO: 1 (ref 1–2.5)
ALP BLD-CCNC: 83 U/L (ref 35–104)
ALT SERPL-CCNC: 7 U/L (ref 5–33)
ANION GAP SERPL CALCULATED.3IONS-SCNC: 8 MMOL/L (ref 9–17)
AST SERPL-CCNC: 11 U/L
BASOPHILS # BLD: 1 % (ref 0–2)
BASOPHILS ABSOLUTE: 0.03 K/UL (ref 0–0.2)
BILIRUB SERPL-MCNC: 0.2 MG/DL (ref 0.3–1.2)
BUN BLDV-MCNC: 14 MG/DL (ref 8–23)
CALCIUM SERPL-MCNC: 9.2 MG/DL (ref 8.6–10.4)
CHLORIDE BLD-SCNC: 101 MMOL/L (ref 98–107)
CO2: 31 MMOL/L (ref 20–31)
CREAT SERPL-MCNC: 0.52 MG/DL (ref 0.5–0.9)
EOSINOPHILS RELATIVE PERCENT: 7 % (ref 1–4)
GFR SERPL CREATININE-BSD FRML MDRD: >60 ML/MIN/1.73M2
GLUCOSE BLD-MCNC: 78 MG/DL (ref 70–99)
HCT VFR BLD CALC: 33.8 % (ref 36.3–47.1)
HEMOGLOBIN: 9.7 G/DL (ref 11.9–15.1)
IMMATURE GRANULOCYTES: 0 %
LYMPHOCYTES # BLD: 18 % (ref 24–43)
MCH RBC QN AUTO: 27.2 PG (ref 25.2–33.5)
MCHC RBC AUTO-ENTMCNC: 28.7 G/DL (ref 28.4–34.8)
MCV RBC AUTO: 94.9 FL (ref 82.6–102.9)
MONOCYTES # BLD: 14 % (ref 3–12)
NRBC AUTOMATED: 0 PER 100 WBC
PDW BLD-RTO: 16.4 % (ref 11.8–14.4)
PLATELET # BLD: 305 K/UL (ref 138–453)
PMV BLD AUTO: 10.5 FL (ref 8.1–13.5)
POTASSIUM SERPL-SCNC: 3.8 MMOL/L (ref 3.7–5.3)
PRO-BNP: 116 PG/ML
RBC # BLD: 3.56 M/UL (ref 3.95–5.11)
RBC # BLD: ABNORMAL 10*6/UL
SEG NEUTROPHILS: 60 % (ref 36–65)
SEGMENTED NEUTROPHILS ABSOLUTE COUNT: 2.94 K/UL (ref 1.5–8.1)
SODIUM BLD-SCNC: 140 MMOL/L (ref 135–144)
TOTAL PROTEIN: 6.7 G/DL (ref 6.4–8.3)
WBC # BLD: 4.9 K/UL (ref 3.5–11.3)

## 2023-01-17 PROCEDURE — 83880 ASSAY OF NATRIURETIC PEPTIDE: CPT

## 2023-01-17 PROCEDURE — 2580000003 HC RX 258: Performed by: INTERNAL MEDICINE

## 2023-01-17 PROCEDURE — P9603 ONE-WAY ALLOW PRORATED MILES: HCPCS

## 2023-01-17 PROCEDURE — 71260 CT THORAX DX C+: CPT

## 2023-01-17 PROCEDURE — 6360000004 HC RX CONTRAST MEDICATION: Performed by: INTERNAL MEDICINE

## 2023-01-17 PROCEDURE — 85025 COMPLETE CBC W/AUTO DIFF WBC: CPT

## 2023-01-17 PROCEDURE — 80053 COMPREHEN METABOLIC PANEL: CPT

## 2023-01-17 PROCEDURE — 36415 COLL VENOUS BLD VENIPUNCTURE: CPT

## 2023-01-17 RX ORDER — 0.9 % SODIUM CHLORIDE 0.9 %
100 INTRAVENOUS SOLUTION INTRAVENOUS ONCE
Status: COMPLETED | OUTPATIENT
Start: 2023-01-17 | End: 2023-01-17

## 2023-01-17 RX ORDER — SODIUM CHLORIDE 0.9 % (FLUSH) 0.9 %
10 SYRINGE (ML) INJECTION PRN
Status: DISCONTINUED | OUTPATIENT
Start: 2023-01-17 | End: 2023-01-20 | Stop reason: HOSPADM

## 2023-01-17 RX ADMIN — SODIUM CHLORIDE, PRESERVATIVE FREE 10 ML: 5 INJECTION INTRAVENOUS at 13:15

## 2023-01-17 RX ADMIN — SODIUM CHLORIDE 100 ML: 9 INJECTION, SOLUTION INTRAVENOUS at 13:15

## 2023-01-17 RX ADMIN — IOPAMIDOL 75 ML: 755 INJECTION, SOLUTION INTRAVENOUS at 13:14

## 2023-01-19 ENCOUNTER — HOSPITAL ENCOUNTER (OUTPATIENT)
Age: 68
Setting detail: SPECIMEN
Discharge: HOME | End: 2023-01-19

## 2023-01-19 LAB — URIC ACID: 5.6 MG/DL (ref 2.4–5.7)

## 2023-01-19 PROCEDURE — 84550 ASSAY OF BLOOD/URIC ACID: CPT

## 2023-01-19 PROCEDURE — P9603 ONE-WAY ALLOW PRORATED MILES: HCPCS

## 2023-01-19 PROCEDURE — 36415 COLL VENOUS BLD VENIPUNCTURE: CPT

## 2023-01-20 ENCOUNTER — TELEMEDICINE (OUTPATIENT)
Dept: ONCOLOGY | Age: 68
End: 2023-01-20
Payer: MEDICAID

## 2023-01-20 DIAGNOSIS — Z86.718 HISTORY OF DVT (DEEP VEIN THROMBOSIS): ICD-10-CM

## 2023-01-20 DIAGNOSIS — R91.8 LUNG NODULE, MULTIPLE: Primary | ICD-10-CM

## 2023-01-20 DIAGNOSIS — E61.1 IRON DEFICIENCY: ICD-10-CM

## 2023-01-20 DIAGNOSIS — Z79.01 ANTICOAGULATED: ICD-10-CM

## 2023-01-20 PROCEDURE — 99214 OFFICE O/P EST MOD 30 MIN: CPT | Performed by: INTERNAL MEDICINE

## 2023-01-20 PROCEDURE — 1123F ACP DISCUSS/DSCN MKR DOCD: CPT | Performed by: INTERNAL MEDICINE

## 2023-01-20 RX ORDER — FERROUS SULFATE 325(65) MG
325 TABLET ORAL
Qty: 90 TABLET | Refills: 1 | Status: SHIPPED | OUTPATIENT
Start: 2023-01-20

## 2023-01-20 NOTE — PROGRESS NOTES
[place]                                                                                                                  1/20/2023  MRN:   [id number]  YOB: 1955  PCP:                           Jacinto Puente MD  Referring Physician: No ref. provider found  Treating Physician Name: Vern Rodriguez MD      Reason for visit:  Chief Complaint   Patient presents with    Follow-up     Review status of disease    Results     Go over scan    Patient seen in clinic via virtual visit  Discussed results of CT scan. 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 o.female is a  female who is admitted to the hospital for Admitted to hospital with chief complains of shortness of breath. Patient started noticing shortness of breath a week ago. She does have baseline COPD on home O2. Patient has significant history of tobacco dependence. Patient also noted swelling of her right leg. Patient shortness of breath worsened and she presented to the ER. CT chest done shows a large right pulmonary embolism in the main stem pulmonary artery and also in the right upper lobe. Patient continues to smoke cigarettes. Patient has been started on Lovenox. She has been transitioned to Eliquis. Her history includes menorrhagia and hormone birth control premenopausal.   She was fully vaccinated for COVID 05/05/2021. Interim History:    Patient presents to the clinic for a follow-up visit and to discuss results of CT scan. Since last office visit patient was seen by vascular surgery who recommend that patient likely has chronic clots.   They have recommended ultrasound of the lower extremities to assess for any venous reflux. Patient CT scan shows worsening of the right upper lobe pleural-based nodule. Patient denies symptoms of any respiratory tract infection. Still has swelling of her leg. During this visit patient's allergy, social, medical, surgical history and medications were reviewed and updated.     Past Medical History:   Past Medical History:   Diagnosis Date    Abnormal EKG     TRAM (acute kidney injury) ([facility] 2022    Anxiety     Asthma     Bipolar disorder ([facility]     SEVERE IN , [place]    COPD (chronic obstructive pulmonary disease) (HCC)     Cramps, extremity     SEVERE LEG CRAMPS    Depression     Dilated bile duct     Headache     History of elective      Hyperlipidemia     Irritable bowel syndrome     Prolonged emergence from general anesthesia     SEVERE ISSUES WAKING UP    Substance abuse ([facility]     street drugs when younger    Unspecified sleep apnea     Vision abnormalities     glasses       Past Surgical History:     Past Surgical History:   Procedure Laterality Date    ANKLE SURGERY      HAD SCREWS AND HARDWARE, THEN REMOVED    BRONCHOSCOPY  2022         COLONOSCOPY  02/15/2018    tubular adenoma    EYE SURGERY Bilateral     CATARACTS    HYSTEROSCOPY  10/19/2016    D & C    INDUCED       LASIK      bilateral    TONSILLECTOMY AND ADENOIDECTOMY Bilateral     VULVA SURGERY      HAD A BIOPSY AND REMOVAL OF       Patient Family Social History:     Social History     Socioeconomic History    Marital status:    Tobacco Use    Smoking status: Former     Packs/day: 2.00     Years: 42.00     Pack years: 84.00     Types: Cigarettes     Quit date: 2018     Years since quittin.2    Smokeless tobacco: Never   Substance and Sexual Activity    Alcohol use: Yes     Comment: yearly    Drug use: No    Sexual activity: Not Currently     Partners: Male     Birth control/protection: Post-menopausal     Family History   Problem Relation Age of Onset    Dementia Maternal Aunt     Kidney Disease Mother     Heart Attack Sister     Prostate Cancer Father     High Cholesterol Brother     Heart Attack Paternal Grandmother        Current Medications:     Current Outpatient Medications   Medication Sig Dispense Refill    alendronate (FOSAMAX) 70 MG tablet Take 70 mg by mouth every 7 days Every Monday      benzonatate (TESSALON) 200 MG capsule Take 200 mg by mouth 3 times daily as needed for Cough      butalbital-acetaminophen-caffeine (FIORICET, ESGIC) -40 MG per tablet Take 1 tablet by mouth every 4 hours as needed for Headaches      cetirizine (ZYRTEC) 10 MG tablet Take 10 mg by mouth daily      doxycycline hyclate (VIBRAMYCIN) 100 MG capsule Take 100 mg by mouth 2 times daily For 7 days for lung infection      oxyCODONE-acetaminophen (PERCOCET) 5-325 MG per tablet Take 1 tablet by mouth every 6 hours as needed for Pain.       topiramate (TOPAMAX SPRINKLE) 25 MG capsule Take 25 mg by mouth 2 times daily      sertraline (ZOLOFT) 25 MG tablet Take 25 mg by mouth daily      hydrocortisone (ANUSOL-HC) 2.5 % CREA rectal cream Use topically every 6 hours as needed 84 g 2    apixaban (ELIQUIS) 5 MG TABS tablet Take 1 tablet by mouth 2 times daily 60 tablet 0    albuterol sulfate HFA (VENTOLIN HFA) 108 (90 Base) MCG/ACT inhaler Inhale 2 puffs into the lungs every 4 hours as needed for Wheezing 18 g 3    albuterol (PROVENTIL) (2.5 MG/3ML) 0.083% nebulizer solution Take 3 mLs by nebulization every 4 hours (Patient taking differently: Take by nebulization every 4 hours) 120 each 3    acetaminophen (TYLENOL) 325 MG tablet Take 650 mg by mouth every 6 hours as needed for Pain or Fever      atorvastatin (LIPITOR) 20 MG tablet Take 20 mg by mouth at bedtime      guaiFENesin (MUCINEX) 600 MG extended release tablet Take 600 mg by mouth 2 times daily      insulin lispro, 1 Unit Dial, (HUMALOG/ADMELOG) 100 UNIT/ML SOPN Inject into the skin 4 times daily (before meals and nightly) Per sliding scale:  151-200 = 2 units  201-250 = 4 units  251-300 = 6 units  301-350 = 8 units  351-400 = 10 units      lidocaine 4 % external patch Place 1 patch onto the skin daily as needed (lower back pain)       magnesium hydroxide (MILK OF MAGNESIA) 400 MG/5ML suspension Take 30 mLs by mouth daily as needed for Constipation (at bedtime if no BM in 3 days)      polyethylene glycol (GLYCOLAX) 17 GM/SCOOP powder Take 17 g by mouth daily as needed (constipation)      omeprazole (PRILOSEC) 20 MG delayed release capsule Take 20 mg by mouth daily      ondansetron (ZOFRAN) 4 MG tablet Take 4 mg by mouth every 6 hours as needed for Nausea or Vomiting      hydrocortisone 1 % cream Apply topically every 6 hours as needed (hemorrhoids)      phenylephrine-cocoa butter (PREPARATION H) 0.25-88.44 % Place 1 suppository rectally every 6 hours as needed for Hemorrhoids      senna-docusate (PERICOLACE) 8.6-50 MG per tablet Take 2 tablets by mouth daily      montelukast (SINGULAIR) 10 MG tablet Take 10 mg by mouth nightly      glucose (GLUTOSE) 40 % GEL Take 37.5 mLs by mouth as needed (low blood glucose) 45 g 1    risperiDONE (RISPERDAL) 0.5 MG tablet Take 3 tablets by mouth every 12 hours 60 tablet 3    furosemide (LASIX) 20 MG tablet Take 1 tablet by mouth 2 times daily 60 tablet 3    folic acid (FOLVITE) 1 MG tablet Take 1 tablet by mouth daily 30 tablet 3    famotidine (PEPCID) 20 MG tablet Take 1 tablet by mouth 2 times daily 60 tablet 3    docusate sodium (COLACE) 100 MG capsule take 1 capsule by mouth twice a day 60 capsule 3    SPIRIVA RESPIMAT 2.5 MCG/ACT AERS inhaler inhale 2 puffs INTO THE LUNGS once daily 4 g 1    BREO ELLIPTA 100-25 MCG/INH AEPB inhaler Inhale 1 puff into the lungs daily       traZODone (DESYREL) 50 MG tablet Take 50 mg by mouth nightly       fluticasone (FLONASE) 50 MCG/ACT nasal spray 2 sprays by Nasal route daily 1 Bottle 3     No current facility-administered medications for this visit. Allergies:   Advil [ibuprofen], Aleve [naproxen], Antipyrine, Celecoxib, Codeine, Fomepizole, Incruse ellipta [umeclidinium bromide], Other, Rofecoxib, Salicylates, Strawberry extract, Sulfinpyrazone, Aspirin, and Nsaids    Review of Systems:    Constitutional: No fever or chills. No night sweats, no weight loss   Eyes: No eye discharge, double vision, or eye pain   HEENT: negative for sore mouth, sore throat, hoarseness and voice change   Respiratory: negative for cough , sputum, dyspnea, wheezing, hemoptysis, chest pain   Cardiovascular: negative for chest pain, dyspnea, palpitations, orthopnea, PND   Gastrointestinal: negative for nausea, vomiting, diarrhea, constipation, abdominal pain, Dysphagia, hematemesis and hematochezia   Genitourinary: negative for frequency, dysuria, nocturia, urinary incontinence, and hematuria   Integument: negative for rash, skin lesions, bruises. Hematologic/Lymphatic: negative for easy bruising, bleeding, lymphadenopathy, or petechiae   Endocrine: negative for heat or cold intolerance,weight changes, change in bowel habits and hair loss   Musculoskeletal: negative for myalgias, arthralgias, joint swelling,and bone pain +bilateral leg pain +right shoulder pain limiting motion   Neurological: negative for headaches, dizziness, seizures, weakness, numbness      PHYSICAL EXAMINATION:    Vital Signs: (As obtained by patient/caregiver or practitioner observation)    Blood pressure-  Heart rate-    Respiratory rate-    Temperature-  Pulse oximetry-     Constitutional: [x] Appears well-developed and well-nourished [x] No apparent distress      [] Abnormal-   Mental status  [x] Alert and awake  [x] Oriented to person/place/time [x]Able to follow commands      Eyes:  EOM    [x]  Normal  [] Abnormal-  Sclera  []  Normal  [] Abnormal -         Discharge [x]  None visible  [] Abnormal -    HENT:   [x] Normocephalic, atraumatic.   [] Abnormal   [x] Mouth/Throat: Mucous membranes are moist. External Ears [x] Normal  [] Abnormal-     Neck: [x] No visualized mass     Pulmonary/Chest: [x] Respiratory effort normal.  [] No visualized signs of difficulty breathing or respiratory distress        [] Abnormal-      Musculoskeletal:   [] Normal gait with no signs of ataxia         [x] Normal range of motion of neck        [] Abnormal-       Neurological:        [x] No Facial Asymmetry (Cranial nerve 7 motor function) (limited exam to video visit)          [x] No gaze palsy        [] Abnormal-         Skin:        [x] No significant exanthematous lesions or discoloration noted on facial skin         [] Abnormal-            Psychiatric:       [x] Normal Affect [x] No Hallucinations        [] Abnormal-     Other pertinent observable physical exam findings-     Due to this being a TeleHealth encounter, evaluation of the following organ systems is limited: Vitals/Constitutional/EENT/Resp/CV/GI//MS/Neuro/Skin/Heme-Lymph-Imm. DATA:  Lab Results   Component Value Date    WBC 4.9 01/17/2023    HGB 9.7 (L) 01/17/2023    HCT 33.8 (L) 01/17/2023    MCV 94.9 01/17/2023     01/17/2023       Chemistry        Component Value Date/Time     01/17/2023 0620    K 3.8 01/17/2023 0620     01/17/2023 0620    CO2 31 01/17/2023 0620    BUN 14 01/17/2023 0620    CREATININE 0.52 01/17/2023 0620        Component Value Date/Time    CALCIUM 9.2 01/17/2023 0620    ALKPHOS 83 01/17/2023 0620    AST 11 01/17/2023 0620    ALT 7 01/17/2023 0620    BILITOT 0.2 (L) 01/17/2023 0620        CT CHEST W CONTRAST    Result Date: 1/18/2023  EXAMINATION: CT OF THE CHEST WITH CONTRAST 1/17/2023 12:52 pm TECHNIQUE: CT of the chest was performed with the administration of intravenous contrast. Multiplanar reformatted images are provided for review. Automated exposure control, iterative reconstruction, and/or weight based adjustment of the mA/kV was utilized to reduce the radiation dose to as low as reasonably achievable. COMPARISON: 06/30/2022 HISTORY: ORDERING SYSTEM PROVIDED HISTORY: History of DVT (deep vein thrombosis) TECHNOLOGIST PROVIDED HISTORY: STAT Creatinine as needed:->Yes abnormal ct chest Reason for Exam: abnormal ct chest, History of DVT (deep vein thrombosis), Pulmonary embolism on right ([address] [place]. .. FINDINGS: Mediastinum: The cardiac size is normal. . There is no significant mediastinal, hilar or axillary lymphadenopathy. The thyroid gland shows no significant abnormalities. The esophagus shows no significant abnormalities. Lungs/pleura: Moderate centrilobular emphysema. Medial right lung apex 2.4 x 1.5 cm pleural based nodular density is unchanged in size but surrounding irregular nodularities have increased in size and number with a few confluent with the dominant nodule. Further inferiorly in the posterior segment right upper lobe there are scattered irregular densities which are also larger in size and overall more solid. In the posterior basal right lower lobe slight decrease cavitation with thicken wall now measuring 3.2 x 2.4 cm prior 3.4 x 2.7 cm. Wall thickening has also decreased although remains significant. Along its posteroinferior aspect there is irregular pleural-parenchymal density extending along the posterior pleura showing some decrease in thickness. 2.8 cm irregular pleural-based density in the medial right upper lobe has almost completely resolved, series 4, image 19. No pleural effusion or pneumothorax. Upper Abdomen: No acute process. No suspicious mass. Soft Tissues/Bones: No acute abnormality of the bones. The superficial soft tissues show no significant abnormalities. 1. Interval mixed changes.   Irregular pleural-based nodule in the medial right lung apex along with surrounding scattered nodular densities with additional in the posterior segment right upper lobe have increased in size and number whereas thick-walled cavitation with adjacent broad-based pleural-parenchymal irregular consolidative density in the posterior basal right lower lobe shows some interval decrease as discussed above. Suggest three-month follow-up. Impression:  PE, DVT, provoked from smoking and sedentary lifestyle-6/2021  Eliquis-6/21 1 through 12/21  Recurrent DVT, provoked from illness in decreased mobility-6/2022  Plan for long-term anticoagulation  Right upper lobe lung nodules  Leukopenia secondary to risperidone  Anemia  HX smoking, quit  HX COPD, on oxygen    Plan:  I had a detailed discussion with the patient and her daughter. Reviewed recommendations from vascular surgery. Follow-up on results of ultrasound of lower extremity. They also agree with the recommendation that patient has chronic clots. Continue Eliquis. Patient understands she will need long-term anticoagulation  Patient had CT scan done due to concern regarding possible Eliquis failure. CT scan compared to CT scan from June 2022 showed persistent right lung nodule with slight increase in size. I also reviewed images with Dr. Vanesa Perez over the phone from radiology. Recommend 3-month follow-up with imaging studies. Discussed with the patient and her daughter they are agreeable. Patient has history of tobacco dependence but now quit. Iron studies show iron deficiency. I will call in iron supplements for the patient. Also patient's question to the best mobility. Brayden Bella MD      This note is created with the assistance of a speech recognition program.  While intending to generate a document that actually reflects the content of the visit, the document can still have some errors including those of syntax and sound a like substitutions which may escape proof reading. It such instances, actual meaning can be extrapolated by contextual diversion. Michelle Bo, was evaluated through a synchronous (real-time) audio-video encounter.  The patient (or guardian if applicable) is aware that this is a billable service, which includes applicable co-pays. This Virtual Visit was conducted with patient's (and/or legal guardian's) consent. The visit was conducted pursuant to the emergency declaration under the [address] [place] [address] authority and the [employer] and [employer] General Act. Patient identification was verified, and a caregiver was present when appropriate. The patient was located at Home: [place] [id number]. Provider was located at [address]: [address] [place] [address].. Total time spent for this encounter: Not billed by time    --Brown Aguilar MD on 1/20/2023 at 4:06 PM    An electronic signature was used to authenticate this note.

## 2023-01-23 ENCOUNTER — TELEPHONE (OUTPATIENT)
Dept: ONCOLOGY | Age: 68
End: 2023-01-23

## 2023-01-23 NOTE — TELEPHONE ENCOUNTER
AVS from 01/20/2023    Return visit in 3 months. CT scan prior to return visit  Labs prior to return visit    RV is scheduled for 04/28/2023 at 3:30. PT was mailed AVS, lab orders, and scheduling instructions.     PT was given orders, scheduling instructions, AVS and an appt schedule    Electronically signed by Geena Vázquez on 1/23/2023 at 8:33 AM

## 2023-02-09 ENCOUNTER — HOSPITAL ENCOUNTER (OUTPATIENT)
Age: 68
Setting detail: SPECIMEN
Discharge: HOME | End: 2023-02-09
Payer: MEDICAID

## 2023-02-09 LAB — TSH SERPL-ACNC: 0.83 UIU/ML (ref 0.3–5)

## 2023-02-09 PROCEDURE — 84443 ASSAY THYROID STIM HORMONE: CPT

## 2023-02-09 PROCEDURE — 36415 COLL VENOUS BLD VENIPUNCTURE: CPT

## 2023-02-09 PROCEDURE — P9603 ONE-WAY ALLOW PRORATED MILES: HCPCS

## 2023-02-20 ENCOUNTER — OFFICE VISIT (OUTPATIENT)
Dept: ORTHOPEDIC SURGERY | Age: 68
End: 2023-02-20
Payer: MEDICAID

## 2023-02-20 VITALS — WEIGHT: 217 LBS | BODY MASS INDEX: 36.15 KG/M2 | RESPIRATION RATE: 16 BRPM | HEIGHT: 65 IN

## 2023-02-20 DIAGNOSIS — M25.511 RIGHT SHOULDER PAIN, UNSPECIFIED CHRONICITY: Primary | ICD-10-CM

## 2023-02-20 PROCEDURE — 1123F ACP DISCUSS/DSCN MKR DOCD: CPT | Performed by: ORTHOPAEDIC SURGERY

## 2023-02-20 PROCEDURE — 20610 DRAIN/INJ JOINT/BURSA W/O US: CPT | Performed by: ORTHOPAEDIC SURGERY

## 2023-02-20 PROCEDURE — 99203 OFFICE O/P NEW LOW 30 MIN: CPT | Performed by: ORTHOPAEDIC SURGERY

## 2023-02-20 RX ORDER — TRIAMCINOLONE ACETONIDE 40 MG/ML
40 INJECTION, SUSPENSION INTRA-ARTICULAR; INTRAMUSCULAR ONCE
Status: COMPLETED | OUTPATIENT
Start: 2023-02-20 | End: 2023-02-20

## 2023-02-20 RX ORDER — LIDOCAINE HYDROCHLORIDE 10 MG/ML
3 INJECTION, SOLUTION INFILTRATION; PERINEURAL ONCE
Status: COMPLETED | OUTPATIENT
Start: 2023-02-20 | End: 2023-02-20

## 2023-02-20 RX ADMIN — TRIAMCINOLONE ACETONIDE 40 MG: 40 INJECTION, SUSPENSION INTRA-ARTICULAR; INTRAMUSCULAR at 13:58

## 2023-02-20 RX ADMIN — LIDOCAINE HYDROCHLORIDE 3 ML: 10 INJECTION, SOLUTION INFILTRATION; PERINEURAL at 13:57

## 2023-02-20 NOTE — PROGRESS NOTES
Name:  Hazel Courser Year:  12th Grade Class: Student ID No.:   Address:  [address] [place] [address] Phone:  [phone] (home)  :  18year old   Name Relationship [address] Work Phone Home Phone Mobile Phone      HISTORY FORM ORTHOPEDIC PATIENT EVALUATION      HPI / Chief Complaint  Manjit Schreiber is a 79 y.o. left-hand-dominant female who presents for evaluation of her right shoulder. About 2 and half months ago she was lifted up underneath both arms to go to the bathroom by her caregivers instead of using a Mara lift and has been dealing with right shoulder pain ever since. Her pain is primarily localized to the anterior and superior aspect of her shoulders. It is intermittent. It is worse with any movement or use. She does reside full-time at [facility]. Past Medical History  Angel Stovall  has a past medical history of Abnormal EKG, TRAM (acute kidney injury) ([address] Anxiety, Asthma, Bipolar disorder ([address] COPD (chronic obstructive pulmonary disease) ([address] Cramps, extremity, Depression, Dilated bile duct, Headache, History of elective , Hyperlipidemia, Irritable bowel syndrome, Prolonged emergence from general anesthesia, Substance abuse ([address] Unspecified sleep apnea, and Vision abnormalities. Past Surgical History  Angel Stovall  has a past surgical history that includes Tonsillectomy and adenoidectomy (Bilateral); LASIK; Induced ; Ankle surgery; eye surgery (Bilateral); Vulva surgery; hysteroscopy (10/19/2016); Colonoscopy (02/15/2018); and Bronchoscopy (2022).     Current Medications  Current Outpatient Medications   Medication Sig Dispense Refill    ferrous sulfate (IRON 325) 325 (65 Fe) MG tablet Take 1 tablet by mouth daily (with breakfast) 90 tablet 1    alendronate (FOSAMAX) 70 MG tablet Take 70 mg by mouth every 7 days Every Monday      benzonatate (TESSALON) 200 MG capsule Take 200 mg by mouth 3 times daily as needed for Cough      butalbital-acetaminophen-caffeine (FIORICET, ESGIC) -40 MG per tablet Take 1 tablet by mouth every 4 hours as needed for Headaches      cetirizine (ZYRTEC) 10 MG tablet Take 10 mg by mouth daily      doxycycline hyclate (VIBRAMYCIN) 100 MG capsule Take 100 mg by mouth 2 pacemaker, or implanted defibrillator? No   16. Has anyone in your family had unexplained fainting, seizures, or near drowning?  No   BONE AND JOINT QUESTIONS    17. Have you ever had an injury to a bone, muscle, ligament, or tendon that caused you to miss times daily For 7 days for lung infection      oxyCODONE-acetaminophen (PERCOCET) 5-325 MG per tablet Take 1 tablet by mouth every 6 hours as needed for Pain.       topiramate (TOPAMAX SPRINKLE) 25 MG capsule Take 25 mg by mouth 2 times daily      sertraline (ZOLOFT) 25 MG tablet Take 25 mg by mouth daily      hydrocortisone (ANUSOL-HC) 2.5 % CREA rectal cream Use topically every 6 hours as needed 84 g 2    apixaban (ELIQUIS) 5 MG TABS tablet Take 1 tablet by mouth 2 times daily 60 tablet 0    albuterol sulfate HFA (VENTOLIN HFA) 108 (90 Base) MCG/ACT inhaler Inhale 2 puffs into the lungs every 4 hours as needed for Wheezing 18 g 3    albuterol (PROVENTIL) (2.5 MG/3ML) 0.083% nebulizer solution Take 3 mLs by nebulization every 4 hours (Patient taking differently: Take by nebulization every 4 hours) 120 each 3    acetaminophen (TYLENOL) 325 MG tablet Take 650 mg by mouth every 6 hours as needed for Pain or Fever      atorvastatin (LIPITOR) 20 MG tablet Take 20 mg by mouth at bedtime      guaiFENesin (MUCINEX) 600 MG extended release tablet Take 600 mg by mouth 2 times daily      insulin lispro, 1 Unit Dial, (HUMALOG/ADMELOG) 100 UNIT/ML SOPN Inject into the skin 4 times daily (before meals and nightly) Per sliding scale:  151-200 = 2 units  201-250 = 4 units  251-300 = 6 units  301-350 = 8 units  351-400 = 10 units      lidocaine 4 % external patch Place 1 patch onto the skin daily as needed (lower back pain)       magnesium hydroxide (MILK OF MAGNESIA) 400 MG/5ML suspension Take 30 mLs by mouth daily as needed for Constipation (at bedtime if no BM in 3 days)      polyethylene glycol (GLYCOLAX) 17 GM/SCOOP powder Take 17 g by mouth daily as needed (constipation)      omeprazole (PRILOSEC) 20 MG delayed release capsule Take 20 mg by mouth daily      ondansetron (ZOFRAN) 4 MG tablet Take 4 mg by mouth every 6 hours as needed for Nausea or Vomiting      hydrocortisone 1 % cream Apply topically every 6 hours as needed (hemorrhoids)      phenylephrine-cocoa butter (PREPARATION H) 0.25-88.44 % Place 1 suppository rectally every 6 hours as needed for Hemorrhoids      senna-docusate (PERICOLACE) 8.6-50 MG per tablet Take 2 tablets by mouth daily      montelukast (SINGULAIR) 10 MG tablet Take 10 mg by mouth nightly      glucose (GLUTOSE) 40 % GEL Take 37.5 mLs by mouth as needed (low blood glucose) 45 g 1    risperiDONE (RISPERDAL) 0.5 MG tablet Take 3 tablets by mouth every 12 hours 60 tablet 3    furosemide (LASIX) 20 MG tablet Take 1 tablet by mouth 2 times daily 60 tablet 3    folic acid (FOLVITE) 1 MG tablet Take 1 tablet by mouth daily 30 tablet 3    famotidine (PEPCID) 20 MG tablet Take 1 tablet by mouth 2 times daily 60 tablet 3    docusate sodium (COLACE) 100 MG capsule take 1 capsule by mouth twice a day 60 capsule 3    SPIRIVA RESPIMAT 2.5 MCG/ACT AERS inhaler inhale 2 puffs INTO THE LUNGS once daily 4 g 1    BREO ELLIPTA 100-25 MCG/INH AEPB inhaler Inhale 1 puff into the lungs daily       fluticasone (FLONASE) 50 MCG/ACT nasal spray 2 sprays by Nasal route daily 1 Bottle 3    traZODone (DESYREL) 50 MG tablet Take 50 mg by mouth nightly        Current Facility-Administered Medications   Medication Dose Route Frequency Provider Last Rate Last Admin    lidocaine 1 % injection 3 mL  3 mL Intra-artICUlar Once Patricia Peng MD        triamcinolone acetonide (KENALOG-40) injection 40 mg  40 mg Intra-artICUlar Once Yolanda Huynh MD           Allergies  Allergies have been reviewed. Catalina Hernandez is allergic to advil [ibuprofen], aleve [naproxen], antipyrine, celecoxib, codeine, fomepizole, incruse ellipta [umeclidinium bromide], other, rofecoxib, salicylates, strawberry extract, sulfinpyrazone, aspirin, and nsaids. Social History  Catalina Hernandez  reports that she quit smoking about 4 years ago. Her smoking use included cigarettes. She has a 84.00 pack-year smoking history.  She has never used smokeless tobacco. She reports current alcohol legs after being hit or falling? No   39. Have you ever been unable to move your arms / legs after being hit /fall? No   40. Have you ever become ill while exercising in the heat? No   41. Do you get frequent muscle cramps when exercising? No   42.  Do you o Eyes/Ears/Nose/Throat:    · Pupils equal  · Hearing Yes    Lymph nodes Yes    Heart*  · Murmurs (auscultation standing, supine, +/- Valsalva)  · Location of point of maximal impulse (PMI) Yes    Pulses: Simultaneous femoral and radial pulses Yes    Lungs Y use. She reports that she does not use drugs. Family History  Elaina's family history includes Dementia in her maternal aunt; Heart Attack in her paternal grandmother and sister; High Cholesterol in her brother; Kidney Disease in her mother; Prostate Cancer in her father. Review of Systems   History obtained from the patient. REVIEW OF SYSTEMS:   Constitution: negative for fever, chills, weight loss or malaise   Musculoskeletal: As noted in the HPI   Neurologic: As noted in the HPI    Physical Exam  Resp 16   Ht 5' 5\" (1.651 m)   Wt 217 lb (98.4 kg)   LMP 05/20/2013 (Exact Date)   BMI 36.11 kg/m²    General Appearance: alert, well appearing, and in no distress  Mental Status: alert, oriented to person, place, and time  Evaluation of the right shoulder and upper extremity demonstrates intact skin without warmth erythema or swelling. No induration. She is tender to palpation over the anterior aspect of the shoulder. She has limited active range of motion through the shoulder. Passively I can elevate her to about 90 degrees before she becomes too painful. I am unable to adequately examine her shoulder due to pain. Imaging Studies  4 x-ray views of the right shoulder completed on 2/20/2023 reviewed independently demonstrating normal glenohumeral joint space without obvious fracture, dislocation or subluxation. Assessment and Plan  Theron Waite is a 79 y.o. old female with right shoulder pain. We had a discussion about possible etiologies. As noted above my exam was significantly limited due to pain. There does not appear to be an infectious etiology at play here and so I did recommend she continue on with physical therapy working on her range of motion as well is gradual progressive rotator cuff and scapular stabilizer strengthening. Did provide some pain relief I did recommend and administer cortisone injection as outlined below.   I will see her back in my clinic as needed but she may asked to submit to testing for the presence of performance-enhancing substances in my/his/her body either during [employer] state series events or during the school day, and I/our student do/does hereby agree to submit to such testing and analysis by a certified return or call at anytime with persistent or worsening symptoms or with any questions or concerns. Procedure: right shoulder subacromial space injection  Following an appropriate discussion with the patient regarding the risks and benefits of the procedure she consented to proceed. her right shoulder was prepped using chlorhexadine solution. Using aseptic technique and through a posterior approach, her right shoulder subacromial space was injected with a 4 cc mixture of 1cc 40mg/ml kenalog and 3 cc of 1% lidocaine without epinephrine. A band aid was applied to the injection site. she tolerated the injection with no immediate adverse reactions. This note is created with the assistance of a speech recognition program.  While intending to generate adocument that actually reflects the content of the visit, the document can still have some errors including those of syntax and sound a like substitutions which may escape proof reading. It such instances, actual meaningcan be extrapolated by contextual diversion.

## 2023-03-08 ENCOUNTER — TELEPHONE (OUTPATIENT)
Dept: VASCULAR SURGERY | Age: 68
End: 2023-03-08

## 2023-03-09 ENCOUNTER — TELEPHONE (OUTPATIENT)
Dept: VASCULAR SURGERY | Age: 68
End: 2023-03-09

## 2023-03-13 ENCOUNTER — TELEPHONE (OUTPATIENT)
Dept: VASCULAR SURGERY | Age: 68
End: 2023-03-13

## 2023-03-14 ENCOUNTER — TELEPHONE (OUTPATIENT)
Dept: VASCULAR SURGERY | Age: 68
End: 2023-03-14

## 2023-03-14 NOTE — TELEPHONE ENCOUNTER
Patients facility called into the office and will call [facility] and schedule the venous doppler with reflux and call our office to reschedule the office visit with Dr. Haroon Waters once the testing is scheduled.

## 2023-03-15 ENCOUNTER — TELEPHONE (OUTPATIENT)
Dept: VASCULAR SURGERY | Age: 68
End: 2023-03-15

## 2023-03-16 ENCOUNTER — TELEPHONE (OUTPATIENT)
Dept: VASCULAR SURGERY | Age: 68
End: 2023-03-16

## 2023-03-20 ENCOUNTER — HOSPITAL ENCOUNTER (OUTPATIENT)
Dept: VASCULAR LAB | Age: 68
Discharge: HOME | End: 2023-03-20
Payer: MEDICAID

## 2023-03-20 DIAGNOSIS — I26.99 PULMONARY EMBOLISM ON RIGHT (HCC): ICD-10-CM

## 2023-03-20 DIAGNOSIS — I82.403 LEG DVT (DEEP VENOUS THROMBOEMBOLISM), ACUTE, BILATERAL (HCC): ICD-10-CM

## 2023-03-20 PROCEDURE — 93970 EXTREMITY STUDY: CPT

## 2023-03-21 ENCOUNTER — OFFICE VISIT (OUTPATIENT)
Dept: VASCULAR SURGERY | Age: 68
End: 2023-03-21
Payer: MEDICAID

## 2023-03-21 VITALS
RESPIRATION RATE: 20 BRPM | OXYGEN SATURATION: 98 % | WEIGHT: 217 LBS | BODY MASS INDEX: 36.15 KG/M2 | HEIGHT: 65 IN | DIASTOLIC BLOOD PRESSURE: 60 MMHG | HEART RATE: 80 BPM | TEMPERATURE: 98.2 F | SYSTOLIC BLOOD PRESSURE: 115 MMHG

## 2023-03-21 DIAGNOSIS — I82.403 LEG DVT (DEEP VENOUS THROMBOEMBOLISM), ACUTE, BILATERAL (HCC): Primary | ICD-10-CM

## 2023-03-21 DIAGNOSIS — I26.99 PULMONARY EMBOLISM ON RIGHT (HCC): ICD-10-CM

## 2023-03-21 DIAGNOSIS — I83.811 VARICOSE VEINS OF RIGHT LOWER EXTREMITY WITH PAIN: ICD-10-CM

## 2023-03-21 PROCEDURE — 99214 OFFICE O/P EST MOD 30 MIN: CPT | Performed by: STUDENT IN AN ORGANIZED HEALTH CARE EDUCATION/TRAINING PROGRAM

## 2023-03-21 PROCEDURE — 1123F ACP DISCUSS/DSCN MKR DOCD: CPT | Performed by: STUDENT IN AN ORGANIZED HEALTH CARE EDUCATION/TRAINING PROGRAM

## 2023-03-21 ASSESSMENT — ENCOUNTER SYMPTOMS
COUGH: 0
TROUBLE SWALLOWING: 0
ABDOMINAL PAIN: 0
EYE PAIN: 0
COLOR CHANGE: 0
ABDOMINAL DISTENTION: 0
CHEST TIGHTNESS: 0
VOICE CHANGE: 0
VOMITING: 0

## 2023-03-21 NOTE — PROGRESS NOTES
adenopathy. Skin:     Coloration: Skin is not jaundiced. Findings: No rash. Neurological:      General: No focal deficit present. Mental Status: She is alert and oriented to person, place, and time. Cranial Nerves: No cranial nerve deficit. Psychiatric:         Mood and Affect: Mood normal.       Assessment:  1. Leg DVT (deep venous thromboembolism), acute, bilateral ([address]    2. Pulmonary embolism on right ([address]    3. Varicose veins of right lower extremity with pain      Plan:  Ms. 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 She has mild right leg GSV reflux. She is responding well to compression stockings. I discussed with her that at this time if she is ok with compression therapy and is not ambulatory, I would not pursue any vein ablation. She is not interested in any procedures at this time. She will continue elevation and compression as tolerated. She can follow up with me as needed.     Electronically signed by:  Casi Lane MD

## 2023-03-24 ENCOUNTER — HOSPITAL ENCOUNTER (OUTPATIENT)
Age: 68
Setting detail: SPECIMEN
Discharge: HOME | End: 2023-03-24

## 2023-03-24 LAB
HCT VFR BLD AUTO: 32.3 % (ref 36.3–47.1)
HGB BLD-MCNC: 9.3 G/DL (ref 11.9–15.1)
MCH RBC QN AUTO: 28.8 PG (ref 25.2–33.5)
MCHC RBC AUTO-ENTMCNC: 28.8 G/DL (ref 28.4–34.8)
MCV RBC AUTO: 100 FL (ref 82.6–102.9)
NRBC AUTOMATED: 0 PER 100 WBC
PDW BLD-RTO: 18.6 % (ref 11.8–14.4)
PLATELET # BLD AUTO: 270 K/UL (ref 138–453)
PMV BLD AUTO: 9.6 FL (ref 8.1–13.5)
RBC # BLD: 3.23 M/UL (ref 3.95–5.11)
WBC # BLD AUTO: 3.9 K/UL (ref 3.5–11.3)

## 2023-03-24 PROCEDURE — 85027 COMPLETE CBC AUTOMATED: CPT

## 2023-03-24 PROCEDURE — P9603 ONE-WAY ALLOW PRORATED MILES: HCPCS

## 2023-03-24 PROCEDURE — 36415 COLL VENOUS BLD VENIPUNCTURE: CPT

## 2023-03-28 ENCOUNTER — HOSPITAL ENCOUNTER (OUTPATIENT)
Age: 68
Setting detail: SPECIMEN
Discharge: HOME | End: 2023-03-28
Payer: MEDICAID

## 2023-03-28 LAB
25(OH)D3 SERPL-MCNC: 41.6 NG/ML
CHOLEST SERPL-MCNC: 138 MG/DL
CHOLESTEROL/HDL RATIO: 2.9
EST. AVERAGE GLUCOSE BLD GHB EST-MCNC: 123 MG/DL
HBA1C MFR BLD: 5.9 % (ref 4–6)
HDLC SERPL-MCNC: 47 MG/DL
LDLC SERPL CALC-MCNC: 74 MG/DL (ref 0–130)
TRIGL SERPL-MCNC: 84 MG/DL

## 2023-03-28 PROCEDURE — 82306 VITAMIN D 25 HYDROXY: CPT

## 2023-03-28 PROCEDURE — 80061 LIPID PANEL: CPT

## 2023-03-28 PROCEDURE — 36415 COLL VENOUS BLD VENIPUNCTURE: CPT

## 2023-03-28 PROCEDURE — P9603 ONE-WAY ALLOW PRORATED MILES: HCPCS

## 2023-03-28 PROCEDURE — 83036 HEMOGLOBIN GLYCOSYLATED A1C: CPT

## 2023-04-17 ENCOUNTER — HOSPITAL ENCOUNTER (OUTPATIENT)
Age: 68
Setting detail: SPECIMEN
Discharge: HOME | End: 2023-04-17
Payer: MEDICAID

## 2023-04-17 LAB
ANION GAP SERPL CALCULATED.3IONS-SCNC: 7 MMOL/L (ref 9–17)
BUN SERPL-MCNC: 17 MG/DL (ref 8–23)
CALCIUM SERPL-MCNC: 9.1 MG/DL (ref 8.6–10.4)
CHLORIDE SERPL-SCNC: 101 MMOL/L (ref 98–107)
CO2 SERPL-SCNC: 32 MMOL/L (ref 20–31)
CREAT SERPL-MCNC: 0.57 MG/DL (ref 0.5–0.9)
GFR SERPL CREATININE-BSD FRML MDRD: >60 ML/MIN/1.73M2
GLUCOSE SERPL-MCNC: 98 MG/DL (ref 70–99)
POTASSIUM SERPL-SCNC: 3.7 MMOL/L (ref 3.7–5.3)
SODIUM SERPL-SCNC: 140 MMOL/L (ref 135–144)

## 2023-04-17 PROCEDURE — 36415 COLL VENOUS BLD VENIPUNCTURE: CPT

## 2023-04-17 PROCEDURE — P9603 ONE-WAY ALLOW PRORATED MILES: HCPCS

## 2023-04-17 PROCEDURE — 80048 BASIC METABOLIC PNL TOTAL CA: CPT

## 2023-04-21 ENCOUNTER — HOSPITAL ENCOUNTER (OUTPATIENT)
Dept: CT IMAGING | Age: 68
End: 2023-04-21
Payer: MEDICAID

## 2023-04-21 DIAGNOSIS — Z79.01 ANTICOAGULATED: ICD-10-CM

## 2023-04-21 DIAGNOSIS — Z86.718 HISTORY OF DVT (DEEP VEIN THROMBOSIS): ICD-10-CM

## 2023-04-21 DIAGNOSIS — E61.1 IRON DEFICIENCY: ICD-10-CM

## 2023-04-21 DIAGNOSIS — R91.8 LUNG NODULE, MULTIPLE: ICD-10-CM

## 2023-04-21 PROCEDURE — 71250 CT THORAX DX C-: CPT

## 2023-04-28 ENCOUNTER — TELEMEDICINE (OUTPATIENT)
Dept: ONCOLOGY | Age: 68
End: 2023-04-28
Payer: MEDICAID

## 2023-04-28 DIAGNOSIS — R91.8 LUNG NODULE, MULTIPLE: Primary | ICD-10-CM

## 2023-04-28 DIAGNOSIS — E61.1 IRON DEFICIENCY: ICD-10-CM

## 2023-04-28 DIAGNOSIS — Z86.718 HISTORY OF DVT (DEEP VEIN THROMBOSIS): ICD-10-CM

## 2023-04-28 DIAGNOSIS — Z79.01 ANTICOAGULATED: ICD-10-CM

## 2023-04-28 PROCEDURE — 1123F ACP DISCUSS/DSCN MKR DOCD: CPT | Performed by: INTERNAL MEDICINE

## 2023-04-28 PROCEDURE — 99214 OFFICE O/P EST MOD 30 MIN: CPT | Performed by: INTERNAL MEDICINE

## 2023-05-02 ENCOUNTER — TELEPHONE (OUTPATIENT)
Dept: ONCOLOGY | Age: 68
End: 2023-05-02

## 2023-05-02 NOTE — TELEPHONE ENCOUNTER
AVS from 4/28/23    Ct chest in 6 months  Labs just priro to rv   Rv in 6 months       *ct will be sched 1 week prior to rv  *rv is sched for [email or web address]    PT was given orders, scheduling instructions, AVS and an appt schedule

## 2023-06-23 ENCOUNTER — HOSPITAL ENCOUNTER (OUTPATIENT)
Age: 68
Setting detail: SPECIMEN
Discharge: HOME | End: 2023-06-23

## 2023-06-23 PROCEDURE — 87205 SMEAR GRAM STAIN: CPT

## 2023-06-23 PROCEDURE — 87070 CULTURE OTHR SPECIMN AEROBIC: CPT

## 2023-06-26 ENCOUNTER — HOSPITAL ENCOUNTER (OUTPATIENT)
Age: 68
Setting detail: SPECIMEN
Discharge: HOME | End: 2023-06-26
Payer: MEDICAID

## 2023-06-26 PROCEDURE — 87070 CULTURE OTHR SPECIMN AEROBIC: CPT

## 2023-06-26 PROCEDURE — 87205 SMEAR GRAM STAIN: CPT

## 2023-06-28 LAB
MICROORGANISM SPEC CULT: ABNORMAL
MICROORGANISM/AGENT SPEC: ABNORMAL
SERVICE CMNT-IMP: ABNORMAL
SPECIMEN DESCRIPTION: ABNORMAL

## 2023-07-01 LAB
MICROORGANISM SPEC CULT: NORMAL
MICROORGANISM SPEC CULT: NORMAL
MICROORGANISM/AGENT SPEC: NORMAL
SPECIMEN DESCRIPTION: NORMAL

## 2023-07-20 ENCOUNTER — HOSPITAL ENCOUNTER (OUTPATIENT)
Dept: NON INVASIVE DIAGNOSTICS | Age: 68
Discharge: HOME | End: 2023-07-20
Payer: MEDICAID

## 2023-07-20 DIAGNOSIS — I26.99 OTHER PULMONARY EMBOLISM WITHOUT ACUTE COR PULMONALE, UNSPECIFIED CHRONICITY (HCC): ICD-10-CM

## 2023-07-20 DIAGNOSIS — I28.8 OTHER DISEASES OF PULMONARY VESSELS (HCC): ICD-10-CM

## 2023-07-20 DIAGNOSIS — R60.9 EDEMA, UNSPECIFIED TYPE: ICD-10-CM

## 2023-07-20 LAB
LV EF: 55 %
LVEF MODALITY: NORMAL

## 2023-07-20 PROCEDURE — 93306 TTE W/DOPPLER COMPLETE: CPT

## 2023-07-28 ENCOUNTER — OFFICE VISIT (OUTPATIENT)
Dept: ORTHOPEDIC SURGERY | Age: 68
End: 2023-07-28

## 2023-07-28 VITALS — HEIGHT: 65 IN | RESPIRATION RATE: 14 BRPM | BODY MASS INDEX: 38.32 KG/M2 | WEIGHT: 230 LBS

## 2023-07-28 DIAGNOSIS — M25.511 RIGHT SHOULDER PAIN, UNSPECIFIED CHRONICITY: Primary | ICD-10-CM

## 2023-07-28 RX ORDER — TRIAMCINOLONE ACETONIDE 40 MG/ML
40 INJECTION, SUSPENSION INTRA-ARTICULAR; INTRAMUSCULAR ONCE
Status: COMPLETED | OUTPATIENT
Start: 2023-07-28 | End: 2023-07-28

## 2023-07-28 RX ORDER — LIDOCAINE HYDROCHLORIDE 10 MG/ML
3 INJECTION, SOLUTION INFILTRATION; PERINEURAL ONCE
Status: COMPLETED | OUTPATIENT
Start: 2023-07-28 | End: 2023-07-28

## 2023-07-28 RX ORDER — LIDOCAINE HYDROCHLORIDE 10 MG/ML
2 INJECTION, SOLUTION INFILTRATION; PERINEURAL ONCE
Status: SHIPPED | OUTPATIENT
Start: 2023-07-28

## 2023-07-28 RX ADMIN — LIDOCAINE HYDROCHLORIDE 3 ML: 10 INJECTION, SOLUTION INFILTRATION; PERINEURAL at 11:10

## 2023-07-28 RX ADMIN — TRIAMCINOLONE ACETONIDE 40 MG: 40 INJECTION, SUSPENSION INTRA-ARTICULAR; INTRAMUSCULAR at 11:12

## 2023-07-28 NOTE — PROGRESS NOTES
HPI: Ms. Manny Platt is a 70-year-old lady who presents for reevaluation of her right shoulder. She was last seen in my clinic about 5 months ago at which time she received a cortisone injection. She states that this worked very well for her lasting about 2-1/2 to 3 months. She did keep up with physical therapy to over that period of time. Her pain has gradually returned to baseline. She returns today requesting a repeat cortisone injection to the shoulder and also complaining of some pain in her right elbow as well. Her pain is primarily localized to the posterior aspect of the elbow typically with pressure applied. She apparently leans on that right side all the time. No recent trauma. Physical examination:  Evaluation of patient's right upper extremity demonstrates intact skin without warmth, erythema or notable swelling. She is tender to palpation at the level of the olecranon. She has relatively good range of motion. 2+ radial pulse. Impression and plan: Ms. Aleks Schmidt is a 70-year-old lady with recurrent right shoulder pain and now some right elbow pain. We had a discussion about her shoulder. Given the fact that the injection worked well for her in the past I believe it is reasonable to move ahead with a repeat cortisone injection as she is requesting. We also had a discussion about the possibility of an MRI study of her shoulder for further evaluation but she has declined this at this time. With regards to her elbow I would recommend that she avoid applying any pressure through the elbow as this is causing irritation at the olecranon. I will see her back in my clinic as needed but she may certainly return or call at anytime with persistent or worsening symptoms and with any questions or concerns. Procedure: right shoulder subacromial space injection  Following an appropriate discussion with the patient regarding the risks and benefits of the procedure she consented to proceed.  her right

## 2023-08-15 ENCOUNTER — HOSPITAL ENCOUNTER (OUTPATIENT)
Age: 68
Setting detail: SPECIMEN
Discharge: HOME | End: 2023-08-15
Payer: MEDICAID

## 2023-08-15 LAB
ANION GAP SERPL CALCULATED.3IONS-SCNC: 13 MMOL/L (ref 9–17)
BUN SERPL-MCNC: 13 MG/DL (ref 8–23)
CALCIUM SERPL-MCNC: 9 MG/DL (ref 8.6–10.4)
CHLORIDE SERPL-SCNC: 91 MMOL/L (ref 98–107)
CO2 SERPL-SCNC: 35 MMOL/L (ref 20–31)
CREAT SERPL-MCNC: 0.6 MG/DL (ref 0.5–0.9)
GFR SERPL CREATININE-BSD FRML MDRD: >60 ML/MIN/1.73M2
GLUCOSE SERPL-MCNC: 111 MG/DL (ref 70–99)
POTASSIUM SERPL-SCNC: 2.5 MMOL/L (ref 3.7–5.3)
SODIUM SERPL-SCNC: 139 MMOL/L (ref 135–144)

## 2023-08-15 PROCEDURE — P9603 ONE-WAY ALLOW PRORATED MILES: HCPCS

## 2023-08-15 PROCEDURE — 80048 BASIC METABOLIC PNL TOTAL CA: CPT

## 2023-08-15 PROCEDURE — 36415 COLL VENOUS BLD VENIPUNCTURE: CPT

## 2023-08-16 ENCOUNTER — HOSPITAL ENCOUNTER (OUTPATIENT)
Age: 68
Setting detail: SPECIMEN
Discharge: HOME | End: 2023-08-16

## 2023-08-16 LAB — POTASSIUM SERPL-SCNC: 3.4 MMOL/L (ref 3.7–5.3)

## 2023-08-16 PROCEDURE — 84132 ASSAY OF SERUM POTASSIUM: CPT

## 2023-08-16 PROCEDURE — 36415 COLL VENOUS BLD VENIPUNCTURE: CPT

## 2023-08-16 PROCEDURE — P9603 ONE-WAY ALLOW PRORATED MILES: HCPCS

## 2023-08-31 ENCOUNTER — HOSPITAL ENCOUNTER (OUTPATIENT)
Age: 68
Setting detail: SPECIMEN
Discharge: HOME | End: 2023-08-31

## 2023-08-31 LAB
ANION GAP SERPL CALCULATED.3IONS-SCNC: 7 MMOL/L (ref 9–17)
BUN SERPL-MCNC: 16 MG/DL (ref 8–23)
CALCIUM SERPL-MCNC: 8.6 MG/DL (ref 8.6–10.4)
CHLORIDE SERPL-SCNC: 105 MMOL/L (ref 98–107)
CO2 SERPL-SCNC: 29 MMOL/L (ref 20–31)
CREAT SERPL-MCNC: 0.6 MG/DL (ref 0.5–0.9)
ERYTHROCYTE [DISTWIDTH] IN BLOOD BY AUTOMATED COUNT: 16.3 % (ref 11.8–14.4)
GFR SERPL CREATININE-BSD FRML MDRD: >60 ML/MIN/1.73M2
GLUCOSE SERPL-MCNC: 98 MG/DL (ref 70–99)
HCT VFR BLD AUTO: 34.8 % (ref 36.3–47.1)
HGB BLD-MCNC: 9.9 G/DL (ref 11.9–15.1)
MCH RBC QN AUTO: 28.9 PG (ref 25.2–33.5)
MCHC RBC AUTO-ENTMCNC: 28.4 G/DL (ref 28.4–34.8)
MCV RBC AUTO: 101.5 FL (ref 82.6–102.9)
NRBC BLD-RTO: 0 PER 100 WBC
PLATELET # BLD AUTO: 268 K/UL (ref 138–453)
PMV BLD AUTO: 9.9 FL (ref 8.1–13.5)
POTASSIUM SERPL-SCNC: 4.2 MMOL/L (ref 3.7–5.3)
RBC # BLD AUTO: 3.43 M/UL (ref 3.95–5.11)
SODIUM SERPL-SCNC: 141 MMOL/L (ref 135–144)
WBC OTHER # BLD: 4.6 K/UL (ref 3.5–11.3)

## 2023-08-31 PROCEDURE — P9603 ONE-WAY ALLOW PRORATED MILES: HCPCS

## 2023-08-31 PROCEDURE — 36415 COLL VENOUS BLD VENIPUNCTURE: CPT

## 2023-08-31 PROCEDURE — 85027 COMPLETE CBC AUTOMATED: CPT

## 2023-08-31 PROCEDURE — 80048 BASIC METABOLIC PNL TOTAL CA: CPT

## 2023-09-22 ENCOUNTER — HOSPITAL ENCOUNTER (OUTPATIENT)
Age: 68
Setting detail: SPECIMEN
Discharge: HOME | End: 2023-09-22

## 2023-09-22 LAB
ANION GAP SERPL CALCULATED.3IONS-SCNC: 6 MMOL/L (ref 9–17)
BUN SERPL-MCNC: 15 MG/DL (ref 8–23)
CALCIUM SERPL-MCNC: 8.6 MG/DL (ref 8.6–10.4)
CHLORIDE SERPL-SCNC: 103 MMOL/L (ref 98–107)
CO2 SERPL-SCNC: 32 MMOL/L (ref 20–31)
CREAT SERPL-MCNC: 0.5 MG/DL (ref 0.5–0.9)
GFR SERPL CREATININE-BSD FRML MDRD: >60 ML/MIN/1.73M2
GLUCOSE SERPL-MCNC: 77 MG/DL (ref 70–99)
POTASSIUM SERPL-SCNC: 3.8 MMOL/L (ref 3.7–5.3)
SODIUM SERPL-SCNC: 141 MMOL/L (ref 135–144)

## 2023-09-22 PROCEDURE — P9603 ONE-WAY ALLOW PRORATED MILES: HCPCS

## 2023-09-22 PROCEDURE — 80048 BASIC METABOLIC PNL TOTAL CA: CPT

## 2023-09-22 PROCEDURE — 36415 COLL VENOUS BLD VENIPUNCTURE: CPT

## 2023-09-29 ENCOUNTER — HOSPITAL ENCOUNTER (OUTPATIENT)
Age: 68
Setting detail: SPECIMEN
Discharge: HOME | End: 2023-09-29

## 2023-09-29 LAB
CHOLEST SERPL-MCNC: 123 MG/DL
CHOLESTEROL/HDL RATIO: 2.6
EST. AVERAGE GLUCOSE BLD GHB EST-MCNC: 108 MG/DL
HBA1C MFR BLD: 5.4 % (ref 4–6)
HDLC SERPL-MCNC: 48 MG/DL
LDLC SERPL CALC-MCNC: 62 MG/DL (ref 0–130)
TRIGL SERPL-MCNC: 66 MG/DL

## 2023-09-29 PROCEDURE — 83036 HEMOGLOBIN GLYCOSYLATED A1C: CPT

## 2023-09-29 PROCEDURE — P9603 ONE-WAY ALLOW PRORATED MILES: HCPCS

## 2023-09-29 PROCEDURE — 36415 COLL VENOUS BLD VENIPUNCTURE: CPT

## 2023-09-29 PROCEDURE — 80061 LIPID PANEL: CPT

## 2023-10-24 ENCOUNTER — HOSPITAL ENCOUNTER (OUTPATIENT)
Age: 68
Setting detail: SPECIMEN
Discharge: HOME | End: 2023-10-24
Payer: MEDICAID

## 2023-10-24 LAB
BNP SERPL-MCNC: 107 PG/ML
POTASSIUM SERPL-SCNC: 4.3 MMOL/L (ref 3.7–5.3)

## 2023-10-24 PROCEDURE — 83880 ASSAY OF NATRIURETIC PEPTIDE: CPT

## 2023-10-24 PROCEDURE — P9603 ONE-WAY ALLOW PRORATED MILES: HCPCS

## 2023-10-24 PROCEDURE — 84132 ASSAY OF SERUM POTASSIUM: CPT

## 2023-10-24 PROCEDURE — 36415 COLL VENOUS BLD VENIPUNCTURE: CPT

## 2023-11-07 ENCOUNTER — HOSPITAL ENCOUNTER (OUTPATIENT)
Age: 68
Setting detail: SPECIMEN
Discharge: HOME | End: 2023-11-07
Payer: MEDICAID

## 2023-11-07 ENCOUNTER — HOSPITAL ENCOUNTER (OUTPATIENT)
Dept: CT IMAGING | Age: 68
Discharge: HOME | End: 2023-11-09
Attending: INTERNAL MEDICINE
Payer: MEDICAID

## 2023-11-07 DIAGNOSIS — Z86.718 HISTORY OF DVT (DEEP VEIN THROMBOSIS): ICD-10-CM

## 2023-11-07 DIAGNOSIS — R91.8 LUNG NODULE, MULTIPLE: ICD-10-CM

## 2023-11-07 DIAGNOSIS — E61.1 IRON DEFICIENCY: ICD-10-CM

## 2023-11-07 DIAGNOSIS — Z79.01 ANTICOAGULATED: ICD-10-CM

## 2023-11-07 LAB
ALBUMIN SERPL-MCNC: 3.1 G/DL (ref 3.5–5.2)
ALBUMIN/GLOB SERPL: 0.9 {RATIO} (ref 1–2.5)
ALP SERPL-CCNC: 93 U/L (ref 35–104)
ALT SERPL-CCNC: 9 U/L (ref 5–33)
ANION GAP SERPL CALCULATED.3IONS-SCNC: 8 MMOL/L (ref 9–17)
AST SERPL-CCNC: 11 U/L
BASOPHILS # BLD: 0.04 K/UL (ref 0–0.2)
BASOPHILS NFR BLD: 1 % (ref 0–2)
BILIRUB SERPL-MCNC: 0.2 MG/DL (ref 0.3–1.2)
BUN SERPL-MCNC: 12 MG/DL (ref 8–23)
CALCIUM SERPL-MCNC: 8.6 MG/DL (ref 8.6–10.4)
CHLORIDE SERPL-SCNC: 102 MMOL/L (ref 98–107)
CO2 SERPL-SCNC: 32 MMOL/L (ref 20–31)
CREAT SERPL-MCNC: 0.6 MG/DL (ref 0.5–0.9)
EOSINOPHIL # BLD: 0.1 K/UL (ref 0–0.44)
EOSINOPHILS RELATIVE PERCENT: 2 % (ref 1–4)
ERYTHROCYTE [DISTWIDTH] IN BLOOD BY AUTOMATED COUNT: 15.6 % (ref 11.8–14.4)
GFR SERPL CREATININE-BSD FRML MDRD: >60 ML/MIN/1.73M2
GLUCOSE SERPL-MCNC: 102 MG/DL (ref 70–99)
HCT VFR BLD AUTO: 34.5 % (ref 36.3–47.1)
HGB BLD-MCNC: 9.9 G/DL (ref 11.9–15.1)
IMM GRANULOCYTES # BLD AUTO: <0.03 K/UL (ref 0–0.3)
IMM GRANULOCYTES NFR BLD: 0 %
LYMPHOCYTES # BLD: 23 % (ref 24–43)
LYMPHOCYTES NFR BLD: 1.33 K/UL (ref 1.1–3.7)
MCH RBC QN AUTO: 29.1 PG (ref 25.2–33.5)
MCHC RBC AUTO-ENTMCNC: 28.7 G/DL (ref 28.4–34.8)
MCV RBC AUTO: 101.5 FL (ref 82.6–102.9)
MONOCYTES NFR BLD: 0.59 K/UL (ref 0.1–1.2)
MONOCYTES NFR BLD: 10 % (ref 3–12)
NEUTROPHILS NFR BLD: 64 % (ref 36–65)
NEUTS SEG NFR BLD: 3.6 K/UL (ref 1.5–8.1)
NRBC BLD-RTO: 0 PER 100 WBC
PLATELET # BLD AUTO: 280 K/UL (ref 138–453)
PMV BLD AUTO: 9.6 FL (ref 8.1–13.5)
POTASSIUM SERPL-SCNC: 4 MMOL/L (ref 3.7–5.3)
PROT SERPL-MCNC: 6.7 G/DL (ref 6.4–8.3)
RBC # BLD AUTO: 3.4 M/UL (ref 3.95–5.11)
RBC # BLD: ABNORMAL 10*6/UL
SODIUM SERPL-SCNC: 142 MMOL/L (ref 135–144)
WBC OTHER # BLD: 5.7 K/UL (ref 3.5–11.3)

## 2023-11-07 PROCEDURE — 85025 COMPLETE CBC W/AUTO DIFF WBC: CPT

## 2023-11-07 PROCEDURE — 71250 CT THORAX DX C-: CPT

## 2023-11-07 PROCEDURE — P9603 ONE-WAY ALLOW PRORATED MILES: HCPCS

## 2023-11-07 PROCEDURE — 36415 COLL VENOUS BLD VENIPUNCTURE: CPT

## 2023-11-07 PROCEDURE — 80053 COMPREHEN METABOLIC PANEL: CPT

## 2023-11-21 ENCOUNTER — HOSPITAL ENCOUNTER (OUTPATIENT)
Age: 68
Setting detail: SPECIMEN
Discharge: HOME | End: 2023-11-21
Payer: MEDICAID

## 2023-11-21 LAB
BASOPHILS # BLD: 0.05 K/UL (ref 0–0.2)
BASOPHILS NFR BLD: 1 % (ref 0–2)
EOSINOPHIL # BLD: 0.29 K/UL (ref 0–0.44)
EOSINOPHILS RELATIVE PERCENT: 5 % (ref 1–4)
ERYTHROCYTE [DISTWIDTH] IN BLOOD BY AUTOMATED COUNT: 15.7 % (ref 11.8–14.4)
FERRITIN SERPL-MCNC: 94 NG/ML (ref 13–150)
HCT VFR BLD AUTO: 34.7 % (ref 36.3–47.1)
HGB BLD-MCNC: 9.9 G/DL (ref 11.9–15.1)
IMM GRANULOCYTES # BLD AUTO: <0.03 K/UL (ref 0–0.3)
IMM GRANULOCYTES NFR BLD: 0 %
IRON SATN MFR SERPL: 12 % (ref 20–55)
IRON SERPL-MCNC: 26 UG/DL (ref 37–145)
LYMPHOCYTES NFR BLD: 0.89 K/UL (ref 1.1–3.7)
LYMPHOCYTES RELATIVE PERCENT: 15 % (ref 24–43)
MCH RBC QN AUTO: 28.8 PG (ref 25.2–33.5)
MCHC RBC AUTO-ENTMCNC: 28.5 G/DL (ref 28.4–34.8)
MCV RBC AUTO: 100.9 FL (ref 82.6–102.9)
MONOCYTES NFR BLD: 0.65 K/UL (ref 0.1–1.2)
MONOCYTES NFR BLD: 11 % (ref 3–12)
NEUTROPHILS NFR BLD: 68 % (ref 36–65)
NEUTS SEG NFR BLD: 3.87 K/UL (ref 1.5–8.1)
NRBC BLD-RTO: 0 PER 100 WBC
PLATELET # BLD AUTO: 314 K/UL (ref 138–453)
PMV BLD AUTO: 9.6 FL (ref 8.1–13.5)
RBC # BLD AUTO: 3.44 M/UL (ref 3.95–5.11)
RBC # BLD: ABNORMAL 10*6/UL
TIBC SERPL-MCNC: 211 UG/DL (ref 250–450)
UNSATURATED IRON BINDING CAPACITY: 185 UG/DL (ref 112–347)
WBC OTHER # BLD: 5.8 K/UL (ref 3.5–11.3)

## 2023-11-21 PROCEDURE — 83540 ASSAY OF IRON: CPT

## 2023-11-21 PROCEDURE — 36415 COLL VENOUS BLD VENIPUNCTURE: CPT

## 2023-11-21 PROCEDURE — 83550 IRON BINDING TEST: CPT

## 2023-11-21 PROCEDURE — 85025 COMPLETE CBC W/AUTO DIFF WBC: CPT

## 2023-11-21 PROCEDURE — P9603 ONE-WAY ALLOW PRORATED MILES: HCPCS

## 2023-11-21 PROCEDURE — 82728 ASSAY OF FERRITIN: CPT

## 2023-11-29 ENCOUNTER — OFFICE VISIT (OUTPATIENT)
Dept: ONCOLOGY | Age: 68
End: 2023-11-29
Payer: MEDICAID

## 2023-11-29 VITALS
TEMPERATURE: 97.1 F | SYSTOLIC BLOOD PRESSURE: 104 MMHG | BODY MASS INDEX: 38.27 KG/M2 | OXYGEN SATURATION: 95 % | RESPIRATION RATE: 18 BRPM | HEART RATE: 87 BPM | DIASTOLIC BLOOD PRESSURE: 70 MMHG | WEIGHT: 230 LBS

## 2023-11-29 DIAGNOSIS — E61.1 IRON DEFICIENCY: Primary | ICD-10-CM

## 2023-11-29 DIAGNOSIS — Z79.01 ANTICOAGULATED: ICD-10-CM

## 2023-11-29 DIAGNOSIS — R91.1 LUNG NODULE: ICD-10-CM

## 2023-11-29 DIAGNOSIS — R91.8 LUNG NODULE, MULTIPLE: ICD-10-CM

## 2023-11-29 DIAGNOSIS — Z86.718 HISTORY OF DVT (DEEP VEIN THROMBOSIS): ICD-10-CM

## 2023-11-29 PROCEDURE — 99211 OFF/OP EST MAY X REQ PHY/QHP: CPT | Performed by: INTERNAL MEDICINE

## 2023-11-29 PROCEDURE — 99214 OFFICE O/P EST MOD 30 MIN: CPT | Performed by: INTERNAL MEDICINE

## 2023-11-29 PROCEDURE — 1123F ACP DISCUSS/DSCN MKR DOCD: CPT | Performed by: INTERNAL MEDICINE

## 2023-11-29 RX ORDER — ASCORBIC ACID 250 MG
250 TABLET ORAL 2 TIMES DAILY
Qty: 180 TABLET | Refills: 1 | Status: SHIPPED | OUTPATIENT
Start: 2023-11-29

## 2023-11-29 RX ORDER — ASCORBIC ACID 250 MG
250 TABLET ORAL 2 TIMES DAILY
Qty: 180 TABLET | Refills: 1 | Status: SHIPPED | OUTPATIENT
Start: 2023-11-29 | End: 2023-11-29

## 2023-11-29 RX ORDER — FERROUS SULFATE 325(65) MG
325 TABLET ORAL 2 TIMES DAILY
Qty: 180 TABLET | Refills: 1 | Status: SHIPPED | OUTPATIENT
Start: 2023-11-29

## 2023-11-29 RX ORDER — FERROUS SULFATE 325(65) MG
325 TABLET ORAL 2 TIMES DAILY
Qty: 180 TABLET | Refills: 1 | Status: SHIPPED | OUTPATIENT
Start: 2023-11-29 | End: 2023-11-29

## 2023-11-29 NOTE — PROGRESS NOTES
Insecurity (6/15/2021)    Hunger Vital Sign     Worried About Running Out of Food in the Last Year: Never true     Ran Out of Food in the Last Year: Never true     Family History   Problem Relation Age of Onset    Dementia Maternal Aunt     Kidney Disease Mother     Heart Attack Sister     Prostate Cancer Father     High Cholesterol Brother     Heart Attack Paternal Grandmother        Current Medications:     Current Outpatient Medications   Medication Sig Dispense Refill    ferrous sulfate (IRON 325) 325 (65 Fe) MG tablet Take 1 tablet by mouth daily (with breakfast) 90 tablet 1    alendronate (FOSAMAX) 70 MG tablet Take 1 tablet by mouth every 7 days Every Monday      benzonatate (TESSALON) 200 MG capsule Take 1 capsule by mouth 3 times daily as needed for Cough      butalbital-acetaminophen-caffeine (FIORICET, ESGIC) -40 MG per tablet Take 1 tablet by mouth every 4 hours as needed for Headaches      cetirizine (ZYRTEC) 10 MG tablet Take 1 tablet by mouth daily      doxycycline hyclate (VIBRAMYCIN) 100 MG capsule Take 1 capsule by mouth 2 times daily For 7 days for lung infection      oxyCODONE-acetaminophen (PERCOCET) 5-325 MG per tablet Take 1 tablet by mouth every 6 hours as needed for Pain.       topiramate (TOPAMAX SPRINKLE) 25 MG capsule Take 3 capsules by mouth 2 times daily      sertraline (ZOLOFT) 25 MG tablet Take 1 tablet by mouth daily      hydrocortisone (ANUSOL-HC) 2.5 % CREA rectal cream Use topically every 6 hours as needed 84 g 2    apixaban (ELIQUIS) 5 MG TABS tablet Take 1 tablet by mouth 2 times daily 60 tablet 0    albuterol sulfate HFA (VENTOLIN HFA) 108 (90 Base) MCG/ACT inhaler Inhale 2 puffs into the lungs every 4 hours as needed for Wheezing 18 g 3    albuterol (PROVENTIL) (2.5 MG/3ML) 0.083% nebulizer solution Take 3 mLs by nebulization every 4 hours (Patient taking differently: Take by nebulization every 4 hours) 120 each 3    acetaminophen (TYLENOL) 325 MG tablet Take 2 tablets by

## 2023-12-27 ENCOUNTER — HOSPITAL ENCOUNTER (OUTPATIENT)
Age: 68
Setting detail: SPECIMEN
Discharge: HOME | End: 2023-12-27
Payer: MEDICAID

## 2023-12-27 LAB — POTASSIUM SERPL-SCNC: 3.8 MMOL/L (ref 3.7–5.3)

## 2023-12-27 PROCEDURE — P9603 ONE-WAY ALLOW PRORATED MILES: HCPCS

## 2023-12-27 PROCEDURE — 36415 COLL VENOUS BLD VENIPUNCTURE: CPT

## 2023-12-27 PROCEDURE — 84132 ASSAY OF SERUM POTASSIUM: CPT

## 2024-01-09 ENCOUNTER — HOSPITAL ENCOUNTER (OUTPATIENT)
Age: 69
Setting detail: SPECIMEN
Discharge: HOME | End: 2024-01-09
Payer: MEDICAID

## 2024-01-09 LAB
ALBUMIN SERPL-MCNC: 3.3 G/DL (ref 3.5–5.2)
ALBUMIN/GLOB SERPL: 1 {RATIO} (ref 1–2.5)
ALP SERPL-CCNC: 88 U/L (ref 35–104)
ALT SERPL-CCNC: 16 U/L (ref 5–33)
ANION GAP SERPL CALCULATED.3IONS-SCNC: 12 MMOL/L (ref 9–17)
AST SERPL-CCNC: 14 U/L
BASOPHILS # BLD: 0 K/UL (ref 0–0.2)
BASOPHILS NFR BLD: 0 % (ref 0–2)
BILIRUB SERPL-MCNC: 0.2 MG/DL (ref 0.3–1.2)
BUN SERPL-MCNC: 14 MG/DL (ref 8–23)
CALCIUM SERPL-MCNC: 9.1 MG/DL (ref 8.6–10.4)
CHLORIDE SERPL-SCNC: 97 MMOL/L (ref 98–107)
CO2 SERPL-SCNC: 29 MMOL/L (ref 20–31)
CREAT SERPL-MCNC: 0.6 MG/DL (ref 0.5–0.9)
EOSINOPHIL # BLD: 0.18 K/UL (ref 0–0.4)
EOSINOPHILS RELATIVE PERCENT: 3 % (ref 1–4)
ERYTHROCYTE [DISTWIDTH] IN BLOOD BY AUTOMATED COUNT: 16.1 % (ref 11.8–14.4)
GFR SERPL CREATININE-BSD FRML MDRD: >60 ML/MIN/1.73M2
GLUCOSE SERPL-MCNC: 149 MG/DL (ref 70–99)
HCT VFR BLD AUTO: 34.3 % (ref 36.3–47.1)
HGB BLD-MCNC: 9.9 G/DL (ref 11.9–15.1)
IMM GRANULOCYTES # BLD AUTO: 0.12 K/UL (ref 0–0.3)
IMM GRANULOCYTES NFR BLD: 2 %
LYMPHOCYTES NFR BLD: 0.73 K/UL (ref 1–4.8)
LYMPHOCYTES RELATIVE PERCENT: 12 % (ref 24–44)
MAGNESIUM SERPL-MCNC: 1.8 MG/DL (ref 1.6–2.6)
MCH RBC QN AUTO: 28.5 PG (ref 25.2–33.5)
MCHC RBC AUTO-ENTMCNC: 28.9 G/DL (ref 28.4–34.8)
MCV RBC AUTO: 98.8 FL (ref 82.6–102.9)
MONOCYTES NFR BLD: 0.24 K/UL (ref 0.1–0.8)
MONOCYTES NFR BLD: 4 % (ref 1–7)
MORPHOLOGY: ABNORMAL
NEUTROPHILS NFR BLD: 79 % (ref 36–66)
NEUTS SEG NFR BLD: 4.83 K/UL (ref 1.8–7.7)
NRBC BLD-RTO: 0 PER 100 WBC
PLATELET # BLD AUTO: 315 K/UL (ref 138–453)
PMV BLD AUTO: 9.8 FL (ref 8.1–13.5)
POTASSIUM SERPL-SCNC: 3.8 MMOL/L (ref 3.7–5.3)
PROT SERPL-MCNC: 6.6 G/DL (ref 6.4–8.3)
RBC # BLD AUTO: 3.47 M/UL (ref 3.95–5.11)
SODIUM SERPL-SCNC: 138 MMOL/L (ref 135–144)
WBC OTHER # BLD: 6.1 K/UL (ref 3.5–11.3)

## 2024-01-09 PROCEDURE — 36415 COLL VENOUS BLD VENIPUNCTURE: CPT

## 2024-01-09 PROCEDURE — 85025 COMPLETE CBC W/AUTO DIFF WBC: CPT

## 2024-01-09 PROCEDURE — P9603 ONE-WAY ALLOW PRORATED MILES: HCPCS

## 2024-01-09 PROCEDURE — 80053 COMPREHEN METABOLIC PANEL: CPT

## 2024-01-09 PROCEDURE — 83735 ASSAY OF MAGNESIUM: CPT

## 2024-02-21 NOTE — ED NOTES
Report given to [facility] RN   Report method {IN PERSON   The following was reviewed with receiving RN: needs Zyvox. + pneumonia. Current vital signs:  BP (!) 106/42   Pulse 83   Temp 98.6 °F (37 °C) (Oral)   Resp 18   Ht 5' 2\" (1.575 m)   Wt 147 lb (66.7 kg)   LMP 05/20/2013 (Exact Date)   SpO2 97%   BMI 26.89 kg/m²                MEWS Score: 1     Any medication or safety alerts were reviewed. Any pending diagnostics and notifications were also reviewed, as well as any safety concerns or issues, abnormal labs, abnormal imaging, and abnormal assessment findings. Questions were answered.             Ed Rajan RN  05/09/21 3134
Class I (easy) - visualization of the soft palate, fauces, uvula, and both anterior and posterior pillars

## 2024-03-19 ENCOUNTER — HOSPITAL ENCOUNTER (OUTPATIENT)
Age: 69
Setting detail: SPECIMEN
Discharge: HOME | End: 2024-03-19

## 2024-03-19 LAB
BASOPHILS # BLD: 0.05 K/UL (ref 0–0.2)
BASOPHILS NFR BLD: 1 % (ref 0–2)
EOSINOPHIL # BLD: 0.4 K/UL (ref 0–0.44)
EOSINOPHILS RELATIVE PERCENT: 9 % (ref 1–4)
ERYTHROCYTE [DISTWIDTH] IN BLOOD BY AUTOMATED COUNT: 17 % (ref 11.8–14.4)
FOLATE SERPL-MCNC: 5.4 NG/ML (ref 4.8–24.2)
HCT VFR BLD AUTO: 39.4 % (ref 36.3–47.1)
HGB BLD-MCNC: 11.4 G/DL (ref 11.9–15.1)
IMM GRANULOCYTES # BLD AUTO: <0.03 K/UL (ref 0–0.3)
IMM GRANULOCYTES NFR BLD: 0 %
IRON SATN MFR SERPL: 21 % (ref 20–55)
IRON SERPL-MCNC: 55 UG/DL (ref 37–145)
LYMPHOCYTES NFR BLD: 0.99 K/UL (ref 1.1–3.7)
LYMPHOCYTES RELATIVE PERCENT: 22 % (ref 24–43)
MCH RBC QN AUTO: 29.1 PG (ref 25.2–33.5)
MCHC RBC AUTO-ENTMCNC: 28.9 G/DL (ref 28.4–34.8)
MCV RBC AUTO: 100.5 FL (ref 82.6–102.9)
MONOCYTES NFR BLD: 0.53 K/UL (ref 0.1–1.2)
MONOCYTES NFR BLD: 12 % (ref 3–12)
NEUTROPHILS NFR BLD: 56 % (ref 36–65)
NEUTS SEG NFR BLD: 2.47 K/UL (ref 1.5–8.1)
NRBC BLD-RTO: 0 PER 100 WBC
PLATELET # BLD AUTO: 286 K/UL (ref 138–453)
PMV BLD AUTO: 10.1 FL (ref 8.1–13.5)
RBC # BLD AUTO: 3.92 M/UL (ref 3.95–5.11)
RBC # BLD: ABNORMAL 10*6/UL
TIBC SERPL-MCNC: 260 UG/DL (ref 250–450)
UNSATURATED IRON BINDING CAPACITY: 205 UG/DL (ref 112–347)
VIT B12 SERPL-MCNC: 538 PG/ML (ref 232–1245)
WBC OTHER # BLD: 4.5 K/UL (ref 3.5–11.3)

## 2024-03-19 PROCEDURE — 83540 ASSAY OF IRON: CPT

## 2024-03-19 PROCEDURE — 83521 IG LIGHT CHAINS FREE EACH: CPT

## 2024-03-19 PROCEDURE — 82746 ASSAY OF FOLIC ACID SERUM: CPT

## 2024-03-19 PROCEDURE — 85025 COMPLETE CBC W/AUTO DIFF WBC: CPT

## 2024-03-19 PROCEDURE — 82607 VITAMIN B-12: CPT

## 2024-03-19 PROCEDURE — P9603 ONE-WAY ALLOW PRORATED MILES: HCPCS

## 2024-03-19 PROCEDURE — 84165 PROTEIN E-PHORESIS SERUM: CPT

## 2024-03-19 PROCEDURE — 36415 COLL VENOUS BLD VENIPUNCTURE: CPT

## 2024-03-19 PROCEDURE — 83550 IRON BINDING TEST: CPT

## 2024-03-19 PROCEDURE — 86334 IMMUNOFIX E-PHORESIS SERUM: CPT

## 2024-03-19 PROCEDURE — 84155 ASSAY OF PROTEIN SERUM: CPT

## 2024-03-20 ENCOUNTER — HOSPITAL ENCOUNTER (OUTPATIENT)
Age: 69
Setting detail: SPECIMEN
Discharge: HOME | End: 2024-03-20
Payer: MEDICAID

## 2024-03-20 LAB
ALBUMIN PERCENT: 59 % (ref 45–65)
ALBUMIN PERCENT: ABNORMAL %
ALBUMIN SERPL-MCNC: 3.8 G/DL (ref 3.2–5.2)
ALBUMIN SERPL-MCNC: ABNORMAL G/DL
ALPHA 2 PERCENT: 12 % (ref 6–13)
ALPHA 2 PERCENT: ABNORMAL %
ALPHA1 GLOB SERPL ELPH-MCNC: 0.1 G/DL (ref 0.1–0.4)
ALPHA1 GLOB SERPL ELPH-MCNC: 2 % (ref 3–6)
ALPHA1 GLOB SERPL ELPH-MCNC: ABNORMAL %
ALPHA1 GLOB SERPL ELPH-MCNC: ABNORMAL G/DL
ALPHA2 GLOB SERPL ELPH-MCNC: 0.8 G/DL (ref 0.5–0.9)
ALPHA2 GLOB SERPL ELPH-MCNC: ABNORMAL G/DL
ANION GAP SERPL CALCULATED.3IONS-SCNC: 9 MMOL/L (ref 9–16)
B-GLOBULIN SERPL ELPH-MCNC: 0.7 G/DL (ref 0.5–1.1)
B-GLOBULIN SERPL ELPH-MCNC: 11 % (ref 11–19)
B-GLOBULIN SERPL ELPH-MCNC: ABNORMAL %
B-GLOBULIN SERPL ELPH-MCNC: ABNORMAL G/DL
BASOPHILS # BLD: 0.05 K/UL (ref 0–0.2)
BASOPHILS NFR BLD: 1 % (ref 0–2)
BUN SERPL-MCNC: 14 MG/DL (ref 8–23)
CALCIUM SERPL-MCNC: 8.9 MG/DL (ref 8.6–10.4)
CHLORIDE SERPL-SCNC: 102 MMOL/L (ref 98–107)
CO2 SERPL-SCNC: 29 MMOL/L (ref 20–31)
CREAT SERPL-MCNC: 0.6 MG/DL (ref 0.5–0.9)
EOSINOPHIL # BLD: 0.36 K/UL (ref 0–0.44)
EOSINOPHILS RELATIVE PERCENT: 8 % (ref 1–4)
ERYTHROCYTE [DISTWIDTH] IN BLOOD BY AUTOMATED COUNT: 17.1 % (ref 11.8–14.4)
FERRITIN SERPL-MCNC: 110 NG/ML (ref 13–150)
FOLATE SERPL-MCNC: 5.2 NG/ML (ref 4.8–24.2)
FREE KAPPA/LAMBDA RATIO: 1.38 (ref 0.22–1.74)
FREE KAPPA/LAMBDA RATIO: 1.41 (ref 0.22–1.74)
GAMMA GLOB SERPL ELPH-MCNC: 1 G/DL (ref 0.5–1.5)
GAMMA GLOB SERPL ELPH-MCNC: ABNORMAL G/DL
GAMMA GLOBULIN %: 16 % (ref 9–20)
GAMMA GLOBULIN %: ABNORMAL %
GFR SERPL CREATININE-BSD FRML MDRD: >60 ML/MIN/1.73M2
GLUCOSE SERPL-MCNC: 120 MG/DL (ref 74–99)
HCT VFR BLD AUTO: 36.5 % (ref 36.3–47.1)
HGB BLD-MCNC: 10.6 G/DL (ref 11.9–15.1)
IMM GRANULOCYTES # BLD AUTO: <0.03 K/UL (ref 0–0.3)
IMM GRANULOCYTES NFR BLD: 0 %
INTERPRETATION SERPL IFE-IMP: ABNORMAL
INTERPRETATION SERPL IFE-IMP: ABNORMAL
IRON SATN MFR SERPL: 18 % (ref 20–55)
IRON SERPL-MCNC: 43 UG/DL (ref 37–145)
KAPPA LC FREE SER-MCNC: 48.3 MG/L
KAPPA LC FREE SER-MCNC: 51.1 MG/L
LAMBDA LC FREE SERPL-MCNC: 35 MG/L (ref 4.2–27.7)
LAMBDA LC FREE SERPL-MCNC: 36.3 MG/L (ref 4.2–27.7)
LYMPHOCYTES NFR BLD: 0.92 K/UL (ref 1.1–3.7)
LYMPHOCYTES RELATIVE PERCENT: 20 % (ref 24–43)
M PROTEIN 2 SERPL ELPH-MCNC: ABNORMAL G/DL
M PROTEIN SERPL ELPH-MCNC: ABNORMAL G/DL
MCH RBC QN AUTO: 28.7 PG (ref 25.2–33.5)
MCHC RBC AUTO-ENTMCNC: 29 G/DL (ref 28.4–34.8)
MCV RBC AUTO: 98.9 FL (ref 82.6–102.9)
MONOCYTES NFR BLD: 0.59 K/UL (ref 0.1–1.2)
MONOCYTES NFR BLD: 13 % (ref 3–12)
NEUTROPHILS NFR BLD: 58 % (ref 36–65)
NEUTS SEG NFR BLD: 2.65 K/UL (ref 1.5–8.1)
NRBC BLD-RTO: 0 PER 100 WBC
PATH REV: ABNORMAL
PATH REV: ABNORMAL
PATHOLOGIST: ABNORMAL
PATHOLOGIST: ABNORMAL
PLATELET # BLD AUTO: 276 K/UL (ref 138–453)
PMV BLD AUTO: 10.3 FL (ref 8.1–13.5)
POTASSIUM SERPL-SCNC: 4.1 MMOL/L (ref 3.7–5.3)
PROT PATTERN SERPL ELPH-IMP: ABNORMAL
PROT PATTERN SERPL ELPH-IMP: ABNORMAL
PROT SERPL-MCNC: 6.5 G/DL (ref 6.6–8.7)
PROT SERPL-MCNC: ABNORMAL G/DL
RBC # BLD AUTO: 3.69 M/UL (ref 3.95–5.11)
RBC # BLD: ABNORMAL 10*6/UL
SODIUM SERPL-SCNC: 140 MMOL/L (ref 136–145)
TIBC SERPL-MCNC: 242 UG/DL (ref 250–450)
TOTAL PROT. SUM,%: 100 % (ref 98–102)
TOTAL PROT. SUM,%: ABNORMAL %
TOTAL PROT. SUM: 6.4 G/DL (ref 6.3–8.2)
TOTAL PROT. SUM: ABNORMAL G/DL
UNSATURATED IRON BINDING CAPACITY: 199 UG/DL (ref 112–347)
VIT B12 SERPL-MCNC: 502 PG/ML (ref 232–1245)
WBC OTHER # BLD: 4.6 K/UL (ref 3.5–11.3)

## 2024-03-20 PROCEDURE — 83521 IG LIGHT CHAINS FREE EACH: CPT

## 2024-03-20 PROCEDURE — 36415 COLL VENOUS BLD VENIPUNCTURE: CPT

## 2024-03-20 PROCEDURE — 83550 IRON BINDING TEST: CPT

## 2024-03-20 PROCEDURE — 86334 IMMUNOFIX E-PHORESIS SERUM: CPT

## 2024-03-20 PROCEDURE — 82746 ASSAY OF FOLIC ACID SERUM: CPT

## 2024-03-20 PROCEDURE — 85025 COMPLETE CBC W/AUTO DIFF WBC: CPT

## 2024-03-20 PROCEDURE — 82728 ASSAY OF FERRITIN: CPT

## 2024-03-20 PROCEDURE — P9603 ONE-WAY ALLOW PRORATED MILES: HCPCS

## 2024-03-20 PROCEDURE — 84155 ASSAY OF PROTEIN SERUM: CPT

## 2024-03-20 PROCEDURE — 80048 BASIC METABOLIC PNL TOTAL CA: CPT

## 2024-03-20 PROCEDURE — 82607 VITAMIN B-12: CPT

## 2024-03-20 PROCEDURE — 84165 PROTEIN E-PHORESIS SERUM: CPT

## 2024-03-20 PROCEDURE — 83540 ASSAY OF IRON: CPT

## 2024-03-27 ENCOUNTER — OFFICE VISIT (OUTPATIENT)
Dept: ONCOLOGY | Age: 69
End: 2024-03-27
Payer: MEDICAID

## 2024-03-27 ENCOUNTER — TELEPHONE (OUTPATIENT)
Dept: ONCOLOGY | Age: 69
End: 2024-03-27

## 2024-03-27 VITALS
HEART RATE: 84 BPM | TEMPERATURE: 97.9 F | DIASTOLIC BLOOD PRESSURE: 79 MMHG | BODY MASS INDEX: 41.44 KG/M2 | SYSTOLIC BLOOD PRESSURE: 128 MMHG | WEIGHT: 249 LBS | OXYGEN SATURATION: 95 %

## 2024-03-27 DIAGNOSIS — R91.8 LUNG NODULE, MULTIPLE: ICD-10-CM

## 2024-03-27 DIAGNOSIS — D50.9 IRON DEFICIENCY ANEMIA, UNSPECIFIED IRON DEFICIENCY ANEMIA TYPE: Primary | ICD-10-CM

## 2024-03-27 DIAGNOSIS — D89.2 PARAPROTEINEMIA: ICD-10-CM

## 2024-03-27 DIAGNOSIS — Z79.01 ANTICOAGULATED: ICD-10-CM

## 2024-03-27 PROCEDURE — 99214 OFFICE O/P EST MOD 30 MIN: CPT | Performed by: INTERNAL MEDICINE

## 2024-03-27 PROCEDURE — 1123F ACP DISCUSS/DSCN MKR DOCD: CPT | Performed by: INTERNAL MEDICINE

## 2024-03-27 PROCEDURE — 99211 OFF/OP EST MAY X REQ PHY/QHP: CPT | Performed by: INTERNAL MEDICINE

## 2024-03-27 RX ORDER — LIRAGLUTIDE 6 MG/ML
1.2 INJECTION SUBCUTANEOUS DAILY
COMMUNITY

## 2024-03-27 RX ORDER — POTASSIUM CHLORIDE 20 MEQ/1
20 TABLET, EXTENDED RELEASE ORAL 3 TIMES DAILY
COMMUNITY

## 2024-03-27 RX ORDER — BUMETANIDE 2 MG/1
2 TABLET ORAL DAILY
COMMUNITY

## 2024-03-27 NOTE — PROGRESS NOTES
interval change.  Other areas are also similar as described above.  No progression.  CT chest recommended for follow-up at 3-6 months RECOMMENDATIONS: CT chest recommended for follow-up at 3-6 months        Impression:  PE, DVT, provoked from smoking and sedentary lifestyle-6/2021  Eliquis-6/21 1 through 12/21  Recurrent DVT, provoked from illness in decreased mobility-6/2022  Plan for long-term anticoagulation  Right upper lobe lung nodules  Leukopenia secondary to risperidone  Anemia  Paraproteinemia, likely MGUS, IgM lambda 0.07 g/dL  HX smoking, quit  HX COPD, on oxygen    Plan:  I had a detailed discussion with the patient and her daughter.  Patient continues to be on iron twice a day.  Iron saturation noted to be at 20%.  Continue oral iron to maintain iron stores.  Monoclonal panel came back positive for small amount of M protein, IgM lambda.  Patient likely has MGUS.  Given small amount of M protein no plan for bone marrow biopsy at this point  Per radiology recommendation we will repeat CT chest in 6 months  Continue anticoagulation using Eliquis due to recurrent VTE  Patient understands she will need long-term anticoagulation  Patient has history of tobacco dependence but now quit.  Discussed with patient's daughter.  Also patient's question to the best mobility.      MONICA AYOUB MD      This note is created with the assistance of a speech recognition program.  While intending to generate a document that actually reflects the content of the visit, the document can still have some errors including those of syntax and sound a like substitutions which may escape proof reading.  It such instances, actual meaning can be extrapolated by contextual diversion.

## 2024-03-27 NOTE — TELEPHONE ENCOUNTER
AVS from 3/27/24    Rv in 6 months with labs and scans just before rv       *rv is sched for 9/18/24@1230   *CT scan will be done prior at [facility] gave patient orders for CT and labs with office fax number      PT was given orders, scheduling instructions, AVS and an appt schedule     [Follow-Up - Clinic] : a clinic follow-up of [Pacemaker Evaluation] : pacemaker ~T evaluation ~C was performed

## 2024-04-16 ENCOUNTER — HOSPITAL ENCOUNTER (OUTPATIENT)
Age: 69
Setting detail: SPECIMEN
Discharge: HOME | End: 2024-04-16
Payer: MEDICAID

## 2024-04-16 LAB
ANION GAP SERPL CALCULATED.3IONS-SCNC: 9 MMOL/L (ref 9–16)
BUN SERPL-MCNC: 14 MG/DL (ref 8–23)
CALCIUM SERPL-MCNC: 9.2 MG/DL (ref 8.6–10.4)
CHLORIDE SERPL-SCNC: 95 MMOL/L (ref 98–107)
CO2 SERPL-SCNC: 37 MMOL/L (ref 20–31)
CREAT SERPL-MCNC: 0.8 MG/DL (ref 0.5–0.9)
GFR SERPL CREATININE-BSD FRML MDRD: 86 ML/MIN/1.73M2
GLUCOSE SERPL-MCNC: 94 MG/DL (ref 74–99)
POTASSIUM SERPL-SCNC: 3 MMOL/L (ref 3.7–5.3)
SODIUM SERPL-SCNC: 141 MMOL/L (ref 136–145)

## 2024-04-16 PROCEDURE — P9603 ONE-WAY ALLOW PRORATED MILES: HCPCS

## 2024-04-16 PROCEDURE — 80048 BASIC METABOLIC PNL TOTAL CA: CPT

## 2024-04-16 PROCEDURE — 36415 COLL VENOUS BLD VENIPUNCTURE: CPT

## 2024-04-17 ENCOUNTER — HOSPITAL ENCOUNTER (OUTPATIENT)
Age: 69
Setting detail: SPECIMEN
Discharge: HOME | End: 2024-04-17
Payer: MEDICAID

## 2024-04-17 LAB
EST. AVERAGE GLUCOSE BLD GHB EST-MCNC: 126 MG/DL
HBA1C MFR BLD: 6 % (ref 4–6)

## 2024-04-17 PROCEDURE — P9603 ONE-WAY ALLOW PRORATED MILES: HCPCS

## 2024-04-17 PROCEDURE — 83036 HEMOGLOBIN GLYCOSYLATED A1C: CPT

## 2024-04-17 PROCEDURE — 36415 COLL VENOUS BLD VENIPUNCTURE: CPT

## 2024-05-23 ENCOUNTER — HOSPITAL ENCOUNTER (OUTPATIENT)
Age: 69
Setting detail: SPECIMEN
Discharge: HOME | End: 2024-05-23

## 2024-05-23 LAB
ALBUMIN SERPL-MCNC: 3.7 G/DL (ref 3.5–5.2)
ALBUMIN/GLOB SERPL: 1 {RATIO} (ref 1–2.5)
ALP SERPL-CCNC: 83 U/L (ref 35–104)
ALT SERPL-CCNC: 12 U/L (ref 10–35)
ANION GAP SERPL CALCULATED.3IONS-SCNC: 11 MMOL/L (ref 9–16)
AST SERPL-CCNC: 21 U/L (ref 10–35)
BILIRUB SERPL-MCNC: 0.3 MG/DL (ref 0–1.2)
BNP SERPL-MCNC: 88 PG/ML (ref 0–300)
BUN SERPL-MCNC: 14 MG/DL (ref 8–23)
CALCIUM SERPL-MCNC: 9.4 MG/DL (ref 8.6–10.4)
CHLORIDE SERPL-SCNC: 95 MMOL/L (ref 98–107)
CO2 SERPL-SCNC: 32 MMOL/L (ref 20–31)
CREAT SERPL-MCNC: 0.8 MG/DL (ref 0.5–0.9)
GFR, ESTIMATED: 76 ML/MIN/1.73M2
GLUCOSE SERPL-MCNC: 122 MG/DL (ref 74–99)
POTASSIUM SERPL-SCNC: 3.7 MMOL/L (ref 3.7–5.3)
PROT SERPL-MCNC: 7.1 G/DL (ref 6.6–8.7)
SODIUM SERPL-SCNC: 138 MMOL/L (ref 136–145)

## 2024-05-23 PROCEDURE — 36415 COLL VENOUS BLD VENIPUNCTURE: CPT

## 2024-05-23 PROCEDURE — 80053 COMPREHEN METABOLIC PANEL: CPT

## 2024-05-23 PROCEDURE — P9603 ONE-WAY ALLOW PRORATED MILES: HCPCS

## 2024-05-23 PROCEDURE — 83880 ASSAY OF NATRIURETIC PEPTIDE: CPT

## 2024-07-30 ENCOUNTER — HOSPITAL ENCOUNTER (OUTPATIENT)
Dept: CT IMAGING | Age: 69
Discharge: HOME | End: 2024-08-01
Attending: INTERNAL MEDICINE
Payer: MEDICAID

## 2024-07-30 DIAGNOSIS — R91.8 LUNG NODULE, MULTIPLE: ICD-10-CM

## 2024-07-30 DIAGNOSIS — D89.2 PARAPROTEINEMIA: ICD-10-CM

## 2024-07-30 DIAGNOSIS — Z79.01 ANTICOAGULATED: ICD-10-CM

## 2024-07-30 DIAGNOSIS — D50.9 IRON DEFICIENCY ANEMIA, UNSPECIFIED IRON DEFICIENCY ANEMIA TYPE: ICD-10-CM

## 2024-07-30 PROCEDURE — 71250 CT THORAX DX C-: CPT

## 2024-08-04 ENCOUNTER — HOSPITAL ENCOUNTER (OUTPATIENT)
Age: 69
Setting detail: SPECIMEN
Discharge: HOME | End: 2024-08-04

## 2024-08-04 LAB
ERYTHROCYTE [DISTWIDTH] IN BLOOD BY AUTOMATED COUNT: 16.8 % (ref 11.8–14.4)
HCT VFR BLD AUTO: 33.4 % (ref 36.3–47.1)
HGB BLD-MCNC: 9.9 G/DL (ref 11.9–15.1)
MCH RBC QN AUTO: 30.4 PG (ref 25.2–33.5)
MCHC RBC AUTO-ENTMCNC: 29.6 G/DL (ref 28.4–34.8)
MCV RBC AUTO: 102.5 FL (ref 82.6–102.9)
NRBC BLD-RTO: 0 PER 100 WBC
PLATELET # BLD AUTO: 306 K/UL (ref 138–453)
PMV BLD AUTO: 10 FL (ref 8.1–13.5)
RBC # BLD AUTO: 3.26 M/UL (ref 3.95–5.11)
WBC OTHER # BLD: 8.9 K/UL (ref 3.5–11.3)

## 2024-08-04 PROCEDURE — 85027 COMPLETE CBC AUTOMATED: CPT

## 2024-08-04 PROCEDURE — 36415 COLL VENOUS BLD VENIPUNCTURE: CPT

## 2024-08-04 PROCEDURE — P9603 ONE-WAY ALLOW PRORATED MILES: HCPCS

## 2024-08-06 ENCOUNTER — HOSPITAL ENCOUNTER (OUTPATIENT)
Age: 69
Setting detail: SPECIMEN
Discharge: HOME | End: 2024-08-06

## 2024-08-06 LAB
ANION GAP SERPL CALCULATED.3IONS-SCNC: 11 MMOL/L (ref 9–16)
BASOPHILS # BLD: 0.08 K/UL (ref 0–0.2)
BASOPHILS NFR BLD: 1 % (ref 0–2)
BUN SERPL-MCNC: 17 MG/DL (ref 8–23)
CALCIUM SERPL-MCNC: 8.8 MG/DL (ref 8.6–10.4)
CHLORIDE SERPL-SCNC: 98 MMOL/L (ref 98–107)
CO2 SERPL-SCNC: 29 MMOL/L (ref 20–31)
CREAT SERPL-MCNC: 0.7 MG/DL (ref 0.5–0.9)
EOSINOPHIL # BLD: 0.45 K/UL (ref 0–0.4)
EOSINOPHILS RELATIVE PERCENT: 6 % (ref 1–4)
ERYTHROCYTE [DISTWIDTH] IN BLOOD BY AUTOMATED COUNT: 16.9 % (ref 11.8–14.4)
GFR, ESTIMATED: >90 ML/MIN/1.73M2
GLUCOSE SERPL-MCNC: 138 MG/DL (ref 74–99)
HCT VFR BLD AUTO: 31.8 % (ref 36.3–47.1)
HGB BLD-MCNC: 9.3 G/DL (ref 11.9–15.1)
IMM GRANULOCYTES # BLD AUTO: 0 K/UL (ref 0–0.3)
IMM GRANULOCYTES NFR BLD: 0 %
LYMPHOCYTES NFR BLD: 0.68 K/UL (ref 1–4.8)
LYMPHOCYTES RELATIVE PERCENT: 9 % (ref 24–44)
MCH RBC QN AUTO: 29.6 PG (ref 25.2–33.5)
MCHC RBC AUTO-ENTMCNC: 29.2 G/DL (ref 28.4–34.8)
MCV RBC AUTO: 101.3 FL (ref 82.6–102.9)
MONOCYTES NFR BLD: 0.83 K/UL (ref 0.1–0.8)
MONOCYTES NFR BLD: 11 % (ref 1–7)
MORPHOLOGY: ABNORMAL
NEUTROPHILS NFR BLD: 73 % (ref 36–66)
NEUTS SEG NFR BLD: 5.46 K/UL (ref 1.8–7.7)
NRBC BLD-RTO: 0 PER 100 WBC
PLATELET # BLD AUTO: 317 K/UL (ref 138–453)
PMV BLD AUTO: 10.1 FL (ref 8.1–13.5)
POTASSIUM SERPL-SCNC: 3.4 MMOL/L (ref 3.7–5.3)
RBC # BLD AUTO: 3.14 M/UL (ref 3.95–5.11)
SODIUM SERPL-SCNC: 138 MMOL/L (ref 136–145)
WBC OTHER # BLD: 7.5 K/UL (ref 3.5–11.3)

## 2024-08-06 PROCEDURE — 80048 BASIC METABOLIC PNL TOTAL CA: CPT

## 2024-08-06 PROCEDURE — 36415 COLL VENOUS BLD VENIPUNCTURE: CPT

## 2024-08-06 PROCEDURE — P9603 ONE-WAY ALLOW PRORATED MILES: HCPCS

## 2024-08-06 PROCEDURE — 85025 COMPLETE CBC W/AUTO DIFF WBC: CPT

## 2024-08-20 ENCOUNTER — HOSPITAL ENCOUNTER (OUTPATIENT)
Age: 69
Setting detail: SPECIMEN
Discharge: HOME | End: 2024-08-20
Payer: MEDICAID

## 2024-08-20 LAB
BASOPHILS # BLD: 0.05 K/UL (ref 0–0.2)
BASOPHILS NFR BLD: 1 % (ref 0–2)
EOSINOPHIL # BLD: 0.35 K/UL (ref 0–0.44)
EOSINOPHILS RELATIVE PERCENT: 6 % (ref 1–4)
ERYTHROCYTE [DISTWIDTH] IN BLOOD BY AUTOMATED COUNT: 16.7 % (ref 11.8–14.4)
FERRITIN SERPL-MCNC: 321 NG/ML (ref 13–150)
HCT VFR BLD AUTO: 33.1 % (ref 36.3–47.1)
HGB BLD-MCNC: 9.6 G/DL (ref 11.9–15.1)
IMM GRANULOCYTES # BLD AUTO: <0.03 K/UL (ref 0–0.3)
IMM GRANULOCYTES NFR BLD: 0 %
IRON SATN MFR SERPL: 18 % (ref 20–55)
IRON SERPL-MCNC: 42 UG/DL (ref 37–145)
LYMPHOCYTES NFR BLD: 0.92 K/UL (ref 1.1–3.7)
LYMPHOCYTES RELATIVE PERCENT: 16 % (ref 24–43)
MCH RBC QN AUTO: 29.5 PG (ref 25.2–33.5)
MCHC RBC AUTO-ENTMCNC: 29 G/DL (ref 28.4–34.8)
MCV RBC AUTO: 101.8 FL (ref 82.6–102.9)
MONOCYTES NFR BLD: 0.49 K/UL (ref 0.1–1.2)
MONOCYTES NFR BLD: 9 % (ref 3–12)
NEUTROPHILS NFR BLD: 68 % (ref 36–65)
NEUTS SEG NFR BLD: 3.8 K/UL (ref 1.5–8.1)
NRBC BLD-RTO: 0 PER 100 WBC
PLATELET # BLD AUTO: 344 K/UL (ref 138–453)
PMV BLD AUTO: 9.8 FL (ref 8.1–13.5)
RBC # BLD AUTO: 3.25 M/UL (ref 3.95–5.11)
RBC # BLD: ABNORMAL 10*6/UL
TIBC SERPL-MCNC: 228 UG/DL (ref 250–450)
UNSATURATED IRON BINDING CAPACITY: 186 UG/DL (ref 112–347)
WBC OTHER # BLD: 5.6 K/UL (ref 3.5–11.3)

## 2024-08-20 PROCEDURE — 85025 COMPLETE CBC W/AUTO DIFF WBC: CPT

## 2024-08-20 PROCEDURE — P9603 ONE-WAY ALLOW PRORATED MILES: HCPCS

## 2024-08-20 PROCEDURE — 84165 PROTEIN E-PHORESIS SERUM: CPT

## 2024-08-20 PROCEDURE — 84155 ASSAY OF PROTEIN SERUM: CPT

## 2024-08-20 PROCEDURE — 82728 ASSAY OF FERRITIN: CPT

## 2024-08-20 PROCEDURE — 83550 IRON BINDING TEST: CPT

## 2024-08-20 PROCEDURE — 86334 IMMUNOFIX E-PHORESIS SERUM: CPT

## 2024-08-20 PROCEDURE — 83540 ASSAY OF IRON: CPT

## 2024-08-20 PROCEDURE — 83521 IG LIGHT CHAINS FREE EACH: CPT

## 2024-08-20 PROCEDURE — 36415 COLL VENOUS BLD VENIPUNCTURE: CPT

## 2024-08-21 LAB
ALBUMIN PERCENT: 47 % (ref 45–65)
ALBUMIN SERPL-MCNC: 3 G/DL (ref 3.2–5.2)
ALPHA 2 PERCENT: 14 % (ref 6–13)
ALPHA1 GLOB SERPL ELPH-MCNC: 0.2 G/DL (ref 0.1–0.4)
ALPHA1 GLOB SERPL ELPH-MCNC: 4 % (ref 3–6)
ALPHA2 GLOB SERPL ELPH-MCNC: 0.9 G/DL (ref 0.5–0.9)
B-GLOBULIN SERPL ELPH-MCNC: 0.8 G/DL (ref 0.5–1.1)
B-GLOBULIN SERPL ELPH-MCNC: 13 % (ref 11–19)
FREE KAPPA/LAMBDA RATIO: 1.57 (ref 0.22–1.74)
GAMMA GLOB SERPL ELPH-MCNC: 1.5 G/DL (ref 0.5–1.5)
GAMMA GLOBULIN %: 23 % (ref 9–20)
ITYP INTERPRETATION: ABNORMAL
KAPPA LC FREE SER-MCNC: 76.6 MG/L
LAMBDA LC FREE SERPL-MCNC: 48.7 MG/L (ref 4.2–27.7)
PATH REV: ABNORMAL
PATHOLOGIST: ABNORMAL
PROT PATTERN SERPL ELPH-IMP: ABNORMAL
PROT SERPL-MCNC: 6.3 G/DL (ref 6.6–8.7)
TOTAL PROT. SUM,%: 101 % (ref 98–102)
TOTAL PROT. SUM: 6.4 G/DL (ref 6.3–8.2)

## 2024-09-18 ENCOUNTER — HOSPITAL ENCOUNTER (OUTPATIENT)
Dept: NUCLEAR MEDICINE | Age: 69
Discharge: HOME | End: 2024-09-20
Payer: MEDICAID

## 2024-09-18 DIAGNOSIS — R91.1 RIGHT LOWER LOBE PULMONARY NODULE: ICD-10-CM

## 2024-09-18 LAB — GLUCOSE BLD-MCNC: 112 MG/DL (ref 65–105)

## 2024-09-18 PROCEDURE — 82947 ASSAY GLUCOSE BLOOD QUANT: CPT

## 2024-09-18 PROCEDURE — 78815 PET IMAGE W/CT SKULL-THIGH: CPT

## 2024-09-18 PROCEDURE — A9609 HC RX DIAGNOSTIC RADIOPHARMACEUTICAL: HCPCS | Performed by: NURSE PRACTITIONER

## 2024-09-18 PROCEDURE — 3430000000 HC RX DIAGNOSTIC RADIOPHARMACEUTICAL: Performed by: NURSE PRACTITIONER

## 2024-09-18 RX ORDER — FLUDEOXYGLUCOSE F 18 200 MCI/ML
10 INJECTION, SOLUTION INTRAVENOUS
Status: COMPLETED | OUTPATIENT
Start: 2024-09-18 | End: 2024-09-18

## 2024-09-18 RX ORDER — SODIUM CHLORIDE 0.9 % (FLUSH) 0.9 %
10 SYRINGE (ML) INJECTION PRN
Status: DISCONTINUED | OUTPATIENT
Start: 2024-09-18 | End: 2024-09-21 | Stop reason: HOSPADM

## 2024-09-18 RX ADMIN — FLUDEOXYGLUCOSE F 18 10.97 MILLICURIE: 200 INJECTION, SOLUTION INTRAVENOUS at 10:15

## 2024-09-19 ENCOUNTER — HOSPITAL ENCOUNTER (OUTPATIENT)
Age: 69
Setting detail: SPECIMEN
Discharge: HOME | End: 2024-09-19
Payer: MEDICAID

## 2024-09-19 LAB
ANION GAP SERPL CALCULATED.3IONS-SCNC: 10 MMOL/L (ref 9–16)
BUN SERPL-MCNC: 19 MG/DL (ref 8–23)
CALCIUM SERPL-MCNC: 8.7 MG/DL (ref 8.6–10.4)
CHLORIDE SERPL-SCNC: 95 MMOL/L (ref 98–107)
CO2 SERPL-SCNC: 29 MMOL/L (ref 20–31)
CREAT SERPL-MCNC: 0.8 MG/DL (ref 0.5–0.9)
GFR, ESTIMATED: 76 ML/MIN/1.73M2
GLUCOSE SERPL-MCNC: 121 MG/DL (ref 74–99)
POTASSIUM SERPL-SCNC: 3.6 MMOL/L (ref 3.7–5.3)
SODIUM SERPL-SCNC: 134 MMOL/L (ref 136–145)

## 2024-09-19 PROCEDURE — P9603 ONE-WAY ALLOW PRORATED MILES: HCPCS

## 2024-09-19 PROCEDURE — 80048 BASIC METABOLIC PNL TOTAL CA: CPT

## 2024-09-19 PROCEDURE — 36415 COLL VENOUS BLD VENIPUNCTURE: CPT

## 2024-09-26 ENCOUNTER — HOSPITAL ENCOUNTER (OUTPATIENT)
Age: 69
Setting detail: SPECIMEN
Discharge: HOME | End: 2024-09-26

## 2024-09-26 LAB — POTASSIUM SERPL-SCNC: 3.9 MMOL/L (ref 3.7–5.3)

## 2024-09-26 PROCEDURE — 36415 COLL VENOUS BLD VENIPUNCTURE: CPT

## 2024-09-26 PROCEDURE — P9603 ONE-WAY ALLOW PRORATED MILES: HCPCS

## 2024-09-26 PROCEDURE — 84132 ASSAY OF SERUM POTASSIUM: CPT

## 2024-10-02 ENCOUNTER — OFFICE VISIT (OUTPATIENT)
Dept: ONCOLOGY | Age: 69
End: 2024-10-02
Payer: MEDICAID

## 2024-10-02 VITALS
TEMPERATURE: 97.2 F | WEIGHT: 210.4 LBS | OXYGEN SATURATION: 95 % | HEART RATE: 77 BPM | DIASTOLIC BLOOD PRESSURE: 57 MMHG | SYSTOLIC BLOOD PRESSURE: 99 MMHG | BODY MASS INDEX: 35.01 KG/M2 | RESPIRATION RATE: 18 BRPM

## 2024-10-02 DIAGNOSIS — Z79.01 ANTICOAGULATED: ICD-10-CM

## 2024-10-02 DIAGNOSIS — R91.8 LUNG NODULE, MULTIPLE: Primary | ICD-10-CM

## 2024-10-02 DIAGNOSIS — D89.2 PARAPROTEINEMIA: ICD-10-CM

## 2024-10-02 PROCEDURE — 1123F ACP DISCUSS/DSCN MKR DOCD: CPT | Performed by: INTERNAL MEDICINE

## 2024-10-02 PROCEDURE — 99214 OFFICE O/P EST MOD 30 MIN: CPT | Performed by: INTERNAL MEDICINE

## 2024-10-02 RX ORDER — TIZANIDINE 2 MG/1
2 TABLET ORAL EVERY 6 HOURS PRN
COMMUNITY

## 2024-10-02 RX ORDER — SPIRONOLACTONE 25 MG/1
25 TABLET ORAL DAILY
COMMUNITY

## 2024-10-02 NOTE — PROGRESS NOTES
coloration and turgor, no rashes, no suspicious skin lesions noted     DATA:  Lab Results   Component Value Date    WBC 5.6 08/20/2024    HGB 9.6 (L) 08/20/2024    HCT 33.1 (L) 08/20/2024    .8 08/20/2024     08/20/2024       Chemistry        Component Value Date/Time     (L) 09/19/2024 0700    K 3.9 09/26/2024 0730    CL 95 (L) 09/19/2024 0700    CO2 29 09/19/2024 0700    BUN 19 09/19/2024 0700    CREATININE 0.8 09/19/2024 0700        Component Value Date/Time    CALCIUM 8.7 09/19/2024 0700    ALKPHOS 83 05/23/2024 0831    AST 21 05/23/2024 0831    ALT 12 05/23/2024 0831    BILITOT 0.3 05/23/2024 0831        PET CT SKULL BASE TO MID THIGH    Result Date: 9/18/2024  EXAMINATION: WHOLE BODY PET/CT 9/18/2024 TECHNIQUE: Following IV injection of 10.97 mCi of F-18 FDG, PET  tumor imaging was acquired from the base of the skull to the mid thighs.  Computed tomography was used for purposes of attenuation correction and anatomic localization. Fusion imaging was utilized for interpretation. Uptake time 66 min.  Glucose level 112 mg/dl. COMPARISON: Multiple chest CT exams including 07/30/2024 most recently, 06/30/2022, 06/17/2021, 07/25/2019 HISTORY: ORDERING SYSTEM PROVIDED HISTORY: Right lower lobe pulmonary nodule TECHNOLOGIST PROVIDED HISTORY: Reason for Exam: Right lower lobe pulmonary nodule FINDINGS: HEAD/NECK: No suspicious metabolic activity identified.  Relatively diffuse oral and laryngeal activity is noted. CHEST: Newly described irregular left upper lobe opacity has notably improved now measuring up to 7 mm in greatest dimension versus 18 mm and demonstrates no significant metabolic activity.  The chronic pleural based opacity in the medial right apex demonstrates diffuse metabolic activity and measures up to 12 mm in greatest dimension (SUV max 7.8).  This has waxed and waned in size over several exams, which once measured up to 28 mm in 2022.  Evolving cavitary opacities in the upper lobes

## 2024-10-09 ENCOUNTER — HOSPITAL ENCOUNTER (OUTPATIENT)
Age: 69
Setting detail: SPECIMEN
Discharge: HOME | End: 2024-10-09

## 2024-10-09 LAB
BASOPHILS # BLD: <0.03 K/UL (ref 0–0.2)
BASOPHILS NFR BLD: 0 % (ref 0–2)
EOSINOPHIL # BLD: 0.3 K/UL (ref 0–0.44)
EOSINOPHILS RELATIVE PERCENT: 7 % (ref 1–4)
ERYTHROCYTE [DISTWIDTH] IN BLOOD BY AUTOMATED COUNT: 15.9 % (ref 11.8–14.4)
FERRITIN SERPL-MCNC: 245 NG/ML
FOLATE SERPL-MCNC: 4.5 NG/ML (ref 4.8–24.2)
HCT VFR BLD AUTO: 35.8 % (ref 36.3–47.1)
HGB BLD-MCNC: 10.5 G/DL (ref 11.9–15.1)
IMM GRANULOCYTES # BLD AUTO: <0.03 K/UL (ref 0–0.3)
IMM GRANULOCYTES NFR BLD: 0 %
IRON SATN MFR SERPL: 26 % (ref 20–55)
IRON SERPL-MCNC: 58 UG/DL (ref 37–145)
LYMPHOCYTES NFR BLD: 0.95 K/UL (ref 1.1–3.7)
LYMPHOCYTES RELATIVE PERCENT: 21 % (ref 24–43)
MCH RBC QN AUTO: 29.3 PG (ref 25.2–33.5)
MCHC RBC AUTO-ENTMCNC: 29.3 G/DL (ref 28.4–34.8)
MCV RBC AUTO: 100 FL (ref 82.6–102.9)
MONOCYTES NFR BLD: 0.52 K/UL (ref 0.1–1.2)
MONOCYTES NFR BLD: 11 % (ref 3–12)
NEUTROPHILS NFR BLD: 61 % (ref 36–65)
NEUTS SEG NFR BLD: 2.79 K/UL (ref 1.5–8.1)
NRBC BLD-RTO: 0 PER 100 WBC
PLATELET # BLD AUTO: 292 K/UL (ref 138–453)
PMV BLD AUTO: 10.4 FL (ref 8.1–13.5)
RBC # BLD AUTO: 3.58 M/UL (ref 3.95–5.11)
RBC # BLD: ABNORMAL 10*6/UL
TIBC SERPL-MCNC: 221 UG/DL (ref 250–450)
UNSATURATED IRON BINDING CAPACITY: 163 UG/DL (ref 112–347)
VIT B12 SERPL-MCNC: 310 PG/ML (ref 232–1245)
WBC OTHER # BLD: 4.6 K/UL (ref 3.5–11.3)

## 2024-10-09 PROCEDURE — 36415 COLL VENOUS BLD VENIPUNCTURE: CPT

## 2024-10-09 PROCEDURE — 82607 VITAMIN B-12: CPT

## 2024-10-09 PROCEDURE — P9603 ONE-WAY ALLOW PRORATED MILES: HCPCS

## 2024-10-09 PROCEDURE — 82728 ASSAY OF FERRITIN: CPT

## 2024-10-09 PROCEDURE — 83540 ASSAY OF IRON: CPT

## 2024-10-09 PROCEDURE — 83550 IRON BINDING TEST: CPT

## 2024-10-09 PROCEDURE — 82746 ASSAY OF FOLIC ACID SERUM: CPT

## 2024-10-09 PROCEDURE — 85025 COMPLETE CBC W/AUTO DIFF WBC: CPT

## 2024-11-15 ENCOUNTER — HOSPITAL ENCOUNTER (OUTPATIENT)
Age: 69
Setting detail: SPECIMEN
Discharge: HOME | End: 2024-11-15

## 2024-11-15 LAB
ANION GAP SERPL CALCULATED.3IONS-SCNC: 9 MMOL/L (ref 9–16)
BUN SERPL-MCNC: 16 MG/DL (ref 8–23)
CALCIUM SERPL-MCNC: 8.4 MG/DL (ref 8.6–10.4)
CHLORIDE SERPL-SCNC: 100 MMOL/L (ref 98–107)
CO2 SERPL-SCNC: 32 MMOL/L (ref 20–31)
CREAT SERPL-MCNC: 0.8 MG/DL (ref 0.6–0.9)
GFR, ESTIMATED: 81 ML/MIN/1.73M2
GLUCOSE SERPL-MCNC: 98 MG/DL (ref 74–99)
POTASSIUM SERPL-SCNC: 3.6 MMOL/L (ref 3.7–5.3)
SODIUM SERPL-SCNC: 141 MMOL/L (ref 136–145)

## 2024-11-15 PROCEDURE — 36415 COLL VENOUS BLD VENIPUNCTURE: CPT

## 2024-11-15 PROCEDURE — 80048 BASIC METABOLIC PNL TOTAL CA: CPT

## 2024-11-15 PROCEDURE — P9603 ONE-WAY ALLOW PRORATED MILES: HCPCS

## 2025-01-02 ENCOUNTER — HOSPITAL ENCOUNTER (OUTPATIENT)
Dept: CT IMAGING | Age: 70
Discharge: HOME | End: 2025-01-04
Attending: INTERNAL MEDICINE
Payer: MEDICAID

## 2025-01-02 DIAGNOSIS — R91.8 PULMONARY NODULES: ICD-10-CM

## 2025-01-02 PROCEDURE — 71250 CT THORAX DX C-: CPT

## 2025-01-14 ENCOUNTER — HOSPITAL ENCOUNTER (OUTPATIENT)
Age: 70
Setting detail: SPECIMEN
Discharge: HOME | End: 2025-01-14
Payer: MEDICAID

## 2025-01-14 LAB
ANION GAP SERPL CALCULATED.3IONS-SCNC: 9 MMOL/L (ref 9–16)
BUN SERPL-MCNC: 16 MG/DL (ref 8–23)
CALCIUM SERPL-MCNC: 8.9 MG/DL (ref 8.6–10.4)
CHLORIDE SERPL-SCNC: 99 MMOL/L (ref 98–107)
CO2 SERPL-SCNC: 31 MMOL/L (ref 20–31)
CREAT SERPL-MCNC: 0.7 MG/DL (ref 0.6–0.9)
ERYTHROCYTE [DISTWIDTH] IN BLOOD BY AUTOMATED COUNT: 15.2 % (ref 11.8–14.4)
GFR, ESTIMATED: >90 ML/MIN/1.73M2
GLUCOSE SERPL-MCNC: 98 MG/DL (ref 74–99)
HCT VFR BLD AUTO: 36.1 % (ref 36.3–47.1)
HGB BLD-MCNC: 10.6 G/DL (ref 11.9–15.1)
MCH RBC QN AUTO: 29.4 PG (ref 25.2–33.5)
MCHC RBC AUTO-ENTMCNC: 29.4 G/DL (ref 28.4–34.8)
MCV RBC AUTO: 100.3 FL (ref 82.6–102.9)
NRBC BLD-RTO: 0 PER 100 WBC
PLATELET # BLD AUTO: 287 K/UL (ref 138–453)
PMV BLD AUTO: 10 FL (ref 8.1–13.5)
POTASSIUM SERPL-SCNC: 3.8 MMOL/L (ref 3.7–5.3)
RBC # BLD AUTO: 3.6 M/UL (ref 3.95–5.11)
SODIUM SERPL-SCNC: 139 MMOL/L (ref 136–145)
WBC OTHER # BLD: 6.7 K/UL (ref 3.5–11.3)

## 2025-01-14 PROCEDURE — P9603 ONE-WAY ALLOW PRORATED MILES: HCPCS

## 2025-01-14 PROCEDURE — 80048 BASIC METABOLIC PNL TOTAL CA: CPT

## 2025-01-14 PROCEDURE — 36415 COLL VENOUS BLD VENIPUNCTURE: CPT

## 2025-01-14 PROCEDURE — 85027 COMPLETE CBC AUTOMATED: CPT

## 2025-02-03 ENCOUNTER — HOSPITAL ENCOUNTER (OUTPATIENT)
Age: 70
Setting detail: SPECIMEN
Discharge: HOME | End: 2025-02-03
Payer: MEDICAID

## 2025-02-03 LAB
ANION GAP SERPL CALCULATED.3IONS-SCNC: 10 MMOL/L (ref 9–16)
BASOPHILS # BLD: 0.05 K/UL (ref 0–0.2)
BASOPHILS NFR BLD: 1 % (ref 0–2)
BNP SERPL-MCNC: 83 PG/ML (ref 0–125)
BUN SERPL-MCNC: 12 MG/DL (ref 8–23)
CALCIUM SERPL-MCNC: 8.4 MG/DL (ref 8.6–10.4)
CHLORIDE SERPL-SCNC: 104 MMOL/L (ref 98–107)
CHOLEST SERPL-MCNC: 121 MG/DL (ref 0–199)
CHOLESTEROL/HDL RATIO: 2.8
CO2 SERPL-SCNC: 32 MMOL/L (ref 20–31)
CREAT SERPL-MCNC: 0.8 MG/DL (ref 0.6–0.9)
EOSINOPHIL # BLD: 0.39 K/UL (ref 0–0.44)
EOSINOPHILS RELATIVE PERCENT: 6 % (ref 1–4)
ERYTHROCYTE [DISTWIDTH] IN BLOOD BY AUTOMATED COUNT: 14.8 % (ref 11.8–14.4)
EST. AVERAGE GLUCOSE BLD GHB EST-MCNC: 105 MG/DL
GFR, ESTIMATED: 81 ML/MIN/1.73M2
GLUCOSE SERPL-MCNC: 181 MG/DL (ref 74–99)
HBA1C MFR BLD: 5.3 % (ref 4–6)
HCT VFR BLD AUTO: 36.2 % (ref 36.3–47.1)
HDLC SERPL-MCNC: 43 MG/DL
HGB BLD-MCNC: 10.4 G/DL (ref 11.9–15.1)
IMM GRANULOCYTES # BLD AUTO: <0.03 K/UL (ref 0–0.3)
IMM GRANULOCYTES NFR BLD: 0 %
IRON SATN MFR SERPL: 18 % (ref 20–55)
IRON SERPL-MCNC: 37 UG/DL (ref 37–145)
LDLC SERPL CALC-MCNC: 63 MG/DL (ref 0–100)
LYMPHOCYTES NFR BLD: 1.09 K/UL (ref 1.1–3.7)
LYMPHOCYTES RELATIVE PERCENT: 17 % (ref 24–43)
MCH RBC QN AUTO: 30 PG (ref 25.2–33.5)
MCHC RBC AUTO-ENTMCNC: 28.7 G/DL (ref 28.4–34.8)
MCV RBC AUTO: 104.3 FL (ref 82.6–102.9)
MONOCYTES NFR BLD: 0.54 K/UL (ref 0.1–1.2)
MONOCYTES NFR BLD: 8 % (ref 3–12)
NEUTROPHILS NFR BLD: 68 % (ref 36–65)
NEUTS SEG NFR BLD: 4.33 K/UL (ref 1.5–8.1)
NRBC BLD-RTO: 0 PER 100 WBC
PLATELET # BLD AUTO: 294 K/UL (ref 138–453)
PMV BLD AUTO: 9.9 FL (ref 8.1–13.5)
POTASSIUM SERPL-SCNC: 4.2 MMOL/L (ref 3.7–5.3)
RBC # BLD AUTO: 3.47 M/UL (ref 3.95–5.11)
RBC # BLD: ABNORMAL 10*6/UL
RBC # BLD: ABNORMAL 10*6/UL
SODIUM SERPL-SCNC: 146 MMOL/L (ref 136–145)
TIBC SERPL-MCNC: 209 UG/DL (ref 250–450)
TRIGL SERPL-MCNC: 75 MG/DL (ref 0–149)
UNSATURATED IRON BINDING CAPACITY: 172 UG/DL (ref 112–347)
VLDLC SERPL CALC-MCNC: 15 MG/DL (ref 1–30)
WBC OTHER # BLD: 6.4 K/UL (ref 3.5–11.3)

## 2025-02-03 PROCEDURE — 83880 ASSAY OF NATRIURETIC PEPTIDE: CPT

## 2025-02-03 PROCEDURE — 36415 COLL VENOUS BLD VENIPUNCTURE: CPT

## 2025-02-03 PROCEDURE — 85025 COMPLETE CBC W/AUTO DIFF WBC: CPT

## 2025-02-03 PROCEDURE — 80061 LIPID PANEL: CPT

## 2025-02-03 PROCEDURE — 83540 ASSAY OF IRON: CPT

## 2025-02-03 PROCEDURE — 80048 BASIC METABOLIC PNL TOTAL CA: CPT

## 2025-02-03 PROCEDURE — 83036 HEMOGLOBIN GLYCOSYLATED A1C: CPT

## 2025-02-03 PROCEDURE — 83550 IRON BINDING TEST: CPT

## 2025-04-01 ENCOUNTER — TRANSCRIBE ORDERS (OUTPATIENT)
Dept: ADMINISTRATIVE | Age: 70
End: 2025-04-01

## 2025-04-01 DIAGNOSIS — R91.8 PULMONARY NODULES: Primary | ICD-10-CM

## 2025-04-18 ENCOUNTER — OFFICE VISIT (OUTPATIENT)
Dept: ONCOLOGY | Age: 70
End: 2025-04-18
Payer: MEDICAID

## 2025-04-18 VITALS
SYSTOLIC BLOOD PRESSURE: 118 MMHG | RESPIRATION RATE: 18 BRPM | WEIGHT: 215.4 LBS | TEMPERATURE: 98.7 F | DIASTOLIC BLOOD PRESSURE: 59 MMHG | HEART RATE: 87 BPM | BODY MASS INDEX: 35.84 KG/M2 | OXYGEN SATURATION: 96 %

## 2025-04-18 DIAGNOSIS — Z79.01 ANTICOAGULATED: ICD-10-CM

## 2025-04-18 DIAGNOSIS — D89.2 PARAPROTEINEMIA: ICD-10-CM

## 2025-04-18 DIAGNOSIS — D50.9 IRON DEFICIENCY ANEMIA, UNSPECIFIED IRON DEFICIENCY ANEMIA TYPE: Primary | ICD-10-CM

## 2025-04-18 PROCEDURE — 99211 OFF/OP EST MAY X REQ PHY/QHP: CPT | Performed by: INTERNAL MEDICINE

## 2025-04-18 PROCEDURE — 99214 OFFICE O/P EST MOD 30 MIN: CPT | Performed by: INTERNAL MEDICINE

## 2025-04-18 PROCEDURE — 1123F ACP DISCUSS/DSCN MKR DOCD: CPT | Performed by: INTERNAL MEDICINE

## 2025-04-18 RX ORDER — PHENOL 1.4 %
1 AEROSOL, SPRAY (ML) MUCOUS MEMBRANE DAILY
COMMUNITY

## 2025-04-18 RX ORDER — GABAPENTIN 100 MG/1
100 CAPSULE ORAL 3 TIMES DAILY
COMMUNITY

## 2025-04-18 RX ORDER — FLUTICASONE PROPIONATE AND SALMETEROL 100; 50 UG/1; UG/1
1 POWDER RESPIRATORY (INHALATION) EVERY 12 HOURS
COMMUNITY

## 2025-04-18 NOTE — PROGRESS NOTES
hyclate (VIBRAMYCIN) 100 MG capsule Take 1 capsule by mouth 2 times daily For 7 days for lung infection (Patient not taking: Reported on 3/27/2024)      oxyCODONE-acetaminophen (PERCOCET) 5-325 MG per tablet Take 1 tablet by mouth every 6 hours as needed for Pain.      topiramate (TOPAMAX SPRINKLE) 25 MG capsule Take 3 capsules by mouth 2 times daily      sertraline (ZOLOFT) 25 MG tablet Take 1 tablet by mouth daily      hydrocortisone (ANUSOL-HC) 2.5 % CREA rectal cream Use topically every 6 hours as needed 84 g 2    apixaban (ELIQUIS) 5 MG TABS tablet Take 1 tablet by mouth 2 times daily 60 tablet 0    albuterol sulfate HFA (VENTOLIN HFA) 108 (90 Base) MCG/ACT inhaler Inhale 2 puffs into the lungs every 4 hours as needed for Wheezing 18 g 3    albuterol (PROVENTIL) (2.5 MG/3ML) 0.083% nebulizer solution Take 3 mLs by nebulization every 4 hours (Patient taking differently: Take by nebulization every 4 hours) 120 each 3    atorvastatin (LIPITOR) 20 MG tablet Take 1 tablet by mouth at bedtime      guaiFENesin (MUCINEX) 600 MG extended release tablet Take 1 tablet by mouth 2 times daily      insulin lispro, 1 Unit Dial, (HUMALOG/ADMELOG) 100 UNIT/ML SOPN Inject into the skin 4 times daily (before meals and nightly) Per sliding scale:  151-200 = 2 units  201-250 = 4 units  251-300 = 6 units  301-350 = 8 units  351-400 = 10 units      lidocaine 4 % external patch Place 1 patch onto the skin daily as needed (lower back pain)      magnesium hydroxide (MILK OF MAGNESIA) 400 MG/5ML suspension Take 30 mLs by mouth daily as needed for Constipation (at bedtime if no BM in 3 days)      polyethylene glycol (GLYCOLAX) 17 GM/SCOOP powder Take 17 g by mouth daily as needed (constipation) (Patient not taking: Reported on 3/27/2024)      omeprazole (PRILOSEC) 20 MG delayed release capsule Take 1 capsule by mouth daily      ondansetron (ZOFRAN) 4 MG tablet Take 1 tablet by mouth every 6 hours as needed for Nausea or Vomiting

## 2025-06-06 ENCOUNTER — APPOINTMENT (OUTPATIENT)
Dept: GENERAL RADIOLOGY | Age: 70
DRG: 137 | End: 2025-06-06
Attending: EMERGENCY MEDICINE
Payer: MEDICAID

## 2025-06-06 ENCOUNTER — APPOINTMENT (OUTPATIENT)
Dept: CT IMAGING | Age: 70
DRG: 137 | End: 2025-06-06
Payer: MEDICAID

## 2025-06-06 ENCOUNTER — APPOINTMENT (OUTPATIENT)
Dept: MRI IMAGING | Age: 70
DRG: 137 | End: 2025-06-06
Payer: MEDICAID

## 2025-06-06 ENCOUNTER — HOSPITAL ENCOUNTER (INPATIENT)
Age: 70
LOS: 5 days | Discharge: NURSING, REHAB OR LONG-TERM CARE | DRG: 137 | End: 2025-06-11
Attending: EMERGENCY MEDICINE
Payer: MEDICAID

## 2025-06-06 DIAGNOSIS — J18.9 PNEUMONIA DUE TO INFECTIOUS ORGANISM, UNSPECIFIED LATERALITY, UNSPECIFIED PART OF LUNG: ICD-10-CM

## 2025-06-06 DIAGNOSIS — J44.1 COPD EXACERBATION (HCC): Primary | ICD-10-CM

## 2025-06-06 DIAGNOSIS — M54.50 ACUTE LEFT-SIDED LOW BACK PAIN WITHOUT SCIATICA: ICD-10-CM

## 2025-06-06 DIAGNOSIS — S22.000A COMPRESSION FRACTURE OF BODY OF THORACIC VERTEBRA (HCC): ICD-10-CM

## 2025-06-06 PROBLEM — R06.02 SOB (SHORTNESS OF BREATH): Status: ACTIVE | Noted: 2025-06-06

## 2025-06-06 PROBLEM — S22.060A CLOSED WEDGE COMPRESSION FRACTURE OF T7 VERTEBRA (HCC): Status: ACTIVE | Noted: 2025-06-06

## 2025-06-06 PROBLEM — M54.6 ACUTE MIDLINE THORACIC BACK PAIN: Status: ACTIVE | Noted: 2025-06-06

## 2025-06-06 LAB
ALBUMIN SERPL-MCNC: 3.5 G/DL (ref 3.5–5.2)
ALP SERPL-CCNC: 171 U/L (ref 35–104)
ALT SERPL-CCNC: 15 U/L (ref 10–35)
ANION GAP SERPL CALCULATED.3IONS-SCNC: 11 MMOL/L (ref 9–16)
AST SERPL-CCNC: 20 U/L (ref 10–35)
B PARAP IS1001 DNA NPH QL NAA+NON-PROBE: NOT DETECTED
B PERT DNA SPEC QL NAA+PROBE: NOT DETECTED
BASOPHILS # BLD: 0.06 K/UL (ref 0–0.2)
BASOPHILS NFR BLD: 1 % (ref 0–2)
BILIRUB SERPL-MCNC: 0.4 MG/DL (ref 0–1.2)
BNP SERPL-MCNC: 104 PG/ML (ref 0–300)
BODY TEMPERATURE: 37
BUN SERPL-MCNC: 10 MG/DL (ref 8–23)
C PNEUM DNA NPH QL NAA+NON-PROBE: NOT DETECTED
CALCIUM SERPL-MCNC: 9.4 MG/DL (ref 8.6–10.4)
CHLORIDE SERPL-SCNC: 98 MMOL/L (ref 98–107)
CO2 SERPL-SCNC: 30 MMOL/L (ref 20–31)
COHGB MFR BLD: 2.7 % (ref 0–5)
CREAT SERPL-MCNC: 1 MG/DL (ref 0.7–1.2)
EOSINOPHIL # BLD: 0.52 K/UL (ref 0–0.44)
EOSINOPHILS RELATIVE PERCENT: 7 % (ref 0–4)
ERYTHROCYTE [DISTWIDTH] IN BLOOD BY AUTOMATED COUNT: 15.6 % (ref 11.5–14.9)
FLUAV RNA NPH QL NAA+NON-PROBE: NOT DETECTED
FLUAV RNA RESP QL NAA+PROBE: NOT DETECTED
FLUBV RNA NPH QL NAA+NON-PROBE: NOT DETECTED
FLUBV RNA RESP QL NAA+PROBE: NOT DETECTED
GFR, ESTIMATED: 61 ML/MIN/1.73M2
GLUCOSE BLD-MCNC: 179 MG/DL (ref 65–105)
GLUCOSE BLD-MCNC: 207 MG/DL (ref 65–105)
GLUCOSE SERPL-MCNC: 122 MG/DL (ref 74–99)
HADV DNA NPH QL NAA+NON-PROBE: NOT DETECTED
HCO3 VENOUS: 32.4 MMOL/L (ref 24–30)
HCOV 229E RNA NPH QL NAA+NON-PROBE: NOT DETECTED
HCOV HKU1 RNA NPH QL NAA+NON-PROBE: NOT DETECTED
HCOV NL63 RNA NPH QL NAA+NON-PROBE: NOT DETECTED
HCOV OC43 RNA NPH QL NAA+NON-PROBE: NOT DETECTED
HCT VFR BLD AUTO: 38.9 % (ref 36–46)
HGB BLD-MCNC: 11.6 G/DL (ref 12–16)
HMPV RNA NPH QL NAA+NON-PROBE: NOT DETECTED
HPIV1 RNA NPH QL NAA+NON-PROBE: NOT DETECTED
HPIV2 RNA NPH QL NAA+NON-PROBE: NOT DETECTED
HPIV3 RNA NPH QL NAA+NON-PROBE: DETECTED
HPIV4 RNA NPH QL NAA+NON-PROBE: NOT DETECTED
IMM GRANULOCYTES # BLD AUTO: <0.03 K/UL (ref 0–0.3)
IMM GRANULOCYTES NFR BLD: 0 %
INR PPP: 1.5
LACTATE BLDV-SCNC: 1.7 MMOL/L (ref 0.5–1.9)
LYMPHOCYTES NFR BLD: 1.2 K/UL (ref 1.1–3.7)
LYMPHOCYTES RELATIVE PERCENT: 16 % (ref 24–44)
M PNEUMO DNA NPH QL NAA+NON-PROBE: NOT DETECTED
MAGNESIUM SERPL-MCNC: 2 MG/DL (ref 1.6–2.4)
MCH RBC QN AUTO: 29.7 PG (ref 26–34)
MCHC RBC AUTO-ENTMCNC: 29.8 G/DL (ref 31–37)
MCV RBC AUTO: 99.5 FL (ref 80–100)
METHEMOGLOBIN: 0.1 % (ref 0–1.9)
MONOCYTES NFR BLD: 0.64 K/UL (ref 0.1–1.2)
MONOCYTES NFR BLD: 9 % (ref 3–12)
NEUTROPHILS NFR BLD: 67 % (ref 36–66)
NEUTS SEG NFR BLD: 4.96 K/UL (ref 1.5–8.1)
NRBC BLD-RTO: 0 PER 100 WBC
O2 SAT, VEN: 86.4 % (ref 60–85)
PCO2 VENOUS: 60.8 MM HG (ref 39–55)
PH VENOUS: 7.34 (ref 7.32–7.42)
PLATELET # BLD AUTO: 327 K/UL (ref 150–450)
PMV BLD AUTO: 9.6 FL (ref 8–13.5)
PO2 VENOUS: 51 MM HG (ref 30–50)
POSITIVE BASE EXCESS, VEN: 5.2 MMOL/L (ref 0–2)
POTASSIUM SERPL-SCNC: 3.8 MMOL/L (ref 3.7–5.3)
PROCALCITONIN SERPL-MCNC: 0.14 NG/ML (ref 0–0.09)
PROT SERPL-MCNC: 8.4 G/DL (ref 6.6–8.7)
PROTHROMBIN TIME: 19.6 SEC (ref 11.8–14.6)
RBC # BLD AUTO: 3.91 M/UL (ref 3.95–5.11)
RSV RNA NPH QL NAA+NON-PROBE: NOT DETECTED
RV+EV RNA NPH QL NAA+NON-PROBE: NOT DETECTED
SARS-COV-2 RNA NPH QL NAA+NON-PROBE: NOT DETECTED
SARS-COV-2 RNA RESP QL NAA+PROBE: NOT DETECTED
SODIUM SERPL-SCNC: 139 MMOL/L (ref 136–145)
SOURCE: NORMAL
SPECIMEN DESCRIPTION: ABNORMAL
SPECIMEN DESCRIPTION: NORMAL
TEXT FOR RESPIRATORY: ABNORMAL
TROPONIN I SERPL HS-MCNC: 16 NG/L (ref 0–14)
TROPONIN I SERPL HS-MCNC: 16 NG/L (ref 0–14)
WBC OTHER # BLD: 7.4 K/UL (ref 3.5–11)

## 2025-06-06 PROCEDURE — 87205 SMEAR GRAM STAIN: CPT

## 2025-06-06 PROCEDURE — 93005 ELECTROCARDIOGRAM TRACING: CPT | Performed by: EMERGENCY MEDICINE

## 2025-06-06 PROCEDURE — 87070 CULTURE OTHR SPECIMN AEROBIC: CPT

## 2025-06-06 PROCEDURE — 2060000000 HC ICU INTERMEDIATE R&B

## 2025-06-06 PROCEDURE — 84145 PROCALCITONIN (PCT): CPT

## 2025-06-06 PROCEDURE — 85610 PROTHROMBIN TIME: CPT

## 2025-06-06 PROCEDURE — 83735 ASSAY OF MAGNESIUM: CPT

## 2025-06-06 PROCEDURE — 84484 ASSAY OF TROPONIN QUANT: CPT

## 2025-06-06 PROCEDURE — 6360000004 HC RX CONTRAST MEDICATION: Performed by: EMERGENCY MEDICINE

## 2025-06-06 PROCEDURE — 0202U NFCT DS 22 TRGT SARS-COV-2: CPT

## 2025-06-06 PROCEDURE — 6370000000 HC RX 637 (ALT 250 FOR IP)

## 2025-06-06 PROCEDURE — 96375 TX/PRO/DX INJ NEW DRUG ADDON: CPT

## 2025-06-06 PROCEDURE — 82947 ASSAY GLUCOSE BLOOD QUANT: CPT

## 2025-06-06 PROCEDURE — 71260 CT THORAX DX C+: CPT

## 2025-06-06 PROCEDURE — 6360000002 HC RX W HCPCS

## 2025-06-06 PROCEDURE — 2700000000 HC OXYGEN THERAPY PER DAY

## 2025-06-06 PROCEDURE — 82805 BLOOD GASES W/O2 SATURATION: CPT

## 2025-06-06 PROCEDURE — 72131 CT LUMBAR SPINE W/O DYE: CPT

## 2025-06-06 PROCEDURE — 6370000000 HC RX 637 (ALT 250 FOR IP): Performed by: NURSE PRACTITIONER

## 2025-06-06 PROCEDURE — 99211 OFF/OP EST MAY X REQ PHY/QHP: CPT

## 2025-06-06 PROCEDURE — 74177 CT ABD & PELVIS W/CONTRAST: CPT

## 2025-06-06 PROCEDURE — 83605 ASSAY OF LACTIC ACID: CPT

## 2025-06-06 PROCEDURE — 2500000003 HC RX 250 WO HCPCS: Performed by: EMERGENCY MEDICINE

## 2025-06-06 PROCEDURE — 6370000000 HC RX 637 (ALT 250 FOR IP): Performed by: EMERGENCY MEDICINE

## 2025-06-06 PROCEDURE — 87186 SC STD MICRODIL/AGAR DIL: CPT

## 2025-06-06 PROCEDURE — 87077 CULTURE AEROBIC IDENTIFY: CPT

## 2025-06-06 PROCEDURE — 2500000003 HC RX 250 WO HCPCS

## 2025-06-06 PROCEDURE — 99223 1ST HOSP IP/OBS HIGH 75: CPT | Performed by: INTERNAL MEDICINE

## 2025-06-06 PROCEDURE — 6360000002 HC RX W HCPCS: Performed by: EMERGENCY MEDICINE

## 2025-06-06 PROCEDURE — 83880 ASSAY OF NATRIURETIC PEPTIDE: CPT

## 2025-06-06 PROCEDURE — 86738 MYCOPLASMA ANTIBODY: CPT

## 2025-06-06 PROCEDURE — 80053 COMPREHEN METABOLIC PANEL: CPT

## 2025-06-06 PROCEDURE — 94640 AIRWAY INHALATION TREATMENT: CPT

## 2025-06-06 PROCEDURE — 71045 X-RAY EXAM CHEST 1 VIEW: CPT

## 2025-06-06 PROCEDURE — 2580000003 HC RX 258: Performed by: EMERGENCY MEDICINE

## 2025-06-06 PROCEDURE — 96365 THER/PROPH/DIAG IV INF INIT: CPT

## 2025-06-06 PROCEDURE — 72128 CT CHEST SPINE W/O DYE: CPT

## 2025-06-06 PROCEDURE — 82800 BLOOD PH: CPT

## 2025-06-06 PROCEDURE — 36415 COLL VENOUS BLD VENIPUNCTURE: CPT

## 2025-06-06 PROCEDURE — 99285 EMERGENCY DEPT VISIT HI MDM: CPT

## 2025-06-06 PROCEDURE — 85025 COMPLETE CBC W/AUTO DIFF WBC: CPT

## 2025-06-06 PROCEDURE — 94761 N-INVAS EAR/PLS OXIMETRY MLT: CPT

## 2025-06-06 PROCEDURE — 87641 MR-STAPH DNA AMP PROBE: CPT

## 2025-06-06 PROCEDURE — 87184 SC STD DISK METHOD PER PLATE: CPT

## 2025-06-06 PROCEDURE — 87636 SARSCOV2 & INF A&B AMP PRB: CPT

## 2025-06-06 PROCEDURE — 96376 TX/PRO/DX INJ SAME DRUG ADON: CPT

## 2025-06-06 PROCEDURE — 87040 BLOOD CULTURE FOR BACTERIA: CPT

## 2025-06-06 PROCEDURE — 87181 SC STD AGAR DILUTION PER AGT: CPT

## 2025-06-06 RX ORDER — OXYCODONE AND ACETAMINOPHEN 5; 325 MG/1; MG/1
1 TABLET ORAL EVERY 6 HOURS
Refills: 0 | Status: DISCONTINUED | OUTPATIENT
Start: 2025-06-06 | End: 2025-06-11

## 2025-06-06 RX ORDER — ACETAMINOPHEN 650 MG/1
650 SUPPOSITORY RECTAL EVERY 6 HOURS PRN
Status: DISCONTINUED | OUTPATIENT
Start: 2025-06-06 | End: 2025-06-11 | Stop reason: HOSPADM

## 2025-06-06 RX ORDER — ONDANSETRON 4 MG/1
4 TABLET, ORALLY DISINTEGRATING ORAL EVERY 8 HOURS PRN
Status: DISCONTINUED | OUTPATIENT
Start: 2025-06-06 | End: 2025-06-11 | Stop reason: HOSPADM

## 2025-06-06 RX ORDER — ASCORBIC ACID 500 MG
250 TABLET ORAL 2 TIMES DAILY
Status: CANCELLED | OUTPATIENT
Start: 2025-06-06

## 2025-06-06 RX ORDER — IPRATROPIUM BROMIDE AND ALBUTEROL SULFATE 2.5; .5 MG/3ML; MG/3ML
1 SOLUTION RESPIRATORY (INHALATION) ONCE
Status: COMPLETED | OUTPATIENT
Start: 2025-06-06 | End: 2025-06-06

## 2025-06-06 RX ORDER — SODIUM CHLORIDE 0.9 % (FLUSH) 0.9 %
5-40 SYRINGE (ML) INJECTION EVERY 12 HOURS SCHEDULED
Status: DISCONTINUED | OUTPATIENT
Start: 2025-06-06 | End: 2025-06-11 | Stop reason: HOSPADM

## 2025-06-06 RX ORDER — ACETAMINOPHEN 325 MG/1
650 TABLET ORAL EVERY 6 HOURS PRN
Status: DISCONTINUED | OUTPATIENT
Start: 2025-06-06 | End: 2025-06-11 | Stop reason: HOSPADM

## 2025-06-06 RX ORDER — IPRATROPIUM BROMIDE AND ALBUTEROL SULFATE 2.5; .5 MG/3ML; MG/3ML
1 SOLUTION RESPIRATORY (INHALATION)
Status: DISCONTINUED | OUTPATIENT
Start: 2025-06-06 | End: 2025-06-09

## 2025-06-06 RX ORDER — ALENDRONATE SODIUM 70 MG/1
70 TABLET ORAL
Status: DISCONTINUED | OUTPATIENT
Start: 2025-06-06 | End: 2025-06-06

## 2025-06-06 RX ORDER — SODIUM CHLORIDE 9 MG/ML
INJECTION, SOLUTION INTRAVENOUS PRN
Status: DISCONTINUED | OUTPATIENT
Start: 2025-06-06 | End: 2025-06-11 | Stop reason: HOSPADM

## 2025-06-06 RX ORDER — POLYETHYLENE GLYCOL 3350 17 G/17G
17 POWDER, FOR SOLUTION ORAL DAILY PRN
Status: DISCONTINUED | OUTPATIENT
Start: 2025-06-06 | End: 2025-06-11 | Stop reason: HOSPADM

## 2025-06-06 RX ORDER — ATORVASTATIN CALCIUM 20 MG/1
20 TABLET, FILM COATED ORAL NIGHTLY
Status: DISCONTINUED | OUTPATIENT
Start: 2025-06-06 | End: 2025-06-11 | Stop reason: HOSPADM

## 2025-06-06 RX ORDER — ONDANSETRON 2 MG/ML
4 INJECTION INTRAMUSCULAR; INTRAVENOUS EVERY 6 HOURS PRN
Status: DISCONTINUED | OUTPATIENT
Start: 2025-06-06 | End: 2025-06-11 | Stop reason: HOSPADM

## 2025-06-06 RX ORDER — BUDESONIDE AND FORMOTEROL FUMARATE DIHYDRATE 160; 4.5 UG/1; UG/1
2 AEROSOL RESPIRATORY (INHALATION)
Status: DISCONTINUED | OUTPATIENT
Start: 2025-06-06 | End: 2025-06-11 | Stop reason: HOSPADM

## 2025-06-06 RX ORDER — BENZONATATE 200 MG/1
200 CAPSULE ORAL 3 TIMES DAILY PRN
Status: DISCONTINUED | OUTPATIENT
Start: 2025-06-06 | End: 2025-06-09

## 2025-06-06 RX ORDER — RISPERIDONE 0.5 MG/1
1 TABLET ORAL EVERY 12 HOURS
Status: DISCONTINUED | OUTPATIENT
Start: 2025-06-06 | End: 2025-06-11 | Stop reason: HOSPADM

## 2025-06-06 RX ORDER — POTASSIUM CHLORIDE 1500 MG/1
40 TABLET, EXTENDED RELEASE ORAL PRN
Status: DISCONTINUED | OUTPATIENT
Start: 2025-06-06 | End: 2025-06-11 | Stop reason: HOSPADM

## 2025-06-06 RX ORDER — OXYCODONE HYDROCHLORIDE 5 MG/1
5 TABLET ORAL EVERY 4 HOURS PRN
Refills: 0 | Status: DISCONTINUED | OUTPATIENT
Start: 2025-06-06 | End: 2025-06-11

## 2025-06-06 RX ORDER — MAGNESIUM SULFATE HEPTAHYDRATE 40 MG/ML
2000 INJECTION, SOLUTION INTRAVENOUS PRN
Status: DISCONTINUED | OUTPATIENT
Start: 2025-06-06 | End: 2025-06-11 | Stop reason: HOSPADM

## 2025-06-06 RX ORDER — FAMOTIDINE 20 MG/1
20 TABLET, FILM COATED ORAL 2 TIMES DAILY
Status: CANCELLED | OUTPATIENT
Start: 2025-06-06

## 2025-06-06 RX ORDER — POTASSIUM CHLORIDE 7.45 MG/ML
10 INJECTION INTRAVENOUS PRN
Status: DISCONTINUED | OUTPATIENT
Start: 2025-06-06 | End: 2025-06-11 | Stop reason: HOSPADM

## 2025-06-06 RX ORDER — IOPAMIDOL 755 MG/ML
100 INJECTION, SOLUTION INTRAVASCULAR
Status: COMPLETED | OUTPATIENT
Start: 2025-06-06 | End: 2025-06-06

## 2025-06-06 RX ORDER — ENOXAPARIN SODIUM 100 MG/ML
40 INJECTION SUBCUTANEOUS DAILY
Status: DISCONTINUED | OUTPATIENT
Start: 2025-06-06 | End: 2025-06-06

## 2025-06-06 RX ORDER — MORPHINE SULFATE 4 MG/ML
4 INJECTION, SOLUTION INTRAMUSCULAR; INTRAVENOUS ONCE
Refills: 0 | Status: COMPLETED | OUTPATIENT
Start: 2025-06-06 | End: 2025-06-06

## 2025-06-06 RX ORDER — BUMETANIDE 1 MG/1
2 TABLET ORAL DAILY
Status: DISCONTINUED | OUTPATIENT
Start: 2025-06-06 | End: 2025-06-11 | Stop reason: HOSPADM

## 2025-06-06 RX ORDER — DEXTROSE MONOHYDRATE 100 MG/ML
INJECTION, SOLUTION INTRAVENOUS CONTINUOUS PRN
Status: DISCONTINUED | OUTPATIENT
Start: 2025-06-06 | End: 2025-06-11 | Stop reason: HOSPADM

## 2025-06-06 RX ORDER — MONTELUKAST SODIUM 10 MG/1
10 TABLET ORAL NIGHTLY
Status: DISCONTINUED | OUTPATIENT
Start: 2025-06-06 | End: 2025-06-11 | Stop reason: HOSPADM

## 2025-06-06 RX ORDER — BUDESONIDE AND FORMOTEROL FUMARATE DIHYDRATE 80; 4.5 UG/1; UG/1
2 AEROSOL RESPIRATORY (INHALATION)
Status: CANCELLED | OUTPATIENT
Start: 2025-06-06

## 2025-06-06 RX ORDER — TRAZODONE HYDROCHLORIDE 50 MG/1
50 TABLET ORAL NIGHTLY
Status: DISCONTINUED | OUTPATIENT
Start: 2025-06-06 | End: 2025-06-11 | Stop reason: HOSPADM

## 2025-06-06 RX ORDER — SODIUM CHLORIDE 0.9 % (FLUSH) 0.9 %
10 SYRINGE (ML) INJECTION PRN
Status: DISCONTINUED | OUTPATIENT
Start: 2025-06-06 | End: 2025-06-11 | Stop reason: HOSPADM

## 2025-06-06 RX ORDER — FERROUS SULFATE 325(65) MG
325 TABLET ORAL
Status: DISCONTINUED | OUTPATIENT
Start: 2025-06-07 | End: 2025-06-11 | Stop reason: HOSPADM

## 2025-06-06 RX ORDER — INSULIN LISPRO 100 [IU]/ML
0-4 INJECTION, SOLUTION INTRAVENOUS; SUBCUTANEOUS
Status: DISCONTINUED | OUTPATIENT
Start: 2025-06-06 | End: 2025-06-11

## 2025-06-06 RX ORDER — 0.9 % SODIUM CHLORIDE 0.9 %
100 INTRAVENOUS SOLUTION INTRAVENOUS ONCE
Status: COMPLETED | OUTPATIENT
Start: 2025-06-06 | End: 2025-06-06

## 2025-06-06 RX ORDER — SODIUM CHLORIDE 0.9 % (FLUSH) 0.9 %
5-40 SYRINGE (ML) INJECTION PRN
Status: DISCONTINUED | OUTPATIENT
Start: 2025-06-06 | End: 2025-06-11 | Stop reason: HOSPADM

## 2025-06-06 RX ORDER — SPIRONOLACTONE 25 MG/1
25 TABLET ORAL DAILY
Status: DISCONTINUED | OUTPATIENT
Start: 2025-06-06 | End: 2025-06-11 | Stop reason: HOSPADM

## 2025-06-06 RX ORDER — CALCIUM CARBONATE 500 MG/1
1 TABLET, CHEWABLE ORAL DAILY
Status: DISCONTINUED | OUTPATIENT
Start: 2025-06-06 | End: 2025-06-11 | Stop reason: HOSPADM

## 2025-06-06 RX ORDER — MORPHINE SULFATE 2 MG/ML
2 INJECTION, SOLUTION INTRAMUSCULAR; INTRAVENOUS ONCE
Refills: 0 | Status: COMPLETED | OUTPATIENT
Start: 2025-06-06 | End: 2025-06-06

## 2025-06-06 RX ADMIN — MORPHINE SULFATE 4 MG: 4 INJECTION, SOLUTION INTRAMUSCULAR; INTRAVENOUS at 07:02

## 2025-06-06 RX ADMIN — IPRATROPIUM BROMIDE AND ALBUTEROL SULFATE 1 DOSE: .5; 2.5 SOLUTION RESPIRATORY (INHALATION) at 20:20

## 2025-06-06 RX ADMIN — OXYCODONE HYDROCHLORIDE AND ACETAMINOPHEN 1 TABLET: 5; 325 TABLET ORAL at 19:51

## 2025-06-06 RX ADMIN — APIXABAN 5 MG: 5 TABLET, FILM COATED ORAL at 19:51

## 2025-06-06 RX ADMIN — SODIUM CHLORIDE 100 ML: 9 INJECTION, SOLUTION INTRAVENOUS at 09:05

## 2025-06-06 RX ADMIN — WATER 40 MG: 1 INJECTION INTRAMUSCULAR; INTRAVENOUS; SUBCUTANEOUS at 19:53

## 2025-06-06 RX ADMIN — OXYCODONE HYDROCHLORIDE AND ACETAMINOPHEN 1 TABLET: 5; 325 TABLET ORAL at 14:24

## 2025-06-06 RX ADMIN — AZITHROMYCIN MONOHYDRATE 500 MG: 500 INJECTION, POWDER, LYOPHILIZED, FOR SOLUTION INTRAVENOUS at 07:01

## 2025-06-06 RX ADMIN — SODIUM CHLORIDE, PRESERVATIVE FREE 10 ML: 5 INJECTION INTRAVENOUS at 19:58

## 2025-06-06 RX ADMIN — ATORVASTATIN CALCIUM 20 MG: 20 TABLET, FILM COATED ORAL at 19:51

## 2025-06-06 RX ADMIN — MORPHINE SULFATE 2 MG: 2 INJECTION, SOLUTION INTRAMUSCULAR; INTRAVENOUS at 08:22

## 2025-06-06 RX ADMIN — IPRATROPIUM BROMIDE AND ALBUTEROL SULFATE 1 DOSE: .5; 2.5 SOLUTION RESPIRATORY (INHALATION) at 15:14

## 2025-06-06 RX ADMIN — IOPAMIDOL 100 ML: 755 INJECTION, SOLUTION INTRAVENOUS at 09:04

## 2025-06-06 RX ADMIN — SODIUM CHLORIDE, PRESERVATIVE FREE 10 ML: 5 INJECTION INTRAVENOUS at 09:05

## 2025-06-06 RX ADMIN — BUDESONIDE AND FORMOTEROL FUMARATE DIHYDRATE 2 PUFF: 160; 4.5 AEROSOL RESPIRATORY (INHALATION) at 20:30

## 2025-06-06 RX ADMIN — SPIRONOLACTONE 25 MG: 25 TABLET ORAL at 14:26

## 2025-06-06 RX ADMIN — WATER 125 MG: 1 INJECTION INTRAMUSCULAR; INTRAVENOUS; SUBCUTANEOUS at 07:05

## 2025-06-06 RX ADMIN — WATER 1000 MG: 1 INJECTION INTRAMUSCULAR; INTRAVENOUS; SUBCUTANEOUS at 06:54

## 2025-06-06 RX ADMIN — ANTACID TABLETS 500 MG: 500 TABLET, CHEWABLE ORAL at 14:52

## 2025-06-06 RX ADMIN — INSULIN LISPRO 1 UNITS: 100 INJECTION, SOLUTION INTRAVENOUS; SUBCUTANEOUS at 19:54

## 2025-06-06 RX ADMIN — MONTELUKAST 10 MG: 10 TABLET, FILM COATED ORAL at 19:51

## 2025-06-06 RX ADMIN — WATER 40 MG: 1 INJECTION INTRAMUSCULAR; INTRAVENOUS; SUBCUTANEOUS at 13:51

## 2025-06-06 RX ADMIN — APIXABAN 5 MG: 5 TABLET, FILM COATED ORAL at 14:24

## 2025-06-06 RX ADMIN — IPRATROPIUM BROMIDE AND ALBUTEROL SULFATE 1 DOSE: .5; 2.5 SOLUTION RESPIRATORY (INHALATION) at 10:54

## 2025-06-06 RX ADMIN — RISPERIDONE 1 MG: 1 TABLET, FILM COATED ORAL at 14:26

## 2025-06-06 RX ADMIN — BUMETANIDE 2 MG: 1 TABLET ORAL at 14:26

## 2025-06-06 RX ADMIN — SERTRALINE 25 MG: 50 TABLET, FILM COATED ORAL at 14:26

## 2025-06-06 ASSESSMENT — LIFESTYLE VARIABLES
HOW OFTEN DO YOU HAVE A DRINK CONTAINING ALCOHOL: NEVER
HOW MANY STANDARD DRINKS CONTAINING ALCOHOL DO YOU HAVE ON A TYPICAL DAY: PATIENT DOES NOT DRINK

## 2025-06-06 ASSESSMENT — PAIN DESCRIPTION - LOCATION
LOCATION: BACK
LOCATION: HEAD
LOCATION: BACK;COCCYX
LOCATION: BACK

## 2025-06-06 ASSESSMENT — PAIN DESCRIPTION - DESCRIPTORS
DESCRIPTORS: ACHING;CRAMPING;DISCOMFORT
DESCRIPTORS: ACHING

## 2025-06-06 ASSESSMENT — PAIN - FUNCTIONAL ASSESSMENT: PAIN_FUNCTIONAL_ASSESSMENT: ACTIVITIES ARE NOT PREVENTED

## 2025-06-06 ASSESSMENT — PAIN SCALES - GENERAL
PAINLEVEL_OUTOF10: 10
PAINLEVEL_OUTOF10: 8
PAINLEVEL_OUTOF10: 10
PAINLEVEL_OUTOF10: 5

## 2025-06-06 ASSESSMENT — PAIN DESCRIPTION - ORIENTATION
ORIENTATION: LOWER
ORIENTATION: UPPER;RIGHT;LEFT

## 2025-06-06 NOTE — PROGRESS NOTES
[facility]  Consult Note       NAME:  Elaina Lopez  MEDICAL RECORD NUMBER:  [id number]  AGE: 70 y.o.   GENDER: female  : 1955  TODAY'S DATE:  2025    Subjective:      Elaina Lopez is a 70 y.o. female with inpatient referral to [facility] for:  Right elbow and coccyx    Pt in bed with lunch tray. Pt refusing wound care consult at this time. She states she is willing to be seen \"when she is already being turned on her side.\" Pt refused photos of wounds with bedside Rn on admission. Pt educated that we can try and coordinate a time for assessment at a later time.  Pt educated that we can try again at a later time. Discussed wounds with bedside Rn Bianka. According to bedside Rn, pt wounds are small, superficial openings on both coccyx and right elbow. Orders given for zinc paste and foam until further assessment can be completed.     Pressure Injury Prevention Strategies:    -Turn every 2 hours    -Float heels off of bed with pillows under calves. If needed- use offloading boots.    - Foam dressing to any/all compromised bony prominence.  Peel back dressing to inspect skin beneath qshift, re-secure. Change every 72 hours and prn wrinkles, soilage. Discontinue if repeatedly soiled by incontinence.     -Routine incontinence care with foaming cleanser and zinc oxide cream. Apply zinc oxide cream twice daily and prn incontinence. Use moisture wicking under pad (one layer only).    -Waffle mattress overlay. Check inflation each shift by sliding hand under the air overlay. Feel for the patient's heaviest/ most dependant body part. Ideally 1/2 to 1\" of air will be between your hand and the patient's body. Add air prn.    Plan:     Please contact [facility] nursing via [employer] by searching \"wound\" and choosing \"Wound Care Ostomy- [facility]\" if needs or concerns arise. Thank you.    Specialty Bed Required : Yes   [] Low Air Loss   [] Pressure Redistribution  [] Fluid

## 2025-06-06 NOTE — PROGRESS NOTES
Patient, seen and examined  Has history of COPD, ex-smoker, admitted with back pain, shortness of breath, wheezing  Concern for compression fracture of T8  Compression deformity of T7  Orthopedic surgery consulted for emergency room  Planning to get MRI thoracic and lumbar spine  CT chest-concerning for pneumonia started on antibiotic to cover pneumonia  Steroids  Procalcitonin  Pulmonary critical care consult patient follows with Dr. Mcmahon  Patient is DNR CC  Full dictation to follow

## 2025-06-06 NOTE — PROGRESS NOTES
Pt arrives to unit from ED. Vital signs taken, telemetry applied, admission documentation complete, initial assessment complete, bed locked in low position, call light within reach, pt oriented to room.

## 2025-06-06 NOTE — H&P
THEN REMOVED    BRONCHOSCOPY  2022         COLONOSCOPY  02/15/2018    tubular adenoma    EYE SURGERY Bilateral     CATARACTS    HYSTEROSCOPY  10/19/2016    D & C    INDUCED       LASIK      bilateral    TONSILLECTOMY AND ADENOIDECTOMY Bilateral     VULVA SURGERY      HAD A BIOPSY AND REMOVAL OF        Medications Prior to Admission:        Prior to Admission medications    Medication Sig Start Date End Date Taking? Authorizing Provider   albuterol sulfate HFA (VENTOLIN HFA) 108 (90 Base) MCG/ACT inhaler Inhale 2 puffs into the lungs every 4 hours as needed for Wheezing 22  Yes Vasyl Jacobson MD   albuterol (PROVENTIL) (2.5 MG/3ML) 0.083% nebulizer solution Take 3 mLs by nebulization every 4 hours 22  Yes Vasyl Jacobson MD   fluticasone-salmeterol (ADVAIR) 100-50 MCG/ACT AEPB diskus inhaler Inhale 1 puff into the lungs in the morning and 1 puff in the evening.    ProviderDot MD   calcium carbonate 600 MG TABS tablet Take 1 tablet by mouth daily    Dot Atwood MD   gabapentin (NEURONTIN) 100 MG capsule Take 1 capsule by mouth 3 times daily.    Dot Atwood MD   tiZANidine (ZANAFLEX) 2 MG tablet Take 1 tablet by mouth every 6 hours as needed    Dot Atwood MD   spironolactone (ALDACTONE) 25 MG tablet Take 1 tablet by mouth daily    Dot Atwood MD   bumetanide (BUMEX) 2 MG tablet Take 1 tablet by mouth daily    Dot Atwood MD   potassium chloride (KLOR-CON M) 20 MEQ extended release tablet Take 1 tablet by mouth 3 times daily    Dot Atwood MD   Liraglutide (VICTOZA) 18 MG/3ML SOPN SC injection Inject 1.2 mg into the skin daily    Dot Atwood MD   ascorbic acid (V-R VITAMIN C) 250 MG tablet Take 1 tablet by mouth 2 times daily  Patient not taking: Reported on 23   Kiko Chanel MD   ferrous sulfate (IRON 325) 325 (65 Fe) MG tablet Take 1 tablet by mouth daily (with breakfast) 23   Darío  MD Kiko   alendronate (FOSAMAX) 70 MG tablet Take 1 tablet by mouth every 7 days Every Monday    Dot Atwood MD   benzonatate (TESSALON) 200 MG capsule Take 1 capsule by mouth 3 times daily as needed for Cough    Dot Atwood MD   butalbital-acetaminophen-caffeine (FIORICET, ESGIC) -40 MG per tablet Take 1 tablet by mouth every 4 hours as needed for Headaches    Dot Atwood MD   cetirizine (ZYRTEC) 10 MG tablet Take 1 tablet by mouth daily  Patient not taking: Reported on 10/2/2024    Dot Atwood MD   doxycycline hyclate (VIBRAMYCIN) 100 MG capsule Take 1 capsule by mouth 2 times daily For 7 days for lung infection  Patient not taking: Reported on 3/27/2024    Dot Atwood MD   oxyCODONE-acetaminophen (PERCOCET) 5-325 MG per tablet Take 1 tablet by mouth every 6 hours as needed for Pain.    Dot Atwood MD   topiramate (TOPAMAX SPRINKLE) 25 MG capsule Take 3 capsules by mouth 2 times daily    Dot Atwood MD   sertraline (ZOLOFT) 25 MG tablet Take 1 tablet by mouth daily    Dot Atwood MD   hydrocortisone (ANUSOL-HC) 2.5 % CREA rectal cream Use topically every 6 hours as needed 7/7/22   Primitivo Armstrong MD   apixaban (ELIQUIS) 5 MG TABS tablet Take 1 tablet by mouth 2 times daily 7/8/22   Suzette Reno MD   acetaminophen (TYLENOL) 325 MG tablet Take 2 tablets by mouth every 6 hours as needed for Pain or Fever    Dot Atwood MD   atorvastatin (LIPITOR) 20 MG tablet Take 1 tablet by mouth at bedtime    Dot Atwood MD   guaiFENesin (MUCINEX) 600 MG extended release tablet Take 1 tablet by mouth 2 times daily    Dot Atwood MD   insulin lispro, 1 Unit Dial, (HUMALOG/ADMELOG) 100 UNIT/ML SOPN Inject into the skin 4 times daily (before meals and nightly) Per sliding scale:  151-200 = 2 units  201-250 = 4 units  251-300 = 6 units  301-350 = 8 units  351-400 = 10 units    Dot Atwood MD   lidocaine

## 2025-06-06 NOTE — CONSULTS
[facility] PULMONARY, CRITICAL CARE & SLEEP   Lenny Clemons MD/ Vasyl Cortez MD/ Jose Patel APRN AGACN-BC NP-C  Rosalind CORONADO NP-C  Donn CORONADO NP-C   CONSULT NOTE      Date of Admission: 6/6/2025  6:33 AM    Chief complaint: back pain, dyspnea     Referring Physician: * No referring provider recorded for this case *  PCP: Tayla Kim MD     History of Present Illness:   Elaina is a 62-year-old female with history of severe COPD, chronic hypoxic bryn failure, pulmonary nodules which have resolved, DVT on chronic anticoagulation, former smoker  Prior pulmonologist Dr. Jacobson  Oncologist Dr. Chanel  On Eliquis 5 mg twice daily, Advair 250 mcg, Spiriva, Singulair, 3 L O2  DNR Cc-a no intubation per office documentation    She arrived to [facility] today with complaints of increasing dyspnea, cough, wheezing and back pain, flank pain.  She is typically bedbound, she lives at [facility].    Pulmonary consulted for COPD exacerbation, pneumonia, lung nodule    She tells me she has been having back pain for about a month with a cough  Things are just been progressive over the last few weeks  Her biggest complaints to me were cough which seems spastic in the room and her back pain  She was having complaints of diarrhea in the last couple weeks as well  Notable labs include eosinophil 520, , normal lactate, blood culture x 2 pending growth, negative flu and COVID  She is pending an RPP, respiratory culture, Legionella, strep, MRSA, procalcitonin,       She had a CT chest which showed scattered subpleural opacities in the right lower and middle lobe, new nodular subpleural superior segment right lower lobe nodular opacity 1.2 cm and 6 mm right upper lobe nodular opacity, 1.8 cm right apical nodule, enlarging 1.5 x 1 cm left major fissure nodule in the posterior left upper lobe enlarging cavitary nodule 1.8 x 1.4 cm suspicious for metastatic  intravenous contrast. Multiplanar reformatted images are provided for review. Automated exposure control, iterative reconstruction, and/or weight based adjustment of the mA/kV was utilized to reduce the radiation dose to as low as reasonably achievable.; CT of the lumbar spine was performed without the administration of intravenous contrast. Multiplanar reformatted images are provided for review.  Adjustment of mA and/or kV according to patient size was utilized.  Automated exposure control, iterative reconstruction, and/or weight based adjustment of the mA/kV was utilized to reduce the radiation dose to as low as reasonably achievable. COMPARISON: CT chest, abdomen, and pelvis from 06/06/2025 HISTORY: ORDERING SYSTEM PROVIDED HISTORY: back pain TECHNOLOGIST PROVIDED HISTORY: back pain Reason for Exam: sob, back pain 70-year-old female with shortness of breath and back pain FINDINGS: CT THORACIC SPINE: BONES/ALIGNMENT: Thoracic spine is imaged from the inferior L2 vertebral body level.  Stable remote compression deformities of T5 and T6.  New compression deformity with approximately 50% height loss of T7.  Mild sclerosis of T8 which could represent minimal healing compression fracture. DEGENERATIVE CHANGES: Mild multilevel disc space narrowing and mild multilevel hypertrophic osteophyte spur formation. SOFT TISSUES: Atherosclerotic calcification of the thoracic aorta.  Calcified hilar lymph nodes.  Coronary artery disease.  Multifocal scarring, parenchymal banding and atelectasis within the lungs. CT LUMBAR SPINE: BONES/ALIGNMENT: Diffuse osteopenia.  Lumbar spine is imaged from the superior endplate of T12 to the sacrococcygeal junction on the sagittal reconstructions.  Gross preservation of the vertebral body heights. No clear evidence for acute fracture on axial imaging.  Mild dextroscoliosis of the lumbar spine. DEGENERATIVE CHANGES: Mild multilevel hypertrophic osteophyte spur formation. Mild disc space

## 2025-06-06 NOTE — ED TRIAGE NOTES
Mode of arrival (squad #, walk in, police, etc) : EMS        Chief complaint(s): Shortness of breath, Back pain        Arrival Note (brief scenario, treatment PTA, etc).: Patient came here to ED transported via EMS from [facility] with the above complaints. According to EMS patient is experiencing these symptoms x 4 days. Pt is A/Ox4 denies any chest pain. Hx of COPD.        C= \"Have you ever felt that you should Cut down on your drinking?\"  No  A= \"Have people Annoyed you by criticizing your drinking?\"  No  G= \"Have you ever felt bad or Guilty about your drinking?\"  No  E= \"Have you ever had a drink as an Eye-opener first thing in the morning to steady your nerves or to help a hangover?\"  No      Deferred []      Reason for deferring: N/A    *If yes to two or more: probable alcohol abuse.*

## 2025-06-06 NOTE — PLAN OF CARE
Please have patient or POA answer all MRI Screening questions in Epic. Pt must be dressed in hospital clothes for exam. Thank you.

## 2025-06-06 NOTE — CONSULTS
Inpatient consult to Orthopedic Surgery  Consult performed by: Domo Arriaga MD  Consult ordered by: Keyur Flores DO        Patient: Elaina Lopez  Unit/Bed: 2117/2117-01  YOB: 1955  MRN: [id number]  Acct: [id number]   Admitting Diagnosis: SOB (shortness of breath) [R06.02]  COPD exacerbation (HCC) [J44.1]  Compression fracture of body of thoracic vertebra (HCC) [S22.000A]  Acute left-sided low back pain without sciatica [M54.50]  Pneumonia due to infectious organism, unspecified laterality, unspecified part of lung [J18.9]  Admit Date:  6/6/2025  Hospital Day: 0    Subjective:    Patient is having problems with  thoracic back pain sob and new T7 compression deformity  Shortness of Breath    Back Pain      Patient Seen, Chart, Labs, Radiologystudies, and Consults reviewed.      Multiple pulmonary issues potential cancer admitted sob        Objective:   BP (!) 150/64   Pulse 97   Temp 97.7 °F (36.5 °C) (Oral)   Resp 22   Ht 1.651 m (5' 5\")   Wt 97.8 kg (215 lb 8 oz)   LMP 05/20/2013 (Exact Date)   SpO2 92%   BMI 35.86 kg/m² No intake or output data in the 24 hours ending 06/06/25 1315  Review of Systems   Respiratory:  Positive for shortness of breath.    Musculoskeletal:  Positive for back pain.     Physical Exam  Vitals and nursing note reviewed.   Constitutional:       Appearance: She is well-developed.   HENT:      Head: Normocephalic and atraumatic.      Nose: Nose normal.   Eyes:      Conjunctiva/sclera: Conjunctivae normal.   Pulmonary:      Effort: Pulmonary effort is normal. No respiratory distress.   Musculoskeletal:      Cervical back: Normal range of motion and neck supple.      Comments: Sitting in bed     Reports difficulty flat laying     Skin:     General: Skin is warm and dry.   Neurological:      Mental Status: She is alert and oriented to person, place, and time.      Sensory: No sensory deficit.   Psychiatric:         Behavior: Behavior normal.         Thought Content:  Thought content normal.           Drains:No  Imaging:Yes T7 comp fracture muliple likely old ? Lung cancer      Medications:    sodium chloride flush  5-40 mL IntraVENous 2 times per day    enoxaparin  40 mg SubCUTAneous Daily    methylPREDNISolone  40 mg IntraVENous Q6H    [START ON 6/7/2025] azithromycin  500 mg IntraVENous Q24H    [START ON 6/7/2025] cefTRIAXone (ROCEPHIN) IV  2,000 mg IntraVENous Q24H     PRN Meds:sodium chloride flush, sodium chloride flush, sodium chloride, potassium chloride **OR** potassium alternative oral replacement **OR** potassium chloride, magnesium sulfate, ondansetron **OR** ondansetron, polyethylene glycol, acetaminophen **OR** acetaminophen      Data:  CBC:   Recent Labs     06/05/25  0500 06/06/25  0643   WBC 8.35 7.4   RBC 3.72* 3.91*   HGB 10.8* 11.6*   HCT 37.2 38.9   .0* 99.5   RDW 15.6* 15.6*    327     BNP: No results for input(s): \"BNP\" in the last 72 hours.  PT/INR:   Recent Labs     06/06/25  0643   PROTIME 19.6*   INR 1.5       Assessment/Plan:  Principal Problem:    SOB (shortness of breath)  Active Problems:    Acute midline thoracic back pain    Closed wedge compression fracture of T7 vertebra (HCC)  Resolved Problems:    * No resolved hospital problems. *    MRI pending (not critical now and not likely to be done until next weeks pt likely intolerant today    Will follow    Electronically signed by Domo Arriaga MD on 6/6/2025 at 1:15 PM    [facility] Hospitalist

## 2025-06-06 NOTE — ED NOTES
of the lumbar spine.   3. Mild multilevel degenerative changes of the lumbar spine.   4. Diffuse osteopenia.   Please see CT chest, abdomen, and pelvis report from 06/06/2025 for details   regarding the intrathoracic, intra-abdominal, and intrapelvic findings.         CT THORACIC SPINE WO CONTRAST   Preliminary Result   THORACIC SPINE:      1. New compression deformity with approximately 50% height loss of T7. Mild   sclerosis of T8 which could represent mild minimal healing compression   fracture.   2. Stable remote compression deformities of T5 and T6.   3. Mild multilevel degenerative changes of the thoracic spine.   4. Multifocal scarring, parenchymal banding and atelectasis within the lungs.   5. Coronary artery disease.   LUMBAR SPINE:      1. No acute vertebral body height loss in the lumbar spine.   2. Mild dextroscoliosis of the lumbar spine.   3. Mild multilevel degenerative changes of the lumbar spine.   4. Diffuse osteopenia.   Please see CT chest, abdomen, and pelvis report from 06/06/2025 for details   regarding the intrathoracic, intra-abdominal, and intrapelvic findings.         CT ABDOMEN PELVIS W IV CONTRAST Additional Contrast? None   Preliminary Result   CHEST:      1. Acute 50% compression deformity of T7. Probable subacute or acute minimal   compression deformity of T8.   2. No obvious central pulmonary embolus. Lobar pulmonary arterial vasculature   and beyond is somewhat limited due to suboptimal bolus timing, respiratory   motion, and beam hardening artifact from the contrast bolus in the SVC.   3. Scattered subpleural opacities in the right lower lobe and right middle   lobe. Scattered ground-glass opacities throughout the right lung.  Findings   may represent multifocal pneumonia.  Follow-up is recommended to document   resolution.   4. Enlarging/developing multiple bilateral pulmonary nodules as detailed   above including a cavitary nodule in the left upper lobe. Findings are   suspicious  for pulmonary metastatic disease.   5. Right hilar mediastinal/pretracheal and right paratracheal lymphadenopathy   which could be reactive or metastatic.   6. Moderate emphysema.   7. Probable aspirated or mucoid secretions in the right mainstem bronchus and   bronchus intermedius.   ABDOMEN AND PELVIS:      1. No obstructing calculus, hydronephrosis or hydroureter.   2. Fatty liver.   3. Gallbladder distension.   4. Moderate stool burden.   5. Small midline periumbilical fat containing hernia.   6. Bilateral femoral head AVN.   7. Mild degenerative changes within the lumbar spine.         XR CHEST PORTABLE   Final Result   1. Stable interstitial prominence.   2. Resolution of subcutaneous emphysema.            MRI LUMBAR SPINE WO CONTRAST    (Results Pending)     Wounds   Wound 04/23/22 Chest Lateral;Right (Active)   Number of days: 1139       Recommendation  Outstanding testing: N/A  Family notified: No  Contact information:   Decision Maker: Self  Consulting MD: IP CONSULT TO INTERNAL MEDICINE  IP CONSULT TO ORTHOPEDIC SURGERY    Ann-Marie Yanes RN

## 2025-06-06 NOTE — ED PROVIDER NOTES
POLYETHYLENE GLYCOL (GLYCOLAX) 17 GM/SCOOP POWDER    Take 17 g by mouth daily as needed (constipation)    POTASSIUM CHLORIDE (KLOR-CON M) 20 MEQ EXTENDED RELEASE TABLET    Take 1 tablet by mouth 3 times daily    RISPERIDONE (RISPERDAL) 0.5 MG TABLET    Take 3 tablets by mouth every 12 hours    SENNA-DOCUSATE (PERICOLACE) 8.6-50 MG PER TABLET    Take 2 tablets by mouth daily    SERTRALINE (ZOLOFT) 25 MG TABLET    Take 1 tablet by mouth daily    SPIRIVA RESPIMAT 2.5 MCG/ACT AERS INHALER    inhale 2 puffs INTO THE LUNGS once daily    SPIRONOLACTONE (ALDACTONE) 25 MG TABLET    Take 1 tablet by mouth daily    TIZANIDINE (ZANAFLEX) 2 MG TABLET    Take 1 tablet by mouth every 6 hours as needed    TOPIRAMATE (TOPAMAX SPRINKLE) 25 MG CAPSULE    Take 3 capsules by mouth 2 times daily    TRAZODONE (DESYREL) 50 MG TABLET    Take 1 tablet by mouth nightly     ALLERGIES     is allergic to advil [ibuprofen], aleve [naproxen], antipyrine, celecoxib, codeine, fomepizole, incruse ellipta [umeclidinium bromide], other/food, rofecoxib, salicylates, strawberry extract, sulfinpyrazone, aspirin, and nsaids.  FAMILY HISTORY     She indicated that her mother is . She indicated that her father is . She indicated that all of her three sisters are alive. She indicated that her brother is alive. She indicated that her maternal grandmother is . She indicated that her maternal grandfather is . She indicated that her paternal grandmother is . She indicated that her paternal grandfather is . She indicated that her maternal aunt is .     SOCIAL HISTORY       Social History     Tobacco Use    Smoking status: Former     Current packs/day: 0.00     Average packs/day: 2.0 packs/day for 42.0 years (84.0 ttl pk-yrs)     Types: Cigarettes     Start date: 1976     Quit date: 2018     Years since quittin.6    Smokeless tobacco: Never   Substance Use Topics    Alcohol use: Yes     Comment:  the radiologist, see reports below, unless otherwise noted in MDM or here.  Reports below are reviewed by myself.  XR CHEST PORTABLE    (Results Pending)   CT CHEST PULMONARY EMBOLISM W CONTRAST    (Results Pending)   CT LUMBAR SPINE WO CONTRAST    (Results Pending)   CT THORACIC SPINE WO CONTRAST    (Results Pending)   CT ABDOMEN PELVIS W IV CONTRAST Additional Contrast? None    (Results Pending)       LABS: Lab orders shown below, the results are reviewed by myself, and all abnormals are listed below.  Labs Reviewed   COVID-19 & INFLUENZA COMBO   CULTURE, BLOOD 1   CULTURE, BLOOD 1   CULTURE, URINE   CBC WITH AUTO DIFFERENTIAL   BRAIN NATRIURETIC PEPTIDE   COMPREHENSIVE METABOLIC PANEL   TROPONIN   TROPONIN   PROTIME-INR   MAGNESIUM   LACTATE, SEPSIS   LACTATE, SEPSIS   URINALYSIS WITH REFLEX TO CULTURE       Vitals Reviewed:    Vitals:    06/06/25 0633   BP: (!) 202/81   Pulse: 94   Resp: 24   Temp: 97.9 °F (36.6 °C)   TempSrc: Oral   SpO2: 96%   Weight: 97.8 kg (215 lb 8 oz)   Height: 1.651 m (5' 5\")     MEDICATIONS GIVEN TO PATIENT THIS ENCOUNTER:  Orders Placed This Encounter   Medications    morphine injection 4 mg     Refill:  0    methylPREDNISolone sodium succ (SOLU-MEDROL) 125 mg in sterile water 2 mL injection    ipratropium 0.5 mg-albuterol 2.5 mg (DUONEB) nebulizer solution 1 Dose     Initiate RT Bronchodilator Protocol:   Yes - Inpatient Protocol    cefTRIAXone (ROCEPHIN) 1,000 mg in sterile water 10 mL IV syringe     Antimicrobial Indications:   Pneumonia (CAP)    azithromycin (ZITHROMAX) 500 mg in sodium chloride 0.9 % 250 mL IVPB ([id number])     Antimicrobial Indications:   Pneumonia (CAP)     DISCHARGE PRESCRIPTIONS:  New Prescriptions    No medications on file     PHYSICIAN CONSULTS ORDERED THIS ENCOUNTER:  None  FINAL IMPRESSION      1. COPD exacerbation (HCC)    2. Acute left-sided low back pain without sciatica          DISPOSITION/PLAN   DISPOSITION                 OUTPATIENT FOLLOW UP THE

## 2025-06-07 LAB
ANION GAP SERPL CALCULATED.3IONS-SCNC: 15 MMOL/L (ref 9–16)
ARTERIAL PATENCY WRIST A: ABNORMAL
BASOPHILS # BLD: 0 K/UL (ref 0–0.2)
BASOPHILS NFR BLD: 0 % (ref 0–2)
BDY SITE: ABNORMAL
BODY TEMPERATURE: 37
BUN SERPL-MCNC: 13 MG/DL (ref 8–23)
CALCIUM SERPL-MCNC: 9.4 MG/DL (ref 8.6–10.4)
CHLORIDE SERPL-SCNC: 96 MMOL/L (ref 98–107)
CO2 SERPL-SCNC: 28 MMOL/L (ref 20–31)
COHGB MFR BLD: 1.2 % (ref 0–5)
CREAT SERPL-MCNC: 0.8 MG/DL (ref 0.7–1.2)
EKG ATRIAL RATE: 91 BPM
EKG P AXIS: 24 DEGREES
EKG P-R INTERVAL: 142 MS
EKG Q-T INTERVAL: 364 MS
EKG QRS DURATION: 84 MS
EKG QTC CALCULATION (BAZETT): 447 MS
EKG R AXIS: 266 DEGREES
EKG T AXIS: 61 DEGREES
EKG VENTRICULAR RATE: 91 BPM
EOSINOPHIL # BLD: 0 K/UL (ref 0–0.4)
EOSINOPHILS RELATIVE PERCENT: 0 % (ref 0–4)
ERYTHROCYTE [DISTWIDTH] IN BLOOD BY AUTOMATED COUNT: 15.2 % (ref 11.5–14.9)
GAS FLOW.O2 O2 DELIVERY SYS: ABNORMAL L/MIN
GFR, ESTIMATED: 80 ML/MIN/1.73M2
GLUCOSE BLD-MCNC: 169 MG/DL (ref 65–105)
GLUCOSE BLD-MCNC: 180 MG/DL (ref 65–105)
GLUCOSE BLD-MCNC: 181 MG/DL (ref 65–105)
GLUCOSE BLD-MCNC: 220 MG/DL (ref 65–105)
GLUCOSE SERPL-MCNC: 189 MG/DL (ref 74–99)
HCO3 ARTERIAL: 32.8 MMOL/L (ref 22–26)
HCT VFR BLD AUTO: 36.4 % (ref 36–46)
HGB BLD-MCNC: 10.8 G/DL (ref 12–16)
IMM GRANULOCYTES # BLD AUTO: 0 K/UL (ref 0–0.3)
IMM GRANULOCYTES NFR BLD: 0 %
LYMPHOCYTES NFR BLD: 0.67 K/UL (ref 1–4.8)
LYMPHOCYTES RELATIVE PERCENT: 10 % (ref 24–44)
MAGNESIUM SERPL-MCNC: 2 MG/DL (ref 1.6–2.4)
MCH RBC QN AUTO: 29.1 PG (ref 26–34)
MCHC RBC AUTO-ENTMCNC: 29.7 G/DL (ref 31–37)
MCV RBC AUTO: 98.1 FL (ref 80–100)
METHEMOGLOBIN: 0.1 % (ref 0–1.9)
MONOCYTES NFR BLD: 0.07 K/UL (ref 0.1–1.3)
MONOCYTES NFR BLD: 1 % (ref 1–7)
MORPHOLOGY: ABNORMAL
MRSA, DNA, NASAL: ABNORMAL
NEUTROPHILS NFR BLD: 89 % (ref 36–66)
NEUTS SEG NFR BLD: 5.96 K/UL (ref 1.3–9.1)
NRBC BLD-RTO: 0 PER 100 WBC
O2 SAT, ARTERIAL: 92.8 % (ref 95–98)
PCO2 ARTERIAL: 49.8 MMHG (ref 35–45)
PH ARTERIAL: 7.44 (ref 7.35–7.45)
PLATELET # BLD AUTO: 337 K/UL (ref 150–450)
PMV BLD AUTO: 9.6 FL (ref 8–13.5)
PO2 ARTERIAL: 64.5 MMHG (ref 80–100)
POSITIVE BASE EXCESS, ART: 7.5 MMOL/L (ref 0–2)
POTASSIUM SERPL-SCNC: 3.4 MMOL/L (ref 3.7–5.3)
PT. POSITION: ABNORMAL
RBC # BLD AUTO: 3.71 M/UL (ref 3.95–5.11)
RESPIRATORY RATE: 20
SODIUM SERPL-SCNC: 139 MMOL/L (ref 136–145)
SPECIMEN DESCRIPTION: ABNORMAL
WBC OTHER # BLD: 6.7 K/UL (ref 3.5–11)

## 2025-06-07 PROCEDURE — 82805 BLOOD GASES W/O2 SATURATION: CPT

## 2025-06-07 PROCEDURE — 6370000000 HC RX 637 (ALT 250 FOR IP)

## 2025-06-07 PROCEDURE — 94761 N-INVAS EAR/PLS OXIMETRY MLT: CPT

## 2025-06-07 PROCEDURE — 6360000002 HC RX W HCPCS

## 2025-06-07 PROCEDURE — 2700000000 HC OXYGEN THERAPY PER DAY

## 2025-06-07 PROCEDURE — 2580000003 HC RX 258

## 2025-06-07 PROCEDURE — 6370000000 HC RX 637 (ALT 250 FOR IP): Performed by: NURSE PRACTITIONER

## 2025-06-07 PROCEDURE — 94640 AIRWAY INHALATION TREATMENT: CPT

## 2025-06-07 PROCEDURE — 2500000003 HC RX 250 WO HCPCS

## 2025-06-07 PROCEDURE — 36600 WITHDRAWAL OF ARTERIAL BLOOD: CPT

## 2025-06-07 PROCEDURE — 99233 SBSQ HOSP IP/OBS HIGH 50: CPT | Performed by: INTERNAL MEDICINE

## 2025-06-07 PROCEDURE — 2060000000 HC ICU INTERMEDIATE R&B

## 2025-06-07 PROCEDURE — 80048 BASIC METABOLIC PNL TOTAL CA: CPT

## 2025-06-07 PROCEDURE — 82947 ASSAY GLUCOSE BLOOD QUANT: CPT

## 2025-06-07 PROCEDURE — 6360000002 HC RX W HCPCS: Performed by: INTERNAL MEDICINE

## 2025-06-07 PROCEDURE — 93010 ELECTROCARDIOGRAM REPORT: CPT | Performed by: INTERNAL MEDICINE

## 2025-06-07 PROCEDURE — 36415 COLL VENOUS BLD VENIPUNCTURE: CPT

## 2025-06-07 PROCEDURE — 83735 ASSAY OF MAGNESIUM: CPT

## 2025-06-07 PROCEDURE — 85025 COMPLETE CBC W/AUTO DIFF WBC: CPT

## 2025-06-07 RX ORDER — ALBUTEROL SULFATE 0.83 MG/ML
2.5 SOLUTION RESPIRATORY (INHALATION) EVERY 6 HOURS PRN
Status: DISCONTINUED | OUTPATIENT
Start: 2025-06-07 | End: 2025-06-09

## 2025-06-07 RX ADMIN — SERTRALINE 25 MG: 50 TABLET, FILM COATED ORAL at 08:46

## 2025-06-07 RX ADMIN — INSULIN LISPRO 1 UNITS: 100 INJECTION, SOLUTION INTRAVENOUS; SUBCUTANEOUS at 06:51

## 2025-06-07 RX ADMIN — ACETAMINOPHEN 650 MG: 325 TABLET ORAL at 01:08

## 2025-06-07 RX ADMIN — MONTELUKAST 10 MG: 10 TABLET, FILM COATED ORAL at 20:24

## 2025-06-07 RX ADMIN — IPRATROPIUM BROMIDE AND ALBUTEROL SULFATE 1 DOSE: .5; 2.5 SOLUTION RESPIRATORY (INHALATION) at 14:22

## 2025-06-07 RX ADMIN — OXYCODONE HYDROCHLORIDE AND ACETAMINOPHEN 1 TABLET: 5; 325 TABLET ORAL at 20:25

## 2025-06-07 RX ADMIN — WATER 40 MG: 1 INJECTION INTRAMUSCULAR; INTRAVENOUS; SUBCUTANEOUS at 01:06

## 2025-06-07 RX ADMIN — IPRATROPIUM BROMIDE AND ALBUTEROL SULFATE 1 DOSE: .5; 2.5 SOLUTION RESPIRATORY (INHALATION) at 07:09

## 2025-06-07 RX ADMIN — OXYCODONE HYDROCHLORIDE 5 MG: 5 TABLET ORAL at 15:10

## 2025-06-07 RX ADMIN — IPRATROPIUM BROMIDE AND ALBUTEROL SULFATE 1 DOSE: .5; 2.5 SOLUTION RESPIRATORY (INHALATION) at 19:40

## 2025-06-07 RX ADMIN — IPRATROPIUM BROMIDE AND ALBUTEROL SULFATE 1 DOSE: .5; 2.5 SOLUTION RESPIRATORY (INHALATION) at 10:54

## 2025-06-07 RX ADMIN — POTASSIUM CHLORIDE 40 MEQ: 1500 TABLET, EXTENDED RELEASE ORAL at 08:49

## 2025-06-07 RX ADMIN — APIXABAN 5 MG: 5 TABLET, FILM COATED ORAL at 08:49

## 2025-06-07 RX ADMIN — RISPERIDONE 1 MG: 1 TABLET, FILM COATED ORAL at 03:55

## 2025-06-07 RX ADMIN — WATER 40 MG: 1 INJECTION INTRAMUSCULAR; INTRAVENOUS; SUBCUTANEOUS at 19:39

## 2025-06-07 RX ADMIN — OXYCODONE HYDROCHLORIDE AND ACETAMINOPHEN 1 TABLET: 5; 325 TABLET ORAL at 13:23

## 2025-06-07 RX ADMIN — TRAZODONE HYDROCHLORIDE 50 MG: 50 TABLET ORAL at 20:24

## 2025-06-07 RX ADMIN — OXYCODONE HYDROCHLORIDE AND ACETAMINOPHEN 1 TABLET: 5; 325 TABLET ORAL at 03:55

## 2025-06-07 RX ADMIN — FERROUS SULFATE TAB 325 MG (65 MG ELEMENTAL FE) 325 MG: 325 (65 FE) TAB at 08:50

## 2025-06-07 RX ADMIN — ALBUTEROL SULFATE 2.5 MG: 2.5 SOLUTION RESPIRATORY (INHALATION) at 01:44

## 2025-06-07 RX ADMIN — WATER 40 MG: 1 INJECTION INTRAMUSCULAR; INTRAVENOUS; SUBCUTANEOUS at 06:49

## 2025-06-07 RX ADMIN — RISPERIDONE 1 MG: 1 TABLET, FILM COATED ORAL at 13:23

## 2025-06-07 RX ADMIN — SODIUM CHLORIDE, PRESERVATIVE FREE 10 ML: 5 INJECTION INTRAVENOUS at 08:50

## 2025-06-07 RX ADMIN — BUDESONIDE AND FORMOTEROL FUMARATE DIHYDRATE 2 PUFF: 160; 4.5 AEROSOL RESPIRATORY (INHALATION) at 07:19

## 2025-06-07 RX ADMIN — OXYCODONE HYDROCHLORIDE AND ACETAMINOPHEN 1 TABLET: 5; 325 TABLET ORAL at 08:46

## 2025-06-07 RX ADMIN — CEFTRIAXONE SODIUM 2000 MG: 2 INJECTION, POWDER, FOR SOLUTION INTRAMUSCULAR; INTRAVENOUS at 06:34

## 2025-06-07 RX ADMIN — ANTACID TABLETS 500 MG: 500 TABLET, CHEWABLE ORAL at 08:50

## 2025-06-07 RX ADMIN — SPIRONOLACTONE 25 MG: 25 TABLET ORAL at 08:50

## 2025-06-07 RX ADMIN — SODIUM CHLORIDE, PRESERVATIVE FREE 10 ML: 5 INJECTION INTRAVENOUS at 20:28

## 2025-06-07 RX ADMIN — SODIUM CHLORIDE: 0.9 INJECTION, SOLUTION INTRAVENOUS at 06:31

## 2025-06-07 RX ADMIN — WATER 40 MG: 1 INJECTION INTRAMUSCULAR; INTRAVENOUS; SUBCUTANEOUS at 13:23

## 2025-06-07 RX ADMIN — AZITHROMYCIN MONOHYDRATE 500 MG: 500 INJECTION, POWDER, LYOPHILIZED, FOR SOLUTION INTRAVENOUS at 06:37

## 2025-06-07 RX ADMIN — BUDESONIDE AND FORMOTEROL FUMARATE DIHYDRATE 2 PUFF: 160; 4.5 AEROSOL RESPIRATORY (INHALATION) at 19:40

## 2025-06-07 RX ADMIN — APIXABAN 5 MG: 5 TABLET, FILM COATED ORAL at 20:24

## 2025-06-07 RX ADMIN — ATORVASTATIN CALCIUM 20 MG: 20 TABLET, FILM COATED ORAL at 20:24

## 2025-06-07 RX ADMIN — BUMETANIDE 2 MG: 1 TABLET ORAL at 08:50

## 2025-06-07 RX ADMIN — OXYCODONE HYDROCHLORIDE 5 MG: 5 TABLET ORAL at 19:09

## 2025-06-07 ASSESSMENT — PAIN SCALES - GENERAL
PAINLEVEL_OUTOF10: 8
PAINLEVEL_OUTOF10: 6
PAINLEVEL_OUTOF10: 8
PAINLEVEL_OUTOF10: 3
PAINLEVEL_OUTOF10: 6
PAINLEVEL_OUTOF10: 8
PAINLEVEL_OUTOF10: 9
PAINLEVEL_OUTOF10: 9

## 2025-06-07 ASSESSMENT — PAIN DESCRIPTION - PAIN TYPE
TYPE: CHRONIC PAIN

## 2025-06-07 ASSESSMENT — PAIN DESCRIPTION - DESCRIPTORS
DESCRIPTORS: ACHING;CRAMPING;DISCOMFORT
DESCRIPTORS: ACHING;CRAMPING;DISCOMFORT
DESCRIPTORS: ACHING
DESCRIPTORS: ACHING
DESCRIPTORS: ACHING;CRAMPING;DISCOMFORT
DESCRIPTORS: ACHING
DESCRIPTORS: ACHING

## 2025-06-07 ASSESSMENT — PAIN DESCRIPTION - LOCATION
LOCATION: BACK
LOCATION: HEAD
LOCATION: BACK

## 2025-06-07 ASSESSMENT — PAIN DESCRIPTION - FREQUENCY
FREQUENCY: INTERMITTENT

## 2025-06-07 ASSESSMENT — PAIN - FUNCTIONAL ASSESSMENT
PAIN_FUNCTIONAL_ASSESSMENT: ACTIVITIES ARE NOT PREVENTED
PAIN_FUNCTIONAL_ASSESSMENT: PREVENTS OR INTERFERES SOME ACTIVE ACTIVITIES AND ADLS
PAIN_FUNCTIONAL_ASSESSMENT: ACTIVITIES ARE NOT PREVENTED
PAIN_FUNCTIONAL_ASSESSMENT: PREVENTS OR INTERFERES SOME ACTIVE ACTIVITIES AND ADLS
PAIN_FUNCTIONAL_ASSESSMENT: ACTIVITIES ARE NOT PREVENTED
PAIN_FUNCTIONAL_ASSESSMENT: PREVENTS OR INTERFERES SOME ACTIVE ACTIVITIES AND ADLS
PAIN_FUNCTIONAL_ASSESSMENT: PREVENTS OR INTERFERES SOME ACTIVE ACTIVITIES AND ADLS

## 2025-06-07 ASSESSMENT — PAIN DESCRIPTION - ORIENTATION
ORIENTATION: LOWER

## 2025-06-07 ASSESSMENT — PAIN DESCRIPTION - ONSET
ONSET: ON-GOING

## 2025-06-07 NOTE — PLAN OF CARE
Problem: Pain  Goal: Verbalizes/displays adequate comfort level or baseline comfort level  6/7/2025 1244 by Ivana Lyon RN  Outcome: Not Progressing    Problem: Safety - Adult  Goal: Free from fall injury  6/7/2025 1244 by Ivana Lyon RN  Outcome: Progressing     Problem: Discharge Planning  Goal: Discharge to home or other facility with appropriate resources  6/7/2025 1244 by Ivana Lyon RN  Outcome: Progressing     Problem: Skin/Tissue Integrity  Goal: Skin integrity remains intact  Description: 1.  Monitor for areas of redness and/or skin breakdown2.  Assess vascular access sites hourly3.  Every 4-6 hours minimum:  Change oxygen saturation probe site4.  Every 4-6 hours:  If on nasal continuous positive airway pressure, respiratory therapy assess nares and determine need for appliance change or resting period  6/7/2025 1244 by Ivana Lyon RN  Outcome: Progressing     Problem: ABCDS Injury Assessment  Goal: Absence of physical injury  6/7/2025 1244 by Ivana Lyon RN  Outcome: Progressing

## 2025-06-07 NOTE — PLAN OF CARE
Problem: Safety - Adult  Goal: Free from fall injury  Outcome: Progressing     Problem: Discharge Planning  Goal: Discharge to home or other facility with appropriate resources  Outcome: Progressing     Problem: Skin/Tissue Integrity  Goal: Skin integrity remains intact  Description: 1.  Monitor for areas of redness and/or skin breakdown2.  Assess vascular access sites hourly3.  Every 4-6 hours minimum:  Change oxygen saturation probe site4.  Every 4-6 hours:  If on nasal continuous positive airway pressure, respiratory therapy assess nares and determine need for appliance change or resting period  Outcome: Progressing     Problem: ABCDS Injury Assessment  Goal: Absence of physical injury  Outcome: Progressing  Flowsheets (Taken 6/6/2025 2000)  Absence of Physical Injury: Implement safety measures based on patient assessment     Problem: Pain  Goal: Verbalizes/displays adequate comfort level or baseline comfort level  Outcome: Progressing

## 2025-06-07 NOTE — PROGRESS NOTES
Writer was called to room 2117 due to difficulty breathing. Patient was in visible distress, heavily breathing and struggling to speak. Spo2 obtained- 94% while on 4L via nasal cannula, which is baseline for the patient. HR- 94-96, Respirations 20. I observed the patient in the room for roughly 10 minutes. I spoke to Romelia with respiratory, asking if there were any recommendations that I could request from the physician/NP on staff. She noted that the patient is getting scheduled Duoneb treatments every 4 hours, along with a symbicort inhaler as well. It's not advised to not give any more medications like this because of possible adverse effects. I noted this to the patient and she became very upset. I noted that I was able to move her o2 up to 5L if she would feel more comfortable with it. She got loud and aggressive, questioning why I would offer her something like that. I explained to her why it could potentially be helpful for her since it's not recommended that she have any more treatments until her next scheduled one. She told me to just put it on 5 then.     Oxygen was moved from 4L to 5L, with no change to vital signs.

## 2025-06-07 NOTE — PROGRESS NOTES
Date: 6/6/2025  EXAMINATION: CT OF THE ABDOMEN AND PELVIS WITH CONTRAST; CTA OF THE CHEST 6/6/2025 8:47 am TECHNIQUE: CT of the abdomen and pelvis was performed with the administration of intravenous contrast. Multiplanar reformatted images are provided for review. Automated exposure control, iterative reconstruction, and/or weight based adjustment of the mA/kV was utilized to reduce the radiation dose to as low as reasonably achievable.; CTA of the chest was performed after the administration of intravenous contrast.  Multiplanar reformatted images are provided for review.  MIP images are provided for review. Automated exposure control, iterative reconstruction, and/or weight based adjustment of the mA/kV was utilized to reduce the radiation dose to as low as reasonably achievable. COMPARISON: CT thoracic and lumbar spine from 06/06/2025, CT chest from 01/02/2025, PET-CT from 09/18/2024, CT abdomen and pelvis from 07/01/2022 HISTORY: ORDERING SYSTEM PROVIDED HISTORY: flank pain TECHNOLOGIST PROVIDED HISTORY: flank pain Decision Support Exception - unselect if not a suspected or confirmed emergency medical condition->Emergency Medical Condition (MA) Reason for Exam: sob, back pain; ORDERING SYSTEM PROVIDED HISTORY: dyspnea, PE, back pain TECHNOLOGIST PROVIDED HISTORY: dyspnea, PE, back pain Decision Support Exception - unselect if not a suspected or confirmed emergency medical condition->Emergency Medical Condition (MA) Reason for Exam: sob, back pain, leg pain, swelling Additional signs and symptoms: pt unable to raise arms 70-year-old female with shortness of breath and back pain; flank pain FINDINGS: Chest: Mediastinum: No obvious filling defect in the main, central main, and left main pulmonary arteries to suggest central pulmonary embolus.  Lobar pulmonary arterial vasculature and beyond is somewhat limited due to suboptimal bolus timing, respiratory motion, and beam hardening artifact from the contrast bolus  and intrapelvic findings.     XR CHEST PORTABLE  Result Date: 6/6/2025  EXAM: 1 VIEW XRAY OF THE CHEST 06/06/2025 07:27:27 AM COMPARISON: 04/30/2022 CLINICAL HISTORY: Shortness of breath, cough, back pain. FINDINGS: LUNGS AND PLEURA: No focal pulmonary opacity. No pulmonary edema. No pleural effusion. No pneumothorax. Stable interstitial prominence. HEART AND MEDIASTINUM: No acute abnormality of the cardiac and mediastinal silhouettes. BONES AND SOFT TISSUES: No acute osseous abnormality. Resolution of subcutaneous emphysema.     1. Stable interstitial prominence. 2. Resolution of subcutaneous emphysema.         Physical Examination:        Physical Exam  Cardiovascular:      Rate and Rhythm: Normal rate and regular rhythm.      Pulses: Normal pulses.      Heart sounds: Normal heart sounds. No murmur heard.     No friction rub. No gallop.   Pulmonary:      Effort: Pulmonary effort is normal. No respiratory distress.      Breath sounds: Normal breath sounds. No stridor. No wheezing, rhonchi or rales.   Abdominal:      General: There is no distension.      Palpations: Abdomen is soft. There is no mass.      Tenderness: There is no abdominal tenderness. There is no guarding or rebound.      Hernia: No hernia is present.   Musculoskeletal:         General: Deformity present. No swelling or tenderness. Normal range of motion.      Right lower leg: No edema.      Left lower leg: No edema.   Skin:     General: Skin is warm.      Coloration: Skin is not jaundiced or pale.      Findings: No bruising, erythema or lesion.   Neurological:      Mental Status: She is alert.           Assessment:        Primary Problem  SOB (shortness of breath)    Active Hospital Problems    Diagnosis Date Noted    SOB (shortness of breath) [R06.02] 06/06/2025    Acute midline thoracic back pain [M54.6] 06/06/2025    Closed wedge compression fracture of T7 vertebra (HCC) [S22.060A] 06/06/2025       Plan:      Acute on chronic respiratory distress

## 2025-06-07 NOTE — ACP (ADVANCE CARE PLANNING)
Advance Care Planning     Advance Care Planning Activator (Inpatient)  Conversation Note      Date of ACP Conversation: 6/7/2025     Conversation Conducted with: Patient with Decision Making Capacity    ACP Activator: vEa Baxter        Health Care Decision Maker:     Current Designated Health Care Decision Maker:     Primary Decision Maker: Calli Lopez - [facility] - [phone]  Click here to complete Healthcare Decision Makers including section of the Healthcare Decision Maker Relationship (ie \"Primary\")      Care Preferences    Ventilation:  \"If you were in your present state of health and suddenly became very ill and were unable to breathe on your own, what would your preference be about the use of a ventilator (breathing machine) if it were available to you?\"      Would the patient desire the use of ventilator (breathing machine)?: no    \"If your health worsens and it becomes clear that your chance of recovery is unlikely, what would your preference be about the use of a ventilator (breathing machine) if it were available to you?\"     Would the patient desire the use of ventilator (breathing machine)?: No      Resuscitation  \"CPR works best to restart the heart when there is a sudden event, like a heart attack, in someone who is otherwise healthy. Unfortunately, CPR does not typically restart the heart for people who have serious health conditions or who are very sick.\"    \"In the event your heart stopped as a result of an underlying serious health condition, would you want attempts to be made to restart your heart (answer \"yes\" for attempt to resuscitate) or would you prefer a natural death (answer \"no\" for do not attempt to resuscitate)?\" no       [] Yes   [] No   Educated Patient / Decision Maker regarding differences between Advance Directives and portable DNR orders.    Length of ACP Conversation in minutes:      Conversation Outcomes:  ACP discussion completed    Follow-up plan:    [] Schedule  follow-up conversation to continue planning  [] Referred individual to Provider for additional questions/concerns   [] Advised patient/agent/surrogate to review completed ACP document and update if needed with changes in condition, patient preferences or care setting    [] This note routed to one or more involved healthcare providers

## 2025-06-07 NOTE — PROGRESS NOTES
Comprehensive Nutrition Assessment    Type and Reason for Visit:  Wound    Nutrition Recommendations/Plan:   Will continue to provide 5 carbohydrate choices per tray  Will add Ensure High Protein once daily     Malnutrition Assessment:  Malnutrition Status:  At risk for malnutrition (06/07/25 1440)    Context:  Acute Illness     Findings of the 6 clinical characteristics of malnutrition:  Energy Intake:  Unable to assess  Weight Loss:  Unable to assess (estimated wt)     Body Fat Loss:  Unable to assess (pt is in isolation)     Muscle Mass Loss:  Unable to assess    Fluid Accumulation:  Mild Extremities   Strength:  Not Performed    Nutrition Assessment:    Pt was admitted for Pneumonia/Exacerbation of COPD. She is currentl in droplet isolation due to parainfluenza. Mx Pressure Ulcers noted. Based on estimated wt, no wt loss over the past year.    Nutrition Related Findings:    mild BLE edema, Labs: Glu 189, Meds: Solumedrol, PMH: COPD, DM, IBS, Lung Nodes Wound Type: Multiple, Pressure Injury, Stage I, Stage II       Current Nutrition Intake & Therapies:    Average Meal Intake: Unable to assess     ADULT DIET; Easy to Chew; 5 carb choices (75 gm/meal)    Anthropometric Measures:  Height: 165.1 cm (5' 5\")  Ideal Body Weight (IBW): 125 lbs (57 kg)    Admission Body Weight: 97.5 kg (215 lb)  Current Body Weight: 97.5 kg (215 lb) (estimated), 172 % IBW.    Current BMI (kg/m2): 35.8  Usual Body Weight: 95.3 kg (210 lb)     % Weight Change (Calculated): 2.4                    BMI Categories: Obese Class 2 (BMI 35.0 -39.9)    Estimated Daily Nutrient Needs:  Energy Requirements Based On: Formula  Weight Used for Energy Requirements: Admission  Energy (kcal/day): [place] x 1.2= 1800 kcal  Weight Used for Protein Requirements: Admission  Protein (g/day): 1.3g/kg= 125-130 g  Method Used for Fluid Requirements: 1 ml/kcal  Fluid (ml/day): 1800 ml    Nutrition Diagnosis:   Increased nutrient needs related to  (healing) as  evidenced by wounds    Nutrition Interventions:   Food and/or Nutrient Delivery: Modify Current Diet, Start Oral Nutrition Supplement  Nutrition Education/Counseling: No recommendation at this time  Coordination of Nutrition Care: Continue to monitor while inpatient       Goals:  Goals: PO intake 50% or greater  Type of Goal: New goal  Previous Goal Met: New Goal    Nutrition Monitoring and Evaluation:      Food/Nutrient Intake Outcomes: Food and Nutrient Intake, Supplement Intake  Physical Signs/Symptoms Outcomes: Biochemical Data, Chewing or Swallowing, GI Status, Fluid Status or Edema, Weight, Skin    Discharge Planning:    Continue current diet     Leonie Novak RD, LD  Contact: [phone]

## 2025-06-07 NOTE — PROGRESS NOTES
[facility] PULMONARY,CRITICAL CARE & SLEEP   Lenny Clemons MD/Vasyl Cortez MD/Jose Patel APRN AGACNP-BC, NP-C      Rosalind CORONADO NP-C    Donn CORONADO NP-C                                         Pulmonary Progress Note    Patient - Elaina Lopez   Age - 70 y.o.   - 1955  MRN - [id number]  [facility] # - [id number]  Date of Admission - 2025  6:33 AM    Consulting Service/Physician:       Primary Care Physician: Tayla Kim MD    SUBJECTIVE:     Chief Complaint:   Chief Complaint   Patient presents with    Shortness of Breath    Back Pain     Subjective:    Patient is resting in bed  Seems somewhat sleepy and somewhat confused but oriented and alert;   I was told she did not sleep  She really has no acute complaints today but still has a spastic cough  RPP positive for parainfluenza  No acute complaint  CODE STATUS discussed and clarified to DNR CCA no intubation   Daughter at bedside for discussion    VITALS  BP (!) 150/50   Pulse 99   Temp 98.6 °F (37 °C) (Oral)   Resp 20   Ht 1.651 m (5' 5\")   Wt 97.8 kg (215 lb 8 oz)   LMP 2013 (Exact Date)   SpO2 92%   BMI 35.86 kg/m²   Wt Readings from Last 3 Encounters:   25 97.8 kg (215 lb 8 oz)   25 97.7 kg (215 lb 6.4 oz)   10/02/24 95.4 kg (210 lb 6.4 oz)     I/O (24 Hours)  No intake or output data in the 24 hours ending 25 0928  Ventilator:      Exam:   Physical Exam   Constitutional: Obese, 4 L nasal cannula, no acute distress, resting in bed, somewhat sleepy  HENT: Unremarkable  Head: Normocephalic and atraumatic.   Eyes: EOM are normal. Pupils are equal, round, and reactive to light.   Neck: Neck supple.   Cardiovascular:  Regular rate and rhythm.  Normal heart tones.  No JVD.    Pulmonary/Chest: Lung sounds remain rhonchorous bilaterally but wheezes seem improved  Abdominal: Obese, soft, nontender  Musculoskeletal: Bedridden, foot drop bilaterally,   Neurological:patient  nodules including cavitary left upper lobe nodule suspicious for metastatic disease with PET/CT September 2024 showing SUV 7.8 for right apex lesion but the evolving cavitary opacities in the upper lobes SUV max 4.6  Mediastinal adenopathy  Pseudomonas pneumonia history with cavitary lesion, repeat CT chest January 2025 showing resolution of a large left upper lobe nodular density with biapical nodular densities similar in size;   History of DVT on chronic anticoagulation  Chronic anemia  DNR CCA no intubation per our discussion today at bedside with daughter and previous office documentation  PLAN:   Discussed CODE STATUS and clarified she is a DNR CCA no intubation  Attempted to discuss if she ever would want treatment regarding chest CT; she would like to discuss with Dr. Jacobson, he is on starting this Monday  ABG, VBG stable borderline acidotic with pCO2 60 which is probably around her baseline; somewhat sleepy likely due to no rest but would like to be sure she does not require BiPAP  Pro-Branden 0.14, RPP positive for parainfluenza, lactate 1.7  Pending MRSA, strep pneumonia, Legionella, mycoplasma, respiratory culture  Blood cultures no growth so far  We will continue to follow    ABG 6/7 pH 7.437 pCO2 49.8 vs VBG 6/6 pH 7.345 pCO2 60.8  stable      Electronically signed by IVONNE NOEL CNP on 06/07/25   [facility]    Available via CREAT    [place] Office:  [address] , [place] [address] [phone]     [place] Office:  [facility] [address], [place] [address] [phone]     This progress note was completed using a voice transcription system. Every effort was made to ensure accuracy. However, inadvertent computerized transcription errors may be present.

## 2025-06-07 NOTE — PROGRESS NOTES
Contacted Dr. Clemons via perfect serve- This patient has been saying that she's had coughing fits throughout the afternoon/evening that cause her to be very short of breath. When she calls out, I've noted that her oxygen has been between 94-95, but she's in visble distress. She's currently on 4L which is baseline for her. She feels temporarily better when she gets the Duoneb treatments. She does not have any scheduled overnight. Are you able to put in an order to have these scheduled or should I call respiratory when she requests one? Also, her cough is very productive, so I wasn't sure if it was OK to give her the tessalon. Please advise.      Received call from Dr. Clemons advising against the tessalon due to productive cough. He also added in another breathing treatment.    Contacted Nasreen BROOKS, who ordered duoneb treatment

## 2025-06-07 NOTE — CARE COORDINATION
Case Management Assessment  Initial Evaluation    Date/Time of Evaluation: 6/7/2025 12:35 PM  Assessment Completed by: Eva Baxter    If patient is discharged prior to next notation, then this note serves as note for discharge by case management.    Patient Name: Elaina Lopez                   YOB: 1955  Diagnosis: SOB (shortness of breath) [R06.02]  COPD exacerbation (HCC) [J44.1]  Compression fracture of body of thoracic vertebra (HCC) [S22.000A]  Acute left-sided low back pain without sciatica [M54.50]  Pneumonia due to infectious organism, unspecified laterality, unspecified part of lung [J18.9]                   Date / Time: 6/6/2025  6:33 AM    Patient Admission Status: Inpatient   Readmission Risk (Low < 19, Mod (19-27), High > 27): Readmission Risk Score: 15.5    Current PCP: Tayla Kim MD  PCP verified by CM? No    Chart Reviewed: Yes      History Provided by: Patient  Patient Orientation: Alert and Oriented    Patient Cognition: Alert    Hospitalization in the last 30 days (Readmission):  No    If yes, Readmission Assessment in CM Navigator will be completed.    Advance Directives:      Code Status: DNR-CCA   Patient's Primary Decision Maker is: Legal Next of Kin    Primary Decision Maker: Calli Lopez - Child - [phone]    Discharge Planning:    Patient lives with: Alone Type of Home: Long-Term Acute Care  Primary Care Giver: Self  Patient Support Systems include: Children   Current Financial resources: Medicaid  Current community resources: None  Current services prior to admission: Oxygen Therapy            Current DME:              Type of Home Care services:  Nursing Services    ADLS  Prior functional level: Assistance with the following:, Bathing, Dressing, Mobility, Shopping  Current functional level: Assistance with the following:, Bathing, Dressing, Shopping, Mobility    PT AM-PAC:   /24  OT AM-PAC:   /24    Family can provide assistance at DC: Yes  Would you like

## 2025-06-08 LAB
ANION GAP SERPL CALCULATED.3IONS-SCNC: 9 MMOL/L (ref 9–16)
BACTERIA URNS QL MICRO: ABNORMAL
BASOPHILS # BLD: <0.03 K/UL (ref 0–0.2)
BASOPHILS NFR BLD: 0 % (ref 0–2)
BILIRUB UR QL STRIP: NEGATIVE
BUN SERPL-MCNC: 17 MG/DL (ref 8–23)
CALCIUM SERPL-MCNC: 9.3 MG/DL (ref 8.6–10.4)
CASTS #/AREA URNS LPF: ABNORMAL /LPF
CHLORIDE SERPL-SCNC: 98 MMOL/L (ref 98–107)
CLARITY UR: CLEAR
CO2 SERPL-SCNC: 32 MMOL/L (ref 20–31)
COLOR UR: YELLOW
CREAT SERPL-MCNC: 0.9 MG/DL (ref 0.7–1.2)
EOSINOPHIL # BLD: <0.03 K/UL (ref 0–0.44)
EOSINOPHILS RELATIVE PERCENT: 0 % (ref 0–4)
EPI CELLS #/AREA URNS HPF: ABNORMAL /HPF
ERYTHROCYTE [DISTWIDTH] IN BLOOD BY AUTOMATED COUNT: 15.5 % (ref 11.5–14.9)
GFR, ESTIMATED: 70 ML/MIN/1.73M2
GLUCOSE BLD-MCNC: 180 MG/DL (ref 65–105)
GLUCOSE BLD-MCNC: 210 MG/DL (ref 65–105)
GLUCOSE BLD-MCNC: 216 MG/DL (ref 65–105)
GLUCOSE BLD-MCNC: 267 MG/DL (ref 65–105)
GLUCOSE SERPL-MCNC: 239 MG/DL (ref 74–99)
GLUCOSE UR STRIP-MCNC: ABNORMAL MG/DL
HCT VFR BLD AUTO: 34.1 % (ref 36–46)
HGB BLD-MCNC: 10.2 G/DL (ref 12–16)
HGB UR QL STRIP.AUTO: NEGATIVE
IMM GRANULOCYTES # BLD AUTO: 0.04 K/UL (ref 0–0.3)
IMM GRANULOCYTES NFR BLD: 0 %
KETONES UR STRIP-MCNC: ABNORMAL MG/DL
L PNEUMO1 AG UR QL IA.RAPID: NEGATIVE
LEUKOCYTE ESTERASE UR QL STRIP: NEGATIVE
LYMPHOCYTES NFR BLD: 0.8 K/UL (ref 1.1–3.7)
LYMPHOCYTES RELATIVE PERCENT: 8 % (ref 24–44)
MCH RBC QN AUTO: 29.2 PG (ref 26–34)
MCHC RBC AUTO-ENTMCNC: 29.9 G/DL (ref 31–37)
MCV RBC AUTO: 97.7 FL (ref 80–100)
MONOCYTES NFR BLD: 0.3 K/UL (ref 0.1–1.2)
MONOCYTES NFR BLD: 3 % (ref 3–12)
NEUTROPHILS NFR BLD: 89 % (ref 36–66)
NEUTS SEG NFR BLD: 9.17 K/UL (ref 1.5–8.1)
NITRITE UR QL STRIP: NEGATIVE
NRBC BLD-RTO: 0 PER 100 WBC
PH UR STRIP: 5 [PH] (ref 5–8)
PLATELET # BLD AUTO: 332 K/UL (ref 150–450)
PMV BLD AUTO: 9.5 FL (ref 8–13.5)
POTASSIUM SERPL-SCNC: 3.7 MMOL/L (ref 3.7–5.3)
PROT UR STRIP-MCNC: ABNORMAL MG/DL
RBC # BLD AUTO: 3.49 M/UL (ref 3.95–5.11)
RBC #/AREA URNS HPF: ABNORMAL /HPF
S PNEUM AG SPEC QL: NEGATIVE
SODIUM SERPL-SCNC: 139 MMOL/L (ref 136–145)
SP GR UR STRIP: 1.03 (ref 1–1.03)
SPECIMEN SOURCE: NORMAL
UROBILINOGEN UR STRIP-ACNC: NORMAL EU/DL (ref 0–1)
WBC #/AREA URNS HPF: ABNORMAL /HPF
WBC OTHER # BLD: 10.3 K/UL (ref 3.5–11)

## 2025-06-08 PROCEDURE — 6370000000 HC RX 637 (ALT 250 FOR IP)

## 2025-06-08 PROCEDURE — 87899 AGENT NOS ASSAY W/OPTIC: CPT

## 2025-06-08 PROCEDURE — 81001 URINALYSIS AUTO W/SCOPE: CPT

## 2025-06-08 PROCEDURE — 6370000000 HC RX 637 (ALT 250 FOR IP): Performed by: NURSE PRACTITIONER

## 2025-06-08 PROCEDURE — 82947 ASSAY GLUCOSE BLOOD QUANT: CPT

## 2025-06-08 PROCEDURE — 99233 SBSQ HOSP IP/OBS HIGH 50: CPT | Performed by: INTERNAL MEDICINE

## 2025-06-08 PROCEDURE — 94664 DEMO&/EVAL PT USE INHALER: CPT

## 2025-06-08 PROCEDURE — 94761 N-INVAS EAR/PLS OXIMETRY MLT: CPT

## 2025-06-08 PROCEDURE — 99231 SBSQ HOSP IP/OBS SF/LOW 25: CPT | Performed by: ORTHOPAEDIC SURGERY

## 2025-06-08 PROCEDURE — 6360000002 HC RX W HCPCS: Performed by: INTERNAL MEDICINE

## 2025-06-08 PROCEDURE — 6360000002 HC RX W HCPCS

## 2025-06-08 PROCEDURE — 36415 COLL VENOUS BLD VENIPUNCTURE: CPT

## 2025-06-08 PROCEDURE — 87086 URINE CULTURE/COLONY COUNT: CPT

## 2025-06-08 PROCEDURE — 94640 AIRWAY INHALATION TREATMENT: CPT

## 2025-06-08 PROCEDURE — 2580000003 HC RX 258

## 2025-06-08 PROCEDURE — 2060000000 HC ICU INTERMEDIATE R&B

## 2025-06-08 PROCEDURE — 87449 NOS EACH ORGANISM AG IA: CPT

## 2025-06-08 PROCEDURE — 80048 BASIC METABOLIC PNL TOTAL CA: CPT

## 2025-06-08 PROCEDURE — 2500000003 HC RX 250 WO HCPCS

## 2025-06-08 PROCEDURE — 85025 COMPLETE CBC W/AUTO DIFF WBC: CPT

## 2025-06-08 PROCEDURE — 2700000000 HC OXYGEN THERAPY PER DAY

## 2025-06-08 PROCEDURE — 94669 MECHANICAL CHEST WALL OSCILL: CPT

## 2025-06-08 RX ORDER — GUAIFENESIN 600 MG/1
600 TABLET, EXTENDED RELEASE ORAL 2 TIMES DAILY
Status: DISCONTINUED | OUTPATIENT
Start: 2025-06-08 | End: 2025-06-11 | Stop reason: HOSPADM

## 2025-06-08 RX ORDER — INSULIN GLARGINE 100 [IU]/ML
5 INJECTION, SOLUTION SUBCUTANEOUS NIGHTLY
Status: DISCONTINUED | OUTPATIENT
Start: 2025-06-08 | End: 2025-06-11

## 2025-06-08 RX ADMIN — APIXABAN 5 MG: 5 TABLET, FILM COATED ORAL at 09:09

## 2025-06-08 RX ADMIN — INSULIN GLARGINE 5 UNITS: 100 INJECTION, SOLUTION SUBCUTANEOUS at 21:20

## 2025-06-08 RX ADMIN — ACETAMINOPHEN 650 MG: 325 TABLET ORAL at 21:33

## 2025-06-08 RX ADMIN — ATORVASTATIN CALCIUM 20 MG: 20 TABLET, FILM COATED ORAL at 20:08

## 2025-06-08 RX ADMIN — OXYCODONE HYDROCHLORIDE 5 MG: 5 TABLET ORAL at 18:09

## 2025-06-08 RX ADMIN — SPIRONOLACTONE 25 MG: 25 TABLET ORAL at 09:09

## 2025-06-08 RX ADMIN — WATER 40 MG: 1 INJECTION INTRAMUSCULAR; INTRAVENOUS; SUBCUTANEOUS at 09:53

## 2025-06-08 RX ADMIN — MONTELUKAST 10 MG: 10 TABLET, FILM COATED ORAL at 20:08

## 2025-06-08 RX ADMIN — WATER 40 MG: 1 INJECTION INTRAMUSCULAR; INTRAVENOUS; SUBCUTANEOUS at 15:46

## 2025-06-08 RX ADMIN — OXYCODONE HYDROCHLORIDE 5 MG: 5 TABLET ORAL at 00:23

## 2025-06-08 RX ADMIN — SODIUM CHLORIDE, PRESERVATIVE FREE 10 ML: 5 INJECTION INTRAVENOUS at 09:10

## 2025-06-08 RX ADMIN — BUDESONIDE AND FORMOTEROL FUMARATE DIHYDRATE 2 PUFF: 160; 4.5 AEROSOL RESPIRATORY (INHALATION) at 07:25

## 2025-06-08 RX ADMIN — OXYCODONE HYDROCHLORIDE 5 MG: 5 TABLET ORAL at 06:12

## 2025-06-08 RX ADMIN — CEFTRIAXONE SODIUM 2000 MG: 2 INJECTION, POWDER, FOR SOLUTION INTRAMUSCULAR; INTRAVENOUS at 06:09

## 2025-06-08 RX ADMIN — GUAIFENESIN 600 MG: 600 TABLET, EXTENDED RELEASE ORAL at 20:09

## 2025-06-08 RX ADMIN — OXYCODONE HYDROCHLORIDE AND ACETAMINOPHEN 1 TABLET: 5; 325 TABLET ORAL at 14:46

## 2025-06-08 RX ADMIN — SODIUM CHLORIDE, PRESERVATIVE FREE 10 ML: 5 INJECTION INTRAVENOUS at 20:09

## 2025-06-08 RX ADMIN — TRAZODONE HYDROCHLORIDE 50 MG: 50 TABLET ORAL at 20:08

## 2025-06-08 RX ADMIN — OXYCODONE HYDROCHLORIDE AND ACETAMINOPHEN 1 TABLET: 5; 325 TABLET ORAL at 09:10

## 2025-06-08 RX ADMIN — BUDESONIDE AND FORMOTEROL FUMARATE DIHYDRATE 2 PUFF: 160; 4.5 AEROSOL RESPIRATORY (INHALATION) at 20:26

## 2025-06-08 RX ADMIN — IPRATROPIUM BROMIDE AND ALBUTEROL SULFATE 1 DOSE: .5; 2.5 SOLUTION RESPIRATORY (INHALATION) at 07:20

## 2025-06-08 RX ADMIN — INSULIN LISPRO 1 UNITS: 100 INJECTION, SOLUTION INTRAVENOUS; SUBCUTANEOUS at 16:37

## 2025-06-08 RX ADMIN — INSULIN LISPRO 2 UNITS: 100 INJECTION, SOLUTION INTRAVENOUS; SUBCUTANEOUS at 21:20

## 2025-06-08 RX ADMIN — ALBUTEROL SULFATE 2.5 MG: 2.5 SOLUTION RESPIRATORY (INHALATION) at 00:05

## 2025-06-08 RX ADMIN — OXYCODONE HYDROCHLORIDE AND ACETAMINOPHEN 1 TABLET: 5; 325 TABLET ORAL at 20:09

## 2025-06-08 RX ADMIN — RISPERIDONE 1 MG: 1 TABLET, FILM COATED ORAL at 14:46

## 2025-06-08 RX ADMIN — IPRATROPIUM BROMIDE AND ALBUTEROL SULFATE 1 DOSE: .5; 2.5 SOLUTION RESPIRATORY (INHALATION) at 10:33

## 2025-06-08 RX ADMIN — ANTACID TABLETS 500 MG: 500 TABLET, CHEWABLE ORAL at 09:10

## 2025-06-08 RX ADMIN — RISPERIDONE 1 MG: 1 TABLET, FILM COATED ORAL at 01:43

## 2025-06-08 RX ADMIN — GUAIFENESIN 600 MG: 600 TABLET, EXTENDED RELEASE ORAL at 14:46

## 2025-06-08 RX ADMIN — WATER 40 MG: 1 INJECTION INTRAMUSCULAR; INTRAVENOUS; SUBCUTANEOUS at 01:39

## 2025-06-08 RX ADMIN — SERTRALINE 25 MG: 50 TABLET, FILM COATED ORAL at 09:09

## 2025-06-08 RX ADMIN — IPRATROPIUM BROMIDE AND ALBUTEROL SULFATE 1 DOSE: .5; 2.5 SOLUTION RESPIRATORY (INHALATION) at 14:43

## 2025-06-08 RX ADMIN — APIXABAN 5 MG: 5 TABLET, FILM COATED ORAL at 20:08

## 2025-06-08 RX ADMIN — FERROUS SULFATE TAB 325 MG (65 MG ELEMENTAL FE) 325 MG: 325 (65 FE) TAB at 09:10

## 2025-06-08 RX ADMIN — WATER 40 MG: 1 INJECTION INTRAMUSCULAR; INTRAVENOUS; SUBCUTANEOUS at 20:04

## 2025-06-08 RX ADMIN — AZITHROMYCIN MONOHYDRATE 500 MG: 500 INJECTION, POWDER, LYOPHILIZED, FOR SOLUTION INTRAVENOUS at 06:40

## 2025-06-08 RX ADMIN — BUMETANIDE 2 MG: 1 TABLET ORAL at 09:10

## 2025-06-08 RX ADMIN — INSULIN LISPRO 1 UNITS: 100 INJECTION, SOLUTION INTRAVENOUS; SUBCUTANEOUS at 09:10

## 2025-06-08 RX ADMIN — OXYCODONE HYDROCHLORIDE 5 MG: 5 TABLET ORAL at 22:59

## 2025-06-08 RX ADMIN — IPRATROPIUM BROMIDE AND ALBUTEROL SULFATE 1 DOSE: .5; 2.5 SOLUTION RESPIRATORY (INHALATION) at 20:17

## 2025-06-08 RX ADMIN — OXYCODONE HYDROCHLORIDE 5 MG: 5 TABLET ORAL at 12:12

## 2025-06-08 RX ADMIN — OXYCODONE HYDROCHLORIDE AND ACETAMINOPHEN 1 TABLET: 5; 325 TABLET ORAL at 01:43

## 2025-06-08 ASSESSMENT — PAIN - FUNCTIONAL ASSESSMENT
PAIN_FUNCTIONAL_ASSESSMENT: ACTIVITIES ARE NOT PREVENTED
PAIN_FUNCTIONAL_ASSESSMENT: PREVENTS OR INTERFERES SOME ACTIVE ACTIVITIES AND ADLS

## 2025-06-08 ASSESSMENT — PAIN DESCRIPTION - ONSET
ONSET: ON-GOING

## 2025-06-08 ASSESSMENT — PAIN DESCRIPTION - FREQUENCY
FREQUENCY: INTERMITTENT

## 2025-06-08 ASSESSMENT — PAIN DESCRIPTION - PAIN TYPE
TYPE: CHRONIC PAIN

## 2025-06-08 ASSESSMENT — PAIN DESCRIPTION - ORIENTATION
ORIENTATION: LOWER
ORIENTATION: POSTERIOR
ORIENTATION: LOWER
ORIENTATION: LOWER
ORIENTATION: POSTERIOR

## 2025-06-08 ASSESSMENT — PAIN DESCRIPTION - LOCATION
LOCATION: BACK
LOCATION: HEAD
LOCATION: BACK
LOCATION: BACK

## 2025-06-08 ASSESSMENT — PAIN DESCRIPTION - DESCRIPTORS
DESCRIPTORS: ACHING
DESCRIPTORS: ACHING;CRAMPING;DISCOMFORT
DESCRIPTORS: ACHING
DESCRIPTORS: ACHING
DESCRIPTORS: ACHING;CRAMPING;DISCOMFORT
DESCRIPTORS: ACHING
DESCRIPTORS: ACHING;CRAMPING;DISCOMFORT
DESCRIPTORS: ACHING
DESCRIPTORS: ACHING;CRAMPING;DISCOMFORT

## 2025-06-08 ASSESSMENT — PAIN SCALES - GENERAL
PAINLEVEL_OUTOF10: 0
PAINLEVEL_OUTOF10: 8
PAINLEVEL_OUTOF10: 8
PAINLEVEL_OUTOF10: 0
PAINLEVEL_OUTOF10: 8
PAINLEVEL_OUTOF10: 5
PAINLEVEL_OUTOF10: 0
PAINLEVEL_OUTOF10: 0
PAINLEVEL_OUTOF10: 5
PAINLEVEL_OUTOF10: 4
PAINLEVEL_OUTOF10: 4
PAINLEVEL_OUTOF10: 6
PAINLEVEL_OUTOF10: 7
PAINLEVEL_OUTOF10: 3
PAINLEVEL_OUTOF10: 8

## 2025-06-08 NOTE — PROGRESS NOTES
breath. Negative for apnea, choking, wheezing and stridor.    Cardiovascular:  Negative for chest pain, palpitations and leg swelling.   Gastrointestinal:  Negative for abdominal pain, nausea and vomiting.   Genitourinary: Negative.    Musculoskeletal: Negative.    Skin: Negative.          Medications:     Allergies:    Allergies   Allergen Reactions    Advil [Ibuprofen] Other (See Comments)     vomiting    Aleve [Naproxen] Other (See Comments)     vomiting    Antipyrine Other (See Comments)     unknown    Celecoxib Other (See Comments)    Codeine      headache    Fomepizole     Incruse Ellipta [Umeclidinium Bromide]      confusion    Other/Food      GRASS, TREES, WEEDS    Rofecoxib Other (See Comments)     Unknown reaction    Salicylates      Unknown reaction     Strawberry Extract      HEADACHES    Sulfinpyrazone Other (See Comments)     Unknown reaction    Aspirin Nausea And Vomiting, Other (See Comments) and Nausea Only    Nsaids Nausea Only, Other (See Comments) and Nausea And Vomiting       Current Meds:   Scheduled Meds:    sodium chloride flush  5-40 mL IntraVENous 2 times per day    methylPREDNISolone  40 mg IntraVENous Q6H    azithromycin  500 mg IntraVENous Q24H    cefTRIAXone (ROCEPHIN) IV  2,000 mg IntraVENous Q24H    apixaban  5 mg Oral BID    atorvastatin  20 mg Oral Nightly    bumetanide  2 mg Oral Daily    calcium carbonate  1 tablet Oral Daily    oxyCODONE-acetaminophen  1 tablet Oral Q6H    ferrous sulfate  325 mg Oral Daily with breakfast    risperiDONE  1 mg Oral Q12H    sertraline  25 mg Oral Daily    spironolactone  25 mg Oral Daily    traZODone  50 mg Oral Nightly    ipratropium 0.5 mg-albuterol 2.5 mg  1 Dose Inhalation Q4H WA RT    insulin lispro  0-4 Units SubCUTAneous 4x Daily AC & HS    montelukast  10 mg Oral Nightly    budesonide-formoterol  2 puff Inhalation BID RT     Continuous Infusions:    sodium chloride 25 mL/hr at 06/07/25 0631    dextrose       PRN Meds: albuterol, sodium  grossly unremarkable for patient's age.  No inguinal or pelvic sidewall lymphadenopathy. Peritoneum/Retroperitoneum: Atherosclerotic calcification of the aorta and branch vasculature.  No retroperitoneal lymphadenopathy.  Psoas muscles normal in size and symmetric in appearance. Bones/Soft Tissues: Small midline periumbilical fat containing hernia measuring 2.9 x 1.4 cm image 88, series 11.  Mild degenerative changes within the lumbar spine. Bilateral femoral head AVN.  Mild dextroscoliosis of the lumbar spine.     CHEST: 1. Acute 50% compression deformity of T7. Probable subacute or acute minimal compression deformity of T8. 2. No obvious central pulmonary embolus. Lobar pulmonary arterial vasculature and beyond is somewhat limited due to suboptimal bolus timing, respiratory motion, and beam hardening artifact from the contrast bolus in the SVC. 3. Scattered subpleural opacities in the right lower lobe and right middle lobe. Scattered ground-glass opacities throughout the right lung.  Findings may represent multifocal pneumonia.  Follow-up is recommended to document resolution. 4. Enlarging/developing multiple bilateral pulmonary nodules as detailed above including a cavitary nodule in the left upper lobe. Findings are suspicious for pulmonary metastatic disease. 5. Right hilar mediastinal/pretracheal and right paratracheal lymphadenopathy which could be reactive or metastatic. 6. Moderate emphysema. 7. Probable aspirated or mucoid secretions in the right mainstem bronchus and bronchus intermedius. ABDOMEN AND PELVIS: 1. No obstructing calculus, hydronephrosis or hydroureter. 2. Fatty liver. 3. Gallbladder distension. 4. Moderate stool burden. 5. Small midline periumbilical fat containing hernia. 6. Bilateral femoral head AVN. 7. Mild degenerative changes within the lumbar spine.     CT LUMBAR SPINE WO CONTRAST  Result Date: 6/6/2025  EXAMINATION: CT OF THE THORACIC SPINE WITHOUT CONTRAST; CT OF THE LUMBAR SPINE

## 2025-06-08 NOTE — PROGRESS NOTES
Physical Therapy        Physical Therapy Cancel Note      DATE: 2025    NAME: Elaina Lopez  MRN: [id number]   : 1955      Patient not seen this date for Physical Therapy due to:    Other: Pt complaining of back pain and awaiting an MRI tomorrow for T7 compression fracture. Will check again once that is done.      Electronically signed by Serafin Deal PT on 2025 at 2:05 PM

## 2025-06-08 NOTE — PROGRESS NOTES
[facility] PULMONARY,CRITICAL CARE & SLEEP   Lennyjossie Clemons MD/Vasyl Cortez MD/Jose CORONADO AGAP-BC, NP-C      Rosalind CORONADO NP-C    Donn CORONADO NP-C                                         Pulmonary Progress Note    Patient - Elaina Lopez   Age - 70 y.o.   - 1955  MRN - [id number]  [facility] # - [id number]  Date of Admission - 2025  6:33 AM    Consulting Service/Physician:       Primary Care Physician: Tayla Kim MD    SUBJECTIVE:     Chief Complaint:   Chief Complaint   Patient presents with    Shortness of Breath    Back Pain     Subjective:      VITALS  BP (!) 133/52   Pulse (!) 103   Temp 97.7 °F (36.5 °C) (Axillary)   Resp 18   Ht 1.651 m (5' 5\")   Wt 97.8 kg (215 lb 8 oz)   LMP 2013 (Exact Date)   SpO2 94%   BMI 35.86 kg/m²   Wt Readings from Last 3 Encounters:   25 97.8 kg (215 lb 8 oz)   25 97.7 kg (215 lb 6.4 oz)   10/02/24 95.4 kg (210 lb 6.4 oz)     I/O (24 Hours)    Intake/Output Summary (Last 24 hours) at 2025 1029  Last data filed at 2025 0612  Gross per 24 hour   Intake 600 ml   Output 1075 ml   Net -475 ml     Ventilator:      Exam:   Physical Exam   Constitutional: Obese, 4 L nasal cannula, no acute distress, resting in bed, somewhat sleepy  HENT: Unremarkable  Head: Normocephalic and atraumatic.   Eyes: EOM are normal. Pupils are equal, round, and reactive to light.   Neck: Neck supple.   Cardiovascular:  Regular rate and rhythm.  Normal heart tones.  No JVD.    Pulmonary/Chest: Lung sounds remain rhonchorous bilaterally but wheezes seem improved  Abdominal: Obese, soft, nontender  Musculoskeletal: Bedridden, foot drop bilaterally,   Neurological:patient is alert and oriented to person, place, and time.   Skin: Skin is warm and dry. No rash noted.   Extremities: Bilateral lower extremity edema right worse than left  Infusions:      sodium chloride 25 mL/hr at 06/07/25 0631    dextrose

## 2025-06-08 NOTE — PROGRESS NOTES
Surgical Progress Note    POD:      Patient doing fairly well  Vitals:    25 0909   BP:    Pulse:    Resp: 18   Temp:    SpO2:       Temp (24hrs), Av.1 °F (36.7 °C), Min:97.7 °F (36.5 °C), Max:98.4 °F (36.9 °C)       Pain Control no change  No unusual nausea    Exam: not doing well enough to MRI yesterday        Lungs:  No respiratory distress    Labs reviewed:  Labs:  WBC/Hgb/Hct/Plts:  10.3/10.2/34.1/332 (545)  BUN/Cr/glu/ALT/AST/amyl/lip:  17/0.9/--/--/--/--/-- (545)  PT/INR/PTT:  19.6/1.5/-- (643)  Na/K/Cl/CO2:  139/3.7/98/32 (545)    I/O last 3 completed shifts:  In: 600 [P.O.:300; I.V.:300]  Out: 1075 [Urine:1075]    Assessment:    Patient Active Problem List   Diagnosis    Chronic obstructive pulmonary disease (HCC)    Vitamin D deficiency    Anxiety    Asthma    Bipolar disorder (HCC)    Depression    Hyperlipidemia with target LDL less than 100    Sleep apnea    Vision abnormalities    Status post hysteroscopy    Chronic headaches    IBS (irritable bowel syndrome)    Colon polyp    Collagenous colitis    Lung nodule    Left elbow pain    Dilated bile duct    Acute pain of left shoulder    Adhesive capsulitis of left shoulder    Leucopenia    Compression fracture of body of thoracic vertebra (HCC)    Age-related osteoporosis with current pathological fracture    Pulmonary embolism on right (HCC)    Migraines    Paranoid behavior (HCC)    Acute deep vein thrombosis (DVT) of right lower extremity (HCC)    Delirium    Chronic respiratory failure with hypoxia (HCC)    Major neurocognitive disorder (HCC)    Pneumonia    Chronic respiratory failure with hypoxia, on home O2 therapy (HCC)    COPD (chronic obstructive pulmonary disease) (HCC)    TRAM (acute kidney injury)    History of pulmonary embolus (PE)    Mycoplasma pneumonia    Iron deficiency anemia    Sepsis (HCC)    Acute on chronic respiratory failure (HCC)    Cavitary lesion of lung    History of DVT (deep vein

## 2025-06-08 NOTE — PLAN OF CARE
Problem: Safety - Adult  Goal: Free from fall injury  Outcome: Progressing     Problem: Discharge Planning  Goal: Discharge to home or other facility with appropriate resources  Outcome: Progressing     Problem: Skin/Tissue Integrity  Goal: Skin integrity remains intact  Description: 1.  Monitor for areas of redness and/or skin breakdown2.  Assess vascular access sites hourly3.  Every 4-6 hours minimum:  Change oxygen saturation probe site4.  Every 4-6 hours:  If on nasal continuous positive airway pressure, respiratory therapy assess nares and determine need for appliance change or resting period  Outcome: Progressing     Problem: ABCDS Injury Assessment  Goal: Absence of physical injury  Outcome: Progressing     Problem: Pain  Goal: Verbalizes/displays adequate comfort level or baseline comfort level  Outcome: Progressing     Problem: Nutrition Deficit:  Goal: Optimize nutritional status  Outcome: Progressing

## 2025-06-08 NOTE — CARE COORDINATION
Case Management   Daily Progress Note       Patient Name: Elaina Lopez                   YOB: 1955  Diagnosis: SOB (shortness of breath) [R06.02]  COPD exacerbation (HCC) [J44.1]  Compression fracture of body of thoracic vertebra (HCC) [S22.000A]  Acute left-sided low back pain without sciatica [M54.50]  Pneumonia due to infectious organism, unspecified laterality, unspecified part of lung [J18.9]                         days  Length of Stay: 2  days    Readmission Risk (Low < 19, Mod (19-27), High > 27): Readmission Risk Score: 15.9      Patient is alert and oriented.    Spoke with Patient , and Current Transitional Plan is:    [] Home Independently    [] Home with HC    [x] [facility] Nursing Facility [facility]     [] Acute Rehabilitation    [] Long Term Acute Care (LTAC)    [] Other:     Medical Management: Blood cultures no growth x2, Pulmonary following, MRI, may need kyphoplasty, MRI ordered,   Parainfluenza infection,     Testing Ordered:     Additional Notes:     Electronically signed by Eva Baxter on 6/8/2025 at 1:52 PM

## 2025-06-09 ENCOUNTER — APPOINTMENT (OUTPATIENT)
Dept: MRI IMAGING | Age: 70
DRG: 137 | End: 2025-06-09
Payer: MEDICAID

## 2025-06-09 LAB
ANION GAP SERPL CALCULATED.3IONS-SCNC: 9 MMOL/L (ref 9–16)
ARTERIAL PATENCY WRIST A: ABNORMAL
BASOPHILS # BLD: <0.03 K/UL (ref 0–0.2)
BASOPHILS NFR BLD: 0 % (ref 0–2)
BDY SITE: ABNORMAL
BODY TEMPERATURE: 37
BUN SERPL-MCNC: 22 MG/DL (ref 8–23)
CALCIUM SERPL-MCNC: 9.1 MG/DL (ref 8.6–10.4)
CHLORIDE SERPL-SCNC: 101 MMOL/L (ref 98–107)
CO2 SERPL-SCNC: 32 MMOL/L (ref 20–31)
COHGB MFR BLD: 1 % (ref 0–5)
CREAT SERPL-MCNC: 0.9 MG/DL (ref 0.7–1.2)
EOSINOPHIL # BLD: <0.03 K/UL (ref 0–0.44)
EOSINOPHILS RELATIVE PERCENT: 0 % (ref 0–4)
ERYTHROCYTE [DISTWIDTH] IN BLOOD BY AUTOMATED COUNT: 15.6 % (ref 11.5–14.9)
GAS FLOW.O2 O2 DELIVERY SYS: ABNORMAL L/MIN
GFR, ESTIMATED: 70 ML/MIN/1.73M2
GLUCOSE BLD-MCNC: 156 MG/DL (ref 65–105)
GLUCOSE BLD-MCNC: 198 MG/DL (ref 65–105)
GLUCOSE BLD-MCNC: 282 MG/DL (ref 65–105)
GLUCOSE SERPL-MCNC: 211 MG/DL (ref 74–99)
HCO3 ARTERIAL: 32.2 MMOL/L (ref 22–26)
HCT VFR BLD AUTO: 33.2 % (ref 36–46)
HGB BLD-MCNC: 10 G/DL (ref 12–16)
IMM GRANULOCYTES # BLD AUTO: 0.04 K/UL (ref 0–0.3)
IMM GRANULOCYTES NFR BLD: 0 %
LYMPHOCYTES NFR BLD: 0.74 K/UL (ref 1.1–3.7)
LYMPHOCYTES RELATIVE PERCENT: 8 % (ref 24–44)
M PNEUMO IGM SER QL IA: 1.63
MCH RBC QN AUTO: 29.5 PG (ref 26–34)
MCHC RBC AUTO-ENTMCNC: 30.1 G/DL (ref 31–37)
MCV RBC AUTO: 97.9 FL (ref 80–100)
METHEMOGLOBIN: 0 % (ref 0–1.9)
MONOCYTES NFR BLD: 0.23 K/UL (ref 0.1–1.2)
MONOCYTES NFR BLD: 3 % (ref 3–12)
NEUTROPHILS NFR BLD: 89 % (ref 36–66)
NEUTS SEG NFR BLD: 7.94 K/UL (ref 1.5–8.1)
NRBC BLD-RTO: 0 PER 100 WBC
O2 SAT, ARTERIAL: 97.1 % (ref 95–98)
PCO2 ARTERIAL: 51 MMHG (ref 35–45)
PH ARTERIAL: 7.42 (ref 7.35–7.45)
PLATELET # BLD AUTO: 323 K/UL (ref 150–450)
PMV BLD AUTO: 10 FL (ref 8–13.5)
PO2 ARTERIAL: 86.9 MMHG (ref 80–100)
POSITIVE BASE EXCESS, ART: 6.6 MMOL/L (ref 0–2)
POTASSIUM SERPL-SCNC: 3.8 MMOL/L (ref 3.7–5.3)
PT. POSITION: ABNORMAL
RBC # BLD AUTO: 3.39 M/UL (ref 3.95–5.11)
RESPIRATORY RATE: 22
SODIUM SERPL-SCNC: 142 MMOL/L (ref 136–145)
TEXT FOR RESPIRATORY: ABNORMAL
TOTAL RATE: 22
WBC OTHER # BLD: 9 K/UL (ref 3.5–11)

## 2025-06-09 PROCEDURE — 6370000000 HC RX 637 (ALT 250 FOR IP)

## 2025-06-09 PROCEDURE — 6360000002 HC RX W HCPCS

## 2025-06-09 PROCEDURE — 2580000003 HC RX 258

## 2025-06-09 PROCEDURE — 94669 MECHANICAL CHEST WALL OSCILL: CPT

## 2025-06-09 PROCEDURE — 82947 ASSAY GLUCOSE BLOOD QUANT: CPT

## 2025-06-09 PROCEDURE — 85025 COMPLETE CBC W/AUTO DIFF WBC: CPT

## 2025-06-09 PROCEDURE — 6370000000 HC RX 637 (ALT 250 FOR IP): Performed by: INTERNAL MEDICINE

## 2025-06-09 PROCEDURE — 82805 BLOOD GASES W/O2 SATURATION: CPT

## 2025-06-09 PROCEDURE — 2500000003 HC RX 250 WO HCPCS

## 2025-06-09 PROCEDURE — 6360000002 HC RX W HCPCS: Performed by: INTERNAL MEDICINE

## 2025-06-09 PROCEDURE — 36600 WITHDRAWAL OF ARTERIAL BLOOD: CPT

## 2025-06-09 PROCEDURE — 2000000000 HC ICU R&B

## 2025-06-09 PROCEDURE — 94761 N-INVAS EAR/PLS OXIMETRY MLT: CPT

## 2025-06-09 PROCEDURE — 6370000000 HC RX 637 (ALT 250 FOR IP): Performed by: NURSE PRACTITIONER

## 2025-06-09 PROCEDURE — 36415 COLL VENOUS BLD VENIPUNCTURE: CPT

## 2025-06-09 PROCEDURE — 2700000000 HC OXYGEN THERAPY PER DAY

## 2025-06-09 PROCEDURE — 99233 SBSQ HOSP IP/OBS HIGH 50: CPT | Performed by: INTERNAL MEDICINE

## 2025-06-09 PROCEDURE — 99211 OFF/OP EST MAY X REQ PHY/QHP: CPT

## 2025-06-09 PROCEDURE — 80048 BASIC METABOLIC PNL TOTAL CA: CPT

## 2025-06-09 PROCEDURE — 94640 AIRWAY INHALATION TREATMENT: CPT

## 2025-06-09 RX ORDER — BENZONATATE 200 MG/1
200 CAPSULE ORAL 3 TIMES DAILY
Status: DISCONTINUED | OUTPATIENT
Start: 2025-06-09 | End: 2025-06-11 | Stop reason: HOSPADM

## 2025-06-09 RX ORDER — LORAZEPAM 2 MG/ML
1 INJECTION INTRAMUSCULAR ONCE
Status: DISCONTINUED | OUTPATIENT
Start: 2025-06-09 | End: 2025-06-09

## 2025-06-09 RX ORDER — DEXTROMETHORPHAN POLISTIREX 30 MG/5ML
60 SUSPENSION ORAL EVERY 12 HOURS SCHEDULED
Status: DISCONTINUED | OUTPATIENT
Start: 2025-06-09 | End: 2025-06-11 | Stop reason: HOSPADM

## 2025-06-09 RX ORDER — ALBUTEROL SULFATE 0.83 MG/ML
2.5 SOLUTION RESPIRATORY (INHALATION) EVERY 4 HOURS PRN
Status: DISCONTINUED | OUTPATIENT
Start: 2025-06-09 | End: 2025-06-11 | Stop reason: HOSPADM

## 2025-06-09 RX ORDER — HYDRALAZINE HYDROCHLORIDE 20 MG/ML
10 INJECTION INTRAMUSCULAR; INTRAVENOUS EVERY 6 HOURS PRN
Status: DISCONTINUED | OUTPATIENT
Start: 2025-06-09 | End: 2025-06-11 | Stop reason: HOSPADM

## 2025-06-09 RX ORDER — ALBUTEROL SULFATE 0.83 MG/ML
2.5 SOLUTION RESPIRATORY (INHALATION)
Status: DISCONTINUED | OUTPATIENT
Start: 2025-06-09 | End: 2025-06-11 | Stop reason: HOSPADM

## 2025-06-09 RX ORDER — MORPHINE SULFATE 2 MG/ML
2 INJECTION, SOLUTION INTRAMUSCULAR; INTRAVENOUS
Status: DISCONTINUED | OUTPATIENT
Start: 2025-06-09 | End: 2025-06-10

## 2025-06-09 RX ORDER — LEVOFLOXACIN 5 MG/ML
750 INJECTION, SOLUTION INTRAVENOUS EVERY 24 HOURS
Status: DISCONTINUED | OUTPATIENT
Start: 2025-06-09 | End: 2025-06-11 | Stop reason: HOSPADM

## 2025-06-09 RX ORDER — MORPHINE SULFATE 2 MG/ML
2 INJECTION, SOLUTION INTRAMUSCULAR; INTRAVENOUS EVERY 4 HOURS PRN
Refills: 0 | Status: DISCONTINUED | OUTPATIENT
Start: 2025-06-09 | End: 2025-06-09

## 2025-06-09 RX ADMIN — OXYCODONE HYDROCHLORIDE AND ACETAMINOPHEN 1 TABLET: 5; 325 TABLET ORAL at 02:14

## 2025-06-09 RX ADMIN — MORPHINE SULFATE 2 MG: 2 INJECTION, SOLUTION INTRAMUSCULAR; INTRAVENOUS at 23:14

## 2025-06-09 RX ADMIN — OXYCODONE HYDROCHLORIDE AND ACETAMINOPHEN 1 TABLET: 5; 325 TABLET ORAL at 07:52

## 2025-06-09 RX ADMIN — ATORVASTATIN CALCIUM 20 MG: 20 TABLET, FILM COATED ORAL at 19:34

## 2025-06-09 RX ADMIN — WATER 40 MG: 1 INJECTION INTRAMUSCULAR; INTRAVENOUS; SUBCUTANEOUS at 07:50

## 2025-06-09 RX ADMIN — WATER 40 MG: 1 INJECTION INTRAMUSCULAR; INTRAVENOUS; SUBCUTANEOUS at 02:14

## 2025-06-09 RX ADMIN — ANTACID TABLETS 500 MG: 500 TABLET, CHEWABLE ORAL at 07:51

## 2025-06-09 RX ADMIN — APIXABAN 5 MG: 5 TABLET, FILM COATED ORAL at 07:51

## 2025-06-09 RX ADMIN — SERTRALINE 25 MG: 50 TABLET, FILM COATED ORAL at 07:51

## 2025-06-09 RX ADMIN — APIXABAN 5 MG: 5 TABLET, FILM COATED ORAL at 19:34

## 2025-06-09 RX ADMIN — INSULIN GLARGINE 5 UNITS: 100 INJECTION, SOLUTION SUBCUTANEOUS at 19:53

## 2025-06-09 RX ADMIN — FERROUS SULFATE TAB 325 MG (65 MG ELEMENTAL FE) 325 MG: 325 (65 FE) TAB at 07:51

## 2025-06-09 RX ADMIN — RISPERIDONE 1 MG: 1 TABLET, FILM COATED ORAL at 13:52

## 2025-06-09 RX ADMIN — IPRATROPIUM BROMIDE AND ALBUTEROL SULFATE 1 DOSE: .5; 2.5 SOLUTION RESPIRATORY (INHALATION) at 11:20

## 2025-06-09 RX ADMIN — GUAIFENESIN 600 MG: 600 TABLET, EXTENDED RELEASE ORAL at 07:51

## 2025-06-09 RX ADMIN — AZITHROMYCIN MONOHYDRATE 500 MG: 500 INJECTION, POWDER, LYOPHILIZED, FOR SOLUTION INTRAVENOUS at 06:31

## 2025-06-09 RX ADMIN — GUAIFENESIN 600 MG: 600 TABLET, EXTENDED RELEASE ORAL at 19:32

## 2025-06-09 RX ADMIN — INSULIN LISPRO 1 UNITS: 100 INJECTION, SOLUTION INTRAVENOUS; SUBCUTANEOUS at 12:56

## 2025-06-09 RX ADMIN — RISPERIDONE 1 MG: 1 TABLET, FILM COATED ORAL at 02:14

## 2025-06-09 RX ADMIN — BUMETANIDE 2 MG: 1 TABLET ORAL at 07:51

## 2025-06-09 RX ADMIN — WATER 40 MG: 1 INJECTION INTRAMUSCULAR; INTRAVENOUS; SUBCUTANEOUS at 19:35

## 2025-06-09 RX ADMIN — INSULIN LISPRO 1 UNITS: 100 INJECTION, SOLUTION INTRAVENOUS; SUBCUTANEOUS at 07:51

## 2025-06-09 RX ADMIN — BUDESONIDE AND FORMOTEROL FUMARATE DIHYDRATE 2 PUFF: 160; 4.5 AEROSOL RESPIRATORY (INHALATION) at 07:41

## 2025-06-09 RX ADMIN — MORPHINE SULFATE 2 MG: 2 INJECTION, SOLUTION INTRAMUSCULAR; INTRAVENOUS at 13:51

## 2025-06-09 RX ADMIN — MORPHINE SULFATE 2 MG: 2 INJECTION, SOLUTION INTRAMUSCULAR; INTRAVENOUS at 17:49

## 2025-06-09 RX ADMIN — Medication 60 MG: at 19:34

## 2025-06-09 RX ADMIN — MORPHINE SULFATE 2 MG: 2 INJECTION, SOLUTION INTRAMUSCULAR; INTRAVENOUS at 21:40

## 2025-06-09 RX ADMIN — BUDESONIDE AND FORMOTEROL FUMARATE DIHYDRATE 2 PUFF: 160; 4.5 AEROSOL RESPIRATORY (INHALATION) at 19:04

## 2025-06-09 RX ADMIN — BENZONATATE 200 MG: 200 CAPSULE ORAL at 13:51

## 2025-06-09 RX ADMIN — OXYCODONE HYDROCHLORIDE AND ACETAMINOPHEN 1 TABLET: 5; 325 TABLET ORAL at 19:33

## 2025-06-09 RX ADMIN — INSULIN LISPRO 2 UNITS: 100 INJECTION, SOLUTION INTRAVENOUS; SUBCUTANEOUS at 19:52

## 2025-06-09 RX ADMIN — LEVOFLOXACIN 750 MG: 5 INJECTION, SOLUTION INTRAVENOUS at 07:56

## 2025-06-09 RX ADMIN — IPRATROPIUM BROMIDE AND ALBUTEROL SULFATE 1 DOSE: .5; 2.5 SOLUTION RESPIRATORY (INHALATION) at 07:41

## 2025-06-09 RX ADMIN — ALBUTEROL SULFATE 2.5 MG: 2.5 SOLUTION RESPIRATORY (INHALATION) at 22:43

## 2025-06-09 RX ADMIN — WATER 40 MG: 1 INJECTION INTRAMUSCULAR; INTRAVENOUS; SUBCUTANEOUS at 12:56

## 2025-06-09 RX ADMIN — MONTELUKAST 10 MG: 10 TABLET, FILM COATED ORAL at 19:34

## 2025-06-09 RX ADMIN — ALBUTEROL SULFATE 2.5 MG: 2.5 SOLUTION RESPIRATORY (INHALATION) at 19:04

## 2025-06-09 RX ADMIN — OXYCODONE HYDROCHLORIDE 5 MG: 5 TABLET ORAL at 04:23

## 2025-06-09 RX ADMIN — SODIUM CHLORIDE, PRESERVATIVE FREE 10 ML: 5 INJECTION INTRAVENOUS at 19:46

## 2025-06-09 RX ADMIN — ALBUTEROL SULFATE 2.5 MG: 2.5 SOLUTION RESPIRATORY (INHALATION) at 15:56

## 2025-06-09 RX ADMIN — OXYCODONE HYDROCHLORIDE 5 MG: 5 TABLET ORAL at 11:52

## 2025-06-09 RX ADMIN — SPIRONOLACTONE 25 MG: 25 TABLET ORAL at 07:51

## 2025-06-09 RX ADMIN — BENZONATATE 200 MG: 200 CAPSULE ORAL at 19:34

## 2025-06-09 RX ADMIN — OXYCODONE HYDROCHLORIDE AND ACETAMINOPHEN 1 TABLET: 5; 325 TABLET ORAL at 16:11

## 2025-06-09 RX ADMIN — CEFTRIAXONE SODIUM 2000 MG: 2 INJECTION, POWDER, FOR SOLUTION INTRAMUSCULAR; INTRAVENOUS at 06:06

## 2025-06-09 ASSESSMENT — PAIN SCALES - GENERAL
PAINLEVEL_OUTOF10: 6
PAINLEVEL_OUTOF10: 3
PAINLEVEL_OUTOF10: 3
PAINLEVEL_OUTOF10: 6
PAINLEVEL_OUTOF10: 6
PAINLEVEL_OUTOF10: 8
PAINLEVEL_OUTOF10: 10
PAINLEVEL_OUTOF10: 10
PAINLEVEL_OUTOF10: 8

## 2025-06-09 ASSESSMENT — PAIN DESCRIPTION - LOCATION
LOCATION: BACK
LOCATION: GENERALIZED
LOCATION: BACK

## 2025-06-09 ASSESSMENT — PAIN DESCRIPTION - ONSET
ONSET: ON-GOING
ONSET: ON-GOING

## 2025-06-09 ASSESSMENT — ENCOUNTER SYMPTOMS
ABDOMINAL PAIN: 0
SHORTNESS OF BREATH: 1
VOMITING: 0
STRIDOR: 0
NAUSEA: 0
APNEA: 0
COUGH: 1
CHOKING: 0
WHEEZING: 0

## 2025-06-09 ASSESSMENT — PAIN DESCRIPTION - ORIENTATION
ORIENTATION: LOWER

## 2025-06-09 ASSESSMENT — PAIN DESCRIPTION - DESCRIPTORS
DESCRIPTORS: ACHING

## 2025-06-09 ASSESSMENT — PAIN - FUNCTIONAL ASSESSMENT
PAIN_FUNCTIONAL_ASSESSMENT: PREVENTS OR INTERFERES SOME ACTIVE ACTIVITIES AND ADLS
PAIN_FUNCTIONAL_ASSESSMENT: PREVENTS OR INTERFERES WITH MANY ACTIVE NOT PASSIVE ACTIVITIES

## 2025-06-09 ASSESSMENT — PAIN DESCRIPTION - PAIN TYPE
TYPE: CHRONIC PAIN
TYPE: CHRONIC PAIN

## 2025-06-09 ASSESSMENT — PAIN DESCRIPTION - FREQUENCY
FREQUENCY: INTERMITTENT
FREQUENCY: INTERMITTENT

## 2025-06-09 NOTE — PROGRESS NOTES
Notified residents via perfect serve that patient continues to refuse MRI due to being unable to tolerate laying flat. She stated she will not be able to do it unless we can \"knock her out\".

## 2025-06-09 NOTE — PROGRESS NOTES
Mri canceled that was scheduled for 3p. SUE Mahoney states pt unable to lay flat and respiratory advised pt wait for MRI due to medicated they're on. MRI dept will see if pt able to attempt exams tomorrow.

## 2025-06-09 NOTE — PROGRESS NOTES
Pt unable to do MRI on heliox per RT. Writer notified medicine residents, asked if they would like writer to notify Dr. Jacobson and ask if pt was okay to go down without, medicine residents stated MRI can be done tomorrow instead.

## 2025-06-09 NOTE — PROGRESS NOTES
Physical Therapy          Physical Therapy Cancel Note      DATE: 2025    NAME: Elaina Lopez  MRN: [id number]   : 1955      Patient not seen this date for Physical Therapy due to:    Pt being moved to step down ICU due to dyspnea/air hunger and tachypnea.. Will cont to follow as medical condition improves.      Electronically signed by Audrey Reece, PT on 2025 at 1:09 PM

## 2025-06-09 NOTE — CARE COORDINATION
DISCHARGE PLANNING NOTE:    LSW following for discharge back to facility. Patient is from [facility]. No insurance authorization needed to return once medically ready for discharge per Zita in admissions.

## 2025-06-09 NOTE — PROGRESS NOTES
Pulmonary Progress Note  [facility]      Patient - Elaina Lopez,  Age - 70 y.o.    - 1955      Room Number - 2117/2117-01   N -  [id number]   [facility] # - [id number]  Date of Admission -  2025  6:33 AM        Consulting Service/Physician   Consulting - Ethan Fernandez MD  Primary Care Physician - Tayla Kim MD     SUBJECTIVE   Asked to see patient emergently due to severe episode of dyspnea and air hunger.  She is tachypneic and she is not moving much air this morning.  Apparently just had a coughing spell    OBJECTIVE   VITALS    height is 1.651 m (5' 5\") and weight is 97.8 kg (215 lb 8 oz). Her oral temperature is 97.7 °F (36.5 °C). Her blood pressure is 147/84 (abnormal) and her pulse is 88. Her respiration is 18 and oxygen saturation is 97%.     Body mass index is 35.86 kg/m².  Temperature Range: Temp: 97.7 °F (36.5 °C) Temp  Av.2 °F (36.8 °C)  Min: 97.7 °F (36.5 °C)  Max: 98.4 °F (36.9 °C)  BP Range:  Systolic (24hrs), Av , Min:124 , Max:147     Diastolic (24hrs), Av, Min:48, Max:84    Pulse Range: Pulse  Av.4  Min: 78  Max: 102  Respiration Range: Resp  Av.1  Min: 16  Max: 22  Current Pulse Ox::  SpO2: 97 %  24HR Pulse Ox Range:  SpO2  Av.8 %  Min: 90 %  Max: 100 %  Oxygen Amount and Delivery: O2 Flow Rate (L/min): 4 L/min    Wt Readings from Last 3 Encounters:   25 97.8 kg (215 lb 8 oz)   25 97.7 kg (215 lb 6.4 oz)   10/02/24 95.4 kg (210 lb 6.4 oz)       I/O (24 Hours)    Intake/Output Summary (Last 24 hours) at 2025 1136  Last data filed at 2025 0606  Gross per 24 hour   Intake 500 ml   Output 500 ml   Net 0 ml       EXAM     General Appearance  Awake, alert, oriented, in moderate respiratory distress  HEENT - normocephalic, atraumatic. []  Mallampati  [] Crowded airway   [] Macroglossia  []  Retrognathia  [] Micrognathia  []  Normal tongue size []  Normal Bite  [] Tru  sign positive    Neck - Supple,  trachea midline   Lungs -diffuse bilateral wheezes  Cardiovascular - Heart sounds are normal.  Regular rate and rhythm   Abdomen - Soft, nontender, nondistended, no masses or organomegaly  Neurologic - There are no focal motor or sensory deficits  Skin - No bruising or bleeding  Extremities - No clubbing, cyanosis, edema    MEDS      levofloxacin  750 mg IntraVENous Q24H    LORazepam  1 mg IntraVENous Once    insulin glargine  5 Units SubCUTAneous Nightly    guaiFENesin  600 mg Oral BID    sodium chloride flush  5-40 mL IntraVENous 2 times per day    methylPREDNISolone  40 mg IntraVENous Q6H    apixaban  5 mg Oral BID    atorvastatin  20 mg Oral Nightly    bumetanide  2 mg Oral Daily    calcium carbonate  1 tablet Oral Daily    oxyCODONE-acetaminophen  1 tablet Oral Q6H    ferrous sulfate  325 mg Oral Daily with breakfast    risperiDONE  1 mg Oral Q12H    sertraline  25 mg Oral Daily    spironolactone  25 mg Oral Daily    traZODone  50 mg Oral Nightly    ipratropium 0.5 mg-albuterol 2.5 mg  1 Dose Inhalation Q4H WA RT    insulin lispro  0-4 Units SubCUTAneous 4x Daily AC & HS    montelukast  10 mg Oral Nightly    budesonide-formoterol  2 puff Inhalation BID RT      sodium chloride 25 mL/hr at 06/07/25 0631    dextrose       albuterol, sodium chloride flush, sodium chloride flush, sodium chloride, potassium chloride **OR** potassium alternative oral replacement **OR** potassium chloride, magnesium sulfate, ondansetron **OR** ondansetron, polyethylene glycol, acetaminophen **OR** acetaminophen, benzonatate, oxyCODONE, glucose, dextrose bolus **OR** dextrose bolus, glucagon (rDNA), dextrose    LABS   CBC   Recent Labs     06/09/25  0529   WBC 9.0   HGB 10.0*   HCT 33.2*   MCV 97.9        BMP:   Lab Results   Component Value Date/Time     06/09/2025 05:29 AM    K 3.8 06/09/2025 05:29 AM     06/09/2025 05:29 AM    CO2 32 06/09/2025 05:29 AM    BUN 22 06/09/2025 05:29 AM

## 2025-06-09 NOTE — DISCHARGE INSTR - COC
Continuity of Care Form    Patient Name: Elaina Lopez   :  1955  MRN:  [id number]    Admit date:  2025  Discharge date:      Code Status Order: DNR-CCA   Advance Directives:     Admitting Physician:  Ethan Fernandez MD  PCP: Tayla Kim MD    Discharging Nurse: Debby ROGERS  Discharging Hospital Unit/Room#: 2117/2117-01  Discharging Unit Phone Number: [phone]    Emergency Contact:   Extended Emergency Contact Information  Primary Emergency Contact: Calli Lopez  Home Phone: [phone]  Mobile Phone: [phone]  Relation: Child  Secondary Emergency Contact: Roseanne Siegel  Home Phone: [phone]  Relation: Brother/Sister    Past Surgical History:  Past Surgical History:   Procedure Laterality Date    ANKLE SURGERY      HAD SCREWS AND HARDWARE, THEN REMOVED    BRONCHOSCOPY  2022         COLONOSCOPY  02/15/2018    tubular adenoma    EYE SURGERY Bilateral     CATARACTS    HYSTEROSCOPY  10/19/2016    D & C    INDUCED       LASIK      bilateral    TONSILLECTOMY AND ADENOIDECTOMY Bilateral     VULVA SURGERY      HAD A BIOPSY AND REMOVAL OF       Immunization History:   Immunization History   Administered Date(s) Administered    COVID-19, MODERNA BLUE border, Primary or Immunocompromised, (age 12y+), IM, 100 mcg/0.5mL 2021, 2021, 2022    Influenza Virus Vaccine 10/15/2018    Influenza, FLUARIX, FLULAVAL, FLUZONE (age 6 mo+) and AFLURIA, (age 3 y+), Quadv PF, 0.5mL 10/03/2016, 2017, 2018, 10/30/2019    Influenza, FLUCELVAX, (age 6 mo+), MDCK, Quadv PF, 0.5mL 2020, 2021    Pneumococcal, PCV-13, PREVNAR 13, (age 6w+), IM, 0.5mL 2021    Pneumococcal, PPSV23, PNEUMOVAX 23, (age 2y+), SC/IM, 0.5mL 2016, 2021    TDaP, ADACEL (age 10y-64y), BOOSTRIX (age 10y+), IM, 0.5mL 2016    Zoster Recombinant (Shingrix) 2018, 2018, 2018       Active Problems:  Patient Active Problem List   Diagnosis Code

## 2025-06-09 NOTE — PROGRESS NOTES
[facility]   OCCUPATIONAL THERAPY MISSED TREATMENT NOTE   INPATIENT   Date: 25  Patient Name: Elaina Lopez       Room:   MRN: [id number]   Account #: [id number]    : 1955  (70 y.o.)  Gender: female                 REASON FOR MISSED TREATMENT:  Patient unable to participate    -   Pt being moved to step down ICU due to dyspnea/air hunger and tachypnea.. OT will continue to follow as medical condition improves.     Electronically signed by DONALDO Stockton on 25 at 1:19 PM EDT

## 2025-06-09 NOTE — PROGRESS NOTES
[facility]  IN-PATIENT SERVICE  [facility]    PROGRESS NOTE             6/9/2025    7:18 AM    Name:   Elaina Lopez  MRN:     [id number]     Acct:      [id number]   Room:   2117/2117-01   Day:  3  Admit Date:  6/6/2025  6:33 AM    PCP:  Tayla Kim MD  Code Status:  DNR-CCA    Subjective:     C/C:   Chief Complaint   Patient presents with    Shortness of Breath    Back Pain   The patient was seen and examined at the bedside.  Patient is awake, alert, oriented to time, place, and person.  Patient still endorses cough, which exacerbated her back pain.  States that his still with sputum production.  Denies nausea, vomiting, abdominal pain.  Had 1 bowel movement yesterday soft, no blood noted.  Vital patient stable with /63.  Currently on 4 L of nasal cannula oxygen with saturation 100%.  Blood work reviewed, no significant changes.  Stable.  Sputum culture came back positive for Pseudomonas aeruginosa  Pulmonology notes reviewed.  Orthopedic surgeon notes reviewed, recommended MRI, if not expectant observation.    Brief History:     The patient is a 70 y.o.  Non- / non  female who presents withShortness of Breath and Back Pain   and she is admitted to the hospital for the management of acute on chronic respiratory failure likely secondary to COPD exacerbation vs CAP.     Patient is a 70-year-old female with a history of COPD, hyperlipidemia, sleep apnea, bipolar disorder, anxiety, right upper lobe pleural nodules, DM who presented with shortness of breath and back pain.  For the last 3 days patient complains of increasing shortness of breath that is associated with a productive cough.  Sputum is yellow-green in nature.  Shortness of breath is associated with chills and nausea.  Patient also associates this shortness of breath and cough with back pain.  Back pain is located in the low back and radiates downward.  She describes it as a shooting  **OR** potassium alternative oral replacement **OR** potassium chloride, magnesium sulfate, ondansetron **OR** ondansetron, polyethylene glycol, acetaminophen **OR** acetaminophen, benzonatate, oxyCODONE, glucose, dextrose bolus **OR** dextrose bolus, glucagon (rDNA), dextrose    Data:     Past Medical History:   has a past medical history of Abnormal EKG, TRAM (acute kidney injury), Anxiety, Asthma, Bipolar disorder ([facility]), COPD (chronic obstructive pulmonary disease) ([facility]), Cramps, extremity, Depression, Dilated bile duct, Headache, History of elective , Hyperlipidemia, Irritable bowel syndrome, Prolonged emergence from general anesthesia, Substance abuse ([facility]), Unspecified sleep apnea, and Vision abnormalities.    Social History:   reports that she quit smoking about 6 years ago. Her smoking use included cigarettes. She started smoking about 48 years ago. She has a 84 pack-year smoking history. She has never used smokeless tobacco. She reports current alcohol use. She reports that she does not use drugs.     Family History:   Family History   Problem Relation Age of Onset    Dementia Maternal Aunt     Kidney Disease Mother     Heart Attack Sister     Prostate Cancer Father     High Cholesterol Brother     Heart Attack Paternal Grandmother        Vitals:  /63   Pulse 78   Temp 98.2 °F (36.8 °C) (Oral)   Resp 16   Ht 1.651 m (5' 5\")   Wt 97.8 kg (215 lb 8 oz)   LMP 2013 (Exact Date)   SpO2 100%   BMI 35.86 kg/m²   Temp (24hrs), Av.2 °F (36.8 °C), Min:97.7 °F (36.5 °C), Max:98.4 °F (36.9 °C)    Recent Labs     25  1157 25  1618 25  1942 25  0606   POCGLU 180* 210* 267* 198*       I/O(24Hr):    Intake/Output Summary (Last 24 hours) at 2025 0718  Last data filed at 2025 0606  Gross per 24 hour   Intake 500 ml   Output 500 ml   Net 0 ml       Labs:    [unfilled]    Lab Results   Component Value Date/Time    SPECIAL Site: [facility] 2025 07:20 PM     Lab

## 2025-06-09 NOTE — PROGRESS NOTES
Patient continues to refuse all turns and repositioning. Educated multiple times on importance of turning to prevent pressure injuries. Patient stated it is too hard for her to breathe and too painful for her to lay flat.

## 2025-06-09 NOTE — PLAN OF CARE
Pulmonary/CCM      ABG does not show acute on chronic hypercapnic respiratory failure just chronic since the pH is fairly normal.  However, continues to be tachypneic and wheezing so we will move her to the intermediate ICU for a trial of heliox.  We will also order scheduled antitussives and morphine to help with air hunger.  Noninvasive ventilation may be in order down the road.

## 2025-06-09 NOTE — PROGRESS NOTES
Patient arrives to ICU 2007. Patient hooked up to cardiac monitor and vital signs taken. Assessment by primary RN to follow.

## 2025-06-09 NOTE — PROGRESS NOTES
[facility]  Progress Note      NAME:  Elaina Lopez  MEDICAL RECORD NUMBER:  [id number]  AGE: 70 y.o.   GENDER: female  : 1955  TODAY'S DATE:  2025    [facility] nurse consult noted for wounds to right elbow and coccyx.  Patient refuses to turn for assessment or be repositioned in bed.  Primary RN aware.  Will try to see patient again at another time.     ROBERTA BellamyN, RN, CWOCN, [facility]  Wound, Ostomy, and Continence Nursing  [phone]

## 2025-06-10 LAB
ANION GAP SERPL CALCULATED.3IONS-SCNC: 11 MMOL/L (ref 9–16)
BASOPHILS # BLD: <0.03 K/UL (ref 0–0.2)
BASOPHILS NFR BLD: 0 % (ref 0–2)
BUN SERPL-MCNC: 25 MG/DL (ref 8–23)
CALCIUM SERPL-MCNC: 9 MG/DL (ref 8.6–10.4)
CHLORIDE SERPL-SCNC: 97 MMOL/L (ref 98–107)
CO2 SERPL-SCNC: 33 MMOL/L (ref 20–31)
CREAT SERPL-MCNC: 0.9 MG/DL (ref 0.7–1.2)
EOSINOPHIL # BLD: <0.03 K/UL (ref 0–0.44)
EOSINOPHILS RELATIVE PERCENT: 0 % (ref 0–4)
ERYTHROCYTE [DISTWIDTH] IN BLOOD BY AUTOMATED COUNT: 15.7 % (ref 11.5–14.9)
GFR, ESTIMATED: 70 ML/MIN/1.73M2
GLUCOSE BLD-MCNC: 185 MG/DL (ref 65–105)
GLUCOSE BLD-MCNC: 208 MG/DL (ref 65–105)
GLUCOSE BLD-MCNC: 242 MG/DL (ref 65–105)
GLUCOSE BLD-MCNC: 297 MG/DL (ref 65–105)
GLUCOSE SERPL-MCNC: 217 MG/DL (ref 74–99)
HCT VFR BLD AUTO: 36 % (ref 36–46)
HGB BLD-MCNC: 10.7 G/DL (ref 12–16)
IMM GRANULOCYTES # BLD AUTO: 0.08 K/UL (ref 0–0.3)
IMM GRANULOCYTES NFR BLD: 1 %
LYMPHOCYTES NFR BLD: 1.05 K/UL (ref 1.1–3.7)
LYMPHOCYTES RELATIVE PERCENT: 11 % (ref 24–44)
MCH RBC QN AUTO: 29.3 PG (ref 26–34)
MCHC RBC AUTO-ENTMCNC: 29.7 G/DL (ref 31–37)
MCV RBC AUTO: 98.6 FL (ref 80–100)
MICROORGANISM SPEC CULT: ABNORMAL
MICROORGANISM SPEC CULT: ABNORMAL
MICROORGANISM SPEC CULT: NORMAL
MICROORGANISM/AGENT SPEC: ABNORMAL
MONOCYTES NFR BLD: 0.57 K/UL (ref 0.1–1.2)
MONOCYTES NFR BLD: 6 % (ref 3–12)
NEUTROPHILS NFR BLD: 82 % (ref 36–66)
NEUTS SEG NFR BLD: 8 K/UL (ref 1.5–8.1)
NRBC BLD-RTO: 0.2 PER 100 WBC
PLATELET # BLD AUTO: 334 K/UL (ref 150–450)
PMV BLD AUTO: 9.5 FL (ref 8–13.5)
POTASSIUM SERPL-SCNC: 3.7 MMOL/L (ref 3.7–5.3)
RBC # BLD AUTO: 3.65 M/UL (ref 3.95–5.11)
SERVICE CMNT-IMP: ABNORMAL
SODIUM SERPL-SCNC: 141 MMOL/L (ref 136–145)
SPECIMEN DESCRIPTION: ABNORMAL
SPECIMEN DESCRIPTION: NORMAL
WBC OTHER # BLD: 9.7 K/UL (ref 3.5–11)

## 2025-06-10 PROCEDURE — 94669 MECHANICAL CHEST WALL OSCILL: CPT

## 2025-06-10 PROCEDURE — 82947 ASSAY GLUCOSE BLOOD QUANT: CPT

## 2025-06-10 PROCEDURE — 99291 CRITICAL CARE FIRST HOUR: CPT | Performed by: INTERNAL MEDICINE

## 2025-06-10 PROCEDURE — 2700000000 HC OXYGEN THERAPY PER DAY

## 2025-06-10 PROCEDURE — 94761 N-INVAS EAR/PLS OXIMETRY MLT: CPT

## 2025-06-10 PROCEDURE — 6360000002 HC RX W HCPCS: Performed by: INTERNAL MEDICINE

## 2025-06-10 PROCEDURE — 99231 SBSQ HOSP IP/OBS SF/LOW 25: CPT | Performed by: ORTHOPAEDIC SURGERY

## 2025-06-10 PROCEDURE — 80048 BASIC METABOLIC PNL TOTAL CA: CPT

## 2025-06-10 PROCEDURE — 6370000000 HC RX 637 (ALT 250 FOR IP): Performed by: INTERNAL MEDICINE

## 2025-06-10 PROCEDURE — 2500000003 HC RX 250 WO HCPCS

## 2025-06-10 PROCEDURE — 94640 AIRWAY INHALATION TREATMENT: CPT

## 2025-06-10 PROCEDURE — 6370000000 HC RX 637 (ALT 250 FOR IP): Performed by: NURSE PRACTITIONER

## 2025-06-10 PROCEDURE — 2000000000 HC ICU R&B

## 2025-06-10 PROCEDURE — 6360000002 HC RX W HCPCS

## 2025-06-10 PROCEDURE — 6370000000 HC RX 637 (ALT 250 FOR IP)

## 2025-06-10 PROCEDURE — 36415 COLL VENOUS BLD VENIPUNCTURE: CPT

## 2025-06-10 PROCEDURE — 85025 COMPLETE CBC W/AUTO DIFF WBC: CPT

## 2025-06-10 RX ORDER — SENNA AND DOCUSATE SODIUM 50; 8.6 MG/1; MG/1
2 TABLET, FILM COATED ORAL 2 TIMES DAILY
Status: DISCONTINUED | OUTPATIENT
Start: 2025-06-10 | End: 2025-06-11 | Stop reason: HOSPADM

## 2025-06-10 RX ORDER — MORPHINE SULFATE 2 MG/ML
2 INJECTION, SOLUTION INTRAMUSCULAR; INTRAVENOUS
Status: DISCONTINUED | OUTPATIENT
Start: 2025-06-10 | End: 2025-06-11 | Stop reason: HOSPADM

## 2025-06-10 RX ADMIN — MONTELUKAST 10 MG: 10 TABLET, FILM COATED ORAL at 19:41

## 2025-06-10 RX ADMIN — INSULIN LISPRO 1 UNITS: 100 INJECTION, SOLUTION INTRAVENOUS; SUBCUTANEOUS at 16:24

## 2025-06-10 RX ADMIN — ANTACID TABLETS 500 MG: 500 TABLET, CHEWABLE ORAL at 08:44

## 2025-06-10 RX ADMIN — INSULIN LISPRO 1 UNITS: 100 INJECTION, SOLUTION INTRAVENOUS; SUBCUTANEOUS at 11:27

## 2025-06-10 RX ADMIN — OXYCODONE HYDROCHLORIDE AND ACETAMINOPHEN 1 TABLET: 5; 325 TABLET ORAL at 13:30

## 2025-06-10 RX ADMIN — INSULIN LISPRO 2 UNITS: 100 INJECTION, SOLUTION INTRAVENOUS; SUBCUTANEOUS at 20:07

## 2025-06-10 RX ADMIN — BENZONATATE 200 MG: 200 CAPSULE ORAL at 13:31

## 2025-06-10 RX ADMIN — LEVOFLOXACIN 750 MG: 5 INJECTION, SOLUTION INTRAVENOUS at 07:43

## 2025-06-10 RX ADMIN — ALBUTEROL SULFATE 2.5 MG: 2.5 SOLUTION RESPIRATORY (INHALATION) at 15:42

## 2025-06-10 RX ADMIN — ALBUTEROL SULFATE 2.5 MG: 2.5 SOLUTION RESPIRATORY (INHALATION) at 07:29

## 2025-06-10 RX ADMIN — MORPHINE SULFATE 2 MG: 2 INJECTION, SOLUTION INTRAMUSCULAR; INTRAVENOUS at 13:30

## 2025-06-10 RX ADMIN — FERROUS SULFATE TAB 325 MG (65 MG ELEMENTAL FE) 325 MG: 325 (65 FE) TAB at 08:43

## 2025-06-10 RX ADMIN — Medication 60 MG: at 08:42

## 2025-06-10 RX ADMIN — RISPERIDONE 1 MG: 1 TABLET, FILM COATED ORAL at 03:13

## 2025-06-10 RX ADMIN — ALBUTEROL SULFATE 2.5 MG: 2.5 SOLUTION RESPIRATORY (INHALATION) at 19:00

## 2025-06-10 RX ADMIN — MORPHINE SULFATE 2 MG: 2 INJECTION, SOLUTION INTRAMUSCULAR; INTRAVENOUS at 23:17

## 2025-06-10 RX ADMIN — SENNOSIDES AND DOCUSATE SODIUM 2 TABLET: 50; 8.6 TABLET ORAL at 10:04

## 2025-06-10 RX ADMIN — BENZONATATE 200 MG: 200 CAPSULE ORAL at 19:41

## 2025-06-10 RX ADMIN — OXYCODONE HYDROCHLORIDE AND ACETAMINOPHEN 1 TABLET: 5; 325 TABLET ORAL at 19:41

## 2025-06-10 RX ADMIN — ALBUTEROL SULFATE 2.5 MG: 2.5 SOLUTION RESPIRATORY (INHALATION) at 03:14

## 2025-06-10 RX ADMIN — RISPERIDONE 1 MG: 1 TABLET, FILM COATED ORAL at 13:31

## 2025-06-10 RX ADMIN — BENZONATATE 200 MG: 200 CAPSULE ORAL at 08:43

## 2025-06-10 RX ADMIN — ALBUTEROL SULFATE 2.5 MG: 2.5 SOLUTION RESPIRATORY (INHALATION) at 23:02

## 2025-06-10 RX ADMIN — SENNOSIDES AND DOCUSATE SODIUM 2 TABLET: 50; 8.6 TABLET ORAL at 20:06

## 2025-06-10 RX ADMIN — MORPHINE SULFATE 2 MG: 2 INJECTION, SOLUTION INTRAMUSCULAR; INTRAVENOUS at 20:33

## 2025-06-10 RX ADMIN — GUAIFENESIN 600 MG: 600 TABLET, EXTENDED RELEASE ORAL at 08:42

## 2025-06-10 RX ADMIN — WATER 40 MG: 1 INJECTION INTRAMUSCULAR; INTRAVENOUS; SUBCUTANEOUS at 13:31

## 2025-06-10 RX ADMIN — Medication 60 MG: at 19:45

## 2025-06-10 RX ADMIN — WATER 40 MG: 1 INJECTION INTRAMUSCULAR; INTRAVENOUS; SUBCUTANEOUS at 03:00

## 2025-06-10 RX ADMIN — INSULIN GLARGINE 5 UNITS: 100 INJECTION, SOLUTION SUBCUTANEOUS at 20:07

## 2025-06-10 RX ADMIN — OXYCODONE HYDROCHLORIDE AND ACETAMINOPHEN 1 TABLET: 5; 325 TABLET ORAL at 08:43

## 2025-06-10 RX ADMIN — INSULIN LISPRO 1 UNITS: 100 INJECTION, SOLUTION INTRAVENOUS; SUBCUTANEOUS at 08:43

## 2025-06-10 RX ADMIN — OXYCODONE HYDROCHLORIDE AND ACETAMINOPHEN 1 TABLET: 5; 325 TABLET ORAL at 03:13

## 2025-06-10 RX ADMIN — TIOTROPIUM BROMIDE INHALATION SPRAY 2 PUFF: 3.12 SPRAY, METERED RESPIRATORY (INHALATION) at 07:29

## 2025-06-10 RX ADMIN — BUDESONIDE AND FORMOTEROL FUMARATE DIHYDRATE 2 PUFF: 160; 4.5 AEROSOL RESPIRATORY (INHALATION) at 07:29

## 2025-06-10 RX ADMIN — APIXABAN 5 MG: 5 TABLET, FILM COATED ORAL at 19:41

## 2025-06-10 RX ADMIN — BUDESONIDE AND FORMOTEROL FUMARATE DIHYDRATE 2 PUFF: 160; 4.5 AEROSOL RESPIRATORY (INHALATION) at 19:01

## 2025-06-10 RX ADMIN — ALBUTEROL SULFATE 2.5 MG: 2.5 SOLUTION RESPIRATORY (INHALATION) at 11:29

## 2025-06-10 RX ADMIN — SERTRALINE 25 MG: 50 TABLET, FILM COATED ORAL at 08:44

## 2025-06-10 RX ADMIN — WATER 40 MG: 1 INJECTION INTRAMUSCULAR; INTRAVENOUS; SUBCUTANEOUS at 19:41

## 2025-06-10 RX ADMIN — WATER 40 MG: 1 INJECTION INTRAMUSCULAR; INTRAVENOUS; SUBCUTANEOUS at 07:46

## 2025-06-10 RX ADMIN — SPIRONOLACTONE 25 MG: 25 TABLET ORAL at 08:43

## 2025-06-10 RX ADMIN — SODIUM CHLORIDE, PRESERVATIVE FREE 10 ML: 5 INJECTION INTRAVENOUS at 19:47

## 2025-06-10 RX ADMIN — BUMETANIDE 2 MG: 1 TABLET ORAL at 08:44

## 2025-06-10 RX ADMIN — SODIUM CHLORIDE, PRESERVATIVE FREE 10 ML: 5 INJECTION INTRAVENOUS at 07:46

## 2025-06-10 RX ADMIN — APIXABAN 5 MG: 5 TABLET, FILM COATED ORAL at 08:42

## 2025-06-10 RX ADMIN — ATORVASTATIN CALCIUM 20 MG: 20 TABLET, FILM COATED ORAL at 19:41

## 2025-06-10 RX ADMIN — GUAIFENESIN 600 MG: 600 TABLET, EXTENDED RELEASE ORAL at 19:41

## 2025-06-10 ASSESSMENT — ENCOUNTER SYMPTOMS
VOMITING: 0
APNEA: 0
NAUSEA: 0
STRIDOR: 0
SHORTNESS OF BREATH: 1
COUGH: 1
CHOKING: 0
WHEEZING: 0
ABDOMINAL PAIN: 0

## 2025-06-10 ASSESSMENT — PAIN SCALES - GENERAL
PAINLEVEL_OUTOF10: 6
PAINLEVEL_OUTOF10: 5
PAINLEVEL_OUTOF10: 4
PAINLEVEL_OUTOF10: 7

## 2025-06-10 ASSESSMENT — PAIN DESCRIPTION - LOCATION
LOCATION: BACK
LOCATION: BACK

## 2025-06-10 ASSESSMENT — PAIN DESCRIPTION - DESCRIPTORS
DESCRIPTORS: ACHING
DESCRIPTORS: ACHING

## 2025-06-10 NOTE — PROGRESS NOTES
Dr Jacobson notified of pt request for morphine due to SOB but too early for prn dose, MD requests order changed to every 1 hr prn

## 2025-06-10 NOTE — CARE COORDINATION
ONGOING DISCHARGE PLAN:    Patient is alert and oriented x4.    Spoke with patient regarding discharge plan and patient confirms that plan is still [facility]. No auth needed.     Patient is bed bound, wheelchair transfer via montez lift. LE contractures. Patient wear 2L NC  at the facility.     +parainfluenza, +mycoplasma pna  Pulm consult  5L NC  Refusing heliox    IV steroids 40 Q  6  IV levaquin    Ortho consult  MRI thoracis/cervical spine (unable to lie flat)    PT/OT    Will continue to follow for additional discharge needs.    If patient is discharged prior to next notation, then this note serves as note for discharge by case management.    Electronically signed by Pamela Ayala RN on 6/10/2025 at 3:16 PM

## 2025-06-10 NOTE — PROGRESS NOTES
swelling.   Gastrointestinal:  Negative for abdominal pain, nausea and vomiting.   Genitourinary: Negative.    Musculoskeletal: Negative.    Skin: Negative.          Medications:     Allergies:    Allergies   Allergen Reactions    Advil [Ibuprofen] Other (See Comments)     vomiting    Aleve [Naproxen] Other (See Comments)     vomiting    Antipyrine Other (See Comments)     unknown    Celecoxib Other (See Comments)    Codeine      headache    Fomepizole     Incruse Ellipta [Umeclidinium Bromide]      confusion    Other/Food      GRASS, TREES, WEEDS    Rofecoxib Other (See Comments)     Unknown reaction    Salicylates      Unknown reaction     Strawberry Extract      HEADACHES    Sulfinpyrazone Other (See Comments)     Unknown reaction    Aspirin Nausea And Vomiting, Other (See Comments) and Nausea Only    Nsaids Nausea Only, Other (See Comments) and Nausea And Vomiting       Current Meds:   Scheduled Meds:    sennosides-docusate sodium  2 tablet Oral BID    levofloxacin  750 mg IntraVENous Q24H    tiotropium  2 puff Inhalation Daily RT    albuterol  2.5 mg Nebulization Q4H RT    benzonatate  200 mg Oral TID    dextromethorphan  60 mg Oral 2 times per day    insulin glargine  5 Units SubCUTAneous Nightly    guaiFENesin  600 mg Oral BID    sodium chloride flush  5-40 mL IntraVENous 2 times per day    methylPREDNISolone  40 mg IntraVENous Q6H    apixaban  5 mg Oral BID    atorvastatin  20 mg Oral Nightly    bumetanide  2 mg Oral Daily    calcium carbonate  1 tablet Oral Daily    oxyCODONE-acetaminophen  1 tablet Oral Q6H    ferrous sulfate  325 mg Oral Daily with breakfast    risperiDONE  1 mg Oral Q12H    sertraline  25 mg Oral Daily    spironolactone  25 mg Oral Daily    traZODone  50 mg Oral Nightly    insulin lispro  0-4 Units SubCUTAneous 4x Daily AC & HS    montelukast  10 mg Oral Nightly    budesonide-formoterol  2 puff Inhalation BID RT     Continuous Infusions:    sodium chloride 25 mL/hr at 06/07/25 0631     surgeon signed off, will consult once patient stable.     Urinary tract infection  UA (6/8/2025): WBC 3-5, 0-2, moderate amount of glucose, trace ketones, trace proteins  Urine culture pending  IV Rocephin 2 g Every 24 hours discontinued on 06/09/2025 due to positive sputum culture for Pseudomonas aeruginosa, patient transition to levofloxacin 750 mg once daily for 5 days.    Hypertension  Blood pressure in /81, now 180/100  Resume home dose Bumex and spironolactone  Overnight patient added hydralazine 10 mg IV as needed for high BP control.     5.   Hyperlipidemia  Resume home dose Lipitor 20 mg     6.   Diabetes mellitus controlled  glucose 122 > 208 (6/10/2025)  low-dose sliding scale  Lantus 5 units nightly    7.   Unprovoked PE  Patient has longstanding history of unprovoked PE in 2021.  On Eliquis since then.  Resume home dose of Eliquis 5 mg twice daily.    DVT prophylaxis: Patient on Eliquis 5 mg twice daily      Plan will be discussed with the attending, Dr Patti Barry MD  PGY I Family Medicine Resident  6/10/2025 9:15 AM   Attending Physician Statement  I have discussed the care of Elaina Lopez and I have examined the patient myself and taken ROS and HPI, including pertinent history and exam findings, with the resident. I have reviewed the key elements of all parts of the encounter with the resident.  I agree with the assessment, plan and orders as documented by the resident.  Patient, clinically doing the same  Using accessory muscles of breathing  Refusing heliox  On steroids  Mycoplasma antibodies positive, Pseudomonas in sputum, on Levaquin  Overall prognosis guarded      Electronically signed by Primitivo Armstrong MD

## 2025-06-10 NOTE — PROGRESS NOTES
[facility]   OCCUPATIONAL THERAPY MISSED TREATMENT NOTE   INPATIENT   Date: 6/10/25  Patient Name: Elaina Lopez       Room:   MRN: [id number]   Account #: [id number]    : 1955  (70 y.o.)  Gender: female                 REASON FOR MISSED TREATMENT:  Hold treatment per nursing request   -    Pt not cleared for any sort of orthopedic intervention; patient cannot even lay flat for MRI . Pt not medically appropriate for therapy assessment. Discussed with RN.            Electronically signed by DONALDO Douglas on 6/10/25 at 3:11 PM EDT

## 2025-06-10 NOTE — PROGRESS NOTES
Pt requesting morphine due to SOB, pulse ox stable on heliox therapy, too early for prn morphine, pt reassured

## 2025-06-10 NOTE — PLAN OF CARE
Problem: Discharge Planning  Goal: Discharge to home or other facility with appropriate resources  6/10/2025 0159 by Brielle Alcala RN  Outcome: Not Progressing  Flowsheets (Taken 6/10/2025 0159)  Discharge to home or other facility with appropriate resources:   Identify barriers to discharge with patient and caregiver   Identify discharge learning needs (meds, wound care, etc)  Note: Dependent on pt respiratory status     Problem: Safety - Adult  Goal: Free from fall injury  6/10/2025 0159 by Brielle Alcala RN  Outcome: Progressing  Flowsheets (Taken 6/10/2025 0159)  Free From Fall Injury: Instruct family/caregiver on patient safety  Note: Safety maintained this shift     Problem: Skin/Tissue Integrity  Goal: Skin integrity remains intact  Description: 1.  Monitor for areas of redness and/or skin breakdown2.  Assess vascular access sites hourly3.  Every 4-6 hours minimum:  Change oxygen saturation probe site4.  Every 4-6 hours:  If on nasal continuous positive airway pressure, respiratory therapy assess nares and determine need for appliance change or resting period  6/10/2025 0159 by Brielle Alcala RN  Outcome: Progressing  Flowsheets (Taken 6/10/2025 0159)  Skin Integrity Remains Intact:   Monitor for areas of redness and/or skin breakdown   Every 4-6 hours:  If on nasal continuous positive airway pressure, assess nares and determine need for appliance change or resting period   Turn and reposition as indicated   Monitor skin under medical devices  Note: No new skin breakdown noted     Problem: ABCDS Injury Assessment  Goal: Absence of physical injury  6/10/2025 0159 by Brielle Alcala RN  Outcome: Progressing  Flowsheets (Taken 6/10/2025 0159)  Absence of Physical Injury: Implement safety measures based on patient assessment  Note: Safety maintained. Pt uses call light appropriately     Problem: Pain  Goal: Verbalizes/displays adequate comfort level or baseline comfort level  6/10/2025 0159 by Brielle Alcala  KWESI RN  Outcome: Progressing     Problem: Nutrition Deficit:  Goal: Optimize nutritional status  6/10/2025 0159 by Brielle Alcala RN  Outcome: Progressing     Problem: Respiratory - Adult  Goal: Achieves optimal ventilation and oxygenation  Outcome: Progressing  Flowsheets (Taken 6/10/2025 0159)  Achieves optimal ventilation and oxygenation:   Assess for changes in respiratory status   Assess for changes in mentation and behavior   Position to facilitate oxygenation and minimize respiratory effort   Oxygen supplementation based on oxygen saturation or arterial blood gases   Encourage broncho-pulmonary hygiene including cough, deep breathe, incentive spirometry   Assess and instruct to report shortness of breath or any respiratory difficulty   Respiratory therapy support as indicated  Note: Continuous pulse ox monitored, CXR, ABG results noted, heliox therapy continues     Problem: Discharge Planning  Goal: Discharge to home or other facility with appropriate resources  6/10/2025 0159 by Brielle Alcala RN  Outcome: Not Progressing  Flowsheets (Taken 6/10/2025 0159)  Discharge to home or other facility with appropriate resources:   Identify barriers to discharge with patient and caregiver   Identify discharge learning needs (meds, wound care, etc)  Note: Dependent on pt respiratory status  6/9/2025 1826 by Tre Dixon, RN  Outcome: Progressing

## 2025-06-10 NOTE — PROGRESS NOTES
BRONCHOSPASM/BRONCHOCONSTRICTION     [x]         IMPROVE AERATION/BREATH SOUNDS  [x]   ADMINISTER BRONCHODILATOR THERAPY AS APPROPRIATE  [x]   ASSESS BREATH SOUNDS  []   IMPLEMENT AEROSOL/MDI PROTOCOL  [x]   PATIENT EDUCATION AS NEEDED      PT currently on 4lnc, will attempt heliox after family leaves. Diminished expiratory wheezing t/o however improvement from yesterday

## 2025-06-10 NOTE — PROGRESS NOTES
Pulmonary Progress Note  [facility]      Patient - Elaina Lopez,  Age - 70 y.o.    - 1955      Room Number -    N -  [id number]   [facility] # - [id number]  Date of Admission -  2025  6:33 AM        Consulting Service/Physician   Consulting - Ethan Fernandez MD  Primary Care Physician - Tayla Kim MD     SUBJECTIVE         OBJECTIVE   VITALS    height is 1.651 m (5' 5\") and weight is 98.2 kg (216 lb 7.9 oz). Her axillary temperature is 98 °F (36.7 °C). Her blood pressure is 107/58 (abnormal) and her pulse is 71. Her respiration is 20 and oxygen saturation is 100%.     Body mass index is 36.03 kg/m².  Temperature Range: Temp: 98 °F (36.7 °C) Temp  Av.1 °F (36.7 °C)  Min: 97.3 °F (36.3 °C)  Max: 99.8 °F (37.7 °C)  BP Range:  Systolic (24hrs), Av , Min:107 , Max:187     Diastolic (24hrs), Av, Min:52, Max:139    Pulse Range: Pulse  Av.6  Min: 71  Max: 106  Respiration Range: Resp  Av.1  Min: 14  Max: 34  Current Pulse Ox::  SpO2: 100 %  24HR Pulse Ox Range:  SpO2  Av.5 %  Min: 90 %  Max: 100 %  Oxygen Amount and Delivery: O2 Flow Rate (L/min): 4 L/min    Wt Readings from Last 3 Encounters:   06/10/25 98.2 kg (216 lb 7.9 oz)   25 97.7 kg (215 lb 6.4 oz)   10/02/24 95.4 kg (210 lb 6.4 oz)       I/O (24 Hours)    Intake/Output Summary (Last 24 hours) at 6/10/2025 0842  Last data filed at 6/10/2025 0315  Gross per 24 hour   Intake 220 ml   Output 900 ml   Net -680 ml       EXAM     General Appearance  Awake, alert, oriented, in moderate respiratory distress  HEENT - normocephalic, atraumatic. []  Mallampati  [] Crowded airway   [] Macroglossia  []  Retrognathia  [] Micrognathia  []  Normal tongue size []  Normal Bite  [] Dougherty sign positive    Neck - Supple,  trachea midline   Lungs -improved bilateral wheezes  Cardiovascular - Heart sounds are normal.  Regular rate and rhythm   Abdomen - Soft,

## 2025-06-10 NOTE — PLAN OF CARE
Problem: Safety - Adult  Goal: Free from fall injury  6/10/2025 1431 by Tre Dixon RN  Outcome: Progressing  Flowsheets (Taken 6/10/2025 1431)  Free From Fall Injury: Instruct family/caregiver on patient safety     Problem: Discharge Planning  Goal: Discharge to home or other facility with appropriate resources  6/10/2025 1431 by Tre Dixon RN  Outcome: Progressing  Flowsheets (Taken 6/10/2025 1431)  Discharge to home or other facility with appropriate resources:   Identify barriers to discharge with patient and caregiver   Identify discharge learning needs (meds, wound care, etc)     Problem: Skin/Tissue Integrity  Goal: Skin integrity remains intact  Description: 1.  Monitor for areas of redness and/or skin breakdown2.  Assess vascular access sites hourly3.  Every 4-6 hours minimum:  Change oxygen saturation probe site4.  Every 4-6 hours:  If on nasal continuous positive airway pressure, respiratory therapy assess nares and determine need for appliance change or resting period  6/10/2025 1431 by Tre Dixon RN  Outcome: Progressing  Flowsheets (Taken 6/10/2025 1431)  Skin Integrity Remains Intact:   Monitor for areas of redness and/or skin breakdown   Assess vascular access sites hourly   Check visual cues for pain   Turn and reposition as indicated     Problem: ABCDS Injury Assessment  Goal: Absence of physical injury  6/10/2025 1431 by Tre Dixon RN  Outcome: Progressing  Flowsheets (Taken 6/10/2025 1431)  Absence of Physical Injury: Implement safety measures based on patient assessment     Problem: Pain  Goal: Verbalizes/displays adequate comfort level or baseline comfort level  6/10/2025 1431 by Tre Dixon RN  Flowsheets (Taken 6/10/2025 1431)  Verbalizes/displays adequate comfort level or baseline comfort level:   Encourage patient to monitor pain and request assistance   Assess pain using appropriate pain scale   Administer analgesics based on type and severity of pain and evaluate  response     Problem: Nutrition Deficit:  Goal: Optimize nutritional status  6/10/2025 1431 by Tre Dixon, RN  Flowsheets (Taken 6/10/2025 1431)  Nutrient intake appropriate for improving, restoring, or maintaining nutritional needs:   Assess nutritional status and recommend course of action   Monitor oral intake, labs, and treatment plans   Recommend appropriate diets, oral nutritional supplements, and vitamin/mineral supplements     Problem: Respiratory - Adult  Goal: Achieves optimal ventilation and oxygenation  6/10/2025 1431 by Tre Dixon, RN  Outcome: Progressing  Flowsheets (Taken 6/10/2025 1431)  Achieves optimal ventilation and oxygenation:   Assess for changes in respiratory status   Position to facilitate oxygenation and minimize respiratory effort   Assess for changes in mentation and behavior   Oxygen supplementation based on oxygen saturation or arterial blood gases   Respiratory therapy support as indicated     Problem: Discharge Planning  Goal: Discharge to home or other facility with appropriate resources  6/10/2025 1431 by Tre Dixon RN  Outcome: Progressing  Flowsheets (Taken 6/10/2025 1431)  Discharge to home or other facility with appropriate resources:   Identify barriers to discharge with patient and caregiver   Identify discharge learning needs (meds, wound care, etc)  6/10/2025 0159 by Brielle Alcala RN  Outcome: Not Progressing  Flowsheets (Taken 6/10/2025 0159)  Discharge to home or other facility with appropriate resources:   Identify barriers to discharge with patient and caregiver   Identify discharge learning needs (meds, wound care, etc)  Note: Dependent on pt respiratory status

## 2025-06-10 NOTE — PROGRESS NOTES
Surgical Progress Note    POD:      Patient doing poorly  Vitals:    06/10/25 0700   BP: (!) 107/58   Pulse: 71   Resp: 20   Temp:    SpO2: 100%      Temp (24hrs), Av.1 °F (36.7 °C), Min:97.3 °F (36.3 °C), Max:99.8 °F (37.7 °C)       Pain Control fair  No unusual nausea    Exam: sob on o2        Lungs:  No respiratory distress    Labs reviewed:  Labs:  WBC/Hgb/Hct/Plts:  9.7/10.7/36.0/334 (06/10 0406)  BUN/Cr/glu/ALT/AST/amyl/lip:  25/0.9/--/--/--/--/-- (06/10 0406)     Na/K/Cl/CO2:  141/3.7/97/33 (06/10 0406)    I/O last 3 completed shifts:  In: 720 [P.O.:420; I.V.:300]  Out: 1200 [Urine:1200]    Assessment:    Patient Active Problem List   Diagnosis    Chronic obstructive pulmonary disease (HCC)    Vitamin D deficiency    Anxiety    Asthma    Bipolar disorder (HCC)    Depression    Hyperlipidemia with target LDL less than 100    Sleep apnea    Vision abnormalities    Status post hysteroscopy    Chronic headaches    IBS (irritable bowel syndrome)    Colon polyp    Collagenous colitis    Lung nodule    Left elbow pain    Dilated bile duct    Acute pain of left shoulder    Adhesive capsulitis of left shoulder    Leucopenia    Compression fracture of body of thoracic vertebra (HCC)    Age-related osteoporosis with current pathological fracture    Pulmonary embolism on right (HCC)    Migraines    Paranoid behavior (HCC)    Acute deep vein thrombosis (DVT) of right lower extremity (HCC)    Delirium    Chronic respiratory failure with hypoxia (HCC)    Major neurocognitive disorder (HCC)    Pneumonia    Chronic respiratory failure with hypoxia, on home O2 therapy (HCC)    COPD (chronic obstructive pulmonary disease) (HCC)    TRAM (acute kidney injury)    History of pulmonary embolus (PE)    Mycoplasma pneumonia    Iron deficiency anemia    Sepsis (HCC)    Acute on chronic respiratory failure (HCC)    Cavitary lesion of lung    History of DVT (deep vein thrombosis)    Pneumothorax, right    Status post chest tube  placement (HCC)    Pneumothorax on right    HCAP (healthcare-associated pneumonia)    Acute systolic HF (heart failure) (HCC)    Pseudomonas aeruginosa infection    Elevated procalcitonin    Elevated C-reactive protein (CRP)    Lung abscess (HCC)    Recurrent pneumothorax after chest tube removed    Postprocedural subcutaneous emphysema    Leg DVT (deep venous thromboembolism), acute, bilateral (HCC)    Chronic obstructive pulmonary disease with acute exacerbation ([facility])    SOB (shortness of breath)    Acute midline thoracic back pain    Closed wedge compression fracture of T7 vertebra ([facility])       Plan:  Medically unfit for MRI or surgery    If pain persists feel free to re-consult    Domo Arriaga MD MD  6/10/2025 8:03 AM

## 2025-06-10 NOTE — PROGRESS NOTES
Physical Therapy          Physical Therapy Cancel Note      DATE: 6/10/2025    NAME: Elaina Lopez  MRN: [id number]   : 1955      Patient not seen this date for Physical Therapy due to:      Pt not cleared for any sort of orthopedic intervention; patient cannot even lay flat for MRI . Pt not medically appropriate for therapy assessment. Discussed with RN .     Electronically signed by Audrey Reece PT on 6/10/2025 at 2:33 PM

## 2025-06-11 VITALS
OXYGEN SATURATION: 100 % | BODY MASS INDEX: 34.16 KG/M2 | DIASTOLIC BLOOD PRESSURE: 83 MMHG | RESPIRATION RATE: 20 BRPM | SYSTOLIC BLOOD PRESSURE: 176 MMHG | TEMPERATURE: 99.2 F | WEIGHT: 205.03 LBS | HEART RATE: 95 BPM | HEIGHT: 65 IN

## 2025-06-11 PROBLEM — J44.1 COPD EXACERBATION (HCC): Status: ACTIVE | Noted: 2025-06-11

## 2025-06-11 PROBLEM — Z51.5 ENCOUNTER FOR PALLIATIVE CARE: Status: ACTIVE | Noted: 2025-06-11

## 2025-06-11 PROBLEM — Z71.89 GOALS OF CARE, COUNSELING/DISCUSSION: Status: ACTIVE | Noted: 2025-06-11

## 2025-06-11 LAB
AMMONIA PLAS-SCNC: 35 UMOL/L (ref 11–51)
ANION GAP SERPL CALCULATED.3IONS-SCNC: 8 MMOL/L (ref 9–16)
ANION GAP SERPL CALCULATED.3IONS-SCNC: 9 MMOL/L (ref 9–16)
ARTERIAL PATENCY WRIST A: ABNORMAL
BASOPHILS # BLD: <0.03 K/UL (ref 0–0.2)
BASOPHILS NFR BLD: 0 % (ref 0–2)
BDY SITE: ABNORMAL
BODY TEMPERATURE: 37
BUN SERPL-MCNC: 25 MG/DL (ref 8–23)
BUN SERPL-MCNC: 25 MG/DL (ref 8–23)
CALCIUM SERPL-MCNC: 8.8 MG/DL (ref 8.6–10.4)
CALCIUM SERPL-MCNC: 8.8 MG/DL (ref 8.6–10.4)
CHLORIDE SERPL-SCNC: 95 MMOL/L (ref 98–107)
CHLORIDE SERPL-SCNC: 96 MMOL/L (ref 98–107)
CO2 SERPL-SCNC: 35 MMOL/L (ref 20–31)
CO2 SERPL-SCNC: 35 MMOL/L (ref 20–31)
COHGB MFR BLD: 1 % (ref 0–5)
CREAT SERPL-MCNC: 0.8 MG/DL (ref 0.7–1.2)
CREAT SERPL-MCNC: 0.9 MG/DL (ref 0.7–1.2)
EOSINOPHIL # BLD: <0.03 K/UL (ref 0–0.44)
EOSINOPHILS RELATIVE PERCENT: 0 % (ref 0–4)
ERYTHROCYTE [DISTWIDTH] IN BLOOD BY AUTOMATED COUNT: 15.7 % (ref 11.5–14.9)
GAS FLOW.O2 O2 DELIVERY SYS: ABNORMAL L/MIN
GFR, ESTIMATED: 70 ML/MIN/1.73M2
GFR, ESTIMATED: 80 ML/MIN/1.73M2
GLUCOSE BLD-MCNC: 234 MG/DL (ref 65–105)
GLUCOSE BLD-MCNC: 255 MG/DL (ref 65–105)
GLUCOSE SERPL-MCNC: 224 MG/DL (ref 74–99)
GLUCOSE SERPL-MCNC: 247 MG/DL (ref 74–99)
HCO3 ARTERIAL: 35.3 MMOL/L (ref 22–26)
HCT VFR BLD AUTO: 36.6 % (ref 36–46)
HGB BLD-MCNC: 10.7 G/DL (ref 12–16)
IMM GRANULOCYTES # BLD AUTO: 0.08 K/UL (ref 0–0.3)
IMM GRANULOCYTES NFR BLD: 1 %
LYMPHOCYTES NFR BLD: 0.88 K/UL (ref 1.1–3.7)
LYMPHOCYTES RELATIVE PERCENT: 11 % (ref 24–44)
MCH RBC QN AUTO: 28.8 PG (ref 26–34)
MCHC RBC AUTO-ENTMCNC: 29.2 G/DL (ref 31–37)
MCV RBC AUTO: 98.4 FL (ref 80–100)
METHEMOGLOBIN: 0.2 % (ref 0–1.9)
MICROORGANISM SPEC CULT: NORMAL
MICROORGANISM SPEC CULT: NORMAL
MONOCYTES NFR BLD: 0.3 K/UL (ref 0.1–1.2)
MONOCYTES NFR BLD: 4 % (ref 3–12)
NEUTROPHILS NFR BLD: 84 % (ref 36–66)
NEUTS SEG NFR BLD: 7.13 K/UL (ref 1.5–8.1)
NRBC BLD-RTO: 0 PER 100 WBC
O2 SAT, ARTERIAL: 93.8 % (ref 95–98)
PCO2 ARTERIAL: 64.9 MMHG (ref 35–45)
PH ARTERIAL: 7.35 (ref 7.35–7.45)
PLATELET # BLD AUTO: 314 K/UL (ref 150–450)
PMV BLD AUTO: 9.8 FL (ref 8–13.5)
PO2 ARTERIAL: 71.4 MMHG (ref 80–100)
POSITIVE BASE EXCESS, ART: 7.8 MMOL/L (ref 0–2)
POTASSIUM SERPL-SCNC: 3.5 MMOL/L (ref 3.7–5.3)
POTASSIUM SERPL-SCNC: 3.7 MMOL/L (ref 3.7–5.3)
PT. POSITION: ABNORMAL
RBC # BLD AUTO: 3.72 M/UL (ref 3.95–5.11)
RESPIRATORY RATE: 22
SERVICE CMNT-IMP: NORMAL
SERVICE CMNT-IMP: NORMAL
SODIUM SERPL-SCNC: 139 MMOL/L (ref 136–145)
SODIUM SERPL-SCNC: 139 MMOL/L (ref 136–145)
SPECIMEN DESCRIPTION: NORMAL
SPECIMEN DESCRIPTION: NORMAL
TEXT FOR RESPIRATORY: ABNORMAL
WBC OTHER # BLD: 8.4 K/UL (ref 3.5–11)

## 2025-06-11 PROCEDURE — 2700000000 HC OXYGEN THERAPY PER DAY

## 2025-06-11 PROCEDURE — 94640 AIRWAY INHALATION TREATMENT: CPT

## 2025-06-11 PROCEDURE — 6370000000 HC RX 637 (ALT 250 FOR IP): Performed by: NURSE PRACTITIONER

## 2025-06-11 PROCEDURE — 6370000000 HC RX 637 (ALT 250 FOR IP): Performed by: INTERNAL MEDICINE

## 2025-06-11 PROCEDURE — 94761 N-INVAS EAR/PLS OXIMETRY MLT: CPT

## 2025-06-11 PROCEDURE — 6360000002 HC RX W HCPCS

## 2025-06-11 PROCEDURE — 6360000002 HC RX W HCPCS: Performed by: INTERNAL MEDICINE

## 2025-06-11 PROCEDURE — 99233 SBSQ HOSP IP/OBS HIGH 50: CPT | Performed by: INTERNAL MEDICINE

## 2025-06-11 PROCEDURE — 6370000000 HC RX 637 (ALT 250 FOR IP)

## 2025-06-11 PROCEDURE — 82805 BLOOD GASES W/O2 SATURATION: CPT

## 2025-06-11 PROCEDURE — 36600 WITHDRAWAL OF ARTERIAL BLOOD: CPT

## 2025-06-11 PROCEDURE — 82140 ASSAY OF AMMONIA: CPT

## 2025-06-11 PROCEDURE — 2500000003 HC RX 250 WO HCPCS

## 2025-06-11 PROCEDURE — 6360000002 HC RX W HCPCS: Performed by: NURSE PRACTITIONER

## 2025-06-11 PROCEDURE — 82947 ASSAY GLUCOSE BLOOD QUANT: CPT

## 2025-06-11 PROCEDURE — 80048 BASIC METABOLIC PNL TOTAL CA: CPT

## 2025-06-11 PROCEDURE — 99223 1ST HOSP IP/OBS HIGH 75: CPT | Performed by: NURSE PRACTITIONER

## 2025-06-11 PROCEDURE — 85025 COMPLETE CBC W/AUTO DIFF WBC: CPT

## 2025-06-11 PROCEDURE — 36415 COLL VENOUS BLD VENIPUNCTURE: CPT

## 2025-06-11 RX ORDER — BUSPIRONE HYDROCHLORIDE 10 MG/1
10 TABLET ORAL 3 TIMES DAILY
Qty: 90 TABLET | Refills: 0 | Status: ON HOLD | OUTPATIENT
Start: 2025-06-11

## 2025-06-11 RX ORDER — LEVOFLOXACIN 5 MG/ML
750 INJECTION, SOLUTION INTRAVENOUS EVERY 24 HOURS
Qty: 600 ML | Refills: 0 | Status: ON HOLD | OUTPATIENT
Start: 2025-06-12 | End: 2025-06-16

## 2025-06-11 RX ORDER — INSULIN LISPRO 100 [IU]/ML
0-8 INJECTION, SOLUTION INTRAVENOUS; SUBCUTANEOUS
Status: DISCONTINUED | OUTPATIENT
Start: 2025-06-11 | End: 2025-06-11 | Stop reason: HOSPADM

## 2025-06-11 RX ORDER — INSULIN GLARGINE 100 [IU]/ML
10 INJECTION, SOLUTION SUBCUTANEOUS NIGHTLY
Status: DISCONTINUED | OUTPATIENT
Start: 2025-06-11 | End: 2025-06-11 | Stop reason: HOSPADM

## 2025-06-11 RX ORDER — OXYCODONE HYDROCHLORIDE 10 MG/1
10 TABLET ORAL EVERY 4 HOURS PRN
Refills: 0 | Status: DISCONTINUED | OUTPATIENT
Start: 2025-06-11 | End: 2025-06-11

## 2025-06-11 RX ORDER — AMLODIPINE BESYLATE 5 MG/1
5 TABLET ORAL DAILY
Status: DISCONTINUED | OUTPATIENT
Start: 2025-06-11 | End: 2025-06-11 | Stop reason: HOSPADM

## 2025-06-11 RX ORDER — NALOXONE HYDROCHLORIDE 0.4 MG/ML
0.4 INJECTION, SOLUTION INTRAMUSCULAR; INTRAVENOUS; SUBCUTANEOUS PRN
Status: DISCONTINUED | OUTPATIENT
Start: 2025-06-11 | End: 2025-06-11 | Stop reason: HOSPADM

## 2025-06-11 RX ORDER — MORPHINE SULFATE 15 MG/1
15 TABLET, FILM COATED, EXTENDED RELEASE ORAL EVERY 12 HOURS SCHEDULED
Refills: 0 | Status: DISCONTINUED | OUTPATIENT
Start: 2025-06-11 | End: 2025-06-11

## 2025-06-11 RX ORDER — DEXTROMETHORPHAN POLISTIREX 30 MG/5ML
60 SUSPENSION ORAL EVERY 12 HOURS SCHEDULED
Qty: 200 ML | Refills: 0 | Status: ON HOLD | OUTPATIENT
Start: 2025-06-11 | End: 2025-06-21

## 2025-06-11 RX ORDER — OXYCODONE HYDROCHLORIDE 5 MG/1
5 TABLET ORAL EVERY 4 HOURS PRN
Refills: 0 | Status: DISCONTINUED | OUTPATIENT
Start: 2025-06-11 | End: 2025-06-11 | Stop reason: HOSPADM

## 2025-06-11 RX ORDER — AMLODIPINE BESYLATE 5 MG/1
5 TABLET ORAL DAILY
Qty: 30 TABLET | Refills: 3 | Status: ON HOLD | OUTPATIENT
Start: 2025-06-12

## 2025-06-11 RX ORDER — NALOXONE HYDROCHLORIDE 0.4 MG/ML
INJECTION, SOLUTION INTRAMUSCULAR; INTRAVENOUS; SUBCUTANEOUS
Status: COMPLETED
Start: 2025-06-11 | End: 2025-06-11

## 2025-06-11 RX ORDER — BUDESONIDE AND FORMOTEROL FUMARATE DIHYDRATE 160; 4.5 UG/1; UG/1
2 AEROSOL RESPIRATORY (INHALATION)
Qty: 10.2 G | Refills: 3 | Status: ON HOLD | OUTPATIENT
Start: 2025-06-11

## 2025-06-11 RX ORDER — BUSPIRONE HYDROCHLORIDE 10 MG/1
10 TABLET ORAL 3 TIMES DAILY
Status: DISCONTINUED | OUTPATIENT
Start: 2025-06-11 | End: 2025-06-11 | Stop reason: HOSPADM

## 2025-06-11 RX ORDER — BUSPIRONE HYDROCHLORIDE 5 MG/1
5 TABLET ORAL 3 TIMES DAILY PRN
Status: DISCONTINUED | OUTPATIENT
Start: 2025-06-11 | End: 2025-06-11

## 2025-06-11 RX ADMIN — BUDESONIDE AND FORMOTEROL FUMARATE DIHYDRATE 2 PUFF: 160; 4.5 AEROSOL RESPIRATORY (INHALATION) at 07:19

## 2025-06-11 RX ADMIN — MORPHINE SULFATE 2 MG: 2 INJECTION, SOLUTION INTRAMUSCULAR; INTRAVENOUS at 04:08

## 2025-06-11 RX ADMIN — BUSPIRONE HYDROCHLORIDE 5 MG: 5 TABLET ORAL at 00:24

## 2025-06-11 RX ADMIN — INSULIN LISPRO 2 UNITS: 100 INJECTION, SOLUTION INTRAVENOUS; SUBCUTANEOUS at 08:21

## 2025-06-11 RX ADMIN — RISPERIDONE 1 MG: 1 TABLET, FILM COATED ORAL at 14:12

## 2025-06-11 RX ADMIN — AMLODIPINE BESYLATE 5 MG: 5 TABLET ORAL at 07:44

## 2025-06-11 RX ADMIN — NALOXONE HYDROCHLORIDE 0.4 MG: 0.4 INJECTION, SOLUTION INTRAMUSCULAR; INTRAVENOUS; SUBCUTANEOUS at 14:42

## 2025-06-11 RX ADMIN — INSULIN LISPRO 4 UNITS: 100 INJECTION, SOLUTION INTRAVENOUS; SUBCUTANEOUS at 11:37

## 2025-06-11 RX ADMIN — LEVOFLOXACIN 750 MG: 5 INJECTION, SOLUTION INTRAVENOUS at 07:53

## 2025-06-11 RX ADMIN — ALBUTEROL SULFATE 2.5 MG: 2.5 SOLUTION RESPIRATORY (INHALATION) at 07:18

## 2025-06-11 RX ADMIN — GUAIFENESIN 600 MG: 600 TABLET, EXTENDED RELEASE ORAL at 07:44

## 2025-06-11 RX ADMIN — MORPHINE SULFATE 2 MG: 2 INJECTION, SOLUTION INTRAMUSCULAR; INTRAVENOUS at 06:33

## 2025-06-11 RX ADMIN — OXYCODONE HYDROCHLORIDE AND ACETAMINOPHEN 1 TABLET: 5; 325 TABLET ORAL at 07:43

## 2025-06-11 RX ADMIN — TIOTROPIUM BROMIDE INHALATION SPRAY 2 PUFF: 3.12 SPRAY, METERED RESPIRATORY (INHALATION) at 07:19

## 2025-06-11 RX ADMIN — SPIRONOLACTONE 25 MG: 25 TABLET ORAL at 07:44

## 2025-06-11 RX ADMIN — BUMETANIDE 2 MG: 1 TABLET ORAL at 07:44

## 2025-06-11 RX ADMIN — OXYCODONE HYDROCHLORIDE AND ACETAMINOPHEN 1 TABLET: 5; 325 TABLET ORAL at 01:05

## 2025-06-11 RX ADMIN — Medication 60 MG: at 07:42

## 2025-06-11 RX ADMIN — SENNOSIDES AND DOCUSATE SODIUM 2 TABLET: 50; 8.6 TABLET ORAL at 07:44

## 2025-06-11 RX ADMIN — BENZONATATE 200 MG: 200 CAPSULE ORAL at 07:44

## 2025-06-11 RX ADMIN — ALBUTEROL SULFATE 2.5 MG: 2.5 SOLUTION RESPIRATORY (INHALATION) at 11:33

## 2025-06-11 RX ADMIN — WATER 40 MG: 1 INJECTION INTRAMUSCULAR; INTRAVENOUS; SUBCUTANEOUS at 14:11

## 2025-06-11 RX ADMIN — WATER 40 MG: 1 INJECTION INTRAMUSCULAR; INTRAVENOUS; SUBCUTANEOUS at 06:28

## 2025-06-11 RX ADMIN — HYDRALAZINE HYDROCHLORIDE 10 MG: 20 INJECTION INTRAMUSCULAR; INTRAVENOUS at 06:08

## 2025-06-11 RX ADMIN — BUSPIRONE HYDROCHLORIDE 5 MG: 5 TABLET ORAL at 06:25

## 2025-06-11 RX ADMIN — APIXABAN 5 MG: 5 TABLET, FILM COATED ORAL at 07:43

## 2025-06-11 RX ADMIN — BENZONATATE 200 MG: 200 CAPSULE ORAL at 14:11

## 2025-06-11 RX ADMIN — BUSPIRONE HYDROCHLORIDE 10 MG: 10 TABLET ORAL at 14:12

## 2025-06-11 RX ADMIN — RISPERIDONE 1 MG: 1 TABLET, FILM COATED ORAL at 01:05

## 2025-06-11 RX ADMIN — MORPHINE SULFATE 15 MG: 15 TABLET, FILM COATED, EXTENDED RELEASE ORAL at 11:37

## 2025-06-11 RX ADMIN — ALBUTEROL SULFATE 2.5 MG: 2.5 SOLUTION RESPIRATORY (INHALATION) at 02:37

## 2025-06-11 RX ADMIN — SERTRALINE 25 MG: 50 TABLET, FILM COATED ORAL at 07:42

## 2025-06-11 RX ADMIN — SODIUM CHLORIDE, PRESERVATIVE FREE 10 ML: 5 INJECTION INTRAVENOUS at 08:22

## 2025-06-11 RX ADMIN — ALBUTEROL SULFATE 2.5 MG: 2.5 SOLUTION RESPIRATORY (INHALATION) at 16:11

## 2025-06-11 RX ADMIN — WATER 40 MG: 1 INJECTION INTRAMUSCULAR; INTRAVENOUS; SUBCUTANEOUS at 01:08

## 2025-06-11 RX ADMIN — MORPHINE SULFATE 2 MG: 2 INJECTION, SOLUTION INTRAMUSCULAR; INTRAVENOUS at 11:26

## 2025-06-11 RX ADMIN — MORPHINE SULFATE 2 MG: 2 INJECTION, SOLUTION INTRAMUSCULAR; INTRAVENOUS at 08:21

## 2025-06-11 RX ADMIN — FERROUS SULFATE TAB 325 MG (65 MG ELEMENTAL FE) 325 MG: 325 (65 FE) TAB at 07:43

## 2025-06-11 RX ADMIN — BUSPIRONE HYDROCHLORIDE 10 MG: 10 TABLET ORAL at 09:33

## 2025-06-11 ASSESSMENT — PAIN SCALES - GENERAL
PAINLEVEL_OUTOF10: 8
PAINLEVEL_OUTOF10: 9

## 2025-06-11 ASSESSMENT — PAIN DESCRIPTION - LOCATION: LOCATION: BACK

## 2025-06-11 ASSESSMENT — ENCOUNTER SYMPTOMS
VOMITING: 0
CHOKING: 0
WHEEZING: 0
SHORTNESS OF BREATH: 1
APNEA: 0
ABDOMINAL PAIN: 0
STRIDOR: 0
COUGH: 1
NAUSEA: 0

## 2025-06-11 ASSESSMENT — PAIN DESCRIPTION - DESCRIPTORS: DESCRIPTORS: ACHING

## 2025-06-11 NOTE — PLAN OF CARE
Due to pt condition MRIs have been put on hold. If pt condition improves please call MRI at [phone] to schedule exams.

## 2025-06-11 NOTE — CONSULTS
hospice at this time.     I discussed importance of symptom management for her back pain and breathing. She believes that she has palliative care at her nursing facility as well.     Medications reviewed over the last 24 hours - patient received 25mg scheduled Oxycodone and 12mg IV morphine which equates to approximately 73mme/day of oral morphine.     Patient has significant pain after Percocet wears off after 6 hours. Discussed starting long acting narcotic to hopefully help with dyspnea and pain. Patient has tolerated IV morphine. Recommended MS contin 15mg BID which patient is agreeable to. Percocet will be discontinued. Oxycodone will remain 5mg Q4 PRN. Will additionally leave IV morphine. Patient agreeable to the above. Would recommend evaluating patient close to Q4 for the need for Oxycodone as the MS contin will likely take several days to take full effect.     Patient requested that I contact her daughter to discuss this with her. Patient denies any other needs.     I contacted patient's daughter Calli and introduced myself. Updated her on my conversation with patient regarding goals and symptom management. Explained the MS contin with transition to PRN Oxycodone. She states patient has had difficulty getting her medications on time at the nursing facility and it is very difficult for her to remain comfortable. Ideally, long acting should help with this once she has the medications regulated in her system. Calli agreeable.     Patient is likely going to LTAC facility. She is currently followed by [facility] - will ensure that they follow at LTAC. Case management also aware.     Discussed plan of care with RNs and Dr. Jacobson.     Palliative to follow.     Additional Assessments:     Symptom management/ pain control   We feel the patient symptoms are being controlled. her current regimen is reviewed by myself and discussed with the staff.   We recommend adjusting her medications as follows  See  Problems:    * No resolved hospital problems. *      Please call with any palliative questions or concerns.  Palliative Care Team is available via perfect serve or via phone.     This note has been dictated by dragon, typing errors may be a possibility.  The total time I spent in seeing the patient, discussing goals of care, advanced directives, code status, greater than 50% time in counseling and other major issues was more than 80 minutes      Total time in talking with family, discussing with patient, chart review, and writing note: 80  Complexity: 3 - managing opioids       Electronically signed by     IVONNE Siduh-JENNIFER  Hospice/Palliative Care  [facility], [place], [place]  6/11/2025 8:24 AM  Palliative care office: [phone]

## 2025-06-11 NOTE — PROGRESS NOTES
Pt puts call light on states : can't breathe, pulse ox 92%,  RT called and pt medicated with morphine, reassured, discussed importance of heliox therapy with pt and she agrees to use heliox

## 2025-06-11 NOTE — PROGRESS NOTES
Patient became unresponsive, medicine residents called to bedside. 0.4mg narcan given, patient awake and moving around. Dr. Jacobson notified, MD stated patient is okay to go to [facility]. Selin Gonzalez also notified

## 2025-06-11 NOTE — DISCHARGE SUMMARY
[facility]   IN-PATIENT SERVICE   [facility]    Discharge Summary     Patient ID: Elaina Lopez  :  1955   MRN: [id number]     ACCOUNT:  [id number]   Patient's PCP: Tayla Kim MD  Admit Date: 2025   Discharge Date: 2025     Length of Stay: 5  Code Status:  Prior  Admitting Physician: Ethan Fernandez MD  Discharge Physician: Edith Barry MD     Active Discharge Diagnoses:       Primary Problem  SOB (shortness of breath)      Hospital Problems  Active Hospital Problems    Diagnosis Date Noted    Encounter for palliative care [Z51.5] 2025    Goals of care, counseling/discussion [Z71.89] 2025    COPD exacerbation (HCC) [J44.1] 2025    SOB (shortness of breath) [R06.02] 2025    Acute midline thoracic back pain [M54.6] 2025    Closed wedge compression fracture of T7 vertebra (HCC) [S22.060A] 2025       Admission Condition:  poor     Discharged Condition: fair    Hospital Stay:       Hospital Course:    This patient is 70 years old female with a history significant for COPD, hyperlipidemia, sleep apnea, bipolar disorder, anxiety, right upper lobe pleural nodules, diabetes mellitus who presented originally with shortness of breath and back pain.  Patient was also complaining of yellow-green sputum production.  In the emergency department BP was significant for high readings, labs were drawn with was significant for hemoglobin 11.6, sodium 139, potassium 3.8, glucose 122.  CXR shows stable interstitial prominence, CT PE was negative for PE, was significant for scattered groundglass opacities throughout the right lung.  CT thoracic spine showed T7 compression deformity, multilevel degenerative changes of thoracic spine.  While in the ED patient received Solu-Medrol, DuoNeb, started on IV azithromycin and Rocephin.  Patient was admitted with the COPD exacerbation secondary to pneumonia.  Further workup revealed that  drug: fluticasone furoate-vilanterol     cetirizine 10 MG tablet  Commonly known as: ZYRTEC     folic acid 1 MG tablet  Commonly known as: FOLVITE  Take 1 tablet by mouth daily     polyethylene glycol 17 GM/SCOOP powder  Commonly known as: GLYCOLAX               Where to Get Your Medications        These medications were sent to [facility] [place] [address] [address] [facility] -  [phone] - F [phone] [address] [facility] [address]      Phone: [phone]   amLODIPine 5 MG tablet  budesonide-formoterol 160-4.5 MCG/ACT Aero  busPIRone 10 MG tablet  dextromethorphan 30 MG/5ML extended release liquid  levoFLOXacin 750 MG/150ML Soln         Time Spent on discharge is  40 mins in patient examination, evaluation, counseling as well as medication reconciliation, prescriptions for required medications, discharge plan and follow up.    Electronically signed by   Edith Barry MD  6/11/2025  5:33 PM      Thank you Tayla Rey MD for the opportunity to be involved in this patient's care.

## 2025-06-11 NOTE — PLAN OF CARE
Plan of care note    Residents team called urgently to the bedside for the patient evaluation.    On arrival, the patient was found laying in bed, vital signs stable.  Respiratory rate was noted to be 13 breaths/min.  The patient was unresponsive to verbal stimuli but demonstrated the response to painful stimuli.  Pupils were equal and reactive to light.  Reflexes were intact and symmetric.  No signs of seizure activity or acute trauma were noted.    Review of recent medication administration revealed the patient had received Norco and later on around 11 AM extended release morphine 15 mg.  Concern for opiate induced respiratory depression.    Naloxone 0.4 mg IV was administered.  Following administration, the patient respiratory rate improved, and spontaneous movement was observed.  The patient began to cough and demonstrated increased responsiveness, though remained unable to consistently follow verbal commands.    Plan is to closely monitor neurologic status and respiratory function.  Will continue to reassess for further improvement.  At this time, the patient appears to be gradually returning to baseline.

## 2025-06-11 NOTE — PROGRESS NOTES
Report given [facility].      PT was transported by stretcher with belongings. [facility] staff at bedside.

## 2025-06-11 NOTE — CARE COORDINATION
ONGOING DISCHARGE PLAN:    Patient is alert and oriented x4.    Spoke with patient regarding discharge plan and patient confirms that plan is still [facility]. No auth needed.    Patient is agreeable to [facility]. She states she has been to [facility] in the past. Writer spoke with daughter, Calli, and she is agreeable to [facility]. She is current with [facility].     +parainfluenza, +mycoplasma pna  Pulm consult  5L NC 97%      IV steroids 40 Q  6  IV levaquin    Ortho consult  MRI thoracis/cervical spine (unable to lie flat)    PT/OT    Palliative Care    Will continue to follow for additional discharge needs.    If patient is discharged prior to next notation, then this note serves as note for discharge by case management.    Electronically signed by Pamela Ayala RN on 6/11/2025 at 10:29 AM

## 2025-06-11 NOTE — PROGRESS NOTES
[facility]  IN-PATIENT SERVICE  [facility]    PROGRESS NOTE             6/11/2025    7:28 AM    Name:   Elaina Lopez  MRN:     [id number]     Acct:      [id number]   Room:   2007/2007-01   Day:  5  Admit Date:  6/6/2025  6:33 AM    PCP:  Tayla Kim MD  Code Status:  DNR-CCA    Subjective:     C/C:   Chief Complaint   Patient presents with    Shortness of Breath    Back Pain     The patient was seen and examined at the bedside.  Patient is awake, alert, oriented to time, place, and person.  Patient still reports continuing shortness of breath and some cough.    Patient, very agitated  Using accessory muscles of breathing  DNR CCA, no intubation  Blood pressure, blood sugar running high  Also on heliox since 11 PM  Brief History:     The patient is a 70 y.o.  Non- / non  female who presents withShortness of Breath and Back Pain   and she is admitted to the hospital for the management of acute on chronic respiratory failure likely secondary to COPD exacerbation vs CAP.     Patient is a 70-year-old female with a history of COPD, hyperlipidemia, sleep apnea, bipolar disorder, anxiety, right upper lobe pleural nodules, DM who presented with shortness of breath and back pain.  For the last 3 days patient complains of increasing shortness of breath that is associated with a productive cough.  Sputum is yellow-green in nature.  Shortness of breath is associated with chills and nausea.  Patient also associates this shortness of breath and cough with back pain.  Back pain is located in the low back and radiates downward.  She describes it as a shooting pain.  She denies any numbness or tingling or any bowel or bladder incontinence.     In the ED /81, HR 94, RR 24, 97.9.  Labs were drawn which showed Hb 11.6, WBC within normal limits, , K3.8, glucose 122, troponin 16> 16, PT 19.6, magnesium within normal limits, lactate 1.7, , COVID-19  abdomen and pelvis was performed with the administration of intravenous contrast. Multiplanar reformatted images are provided for review. Automated exposure control, iterative reconstruction, and/or weight based adjustment of the mA/kV was utilized to reduce the radiation dose to as low as reasonably achievable.; CTA of the chest was performed after the administration of intravenous contrast.  Multiplanar reformatted images are provided for review.  MIP images are provided for review. Automated exposure control, iterative reconstruction, and/or weight based adjustment of the mA/kV was utilized to reduce the radiation dose to as low as reasonably achievable. COMPARISON: CT thoracic and lumbar spine from 06/06/2025, CT chest from 01/02/2025, PET-CT from 09/18/2024, CT abdomen and pelvis from 07/01/2022 HISTORY: ORDERING SYSTEM PROVIDED HISTORY: flank pain TECHNOLOGIST PROVIDED HISTORY: flank pain Decision Support Exception - unselect if not a suspected or confirmed emergency medical condition->Emergency Medical Condition (MA) Reason for Exam: sob, back pain; ORDERING SYSTEM PROVIDED HISTORY: dyspnea, PE, back pain TECHNOLOGIST PROVIDED HISTORY: dyspnea, PE, back pain Decision Support Exception - unselect if not a suspected or confirmed emergency medical condition->Emergency Medical Condition (MA) Reason for Exam: sob, back pain, leg pain, swelling Additional signs and symptoms: pt unable to raise arms 70-year-old female with shortness of breath and back pain; flank pain FINDINGS: Chest: Mediastinum: No obvious filling defect in the main, central main, and left main pulmonary arteries to suggest central pulmonary embolus.  Lobar pulmonary arterial vasculature and beyond is somewhat limited due to suboptimal bolus timing, respiratory motion, and beam hardening artifact from the contrast bolus in the SVC. Atherosclerotic calcification of the aorta.  No periaortic or mediastinal hemorrhage.  Right hilar

## 2025-06-11 NOTE — ACP (ADVANCE CARE PLANNING)
Advance Care Planning      Palliative Medicine Provider (MD/NP)  Advance Care Planning (ACP) Conversation      Date of Conversation: 06/11/25  The patient and/or authorized decision maker consented to a voluntary Advance Care Planning conversation.   Individuals present for the conversation:   Patient with decision making capacity    Legal Healthcare Agent(s):    Primary Decision Maker: Calli Lopez - Child - [phone]    Secondary Decision Maker: Roseanne Siegel - Brother/Sister - [phone]    ACP documents available in EMR prior to discussion:  -Power of  for Healthcare  -Living Will  -Portable DNR    Primary Palliative Diagnosis(es):  Acute on chronic respiratory failure      Conversation Summary:  Discussed with patient. She has power of  in epic - her daughter is primary decision maker. Patient confirms code status - DNR-CCA no intubation.   No changes made.    Resuscitation Status:    Code Status: DNR-CCA    Outcomes / Completed Documentation:  An explanation of advance directives and their importance was provided and the following forms completed:    -No new documents completed.    If new document completed, original was provided to patient and/or family member.    Copy was placed for scanning into the [facility] EMR.      I spent 10 minutes providing separately identifiable ACP services with the patient and/or surrogate decision maker in a voluntary, in-person conversation discussing the patient's wishes and goals as detailed in the above note.       Geri Solares, IVONNE - CNP

## 2025-06-11 NOTE — PLAN OF CARE
Problem: Safety - Adult  Goal: Free from fall injury  Outcome: Completed     Problem: Discharge Planning  Goal: Discharge to home or other facility with appropriate resources  Outcome: Completed     Problem: Skin/Tissue Integrity  Goal: Skin integrity remains intact  Description: 1.  Monitor for areas of redness and/or skin breakdown2.  Assess vascular access sites hourly3.  Every 4-6 hours minimum:  Change oxygen saturation probe site4.  Every 4-6 hours:  If on nasal continuous positive airway pressure, respiratory therapy assess nares and determine need for appliance change or resting period  Outcome: Completed     Problem: ABCDS Injury Assessment  Goal: Absence of physical injury  Outcome: Completed     Problem: Pain  Goal: Verbalizes/displays adequate comfort level or baseline comfort level  Outcome: Completed     Problem: Nutrition Deficit:  Goal: Optimize nutritional status  Outcome: Completed  Flowsheets (Taken 6/11/2025 0755 by Fransisca Garcia)  Nutrient intake appropriate for improving, restoring, or maintaining nutritional needs:   Assess nutritional status and recommend course of action   Monitor oral intake, labs, and treatment plans   Recommend appropriate diets, oral nutritional supplements, and vitamin/mineral supplements     Problem: Respiratory - Adult  Goal: Achieves optimal ventilation and oxygenation  6/11/2025 1656 by Tre Dixon, RN  Outcome: Completed  6/11/2025 0721 by Yisel Claros RCP  Outcome: Progressing

## 2025-06-11 NOTE — PLAN OF CARE
Patient required Narcan this afternoon after receiving MS contin. Discontinued order. She is being discharged to [facility]. Discussed with RN to update [facility] to reach out to [facility] for symptom management. She is a current patient with them.     Electronically signed by IVONNE Quezada CNP on 6/11/25 at 3:49 PM EDT

## 2025-06-11 NOTE — PROGRESS NOTES
[facility]   OCCUPATIONAL THERAPY MISSED TREATMENT NOTE   INPATIENT   Date: 25  Patient Name: Elaina Lopez       Room:   MRN: [id number]   Account #: [id number]    : 1955  (70 y.o.)  Gender: female                 REASON FOR MISSED TREATMENT:  Pt not seen for OT this date due to     -    Pt is not medically appropriate to be seen for OT evaluation at this time.   OT will reattempt at a later time when medically appropriate as schedule permits.    1016         Electronically signed by DONALDO Ngo  on 25 at 10:16 AM EDT

## 2025-06-11 NOTE — PROGRESS NOTES
Pulmonary Progress Note  [facility]      Patient - Elaina Lopez,  Age - 70 y.o.    - 1955      Room Number -    N -  [id number]   [facility] # - [id number]  Date of Admission -  2025  6:33 AM        Consulting Service/Physician   Consulting - Ethan Fernandez MD  Primary Care Physician - Tayla Kim MD     SUBJECTIVE     Some confusion about discharge planning-patient told me yesterday she is agreeable to LTAC and I also asked reached out to her daughter Calli and told her about [facility] and she also agreed.  However when patient talked to discharge planner said she wanted to go back to [facility].  She had another episode of dyspnea overnight requiring morphine.  I feel the heliox has helped her bronchospasm    OBJECTIVE   VITALS    height is 1.651 m (5' 5\") and weight is 93 kg (205 lb 0.4 oz). Her axillary temperature is 98.6 °F (37 °C). Her blood pressure is 172/90 (abnormal) and her pulse is 105 (abnormal). Her respiration is 19 and oxygen saturation is 96%.     Body mass index is 34.12 kg/m².  Temperature Range: Temp: 98.6 °F (37 °C) Temp  Av.3 °F (36.8 °C)  Min: 98 °F (36.7 °C)  Max: 98.6 °F (37 °C)  BP Range:  Systolic (24hrs), Av , Min:119 , Max:180     Diastolic (24hrs), Av, Min:52, Max:131    Pulse Range: Pulse  Av.6  Min: 80  Max: 105  Respiration Range: Resp  Av.8  Min: 13  Max: 28  Current Pulse Ox::  SpO2: 96 %  24HR Pulse Ox Range:  SpO2  Av.7 %  Min: 93 %  Max: 100 %  Oxygen Amount and Delivery: O2 Flow Rate (L/min): 10 L/min (Pt on 5L O2 and 5L Heliox (80/20 blend))    Wt Readings from Last 3 Encounters:   25 93 kg (205 lb 0.4 oz)   25 97.7 kg (215 lb 6.4 oz)   10/02/24 95.4 kg (210 lb 6.4 oz)       I/O (24 Hours)    Intake/Output Summary (Last 24 hours) at 2025 0848  Last data filed at 2025 0600  Gross per 24 hour   Intake 200 ml   Output 1700 ml   Net

## 2025-06-11 NOTE — CARE COORDINATION
[facility] can accept and likely could admit today. Will follow up once confirmed. Will need discharge orders.     Electronically signed by Pamela Ayala RN on 6/11/2025 at 12:19 PM       Patient can admit between 3pm-4pm today. Writer messaged Dr Armstrong.     Electronically signed by Pamela Ayala RN on 6/11/2025 at 12:38 PM

## 2025-06-11 NOTE — PROGRESS NOTES
Comprehensive Nutrition Assessment    Type and Reason for Visit:  Reassess    Nutrition Recommendations/Plan:   Continue current diet.   Will try a variety of supplements to see if intake improves     Malnutrition Assessment:  Malnutrition Status:  At risk for malnutrition (06/07/25 1440)    Context:  Acute Illness     Findings of the 6 clinical characteristics of malnutrition:  Energy Intake:  Unable to assess  Weight Loss:  Unable to assess (estimated wt)     Body Fat Loss:  Unable to assess (pt is in isolation)     Muscle Mass Loss:  Unable to assess    Fluid Accumulation:  Mild Extremities   Strength:  Not Performed    Nutrition Assessment:    Pt is currently in droplet isolation due to parainfluenza. Discussed with pt nurse. She states patient has not been eating very much due to being short of breath. She says intake prior to today was the same. No reports of nausea, vomiting or GI upset.    Nutrition Related Findings:    Edema: +2 pitting RLE, +3 pitting LLE. Labs: Glucose 247, POC Glucose 234. BM: 6/7. Active x4. Wound Type: Multiple, Pressure Injury, Stage I, Stage II       Current Nutrition Intake & Therapies:    Average Meal Intake: 0%  Average Supplements Intake: Unable to assess  ADULT ORAL NUTRITION SUPPLEMENT; Dinner; Low Calorie/High Protein Oral Supplement  ADULT DIET; Easy to Chew; 5 carb choices (75 gm/meal); NO STRAWBERRY FLAVORING    Anthropometric Measures:  Height: 165.1 cm (5' 5\")  Ideal Body Weight (IBW): 125 lbs (57 kg)    Admission Body Weight: 97.5 kg (215 lb)  Current Body Weight: 93 kg (205 lb 0.4 oz) (current weight may not be accurate. estimated.), 172 % IBW. Weight Source: Other (estimated)  Current BMI (kg/m2): 34.1  Usual Body Weight: 95.3 kg (210 lb)     % Weight Change (Calculated): -2.4                    BMI Categories: Obese Class 1 (BMI 30.0-34.9)    Estimated Daily Nutrient Needs:  Energy Requirements Based On: Formula  Weight Used for Energy Requirements:  Admission  Energy (kcal/day): [place] x 1.2= 1800 kcal  Weight Used for Protein Requirements: Admission  Protein (g/day): 1.3g/kg= 125-130 g  Method Used for Fluid Requirements: 1 ml/kcal  Fluid (ml/day): 1800 ml    Nutrition Diagnosis:   Increased nutrient needs related to  (healing) as evidenced by wounds    Nutrition Interventions:   Food and/or Nutrient Delivery: Continue Current Diet, Continue Oral Nutrition Supplement  Nutrition Education/Counseling: No recommendation at this time  Coordination of Nutrition Care: Continue to monitor while inpatient       Goals:  Goals: PO intake 50% or greater  Type of Goal: Continue current goal  Previous Goal Met: Progressing toward Goal(s)    Nutrition Monitoring and Evaluation:   Behavioral-Environmental Outcomes: None Identified  Food/Nutrient Intake Outcomes: Food and Nutrient Intake, Supplement Intake  Physical Signs/Symptoms Outcomes: Biochemical Data, Chewing or Swallowing, GI Status, Skin, Weight, Fluid Status or Edema    Discharge Planning:    Too soon to determine     Fransisca Garcia Dietetic Intern  PAULY Novak RD, LD  Phone: [phone]

## 2025-06-11 NOTE — CARE COORDINATION
Plan is to admit to [facility] in [place] at 4pm today. Patient will be transported by ICU staff.    CALL REPORT [phone].    FAX DISCHARGE ORDERS/AVS once completed to [phone].    Debby ROGERS notified of discharge time.    Writer called and updated Calli, daughter.     Writer messaged Сергей Olvera about discharge to [facility].    Electronically signed by Pamela Ayala RN on 6/11/2025 at 1:35 PM

## 2025-06-11 NOTE — CARE COORDINATION
[facility] Encounter Date/Time: 2025 0633    Hospital Account: [id number]    MRN: [id number]    Patient: Elaina Morgan    Contact Serial #: [id number]      ENCOUNTER          Patient Class: I Private Enc?  No Unit RM BD: [facility] ICU    Hospital Service: MED   Encounter DX: SOB (shortness of breath*   ADM Provider: Ethan Fernandez MD   Procedure:     ATT Provider: Ethan Fernandez MD   REF Provider:        Admission DX: SOB (shortness of breath), COPD exacerbation (HCC), Compression fracture of body of thoracic vertebra (HCC), Acute left-sided low back pain without sciatica, Pneumonia due to infectious organism, unspecified laterality, unspecified part of lung and DX codes: R06.02, J44.1, S22.000A, M54.50, J18.9      PATIENT             Name: Elaina Morgan : 1955 (70 yrs)   Address: *[facility] [address] Sex: Female   City: [place] [address]         Marital Status:    Employer: DISABLED         Scientology: None   Primary Care Provider: Tayla Kim MD         Primary Phone: [phone]   EMERGENCY CONTACT   Name Home Phone Work Phone Mobile Phone Relationship Lgl Grd   FAWN MORGAN [phone] Child     NESHA SILVESTRE [phone]     Brother/Sis*           GUARANTOR            Guarantor: Elaina Morgan     : 1955   Address: *[facility] Sex: [place] [address]     Relation to Patient: Self       Home Phone: [phone]   Guarantor ID: [id number]       Work Phone:     Guarantor Employer: DISABLED         Status: DISABLED      COVERAGE        PRIMARY INSURANCE   Payor: [place]ICA[place] Plan: MEDICAID [facility] DEPT OF*   Payor Address: [facility] [address]  [place] [address]       Group Number:   Insurance Type: INDEMNITY   Subscriber Name: ELAINA MORGAN Subscriber : 1955   Subscriber ID: [id number] Pat. Rel. to Sub: Self   SECONDARY INSURANCE   Payor:   Plan:     Payor Address:  ,           Group  with hypoxia, on home O2 therapy ([facility]) J96.11, Z99.81    COPD (chronic obstructive pulmonary disease) ([facility]) J44.9    TRAM (acute kidney injury) N17.9    History of pulmonary embolus (PE) Z86.711    Mycoplasma pneumonia J15.7    Iron deficiency anemia D50.9    Sepsis ([facility]) A41.9    Acute on chronic respiratory failure ([facility]) J96.20    Cavitary lesion of lung J98.4    History of DVT (deep vein thrombosis) Z86.718    Pneumothorax, right J93.9    Status post chest tube placement ([facility]) Z93.8    Pneumothorax on right J93.9    HCAP (healthcare-associated pneumonia) J18.9    Acute systolic HF (heart failure) ([facility]) I50.21    Pseudomonas aeruginosa infection A49.8    Elevated procalcitonin R79.89    Elevated C-reactive protein (CRP) R79.82    Lung abscess ([facility]) J85.2    Recurrent pneumothorax after chest tube removed J95.811    Postprocedural subcutaneous emphysema T81.82XA    Leg DVT (deep venous thromboembolism), acute, bilateral ([facility]) I82.403    Chronic obstructive pulmonary disease with acute exacerbation ([facility]) J44.1    SOB (shortness of breath) R06.02    Acute midline thoracic back pain M54.6    Closed wedge compression fracture of T7 vertebra ([facility]) S22.060A       Isolation/Infection:   Isolation            Contact  Droplet          Patient Infection Status        Infection Onset Added Last Indicated Last Indicated By Review Planned Expiration    Mycoplasma pneumonia 06/06/25 06/09/25 06/06/25 Mycoplasma Ab,IgM 06/16/25 06/16/25 history    MRSA 06/06/25 06/07/25 06/06/25 MRSA DNA Probe, Nasal      Parainfluenza 06/06/25 06/06/25 06/06/25 Respiratory Panel, Molecular, with COVID-19 (Restricted: peds pts or suitable admitted adults) 06/13/25 06/16/25 history                         Nurse Assessment:  Last Vital Signs: /70   Pulse 92   Temp 97.3 °F (36.3 °C) (Oral)   Resp 20   Ht 1.651 m (5' 5\")   Wt 97.8 kg (215 lb 8 oz)   LMP 05/20/2013 (Exact Date)   SpO2 94%   BMI 35.86 kg/m²     Last documented pain score

## 2025-06-11 NOTE — PROGRESS NOTES
[facility]  Progress Note      NAME:  Elaina Lopez  MEDICAL RECORD NUMBER:  [id number]  AGE: 70 y.o.   GENDER: female  : 1955  TODAY'S DATE:  2025    Spoke with primary RN and patient continues to refuse turns. She states that patient has blanchable redness to her buttocks and staff have been applying zinc paste when she will allow it. Will continue with zinc paste as needed and try to see patient at another time.     ROBERTA BellamyN, RN, CWOCN, [facility]  Wound, Ostomy, and Continence Nursing  [phone]

## 2025-06-12 ENCOUNTER — TELEPHONE (OUTPATIENT)
Dept: FAMILY MEDICINE CLINIC | Age: 70
End: 2025-06-12

## 2025-06-12 NOTE — TELEPHONE ENCOUNTER
Care Transitions Initial Follow Up Call    Outreach made within 2 business days of discharge: Yes    Patient: Elaina Lopez Patient : 1955   MRN: [id number]  Reason for Admission: copd  Discharge Date: 25       Spoke with: damien    Discharge department/facility:     [facility] Interactive Patient Contact:  Was patient able to fill all prescriptions:   Was patient instructed to bring all medications to the follow-up visit:   Is patient taking all medications as directed in the discharge summary?   Does patient understand their discharge instructions:   Does patient have questions or concerns that need addressed prior to 7-14 day follow up office visit:     Additional needs identified to be addressed with provider               Yola Carroll MA

## 2025-06-12 NOTE — ED PROVIDER NOTES
Patient was signed out with pending imaging. Patient was already started on antibiotics. Breathing treatments were continued in the ER. Patient was admitted after speaking with the admitting team. Patient understands and agrees with the plan.     Ortho spine consulted secondary to compression fracture findings on CT imaging.      Keyur Flores,   06/12/25 0252

## 2025-06-14 ENCOUNTER — TELEPHONE (OUTPATIENT)
Dept: FAMILY MEDICINE CLINIC | Age: 70
End: 2025-06-14

## 2025-06-14 NOTE — TELEPHONE ENCOUNTER
[facility] Transitions Initial Follow Up Call    Outreach made within 2 business days of discharge: Yes    Patient: Elaina Lopez Patient : 1955   MRN: [id number]  Reason for Admission: There are no discharge diagnoses documented for the most recent discharge.  Discharge Date: 25       Spoke with: HECTOR #2    If Virtual visit made for hospital follow up, advise patient to make sure to have family member present to help assist with visit. Please make sure mobile number is updated in patient chart.     Discharge department/facility: [facility] Interactive Patient Contact:  Was patient able to fill all prescriptions:   Was patient instructed to bring all medications to the follow-up visit:   Is patient taking all medications as directed in the discharge summary?   Does patient understand their discharge instructions:   Does patient have questions or concerns that need addressed prior to 7-14 day follow up office visit:     Scheduled appointment with PCP within 7-14 days    Follow Up  Future Appointments   Date Time Provider Department Center   2025  9:30 AM [facility] CT RM 2 FAST SCANNER [facility] CT SCAN [facility] Radiolog   10/15/2025  3:00 PM [facility]       Jennifer Kwok MA

## 2025-06-15 ENCOUNTER — TELEPHONE (OUTPATIENT)
Dept: FAMILY MEDICINE CLINIC | Age: 70
End: 2025-06-15

## 2025-06-17 LAB — GLUCOSE BLD-MCNC: 189 MG/DL (ref 65–105)

## 2025-08-12 ENCOUNTER — APPOINTMENT (OUTPATIENT)
Dept: GENERAL RADIOLOGY | Age: 70
End: 2025-08-12
Payer: MEDICAID

## 2025-08-12 ENCOUNTER — HOSPITAL ENCOUNTER (EMERGENCY)
Age: 70
Discharge: HOME | End: 2025-08-12
Attending: EMERGENCY MEDICINE
Payer: MEDICAID

## 2025-08-12 VITALS
HEIGHT: 65 IN | RESPIRATION RATE: 13 BRPM | BODY MASS INDEX: 33.15 KG/M2 | OXYGEN SATURATION: 98 % | WEIGHT: 199 LBS | TEMPERATURE: 97.6 F | DIASTOLIC BLOOD PRESSURE: 62 MMHG | HEART RATE: 80 BPM | SYSTOLIC BLOOD PRESSURE: 97 MMHG

## 2025-08-12 DIAGNOSIS — R60.0 PERIPHERAL EDEMA: Primary | ICD-10-CM

## 2025-08-12 LAB
ANION GAP SERPL CALCULATED.3IONS-SCNC: 9 MMOL/L (ref 9–16)
BASOPHILS # BLD: 0.05 K/UL (ref 0–0.2)
BASOPHILS NFR BLD: 1 % (ref 0–2)
BNP SERPL-MCNC: 91 PG/ML (ref 0–300)
BUN SERPL-MCNC: 12 MG/DL (ref 8–23)
CALCIUM SERPL-MCNC: 8.2 MG/DL (ref 8.6–10.4)
CHLORIDE SERPL-SCNC: 96 MMOL/L (ref 98–107)
CO2 SERPL-SCNC: 35 MMOL/L (ref 20–31)
CREAT SERPL-MCNC: 0.6 MG/DL (ref 0.7–1.2)
D DIMER PPP FEU-MCNC: 0.31 UG/ML FEU (ref 0–0.59)
EOSINOPHIL # BLD: 0.3 K/UL (ref 0–0.44)
EOSINOPHILS RELATIVE PERCENT: 7 % (ref 0–4)
ERYTHROCYTE [DISTWIDTH] IN BLOOD BY AUTOMATED COUNT: 17.5 % (ref 11.5–14.9)
GFR, ESTIMATED: >90 ML/MIN/1.73M2
GLUCOSE SERPL-MCNC: 95 MG/DL (ref 74–99)
HCT VFR BLD AUTO: 36.7 % (ref 36–46)
HGB BLD-MCNC: 10.5 G/DL (ref 12–16)
IMM GRANULOCYTES # BLD AUTO: <0.03 K/UL (ref 0–0.3)
IMM GRANULOCYTES NFR BLD: 0 %
INR PPP: 1.1
LYMPHOCYTES NFR BLD: 1.02 K/UL (ref 1.1–3.7)
LYMPHOCYTES RELATIVE PERCENT: 23 % (ref 24–44)
MCH RBC QN AUTO: 28.1 PG (ref 26–34)
MCHC RBC AUTO-ENTMCNC: 28.6 G/DL (ref 31–37)
MCV RBC AUTO: 98.1 FL (ref 80–100)
MONOCYTES NFR BLD: 0.52 K/UL (ref 0.1–1.2)
MONOCYTES NFR BLD: 12 % (ref 3–12)
NEUTROPHILS NFR BLD: 57 % (ref 36–66)
NEUTS SEG NFR BLD: 2.55 K/UL (ref 1.5–8.1)
NRBC BLD-RTO: 0 PER 100 WBC
PARTIAL THROMBOPLASTIN TIME: 33.8 SEC (ref 24–36)
PLATELET # BLD AUTO: 318 K/UL (ref 150–450)
PMV BLD AUTO: 9.2 FL (ref 8–13.5)
POTASSIUM SERPL-SCNC: 3 MMOL/L (ref 3.7–5.3)
PROTHROMBIN TIME: 15.6 SEC (ref 11.8–14.6)
RBC # BLD AUTO: 3.74 M/UL (ref 3.95–5.11)
SODIUM SERPL-SCNC: 140 MMOL/L (ref 136–145)
T4 FREE SERPL-MCNC: 1 NG/DL (ref 0.9–1.7)
TROPONIN I SERPL HS-MCNC: 19 NG/L (ref 0–14)
TROPONIN I SERPL HS-MCNC: 25 NG/L (ref 0–14)
TSH SERPL DL<=0.05 MIU/L-ACNC: 2.24 UIU/ML (ref 0.27–4.2)
WBC OTHER # BLD: 4.5 K/UL (ref 3.5–11)

## 2025-08-12 PROCEDURE — 93005 ELECTROCARDIOGRAM TRACING: CPT | Performed by: EMERGENCY MEDICINE

## 2025-08-12 PROCEDURE — 85025 COMPLETE CBC W/AUTO DIFF WBC: CPT

## 2025-08-12 PROCEDURE — 6360000002 HC RX W HCPCS: Performed by: EMERGENCY MEDICINE

## 2025-08-12 PROCEDURE — 85379 FIBRIN DEGRADATION QUANT: CPT

## 2025-08-12 PROCEDURE — 84443 ASSAY THYROID STIM HORMONE: CPT

## 2025-08-12 PROCEDURE — 99285 EMERGENCY DEPT VISIT HI MDM: CPT

## 2025-08-12 PROCEDURE — 71045 X-RAY EXAM CHEST 1 VIEW: CPT

## 2025-08-12 PROCEDURE — 96374 THER/PROPH/DIAG INJ IV PUSH: CPT

## 2025-08-12 PROCEDURE — 80048 BASIC METABOLIC PNL TOTAL CA: CPT

## 2025-08-12 PROCEDURE — 84484 ASSAY OF TROPONIN QUANT: CPT

## 2025-08-12 PROCEDURE — 85730 THROMBOPLASTIN TIME PARTIAL: CPT

## 2025-08-12 PROCEDURE — 6370000000 HC RX 637 (ALT 250 FOR IP): Performed by: EMERGENCY MEDICINE

## 2025-08-12 PROCEDURE — 85610 PROTHROMBIN TIME: CPT

## 2025-08-12 PROCEDURE — 84439 ASSAY OF FREE THYROXINE: CPT

## 2025-08-12 PROCEDURE — 36415 COLL VENOUS BLD VENIPUNCTURE: CPT

## 2025-08-12 PROCEDURE — 83880 ASSAY OF NATRIURETIC PEPTIDE: CPT

## 2025-08-12 RX ORDER — BUMETANIDE 2 MG/1
2 TABLET ORAL 2 TIMES DAILY
Qty: 10 TABLET | Refills: 0 | Status: SHIPPED | OUTPATIENT
Start: 2025-08-12 | End: 2025-08-17

## 2025-08-12 RX ORDER — FENTANYL CITRATE 0.05 MG/ML
50 INJECTION, SOLUTION INTRAMUSCULAR; INTRAVENOUS ONCE
Status: COMPLETED | OUTPATIENT
Start: 2025-08-12 | End: 2025-08-12

## 2025-08-12 RX ORDER — POTASSIUM CHLORIDE 1500 MG/1
40 TABLET, EXTENDED RELEASE ORAL ONCE
Status: COMPLETED | OUTPATIENT
Start: 2025-08-12 | End: 2025-08-12

## 2025-08-12 RX ORDER — POTASSIUM CHLORIDE 7.45 MG/ML
10 INJECTION INTRAVENOUS ONCE
Status: COMPLETED | OUTPATIENT
Start: 2025-08-12 | End: 2025-08-12

## 2025-08-12 RX ADMIN — POTASSIUM CHLORIDE 40 MEQ: 1500 TABLET, EXTENDED RELEASE ORAL at 19:42

## 2025-08-12 RX ADMIN — POTASSIUM CHLORIDE 10 MEQ: 7.46 INJECTION, SOLUTION INTRAVENOUS at 19:48

## 2025-08-12 RX ADMIN — FENTANYL CITRATE 50 MCG: 50 INJECTION INTRAMUSCULAR; INTRAVENOUS at 18:54

## 2025-08-12 ASSESSMENT — ENCOUNTER SYMPTOMS
SHORTNESS OF BREATH: 1
FACIAL SWELLING: 0
DIARRHEA: 0
EYE PAIN: 0
WHEEZING: 0
EYE REDNESS: 0
CONSTIPATION: 0
COLOR CHANGE: 0
VOMITING: 0
TROUBLE SWALLOWING: 0
COUGH: 0
RHINORRHEA: 0
CHEST TIGHTNESS: 0
EYE DISCHARGE: 0
BLOOD IN STOOL: 0
BACK PAIN: 0
ABDOMINAL PAIN: 0
SORE THROAT: 0
SINUS PRESSURE: 0
NAUSEA: 0

## 2025-08-12 ASSESSMENT — PAIN SCALES - GENERAL
PAINLEVEL_OUTOF10: 10
PAINLEVEL_OUTOF10: 10
PAINLEVEL_OUTOF10: 8
PAINLEVEL_OUTOF10: 6

## 2025-08-12 ASSESSMENT — PAIN DESCRIPTION - LOCATION
LOCATION: LEG
LOCATION: LEG;ANKLE
LOCATION: LEG

## 2025-08-12 ASSESSMENT — PAIN DESCRIPTION - ORIENTATION
ORIENTATION: RIGHT;LEFT

## 2025-08-12 ASSESSMENT — PAIN DESCRIPTION - DESCRIPTORS
DESCRIPTORS: ACHING
DESCRIPTORS: ACHING

## 2025-08-12 ASSESSMENT — PAIN - FUNCTIONAL ASSESSMENT
PAIN_FUNCTIONAL_ASSESSMENT: 0-10
PAIN_FUNCTIONAL_ASSESSMENT: 0-10

## 2025-08-13 ENCOUNTER — TELEPHONE (OUTPATIENT)
Dept: FAMILY MEDICINE CLINIC | Age: 70
End: 2025-08-13

## 2025-08-14 ENCOUNTER — TELEPHONE (OUTPATIENT)
Dept: FAMILY MEDICINE CLINIC | Age: 70
End: 2025-08-14

## 2025-08-14 LAB
EKG ATRIAL RATE: 77 BPM
EKG ATRIAL RATE: 79 BPM
EKG P AXIS: 27 DEGREES
EKG P AXIS: 37 DEGREES
EKG P-R INTERVAL: 146 MS
EKG P-R INTERVAL: 146 MS
EKG Q-T INTERVAL: 390 MS
EKG Q-T INTERVAL: 400 MS
EKG QRS DURATION: 86 MS
EKG QRS DURATION: 88 MS
EKG QTC CALCULATION (BAZETT): 447 MS
EKG QTC CALCULATION (BAZETT): 452 MS
EKG R AXIS: 258 DEGREES
EKG R AXIS: 259 DEGREES
EKG T AXIS: 36 DEGREES
EKG T AXIS: 38 DEGREES
EKG VENTRICULAR RATE: 77 BPM
EKG VENTRICULAR RATE: 79 BPM

## 2025-08-28 ENCOUNTER — HOSPITAL ENCOUNTER (OUTPATIENT)
Dept: CT IMAGING | Age: 70
Discharge: HOME | End: 2025-08-30
Payer: MEDICAID

## 2025-08-28 DIAGNOSIS — R91.8 PULMONARY NODULES: ICD-10-CM

## 2025-08-28 PROCEDURE — 71250 CT THORAX DX C-: CPT
